# Patient Record
Sex: FEMALE | Race: WHITE | NOT HISPANIC OR LATINO | Employment: OTHER | URBAN - METROPOLITAN AREA
[De-identification: names, ages, dates, MRNs, and addresses within clinical notes are randomized per-mention and may not be internally consistent; named-entity substitution may affect disease eponyms.]

---

## 2017-01-16 ENCOUNTER — ALLSCRIPTS OFFICE VISIT (OUTPATIENT)
Dept: OTHER | Facility: OTHER | Age: 77
End: 2017-01-16

## 2017-01-23 ENCOUNTER — GENERIC CONVERSION - ENCOUNTER (OUTPATIENT)
Dept: OTHER | Facility: OTHER | Age: 77
End: 2017-01-23

## 2017-01-27 ENCOUNTER — ALLSCRIPTS OFFICE VISIT (OUTPATIENT)
Dept: OTHER | Facility: OTHER | Age: 77
End: 2017-01-27

## 2017-01-27 ENCOUNTER — GENERIC CONVERSION - ENCOUNTER (OUTPATIENT)
Dept: OTHER | Facility: OTHER | Age: 77
End: 2017-01-27

## 2017-01-27 DIAGNOSIS — R06.00 DYSPNEA: ICD-10-CM

## 2017-01-27 DIAGNOSIS — E11.40 TYPE 2 DIABETES MELLITUS WITH DIABETIC NEUROPATHY (HCC): ICD-10-CM

## 2017-01-27 DIAGNOSIS — I10 ESSENTIAL (PRIMARY) HYPERTENSION: ICD-10-CM

## 2017-01-27 DIAGNOSIS — J43.9 PULMONARY EMPHYSEMA (HCC): ICD-10-CM

## 2017-01-27 DIAGNOSIS — I50.23 ACUTE ON CHRONIC SYSTOLIC CONGESTIVE HEART FAILURE (HCC): ICD-10-CM

## 2017-01-27 DIAGNOSIS — E03.9 HYPOTHYROIDISM: ICD-10-CM

## 2017-01-28 LAB
A/G RATIO (HISTORICAL): 1.5 (ref 1.1–2.5)
ALBUMIN SERPL BCP-MCNC: 4.1 G/DL (ref 3.5–4.8)
ALP SERPL-CCNC: 67 IU/L (ref 39–117)
ALT SERPL W P-5'-P-CCNC: 28 IU/L (ref 0–32)
AST SERPL W P-5'-P-CCNC: 14 IU/L (ref 0–40)
B-NP NT PRO (HISTORICAL): 52 PG/ML (ref 0–738)
BASOPHILS # BLD AUTO: 0 %
BASOPHILS # BLD AUTO: 0 X10E3/UL (ref 0–0.2)
BILIRUB SERPL-MCNC: 0.2 MG/DL (ref 0–1.2)
BUN SERPL-MCNC: 31 MG/DL (ref 8–27)
BUN/CREA RATIO (HISTORICAL): 30 (ref 11–26)
CALCIUM SERPL-MCNC: 8.5 MG/DL (ref 8.7–10.3)
CHLORIDE SERPL-SCNC: 94 MMOL/L (ref 96–106)
CO2 SERPL-SCNC: 27 MMOL/L (ref 18–29)
CREAT SERPL-MCNC: 1.03 MG/DL (ref 0.57–1)
DEPRECATED RDW RBC AUTO: 15.4 % (ref 12.3–15.4)
EGFR AFRICAN AMERICAN (HISTORICAL): 60 ML/MIN/1.73
EGFR-AMERICAN CALC (HISTORICAL): 52 ML/MIN/1.73
EOSINOPHIL # BLD AUTO: 0 %
EOSINOPHIL # BLD AUTO: 0 X10E3/UL (ref 0–0.4)
ERYTHROCYTE SEDIMENTATION RATE (HISTORICAL): 43 MM/HR (ref 0–40)
GLUCOSE SERPL-MCNC: 323 MG/DL (ref 65–99)
HCT VFR BLD AUTO: 41.6 % (ref 34–46.6)
HGB BLD-MCNC: 13.6 G/DL (ref 11.1–15.9)
IMM.GRANULOCYTES (CD4/8) (HISTORICAL): 0.1 X10E3/UL (ref 0–0.1)
IMM.GRANULOCYTES (CD4/8) (HISTORICAL): 1 %
LYMPHOCYTES # BLD AUTO: 1.8 X10E3/UL (ref 0.7–3.1)
LYMPHOCYTES # BLD AUTO: 14 %
MCH RBC QN AUTO: 30 PG (ref 26.6–33)
MCHC RBC AUTO-ENTMCNC: 32.7 G/DL (ref 31.5–35.7)
MCV RBC AUTO: 92 FL (ref 79–97)
MONOCYTES # BLD AUTO: 0.8 X10E3/UL (ref 0.1–0.9)
MONOCYTES (HISTORICAL): 6 %
NEUTROPHILS # BLD AUTO: 10 X10E3/UL (ref 1.4–7)
NEUTROPHILS # BLD AUTO: 79 %
PLATELET # BLD AUTO: 190 X10E3/UL (ref 150–379)
POTASSIUM SERPL-SCNC: 4.4 MMOL/L (ref 3.5–5.2)
RBC (HISTORICAL): 4.53 X10E6/UL (ref 3.77–5.28)
SODIUM SERPL-SCNC: 140 MMOL/L (ref 134–144)
TOT. GLOBULIN, SERUM (HISTORICAL): 2.8 G/DL (ref 1.5–4.5)
TOTAL PROTEIN (HISTORICAL): 6.9 G/DL (ref 6–8.5)
TSH SERPL DL<=0.05 MIU/L-ACNC: 1.28 UIU/ML (ref 0.45–4.5)
WBC # BLD AUTO: 12.6 X10E3/UL (ref 3.4–10.8)

## 2017-01-30 ENCOUNTER — GENERIC CONVERSION - ENCOUNTER (OUTPATIENT)
Dept: OTHER | Facility: OTHER | Age: 77
End: 2017-01-30

## 2017-01-30 ENCOUNTER — ALLSCRIPTS OFFICE VISIT (OUTPATIENT)
Dept: OTHER | Facility: OTHER | Age: 77
End: 2017-01-30

## 2017-02-07 ENCOUNTER — GENERIC CONVERSION - ENCOUNTER (OUTPATIENT)
Dept: OTHER | Facility: OTHER | Age: 77
End: 2017-02-07

## 2017-02-08 ENCOUNTER — GENERIC CONVERSION - ENCOUNTER (OUTPATIENT)
Dept: OTHER | Facility: OTHER | Age: 77
End: 2017-02-08

## 2017-02-10 ENCOUNTER — GENERIC CONVERSION - ENCOUNTER (OUTPATIENT)
Dept: OTHER | Facility: OTHER | Age: 77
End: 2017-02-10

## 2017-02-20 ENCOUNTER — GENERIC CONVERSION - ENCOUNTER (OUTPATIENT)
Dept: OTHER | Facility: OTHER | Age: 77
End: 2017-02-20

## 2017-02-23 ENCOUNTER — ALLSCRIPTS OFFICE VISIT (OUTPATIENT)
Dept: OTHER | Facility: OTHER | Age: 77
End: 2017-02-23

## 2017-02-28 ENCOUNTER — GENERIC CONVERSION - ENCOUNTER (OUTPATIENT)
Dept: OTHER | Facility: OTHER | Age: 77
End: 2017-02-28

## 2017-02-28 LAB
LEFT EYE DIABETIC RETINOPATHY: NORMAL
RIGHT EYE DIABETIC RETINOPATHY: NORMAL

## 2017-03-03 ENCOUNTER — GENERIC CONVERSION - ENCOUNTER (OUTPATIENT)
Dept: OTHER | Facility: OTHER | Age: 77
End: 2017-03-03

## 2017-03-09 ENCOUNTER — ALLSCRIPTS OFFICE VISIT (OUTPATIENT)
Dept: OTHER | Facility: OTHER | Age: 77
End: 2017-03-09

## 2017-03-09 PROCEDURE — 85025 COMPLETE CBC W/AUTO DIFF WBC: CPT | Performed by: FAMILY MEDICINE

## 2017-03-09 PROCEDURE — 83036 HEMOGLOBIN GLYCOSYLATED A1C: CPT | Performed by: FAMILY MEDICINE

## 2017-03-09 PROCEDURE — 80053 COMPREHEN METABOLIC PANEL: CPT | Performed by: FAMILY MEDICINE

## 2017-03-09 PROCEDURE — 84443 ASSAY THYROID STIM HORMONE: CPT | Performed by: FAMILY MEDICINE

## 2017-03-09 PROCEDURE — 85652 RBC SED RATE AUTOMATED: CPT | Performed by: FAMILY MEDICINE

## 2017-03-09 PROCEDURE — 83880 ASSAY OF NATRIURETIC PEPTIDE: CPT | Performed by: FAMILY MEDICINE

## 2017-03-09 PROCEDURE — 86140 C-REACTIVE PROTEIN: CPT | Performed by: FAMILY MEDICINE

## 2017-03-10 ENCOUNTER — LAB REQUISITION (OUTPATIENT)
Dept: LAB | Facility: HOSPITAL | Age: 77
End: 2017-03-10
Payer: COMMERCIAL

## 2017-03-10 DIAGNOSIS — I50.23 ACUTE ON CHRONIC SYSTOLIC CONGESTIVE HEART FAILURE (HCC): ICD-10-CM

## 2017-03-10 DIAGNOSIS — E03.9 HYPOTHYROIDISM: ICD-10-CM

## 2017-03-10 DIAGNOSIS — E11.40 TYPE 2 DIABETES MELLITUS WITH DIABETIC NEUROPATHY (HCC): ICD-10-CM

## 2017-03-10 DIAGNOSIS — I10 ESSENTIAL (PRIMARY) HYPERTENSION: ICD-10-CM

## 2017-03-10 DIAGNOSIS — R60.9 EDEMA: ICD-10-CM

## 2017-03-10 DIAGNOSIS — K58.0 IRRITABLE BOWEL SYNDROME WITH DIARRHEA: ICD-10-CM

## 2017-03-10 DIAGNOSIS — R06.00 DYSPNEA: ICD-10-CM

## 2017-03-10 LAB
ALBUMIN SERPL BCP-MCNC: 3.8 G/DL (ref 3.5–5)
ALP SERPL-CCNC: 78 U/L (ref 46–116)
ALT SERPL W P-5'-P-CCNC: 42 U/L (ref 12–78)
ANION GAP SERPL CALCULATED.3IONS-SCNC: 8 MMOL/L (ref 4–13)
AST SERPL W P-5'-P-CCNC: 19 U/L (ref 5–45)
BASOPHILS # BLD AUTO: 0.1 THOUSANDS/ΜL (ref 0–0.1)
BASOPHILS NFR BLD AUTO: 1 % (ref 0–1)
BILIRUB SERPL-MCNC: 0.3 MG/DL (ref 0.2–1)
BUN SERPL-MCNC: 27 MG/DL (ref 5–25)
CALCIUM SERPL-MCNC: 8.8 MG/DL (ref 8.3–10.1)
CHLORIDE SERPL-SCNC: 100 MMOL/L (ref 100–108)
CO2 SERPL-SCNC: 35 MMOL/L (ref 21–32)
CREAT SERPL-MCNC: 1.01 MG/DL (ref 0.6–1.3)
CRP SERPL QL: 10.2 MG/L
EOSINOPHIL # BLD AUTO: 0.1 THOUSAND/ΜL (ref 0–0.61)
EOSINOPHIL NFR BLD AUTO: 1 % (ref 0–6)
ERYTHROCYTE [DISTWIDTH] IN BLOOD BY AUTOMATED COUNT: 16.9 % (ref 11.6–15.1)
ERYTHROCYTE [SEDIMENTATION RATE] IN BLOOD: 19 MM/HOUR (ref 2–25)
EST. AVERAGE GLUCOSE BLD GHB EST-MCNC: 226 MG/DL
GFR SERPL CREATININE-BSD FRML MDRD: 53 ML/MIN/1.73SQ M
GLUCOSE SERPL-MCNC: 123 MG/DL (ref 65–140)
HBA1C MFR BLD: 9.5 % (ref 4.2–6.3)
HCT VFR BLD AUTO: 43.6 % (ref 37–47)
HGB BLD-MCNC: 14.1 G/DL (ref 12–16)
LYMPHOCYTES # BLD AUTO: 1.8 THOUSANDS/ΜL (ref 0.6–4.47)
LYMPHOCYTES NFR BLD AUTO: 15 % (ref 14–44)
MCH RBC QN AUTO: 29.9 PG (ref 27–31)
MCHC RBC AUTO-ENTMCNC: 32.3 G/DL (ref 31.4–37.4)
MCV RBC AUTO: 92 FL (ref 82–98)
MONOCYTES # BLD AUTO: 1 THOUSAND/ΜL (ref 0.17–1.22)
MONOCYTES NFR BLD AUTO: 8 % (ref 4–12)
NEUTROPHILS # BLD AUTO: 9.1 THOUSANDS/ΜL (ref 1.85–7.62)
NEUTS SEG NFR BLD AUTO: 76 % (ref 43–75)
NRBC BLD AUTO-RTO: 0 /100 WBCS
NT-PROBNP SERPL-MCNC: 48 PG/ML
PLATELET # BLD AUTO: 214 THOUSANDS/UL (ref 130–400)
PLATELET BLD QL SMEAR: ADEQUATE
PMV BLD AUTO: 9.4 FL (ref 8.9–12.7)
POTASSIUM SERPL-SCNC: 4.1 MMOL/L (ref 3.5–5.3)
PROT SERPL-MCNC: 6.9 G/DL (ref 6.4–8.2)
RBC # BLD AUTO: 4.72 MILLION/UL (ref 4.2–5.4)
SODIUM SERPL-SCNC: 143 MMOL/L (ref 136–145)
TSH SERPL DL<=0.05 MIU/L-ACNC: 3.96 UIU/ML (ref 0.36–3.74)
WBC # BLD AUTO: 12 THOUSAND/UL (ref 4.8–10.8)

## 2017-03-23 ENCOUNTER — GENERIC CONVERSION - ENCOUNTER (OUTPATIENT)
Dept: OTHER | Facility: OTHER | Age: 77
End: 2017-03-23

## 2017-03-30 ENCOUNTER — ALLSCRIPTS OFFICE VISIT (OUTPATIENT)
Dept: OTHER | Facility: OTHER | Age: 77
End: 2017-03-30

## 2017-04-04 ENCOUNTER — ALLSCRIPTS OFFICE VISIT (OUTPATIENT)
Dept: OTHER | Facility: OTHER | Age: 77
End: 2017-04-04

## 2017-04-06 ENCOUNTER — GENERIC CONVERSION - ENCOUNTER (OUTPATIENT)
Dept: OTHER | Facility: OTHER | Age: 77
End: 2017-04-06

## 2017-04-15 ENCOUNTER — APPOINTMENT (EMERGENCY)
Dept: RADIOLOGY | Facility: HOSPITAL | Age: 77
DRG: 392 | End: 2017-04-15
Payer: COMMERCIAL

## 2017-04-15 ENCOUNTER — HOSPITAL ENCOUNTER (INPATIENT)
Facility: HOSPITAL | Age: 77
LOS: 5 days | Discharge: HOME | DRG: 392 | End: 2017-04-21
Attending: EMERGENCY MEDICINE | Admitting: STUDENT IN AN ORGANIZED HEALTH CARE EDUCATION/TRAINING PROGRAM
Payer: COMMERCIAL

## 2017-04-15 DIAGNOSIS — R10.31 RIGHT LOWER QUADRANT ABDOMINAL PAIN: Primary | ICD-10-CM

## 2017-04-15 DIAGNOSIS — K76.9 LIVER LESION: ICD-10-CM

## 2017-04-15 DIAGNOSIS — R10.84 GENERALIZED ABDOMINAL PAIN: ICD-10-CM

## 2017-04-15 DIAGNOSIS — K86.2 PANCREATIC CYST: ICD-10-CM

## 2017-04-15 DIAGNOSIS — R16.0 LIVER MASS, RIGHT LOBE: ICD-10-CM

## 2017-04-15 DIAGNOSIS — R16.0 HEPATOMEGALY: ICD-10-CM

## 2017-04-15 PROBLEM — E03.9 HYPOTHYROIDISM: Status: ACTIVE | Noted: 2017-04-15

## 2017-04-15 PROBLEM — E11.9 DM (DIABETES MELLITUS) (HCC): Status: ACTIVE | Noted: 2017-04-15

## 2017-04-15 PROBLEM — E87.6 HYPOKALEMIA: Status: ACTIVE | Noted: 2017-04-15

## 2017-04-15 PROBLEM — R11.0 NAUSEA: Status: ACTIVE | Noted: 2017-04-15

## 2017-04-15 PROBLEM — J44.9 COPD (CHRONIC OBSTRUCTIVE PULMONARY DISEASE) (HCC): Status: ACTIVE | Noted: 2017-04-15

## 2017-04-15 PROBLEM — R91.1 PULMONARY NODULE: Status: ACTIVE | Noted: 2017-04-15

## 2017-04-15 PROBLEM — R10.9 ABDOMINAL PAIN: Status: ACTIVE | Noted: 2017-04-15

## 2017-04-15 LAB
ALBUMIN SERPL BCP-MCNC: 3.4 G/DL (ref 3.5–5)
ALP SERPL-CCNC: 89 U/L (ref 46–116)
ALT SERPL W P-5'-P-CCNC: 62 U/L (ref 12–78)
ANION GAP SERPL CALCULATED.3IONS-SCNC: 8 MMOL/L (ref 4–13)
APTT PPP: 24 SECONDS (ref 24–33)
AST SERPL W P-5'-P-CCNC: 41 U/L (ref 5–45)
BACTERIA UR QL AUTO: ABNORMAL /HPF
BASOPHILS # BLD AUTO: 0.1 THOUSANDS/ΜL (ref 0–0.1)
BASOPHILS NFR BLD AUTO: 1 % (ref 0–1)
BILIRUB SERPL-MCNC: 0.5 MG/DL (ref 0.2–1)
BILIRUB UR QL STRIP: NEGATIVE
BUN SERPL-MCNC: 33 MG/DL (ref 5–25)
CALCIUM SERPL-MCNC: 9.7 MG/DL (ref 8.3–10.1)
CHLORIDE SERPL-SCNC: 92 MMOL/L (ref 100–108)
CLARITY UR: CLEAR
CO2 SERPL-SCNC: 36 MMOL/L (ref 21–32)
COLOR UR: YELLOW
CREAT SERPL-MCNC: 1.07 MG/DL (ref 0.6–1.3)
EOSINOPHIL # BLD AUTO: 0.1 THOUSAND/ΜL (ref 0–0.61)
EOSINOPHIL NFR BLD AUTO: 1 % (ref 0–6)
ERYTHROCYTE [DISTWIDTH] IN BLOOD BY AUTOMATED COUNT: 15.4 % (ref 11.6–15.1)
GFR SERPL CREATININE-BSD FRML MDRD: 49.6 ML/MIN/1.73SQ M
GLUCOSE SERPL-MCNC: 343 MG/DL (ref 65–140)
GLUCOSE UR STRIP-MCNC: ABNORMAL MG/DL
HCT VFR BLD AUTO: 43.4 % (ref 37–47)
HGB BLD-MCNC: 14.3 G/DL (ref 12–16)
HGB UR QL STRIP.AUTO: ABNORMAL
INR PPP: 0.96 (ref 0.86–1.16)
KETONES UR STRIP-MCNC: NEGATIVE MG/DL
LEUKOCYTE ESTERASE UR QL STRIP: ABNORMAL
LIPASE SERPL-CCNC: 104 U/L (ref 73–393)
LYMPHOCYTES # BLD AUTO: 2.4 THOUSANDS/ΜL (ref 0.6–4.47)
LYMPHOCYTES NFR BLD AUTO: 24 % (ref 14–44)
MAGNESIUM SERPL-MCNC: 1.9 MG/DL (ref 1.6–2.6)
MCH RBC QN AUTO: 30.2 PG (ref 27–31)
MCHC RBC AUTO-ENTMCNC: 33 G/DL (ref 31.4–37.4)
MCV RBC AUTO: 92 FL (ref 82–98)
MONOCYTES # BLD AUTO: 0.6 THOUSAND/ΜL (ref 0.17–1.22)
MONOCYTES NFR BLD AUTO: 6 % (ref 4–12)
NEUTROPHILS # BLD AUTO: 6.8 THOUSANDS/ΜL (ref 1.85–7.62)
NEUTS SEG NFR BLD AUTO: 68 % (ref 43–75)
NITRITE UR QL STRIP: NEGATIVE
NON-SQ EPI CELLS URNS QL MICRO: ABNORMAL /HPF
NRBC BLD AUTO-RTO: 0 /100 WBCS
PH UR STRIP.AUTO: 6 [PH] (ref 5–9)
PLATELET # BLD AUTO: 192 THOUSANDS/UL (ref 130–400)
PMV BLD AUTO: 9 FL (ref 8.9–12.7)
POTASSIUM SERPL-SCNC: 3.2 MMOL/L (ref 3.5–5.3)
PROT SERPL-MCNC: 7.2 G/DL (ref 6.4–8.2)
PROT UR STRIP-MCNC: NEGATIVE MG/DL
PROTHROMBIN TIME: 10.1 SECONDS (ref 9.4–11.7)
RBC # BLD AUTO: 4.73 MILLION/UL (ref 4.2–5.4)
RBC #/AREA URNS AUTO: ABNORMAL /HPF
SODIUM SERPL-SCNC: 136 MMOL/L (ref 136–145)
SP GR UR STRIP.AUTO: 1.01 (ref 1–1.03)
UROBILINOGEN UR QL STRIP.AUTO: 0.2 E.U./DL
WBC # BLD AUTO: 10.1 THOUSAND/UL (ref 4.8–10.8)
WBC #/AREA URNS AUTO: ABNORMAL /HPF

## 2017-04-15 PROCEDURE — 83735 ASSAY OF MAGNESIUM: CPT | Performed by: STUDENT IN AN ORGANIZED HEALTH CARE EDUCATION/TRAINING PROGRAM

## 2017-04-15 PROCEDURE — 85610 PROTHROMBIN TIME: CPT | Performed by: EMERGENCY MEDICINE

## 2017-04-15 PROCEDURE — 96361 HYDRATE IV INFUSION ADD-ON: CPT

## 2017-04-15 PROCEDURE — 96374 THER/PROPH/DIAG INJ IV PUSH: CPT

## 2017-04-15 PROCEDURE — 71020 HB CHEST X-RAY 2VW FRONTAL&LATL: CPT

## 2017-04-15 PROCEDURE — 36415 COLL VENOUS BLD VENIPUNCTURE: CPT | Performed by: EMERGENCY MEDICINE

## 2017-04-15 PROCEDURE — 87086 URINE CULTURE/COLONY COUNT: CPT | Performed by: EMERGENCY MEDICINE

## 2017-04-15 PROCEDURE — 80053 COMPREHEN METABOLIC PANEL: CPT | Performed by: EMERGENCY MEDICINE

## 2017-04-15 PROCEDURE — 96375 TX/PRO/DX INJ NEW DRUG ADDON: CPT

## 2017-04-15 PROCEDURE — 74177 CT ABD & PELVIS W/CONTRAST: CPT

## 2017-04-15 PROCEDURE — 85730 THROMBOPLASTIN TIME PARTIAL: CPT | Performed by: EMERGENCY MEDICINE

## 2017-04-15 PROCEDURE — 81001 URINALYSIS AUTO W/SCOPE: CPT | Performed by: EMERGENCY MEDICINE

## 2017-04-15 PROCEDURE — 85025 COMPLETE CBC W/AUTO DIFF WBC: CPT | Performed by: EMERGENCY MEDICINE

## 2017-04-15 PROCEDURE — 83690 ASSAY OF LIPASE: CPT | Performed by: EMERGENCY MEDICINE

## 2017-04-15 RX ORDER — FUROSEMIDE 40 MG/1
20 TABLET ORAL 2 TIMES DAILY
Status: ON HOLD | COMMUNITY
End: 2017-04-21

## 2017-04-15 RX ORDER — ONDANSETRON 2 MG/ML
4 INJECTION INTRAMUSCULAR; INTRAVENOUS ONCE
Status: COMPLETED | OUTPATIENT
Start: 2017-04-15 | End: 2017-04-15

## 2017-04-15 RX ORDER — POTASSIUM CHLORIDE 14.9 MG/ML
20 INJECTION INTRAVENOUS
Status: DISCONTINUED | OUTPATIENT
Start: 2017-04-15 | End: 2017-04-16

## 2017-04-15 RX ORDER — MORPHINE SULFATE 4 MG/ML
4 INJECTION, SOLUTION INTRAMUSCULAR; INTRAVENOUS ONCE
Status: COMPLETED | OUTPATIENT
Start: 2017-04-15 | End: 2017-04-15

## 2017-04-15 RX ORDER — PRAMIPEXOLE DIHYDROCHLORIDE 1 MG/1
1 TABLET ORAL 3 TIMES DAILY
COMMUNITY
Start: 2016-02-09 | End: 2018-02-08

## 2017-04-15 RX ORDER — OXYCODONE HYDROCHLORIDE 5 MG/1
1 CAPSULE ORAL EVERY EVENING
COMMUNITY
End: 2017-04-21 | Stop reason: HOSPADM

## 2017-04-15 RX ORDER — POTASSIUM CHLORIDE 29.8 MG/ML
40 INJECTION INTRAVENOUS ONCE
Status: DISCONTINUED | OUTPATIENT
Start: 2017-04-15 | End: 2017-04-15 | Stop reason: SDUPTHER

## 2017-04-15 RX ORDER — FOLIC ACID 1 MG/1
1 TABLET ORAL DAILY
COMMUNITY
Start: 2017-01-22 | End: 2017-09-18

## 2017-04-15 RX ORDER — SODIUM CHLORIDE 9 MG/ML
125 INJECTION, SOLUTION INTRAVENOUS CONTINUOUS
Status: DISCONTINUED | OUTPATIENT
Start: 2017-04-15 | End: 2017-04-16

## 2017-04-15 RX ORDER — IPRATROPIUM BROMIDE AND ALBUTEROL SULFATE 2.5; .5 MG/3ML; MG/3ML
1 SOLUTION RESPIRATORY (INHALATION) EVERY 4 HOURS PRN
COMMUNITY
End: 2018-01-30 | Stop reason: HOSPADM

## 2017-04-15 RX ORDER — LEVOTHYROXINE SODIUM 0.12 MG/1
1 TABLET ORAL DAILY
COMMUNITY
End: 2018-02-08

## 2017-04-15 RX ADMIN — SODIUM CHLORIDE 125 ML/HR: 0.9 INJECTION, SOLUTION INTRAVENOUS at 19:13

## 2017-04-15 RX ADMIN — ONDANSETRON 4 MG: 2 INJECTION INTRAMUSCULAR; INTRAVENOUS at 19:30

## 2017-04-15 RX ADMIN — IOHEXOL 100 ML: 350 INJECTION, SOLUTION INTRAVENOUS at 20:09

## 2017-04-15 RX ADMIN — MORPHINE SULFATE 4 MG: 4 INJECTION, SOLUTION INTRAMUSCULAR; INTRAVENOUS at 22:29

## 2017-04-16 LAB
ALBUMIN SERPL BCP-MCNC: 2.9 G/DL (ref 3.5–5)
ALP SERPL-CCNC: 73 U/L (ref 46–116)
ALT SERPL W P-5'-P-CCNC: 51 U/L (ref 12–78)
ANION GAP SERPL CALCULATED.3IONS-SCNC: 6 MMOL/L (ref 4–13)
ANION GAP SERPL CALCULATED.3IONS-SCNC: 9 MMOL/L (ref 4–13)
AST SERPL W P-5'-P-CCNC: 24 U/L (ref 5–45)
BILIRUB SERPL-MCNC: 0.5 MG/DL (ref 0.2–1)
BUN SERPL-MCNC: 23 MG/DL (ref 5–25)
BUN SERPL-MCNC: 24 MG/DL (ref 5–25)
CALCIUM SERPL-MCNC: 8.3 MG/DL (ref 8.3–10.1)
CALCIUM SERPL-MCNC: 8.8 MG/DL (ref 8.3–10.1)
CHLORIDE SERPL-SCNC: 94 MMOL/L (ref 100–108)
CHLORIDE SERPL-SCNC: 98 MMOL/L (ref 100–108)
CO2 SERPL-SCNC: 37 MMOL/L (ref 21–32)
CO2 SERPL-SCNC: 39 MMOL/L (ref 21–32)
CREAT SERPL-MCNC: 0.92 MG/DL (ref 0.6–1.3)
CREAT SERPL-MCNC: 0.98 MG/DL (ref 0.6–1.3)
ERYTHROCYTE [DISTWIDTH] IN BLOOD BY AUTOMATED COUNT: 15.4 % (ref 11.6–15.1)
GFR SERPL CREATININE-BSD FRML MDRD: 54.9 ML/MIN/1.73SQ M
GFR SERPL CREATININE-BSD FRML MDRD: 59 ML/MIN/1.73SQ M
GLUCOSE P FAST SERPL-MCNC: 271 MG/DL (ref 65–99)
GLUCOSE SERPL-MCNC: 264 MG/DL (ref 65–140)
GLUCOSE SERPL-MCNC: 264 MG/DL (ref 65–140)
GLUCOSE SERPL-MCNC: 271 MG/DL (ref 65–140)
GLUCOSE SERPL-MCNC: 275 MG/DL (ref 65–140)
GLUCOSE SERPL-MCNC: 287 MG/DL (ref 65–140)
GLUCOSE SERPL-MCNC: 298 MG/DL (ref 65–140)
GLUCOSE SERPL-MCNC: 319 MG/DL (ref 65–140)
HCT VFR BLD AUTO: 38 % (ref 37–47)
HGB BLD-MCNC: 12.6 G/DL (ref 12–16)
MCH RBC QN AUTO: 30.5 PG (ref 27–31)
MCHC RBC AUTO-ENTMCNC: 33.2 G/DL (ref 31.4–37.4)
MCV RBC AUTO: 92 FL (ref 82–98)
PLATELET # BLD AUTO: 161 THOUSANDS/UL (ref 130–400)
PMV BLD AUTO: 9.2 FL (ref 8.9–12.7)
POTASSIUM SERPL-SCNC: 2.6 MMOL/L (ref 3.5–5.3)
POTASSIUM SERPL-SCNC: 3.1 MMOL/L (ref 3.5–5.3)
PROT SERPL-MCNC: 6.1 G/DL (ref 6.4–8.2)
RBC # BLD AUTO: 4.14 MILLION/UL (ref 4.2–5.4)
SODIUM SERPL-SCNC: 141 MMOL/L (ref 136–145)
SODIUM SERPL-SCNC: 142 MMOL/L (ref 136–145)
WBC # BLD AUTO: 7.9 THOUSAND/UL (ref 4.8–10.8)

## 2017-04-16 PROCEDURE — A9270 NON-COVERED ITEM OR SERVICE: HCPCS | Performed by: INTERNAL MEDICINE

## 2017-04-16 PROCEDURE — A9270 NON-COVERED ITEM OR SERVICE: HCPCS | Performed by: STUDENT IN AN ORGANIZED HEALTH CARE EDUCATION/TRAINING PROGRAM

## 2017-04-16 PROCEDURE — 80053 COMPREHEN METABOLIC PANEL: CPT | Performed by: STUDENT IN AN ORGANIZED HEALTH CARE EDUCATION/TRAINING PROGRAM

## 2017-04-16 PROCEDURE — A9270 NON-COVERED ITEM OR SERVICE: HCPCS | Performed by: HOSPITALIST

## 2017-04-16 PROCEDURE — 82948 REAGENT STRIP/BLOOD GLUCOSE: CPT

## 2017-04-16 PROCEDURE — 80048 BASIC METABOLIC PNL TOTAL CA: CPT | Performed by: HOSPITALIST

## 2017-04-16 PROCEDURE — 94760 N-INVAS EAR/PLS OXIMETRY 1: CPT

## 2017-04-16 PROCEDURE — A9270 NON-COVERED ITEM OR SERVICE: HCPCS | Performed by: NURSE PRACTITIONER

## 2017-04-16 PROCEDURE — 99285 EMERGENCY DEPT VISIT HI MDM: CPT

## 2017-04-16 PROCEDURE — 94640 AIRWAY INHALATION TREATMENT: CPT

## 2017-04-16 PROCEDURE — 87081 CULTURE SCREEN ONLY: CPT | Performed by: STUDENT IN AN ORGANIZED HEALTH CARE EDUCATION/TRAINING PROGRAM

## 2017-04-16 PROCEDURE — 85027 COMPLETE CBC AUTOMATED: CPT | Performed by: STUDENT IN AN ORGANIZED HEALTH CARE EDUCATION/TRAINING PROGRAM

## 2017-04-16 RX ORDER — ONDANSETRON 2 MG/ML
4 INJECTION INTRAMUSCULAR; INTRAVENOUS EVERY 6 HOURS PRN
Status: DISCONTINUED | OUTPATIENT
Start: 2017-04-16 | End: 2017-04-21 | Stop reason: HOSPADM

## 2017-04-16 RX ORDER — ASPIRIN 81 MG/1
81 TABLET, CHEWABLE ORAL DAILY
Status: DISCONTINUED | OUTPATIENT
Start: 2017-04-16 | End: 2017-04-21 | Stop reason: HOSPADM

## 2017-04-16 RX ORDER — ALBUTEROL SULFATE 2.5 MG/3ML
2.5 SOLUTION RESPIRATORY (INHALATION) EVERY 6 HOURS PRN
Status: DISCONTINUED | OUTPATIENT
Start: 2017-04-16 | End: 2017-04-16

## 2017-04-16 RX ORDER — POLYETHYLENE GLYCOL 3350 17 G/17G
238 POWDER, FOR SOLUTION ORAL ONCE
Status: COMPLETED | OUTPATIENT
Start: 2017-04-16 | End: 2017-04-16

## 2017-04-16 RX ORDER — IPRATROPIUM BROMIDE AND ALBUTEROL SULFATE 2.5; .5 MG/3ML; MG/3ML
3 SOLUTION RESPIRATORY (INHALATION) EVERY 4 HOURS PRN
Status: DISCONTINUED | OUTPATIENT
Start: 2017-04-16 | End: 2017-04-21 | Stop reason: HOSPADM

## 2017-04-16 RX ORDER — SULFAMETHOXAZOLE AND TRIMETHOPRIM 800; 160 MG/1; MG/1
1 TABLET ORAL EVERY 12 HOURS SCHEDULED
Status: DISCONTINUED | OUTPATIENT
Start: 2017-04-16 | End: 2017-04-21 | Stop reason: HOSPADM

## 2017-04-16 RX ORDER — POTASSIUM CHLORIDE 20 MEQ/1
40 TABLET, EXTENDED RELEASE ORAL ONCE
Status: COMPLETED | OUTPATIENT
Start: 2017-04-16 | End: 2017-04-16

## 2017-04-16 RX ORDER — POLYETHYLENE GLYCOL 3350 17 G/17G
238 POWDER, FOR SOLUTION ORAL ONCE
Status: DISCONTINUED | OUTPATIENT
Start: 2017-04-16 | End: 2017-04-16

## 2017-04-16 RX ORDER — HEPARIN SODIUM 5000 [USP'U]/ML
5000 INJECTION, SOLUTION INTRAVENOUS; SUBCUTANEOUS EVERY 8 HOURS SCHEDULED
Status: DISCONTINUED | OUTPATIENT
Start: 2017-04-16 | End: 2017-04-18

## 2017-04-16 RX ORDER — PRAMIPEXOLE DIHYDROCHLORIDE 0.5 MG/1
1 TABLET ORAL 3 TIMES DAILY
Status: DISCONTINUED | OUTPATIENT
Start: 2017-04-16 | End: 2017-04-21 | Stop reason: HOSPADM

## 2017-04-16 RX ORDER — LEVOTHYROXINE SODIUM 0.12 MG/1
125 TABLET ORAL
Status: DISCONTINUED | OUTPATIENT
Start: 2017-04-16 | End: 2017-04-21 | Stop reason: HOSPADM

## 2017-04-16 RX ORDER — ACETAMINOPHEN 325 MG/1
650 TABLET ORAL EVERY 6 HOURS PRN
Status: DISCONTINUED | OUTPATIENT
Start: 2017-04-16 | End: 2017-04-21 | Stop reason: HOSPADM

## 2017-04-16 RX ORDER — POTASSIUM CHLORIDE 20MEQ/15ML
20 LIQUID (ML) ORAL ONCE
Status: COMPLETED | OUTPATIENT
Start: 2017-04-16 | End: 2017-04-16

## 2017-04-16 RX ORDER — SODIUM CHLORIDE 9 MG/ML
50 INJECTION, SOLUTION INTRAVENOUS CONTINUOUS
Status: DISCONTINUED | OUTPATIENT
Start: 2017-04-16 | End: 2017-04-17

## 2017-04-16 RX ADMIN — HEPARIN SODIUM 5000 UNITS: 5000 INJECTION, SOLUTION INTRAVENOUS; SUBCUTANEOUS at 13:08

## 2017-04-16 RX ADMIN — BISACODYL 10 MG: 5 TABLET, COATED ORAL at 11:59

## 2017-04-16 RX ADMIN — HEPARIN SODIUM 5000 UNITS: 5000 INJECTION, SOLUTION INTRAVENOUS; SUBCUTANEOUS at 05:42

## 2017-04-16 RX ADMIN — ASPIRIN 81 MG CHEWABLE TABLET 81 MG: 81 TABLET CHEWABLE at 08:38

## 2017-04-16 RX ADMIN — INSULIN LISPRO 2 UNITS: 100 INJECTION, SOLUTION INTRAVENOUS; SUBCUTANEOUS at 12:00

## 2017-04-16 RX ADMIN — POTASSIUM CHLORIDE 40 MEQ: 1500 TABLET, EXTENDED RELEASE ORAL at 05:42

## 2017-04-16 RX ADMIN — HYDROMORPHONE HYDROCHLORIDE 0.5 MG: 1 INJECTION, SOLUTION INTRAMUSCULAR; INTRAVENOUS; SUBCUTANEOUS at 18:23

## 2017-04-16 RX ADMIN — INSULIN LISPRO 2 UNITS: 100 INJECTION, SOLUTION INTRAVENOUS; SUBCUTANEOUS at 17:22

## 2017-04-16 RX ADMIN — PRAMIPEXOLE DIHYDROCHLORIDE 1 MG: 0.5 TABLET ORAL at 08:38

## 2017-04-16 RX ADMIN — POLYETHYLENE GLYCOL 3350 238 G: 17 POWDER, FOR SOLUTION ORAL at 13:08

## 2017-04-16 RX ADMIN — IPRATROPIUM BROMIDE AND ALBUTEROL SULFATE 3 ML: .5; 3 SOLUTION RESPIRATORY (INHALATION) at 15:12

## 2017-04-16 RX ADMIN — HEPARIN SODIUM 5000 UNITS: 5000 INJECTION, SOLUTION INTRAVENOUS; SUBCUTANEOUS at 21:57

## 2017-04-16 RX ADMIN — HYDROMORPHONE HYDROCHLORIDE 0.5 MG: 1 INJECTION, SOLUTION INTRAMUSCULAR; INTRAVENOUS; SUBCUTANEOUS at 05:59

## 2017-04-16 RX ADMIN — POTASSIUM CHLORIDE 20 MEQ: 200 INJECTION, SOLUTION INTRAVENOUS at 00:31

## 2017-04-16 RX ADMIN — SULFAMETHOXAZOLE AND TRIMETHOPRIM 1 TABLET: 800; 160 TABLET ORAL at 21:57

## 2017-04-16 RX ADMIN — INSULIN LISPRO 2 UNITS: 100 INJECTION, SOLUTION INTRAVENOUS; SUBCUTANEOUS at 05:55

## 2017-04-16 RX ADMIN — PRAMIPEXOLE DIHYDROCHLORIDE 1 MG: 0.5 TABLET ORAL at 17:23

## 2017-04-16 RX ADMIN — ACETAMINOPHEN 650 MG: 325 TABLET, FILM COATED ORAL at 23:47

## 2017-04-16 RX ADMIN — LEVOTHYROXINE SODIUM 125 MCG: 125 TABLET ORAL at 05:42

## 2017-04-16 RX ADMIN — PRAMIPEXOLE DIHYDROCHLORIDE 1 MG: 0.5 TABLET ORAL at 21:57

## 2017-04-16 RX ADMIN — SODIUM CHLORIDE 50 ML/HR: 0.9 INJECTION, SOLUTION INTRAVENOUS at 02:23

## 2017-04-16 RX ADMIN — INSULIN LISPRO 3 UNITS: 100 INJECTION, SOLUTION INTRAVENOUS; SUBCUTANEOUS at 02:27

## 2017-04-16 RX ADMIN — POTASSIUM CHLORIDE 20 MEQ: 20 SOLUTION ORAL at 12:01

## 2017-04-17 ENCOUNTER — APPOINTMENT (INPATIENT)
Dept: RADIOLOGY | Facility: HOSPITAL | Age: 77
DRG: 392 | End: 2017-04-17
Payer: COMMERCIAL

## 2017-04-17 PROBLEM — K59.09 CHRONIC CONSTIPATION: Status: ACTIVE | Noted: 2017-04-17

## 2017-04-17 LAB
ALBUMIN SERPL BCP-MCNC: 3 G/DL (ref 3.5–5)
ALP SERPL-CCNC: 73 U/L (ref 46–116)
ALT SERPL W P-5'-P-CCNC: 52 U/L (ref 12–78)
ANION GAP SERPL CALCULATED.3IONS-SCNC: 5 MMOL/L (ref 4–13)
AST SERPL W P-5'-P-CCNC: 24 U/L (ref 5–45)
BACTERIA UR CULT: NORMAL
BASOPHILS # BLD AUTO: 0 THOUSANDS/ΜL (ref 0–0.1)
BASOPHILS NFR BLD AUTO: 0 % (ref 0–1)
BILIRUB SERPL-MCNC: 0.4 MG/DL (ref 0.2–1)
BUN SERPL-MCNC: 14 MG/DL (ref 5–25)
CALCIUM SERPL-MCNC: 7.9 MG/DL (ref 8.3–10.1)
CHLORIDE SERPL-SCNC: 99 MMOL/L (ref 100–108)
CO2 SERPL-SCNC: 35 MMOL/L (ref 21–32)
CREAT SERPL-MCNC: 0.77 MG/DL (ref 0.6–1.3)
EOSINOPHIL # BLD AUTO: 0.1 THOUSAND/ΜL (ref 0–0.61)
EOSINOPHIL NFR BLD AUTO: 2 % (ref 0–6)
ERYTHROCYTE [DISTWIDTH] IN BLOOD BY AUTOMATED COUNT: 15.8 % (ref 11.6–15.1)
GFR SERPL CREATININE-BSD FRML MDRD: >60 ML/MIN/1.73SQ M
GLUCOSE SERPL-MCNC: 238 MG/DL (ref 65–140)
GLUCOSE SERPL-MCNC: 246 MG/DL (ref 65–140)
GLUCOSE SERPL-MCNC: 259 MG/DL (ref 65–140)
GLUCOSE SERPL-MCNC: 278 MG/DL (ref 65–140)
GLUCOSE SERPL-MCNC: 298 MG/DL (ref 65–140)
HCT VFR BLD AUTO: 38.3 % (ref 37–47)
HGB BLD-MCNC: 12.5 G/DL (ref 12–16)
LYMPHOCYTES # BLD AUTO: 1.5 THOUSANDS/ΜL (ref 0.6–4.47)
LYMPHOCYTES NFR BLD AUTO: 22 % (ref 14–44)
MAGNESIUM SERPL-MCNC: 2 MG/DL (ref 1.6–2.6)
MCH RBC QN AUTO: 30.5 PG (ref 27–31)
MCHC RBC AUTO-ENTMCNC: 32.7 G/DL (ref 31.4–37.4)
MCV RBC AUTO: 93 FL (ref 82–98)
MONOCYTES # BLD AUTO: 0.5 THOUSAND/ΜL (ref 0.17–1.22)
MONOCYTES NFR BLD AUTO: 8 % (ref 4–12)
MRSA NOSE QL CULT: NORMAL
NEUTROPHILS # BLD AUTO: 4.8 THOUSANDS/ΜL (ref 1.85–7.62)
NEUTS SEG NFR BLD AUTO: 69 % (ref 43–75)
NRBC BLD AUTO-RTO: 0 /100 WBCS
PHOSPHATE SERPL-MCNC: 3 MG/DL (ref 2.3–4.1)
PLATELET # BLD AUTO: 163 THOUSANDS/UL (ref 130–400)
PMV BLD AUTO: 9.1 FL (ref 8.9–12.7)
POTASSIUM SERPL-SCNC: 3.1 MMOL/L (ref 3.5–5.3)
PROT SERPL-MCNC: 6.2 G/DL (ref 6.4–8.2)
RBC # BLD AUTO: 4.11 MILLION/UL (ref 4.2–5.4)
SODIUM SERPL-SCNC: 139 MMOL/L (ref 136–145)
WBC # BLD AUTO: 7.1 THOUSAND/UL (ref 4.8–10.8)

## 2017-04-17 PROCEDURE — 83735 ASSAY OF MAGNESIUM: CPT | Performed by: HOSPITALIST

## 2017-04-17 PROCEDURE — 74183 MRI ABD W/O CNTR FLWD CNTR: CPT

## 2017-04-17 PROCEDURE — A9270 NON-COVERED ITEM OR SERVICE: HCPCS | Performed by: STUDENT IN AN ORGANIZED HEALTH CARE EDUCATION/TRAINING PROGRAM

## 2017-04-17 PROCEDURE — A9585 GADOBUTROL INJECTION: HCPCS | Performed by: INTERNAL MEDICINE

## 2017-04-17 PROCEDURE — 85025 COMPLETE CBC W/AUTO DIFF WBC: CPT | Performed by: HOSPITALIST

## 2017-04-17 PROCEDURE — 76376 3D RENDER W/INTRP POSTPROCES: CPT

## 2017-04-17 PROCEDURE — 94640 AIRWAY INHALATION TREATMENT: CPT

## 2017-04-17 PROCEDURE — 80053 COMPREHEN METABOLIC PANEL: CPT | Performed by: HOSPITALIST

## 2017-04-17 PROCEDURE — A9270 NON-COVERED ITEM OR SERVICE: HCPCS | Performed by: HOSPITALIST

## 2017-04-17 PROCEDURE — A9270 NON-COVERED ITEM OR SERVICE: HCPCS | Performed by: INTERNAL MEDICINE

## 2017-04-17 PROCEDURE — 94760 N-INVAS EAR/PLS OXIMETRY 1: CPT

## 2017-04-17 PROCEDURE — 84100 ASSAY OF PHOSPHORUS: CPT | Performed by: HOSPITALIST

## 2017-04-17 PROCEDURE — 82948 REAGENT STRIP/BLOOD GLUCOSE: CPT

## 2017-04-17 RX ORDER — INSULIN ASPART 100 [IU]/ML
20 INJECTION, SUSPENSION SUBCUTANEOUS
Status: DISCONTINUED | OUTPATIENT
Start: 2017-04-18 | End: 2017-04-18

## 2017-04-17 RX ORDER — POTASSIUM CHLORIDE 20 MEQ/1
40 TABLET, EXTENDED RELEASE ORAL EVERY 4 HOURS
Status: COMPLETED | OUTPATIENT
Start: 2017-04-17 | End: 2017-04-17

## 2017-04-17 RX ORDER — DIAZEPAM 5 MG/1
5 TABLET ORAL ONCE
Status: COMPLETED | OUTPATIENT
Start: 2017-04-17 | End: 2017-04-17

## 2017-04-17 RX ORDER — INSULIN ASPART 100 [IU]/ML
10 INJECTION, SUSPENSION SUBCUTANEOUS
Status: DISCONTINUED | OUTPATIENT
Start: 2017-04-17 | End: 2017-04-17

## 2017-04-17 RX ADMIN — IPRATROPIUM BROMIDE AND ALBUTEROL SULFATE 3 ML: .5; 3 SOLUTION RESPIRATORY (INHALATION) at 15:38

## 2017-04-17 RX ADMIN — SODIUM CHLORIDE 50 ML/HR: 0.9 INJECTION, SOLUTION INTRAVENOUS at 00:50

## 2017-04-17 RX ADMIN — INSULIN LISPRO 2 UNITS: 100 INJECTION, SOLUTION INTRAVENOUS; SUBCUTANEOUS at 06:02

## 2017-04-17 RX ADMIN — SULFAMETHOXAZOLE AND TRIMETHOPRIM 1 TABLET: 800; 160 TABLET ORAL at 22:48

## 2017-04-17 RX ADMIN — INSULIN LISPRO 6 UNITS: 100 INJECTION, SOLUTION INTRAVENOUS; SUBCUTANEOUS at 11:53

## 2017-04-17 RX ADMIN — PRAMIPEXOLE DIHYDROCHLORIDE 1 MG: 0.5 TABLET ORAL at 08:41

## 2017-04-17 RX ADMIN — HYDROMORPHONE HYDROCHLORIDE 0.5 MG: 1 INJECTION, SOLUTION INTRAMUSCULAR; INTRAVENOUS; SUBCUTANEOUS at 05:56

## 2017-04-17 RX ADMIN — POTASSIUM CHLORIDE 40 MEQ: 1500 TABLET, EXTENDED RELEASE ORAL at 18:39

## 2017-04-17 RX ADMIN — INSULIN ASPART 10 UNITS: 100 INJECTION, SUSPENSION SUBCUTANEOUS at 11:51

## 2017-04-17 RX ADMIN — DIAZEPAM 5 MG: 5 TABLET ORAL at 08:51

## 2017-04-17 RX ADMIN — HEPARIN SODIUM 5000 UNITS: 5000 INJECTION, SOLUTION INTRAVENOUS; SUBCUTANEOUS at 05:56

## 2017-04-17 RX ADMIN — HEPARIN SODIUM 5000 UNITS: 5000 INJECTION, SOLUTION INTRAVENOUS; SUBCUTANEOUS at 22:49

## 2017-04-17 RX ADMIN — ASPIRIN 81 MG CHEWABLE TABLET 81 MG: 81 TABLET CHEWABLE at 08:41

## 2017-04-17 RX ADMIN — SULFAMETHOXAZOLE AND TRIMETHOPRIM 1 TABLET: 800; 160 TABLET ORAL at 08:41

## 2017-04-17 RX ADMIN — INSULIN LISPRO 2 UNITS: 100 INJECTION, SOLUTION INTRAVENOUS; SUBCUTANEOUS at 00:52

## 2017-04-17 RX ADMIN — IPRATROPIUM BROMIDE AND ALBUTEROL SULFATE 3 ML: .5; 3 SOLUTION RESPIRATORY (INHALATION) at 08:19

## 2017-04-17 RX ADMIN — IPRATROPIUM BROMIDE AND ALBUTEROL SULFATE 3 ML: .5; 3 SOLUTION RESPIRATORY (INHALATION) at 19:36

## 2017-04-17 RX ADMIN — LEVOTHYROXINE SODIUM 125 MCG: 125 TABLET ORAL at 05:57

## 2017-04-17 RX ADMIN — INSULIN LISPRO 6 UNITS: 100 INJECTION, SOLUTION INTRAVENOUS; SUBCUTANEOUS at 22:49

## 2017-04-17 RX ADMIN — GADOBUTROL 11 ML: 604.72 INJECTION INTRAVENOUS at 10:12

## 2017-04-17 RX ADMIN — INSULIN ASPART 10 UNITS: 100 INJECTION, SUSPENSION SUBCUTANEOUS at 17:07

## 2017-04-17 RX ADMIN — INSULIN LISPRO 6 UNITS: 100 INJECTION, SOLUTION INTRAVENOUS; SUBCUTANEOUS at 17:06

## 2017-04-17 RX ADMIN — PRAMIPEXOLE DIHYDROCHLORIDE 1 MG: 0.5 TABLET ORAL at 22:48

## 2017-04-17 RX ADMIN — POTASSIUM CHLORIDE 40 MEQ: 1500 TABLET, EXTENDED RELEASE ORAL at 22:48

## 2017-04-17 RX ADMIN — PRAMIPEXOLE DIHYDROCHLORIDE 1 MG: 0.5 TABLET ORAL at 18:39

## 2017-04-17 RX ADMIN — HEPARIN SODIUM 5000 UNITS: 5000 INJECTION, SOLUTION INTRAVENOUS; SUBCUTANEOUS at 14:29

## 2017-04-18 ENCOUNTER — APPOINTMENT (INPATIENT)
Dept: RADIOLOGY | Facility: HOSPITAL | Age: 77
DRG: 392 | End: 2017-04-18
Payer: COMMERCIAL

## 2017-04-18 PROBLEM — E66.01 MORBID OBESITY WITH BMI OF 40.0-44.9, ADULT (HCC): Status: ACTIVE | Noted: 2017-04-18

## 2017-04-18 PROBLEM — Z79.891 CHRONIC PRESCRIPTION OPIATE USE: Status: ACTIVE | Noted: 2017-04-18

## 2017-04-18 LAB
ANION GAP SERPL CALCULATED.3IONS-SCNC: 5 MMOL/L (ref 4–13)
BUN SERPL-MCNC: 11 MG/DL (ref 5–25)
CALCIUM SERPL-MCNC: 7.7 MG/DL (ref 8.3–10.1)
CEA SERPL-MCNC: 1.2 NG/ML (ref 0–3)
CHLORIDE SERPL-SCNC: 100 MMOL/L (ref 100–108)
CO2 SERPL-SCNC: 34 MMOL/L (ref 21–32)
CREAT SERPL-MCNC: 0.84 MG/DL (ref 0.6–1.3)
GFR SERPL CREATININE-BSD FRML MDRD: >60 ML/MIN/1.73SQ M
GLUCOSE SERPL-MCNC: 206 MG/DL (ref 65–140)
GLUCOSE SERPL-MCNC: 265 MG/DL (ref 65–140)
GLUCOSE SERPL-MCNC: 267 MG/DL (ref 65–140)
GLUCOSE SERPL-MCNC: 280 MG/DL (ref 65–140)
GLUCOSE SERPL-MCNC: 310 MG/DL (ref 65–140)
MAGNESIUM SERPL-MCNC: 2.1 MG/DL (ref 1.6–2.6)
PHOSPHATE SERPL-MCNC: 2.3 MG/DL (ref 2.3–4.1)
POTASSIUM SERPL-SCNC: 3.3 MMOL/L (ref 3.5–5.3)
SODIUM SERPL-SCNC: 139 MMOL/L (ref 136–145)

## 2017-04-18 PROCEDURE — A9270 NON-COVERED ITEM OR SERVICE: HCPCS | Performed by: PHYSICIAN ASSISTANT

## 2017-04-18 PROCEDURE — 71260 CT THORAX DX C+: CPT

## 2017-04-18 PROCEDURE — 82378 CARCINOEMBRYONIC ANTIGEN: CPT | Performed by: INTERNAL MEDICINE

## 2017-04-18 PROCEDURE — A9270 NON-COVERED ITEM OR SERVICE: HCPCS | Performed by: STUDENT IN AN ORGANIZED HEALTH CARE EDUCATION/TRAINING PROGRAM

## 2017-04-18 PROCEDURE — 94760 N-INVAS EAR/PLS OXIMETRY 1: CPT

## 2017-04-18 PROCEDURE — A9270 NON-COVERED ITEM OR SERVICE: HCPCS | Performed by: INTERNAL MEDICINE

## 2017-04-18 PROCEDURE — A9270 NON-COVERED ITEM OR SERVICE: HCPCS | Performed by: NURSE PRACTITIONER

## 2017-04-18 PROCEDURE — 82948 REAGENT STRIP/BLOOD GLUCOSE: CPT

## 2017-04-18 PROCEDURE — 84100 ASSAY OF PHOSPHORUS: CPT | Performed by: INTERNAL MEDICINE

## 2017-04-18 PROCEDURE — 94640 AIRWAY INHALATION TREATMENT: CPT

## 2017-04-18 PROCEDURE — 80048 BASIC METABOLIC PNL TOTAL CA: CPT | Performed by: INTERNAL MEDICINE

## 2017-04-18 PROCEDURE — 86301 IMMUNOASSAY TUMOR CA 19-9: CPT | Performed by: INTERNAL MEDICINE

## 2017-04-18 PROCEDURE — 83735 ASSAY OF MAGNESIUM: CPT | Performed by: INTERNAL MEDICINE

## 2017-04-18 PROCEDURE — A9270 NON-COVERED ITEM OR SERVICE: HCPCS | Performed by: HOSPITALIST

## 2017-04-18 RX ORDER — DICYCLOMINE HYDROCHLORIDE 10 MG/1
10 CAPSULE ORAL
Status: DISCONTINUED | OUTPATIENT
Start: 2017-04-18 | End: 2017-04-21 | Stop reason: HOSPADM

## 2017-04-18 RX ORDER — SODIUM PHOSPHATE, DIBASIC AND SODIUM PHOSPHATE, MONOBASIC 7; 19 G/133ML; G/133ML
1 ENEMA RECTAL ONCE
Status: COMPLETED | OUTPATIENT
Start: 2017-04-18 | End: 2017-04-18

## 2017-04-18 RX ORDER — POLYETHYLENE GLYCOL 3350 17 G/17G
17 POWDER, FOR SOLUTION ORAL DAILY
Status: DISCONTINUED | OUTPATIENT
Start: 2017-04-18 | End: 2017-04-21 | Stop reason: HOSPADM

## 2017-04-18 RX ORDER — POTASSIUM CHLORIDE 20 MEQ/1
40 TABLET, EXTENDED RELEASE ORAL DAILY
Status: DISCONTINUED | OUTPATIENT
Start: 2017-04-18 | End: 2017-04-21 | Stop reason: HOSPADM

## 2017-04-18 RX ORDER — INSULIN ASPART 100 [IU]/ML
40 INJECTION, SUSPENSION SUBCUTANEOUS
Status: DISCONTINUED | OUTPATIENT
Start: 2017-04-18 | End: 2017-04-18

## 2017-04-18 RX ORDER — INSULIN ASPART 100 [IU]/ML
20 INJECTION, SUSPENSION SUBCUTANEOUS
Status: DISCONTINUED | OUTPATIENT
Start: 2017-04-18 | End: 2017-04-19

## 2017-04-18 RX ADMIN — IPRATROPIUM BROMIDE AND ALBUTEROL SULFATE 3 ML: .5; 3 SOLUTION RESPIRATORY (INHALATION) at 20:18

## 2017-04-18 RX ADMIN — IOHEXOL 85 ML: 350 INJECTION, SOLUTION INTRAVENOUS at 16:34

## 2017-04-18 RX ADMIN — PRAMIPEXOLE DIHYDROCHLORIDE 1 MG: 0.5 TABLET ORAL at 08:34

## 2017-04-18 RX ADMIN — HYDROMORPHONE HYDROCHLORIDE 0.5 MG: 1 INJECTION, SOLUTION INTRAMUSCULAR; INTRAVENOUS; SUBCUTANEOUS at 03:50

## 2017-04-18 RX ADMIN — PRAMIPEXOLE DIHYDROCHLORIDE 1 MG: 0.5 TABLET ORAL at 22:16

## 2017-04-18 RX ADMIN — IPRATROPIUM BROMIDE AND ALBUTEROL SULFATE 3 ML: .5; 3 SOLUTION RESPIRATORY (INHALATION) at 07:38

## 2017-04-18 RX ADMIN — POLYETHYLENE GLYCOL 3350 17 G: 17 POWDER, FOR SOLUTION ORAL at 10:03

## 2017-04-18 RX ADMIN — INSULIN ASPART 20 UNITS: 100 INJECTION, SUSPENSION SUBCUTANEOUS at 17:46

## 2017-04-18 RX ADMIN — INSULIN LISPRO 6 UNITS: 100 INJECTION, SOLUTION INTRAVENOUS; SUBCUTANEOUS at 08:34

## 2017-04-18 RX ADMIN — PRAMIPEXOLE DIHYDROCHLORIDE 1 MG: 0.5 TABLET ORAL at 16:11

## 2017-04-18 RX ADMIN — HEPARIN SODIUM 5000 UNITS: 5000 INJECTION, SOLUTION INTRAVENOUS; SUBCUTANEOUS at 05:07

## 2017-04-18 RX ADMIN — INSULIN LISPRO 4 UNITS: 100 INJECTION, SOLUTION INTRAVENOUS; SUBCUTANEOUS at 22:17

## 2017-04-18 RX ADMIN — ACETAMINOPHEN 650 MG: 325 TABLET, FILM COATED ORAL at 22:17

## 2017-04-18 RX ADMIN — ACETAMINOPHEN 650 MG: 325 TABLET, FILM COATED ORAL at 08:33

## 2017-04-18 RX ADMIN — SULFAMETHOXAZOLE AND TRIMETHOPRIM 1 TABLET: 800; 160 TABLET ORAL at 22:17

## 2017-04-18 RX ADMIN — HYDROMORPHONE HYDROCHLORIDE 0.5 MG: 1 INJECTION, SOLUTION INTRAMUSCULAR; INTRAVENOUS; SUBCUTANEOUS at 23:41

## 2017-04-18 RX ADMIN — ASPIRIN 81 MG CHEWABLE TABLET 81 MG: 81 TABLET CHEWABLE at 08:33

## 2017-04-18 RX ADMIN — INSULIN ASPART 20 UNITS: 100 INJECTION, SUSPENSION SUBCUTANEOUS at 08:41

## 2017-04-18 RX ADMIN — ACETAMINOPHEN 650 MG: 325 TABLET, FILM COATED ORAL at 01:34

## 2017-04-18 RX ADMIN — HEPARIN SODIUM 5000 UNITS: 5000 INJECTION, SOLUTION INTRAVENOUS; SUBCUTANEOUS at 13:52

## 2017-04-18 RX ADMIN — SODIUM PHOSPHATE 1 ENEMA: 7; 19 ENEMA RECTAL at 16:11

## 2017-04-18 RX ADMIN — IPRATROPIUM BROMIDE AND ALBUTEROL SULFATE 3 ML: .5; 3 SOLUTION RESPIRATORY (INHALATION) at 01:05

## 2017-04-18 RX ADMIN — INSULIN LISPRO 6 UNITS: 100 INJECTION, SOLUTION INTRAVENOUS; SUBCUTANEOUS at 12:43

## 2017-04-18 RX ADMIN — LEVOTHYROXINE SODIUM 125 MCG: 125 TABLET ORAL at 05:07

## 2017-04-18 RX ADMIN — SULFAMETHOXAZOLE AND TRIMETHOPRIM 1 TABLET: 800; 160 TABLET ORAL at 08:34

## 2017-04-18 RX ADMIN — INSULIN LISPRO 8 UNITS: 100 INJECTION, SOLUTION INTRAVENOUS; SUBCUTANEOUS at 17:47

## 2017-04-18 RX ADMIN — POTASSIUM CHLORIDE 40 MEQ: 1500 TABLET, EXTENDED RELEASE ORAL at 17:51

## 2017-04-19 ENCOUNTER — APPOINTMENT (INPATIENT)
Dept: RADIOLOGY | Facility: HOSPITAL | Age: 77
DRG: 392 | End: 2017-04-19
Payer: COMMERCIAL

## 2017-04-19 ENCOUNTER — APPOINTMENT (INPATIENT)
Dept: RADIOLOGY | Facility: HOSPITAL | Age: 77
DRG: 392 | End: 2017-04-19
Attending: INTERNAL MEDICINE
Payer: COMMERCIAL

## 2017-04-19 LAB
CANCER AG19-9 SERPL-ACNC: 24 U/ML (ref 0–35)
GLUCOSE SERPL-MCNC: 159 MG/DL (ref 65–140)
GLUCOSE SERPL-MCNC: 205 MG/DL (ref 65–140)
GLUCOSE SERPL-MCNC: 304 MG/DL (ref 65–140)
GLUCOSE SERPL-MCNC: 347 MG/DL (ref 65–140)

## 2017-04-19 PROCEDURE — A9270 NON-COVERED ITEM OR SERVICE: HCPCS | Performed by: PHYSICIAN ASSISTANT

## 2017-04-19 PROCEDURE — A9270 NON-COVERED ITEM OR SERVICE: HCPCS | Performed by: STUDENT IN AN ORGANIZED HEALTH CARE EDUCATION/TRAINING PROGRAM

## 2017-04-19 PROCEDURE — 76705 ECHO EXAM OF ABDOMEN: CPT

## 2017-04-19 PROCEDURE — 82948 REAGENT STRIP/BLOOD GLUCOSE: CPT

## 2017-04-19 PROCEDURE — 94760 N-INVAS EAR/PLS OXIMETRY 1: CPT

## 2017-04-19 PROCEDURE — A9270 NON-COVERED ITEM OR SERVICE: HCPCS | Performed by: HOSPITALIST

## 2017-04-19 PROCEDURE — 74022 RADEX COMPL AQT ABD SERIES: CPT

## 2017-04-19 PROCEDURE — A9270 NON-COVERED ITEM OR SERVICE: HCPCS | Performed by: INTERNAL MEDICINE

## 2017-04-19 PROCEDURE — 94640 AIRWAY INHALATION TREATMENT: CPT

## 2017-04-19 RX ORDER — FUROSEMIDE 20 MG/1
20 TABLET ORAL
Status: DISCONTINUED | OUTPATIENT
Start: 2017-04-19 | End: 2017-04-21 | Stop reason: HOSPADM

## 2017-04-19 RX ORDER — INSULIN ASPART 100 [IU]/ML
40 INJECTION, SUSPENSION SUBCUTANEOUS
Status: DISCONTINUED | OUTPATIENT
Start: 2017-04-19 | End: 2017-04-21 | Stop reason: HOSPADM

## 2017-04-19 RX ADMIN — LEVOTHYROXINE SODIUM 125 MCG: 125 TABLET ORAL at 06:12

## 2017-04-19 RX ADMIN — INSULIN ASPART 40 UNITS: 100 INJECTION, SUSPENSION SUBCUTANEOUS at 18:01

## 2017-04-19 RX ADMIN — HYDROMORPHONE HYDROCHLORIDE 0.5 MG: 1 INJECTION, SOLUTION INTRAMUSCULAR; INTRAVENOUS; SUBCUTANEOUS at 07:30

## 2017-04-19 RX ADMIN — SULFAMETHOXAZOLE AND TRIMETHOPRIM 1 TABLET: 800; 160 TABLET ORAL at 08:34

## 2017-04-19 RX ADMIN — IPRATROPIUM BROMIDE AND ALBUTEROL SULFATE 3 ML: .5; 3 SOLUTION RESPIRATORY (INHALATION) at 12:28

## 2017-04-19 RX ADMIN — IPRATROPIUM BROMIDE AND ALBUTEROL SULFATE 3 ML: .5; 3 SOLUTION RESPIRATORY (INHALATION) at 20:05

## 2017-04-19 RX ADMIN — INSULIN LISPRO 8 UNITS: 100 INJECTION, SOLUTION INTRAVENOUS; SUBCUTANEOUS at 18:04

## 2017-04-19 RX ADMIN — ASPIRIN 81 MG CHEWABLE TABLET 81 MG: 81 TABLET CHEWABLE at 08:34

## 2017-04-19 RX ADMIN — FUROSEMIDE 20 MG: 20 TABLET ORAL at 18:14

## 2017-04-19 RX ADMIN — PRAMIPEXOLE DIHYDROCHLORIDE 1 MG: 0.5 TABLET ORAL at 23:05

## 2017-04-19 RX ADMIN — POLYETHYLENE GLYCOL 3350 17 G: 17 POWDER, FOR SOLUTION ORAL at 10:44

## 2017-04-19 RX ADMIN — PRAMIPEXOLE DIHYDROCHLORIDE 1 MG: 0.5 TABLET ORAL at 10:53

## 2017-04-19 RX ADMIN — INSULIN ASPART 20 UNITS: 100 INJECTION, SUSPENSION SUBCUTANEOUS at 08:35

## 2017-04-19 RX ADMIN — INSULIN LISPRO 4 UNITS: 100 INJECTION, SOLUTION INTRAVENOUS; SUBCUTANEOUS at 12:35

## 2017-04-19 RX ADMIN — SULFAMETHOXAZOLE AND TRIMETHOPRIM 1 TABLET: 800; 160 TABLET ORAL at 23:05

## 2017-04-19 RX ADMIN — HYDROMORPHONE HYDROCHLORIDE 0.5 MG: 1 INJECTION, SOLUTION INTRAMUSCULAR; INTRAVENOUS; SUBCUTANEOUS at 23:07

## 2017-04-19 RX ADMIN — PRAMIPEXOLE DIHYDROCHLORIDE 1 MG: 0.5 TABLET ORAL at 18:01

## 2017-04-19 RX ADMIN — INSULIN LISPRO 8 UNITS: 100 INJECTION, SOLUTION INTRAVENOUS; SUBCUTANEOUS at 23:05

## 2017-04-20 LAB
GLUCOSE SERPL-MCNC: 141 MG/DL (ref 65–140)
GLUCOSE SERPL-MCNC: 163 MG/DL (ref 65–140)
GLUCOSE SERPL-MCNC: 230 MG/DL (ref 65–140)
TROPONIN I SERPL-MCNC: <0.02 NG/ML
TROPONIN I SERPL-MCNC: <0.02 NG/ML

## 2017-04-20 PROCEDURE — 82948 REAGENT STRIP/BLOOD GLUCOSE: CPT

## 2017-04-20 PROCEDURE — 94640 AIRWAY INHALATION TREATMENT: CPT

## 2017-04-20 PROCEDURE — A9270 NON-COVERED ITEM OR SERVICE: HCPCS | Performed by: INTERNAL MEDICINE

## 2017-04-20 PROCEDURE — 82105 ALPHA-FETOPROTEIN SERUM: CPT | Performed by: INTERNAL MEDICINE

## 2017-04-20 PROCEDURE — 93005 ELECTROCARDIOGRAM TRACING: CPT | Performed by: NURSE PRACTITIONER

## 2017-04-20 PROCEDURE — A9270 NON-COVERED ITEM OR SERVICE: HCPCS | Performed by: NURSE PRACTITIONER

## 2017-04-20 PROCEDURE — A9270 NON-COVERED ITEM OR SERVICE: HCPCS | Performed by: STUDENT IN AN ORGANIZED HEALTH CARE EDUCATION/TRAINING PROGRAM

## 2017-04-20 PROCEDURE — 84484 ASSAY OF TROPONIN QUANT: CPT | Performed by: NURSE PRACTITIONER

## 2017-04-20 PROCEDURE — 94760 N-INVAS EAR/PLS OXIMETRY 1: CPT

## 2017-04-20 PROCEDURE — A9270 NON-COVERED ITEM OR SERVICE: HCPCS | Performed by: PHYSICIAN ASSISTANT

## 2017-04-20 PROCEDURE — A9270 NON-COVERED ITEM OR SERVICE: HCPCS | Performed by: HOSPITALIST

## 2017-04-20 RX ADMIN — HYDROMORPHONE HYDROCHLORIDE 0.5 MG: 1 INJECTION, SOLUTION INTRAMUSCULAR; INTRAVENOUS; SUBCUTANEOUS at 10:49

## 2017-04-20 RX ADMIN — POLYETHYLENE GLYCOL 3350 17 G: 17 POWDER, FOR SOLUTION ORAL at 08:43

## 2017-04-20 RX ADMIN — PRAMIPEXOLE DIHYDROCHLORIDE 1 MG: 0.5 TABLET ORAL at 17:56

## 2017-04-20 RX ADMIN — INSULIN ASPART 40 UNITS: 100 INJECTION, SUSPENSION SUBCUTANEOUS at 17:15

## 2017-04-20 RX ADMIN — INSULIN LISPRO 4 UNITS: 100 INJECTION, SOLUTION INTRAVENOUS; SUBCUTANEOUS at 12:15

## 2017-04-20 RX ADMIN — PRAMIPEXOLE DIHYDROCHLORIDE 1 MG: 0.5 TABLET ORAL at 08:48

## 2017-04-20 RX ADMIN — ACETAMINOPHEN 650 MG: 325 TABLET, FILM COATED ORAL at 21:37

## 2017-04-20 RX ADMIN — ASPIRIN 81 MG CHEWABLE TABLET 81 MG: 81 TABLET CHEWABLE at 08:46

## 2017-04-20 RX ADMIN — INSULIN LISPRO 2 UNITS: 100 INJECTION, SOLUTION INTRAVENOUS; SUBCUTANEOUS at 21:37

## 2017-04-20 RX ADMIN — LEVOTHYROXINE SODIUM 125 MCG: 125 TABLET ORAL at 06:12

## 2017-04-20 RX ADMIN — POTASSIUM CHLORIDE 40 MEQ: 1500 TABLET, EXTENDED RELEASE ORAL at 09:22

## 2017-04-20 RX ADMIN — FUROSEMIDE 20 MG: 20 TABLET ORAL at 17:56

## 2017-04-20 RX ADMIN — IPRATROPIUM BROMIDE AND ALBUTEROL SULFATE 3 ML: .5; 3 SOLUTION RESPIRATORY (INHALATION) at 20:41

## 2017-04-20 RX ADMIN — INSULIN LISPRO 2 UNITS: 100 INJECTION, SOLUTION INTRAVENOUS; SUBCUTANEOUS at 08:59

## 2017-04-20 RX ADMIN — INSULIN ASPART 40 UNITS: 100 INJECTION, SUSPENSION SUBCUTANEOUS at 10:15

## 2017-04-20 RX ADMIN — FUROSEMIDE 20 MG: 20 TABLET ORAL at 08:49

## 2017-04-20 RX ADMIN — SULFAMETHOXAZOLE AND TRIMETHOPRIM 1 TABLET: 800; 160 TABLET ORAL at 21:37

## 2017-04-20 RX ADMIN — SULFAMETHOXAZOLE AND TRIMETHOPRIM 1 TABLET: 800; 160 TABLET ORAL at 08:53

## 2017-04-20 RX ADMIN — PRAMIPEXOLE DIHYDROCHLORIDE 1 MG: 0.5 TABLET ORAL at 21:37

## 2017-04-20 RX ADMIN — ONDANSETRON 4 MG: 2 INJECTION INTRAMUSCULAR; INTRAVENOUS at 11:00

## 2017-04-21 VITALS
OXYGEN SATURATION: 92 % | RESPIRATION RATE: 18 BRPM | SYSTOLIC BLOOD PRESSURE: 145 MMHG | HEIGHT: 62 IN | DIASTOLIC BLOOD PRESSURE: 63 MMHG | BODY MASS INDEX: 43.65 KG/M2 | WEIGHT: 237.2 LBS | HEART RATE: 92 BPM | TEMPERATURE: 98.4 F

## 2017-04-21 PROBLEM — L98.9 SKIN LESION OF LEFT LEG: Status: ACTIVE | Noted: 2017-04-21

## 2017-04-21 PROBLEM — F11.20 OPIOID DEPENDENCE (HCC): Status: ACTIVE | Noted: 2017-04-18

## 2017-04-21 LAB
AFP-TM SERPL-MCNC: 2.7 NG/ML (ref 0–8.3)
ALBUMIN SERPL BCP-MCNC: 3.1 G/DL (ref 3.5–5)
ALP SERPL-CCNC: 69 U/L (ref 46–116)
ALT SERPL W P-5'-P-CCNC: 57 U/L (ref 12–78)
AST SERPL W P-5'-P-CCNC: 26 U/L (ref 5–45)
BILIRUB DIRECT SERPL-MCNC: 0.1 MG/DL (ref 0–0.2)
BILIRUB SERPL-MCNC: 0.2 MG/DL (ref 0.2–1)
GLUCOSE SERPL-MCNC: 130 MG/DL (ref 65–140)
GLUCOSE SERPL-MCNC: 163 MG/DL (ref 65–140)
GLUCOSE SERPL-MCNC: 203 MG/DL (ref 65–140)
PROT SERPL-MCNC: 6.4 G/DL (ref 6.4–8.2)

## 2017-04-21 PROCEDURE — A9270 NON-COVERED ITEM OR SERVICE: HCPCS | Performed by: NURSE PRACTITIONER

## 2017-04-21 PROCEDURE — A9270 NON-COVERED ITEM OR SERVICE: HCPCS | Performed by: STUDENT IN AN ORGANIZED HEALTH CARE EDUCATION/TRAINING PROGRAM

## 2017-04-21 PROCEDURE — A9270 NON-COVERED ITEM OR SERVICE: HCPCS | Performed by: HOSPITALIST

## 2017-04-21 PROCEDURE — A9270 NON-COVERED ITEM OR SERVICE: HCPCS | Performed by: PHYSICIAN ASSISTANT

## 2017-04-21 PROCEDURE — A9270 NON-COVERED ITEM OR SERVICE: HCPCS | Performed by: INTERNAL MEDICINE

## 2017-04-21 PROCEDURE — 82948 REAGENT STRIP/BLOOD GLUCOSE: CPT

## 2017-04-21 PROCEDURE — 80076 HEPATIC FUNCTION PANEL: CPT | Performed by: PHYSICIAN ASSISTANT

## 2017-04-21 PROCEDURE — 94760 N-INVAS EAR/PLS OXIMETRY 1: CPT

## 2017-04-21 RX ORDER — ONDANSETRON 4 MG/1
4 TABLET, ORALLY DISINTEGRATING ORAL ONCE
Status: COMPLETED | OUTPATIENT
Start: 2017-04-21 | End: 2017-04-21

## 2017-04-21 RX ORDER — ACETAMINOPHEN 325 MG/1
650 TABLET ORAL EVERY 6 HOURS PRN
Qty: 30 TABLET | Refills: 0 | Status: SHIPPED | OUTPATIENT
Start: 2017-04-21 | End: 2017-06-17 | Stop reason: HOSPADM

## 2017-04-21 RX ORDER — POLYETHYLENE GLYCOL 3350 17 G/17G
17 POWDER, FOR SOLUTION ORAL 2 TIMES DAILY
Qty: 1020 G | Refills: 0 | Status: SHIPPED | OUTPATIENT
Start: 2017-04-21 | End: 2017-06-17 | Stop reason: HOSPADM

## 2017-04-21 RX ORDER — FUROSEMIDE 20 MG/1
20 TABLET ORAL 2 TIMES DAILY
Qty: 60 TABLET | Refills: 0 | Status: SHIPPED | OUTPATIENT
Start: 2017-04-21 | End: 2017-06-07 | Stop reason: CLARIF

## 2017-04-21 RX ORDER — DICYCLOMINE HYDROCHLORIDE 10 MG/1
10 CAPSULE ORAL
Qty: 90 CAPSULE | Refills: 0 | Status: SHIPPED | OUTPATIENT
Start: 2017-04-21 | End: 2017-06-17 | Stop reason: HOSPADM

## 2017-04-21 RX ORDER — SULFAMETHOXAZOLE AND TRIMETHOPRIM 800; 160 MG/1; MG/1
1 TABLET ORAL EVERY 12 HOURS SCHEDULED
Qty: 12 TABLET | Refills: 0 | Status: SHIPPED | OUTPATIENT
Start: 2017-04-21 | End: 2017-04-27

## 2017-04-21 RX ORDER — ONDANSETRON 4 MG/1
4 TABLET, ORALLY DISINTEGRATING ORAL EVERY 8 HOURS PRN
Qty: 20 TABLET | Refills: 0 | Status: SHIPPED | OUTPATIENT
Start: 2017-04-21 | End: 2017-06-07 | Stop reason: CLARIF

## 2017-04-21 RX ADMIN — INSULIN ASPART 40 UNITS: 100 INJECTION, SUSPENSION SUBCUTANEOUS at 08:23

## 2017-04-21 RX ADMIN — ONDANSETRON 4 MG: 4 TABLET, ORALLY DISINTEGRATING ORAL at 12:01

## 2017-04-21 RX ADMIN — FUROSEMIDE 20 MG: 20 TABLET ORAL at 08:07

## 2017-04-21 RX ADMIN — POTASSIUM CHLORIDE 40 MEQ: 1500 TABLET, EXTENDED RELEASE ORAL at 08:39

## 2017-04-21 RX ADMIN — INSULIN LISPRO 4 UNITS: 100 INJECTION, SOLUTION INTRAVENOUS; SUBCUTANEOUS at 12:02

## 2017-04-21 RX ADMIN — LEVOTHYROXINE SODIUM 125 MCG: 125 TABLET ORAL at 05:36

## 2017-04-21 RX ADMIN — HYDROMORPHONE HYDROCHLORIDE 0.5 MG: 1 INJECTION, SOLUTION INTRAMUSCULAR; INTRAVENOUS; SUBCUTANEOUS at 01:55

## 2017-04-21 RX ADMIN — POLYETHYLENE GLYCOL 3350 17 G: 17 POWDER, FOR SOLUTION ORAL at 08:45

## 2017-04-21 RX ADMIN — ASPIRIN 81 MG CHEWABLE TABLET 81 MG: 81 TABLET CHEWABLE at 08:05

## 2017-04-21 RX ADMIN — SULFAMETHOXAZOLE AND TRIMETHOPRIM 1 TABLET: 800; 160 TABLET ORAL at 08:10

## 2017-04-21 RX ADMIN — PRAMIPEXOLE DIHYDROCHLORIDE 1 MG: 0.5 TABLET ORAL at 08:09

## 2017-04-21 RX ADMIN — ACETAMINOPHEN 650 MG: 325 TABLET, FILM COATED ORAL at 04:38

## 2017-04-22 LAB
ATRIAL RATE: 73 BPM
P AXIS: 44 DEGREES
PR INTERVAL: 176 MS
QRS AXIS: -19 DEGREES
QRSD INTERVAL: 76 MS
QT INTERVAL: 416 MS
QTC INTERVAL: 458 MS
T WAVE AXIS: 17 DEGREES
VENTRICULAR RATE: 73 BPM

## 2017-04-26 ENCOUNTER — ALLSCRIPTS OFFICE VISIT (OUTPATIENT)
Dept: OTHER | Facility: OTHER | Age: 77
End: 2017-04-26

## 2017-04-26 ENCOUNTER — GENERIC CONVERSION - ENCOUNTER (OUTPATIENT)
Dept: OTHER | Facility: OTHER | Age: 77
End: 2017-04-26

## 2017-05-04 ENCOUNTER — ALLSCRIPTS OFFICE VISIT (OUTPATIENT)
Dept: OTHER | Facility: OTHER | Age: 77
End: 2017-05-04

## 2017-05-09 ENCOUNTER — ALLSCRIPTS OFFICE VISIT (OUTPATIENT)
Dept: OTHER | Facility: OTHER | Age: 77
End: 2017-05-09

## 2017-05-11 ENCOUNTER — GENERIC CONVERSION - ENCOUNTER (OUTPATIENT)
Dept: OTHER | Facility: OTHER | Age: 77
End: 2017-05-11

## 2017-05-22 ENCOUNTER — LAB REQUISITION (OUTPATIENT)
Dept: LAB | Facility: HOSPITAL | Age: 77
End: 2017-05-22
Payer: COMMERCIAL

## 2017-05-22 ENCOUNTER — ALLSCRIPTS OFFICE VISIT (OUTPATIENT)
Dept: OTHER | Facility: OTHER | Age: 77
End: 2017-05-22

## 2017-05-22 DIAGNOSIS — E11.40 TYPE 2 DIABETES MELLITUS WITH DIABETIC NEUROPATHY (HCC): ICD-10-CM

## 2017-05-22 PROCEDURE — 82570 ASSAY OF URINE CREATININE: CPT | Performed by: FAMILY MEDICINE

## 2017-05-22 PROCEDURE — 82043 UR ALBUMIN QUANTITATIVE: CPT | Performed by: FAMILY MEDICINE

## 2017-05-23 LAB
CREAT UR-MCNC: 178 MG/DL
MICROALBUMIN UR-MCNC: 51.5 MG/L (ref 0–20)
MICROALBUMIN/CREAT 24H UR: 29 MG/G CREATININE (ref 0–30)

## 2017-05-25 ENCOUNTER — ALLSCRIPTS OFFICE VISIT (OUTPATIENT)
Dept: OTHER | Facility: OTHER | Age: 77
End: 2017-05-25

## 2017-05-29 ENCOUNTER — ANESTHESIA EVENT (OUTPATIENT)
Dept: GASTROENTEROLOGY | Facility: HOSPITAL | Age: 77
End: 2017-05-29
Payer: COMMERCIAL

## 2017-05-30 ENCOUNTER — HOSPITAL ENCOUNTER (OUTPATIENT)
Facility: HOSPITAL | Age: 77
Setting detail: OUTPATIENT SURGERY
Discharge: HOME | End: 2017-05-30
Attending: INTERNAL MEDICINE | Admitting: INTERNAL MEDICINE
Payer: COMMERCIAL

## 2017-05-30 ENCOUNTER — HOSPITAL ENCOUNTER (OUTPATIENT)
Dept: RADIOLOGY | Facility: HOSPITAL | Age: 77
Discharge: HOME | End: 2017-05-30
Attending: INTERNAL MEDICINE
Payer: COMMERCIAL

## 2017-05-30 ENCOUNTER — ANESTHESIA (OUTPATIENT)
Dept: GASTROENTEROLOGY | Facility: HOSPITAL | Age: 77
End: 2017-05-30
Payer: COMMERCIAL

## 2017-05-30 ENCOUNTER — GENERIC CONVERSION - ENCOUNTER (OUTPATIENT)
Dept: OTHER | Facility: OTHER | Age: 77
End: 2017-05-30

## 2017-05-30 VITALS
HEIGHT: 62 IN | BODY MASS INDEX: 44.16 KG/M2 | SYSTOLIC BLOOD PRESSURE: 157 MMHG | HEART RATE: 92 BPM | DIASTOLIC BLOOD PRESSURE: 76 MMHG | TEMPERATURE: 97.2 F | WEIGHT: 240 LBS | OXYGEN SATURATION: 96 % | RESPIRATION RATE: 20 BRPM

## 2017-05-30 DIAGNOSIS — R93.2 NONVISUALIZATION OF GALLBLADDER: ICD-10-CM

## 2017-05-30 PROCEDURE — C2617 STENT, NON-COR, TEM W/O DEL: HCPCS | Performed by: INTERNAL MEDICINE

## 2017-05-30 PROCEDURE — C1769 GUIDE WIRE: HCPCS | Performed by: INTERNAL MEDICINE

## 2017-05-30 PROCEDURE — 74328 X-RAY BILE DUCT ENDOSCOPY: CPT

## 2017-05-30 DEVICE — PANCREATIC STENT
Type: IMPLANTABLE DEVICE | Site: DUODENUM | Status: FUNCTIONAL
Brand: ADVANIX™ PANCREATIC STENT

## 2017-05-30 RX ORDER — ASCORBIC ACID 500 MG
1000 TABLET ORAL DAILY
COMMUNITY
End: 2018-10-11 | Stop reason: ALTCHOICE

## 2017-05-30 RX ORDER — SUCCINYLCHOLINE CHLORIDE 20 MG/ML
INJECTION INTRAMUSCULAR; INTRAVENOUS AS NEEDED
Status: DISCONTINUED | OUTPATIENT
Start: 2017-05-30 | End: 2017-05-30 | Stop reason: SURG

## 2017-05-30 RX ORDER — OXYCODONE HYDROCHLORIDE 5 MG/1
5 CAPSULE ORAL
COMMUNITY
End: 2017-06-17 | Stop reason: HOSPADM

## 2017-05-30 RX ORDER — ONDANSETRON 2 MG/ML
INJECTION INTRAMUSCULAR; INTRAVENOUS AS NEEDED
Status: DISCONTINUED | OUTPATIENT
Start: 2017-05-30 | End: 2017-05-30 | Stop reason: SURG

## 2017-05-30 RX ORDER — SODIUM CHLORIDE 9 MG/ML
125 INJECTION, SOLUTION INTRAVENOUS CONTINUOUS
Status: DISCONTINUED | OUTPATIENT
Start: 2017-05-30 | End: 2017-05-30 | Stop reason: HOSPADM

## 2017-05-30 RX ORDER — CIPROFLOXACIN 2 MG/ML
400 INJECTION, SOLUTION INTRAVENOUS ONCE
Status: DISCONTINUED | OUTPATIENT
Start: 2017-05-30 | End: 2017-05-30 | Stop reason: SDUPTHER

## 2017-05-30 RX ORDER — PROPOFOL 10 MG/ML
INJECTION, EMULSION INTRAVENOUS AS NEEDED
Status: DISCONTINUED | OUTPATIENT
Start: 2017-05-30 | End: 2017-05-30 | Stop reason: SURG

## 2017-05-30 RX ORDER — METOLAZONE 2.5 MG/1
2.5 TABLET ORAL DAILY
COMMUNITY
End: 2017-06-07 | Stop reason: CLARIF

## 2017-05-30 RX ORDER — LIDOCAINE HYDROCHLORIDE 10 MG/ML
INJECTION, SOLUTION EPIDURAL; INFILTRATION; INTRACAUDAL; PERINEURAL AS NEEDED
Status: DISCONTINUED | OUTPATIENT
Start: 2017-05-30 | End: 2017-05-30 | Stop reason: SURG

## 2017-05-30 RX ORDER — GLYCOPYRROLATE 0.2 MG/ML
INJECTION INTRAMUSCULAR; INTRAVENOUS AS NEEDED
Status: DISCONTINUED | OUTPATIENT
Start: 2017-05-30 | End: 2017-05-30 | Stop reason: SURG

## 2017-05-30 RX ADMIN — GLYCOPYRROLATE 0.2 MG: 0.2 INJECTION INTRAMUSCULAR; INTRAVENOUS at 13:03

## 2017-05-30 RX ADMIN — ONDANSETRON 4 MG: 2 INJECTION INTRAMUSCULAR; INTRAVENOUS at 15:11

## 2017-05-30 RX ADMIN — GLUCAGON HYDROCHLORIDE 0.5 MG: KIT at 13:43

## 2017-05-30 RX ADMIN — SODIUM CHLORIDE: 0.9 INJECTION, SOLUTION INTRAVENOUS at 15:11

## 2017-05-30 RX ADMIN — SUCCINYLCHOLINE CHLORIDE 100 MG: 20 INJECTION, SOLUTION INTRAMUSCULAR; INTRAVENOUS at 13:04

## 2017-05-30 RX ADMIN — SODIUM CHLORIDE 125 ML/HR: 0.9 INJECTION, SOLUTION INTRAVENOUS at 12:54

## 2017-05-30 RX ADMIN — LIDOCAINE HYDROCHLORIDE 50 MG: 10 INJECTION, SOLUTION EPIDURAL; INFILTRATION; INTRACAUDAL; PERINEURAL at 13:03

## 2017-05-30 RX ADMIN — CIPROFLOXACIN 400 MG: 2 INJECTION INTRAVENOUS at 13:16

## 2017-05-30 RX ADMIN — PROPOFOL 150 MG: 10 INJECTION, EMULSION INTRAVENOUS at 13:03

## 2017-06-01 ENCOUNTER — TRANSCRIBE ORDERS (OUTPATIENT)
Dept: ADMINISTRATIVE | Facility: HOSPITAL | Age: 77
End: 2017-06-01

## 2017-06-01 ENCOUNTER — APPOINTMENT (OUTPATIENT)
Dept: LAB | Facility: HOSPITAL | Age: 77
End: 2017-06-01
Payer: COMMERCIAL

## 2017-06-01 DIAGNOSIS — K76.9 LIVER DISEASE: ICD-10-CM

## 2017-06-01 DIAGNOSIS — K76.9 UNSPECIFIED DISORDER OF LIVER: ICD-10-CM

## 2017-06-01 DIAGNOSIS — R94.5 ABNORMAL RESULTS OF LIVER FUNCTION STUDIES: ICD-10-CM

## 2017-06-01 DIAGNOSIS — K76.9 UNSPECIFIED DISORDER OF LIVER: Primary | ICD-10-CM

## 2017-06-01 LAB
ALBUMIN SERPL BCP-MCNC: 3 G/DL (ref 3.5–5)
ALP SERPL-CCNC: 277 U/L (ref 46–116)
ALT SERPL W P-5'-P-CCNC: 488 U/L (ref 12–78)
ANION GAP SERPL CALCULATED.3IONS-SCNC: 4 MMOL/L (ref 4–13)
AST SERPL W P-5'-P-CCNC: 210 U/L (ref 5–45)
BASOPHILS # BLD AUTO: 0 THOUSANDS/ΜL (ref 0–0.1)
BASOPHILS NFR BLD AUTO: 0 % (ref 0–1)
BILIRUB SERPL-MCNC: 1 MG/DL (ref 0.2–1)
BUN SERPL-MCNC: 15 MG/DL (ref 5–25)
CALCIUM SERPL-MCNC: 8.3 MG/DL (ref 8.3–10.1)
CHLORIDE SERPL-SCNC: 97 MMOL/L (ref 100–108)
CO2 SERPL-SCNC: 36 MMOL/L (ref 21–32)
CREAT SERPL-MCNC: 0.8 MG/DL (ref 0.6–1.3)
EOSINOPHIL # BLD AUTO: 0.1 THOUSAND/ΜL (ref 0–0.61)
EOSINOPHIL NFR BLD AUTO: 1 % (ref 0–6)
ERYTHROCYTE [DISTWIDTH] IN BLOOD BY AUTOMATED COUNT: 15.2 % (ref 11.6–15.1)
GFR SERPL CREATININE-BSD FRML MDRD: >60 ML/MIN/1.73SQ M
GLUCOSE SERPL-MCNC: 287 MG/DL (ref 65–140)
HCT VFR BLD AUTO: 40.1 % (ref 37–47)
HGB BLD-MCNC: 13.2 G/DL (ref 12–16)
LIPASE SERPL-CCNC: 345 U/L (ref 73–393)
LYMPHOCYTES # BLD AUTO: 1.4 THOUSANDS/ΜL (ref 0.6–4.47)
LYMPHOCYTES NFR BLD AUTO: 17 % (ref 14–44)
MCH RBC QN AUTO: 31.3 PG (ref 27–31)
MCHC RBC AUTO-ENTMCNC: 33 G/DL (ref 31.4–37.4)
MCV RBC AUTO: 95 FL (ref 82–98)
MONOCYTES # BLD AUTO: 0.5 THOUSAND/ΜL (ref 0.17–1.22)
MONOCYTES NFR BLD AUTO: 6 % (ref 4–12)
NEUTROPHILS # BLD AUTO: 6.4 THOUSANDS/ΜL (ref 1.85–7.62)
NEUTS SEG NFR BLD AUTO: 76 % (ref 43–75)
NRBC BLD AUTO-RTO: 0 /100 WBCS
PLATELET # BLD AUTO: 165 THOUSANDS/UL (ref 130–400)
PMV BLD AUTO: 8.5 FL (ref 8.9–12.7)
POTASSIUM SERPL-SCNC: 3.3 MMOL/L (ref 3.5–5.3)
PROT SERPL-MCNC: 6.6 G/DL (ref 6.4–8.2)
RBC # BLD AUTO: 4.22 MILLION/UL (ref 4.2–5.4)
SODIUM SERPL-SCNC: 137 MMOL/L (ref 136–145)
WBC # BLD AUTO: 8.4 THOUSAND/UL (ref 4.8–10.8)

## 2017-06-01 PROCEDURE — 85025 COMPLETE CBC W/AUTO DIFF WBC: CPT | Performed by: INTERNAL MEDICINE

## 2017-06-01 PROCEDURE — 36415 COLL VENOUS BLD VENIPUNCTURE: CPT | Performed by: INTERNAL MEDICINE

## 2017-06-01 PROCEDURE — 80053 COMPREHEN METABOLIC PANEL: CPT | Performed by: INTERNAL MEDICINE

## 2017-06-01 PROCEDURE — 83690 ASSAY OF LIPASE: CPT

## 2017-06-02 ENCOUNTER — GENERIC CONVERSION - ENCOUNTER (OUTPATIENT)
Dept: OTHER | Facility: OTHER | Age: 77
End: 2017-06-02

## 2017-06-06 ENCOUNTER — HOSPITAL ENCOUNTER (OUTPATIENT)
Dept: RADIOLOGY | Facility: HOSPITAL | Age: 77
Discharge: HOME | End: 2017-06-06
Attending: INTERNAL MEDICINE | Admitting: RADIOLOGY
Payer: COMMERCIAL

## 2017-06-06 VITALS
HEART RATE: 93 BPM | BODY MASS INDEX: 44.53 KG/M2 | DIASTOLIC BLOOD PRESSURE: 67 MMHG | TEMPERATURE: 98 F | RESPIRATION RATE: 18 BRPM | OXYGEN SATURATION: 93 % | HEIGHT: 62 IN | SYSTOLIC BLOOD PRESSURE: 152 MMHG | WEIGHT: 242 LBS

## 2017-06-06 DIAGNOSIS — K76.9 LIVER DISEASE: ICD-10-CM

## 2017-06-06 LAB
INR PPP: 0.91 (ref 0.86–1.16)
PROTHROMBIN TIME: 12.3 SECONDS (ref 12.1–14.4)

## 2017-06-06 PROCEDURE — 88333 PATH CONSLTJ SURG CYTO XM 1: CPT | Performed by: INTERNAL MEDICINE

## 2017-06-06 PROCEDURE — 88307 TISSUE EXAM BY PATHOLOGIST: CPT | Performed by: INTERNAL MEDICINE

## 2017-06-06 PROCEDURE — 88313 SPECIAL STAINS GROUP 2: CPT | Performed by: INTERNAL MEDICINE

## 2017-06-06 PROCEDURE — 76942 ECHO GUIDE FOR BIOPSY: CPT

## 2017-06-06 PROCEDURE — 99152 MOD SED SAME PHYS/QHP 5/>YRS: CPT

## 2017-06-06 PROCEDURE — 85610 PROTHROMBIN TIME: CPT | Performed by: RADIOLOGY

## 2017-06-06 PROCEDURE — A9270 NON-COVERED ITEM OR SERVICE: HCPCS | Performed by: RADIOLOGY

## 2017-06-06 PROCEDURE — 47000 NEEDLE BIOPSY OF LIVER PERQ: CPT

## 2017-06-06 PROCEDURE — 99153 MOD SED SAME PHYS/QHP EA: CPT

## 2017-06-06 RX ORDER — MIDAZOLAM HYDROCHLORIDE 1 MG/ML
INJECTION INTRAMUSCULAR; INTRAVENOUS CODE/TRAUMA/SEDATION MEDICATION
Status: COMPLETED | OUTPATIENT
Start: 2017-06-06 | End: 2017-06-06

## 2017-06-06 RX ORDER — FENTANYL CITRATE 50 UG/ML
INJECTION, SOLUTION INTRAMUSCULAR; INTRAVENOUS CODE/TRAUMA/SEDATION MEDICATION
Status: COMPLETED | OUTPATIENT
Start: 2017-06-06 | End: 2017-06-06

## 2017-06-06 RX ORDER — OXYCODONE HYDROCHLORIDE AND ACETAMINOPHEN 5; 325 MG/1; MG/1
2 TABLET ORAL EVERY 4 HOURS PRN
Status: DISCONTINUED | OUTPATIENT
Start: 2017-06-06 | End: 2017-06-06 | Stop reason: HOSPADM

## 2017-06-06 RX ADMIN — FENTANYL CITRATE 50 MCG: 50 INJECTION, SOLUTION INTRAMUSCULAR; INTRAVENOUS at 10:06

## 2017-06-06 RX ADMIN — MIDAZOLAM HYDROCHLORIDE 1 MG: 1 INJECTION, SOLUTION INTRAMUSCULAR; INTRAVENOUS at 10:21

## 2017-06-06 RX ADMIN — MIDAZOLAM HYDROCHLORIDE 2 MG: 1 INJECTION, SOLUTION INTRAMUSCULAR; INTRAVENOUS at 10:06

## 2017-06-06 RX ADMIN — FENTANYL CITRATE 50 MCG: 50 INJECTION, SOLUTION INTRAMUSCULAR; INTRAVENOUS at 10:21

## 2017-06-06 RX ADMIN — OXYCODONE HYDROCHLORIDE AND ACETAMINOPHEN 2 TABLET: 5; 325 TABLET ORAL at 14:29

## 2017-06-07 ENCOUNTER — APPOINTMENT (INPATIENT)
Dept: NON INVASIVE DIAGNOSTICS | Facility: HOSPITAL | Age: 77
DRG: 862 | End: 2017-06-07
Payer: COMMERCIAL

## 2017-06-07 ENCOUNTER — APPOINTMENT (INPATIENT)
Dept: RADIOLOGY | Facility: HOSPITAL | Age: 77
DRG: 862 | End: 2017-06-07
Payer: COMMERCIAL

## 2017-06-07 ENCOUNTER — APPOINTMENT (EMERGENCY)
Dept: RADIOLOGY | Facility: HOSPITAL | Age: 77
DRG: 862 | End: 2017-06-07
Payer: COMMERCIAL

## 2017-06-07 ENCOUNTER — HOSPITAL ENCOUNTER (INPATIENT)
Facility: HOSPITAL | Age: 77
LOS: 10 days | Discharge: NURSING, REHAB OR LONG-TERM CARE | DRG: 862 | End: 2017-06-17
Attending: EMERGENCY MEDICINE | Admitting: INTERNAL MEDICINE
Payer: COMMERCIAL

## 2017-06-07 DIAGNOSIS — R06.82 TACHYPNEA: ICD-10-CM

## 2017-06-07 DIAGNOSIS — E11.39 TYPE 2 DIABETES MELLITUS WITH OTHER OPHTHALMIC COMPLICATION, WITH LONG-TERM CURRENT USE OF INSULIN (HCC): Chronic | ICD-10-CM

## 2017-06-07 DIAGNOSIS — H53.8 BLURRY VISION, BILATERAL: ICD-10-CM

## 2017-06-07 DIAGNOSIS — J44.9 CHRONIC OBSTRUCTIVE PULMONARY DISEASE, UNSPECIFIED COPD TYPE (HCC): ICD-10-CM

## 2017-06-07 DIAGNOSIS — R65.20 SEVERE SEPSIS (HCC): ICD-10-CM

## 2017-06-07 DIAGNOSIS — J96.22 ACUTE ON CHRONIC RESPIRATORY FAILURE WITH HYPERCAPNIA (HCC): ICD-10-CM

## 2017-06-07 DIAGNOSIS — R06.02 SOB (SHORTNESS OF BREATH): ICD-10-CM

## 2017-06-07 DIAGNOSIS — Z79.4 TYPE 2 DIABETES MELLITUS WITH OTHER OPHTHALMIC COMPLICATION, WITH LONG-TERM CURRENT USE OF INSULIN (HCC): Chronic | ICD-10-CM

## 2017-06-07 DIAGNOSIS — E87.2 RESPIRATORY ACIDOSIS: ICD-10-CM

## 2017-06-07 DIAGNOSIS — R10.9 ABDOMINAL PAIN: ICD-10-CM

## 2017-06-07 DIAGNOSIS — A41.9 SEVERE SEPSIS (HCC): ICD-10-CM

## 2017-06-07 DIAGNOSIS — R50.82 POST-PROCEDURAL FEVER: Primary | ICD-10-CM

## 2017-06-07 PROBLEM — R65.10 SIRS (SYSTEMIC INFLAMMATORY RESPONSE SYNDROME) (HCC): Status: ACTIVE | Noted: 2017-06-07

## 2017-06-07 PROBLEM — I21.4 NON-ST ELEVATION MYOCARDIAL INFARCTION (NSTEMI) (HCC): Status: ACTIVE | Noted: 2017-06-07

## 2017-06-07 PROBLEM — G47.34 SLEEP RELATED HYPOXIA: Status: ACTIVE | Noted: 2017-06-07

## 2017-06-07 LAB
ABO GROUP BLD: NORMAL
ALBUMIN SERPL BCP-MCNC: 3.1 G/DL (ref 3.5–5)
ALP SERPL-CCNC: 181 U/L (ref 46–116)
ALT SERPL W P-5'-P-CCNC: 113 U/L (ref 12–78)
ANION GAP SERPL CALCULATED.3IONS-SCNC: 10 MMOL/L (ref 4–13)
APTT PPP: 26 SECONDS (ref 24–33)
ARTERIAL PATENCY WRIST A: YES
AST SERPL W P-5'-P-CCNC: 28 U/L (ref 5–45)
BACTERIA UR QL AUTO: ABNORMAL /HPF
BASE EXCESS BLDA CALC-SCNC: 2.8 MMOL/L
BASOPHILS # BLD AUTO: 0 THOUSANDS/ΜL (ref 0–0.1)
BASOPHILS NFR BLD AUTO: 0 % (ref 0–1)
BILIRUB SERPL-MCNC: 0.9 MG/DL (ref 0.2–1)
BILIRUB UR QL STRIP: NEGATIVE
BLD GP AB SCN SERPL QL: NEGATIVE
BODY TEMPERATURE: 104.2 DEGREES FEHRENHEIT
BUN SERPL-MCNC: 13 MG/DL (ref 5–25)
CALCIUM SERPL-MCNC: 8.5 MG/DL (ref 8.3–10.1)
CHLORIDE SERPL-SCNC: 98 MMOL/L (ref 100–108)
CLARITY UR: CLEAR
CO2 SERPL-SCNC: 30 MMOL/L (ref 21–32)
COLOR UR: YELLOW
CREAT SERPL-MCNC: 0.88 MG/DL (ref 0.6–1.3)
EOSINOPHIL # BLD AUTO: 0 THOUSAND/ΜL (ref 0–0.61)
EOSINOPHIL NFR BLD AUTO: 0 % (ref 0–6)
ERYTHROCYTE [DISTWIDTH] IN BLOOD BY AUTOMATED COUNT: 15.2 % (ref 11.6–15.1)
GFR SERPL CREATININE-BSD FRML MDRD: >60 ML/MIN/1.73SQ M
GLUCOSE SERPL-MCNC: 207 MG/DL (ref 65–140)
GLUCOSE SERPL-MCNC: 236 MG/DL (ref 65–140)
GLUCOSE SERPL-MCNC: 294 MG/DL (ref 65–140)
GLUCOSE SERPL-MCNC: 308 MG/DL (ref 65–140)
GLUCOSE UR STRIP-MCNC: ABNORMAL MG/DL
HCO3 BLDA-SCNC: 27.1 MMOL/L (ref 22–28)
HCT VFR BLD AUTO: 41.6 % (ref 37–47)
HGB BLD-MCNC: 13.7 G/DL (ref 12–16)
HGB UR QL STRIP.AUTO: ABNORMAL
INR PPP: 1.01 (ref 0.86–1.16)
KETONES UR STRIP-MCNC: NEGATIVE MG/DL
L PNEUMO1 AG UR QL IA.RAPID: NEGATIVE
LACTATE SERPL-SCNC: 1.7 MMOL/L (ref 0.5–2)
LEUKOCYTE ESTERASE UR QL STRIP: NEGATIVE
LIPASE SERPL-CCNC: 100 U/L (ref 73–393)
LYMPHOCYTES # BLD AUTO: 1 THOUSANDS/ΜL (ref 0.6–4.47)
LYMPHOCYTES NFR BLD AUTO: 6 % (ref 14–44)
MAGNESIUM SERPL-MCNC: 1.7 MG/DL (ref 1.6–2.6)
MCH RBC QN AUTO: 31.2 PG (ref 27–31)
MCHC RBC AUTO-ENTMCNC: 32.9 G/DL (ref 31.4–37.4)
MCV RBC AUTO: 95 FL (ref 82–98)
MONOCYTES # BLD AUTO: 0.5 THOUSAND/ΜL (ref 0.17–1.22)
MONOCYTES NFR BLD AUTO: 3 % (ref 4–12)
NEUTROPHILS # BLD AUTO: 14.9 THOUSANDS/ΜL (ref 1.85–7.62)
NEUTS SEG NFR BLD AUTO: 91 % (ref 43–75)
NITRITE UR QL STRIP: NEGATIVE
NON VENT TYPE-AEROSOL MASK: ABNORMAL
NON-SQ EPI CELLS URNS QL MICRO: ABNORMAL /HPF
NRBC BLD AUTO-RTO: 0 /100 WBCS
NT-PROBNP SERPL-MCNC: 212 PG/ML
O2 CT BLDA-SCNC: 18.9 ML/DL (ref 16–23)
OXYHGB MFR BLDA: 96.6 % (ref 94–97)
PCO2 BLDA: 40.7 MM HG (ref 36–44)
PCO2 TEMP ADJ BLDA: 46.6 MM HG (ref 36–44)
PH BLD: 7.39 [PH] (ref 7.35–7.45)
PH BLDA: 7.44 [PH] (ref 7.35–7.45)
PH UR STRIP.AUTO: 6.5 [PH] (ref 5–9)
PLATELET # BLD AUTO: 186 THOUSANDS/UL (ref 130–400)
PLATELET BLD QL SMEAR: ADEQUATE
PMV BLD AUTO: 8.2 FL (ref 8.9–12.7)
PO2 BLD: 131 MM HG (ref 75–129)
PO2 BLDA: 110.2 MM HG (ref 75–129)
POTASSIUM SERPL-SCNC: 3.8 MMOL/L (ref 3.5–5.3)
PROT SERPL-MCNC: 7.2 G/DL (ref 6.4–8.2)
PROT UR STRIP-MCNC: ABNORMAL MG/DL
PROTHROMBIN TIME: 10.6 SECONDS (ref 9.4–11.7)
RBC # BLD AUTO: 4.39 MILLION/UL (ref 4.2–5.4)
RBC #/AREA URNS AUTO: ABNORMAL /HPF
RH BLD: POSITIVE
S PNEUM AG UR QL: NEGATIVE
SODIUM SERPL-SCNC: 138 MMOL/L (ref 136–145)
SP GR UR STRIP.AUTO: <=1.005 (ref 1–1.03)
SPECIMEN EXPIRATION DATE: NORMAL
SPECIMEN SOURCE: ABNORMAL
TROPONIN I SERPL-MCNC: 0.33 NG/ML
TROPONIN I SERPL-MCNC: 0.78 NG/ML
TROPONIN I SERPL-MCNC: 0.89 NG/ML
TROPONIN I SERPL-MCNC: 1.12 NG/ML
UROBILINOGEN UR QL STRIP.AUTO: 0.2 E.U./DL
WBC # BLD AUTO: 16.4 THOUSAND/UL (ref 4.8–10.8)
WBC #/AREA URNS AUTO: ABNORMAL /HPF

## 2017-06-07 PROCEDURE — A9270 NON-COVERED ITEM OR SERVICE: HCPCS | Performed by: PHYSICIAN ASSISTANT

## 2017-06-07 PROCEDURE — 96360 HYDRATION IV INFUSION INIT: CPT

## 2017-06-07 PROCEDURE — 71010 HB CHEST X-RAY 1 VIEW FRONTAL (PORTABLE): CPT

## 2017-06-07 PROCEDURE — 83735 ASSAY OF MAGNESIUM: CPT | Performed by: EMERGENCY MEDICINE

## 2017-06-07 PROCEDURE — 94640 AIRWAY INHALATION TREATMENT: CPT

## 2017-06-07 PROCEDURE — 87186 SC STD MICRODIL/AGAR DIL: CPT | Performed by: EMERGENCY MEDICINE

## 2017-06-07 PROCEDURE — 80053 COMPREHEN METABOLIC PANEL: CPT | Performed by: EMERGENCY MEDICINE

## 2017-06-07 PROCEDURE — A9270 NON-COVERED ITEM OR SERVICE: HCPCS | Performed by: EMERGENCY MEDICINE

## 2017-06-07 PROCEDURE — 96367 TX/PROPH/DG ADDL SEQ IV INF: CPT

## 2017-06-07 PROCEDURE — 96365 THER/PROPH/DIAG IV INF INIT: CPT

## 2017-06-07 PROCEDURE — 86901 BLOOD TYPING SEROLOGIC RH(D): CPT | Performed by: EMERGENCY MEDICINE

## 2017-06-07 PROCEDURE — 87449 NOS EACH ORGANISM AG IA: CPT | Performed by: FAMILY MEDICINE

## 2017-06-07 PROCEDURE — 94760 N-INVAS EAR/PLS OXIMETRY 1: CPT

## 2017-06-07 PROCEDURE — 84484 ASSAY OF TROPONIN QUANT: CPT | Performed by: PHYSICIAN ASSISTANT

## 2017-06-07 PROCEDURE — 36600 WITHDRAWAL OF ARTERIAL BLOOD: CPT

## 2017-06-07 PROCEDURE — 85025 COMPLETE CBC W/AUTO DIFF WBC: CPT | Performed by: EMERGENCY MEDICINE

## 2017-06-07 PROCEDURE — 83605 ASSAY OF LACTIC ACID: CPT | Performed by: EMERGENCY MEDICINE

## 2017-06-07 PROCEDURE — 83690 ASSAY OF LIPASE: CPT | Performed by: EMERGENCY MEDICINE

## 2017-06-07 PROCEDURE — 96366 THER/PROPH/DIAG IV INF ADDON: CPT

## 2017-06-07 PROCEDURE — 85730 THROMBOPLASTIN TIME PARTIAL: CPT | Performed by: EMERGENCY MEDICINE

## 2017-06-07 PROCEDURE — 84484 ASSAY OF TROPONIN QUANT: CPT | Performed by: EMERGENCY MEDICINE

## 2017-06-07 PROCEDURE — 82805 BLOOD GASES W/O2 SATURATION: CPT | Performed by: EMERGENCY MEDICINE

## 2017-06-07 PROCEDURE — 99285 EMERGENCY DEPT VISIT HI MDM: CPT

## 2017-06-07 PROCEDURE — 74177 CT ABD & PELVIS W/CONTRAST: CPT

## 2017-06-07 PROCEDURE — 86850 RBC ANTIBODY SCREEN: CPT | Performed by: EMERGENCY MEDICINE

## 2017-06-07 PROCEDURE — 87076 CULTURE ANAEROBE IDENT EACH: CPT | Performed by: EMERGENCY MEDICINE

## 2017-06-07 PROCEDURE — 93005 ELECTROCARDIOGRAM TRACING: CPT | Performed by: EMERGENCY MEDICINE

## 2017-06-07 PROCEDURE — 36415 COLL VENOUS BLD VENIPUNCTURE: CPT | Performed by: EMERGENCY MEDICINE

## 2017-06-07 PROCEDURE — 81001 URINALYSIS AUTO W/SCOPE: CPT | Performed by: EMERGENCY MEDICINE

## 2017-06-07 PROCEDURE — 82948 REAGENT STRIP/BLOOD GLUCOSE: CPT

## 2017-06-07 PROCEDURE — 94664 DEMO&/EVAL PT USE INHALER: CPT

## 2017-06-07 PROCEDURE — 83880 ASSAY OF NATRIURETIC PEPTIDE: CPT | Performed by: EMERGENCY MEDICINE

## 2017-06-07 PROCEDURE — 85610 PROTHROMBIN TIME: CPT | Performed by: EMERGENCY MEDICINE

## 2017-06-07 PROCEDURE — 87040 BLOOD CULTURE FOR BACTERIA: CPT | Performed by: EMERGENCY MEDICINE

## 2017-06-07 PROCEDURE — 87081 CULTURE SCREEN ONLY: CPT | Performed by: INTERNAL MEDICINE

## 2017-06-07 PROCEDURE — 94660 CPAP INITIATION&MGMT: CPT

## 2017-06-07 PROCEDURE — 93306 TTE W/DOPPLER COMPLETE: CPT

## 2017-06-07 PROCEDURE — 86900 BLOOD TYPING SEROLOGIC ABO: CPT | Performed by: EMERGENCY MEDICINE

## 2017-06-07 RX ORDER — IPRATROPIUM BROMIDE AND ALBUTEROL SULFATE 2.5; .5 MG/3ML; MG/3ML
3 SOLUTION RESPIRATORY (INHALATION)
Status: DISCONTINUED | OUTPATIENT
Start: 2017-06-07 | End: 2017-06-07

## 2017-06-07 RX ORDER — POLYETHYLENE GLYCOL 3350 17 G/17G
17 POWDER, FOR SOLUTION ORAL DAILY
Status: DISCONTINUED | OUTPATIENT
Start: 2017-06-07 | End: 2017-06-17 | Stop reason: HOSPADM

## 2017-06-07 RX ORDER — POLYETHYLENE GLYCOL 3350 17 G/17G
17 POWDER, FOR SOLUTION ORAL DAILY
Status: CANCELLED | OUTPATIENT
Start: 2017-06-08

## 2017-06-07 RX ORDER — MAGNESIUM SULFATE HEPTAHYDRATE 40 MG/ML
2 INJECTION, SOLUTION INTRAVENOUS ONCE
Status: COMPLETED | OUTPATIENT
Start: 2017-06-07 | End: 2017-06-07

## 2017-06-07 RX ORDER — OXYCODONE HYDROCHLORIDE 5 MG/1
5 TABLET ORAL EVERY 4 HOURS PRN
Status: DISCONTINUED | OUTPATIENT
Start: 2017-06-07 | End: 2017-06-11

## 2017-06-07 RX ORDER — LEVOTHYROXINE SODIUM 0.12 MG/1
125 TABLET ORAL DAILY
Status: DISCONTINUED | OUTPATIENT
Start: 2017-06-08 | End: 2017-06-17 | Stop reason: HOSPADM

## 2017-06-07 RX ORDER — METOLAZONE 5 MG/1
5 TABLET ORAL 2 TIMES WEEKLY
COMMUNITY
End: 2017-06-17 | Stop reason: HOSPADM

## 2017-06-07 RX ORDER — IPRATROPIUM BROMIDE AND ALBUTEROL SULFATE 2.5; .5 MG/3ML; MG/3ML
SOLUTION RESPIRATORY (INHALATION)
Status: COMPLETED
Start: 2017-06-07 | End: 2017-06-07

## 2017-06-07 RX ORDER — ASPIRIN 81 MG/1
81 TABLET, CHEWABLE ORAL DAILY
Status: DISCONTINUED | OUTPATIENT
Start: 2017-06-08 | End: 2017-06-17 | Stop reason: HOSPADM

## 2017-06-07 RX ORDER — SODIUM CHLORIDE 9 MG/ML
75 INJECTION, SOLUTION INTRAVENOUS CONTINUOUS
Status: DISCONTINUED | OUTPATIENT
Start: 2017-06-07 | End: 2017-06-08

## 2017-06-07 RX ORDER — IPRATROPIUM BROMIDE AND ALBUTEROL SULFATE 2.5; .5 MG/3ML; MG/3ML
3 SOLUTION RESPIRATORY (INHALATION) ONCE
Status: COMPLETED | OUTPATIENT
Start: 2017-06-07 | End: 2017-06-07

## 2017-06-07 RX ORDER — IPRATROPIUM BROMIDE AND ALBUTEROL SULFATE 2.5; .5 MG/3ML; MG/3ML
3 SOLUTION RESPIRATORY (INHALATION)
Status: CANCELLED | OUTPATIENT
Start: 2017-06-07

## 2017-06-07 RX ORDER — FOLIC ACID 1 MG/1
1000 TABLET ORAL DAILY
Status: DISCONTINUED | OUTPATIENT
Start: 2017-06-07 | End: 2017-06-17 | Stop reason: HOSPADM

## 2017-06-07 RX ORDER — SODIUM CHLORIDE 9 MG/ML
75 INJECTION, SOLUTION INTRAVENOUS CONTINUOUS
Status: CANCELLED | OUTPATIENT
Start: 2017-06-07

## 2017-06-07 RX ORDER — IPRATROPIUM BROMIDE AND ALBUTEROL SULFATE 2.5; .5 MG/3ML; MG/3ML
3 SOLUTION RESPIRATORY (INHALATION) EVERY 6 HOURS PRN
Status: DISCONTINUED | OUTPATIENT
Start: 2017-06-07 | End: 2017-06-17 | Stop reason: HOSPADM

## 2017-06-07 RX ORDER — DICYCLOMINE HYDROCHLORIDE 10 MG/1
10 CAPSULE ORAL
Status: DISCONTINUED | OUTPATIENT
Start: 2017-06-07 | End: 2017-06-07

## 2017-06-07 RX ORDER — FUROSEMIDE 40 MG/1
40 TABLET ORAL 2 TIMES DAILY
COMMUNITY
End: 2017-09-21 | Stop reason: HOSPADM

## 2017-06-07 RX ORDER — ACETAMINOPHEN 325 MG/1
650 TABLET ORAL EVERY 6 HOURS PRN
Status: DISCONTINUED | OUTPATIENT
Start: 2017-06-07 | End: 2017-06-17 | Stop reason: HOSPADM

## 2017-06-07 RX ORDER — DICYCLOMINE HCL 20 MG
20 TABLET ORAL
Status: CANCELLED | OUTPATIENT
Start: 2017-06-07

## 2017-06-07 RX ORDER — ASCORBIC ACID 500 MG
500 TABLET ORAL DAILY
Status: DISCONTINUED | OUTPATIENT
Start: 2017-06-07 | End: 2017-06-17 | Stop reason: HOSPADM

## 2017-06-07 RX ORDER — ACETAMINOPHEN 325 MG/1
650 TABLET ORAL ONCE
Status: COMPLETED | OUTPATIENT
Start: 2017-06-07 | End: 2017-06-07

## 2017-06-07 RX ORDER — LEVOTHYROXINE SODIUM 0.12 MG/1
125 TABLET ORAL
Status: CANCELLED | OUTPATIENT
Start: 2017-06-07

## 2017-06-07 RX ADMIN — OXYCODONE HYDROCHLORIDE 5 MG: 5 TABLET ORAL at 19:51

## 2017-06-07 RX ADMIN — IPRATROPIUM BROMIDE AND ALBUTEROL SULFATE 3 ML: 2.5; .5 SOLUTION RESPIRATORY (INHALATION) at 06:57

## 2017-06-07 RX ADMIN — IOHEXOL 100 ML: 350 INJECTION, SOLUTION INTRAVENOUS at 09:29

## 2017-06-07 RX ADMIN — BISACODYL 10 MG: 5 TABLET, COATED ORAL at 21:29

## 2017-06-07 RX ADMIN — VANCOMYCIN HYDROCHLORIDE 1750 MG: 1 INJECTION, POWDER, LYOPHILIZED, FOR SOLUTION INTRAVENOUS at 08:25

## 2017-06-07 RX ADMIN — SODIUM CHLORIDE 1000 ML: 0.9 INJECTION, SOLUTION INTRAVENOUS at 07:39

## 2017-06-07 RX ADMIN — ACETAMINOPHEN 650 MG: 325 TABLET, FILM COATED ORAL at 07:06

## 2017-06-07 RX ADMIN — MAGNESIUM SULFATE HEPTAHYDRATE 2 G: 40 INJECTION, SOLUTION INTRAVENOUS at 19:52

## 2017-06-07 RX ADMIN — METRONIDAZOLE 500 MG: 500 SOLUTION INTRAVENOUS at 07:11

## 2017-06-07 RX ADMIN — INSULIN LISPRO 4 UNITS: 100 INJECTION, SOLUTION INTRAVENOUS; SUBCUTANEOUS at 13:56

## 2017-06-07 RX ADMIN — VANCOMYCIN HYDROCHLORIDE 1750 MG: 1 INJECTION, POWDER, LYOPHILIZED, FOR SOLUTION INTRAVENOUS at 20:10

## 2017-06-07 RX ADMIN — IPRATROPIUM BROMIDE AND ALBUTEROL SULFATE 3 ML: .5; 3 SOLUTION RESPIRATORY (INHALATION) at 13:33

## 2017-06-07 RX ADMIN — POLYETHYLENE GLYCOL 3350 17 G: 17 POWDER, FOR SOLUTION ORAL at 13:39

## 2017-06-07 RX ADMIN — PIPERACILLIN SODIUM,TAZOBACTAM SODIUM 3.38 G: 3; .375 INJECTION, POWDER, FOR SOLUTION INTRAVENOUS at 13:39

## 2017-06-07 RX ADMIN — SODIUM CHLORIDE 75 ML/HR: 0.9 INJECTION, SOLUTION INTRAVENOUS at 16:00

## 2017-06-07 RX ADMIN — OXYCODONE HYDROCHLORIDE 5 MG: 5 TABLET ORAL at 13:39

## 2017-06-07 RX ADMIN — SODIUM CHLORIDE 75 ML/HR: 0.9 INJECTION, SOLUTION INTRAVENOUS at 22:40

## 2017-06-07 RX ADMIN — INSULIN LISPRO 4 UNITS: 100 INJECTION, SOLUTION INTRAVENOUS; SUBCUTANEOUS at 21:55

## 2017-06-07 RX ADMIN — IPRATROPIUM BROMIDE AND ALBUTEROL SULFATE 3 ML: .5; 3 SOLUTION RESPIRATORY (INHALATION) at 06:57

## 2017-06-07 RX ADMIN — OXYCODONE HYDROCHLORIDE AND ACETAMINOPHEN 500 MG: 500 TABLET ORAL at 13:39

## 2017-06-07 RX ADMIN — ACETAMINOPHEN 650 MG: 325 TABLET, FILM COATED ORAL at 20:10

## 2017-06-07 RX ADMIN — FOLIC ACID 1000 MCG: 1 TABLET ORAL at 13:39

## 2017-06-07 RX ADMIN — PIPERACILLIN SODIUM,TAZOBACTAM SODIUM 3.38 G: 3; .375 INJECTION, POWDER, FOR SOLUTION INTRAVENOUS at 19:51

## 2017-06-07 RX ADMIN — PIPERACILLIN SODIUM,TAZOBACTAM SODIUM 3.38 G: 3; .375 INJECTION, POWDER, FOR SOLUTION INTRAVENOUS at 07:43

## 2017-06-07 RX ADMIN — INSULIN LISPRO 2 UNITS: 100 INJECTION, SOLUTION INTRAVENOUS; SUBCUTANEOUS at 17:25

## 2017-06-08 ENCOUNTER — APPOINTMENT (INPATIENT)
Dept: RADIOLOGY | Facility: HOSPITAL | Age: 77
DRG: 862 | End: 2017-06-08
Payer: COMMERCIAL

## 2017-06-08 PROBLEM — R78.81 GRAM-NEGATIVE BACTEREMIA: Status: ACTIVE | Noted: 2017-06-08

## 2017-06-08 LAB
ALBUMIN SERPL BCP-MCNC: 2.3 G/DL (ref 3.5–5)
ALP SERPL-CCNC: 125 U/L (ref 46–116)
ALT SERPL W P-5'-P-CCNC: 137 U/L (ref 12–78)
ANION GAP SERPL CALCULATED.3IONS-SCNC: 7 MMOL/L (ref 4–13)
AST SERPL W P-5'-P-CCNC: 105 U/L (ref 5–45)
BILIRUB SERPL-MCNC: 0.6 MG/DL (ref 0.2–1)
BUN SERPL-MCNC: 13 MG/DL (ref 5–25)
CA-I BLD-SCNC: 1.04 MMOL/L (ref 1.12–1.32)
CALCIUM SERPL-MCNC: 7.3 MG/DL (ref 8.3–10.1)
CHLORIDE SERPL-SCNC: 103 MMOL/L (ref 100–108)
CO2 SERPL-SCNC: 31 MMOL/L (ref 21–32)
CREAT SERPL-MCNC: 0.92 MG/DL (ref 0.6–1.3)
DOHLE BOD BLD QL SMEAR: PRESENT
DOHLE BOD BLD QL SMEAR: PRESENT
EOSINOPHIL # BLD AUTO: 0.13 THOUSAND/UL (ref 0–0.61)
EOSINOPHIL NFR BLD MANUAL: 1 % (ref 0–6)
ERYTHROCYTE [DISTWIDTH] IN BLOOD BY AUTOMATED COUNT: 15.4 % (ref 11.6–15.1)
EST. AVERAGE GLUCOSE BLD GHB EST-MCNC: 255 MG/DL
GFR SERPL CREATININE-BSD FRML MDRD: 59 ML/MIN/1.73SQ M
GLUCOSE SERPL-MCNC: 172 MG/DL (ref 65–140)
GLUCOSE SERPL-MCNC: 177 MG/DL (ref 65–140)
GLUCOSE SERPL-MCNC: 184 MG/DL (ref 65–140)
GLUCOSE SERPL-MCNC: 189 MG/DL (ref 65–140)
GLUCOSE SERPL-MCNC: 206 MG/DL (ref 65–140)
HBA1C MFR BLD: 10.5 % (ref 4.2–6.3)
HCT VFR BLD AUTO: 34.9 % (ref 37–47)
HGB BLD-MCNC: 11.4 G/DL (ref 12–16)
LYMPHOCYTES # BLD AUTO: 0.64 THOUSAND/UL (ref 0.6–4.47)
LYMPHOCYTES # BLD AUTO: 5 %
MAGNESIUM SERPL-MCNC: 2.3 MG/DL (ref 1.6–2.6)
MCH RBC QN AUTO: 31 PG (ref 27–31)
MCHC RBC AUTO-ENTMCNC: 32.7 G/DL (ref 31.4–37.4)
MCV RBC AUTO: 95 FL (ref 82–98)
MONOCYTES # BLD AUTO: 0.38 THOUSAND/UL (ref 0–1.22)
MONOCYTES NFR BLD AUTO: 3 % (ref 4–12)
MRSA NOSE QL CULT: NORMAL
NEUTS BAND NFR BLD MANUAL: 18 % (ref 0–8)
NEUTS SEG # BLD: 11.65 THOUSAND/UL (ref 1.81–6.82)
NEUTS SEG NFR BLD AUTO: 73 %
NRBC BLD AUTO-RTO: 0 /100 WBCS
PHOSPHATE SERPL-MCNC: 3.1 MG/DL (ref 2.3–4.1)
PLATELET # BLD AUTO: 149 THOUSANDS/UL (ref 130–400)
PLATELET BLD QL SMEAR: ADEQUATE
PLATELET BLD QL SMEAR: ADEQUATE
PMV BLD AUTO: 7.8 FL (ref 8.9–12.7)
POTASSIUM SERPL-SCNC: 3.5 MMOL/L (ref 3.5–5.3)
PROT SERPL-MCNC: 5.8 G/DL (ref 6.4–8.2)
RBC # BLD AUTO: 3.69 MILLION/UL (ref 4.2–5.4)
SODIUM SERPL-SCNC: 141 MMOL/L (ref 136–145)
TOTAL CELLS COUNTED SPEC: 100
WBC # BLD AUTO: 12.8 THOUSAND/UL (ref 4.8–10.8)

## 2017-06-08 PROCEDURE — A9270 NON-COVERED ITEM OR SERVICE: HCPCS | Performed by: PHYSICIAN ASSISTANT

## 2017-06-08 PROCEDURE — 85007 BL SMEAR W/DIFF WBC COUNT: CPT | Performed by: PHYSICIAN ASSISTANT

## 2017-06-08 PROCEDURE — 85027 COMPLETE CBC AUTOMATED: CPT | Performed by: PHYSICIAN ASSISTANT

## 2017-06-08 PROCEDURE — 83036 HEMOGLOBIN GLYCOSYLATED A1C: CPT | Performed by: SPECIALIST

## 2017-06-08 PROCEDURE — 80053 COMPREHEN METABOLIC PANEL: CPT | Performed by: PHYSICIAN ASSISTANT

## 2017-06-08 PROCEDURE — 83735 ASSAY OF MAGNESIUM: CPT | Performed by: PHYSICIAN ASSISTANT

## 2017-06-08 PROCEDURE — 94640 AIRWAY INHALATION TREATMENT: CPT

## 2017-06-08 PROCEDURE — 84100 ASSAY OF PHOSPHORUS: CPT | Performed by: PHYSICIAN ASSISTANT

## 2017-06-08 PROCEDURE — 93005 ELECTROCARDIOGRAM TRACING: CPT

## 2017-06-08 PROCEDURE — 82948 REAGENT STRIP/BLOOD GLUCOSE: CPT

## 2017-06-08 PROCEDURE — 94660 CPAP INITIATION&MGMT: CPT

## 2017-06-08 PROCEDURE — 94760 N-INVAS EAR/PLS OXIMETRY 1: CPT

## 2017-06-08 PROCEDURE — 93005 ELECTROCARDIOGRAM TRACING: CPT | Performed by: PHYSICIAN ASSISTANT

## 2017-06-08 PROCEDURE — 82330 ASSAY OF CALCIUM: CPT | Performed by: PHYSICIAN ASSISTANT

## 2017-06-08 RX ORDER — POTASSIUM CHLORIDE 20 MEQ/1
40 TABLET, EXTENDED RELEASE ORAL ONCE
Status: COMPLETED | OUTPATIENT
Start: 2017-06-08 | End: 2017-06-08

## 2017-06-08 RX ORDER — PRAMIPEXOLE DIHYDROCHLORIDE 0.5 MG/1
1 TABLET ORAL 3 TIMES DAILY
Status: DISCONTINUED | OUTPATIENT
Start: 2017-06-08 | End: 2017-06-17 | Stop reason: HOSPADM

## 2017-06-08 RX ADMIN — IPRATROPIUM BROMIDE AND ALBUTEROL SULFATE 3 ML: .5; 3 SOLUTION RESPIRATORY (INHALATION) at 03:01

## 2017-06-08 RX ADMIN — OXYCODONE HYDROCHLORIDE 5 MG: 5 TABLET ORAL at 23:13

## 2017-06-08 RX ADMIN — PIPERACILLIN SODIUM,TAZOBACTAM SODIUM 4.5 G: 4; .5 INJECTION, POWDER, FOR SOLUTION INTRAVENOUS at 20:24

## 2017-06-08 RX ADMIN — PRAMIPEXOLE DIHYDROCHLORIDE 1 MG: 0.5 TABLET ORAL at 09:30

## 2017-06-08 RX ADMIN — OXYCODONE HYDROCHLORIDE 5 MG: 5 TABLET ORAL at 07:55

## 2017-06-08 RX ADMIN — FOLIC ACID 1000 MCG: 1 TABLET ORAL at 09:30

## 2017-06-08 RX ADMIN — BISACODYL 10 MG: 5 TABLET, COATED ORAL at 21:42

## 2017-06-08 RX ADMIN — OXYCODONE HYDROCHLORIDE 5 MG: 5 TABLET ORAL at 19:00

## 2017-06-08 RX ADMIN — OXYCODONE HYDROCHLORIDE 5 MG: 5 TABLET ORAL at 02:52

## 2017-06-08 RX ADMIN — INSULIN LISPRO 2 UNITS: 100 INJECTION, SOLUTION INTRAVENOUS; SUBCUTANEOUS at 18:01

## 2017-06-08 RX ADMIN — PRAMIPEXOLE DIHYDROCHLORIDE 1 MG: 0.5 TABLET ORAL at 18:04

## 2017-06-08 RX ADMIN — IPRATROPIUM BROMIDE AND ALBUTEROL SULFATE 3 ML: .5; 3 SOLUTION RESPIRATORY (INHALATION) at 14:57

## 2017-06-08 RX ADMIN — LEVOTHYROXINE SODIUM 125 MCG: 125 TABLET ORAL at 05:01

## 2017-06-08 RX ADMIN — PRAMIPEXOLE DIHYDROCHLORIDE 1 MG: 0.5 TABLET ORAL at 21:41

## 2017-06-08 RX ADMIN — PIPERACILLIN SODIUM,TAZOBACTAM SODIUM 4.5 G: 4; .5 INJECTION, POWDER, FOR SOLUTION INTRAVENOUS at 14:50

## 2017-06-08 RX ADMIN — ACETAMINOPHEN 650 MG: 325 TABLET, FILM COATED ORAL at 03:24

## 2017-06-08 RX ADMIN — OXYCODONE HYDROCHLORIDE 5 MG: 5 TABLET ORAL at 14:48

## 2017-06-08 RX ADMIN — ACETAMINOPHEN 650 MG: 325 TABLET, FILM COATED ORAL at 14:49

## 2017-06-08 RX ADMIN — POLYETHYLENE GLYCOL 3350 17 G: 17 POWDER, FOR SOLUTION ORAL at 09:31

## 2017-06-08 RX ADMIN — INSULIN LISPRO 2 UNITS: 100 INJECTION, SOLUTION INTRAVENOUS; SUBCUTANEOUS at 10:59

## 2017-06-08 RX ADMIN — ENOXAPARIN SODIUM 40 MG: 40 INJECTION SUBCUTANEOUS at 13:23

## 2017-06-08 RX ADMIN — ASPIRIN 81 MG CHEWABLE TABLET 81 MG: 81 TABLET CHEWABLE at 09:30

## 2017-06-08 RX ADMIN — POTASSIUM CHLORIDE 40 MEQ: 1500 TABLET, EXTENDED RELEASE ORAL at 10:30

## 2017-06-08 RX ADMIN — OXYCODONE HYDROCHLORIDE AND ACETAMINOPHEN 500 MG: 500 TABLET ORAL at 09:30

## 2017-06-08 RX ADMIN — PIPERACILLIN SODIUM,TAZOBACTAM SODIUM 3.38 G: 3; .375 INJECTION, POWDER, FOR SOLUTION INTRAVENOUS at 07:56

## 2017-06-08 RX ADMIN — INSULIN LISPRO 4 UNITS: 100 INJECTION, SOLUTION INTRAVENOUS; SUBCUTANEOUS at 21:42

## 2017-06-08 RX ADMIN — PIPERACILLIN SODIUM,TAZOBACTAM SODIUM 3.38 G: 3; .375 INJECTION, POWDER, FOR SOLUTION INTRAVENOUS at 01:27

## 2017-06-08 RX ADMIN — INSULIN LISPRO 2 UNITS: 100 INJECTION, SOLUTION INTRAVENOUS; SUBCUTANEOUS at 07:56

## 2017-06-09 ENCOUNTER — APPOINTMENT (INPATIENT)
Dept: RADIOLOGY | Facility: HOSPITAL | Age: 77
DRG: 862 | End: 2017-06-09
Payer: COMMERCIAL

## 2017-06-09 PROBLEM — Z16.12 INFECTION DUE TO ESBL-PRODUCING KLEBSIELLA PNEUMONIAE: Status: ACTIVE | Noted: 2017-06-09

## 2017-06-09 PROBLEM — A49.8 INFECTION DUE TO ESBL-PRODUCING KLEBSIELLA PNEUMONIAE: Status: ACTIVE | Noted: 2017-06-09

## 2017-06-09 PROBLEM — K83.09 CHOLANGITIS: Status: ACTIVE | Noted: 2017-06-09

## 2017-06-09 LAB
ALBUMIN SERPL BCP-MCNC: 2.5 G/DL (ref 3.5–5)
ALP SERPL-CCNC: 160 U/L (ref 46–116)
ALT SERPL W P-5'-P-CCNC: 136 U/L (ref 12–78)
ANION GAP SERPL CALCULATED.3IONS-SCNC: 7 MMOL/L (ref 4–13)
AST SERPL W P-5'-P-CCNC: 70 U/L (ref 5–45)
BASOPHILS # BLD AUTO: 0 THOUSANDS/ΜL (ref 0–0.1)
BASOPHILS NFR BLD AUTO: 0 % (ref 0–1)
BILIRUB SERPL-MCNC: 0.5 MG/DL (ref 0.2–1)
BUN SERPL-MCNC: 16 MG/DL (ref 5–25)
CALCIUM SERPL-MCNC: 7.4 MG/DL (ref 8.3–10.1)
CHLORIDE SERPL-SCNC: 102 MMOL/L (ref 100–108)
CO2 SERPL-SCNC: 30 MMOL/L (ref 21–32)
CREAT SERPL-MCNC: 0.8 MG/DL (ref 0.6–1.3)
EOSINOPHIL # BLD AUTO: 0 THOUSAND/ΜL (ref 0–0.61)
EOSINOPHIL NFR BLD AUTO: 0 % (ref 0–6)
ERYTHROCYTE [DISTWIDTH] IN BLOOD BY AUTOMATED COUNT: 15.1 % (ref 11.6–15.1)
GFR SERPL CREATININE-BSD FRML MDRD: >60 ML/MIN/1.73SQ M
GLUCOSE SERPL-MCNC: 172 MG/DL (ref 65–140)
GLUCOSE SERPL-MCNC: 177 MG/DL (ref 65–140)
GLUCOSE SERPL-MCNC: 210 MG/DL (ref 65–140)
GLUCOSE SERPL-MCNC: 240 MG/DL (ref 65–140)
GLUCOSE SERPL-MCNC: 253 MG/DL (ref 65–140)
HCT VFR BLD AUTO: 35.7 % (ref 37–47)
HGB BLD-MCNC: 11.7 G/DL (ref 12–16)
LYMPHOCYTES # BLD AUTO: 1.2 THOUSANDS/ΜL (ref 0.6–4.47)
LYMPHOCYTES NFR BLD AUTO: 10 % (ref 14–44)
MAGNESIUM SERPL-MCNC: 2.3 MG/DL (ref 1.6–2.6)
MCH RBC QN AUTO: 31.2 PG (ref 27–31)
MCHC RBC AUTO-ENTMCNC: 32.9 G/DL (ref 31.4–37.4)
MCV RBC AUTO: 95 FL (ref 82–98)
MONOCYTES # BLD AUTO: 0.7 THOUSAND/ΜL (ref 0.17–1.22)
MONOCYTES NFR BLD AUTO: 6 % (ref 4–12)
NEUTROPHILS # BLD AUTO: 10.4 THOUSANDS/ΜL (ref 1.85–7.62)
NEUTS SEG NFR BLD AUTO: 84 % (ref 43–75)
NRBC BLD AUTO-RTO: 0 /100 WBCS
PLATELET # BLD AUTO: 162 THOUSANDS/UL (ref 130–400)
PMV BLD AUTO: 7.9 FL (ref 8.9–12.7)
POTASSIUM SERPL-SCNC: 4.4 MMOL/L (ref 3.5–5.3)
PROT SERPL-MCNC: 6.5 G/DL (ref 6.4–8.2)
RBC # BLD AUTO: 3.76 MILLION/UL (ref 4.2–5.4)
SODIUM SERPL-SCNC: 139 MMOL/L (ref 136–145)
WBC # BLD AUTO: 12.3 THOUSAND/UL (ref 4.8–10.8)

## 2017-06-09 PROCEDURE — A9270 NON-COVERED ITEM OR SERVICE: HCPCS | Performed by: PHYSICIAN ASSISTANT

## 2017-06-09 PROCEDURE — 80053 COMPREHEN METABOLIC PANEL: CPT | Performed by: PHYSICIAN ASSISTANT

## 2017-06-09 PROCEDURE — 85025 COMPLETE CBC W/AUTO DIFF WBC: CPT | Performed by: PHYSICIAN ASSISTANT

## 2017-06-09 PROCEDURE — 94760 N-INVAS EAR/PLS OXIMETRY 1: CPT

## 2017-06-09 PROCEDURE — 83735 ASSAY OF MAGNESIUM: CPT | Performed by: PHYSICIAN ASSISTANT

## 2017-06-09 PROCEDURE — 74000 HB X-RAY EXAM OF ABDOMEN (SINGLE ANTEROPOSTERIOR VIEW): CPT

## 2017-06-09 PROCEDURE — 71010 HB CHEST X-RAY 1 VIEW FRONTAL (PORTABLE): CPT

## 2017-06-09 PROCEDURE — 94660 CPAP INITIATION&MGMT: CPT

## 2017-06-09 PROCEDURE — 82948 REAGENT STRIP/BLOOD GLUCOSE: CPT

## 2017-06-09 RX ORDER — ONDANSETRON 2 MG/ML
4 INJECTION INTRAMUSCULAR; INTRAVENOUS EVERY 6 HOURS PRN
Status: DISCONTINUED | OUTPATIENT
Start: 2017-06-09 | End: 2017-06-17 | Stop reason: HOSPADM

## 2017-06-09 RX ADMIN — PIPERACILLIN SODIUM,TAZOBACTAM SODIUM 4.5 G: 4; .5 INJECTION, POWDER, FOR SOLUTION INTRAVENOUS at 07:44

## 2017-06-09 RX ADMIN — PRAMIPEXOLE DIHYDROCHLORIDE 1 MG: 0.5 TABLET ORAL at 21:26

## 2017-06-09 RX ADMIN — PRAMIPEXOLE DIHYDROCHLORIDE 1 MG: 0.5 TABLET ORAL at 15:28

## 2017-06-09 RX ADMIN — MEROPENEM 1000 MG: 1 INJECTION, POWDER, FOR SOLUTION INTRAVENOUS at 13:31

## 2017-06-09 RX ADMIN — MEROPENEM 1000 MG: 1 INJECTION, POWDER, FOR SOLUTION INTRAVENOUS at 21:19

## 2017-06-09 RX ADMIN — OXYCODONE HYDROCHLORIDE 5 MG: 5 TABLET ORAL at 05:23

## 2017-06-09 RX ADMIN — PIPERACILLIN SODIUM,TAZOBACTAM SODIUM 4.5 G: 4; .5 INJECTION, POWDER, FOR SOLUTION INTRAVENOUS at 01:49

## 2017-06-09 RX ADMIN — INSULIN LISPRO 2 UNITS: 100 INJECTION, SOLUTION INTRAVENOUS; SUBCUTANEOUS at 08:11

## 2017-06-09 RX ADMIN — LEVOTHYROXINE SODIUM 125 MCG: 125 TABLET ORAL at 05:23

## 2017-06-09 RX ADMIN — BISACODYL 10 MG: 5 TABLET, COATED ORAL at 21:25

## 2017-06-09 RX ADMIN — OXYCODONE HYDROCHLORIDE 5 MG: 5 TABLET ORAL at 15:26

## 2017-06-09 RX ADMIN — ONDANSETRON 4 MG: 2 INJECTION INTRAMUSCULAR; INTRAVENOUS at 21:41

## 2017-06-09 RX ADMIN — MAGNESIUM HYDROXIDE 15 ML: 400 SUSPENSION ORAL at 08:09

## 2017-06-09 RX ADMIN — INSULIN LISPRO 2 UNITS: 100 INJECTION, SOLUTION INTRAVENOUS; SUBCUTANEOUS at 21:25

## 2017-06-09 RX ADMIN — OXYCODONE HYDROCHLORIDE 5 MG: 5 TABLET ORAL at 19:14

## 2017-06-09 RX ADMIN — INSULIN LISPRO 6 UNITS: 100 INJECTION, SOLUTION INTRAVENOUS; SUBCUTANEOUS at 12:15

## 2017-06-09 RX ADMIN — OXYCODONE HYDROCHLORIDE AND ACETAMINOPHEN 500 MG: 500 TABLET ORAL at 08:09

## 2017-06-09 RX ADMIN — POLYETHYLENE GLYCOL 3350 17 G: 17 POWDER, FOR SOLUTION ORAL at 08:09

## 2017-06-09 RX ADMIN — FOLIC ACID 1000 MCG: 1 TABLET ORAL at 08:09

## 2017-06-09 RX ADMIN — INSULIN LISPRO 4 UNITS: 100 INJECTION, SOLUTION INTRAVENOUS; SUBCUTANEOUS at 17:01

## 2017-06-09 RX ADMIN — PRAMIPEXOLE DIHYDROCHLORIDE 1 MG: 0.5 TABLET ORAL at 08:09

## 2017-06-09 RX ADMIN — ONDANSETRON 4 MG: 2 INJECTION INTRAMUSCULAR; INTRAVENOUS at 10:47

## 2017-06-09 RX ADMIN — ASPIRIN 81 MG CHEWABLE TABLET 81 MG: 81 TABLET CHEWABLE at 08:08

## 2017-06-10 PROBLEM — J45.909 ASTHMA: Status: ACTIVE | Noted: 2017-06-10

## 2017-06-10 PROBLEM — I50.23 ACUTE ON CHRONIC SYSTOLIC HEART FAILURE (HCC): Chronic | Status: ACTIVE | Noted: 2017-06-10

## 2017-06-10 PROBLEM — I50.23 ACUTE ON CHRONIC SYSTOLIC HEART FAILURE (HCC): Status: ACTIVE | Noted: 2017-06-10

## 2017-06-10 PROBLEM — R06.02 SOB (SHORTNESS OF BREATH): Status: RESOLVED | Noted: 2017-06-07 | Resolved: 2017-06-10

## 2017-06-10 PROBLEM — J96.91 RESPIRATORY FAILURE WITH HYPOXIA (HCC): Status: ACTIVE | Noted: 2017-06-10

## 2017-06-10 PROBLEM — G89.29 CHRONIC PAIN: Status: ACTIVE | Noted: 2017-06-10

## 2017-06-10 PROBLEM — K59.03 CONSTIPATION DUE TO OPIOID THERAPY: Status: ACTIVE | Noted: 2017-06-10

## 2017-06-10 PROBLEM — E66.01 MORBID OBESITY WITH BMI OF 40.0-44.9, ADULT (HCC): Chronic | Status: ACTIVE | Noted: 2017-04-18

## 2017-06-10 PROBLEM — G25.81 RESTLESS LEG: Chronic | Status: ACTIVE | Noted: 2017-06-10

## 2017-06-10 PROBLEM — G89.29 CHRONIC PAIN: Chronic | Status: ACTIVE | Noted: 2017-06-10

## 2017-06-10 PROBLEM — G25.81 RESTLESS LEG: Status: ACTIVE | Noted: 2017-06-10

## 2017-06-10 PROBLEM — B96.1 BACTEREMIA DUE TO KLEBSIELLA PNEUMONIAE: Status: ACTIVE | Noted: 2017-06-08

## 2017-06-10 PROBLEM — G47.30 SLEEP APNEA: Status: ACTIVE | Noted: 2017-06-10

## 2017-06-10 PROBLEM — F11.20 OPIOID DEPENDENCE (HCC): Chronic | Status: ACTIVE | Noted: 2017-04-18

## 2017-06-10 PROBLEM — T40.2X5A CONSTIPATION DUE TO OPIOID THERAPY: Status: ACTIVE | Noted: 2017-06-10

## 2017-06-10 PROBLEM — G47.30 SLEEP APNEA: Chronic | Status: ACTIVE | Noted: 2017-06-10

## 2017-06-10 PROBLEM — K58.0 IRRITABLE BOWEL SYNDROME WITH DIARRHEA: Chronic | Status: ACTIVE | Noted: 2017-06-10

## 2017-06-10 PROBLEM — K58.0 IRRITABLE BOWEL SYNDROME WITH DIARRHEA: Status: ACTIVE | Noted: 2017-06-10

## 2017-06-10 PROBLEM — J45.909 ASTHMA: Chronic | Status: ACTIVE | Noted: 2017-06-10

## 2017-06-10 PROBLEM — K59.09 CHRONIC CONSTIPATION: Chronic | Status: ACTIVE | Noted: 2017-04-17

## 2017-06-10 PROBLEM — E11.9 T2DM (TYPE 2 DIABETES MELLITUS) (HCC): Chronic | Status: ACTIVE | Noted: 2017-04-15

## 2017-06-10 PROBLEM — E03.9 ACQUIRED HYPOTHYROIDISM: Chronic | Status: ACTIVE | Noted: 2017-04-15

## 2017-06-10 PROBLEM — R06.82 TACHYPNEA: Status: RESOLVED | Noted: 2017-06-07 | Resolved: 2017-06-10

## 2017-06-10 LAB
ALBUMIN SERPL BCP-MCNC: 2.4 G/DL (ref 3.5–5)
ALP SERPL-CCNC: 152 U/L (ref 46–116)
ALT SERPL W P-5'-P-CCNC: 109 U/L (ref 12–78)
ANION GAP SERPL CALCULATED.3IONS-SCNC: 3 MMOL/L (ref 4–13)
AST SERPL W P-5'-P-CCNC: 37 U/L (ref 5–45)
BACTERIA BLD CULT: ABNORMAL
BACTERIA BLD CULT: ABNORMAL
BASOPHILS # BLD AUTO: 0 THOUSANDS/ΜL (ref 0–0.1)
BASOPHILS NFR BLD AUTO: 0 % (ref 0–1)
BILIRUB SERPL-MCNC: 0.4 MG/DL (ref 0.2–1)
BUN SERPL-MCNC: 16 MG/DL (ref 5–25)
CALCIUM SERPL-MCNC: 7.6 MG/DL (ref 8.3–10.1)
CHLORIDE SERPL-SCNC: 104 MMOL/L (ref 100–108)
CO2 SERPL-SCNC: 33 MMOL/L (ref 21–32)
CREAT SERPL-MCNC: 0.73 MG/DL (ref 0.6–1.3)
EOSINOPHIL # BLD AUTO: 0.1 THOUSAND/ΜL (ref 0–0.61)
EOSINOPHIL NFR BLD AUTO: 1 % (ref 0–6)
ERYTHROCYTE [DISTWIDTH] IN BLOOD BY AUTOMATED COUNT: 15 % (ref 11.6–15.1)
GFR SERPL CREATININE-BSD FRML MDRD: >60 ML/MIN/1.73SQ M
GLUCOSE SERPL-MCNC: 164 MG/DL (ref 65–140)
GLUCOSE SERPL-MCNC: 179 MG/DL (ref 65–140)
GLUCOSE SERPL-MCNC: 231 MG/DL (ref 65–140)
GRAM STN SPEC: ABNORMAL
GRAM STN SPEC: ABNORMAL
HCT VFR BLD AUTO: 34.4 % (ref 37–47)
HGB BLD-MCNC: 11.1 G/DL (ref 12–16)
LYMPHOCYTES # BLD AUTO: 1.6 THOUSANDS/ΜL (ref 0.6–4.47)
LYMPHOCYTES NFR BLD AUTO: 20 % (ref 14–44)
MAGNESIUM SERPL-MCNC: 2.3 MG/DL (ref 1.6–2.6)
MCH RBC QN AUTO: 30.8 PG (ref 27–31)
MCHC RBC AUTO-ENTMCNC: 32.3 G/DL (ref 31.4–37.4)
MCV RBC AUTO: 95 FL (ref 82–98)
MONOCYTES # BLD AUTO: 0.6 THOUSAND/ΜL (ref 0.17–1.22)
MONOCYTES NFR BLD AUTO: 8 % (ref 4–12)
NEUTROPHILS # BLD AUTO: 5.9 THOUSANDS/ΜL (ref 1.85–7.62)
NEUTS SEG NFR BLD AUTO: 72 % (ref 43–75)
NRBC BLD AUTO-RTO: 0 /100 WBCS
PHOSPHATE SERPL-MCNC: 2.5 MG/DL (ref 2.3–4.1)
PLATELET # BLD AUTO: 177 THOUSANDS/UL (ref 130–400)
PMV BLD AUTO: 7.9 FL (ref 8.9–12.7)
POTASSIUM SERPL-SCNC: 4.7 MMOL/L (ref 3.5–5.3)
PROT SERPL-MCNC: 6.3 G/DL (ref 6.4–8.2)
RBC # BLD AUTO: 3.62 MILLION/UL (ref 4.2–5.4)
SODIUM SERPL-SCNC: 140 MMOL/L (ref 136–145)
WBC # BLD AUTO: 8.2 THOUSAND/UL (ref 4.8–10.8)

## 2017-06-10 PROCEDURE — 80053 COMPREHEN METABOLIC PANEL: CPT | Performed by: PHYSICIAN ASSISTANT

## 2017-06-10 PROCEDURE — A9270 NON-COVERED ITEM OR SERVICE: HCPCS | Performed by: PHYSICIAN ASSISTANT

## 2017-06-10 PROCEDURE — A9270 NON-COVERED ITEM OR SERVICE: HCPCS | Performed by: NURSE PRACTITIONER

## 2017-06-10 PROCEDURE — 94640 AIRWAY INHALATION TREATMENT: CPT

## 2017-06-10 PROCEDURE — 83735 ASSAY OF MAGNESIUM: CPT | Performed by: FAMILY MEDICINE

## 2017-06-10 PROCEDURE — 82948 REAGENT STRIP/BLOOD GLUCOSE: CPT

## 2017-06-10 PROCEDURE — 85025 COMPLETE CBC W/AUTO DIFF WBC: CPT | Performed by: PHYSICIAN ASSISTANT

## 2017-06-10 PROCEDURE — 94760 N-INVAS EAR/PLS OXIMETRY 1: CPT

## 2017-06-10 PROCEDURE — 84100 ASSAY OF PHOSPHORUS: CPT | Performed by: FAMILY MEDICINE

## 2017-06-10 RX ORDER — AMOXICILLIN 250 MG
1 CAPSULE ORAL 2 TIMES DAILY
Status: DISCONTINUED | OUTPATIENT
Start: 2017-06-10 | End: 2017-06-13

## 2017-06-10 RX ORDER — BISACODYL 10 MG
10 SUPPOSITORY, RECTAL RECTAL ONCE
Status: DISCONTINUED | OUTPATIENT
Start: 2017-06-10 | End: 2017-06-10

## 2017-06-10 RX ADMIN — POLYETHYLENE GLYCOL 3350 17 G: 17 POWDER, FOR SOLUTION ORAL at 08:19

## 2017-06-10 RX ADMIN — MEROPENEM 1000 MG: 1 INJECTION, POWDER, FOR SOLUTION INTRAVENOUS at 05:43

## 2017-06-10 RX ADMIN — PRAMIPEXOLE DIHYDROCHLORIDE 1 MG: 0.5 TABLET ORAL at 08:19

## 2017-06-10 RX ADMIN — ENOXAPARIN SODIUM 40 MG: 40 INJECTION SUBCUTANEOUS at 08:19

## 2017-06-10 RX ADMIN — MEROPENEM 1000 MG: 1 INJECTION, POWDER, FOR SOLUTION INTRAVENOUS at 14:51

## 2017-06-10 RX ADMIN — INSULIN LISPRO 2 UNITS: 100 INJECTION, SOLUTION INTRAVENOUS; SUBCUTANEOUS at 07:59

## 2017-06-10 RX ADMIN — IPRATROPIUM BROMIDE AND ALBUTEROL SULFATE 3 ML: .5; 3 SOLUTION RESPIRATORY (INHALATION) at 23:53

## 2017-06-10 RX ADMIN — PRAMIPEXOLE DIHYDROCHLORIDE 1 MG: 0.5 TABLET ORAL at 17:10

## 2017-06-10 RX ADMIN — OXYCODONE HYDROCHLORIDE 5 MG: 5 TABLET ORAL at 08:18

## 2017-06-10 RX ADMIN — OXYCODONE HYDROCHLORIDE 5 MG: 5 TABLET ORAL at 17:30

## 2017-06-10 RX ADMIN — FOLIC ACID 1000 MCG: 1 TABLET ORAL at 08:19

## 2017-06-10 RX ADMIN — IPRATROPIUM BROMIDE AND ALBUTEROL SULFATE 3 ML: .5; 3 SOLUTION RESPIRATORY (INHALATION) at 07:26

## 2017-06-10 RX ADMIN — OXYCODONE HYDROCHLORIDE AND ACETAMINOPHEN 500 MG: 500 TABLET ORAL at 08:19

## 2017-06-10 RX ADMIN — MEROPENEM 1000 MG: 1 INJECTION, POWDER, FOR SOLUTION INTRAVENOUS at 21:22

## 2017-06-10 RX ADMIN — INSULIN LISPRO 6 UNITS: 100 INJECTION, SOLUTION INTRAVENOUS; SUBCUTANEOUS at 17:09

## 2017-06-10 RX ADMIN — Medication 1 TABLET: at 17:10

## 2017-06-10 RX ADMIN — INSULIN LISPRO 6 UNITS: 100 INJECTION, SOLUTION INTRAVENOUS; SUBCUTANEOUS at 21:22

## 2017-06-10 RX ADMIN — ASPIRIN 81 MG CHEWABLE TABLET 81 MG: 81 TABLET CHEWABLE at 08:19

## 2017-06-10 RX ADMIN — ONDANSETRON 4 MG: 2 INJECTION INTRAMUSCULAR; INTRAVENOUS at 08:18

## 2017-06-10 RX ADMIN — INSULIN LISPRO 4 UNITS: 100 INJECTION, SOLUTION INTRAVENOUS; SUBCUTANEOUS at 11:58

## 2017-06-10 RX ADMIN — METHYLNALTREXONE BROMIDE 12 MG: 12 INJECTION, SOLUTION SUBCUTANEOUS at 10:13

## 2017-06-10 RX ADMIN — PRAMIPEXOLE DIHYDROCHLORIDE 1 MG: 0.5 TABLET ORAL at 21:22

## 2017-06-10 RX ADMIN — OXYCODONE HYDROCHLORIDE 5 MG: 5 TABLET ORAL at 12:01

## 2017-06-10 RX ADMIN — Medication 1 TABLET: at 10:23

## 2017-06-10 RX ADMIN — LEVOTHYROXINE SODIUM 125 MCG: 125 TABLET ORAL at 05:40

## 2017-06-11 ENCOUNTER — APPOINTMENT (INPATIENT)
Dept: RADIOLOGY | Facility: HOSPITAL | Age: 77
DRG: 862 | End: 2017-06-11
Payer: COMMERCIAL

## 2017-06-11 ENCOUNTER — GENERIC CONVERSION - ENCOUNTER (OUTPATIENT)
Dept: OTHER | Facility: OTHER | Age: 77
End: 2017-06-11

## 2017-06-11 LAB
ALBUMIN SERPL BCP-MCNC: 2.5 G/DL (ref 3.5–5)
ALP SERPL-CCNC: 145 U/L (ref 46–116)
ALT SERPL W P-5'-P-CCNC: 88 U/L (ref 12–78)
ANION GAP SERPL CALCULATED.3IONS-SCNC: 5 MMOL/L (ref 4–13)
ARTERIAL PATENCY WRIST A: YES
AST SERPL W P-5'-P-CCNC: 25 U/L (ref 5–45)
BASE EXCESS BLDA CALC-SCNC: 7.5 MMOL/L
BASOPHILS # BLD AUTO: 0.1 THOUSANDS/ΜL (ref 0–0.1)
BASOPHILS NFR BLD AUTO: 1 % (ref 0–1)
BILIRUB SERPL-MCNC: 0.3 MG/DL (ref 0.2–1)
BODY TEMPERATURE: 97.8 DEGREES FEHRENHEIT
BUN SERPL-MCNC: 10 MG/DL (ref 5–25)
CALCIUM SERPL-MCNC: 7.6 MG/DL (ref 8.3–10.1)
CHLORIDE SERPL-SCNC: 102 MMOL/L (ref 100–108)
CO2 SERPL-SCNC: 34 MMOL/L (ref 21–32)
CREAT SERPL-MCNC: 0.7 MG/DL (ref 0.6–1.3)
EOSINOPHIL # BLD AUTO: 0 THOUSAND/ΜL (ref 0–0.61)
EOSINOPHIL NFR BLD AUTO: 1 % (ref 0–6)
ERYTHROCYTE [DISTWIDTH] IN BLOOD BY AUTOMATED COUNT: 14.9 % (ref 11.6–15.1)
GFR SERPL CREATININE-BSD FRML MDRD: >60 ML/MIN/1.73SQ M
GLUCOSE SERPL-MCNC: 215 MG/DL (ref 65–140)
HCO3 BLDA-SCNC: 35 MMOL/L (ref 22–28)
HCT VFR BLD AUTO: 34.6 % (ref 37–47)
HGB BLD-MCNC: 11.4 G/DL (ref 12–16)
LYMPHOCYTES # BLD AUTO: 1.3 THOUSANDS/ΜL (ref 0.6–4.47)
LYMPHOCYTES NFR BLD AUTO: 18 % (ref 14–44)
MAGNESIUM SERPL-MCNC: 2.2 MG/DL (ref 1.6–2.6)
MCH RBC QN AUTO: 30.9 PG (ref 27–31)
MCHC RBC AUTO-ENTMCNC: 32.8 G/DL (ref 31.4–37.4)
MCV RBC AUTO: 94 FL (ref 82–98)
MONOCYTES # BLD AUTO: 0.6 THOUSAND/ΜL (ref 0.17–1.22)
MONOCYTES NFR BLD AUTO: 8 % (ref 4–12)
NASAL CANNULA: 2
NEUTROPHILS # BLD AUTO: 5.3 THOUSANDS/ΜL (ref 1.85–7.62)
NEUTS SEG NFR BLD AUTO: 73 % (ref 43–75)
NRBC BLD AUTO-RTO: 0 /100 WBCS
O2 CT BLDA-SCNC: 16.5 ML/DL (ref 16–23)
OXYHGB MFR BLDA: 93.4 % (ref 94–97)
PCO2 BLDA: 63.9 MM HG (ref 36–44)
PCO2 TEMP ADJ BLDA: 62.8 MM HG (ref 36–44)
PH BLD: 7.36 [PH] (ref 7.35–7.45)
PH BLDA: 7.36 [PH] (ref 7.35–7.45)
PHOSPHATE SERPL-MCNC: 2.1 MG/DL (ref 2.3–4.1)
PLATELET # BLD AUTO: 209 THOUSANDS/UL (ref 130–400)
PMV BLD AUTO: 8.6 FL (ref 8.9–12.7)
PO2 BLD: 71.3 MM HG (ref 75–129)
PO2 BLDA: 73.2 MM HG (ref 75–129)
POTASSIUM SERPL-SCNC: 4.8 MMOL/L (ref 3.5–5.3)
PROT SERPL-MCNC: 6.4 G/DL (ref 6.4–8.2)
RBC # BLD AUTO: 3.67 MILLION/UL (ref 4.2–5.4)
SODIUM SERPL-SCNC: 141 MMOL/L (ref 136–145)
SPECIMEN SOURCE: ABNORMAL
WBC # BLD AUTO: 7.2 THOUSAND/UL (ref 4.8–10.8)

## 2017-06-11 PROCEDURE — A9270 NON-COVERED ITEM OR SERVICE: HCPCS | Performed by: NURSE PRACTITIONER

## 2017-06-11 PROCEDURE — A9270 NON-COVERED ITEM OR SERVICE: HCPCS | Performed by: PHYSICIAN ASSISTANT

## 2017-06-11 PROCEDURE — 87040 BLOOD CULTURE FOR BACTERIA: CPT | Performed by: NURSE PRACTITIONER

## 2017-06-11 PROCEDURE — 80053 COMPREHEN METABOLIC PANEL: CPT | Performed by: NURSE PRACTITIONER

## 2017-06-11 PROCEDURE — 82948 REAGENT STRIP/BLOOD GLUCOSE: CPT

## 2017-06-11 PROCEDURE — 71010 HB CHEST X-RAY 1 VIEW FRONTAL: CPT

## 2017-06-11 PROCEDURE — 73130 X-RAY EXAM OF HAND: CPT

## 2017-06-11 PROCEDURE — 85025 COMPLETE CBC W/AUTO DIFF WBC: CPT | Performed by: NURSE PRACTITIONER

## 2017-06-11 PROCEDURE — 83735 ASSAY OF MAGNESIUM: CPT | Performed by: NURSE PRACTITIONER

## 2017-06-11 PROCEDURE — 36600 WITHDRAWAL OF ARTERIAL BLOOD: CPT

## 2017-06-11 PROCEDURE — 82805 BLOOD GASES W/O2 SATURATION: CPT | Performed by: NURSE PRACTITIONER

## 2017-06-11 PROCEDURE — 84100 ASSAY OF PHOSPHORUS: CPT | Performed by: NURSE PRACTITIONER

## 2017-06-11 PROCEDURE — 94760 N-INVAS EAR/PLS OXIMETRY 1: CPT

## 2017-06-11 PROCEDURE — 94640 AIRWAY INHALATION TREATMENT: CPT

## 2017-06-11 RX ORDER — MORPHINE SULFATE 2 MG/ML
2 INJECTION, SOLUTION INTRAMUSCULAR; INTRAVENOUS ONCE AS NEEDED
Status: COMPLETED | OUTPATIENT
Start: 2017-06-11 | End: 2017-06-11

## 2017-06-11 RX ORDER — MORPHINE SULFATE 2 MG/ML
2 INJECTION, SOLUTION INTRAMUSCULAR; INTRAVENOUS ONCE AS NEEDED
Status: DISCONTINUED | OUTPATIENT
Start: 2017-06-11 | End: 2017-06-11

## 2017-06-11 RX ORDER — POTASSIUM CHLORIDE 750 MG/1
10 TABLET, EXTENDED RELEASE ORAL DAILY
Status: DISCONTINUED | OUTPATIENT
Start: 2017-06-11 | End: 2017-06-17 | Stop reason: HOSPADM

## 2017-06-11 RX ORDER — FUROSEMIDE 40 MG/1
40 TABLET ORAL
Status: DISCONTINUED | OUTPATIENT
Start: 2017-06-11 | End: 2017-06-11

## 2017-06-11 RX ORDER — OXYCODONE HYDROCHLORIDE 5 MG/1
5 TABLET ORAL EVERY 6 HOURS PRN
Status: DISCONTINUED | OUTPATIENT
Start: 2017-06-11 | End: 2017-06-12

## 2017-06-11 RX ORDER — FUROSEMIDE 40 MG/1
40 TABLET ORAL
Status: DISCONTINUED | OUTPATIENT
Start: 2017-06-11 | End: 2017-06-13

## 2017-06-11 RX ORDER — FUROSEMIDE 40 MG/1
40 TABLET ORAL DAILY
Status: DISCONTINUED | OUTPATIENT
Start: 2017-06-11 | End: 2017-06-11

## 2017-06-11 RX ADMIN — INSULIN LISPRO 8 UNITS: 100 INJECTION, SOLUTION INTRAVENOUS; SUBCUTANEOUS at 21:45

## 2017-06-11 RX ADMIN — IPRATROPIUM BROMIDE AND ALBUTEROL SULFATE 3 ML: .5; 3 SOLUTION RESPIRATORY (INHALATION) at 12:20

## 2017-06-11 RX ADMIN — POTASSIUM CHLORIDE 10 MEQ: 750 TABLET, EXTENDED RELEASE ORAL at 12:43

## 2017-06-11 RX ADMIN — INSULIN LISPRO 2 UNITS: 100 INJECTION, SOLUTION INTRAVENOUS; SUBCUTANEOUS at 09:06

## 2017-06-11 RX ADMIN — MEROPENEM 1000 MG: 1 INJECTION, POWDER, FOR SOLUTION INTRAVENOUS at 15:55

## 2017-06-11 RX ADMIN — PRAMIPEXOLE DIHYDROCHLORIDE 1 MG: 0.5 TABLET ORAL at 15:55

## 2017-06-11 RX ADMIN — OXYCODONE HYDROCHLORIDE AND ACETAMINOPHEN 500 MG: 500 TABLET ORAL at 09:09

## 2017-06-11 RX ADMIN — PRAMIPEXOLE DIHYDROCHLORIDE 1 MG: 0.5 TABLET ORAL at 09:09

## 2017-06-11 RX ADMIN — POLYETHYLENE GLYCOL 3350 17 G: 17 POWDER, FOR SOLUTION ORAL at 09:09

## 2017-06-11 RX ADMIN — Medication 1 TABLET: at 09:08

## 2017-06-11 RX ADMIN — ENOXAPARIN SODIUM 40 MG: 40 INJECTION SUBCUTANEOUS at 09:09

## 2017-06-11 RX ADMIN — INSULIN LISPRO 8 UNITS: 100 INJECTION, SOLUTION INTRAVENOUS; SUBCUTANEOUS at 17:21

## 2017-06-11 RX ADMIN — LEVOTHYROXINE SODIUM 125 MCG: 125 TABLET ORAL at 05:37

## 2017-06-11 RX ADMIN — OXYCODONE HYDROCHLORIDE 5 MG: 5 TABLET ORAL at 18:59

## 2017-06-11 RX ADMIN — PRAMIPEXOLE DIHYDROCHLORIDE 1 MG: 0.5 TABLET ORAL at 21:45

## 2017-06-11 RX ADMIN — DIBASIC SODIUM PHOSPHATE, MONOBASIC POTASSIUM PHOSPHATE AND MONOBASIC SODIUM PHOSPHATE 2 TABLET: 852; 155; 130 TABLET ORAL at 12:44

## 2017-06-11 RX ADMIN — MEROPENEM 1000 MG: 1 INJECTION, POWDER, FOR SOLUTION INTRAVENOUS at 05:37

## 2017-06-11 RX ADMIN — OXYCODONE HYDROCHLORIDE 5 MG: 5 TABLET ORAL at 05:57

## 2017-06-11 RX ADMIN — FOLIC ACID 1000 MCG: 1 TABLET ORAL at 09:09

## 2017-06-11 RX ADMIN — ASPIRIN 81 MG CHEWABLE TABLET 81 MG: 81 TABLET CHEWABLE at 09:09

## 2017-06-11 RX ADMIN — FUROSEMIDE 40 MG: 40 TABLET ORAL at 12:54

## 2017-06-11 RX ADMIN — Medication 1 TABLET: at 18:46

## 2017-06-11 RX ADMIN — MORPHINE SULFATE 2 MG: 2 INJECTION, SOLUTION INTRAMUSCULAR; INTRAVENOUS at 16:34

## 2017-06-11 RX ADMIN — INSULIN LISPRO 8 UNITS: 100 INJECTION, SOLUTION INTRAVENOUS; SUBCUTANEOUS at 12:55

## 2017-06-11 RX ADMIN — MEROPENEM 1000 MG: 1 INJECTION, POWDER, FOR SOLUTION INTRAVENOUS at 21:45

## 2017-06-12 ENCOUNTER — APPOINTMENT (INPATIENT)
Dept: PULMONOLOGY | Facility: HOSPITAL | Age: 77
DRG: 862 | End: 2017-06-12
Attending: INTERNAL MEDICINE
Payer: COMMERCIAL

## 2017-06-12 LAB
ALBUMIN SERPL BCP-MCNC: 2.5 G/DL (ref 3.5–5)
ALP SERPL-CCNC: 129 U/L (ref 46–116)
ALT SERPL W P-5'-P-CCNC: 82 U/L (ref 12–78)
ANION GAP SERPL CALCULATED.3IONS-SCNC: 2 MMOL/L (ref 4–13)
AST SERPL W P-5'-P-CCNC: 20 U/L (ref 5–45)
BILIRUB SERPL-MCNC: 0.3 MG/DL (ref 0.2–1)
BUN SERPL-MCNC: 10 MG/DL (ref 5–25)
CALCIUM SERPL-MCNC: 7.9 MG/DL (ref 8.3–10.1)
CHLORIDE SERPL-SCNC: 102 MMOL/L (ref 100–108)
CO2 SERPL-SCNC: 41 MMOL/L (ref 21–32)
CREAT SERPL-MCNC: 0.65 MG/DL (ref 0.6–1.3)
ERYTHROCYTE [DISTWIDTH] IN BLOOD BY AUTOMATED COUNT: 14.8 % (ref 11.6–15.1)
GFR SERPL CREATININE-BSD FRML MDRD: >60 ML/MIN/1.73SQ M
GLUCOSE SERPL-MCNC: 193 MG/DL (ref 65–140)
GLUCOSE SERPL-MCNC: 196 MG/DL (ref 65–140)
GLUCOSE SERPL-MCNC: 215 MG/DL (ref 65–140)
GLUCOSE SERPL-MCNC: 269 MG/DL (ref 65–140)
GLUCOSE SERPL-MCNC: 297 MG/DL (ref 65–140)
GLUCOSE SERPL-MCNC: 315 MG/DL (ref 65–140)
GLUCOSE SERPL-MCNC: 316 MG/DL (ref 65–140)
GLUCOSE SERPL-MCNC: 319 MG/DL (ref 65–140)
GLUCOSE SERPL-MCNC: 340 MG/DL (ref 65–140)
HCT VFR BLD AUTO: 34.8 % (ref 37–47)
HGB BLD-MCNC: 11.3 G/DL (ref 12–16)
MAGNESIUM SERPL-MCNC: 2.1 MG/DL (ref 1.6–2.6)
MCH RBC QN AUTO: 31 PG (ref 27–31)
MCHC RBC AUTO-ENTMCNC: 32.6 G/DL (ref 31.4–37.4)
MCV RBC AUTO: 95 FL (ref 82–98)
PHOSPHATE SERPL-MCNC: 2.6 MG/DL (ref 2.3–4.1)
PLATELET # BLD AUTO: 196 THOUSANDS/UL (ref 130–400)
PMV BLD AUTO: 7.7 FL (ref 8.9–12.7)
POTASSIUM SERPL-SCNC: 4.1 MMOL/L (ref 3.5–5.3)
PROT SERPL-MCNC: 6.1 G/DL (ref 6.4–8.2)
RBC # BLD AUTO: 3.66 MILLION/UL (ref 4.2–5.4)
SODIUM SERPL-SCNC: 145 MMOL/L (ref 136–145)
T4 FREE SERPL-MCNC: 1.02 NG/DL (ref 0.76–1.46)
TSH 30M P TRH SERPL-ACNC: 7.57 UIU/ML
TSH SERPL DL<=0.05 MIU/L-ACNC: 7.34 UIU/ML
TSH SERPL DL<=0.05 MIU/L-ACNC: 7.75 UIU/ML (ref 0.36–3.74)
WBC # BLD AUTO: 5.8 THOUSAND/UL (ref 4.8–10.8)

## 2017-06-12 PROCEDURE — 84443 ASSAY THYROID STIM HORMONE: CPT | Performed by: NURSE PRACTITIONER

## 2017-06-12 PROCEDURE — 82948 REAGENT STRIP/BLOOD GLUCOSE: CPT

## 2017-06-12 PROCEDURE — A9270 NON-COVERED ITEM OR SERVICE: HCPCS | Performed by: NURSE PRACTITIONER

## 2017-06-12 PROCEDURE — A9270 NON-COVERED ITEM OR SERVICE: HCPCS | Performed by: PHYSICIAN ASSISTANT

## 2017-06-12 PROCEDURE — G8978 MOBILITY CURRENT STATUS: HCPCS

## 2017-06-12 PROCEDURE — 83735 ASSAY OF MAGNESIUM: CPT | Performed by: NURSE PRACTITIONER

## 2017-06-12 PROCEDURE — 82652 VIT D 1 25-DIHYDROXY: CPT | Performed by: NURSE PRACTITIONER

## 2017-06-12 PROCEDURE — 85027 COMPLETE CBC AUTOMATED: CPT | Performed by: NURSE PRACTITIONER

## 2017-06-12 PROCEDURE — 94760 N-INVAS EAR/PLS OXIMETRY 1: CPT

## 2017-06-12 PROCEDURE — G8987 SELF CARE CURRENT STATUS: HCPCS

## 2017-06-12 PROCEDURE — 94640 AIRWAY INHALATION TREATMENT: CPT

## 2017-06-12 PROCEDURE — G8988 SELF CARE GOAL STATUS: HCPCS

## 2017-06-12 PROCEDURE — 94660 CPAP INITIATION&MGMT: CPT

## 2017-06-12 PROCEDURE — 94060 EVALUATION OF WHEEZING: CPT

## 2017-06-12 PROCEDURE — 84100 ASSAY OF PHOSPHORUS: CPT | Performed by: NURSE PRACTITIONER

## 2017-06-12 PROCEDURE — G8979 MOBILITY GOAL STATUS: HCPCS

## 2017-06-12 PROCEDURE — 80053 COMPREHEN METABOLIC PANEL: CPT | Performed by: NURSE PRACTITIONER

## 2017-06-12 PROCEDURE — 84439 ASSAY OF FREE THYROXINE: CPT | Performed by: NURSE PRACTITIONER

## 2017-06-12 PROCEDURE — 97163 PT EVAL HIGH COMPLEX 45 MIN: CPT

## 2017-06-12 PROCEDURE — 97166 OT EVAL MOD COMPLEX 45 MIN: CPT

## 2017-06-12 PROCEDURE — 97535 SELF CARE MNGMENT TRAINING: CPT

## 2017-06-12 RX ORDER — OXYCODONE HYDROCHLORIDE 5 MG/1
5 TABLET ORAL EVERY 4 HOURS PRN
Status: DISCONTINUED | OUTPATIENT
Start: 2017-06-12 | End: 2017-06-17 | Stop reason: HOSPADM

## 2017-06-12 RX ORDER — DICYCLOMINE HYDROCHLORIDE 10 MG/1
10 CAPSULE ORAL
Status: DISCONTINUED | OUTPATIENT
Start: 2017-06-12 | End: 2017-06-17 | Stop reason: HOSPADM

## 2017-06-12 RX ORDER — INSULIN GLARGINE 100 [IU]/ML
40 INJECTION, SOLUTION SUBCUTANEOUS EVERY 12 HOURS SCHEDULED
Status: DISCONTINUED | OUTPATIENT
Start: 2017-06-12 | End: 2017-06-14

## 2017-06-12 RX ORDER — LISINOPRIL 10 MG/1
10 TABLET ORAL DAILY
Status: DISCONTINUED | OUTPATIENT
Start: 2017-06-12 | End: 2017-06-17 | Stop reason: HOSPADM

## 2017-06-12 RX ADMIN — INSULIN GLARGINE 40 UNITS: 100 INJECTION, SOLUTION SUBCUTANEOUS at 21:03

## 2017-06-12 RX ADMIN — PRAMIPEXOLE DIHYDROCHLORIDE 1 MG: 0.5 TABLET ORAL at 16:53

## 2017-06-12 RX ADMIN — INSULIN LISPRO 2 UNITS: 100 INJECTION, SOLUTION INTRAVENOUS; SUBCUTANEOUS at 10:08

## 2017-06-12 RX ADMIN — ASPIRIN 81 MG CHEWABLE TABLET 81 MG: 81 TABLET CHEWABLE at 10:05

## 2017-06-12 RX ADMIN — INSULIN LISPRO 2 UNITS: 100 INJECTION, SOLUTION INTRAVENOUS; SUBCUTANEOUS at 16:53

## 2017-06-12 RX ADMIN — MEROPENEM 1000 MG: 1 INJECTION, POWDER, FOR SOLUTION INTRAVENOUS at 14:48

## 2017-06-12 RX ADMIN — OXYCODONE HYDROCHLORIDE 5 MG: 5 TABLET ORAL at 16:57

## 2017-06-12 RX ADMIN — Medication 1 TABLET: at 10:05

## 2017-06-12 RX ADMIN — IPRATROPIUM BROMIDE AND ALBUTEROL SULFATE 3 ML: .5; 3 SOLUTION RESPIRATORY (INHALATION) at 00:30

## 2017-06-12 RX ADMIN — DICYCLOMINE HYDROCHLORIDE 10 MG: 10 CAPSULE ORAL at 14:48

## 2017-06-12 RX ADMIN — OXYCODONE HYDROCHLORIDE 5 MG: 5 TABLET ORAL at 07:22

## 2017-06-12 RX ADMIN — IPRATROPIUM BROMIDE AND ALBUTEROL SULFATE 3 ML: .5; 3 SOLUTION RESPIRATORY (INHALATION) at 16:19

## 2017-06-12 RX ADMIN — OXYCODONE HYDROCHLORIDE 5 MG: 5 TABLET ORAL at 11:38

## 2017-06-12 RX ADMIN — OXYCODONE HYDROCHLORIDE 5 MG: 5 TABLET ORAL at 21:04

## 2017-06-12 RX ADMIN — MEROPENEM 1000 MG: 1 INJECTION, POWDER, FOR SOLUTION INTRAVENOUS at 21:03

## 2017-06-12 RX ADMIN — OXYCODONE HYDROCHLORIDE 5 MG: 5 TABLET ORAL at 00:23

## 2017-06-12 RX ADMIN — POTASSIUM CHLORIDE 10 MEQ: 750 TABLET, EXTENDED RELEASE ORAL at 10:06

## 2017-06-12 RX ADMIN — INSULIN LISPRO 8 UNITS: 100 INJECTION, SOLUTION INTRAVENOUS; SUBCUTANEOUS at 11:37

## 2017-06-12 RX ADMIN — FUROSEMIDE 40 MG: 40 TABLET ORAL at 16:52

## 2017-06-12 RX ADMIN — ENOXAPARIN SODIUM 40 MG: 40 INJECTION SUBCUTANEOUS at 10:07

## 2017-06-12 RX ADMIN — OXYCODONE HYDROCHLORIDE AND ACETAMINOPHEN 500 MG: 500 TABLET ORAL at 10:06

## 2017-06-12 RX ADMIN — POLYETHYLENE GLYCOL 3350 17 G: 17 POWDER, FOR SOLUTION ORAL at 11:38

## 2017-06-12 RX ADMIN — LISINOPRIL 10 MG: 10 TABLET ORAL at 18:10

## 2017-06-12 RX ADMIN — Medication 1 TABLET: at 18:11

## 2017-06-12 RX ADMIN — LEVOTHYROXINE SODIUM 125 MCG: 125 TABLET ORAL at 05:29

## 2017-06-12 RX ADMIN — PRAMIPEXOLE DIHYDROCHLORIDE 1 MG: 0.5 TABLET ORAL at 21:03

## 2017-06-12 RX ADMIN — IPRATROPIUM BROMIDE AND ALBUTEROL SULFATE 3 ML: .5; 3 SOLUTION RESPIRATORY (INHALATION) at 10:22

## 2017-06-12 RX ADMIN — FUROSEMIDE 40 MG: 40 TABLET ORAL at 10:06

## 2017-06-12 RX ADMIN — FOLIC ACID 1000 MCG: 1 TABLET ORAL at 10:06

## 2017-06-12 RX ADMIN — DICYCLOMINE HYDROCHLORIDE 10 MG: 10 CAPSULE ORAL at 16:52

## 2017-06-12 RX ADMIN — MEROPENEM 1000 MG: 1 INJECTION, POWDER, FOR SOLUTION INTRAVENOUS at 05:29

## 2017-06-12 RX ADMIN — PRAMIPEXOLE DIHYDROCHLORIDE 1 MG: 0.5 TABLET ORAL at 11:47

## 2017-06-13 LAB
ALBUMIN SERPL BCP-MCNC: 2.8 G/DL (ref 3.5–5)
ALP SERPL-CCNC: 132 U/L (ref 46–116)
ALT SERPL W P-5'-P-CCNC: 79 U/L (ref 12–78)
ANION GAP SERPL CALCULATED.3IONS-SCNC: 1 MMOL/L (ref 4–13)
AST SERPL W P-5'-P-CCNC: 21 U/L (ref 5–45)
ATRIAL RATE: 79 BPM
BILIRUB SERPL-MCNC: 0.3 MG/DL (ref 0.2–1)
BUN SERPL-MCNC: 11 MG/DL (ref 5–25)
CALCIUM SERPL-MCNC: 8.2 MG/DL (ref 8.3–10.1)
CHLORIDE SERPL-SCNC: 96 MMOL/L (ref 100–108)
CO2 SERPL-SCNC: 45 MMOL/L (ref 21–32)
CREAT SERPL-MCNC: 0.58 MG/DL (ref 0.6–1.3)
ERYTHROCYTE [DISTWIDTH] IN BLOOD BY AUTOMATED COUNT: 14.8 % (ref 11.6–15.1)
GFR SERPL CREATININE-BSD FRML MDRD: >60 ML/MIN/1.73SQ M
GLUCOSE SERPL-MCNC: 175 MG/DL (ref 65–140)
GLUCOSE SERPL-MCNC: 209 MG/DL (ref 65–140)
GLUCOSE SERPL-MCNC: 256 MG/DL (ref 65–140)
GLUCOSE SERPL-MCNC: 266 MG/DL (ref 65–140)
GLUCOSE SERPL-MCNC: 288 MG/DL (ref 65–140)
GLUCOSE SERPL-MCNC: 329 MG/DL (ref 65–140)
GLUCOSE SERPL-MCNC: 330 MG/DL (ref 65–140)
HCT VFR BLD AUTO: 37.8 % (ref 37–47)
HGB BLD-MCNC: 12.4 G/DL (ref 12–16)
MCH RBC QN AUTO: 31.2 PG (ref 27–31)
MCHC RBC AUTO-ENTMCNC: 32.7 G/DL (ref 31.4–37.4)
MCV RBC AUTO: 95 FL (ref 82–98)
PLATELET # BLD AUTO: 243 THOUSANDS/UL (ref 130–400)
PMV BLD AUTO: 7.8 FL (ref 8.9–12.7)
POTASSIUM SERPL-SCNC: 3.8 MMOL/L (ref 3.5–5.3)
PR INTERVAL: 172 MS
PROT SERPL-MCNC: 6.6 G/DL (ref 6.4–8.2)
QRS AXIS: -26 DEGREES
QRSD INTERVAL: 90 MS
QT INTERVAL: 416 MS
QTC INTERVAL: 477 MS
RBC # BLD AUTO: 3.97 MILLION/UL (ref 4.2–5.4)
SODIUM SERPL-SCNC: 142 MMOL/L (ref 136–145)
T WAVE AXIS: -19 DEGREES
VENTRICULAR RATE: 79 BPM
WBC # BLD AUTO: 7.4 THOUSAND/UL (ref 4.8–10.8)

## 2017-06-13 PROCEDURE — A9270 NON-COVERED ITEM OR SERVICE: HCPCS | Performed by: NURSE PRACTITIONER

## 2017-06-13 PROCEDURE — 94760 N-INVAS EAR/PLS OXIMETRY 1: CPT

## 2017-06-13 PROCEDURE — 80053 COMPREHEN METABOLIC PANEL: CPT | Performed by: NURSE PRACTITIONER

## 2017-06-13 PROCEDURE — 82948 REAGENT STRIP/BLOOD GLUCOSE: CPT

## 2017-06-13 PROCEDURE — 94660 CPAP INITIATION&MGMT: CPT

## 2017-06-13 PROCEDURE — 94640 AIRWAY INHALATION TREATMENT: CPT

## 2017-06-13 PROCEDURE — 94762 N-INVAS EAR/PLS OXIMTRY CONT: CPT

## 2017-06-13 PROCEDURE — A9270 NON-COVERED ITEM OR SERVICE: HCPCS | Performed by: PHYSICIAN ASSISTANT

## 2017-06-13 PROCEDURE — 85027 COMPLETE CBC AUTOMATED: CPT | Performed by: NURSE PRACTITIONER

## 2017-06-13 RX ORDER — DOCUSATE SODIUM 100 MG/1
100 CAPSULE, LIQUID FILLED ORAL 2 TIMES DAILY
Status: DISCONTINUED | OUTPATIENT
Start: 2017-06-13 | End: 2017-06-17 | Stop reason: HOSPADM

## 2017-06-13 RX ORDER — FUROSEMIDE 10 MG/ML
40 INJECTION INTRAMUSCULAR; INTRAVENOUS
Status: DISCONTINUED | OUTPATIENT
Start: 2017-06-13 | End: 2017-06-14

## 2017-06-13 RX ORDER — MAGNESIUM CARB/ALUMINUM HYDROX 105-160MG
296 TABLET,CHEWABLE ORAL ONCE
Status: COMPLETED | OUTPATIENT
Start: 2017-06-13 | End: 2017-06-13

## 2017-06-13 RX ORDER — POLYETHYLENE GLYCOL 3350 17 G/17G
238 POWDER, FOR SOLUTION ORAL ONCE
Status: COMPLETED | OUTPATIENT
Start: 2017-06-13 | End: 2017-06-13

## 2017-06-13 RX ORDER — SENNOSIDES 8.6 MG
1 TABLET ORAL
Status: DISCONTINUED | OUTPATIENT
Start: 2017-06-14 | End: 2017-06-17 | Stop reason: HOSPADM

## 2017-06-13 RX ADMIN — LISINOPRIL 10 MG: 10 TABLET ORAL at 09:31

## 2017-06-13 RX ADMIN — INSULIN GLARGINE 40 UNITS: 100 INJECTION, SOLUTION SUBCUTANEOUS at 09:52

## 2017-06-13 RX ADMIN — MEROPENEM 1000 MG: 1 INJECTION, POWDER, FOR SOLUTION INTRAVENOUS at 15:08

## 2017-06-13 RX ADMIN — INSULIN LISPRO 2 UNITS: 100 INJECTION, SOLUTION INTRAVENOUS; SUBCUTANEOUS at 17:19

## 2017-06-13 RX ADMIN — DICYCLOMINE HYDROCHLORIDE 10 MG: 10 CAPSULE ORAL at 06:32

## 2017-06-13 RX ADMIN — OXYCODONE HYDROCHLORIDE 5 MG: 5 TABLET ORAL at 18:00

## 2017-06-13 RX ADMIN — ASPIRIN 81 MG CHEWABLE TABLET 81 MG: 81 TABLET CHEWABLE at 09:31

## 2017-06-13 RX ADMIN — OXYCODONE HYDROCHLORIDE 5 MG: 5 TABLET ORAL at 06:32

## 2017-06-13 RX ADMIN — METHYLNALTREXONE BROMIDE 12 MG: 12 INJECTION, SOLUTION SUBCUTANEOUS at 16:42

## 2017-06-13 RX ADMIN — OXYCODONE HYDROCHLORIDE 5 MG: 5 TABLET ORAL at 01:32

## 2017-06-13 RX ADMIN — POLYETHYLENE GLYCOL 3350 17 G: 17 POWDER, FOR SOLUTION ORAL at 09:30

## 2017-06-13 RX ADMIN — Medication 1 TABLET: at 09:32

## 2017-06-13 RX ADMIN — POLYETHYLENE GLYCOL 3350 238 G: 17 POWDER, FOR SOLUTION ORAL at 16:44

## 2017-06-13 RX ADMIN — FUROSEMIDE 40 MG: 40 TABLET ORAL at 09:31

## 2017-06-13 RX ADMIN — MAGESIUM CITRATE 296 ML: 1.75 LIQUID ORAL at 16:55

## 2017-06-13 RX ADMIN — MEROPENEM 1000 MG: 1 INJECTION, POWDER, FOR SOLUTION INTRAVENOUS at 06:32

## 2017-06-13 RX ADMIN — POTASSIUM CHLORIDE 10 MEQ: 750 TABLET, EXTENDED RELEASE ORAL at 09:31

## 2017-06-13 RX ADMIN — MEROPENEM 1000 MG: 1 INJECTION, POWDER, FOR SOLUTION INTRAVENOUS at 21:04

## 2017-06-13 RX ADMIN — METHYLNALTREXONE BROMIDE 8 MG: 12 INJECTION, SOLUTION SUBCUTANEOUS at 16:00

## 2017-06-13 RX ADMIN — INSULIN GLARGINE 40 UNITS: 100 INJECTION, SOLUTION SUBCUTANEOUS at 21:02

## 2017-06-13 RX ADMIN — FOLIC ACID 1000 MCG: 1 TABLET ORAL at 09:31

## 2017-06-13 RX ADMIN — INSULIN LISPRO 3 UNITS: 100 INJECTION, SOLUTION INTRAVENOUS; SUBCUTANEOUS at 12:09

## 2017-06-13 RX ADMIN — PRAMIPEXOLE DIHYDROCHLORIDE 1 MG: 0.5 TABLET ORAL at 15:14

## 2017-06-13 RX ADMIN — DICYCLOMINE HYDROCHLORIDE 10 MG: 10 CAPSULE ORAL at 12:09

## 2017-06-13 RX ADMIN — IPRATROPIUM BROMIDE AND ALBUTEROL SULFATE 3 ML: .5; 3 SOLUTION RESPIRATORY (INHALATION) at 10:40

## 2017-06-13 RX ADMIN — FUROSEMIDE 40 MG: 10 INJECTION, SOLUTION INTRAMUSCULAR; INTRAVENOUS at 15:08

## 2017-06-13 RX ADMIN — LEVOTHYROXINE SODIUM 125 MCG: 125 TABLET ORAL at 06:32

## 2017-06-13 RX ADMIN — PRAMIPEXOLE DIHYDROCHLORIDE 1 MG: 0.5 TABLET ORAL at 21:02

## 2017-06-13 RX ADMIN — INSULIN LISPRO 1 UNITS: 100 INJECTION, SOLUTION INTRAVENOUS; SUBCUTANEOUS at 09:51

## 2017-06-13 RX ADMIN — DOCUSATE SODIUM 100 MG: 100 CAPSULE, LIQUID FILLED ORAL at 17:21

## 2017-06-13 RX ADMIN — OXYCODONE HYDROCHLORIDE 5 MG: 5 TABLET ORAL at 23:27

## 2017-06-13 RX ADMIN — OXYCODONE HYDROCHLORIDE AND ACETAMINOPHEN 500 MG: 500 TABLET ORAL at 09:32

## 2017-06-13 RX ADMIN — PRAMIPEXOLE DIHYDROCHLORIDE 1 MG: 0.5 TABLET ORAL at 09:31

## 2017-06-13 RX ADMIN — ENOXAPARIN SODIUM 40 MG: 40 INJECTION SUBCUTANEOUS at 09:30

## 2017-06-13 RX ADMIN — OXYCODONE HYDROCHLORIDE 5 MG: 5 TABLET ORAL at 12:16

## 2017-06-13 RX ADMIN — DICYCLOMINE HYDROCHLORIDE 10 MG: 10 CAPSULE ORAL at 15:08

## 2017-06-14 ENCOUNTER — APPOINTMENT (INPATIENT)
Dept: PHYSICAL THERAPY | Facility: HOSPITAL | Age: 77
DRG: 862 | End: 2017-06-14
Payer: COMMERCIAL

## 2017-06-14 LAB
1,25(OH)2D3 SERPL-MCNC: 65.7 PG/ML (ref 19.9–79.3)
ALBUMIN SERPL BCP-MCNC: 2.7 G/DL (ref 3.5–5)
ALP SERPL-CCNC: 121 U/L (ref 46–116)
ALT SERPL W P-5'-P-CCNC: 68 U/L (ref 12–78)
AST SERPL W P-5'-P-CCNC: 23 U/L (ref 5–45)
BASE EXCESS BLDA CALC-SCNC: 12.9 MMOL/L
BILIRUB SERPL-MCNC: 0.3 MG/DL (ref 0.2–1)
BODY TEMPERATURE: 98 DEGREES FEHRENHEIT
BUN SERPL-MCNC: 13 MG/DL (ref 5–25)
CALCIUM SERPL-MCNC: 8.4 MG/DL (ref 8.3–10.1)
CHLORIDE SERPL-SCNC: 98 MMOL/L (ref 100–108)
CO2 SERPL-SCNC: >45 MMOL/L (ref 21–32)
CREAT SERPL-MCNC: 0.71 MG/DL (ref 0.6–1.3)
ERYTHROCYTE [DISTWIDTH] IN BLOOD BY AUTOMATED COUNT: 14.8 % (ref 11.6–15.1)
GFR SERPL CREATININE-BSD FRML MDRD: >60 ML/MIN/1.73SQ M
GLUCOSE SERPL-MCNC: 142 MG/DL (ref 65–140)
GLUCOSE SERPL-MCNC: 152 MG/DL (ref 65–140)
GLUCOSE SERPL-MCNC: 228 MG/DL (ref 65–140)
GLUCOSE SERPL-MCNC: 304 MG/DL (ref 65–140)
GLUCOSE SERPL-MCNC: 308 MG/DL (ref 65–140)
HCO3 BLDA-SCNC: 38.3 MMOL/L (ref 22–28)
HCT VFR BLD AUTO: 38.4 % (ref 37–47)
HGB BLD-MCNC: 12.7 G/DL (ref 12–16)
MCH RBC QN AUTO: 31.2 PG (ref 27–31)
MCHC RBC AUTO-ENTMCNC: 33 G/DL (ref 31.4–37.4)
MCV RBC AUTO: 95 FL (ref 82–98)
NASAL CANNULA: 2
O2 CT BLDA-SCNC: 17.7 ML/DL (ref 16–23)
OXYHGB MFR BLDA: 95.3 % (ref 94–97)
PCO2 BLDA: 51.4 MM HG (ref 36–44)
PCO2 TEMP ADJ BLDA: 50.7 MM HG (ref 36–44)
PH BLD: 7.5 [PH] (ref 7.35–7.45)
PH BLDA: 7.49 [PH] (ref 7.35–7.45)
PLATELET # BLD AUTO: 252 THOUSANDS/UL (ref 130–400)
PMV BLD AUTO: 7.6 FL (ref 8.9–12.7)
PO2 BLD: 82.5 MM HG (ref 75–129)
PO2 BLDA: 84.1 MM HG (ref 75–129)
POTASSIUM SERPL-SCNC: 3.6 MMOL/L (ref 3.5–5.3)
PROT SERPL-MCNC: 6.4 G/DL (ref 6.4–8.2)
RBC # BLD AUTO: 4.06 MILLION/UL (ref 4.2–5.4)
SODIUM SERPL-SCNC: 144 MMOL/L (ref 136–145)
SPECIMEN SOURCE: ABNORMAL
WBC # BLD AUTO: 7.8 THOUSAND/UL (ref 4.8–10.8)

## 2017-06-14 PROCEDURE — 85027 COMPLETE CBC AUTOMATED: CPT | Performed by: NURSE PRACTITIONER

## 2017-06-14 PROCEDURE — 94760 N-INVAS EAR/PLS OXIMETRY 1: CPT

## 2017-06-14 PROCEDURE — 82948 REAGENT STRIP/BLOOD GLUCOSE: CPT

## 2017-06-14 PROCEDURE — 97110 THERAPEUTIC EXERCISES: CPT

## 2017-06-14 PROCEDURE — 94660 CPAP INITIATION&MGMT: CPT

## 2017-06-14 PROCEDURE — 80053 COMPREHEN METABOLIC PANEL: CPT | Performed by: NURSE PRACTITIONER

## 2017-06-14 PROCEDURE — 94640 AIRWAY INHALATION TREATMENT: CPT

## 2017-06-14 PROCEDURE — 36600 WITHDRAWAL OF ARTERIAL BLOOD: CPT

## 2017-06-14 PROCEDURE — 97535 SELF CARE MNGMENT TRAINING: CPT

## 2017-06-14 PROCEDURE — 82805 BLOOD GASES W/O2 SATURATION: CPT | Performed by: NURSE PRACTITIONER

## 2017-06-14 RX ORDER — LACTULOSE 20 G/30ML
30 SOLUTION ORAL 3 TIMES DAILY
Status: DISCONTINUED | OUTPATIENT
Start: 2017-06-14 | End: 2017-06-16

## 2017-06-14 RX ORDER — INSULIN GLARGINE 100 [IU]/ML
45 INJECTION, SOLUTION SUBCUTANEOUS EVERY 12 HOURS SCHEDULED
Status: DISCONTINUED | OUTPATIENT
Start: 2017-06-14 | End: 2017-06-17 | Stop reason: HOSPADM

## 2017-06-14 RX ORDER — FUROSEMIDE 10 MG/ML
40 INJECTION INTRAMUSCULAR; INTRAVENOUS DAILY
Status: DISCONTINUED | OUTPATIENT
Start: 2017-06-15 | End: 2017-06-17 | Stop reason: HOSPADM

## 2017-06-14 RX ORDER — CLOTRIMAZOLE 10 MG/1
10 LOZENGE ORAL; TOPICAL 3 TIMES DAILY
Status: DISCONTINUED | OUTPATIENT
Start: 2017-06-14 | End: 2017-06-17 | Stop reason: HOSPADM

## 2017-06-14 RX ADMIN — DICYCLOMINE HYDROCHLORIDE 10 MG: 10 CAPSULE ORAL at 06:13

## 2017-06-14 RX ADMIN — PRAMIPEXOLE DIHYDROCHLORIDE 1 MG: 0.5 TABLET ORAL at 09:51

## 2017-06-14 RX ADMIN — INSULIN GLARGINE 40 UNITS: 100 INJECTION, SOLUTION SUBCUTANEOUS at 09:52

## 2017-06-14 RX ADMIN — INSULIN LISPRO 1 UNITS: 100 INJECTION, SOLUTION INTRAVENOUS; SUBCUTANEOUS at 09:51

## 2017-06-14 RX ADMIN — MEROPENEM 1000 MG: 1 INJECTION, POWDER, FOR SOLUTION INTRAVENOUS at 22:31

## 2017-06-14 RX ADMIN — DOCUSATE SODIUM 100 MG: 100 CAPSULE, LIQUID FILLED ORAL at 09:52

## 2017-06-14 RX ADMIN — PRAMIPEXOLE DIHYDROCHLORIDE 1 MG: 0.5 TABLET ORAL at 18:10

## 2017-06-14 RX ADMIN — ASPIRIN 81 MG CHEWABLE TABLET 81 MG: 81 TABLET CHEWABLE at 09:52

## 2017-06-14 RX ADMIN — LEVOTHYROXINE SODIUM 125 MCG: 125 TABLET ORAL at 06:13

## 2017-06-14 RX ADMIN — FUROSEMIDE 40 MG: 10 INJECTION, SOLUTION INTRAMUSCULAR; INTRAVENOUS at 09:52

## 2017-06-14 RX ADMIN — CLOTRIMAZOLE 10 MG: 10 LOZENGE ORAL at 22:31

## 2017-06-14 RX ADMIN — DICYCLOMINE HYDROCHLORIDE 10 MG: 10 CAPSULE ORAL at 14:00

## 2017-06-14 RX ADMIN — ENOXAPARIN SODIUM 40 MG: 40 INJECTION SUBCUTANEOUS at 09:51

## 2017-06-14 RX ADMIN — MEROPENEM 1000 MG: 1 INJECTION, POWDER, FOR SOLUTION INTRAVENOUS at 06:13

## 2017-06-14 RX ADMIN — INSULIN GLARGINE 45 UNITS: 100 INJECTION, SOLUTION SUBCUTANEOUS at 22:32

## 2017-06-14 RX ADMIN — OXYCODONE HYDROCHLORIDE 5 MG: 5 TABLET ORAL at 22:47

## 2017-06-14 RX ADMIN — POTASSIUM CHLORIDE 10 MEQ: 750 TABLET, EXTENDED RELEASE ORAL at 09:52

## 2017-06-14 RX ADMIN — LACTULOSE 30 G: 20 SOLUTION ORAL at 22:31

## 2017-06-14 RX ADMIN — PRAMIPEXOLE DIHYDROCHLORIDE 1 MG: 0.5 TABLET ORAL at 22:31

## 2017-06-14 RX ADMIN — OXYCODONE HYDROCHLORIDE 5 MG: 5 TABLET ORAL at 06:24

## 2017-06-14 RX ADMIN — DICYCLOMINE HYDROCHLORIDE 10 MG: 10 CAPSULE ORAL at 18:11

## 2017-06-14 RX ADMIN — INSULIN LISPRO 3 UNITS: 100 INJECTION, SOLUTION INTRAVENOUS; SUBCUTANEOUS at 18:11

## 2017-06-14 RX ADMIN — INSULIN LISPRO 2 UNITS: 100 INJECTION, SOLUTION INTRAVENOUS; SUBCUTANEOUS at 14:01

## 2017-06-14 RX ADMIN — LISINOPRIL 10 MG: 10 TABLET ORAL at 09:52

## 2017-06-14 RX ADMIN — MEROPENEM 1000 MG: 1 INJECTION, POWDER, FOR SOLUTION INTRAVENOUS at 14:07

## 2017-06-14 RX ADMIN — DOCUSATE SODIUM 100 MG: 100 CAPSULE, LIQUID FILLED ORAL at 18:11

## 2017-06-14 RX ADMIN — OXYCODONE HYDROCHLORIDE 5 MG: 5 TABLET ORAL at 14:00

## 2017-06-14 RX ADMIN — FOLIC ACID 1000 MCG: 1 TABLET ORAL at 09:52

## 2017-06-14 RX ADMIN — OXYCODONE HYDROCHLORIDE AND ACETAMINOPHEN 500 MG: 500 TABLET ORAL at 09:52

## 2017-06-14 RX ADMIN — IPRATROPIUM BROMIDE AND ALBUTEROL SULFATE 3 ML: .5; 3 SOLUTION RESPIRATORY (INHALATION) at 10:23

## 2017-06-15 LAB
ANION GAP SERPL CALCULATED.3IONS-SCNC: 0 MMOL/L (ref 4–13)
BUN SERPL-MCNC: 13 MG/DL (ref 5–25)
CALCIUM SERPL-MCNC: 8.7 MG/DL (ref 8.3–10.1)
CHLORIDE SERPL-SCNC: 96 MMOL/L (ref 100–108)
CO2 SERPL-SCNC: 44 MMOL/L (ref 21–32)
CREAT SERPL-MCNC: 0.62 MG/DL (ref 0.6–1.3)
GFR SERPL CREATININE-BSD FRML MDRD: >60 ML/MIN/1.73SQ M
GLUCOSE SERPL-MCNC: 105 MG/DL (ref 65–140)
GLUCOSE SERPL-MCNC: 109 MG/DL (ref 65–140)
GLUCOSE SERPL-MCNC: 208 MG/DL (ref 65–140)
GLUCOSE SERPL-MCNC: 278 MG/DL (ref 65–140)
GLUCOSE SERPL-MCNC: 281 MG/DL (ref 65–140)
POTASSIUM SERPL-SCNC: 3.9 MMOL/L (ref 3.5–5.3)
SODIUM SERPL-SCNC: 140 MMOL/L (ref 136–145)

## 2017-06-15 PROCEDURE — 94760 N-INVAS EAR/PLS OXIMETRY 1: CPT

## 2017-06-15 PROCEDURE — 80048 BASIC METABOLIC PNL TOTAL CA: CPT | Performed by: NURSE PRACTITIONER

## 2017-06-15 PROCEDURE — 97110 THERAPEUTIC EXERCISES: CPT

## 2017-06-15 PROCEDURE — 82948 REAGENT STRIP/BLOOD GLUCOSE: CPT

## 2017-06-15 PROCEDURE — 94660 CPAP INITIATION&MGMT: CPT

## 2017-06-15 RX ADMIN — OXYCODONE HYDROCHLORIDE AND ACETAMINOPHEN 500 MG: 500 TABLET ORAL at 09:27

## 2017-06-15 RX ADMIN — FUROSEMIDE 40 MG: 10 INJECTION, SOLUTION INTRAMUSCULAR; INTRAVENOUS at 09:27

## 2017-06-15 RX ADMIN — DICYCLOMINE HYDROCHLORIDE 10 MG: 10 CAPSULE ORAL at 06:20

## 2017-06-15 RX ADMIN — SENNOSIDES 8.6 MG: 8.6 TABLET, FILM COATED ORAL at 21:36

## 2017-06-15 RX ADMIN — INSULIN LISPRO 2 UNITS: 100 INJECTION, SOLUTION INTRAVENOUS; SUBCUTANEOUS at 19:07

## 2017-06-15 RX ADMIN — FOLIC ACID 1000 MCG: 1 TABLET ORAL at 09:27

## 2017-06-15 RX ADMIN — POTASSIUM CHLORIDE 10 MEQ: 750 TABLET, EXTENDED RELEASE ORAL at 09:27

## 2017-06-15 RX ADMIN — PRAMIPEXOLE DIHYDROCHLORIDE 1 MG: 0.5 TABLET ORAL at 21:36

## 2017-06-15 RX ADMIN — CLOTRIMAZOLE 10 MG: 10 LOZENGE ORAL at 15:31

## 2017-06-15 RX ADMIN — CLOTRIMAZOLE 10 MG: 10 LOZENGE ORAL at 09:27

## 2017-06-15 RX ADMIN — MEROPENEM 1000 MG: 1 INJECTION, POWDER, FOR SOLUTION INTRAVENOUS at 06:20

## 2017-06-15 RX ADMIN — LEVOTHYROXINE SODIUM 125 MCG: 125 TABLET ORAL at 06:20

## 2017-06-15 RX ADMIN — CLOTRIMAZOLE 10 MG: 10 LOZENGE ORAL at 21:36

## 2017-06-15 RX ADMIN — DICYCLOMINE HYDROCHLORIDE 10 MG: 10 CAPSULE ORAL at 15:31

## 2017-06-15 RX ADMIN — DOCUSATE SODIUM 100 MG: 100 CAPSULE, LIQUID FILLED ORAL at 19:07

## 2017-06-15 RX ADMIN — LACTULOSE 30 G: 20 SOLUTION ORAL at 21:36

## 2017-06-15 RX ADMIN — MEROPENEM 1000 MG: 1 INJECTION, POWDER, FOR SOLUTION INTRAVENOUS at 15:11

## 2017-06-15 RX ADMIN — PRAMIPEXOLE DIHYDROCHLORIDE 1 MG: 0.5 TABLET ORAL at 15:31

## 2017-06-15 RX ADMIN — LISINOPRIL 10 MG: 10 TABLET ORAL at 09:27

## 2017-06-15 RX ADMIN — INSULIN GLARGINE 45 UNITS: 100 INJECTION, SOLUTION SUBCUTANEOUS at 09:31

## 2017-06-15 RX ADMIN — DICYCLOMINE HYDROCHLORIDE 10 MG: 10 CAPSULE ORAL at 12:08

## 2017-06-15 RX ADMIN — MEROPENEM 1000 MG: 1 INJECTION, POWDER, FOR SOLUTION INTRAVENOUS at 21:36

## 2017-06-15 RX ADMIN — ENOXAPARIN SODIUM 40 MG: 40 INJECTION SUBCUTANEOUS at 09:26

## 2017-06-15 RX ADMIN — DOCUSATE SODIUM 100 MG: 100 CAPSULE, LIQUID FILLED ORAL at 09:27

## 2017-06-15 RX ADMIN — OXYCODONE HYDROCHLORIDE 5 MG: 5 TABLET ORAL at 06:20

## 2017-06-15 RX ADMIN — ASPIRIN 81 MG CHEWABLE TABLET 81 MG: 81 TABLET CHEWABLE at 09:27

## 2017-06-15 RX ADMIN — PRAMIPEXOLE DIHYDROCHLORIDE 1 MG: 0.5 TABLET ORAL at 09:27

## 2017-06-15 RX ADMIN — INSULIN LISPRO 2 UNITS: 100 INJECTION, SOLUTION INTRAVENOUS; SUBCUTANEOUS at 12:08

## 2017-06-15 RX ADMIN — OXYCODONE HYDROCHLORIDE 5 MG: 5 TABLET ORAL at 21:36

## 2017-06-15 RX ADMIN — INSULIN GLARGINE 45 UNITS: 100 INJECTION, SOLUTION SUBCUTANEOUS at 21:36

## 2017-06-16 ENCOUNTER — APPOINTMENT (INPATIENT)
Dept: RADIOLOGY | Facility: HOSPITAL | Age: 77
DRG: 862 | End: 2017-06-16
Payer: COMMERCIAL

## 2017-06-16 ENCOUNTER — APPOINTMENT (INPATIENT)
Dept: NON INVASIVE DIAGNOSTICS | Facility: HOSPITAL | Age: 77
DRG: 862 | End: 2017-06-16
Payer: COMMERCIAL

## 2017-06-16 ENCOUNTER — GENERIC CONVERSION - ENCOUNTER (OUTPATIENT)
Dept: OTHER | Facility: OTHER | Age: 77
End: 2017-06-16

## 2017-06-16 PROBLEM — D32.9 MENINGIOMA (HCC): Status: ACTIVE | Noted: 2017-06-16

## 2017-06-16 LAB
ANION GAP SERPL CALCULATED.3IONS-SCNC: 2 MMOL/L (ref 4–13)
ATRIAL RATE: 133 BPM
ATRIAL RATE: 79 BPM
BACTERIA BLD CULT: NORMAL
BACTERIA BLD CULT: NORMAL
BUN SERPL-MCNC: 13 MG/DL (ref 5–25)
CALCIUM SERPL-MCNC: 8.5 MG/DL (ref 8.3–10.1)
CHLORIDE SERPL-SCNC: 100 MMOL/L (ref 100–108)
CO2 SERPL-SCNC: 40 MMOL/L (ref 21–32)
CREAT SERPL-MCNC: 0.56 MG/DL (ref 0.6–1.3)
GFR SERPL CREATININE-BSD FRML MDRD: >60 ML/MIN/1.73SQ M
GLUCOSE SERPL-MCNC: 101 MG/DL (ref 65–140)
GLUCOSE SERPL-MCNC: 110 MG/DL (ref 65–140)
GLUCOSE SERPL-MCNC: 235 MG/DL (ref 65–140)
P AXIS: 28 DEGREES
P AXIS: 56 DEGREES
POTASSIUM SERPL-SCNC: 4 MMOL/L (ref 3.5–5.3)
PR INTERVAL: 154 MS
PR INTERVAL: 174 MS
QRS AXIS: -10 DEGREES
QRS AXIS: -24 DEGREES
QRSD INTERVAL: 80 MS
QRSD INTERVAL: 88 MS
QT INTERVAL: 290 MS
QT INTERVAL: 428 MS
QTC INTERVAL: 431 MS
QTC INTERVAL: 490 MS
SODIUM SERPL-SCNC: 142 MMOL/L (ref 136–145)
T WAVE AXIS: 10 DEGREES
T WAVE AXIS: 45 DEGREES
VENTRICULAR RATE: 133 BPM
VENTRICULAR RATE: 79 BPM

## 2017-06-16 PROCEDURE — C1751 CATH, INF, PER/CENT/MIDLINE: HCPCS

## 2017-06-16 PROCEDURE — 82948 REAGENT STRIP/BLOOD GLUCOSE: CPT

## 2017-06-16 PROCEDURE — 80048 BASIC METABOLIC PNL TOTAL CA: CPT | Performed by: NURSE PRACTITIONER

## 2017-06-16 PROCEDURE — 36569 INSJ PICC 5 YR+ W/O IMAGING: CPT

## 2017-06-16 PROCEDURE — 76937 US GUIDE VASCULAR ACCESS: CPT

## 2017-06-16 PROCEDURE — 02HV33Z INSERTION OF INFUSION DEVICE INTO SUPERIOR VENA CAVA, PERCUTANEOUS APPROACH: ICD-10-PCS | Performed by: RADIOLOGY

## 2017-06-16 PROCEDURE — 94760 N-INVAS EAR/PLS OXIMETRY 1: CPT

## 2017-06-16 PROCEDURE — 77001 FLUOROGUIDE FOR VEIN DEVICE: CPT

## 2017-06-16 PROCEDURE — 70450 CT HEAD/BRAIN W/O DYE: CPT

## 2017-06-16 PROCEDURE — 94640 AIRWAY INHALATION TREATMENT: CPT

## 2017-06-16 PROCEDURE — 97110 THERAPEUTIC EXERCISES: CPT

## 2017-06-16 RX ORDER — LACTULOSE 20 G/30ML
20 SOLUTION ORAL DAILY
Status: DISCONTINUED | OUTPATIENT
Start: 2017-06-17 | End: 2017-06-17 | Stop reason: HOSPADM

## 2017-06-16 RX ORDER — LIDOCAINE HYDROCHLORIDE 10 MG/ML
INJECTION, SOLUTION INFILTRATION; PERINEURAL CODE/TRAUMA/SEDATION MEDICATION
Status: COMPLETED | OUTPATIENT
Start: 2017-06-16 | End: 2017-06-16

## 2017-06-16 RX ADMIN — PRAMIPEXOLE DIHYDROCHLORIDE 1 MG: 0.5 TABLET ORAL at 08:33

## 2017-06-16 RX ADMIN — SENNOSIDES 8.6 MG: 8.6 TABLET, FILM COATED ORAL at 21:59

## 2017-06-16 RX ADMIN — INSULIN LISPRO 2 UNITS: 100 INJECTION, SOLUTION INTRAVENOUS; SUBCUTANEOUS at 12:24

## 2017-06-16 RX ADMIN — INSULIN GLARGINE 45 UNITS: 100 INJECTION, SOLUTION SUBCUTANEOUS at 08:54

## 2017-06-16 RX ADMIN — LIDOCAINE HYDROCHLORIDE 1 ML: 10 INJECTION, SOLUTION INFILTRATION; PERINEURAL at 09:49

## 2017-06-16 RX ADMIN — FOLIC ACID 1000 MCG: 1 TABLET ORAL at 08:33

## 2017-06-16 RX ADMIN — DOCUSATE SODIUM 100 MG: 100 CAPSULE, LIQUID FILLED ORAL at 18:13

## 2017-06-16 RX ADMIN — MEROPENEM 1000 MG: 1 INJECTION, POWDER, FOR SOLUTION INTRAVENOUS at 06:21

## 2017-06-16 RX ADMIN — ENOXAPARIN SODIUM 40 MG: 40 INJECTION SUBCUTANEOUS at 08:33

## 2017-06-16 RX ADMIN — OXYCODONE HYDROCHLORIDE AND ACETAMINOPHEN 500 MG: 500 TABLET ORAL at 08:33

## 2017-06-16 RX ADMIN — DICYCLOMINE HYDROCHLORIDE 10 MG: 10 CAPSULE ORAL at 06:21

## 2017-06-16 RX ADMIN — OXYCODONE HYDROCHLORIDE 5 MG: 5 TABLET ORAL at 20:27

## 2017-06-16 RX ADMIN — ASPIRIN 81 MG CHEWABLE TABLET 81 MG: 81 TABLET CHEWABLE at 08:33

## 2017-06-16 RX ADMIN — PRAMIPEXOLE DIHYDROCHLORIDE 1 MG: 0.5 TABLET ORAL at 20:14

## 2017-06-16 RX ADMIN — FUROSEMIDE 40 MG: 10 INJECTION, SOLUTION INTRAMUSCULAR; INTRAVENOUS at 08:33

## 2017-06-16 RX ADMIN — CLOTRIMAZOLE 10 MG: 10 LOZENGE ORAL at 20:14

## 2017-06-16 RX ADMIN — LEVOTHYROXINE SODIUM 125 MCG: 125 TABLET ORAL at 06:21

## 2017-06-16 RX ADMIN — PRAMIPEXOLE DIHYDROCHLORIDE 1 MG: 0.5 TABLET ORAL at 15:14

## 2017-06-16 RX ADMIN — LISINOPRIL 10 MG: 10 TABLET ORAL at 08:54

## 2017-06-16 RX ADMIN — MEROPENEM 1000 MG: 1 INJECTION, POWDER, FOR SOLUTION INTRAVENOUS at 21:59

## 2017-06-16 RX ADMIN — MEROPENEM 1000 MG: 1 INJECTION, POWDER, FOR SOLUTION INTRAVENOUS at 14:54

## 2017-06-16 RX ADMIN — DICYCLOMINE HYDROCHLORIDE 10 MG: 10 CAPSULE ORAL at 12:20

## 2017-06-16 RX ADMIN — INSULIN GLARGINE 45 UNITS: 100 INJECTION, SOLUTION SUBCUTANEOUS at 21:59

## 2017-06-16 RX ADMIN — IPRATROPIUM BROMIDE AND ALBUTEROL SULFATE 3 ML: .5; 3 SOLUTION RESPIRATORY (INHALATION) at 08:10

## 2017-06-16 RX ADMIN — IPRATROPIUM BROMIDE AND ALBUTEROL SULFATE 3 ML: .5; 3 SOLUTION RESPIRATORY (INHALATION) at 13:41

## 2017-06-16 RX ADMIN — OXYCODONE HYDROCHLORIDE 5 MG: 5 TABLET ORAL at 15:00

## 2017-06-16 RX ADMIN — POLYETHYLENE GLYCOL 3350 17 G: 17 POWDER, FOR SOLUTION ORAL at 08:34

## 2017-06-16 RX ADMIN — DOCUSATE SODIUM 100 MG: 100 CAPSULE, LIQUID FILLED ORAL at 08:33

## 2017-06-16 RX ADMIN — INSULIN LISPRO 2 UNITS: 100 INJECTION, SOLUTION INTRAVENOUS; SUBCUTANEOUS at 16:54

## 2017-06-16 RX ADMIN — CLOTRIMAZOLE 10 MG: 10 LOZENGE ORAL at 15:14

## 2017-06-16 RX ADMIN — DICYCLOMINE HYDROCHLORIDE 10 MG: 10 CAPSULE ORAL at 15:14

## 2017-06-16 RX ADMIN — CLOTRIMAZOLE 10 MG: 10 LOZENGE ORAL at 08:34

## 2017-06-16 RX ADMIN — POTASSIUM CHLORIDE 10 MEQ: 750 TABLET, EXTENDED RELEASE ORAL at 08:33

## 2017-06-17 VITALS
DIASTOLIC BLOOD PRESSURE: 63 MMHG | WEIGHT: 240.52 LBS | SYSTOLIC BLOOD PRESSURE: 133 MMHG | OXYGEN SATURATION: 98 % | RESPIRATION RATE: 16 BRPM | HEART RATE: 80 BPM | TEMPERATURE: 97.8 F | HEIGHT: 62 IN | BODY MASS INDEX: 44.26 KG/M2

## 2017-06-17 PROBLEM — A41.9 SEVERE SEPSIS (HCC): Status: RESOLVED | Noted: 2017-06-07 | Resolved: 2017-06-17

## 2017-06-17 PROBLEM — J96.92 RESPIRATORY FAILURE WITH HYPERCAPNIA (HCC): Status: RESOLVED | Noted: 2017-06-10 | Resolved: 2017-06-17

## 2017-06-17 PROBLEM — R91.8 LUNG NODULES: Status: ACTIVE | Noted: 2017-04-15

## 2017-06-17 PROBLEM — K83.09 CHOLANGITIS: Status: RESOLVED | Noted: 2017-06-09 | Resolved: 2017-06-17

## 2017-06-17 PROBLEM — B96.1 BACTEREMIA DUE TO KLEBSIELLA PNEUMONIAE: Status: RESOLVED | Noted: 2017-06-08 | Resolved: 2017-06-17

## 2017-06-17 PROBLEM — R65.20 SEVERE SEPSIS (HCC): Status: RESOLVED | Noted: 2017-06-07 | Resolved: 2017-06-17

## 2017-06-17 PROBLEM — J96.92 RESPIRATORY FAILURE WITH HYPERCAPNIA (HCC): Status: ACTIVE | Noted: 2017-06-10

## 2017-06-17 PROBLEM — I21.4 NON-ST ELEVATION MYOCARDIAL INFARCTION (NSTEMI) (HCC): Status: RESOLVED | Noted: 2017-06-07 | Resolved: 2017-06-17

## 2017-06-17 PROBLEM — R78.81 BACTEREMIA DUE TO KLEBSIELLA PNEUMONIAE: Status: RESOLVED | Noted: 2017-06-08 | Resolved: 2017-06-17

## 2017-06-17 LAB
ALBUMIN SERPL BCP-MCNC: 2.7 G/DL (ref 3.5–5)
ALP SERPL-CCNC: 89 U/L (ref 46–116)
ALT SERPL W P-5'-P-CCNC: 49 U/L (ref 12–78)
ANION GAP SERPL CALCULATED.3IONS-SCNC: 2 MMOL/L (ref 4–13)
AST SERPL W P-5'-P-CCNC: 19 U/L (ref 5–45)
BILIRUB SERPL-MCNC: 0.3 MG/DL (ref 0.2–1)
BUN SERPL-MCNC: 16 MG/DL (ref 5–25)
CALCIUM SERPL-MCNC: 8.4 MG/DL (ref 8.3–10.1)
CHLORIDE SERPL-SCNC: 101 MMOL/L (ref 100–108)
CO2 SERPL-SCNC: 40 MMOL/L (ref 21–32)
CREAT SERPL-MCNC: 0.73 MG/DL (ref 0.6–1.3)
ERYTHROCYTE [DISTWIDTH] IN BLOOD BY AUTOMATED COUNT: 15.3 % (ref 11.6–15.1)
GFR SERPL CREATININE-BSD FRML MDRD: >60 ML/MIN/1.73SQ M
GLUCOSE SERPL-MCNC: 208 MG/DL (ref 65–140)
GLUCOSE SERPL-MCNC: 268 MG/DL (ref 65–140)
GLUCOSE SERPL-MCNC: 79 MG/DL (ref 65–140)
GLUCOSE SERPL-MCNC: 85 MG/DL (ref 65–140)
HCT VFR BLD AUTO: 35 % (ref 37–47)
HGB BLD-MCNC: 11.4 G/DL (ref 12–16)
MAGNESIUM SERPL-MCNC: 2.3 MG/DL (ref 1.6–2.6)
MCH RBC QN AUTO: 31 PG (ref 27–31)
MCHC RBC AUTO-ENTMCNC: 32.7 G/DL (ref 31.4–37.4)
MCV RBC AUTO: 95 FL (ref 82–98)
PLATELET # BLD AUTO: 226 THOUSANDS/UL (ref 130–400)
PMV BLD AUTO: 8.1 FL (ref 8.9–12.7)
POTASSIUM SERPL-SCNC: 3.9 MMOL/L (ref 3.5–5.3)
PROT SERPL-MCNC: 6.1 G/DL (ref 6.4–8.2)
RBC # BLD AUTO: 3.69 MILLION/UL (ref 4.2–5.4)
SODIUM SERPL-SCNC: 143 MMOL/L (ref 136–145)
WBC # BLD AUTO: 5.3 THOUSAND/UL (ref 4.8–10.8)

## 2017-06-17 PROCEDURE — 82948 REAGENT STRIP/BLOOD GLUCOSE: CPT

## 2017-06-17 PROCEDURE — G0009 ADMIN PNEUMOCOCCAL VACCINE: HCPCS | Performed by: INTERNAL MEDICINE

## 2017-06-17 PROCEDURE — 90670 PCV13 VACCINE IM: CPT | Performed by: INTERNAL MEDICINE

## 2017-06-17 PROCEDURE — 94760 N-INVAS EAR/PLS OXIMETRY 1: CPT

## 2017-06-17 PROCEDURE — 83735 ASSAY OF MAGNESIUM: CPT | Performed by: NURSE PRACTITIONER

## 2017-06-17 PROCEDURE — 80053 COMPREHEN METABOLIC PANEL: CPT | Performed by: NURSE PRACTITIONER

## 2017-06-17 PROCEDURE — 85027 COMPLETE CBC AUTOMATED: CPT | Performed by: NURSE PRACTITIONER

## 2017-06-17 RX ORDER — LACTULOSE 20 G/30ML
10 SOLUTION ORAL DAILY
Qty: 236 ML | Refills: 0
Start: 2017-06-17 | End: 2017-09-18

## 2017-06-17 RX ORDER — INSULIN GLARGINE 100 [IU]/ML
45 INJECTION, SOLUTION SUBCUTANEOUS EVERY 12 HOURS SCHEDULED
Qty: 30 ML | Refills: 0 | Status: ON HOLD
Start: 2017-06-17 | End: 2017-09-21

## 2017-06-17 RX ORDER — POTASSIUM CHLORIDE 750 MG/1
20 TABLET, EXTENDED RELEASE ORAL DAILY
Qty: 30 TABLET | Refills: 0
Start: 2017-06-17 | End: 2018-04-12 | Stop reason: SDUPTHER

## 2017-06-17 RX ORDER — CLOTRIMAZOLE 10 MG/1
10 LOZENGE ORAL; TOPICAL 3 TIMES DAILY
Qty: 9 TABLET | Refills: 0
Start: 2017-06-17 | End: 2017-09-18

## 2017-06-17 RX ORDER — POLYETHYLENE GLYCOL 3350 17 G/17G
17 POWDER, FOR SOLUTION ORAL DAILY
Qty: 14 EACH | Refills: 0
Start: 2017-06-17 | End: 2018-10-11 | Stop reason: ALTCHOICE

## 2017-06-17 RX ORDER — OXYCODONE HYDROCHLORIDE 5 MG/1
5 TABLET ORAL EVERY 6 HOURS PRN
Qty: 30 TABLET | Refills: 0 | Status: SHIPPED | OUTPATIENT
Start: 2017-06-17 | End: 2017-07-01

## 2017-06-17 RX ORDER — ACETAMINOPHEN 325 MG/1
650 TABLET ORAL EVERY 6 HOURS PRN
Qty: 30 TABLET | Refills: 0 | Status: ON HOLD
Start: 2017-06-17 | End: 2018-02-22

## 2017-06-17 RX ORDER — DOCUSATE SODIUM 100 MG/1
100 CAPSULE, LIQUID FILLED ORAL 2 TIMES DAILY
Qty: 10 CAPSULE | Refills: 0
Start: 2017-06-17 | End: 2017-09-18

## 2017-06-17 RX ORDER — LISINOPRIL 10 MG/1
10 TABLET ORAL DAILY
Qty: 30 TABLET | Refills: 0
Start: 2017-06-17 | End: 2017-09-18

## 2017-06-17 RX ORDER — DICYCLOMINE HYDROCHLORIDE 10 MG/1
10 CAPSULE ORAL
Qty: 30 CAPSULE | Refills: 0
Start: 2017-06-17 | End: 2017-09-18

## 2017-06-17 RX ORDER — SENNOSIDES 8.6 MG
1 TABLET ORAL
Qty: 30 EACH | Refills: 0
Start: 2017-06-17 | End: 2017-09-21 | Stop reason: HOSPADM

## 2017-06-17 RX ADMIN — PRAMIPEXOLE DIHYDROCHLORIDE 1 MG: 0.5 TABLET ORAL at 09:29

## 2017-06-17 RX ADMIN — MEROPENEM 1000 MG: 1 INJECTION, POWDER, FOR SOLUTION INTRAVENOUS at 05:18

## 2017-06-17 RX ADMIN — OXYCODONE HYDROCHLORIDE 5 MG: 5 TABLET ORAL at 13:48

## 2017-06-17 RX ADMIN — PNEUMOCOCCAL 13-VALENT CONJUGATE VACCINE 0.5 ML: 2.2; 2.2; 2.2; 2.2; 2.2; 4.4; 2.2; 2.2; 2.2; 2.2; 2.2; 2.2; 2.2 INJECTION, SUSPENSION INTRAMUSCULAR at 13:48

## 2017-06-17 RX ADMIN — INSULIN LISPRO 2 UNITS: 100 INJECTION, SOLUTION INTRAVENOUS; SUBCUTANEOUS at 12:35

## 2017-06-17 RX ADMIN — ENOXAPARIN SODIUM 40 MG: 40 INJECTION SUBCUTANEOUS at 09:29

## 2017-06-17 RX ADMIN — LACTULOSE 20 G: 20 SOLUTION ORAL at 09:38

## 2017-06-17 RX ADMIN — DICYCLOMINE HYDROCHLORIDE 10 MG: 10 CAPSULE ORAL at 12:35

## 2017-06-17 RX ADMIN — DICYCLOMINE HYDROCHLORIDE 10 MG: 10 CAPSULE ORAL at 06:23

## 2017-06-17 RX ADMIN — OXYCODONE HYDROCHLORIDE 5 MG: 5 TABLET ORAL at 00:36

## 2017-06-17 RX ADMIN — POLYETHYLENE GLYCOL 3350 17 G: 17 POWDER, FOR SOLUTION ORAL at 09:29

## 2017-06-17 RX ADMIN — CLOTRIMAZOLE 10 MG: 10 LOZENGE ORAL at 09:29

## 2017-06-17 RX ADMIN — OXYCODONE HYDROCHLORIDE AND ACETAMINOPHEN 500 MG: 500 TABLET ORAL at 09:29

## 2017-06-17 RX ADMIN — LISINOPRIL 10 MG: 10 TABLET ORAL at 09:29

## 2017-06-17 RX ADMIN — OXYCODONE HYDROCHLORIDE 5 MG: 5 TABLET ORAL at 05:18

## 2017-06-17 RX ADMIN — LEVOTHYROXINE SODIUM 125 MCG: 125 TABLET ORAL at 05:18

## 2017-06-17 RX ADMIN — FUROSEMIDE 40 MG: 10 INJECTION, SOLUTION INTRAMUSCULAR; INTRAVENOUS at 09:29

## 2017-06-17 RX ADMIN — FOLIC ACID 1000 MCG: 1 TABLET ORAL at 09:29

## 2017-06-17 RX ADMIN — INSULIN GLARGINE 45 UNITS: 100 INJECTION, SOLUTION SUBCUTANEOUS at 09:28

## 2017-06-17 RX ADMIN — ASPIRIN 81 MG CHEWABLE TABLET 81 MG: 81 TABLET CHEWABLE at 09:29

## 2017-06-17 RX ADMIN — POTASSIUM CHLORIDE 10 MEQ: 750 TABLET, EXTENDED RELEASE ORAL at 09:29

## 2017-06-17 RX ADMIN — DOCUSATE SODIUM 100 MG: 100 CAPSULE, LIQUID FILLED ORAL at 09:29

## 2017-06-19 LAB — GLUCOSE SERPL-MCNC: 232 MG/DL (ref 65–140)

## 2017-07-05 ENCOUNTER — GENERIC CONVERSION - ENCOUNTER (OUTPATIENT)
Dept: OTHER | Facility: OTHER | Age: 77
End: 2017-07-05

## 2017-07-20 ENCOUNTER — GENERIC CONVERSION - ENCOUNTER (OUTPATIENT)
Dept: OTHER | Facility: OTHER | Age: 77
End: 2017-07-20

## 2017-07-21 ENCOUNTER — ALLSCRIPTS OFFICE VISIT (OUTPATIENT)
Dept: OTHER | Facility: OTHER | Age: 77
End: 2017-07-21

## 2017-07-21 ENCOUNTER — TRANSCRIBE ORDERS (OUTPATIENT)
Dept: ADMINISTRATIVE | Facility: HOSPITAL | Age: 77
End: 2017-07-21

## 2017-07-21 DIAGNOSIS — K76.9 LIVER DISEASE: ICD-10-CM

## 2017-07-21 DIAGNOSIS — K59.03 DRUG-INDUCED CONSTIPATION: ICD-10-CM

## 2017-07-21 DIAGNOSIS — Z46.89 ENCOUNTER FOR FITTING AND ADJUSTMENT OF OTHER SPECIFIED DEVICES: ICD-10-CM

## 2017-07-21 DIAGNOSIS — R52 PAIN: Primary | ICD-10-CM

## 2017-07-21 DIAGNOSIS — R93.2 ABNORMAL FINDINGS ON DIAGNOSTIC IMAGING OF LIVER AND BILIARY TRACT: ICD-10-CM

## 2017-08-01 ENCOUNTER — HOSPITAL ENCOUNTER (OUTPATIENT)
Dept: RADIOLOGY | Facility: HOSPITAL | Age: 77
Discharge: HOME | End: 2017-08-01
Payer: COMMERCIAL

## 2017-08-01 ENCOUNTER — ALLSCRIPTS OFFICE VISIT (OUTPATIENT)
Dept: OTHER | Facility: OTHER | Age: 77
End: 2017-08-01

## 2017-08-01 ENCOUNTER — TRANSCRIBE ORDERS (OUTPATIENT)
Dept: ADMINISTRATIVE | Facility: HOSPITAL | Age: 77
End: 2017-08-01

## 2017-08-01 DIAGNOSIS — R93.2 ABNORMAL FINDINGS ON DIAGNOSTIC IMAGING OF LIVER AND BILIARY TRACT: ICD-10-CM

## 2017-08-01 DIAGNOSIS — K59.03 DRUG-INDUCED CONSTIPATION: ICD-10-CM

## 2017-08-01 DIAGNOSIS — Z46.89 ENCOUNTER FOR FITTING AND ADJUSTMENT OF OTHER SPECIFIED DEVICES: ICD-10-CM

## 2017-08-01 LAB
ALBUMIN SERPL BCP-MCNC: 3.9 G/DL (ref 3.5–4.8)
ALP SERPL-CCNC: 70 IU/L (ref 39–117)
ALT SERPL W P-5'-P-CCNC: 26 IU/L (ref 0–32)
AST SERPL W P-5'-P-CCNC: 18 IU/L (ref 0–40)
BILIRUB SERPL-MCNC: 0.2 MG/DL (ref 0–1.2)
BILIRUBIN DIRECT (HISTORICAL): 0.08 MG/DL (ref 0–0.4)
BUN SERPL-MCNC: 23 MG/DL (ref 8–27)
BUN/CREA RATIO (HISTORICAL): 29 (ref 12–28)
CALCIUM SERPL-MCNC: 9.1 MG/DL (ref 8.7–10.3)
CANCER AG19-9 SERPL-ACNC: 21 U/ML (ref 0–35)
CARCINOEMBRYONIC ANTIGEN (HISTORICAL): 1.4 NG/ML (ref 0–4.7)
CHLORIDE SERPL-SCNC: 96 MMOL/L (ref 96–106)
CO2 SERPL-SCNC: 35 MMOL/L (ref 18–29)
CREAT SERPL-MCNC: 0.78 MG/DL (ref 0.57–1)
DEPRECATED RDW RBC AUTO: 14.7 % (ref 12.3–15.4)
EGFR AFRICAN AMERICAN (HISTORICAL): 84 ML/MIN/1.73
EGFR-AMERICAN CALC (HISTORICAL): 73 ML/MIN/1.73
GLUCOSE SERPL-MCNC: 144 MG/DL (ref 65–99)
HCT VFR BLD AUTO: 38.9 % (ref 34–46.6)
HGB BLD-MCNC: 12.8 G/DL (ref 11.1–15.9)
INR PPP: 1 (ref 0.8–1.2)
MCH RBC QN AUTO: 31.1 PG (ref 26.6–33)
MCHC RBC AUTO-ENTMCNC: 32.9 G/DL (ref 31.5–35.7)
MCV RBC AUTO: 95 FL (ref 79–97)
PLATELET # BLD AUTO: 179 X10E3/UL (ref 150–379)
POTASSIUM SERPL-SCNC: 3.9 MMOL/L (ref 3.5–5.2)
PROTHROMBIN TIME: 10 SEC (ref 9.1–12)
RBC (HISTORICAL): 4.11 X10E6/UL (ref 3.77–5.28)
SODIUM SERPL-SCNC: 146 MMOL/L (ref 134–144)
TOTAL PROTEIN (HISTORICAL): 6.9 G/DL (ref 6–8.5)
WBC # BLD AUTO: 9.1 X10E3/UL (ref 3.4–10.8)

## 2017-08-01 PROCEDURE — 74000 HB X-RAY EXAM OF ABDOMEN (SINGLE ANTEROPOSTERIOR VIEW): CPT

## 2017-08-02 ENCOUNTER — GENERIC CONVERSION - ENCOUNTER (OUTPATIENT)
Dept: OTHER | Facility: OTHER | Age: 77
End: 2017-08-02

## 2017-08-04 ENCOUNTER — HOSPITAL ENCOUNTER (OUTPATIENT)
Dept: RADIOLOGY | Facility: HOSPITAL | Age: 77
Discharge: HOME | End: 2017-08-04
Attending: INTERNAL MEDICINE
Payer: COMMERCIAL

## 2017-08-04 DIAGNOSIS — K59.03 DRUG-INDUCED CONSTIPATION: ICD-10-CM

## 2017-08-04 DIAGNOSIS — R93.2 ABNORMAL FINDINGS ON DIAGNOSTIC IMAGING OF LIVER AND BILIARY TRACT: ICD-10-CM

## 2017-08-04 DIAGNOSIS — Z46.89 ENCOUNTER FOR FITTING AND ADJUSTMENT OF OTHER SPECIFIED DEVICES: ICD-10-CM

## 2017-08-04 PROCEDURE — A9585 GADOBUTROL INJECTION: HCPCS | Performed by: INTERNAL MEDICINE

## 2017-08-04 PROCEDURE — 74183 MRI ABD W/O CNTR FLWD CNTR: CPT

## 2017-08-04 RX ADMIN — GADOBUTROL 10 ML: 604.72 INJECTION INTRAVENOUS at 10:51

## 2017-08-22 ENCOUNTER — TRANSCRIBE ORDERS (OUTPATIENT)
Dept: ADMINISTRATIVE | Facility: HOSPITAL | Age: 77
End: 2017-08-22

## 2017-08-22 DIAGNOSIS — K59.09 OTHER CONSTIPATION: Primary | ICD-10-CM

## 2017-08-22 DIAGNOSIS — Z46.89 ENCOUNTER FOR REMOVAL OF PANCREATIC STENT: ICD-10-CM

## 2017-09-01 ENCOUNTER — HOSPITAL ENCOUNTER (OUTPATIENT)
Dept: RADIOLOGY | Facility: HOSPITAL | Age: 77
Discharge: HOME | End: 2017-09-01
Attending: INTERNAL MEDICINE
Payer: COMMERCIAL

## 2017-09-18 ENCOUNTER — APPOINTMENT (EMERGENCY)
Dept: RADIOLOGY | Facility: HOSPITAL | Age: 77
DRG: 190 | End: 2017-09-18
Payer: COMMERCIAL

## 2017-09-18 ENCOUNTER — HOSPITAL ENCOUNTER (INPATIENT)
Facility: HOSPITAL | Age: 77
LOS: 3 days | Discharge: HOME | DRG: 190 | End: 2017-09-21
Attending: EMERGENCY MEDICINE | Admitting: INTERNAL MEDICINE
Payer: COMMERCIAL

## 2017-09-18 DIAGNOSIS — J96.21 ACUTE ON CHRONIC RESPIRATORY FAILURE WITH HYPOXIA AND HYPERCAPNIA (HCC): ICD-10-CM

## 2017-09-18 DIAGNOSIS — Z79.4 TYPE 2 DIABETES MELLITUS WITH OTHER OPHTHALMIC COMPLICATION, WITH LONG-TERM CURRENT USE OF INSULIN (HCC): Chronic | ICD-10-CM

## 2017-09-18 DIAGNOSIS — J96.22 ACUTE ON CHRONIC RESPIRATORY FAILURE WITH HYPOXIA AND HYPERCAPNIA (HCC): ICD-10-CM

## 2017-09-18 DIAGNOSIS — R10.84 GENERALIZED ABDOMINAL PAIN: Chronic | ICD-10-CM

## 2017-09-18 DIAGNOSIS — J44.1 COPD EXACERBATION (HCC): Primary | ICD-10-CM

## 2017-09-18 DIAGNOSIS — E11.39 TYPE 2 DIABETES MELLITUS WITH OTHER OPHTHALMIC COMPLICATION, WITH LONG-TERM CURRENT USE OF INSULIN (HCC): Chronic | ICD-10-CM

## 2017-09-18 PROBLEM — D32.9 MENINGIOMA (HCC): Status: RESOLVED | Noted: 2017-06-16 | Resolved: 2017-09-18

## 2017-09-18 PROBLEM — R11.0 NAUSEA: Status: RESOLVED | Noted: 2017-04-15 | Resolved: 2017-09-18

## 2017-09-18 PROBLEM — L98.9 SKIN LESION OF LEFT LEG: Status: RESOLVED | Noted: 2017-04-21 | Resolved: 2017-09-18

## 2017-09-18 PROBLEM — K58.0 IRRITABLE BOWEL SYNDROME WITH DIARRHEA: Chronic | Status: RESOLVED | Noted: 2017-06-10 | Resolved: 2017-09-18

## 2017-09-18 PROBLEM — E87.6 HYPOKALEMIA: Status: RESOLVED | Noted: 2017-04-15 | Resolved: 2017-09-18

## 2017-09-18 LAB
ALBUMIN SERPL BCP-MCNC: 3.2 G/DL (ref 3.5–5)
ALP SERPL-CCNC: 69 U/L (ref 46–116)
ALT SERPL W P-5'-P-CCNC: 38 U/L (ref 12–78)
ANION GAP SERPL CALCULATED.3IONS-SCNC: 5 MMOL/L (ref 4–13)
APTT PPP: 23 SECONDS (ref 23–35)
ARTERIAL PATENCY WRIST A: YES
AST SERPL W P-5'-P-CCNC: 35 U/L (ref 5–45)
BASE EXCESS BLDA CALC-SCNC: 9.8 MMOL/L
BASOPHILS # BLD AUTO: 0 THOUSANDS/ΜL (ref 0–0.1)
BASOPHILS NFR BLD AUTO: 0 % (ref 0–1)
BILIRUB SERPL-MCNC: 0.3 MG/DL (ref 0.2–1)
BODY TEMPERATURE: 98.5 DEGREES FEHRENHEIT
BUN SERPL-MCNC: 21 MG/DL (ref 5–25)
CALCIUM SERPL-MCNC: 8.6 MG/DL (ref 8.3–10.1)
CHLORIDE SERPL-SCNC: 101 MMOL/L (ref 100–108)
CO2 SERPL-SCNC: 37 MMOL/L (ref 21–32)
CREAT SERPL-MCNC: 0.85 MG/DL (ref 0.6–1.3)
EOSINOPHIL # BLD AUTO: 0.1 THOUSAND/ΜL (ref 0–0.61)
EOSINOPHIL NFR BLD AUTO: 2 % (ref 0–6)
ERYTHROCYTE [DISTWIDTH] IN BLOOD BY AUTOMATED COUNT: 16.1 % (ref 11.6–15.1)
GFR SERPL CREATININE-BSD FRML MDRD: 66 ML/MIN/1.73SQ M
GLUCOSE SERPL-MCNC: 173 MG/DL (ref 65–140)
HCO3 BLDA-SCNC: 36.7 MMOL/L (ref 22–28)
HCT VFR BLD AUTO: 38.4 % (ref 37–47)
HGB BLD-MCNC: 12.6 G/DL (ref 12–16)
INR PPP: 0.97 (ref 0.86–1.16)
LYMPHOCYTES # BLD AUTO: 1.6 THOUSANDS/ΜL (ref 0.6–4.47)
LYMPHOCYTES NFR BLD AUTO: 19 % (ref 14–44)
MAGNESIUM SERPL-MCNC: 2.3 MG/DL (ref 1.6–2.6)
MCH RBC QN AUTO: 30.6 PG (ref 27–31)
MCHC RBC AUTO-ENTMCNC: 32.8 G/DL (ref 31.4–37.4)
MCV RBC AUTO: 93 FL (ref 82–98)
MONOCYTES # BLD AUTO: 0.5 THOUSAND/ΜL (ref 0.17–1.22)
MONOCYTES NFR BLD AUTO: 5 % (ref 4–12)
NEUTROPHILS # BLD AUTO: 6.5 THOUSANDS/ΜL (ref 1.85–7.62)
NEUTS SEG NFR BLD AUTO: 75 % (ref 43–75)
NON VENT ROOM AIR: 21 %
NRBC BLD AUTO-RTO: 0 /100 WBCS
NT-PROBNP SERPL-MCNC: 98 PG/ML
O2 CT BLDA-SCNC: 15.9 ML/DL (ref 16–23)
OXYHGB MFR BLDA: 86.2 % (ref 94–97)
PCO2 BLDA: 59.9 MM HG (ref 36–44)
PCO2 TEMP ADJ BLDA: 59.6 MM HG (ref 36–44)
PH BLD: 7.41 [PH] (ref 7.35–7.45)
PH BLDA: 7.41 [PH] (ref 7.35–7.45)
PHOSPHATE SERPL-MCNC: 3.8 MG/DL (ref 2.3–4.1)
PLATELET # BLD AUTO: 191 THOUSANDS/UL (ref 130–400)
PMV BLD AUTO: 8.3 FL (ref 8.9–12.7)
PO2 BLD: 54.6 MM HG (ref 75–129)
PO2 BLDA: 55 MM HG (ref 75–129)
POTASSIUM SERPL-SCNC: 4.5 MMOL/L (ref 3.5–5.3)
PROT SERPL-MCNC: 7 G/DL (ref 6.4–8.2)
PROTHROMBIN TIME: 10.2 SECONDS (ref 9.4–11.7)
RBC # BLD AUTO: 4.12 MILLION/UL (ref 4.2–5.4)
SODIUM SERPL-SCNC: 143 MMOL/L (ref 136–145)
SPECIMEN SOURCE: ABNORMAL
TROPONIN I SERPL-MCNC: <0.02 NG/ML
WBC # BLD AUTO: 8.7 THOUSAND/UL (ref 4.8–10.8)

## 2017-09-18 PROCEDURE — 85730 THROMBOPLASTIN TIME PARTIAL: CPT | Performed by: EMERGENCY MEDICINE

## 2017-09-18 PROCEDURE — 84484 ASSAY OF TROPONIN QUANT: CPT | Performed by: EMERGENCY MEDICINE

## 2017-09-18 PROCEDURE — 94760 N-INVAS EAR/PLS OXIMETRY 1: CPT

## 2017-09-18 PROCEDURE — 94660 CPAP INITIATION&MGMT: CPT

## 2017-09-18 PROCEDURE — 85610 PROTHROMBIN TIME: CPT | Performed by: EMERGENCY MEDICINE

## 2017-09-18 PROCEDURE — 99285 EMERGENCY DEPT VISIT HI MDM: CPT

## 2017-09-18 PROCEDURE — 83735 ASSAY OF MAGNESIUM: CPT | Performed by: EMERGENCY MEDICINE

## 2017-09-18 PROCEDURE — 82948 REAGENT STRIP/BLOOD GLUCOSE: CPT

## 2017-09-18 PROCEDURE — 82805 BLOOD GASES W/O2 SATURATION: CPT | Performed by: EMERGENCY MEDICINE

## 2017-09-18 PROCEDURE — 93005 ELECTROCARDIOGRAM TRACING: CPT | Performed by: EMERGENCY MEDICINE

## 2017-09-18 PROCEDURE — 71020 HB CHEST X-RAY 2VW FRONTAL&LATL: CPT

## 2017-09-18 PROCEDURE — 84100 ASSAY OF PHOSPHORUS: CPT | Performed by: EMERGENCY MEDICINE

## 2017-09-18 PROCEDURE — 93005 ELECTROCARDIOGRAM TRACING: CPT | Performed by: PHYSICIAN ASSISTANT

## 2017-09-18 PROCEDURE — 94640 AIRWAY INHALATION TREATMENT: CPT

## 2017-09-18 PROCEDURE — 96375 TX/PRO/DX INJ NEW DRUG ADDON: CPT

## 2017-09-18 PROCEDURE — 85025 COMPLETE CBC W/AUTO DIFF WBC: CPT | Performed by: EMERGENCY MEDICINE

## 2017-09-18 PROCEDURE — 36415 COLL VENOUS BLD VENIPUNCTURE: CPT | Performed by: EMERGENCY MEDICINE

## 2017-09-18 PROCEDURE — 96374 THER/PROPH/DIAG INJ IV PUSH: CPT

## 2017-09-18 PROCEDURE — 80053 COMPREHEN METABOLIC PANEL: CPT | Performed by: EMERGENCY MEDICINE

## 2017-09-18 PROCEDURE — 74177 CT ABD & PELVIS W/CONTRAST: CPT

## 2017-09-18 PROCEDURE — 83880 ASSAY OF NATRIURETIC PEPTIDE: CPT | Performed by: EMERGENCY MEDICINE

## 2017-09-18 RX ORDER — CALCIUM CARBONATE 200(500)MG
1000 TABLET,CHEWABLE ORAL DAILY PRN
Status: DISCONTINUED | OUTPATIENT
Start: 2017-09-18 | End: 2017-09-21 | Stop reason: HOSPADM

## 2017-09-18 RX ORDER — AZITHROMYCIN 250 MG/1
500 TABLET, FILM COATED ORAL EVERY 24 HOURS
Status: DISCONTINUED | OUTPATIENT
Start: 2017-09-18 | End: 2017-09-21 | Stop reason: HOSPADM

## 2017-09-18 RX ORDER — METHYLPREDNISOLONE SODIUM SUCCINATE 125 MG/2ML
60 INJECTION, POWDER, LYOPHILIZED, FOR SOLUTION INTRAMUSCULAR; INTRAVENOUS ONCE
Status: COMPLETED | OUTPATIENT
Start: 2017-09-18 | End: 2017-09-18

## 2017-09-18 RX ORDER — ALBUTEROL SULFATE 2.5 MG/3ML
5 SOLUTION RESPIRATORY (INHALATION) ONCE
Status: COMPLETED | OUTPATIENT
Start: 2017-09-18 | End: 2017-09-18

## 2017-09-18 RX ORDER — SENNOSIDES 8.6 MG
1 TABLET ORAL
Status: DISCONTINUED | OUTPATIENT
Start: 2017-09-18 | End: 2017-09-19

## 2017-09-18 RX ORDER — IPRATROPIUM BROMIDE AND ALBUTEROL SULFATE 2.5; .5 MG/3ML; MG/3ML
3 SOLUTION RESPIRATORY (INHALATION) ONCE
Status: COMPLETED | OUTPATIENT
Start: 2017-09-18 | End: 2017-09-18

## 2017-09-18 RX ORDER — ASPIRIN 81 MG/1
81 TABLET, CHEWABLE ORAL DAILY
Status: DISCONTINUED | OUTPATIENT
Start: 2017-09-19 | End: 2017-09-21 | Stop reason: HOSPADM

## 2017-09-18 RX ORDER — ALBUTEROL SULFATE 2.5 MG/3ML
2.5 SOLUTION RESPIRATORY (INHALATION) EVERY 2 HOUR PRN
Status: DISCONTINUED | OUTPATIENT
Start: 2017-09-18 | End: 2017-09-21 | Stop reason: HOSPADM

## 2017-09-18 RX ORDER — IPRATROPIUM BROMIDE AND ALBUTEROL SULFATE 2.5; .5 MG/3ML; MG/3ML
3 SOLUTION RESPIRATORY (INHALATION)
Status: DISCONTINUED | OUTPATIENT
Start: 2017-09-19 | End: 2017-09-19

## 2017-09-18 RX ORDER — HEPARIN SODIUM 5000 [USP'U]/ML
5000 INJECTION, SOLUTION INTRAVENOUS; SUBCUTANEOUS EVERY 8 HOURS SCHEDULED
Status: DISCONTINUED | OUTPATIENT
Start: 2017-09-18 | End: 2017-09-21 | Stop reason: HOSPADM

## 2017-09-18 RX ORDER — LEVOTHYROXINE SODIUM 0.12 MG/1
125 TABLET ORAL
Status: DISCONTINUED | OUTPATIENT
Start: 2017-09-19 | End: 2017-09-21 | Stop reason: HOSPADM

## 2017-09-18 RX ORDER — ACETAMINOPHEN 325 MG/1
650 TABLET ORAL EVERY 6 HOURS PRN
Status: DISCONTINUED | OUTPATIENT
Start: 2017-09-18 | End: 2017-09-21 | Stop reason: HOSPADM

## 2017-09-18 RX ORDER — ASCORBIC ACID 500 MG
500 TABLET ORAL DAILY
Status: DISCONTINUED | OUTPATIENT
Start: 2017-09-19 | End: 2017-09-21 | Stop reason: HOSPADM

## 2017-09-18 RX ORDER — METHYLPREDNISOLONE SODIUM SUCCINATE 40 MG/ML
20 INJECTION, POWDER, LYOPHILIZED, FOR SOLUTION INTRAMUSCULAR; INTRAVENOUS EVERY 6 HOURS SCHEDULED
Status: DISCONTINUED | OUTPATIENT
Start: 2017-09-19 | End: 2017-09-19

## 2017-09-18 RX ORDER — POLYETHYLENE GLYCOL 3350 17 G/17G
17 POWDER, FOR SOLUTION ORAL DAILY
Status: DISCONTINUED | OUTPATIENT
Start: 2017-09-19 | End: 2017-09-20

## 2017-09-18 RX ORDER — PRAMIPEXOLE DIHYDROCHLORIDE 0.5 MG/1
1 TABLET ORAL 3 TIMES DAILY
Status: DISCONTINUED | OUTPATIENT
Start: 2017-09-18 | End: 2017-09-21 | Stop reason: HOSPADM

## 2017-09-18 RX ORDER — MORPHINE SULFATE 4 MG/ML
4 INJECTION, SOLUTION INTRAMUSCULAR; INTRAVENOUS ONCE
Status: COMPLETED | OUTPATIENT
Start: 2017-09-18 | End: 2017-09-18

## 2017-09-18 RX ADMIN — PRAMIPEXOLE DIHYDROCHLORIDE 1 MG: 0.5 TABLET ORAL at 23:54

## 2017-09-18 RX ADMIN — INSULIN LISPRO 2 UNITS: 100 INJECTION, SOLUTION INTRAVENOUS; SUBCUTANEOUS at 23:54

## 2017-09-18 RX ADMIN — MORPHINE SULFATE 4 MG: 4 INJECTION, SOLUTION INTRAMUSCULAR; INTRAVENOUS at 20:45

## 2017-09-18 RX ADMIN — HEPARIN SODIUM 5000 UNITS: 5000 INJECTION, SOLUTION INTRAVENOUS; SUBCUTANEOUS at 23:55

## 2017-09-18 RX ADMIN — SENNOSIDES 8.6 MG: 8.6 TABLET, FILM COATED ORAL at 23:54

## 2017-09-18 RX ADMIN — IPRATROPIUM BROMIDE 0.5 MG: 0.5 SOLUTION RESPIRATORY (INHALATION) at 19:12

## 2017-09-18 RX ADMIN — IOHEXOL 100 ML: 350 INJECTION, SOLUTION INTRAVENOUS at 20:37

## 2017-09-18 RX ADMIN — ALBUTEROL SULFATE 5 MG: 2.5 SOLUTION RESPIRATORY (INHALATION) at 19:13

## 2017-09-18 RX ADMIN — IPRATROPIUM BROMIDE AND ALBUTEROL SULFATE 3 ML: .5; 3 SOLUTION RESPIRATORY (INHALATION) at 20:45

## 2017-09-18 RX ADMIN — METHYLPREDNISOLONE SODIUM SUCCINATE 20 MG: 40 INJECTION, POWDER, FOR SOLUTION INTRAMUSCULAR; INTRAVENOUS at 23:54

## 2017-09-18 RX ADMIN — METHYLPREDNISOLONE SODIUM SUCCINATE 60 MG: 125 INJECTION, POWDER, FOR SOLUTION INTRAMUSCULAR; INTRAVENOUS at 19:14

## 2017-09-18 RX ADMIN — AZITHROMYCIN 500 MG: 250 TABLET, FILM COATED ORAL at 23:54

## 2017-09-19 ENCOUNTER — APPOINTMENT (INPATIENT)
Dept: PHYSICAL THERAPY | Facility: HOSPITAL | Age: 77
DRG: 190 | End: 2017-09-19
Payer: COMMERCIAL

## 2017-09-19 ENCOUNTER — APPOINTMENT (INPATIENT)
Dept: OCCUPATIONAL THERAPY | Facility: HOSPITAL | Age: 77
DRG: 190 | End: 2017-09-19
Payer: COMMERCIAL

## 2017-09-19 PROBLEM — E66.01 MORBID OBESITY DUE TO EXCESS CALORIES (HCC): Chronic | Status: ACTIVE | Noted: 2017-09-19

## 2017-09-19 PROBLEM — K86.2 PANCREATIC CYST: Chronic | Status: ACTIVE | Noted: 2017-09-19

## 2017-09-19 PROBLEM — K86.2 PANCREATIC CYST: Status: ACTIVE | Noted: 2017-09-19

## 2017-09-19 PROBLEM — K59.03 CONSTIPATION DUE TO OPIOID THERAPY: Chronic | Status: ACTIVE | Noted: 2017-06-10

## 2017-09-19 PROBLEM — J44.1 COPD EXACERBATION (HCC): Status: ACTIVE | Noted: 2017-09-19

## 2017-09-19 PROBLEM — T40.2X5A CONSTIPATION DUE TO OPIOID THERAPY: Chronic | Status: ACTIVE | Noted: 2017-06-10

## 2017-09-19 PROBLEM — R10.9 ABDOMINAL PAIN: Chronic | Status: ACTIVE | Noted: 2017-04-15

## 2017-09-19 PROBLEM — J96.21 ACUTE ON CHRONIC RESPIRATORY FAILURE WITH HYPOXIA AND HYPERCAPNIA (HCC): Status: ACTIVE | Noted: 2017-09-19

## 2017-09-19 PROBLEM — J96.22 ACUTE ON CHRONIC RESPIRATORY FAILURE WITH HYPOXIA AND HYPERCAPNIA (HCC): Status: ACTIVE | Noted: 2017-09-19

## 2017-09-19 PROBLEM — E66.01 MORBID OBESITY DUE TO EXCESS CALORIES (HCC): Status: ACTIVE | Noted: 2017-09-19

## 2017-09-19 PROBLEM — R91.8 LUNG NODULES: Chronic | Status: ACTIVE | Noted: 2017-04-15

## 2017-09-19 LAB
ALBUMIN SERPL BCP-MCNC: 3.2 G/DL (ref 3.5–5)
ALP SERPL-CCNC: 69 U/L (ref 46–116)
ALT SERPL W P-5'-P-CCNC: 29 U/L (ref 12–78)
ANION GAP SERPL CALCULATED.3IONS-SCNC: 8 MMOL/L (ref 4–13)
APTT PPP: 25 SECONDS (ref 24–33)
AST SERPL W P-5'-P-CCNC: 14 U/L (ref 5–45)
ATRIAL RATE: 82 BPM
BACTERIA UR QL AUTO: ABNORMAL /HPF
BASOPHILS # BLD AUTO: 0 THOUSANDS/ΜL (ref 0–0.1)
BASOPHILS NFR BLD AUTO: 0 % (ref 0–1)
BILIRUB SERPL-MCNC: 0.2 MG/DL (ref 0.2–1)
BILIRUB UR QL STRIP: NEGATIVE
BUN SERPL-MCNC: 24 MG/DL (ref 5–25)
CALCIUM SERPL-MCNC: 8.1 MG/DL (ref 8.3–10.1)
CHLORIDE SERPL-SCNC: 98 MMOL/L (ref 100–108)
CLARITY UR: ABNORMAL
CO2 SERPL-SCNC: 33 MMOL/L (ref 21–32)
COLOR UR: YELLOW
CREAT SERPL-MCNC: 1.16 MG/DL (ref 0.6–1.3)
EOSINOPHIL # BLD AUTO: 0 THOUSAND/ΜL (ref 0–0.61)
EOSINOPHIL NFR BLD AUTO: 0 % (ref 0–6)
ERYTHROCYTE [DISTWIDTH] IN BLOOD BY AUTOMATED COUNT: 15.8 % (ref 11.6–15.1)
EST. AVERAGE GLUCOSE BLD GHB EST-MCNC: 214 MG/DL
GFR SERPL CREATININE-BSD FRML MDRD: 45 ML/MIN/1.73SQ M
GLUCOSE SERPL-MCNC: 175 MG/DL (ref 65–140)
GLUCOSE SERPL-MCNC: 197 MG/DL (ref 65–140)
GLUCOSE SERPL-MCNC: 207 MG/DL (ref 65–140)
GLUCOSE SERPL-MCNC: 221 MG/DL (ref 65–140)
GLUCOSE SERPL-MCNC: 305 MG/DL (ref 65–140)
GLUCOSE SERPL-MCNC: 394 MG/DL (ref 65–140)
GLUCOSE SERPL-MCNC: 397 MG/DL (ref 65–140)
GLUCOSE SERPL-MCNC: 401 MG/DL (ref 65–140)
GLUCOSE SERPL-MCNC: 413 MG/DL (ref 65–140)
GLUCOSE SERPL-MCNC: 498 MG/DL (ref 65–140)
GLUCOSE UR STRIP-MCNC: NEGATIVE MG/DL
HBA1C MFR BLD: 9.1 % (ref 4.2–6.3)
HCT VFR BLD AUTO: 37.7 % (ref 37–47)
HGB BLD-MCNC: 12.4 G/DL (ref 12–16)
HGB UR QL STRIP.AUTO: NEGATIVE
INR PPP: 0.99 (ref 0.86–1.16)
KETONES UR STRIP-MCNC: NEGATIVE MG/DL
L PNEUMO1 AG UR QL IA.RAPID: NEGATIVE
LEUKOCYTE ESTERASE UR QL STRIP: NEGATIVE
LYMPHOCYTES # BLD AUTO: 0.5 THOUSANDS/ΜL (ref 0.6–4.47)
LYMPHOCYTES NFR BLD AUTO: 5 % (ref 14–44)
MAGNESIUM SERPL-MCNC: 2.1 MG/DL (ref 1.6–2.6)
MCH RBC QN AUTO: 30.7 PG (ref 27–31)
MCHC RBC AUTO-ENTMCNC: 32.8 G/DL (ref 31.4–37.4)
MCV RBC AUTO: 94 FL (ref 82–98)
MONOCYTES # BLD AUTO: 0.1 THOUSAND/ΜL (ref 0.17–1.22)
MONOCYTES NFR BLD AUTO: 1 % (ref 4–12)
NEUTROPHILS # BLD AUTO: 9.2 THOUSANDS/ΜL (ref 1.85–7.62)
NEUTS SEG NFR BLD AUTO: 94 % (ref 43–75)
NITRITE UR QL STRIP: NEGATIVE
NON-SQ EPI CELLS URNS QL MICRO: ABNORMAL /HPF
NRBC BLD AUTO-RTO: 0 /100 WBCS
P AXIS: 45 DEGREES
PH UR STRIP.AUTO: 5 [PH] (ref 5–9)
PHOSPHATE SERPL-MCNC: 4.9 MG/DL (ref 2.3–4.1)
PLATELET # BLD AUTO: 185 THOUSANDS/UL (ref 130–400)
PLATELET BLD QL SMEAR: ADEQUATE
PMV BLD AUTO: 8.3 FL (ref 8.9–12.7)
POTASSIUM SERPL-SCNC: 4.1 MMOL/L (ref 3.5–5.3)
PR INTERVAL: 186 MS
PROT SERPL-MCNC: 7 G/DL (ref 6.4–8.2)
PROT UR STRIP-MCNC: ABNORMAL MG/DL
PROTHROMBIN TIME: 10.4 SECONDS (ref 9.4–11.7)
QRS AXIS: -23 DEGREES
QRSD INTERVAL: 86 MS
QT INTERVAL: 394 MS
QTC INTERVAL: 460 MS
RBC # BLD AUTO: 4.03 MILLION/UL (ref 4.2–5.4)
RBC #/AREA URNS AUTO: ABNORMAL /HPF
S PNEUM AG UR QL: NEGATIVE
SODIUM SERPL-SCNC: 139 MMOL/L (ref 136–145)
SP GR UR STRIP.AUTO: <=1.005 (ref 1–1.03)
T WAVE AXIS: 16 DEGREES
UROBILINOGEN UR QL STRIP.AUTO: 0.2 E.U./DL
VENTRICULAR RATE: 82 BPM
WBC # BLD AUTO: 9.8 THOUSAND/UL (ref 4.8–10.8)
WBC #/AREA URNS AUTO: ABNORMAL /HPF

## 2017-09-19 PROCEDURE — G8979 MOBILITY GOAL STATUS: HCPCS

## 2017-09-19 PROCEDURE — 81001 URINALYSIS AUTO W/SCOPE: CPT | Performed by: EMERGENCY MEDICINE

## 2017-09-19 PROCEDURE — 83735 ASSAY OF MAGNESIUM: CPT | Performed by: PHYSICIAN ASSISTANT

## 2017-09-19 PROCEDURE — 80053 COMPREHEN METABOLIC PANEL: CPT | Performed by: PHYSICIAN ASSISTANT

## 2017-09-19 PROCEDURE — 85610 PROTHROMBIN TIME: CPT | Performed by: PHYSICIAN ASSISTANT

## 2017-09-19 PROCEDURE — 94760 N-INVAS EAR/PLS OXIMETRY 1: CPT

## 2017-09-19 PROCEDURE — 94640 AIRWAY INHALATION TREATMENT: CPT

## 2017-09-19 PROCEDURE — 84100 ASSAY OF PHOSPHORUS: CPT | Performed by: PHYSICIAN ASSISTANT

## 2017-09-19 PROCEDURE — 83036 HEMOGLOBIN GLYCOSYLATED A1C: CPT | Performed by: PHYSICIAN ASSISTANT

## 2017-09-19 PROCEDURE — 85730 THROMBOPLASTIN TIME PARTIAL: CPT | Performed by: PHYSICIAN ASSISTANT

## 2017-09-19 PROCEDURE — G8987 SELF CARE CURRENT STATUS: HCPCS

## 2017-09-19 PROCEDURE — 97167 OT EVAL HIGH COMPLEX 60 MIN: CPT

## 2017-09-19 PROCEDURE — 82948 REAGENT STRIP/BLOOD GLUCOSE: CPT

## 2017-09-19 PROCEDURE — 87081 CULTURE SCREEN ONLY: CPT | Performed by: PHYSICIAN ASSISTANT

## 2017-09-19 PROCEDURE — G8978 MOBILITY CURRENT STATUS: HCPCS

## 2017-09-19 PROCEDURE — 97110 THERAPEUTIC EXERCISES: CPT

## 2017-09-19 PROCEDURE — 87449 NOS EACH ORGANISM AG IA: CPT | Performed by: PHYSICIAN ASSISTANT

## 2017-09-19 PROCEDURE — 85025 COMPLETE CBC W/AUTO DIFF WBC: CPT | Performed by: PHYSICIAN ASSISTANT

## 2017-09-19 PROCEDURE — G8988 SELF CARE GOAL STATUS: HCPCS

## 2017-09-19 PROCEDURE — 97163 PT EVAL HIGH COMPLEX 45 MIN: CPT

## 2017-09-19 PROCEDURE — 97535 SELF CARE MNGMENT TRAINING: CPT

## 2017-09-19 RX ORDER — SENNOSIDES 8.6 MG
2 TABLET ORAL
Status: DISCONTINUED | OUTPATIENT
Start: 2017-09-19 | End: 2017-09-21 | Stop reason: HOSPADM

## 2017-09-19 RX ORDER — FUROSEMIDE 10 MG/ML
40 INJECTION INTRAMUSCULAR; INTRAVENOUS ONCE
Status: COMPLETED | OUTPATIENT
Start: 2017-09-19 | End: 2017-09-19

## 2017-09-19 RX ORDER — METHYLPREDNISOLONE SODIUM SUCCINATE 40 MG/ML
20 INJECTION, POWDER, LYOPHILIZED, FOR SOLUTION INTRAMUSCULAR; INTRAVENOUS EVERY 12 HOURS SCHEDULED
Status: DISCONTINUED | OUTPATIENT
Start: 2017-09-19 | End: 2017-09-20

## 2017-09-19 RX ORDER — MORPHINE SULFATE 2 MG/ML
2 INJECTION, SOLUTION INTRAMUSCULAR; INTRAVENOUS ONCE
Status: COMPLETED | OUTPATIENT
Start: 2017-09-19 | End: 2017-09-19

## 2017-09-19 RX ORDER — TORSEMIDE 20 MG/1
20 TABLET ORAL
Status: DISCONTINUED | OUTPATIENT
Start: 2017-09-19 | End: 2017-09-21 | Stop reason: HOSPADM

## 2017-09-19 RX ORDER — INSULIN GLARGINE 100 [IU]/ML
27 INJECTION, SOLUTION SUBCUTANEOUS EVERY 12 HOURS SCHEDULED
Status: DISCONTINUED | OUTPATIENT
Start: 2017-09-19 | End: 2017-09-19

## 2017-09-19 RX ORDER — IPRATROPIUM BROMIDE AND ALBUTEROL SULFATE 2.5; .5 MG/3ML; MG/3ML
3 SOLUTION RESPIRATORY (INHALATION)
Status: DISCONTINUED | OUTPATIENT
Start: 2017-09-19 | End: 2017-09-21 | Stop reason: HOSPADM

## 2017-09-19 RX ORDER — OXYCODONE HYDROCHLORIDE 5 MG/1
5 TABLET ORAL EVERY 6 HOURS PRN
Status: DISCONTINUED | OUTPATIENT
Start: 2017-09-19 | End: 2017-09-21 | Stop reason: HOSPADM

## 2017-09-19 RX ADMIN — OXYCODONE HYDROCHLORIDE 5 MG: 5 TABLET ORAL at 23:58

## 2017-09-19 RX ADMIN — PRAMIPEXOLE DIHYDROCHLORIDE 1 MG: 0.5 TABLET ORAL at 20:46

## 2017-09-19 RX ADMIN — TORSEMIDE 20 MG: 20 TABLET ORAL at 16:00

## 2017-09-19 RX ADMIN — PRAMIPEXOLE DIHYDROCHLORIDE 1 MG: 0.5 TABLET ORAL at 08:42

## 2017-09-19 RX ADMIN — OXYCODONE HYDROCHLORIDE 5 MG: 5 TABLET ORAL at 00:34

## 2017-09-19 RX ADMIN — HEPARIN SODIUM 5000 UNITS: 5000 INJECTION, SOLUTION INTRAVENOUS; SUBCUTANEOUS at 05:26

## 2017-09-19 RX ADMIN — AZITHROMYCIN 500 MG: 250 TABLET, FILM COATED ORAL at 22:32

## 2017-09-19 RX ADMIN — IPRATROPIUM BROMIDE AND ALBUTEROL SULFATE 3 ML: .5; 3 SOLUTION RESPIRATORY (INHALATION) at 07:18

## 2017-09-19 RX ADMIN — SODIUM CHLORIDE 15 UNITS/HR: 9 INJECTION, SOLUTION INTRAVENOUS at 12:10

## 2017-09-19 RX ADMIN — SENNOSIDES 17.2 MG: 8.6 TABLET, FILM COATED ORAL at 21:12

## 2017-09-19 RX ADMIN — INSULIN LISPRO 10 UNITS: 100 INJECTION, SOLUTION INTRAVENOUS; SUBCUTANEOUS at 07:05

## 2017-09-19 RX ADMIN — TORSEMIDE 20 MG: 20 TABLET ORAL at 08:42

## 2017-09-19 RX ADMIN — PRAMIPEXOLE DIHYDROCHLORIDE 1 MG: 0.5 TABLET ORAL at 16:00

## 2017-09-19 RX ADMIN — FUROSEMIDE 40 MG: 10 INJECTION, SOLUTION INTRAMUSCULAR; INTRAVENOUS at 00:34

## 2017-09-19 RX ADMIN — METHYLPREDNISOLONE SODIUM SUCCINATE 20 MG: 40 INJECTION, POWDER, FOR SOLUTION INTRAMUSCULAR; INTRAVENOUS at 20:46

## 2017-09-19 RX ADMIN — INSULIN GLARGINE 27 UNITS: 100 INJECTION, SOLUTION SUBCUTANEOUS at 07:01

## 2017-09-19 RX ADMIN — Medication 1 TABLET: at 08:42

## 2017-09-19 RX ADMIN — MORPHINE SULFATE 2 MG: 2 INJECTION, SOLUTION INTRAMUSCULAR; INTRAVENOUS at 03:19

## 2017-09-19 RX ADMIN — OXYCODONE HYDROCHLORIDE 5 MG: 5 TABLET ORAL at 16:55

## 2017-09-19 RX ADMIN — ASPIRIN 81 MG 81 MG: 81 TABLET ORAL at 08:42

## 2017-09-19 RX ADMIN — HEPARIN SODIUM 5000 UNITS: 5000 INJECTION, SOLUTION INTRAVENOUS; SUBCUTANEOUS at 13:56

## 2017-09-19 RX ADMIN — OXYCODONE HYDROCHLORIDE AND ACETAMINOPHEN 500 MG: 500 TABLET ORAL at 08:42

## 2017-09-19 RX ADMIN — IPRATROPIUM BROMIDE AND ALBUTEROL SULFATE 3 ML: .5; 3 SOLUTION RESPIRATORY (INHALATION) at 00:41

## 2017-09-19 RX ADMIN — HEPARIN SODIUM 5000 UNITS: 5000 INJECTION, SOLUTION INTRAVENOUS; SUBCUTANEOUS at 21:13

## 2017-09-19 RX ADMIN — OXYCODONE HYDROCHLORIDE 5 MG: 5 TABLET ORAL at 08:52

## 2017-09-19 RX ADMIN — LEVOTHYROXINE SODIUM 125 MCG: 125 TABLET ORAL at 05:26

## 2017-09-19 RX ADMIN — METHYLPREDNISOLONE SODIUM SUCCINATE 20 MG: 40 INJECTION, POWDER, FOR SOLUTION INTRAMUSCULAR; INTRAVENOUS at 05:30

## 2017-09-19 RX ADMIN — IPRATROPIUM BROMIDE AND ALBUTEROL SULFATE 3 ML: .5; 3 SOLUTION RESPIRATORY (INHALATION) at 15:06

## 2017-09-19 RX ADMIN — IPRATROPIUM BROMIDE AND ALBUTEROL SULFATE 3 ML: .5; 3 SOLUTION RESPIRATORY (INHALATION) at 03:14

## 2017-09-19 RX ADMIN — POLYETHYLENE GLYCOL 3350 17 G: 17 POWDER, FOR SOLUTION ORAL at 08:42

## 2017-09-19 RX ADMIN — SODIUM CHLORIDE 11 UNITS/HR: 9 INJECTION, SOLUTION INTRAVENOUS at 17:53

## 2017-09-20 ENCOUNTER — APPOINTMENT (INPATIENT)
Dept: PHYSICAL THERAPY | Facility: HOSPITAL | Age: 77
DRG: 190 | End: 2017-09-20
Payer: COMMERCIAL

## 2017-09-20 ENCOUNTER — APPOINTMENT (INPATIENT)
Dept: OCCUPATIONAL THERAPY | Facility: HOSPITAL | Age: 77
DRG: 190 | End: 2017-09-20
Payer: COMMERCIAL

## 2017-09-20 LAB
ALBUMIN SERPL BCP-MCNC: 3.2 G/DL (ref 3.5–5)
ALP SERPL-CCNC: 61 U/L (ref 46–116)
ALT SERPL W P-5'-P-CCNC: 28 U/L (ref 12–78)
ANION GAP SERPL CALCULATED.3IONS-SCNC: 3 MMOL/L (ref 4–13)
AST SERPL W P-5'-P-CCNC: 11 U/L (ref 5–45)
BASOPHILS # BLD AUTO: 0 THOUSANDS/ΜL (ref 0–0.1)
BASOPHILS NFR BLD AUTO: 0 % (ref 0–1)
BILIRUB SERPL-MCNC: 0.2 MG/DL (ref 0.2–1)
BUN SERPL-MCNC: 32 MG/DL (ref 5–25)
CALCIUM SERPL-MCNC: 8.2 MG/DL (ref 8.3–10.1)
CHLORIDE SERPL-SCNC: 102 MMOL/L (ref 100–108)
CO2 SERPL-SCNC: 36 MMOL/L (ref 21–32)
CREAT SERPL-MCNC: 1.02 MG/DL (ref 0.6–1.3)
EOSINOPHIL # BLD AUTO: 0 THOUSAND/ΜL (ref 0–0.61)
EOSINOPHIL NFR BLD AUTO: 0 % (ref 0–6)
ERYTHROCYTE [DISTWIDTH] IN BLOOD BY AUTOMATED COUNT: 16.2 % (ref 11.6–15.1)
GFR SERPL CREATININE-BSD FRML MDRD: 53 ML/MIN/1.73SQ M
GLUCOSE SERPL-MCNC: 128 MG/DL (ref 65–140)
GLUCOSE SERPL-MCNC: 145 MG/DL (ref 65–140)
GLUCOSE SERPL-MCNC: 155 MG/DL (ref 65–140)
GLUCOSE SERPL-MCNC: 156 MG/DL (ref 65–140)
GLUCOSE SERPL-MCNC: 165 MG/DL (ref 65–140)
GLUCOSE SERPL-MCNC: 187 MG/DL (ref 65–140)
GLUCOSE SERPL-MCNC: 232 MG/DL (ref 65–140)
GLUCOSE SERPL-MCNC: 262 MG/DL (ref 65–140)
GLUCOSE SERPL-MCNC: 324 MG/DL (ref 65–140)
GLUCOSE SERPL-MCNC: 351 MG/DL (ref 65–140)
HCT VFR BLD AUTO: 36.7 % (ref 37–47)
HGB BLD-MCNC: 12 G/DL (ref 12–16)
INR PPP: 0.98 (ref 0.86–1.16)
LYMPHOCYTES # BLD AUTO: 0.9 THOUSANDS/ΜL (ref 0.6–4.47)
LYMPHOCYTES NFR BLD AUTO: 6 % (ref 14–44)
MAGNESIUM SERPL-MCNC: 2.5 MG/DL (ref 1.6–2.6)
MCH RBC QN AUTO: 30.6 PG (ref 27–31)
MCHC RBC AUTO-ENTMCNC: 32.7 G/DL (ref 31.4–37.4)
MCV RBC AUTO: 93 FL (ref 82–98)
MONOCYTES # BLD AUTO: 0.6 THOUSAND/ΜL (ref 0.17–1.22)
MONOCYTES NFR BLD AUTO: 4 % (ref 4–12)
MRSA NOSE QL CULT: NORMAL
NEUTROPHILS # BLD AUTO: 13.3 THOUSANDS/ΜL (ref 1.85–7.62)
NEUTS SEG NFR BLD AUTO: 90 % (ref 43–75)
NRBC BLD AUTO-RTO: 0 /100 WBCS
PHOSPHATE SERPL-MCNC: 4.3 MG/DL (ref 2.3–4.1)
PLATELET # BLD AUTO: 187 THOUSANDS/UL (ref 130–400)
PLATELET BLD QL SMEAR: ADEQUATE
PMV BLD AUTO: 8.5 FL (ref 8.9–12.7)
POTASSIUM SERPL-SCNC: 4.1 MMOL/L (ref 3.5–5.3)
PROT SERPL-MCNC: 6.8 G/DL (ref 6.4–8.2)
PROTHROMBIN TIME: 10.3 SECONDS (ref 9.4–11.7)
RBC # BLD AUTO: 3.93 MILLION/UL (ref 4.2–5.4)
SODIUM SERPL-SCNC: 141 MMOL/L (ref 136–145)
WBC # BLD AUTO: 14.8 THOUSAND/UL (ref 4.8–10.8)

## 2017-09-20 PROCEDURE — 84100 ASSAY OF PHOSPHORUS: CPT | Performed by: PHYSICIAN ASSISTANT

## 2017-09-20 PROCEDURE — 85610 PROTHROMBIN TIME: CPT | Performed by: PHYSICIAN ASSISTANT

## 2017-09-20 PROCEDURE — 82948 REAGENT STRIP/BLOOD GLUCOSE: CPT

## 2017-09-20 PROCEDURE — 80053 COMPREHEN METABOLIC PANEL: CPT | Performed by: PHYSICIAN ASSISTANT

## 2017-09-20 PROCEDURE — 85025 COMPLETE CBC W/AUTO DIFF WBC: CPT | Performed by: PHYSICIAN ASSISTANT

## 2017-09-20 PROCEDURE — 83735 ASSAY OF MAGNESIUM: CPT | Performed by: PHYSICIAN ASSISTANT

## 2017-09-20 PROCEDURE — 94640 AIRWAY INHALATION TREATMENT: CPT

## 2017-09-20 PROCEDURE — 97110 THERAPEUTIC EXERCISES: CPT

## 2017-09-20 PROCEDURE — 94760 N-INVAS EAR/PLS OXIMETRY 1: CPT

## 2017-09-20 RX ORDER — INSULIN GLARGINE 100 [IU]/ML
27 INJECTION, SOLUTION SUBCUTANEOUS EVERY 12 HOURS SCHEDULED
Status: DISCONTINUED | OUTPATIENT
Start: 2017-09-20 | End: 2017-09-21 | Stop reason: HOSPADM

## 2017-09-20 RX ORDER — FUROSEMIDE 40 MG/1
40 TABLET ORAL 2 TIMES DAILY
Status: CANCELLED | OUTPATIENT
Start: 2017-09-20

## 2017-09-20 RX ORDER — METHYLPREDNISOLONE SODIUM SUCCINATE 40 MG/ML
20 INJECTION, POWDER, LYOPHILIZED, FOR SOLUTION INTRAMUSCULAR; INTRAVENOUS DAILY
Status: DISCONTINUED | OUTPATIENT
Start: 2017-09-21 | End: 2017-09-21 | Stop reason: HOSPADM

## 2017-09-20 RX ORDER — DICYCLOMINE HYDROCHLORIDE 10 MG/1
10 CAPSULE ORAL 3 TIMES DAILY PRN
Status: DISCONTINUED | OUTPATIENT
Start: 2017-09-20 | End: 2017-09-21 | Stop reason: HOSPADM

## 2017-09-20 RX ORDER — POLYETHYLENE GLYCOL 3350 17 G/17G
17 POWDER, FOR SOLUTION ORAL 2 TIMES DAILY
Status: DISCONTINUED | OUTPATIENT
Start: 2017-09-20 | End: 2017-09-21 | Stop reason: HOSPADM

## 2017-09-20 RX ORDER — POTASSIUM CHLORIDE 20 MEQ/1
20 TABLET, EXTENDED RELEASE ORAL ONCE
Status: COMPLETED | OUTPATIENT
Start: 2017-09-20 | End: 2017-09-20

## 2017-09-20 RX ORDER — POLYETHYLENE GLYCOL 3350 17 G/17G
17 POWDER, FOR SOLUTION ORAL DAILY
Status: DISCONTINUED | OUTPATIENT
Start: 2017-09-20 | End: 2017-09-20

## 2017-09-20 RX ORDER — POTASSIUM CHLORIDE 20 MEQ/1
20 TABLET, EXTENDED RELEASE ORAL DAILY
Status: DISCONTINUED | OUTPATIENT
Start: 2017-09-21 | End: 2017-09-21 | Stop reason: HOSPADM

## 2017-09-20 RX ADMIN — Medication 1 TABLET: at 08:11

## 2017-09-20 RX ADMIN — TORSEMIDE 20 MG: 20 TABLET ORAL at 08:11

## 2017-09-20 RX ADMIN — ASPIRIN 81 MG 81 MG: 81 TABLET ORAL at 08:13

## 2017-09-20 RX ADMIN — SENNOSIDES 17.2 MG: 8.6 TABLET, FILM COATED ORAL at 21:31

## 2017-09-20 RX ADMIN — PRAMIPEXOLE DIHYDROCHLORIDE 1 MG: 0.5 TABLET ORAL at 21:31

## 2017-09-20 RX ADMIN — OXYCODONE HYDROCHLORIDE 5 MG: 5 TABLET ORAL at 21:33

## 2017-09-20 RX ADMIN — INSULIN LISPRO 3 UNITS: 100 INJECTION, SOLUTION INTRAVENOUS; SUBCUTANEOUS at 11:36

## 2017-09-20 RX ADMIN — LEVOTHYROXINE SODIUM 125 MCG: 125 TABLET ORAL at 05:26

## 2017-09-20 RX ADMIN — AZITHROMYCIN 500 MG: 250 TABLET, FILM COATED ORAL at 23:34

## 2017-09-20 RX ADMIN — IPRATROPIUM BROMIDE AND ALBUTEROL SULFATE 3 ML: .5; 3 SOLUTION RESPIRATORY (INHALATION) at 16:09

## 2017-09-20 RX ADMIN — HEPARIN SODIUM 5000 UNITS: 5000 INJECTION, SOLUTION INTRAVENOUS; SUBCUTANEOUS at 13:35

## 2017-09-20 RX ADMIN — INSULIN LISPRO 5 UNITS: 100 INJECTION, SOLUTION INTRAVENOUS; SUBCUTANEOUS at 21:33

## 2017-09-20 RX ADMIN — PRAMIPEXOLE DIHYDROCHLORIDE 1 MG: 0.5 TABLET ORAL at 08:12

## 2017-09-20 RX ADMIN — POLYETHYLENE GLYCOL 3350 17 G: 17 POWDER, FOR SOLUTION ORAL at 21:31

## 2017-09-20 RX ADMIN — POLYETHYLENE GLYCOL 3350 17 G: 17 POWDER, FOR SOLUTION ORAL at 08:11

## 2017-09-20 RX ADMIN — IPRATROPIUM BROMIDE AND ALBUTEROL SULFATE 3 ML: .5; 3 SOLUTION RESPIRATORY (INHALATION) at 07:39

## 2017-09-20 RX ADMIN — INSULIN GLARGINE 27 UNITS: 100 INJECTION, SOLUTION SUBCUTANEOUS at 21:33

## 2017-09-20 RX ADMIN — PRAMIPEXOLE DIHYDROCHLORIDE 1 MG: 0.5 TABLET ORAL at 16:36

## 2017-09-20 RX ADMIN — OXYCODONE HYDROCHLORIDE 5 MG: 5 TABLET ORAL at 06:03

## 2017-09-20 RX ADMIN — HEPARIN SODIUM 5000 UNITS: 5000 INJECTION, SOLUTION INTRAVENOUS; SUBCUTANEOUS at 21:31

## 2017-09-20 RX ADMIN — INSULIN GLARGINE 27 UNITS: 100 INJECTION, SOLUTION SUBCUTANEOUS at 10:25

## 2017-09-20 RX ADMIN — INSULIN LISPRO 6 UNITS: 100 INJECTION, SOLUTION INTRAVENOUS; SUBCUTANEOUS at 17:05

## 2017-09-20 RX ADMIN — HEPARIN SODIUM 5000 UNITS: 5000 INJECTION, SOLUTION INTRAVENOUS; SUBCUTANEOUS at 05:26

## 2017-09-20 RX ADMIN — POTASSIUM CHLORIDE 20 MEQ: 1500 TABLET, EXTENDED RELEASE ORAL at 10:27

## 2017-09-20 RX ADMIN — OXYCODONE HYDROCHLORIDE AND ACETAMINOPHEN 500 MG: 500 TABLET ORAL at 08:11

## 2017-09-20 RX ADMIN — IPRATROPIUM BROMIDE AND ALBUTEROL SULFATE 3 ML: .5; 3 SOLUTION RESPIRATORY (INHALATION) at 00:58

## 2017-09-20 RX ADMIN — TORSEMIDE 20 MG: 20 TABLET ORAL at 16:37

## 2017-09-20 RX ADMIN — METHYLPREDNISOLONE SODIUM SUCCINATE 20 MG: 40 INJECTION, POWDER, FOR SOLUTION INTRAMUSCULAR; INTRAVENOUS at 08:13

## 2017-09-21 ENCOUNTER — APPOINTMENT (INPATIENT)
Dept: PHYSICAL THERAPY | Facility: HOSPITAL | Age: 77
DRG: 190 | End: 2017-09-21
Payer: COMMERCIAL

## 2017-09-21 ENCOUNTER — APPOINTMENT (INPATIENT)
Dept: OCCUPATIONAL THERAPY | Facility: HOSPITAL | Age: 77
DRG: 190 | End: 2017-09-21
Payer: COMMERCIAL

## 2017-09-21 VITALS
RESPIRATION RATE: 20 BRPM | BODY MASS INDEX: 42.4 KG/M2 | WEIGHT: 230.38 LBS | OXYGEN SATURATION: 94 % | HEIGHT: 62 IN | TEMPERATURE: 98.2 F | DIASTOLIC BLOOD PRESSURE: 91 MMHG | HEART RATE: 95 BPM | SYSTOLIC BLOOD PRESSURE: 181 MMHG

## 2017-09-21 LAB
ANION GAP SERPL CALCULATED.3IONS-SCNC: -1 MMOL/L (ref 4–13)
BASOPHILS # BLD AUTO: 0 THOUSANDS/ΜL (ref 0–0.1)
BASOPHILS NFR BLD AUTO: 0 % (ref 0–1)
BUN SERPL-MCNC: 31 MG/DL (ref 5–25)
CALCIUM SERPL-MCNC: 8 MG/DL (ref 8.3–10.1)
CHLORIDE SERPL-SCNC: 100 MMOL/L (ref 100–108)
CO2 SERPL-SCNC: 42 MMOL/L (ref 21–32)
CREAT SERPL-MCNC: 1.01 MG/DL (ref 0.6–1.3)
EOSINOPHIL # BLD AUTO: 0 THOUSAND/ΜL (ref 0–0.61)
EOSINOPHIL NFR BLD AUTO: 0 % (ref 0–6)
ERYTHROCYTE [DISTWIDTH] IN BLOOD BY AUTOMATED COUNT: 16 % (ref 11.6–15.1)
GFR SERPL CREATININE-BSD FRML MDRD: 53 ML/MIN/1.73SQ M
GLUCOSE SERPL-MCNC: 161 MG/DL (ref 65–140)
GLUCOSE SERPL-MCNC: 165 MG/DL (ref 65–140)
GLUCOSE SERPL-MCNC: 298 MG/DL (ref 65–140)
GLUCOSE SERPL-MCNC: 392 MG/DL (ref 65–140)
HCT VFR BLD AUTO: 37.9 % (ref 37–47)
HGB BLD-MCNC: 12.3 G/DL (ref 12–16)
LYMPHOCYTES # BLD AUTO: 2 THOUSANDS/ΜL (ref 0.6–4.47)
LYMPHOCYTES NFR BLD AUTO: 19 % (ref 14–44)
MCH RBC QN AUTO: 30.5 PG (ref 27–31)
MCHC RBC AUTO-ENTMCNC: 32.4 G/DL (ref 31.4–37.4)
MCV RBC AUTO: 94 FL (ref 82–98)
MONOCYTES # BLD AUTO: 0.8 THOUSAND/ΜL (ref 0.17–1.22)
MONOCYTES NFR BLD AUTO: 7 % (ref 4–12)
NEUTROPHILS # BLD AUTO: 7.7 THOUSANDS/ΜL (ref 1.85–7.62)
NEUTS SEG NFR BLD AUTO: 73 % (ref 43–75)
NRBC BLD AUTO-RTO: 0 /100 WBCS
PLATELET # BLD AUTO: 173 THOUSANDS/UL (ref 130–400)
PMV BLD AUTO: 8.3 FL (ref 8.9–12.7)
POTASSIUM SERPL-SCNC: 3.5 MMOL/L (ref 3.5–5.3)
RBC # BLD AUTO: 4.03 MILLION/UL (ref 4.2–5.4)
SODIUM SERPL-SCNC: 141 MMOL/L (ref 136–145)
WBC # BLD AUTO: 10.5 THOUSAND/UL (ref 4.8–10.8)

## 2017-09-21 PROCEDURE — 97110 THERAPEUTIC EXERCISES: CPT

## 2017-09-21 PROCEDURE — 80048 BASIC METABOLIC PNL TOTAL CA: CPT | Performed by: NURSE PRACTITIONER

## 2017-09-21 PROCEDURE — 85025 COMPLETE CBC W/AUTO DIFF WBC: CPT | Performed by: NURSE PRACTITIONER

## 2017-09-21 PROCEDURE — 82948 REAGENT STRIP/BLOOD GLUCOSE: CPT

## 2017-09-21 PROCEDURE — 94620 HB PULMONARY STRESS TEST/SIMPLE: CPT

## 2017-09-21 PROCEDURE — 97535 SELF CARE MNGMENT TRAINING: CPT

## 2017-09-21 PROCEDURE — 94640 AIRWAY INHALATION TREATMENT: CPT

## 2017-09-21 PROCEDURE — 94760 N-INVAS EAR/PLS OXIMETRY 1: CPT

## 2017-09-21 RX ORDER — INSULIN GLARGINE 100 [IU]/ML
27 INJECTION, SOLUTION SUBCUTANEOUS EVERY 12 HOURS SCHEDULED
Start: 2017-09-21 | End: 2017-09-21

## 2017-09-21 RX ORDER — INSULIN GLARGINE 100 [IU]/ML
27 INJECTION, SOLUTION SUBCUTANEOUS 2 TIMES DAILY
Qty: 300 UNITS | Refills: 0 | Status: SHIPPED | OUTPATIENT
Start: 2017-09-21 | End: 2017-09-21

## 2017-09-21 RX ORDER — INSULIN GLARGINE 100 [IU]/ML
30 INJECTION, SOLUTION SUBCUTANEOUS 2 TIMES DAILY
Qty: 300 UNITS | Refills: 0 | Status: SHIPPED | OUTPATIENT
Start: 2017-09-21 | End: 2017-10-08 | Stop reason: ALTCHOICE

## 2017-09-21 RX ORDER — DICYCLOMINE HYDROCHLORIDE 10 MG/1
10 CAPSULE ORAL 3 TIMES DAILY PRN
Qty: 20 CAPSULE | Refills: 0 | Status: ON HOLD | OUTPATIENT
Start: 2017-09-21 | End: 2017-12-21

## 2017-09-21 RX ORDER — AZITHROMYCIN 500 MG/1
500 TABLET, FILM COATED ORAL EVERY 24 HOURS
Qty: 10 TABLET | Refills: 0 | Status: SHIPPED | OUTPATIENT
Start: 2017-09-21 | End: 2017-09-23

## 2017-09-21 RX ORDER — INSULIN GLARGINE 100 [IU]/ML
27 INJECTION, SOLUTION SUBCUTANEOUS EVERY 12 HOURS SCHEDULED
Qty: 10 ML | Refills: 0 | Status: SHIPPED | OUTPATIENT
Start: 2017-09-21 | End: 2017-09-21 | Stop reason: HOSPADM

## 2017-09-21 RX ORDER — TORSEMIDE 20 MG/1
20 TABLET ORAL
Qty: 60 TABLET | Refills: 0 | Status: ON HOLD | OUTPATIENT
Start: 2017-09-21 | End: 2017-12-21

## 2017-09-21 RX ORDER — PREDNISONE 10 MG/1
10 TABLET ORAL DAILY
Qty: 30 TABLET | Refills: 0 | Status: SHIPPED | OUTPATIENT
Start: 2017-09-21 | End: 2017-10-08 | Stop reason: ALTCHOICE

## 2017-09-21 RX ADMIN — POLYETHYLENE GLYCOL 3350 17 G: 17 POWDER, FOR SOLUTION ORAL at 09:28

## 2017-09-21 RX ADMIN — INSULIN LISPRO 1 UNITS: 100 INJECTION, SOLUTION INTRAVENOUS; SUBCUTANEOUS at 09:12

## 2017-09-21 RX ADMIN — HEPARIN SODIUM 5000 UNITS: 5000 INJECTION, SOLUTION INTRAVENOUS; SUBCUTANEOUS at 05:12

## 2017-09-21 RX ADMIN — OXYCODONE HYDROCHLORIDE 5 MG: 5 TABLET ORAL at 04:46

## 2017-09-21 RX ADMIN — Medication 1 TABLET: at 09:28

## 2017-09-21 RX ADMIN — HEPARIN SODIUM 5000 UNITS: 5000 INJECTION, SOLUTION INTRAVENOUS; SUBCUTANEOUS at 14:41

## 2017-09-21 RX ADMIN — TORSEMIDE 20 MG: 20 TABLET ORAL at 16:52

## 2017-09-21 RX ADMIN — INSULIN LISPRO 6 UNITS: 100 INJECTION, SOLUTION INTRAVENOUS; SUBCUTANEOUS at 11:43

## 2017-09-21 RX ADMIN — PRAMIPEXOLE DIHYDROCHLORIDE 1 MG: 0.5 TABLET ORAL at 09:28

## 2017-09-21 RX ADMIN — ASPIRIN 81 MG 81 MG: 81 TABLET ORAL at 09:29

## 2017-09-21 RX ADMIN — OXYCODONE HYDROCHLORIDE 5 MG: 5 TABLET ORAL at 14:46

## 2017-09-21 RX ADMIN — METHYLPREDNISOLONE SODIUM SUCCINATE 20 MG: 40 INJECTION, POWDER, FOR SOLUTION INTRAMUSCULAR; INTRAVENOUS at 09:29

## 2017-09-21 RX ADMIN — IPRATROPIUM BROMIDE AND ALBUTEROL SULFATE 3 ML: .5; 3 SOLUTION RESPIRATORY (INHALATION) at 00:36

## 2017-09-21 RX ADMIN — INSULIN GLARGINE 27 UNITS: 100 INJECTION, SOLUTION SUBCUTANEOUS at 09:12

## 2017-09-21 RX ADMIN — POTASSIUM CHLORIDE 20 MEQ: 1500 TABLET, EXTENDED RELEASE ORAL at 09:28

## 2017-09-21 RX ADMIN — OXYCODONE HYDROCHLORIDE AND ACETAMINOPHEN 500 MG: 500 TABLET ORAL at 09:28

## 2017-09-21 RX ADMIN — PRAMIPEXOLE DIHYDROCHLORIDE 1 MG: 0.5 TABLET ORAL at 16:52

## 2017-09-21 RX ADMIN — INSULIN LISPRO 4 UNITS: 100 INJECTION, SOLUTION INTRAVENOUS; SUBCUTANEOUS at 16:52

## 2017-09-21 RX ADMIN — TORSEMIDE 20 MG: 20 TABLET ORAL at 09:28

## 2017-09-21 RX ADMIN — IPRATROPIUM BROMIDE AND ALBUTEROL SULFATE 3 ML: .5; 3 SOLUTION RESPIRATORY (INHALATION) at 08:17

## 2017-09-21 RX ADMIN — LEVOTHYROXINE SODIUM 125 MCG: 125 TABLET ORAL at 05:12

## 2017-09-21 RX ADMIN — IPRATROPIUM BROMIDE AND ALBUTEROL SULFATE 3 ML: .5; 3 SOLUTION RESPIRATORY (INHALATION) at 16:39

## 2017-09-22 ENCOUNTER — GENERIC CONVERSION - ENCOUNTER (OUTPATIENT)
Dept: OTHER | Facility: OTHER | Age: 77
End: 2017-09-22

## 2017-09-25 ENCOUNTER — GENERIC CONVERSION - ENCOUNTER (OUTPATIENT)
Dept: OTHER | Facility: OTHER | Age: 77
End: 2017-09-25

## 2017-09-25 ENCOUNTER — ALLSCRIPTS OFFICE VISIT (OUTPATIENT)
Dept: OTHER | Facility: OTHER | Age: 77
End: 2017-09-25

## 2017-09-25 LAB — GLUCOSE SERPL-MCNC: 340 MG/DL

## 2017-09-29 ENCOUNTER — LAB REQUISITION (OUTPATIENT)
Dept: LAB | Facility: HOSPITAL | Age: 77
End: 2017-09-29
Payer: COMMERCIAL

## 2017-09-29 ENCOUNTER — GENERIC CONVERSION - ENCOUNTER (OUTPATIENT)
Dept: OTHER | Facility: OTHER | Age: 77
End: 2017-09-29

## 2017-09-29 DIAGNOSIS — E11.40 TYPE 2 DIABETES MELLITUS WITH DIABETIC NEUROPATHY (HCC): ICD-10-CM

## 2017-09-29 DIAGNOSIS — R73.9 HYPERGLYCEMIA: ICD-10-CM

## 2017-09-29 DIAGNOSIS — I10 ESSENTIAL (PRIMARY) HYPERTENSION: ICD-10-CM

## 2017-09-29 DIAGNOSIS — E03.9 HYPOTHYROIDISM: ICD-10-CM

## 2017-09-29 DIAGNOSIS — J43.9 PULMONARY EMPHYSEMA (HCC): ICD-10-CM

## 2017-09-29 LAB
ALBUMIN SERPL BCP-MCNC: 3.4 G/DL (ref 3.5–5)
ALP SERPL-CCNC: 70 U/L (ref 46–116)
ALT SERPL W P-5'-P-CCNC: 36 U/L (ref 12–78)
ANION GAP SERPL CALCULATED.3IONS-SCNC: 7 MMOL/L (ref 4–13)
AST SERPL W P-5'-P-CCNC: 12 U/L (ref 5–45)
BASOPHILS # BLD AUTO: 0 THOUSANDS/ΜL (ref 0–0.1)
BASOPHILS NFR BLD AUTO: 0 % (ref 0–1)
BILIRUB SERPL-MCNC: 0.3 MG/DL (ref 0.2–1)
BUN SERPL-MCNC: 25 MG/DL (ref 5–25)
CALCIUM SERPL-MCNC: 9.1 MG/DL (ref 8.3–10.1)
CHLORIDE SERPL-SCNC: 101 MMOL/L (ref 100–108)
CO2 SERPL-SCNC: 34 MMOL/L (ref 21–32)
CREAT SERPL-MCNC: 1.14 MG/DL (ref 0.6–1.3)
EOSINOPHIL # BLD AUTO: 0.1 THOUSAND/ΜL (ref 0–0.61)
EOSINOPHIL NFR BLD AUTO: 1 % (ref 0–6)
ERYTHROCYTE [DISTWIDTH] IN BLOOD BY AUTOMATED COUNT: 15.7 % (ref 11.6–15.1)
EST. AVERAGE GLUCOSE BLD GHB EST-MCNC: 220 MG/DL
GFR SERPL CREATININE-BSD FRML MDRD: 46 ML/MIN/1.73SQ M
GLUCOSE SERPL-MCNC: 233 MG/DL (ref 65–140)
HBA1C MFR BLD: 9.3 % (ref 4.2–6.3)
HCT VFR BLD AUTO: 40.5 % (ref 37–47)
HGB BLD-MCNC: 13.4 G/DL (ref 12–16)
LYMPHOCYTES # BLD AUTO: 1.8 THOUSANDS/ΜL (ref 0.6–4.47)
LYMPHOCYTES NFR BLD AUTO: 18 % (ref 14–44)
MCH RBC QN AUTO: 31 PG (ref 27–31)
MCHC RBC AUTO-ENTMCNC: 33.1 G/DL (ref 31.4–37.4)
MCV RBC AUTO: 94 FL (ref 82–98)
MONOCYTES # BLD AUTO: 0.5 THOUSAND/ΜL (ref 0.17–1.22)
MONOCYTES NFR BLD AUTO: 6 % (ref 4–12)
NEUTROPHILS # BLD AUTO: 7.4 THOUSANDS/ΜL (ref 1.85–7.62)
NEUTS SEG NFR BLD AUTO: 75 % (ref 43–75)
NRBC BLD AUTO-RTO: 0 /100 WBCS
NT-PROBNP SERPL-MCNC: 66 PG/ML
PLATELET # BLD AUTO: 164 THOUSANDS/UL (ref 130–400)
PMV BLD AUTO: 9.5 FL (ref 8.9–12.7)
POTASSIUM SERPL-SCNC: 3.4 MMOL/L (ref 3.5–5.3)
PROT SERPL-MCNC: 6.6 G/DL (ref 6.4–8.2)
RBC # BLD AUTO: 4.32 MILLION/UL (ref 4.2–5.4)
SODIUM SERPL-SCNC: 142 MMOL/L (ref 136–145)
T3 SERPL-MCNC: 0.8 NG/ML (ref 0.6–1.8)
T4 FREE SERPL-MCNC: 1.09 NG/DL (ref 0.76–1.46)
TSH SERPL DL<=0.05 MIU/L-ACNC: 3.94 UIU/ML (ref 0.36–3.74)
WBC # BLD AUTO: 9.8 THOUSAND/UL (ref 4.8–10.8)

## 2017-09-29 PROCEDURE — 84439 ASSAY OF FREE THYROXINE: CPT | Performed by: FAMILY MEDICINE

## 2017-09-29 PROCEDURE — 84480 ASSAY TRIIODOTHYRONINE (T3): CPT | Performed by: FAMILY MEDICINE

## 2017-09-29 PROCEDURE — 84443 ASSAY THYROID STIM HORMONE: CPT | Performed by: FAMILY MEDICINE

## 2017-09-29 PROCEDURE — 86376 MICROSOMAL ANTIBODY EACH: CPT | Performed by: FAMILY MEDICINE

## 2017-09-29 PROCEDURE — 83880 ASSAY OF NATRIURETIC PEPTIDE: CPT | Performed by: FAMILY MEDICINE

## 2017-09-29 PROCEDURE — 84432 ASSAY OF THYROGLOBULIN: CPT | Performed by: FAMILY MEDICINE

## 2017-09-29 PROCEDURE — 83036 HEMOGLOBIN GLYCOSYLATED A1C: CPT | Performed by: FAMILY MEDICINE

## 2017-09-29 PROCEDURE — 86800 THYROGLOBULIN ANTIBODY: CPT | Performed by: FAMILY MEDICINE

## 2017-09-29 PROCEDURE — 85025 COMPLETE CBC W/AUTO DIFF WBC: CPT | Performed by: FAMILY MEDICINE

## 2017-09-29 PROCEDURE — 80053 COMPREHEN METABOLIC PANEL: CPT | Performed by: FAMILY MEDICINE

## 2017-09-29 PROCEDURE — 36415 COLL VENOUS BLD VENIPUNCTURE: CPT | Performed by: FAMILY MEDICINE

## 2017-09-30 ENCOUNTER — GENERIC CONVERSION - ENCOUNTER (OUTPATIENT)
Dept: OTHER | Facility: OTHER | Age: 77
End: 2017-09-30

## 2017-10-01 LAB — THYROPEROXIDASE AB SERPL-ACNC: 7 IU/ML (ref 0–34)

## 2017-10-03 LAB
THYROGLOB AB SERPL-ACNC: <1 IU/ML (ref 0–0.9)
THYROGLOB SERPL-MCNC: 11.8 NG/ML (ref 1.5–38.5)

## 2017-10-05 ENCOUNTER — ALLSCRIPTS OFFICE VISIT (OUTPATIENT)
Dept: OTHER | Facility: OTHER | Age: 77
End: 2017-10-05

## 2017-10-08 ENCOUNTER — APPOINTMENT (EMERGENCY)
Dept: RADIOLOGY | Facility: HOSPITAL | Age: 77
DRG: 205 | End: 2017-10-08
Payer: COMMERCIAL

## 2017-10-08 ENCOUNTER — HOSPITAL ENCOUNTER (INPATIENT)
Facility: HOSPITAL | Age: 77
LOS: 5 days | Discharge: HOME HEALTH | DRG: 205 | End: 2017-10-13
Attending: EMERGENCY MEDICINE | Admitting: INTERNAL MEDICINE
Payer: COMMERCIAL

## 2017-10-08 DIAGNOSIS — H53.2 DIPLOPIA: ICD-10-CM

## 2017-10-08 DIAGNOSIS — J44.1 COPD EXACERBATION (HCC): ICD-10-CM

## 2017-10-08 DIAGNOSIS — F41.9 ANXIETY: ICD-10-CM

## 2017-10-08 DIAGNOSIS — D32.9 MENINGIOMA (HCC): ICD-10-CM

## 2017-10-08 DIAGNOSIS — J44.1 ACUTE EXACERBATION OF CHRONIC OBSTRUCTIVE PULMONARY DISEASE (COPD) (HCC): Primary | ICD-10-CM

## 2017-10-08 DIAGNOSIS — E66.01 MORBID OBESITY WITH BMI OF 40.0-44.9, ADULT (HCC): Chronic | ICD-10-CM

## 2017-10-08 PROBLEM — Z79.4 TYPE 2 DIABETES MELLITUS WITH HYPERGLYCEMIA, WITH LONG-TERM CURRENT USE OF INSULIN (HCC): Status: ACTIVE | Noted: 2017-10-08

## 2017-10-08 PROBLEM — E11.65 TYPE 2 DIABETES MELLITUS WITH HYPERGLYCEMIA, WITH LONG-TERM CURRENT USE OF INSULIN (HCC): Status: ACTIVE | Noted: 2017-10-08

## 2017-10-08 LAB
ALBUMIN SERPL BCP-MCNC: 3.1 G/DL (ref 3.5–5)
ALP SERPL-CCNC: 66 U/L (ref 46–116)
ALT SERPL W P-5'-P-CCNC: 44 U/L (ref 12–78)
ANION GAP SERPL CALCULATED.3IONS-SCNC: 6 MMOL/L (ref 4–13)
AST SERPL W P-5'-P-CCNC: 13 U/L (ref 5–45)
BASOPHILS # BLD AUTO: 0.1 THOUSANDS/ΜL (ref 0–0.1)
BASOPHILS NFR BLD AUTO: 1 % (ref 0–1)
BILIRUB SERPL-MCNC: 0.2 MG/DL (ref 0.2–1)
BUN SERPL-MCNC: 22 MG/DL (ref 5–25)
CALCIUM SERPL-MCNC: 8.8 MG/DL (ref 8.3–10.1)
CHLORIDE SERPL-SCNC: 103 MMOL/L (ref 100–108)
CO2 SERPL-SCNC: 32 MMOL/L (ref 21–32)
CREAT SERPL-MCNC: 1.01 MG/DL (ref 0.6–1.3)
EOSINOPHIL # BLD AUTO: 0.1 THOUSAND/ΜL (ref 0–0.61)
EOSINOPHIL NFR BLD AUTO: 1 % (ref 0–6)
ERYTHROCYTE [DISTWIDTH] IN BLOOD BY AUTOMATED COUNT: 16 % (ref 11.6–15.1)
GFR SERPL CREATININE-BSD FRML MDRD: 53 ML/MIN/1.73SQ M
GLUCOSE SERPL-MCNC: 324 MG/DL (ref 65–140)
HCT VFR BLD AUTO: 38.3 % (ref 37–47)
HGB BLD-MCNC: 12.5 G/DL (ref 12–16)
LYMPHOCYTES # BLD AUTO: 1.7 THOUSANDS/ΜL (ref 0.6–4.47)
LYMPHOCYTES NFR BLD AUTO: 18 % (ref 14–44)
MCH RBC QN AUTO: 30.7 PG (ref 27–31)
MCHC RBC AUTO-ENTMCNC: 32.7 G/DL (ref 31.4–37.4)
MCV RBC AUTO: 94 FL (ref 82–98)
MONOCYTES # BLD AUTO: 0.6 THOUSAND/ΜL (ref 0.17–1.22)
MONOCYTES NFR BLD AUTO: 6 % (ref 4–12)
NEUTROPHILS # BLD AUTO: 7.1 THOUSANDS/ΜL (ref 1.85–7.62)
NEUTS SEG NFR BLD AUTO: 75 % (ref 43–75)
NRBC BLD AUTO-RTO: 0 /100 WBCS
NT-PROBNP SERPL-MCNC: 164 PG/ML
PLATELET # BLD AUTO: 158 THOUSANDS/UL (ref 130–400)
PMV BLD AUTO: 8.2 FL (ref 8.9–12.7)
POTASSIUM SERPL-SCNC: 4.3 MMOL/L (ref 3.5–5.3)
PROT SERPL-MCNC: 6.6 G/DL (ref 6.4–8.2)
RBC # BLD AUTO: 4.08 MILLION/UL (ref 4.2–5.4)
SODIUM SERPL-SCNC: 141 MMOL/L (ref 136–145)
TROPONIN I SERPL-MCNC: <0.02 NG/ML
WBC # BLD AUTO: 9.4 THOUSAND/UL (ref 4.8–10.8)

## 2017-10-08 PROCEDURE — 82948 REAGENT STRIP/BLOOD GLUCOSE: CPT

## 2017-10-08 PROCEDURE — 96374 THER/PROPH/DIAG INJ IV PUSH: CPT

## 2017-10-08 PROCEDURE — 85025 COMPLETE CBC W/AUTO DIFF WBC: CPT | Performed by: EMERGENCY MEDICINE

## 2017-10-08 PROCEDURE — 94640 AIRWAY INHALATION TREATMENT: CPT

## 2017-10-08 PROCEDURE — 99285 EMERGENCY DEPT VISIT HI MDM: CPT

## 2017-10-08 PROCEDURE — 96375 TX/PRO/DX INJ NEW DRUG ADDON: CPT

## 2017-10-08 PROCEDURE — 80053 COMPREHEN METABOLIC PANEL: CPT | Performed by: EMERGENCY MEDICINE

## 2017-10-08 PROCEDURE — 87081 CULTURE SCREEN ONLY: CPT | Performed by: INTERNAL MEDICINE

## 2017-10-08 PROCEDURE — 84484 ASSAY OF TROPONIN QUANT: CPT | Performed by: EMERGENCY MEDICINE

## 2017-10-08 PROCEDURE — 83880 ASSAY OF NATRIURETIC PEPTIDE: CPT | Performed by: EMERGENCY MEDICINE

## 2017-10-08 PROCEDURE — 71010 HB CHEST X-RAY 1 VIEW FRONTAL (PORTABLE): CPT

## 2017-10-08 PROCEDURE — 36415 COLL VENOUS BLD VENIPUNCTURE: CPT | Performed by: EMERGENCY MEDICINE

## 2017-10-08 PROCEDURE — 93005 ELECTROCARDIOGRAM TRACING: CPT

## 2017-10-08 RX ORDER — ASCORBIC ACID 500 MG
500 TABLET ORAL DAILY
Status: DISCONTINUED | OUTPATIENT
Start: 2017-10-09 | End: 2017-10-13 | Stop reason: HOSPADM

## 2017-10-08 RX ORDER — MORPHINE SULFATE 4 MG/ML
4 INJECTION, SOLUTION INTRAMUSCULAR; INTRAVENOUS ONCE
Status: COMPLETED | OUTPATIENT
Start: 2017-10-08 | End: 2017-10-08

## 2017-10-08 RX ORDER — OXYCODONE HYDROCHLORIDE AND ACETAMINOPHEN 5; 325 MG/1; MG/1
1 TABLET ORAL EVERY 4 HOURS PRN
COMMUNITY
End: 2018-02-26 | Stop reason: HOSPADM

## 2017-10-08 RX ORDER — HYDRALAZINE HYDROCHLORIDE 20 MG/ML
5 INJECTION INTRAMUSCULAR; INTRAVENOUS EVERY 6 HOURS PRN
Status: DISCONTINUED | OUTPATIENT
Start: 2017-10-08 | End: 2017-10-09

## 2017-10-08 RX ORDER — IPRATROPIUM BROMIDE AND ALBUTEROL SULFATE 2.5; .5 MG/3ML; MG/3ML
3 SOLUTION RESPIRATORY (INHALATION) EVERY 4 HOURS PRN
Status: DISCONTINUED | OUTPATIENT
Start: 2017-10-08 | End: 2017-10-09

## 2017-10-08 RX ORDER — ASPIRIN 81 MG/1
81 TABLET, CHEWABLE ORAL DAILY
Status: DISCONTINUED | OUTPATIENT
Start: 2017-10-09 | End: 2017-10-10

## 2017-10-08 RX ORDER — METHYLPREDNISOLONE SODIUM SUCCINATE 125 MG/2ML
80 INJECTION, POWDER, LYOPHILIZED, FOR SOLUTION INTRAMUSCULAR; INTRAVENOUS ONCE
Status: COMPLETED | OUTPATIENT
Start: 2017-10-08 | End: 2017-10-08

## 2017-10-08 RX ORDER — PRAMIPEXOLE DIHYDROCHLORIDE 0.5 MG/1
1 TABLET ORAL 3 TIMES DAILY
Status: DISCONTINUED | OUTPATIENT
Start: 2017-10-08 | End: 2017-10-13 | Stop reason: HOSPADM

## 2017-10-08 RX ORDER — INSULIN ASPART 100 [IU]/ML
40 INJECTION, SUSPENSION SUBCUTANEOUS
Status: DISCONTINUED | OUTPATIENT
Start: 2017-10-09 | End: 2017-10-13 | Stop reason: HOSPADM

## 2017-10-08 RX ORDER — LEVOTHYROXINE SODIUM 0.12 MG/1
125 TABLET ORAL
Status: DISCONTINUED | OUTPATIENT
Start: 2017-10-09 | End: 2017-10-13 | Stop reason: HOSPADM

## 2017-10-08 RX ORDER — POLYETHYLENE GLYCOL 3350 17 G/17G
17 POWDER, FOR SOLUTION ORAL 2 TIMES DAILY
Status: DISCONTINUED | OUTPATIENT
Start: 2017-10-09 | End: 2017-10-13 | Stop reason: HOSPADM

## 2017-10-08 RX ORDER — OXYCODONE HYDROCHLORIDE AND ACETAMINOPHEN 5; 325 MG/1; MG/1
1 TABLET ORAL EVERY 4 HOURS PRN
Status: DISCONTINUED | OUTPATIENT
Start: 2017-10-08 | End: 2017-10-11

## 2017-10-08 RX ORDER — TORSEMIDE 20 MG/1
20 TABLET ORAL
Status: DISCONTINUED | OUTPATIENT
Start: 2017-10-09 | End: 2017-10-11

## 2017-10-08 RX ORDER — METHYLPREDNISOLONE SODIUM SUCCINATE 40 MG/ML
40 INJECTION, POWDER, LYOPHILIZED, FOR SOLUTION INTRAMUSCULAR; INTRAVENOUS EVERY 12 HOURS SCHEDULED
Status: DISCONTINUED | OUTPATIENT
Start: 2017-10-09 | End: 2017-10-09

## 2017-10-08 RX ORDER — POTASSIUM CHLORIDE 20 MEQ/1
20 TABLET, EXTENDED RELEASE ORAL DAILY
Status: DISCONTINUED | OUTPATIENT
Start: 2017-10-09 | End: 2017-10-09

## 2017-10-08 RX ORDER — IPRATROPIUM BROMIDE AND ALBUTEROL SULFATE 2.5; .5 MG/3ML; MG/3ML
3 SOLUTION RESPIRATORY (INHALATION) ONCE
Status: COMPLETED | OUTPATIENT
Start: 2017-10-08 | End: 2017-10-08

## 2017-10-08 RX ORDER — ACETAMINOPHEN 325 MG/1
650 TABLET ORAL EVERY 6 HOURS PRN
Status: DISCONTINUED | OUTPATIENT
Start: 2017-10-08 | End: 2017-10-11

## 2017-10-08 RX ORDER — DICYCLOMINE HYDROCHLORIDE 10 MG/1
10 CAPSULE ORAL 3 TIMES DAILY PRN
Status: DISCONTINUED | OUTPATIENT
Start: 2017-10-08 | End: 2017-10-13 | Stop reason: HOSPADM

## 2017-10-08 RX ADMIN — IPRATROPIUM BROMIDE AND ALBUTEROL SULFATE 3 ML: .5; 3 SOLUTION RESPIRATORY (INHALATION) at 20:22

## 2017-10-08 RX ADMIN — PRAMIPEXOLE DIHYDROCHLORIDE 1 MG: 0.5 TABLET ORAL at 23:58

## 2017-10-08 RX ADMIN — METHYLPREDNISOLONE SODIUM SUCCINATE 80 MG: 125 INJECTION, POWDER, FOR SOLUTION INTRAMUSCULAR; INTRAVENOUS at 20:23

## 2017-10-08 RX ADMIN — IPRATROPIUM BROMIDE AND ALBUTEROL SULFATE 3 ML: .5; 3 SOLUTION RESPIRATORY (INHALATION) at 19:06

## 2017-10-08 RX ADMIN — MORPHINE SULFATE 4 MG: 4 INJECTION, SOLUTION INTRAMUSCULAR; INTRAVENOUS at 20:22

## 2017-10-08 NOTE — ED PROVIDER NOTES
History  Chief Complaint   Patient presents with    Shortness of Breath     Pt reports SOB x 4 days, and chest tightness  64 yo female with 4 d hx of increasing dyspnea  Some cough, denies chest pain, states pain all over  No fever  HX of COPD and has had prior admission for same, most recently about 3 weeks ago  States reason for today's visit is SOB, not chest pain  Has nebulizers at home which states not helping  Has home O2 as well  Not sure if she has seen a pulmonologist   During last admission pt had gastro evaluation with abdominal CT as well, no specific findings  Pt has umbilical hernia            Prior to Admission Medications   Prescriptions Last Dose Informant Patient Reported? Taking?    COD LIVER OIL PO   Yes No   Sig: Take by mouth daily   Multiple Vitamins-Minerals (MULTIVITAMIN ADULT PO)   Yes No   Sig: Take 1 tablet by mouth daily   acetaminophen (TYLENOL) 325 mg tablet   No No   Sig: Take 2 tablets by mouth every 6 (six) hours as needed for mild pain or headaches   ascorbic acid (VITAMIN C) 500 mg tablet   Yes No   Sig: Take 500 mg by mouth daily   aspirin 81 MG tablet   Yes No   Sig: Take by mouth daily   dicyclomine (BENTYL) 10 mg capsule   No No   Sig: Take 1 capsule by mouth 3 (three) times a day as needed (bowel spasm/abdominal pain)   insulin glargine (LANTUS) 100 units/mL subcutaneous injection   No No   Sig: Inject 30 Units under the skin 2 (two) times a day for 30 days   ipratropium-albuterol (DUONEB) 0 5-2 5 mg/mL   Yes No   Sig: Inhale 1 Container every 4 (four) hours as needed   levothyroxine (SYNTHROID) 125 mcg tablet   Yes No   Sig: Take 1 tablet by mouth daily   polyethylene glycol (MIRALAX) 17 g packet   No No   Sig: Take 17 g by mouth daily   potassium chloride (K-DUR,KLOR-CON) 10 mEq tablet   No No   Sig: Take 2 tablets by mouth daily   pramipexole (MIRAPEX) 1 mg tablet   Yes No   Sig: Take 1 tablet by mouth 3 (three) times a day   predniSONE 10 mg tablet No No   Sig: Take 1 tablet by mouth daily   torsemide (DEMADEX) 20 mg tablet   No No   Sig: Take 1 tablet by mouth 2 (two) times a day      Facility-Administered Medications: None       Past Medical History:   Diagnosis Date    Arthritis     Asthma     [facility] )     Diabetes mellitus ([facility] )     Disease of thyroid gland     Hypertension     Mitral valve regurgitation     Restless leg syndrome     Sleep related hypoxia        Past Surgical History:   Procedure Laterality Date    CHOLECYSTECTOMY      EYE SURGERY      HI ERCP DX COLLECTION SPECIMEN BRUSHING/WASHING N/A 5/30/2017    Procedure: ENDOSCOPIC RETROGRADE CHOLANGIOPANCREATOGRAPHY (ERCP); SPHINCTEROTOMY;  Surgeon: Raul Hunter MD;  Location: [facility]; Service: Gastroenterology    REPLACEMENT TOTAL KNEE BILATERAL      SKIN BIOPSY      melanoma of back    THORACOTOMY Right     at 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, for pericardial cyst    THYROIDECTOMY, PARTIAL      For thyroid cancer       Family History   Problem Relation Age of Onset    Cancer Father     Diabetes Sister      I have reviewed and agree with the history as documented  Social History   Substance Use Topics    Smoking status: Never Smoker    Smokeless tobacco: Never Used    Alcohol use No        Review of Systems   Respiratory: Positive for cough and shortness of breath  Gastrointestinal: Positive for abdominal pain  All other systems reviewed and are negative  Physical Exam  ED Triage Vitals [10/08/17 1731]   Temperature Pulse Respirations Blood Pressure SpO2   98 9 °F (37 2 °C) 92 20 147/73 95 %      Temp Source Heart Rate Source Patient Position - Orthostatic VS BP Location FiO2 (%)   Tympanic Monitor Sitting Left arm --      Pain Score       Worst Possible Pain           Physical Exam   Constitutional: She is oriented to person, place, and time  She appears well-developed and well-nourished  HENT:   Head: Normocephalic and atraumatic  Right Ear: External ear normal    Left Ear: External ear normal    Nose: Nose normal    Mouth/Throat: Oropharynx is clear and moist    Eyes: EOM are normal  Pupils are equal, round, and reactive to light  Neck: Normal range of motion  Neck supple  Cardiovascular: Normal rate and regular rhythm  Pulmonary/Chest: She has no wheezes  She has no rales  Moderate tachypnea noted  Pulse ox on room air is 95  Decreased BS bilaterally, no wheezing noted  Abdominal: Soft  She exhibits no distension  reduceable umbilical hernia noted  Diffuse nonlocalizing tenderness   Musculoskeletal: Normal range of motion  She exhibits edema (+1)  Neurological: She is alert and oriented to person, place, and time  No cranial nerve deficit  Coordination normal    Skin: Skin is warm and dry  Psychiatric: She has a normal mood and affect  Her behavior is normal    Nursing note and vitals reviewed  ED Medications  Medications   ipratropium-albuterol (DUO-NEB) 0 5-2 5 mg/mL inhalation solution 3 mL (3 mL Nebulization Given 10/8/17 1906)   ipratropium-albuterol (DUO-NEB) 0 5-2 5 mg/mL inhalation solution 3 mL (3 mL Nebulization Given 10/8/17 2022)   morphine (PF) 4 mg/mL injection 4 mg (4 mg Intravenous Given 10/8/17 2022)   methylPREDNISolone sodium succinate (Solu-MEDROL) injection 80 mg (80 mg Intravenous Given 10/8/17 2023)       Diagnostic Studies  Labs Reviewed   COMPREHENSIVE METABOLIC PANEL - Abnormal        Result Value Ref Range Status    Glucose 324 (*) 65 - 140 mg/dL Final    Comment:   If the patient is fasting, the ADA then defines impaired fasting glucose as > 100 mg/dL and diabetes as > or equal to 123 mg/dL  Specimen collection should occur prior to Sulfasalazine administration due to the potential for falsely depressed results  Specimen collection should occur prior to Sulfapyridine administration due to the potential for falsely elevated results      Albumin 3 1 (*) 3 5 - 5 0 g/dL Final    Sodium 141 136 - 145 mmol/L Final    Potassium 4 3  3 5 - 5 3 mmol/L Final    Chloride 103  100 - 108 mmol/L Final    CO2 32  21 - 32 mmol/L Final    Anion Gap 6  4 - 13 mmol/L Final    BUN 22  5 - 25 mg/dL Final    Creatinine 1 01  0 60 - 1 30 mg/dL Final    Comment: Standardized to IDMS reference method    Calcium 8 8  8 3 - 10 1 mg/dL Final    AST 13  5 - 45 U/L Final    Comment:   Specimen collection should occur prior to Sulfasalazine administration due to the potential for falsely depressed results  ALT 44  12 - 78 U/L Final    Comment:   Specimen collection should occur prior to Sulfasalazine administration due to the potential for falsely depressed results  Alkaline Phosphatase 66  46 - 116 U/L Final    Total Protein 6 6  6 4 - 8 2 g/dL Final    Total Bilirubin 0 20  0 20 - 1 00 mg/dL Final    eGFR 53  ml/min/1 73sq m Final    Narrative:     National Kidney Disease Education Program recommendations are as follows:  GFR calculation is accurate only with a steady state creatinine  Chronic Kidney disease less than 60 ml/min/1 73 sq  meters  Kidney failure less than 15 ml/min/1 73 sq  meters     CBC AND DIFFERENTIAL - Abnormal     RBC 4 08 (*) 4 20 - 5 40 Million/uL Final    RDW 16 0 (*) 11 6 - 15 1 % Final    MPV 8 2 (*) 8 9 - 12 7 fL Final    WBC 9 40  4 80 - 10 80 Thousand/uL Final    Hemoglobin 12 5  12 0 - 16 0 g/dL Final    Hematocrit 38 3  37 0 - 47 0 % Final    MCV 94  82 - 98 fL Final    MCH 30 7  27 0 - 31 0 pg Final    MCHC 32 7  31 4 - 37 4 g/dL Final    Platelets 646  690 - 400 Thousands/uL Final    nRBC 0  /100 WBCs Final    Neutrophils Relative 75  43 - 75 % Final    Lymphocytes Relative 18  14 - 44 % Final    Monocytes Relative 6  4 - 12 % Final    Eosinophils Relative 1  0 - 6 % Final    Basophils Relative 1  0 - 1 % Final    Neutrophils Absolute 7 10  1 85 - 7 62 Thousands/µL Final    Lymphocytes Absolute 1 70  0 60 - 4 47 Thousands/µL Final    Monocytes Absolute 0 60  0 17 - 1 22 Thousand/µL Final    Eosinophils Absolute 0 10  0 00 - 0 61 Thousand/µL Final    Basophils Absolute 0 10  0 00 - 0 10 Thousands/µL Final   TROPONIN I - Normal    Troponin I <0 02  <=0 04 ng/mL Final    Comment: 3Autovalidation override    Narrative:     [employer] Chemistry analyzer 99% cutoff is > 0 04 ng/mL in network labs    o cTnI 99% cutoff is useful only when applied to patients in the clinical setting of myocardial ischemia  o cTnI 99% cutoff should be interpreted in the context of clinical history, ECG findings and possibly cardiac imaging to establish correct diagnosis  o cTnI 99% cutoff may be suggestive but clearly not indicative of a coronary event without the clinical setting of myocardial ischemia  NT-BNP PRO (BRAIN NATRIURETIC PEPTIDE) - Normal    NT-proBNP 164  <450 pg/mL Final       XR chest 1 view portable   ED Interpretation   CM no CHF or infiltrate          Procedures  ECG 12 Lead Documentation  Date/Time: 10/8/2017 7:05 PM  Performed by: Corazon Mata  Authorized by: Corazon Mata     Indications / Diagnosis:  Dyspnea  Patient location:  ED  Previous ECG:     Previous ECG:  Compared to current    Similarity:  No change    Comparison to cardiac monitor: Yes    Interpretation:     Interpretation: abnormal    Rate:     ECG rate:  87  Rhythm:     Rhythm: sinus rhythm    Ectopy:     Ectopy: none    QRS:     QRS axis:  Normal  Conduction:     Conduction: abnormal      Abnormal conduction: incomplete LBBB    ST segments:     ST segments:  Normal  T waves:     T waves: normal            Phone Contacts  ED Phone Contact    ED Course  ED Course as of Oct 08 2055   Sun Oct 08, 2017   2014 Improvement with neb  Still some decreased air exchange, some wheezing noted  Will repeat neb, give solumedrol  CXR no CHF no infiltrate                                  MDM  Number of Diagnoses or Management Options  Acute exacerbation of chronic obstructive pulmonary disease (COPD) [facility]):   Diagnosis management comments: HX OF COPD, improved with nebs  BNP negative  Cardiac enzymes negative  Discussed with admitted advanced practice      The patient presented with a condition in which there was a high probability of imminent or life-threatening deterioration, and critical care services (excluding separately billable procedures) totalled 30-74 minutes  Disposition  Final diagnoses:   Acute exacerbation of chronic obstructive pulmonary disease (COPD) [facility])     ED Disposition     ED Disposition Condition Comment    Admit        Follow-up Information    None       Patient's Medications   Discharge Prescriptions    No medications on file     No discharge procedures on file      ED Provider  Electronically Signed by       Matthew Moreland MD  10/08/17 4660

## 2017-10-09 ENCOUNTER — APPOINTMENT (INPATIENT)
Dept: RADIOLOGY | Facility: HOSPITAL | Age: 77
DRG: 205 | End: 2017-10-09
Payer: COMMERCIAL

## 2017-10-09 ENCOUNTER — APPOINTMENT (INPATIENT)
Dept: PHYSICAL THERAPY | Facility: HOSPITAL | Age: 77
DRG: 205 | End: 2017-10-09
Payer: COMMERCIAL

## 2017-10-09 ENCOUNTER — APPOINTMENT (INPATIENT)
Dept: OCCUPATIONAL THERAPY | Facility: HOSPITAL | Age: 77
DRG: 205 | End: 2017-10-09
Payer: COMMERCIAL

## 2017-10-09 PROBLEM — R06.00 DYSPNEA ON EXERTION: Status: ACTIVE | Noted: 2017-10-09

## 2017-10-09 PROBLEM — F41.9 ANXIETY: Status: ACTIVE | Noted: 2017-10-09

## 2017-10-09 LAB
ANION GAP SERPL CALCULATED.3IONS-SCNC: 7 MMOL/L (ref 4–13)
ATRIAL RATE: 87 BPM
BUN SERPL-MCNC: 25 MG/DL (ref 5–25)
CALCIUM SERPL-MCNC: 8.2 MG/DL (ref 8.3–10.1)
CHLORIDE SERPL-SCNC: 102 MMOL/L (ref 100–108)
CO2 SERPL-SCNC: 29 MMOL/L (ref 21–32)
CREAT SERPL-MCNC: 1.19 MG/DL (ref 0.6–1.3)
GFR SERPL CREATININE-BSD FRML MDRD: 44 ML/MIN/1.73SQ M
GLUCOSE SERPL-MCNC: 286 MG/DL (ref 65–140)
GLUCOSE SERPL-MCNC: 319 MG/DL (ref 65–140)
GLUCOSE SERPL-MCNC: 414 MG/DL (ref 65–140)
GLUCOSE SERPL-MCNC: 429 MG/DL (ref 65–140)
GLUCOSE SERPL-MCNC: 443 MG/DL (ref 65–140)
GLUCOSE SERPL-MCNC: 481 MG/DL (ref 65–140)
MAGNESIUM SERPL-MCNC: 2.2 MG/DL (ref 1.6–2.6)
P AXIS: 56 DEGREES
POTASSIUM SERPL-SCNC: 5.5 MMOL/L (ref 3.5–5.3)
PR INTERVAL: 174 MS
QRS AXIS: -22 DEGREES
QRSD INTERVAL: 92 MS
QT INTERVAL: 384 MS
QTC INTERVAL: 462 MS
SODIUM SERPL-SCNC: 138 MMOL/L (ref 136–145)
T WAVE AXIS: 26 DEGREES
VENTRICULAR RATE: 87 BPM

## 2017-10-09 PROCEDURE — 94640 AIRWAY INHALATION TREATMENT: CPT

## 2017-10-09 PROCEDURE — G8988 SELF CARE GOAL STATUS: HCPCS

## 2017-10-09 PROCEDURE — G8987 SELF CARE CURRENT STATUS: HCPCS

## 2017-10-09 PROCEDURE — 94664 DEMO&/EVAL PT USE INHALER: CPT

## 2017-10-09 PROCEDURE — G8979 MOBILITY GOAL STATUS: HCPCS

## 2017-10-09 PROCEDURE — 97166 OT EVAL MOD COMPLEX 45 MIN: CPT

## 2017-10-09 PROCEDURE — 74022 RADEX COMPL AQT ABD SERIES: CPT

## 2017-10-09 PROCEDURE — G8978 MOBILITY CURRENT STATUS: HCPCS

## 2017-10-09 PROCEDURE — 83735 ASSAY OF MAGNESIUM: CPT | Performed by: NURSE PRACTITIONER

## 2017-10-09 PROCEDURE — 97535 SELF CARE MNGMENT TRAINING: CPT

## 2017-10-09 PROCEDURE — 97163 PT EVAL HIGH COMPLEX 45 MIN: CPT

## 2017-10-09 PROCEDURE — 97110 THERAPEUTIC EXERCISES: CPT

## 2017-10-09 PROCEDURE — 82948 REAGENT STRIP/BLOOD GLUCOSE: CPT

## 2017-10-09 PROCEDURE — 94760 N-INVAS EAR/PLS OXIMETRY 1: CPT

## 2017-10-09 PROCEDURE — 80048 BASIC METABOLIC PNL TOTAL CA: CPT | Performed by: NURSE PRACTITIONER

## 2017-10-09 PROCEDURE — 94150 VITAL CAPACITY TEST: CPT

## 2017-10-09 RX ORDER — ALPRAZOLAM 0.5 MG/1
0.5 TABLET ORAL 2 TIMES DAILY
Status: DISCONTINUED | OUTPATIENT
Start: 2017-10-09 | End: 2017-10-10

## 2017-10-09 RX ORDER — DOCUSATE SODIUM 100 MG/1
100 CAPSULE, LIQUID FILLED ORAL 2 TIMES DAILY
Status: DISCONTINUED | OUTPATIENT
Start: 2017-10-09 | End: 2017-10-13 | Stop reason: HOSPADM

## 2017-10-09 RX ORDER — IPRATROPIUM BROMIDE AND ALBUTEROL SULFATE 2.5; .5 MG/3ML; MG/3ML
3 SOLUTION RESPIRATORY (INHALATION)
Status: DISCONTINUED | OUTPATIENT
Start: 2017-10-09 | End: 2017-10-13 | Stop reason: HOSPADM

## 2017-10-09 RX ORDER — LISINOPRIL 5 MG/1
5 TABLET ORAL DAILY
Status: DISCONTINUED | OUTPATIENT
Start: 2017-10-09 | End: 2017-10-13 | Stop reason: HOSPADM

## 2017-10-09 RX ORDER — INSULIN GLARGINE 100 [IU]/ML
10 INJECTION, SOLUTION SUBCUTANEOUS ONCE
Status: COMPLETED | OUTPATIENT
Start: 2017-10-09 | End: 2017-10-09

## 2017-10-09 RX ORDER — ALPRAZOLAM 0.25 MG/1
0.25 TABLET ORAL 3 TIMES DAILY PRN
Status: DISCONTINUED | OUTPATIENT
Start: 2017-10-09 | End: 2017-10-11

## 2017-10-09 RX ORDER — LISINOPRIL 10 MG/1
10 TABLET ORAL DAILY
COMMUNITY
End: 2018-02-08

## 2017-10-09 RX ORDER — ALPRAZOLAM 0.5 MG/1
1 TABLET ORAL DAILY PRN
Status: DISCONTINUED | OUTPATIENT
Start: 2017-10-09 | End: 2017-10-09

## 2017-10-09 RX ORDER — INSULIN ASPART 100 [IU]/ML
40 INJECTION, SUSPENSION SUBCUTANEOUS
Status: ON HOLD | COMMUNITY
End: 2018-01-30

## 2017-10-09 RX ORDER — ESCITALOPRAM OXALATE 10 MG/1
10 TABLET ORAL DAILY
Status: DISCONTINUED | OUTPATIENT
Start: 2017-10-09 | End: 2017-10-10

## 2017-10-09 RX ADMIN — INSULIN GLARGINE 10 UNITS: 100 INJECTION, SOLUTION SUBCUTANEOUS at 14:03

## 2017-10-09 RX ADMIN — INSULIN ASPART 40 UNITS: 100 INJECTION, SUSPENSION SUBCUTANEOUS at 17:31

## 2017-10-09 RX ADMIN — OXYCODONE HYDROCHLORIDE AND ACETAMINOPHEN 1 TABLET: 5; 325 TABLET ORAL at 01:58

## 2017-10-09 RX ADMIN — Medication 1 TABLET: at 09:54

## 2017-10-09 RX ADMIN — IPRATROPIUM BROMIDE AND ALBUTEROL SULFATE 3 ML: .5; 3 SOLUTION RESPIRATORY (INHALATION) at 13:55

## 2017-10-09 RX ADMIN — IPRATROPIUM BROMIDE AND ALBUTEROL SULFATE 3 ML: .5; 3 SOLUTION RESPIRATORY (INHALATION) at 19:49

## 2017-10-09 RX ADMIN — PRAMIPEXOLE DIHYDROCHLORIDE 1 MG: 0.5 TABLET ORAL at 17:07

## 2017-10-09 RX ADMIN — ALPRAZOLAM 0.5 MG: 0.5 TABLET ORAL at 12:30

## 2017-10-09 RX ADMIN — INSULIN LISPRO 8 UNITS: 100 INJECTION, SOLUTION INTRAVENOUS; SUBCUTANEOUS at 07:46

## 2017-10-09 RX ADMIN — INSULIN LISPRO 3 UNITS: 100 INJECTION, SOLUTION INTRAVENOUS; SUBCUTANEOUS at 21:49

## 2017-10-09 RX ADMIN — PRAMIPEXOLE DIHYDROCHLORIDE 1 MG: 0.5 TABLET ORAL at 22:21

## 2017-10-09 RX ADMIN — LISINOPRIL 5 MG: 5 TABLET ORAL at 09:11

## 2017-10-09 RX ADMIN — INSULIN LISPRO 8 UNITS: 100 INJECTION, SOLUTION INTRAVENOUS; SUBCUTANEOUS at 17:31

## 2017-10-09 RX ADMIN — PRAMIPEXOLE DIHYDROCHLORIDE 1 MG: 0.5 TABLET ORAL at 09:11

## 2017-10-09 RX ADMIN — INSULIN LISPRO 8 UNITS: 100 INJECTION, SOLUTION INTRAVENOUS; SUBCUTANEOUS at 11:50

## 2017-10-09 RX ADMIN — OXYCODONE HYDROCHLORIDE AND ACETAMINOPHEN 1 TABLET: 5; 325 TABLET ORAL at 09:54

## 2017-10-09 RX ADMIN — POLYETHYLENE GLYCOL 3350 17 G: 17 POWDER, FOR SOLUTION ORAL at 09:11

## 2017-10-09 RX ADMIN — ASPIRIN 81 MG 81 MG: 81 TABLET ORAL at 09:11

## 2017-10-09 RX ADMIN — OXYCODONE HYDROCHLORIDE AND ACETAMINOPHEN 500 MG: 500 TABLET ORAL at 09:11

## 2017-10-09 RX ADMIN — LEVOTHYROXINE SODIUM 125 MCG: 125 TABLET ORAL at 05:03

## 2017-10-09 RX ADMIN — POLYETHYLENE GLYCOL 3350 17 G: 17 POWDER, FOR SOLUTION ORAL at 17:07

## 2017-10-09 RX ADMIN — ALPRAZOLAM 0.5 MG: 0.5 TABLET ORAL at 17:07

## 2017-10-09 RX ADMIN — SODIUM CHLORIDE 500 ML: 0.9 INJECTION, SOLUTION INTRAVENOUS at 07:41

## 2017-10-09 RX ADMIN — DOCUSATE SODIUM 100 MG: 100 CAPSULE, LIQUID FILLED ORAL at 09:11

## 2017-10-09 RX ADMIN — TORSEMIDE 20 MG: 20 TABLET ORAL at 17:07

## 2017-10-09 RX ADMIN — INSULIN LISPRO 6 UNITS: 100 INJECTION, SOLUTION INTRAVENOUS; SUBCUTANEOUS at 11:51

## 2017-10-09 RX ADMIN — ENOXAPARIN SODIUM 40 MG: 40 INJECTION SUBCUTANEOUS at 09:10

## 2017-10-09 RX ADMIN — METHYLPREDNISOLONE SODIUM SUCCINATE 40 MG: 40 INJECTION, POWDER, FOR SOLUTION INTRAMUSCULAR; INTRAVENOUS at 05:03

## 2017-10-09 RX ADMIN — INSULIN ASPART 40 UNITS: 100 INJECTION, SUSPENSION SUBCUTANEOUS at 07:46

## 2017-10-09 RX ADMIN — IPRATROPIUM BROMIDE AND ALBUTEROL SULFATE 3 ML: .5; 3 SOLUTION RESPIRATORY (INHALATION) at 00:48

## 2017-10-09 RX ADMIN — TORSEMIDE 20 MG: 20 TABLET ORAL at 07:48

## 2017-10-09 RX ADMIN — DOCUSATE SODIUM 100 MG: 100 CAPSULE, LIQUID FILLED ORAL at 17:07

## 2017-10-09 RX ADMIN — ESCITALOPRAM OXALATE 10 MG: 10 TABLET ORAL at 12:30

## 2017-10-09 RX ADMIN — ALPRAZOLAM 1 MG: 0.5 TABLET ORAL at 01:09

## 2017-10-09 RX ADMIN — INSULIN LISPRO 6 UNITS: 100 INJECTION, SOLUTION INTRAVENOUS; SUBCUTANEOUS at 07:47

## 2017-10-09 RX ADMIN — INSULIN LISPRO 5 UNITS: 100 INJECTION, SOLUTION INTRAVENOUS; SUBCUTANEOUS at 00:01

## 2017-10-09 NOTE — PROGRESS NOTES
Glucose 481, potassium 5 5  Increased  Humalog to 8 units subcu t i d  with meals  Normal saline 500 mL bolus x1  Discontinued potassium supplement  Continue NovoLog 70 /30 and SSI  Pending AM Accu-Chek

## 2017-10-09 NOTE — SOCIAL WORK
DASH discussion completed  Discussed goals of making sure pt's needs are met upon discharge, pt's preferences are taken into account, pt understands her health condition, medications and symptoms to watch for after returning home and pt is aware of any follow up appointments recommended by hospital physician  SPOKE WITH PT AT BEDSIDE AND AT LENGTH RE: DCP NEEDS AND HOME HEALTH STATUS  PT INDICATED MX ISSUES WITH RE: TO ANXIETY, DIET AND BREATHING DIFFICULTY  PT INDICATED THAT SHE FEELS THAT SHE IS DOING WHAT SHE IS SUPPOSED TO AT HOME BUT SHE DECLINES OVER TIME AND ENDS UP BACK IN [address]  WE HAD A LENGTHY CONVERSATION RE: CONTINUED NEED TO FOLLOW UP WITH PCP, GI AND POSS INITIATE THERAPY FOR ANXIETY ONCE SHE LEAVES THE HOSPITAL TO ASSIST WITH A BALANCE OF HER HEALTH  CM CLEARLY INDICATED MY ROLE AND ENCOURAGED THE PT TO CONTINUE TO HAVE THESE CONVERSATIONS RE: HER CONCERNS AND NEEDS OUTSIDE OF THE HOSPITAL TO HER PROVIDERS, CM OFFERED OP CARE COORDINATION TO ASSIST WITH THIS, HOWEVER, PT INDICATED THAT SHE HAS HAD THIS BEFORE WITH CARLOS PICKENS EMAILED CARLOS BURGESS  RE: CONTINUED OP NEEDS, WILL CONTINUE TO FOLLOW UP WITH THIS MATTER  PT Millie Young AND HAS O2 THROUGH [facility]  PT HAS DME WITH WALKER, CANE, WHEELCHAIR AND BSC    PT USES [facility] IN [facility]

## 2017-10-09 NOTE — NUTRITION
10/09/17 1448   Assessment   Timepoint Initial  (BMI>40)   Labs   List Completed Labs (, K 5 5, ; meds: vit C, dicyclomine, hydralazine, humalog, levothyroxine, MVI, torsemide)   Adequacy of Intake   Nutrition Modality PO  (CCD2, Cardiac Step 1)   Estimated calorie intake compared to estimated need Observed breakfast tray in ICU-100% consumed  Will monitor  Nutrition Prognosis   Potential Guarded   Nutrition Concerns Constipation; Other (Comment)  (morbid obesity, chronic constipation, b/l LE +1)   Comorbid Concerns (DM2, acute CHF, opioid dependence)   Nutrition Precautions None   Nutrition Considerations (Education inappropriate at present, will monitor and provide prn)   PES Statement   Problem Clinical   Weight (3) Overweight/obesity NC-3 3   Overweight/ obesity specific to Obese, Class III (5)   Related to Energy intake > Energy output over time   As evidenced by: BMI   Patient Nutrition Goals   Goal avoid weight gain;glucose in range;meet PO needs   Goal Status initiated   Timeframe to complete goal by next f/u   Recommendations/Interventions   Interventions Diet: continued as ordered   Nutrition Recommendations Continue diet as ordered   Nutrition Complexity Risk   Nutrition complexity level Moderate risk   Nutrition review: 10/14/17  (po intakes, education needs)   Follow up date 10/16/17

## 2017-10-09 NOTE — OCCUPATIONAL THERAPY NOTE
OT TREATMENT       10/09/17 0910   Restrictions/Precautions   Other Precautions Fall Risk;Pain   General   Family/Caregiver Present nurse present   Pain Assessment   Pain Assessment 0-10   Pain Score 8   Pain Location Generalized   ADL   Toileting Comments toilet transfer supervision, hygiene independent seated, supervision for depends management    Functional Standing Tolerance   Activity standing at sink to wash hands supervision    Transfers   Sit to Stand 5  Supervision   Stand to Sit 5  Supervision   Functional Mobility   Functional Mobility 5  Supervision   Additional Comments 10 feet   Additional items Rolling walker   Assessment   Assessment Patient is cooperative  Patient is frustrated with her urine incontinence she has been dealing with at home  Patient requires supervision for toileting and clothing management  Plan   Treatment Interventions ADL retraining;Functional transfer training;UE strengthening/ROM; Endurance training;Patient/family training;Equipment evaluation/education; Activityengagement   Recommendation   Discharge Recommendation Home with family support  (home PT/OT)

## 2017-10-09 NOTE — PLAN OF CARE

## 2017-10-09 NOTE — PHYSICAL THERAPY NOTE
PT EVALUATION       10/09/17 0900   Pain Assessment   Pain Assessment 0-10   Pain Score 8   Pain Type Chronic pain   Pain Location Generalized   Home Living   Type of Home House  (1 PETEY)   Home Layout One level   Bathroom Equipment Commode   Home Equipment (RW, rollator, home O2 at night)   Prior Function   Level of [place] Needs assistance with ADLs and functional mobility; Needs assistance with IADLs   Lives With Significant other   Receives Help From Family   ADL Assistance Needs assistance   IADLs Needs assistance   Restrictions/Precautions   Other Precautions Fall Risk;Bed Alarm; Chair Alarm;Pain;O2   General   Additional Pertinent History Pt adm with SOB x 4 days, and chest tightness  Family/Caregiver Present No   Cognition   Overall Cognitive Status WFL   Arousal/Participation Cooperative   Orientation Level Oriented X4   Following Commands Follows all commands and directions without difficulty   RLE Assessment   RLE Assessment WFL  (3 to 3+/5)   LLE Assessment   LLE Assessment WFL  (3 to 3+/5)   Light Touch   RLE Light Touch Grossly intact   LLE Light Touch Grossly intact   Transfers   Sit to Stand 5  Supervision   Stand to Sit 5  Supervision   Toilet transfer 5  Supervision  (Pt independent with hygiene s/p urination)   Ambulation/Elevation   Gait pattern Forward Flexion   Gait Assistance 5  Supervision   Assistive Device Rolling walker   Distance 10 feet to and from bathroom;SAO2 96% on RA   Balance   Static Sitting Good   Static Standing Fair +  (with RW)   Dynamic Standing Fair +  (with RW)   Ambulatory Fair +  (with RW)   Assessment   Prognosis Good   Problem List Decreased strength;Decreased range of motion;Decreased endurance; Impaired balance;Decreased mobility;Pain   Assessment Patient seen for Physical Therapy evaluation  Patient admitted with Dyspnea on exertion    Comorbidities affecting patient's physical performance include: Arthritis, asthma, COPD, DM, HTN, RLS, sleep related hypoxia and morbid obesity  Personal factors affecting patient at time of initial evaluation include: lives in one story house, ambulating with assistive device, stairs to enter home, inability to navigate community distances, inability to navigate level surfaces without external assistance, inability to perform dynamic tasks in community and anxiety  Prior to admission, patient was independent with functional mobility with RW/rollator, requiring assist for ADLS, requiring assist for IADLS, living with significant other in a one level home with 1 step to enter and ambulating household distance  Please find objective findings from Physical Therapy assessment regarding body systems outlined above with impairments and limitations including weakness, decreased ROM, impaired balance, decreased endurance, impaired coordination, gait deviations, pain, decreased activity tolerance, decreased functional mobility tolerance, fall risk and SOB upon exertion  The Barthel Index was used as a functional outcome tool presenting with a score of 50 today indicating marked limitations of functional mobility and ADLS  Patient's clinical presentation is currently unstable/unpredictable as seen in patient's presentation of vital sign response, changing level of pain, increased fall risk, new onset of impairment of functional mobility, decreased endurance and new onset of weakness  Pt would benefit from continued Physical Therapy treatment to address deficits as defined above and maximize level of functional mobility  As demonstrated by objective findings, the assigned level of complexity for this evaluation is high     Goals   Patient Goals Home   STG Expiration Date (1-7 days)   Short Term Goal #1 bed mob - I; trans - I   Short Term Goal #2 amb with RW x 50 feet - I; balance with RW - G; up/down steps - S/I   LTG Expiration Date (8-14 days)   Long Term Goal #1 Independent all functional mobility, home/community, levels/unlevels with RW as previous   Plan   Treatment/Interventions ADL retraining;Functional transfer training;LE strengthening/ROM; Elevations; Therapeutic exercise; Endurance training;Patient/family training;Bed mobility;Gait training   PT Frequency (3-5x/wk)   Recommendation   Recommendation Home with family support;Home PT   Equipment Recommended (Pt has a RW and rollator)   Barthel Index   Feeding 10   Bathing 0   Grooming Score 0   Dressing Score 5   Bladder Score 5   Bowels Score 10   Toilet Use Score 5   Transfers (Bed/Chair) Score 10   Mobility (Level Surface) Score 0   Stairs Score 5   Barthel Index Score 50

## 2017-10-09 NOTE — PROGRESS NOTES
Pt  c/o SOB and feeling jittery and uneasy  Lungs diminished breath sound on all lung field with expiratory wheeze  SpO2- 94% @2l/min  Noted abdominal breathing  Pt  stated feels calm after  she got the morphine in ER  No c/o pain or discomfort    Hospitalist was page

## 2017-10-09 NOTE — H&P
History and Physical - [facility] Internal Medicine    Patient Information: Molly Abbott 65 y o  female MRN: [id number]  Unit/Bed#: ICU 06 Encounter: [id number]  Admitting Physician: FATIMAH Davila  PCP: Lora Cooper MD  Date of Admission:  10/09/17    Assessment/Plan:    Hospital Problem List:     Principal Problem:    Dyspnea on exertion  Active Problems:    COPD exacerbation ([place] [id number] )    Anxiety    Type 2 diabetes mellitus with hyperglycemia, with long-term current use of insulin ([place] [id number] )    Morbid obesity with BMI of 40 0-44 9, adult ([place] [address] )      Plan for the Primary Problem(s):  · Dyspnea on exertion  Possible COPD exacerbation  Most likely driven by anxiety  Chest x-ray unremarkable, pending final read  Patient is afebrile  Denies productive cough  Patient received DuoNeb x2, Solu-Medrol 80 mg IV x1, morphine 4 mg IV x1 in ED  Will continue with Solu-Medrol 40 mg IV q 12 hours  Will restart patient on Xanax 1 mg p o  daily p r n  Lisa Mims There is no indication for IV antibiotic at present  Respiratory protocol  DuoNeb p r n     Continue oxygen 2 liters/minute via nasal cannula at HS  Will consult Pulmonary and psychiatry  Plan for Additional Problems:   · Possible COPD exacerbation  Management as above  Patient was admitted between 9/19/2017-9/21/2017 for COPD exacerbation  Was treated with IV steroids and IV Zithromax  · Anxiety  History of  Was on Xanax in the past   Will restart  Will consult Psychiatry  · Hyperglycemia secondary to uncontrolled type 2 diabetes  Glucose 324  A1c 9 3 10 days ago  Patient was changed from Lantus 30 units subcu b i d   to NovoLog 70 / 30 40 units subcu b i d  by her PCP recently  Glucose 324  Will add Humalog 5 units subcu t i d  with meals  Fingerstick blood sugar a c  and HS with SSI  Monitor     · Hypertension  BP elevated at times  Continue home medication lisinopril  Will order hydralazine p r n  for systolic above 517  Monitor BP  · Chronic diastolic CHF  2D echo in June 2017 showed normal EF, grade 1 diastolic dysfunction  Continue Demadex  Daily weight  · Hypothyroidism/partial thyroidectomy  Continue Synthroid  TSH, free T4 within normal limit 10 days ago  · IBS  Continue Bentyl p r n  MiraLax b i d  last BM 2 days ago  Questionable abdominal distension to me  Will add Colace  Will order OBS in a m Rosella Goldmann · Chronic pain in joints and abdomen  Continue Percocet p r n  and Bentyl p r n  · Restless leg syndrome  Continue Mirapex  Pulmonary nodules  Right lower lobe  No history of smoking  Patient saw Dr Azalia Castellano in April 2017, he recommended follow-up CT scan in 3 months  Not sure if it was done  Will defer to Pulmonary  History of obesity hypoventilation  Patient was supposedly to be on BiPAP at home  Currently using oxygen 2 liters/minute at night only  Will need to follow up with her Pulmonary as outpatient  Morbid obesity  Body mass index is 41 53 kg/m²  Diet and lifestyle modification  VTE Prophylaxis: Enoxaparin (Lovenox)  / sequential compression device   Code Status: Full code  POLST: POLST form is not discussed and not completed at this time  Anticipated Length of Stay:  Patient will be admitted on an Inpatient basis with an anticipated length of stay of  > 2 midnights  Justification for Hospital Stay: 1 hour  Total Time for Visit, including Counseling / Coordination of Care: 1 hour  Greater than 50% of this total time spent on direct patient counseling and coordination of care  Chief Complaint:   Difficulty breathing with exertion for 4 days  History of Present Illness:    Martin Lu is a 65 y o  female with PMH of  T2DM, HYPERTENSION, COPD, obesity hypoventilation, thyroid cancer who presents with dyspnea on exertion for 4 days  She was discharged from here about 2 weeks ago with Prednisone taper which was completed  on Tuesday last week  according to patient  She denies cough, runny nose or sore throat    She denies fever or chills  She denies chest pains, dizziness, nauseous, vomiting, urinary urgency or frequency  She states she has IBS, has been constipated for 2 days  She states she has chronic pain all over in joints and abdomen  Currently pain 5/10 total   She reports chronic headache  She states she has good appetite  She states she check her blood sugar 4 times a day at  Home  It was ranging between 160-300's for past couple days  Her PCP changed her lantus to possible humalog 70/30 40 units BID a couple recently due to uncontrolled blood sugar  She denies recent weight gain or weight loss  Of note,Patient was admitted between 9/19/2017-9/21/2017 for COPD exacerbation  Was treated with IV steroids and IV Zithromax  In ED, chest x-ray no change from before  Glucose 324  Patient received DuoNeb x2 , Solu-Medrol 80 mg IV x1, morphine 4 mg IV x1 in ED  Review of Systems:    Review of Systems   Constitutional: Positive for chills  Negative for activity change, appetite change, fever and unexpected weight change  HENT: Negative for congestion and sore throat  Respiratory: Positive for shortness of breath  Negative for cough and wheezing  Cardiovascular: Negative for chest pain and leg swelling  Gastrointestinal: Positive for abdominal pain and constipation  Negative for abdominal distention, diarrhea, nausea and vomiting  Musculoskeletal: Positive for arthralgias  Neurological: Positive for headaches  Negative for dizziness and light-headedness  Psychiatric/Behavioral: The patient is nervous/anxious  All other systems reviewed and are negative        Past Medical and Surgical History:     Past Medical History:   Diagnosis Date    Arthritis     Asthma     [facility]     Diabetes mellitus ([facility] )     Disease of thyroid gland     Hypertension     Mitral valve regurgitation     Restless leg syndrome     Sleep related hypoxia     [facility] Past Surgical History:   Procedure Laterality Date    CHOLECYSTECTOMY      EYE SURGERY      [place] ERCP DX COLLECTION SPECIMEN BRUSHING/WASHING N/A 5/30/2017    Procedure: ENDOSCOPIC RETROGRADE CHOLANGIOPANCREATOGRAPHY (ERCP); SPHINCTEROTOMY;  Surgeon: Yasemin Siegel MD;  Location: [facility]; Service: Gastroenterology    REPLACEMENT TOTAL KNEE BILATERAL      SKIN BIOPSY      melanoma of back    THORACOTOMY Right     at 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, for pericardial cyst    THYROIDECTOMY, PARTIAL      For thyroid cancer       Meds/Allergies:    Prior to Admission medications    Medication Sig Start Date End Date Taking?  Authorizing Provider   ascorbic acid (VITAMIN C) 500 mg tablet Take 500 mg by mouth daily   Yes Historical Provider, MD   aspirin 81 MG tablet Take by mouth daily   Yes Historical Provider, MD   Insulin Lispro Prot & Lispro (HUMALOG MIX 75/25 SC) Inject 40 Units under the skin 2 (two) times a day with meals   Yes Historical Provider, MD   ipratropium-albuterol (DUONEB) 0 5-2 5 mg/mL Inhale 1 Container every 4 (four) hours as needed   Yes Historical Provider, MD   levothyroxine (SYNTHROID) 125 mcg tablet Take 1 tablet by mouth daily   Yes Historical Provider, MD   Multiple Vitamins-Minerals (MULTIVITAMIN ADULT PO) Take 1 tablet by mouth daily 2/9/16  Yes Historical Provider, MD   oxyCODONE-acetaminophen (PERCOCET) 5-325 mg per tablet Take 1 tablet by mouth every 4 (four) hours as needed for moderate pain   Yes Historical Provider, MD   polyethylene glycol (MIRALAX) 17 g packet Take 17 g by mouth daily  Patient taking differently: Take 17 g by mouth 2 (two) times a day   6/17/17  Yes FATIMAH Wilburn   potassium chloride (K-DUR,KLOR-CON) 10 mEq tablet Take 2 tablets by mouth daily 6/17/17  Yes FATIMAH Wilburn   pramipexole (MIRAPEX) 1 mg tablet Take 1 tablet by mouth 3 (three) times a day 2/9/16  Yes Historical Provider, MD   torsemide (DEMADEX) 20 mg tablet Take 1 tablet by mouth 2 (two) times a day 9/21/17  Yes Blaine Barrios MD   insulin glargine (LANTUS) 100 units/mL subcutaneous injection Inject 30 Units under the skin 2 (two) times a day for 30 days 9/21/17 10/8/17 Yes Blaine Barrios MD   acetaminophen (TYLENOL) 325 mg tablet Take 2 tablets by mouth every 6 (six) hours as needed for mild pain or headaches 6/17/17   Sherren Six, CRNP   COD LIVER OIL PO Take by mouth daily    Historical Provider, MD   dicyclomine (BENTYL) 10 mg capsule Take 1 capsule by mouth 3 (three) times a day as needed (bowel spasm/abdominal pain) 9/21/17   Blaine Barrios MD   predniSONE 10 mg tablet Take 1 tablet by mouth daily 9/21/17 10/8/17  Blaine Barrios MD     I have reviewed home medications with patient personally  Allergies: Allergies   Allergen Reactions    Ketorolac Swelling    Latex Rash       Social History:     Marital Status:    Occupation:  Retired  Patient Pre-hospital Living Situation:  Lives with significant other's  Patient Pre-hospital Level of Mobility:  Ambulates with walker outside  Patient Pre-hospital Diet Restrictions:  Diabetic and cardiac  Substance Use History:   History   Alcohol Use No     History   Smoking Status    Never Smoker   Smokeless Tobacco    Never Used     History   Drug Use No       Family History:    Family History   Problem Relation Age of Onset    Cancer Father     Diabetes Sister        Physical Exam:     Vitals:   Blood Pressure: 163/74 (10/09/17 0000)  Pulse: (!) 107 (10/09/17 0048)  Temperature: 98 5 °F (36 9 °C) (10/09/17 0000)  Temp Source: Temporal (10/09/17 0000)  Respirations: (!) 24 (10/09/17 0048)  Height: 5' 2" (157 5 cm) (10/08/17 2225)  Weight - Scale: 103 kg (227 lb 1 2 oz) (10/08/17 2225)  SpO2: 97 % (10/09/17 0048)    Physical Exam   Constitutional: She is oriented to person, place, and time  She appears well-developed  Body mass index is 41 53 kg/m²  HENT:   Head: Normocephalic and atraumatic     Neck: Normal range of motion  Neck supple  Cardiovascular: Normal rate and regular rhythm  Exam reveals no gallop and no friction rub  No murmur heard  Pulmonary/Chest: Effort normal  No respiratory distress  She has no wheezes  She has no rales  Diminished breath sounds lower lobes  No conversational dyspnea  Abdominal: Soft  Bowel sounds are normal  She exhibits distension  There is tenderness  There is no rebound  Diffuse abdominal tenderness  Umbilical hernia  Musculoskeletal: Normal range of motion  She exhibits no edema, tenderness or deformity  Discoloration bilateral lower extremities  Neurological: She is alert and oriented to person, place, and time  Skin: Skin is warm and dry  Psychiatric: She has a normal mood and affect  Nursing note and vitals reviewed  Additional Data:     Lab Results: I have personally reviewed pertinent reports  Results from last 7 days  Lab Units 10/08/17  1908   WBC Thousand/uL 9 40   HEMOGLOBIN g/dL 12 5   HEMATOCRIT % 38 3   PLATELETS Thousands/uL 158   NEUTROS PCT % 75   LYMPHS PCT % 18   MONOS PCT % 6   EOS PCT % 1       Results from last 7 days  Lab Units 10/08/17  1908   SODIUM mmol/L 141   POTASSIUM mmol/L 4 3   CHLORIDE mmol/L 103   CO2 mmol/L 32   BUN mg/dL 22   CREATININE mg/dL 1 01   CALCIUM mg/dL 8 8   TOTAL PROTEIN g/dL 6 6   BILIRUBIN TOTAL mg/dL 0 20   ALK PHOS U/L 66   ALT U/L 44   AST U/L 13   GLUCOSE RANDOM mg/dL 324*           Imaging: I have personally reviewed pertinent reports  Xr Chest 2 Views    Result Date: 9/19/2017  Narrative: CHEST INDICATION:  Shortness of breath  COMPARISON:  6/11/2017 VIEWS:  Frontal and lateral projections IMAGES:  2 FINDINGS:  Study is somewhat limited by suboptimal inspiration  The cardiomediastinal silhouette is stable    The lungs are grossly clear  No pneumothorax or pleural effusion  Visualized osseous structures appear within normal limits for the patient's age       Impression: Slightly limited study  No acute pulmonary disease  Workstation performed: [id number]     Ct Abdomen Pelvis With Contrast    Result Date: 9/18/2017  Narrative: CT ABDOMEN AND PELVIS WITH IV CONTRAST INDICATION:  Abdominal pain and constipation COMPARISON: CT performed on 6/7/2017 and 4/15/2017 TECHNIQUE:  CT examination of the abdomen and pelvis was performed  Reformatted images were created in axial, sagittal, and coronal planes  Radiation dose length product (DLP) for this visit:  1021 01 mGy-cm   This examination, like all CT scans performed in the [facility], was performed utilizing techniques to minimize radiation dose exposure, including the use of iterative reconstruction and automated exposure control  IV Contrast:  100 mL of iohexol (OMNIPAQUE)     Enteric Contrast:  Enteric contrast was not administered  FINDINGS: ABDOMEN LOWER CHEST:  No significant abnormalities identified in the lower chest  LIVER/BILIARY TREE:  Persistently dilated right intrahepatic duct is noted  No perfusion imbalance or parenchymal change  Remainder of the liver appears unremarkable  GALLBLADDER:  Gallbladder is surgically absent  Secondary dilatation of the CBD is noted  Mild prominence of pancreatic duct is unchanged  SPLEEN:  Unremarkable  PANCREAS:  Unremarkable  ADRENAL GLANDS:  Unremarkable  KIDNEYS/URETERS:  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  No hydronephrosis or perinephric stranding  STOMACH AND BOWEL:  Hiatal hernia  No evidence for obstruction  Midline fat-containing umbilical hernia without bowel  Mild fecal burden within the colon  A few scattered diverticula  No evidence for diverticulitis  APPENDIX:  No findings to suggest appendicitis  ABDOMINOPELVIC CAVITY:  Stable retroperitoneal and left pelvic sidewall lymph nodes  VESSELS:  Unremarkable for patient's age  PELVIS REPRODUCTIVE ORGANS:  Patient is status post hysterectomy  URINARY BLADDER:  Unremarkable   ABDOMINAL WALL/INGUINAL REGIONS:  Unremarkable  OSSEOUS STRUCTURES:  No acute fracture or destructive osseous lesion  Impression: No evidence for an acute inflammatory process within the abdomen  Stable dilatation of the right intrahepatic ducts  No associated parenchymal change  Stable distal body pancreatic cyst  Stable retroperitoneal lymph nodes  Workstation performed: [id number]       EKG, Pathology, and Other Studies Reviewed on Admission:   · EKG:  Normal sinus rhythm, incomplete left bundle branch block, rate 87  Allscripts Records Reviewed: Yes     ** Please Note: Dragon 360 Dictation voice to text software may have been used in the creation of this document   **

## 2017-10-09 NOTE — PHYSICAL THERAPY NOTE
PT TREATMENT     10/09/17 0920   Pain Assessment   Pain Assessment 0-10   Pain Score 8   Pain Type Chronic pain   Pain Location Generalized   Restrictions/Precautions   Other Precautions Fall Risk;Bed Alarm; Chair Alarm;O2;Pain   General   Chart Reviewed Yes   Family/Caregiver Present No   Subjective   Subjective "I just wish I would get better instead of this back and forth"   Transfers   Sit to Stand 5  Supervision   Stand to Sit 5  Supervision   Ambulation/Elevation   Gait pattern Forward Flexion   Gait Assistance 5  Supervision   Assistive Device Rolling walker   Distance 80 feet   Assessment   Assessment Pt will cont to benefit from skilled PT services to increase pt's strength, endurance and mobility  Plan   Treatment/Interventions ADL retraining;Functional transfer training;LE strengthening/ROM; Elevations; Therapeutic exercise; Endurance training;Patient/family training;Bed mobility;Gait training   Recommendation   Recommendation Home with family support;Home PT   Equipment Recommended (Pt has a RW and rollator)

## 2017-10-09 NOTE — CONSULTS
Consultation - Pulmonary Medicine   Albert Palomino 65 y o  female MRN: [id number]  Unit/Bed#: ICU 06 Encounter: [id number]      Assessment:  Dyspnea which is secondary to her morbid obesity and restrictive impairment from this  At this point I do not feel she has an asthma exacerbation  She has a questionable history of asthma  Possible obstructive sleep apnea  Chronic compensated hypercapnic respiratory failure secondary to obesity alveolar hypoventilation  Patient complains of diffuse chronic pain all over body  Will check markers for any autoimmune or inflammatory disorder    Plan:   Discontinue IV methylprednisolone and will order sleep screen tonight with patient on 2 L of oxygen  I will order serum VIMAL, C-reactive protein and sed rate  Change neb treatments opf Duoneb to q i d  and p r n  History of Present Illness   Physician Requesting Consult: Waldron Ganser, MD  Reason for Consult / Principal Problem: dyspnea    HPI: Albert Palomino is a 65y o  year old female who presents with complaints of pain all over body and shortness of Breath particular with activity  She states she has chronic pain of her chest wall, abdomen, and extremities  When this pain is worse she feels more out of breath  She states she does have shortness of breath with activity  She is not having much in way of any cough and denies any wheezing  She uses 2 L of oxygen at night for sleep-related hypoxemia  She also gets some abdominal discomfort as well  No vomiting or diarrhea  She was given a dose of morphine 4 mg IV in ER and states she had dramatic relief of her pain  and felt great after that  Also her breathing improved right after that  She has a questionable history of asthma  She never smoked  She had recent admission from September 18 to 21 for complains of shortness of breath   She does have obesity alveolar hypoventilation syndrome and prior spirometry done admission here did show severe restrictive impairment and some mild airflow obstruction as obstructive index was mildly decreased at 62%  She states she has irritable bowel syndrome and does get abdominal discomfort from this  She was discharged home with a tapering dose of prednisone  Portable chest x-ray is unremarkable  Blood gas done during her previous [place] should have shown compensated hypercapnia  Her room air pH was 7 41 pCO2 60 and PO2 55 mm Hg  She has been started on IV methylprednisolone 40 mg every 12 hours and nebulized treatments with nebulizer treatments with DuoNeb q 4 hours p r n     At home she does use a nebulizer with DuoNeb and is not on any other regular inhaler  She did have an admission to the hospital earlier this year for cholangitis        Review of Systems   Constitutional:        Presented complaining of shortness of Breath and states she has discomfort all over her body  States she normally has chronic discomfort of her arms legs chest and abdomen  No fever or chills  No new cough   HENT: Negative for congestion and sore throat  Eyes: Negative for pain and redness  Respiratory: Positive for shortness of breath  Negative for cough  Has shortness of breath particularly with activity   Cardiovascular: Negative for chest pain and leg swelling  Endocrine: Negative for cold intolerance and polydipsia  Genitourinary: Negative for dysuria and hematuria  Musculoskeletal: Positive for arthralgias and myalgias  Neurological: Negative for dizziness, syncope and light-headedness  Psychiatric/Behavioral: Negative for agitation and confusion         Historical Information   Past Medical History:   Diagnosis Date    Arthritis     Asthma     [facility]     [facility]     Disease of thyroid gland     Hypertension     Mitral valve regurgitation     Restless leg syndrome     Sleep related hypoxia     Thyroid cancer [facility])      Past Surgical History:   Procedure Laterality Date    CHOLECYSTECTOMY      EYE SURGERY      [place] ERCP DX COLLECTION SPECIMEN BRUSHING/WASHING N/A 5/30/2017    Procedure: ENDOSCOPIC RETROGRADE CHOLANGIOPANCREATOGRAPHY (ERCP); SPHINCTEROTOMY;  Surgeon: Yi Barrios MD;  Location: [facility];   Service: Gastroenterology    REPLACEMENT TOTAL KNEE BILATERAL      SKIN BIOPSY      melanoma of back    THORACOTOMY Right     at 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, for pericardial cyst    THYROIDECTOMY, PARTIAL      For thyroid cancer     Social History   History   Alcohol Use No     History   Drug Use No     History   Smoking Status    Never Smoker   Smokeless Tobacco    Never Used         Family History:   Family History   Problem Relation Age of Onset    Cancer Father     Diabetes Sister        Meds/Allergies     Current Facility-Administered Medications:     acetaminophen (TYLENOL) tablet 650 mg, 650 mg, Oral, Q6H PRN, FEDE HealyNP    ALPRAZolam (XANAX) tablet 1 mg, 1 mg, Oral, Daily PRN, Aggie Grullon, CRNP, 1 mg at 10/09/17 0109    ascorbic acid (VITAMIN C) tablet 500 mg, 500 mg, Oral, Daily, Aggie Grullon, FEDENP, 500 mg at 10/09/17 0911    aspirin chewable tablet 81 mg, 81 mg, Oral, Daily, Aggie Annerimony, CRNP, 81 mg at 10/09/17 0911    dicyclomine (BENTYL) capsule 10 mg, 10 mg, Oral, TID PRN, Aggie Grullon, CRNP    docusate sodium (COLACE) capsule 100 mg, 100 mg, Oral, BID, Aggie Annerik, CRNP, 100 mg at 10/09/17 0911    enoxaparin (LOVENOX) subcutaneous injection 40 mg, 40 mg, Subcutaneous, Daily, Aggie Grullon CRNP, 40 mg at 10/09/17 0910    insulin aspart protamine-insulin aspart (NovoLOG 70/30) 100 units/mL subcutaneous injection 40 Units, 40 Units, Subcutaneous, BID AC, FEDE HealyNP, 40 Units at 10/09/17 0746    insulin lispro (HumaLOG) 100 units/mL subcutaneous injection 1-5 Units, 1-5 Units, Subcutaneous, HS, FEDE HealyNP, 5 Units at 10/09/17 0001    insulin lispro (HumaLOG) 100 units/mL subcutaneous injection 1-6 Units, 1-6 Units, Subcutaneous, TID AC, 6 Units at 10/09/17 0747 **AND** Fingerstick Glucose (POCT), , , TID AC, FATIMAH Healy    insulin lispro (HumaLOG) 100 units/mL subcutaneous injection 8 Units, 8 Units, Subcutaneous, TID With Meals, FEDE HealyNP, 8 Units at 10/09/17 0746    ipratropium-albuterol (DUO-NEB) 0 5-2 5 mg/mL inhalation solution 3 mL, 3 mL, Nebulization, Q4H PRN, Aggie Mcnamarak, CRNP, 3 mL at 10/09/17 0048    levothyroxine tablet 125 mcg, 125 mcg, Oral, Early Morning, Aggie Grullon, CRKWESI, 125 mcg at 10/09/17 0503    lisinopril (ZESTRIL) tablet 5 mg, 5 mg, Oral, Daily, Aggie Grullon, FATIMAH, 5 mg at 10/09/17 0911    methylPREDNISolone sodium succinate (Solu-MEDROL) injection 40 mg, 40 mg, Intravenous, Q12H [address], Aggie Annerik, CRNP, 40 mg at 10/09/17 0503    multivitamin-minerals (CENTRUM) tablet 1 tablet, 1 tablet, Oral, Daily, Aggie Grullon, CRKWESI    oxyCODONE-acetaminophen (PERCOCET) 5-325 mg per tablet 1 tablet, 1 tablet, Oral, Q4H PRN, Aggie Grullon, FEDENP, 1 tablet at 10/09/17 0158    polyethylene glycol (MIRALAX) packet 17 g, 17 g, Oral, BID, Aggie Grullon, CRNP, 17 g at 10/09/17 0911    pramipexole (MIRAPEX) tablet 1 mg, 1 mg, Oral, TID, Aggie Grullon, FEDENP, 1 mg at 10/09/17 0911    sodium chloride 0 9 % bolus 500 mL, 500 mL, Intravenous, Once, FATIMAH Healy, Last Rate: 166 7 mL/hr at 10/09/17 0741, 500 mL at 10/09/17 0741    torsemide (DEMADEX) tablet 20 mg, 20 mg, Oral, BID (diuretic), FATIMAH Healy, 20 mg at 10/09/17 0748    Prior to Admission medications    Medication Sig Start Date End Date Taking?  Authorizing Provider   ascorbic acid (VITAMIN C) 500 mg tablet Take 500 mg by mouth daily   Yes Historical Provider, MD   aspirin 81 MG tablet Take by mouth daily   Yes Historical Provider, MD   insulin aspart protamine-insulin aspart (NovoLOG 70/30) 100 units/mL injection Inject 40 Units under the skin 2 (two) times a day before meals   Yes Historical Provider, MD   ipratropium-albuterol (DUONEB) 0 5-2 5 mg/mL Inhale 1 Container every 4 (four) hours as needed   Yes Historical Provider, MD   levothyroxine (SYNTHROID) 125 mcg tablet Take 1 tablet by mouth daily   Yes Historical Provider, MD   lisinopril (ZESTRIL) 10 mg tablet Take 10 mg by mouth daily   Yes Historical Provider, MD   Multiple Vitamins-Minerals (MULTIVITAMIN ADULT PO) Take 1 tablet by mouth daily 2/9/16  Yes Historical Provider, MD   oxyCODONE-acetaminophen (PERCOCET) 5-325 mg per tablet Take 1 tablet by mouth every 4 (four) hours as needed for moderate pain   Yes Historical Provider, MD   polyethylene glycol (MIRALAX) 17 g packet Take 17 g by mouth daily  Patient taking differently: Take 17 g by mouth 2 (two) times a day   6/17/17  Yes FATIMAH Ramirez   potassium chloride (K-DUR,KLOR-CON) 10 mEq tablet Take 2 tablets by mouth daily 6/17/17  Yes FATIMAH Ramirez   pramipexole (MIRAPEX) 1 mg tablet Take 1 tablet by mouth 3 (three) times a day 2/9/16  Yes Historical Provider, MD   torsemide (DEMADEX) 20 mg tablet Take 1 tablet by mouth 2 (two) times a day 9/21/17  Yes Francisca Velasquez MD   Insulin Lispro Prot & Lispro (HUMALOG MIX 75/25 SC) Inject 40 Units under the skin 2 (two) times a day with meals  10/9/17 Yes Historical Provider, MD   acetaminophen (TYLENOL) 325 mg tablet Take 2 tablets by mouth every 6 (six) hours as needed for mild pain or headaches 6/17/17   FATIMAH Ramirez   COD LIVER OIL PO Take by mouth daily    Historical Provider, MD   dicyclomine (BENTYL) 10 mg capsule Take 1 capsule by mouth 3 (three) times a day as needed (bowel spasm/abdominal pain) 9/21/17   Francisca Velasquez MD       Allergies   Allergen Reactions    Ketorolac Swelling    Latex Rash       Objective   Vitals: Blood pressure 153/73, pulse 97, temperature 98 2 °F (36 8 °C), temperature source Temporal, resp  rate 20, height 5' 2" (1 575 m), weight 103 kg (227 lb 1 2 oz), SpO2 95 %, not currently breastfeeding  ,Body mass index is 41 53 kg/m²  Intake/Output Summary (Last 24 hours) at 10/09/17 0946  Last data filed at 10/09/17 0501   Gross per 24 hour   Intake                0 ml   Output              200 ml   Net             -200 ml     Invasive Devices     Peripheral Intravenous Line            Peripheral IV 10/08/17 Left Antecubital less than 1 day                Physical Exam   Constitutional: She is oriented to person, place, and time  Patient is awake, alert, no distress, on 2 liters/minute with O2 saturation is 94-95%   HENT:   Nose: Nose normal    Mouth/Throat: Oropharynx is clear and moist  No oropharyngeal exudate  Mallampati score is 3   Eyes: Conjunctivae are normal  Pupils are equal, round, and reactive to light  Neck: Neck supple  No JVD present  Cardiovascular: Normal rate, regular rhythm and normal heart sounds  Pulmonary/Chest: Effort normal  She has no wheezes  She has no rales  Abdominal: Soft  She exhibits no distension  There is no guarding  Obese   Musculoskeletal:   Minimal edema lower extremities   Neurological: She is alert and oriented to person, place, and time  Skin: Skin is warm and dry  No erythema  Psychiatric: She has a normal mood and affect   Her behavior is normal  Thought content normal        Lab Results: ABG: Lab Results   Component Value Date    PHART 7 408 09/18/2017    QGN9WAH 59 9 (HH) 09/18/2017    PO2ART 55 0 (LL) 09/18/2017    SHB4NNU 36 7 (H) 09/18/2017    BEART 9 8 09/18/2017    SOURCE Radial, Right 09/18/2017   , BNP: No results found for: BNP, CBC: Lab Results   Component Value Date    WBC 9 40 10/08/2017    HGB 12 5 10/08/2017    HCT 38 3 10/08/2017    MCV 94 10/08/2017     10/08/2017    ADJUSTEDWBC 10 30 04/04/2016    MCH 30 7 10/08/2017    MCHC 32 7 10/08/2017    RDW 16 0 (H) 10/08/2017    MPV 8 2 (L) 10/08/2017    NRBC 0 10/08/2017   , CMP: Lab Results   Component Value Date     10/09/2017     01/08/2016    K 5 5 (H) 10/09/2017    K 3 9 01/08/2016     10/09/2017     01/08/2016    CO2 29 10/09/2017    CO2 34 (H) 01/08/2016    ANIONGAP 7 10/09/2017    ANIONGAP 10 8 01/08/2016    BUN 25 10/09/2017    BUN 15 01/08/2016    CREATININE 1 19 10/09/2017    CREATININE 0 9 01/08/2016    GLUCOSE 481 (H) 10/09/2017    GLUCOSE 164 (H) 01/08/2016    CALCIUM 8 2 (L) 10/09/2017    CALCIUM 8 5 01/08/2016    AST 13 10/08/2017    AST 16 01/08/2016    ALT 44 10/08/2017    ALT 29 01/08/2016    ALKPHOS 66 10/08/2017    ALKPHOS 72 01/08/2016    PROT 6 6 10/08/2017    PROT 7 0 01/08/2016    ALBUMIN 3 1 (L) 10/08/2017    ALBUMIN 3 7 01/08/2016    BILITOT 0 20 10/08/2017    BILITOT 0 3 01/08/2016    EGFR 44 10/09/2017   , PT/INR:   Lab Results   Component Value Date    INR 0 98 09/20/2017   , Troponin: No components found for: TROPONIN    Imaging Studies: I have personally reviewed pertinent reports  and I have personally reviewed pertinent films in PACS    EKG, Pathology, and Other Studies: I have personally reviewed pertinent reports  VTE Prophylaxis: Sequential compression device (Venodyne)     Code Status: Level 1 - Full Code    Counseling/Coordination of Care: Total floor / unit time spent today 30 minutes  Greater than 50% of total time was spent with the patient and / or family counseling and / or coordination of care   A description of the counseling / coordination of care: I reviewed patient's chart, chest radiographs, and discussed diagnosis and treatment with her

## 2017-10-09 NOTE — CONSULTS
Consultation - Roscoe Christopher 65 y o  female MRN: [id number]    Unit/Bed#: ICU 06 Encounter: [id number]      Identifying Data:  65years old white female is admitted at [facility] on 2017 with complaining of difficulty in breathing psychiatric consultation is asked for the anxiety and depression  Patient examined spoke with the nurse history physical medications labs reviewed and noted patient is a poor historian but she was able to give out the background history she admits being anxious and nervous she has trouble in sleeping she also reports that she has seen psychiatrists in the past for short period of time she also reports being depressed especially after her son  in the Sleep 2 years ago she did not go to talk to anybody and currently she is not under psychiatric care  Patient lives with her boyfriend since 24/10 years she had 1 son who  2 years ago and 3 daughters  Patient denies smoking denies abusing alcohol or drugs she has some college education and she was working at [employer] for 28 years she is retired  He patient is suffering from anxiety, depression, arthritis, asthma, COPD, diabetes, thyroid disease, hypertension, mitral wall regurgitation, restless legs syndrome, history of thyroid cancer, cholecystectomy, eye surgery, history of thoracotomy, thyroidectomy partial, patient admits being anxious and agreed to take the medications for the same  Chief Complaints:  Trouble in breathing  Family History:  Patient denies  Family History   Problem Relation Age of Onset   [facility] Cancer Father     Diabetes Sister        Legal History:  Patient denies  Mental Status Exam:  65years old white female is alert awake oriented x3 cooperative her affect anxious and she reports feeling anxious nervous trouble in sleeping and depressed  Memory intact  Patient denies auditory or visual hallucinations    Patient denies suicidal or homicidal ideations  No paranoia and no delusion elucidated poor concentration  Insight and judgments are adequate  History of Present Illness     HPI: Yasmin Sutton is a 65y o  year old female who presents with trouble in breathing    Consults      Historical Information   Past Psychiatric History:  Patient reports being anxious since long time and she is also depressed but she never went for psychiatric help except she has history of seeing a psychiatrist for short period of time she also feels more depressed after her son  2 years ago and his sleep  Patient has no psychiatric admissions no suicide attempts in the past patient has no history of drug and alcohol abuse no DUIs no rehabs and no legal problems  Currently patient is not under psychiatric care and she is not on psychotropic medications at home  Past Medical History:   Diagnosis Date    Arthritis     Asthma     [facility]     [facility]     Disease of thyroid gland     Hypertension     Mitral valve regurgitation     Restless leg syndrome     Sleep related hypoxia     Thyroid cancer ([facility] )      Past Surgical History:   Procedure Laterality Date    CHOLECYSTECTOMY      EYE SURGERY      [place] ERCP DX COLLECTION SPECIMEN BRUSHING/WASHING N/A 2017    Procedure: ENDOSCOPIC RETROGRADE CHOLANGIOPANCREATOGRAPHY (ERCP); SPHINCTEROTOMY;  Surgeon: Lg Arnold MD;  Location: [facility];   Service: Gastroenterology    REPLACEMENT TOTAL KNEE BILATERAL      SKIN BIOPSY      melanoma of back    THORACOTOMY Right     at 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, for pericardial cyst    THYROIDECTOMY, PARTIAL      For thyroid cancer     Social History   History   Alcohol Use No     History   Drug Use No     History   Smoking Status    Never Smoker   Smokeless Tobacco    Never Used       Meds/Allergies   current meds:   Current Facility-Administered Medications   Medication Dose Route Frequency    acetaminophen (TYLENOL) tablet 650 mg  650 mg Oral Q6H PRN    ALPRAZolam (XANAX) tablet 1 mg  1 mg Oral Daily PRN    ascorbic acid (VITAMIN C) tablet 500 mg  500 mg Oral Daily    aspirin chewable tablet 81 mg  81 mg Oral Daily    dicyclomine (BENTYL) capsule 10 mg  10 mg Oral TID PRN    docusate sodium (COLACE) capsule 100 mg  100 mg Oral BID    enoxaparin (LOVENOX) subcutaneous injection 40 mg  40 mg Subcutaneous Daily    insulin aspart protamine-insulin aspart (NovoLOG 70/30) 100 units/mL subcutaneous injection 40 Units  40 Units Subcutaneous BID AC    insulin lispro (HumaLOG) 100 units/mL subcutaneous injection 1-5 Units  1-5 Units Subcutaneous HS    insulin lispro (HumaLOG) 100 units/mL subcutaneous injection 1-6 Units  1-6 Units Subcutaneous TID AC    insulin lispro (HumaLOG) 100 units/mL subcutaneous injection 8 Units  8 Units Subcutaneous TID With Meals    ipratropium-albuterol (DUO-NEB) 0 5-2 5 mg/mL inhalation solution 3 mL  3 mL Nebulization Q6H    levothyroxine tablet 125 mcg  125 mcg Oral Early Morning    lisinopril (ZESTRIL) tablet 5 mg  5 mg Oral Daily    multivitamin-minerals (CENTRUM) tablet 1 tablet  1 tablet Oral Daily    oxyCODONE-acetaminophen (PERCOCET) 5-325 mg per tablet 1 tablet  1 tablet Oral Q4H PRN    polyethylene glycol (MIRALAX) packet 17 g  17 g Oral BID    pramipexole (MIRAPEX) tablet 1 mg  1 mg Oral TID    torsemide (DEMADEX) tablet 20 mg  20 mg Oral BID (diuretic)    and PTA meds:    Prescriptions Prior to Admission   Medication    ascorbic acid (VITAMIN C) 500 mg tablet    aspirin 81 MG tablet    insulin aspart protamine-insulin aspart (NovoLOG 70/30) 100 units/mL injection    ipratropium-albuterol (DUONEB) 0 5-2 5 mg/mL    levothyroxine (SYNTHROID) 125 mcg tablet    lisinopril (ZESTRIL) 10 mg tablet    Multiple Vitamins-Minerals (MULTIVITAMIN ADULT PO)    oxyCODONE-acetaminophen (PERCOCET) 5-325 mg per tablet    polyethylene glycol (MIRALAX) 17 g packet    potassium chloride (K-DUR,KLOR-CON) 10 mEq tablet    pramipexole (MIRAPEX) 1 mg tablet    torsemide (DEMADEX) 20 mg tablet    acetaminophen (TYLENOL) 325 mg tablet    COD LIVER OIL PO    dicyclomine (BENTYL) 10 mg capsule     Allergies   Allergen Reactions    Ketorolac Swelling    Latex Rash       Objective   Vitals: Blood pressure 153/73, pulse 94, temperature 98 4 °F (36 9 °C), temperature source Tympanic, resp  rate 17, height 5' 2" (1 575 m), weight 103 kg (227 lb 1 2 oz), SpO2 94 %, not currently breastfeeding        Routine Lab Results:   Admission on 10/08/2017   Component Date Value Ref Range Status    Sodium 10/08/2017 141  136 - 145 mmol/L Final    Potassium 10/08/2017 4 3  3 5 - 5 3 mmol/L Final    Chloride 10/08/2017 103  100 - 108 mmol/L Final    CO2 10/08/2017 32  21 - 32 mmol/L Final    Anion Gap 10/08/2017 6  4 - 13 mmol/L Final    BUN 10/08/2017 22  5 - 25 mg/dL Final    Creatinine 10/08/2017 1 01  0 60 - 1 30 mg/dL Final    Glucose 10/08/2017 324* 65 - 140 mg/dL Final    Calcium 10/08/2017 8 8  8 3 - 10 1 mg/dL Final    AST 10/08/2017 13  5 - 45 U/L Final    ALT 10/08/2017 44  12 - 78 U/L Final    Alkaline Phosphatase 10/08/2017 66  46 - 116 U/L Final    Total Protein 10/08/2017 6 6  6 4 - 8 2 g/dL Final    Albumin 10/08/2017 3 1* 3 5 - 5 0 g/dL Final    Total Bilirubin 10/08/2017 0 20  0 20 - 1 00 mg/dL Final    eGFR 10/08/2017 53  ml/min/1 73sq m Final    Troponin I 10/08/2017 <0 02  <=0 04 ng/mL Final    NT-proBNP 10/08/2017 164  <450 pg/mL Final    WBC 10/08/2017 9 40  4 80 - 10 80 Thousand/uL Final    RBC 10/08/2017 4 08* 4 20 - 5 40 Million/uL Final    Hemoglobin 10/08/2017 12 5  12 0 - 16 0 g/dL Final    Hematocrit 10/08/2017 38 3  37 0 - 47 0 % Final    MCV 10/08/2017 94  82 - 98 fL Final    MCH 10/08/2017 30 7  27 0 - 31 0 pg Final    MCHC 10/08/2017 32 7  31 4 - 37 4 g/dL Final    RDW 10/08/2017 16 0* 11 6 - 15 1 % Final    MPV 10/08/2017 8 2* 8 9 - 12 7 fL Final    Platelets 08/07/5364 158  130 - 400 Thousands/uL Final    nRBC 10/08/2017 0  /100 WBCs Final    Neutrophils Relative 10/08/2017 75  43 - 75 % Final    Lymphocytes Relative 10/08/2017 18  14 - 44 % Final    Monocytes Relative 10/08/2017 6  4 - 12 % Final    Eosinophils Relative 10/08/2017 1  0 - 6 % Final    Basophils Relative 10/08/2017 1  0 - 1 % Final    Neutrophils Absolute 10/08/2017 7 10  1 85 - 7 62 Thousands/µL Final    Lymphocytes Absolute 10/08/2017 1 70  0 60 - 4 47 Thousands/µL Final    Monocytes Absolute 10/08/2017 0 60  0 17 - 1 22 Thousand/µL Final    Eosinophils Absolute 10/08/2017 0 10  0 00 - 0 61 Thousand/µL Final    Basophils Absolute 10/08/2017 0 10  0 00 - 0 10 Thousands/µL Final    POC Glucose 10/09/2017 443* 65 - 140 mg/dl Final    Sodium 10/09/2017 138  136 - 145 mmol/L Final    Potassium 10/09/2017 5 5* 3 5 - 5 3 mmol/L Final    Chloride 10/09/2017 102  100 - 108 mmol/L Final    CO2 10/09/2017 29  21 - 32 mmol/L Final    Anion Gap 10/09/2017 7  4 - 13 mmol/L Final    BUN 10/09/2017 25  5 - 25 mg/dL Final    Creatinine 10/09/2017 1 19  0 60 - 1 30 mg/dL Final    Glucose 10/09/2017 481* 65 - 140 mg/dL Final    Calcium 10/09/2017 8 2* 8 3 - 10 1 mg/dL Final    eGFR 10/09/2017 44  ml/min/1 73sq m Final    Magnesium 10/09/2017 2 2  1 6 - 2 6 mg/dL Final    POC Glucose 10/09/2017 414* 65 - 140 mg/dl Final         Diagnosis:  Major depression recurrent  Anxiety  Insomnia  Rule out pathological bereavement  Plan:  Lexapro 10 mg daily  Xanax 0 5 mg p o  b i d  Xanax 0 25 mg p o  q 8 hours p r n  for anxiety  Psychotherapy  Psychiatric follow-up recommended after the discharge  I will follow up during the hospital stay  Thank you very much      Reena Araujo MD

## 2017-10-09 NOTE — OCCUPATIONAL THERAPY NOTE
OT EVALUATION       10/09/17 0855   Restrictions/Precautions   Other Precautions Fall Risk;Pain   Pain Assessment   Pain Assessment 0-10   Pain Score 8   Pain Location Generalized   Home Living   Type of Home House  (1 PETEY)   Home Layout One level   Bathroom Shower/Tub (pt reports standing to shower with assist from daughter)   [address]  (rollator, home O2 at night )   Prior Function   Level of [place] Needs assistance with ADLs and functional mobility   Lives With Significant other   Receives Help From Family   ADL Assistance Needs assistance   IADLs Needs assistance   Comments independent with dressing assist with showering per patient   ADL   Eating Assistance 7  Independent   Grooming Assistance 7  Independent   UB Bathing Assistance 7  Independent   LB Bathing Assistance 4  Minimal Assistance   UB Dressing Assistance 7  Independent   LB Dressing Assistance 5  Supervision/Setup   Toileting Assistance  5  Supervision/Setup   Transfers   Sit to Stand 5  Supervision   Stand to Sit 5  Supervision   Functional Mobility   Functional Mobility 5  Supervision   Additional Comments 10 feet   Additional items Rolling walker   Balance   Static Sitting Good   Dynamic Sitting Fair +   Static Standing Fair +   Dynamic Standing Fair +   Activity Tolerance   Activity Tolerance Patient limited by fatigue   RUE Assessment   RUE Assessment WFL  (4-/5)   LUE Assessment   LUE Assessment WFL  (4-/5)   Cognition   Overall Cognitive Status WFL   Arousal/Participation Cooperative   Attention Within functional limits   Orientation Level Oriented X4   Following Commands Follows all commands and directions without difficulty   Assessment   Limitation Decreased ADL status; Decreased UE strength;Decreased Safe judgement during ADL;Decreased endurance;Decreased self-care trans;Decreased high-level ADLs  (decreased balance and mobility)   Prognosis Good   Assessment Patient evaluated by Occupational Therapy  Patient admitted with Dyspnea on exertion  The patients occupational profile, medical and therapy history includes a extensive additional review of physical, cognitive, or psychosocial history related to current functional performance  Comorbidities affecting functional mobility and ADLS include: anxiety, arthritis, COPD, diabetes and hypertension  Prior to admission, patient was independent with functional mobility with rollator, requiring assist for ADLS and requiring assist for IADLS  The evaluation identifies the following performance deficits: weakness, impaired balance, decreased endurance, increased fall risk, new onset of impairment of functional mobility, decreased ADLS, decreased IADLS, pain, decreased activity tolerance, decreased safety awareness, impaired judgement, SOB upon exertion and decreased strength, that result in activity limitations and/or participation restrictions  This evaluation requires clinical decision making of moderate complexity, because the patient may present with comorbidities that affect occupational performance and required minimal or moderate modification of tasks or assistance with the consideration of several treatment options  The Barthel Index was used as a functional outcome tool presenting with a score of 55, indicating marked limitations of functional mobility and ADLS  Patient will benefit from skilled Occupational Therapy services to address above deficits and facilitate a safe return to prior level of function  Goals   Patient Goals go home   STG Time Frame (1-7 days)   Short Term Goal  Patient will increase standing tolerance to 3 minutes during functional activity;   Patient will increase functional mobility to and from bathroom with rolling walker independently to increase performance with ADLS; Patient will tolerate 8 minutes of UE ROM/strengthening to increase general activity tolerance and performance in ADLS/IADLS; Patient will improve functional activity tolerance to 10 minutes of sustained functional tasks to increase participation in basic self-care and decrease assistance level  LTG Time Frame (8-14 days)   Long Term Goal Patient will increase standing tolerance to 6 minutes during functional activity; Patient will tolerate 12 minutes of UE ROM/strengthening to increase general activity tolerance and performance in ADLS/IADLS; Patient will improve functional activity tolerance to 20 minutes of sustained functional tasks to increase participation in basic self-care and decrease assistance level  Functional Transfer Goals   Pt Will Perform All Functional Transfers (STG independent )   ADL Goals   Pt Will Perform Bathing (STG supervision LTG independent )   Pt Will Perform LE Dressing (STG independent )   Pt Will Perform Toileting (STG independent )   Plan   Treatment Interventions ADL retraining;Functional transfer training;UE strengthening/ROM; Endurance training;Patient/family training;Equipment evaluation/education; Activityengagement   OT Frequency 2-3x/wk   Recommendation   Discharge Recommendation Home with family support  (home services PT/OT)   Barthel Index   Feeding 10   Bathing 0   Grooming Score 5   Dressing Score 5   Bladder Score 5   Bowels Score 10   Toilet Use Score 5   Transfers (Bed/Chair) Score 10   Mobility (Level Surface) Score 0   Stairs Score 5   Barthel Index Score 55

## 2017-10-09 NOTE — PLAN OF CARE
Problem: DISCHARGE PLANNING - CARE MANAGEMENT  Goal: Discharge to post-acute care or home with appropriate resources  INTERVENTIONS:  - Conduct assessment to determine patient/family and health care team treatment goals, and need for post-acute services based on payer coverage, community resources, and patient preferences, and barriers to discharge  - Address psychosocial, clinical, and financial barriers to discharge as identified in assessment in conjunction with the patient/family and health care team  - Arrange appropriate level of post-acute services according to patient's   needs and preference and payer coverage in collaboration with the physician and health care team  - Communicate with and update the patient/family, physician, and health care team regarding progress on the discharge plan  - Arrange appropriate transportation to post-acute venues  Fariba Lopez     Outcome: Progressing

## 2017-10-09 NOTE — PLAN OF CARE
Potential for Falls     Patient will remain free of falls Progressing        RESPIRATORY - ADULT     Achieves optimal ventilation and oxygenation Progressing

## 2017-10-10 ENCOUNTER — APPOINTMENT (INPATIENT)
Dept: PHYSICAL THERAPY | Facility: HOSPITAL | Age: 77
DRG: 205 | End: 2017-10-10
Payer: COMMERCIAL

## 2017-10-10 ENCOUNTER — APPOINTMENT (INPATIENT)
Dept: RADIOLOGY | Facility: HOSPITAL | Age: 77
DRG: 205 | End: 2017-10-10
Payer: COMMERCIAL

## 2017-10-10 ENCOUNTER — GENERIC CONVERSION - ENCOUNTER (OUTPATIENT)
Dept: OTHER | Facility: OTHER | Age: 77
End: 2017-10-10

## 2017-10-10 LAB
ALBUMIN SERPL BCP-MCNC: 3 G/DL (ref 3.5–5)
ALP SERPL-CCNC: 62 U/L (ref 46–116)
ALT SERPL W P-5'-P-CCNC: 48 U/L (ref 12–78)
ANION GAP SERPL CALCULATED.3IONS-SCNC: 4 MMOL/L (ref 4–13)
ANION GAP SERPL CALCULATED.3IONS-SCNC: 6 MMOL/L (ref 4–13)
APTT PPP: 23 SECONDS (ref 23–35)
ARTERIAL PATENCY WRIST A: YES
AST SERPL W P-5'-P-CCNC: 16 U/L (ref 5–45)
BASE EXCESS BLDA CALC-SCNC: 7.4 MMOL/L
BASOPHILS # BLD AUTO: 0.1 THOUSANDS/ΜL (ref 0–0.1)
BASOPHILS NFR BLD AUTO: 1 % (ref 0–1)
BILIRUB SERPL-MCNC: 0.2 MG/DL (ref 0.2–1)
BODY TEMPERATURE: 97.8 DEGREES FEHRENHEIT
BUN SERPL-MCNC: 38 MG/DL (ref 5–25)
BUN SERPL-MCNC: 39 MG/DL (ref 5–25)
CALCIUM SERPL-MCNC: 8.3 MG/DL (ref 8.3–10.1)
CALCIUM SERPL-MCNC: 8.4 MG/DL (ref 8.3–10.1)
CHLORIDE SERPL-SCNC: 102 MMOL/L (ref 100–108)
CHLORIDE SERPL-SCNC: 99 MMOL/L (ref 100–108)
CO2 SERPL-SCNC: 34 MMOL/L (ref 21–32)
CO2 SERPL-SCNC: 37 MMOL/L (ref 21–32)
CREAT SERPL-MCNC: 1.01 MG/DL (ref 0.6–1.3)
CREAT SERPL-MCNC: 1.01 MG/DL (ref 0.6–1.3)
CRP SERPL QL: 5.4 MG/L
EOSINOPHIL # BLD AUTO: 0 THOUSAND/ΜL (ref 0–0.61)
EOSINOPHIL NFR BLD AUTO: 1 % (ref 0–6)
ERYTHROCYTE [DISTWIDTH] IN BLOOD BY AUTOMATED COUNT: 16.2 % (ref 11.6–15.1)
ERYTHROCYTE [SEDIMENTATION RATE] IN BLOOD: 13 MM/HOUR (ref 2–25)
GFR SERPL CREATININE-BSD FRML MDRD: 53 ML/MIN/1.73SQ M
GFR SERPL CREATININE-BSD FRML MDRD: 53 ML/MIN/1.73SQ M
GLUCOSE SERPL-MCNC: 162 MG/DL (ref 65–140)
GLUCOSE SERPL-MCNC: 166 MG/DL (ref 65–140)
GLUCOSE SERPL-MCNC: 177 MG/DL (ref 65–140)
GLUCOSE SERPL-MCNC: 208 MG/DL (ref 65–140)
GLUCOSE SERPL-MCNC: 239 MG/DL (ref 65–140)
GLUCOSE SERPL-MCNC: 246 MG/DL (ref 65–140)
GLUCOSE SERPL-MCNC: 247 MG/DL (ref 65–140)
GLUCOSE SERPL-MCNC: 265 MG/DL (ref 65–140)
GLUCOSE SERPL-MCNC: 269 MG/DL (ref 65–140)
GLUCOSE SERPL-MCNC: 290 MG/DL (ref 65–140)
HCO3 BLDA-SCNC: 34.5 MMOL/L (ref 22–28)
HCT VFR BLD AUTO: 38.9 % (ref 37–47)
HGB BLD-MCNC: 12.7 G/DL (ref 12–16)
INR PPP: 0.95 (ref 0.86–1.16)
LYMPHOCYTES # BLD AUTO: 1.9 THOUSANDS/ΜL (ref 0.6–4.47)
LYMPHOCYTES NFR BLD AUTO: 21 % (ref 14–44)
MCH RBC QN AUTO: 30.7 PG (ref 27–31)
MCHC RBC AUTO-ENTMCNC: 32.6 G/DL (ref 31.4–37.4)
MCV RBC AUTO: 94 FL (ref 82–98)
MONOCYTES # BLD AUTO: 0.5 THOUSAND/ΜL (ref 0.17–1.22)
MONOCYTES NFR BLD AUTO: 6 % (ref 4–12)
MRSA NOSE QL CULT: NORMAL
NASAL CANNULA: 2
NEUTROPHILS # BLD AUTO: 6.5 THOUSANDS/ΜL (ref 1.85–7.62)
NEUTS SEG NFR BLD AUTO: 72 % (ref 43–75)
NRBC BLD AUTO-RTO: 0 /100 WBCS
O2 CT BLDA-SCNC: 17.9 ML/DL (ref 16–23)
OXYHGB MFR BLDA: 95.8 % (ref 94–97)
PCO2 BLDA: 59.9 MM HG (ref 36–44)
PCO2 TEMP ADJ BLDA: 58.9 MM HG (ref 36–44)
PH BLD: 7.38 [PH] (ref 7.35–7.45)
PH BLDA: 7.38 [PH] (ref 7.35–7.45)
PLATELET # BLD AUTO: 159 THOUSANDS/UL (ref 130–400)
PMV BLD AUTO: 8 FL (ref 8.9–12.7)
PO2 BLD: 94.4 MM HG (ref 75–129)
PO2 BLDA: 96.7 MM HG (ref 75–129)
POTASSIUM SERPL-SCNC: 4 MMOL/L (ref 3.5–5.3)
POTASSIUM SERPL-SCNC: 4.2 MMOL/L (ref 3.5–5.3)
PROT SERPL-MCNC: 6.5 G/DL (ref 6.4–8.2)
PROTHROMBIN TIME: 10 SECONDS (ref 9.4–11.7)
RBC # BLD AUTO: 4.14 MILLION/UL (ref 4.2–5.4)
SODIUM SERPL-SCNC: 140 MMOL/L (ref 136–145)
SODIUM SERPL-SCNC: 142 MMOL/L (ref 136–145)
SPECIMEN SOURCE: ABNORMAL
TROPONIN I SERPL-MCNC: <0.02 NG/ML
WBC # BLD AUTO: 9 THOUSAND/UL (ref 4.8–10.8)

## 2017-10-10 PROCEDURE — 36600 WITHDRAWAL OF ARTERIAL BLOOD: CPT

## 2017-10-10 PROCEDURE — 97110 THERAPEUTIC EXERCISES: CPT

## 2017-10-10 PROCEDURE — 85025 COMPLETE CBC W/AUTO DIFF WBC: CPT | Performed by: NURSE PRACTITIONER

## 2017-10-10 PROCEDURE — 94640 AIRWAY INHALATION TREATMENT: CPT

## 2017-10-10 PROCEDURE — 70498 CT ANGIOGRAPHY NECK: CPT

## 2017-10-10 PROCEDURE — 85610 PROTHROMBIN TIME: CPT | Performed by: NURSE PRACTITIONER

## 2017-10-10 PROCEDURE — 70496 CT ANGIOGRAPHY HEAD: CPT

## 2017-10-10 PROCEDURE — 86140 C-REACTIVE PROTEIN: CPT | Performed by: INTERNAL MEDICINE

## 2017-10-10 PROCEDURE — 94760 N-INVAS EAR/PLS OXIMETRY 1: CPT

## 2017-10-10 PROCEDURE — 86038 ANTINUCLEAR ANTIBODIES: CPT | Performed by: INTERNAL MEDICINE

## 2017-10-10 PROCEDURE — 85730 THROMBOPLASTIN TIME PARTIAL: CPT | Performed by: NURSE PRACTITIONER

## 2017-10-10 PROCEDURE — 82948 REAGENT STRIP/BLOOD GLUCOSE: CPT

## 2017-10-10 PROCEDURE — 82805 BLOOD GASES W/O2 SATURATION: CPT | Performed by: NURSE PRACTITIONER

## 2017-10-10 PROCEDURE — 94762 N-INVAS EAR/PLS OXIMTRY CONT: CPT

## 2017-10-10 PROCEDURE — 80048 BASIC METABOLIC PNL TOTAL CA: CPT | Performed by: INTERNAL MEDICINE

## 2017-10-10 PROCEDURE — 94150 VITAL CAPACITY TEST: CPT

## 2017-10-10 PROCEDURE — 80053 COMPREHEN METABOLIC PANEL: CPT | Performed by: NURSE PRACTITIONER

## 2017-10-10 PROCEDURE — 84484 ASSAY OF TROPONIN QUANT: CPT | Performed by: NURSE PRACTITIONER

## 2017-10-10 PROCEDURE — 70450 CT HEAD/BRAIN W/O DYE: CPT

## 2017-10-10 PROCEDURE — 85652 RBC SED RATE AUTOMATED: CPT | Performed by: INTERNAL MEDICINE

## 2017-10-10 RX ORDER — ALPRAZOLAM 0.25 MG/1
0.25 TABLET ORAL 2 TIMES DAILY
Status: DISCONTINUED | OUTPATIENT
Start: 2017-10-10 | End: 2017-10-10

## 2017-10-10 RX ORDER — INSULIN ASPART 100 [IU]/ML
40 INJECTION, SUSPENSION SUBCUTANEOUS
Qty: 3 ML | Refills: 0 | Status: SHIPPED | OUTPATIENT
Start: 2017-10-10 | End: 2018-01-09

## 2017-10-10 RX ORDER — ALPRAZOLAM 0.25 MG/1
0.25 TABLET ORAL 3 TIMES DAILY PRN
Qty: 30 TABLET | Refills: 0 | Status: ON HOLD | OUTPATIENT
Start: 2017-10-10 | End: 2018-02-21 | Stop reason: ALTCHOICE

## 2017-10-10 RX ORDER — ESCITALOPRAM OXALATE 10 MG/1
10 TABLET ORAL DAILY
Qty: 30 TABLET | Refills: 0 | Status: ON HOLD | OUTPATIENT
Start: 2017-10-11 | End: 2017-12-21

## 2017-10-10 RX ORDER — SODIUM CHLORIDE 9 MG/ML
75 INJECTION, SOLUTION INTRAVENOUS CONTINUOUS
Status: DISCONTINUED | OUTPATIENT
Start: 2017-10-10 | End: 2017-10-11

## 2017-10-10 RX ORDER — ATORVASTATIN CALCIUM 80 MG/1
80 TABLET, FILM COATED ORAL
Status: DISCONTINUED | OUTPATIENT
Start: 2017-10-10 | End: 2017-10-11

## 2017-10-10 RX ORDER — ASPIRIN 325 MG
325 TABLET ORAL DAILY
Status: DISCONTINUED | OUTPATIENT
Start: 2017-10-10 | End: 2017-10-13 | Stop reason: HOSPADM

## 2017-10-10 RX ADMIN — POLYETHYLENE GLYCOL 3350 17 G: 17 POWDER, FOR SOLUTION ORAL at 18:49

## 2017-10-10 RX ADMIN — IOHEXOL 85 ML: 350 INJECTION, SOLUTION INTRAVENOUS at 23:04

## 2017-10-10 RX ADMIN — ALPRAZOLAM 0.5 MG: 0.5 TABLET ORAL at 09:56

## 2017-10-10 RX ADMIN — ENOXAPARIN SODIUM 40 MG: 40 INJECTION SUBCUTANEOUS at 09:47

## 2017-10-10 RX ADMIN — DOCUSATE SODIUM 100 MG: 100 CAPSULE, LIQUID FILLED ORAL at 09:56

## 2017-10-10 RX ADMIN — ESCITALOPRAM OXALATE 10 MG: 10 TABLET ORAL at 09:30

## 2017-10-10 RX ADMIN — INSULIN LISPRO 2 UNITS: 100 INJECTION, SOLUTION INTRAVENOUS; SUBCUTANEOUS at 23:21

## 2017-10-10 RX ADMIN — INSULIN ASPART 40 UNITS: 100 INJECTION, SUSPENSION SUBCUTANEOUS at 10:38

## 2017-10-10 RX ADMIN — OXYCODONE HYDROCHLORIDE AND ACETAMINOPHEN 500 MG: 500 TABLET ORAL at 09:47

## 2017-10-10 RX ADMIN — INSULIN LISPRO 4 UNITS: 100 INJECTION, SOLUTION INTRAVENOUS; SUBCUTANEOUS at 13:13

## 2017-10-10 RX ADMIN — SODIUM CHLORIDE 75 ML/HR: 0.9 INJECTION, SOLUTION INTRAVENOUS at 23:24

## 2017-10-10 RX ADMIN — ALPRAZOLAM 0.25 MG: 0.25 TABLET ORAL at 18:49

## 2017-10-10 RX ADMIN — PRAMIPEXOLE DIHYDROCHLORIDE 1 MG: 0.5 TABLET ORAL at 10:38

## 2017-10-10 RX ADMIN — ASPIRIN 325 MG: 325 TABLET ORAL at 23:20

## 2017-10-10 RX ADMIN — TORSEMIDE 20 MG: 20 TABLET ORAL at 09:47

## 2017-10-10 RX ADMIN — PRAMIPEXOLE DIHYDROCHLORIDE 1 MG: 0.5 TABLET ORAL at 18:50

## 2017-10-10 RX ADMIN — INSULIN ASPART 40 UNITS: 100 INJECTION, SUSPENSION SUBCUTANEOUS at 18:49

## 2017-10-10 RX ADMIN — ASPIRIN 81 MG 81 MG: 81 TABLET ORAL at 09:47

## 2017-10-10 RX ADMIN — LEVOTHYROXINE SODIUM 125 MCG: 125 TABLET ORAL at 06:33

## 2017-10-10 RX ADMIN — POLYETHYLENE GLYCOL 3350 17 G: 17 POWDER, FOR SOLUTION ORAL at 09:48

## 2017-10-10 RX ADMIN — Medication 1 TABLET: at 09:47

## 2017-10-10 RX ADMIN — DOCUSATE SODIUM 100 MG: 100 CAPSULE, LIQUID FILLED ORAL at 18:49

## 2017-10-10 RX ADMIN — IPRATROPIUM BROMIDE AND ALBUTEROL SULFATE 3 ML: .5; 3 SOLUTION RESPIRATORY (INHALATION) at 08:22

## 2017-10-10 RX ADMIN — ATORVASTATIN CALCIUM 80 MG: 80 TABLET, FILM COATED ORAL at 23:21

## 2017-10-10 RX ADMIN — OXYCODONE HYDROCHLORIDE AND ACETAMINOPHEN 1 TABLET: 5; 325 TABLET ORAL at 00:21

## 2017-10-10 RX ADMIN — IPRATROPIUM BROMIDE AND ALBUTEROL SULFATE 3 ML: .5; 3 SOLUTION RESPIRATORY (INHALATION) at 13:34

## 2017-10-10 RX ADMIN — TORSEMIDE 20 MG: 20 TABLET ORAL at 18:51

## 2017-10-10 RX ADMIN — INSULIN LISPRO 6 UNITS: 100 INJECTION, SOLUTION INTRAVENOUS; SUBCUTANEOUS at 18:50

## 2017-10-10 RX ADMIN — LISINOPRIL 5 MG: 5 TABLET ORAL at 09:47

## 2017-10-10 RX ADMIN — INSULIN LISPRO 2 UNITS: 100 INJECTION, SOLUTION INTRAVENOUS; SUBCUTANEOUS at 08:30

## 2017-10-10 NOTE — RESPIRATORY THERAPY NOTE
Overnight pulse oximetry study completed on 2LPM nasal cannula per Dr Laurence Valderrama order  Results printed and placed in bin to be scanned at nurses station  Carlos Casillas RRT

## 2017-10-10 NOTE — CASE MANAGEMENT
Continued Stay Review    Date: 10/10/17    Vital Signs: /64   Pulse 82   Temp (!) 97 4 °F (36 3 °C) (Temporal)   Resp (!) 24   Ht 5' 2" (1 575 m)   Wt 103 kg (227 lb 1 2 oz)   LMP  (LMP Unknown)   SpO2 98%   BMI 41 53 kg/m²       TRANSFER TO ICU  NEURO CHECKS  SPEECH SWALLOW EVAL  CONSULT NEUROLOGY  RESPIRATORY PROTOCOL  OXYGEN KEEP SPO2>92%  Medications:   Scheduled Meds:   acetaminophen 650 mg Oral Q6H [address]   ascorbic acid 500 mg Oral Daily   aspirin 325 mg Oral Daily   atorvastatin 80 mg Oral Daily With Dinner   docusate sodium 100 mg Oral BID   enoxaparin 40 mg Subcutaneous Daily   insulin aspart protamine-insulin aspart 40 Units Subcutaneous BID AC   insulin lispro 1-5 Units Subcutaneous HS   insulin lispro 2-12 Units Subcutaneous TID AC   ipratropium-albuterol 3 mL Nebulization Q6H   levothyroxine 125 mcg Oral Early Morning   lisinopril 5 mg Oral Daily   multivitamin-minerals 1 tablet Oral Daily   polyethylene glycol 17 g Oral BID   pramipexole 1 mg Oral TID     Continuous Infusions:   multi-electrolyte 75 mL/hr Last Rate: 75 mL/hr (10/11/17 0555)     PRN Meds: dicyclomine    LABS:   Abg pco2 59 9 hco3 34 5 cl 99 co2 37 bun 39 glucose 269 , TROPONIN <0 02   CT HEAD No acute intracranial abnormality    Stable parietal convexity meningioma  No demonstrable adjacent parenchymal edema  CTA HEAD NECK  No signs of intracranial vascular occlusive disease or aneurysm formation  No hemodynamically significant stenosis within the cervical carotids by NASCET criteria  Patent bilateral cervical vertebral arteries          Age/Sex: 65 y o  female     Assessment/Plan:   PER NURSING  Patient started to present with symptoms of fatigued and jittery while trying to eat lunch  V/S taken and BS taken again  Patient states that she is very very tired at this point  Paged Dr Joe Wills to update him of patient's condition    Notified by RN around 8:20 PM the patient complained of diplopia and posterior headache  Pt generalized weakness, lethargy and flat affect noted around 12 noon      NIHSS 2  ( mild right facial droop and mild slurred speech per RN)      Patient seen and examined around 830pm, lethargy but easily arousable, oriented to place and person, moves all extremities but weak, all extremity strength 4/5, follows commands  Mild right facial droop and questionable slurred speech  Double vision and blurry vision in both eyes, pupils equal reactive to light bilateral   Patient denies difficulty speaking or difficulty swallowing  Reports "fullness or swelling" on right side of face  Patient was not sure when diplopia,right facial paresthesia started  Spoke to Dr Celine Petit, neurologist on-call, do CTA head and neck stat, if CT head negative,can give aspirin 325 mg p o  and Lipitor 80 mg p o  Tonight  May need to transfer patient to ICU step-down for close monitoring  Patient is not a candidate for tPA due to last known well > 4 5 hours ago  Neuro checks  Place patient on telemetry  MRI of the brain, 2D echo with bubble study in a m  [facility] Check A1c, lipid panel, homocysteine level in a m     CT head no acute findings  Patient was ordered full dose  aspirin and Lipitor  Discontinued xanax bid and lexapro which were started yesterday for depression/anxiety  Stat blood work showed glucose 269, creatinine 1 01, sodium 140, troponin negative, CBC with diff essentially normal   ABG showed normal pH, pCO2 58 9       Spoke to ICU attending Dr Tejal Curtis  Requested patient to be transferred to step-down for close monitoring  Patient accepted to ICU step-down    Patient will be transferred to ICU step-down after CTA head and neck completion

## 2017-10-10 NOTE — DISCHARGE SUMMARY
Discharge Summary - [address] [facility]    Patient Information: Nava Torres 65 y o  female MRN: [id number]  Unit/Bed#: [address] [place] [address] Encounter: [id number]    Discharging Physician / Practitioner: Robin Sims MD  PCP: Noy Morillo MD  Admission Date: 10/8/2017  Discharge Date: 10/10/17    Reason for Admission:  Shortness of breath    Discharge Diagnoses:     Principal Problem:    Dyspnea on exertion  Active Problems: Morbid obesity with BMI of 40 0-44 9, adult (HCC)    COPD exacerbation ([facility])    Type 2 diabetes mellitus with hyperglycemia, with long-term current use of insulin (HCC)    Anxiety  Resolved Problems:    * No resolved hospital problems  *      Consultations During Hospital Stay:  · Pulmonology    Procedures Performed:     · None    Significant Findings / Test Results:     · None    Incidental Findings:   · None     Test Results Pending at Discharge (will require follow up): · None     Outpatient Tests Requested:  · ESR, CRP, anti nuclear antibodies    Complications:  None    Hospital Course:   Nava Torres is a 65 y o  female with PMH of  T2DM, HYPERTENSION, COPD, obesity hypoventilation, thyroid cancer who presents with dyspnea on exertion for 4 days  She was discharged from here about 2 weeks ago with Prednisone taper which was completed  on Tuesday last week  Patient otherwise did not have any cardiopulmonary symptoms  Chest x-ray done in the ER was unremarkable  She was initially started on IV Solu-Medrol for possible COPD exacerbation, this was discontinued by pulmonology as patient was not wheezing  Impression by Pulmonary was that she might have restrictive lung disease from obesity  Patient had a sleep screen, her pulse ox went to 80% only for 3 minutes, otherwise she was always above 90%  When I saw her this morning, she stated that her shortness of breath was better but she was enquiring about her Xanax    Likely her and anxiety is contributing to the feet sensation of shortness of breath  Patient was seen by behavioral health service while inpatient and her medications were adjusted  She will be screened by Physical therapy and then patient will be discharged according to Physical therapy recommendations  Condition at Discharge: stable     Discharge Day Visit / Exam:     Subjective:  Shortness of breath had improved, no coughing, chest pain, or palpitations  Vitals: Blood Pressure: 115/57 (10/10/17 1415)  Pulse: 92 (10/10/17 1415)  Temperature: 98 4 °F (36 9 °C) (10/10/17 1415)  Temp Source: Tympanic (10/10/17 1415)  Respirations: 20 (10/10/17 1415)  Height: 5' 2" (157 5 cm) (10/08/17 2225)  Weight - Scale: 103 kg (227 lb 1 2 oz) (10/08/17 2225)  SpO2: 95 % (10/10/17 1415)  Exam:   Physical Exam  General: Alert , Ox3  H&N: Anicteric sclera, supple neck  Chest right crackles  Heart: S1, S2 RRR  Abd: soft, Non tender, non distended  Extremities: No oedema, clubbing or cyanosis  Skin: Intact, no rash  Discharge instructions/Information to patient and family:   See after visit summary for information provided to patient and family  Provisions for Follow-Up Care:  See after visit summary for information related to follow-up care and any pertinent home health orders  Disposition:     Home    For Discharges to [facility]:   · Not Applicable to this Patient - Not Applicable to this Patient    Planned Readmission: No     Discharge Statement:  I spent 30 minutes discharging the patient  This time was spent on the day of discharge  I had direct contact with the patient on the day of discharge  Greater than 50% of the total time was spent examining patient, answering all patient questions, arranging and discussing plan of care with patient as well as directly providing post-discharge instructions  Additional time then spent on discharge activities      Discharge Medications:  See after visit summary for reconciled discharge medications provided to patient and family        ** Please Note: This note has been constructed using a voice recognition system **

## 2017-10-10 NOTE — PROGRESS NOTES
Progress Note - Pulmonary   Terri Ramirez 65 y o  female MRN: [id number]  Unit/Bed#: [address] [place] [address] Encounter: [id number]    Assessment:  Dyspnea improved  I suspect her dyspnea is related to her morbid obesity and restrictive impairment from this  She has a questionable history of asthma and does use a nebulizer albuterol periodically at home  Sleep screen done last night did not show any evidence of any significant obstructive sleep apnea  Overall AHI was 2  Patient had only 6 obstructive apneas and 0 hypopneas  With 2 L of oxygen O2 saturation average was 93% and raymond 81%  She spent only 3 minutes below 88%  Patient states her overall diffuse pain is better  Chronic hypercapnic respiratory failure secondary to obesity alveolar hypoventilation  Serum VIMAL pending  There is mild elevation of C-reactive protein which is nonspecific  Plan:  Oxygen 3 l/m nc at bedtime  Follow up in our office in 2 to 3 weeks  She does have a nebulizer at home she can use albuterol 2 5 mg QID PRN wheezing or shortness of breath  Subjective:   Patient feels better today  No shortness of breath at rest and overall states her body pain is significantly better  Objective:     Vitals: Blood pressure 141/67, pulse 83, temperature 98 1 °F (36 7 °C), temperature source Tympanic, resp  rate 18, height 5' 2" (1 575 m), weight 103 kg (227 lb 1 2 oz), SpO2 95 %, not currently breastfeeding  ,Body mass index is 41 53 kg/m²  Intake/Output Summary (Last 24 hours) at 10/10/17 1137  Last data filed at 10/10/17 0401   Gross per 24 hour   Intake                0 ml   Output              350 ml   Net             -350 ml       Physical Exam: Physical Exam   Constitutional: She is oriented to person, place, and time  Obese female who is awake alert no distress, room air O2 saturation is 93%   HENT:   Head: Normocephalic     Nose: Nose normal    Mouth/Throat: Oropharynx is clear and moist    Eyes: Conjunctivae are normal    Neck: Neck supple  No JVD present  Cardiovascular: Normal rate, regular rhythm and normal heart sounds  Pulmonary/Chest: Effort normal    Lung sounds are clear but decreased   Abdominal: Soft  Obese, nontender   Musculoskeletal: She exhibits no edema  Neurological: She is alert and oriented to person, place, and time  Skin: Skin is warm and dry  Psychiatric: She has a normal mood and affect  Her behavior is normal         Labs: I have personally reviewed pertinent lab results  , ABG: Lab Results   Component Value Date    PHART 7 408 09/18/2017    GXO6ZBA 59 9 (HH) 09/18/2017    PO2ART 55 0 (LL) 09/18/2017    RXJ6TOZ 36 7 (H) 09/18/2017    BEART 9 8 09/18/2017    SOURCE Radial, Right 09/18/2017   , BNP: No results found for: BNP, CBC: Lab Results   Component Value Date    WBC 9 40 10/08/2017    HGB 12 5 10/08/2017    HCT 38 3 10/08/2017    MCV 94 10/08/2017     10/08/2017    ADJUSTEDWBC 10 30 04/04/2016    MCH 30 7 10/08/2017    MCHC 32 7 10/08/2017    RDW 16 0 (H) 10/08/2017    MPV 8 2 (L) 10/08/2017    NRBC 0 10/08/2017   , CMP: Lab Results   Component Value Date     10/10/2017     01/08/2016    K 4 2 10/10/2017    K 3 9 01/08/2016     10/10/2017     01/08/2016    CO2 34 (H) 10/10/2017    CO2 34 (H) 01/08/2016    ANIONGAP 6 10/10/2017    ANIONGAP 10 8 01/08/2016    BUN 38 (H) 10/10/2017    BUN 15 01/08/2016    CREATININE 1 01 10/10/2017    CREATININE 0 9 01/08/2016    GLUCOSE 177 (H) 10/10/2017    GLUCOSE 164 (H) 01/08/2016    CALCIUM 8 3 10/10/2017    CALCIUM 8 5 01/08/2016    AST 13 10/08/2017    AST 16 01/08/2016    ALT 44 10/08/2017    ALT 29 01/08/2016    ALKPHOS 66 10/08/2017    ALKPHOS 72 01/08/2016    PROT 6 6 10/08/2017    PROT 7 0 01/08/2016    ALBUMIN 3 1 (L) 10/08/2017    ALBUMIN 3 7 01/08/2016    BILITOT 0 20 10/08/2017    BILITOT 0 3 01/08/2016    EGFR 53 10/10/2017   , PT/INR:   Lab Results   Component Value Date    INR 0 98 09/20/2017   , Troponin: No components found for: TROPONIN    Imaging and other studies: I have personally reviewed pertinent reports     and I have personally reviewed pertinent films in PACS

## 2017-10-10 NOTE — PROGRESS NOTES
Patient examined spoke with the nurse  Patient is sluggish stressed in the bed and complaining of feeling tired nurse reports that she is doing pretty good with breathing and she will be discharged home  Patient reports that she is on oxygen at night when she is at home  Patient is suffering from depression and anxiety patient looks sleepy of sluggish I will reduce her Xanax from 0 5 mg p  o  b i d  to 0 25 mg p o  b i d  and psychiatric follow-up recommended  Patient offers no new complaints  No psychosis denies feeling suicidal not agitated I will continue Lexapro Xanax and Xanax p r n  as ordered  No hallucinations   Therapy done with good response  Discussed with the nurse  Patient may discharge later today  Thank you very much

## 2017-10-10 NOTE — CASE MANAGEMENT
Continued Stay Review     Date: 10/9/17     Vital Signs: /73   Pulse 97   Temp 98 2 °F (36 8 °C) (Temporal)   Resp 20   Ht 5' 2" (1 575 m)   Wt 103 kg (227 lb 1 2 oz)   LMP  (LMP Unknown)   SpO2 95%   BMI 41 53 kg/m²        CONSULT PSYCHIATRY  IV NS BOLUS 500ML  PULSE OXIMETRY OVERNIGHT   C REACTIVE PROTEIN SED RATE  VIMAL SCREEN REFLEX   LEXAPRO  XANAX  Medications:   Scheduled Meds:   ascorbic acid 500 mg Oral Daily   aspirin 81 mg Oral Daily   docusate sodium 100 mg Oral BID   enoxaparin 40 mg Subcutaneous Daily   insulin aspart protamine-insulin aspart 40 Units Subcutaneous BID AC   insulin lispro 1-5 Units Subcutaneous HS   insulin lispro 1-6 Units Subcutaneous TID AC   insulin lispro 8 Units Subcutaneous TID With Meals   ipratropium-albuterol 3 mL Nebulization Q6H   levothyroxine 125 mcg Oral Early Morning   lisinopril 5 mg Oral Daily   multivitamin-minerals 1 tablet Oral Daily   polyethylene glycol 17 g Oral BID   pramipexole 1 mg Oral TID   sodium chloride 500 mL Intravenous Once   torsemide 20 mg Oral BID (diuretic)      Continuous Infusions:    PRN Meds:   acetaminophen    ALPRAZolam    dicyclomine    oxyCODONE-acetaminophen     Abnormal Labs/Diagnostic Results:   K 5 5 GLUC 481 GFR 44      Age/Sex: 65 y o  female      Assessment/Plan:   Glucose 481, potassium 5 5   Increased  Humalog to 8 units subcu t i d  with meals   Normal saline 500 mL bolus x1   Discontinued potassium supplement   Continue NovoLog 70 /30 and SSI   Pending AM Accu-Chek      PER PULM  Assessment:  Dyspnea which is secondary to her morbid obesity and restrictive impairment from this  She has a questionable history of asthma    Possible obstructive sleep apnea  Chronic compensated hypercapnic respiratory failure secondary to obesity alveolar hypoventilation  Patient complains of diffuse chronic pain all over body   Will check markers for any autoimmune or inflammatory disorder  Plan:   Will order sleep screen tonight with patient on 2 L of oxygen  I will order serum VIMAL, C-reactive protein and sed rate  Change neb treatments opf Duoneb to q i d  and p r n  Discharge Plan:   HOME    PER PSYCHIATRY  Diagnosis:  Major depression recurrent  Anxiety  Insomnia  Rule out pathological bereavement      Plan:  Lexapro 10 mg daily  Xanax 0 5 mg p o  b i d  Xanax 0 25 mg p o  q 8 hours p r n  for anxiety  Psychotherapy  Psychiatric follow-up recommended after the discharge  I will follow up during the hospital stay  [address] [place] [address] in the [facility] by [address] for 2017  Network Utilization Review Department  Phone: [phone]; Fax [phone]  ATTENTION: The Network Utilization Review Department is now centralized for our 7 Facilities  Make a note that we have a new phone and fax numbers for our Department  Please call with any questions or concerns to [phone] and carefully follow the prompts so that you are directed to the right person  All voicemails are confidential  Fax any determinations, approvals, denials, and requests for initial or continue stay review clinical to [phone]   Due to HIGH CALL volume, it would be easier if you could please send faxed requests to expedite your requests and in part, help us provide discharge notifications faster

## 2017-10-10 NOTE — PHYSICAL THERAPY NOTE
PT TREATMENT     10/10/17 1430   Pain Assessment   Pain Assessment No/denies pain   Restrictions/Precautions   Other Precautions Chair Alarm; Bed Alarm; Fall Risk  (lethargic)   General   Chart Reviewed Yes   Family/Caregiver Present No   Cognition   Arousal/Participation Lethargic   Subjective   Subjective patient reporting double vision upon standing from chair and with gait attempts   Transfers   Sit to Stand 4  Minimal assistance   Additional items Assist x 1;Verbal cues   Stand to Sit 4  Minimal assistance   Additional items Assist x 1;Verbal cues   Ambulation/Elevation   Gait Assistance 3  Moderate assist   Additional items Assist x 1   Assistive Device Rolling walker   Distance 5 feet with constant verbal cuing and patient unable to keep eyes open and then reporting double vision with standing and gait and not changing with rest  Nurse made aware of complaints   Exercises   Hip Flexion Sitting;10 reps;Bilateral   Knee AROM Long Arc Quad Sitting;10 reps;Bilateral   Ankle Pumps Sitting;10 reps;Bilateral   Assessment   Assessment patient lethargic and unable to effectively participate with therapy this session  Patient also reporting double vision with standing and gait attempts  SpO2 at 93% on 2 liters O2  Patient will progress as tolerated toward goals   Plan   Treatment/Interventions ADL retraining;Functional transfer training;LE strengthening/ROM; Therapeutic exercise; Endurance training;Patient/family training;Equipment eval/education; Bed mobility;Gait training   PT Frequency (3-5x/week)   Recommendation   Recommendation (home with family pending progress/medical status)

## 2017-10-10 NOTE — PROGRESS NOTES
Patient started to present with symptoms of fatigued and jittery while trying to eat lunch  V/S taken and BS taken again  Patient states that she is very very tired at this point  Paged Dr Pk Gibson to update him of patient's condition    Awaiting for him to call us back

## 2017-10-11 ENCOUNTER — APPOINTMENT (INPATIENT)
Dept: RADIOLOGY | Facility: HOSPITAL | Age: 77
DRG: 205 | End: 2017-10-11
Payer: COMMERCIAL

## 2017-10-11 ENCOUNTER — APPOINTMENT (INPATIENT)
Dept: PHYSICAL THERAPY | Facility: HOSPITAL | Age: 77
DRG: 205 | End: 2017-10-11
Payer: COMMERCIAL

## 2017-10-11 ENCOUNTER — APPOINTMENT (INPATIENT)
Dept: OCCUPATIONAL THERAPY | Facility: HOSPITAL | Age: 77
DRG: 205 | End: 2017-10-11
Payer: COMMERCIAL

## 2017-10-11 ENCOUNTER — APPOINTMENT (INPATIENT)
Dept: NON INVASIVE DIAGNOSTICS | Facility: HOSPITAL | Age: 77
DRG: 205 | End: 2017-10-11
Payer: COMMERCIAL

## 2017-10-11 PROBLEM — E66.2 OBESITY HYPOVENTILATION SYNDROME (HCC): Chronic | Status: ACTIVE | Noted: 2017-10-11

## 2017-10-11 PROBLEM — R10.9 ABDOMINAL PAIN: Chronic | Status: RESOLVED | Noted: 2017-04-15 | Resolved: 2017-10-11

## 2017-10-11 PROBLEM — R06.00 DYSPNEA ON EXERTION: Status: RESOLVED | Noted: 2017-10-09 | Resolved: 2017-10-11

## 2017-10-11 PROBLEM — G45.9 TIA (TRANSIENT ISCHEMIC ATTACK): Status: ACTIVE | Noted: 2017-10-11

## 2017-10-11 LAB
ANION GAP SERPL CALCULATED.3IONS-SCNC: 3 MMOL/L (ref 4–13)
BASOPHILS # BLD AUTO: 0 THOUSANDS/ΜL (ref 0–0.1)
BASOPHILS NFR BLD AUTO: 1 % (ref 0–1)
BUN SERPL-MCNC: 36 MG/DL (ref 5–25)
CALCIUM SERPL-MCNC: 7.9 MG/DL (ref 8.3–10.1)
CHLORIDE SERPL-SCNC: 99 MMOL/L (ref 100–108)
CHOLEST SERPL-MCNC: 195 MG/DL (ref 50–200)
CO2 SERPL-SCNC: 36 MMOL/L (ref 21–32)
CREAT SERPL-MCNC: 0.93 MG/DL (ref 0.6–1.3)
EOSINOPHIL # BLD AUTO: 0.1 THOUSAND/ΜL (ref 0–0.61)
EOSINOPHIL NFR BLD AUTO: 1 % (ref 0–6)
ERYTHROCYTE [DISTWIDTH] IN BLOOD BY AUTOMATED COUNT: 16.3 % (ref 11.6–15.1)
EST. AVERAGE GLUCOSE BLD GHB EST-MCNC: 237 MG/DL
GFR SERPL CREATININE-BSD FRML MDRD: 59 ML/MIN/1.73SQ M
GLUCOSE SERPL-MCNC: 203 MG/DL (ref 65–140)
GLUCOSE SERPL-MCNC: 205 MG/DL (ref 65–140)
GLUCOSE SERPL-MCNC: 235 MG/DL (ref 65–140)
HBA1C MFR BLD: 9.9 % (ref 4.2–6.3)
HCT VFR BLD AUTO: 39.5 % (ref 37–47)
HCYS SERPL-SCNC: 10.3 UMOL/L (ref 3.7–11.2)
HDLC SERPL-MCNC: 47 MG/DL (ref 40–60)
HGB BLD-MCNC: 13 G/DL (ref 12–16)
LDLC SERPL CALC-MCNC: 91 MG/DL (ref 0–100)
LYMPHOCYTES # BLD AUTO: 1.8 THOUSANDS/ΜL (ref 0.6–4.47)
LYMPHOCYTES NFR BLD AUTO: 23 % (ref 14–44)
MAGNESIUM SERPL-MCNC: 2.2 MG/DL (ref 1.6–2.6)
MCH RBC QN AUTO: 31 PG (ref 27–31)
MCHC RBC AUTO-ENTMCNC: 32.8 G/DL (ref 31.4–37.4)
MCV RBC AUTO: 95 FL (ref 82–98)
MONOCYTES # BLD AUTO: 0.6 THOUSAND/ΜL (ref 0.17–1.22)
MONOCYTES NFR BLD AUTO: 8 % (ref 4–12)
NEUTROPHILS # BLD AUTO: 5.5 THOUSANDS/ΜL (ref 1.85–7.62)
NEUTS SEG NFR BLD AUTO: 68 % (ref 43–75)
NRBC BLD AUTO-RTO: 0 /100 WBCS
PHOSPHATE SERPL-MCNC: 4.4 MG/DL (ref 2.3–4.1)
PLATELET # BLD AUTO: 156 THOUSANDS/UL (ref 130–400)
PMV BLD AUTO: 8.2 FL (ref 8.9–12.7)
POTASSIUM SERPL-SCNC: 3.9 MMOL/L (ref 3.5–5.3)
POTASSIUM SERPL-SCNC: 5.1 MMOL/L (ref 3.5–5.3)
RBC # BLD AUTO: 4.18 MILLION/UL (ref 4.2–5.4)
RYE IGE QN: NEGATIVE
SODIUM SERPL-SCNC: 138 MMOL/L (ref 136–145)
TRIGL SERPL-MCNC: 286 MG/DL
WBC # BLD AUTO: 8 THOUSAND/UL (ref 4.8–10.8)

## 2017-10-11 PROCEDURE — 92610 EVALUATE SWALLOWING FUNCTION: CPT

## 2017-10-11 PROCEDURE — 93306 TTE W/DOPPLER COMPLETE: CPT

## 2017-10-11 PROCEDURE — 82948 REAGENT STRIP/BLOOD GLUCOSE: CPT

## 2017-10-11 PROCEDURE — 83735 ASSAY OF MAGNESIUM: CPT | Performed by: NURSE PRACTITIONER

## 2017-10-11 PROCEDURE — 84100 ASSAY OF PHOSPHORUS: CPT | Performed by: ANESTHESIOLOGY

## 2017-10-11 PROCEDURE — 94760 N-INVAS EAR/PLS OXIMETRY 1: CPT

## 2017-10-11 PROCEDURE — 70553 MRI BRAIN STEM W/O & W/DYE: CPT

## 2017-10-11 PROCEDURE — 85025 COMPLETE CBC W/AUTO DIFF WBC: CPT | Performed by: ANESTHESIOLOGY

## 2017-10-11 PROCEDURE — 83036 HEMOGLOBIN GLYCOSYLATED A1C: CPT | Performed by: NURSE PRACTITIONER

## 2017-10-11 PROCEDURE — 97535 SELF CARE MNGMENT TRAINING: CPT

## 2017-10-11 PROCEDURE — 97110 THERAPEUTIC EXERCISES: CPT

## 2017-10-11 PROCEDURE — G8998 SWALLOW D/C STATUS: HCPCS

## 2017-10-11 PROCEDURE — A9585 GADOBUTROL INJECTION: HCPCS | Performed by: ANESTHESIOLOGY

## 2017-10-11 PROCEDURE — G8996 SWALLOW CURRENT STATUS: HCPCS

## 2017-10-11 PROCEDURE — 80048 BASIC METABOLIC PNL TOTAL CA: CPT | Performed by: NURSE PRACTITIONER

## 2017-10-11 PROCEDURE — G8997 SWALLOW GOAL STATUS: HCPCS

## 2017-10-11 PROCEDURE — 80061 LIPID PANEL: CPT | Performed by: NURSE PRACTITIONER

## 2017-10-11 PROCEDURE — 84132 ASSAY OF SERUM POTASSIUM: CPT | Performed by: ANESTHESIOLOGY

## 2017-10-11 PROCEDURE — 83090 ASSAY OF HOMOCYSTEINE: CPT | Performed by: NURSE PRACTITIONER

## 2017-10-11 PROCEDURE — 94640 AIRWAY INHALATION TREATMENT: CPT

## 2017-10-11 RX ORDER — ALPRAZOLAM 0.25 MG/1
0.25 TABLET ORAL ONCE
Status: COMPLETED | OUTPATIENT
Start: 2017-10-11 | End: 2017-10-11

## 2017-10-11 RX ORDER — SODIUM CHLORIDE, SODIUM GLUCONATE, SODIUM ACETATE, POTASSIUM CHLORIDE, MAGNESIUM CHLORIDE, SODIUM PHOSPHATE, DIBASIC, AND POTASSIUM PHOSPHATE .53; .5; .37; .037; .03; .012; .00082 G/100ML; G/100ML; G/100ML; G/100ML; G/100ML; G/100ML; G/100ML
75 INJECTION, SOLUTION INTRAVENOUS CONTINUOUS
Status: ACTIVE | OUTPATIENT
Start: 2017-10-11 | End: 2017-10-11

## 2017-10-11 RX ORDER — ACETAMINOPHEN 325 MG/1
650 TABLET ORAL EVERY 6 HOURS SCHEDULED
Status: DISCONTINUED | OUTPATIENT
Start: 2017-10-11 | End: 2017-10-13 | Stop reason: HOSPADM

## 2017-10-11 RX ORDER — OXYCODONE HYDROCHLORIDE AND ACETAMINOPHEN 5; 325 MG/1; MG/1
1 TABLET ORAL ONCE
Status: DISCONTINUED | OUTPATIENT
Start: 2017-10-11 | End: 2017-10-11

## 2017-10-11 RX ORDER — OXYCODONE HYDROCHLORIDE AND ACETAMINOPHEN 5; 325 MG/1; MG/1
1 TABLET ORAL EVERY 6 HOURS PRN
Status: DISCONTINUED | OUTPATIENT
Start: 2017-10-11 | End: 2017-10-13 | Stop reason: HOSPADM

## 2017-10-11 RX ORDER — ALPRAZOLAM 0.25 MG/1
0.25 TABLET ORAL 2 TIMES DAILY PRN
Status: DISCONTINUED | OUTPATIENT
Start: 2017-10-11 | End: 2017-10-13 | Stop reason: HOSPADM

## 2017-10-11 RX ORDER — ATORVASTATIN CALCIUM 20 MG/1
20 TABLET, FILM COATED ORAL
Status: DISCONTINUED | OUTPATIENT
Start: 2017-10-12 | End: 2017-10-13 | Stop reason: HOSPADM

## 2017-10-11 RX ORDER — SODIUM CHLORIDE, SODIUM GLUCONATE, SODIUM ACETATE, POTASSIUM CHLORIDE, MAGNESIUM CHLORIDE, SODIUM PHOSPHATE, DIBASIC, AND POTASSIUM PHOSPHATE .53; .5; .37; .037; .03; .012; .00082 G/100ML; G/100ML; G/100ML; G/100ML; G/100ML; G/100ML; G/100ML
75 INJECTION, SOLUTION INTRAVENOUS CONTINUOUS
Status: DISCONTINUED | OUTPATIENT
Start: 2017-10-11 | End: 2017-10-11

## 2017-10-11 RX ADMIN — IPRATROPIUM BROMIDE AND ALBUTEROL SULFATE 3 ML: .5; 3 SOLUTION RESPIRATORY (INHALATION) at 14:18

## 2017-10-11 RX ADMIN — INSULIN LISPRO 8 UNITS: 100 INJECTION, SOLUTION INTRAVENOUS; SUBCUTANEOUS at 12:40

## 2017-10-11 RX ADMIN — DOCUSATE SODIUM 100 MG: 100 CAPSULE, LIQUID FILLED ORAL at 08:50

## 2017-10-11 RX ADMIN — IPRATROPIUM BROMIDE AND ALBUTEROL SULFATE 3 ML: .5; 3 SOLUTION RESPIRATORY (INHALATION) at 20:23

## 2017-10-11 RX ADMIN — POLYETHYLENE GLYCOL 3350 17 G: 17 POWDER, FOR SOLUTION ORAL at 17:10

## 2017-10-11 RX ADMIN — LEVOTHYROXINE SODIUM 125 MCG: 125 TABLET ORAL at 05:55

## 2017-10-11 RX ADMIN — ALPRAZOLAM 0.25 MG: 0.25 TABLET ORAL at 11:50

## 2017-10-11 RX ADMIN — ACETAMINOPHEN 650 MG: 325 TABLET, FILM COATED ORAL at 04:26

## 2017-10-11 RX ADMIN — INSULIN LISPRO 4 UNITS: 100 INJECTION, SOLUTION INTRAVENOUS; SUBCUTANEOUS at 08:45

## 2017-10-11 RX ADMIN — INSULIN LISPRO 4 UNITS: 100 INJECTION, SOLUTION INTRAVENOUS; SUBCUTANEOUS at 21:56

## 2017-10-11 RX ADMIN — ALPRAZOLAM 0.25 MG: 0.25 TABLET ORAL at 20:59

## 2017-10-11 RX ADMIN — OXYCODONE HYDROCHLORIDE AND ACETAMINOPHEN 1 TABLET: 5; 325 TABLET ORAL at 00:02

## 2017-10-11 RX ADMIN — GADOBUTROL 10 ML: 604.72 INJECTION INTRAVENOUS at 12:18

## 2017-10-11 RX ADMIN — IPRATROPIUM BROMIDE AND ALBUTEROL SULFATE 3 ML: .5; 3 SOLUTION RESPIRATORY (INHALATION) at 07:35

## 2017-10-11 RX ADMIN — INSULIN LISPRO 4 UNITS: 100 INJECTION, SOLUTION INTRAVENOUS; SUBCUTANEOUS at 17:17

## 2017-10-11 RX ADMIN — Medication 1 TABLET: at 08:50

## 2017-10-11 RX ADMIN — IPRATROPIUM BROMIDE AND ALBUTEROL SULFATE 3 ML: .5; 3 SOLUTION RESPIRATORY (INHALATION) at 01:59

## 2017-10-11 RX ADMIN — POLYETHYLENE GLYCOL 3350 17 G: 17 POWDER, FOR SOLUTION ORAL at 08:50

## 2017-10-11 RX ADMIN — OXYCODONE HYDROCHLORIDE AND ACETAMINOPHEN 500 MG: 500 TABLET ORAL at 08:50

## 2017-10-11 RX ADMIN — PRAMIPEXOLE DIHYDROCHLORIDE 1 MG: 0.5 TABLET ORAL at 17:10

## 2017-10-11 RX ADMIN — PRAMIPEXOLE DIHYDROCHLORIDE 1 MG: 0.5 TABLET ORAL at 08:50

## 2017-10-11 RX ADMIN — DOCUSATE SODIUM 100 MG: 100 CAPSULE, LIQUID FILLED ORAL at 17:10

## 2017-10-11 RX ADMIN — OXYCODONE HYDROCHLORIDE AND ACETAMINOPHEN 1 TABLET: 5; 325 TABLET ORAL at 21:00

## 2017-10-11 RX ADMIN — INSULIN ASPART 40 UNITS: 100 INJECTION, SUSPENSION SUBCUTANEOUS at 08:49

## 2017-10-11 RX ADMIN — LISINOPRIL 5 MG: 5 TABLET ORAL at 08:49

## 2017-10-11 RX ADMIN — PRAMIPEXOLE DIHYDROCHLORIDE 1 MG: 0.5 TABLET ORAL at 21:00

## 2017-10-11 RX ADMIN — ENOXAPARIN SODIUM 40 MG: 40 INJECTION SUBCUTANEOUS at 09:54

## 2017-10-11 RX ADMIN — SODIUM CHLORIDE, SODIUM GLUCONATE, SODIUM ACETATE, POTASSIUM CHLORIDE, MAGNESIUM CHLORIDE, SODIUM PHOSPHATE, DIBASIC, AND POTASSIUM PHOSPHATE 75 ML/HR: .53; .5; .37; .037; .03; .012; .00082 INJECTION, SOLUTION INTRAVENOUS at 05:55

## 2017-10-11 RX ADMIN — INSULIN ASPART 40 UNITS: 100 INJECTION, SUSPENSION SUBCUTANEOUS at 18:31

## 2017-10-11 RX ADMIN — ASPIRIN 325 MG: 325 TABLET ORAL at 08:50

## 2017-10-11 NOTE — PLAN OF CARE
Problem: Potential for Falls  Goal: Patient will remain free of falls  INTERVENTIONS:  - Assess patient frequently for physical needs  -  Identify cognitive and physical deficits and behaviors that affect risk of falls  -  [place] fall precautions as indicated by assessment   - Educate patient/family on patient safety including physical limitations  - Instruct patient to call for assistance with activity based on assessment  - Modify environment to reduce risk of injury  - Consider OT/PT consult to assist with strengthening/mobility   Outcome: Progressing      Problem: RESPIRATORY - ADULT  Goal: Achieves optimal ventilation and oxygenation  INTERVENTIONS:  - Assess for changes in respiratory status  - Assess for changes in mentation and behavior  - Position to facilitate oxygenation and minimize respiratory effort  - Oxygen administration by appropriate delivery method based on oxygen saturation (per order) or ABGs  - Initiate smoking cessation education as indicated  - Encourage broncho-pulmonary hygiene including cough, deep breathe, Incentive Spirometry  - Assess the need for suctioning and aspirate as needed  - Assess and instruct to report SOB or any respiratory difficulty  - Respiratory Therapy support as indicated   Outcome: Progressing      Problem: Nutrition/Hydration-ADULT  Goal: Nutrient/Hydration intake appropriate for improving, restoring or maintaining nutritional needs  Monitor and assess patient's nutrition/hydration status for malnutrition (ex- brittle hair, bruises, dry skin, pale skin and conjunctiva, muscle wasting, smooth red tongue, and disorientation)  Collaborate with interdisciplinary team and initiate plan and interventions as ordered  Monitor patient's weight and dietary intake as ordered or per policy  Utilize nutrition screening tool and intervene per policy  Determine patient's food preferences and provide high-protein, high-caloric foods as appropriate       INTERVENTIONS:  - Monitor oral intake, urinary output, labs, and treatment plans  - Assess nutrition and hydration status and recommend course of action  - Evaluate amount of meals eaten  - Assist patient with eating if necessary   - Allow adequate time for meals  - Recommend/ encourage appropriate diets, oral nutritional supplements, and vitamin/mineral supplements  - Assess need for intravenous fluids  - Provide specific nutrition/hydration education as appropriate  - Include patient/family/caregiver in decisions related to nutrition   Outcome: Progressing      Problem: DISCHARGE PLANNING - CARE MANAGEMENT  Goal: Discharge to post-acute care or home with appropriate resources  INTERVENTIONS:  - Conduct assessment to determine patient/family and health care team treatment goals, and need for post-acute services based on payer coverage, community resources, and patient preferences, and barriers to discharge  - Address psychosocial, clinical, and financial barriers to discharge as identified in assessment in conjunction with the patient/family and health care team  - Arrange appropriate level of post-acute services according to patient's   needs and preference and payer coverage in collaboration with the physician and health care team  - Communicate with and update the patient/family, physician, and health care team regarding progress on the discharge plan  - Arrange appropriate transportation to post-acute venues  [place] [id number]      Outcome: Progressing      Problem: Prexisting or High Potential for Compromised Skin Integrity  Goal: Skin integrity is maintained or improved  INTERVENTIONS:  - Identify patients at risk for skin breakdown  - Assess and monitor skin integrity  - Assess and monitor nutrition and hydration status  - Monitor labs (i e  albumin)  - Assess for incontinence   - Turn and reposition patient  - Assist with mobility/ambulation  - Relieve pressure over bony prominences  - Avoid friction and shearing  - Provide appropriate hygiene as needed including keeping skin clean and dry  - Evaluate need for skin moisturizer/barrier cream  - Collaborate with interdisciplinary team (i e  Nutrition, Rehabilitation, etc )   - Patient/family teaching   Outcome: Progressing

## 2017-10-11 NOTE — PROGRESS NOTES
Progress Note - Critical Care   Lowell Bustamante 65 y o  female MRN: [id number]  Unit/Bed#: ICU 05 Encounter: [id number]          ______________________________________________________________________  Assessment and Plan:   Principal Problem (Resolved):    Dyspnea on exertion  Active Problems:    TIA (transient ischemic attack)    Morbid obesity with BMI of 40 0-44 9, adult (HCC)    COPD exacerbation (HCC)    Obesity hypoventilation syndrome (HCC)    Anxiety        Neuro:   · TIA:  Symptoms have resolved  Continue aspirin and statin  Carotids clear  Permissive hypertension  CT head negative  CT neck without significant carotid disease  MRI pending  · Delirium: CAM ICU positive no   · Pain controlled with: PRN percocet  · Pain score: 5-7 /10 - generalized pain  · Regulate sleep/wake cycle  · Encephalopathy:  Secondary to TIA versus benzodiazepine use  Resolved  · Dementia:  Mild  Verlinda Jump following  · Opioid dependence:  Hold opiates at this time  CV:     · Rhythm: NSR  · Follow rhythm on telemetry  · Hemodynamics stable  · Diastolic heart failure  Chronic  EF 55-60%  · Hypertension:  Continue lisinopril  Lung:   · Obesity hypoventilation syndrome:  PFT with reduced FVC of 38% of predicted FEV1 severely decreased at 32% of predicted  Severely restrictive impairment mild airflow obstruction  · Chronic hypercapnic respiratory failure as evidenced by elevated CO2 on BMP likely related to obesity hypoventilation syndrome  GI:   · Stress ulcer prophylaxis: No prophylaxis needed  · Bowel regimen: colace , miralax and Bentyl  · Last BM: 10/8  · Chronic constipation:  Continue home Bentyl    FEN:   · IV fluids:  Continue isolate for 24 hours post contrast to facilitate clearance    · Nutrition/diet plan:  Constant carb to cardiac step 1 diet  · Electrolytes repleted: yes  · Goal: K >4 0, Mag >2 0, and Phos >3 0    :   · Indwelling Cr present: no   · Creatinine 0 93 with adequate urine output  ID:   · No antibiotic therapy indicated  Heme:   · Trend hgb and plts    Endo:   · Glycemic control plan: Blood glucose not controlled  Start SQ insulin  Algorithm 4   · Hemoglobin A1c greater than 9  He has coli insulin regimen  · Hypothyroidism:  Continue home levothyroxine  MSK/Skin:  · Mobility goal:  Out of bed as tolerated  Ambulate patient Q shift  Out of bed for all meals  · PT consult: yes  · OT consult: yes  · Frequent turning and off-loading    Family:  · Family updated within 24 hours: yes   · Family meeting planned today: no   Lines:  · Peripheral IVs  VTE Prophylaxis:  · Pharmacologic Prophylaxis: Enoxaparin (Lovenox)  · Mechanical Prophylaxis: sequential compression device    Disposition: Transfer to [facility]      Code Status: Level 1 - Full Code    Counseling / Coordination of Care  Total time spent today 48 minutes  Greater than 50% of total time was spent with the patient and / or family counseling and / or coordination of care  A description of the counseling / coordination of care: Discussed with patient  Discussed with attending  Discussed with Neurology  Discussed with family  ______________________________________________________________________    Chief Complaint: " I hurt everywhere"    24 Hour Events:   Stroke alert overnight  NIH stroke scale of 2 with dysarthria and right-sided facial droop  Symptoms now resolved  Negative CT  MRI pending     ______________________________________________________________________    Physical Exam   Constitutional: She is oriented to person, place, and time  She appears well-developed and well-nourished  HENT:   Head: Normocephalic and atraumatic  Eyes: Pupils are equal, round, and reactive to light  No scleral icterus  Neck: Neck supple  No JVD present  Cardiovascular: Normal rate and regular rhythm  Exam reveals no gallop and no friction rub  Murmur heard  Pulmonary/Chest: Effort normal and breath sounds normal  No respiratory distress  Abdominal: Soft  Bowel sounds are normal  She exhibits distension  There is no tenderness  There is no rebound  Musculoskeletal: She exhibits no edema  Neurological: She is alert and oriented to person, place, and time  No cranial nerve deficit  Skin: Skin is warm and dry  No erythema        ______________________________________________________________________  Vitals:    10/11/17 0736 10/11/17 0800 10/11/17 0900 10/11/17 1000   BP:  121/56 103/55 144/65   Pulse: 82 82 97 100   Resp: (!) 24 17 (!) 11 22   Temp:       TempSrc:       SpO2: 98% 100% 99% 96%   Weight:   103 kg (227 lb 1 2 oz)    Height:                  Temperature:   Temp (24hrs), Av 9 °F (36 6 °C), Min:97 4 °F (36 3 °C), Max:98 7 °F (37 1 °C)    Current Temperature: (!) 97 4 °F (36 3 °C)  Weights:   IBW: 50 1 kg    Body mass index is 41 53 kg/m²  Weight (last 2 days)     Date/Time   Weight    10/11/17 0900  103 (227 07)            Hemodynamic Monitoring:  N/A     Non-Invasive/Invasive Ventilation Settings:  Respiratory    Lab Data (Last 4 hours)    None         O2/Vent Data (Last 4 hours)    None              SpO2: SpO2: 93 %, SpO2 Activity: SpO2 Activity: At Rest, SpO2 Device: O2 Device: None (Room air)  Intake and Outputs:  I/O       10/09 0701 - 10/10 0700 10/10 0701 - 10/11 0700 10/11 0701 - 10/12 0700    IV Piggyback 500      Total Intake(mL/kg) 500 (4 9)      Urine (mL/kg/hr) 725 (0 3) 1200 (0 5)     Stool       Total Output 725 1200      Net -225 -1200             Unmeasured Urine Occurrence 1 x            Nutrition:        Diet Orders            Start     Ordered    10/09/17 1503  Room Service  Once     Question:  Type of Service  Answer:  Room Service: Food and nutrition svc to call patient    10/09/17 1503    10/08/17 2153  Diet George/CHO Controlled;  Cons Carb 2: 19-2100Kcals; Cardiac Step 1  Diet effective now     Question Answer Comment   Diet Type George/CHO Controlled    George/CHO Controlled Cons Carb 2: 19-2100Kcals    Other Restriction(s): Cardiac Step 1    RD to adjust diet per protocol? Yes        10/08/17 2152        Labs:     Results from last 7 days  Lab Units 10/11/17  0608 10/10/17  2153 10/08/17  1908   WBC Thousand/uL 8 00 9 00 9 40   HEMOGLOBIN g/dL 13 0 12 7 12 5   HEMATOCRIT % 39 5 38 9 38 3   PLATELETS Thousands/uL 156 159 158   NEUTROS PCT % 68 72 75   MONOS PCT % 8 6 6       Results from last 7 days  Lab Units 10/11/17  0913 10/11/17  0608 10/10/17  2153 10/10/17  0645  10/08/17  1908   SODIUM mmol/L  --  138 140 142  < > 141   POTASSIUM mmol/L 3 9 5 1 4 0 4 2  < > 4 3   CHLORIDE mmol/L  --  99* 99* 102  < > 103   CO2 mmol/L  --  36* 37* 34*  < > 32   BUN mg/dL  --  36* 39* 38*  < > 22   CREATININE mg/dL  --  0 93 1 01 1 01  < > 1 01   CALCIUM mg/dL  --  7 9* 8 4 8 3  < > 8 8   TOTAL PROTEIN g/dL  --   --  6 5  --   --  6 6   BILIRUBIN TOTAL mg/dL  --   --  0 20  --   --  0 20   ALK PHOS U/L  --   --  62  --   --  66   ALT U/L  --   --  48  --   --  44   AST U/L  --   --  16  --   --  13   GLUCOSE RANDOM mg/dL  --  205* 269* 177*  < > 324*   < > = values in this interval not displayed      Results from last 7 days  Lab Units 10/11/17  0608 10/09/17  0500   MAGNESIUM mg/dL 2 2 2 2     Lab Results   Component Value Date    PHOS 4 4 (H) 10/11/2017    PHOS 4 3 (H) 09/20/2017    PHOS 4 9 (H) 09/19/2017        Results from last 7 days  Lab Units 10/10/17  2153   INR  0 95   PTT seconds 23       0  Lab Value Date/Time   TROPONINI <0 02 10/10/2017 2153   TROPONINI <0 02 10/08/2017 1908   TROPONINI <0 02 09/18/2017 1908   TROPONINI 0 78 (H) 06/07/2017 1743   TROPONINI 0 89 (H) 06/07/2017 1454   TROPONINI 1 12 (H) 06/07/2017 1109   TROPONINI 0 33 (H) 06/07/2017 0700   TROPONINI <0 02 04/20/2017 0941   TROPONINI <0 02 04/20/2017 0631         ABG:  Lab Results   Component Value Date    PHART 7 378 10/10/2017    JDU1SYI 59 9 (HH) 10/10/2017    PO2ART 96 7 10/10/2017    EQE6IQQ 34 5 (H) 10/10/2017    BEART 7 4 10/10/2017    SOURCE Radial, Left 10/10/2017     Imaging:   MRI brain w wo contrast   Final Result      Moderate diffuse cerebral volume loss  Mild scattered white matter change consistent with chronic microangiopathy  No acute ischemia  2 separate homogeneously enhancing dural based masses are identified within the left hemisphere consistent with meningiomas  The more superior mass within the parietal region is partially calcified while the more inferior mass within the lateral aspect    of the middle cranial fossa does not demonstrate calcification  Workstation performed: [id number]         CTA head and neck w wo contrast   Final Result      No signs of intracranial vascular occlusive disease or aneurysm formation  No hemodynamically significant stenosis within the cervical carotids by NASCET criteria  Patent bilateral cervical vertebral arteries  Workstation performed: [id number]         CT head wo contrast   Final Result      No acute intracranial abnormality  Stable parietal convexity meningioma  No demonstrable adjacent parenchymal edema  Workstation performed: [id number]         XR abdomen obstruction series   Final Result         1  Moderately increased colonic stool without significant distention  2   No evidence of small bowel obstruction or free air         Workstation performed: [id number]         XR chest 1 view portable   ED Interpretation   CM no CHF or infiltrate      Final Result      No active pulmonary disease  Workstation performed: [id number]            I have personally reviewed pertinent reports  and I have personally reviewed pertinent films in PACS  EKG: NSR on tele  Micro:  Lab Results   Component Value Date    BLOODCX No Growth After 5 Days  06/11/2017    BLOODCX No Growth After 5 Days   06/11/2017    BLOODCX Klebsiella pneumoniae - ESBL (A) 06/07/2017    BLOODCX Klebsiella pneumoniae ESBL (A) 06/07/2017    URINECX 20,000-29,000 cfu/ml Mixed Contaminants X3 04/15/2017     Allergies: Allergies   Allergen Reactions    Ketorolac Swelling    Latex Rash     Medications:   Scheduled Meds:    acetaminophen 650 mg Oral Q6H [address]   ascorbic acid 500 mg Oral Daily   aspirin 325 mg Oral Daily   atorvastatin 80 mg Oral Daily With Dinner   docusate sodium 100 mg Oral BID   enoxaparin 40 mg Subcutaneous Daily   insulin aspart protamine-insulin aspart 40 Units Subcutaneous BID AC   insulin lispro 1-5 Units Subcutaneous HS   insulin lispro 2-12 Units Subcutaneous TID AC   ipratropium-albuterol 3 mL Nebulization Q6H   levothyroxine 125 mcg Oral Early Morning   lisinopril 5 mg Oral Daily   multivitamin-minerals 1 tablet Oral Daily   oxyCODONE-acetaminophen 1 tablet Oral Once   polyethylene glycol 17 g Oral BID   pramipexole 1 mg Oral TID     Continuous Infusions:    multi-electrolyte 75 mL/hr Last Rate: 75 mL/hr (10/11/17 0555)     PRN Meds:    dicyclomine 10 mg TID PRN     VTE Pharmacologic Prophylaxis: Enoxaparin (Lovenox)  VTE Mechanical Prophylaxis: sequential compression device  Invasive lines and devices: Invasive Devices     Peripheral Intravenous Line            Peripheral IV 10/11/17 Left Wrist less than 1 day                     Portions of the record may have been created with voice recognition software  Occasional wrong word or "sound a like" substitutions may have occurred due to the inherent limitations of voice recognition software  Read the chart carefully and recognize, using context, where substitutions have occurred      Lourdes Panda PA-C

## 2017-10-11 NOTE — PROGRESS NOTES
Patient received from 3N via bed  Ambulated to the BR to void  Gait slow and steady  Minimal assist  No weakness noted  Returned to ICU bed and cardiac monitor applied  Baseline VS obtained

## 2017-10-11 NOTE — PLAN OF CARE
Problem: SLP ADULT - SWALLOWING, IMPAIRED  Goal: Initial SLP swallow eval performed  Outcome: Completed Date Met: 10/11/17

## 2017-10-11 NOTE — PROGRESS NOTES
Patient is alert awake cooperative she is seen in ICU patient reports that she was confused and she does not remember why she came to ICU but now she feels better her breathing is better and she was able to tell me that she is at the hospital when I asked her about her age she said she is 75 but actually she is 65 patient is still not completely clear with the confusion she is forgetful suffering from dementia depression and anxiety  Currently patient is not lethargic not agitated confabulates with the questions she offers no new complaints  No evidence of hallucinations  Not agitated not suicidal   Nurse reports that she is doing all right  No behavior problems reported or noted  Medical treatment is in progress  I will continue same treatment at this time  Therapy done with good response  I will follow up  Thank you very much

## 2017-10-11 NOTE — PHYSICAL THERAPY NOTE
PT TREATMENT     10/11/17 1015   Pain Assessment   Pain Assessment 0-10   Pain Score 7   Pain Location (R abdomen, R head)   Restrictions/Precautions   Other Precautions Pain; Fall Risk;O2;Chair Alarm; Bed Alarm   General   Chart Reviewed Yes   Cognition   Overall Cognitive Status WFL   Arousal/Participation Responsive  (slow to respond)   Orientation Level Oriented X4   Following Commands Follows multistep commands with increased time or repetition   Subjective   Subjective "feeling weak, tired"  also c/o dizziness when sitting at the edge of the bed   Bed Mobility   Supine to Sit 5  Supervision   Balance   Static Sitting Good   Dynamic Sitting Fair   Activity Tolerance   Activity Tolerance Patient limited by fatigue   Exercises   Heelslides 10 reps; Supine;Bilateral;AROM   Hip Abduction 10 reps; Supine;Bilateral;AROM   Ankle Pumps 20 reps; Supine  (ankle pumps and circles CW/CCW)   Balance training  pt sat at edge of bed x 10 minutes   Assessment   Prognosis Good   Problem List Decreased strength;Decreased endurance; Impaired balance;Decreased mobility   Assessment Pt c/o feeling lightheaded when sitting at edge of bed so pt deferred getting OOB  RN present and took BP which dropped from supine  No focal weakness noted, pt is just generally weak  Await MRI of brain results  Pt left sitting at edge of bed with RN and nursing assistant  Plan   Treatment/Interventions ADL retraining;Functional transfer training;LE strengthening/ROM; Therapeutic exercise; Endurance training;Patient/family training;Gait training;Bed mobility; Equipment eval/education   Progress Progressing toward goals   Recommendation   Recommendation Home PT; Home with family support   Equipment Recommended (pt has a rolling walker)

## 2017-10-11 NOTE — SPEECH THERAPY NOTE
SLP  Bedside Swallow Evaluation       10/11/17 0900   Patient Information   Current Medical 65year-old admitted secondary to SOB x 4 days  On 10/10/17 patient developed diplopia, posterior HA, weakness, flat affect  Swallow Information   Current Risks for Dysphagia & Aspiration New Neuro event   Current Diet Regular; Thin liquids   Baseline Diet Regular; Thin liquids   Baseline Assessment   Behavior/Cognition Alert; Cooperative; Interactive   Speech/Language Status WFL; speech is clear  Patient Positioning Upright in bed   Swallow Mechanism Exam   Labial Symmetry WFL   Labial Strength WFL   Labial ROM WFL   Labial Sensation WFL   Facial Strength WFL   Facial ROM WFL   Facial Sensation   Patient reported Right side of face "feels swollen"  Lingual Symmetry Right Deviation (mild)   Lingual Strength WFL   Lingual ROM WFL   Lingual Sensation WFL   Velum WFL   Mandible WFL   Dentition Adequate   Volitional Cough Strong   Respirations (!) 11   SpO2 99 %   Consistencies Assessed and Performance   Materials Administered Puree/Level 1;Mechanical Soft/Level 2;Soft/Level 3;Regular/Solid; Thin liquid   Oral Stage WFL   Pharyngeal Stage WFL   Pharyngeal Stage Comment No s/s of aspiration seen following all swallows of puree, soft/ solid foods, and thin liquid  Swallow Mechanics WFL   Esophageal Concerns No s/s reported   Strategies and Efficacy Small bites/ sips, slow pace   Summary   Swallow Summary Oral pharyngeal swallow skills are safe/ [facility] for regular solids and thin/ all liquids,   Recommendations   Risk for Aspiration None   Recommendations Oral diet   Diet Solid Recommendation Regular consistency   Diet Liquid Recommendation Thin liquid   Recommended Form of Medications Whole with puree bolus   General Precautions Aspiration precautions; Upright as possible for all oral intakes;  Remain upright for 45 minutes after meals; Assist with feeding   Compensatory Swallowing Strategies External pacing;  Small bites/ sips, slow pace   Further Evaluations Dietitian   Results Reviewed with Patient; RN   Treatment Recommendations   Duration of treatment N/A   Follow up treatments   N/A   Dysphagia Goals   N/A   Speech Therapy Prognosis   Prognosis Good   Prognosis Considerations Co-Morbidities

## 2017-10-11 NOTE — CONSULTS
Consult received and appreciated  Full assessment completed 10/9  Noted pt with episode of lethargy, c/o diplopia, and right side facial droop  Possible acute ischemic stroke, to have MRI, passed beside swallow eval   ST following  Pt reports 100% intakes of breakfast this morning, tolerated well  Will continue to monitor intakes and tolerance  Currently following at moderate risk      Thank you,   Deja Cruz MA, RD, LDN  Pager: [phone]

## 2017-10-11 NOTE — OCCUPATIONAL THERAPY NOTE
OT TREATMENT       10/11/17 0920   Restrictions/Precautions   Other Precautions Bed Alarm; Chair Alarm; Fall Risk   Pain Assessment   Pain Assessment 0-10   Pain Score Worst Possible Pain   Pain Location Generalized  (right abdomen and flank)   ADL   Toileting Comments pt received on commode, commode transfer min assist, hygiene supervision seated    Bed Mobility   Sit to Supine 5  Supervision   Transfers   Sit to Stand 4  Minimal assistance   Stand to Sit 4  Minimal assistance   Functional Mobility   Functional Mobility 4  Minimal assistance   Additional Comments few steps with RW   ROM- Right Upper Extremities   R Shoulder AROM; Flexion; Horizontal ABduction  (chest press)   R Elbow AROM;Elbow flexion;Elbow extension   R Weight/Reps/Sets 10 times each supine   ROM - Left Upper Extremities    L Shoulder AROM; Flexion; Horizontal ABduction  (chest press)   L Elbow AROM;Elbow flexion;Elbow extension   L Weight/Reps/Sets 10 times each supine   Assessment   Assessment Tolerated well  Patient is cooperative and pleasant  Patient is limited by pain, reports not feeling well today  Patient transferred to ICU due to facial droop  Patient with BUE strength 4-/5 today and fine motor coordination functional on BUE  Plan   Treatment Interventions ADL retraining;Functional transfer training;UE strengthening/ROM; Endurance training;Patient/family training;Equipment evaluation/education; Activityengagement   Recommendation   OT Discharge Recommendation Home with family support  (home with home PT/OT)

## 2017-10-11 NOTE — PROGRESS NOTES
Pt  Victorina Najera to her daughter Ajit Chapman [id number] re transfer  Pt  Left unit via wheelchair awake alert no distress

## 2017-10-11 NOTE — SIGNIFICANT EVENT
Notified by RN around 8:20 PM the patient complained of diplopia and posterior headache  Per RN, patient was supposed to be discharged today, it was canceled due to generalized weakness, lethargy and flat affect noted around 12 noon  NIHSS 2  ( mild right facial droop and mild slurred speech per RN)     Patient seen and examined around 830pm, lethargy but easily arousable, oriented to place and person, moves all extremities but weak, all extremity strength 4/5, follows commands  Mild right facial droop and questionable slurred speech  Double vision and blurry vision in both eyes, pupils equal reactive to light bilateral   Patient denies difficulty speaking or difficulty swallowing  Reports "fullness or swelling" on right side of face  Patient was not sure when diplopia,right facial paresthesia started  Spoke to Dr Hermelinda Adamson, neurologist on-call, do CTA head and neck stat, if CT head negative,can give aspirin 325 mg p o  and Lipitor 80 mg p o  Tonight  May need to transfer patient to ICU step-down for close monitoring  Patient is not a candidate for tPA due to last known well > 4 5 hours ago  Neuro checks  Place patient on telemetry  MRI of the brain, 2D echo with bubble study in a [facility] Check A1c, lipid panel, homocysteine level in a m       CT head no acute findings  Patient was ordered full dose  aspirin and Lipitor  Discontinued xanax bid and lexapro which were started yesterday for depression/anxiety  Stat blood work showed glucose 269, creatinine 1 01, sodium 140, troponin negative, CBC with diff essentially normal   ABG showed normal pH, pCO2 58 9  Spoke to ICU attending Dr Brigitte Ruff  Requested patient to be transferred to step-down for close monitoring  Patient accepted to ICU step-down  Patient will be transferred to ICU step-down after CTA head and neck completion

## 2017-10-11 NOTE — CASE MANAGEMENT
Continued Stay Review    Date: 10/11/17    Vital Signs: /64   Pulse 82   Temp (!) 97 4 °F (36 3 °C) (Temporal)   Resp (!) 24   Ht 5' 2" (1 575 m)   Wt 103 kg (227 lb 1 2 oz)   LMP  (LMP Unknown)   SpO2 98%   BMI 41 53 kg/m²     Medications:   Scheduled Meds:   acetaminophen 650 mg Oral Q6H [address]   ascorbic acid 500 mg Oral Daily   aspirin 325 mg Oral Daily   atorvastatin 80 mg Oral Daily With Dinner   docusate sodium 100 mg Oral BID   enoxaparin 40 mg Subcutaneous Daily   insulin aspart protamine-insulin aspart 40 Units Subcutaneous BID AC   insulin lispro 1-5 Units Subcutaneous HS   insulin lispro 2-12 Units Subcutaneous TID AC   ipratropium-albuterol 3 mL Nebulization Q6H   levothyroxine 125 mcg Oral Early Morning   lisinopril 5 mg Oral Daily   multivitamin-minerals 1 tablet Oral Daily   polyethylene glycol 17 g Oral BID   pramipexole 1 mg Oral TID     Continuous Infusions:   multi-electrolyte 75 mL/hr Last Rate: 75 mL/hr (10/11/17 0555)     PRN Meds: dicyclomine  Percocet: used x2/24hrs  Abnormal Labs/Diagnostic Results:   CL 99 Co2 36 ANION GAP 3 BUN 36 GLUC 205 CA 7 9 PHOS 4 4    Age/Sex: 65 y o  female     Assessment/Plan:   Neuro:   · Possible Acute Ischemic Stroke:  ? C/w ASA 325mg PO qd and Lipitor 80mg PO qd  ? F/u Lipid Panel, HgbA1c  ? F/u neuro consult  ? CTA and CT head negative  ? Order placed for echo   ? MRI Brain   ? Bedside swallow evaluation done and passed  · Delirium: CAM ICU positive no   · Restless Leg Syndrome: Continue Mirapex   · Opioid Dependence: No opiate medication use  · Chronic Pain controlled with: Tylenol PRN  ? Pain score: moderate  · Regulate sleep/wake cycle  CV:   · Rhythm: NSR  ? Follow rhythm on telemetry  · Hemodynamically stable   · Acute on Chronic Systolic HF: Last echo was 6/7/17 with EF of 55-60  · HTN: Continue Lisinopril  Lung:    · Chronic Respiratory Failure likely 2/2 Restrictive Lung Disease from Obesity: Pulmonology following   Pt was previously on steroids which were discontinued  Per Dr Teri Hill, continue 3L NC O2 at bedtime and albuterol neb QID PRN for wheezing/SOB  Will continue to monitor O2 sat  Currently on 2L NC O2 saturating at 98%   · ABG showed PCO2 at 59 9, likely chronic  Will monitor  · Hx of COPD vs Asthma: Continue Duo-Neb PRN  · I/S  GI:   · Hx of Constipation: Continue Colace and Miralax  · Cardiac/Diabetic Diet  · No stress ulcer prophylaxis needed at this time  FEN:   · Isolyte at 75 mL/hr IV  · Cardiac/Diabetic Diet  · Monitor and replete electrolytes as needed  ? Goal: K >4 0, Mag >2 0, and Phos >3 0  :   · Indwelling Cr present: no   ID:   · Afebrile, WBC WNL, monitor  Heme:   · Hgb and platelets WNL, monitor  Endo:   · Type II DM: ISS, AC 4 times daily  · Hypothyroidism: C/w Levothyroxine  MSK/Skin:  · Mobility goal: OOBT  ? PT consult: yes  ? OT consult: yes  · Frequent turning and off-loading  Discharge Plan: TBD    [address] [facility] in the [facility] by Miguel Ángel Estevez for 2017  Network Utilization Review Department  Phone: [phone]; Fax [phone]  ATTENTION: The Network Utilization Review Department is now centralized for our 7 Facilities  Make a note that we have a new phone and fax numbers for our Department  Please call with any questions or concerns to [phone] and carefully follow the prompts so that you are directed to the right person  All voicemails are confidential  Fax any determinations, approvals, denials, and requests for initial or continue stay review clinical to [phone]  Due to HIGH CALL volume, it would be easier if you could please send faxed requests to expedite your requests and in part, help us provide discharge notifications faster

## 2017-10-11 NOTE — PROGRESS NOTES
Med Surg to Stepdown Acceptance Note - [facility] 65 y o  female MRN: [id number]  Unit/Bed#: ICU 05 Encounter: [id number]          ______________________________________________________________________  Assessment and Plan:   Patient Active Problem List   Diagnosis    Abdominal pain    Acquired hypothyroidism    T2DM (type 2 diabetes mellitus) ([facility] )    Lung nodules    Chronic constipation    Morbid obesity with BMI of 40 0-44 9, adult ([place] [address] )    Opioid dependence ([place] [address] )    Acute on chronic systolic heart failure (HCC)    Asthma    Chronic pain    Constipation due to opioid therapy    Restless leg    Sleep apnea    Acute on chronic respiratory failure with hypoxia and hypercapnia (HCC)    Morbid obesity due to excess calories (HCC)    Pancreatic cyst    COPD exacerbation (HCC)    Type 2 diabetes mellitus with hyperglycemia, with long-term current use of insulin (HCC)    Dyspnea on exertion    Anxiety         Neuro:     · Possible Acute Ischemic Stroke:  · C/w ASA 325mg PO qd and Lipitor 80mg PO qd  · F/u Lipid Panel, HgbA1c  · F/u neuro consult  · CTA and CT head negative  · Order placed for echo tomorrow  · MRI Brain tomorrow  · Bedside swallow evaluation done and passed  · Delirium: CAM ICU positive no   · Restless Leg Syndrome: Continue Mirapex   · Opioid Dependence: No opiate medication use  · Chronic Pain controlled with: Tylenol PRN  · Pain score: moderate  · Regulate sleep/wake cycle    CV:     · Rhythm: NSR  · Follow rhythm on telemetry  · Hemodynamically stable   · Acute on Chronic Systolic HF: Last echo was 6/7/17 with EF of 55-60  · HTN: Continue Lisinopril    Lung:      · Chronic Respiratory Failure likely 2/2 Restrictive Lung Disease from Obesity: Pulmonology following  Pt was previously on steroids which were discontinued  Per Dr Minoo Dia, continue 3L NC O2 at bedtime and albuterol neb QID PRN for wheezing/SOB  Will continue to monitor O2 sat   Currently on 2L NC O2 saturating at 98%   · ABG showed PCO2 at 59 9, likely chronic  Will monitor  · Hx of COPD vs Asthma: Continue Duo-Neb PRN  · I/S    GI:   · Hx of Constipation: Continue Colace and Miralax  · Cardiac/Diabetic Diet  · No stress ulcer prophylaxis needed at this time  FEN:   · Isolyte at 75 mL/hr IV  · Cardiac/Diabetic Diet  · Monitor and replete electrolytes as needed  · Goal: K >4 0, Mag >2 0, and Phos >3 0    :   · Indwelling Cr present: no     ID:   · Afebrile, WBC WNL, monitor  Heme:   · Hgb and platelets WNL, monitor  Endo:   · Type II DM: ISS, AC 4 times daily  · Hypothyroidism: C/w Levothyroxine    MSK/Skin:  · Mobility goal: OOBT  · PT consult: yes  · OT consult: yes  · Frequent turning and off-loading    Lines:  · PIV    VTE Prophylaxis:  · Pharmacologic Prophylaxis: Enoxaparin (Lovenox)  · Mechanical Prophylaxis: sequential compression device    Disposition: Admit to Step Down      Code Status: Level 1 - Full Code    ______________________________________________________________________  History of Hospital Course:   Dina Clay a 65 y  o  female with PMH of  T2DM, HTN, COPD, obesity hypoventilation, thyroid cancer who presented to the ED on 10/8/17 with dyspnea on exertion for 4 days  Chest x-ray done in the ER was unremarkable  She was initially started on IV Solu-Medrol for possible COPD exacerbation, which was discontinued by pulmonology as patient was not wheezing  Impression by Pulmonary was that she may have restrictive lung disease from obesity  Patient had a sleep screen, her pulse ox went to 80% only for 3 minutes, otherwise she was always above 90%  Yesterday in the AM, pt's SOB had removed and stated that her SOB may have been due to anxiety  Pt was seen by Dr Avila Guzmán and was continued on Lexapro and Xanax       Pt was supposed to be discharged earlier today but around 12:00 PM it was noted that she was increasingly lethargic, had a flat affect, and lethargy so the discharge was cancelled  At 8:20 PM, she complained of diplopia and posterior headache  She was examined at that time by hospitalist and was noted to be lethargic but easily arousable, oriented, and able to move all extremities  Pt did have a mild right sided facial droop and questionable slurred speech  NIH stroke scale at that time had score of 2  Neurology was consulted  CT Head and CTA Head/Neck were done and negative  Pt started on  and Lipitor 80mg  HgA1c and Lipid panel were ordered  ABG done showed PCO2 at 58 9 which is chronic  At this time pt states that she continues to have blurry vision but this is chronic and she has been having similar episodes of blurry vision for the past 6 months which has caused her to stop driving  She states she does not note a facial droop, but feels like the right side of her face is more swollen than the left  Continues to complain of chronic generalized pain        ______________________________________________________________________    Physical Exam   Constitutional: She is oriented to person, place, and time  She appears well-developed and well-nourished  She appears lethargic  No distress  Lethargic   HENT:   Head: Normocephalic and atraumatic  Mouth/Throat: No oropharyngeal exudate  Eyes: Conjunctivae and EOM are normal  Pupils are equal, round, and reactive to light  Right eye exhibits no discharge  Left eye exhibits no discharge  No scleral icterus  Neck: Normal range of motion  Neck supple  Cardiovascular: Normal rate, regular rhythm, normal heart sounds and intact distal pulses  Exam reveals no gallop and no friction rub  No murmur heard  Pulmonary/Chest: Effort normal and breath sounds normal  No respiratory distress  She has no wheezes  She has no rales  She exhibits no tenderness  Abdominal: Soft  She exhibits no distension and no mass  There is tenderness  There is no rebound and no guarding     Musculoskeletal: She exhibits no edema, tenderness or deformity  Neurological: She is oriented to person, place, and time  She has normal reflexes  She appears lethargic  She displays no atrophy, no tremor and normal reflexes  No cranial nerve deficit or sensory deficit  She exhibits normal muscle tone  She displays no seizure activity  Coordination normal    Slight right sided facial droop   Skin: Skin is warm and dry  No rash noted  She is not diaphoretic  No erythema  No pallor  Psychiatric: She has a normal mood and affect  Her behavior is normal  Judgment and thought content normal            ______________________________________________________________________  Vitals:    10/10/17 2010 10/10/17 2102 10/10/17 2310 10/10/17 2357   BP: 107/60 112/59 122/68    Pulse: 87 87 93 91   Resp:  18 (!) 26 (!) 28   Temp: 98 7 °F (37 1 °C) 97 8 °F (36 6 °C) 97 6 °F (36 4 °C)    TempSrc: Tympanic Oral Temporal    SpO2: 97% 95% 98% 97%   Weight:       Height:                  Temperature:   Temp (24hrs), Av °F (36 7 °C), Min:97 6 °F (36 4 °C), Max:98 7 °F (37 1 °C)    Current Temperature: 97 6 °F (36 4 °C)  Weights:   IBW: 50 1 kg    Body mass index is 41 53 kg/m²  Weight (last 2 days)     None        Hemodynamic Monitoring:  N/A     Non-Invasive/Invasive Ventilation Settings:  Respiratory    Lab Data (Last 4 hours)      10/10 2147            pH, Arterial       7 378           pCO2, Arterial       (!)59 9  Comment:  Verified by repeat analysis              pO2, Arterial       96 7           HCO3, Arterial       (!)34 5           Base Excess, Arterial       7 4                O2/Vent Data     None              SpO2: SpO2: 99 %, SpO2 Activity: SpO2 Activity: At Rest, SpO2 Device: O2 Device: Nasal cannula, Capnography:    Intake and Outputs:  I/O       10/09 0701 - 10/10 0700 10/10 0701 - 10/11 0700    IV Piggyback 500     Total Intake(mL/kg) 500 (4 9)     Urine (mL/kg/hr) 725 (0 3) 400 (0 2)    Total Output 725 400    Net -225 -400          Unmeasured Urine Occurrence 1 x         Nutrition:        Diet Orders            Start     Ordered    10/09/17 1503  Room Service  Once     Question:  Type of Service  Answer:  Room Service: Food and nutrition svc to call patient    10/09/17 1503    10/08/17 2153  Diet George/CHO Controlled; Cons Carb 2: 19-2100Kcals; Cardiac Step 1  Diet effective now     Question Answer Comment   Diet Type George/CHO Controlled    George/CHO Controlled Cons Carb 2: 19-2100Kcals    Other Restriction(s): Cardiac Step 1    RD to adjust diet per protocol?  Yes        10/08/17 2152        Labs:     Results from last 7 days  Lab Units 10/10/17  2153 10/08/17  1908   WBC Thousand/uL 9 00 9 40   HEMOGLOBIN g/dL 12 7 12 5   HEMATOCRIT % 38 9 38 3   PLATELETS Thousands/uL 159 158   NEUTROS PCT % 72 75   MONOS PCT % 6 6       Results from last 7 days  Lab Units 10/10/17  2153 10/10/17  0645 10/09/17  0500 10/08/17  1908   SODIUM mmol/L 140 142 138 141   POTASSIUM mmol/L 4 0 4 2 5 5* 4 3   CHLORIDE mmol/L 99* 102 102 103   CO2 mmol/L 37* 34* 29 32   BUN mg/dL 39* 38* 25 22   CREATININE mg/dL 1 01 1 01 1 19 1 01   CALCIUM mg/dL 8 4 8 3 8 2* 8 8   TOTAL PROTEIN g/dL 6 5  --   --  6 6   BILIRUBIN TOTAL mg/dL 0 20  --   --  0 20   ALK PHOS U/L 62  --   --  66   ALT U/L 48  --   --  44   AST U/L 16  --   --  13   GLUCOSE RANDOM mg/dL 269* 177* 481* 324*       Results from last 7 days  Lab Units 10/09/17  0500   MAGNESIUM mg/dL 2 2     Lab Results   Component Value Date    PHOS 4 3 (H) 09/20/2017    PHOS 4 9 (H) 09/19/2017    PHOS 3 8 09/18/2017        Results from last 7 days  Lab Units 10/10/17  2153   INR  0 95   PTT seconds 23       0  Lab Value Date/Time   TROPONINI <0 02 10/10/2017 2153   TROPONINI <0 02 10/08/2017 1908   TROPONINI <0 02 09/18/2017 1908   TROPONINI 0 78 (H) 06/07/2017 1743   TROPONINI 0 89 (H) 06/07/2017 1454   TROPONINI 1 12 (H) 06/07/2017 1109   TROPONINI 0 33 (H) 06/07/2017 0700   TROPONINI <0 02 04/20/2017 0941   TROPONINI <0 02 04/20/2017 0631 ABG:  Lab Results   Component Value Date    PHART 7 378 10/10/2017    [id number] 59 9 (HH) 10/10/2017    PO2ART 96 7 10/10/2017    ZJZ4GLI 34 5 (H) 10/10/2017    BEART 7 4 10/10/2017    SOURCE Radial, Left 10/10/2017     Micro:  Lab Results   Component Value Date    BLOODCX No Growth After 5 Days  06/11/2017    BLOODCX No Growth After 5 Days  06/11/2017    BLOODCX Klebsiella pneumoniae - ESBL (A) 06/07/2017    BLOODCX Klebsiella pneumoniae ESBL (A) 06/07/2017    URINECX 20,000-29,000 cfu/ml Mixed Contaminants X3 04/15/2017     Allergies: Allergies   Allergen Reactions    Ketorolac Swelling    Latex Rash     Medications:   Scheduled Meds:  ascorbic acid 500 mg Oral Daily   aspirin 325 mg Oral Daily   atorvastatin 80 mg Oral Daily With Dinner   docusate sodium 100 mg Oral BID   enoxaparin 40 mg Subcutaneous Daily   insulin aspart protamine-insulin aspart 40 Units Subcutaneous BID AC   insulin lispro 1-5 Units Subcutaneous HS   insulin lispro 2-12 Units Subcutaneous TID AC   ipratropium-albuterol 3 mL Nebulization Q6H   levothyroxine 125 mcg Oral Early Morning   lisinopril 5 mg Oral Daily   multivitamin-minerals 1 tablet Oral Daily   polyethylene glycol 17 g Oral BID   pramipexole 1 mg Oral TID   torsemide 20 mg Oral BID (diuretic)     Continuous Infusions:  sodium chloride 75 mL/hr Last Rate: 75 mL/hr (10/10/17 2324)     PRN Meds:    acetaminophen 650 mg Q6H PRN   ALPRAZolam 0 25 mg TID PRN   dicyclomine 10 mg TID PRN   oxyCODONE-acetaminophen 1 tablet Q4H PRN     VTE Pharmacologic Prophylaxis: Enoxaparin (Lovenox)  VTE Mechanical Prophylaxis: sequential compression device  Invasive lines and devices: Invasive Devices     Peripheral Intravenous Line            Peripheral IV 10/08/17 Left Antecubital 2 days                     Portions of the record may have been created with voice recognition software    Occasional wrong word or "sound a like" substitutions may have occurred due to the inherent limitations of voice recognition software  Read the chart carefully and recognize, using context, where substitutions have occurred      Lola Eric MD

## 2017-10-11 NOTE — PROGRESS NOTES
Transfer Note - ICU Transfer to SD/MS tele   Jaime Akhtar 65 y o  female MRN: [id number]  [facility]   Unit/Bed#: 2 [place] [address] Encounter: [id number]    Code Status: Level 1 - Full Code  POA:    POLST:      Reason for ICU adm:  Change in mental status    Active problems:   Principal Problem (Resolved):    Dyspnea on exertion  Active Problems:    TIA (transient ischemic attack)    Morbid obesity with BMI of 40 0-44 9, adult (HCC)    COPD exacerbation ([facility] [address] )    Obesity hypoventilation syndrome ([facility] [address] )    Anxiety      Consultants:  Neurology, behavioral health, pulmonology      Summary of clinical course:     66 yo F, PMH of  T2DM, HTN, COPD, obesity hypoventilation, thyroid CA presents w/ SOB on exertion x 4 days  Per pulmonology, SOB likely 2/2 restrictive lung disease from obesity  Was supposed to be discharged on 10/10/17 but prior to discharge was noted to have lethargy, decreased affect, blurry vision, right sided facial droop and questionable slurred speech  CTA of head and neck negative for acute findings  MRI negative for acute pathology  Symptoms resolved  Recent or scheduled procedures:    10/10 CT Head: No acute intracranial abnormality  CTA Head and Neck: No signs of intracranial vascular occlusive disease or aneurysm formation  10/11: MRI head: Moderate diffuse cerebral volume loss   Mild scattered white matter change consistent with chronic microangiopathy   No acute ischemia  2 separate homogeneously enhancing dural based masses are identified within the left hemisphere consistent with meningiomas   The more superior mass within the parietal region is partially calcified while the more inferior mass within the lateral aspect of the middle cranial fossa does not demonstrate calcification  10/11: Echocardiogram:  EF 60-65%  Grade 1 diastolic dysfunction  No obvious defect or patent foramen ovale    No significant valvular dysfunction    Outstanding/pending diagnostics: None       Mobilization Plan:  PT/OT    Nutrition Plan:   Constant carb 2 cardiac step 1 diet      Initial Physical Therapy Recommendations:  Home with PT OT  Initial Occupational Therapy Recommendations:  Home with PT OT  Initial /Plan:  Home with PT OT       Spoke with Anna Marie  regarding transfer  Portions of the record may have been created with voice recognition software  Occasional wrong word or "sound a like" substitutions may have occurred due to the inherent limitations of voice recognition software  Read the chart carefully and recognize, using context, where substitutions have occurred      Matthew Dupont PA-C

## 2017-10-11 NOTE — PROGRESS NOTES
NIH stroke scale completed by KATIE Malik from [id number] [facility]  Patient remains lethargic and reports "fullness or swelling" in right cheek  FATIMAH Diaz at bedside

## 2017-10-11 NOTE — PROGRESS NOTES
Patient off the floor to CT scan of head and neck  Per JOHNNY Freeman patient will go to ICU after CT scan  Patient sleepy and easy to arouse; continues to report "swelling like" feeling in right cheek

## 2017-10-11 NOTE — PLAN OF CARE
Problem: OCCUPATIONAL THERAPY ADULT  Goal: Performs self-care activities at highest level of function for planned discharge setting  See evaluation for individualized goals  Outcome: Progressing  Limitation: Decreased ADL status, Decreased UE strength, Decreased Safe judgement during ADL, Decreased endurance, Decreased self-care trans, Decreased high-level ADLs (decreased balance and mobility)  Prognosis: Good  Assessment: Tolerated well  Patient is cooperative and pleasant  Patient is limited by pain, reports not feeling well today  Patient transferred to ICU due to facial droop  Patient with BUE strength 4-/5 today and fine motor coordination functional on BUE        OT Discharge Recommendation: Home with family support (home with home PT/OT)

## 2017-10-11 NOTE — PROGRESS NOTES
Patient reports double vision and posterior headache that is not new for the patient  Patient had report of double vision prior supper per report  Patient is lethargic and falls asleep easily during conversation  VS WNL  Philip 4014 aware and orders received for NIH stroke scale and CT scan

## 2017-10-12 ENCOUNTER — APPOINTMENT (INPATIENT)
Dept: PHYSICAL THERAPY | Facility: HOSPITAL | Age: 77
DRG: 205 | End: 2017-10-12
Payer: COMMERCIAL

## 2017-10-12 LAB
GLUCOSE SERPL-MCNC: 178 MG/DL (ref 65–140)
GLUCOSE SERPL-MCNC: 207 MG/DL (ref 65–140)
GLUCOSE SERPL-MCNC: 225 MG/DL (ref 65–140)
GLUCOSE SERPL-MCNC: 234 MG/DL (ref 65–140)
GLUCOSE SERPL-MCNC: 249 MG/DL (ref 65–140)
GLUCOSE SERPL-MCNC: 320 MG/DL (ref 65–140)

## 2017-10-12 PROCEDURE — G9163 LANG EXPRESS GOAL STATUS: HCPCS

## 2017-10-12 PROCEDURE — G9164 LANG EXPRESS D/C STATUS: HCPCS

## 2017-10-12 PROCEDURE — G9162 LANG EXPRESS CURRENT STATUS: HCPCS

## 2017-10-12 PROCEDURE — 94760 N-INVAS EAR/PLS OXIMETRY 1: CPT

## 2017-10-12 PROCEDURE — 97110 THERAPEUTIC EXERCISES: CPT

## 2017-10-12 PROCEDURE — 82948 REAGENT STRIP/BLOOD GLUCOSE: CPT

## 2017-10-12 PROCEDURE — 92523 SPEECH SOUND LANG COMPREHEN: CPT

## 2017-10-12 PROCEDURE — 97535 SELF CARE MNGMENT TRAINING: CPT

## 2017-10-12 PROCEDURE — 94640 AIRWAY INHALATION TREATMENT: CPT

## 2017-10-12 RX ADMIN — ATORVASTATIN CALCIUM 20 MG: 20 TABLET, FILM COATED ORAL at 16:26

## 2017-10-12 RX ADMIN — PRAMIPEXOLE DIHYDROCHLORIDE 1 MG: 0.5 TABLET ORAL at 21:41

## 2017-10-12 RX ADMIN — ASPIRIN 325 MG: 325 TABLET ORAL at 09:23

## 2017-10-12 RX ADMIN — INSULIN LISPRO 4 UNITS: 100 INJECTION, SOLUTION INTRAVENOUS; SUBCUTANEOUS at 21:41

## 2017-10-12 RX ADMIN — INSULIN LISPRO 4 UNITS: 100 INJECTION, SOLUTION INTRAVENOUS; SUBCUTANEOUS at 16:26

## 2017-10-12 RX ADMIN — IPRATROPIUM BROMIDE AND ALBUTEROL SULFATE 3 ML: .5; 3 SOLUTION RESPIRATORY (INHALATION) at 07:22

## 2017-10-12 RX ADMIN — INSULIN LISPRO 4 UNITS: 100 INJECTION, SOLUTION INTRAVENOUS; SUBCUTANEOUS at 11:16

## 2017-10-12 RX ADMIN — OXYCODONE HYDROCHLORIDE AND ACETAMINOPHEN 1 TABLET: 5; 325 TABLET ORAL at 03:04

## 2017-10-12 RX ADMIN — OXYCODONE HYDROCHLORIDE AND ACETAMINOPHEN 1 TABLET: 5; 325 TABLET ORAL at 19:23

## 2017-10-12 RX ADMIN — DOCUSATE SODIUM 100 MG: 100 CAPSULE, LIQUID FILLED ORAL at 17:41

## 2017-10-12 RX ADMIN — LEVOTHYROXINE SODIUM 125 MCG: 125 TABLET ORAL at 06:09

## 2017-10-12 RX ADMIN — IPRATROPIUM BROMIDE AND ALBUTEROL SULFATE 3 ML: .5; 3 SOLUTION RESPIRATORY (INHALATION) at 13:46

## 2017-10-12 RX ADMIN — IPRATROPIUM BROMIDE AND ALBUTEROL SULFATE 3 ML: .5; 3 SOLUTION RESPIRATORY (INHALATION) at 02:28

## 2017-10-12 RX ADMIN — IPRATROPIUM BROMIDE AND ALBUTEROL SULFATE 3 ML: .5; 3 SOLUTION RESPIRATORY (INHALATION) at 20:41

## 2017-10-12 RX ADMIN — PRAMIPEXOLE DIHYDROCHLORIDE 1 MG: 0.5 TABLET ORAL at 16:27

## 2017-10-12 RX ADMIN — DOCUSATE SODIUM 100 MG: 100 CAPSULE, LIQUID FILLED ORAL at 09:23

## 2017-10-12 RX ADMIN — Medication 1 TABLET: at 09:23

## 2017-10-12 RX ADMIN — INSULIN ASPART 40 UNITS: 100 INJECTION, SUSPENSION SUBCUTANEOUS at 16:26

## 2017-10-12 RX ADMIN — POLYETHYLENE GLYCOL 3350 17 G: 17 POWDER, FOR SOLUTION ORAL at 17:41

## 2017-10-12 RX ADMIN — INSULIN ASPART 40 UNITS: 100 INJECTION, SUSPENSION SUBCUTANEOUS at 06:09

## 2017-10-12 RX ADMIN — POLYETHYLENE GLYCOL 3350 17 G: 17 POWDER, FOR SOLUTION ORAL at 09:22

## 2017-10-12 RX ADMIN — PRAMIPEXOLE DIHYDROCHLORIDE 1 MG: 0.5 TABLET ORAL at 09:32

## 2017-10-12 RX ADMIN — ENOXAPARIN SODIUM 40 MG: 40 INJECTION SUBCUTANEOUS at 09:23

## 2017-10-12 RX ADMIN — INSULIN LISPRO 2 UNITS: 100 INJECTION, SOLUTION INTRAVENOUS; SUBCUTANEOUS at 09:22

## 2017-10-12 RX ADMIN — ACETAMINOPHEN 650 MG: 325 TABLET, FILM COATED ORAL at 21:41

## 2017-10-12 RX ADMIN — OXYCODONE HYDROCHLORIDE AND ACETAMINOPHEN 500 MG: 500 TABLET ORAL at 09:23

## 2017-10-12 RX ADMIN — LISINOPRIL 5 MG: 5 TABLET ORAL at 09:23

## 2017-10-12 NOTE — OCCUPATIONAL THERAPY NOTE
OT TREATMENT       10/12/17 0855   Restrictions/Precautions   Other Precautions Fall Risk;Bed Alarm; Chair Alarm;Contact/isolation   Pain Assessment   Pain Assessment No/denies pain   ADL   Where Assessed Standing at sink   Grooming Assistance 4  Minimal Assistance   UB Bathing Assistance 4  Minimal Assistance   Toileting Assistance  4  Minimal Assistance   Functional Standing Tolerance   Time 5 minutes   Activity grooming ADL   Comments Lee Ann/Supervision   Bed Mobility   Supine to Sit 5  Supervision   Transfers   Sit to Stand 4  Minimal assistance   Stand to Sit 5  Supervision   Functional Mobility   Functional Mobility 4  Minimal assistance   Additional Comments 12 feet x 2    Additional items Rolling walker   Toilet Transfers   Toilet Transfer From Bed   Toilet Transfer Type To and from   Toilet Transfer to Raised toilet seat with rails   Toilet Transfer Technique Ambulating   Toilet Transfers Minimal assistance   Toilet Transfers Comments RW   Cognition   Overall Cognitive Status [facility]   Arousal/Participation Alert; Cooperative   Attention Attends with cues to redirect   Orientation Level Oriented X4   Memory Decreased recall of recent events   Following Commands Follows multistep commands with increased time or repetition   Activity Tolerance   Activity Tolerance Patient limited by fatigue   Assessment   Assessment No lsurred speech or facial droop observed this morning  Pt demonstrating Fair+/Good- stanidng balance during ADLs and mobility tasks  No confusion noted  Plan   Treatment Interventions ADL retraining;Functional transfer training;UE strengthening/ROM; Endurance training;Patient/family training;Equipment evaluation/education; Activityengagement   OT Frequency 2-3x/wk   Recommendation   OT Discharge Recommendation (home with services and family)   Barthel Index   Feeding 10   Bathing 0   Grooming Score 5   Dressing Score 5   Bladder Score 10   Bowels Score 10   Toilet Use Score 5   Transfers (Bed/Chair) Score 10   Mobility (Level Surface) Score 5   Stairs Score 5   Barthel Index Score 65     Patient left OOB in chair with all needs within reach, tab alarm in place

## 2017-10-12 NOTE — PLAN OF CARE
DISCHARGE PLANNING - CARE MANAGEMENT     Discharge to post-acute care or home with appropriate resources Progressing        Nutrition/Hydration-ADULT     Nutrient/Hydration intake appropriate for improving, restoring or maintaining nutritional needs Progressing        Potential for Falls     Patient will remain free of falls Progressing        Prexisting or High Potential for Compromised Skin Integrity     Skin integrity is maintained or improved Progressing        RESPIRATORY - ADULT     Achieves optimal ventilation and oxygenation Progressing

## 2017-10-12 NOTE — SPEECH THERAPY NOTE
SLP  Speech Language Evaluation       10/12/17 1100   Cognition   Overall Cognitive Status WFL   Arousal/ Participation Alert; Cooperative; Interactive   Attention Within functional limits   Orientation Level Oriented x 4   Memory Within functional limits   Following Commands Follows all commands and directions without difficulty   Pain Assessment   Pain Assessment No/ denies pain   Pain Score No Pain   Speech/Swallow Mechanism Exam   Labial Symmetry WFL   Labial Strength WFL   Labial ROM WFL   Facial Symmetry WFL   Facial Strength WFL   Facial ROM WFL   Lingual Symmetry WFL   Lingual Strength WFL   Lingual ROM WFL   Velum WFL   Mandible WFL   Dentition Adequate   Volitional Cough Strong   Vocal Quality Clear   Volitional Swallow WFL   Respratory Status Room air   Motor Speech Evaluation   Respiration/ Phonation WFL   Vocal Quality WFL   Dysarthria No   Intelligibility Intelligible   Apraxia None present   Auditory Comprehension   Word Level Comprehension WFL   Yes/No Questions WFL   Commands WFL   Comprehends Conversation Complex   Reading Comprehension   Reading Status WFL   Expression   Primary Mode of Expression Verbal   Verbal Expression   Repetition No impairment   Automatic Speech WFL   Naming WFL   Narrative Speech WFL   Sentence Level No Impairment   Pragmatics WFL   Summary   Speech/Language Summary Speech, language, and cognition is within functional limits  Communication   Expression (FIM) 7 - Expresses complex/abstract ideas in a reasonable time without devices or helper  Recommendations   Recommendations   No speech therapy warranted at this time     SLP - OK to Discharge Yes   Goals   Goal 1 N/A   Goal 2 N/A

## 2017-10-12 NOTE — PHYSICAL THERAPY NOTE
PT TREATMENT     10/12/17 1056   Pain Assessment   Pain Assessment No/denies pain   Restrictions/Precautions   Other Precautions Fall Risk;Bed Alarm; Chair Alarm;Contact/isolation   General   Chart Reviewed Yes   Family/Caregiver Present No   Subjective   Subjective "exhausted"   Transfers   Sit to Stand 5  Supervision   Stand to Sit 5  Supervision   Ambulation/Elevation   Gait Assistance 5  Supervision   Assistive Device Rolling walker   Distance 200 feet;pt fatigued after amb, SAO2 on 3L 96%   Exercises   Hip Flexion 10 reps;Bilateral   Knee AROM Long Arc Quad 10 reps;Bilateral   Ankle Pumps 10 reps;Bilateral   Assessment   Assessment Pt noted with increased amb endurance today  Pt mildly fatigued with long distance amb  Pt will cont to benefit from skilled PT services  Plan   Treatment/Interventions ADL retraining;Functional transfer training;LE strengthening/ROM; Elevations; Therapeutic exercise; Endurance training;Patient/family training;Bed mobility;Gait training   Recommendation   Recommendation Home with family support;Home PT   Equipment Recommended (cont use of RW)

## 2017-10-12 NOTE — PLAN OF CARE
Problem: PHYSICAL THERAPY ADULT  Goal: Performs mobility at highest level of function for planned discharge setting  See evaluation for individualized goals  Outcome: Progressing  Prognosis: Good  Problem List: Decreased strength, Decreased endurance, Impaired balance, Decreased mobility  Assessment: Pt noted with increased amb endurance today  Pt mildly fatigued with long distance amb  Pt will cont to benefit from skilled PT services  Recommendation: Home with family support, Home PT          See flowsheet documentation for full assessment

## 2017-10-12 NOTE — PROGRESS NOTES
[facility] Progress Note  Patient: Roscoe Christopher 65 y o  female   MRN: [id number]  PCP: Yordy Montelongo MD  Unit/Bed#: [address] [place] [address] Encounter: [id number]  Date Of Visit: 10/12/17      Subjective:   Patient was transferred out of ICU yesterday after she had been worked up for stroke  Her MRI did not confirm any stroke, but incidentally showed to space-occupying lesions consistent with meningioma  This was discussed with Neurosurgery at [facility], there is no surgical intervention for these tumors currently  Recommendation is to follow them up clinically as an outpatient  Will provide the patient with the physician name and numbers to cold after she is discharged  Neurosurgery recommendation was to evaluate the patient for seizure activity prior to discharge and for further evaluation of her stroke-like symptoms  Patient herself is awake, working with physical therapy  She is afebrile  Denies any chest pain, SOB, coughing or palpitations  No abd pain, nausea, vomiting, or diarrhea  Physical Exam:     Vitals:   Temp (24hrs), Av 8 °F (36 6 °C), Min:96 °F (35 6 °C), Max:99 °F (37 2 °C)    HR:  [] 101  Resp:  [18-20] 18  BP: (117-144)/(58-74) 141/69  SpO2:  [88 %-98 %] 98 %  Body mass index is 43 47 kg/m²  H&N: Anicteric sclera, supple neck  Chest CTA BL  Heart: S1, S2 RRR  Abd: soft, Non tender, non distended  Extremities: No oedema, clubbing or cyanosis  Skin: Intact, no rash  Input and Output Summary (last 24 hours): Intake/Output Summary (Last 24 hours) at 10/12/17 1450  Last data filed at 10/12/17 0840   Gross per 24 hour   Intake              240 ml   Output              600 ml   Net             -360 ml       Assessment:    Active Problems:     Morbid obesity with BMI of 40 0-44 9, adult (HCC)    COPD exacerbation (HCC)    Anxiety    Obesity hypoventilation syndrome (HCC)    TIA (transient ischemic attack)      Plan:  Acute encephalopathy likely secondary to benzodiazepine  Six CT scan and MRI of showed no evidence of stroke  Meningioma to be managed as the explained and a history  Section  Will consult Neurology for EEG prior to discharge  Will discontinue benzos    Shortness of breath   Chest x-ray done in the ER was unremarkable  She was initially started on IV Solu-Medrol for possible COPD exacerbation, this was discontinued by pulmonology as patient was not wheezing  Impression by Pulmonary was that she might have restrictive lung disease from obesity  Patient had a sleep screen, her pulse ox went to 80% only for 3 minutes, otherwise she was always above 90%  Continue DuoNeb    Hypothyroidism  Continue levothyroxine    Hypertension  Continue lisinopril    Hyperlipidemia  Continue Lipitor    Diabetes  Continue short/long-acting insulin regimen  Carbohydrate restricted diet  Insulin sliding scale    VTE Pharmacologic Prophylaxis:   Pharmacologic: Enoxaparin (Lovenox)  Mechanical VTE Prophylaxis in Place: Yes    Patient Centered Rounds: I have performed bedside rounds with nursing staff today  Discussions with Specialists or Other Care Team Provider: Nursing + Neurosurgery    Education and Discussions with Family / Patient: Patient and daughter    Time Spent for Care: 30 minutes  More than 50% of total time spent on counseling and coordination of care as described above      Current Length of Stay: 4 day(s)    Current Patient Status: Inpatient   Certification Statement: The patient will continue to require additional inpatient hospital stay due to Need for EEG prior to discharge    Discharge Plan: home    Code Status: Level 1 - Full Code    Additional Data:     Labs:      Results from last 7 days  Lab Units 10/11/17  0608   WBC Thousand/uL 8 00   HEMOGLOBIN g/dL 13 0   HEMATOCRIT % 39 5   PLATELETS Thousands/uL 156   NEUTROS PCT % 68   LYMPHS PCT % 23   MONOS PCT % 8   EOS PCT % 1       Results from last 7 days  Lab Units 10/11/17  0913 10/11/17  3036 10/10/17  2153   SODIUM mmol/L  --  138 140   POTASSIUM mmol/L 3 9 5 1 4 0   CHLORIDE mmol/L  --  99* 99*   CO2 mmol/L  --  36* 37*   BUN mg/dL  --  36* 39*   CREATININE mg/dL  --  0 93 1 01   CALCIUM mg/dL  --  7 9* 8 4   TOTAL PROTEIN g/dL  --   --  6 5   BILIRUBIN TOTAL mg/dL  --   --  0 20   ALK PHOS U/L  --   --  62   ALT U/L  --   --  48   AST U/L  --   --  16   GLUCOSE RANDOM mg/dL  --  205* 269*       Results from last 7 days  Lab Units 10/10/17  2153   INR  0 95       * I Have Reviewed All Lab Data Listed Above  * Additional Pertinent Lab Tests Reviewed: All Labs Within Last 24 Hours Reviewed    Recent Cultures (last 7 days):           Last 24 Hours Medication List:     acetaminophen 650 mg Oral Q6H [address]   ascorbic acid 500 mg Oral Daily   aspirin 325 mg Oral Daily   atorvastatin 20 mg Oral Daily With Dinner   docusate sodium 100 mg Oral BID   enoxaparin 40 mg Subcutaneous Daily   insulin aspart protamine-insulin aspart 40 Units Subcutaneous BID AC   insulin lispro 2-12 Units Subcutaneous TID AC   insulin lispro 2-12 Units Subcutaneous HS   ipratropium-albuterol 3 mL Nebulization Q6H   levothyroxine 125 mcg Oral Early Morning   lisinopril 5 mg Oral Daily   multivitamin-minerals 1 tablet Oral Daily   polyethylene glycol 17 g Oral BID   pramipexole 1 mg Oral TID        Today, Patient Was Seen By: Mando Slaughter MD    ** Please Note: Dragon 360 Dictation voice to text software may have been used in the creation of this document   **

## 2017-10-12 NOTE — PLAN OF CARE
Problem: RESPIRATORY - ADULT  Goal: Achieves optimal ventilation and oxygenation  INTERVENTIONS:  - Assess for changes in respiratory status  - Assess for changes in mentation and behavior  - Position to facilitate oxygenation and minimize respiratory effort  - Oxygen administration by appropriate delivery method based on oxygen saturation (per order) or ABGs  - Initiate smoking cessation education as indicated  - Encourage broncho-pulmonary hygiene including cough, deep breathe, Incentive Spirometry  - Assess and instruct to report SOB or any respiratory difficulty  - Respiratory Therapy support as indicated   Outcome: Progressing  POC due 10/15/17 3633 [address] [place] RRT

## 2017-10-12 NOTE — CASE MANAGEMENT
Continued Stay Review    Date: 10/12/17    Vital Signs: /63   Pulse 95   Temp 99 °F (37 2 °C) (Oral)   Resp 18   Ht 5' 2" (1 575 m)   Wt 108 kg (237 lb 10 5 oz)   LMP  (LMP Unknown)   SpO2 97%   BMI 43 47 kg/m²     Medications:   Scheduled Meds:   acetaminophen 650 mg Oral Q6H [address]   ascorbic acid 500 mg Oral Daily   aspirin 325 mg Oral Daily   atorvastatin 20 mg Oral Daily With Dinner   docusate sodium 100 mg Oral BID   enoxaparin 40 mg Subcutaneous Daily   insulin aspart protamine-insulin aspart 40 Units Subcutaneous BID AC   insulin lispro 2-12 Units Subcutaneous TID AC   insulin lispro 2-12 Units Subcutaneous HS   ipratropium-albuterol 3 mL Nebulization Q6H   levothyroxine 125 mcg Oral Early Morning   lisinopril 5 mg Oral Daily   multivitamin-minerals 1 tablet Oral Daily   polyethylene glycol 17 g Oral BID   pramipexole 1 mg Oral TID     Continuous Infusions:    PRN Meds: ALPRAZolam    dicyclomine    oxyCODONE-acetaminophen    Abnormal Labs/Diagnostic Results:   MRI BRAIN=Moderate diffuse cerebral volume loss  Mild scattered white matter change consistent with chronic microangiopathy  No acute ischemia  2 separate homogeneously enhancing dural based masses are identified within the left hemisphere consistent with meningiomas  The more superior mass within the parietal region is partially calcified while the more inferior mass within the lateral aspect of the middle cranial fossa does not demonstrate calcification  Age/Sex: 65 y o  female   Assessment/Plan: TELEMETRY  Subjective:   Patient was transferred out of ICU yesterday after she had been worked up for stroke  Her MRI did not confirm any stroke, but incidentally showed to space-occupying lesions consistent with meningioma  This was discussed with Neurosurgery at [facility], there is no surgical intervention for these tumors currently   Neurosurgery recommendation was to evaluate the patient for seizure activity prior to discharge and for further evaluation of her stroke-like symptoms  Plan:  Acute encephalopathy likely secondary to benzodiazepine  Six CT scan and MRI of showed no evidence of stroke  Meningioma to be managed as the explained and a history  Section  Will consult Neurology for EEG prior to discharge  Will discontinue benzos  Shortness of breath   Chest x-ray done in the ER was unremarkable   She was initially started on IV Solu-Medrol for possible COPD exacerbation, this was discontinued by pulmonology as patient was not wheezing   Impression by Pulmonary was that she might have restrictive lung disease from obesity   Patient had a sleep screen, her pulse ox went to 80% only for 3 minutes, otherwise she was always above 90%  Continue DuoNeb  Hypothyroidism  Continue levothyroxine  Hypertension  Continue lisinopri   Hyperlipidemia  Continue Lipitor  Diabetes  Continue short/long-acting insulin regimen  Carbohydrate restricted diet  Insulin sliding scale  Discharge Plan: TBD  [address] [facility] in the [facility] by Miguel Ángel Estevez for 2017  Network Utilization Review Department  Phone: [phone]; Fax [phone]  ATTENTION: The Network Utilization Review Department is now centralized for our 7 Facilities  Make a note that we have a new phone and fax numbers for our Department  Please call with any questions or concerns to [phone] and carefully follow the prompts so that you are directed to the right person  All voicemails are confidential  Fax any determinations, approvals, denials, and requests for initial or continue stay review clinical to [phone]  Due to HIGH CALL volume, it would be easier if you could please send faxed requests to expedite your requests and in part, help us provide discharge notifications faster

## 2017-10-12 NOTE — PROGRESS NOTES
Patient is alert awake sitting in the chair cooperative she has periods of confusion and anxiety nurse reports that she has received Xanax yesterday and she was sleepy also reportedly patient was dizzy  Currently patient offers no new complaints she was able to tell me that she is at the hospital and she is 65years old  No hallucinations no psychosis patient is forgetful demented and depressed and gets anxious at times but no other behavior problems  Medical evaluation and treatment is in progress  Neurology consultation reviewed and noted  Currently patient is only on Xanax p r n  Alexia Cutting Patient is not sleepy  No evidence of hallucinations  No agitation not suicidal   Therapy done with good response  Patient is sluggish and she needs help with ADL care  I will continue same treatment at this time and follow up  Thank you very much

## 2017-10-13 VITALS
SYSTOLIC BLOOD PRESSURE: 124 MMHG | OXYGEN SATURATION: 98 % | WEIGHT: 235.4 LBS | RESPIRATION RATE: 18 BRPM | BODY MASS INDEX: 43.32 KG/M2 | HEIGHT: 62 IN | TEMPERATURE: 98 F | HEART RATE: 124 BPM | DIASTOLIC BLOOD PRESSURE: 60 MMHG

## 2017-10-13 LAB
GLUCOSE SERPL-MCNC: 178 MG/DL (ref 65–140)
GLUCOSE SERPL-MCNC: 245 MG/DL (ref 65–140)

## 2017-10-13 PROCEDURE — 94760 N-INVAS EAR/PLS OXIMETRY 1: CPT

## 2017-10-13 PROCEDURE — 94640 AIRWAY INHALATION TREATMENT: CPT

## 2017-10-13 PROCEDURE — 82948 REAGENT STRIP/BLOOD GLUCOSE: CPT

## 2017-10-13 RX ORDER — ATORVASTATIN CALCIUM 20 MG/1
20 TABLET, FILM COATED ORAL
Qty: 30 TABLET | Refills: 0 | Status: ON HOLD | OUTPATIENT
Start: 2017-10-13 | End: 2018-02-22

## 2017-10-13 RX ORDER — ASPIRIN 325 MG
325 TABLET ORAL DAILY
Qty: 100 TABLET | Refills: 0 | Status: SHIPPED | OUTPATIENT
Start: 2017-10-13 | End: 2018-02-08

## 2017-10-13 RX ADMIN — PRAMIPEXOLE DIHYDROCHLORIDE 1 MG: 0.5 TABLET ORAL at 08:43

## 2017-10-13 RX ADMIN — IPRATROPIUM BROMIDE AND ALBUTEROL SULFATE 3 ML: .5; 3 SOLUTION RESPIRATORY (INHALATION) at 02:32

## 2017-10-13 RX ADMIN — ASPIRIN 325 MG: 325 TABLET ORAL at 08:41

## 2017-10-13 RX ADMIN — Medication 1 TABLET: at 08:40

## 2017-10-13 RX ADMIN — ENOXAPARIN SODIUM 40 MG: 40 INJECTION SUBCUTANEOUS at 08:41

## 2017-10-13 RX ADMIN — OXYCODONE HYDROCHLORIDE AND ACETAMINOPHEN 500 MG: 500 TABLET ORAL at 08:40

## 2017-10-13 RX ADMIN — INSULIN LISPRO 2 UNITS: 100 INJECTION, SOLUTION INTRAVENOUS; SUBCUTANEOUS at 08:41

## 2017-10-13 RX ADMIN — POLYETHYLENE GLYCOL 3350 17 G: 17 POWDER, FOR SOLUTION ORAL at 08:41

## 2017-10-13 RX ADMIN — INSULIN ASPART 40 UNITS: 100 INJECTION, SUSPENSION SUBCUTANEOUS at 08:42

## 2017-10-13 RX ADMIN — IPRATROPIUM BROMIDE AND ALBUTEROL SULFATE 3 ML: .5; 3 SOLUTION RESPIRATORY (INHALATION) at 07:24

## 2017-10-13 RX ADMIN — LISINOPRIL 5 MG: 5 TABLET ORAL at 08:40

## 2017-10-13 RX ADMIN — DOCUSATE SODIUM 100 MG: 100 CAPSULE, LIQUID FILLED ORAL at 08:40

## 2017-10-13 RX ADMIN — LEVOTHYROXINE SODIUM 125 MCG: 125 TABLET ORAL at 05:18

## 2017-10-13 RX ADMIN — IPRATROPIUM BROMIDE AND ALBUTEROL SULFATE 3 ML: .5; 3 SOLUTION RESPIRATORY (INHALATION) at 13:21

## 2017-10-13 RX ADMIN — ACETAMINOPHEN 650 MG: 325 TABLET, FILM COATED ORAL at 11:59

## 2017-10-13 RX ADMIN — OXYCODONE HYDROCHLORIDE AND ACETAMINOPHEN 1 TABLET: 5; 325 TABLET ORAL at 05:18

## 2017-10-13 NOTE — PROGRESS NOTES
Patient examined met with her 2 daughters at bedside and discussed with them  Reportedly patient lives with her boyfriend and she has been getting forgetful since last 6 months patient also reports that she stopped driving because she has been seeing double while she is driving sometimes she also seeing double while watching the TV and she is going through this since last 6 months I asked the family whether she went to see the eye doctor they said she did and patient also had seen the neurologist during this hospitalization  Patient has some memory loss she stop driving she has ongoing chronic medical problems breathing problem hypertension  restless legs syndrome today patient seems much better and she is able to tell me her age date of birth current year current month name of the president she was able to give out most of the medications name what she takes at home and what for  She seems to be doing much better today  Patient will follow up with her recommended doctors after the discharge  Currently patient is stable with the depression she has a tendency to get anxious and nervous trouble in sleeping  Patient is on Xanax p r n  which is helping her  Patient is not psychotic not suicidal no paranoia and no delusion elucidated  Patient is not lethargic not agitated  Therapy done with good response  I will continue same treatment at this time   Thank you very much

## 2017-10-13 NOTE — NURSING NOTE
Patient discharged to home  Iv access removed prior to leaving  Vaccines not indicated belongings sent home  Prescriptions sent to pharmacy  Left by wheel chair, back to home with daughters  Discharged instructions and education given to daughters

## 2017-10-13 NOTE — SOCIAL WORK
PT D/C TO HOME WITH RESUMPTION OF [facility]  IMM DONE, PT AND FAMILY AGREEABLE WITH DCP  F/U APPT FOR PCP SCHEDULED

## 2017-10-13 NOTE — DISCHARGE SUMMARY
Discharge Summary - [address] [facility]    Patient Information: Molly Abbott 65 y o  female MRN: [id number]  Unit/Bed#: [address] [facility]  Encounter: [id number]    Discharging Physician / Practitioner: Richard Bo MD  PCP: Lora Cooper MD  Admission Date: 10/8/2017  Discharge Date: 10/13/17    Reason for Admission:  Shortness of breath    Discharge Diagnoses:     Principal Problem (Resolved):    TIA   Dyspnea on exertion  Active Problems: Morbid obesity with BMI of 40 0-44 9, adult (HCC)    COPD exacerbation (HCC)    Anxiety    Obesity hypoventilation syndrome ([facility] [address] )    TIA (transient ischemic attack)      Consultations During Hospital Stay:  · Neurology and Pulmonary Critical Care    Procedures Performed:     · None    Significant Findings / Test Results:     · See below    Incidental Findings:   · MRI incidentally showing to meningiomas     Test Results Pending at Discharge (will require follow up): · None     Outpatient Tests Requested:  · None    Complications:  None    Hospital Course:   Gertrude Zhou a 65 y  o  female with PMH of  T2DM, HYPERTENSION, COPD, obesity hypoventilation, thyroid cancer who presents with dyspnea on exertion for 4 days  Areli [place] was discharged from here about 2 weeks ago with Prednisone taper which was completed  on Tuesday last week  Patient otherwise did not have any cardiopulmonary symptoms  Chest x-ray done in the ER was unremarkable  She was initially started on IV Solu-Medrol for possible COPD exacerbation, this was discontinued by pulmonology as patient was not wheezing  Impression by Pulmonary was that she might have restrictive lung disease from obesity  Patient had a sleep screen, her pulse ox went to 80% only for 3 minutes, otherwise she was always above 90%  patient was seen by Psychiatry and she was started on Xanax     Likely her and anxiety is contributing to the feet sensation of shortness of breath    patient suddenly developed increased fatigability, headache, and diplopia  With this she had a stat CT scan of the brain that came back unremarkable, patient was transferred to ICU for stroke colored, she had an MRI of the brain that came back showing no evidence of stroke but incidentally showed 2 masses of meningiomas  Patient was started on statins and her aspirin dose was increased to 325 mg a day  Case was discussed with Neurosurgery at [facility] who did not recommend any surgical procedure but recommended outpatient follow-up  Patient was screened by PT/OT and she will be discharged according to the recommendations    Condition at Discharge: stable     Discharge Day Visit / Exam:     Subjective:  Headache had resolved, no double vision, no chest pain or shortness of breath  Vitals: Blood Pressure: 124/60 (10/13/17 0657)  Pulse: (!) 124 (10/13/17 0657)  Temperature: 98 °F (36 7 °C) (10/13/17 0657)  Temp Source: Tympanic (10/13/17 0657)  Respirations: 18 (10/13/17 0657)  Height: 5' 2" (157 5 cm) (10/08/17 2225)  Weight - Scale: 107 kg (235 lb 6 4 oz) (10/13/17 0549)  SpO2: 98 % (10/13/17 0900)  Exam:   Physical Exam  H&N: Anicteric sclera, supple neck  Chest CTA BL  Heart: S1, S2 RRR  Abd: soft, Non tender, non distended  Extremities: No oedema, clubbing or cyanosis  Skin: Intact, no rash  Discharge instructions/Information to patient and family:   See after visit summary for information provided to patient and family  Provisions for Follow-Up Care:  See after visit summary for information related to follow-up care and any pertinent home health orders  Disposition:     Home    For Discharges to [facility] SNF:   · Not Applicable to this Patient - Not Applicable to this Patient    Planned Readmission: no     Discharge Statement:  I spent 35 minutes discharging the patient  This time was spent on the day of discharge  I had direct contact with the patient on the day of discharge   Greater than 50% of the total time was spent examining patient, answering all patient questions, arranging and discussing plan of care with patient as well as directly providing post-discharge instructions  Additional time then spent on discharge activities  Discharge Medications:  See after visit summary for reconciled discharge medications provided to patient and family        ** Please Note: This note has been constructed using a voice recognition system **

## 2017-10-16 ENCOUNTER — GENERIC CONVERSION - ENCOUNTER (OUTPATIENT)
Dept: OTHER | Facility: OTHER | Age: 77
End: 2017-10-16

## 2017-10-16 ENCOUNTER — ALLSCRIPTS OFFICE VISIT (OUTPATIENT)
Dept: OTHER | Facility: OTHER | Age: 77
End: 2017-10-16

## 2017-10-16 ENCOUNTER — TRANSCRIBE ORDERS (OUTPATIENT)
Dept: ADMINISTRATIVE | Facility: HOSPITAL | Age: 77
End: 2017-10-16

## 2017-10-16 DIAGNOSIS — G47.19 TRANSIENT DISORDER OF INITIATING OR MAINTAINING WAKEFULNESS: Primary | ICD-10-CM

## 2017-10-16 NOTE — CASE MANAGEMENT
Jose L Jha MD Physician Signed Internal Medicine  Discharge Summaries Date of Service: 10/13/2017  1:59 PM         []Hide copied text  Discharge Summary - [address] Internal Medicine     Patient Information: Gabbie Maria 65 y o  female MRN: [id number]  Unit/Bed#: [facility] Encounter: [id number]     Discharging Physician / Practitioner: Jose L Jha MD  PCP: Sarah Ny MD  Admission Date: 10/8/2017  Discharge Date: 10/13/17     Reason for Admission:  Shortness of breath     Discharge Diagnoses:      Principal Problem (Resolved):    TIA   Dyspnea on exertion  Active Problems: Morbid obesity with BMI of 40 0-44 9, adult (HCC)    COPD exacerbation (HCC)    Anxiety    Obesity hypoventilation syndrome ([facility] [address] )    TIA (transient ischemic attack)        Consultations During Hospital Stay:  · Neurology and Pulmonary Critical Care     Procedures Performed:      · None     Significant Findings / Test Results:      · See below     Incidental Findings:   · MRI incidentally showing to meningiomas      Test Results Pending at Discharge (will require follow up): · None     Outpatient Tests Requested:  · None     Complications:  None     Hospital Course:   Kia Goodwin a 65 y  o  female with PMH of  T2DM, HYPERTENSION, COPD, obesity hypoventilation, thyroid cancer who presents with dyspnea on exertion for 4 days  Dave Lemon was discharged from here about 2 weeks ago with Prednisone taper which was completed  on Tuesday last week   Patient otherwise did not have any cardiopulmonary symptoms   Chest x-ray done in the ER was unremarkable   She was initially started on IV Solu-Medrol for possible COPD exacerbation, this was discontinued by pulmonology as patient was not wheezing   Impression by Pulmonary was that she might have restrictive lung disease from obesity   Patient had a sleep screen, her pulse ox went to 80% only for 3 minutes, otherwise she was always above 90%     patient was seen by Psychiatry and she was started on Xanax    Likely her and anxiety is contributing to the feet sensation of shortness of breath    patient suddenly developed increased fatigability, headache, and diplopia  With this she had a stat CT scan of the brain that came back unremarkable, patient was transferred to ICU for stroke colored, she had an MRI of the brain that came back showing no evidence of stroke but incidentally showed 2 masses of meningiomas  Patient was started on statins and her aspirin dose was increased to 325 mg a day  Case was discussed with Neurosurgery at [facility] who did not recommend any surgical procedure but recommended outpatient follow-up  Patient was screened by PT/OT and she will be discharged according to the recommendations     Condition at Discharge: stable      Discharge Day Visit / Exam:      Subjective:  Headache had resolved, no double vision, no chest pain or shortness of breath  Vitals: Blood Pressure: 124/60 (10/13/17 0657)  Pulse: (!) 124 (10/13/17 0657)  Temperature: 98 °F (36 7 °C) (10/13/17 0657)  Temp Source: Tympanic (10/13/17 0657)  Respirations: 18 (10/13/17 0657)  Height: 5' 2" (157 5 cm) (10/08/17 2225)  Weight - Scale: 107 kg (235 lb 6 4 oz) (10/13/17 0549)  SpO2: 98 % (10/13/17 0900)  Exam:   Physical Exam  H&N: Anicteric sclera, supple neck  Chest CTA BL  Heart: S1, S2 RRR  Abd: soft, Non tender, non distended  Extremities: No oedema, clubbing or cyanosis  Skin: Intact, no rash      Discharge instructions/Information to patient and family:   See after visit summary for information provided to patient and family        Provisions for Follow-Up Care:  See after visit summary for information related to follow-up care and any pertinent home health orders        Disposition:      Home     For Discharges to [facility]:   · Not Applicable to this Patient - Not Applicable to this Patient     Planned Readmission: no     Discharge Statement:  I spent 35 minutes discharging the patient  This time was spent on the day of discharge  I had direct contact with the patient on the day of discharge  Greater than 50% of the total time was spent examining patient, answering all patient questions, arranging and discussing plan of care with patient as well as directly providing post-discharge instructions    Additional time then spent on discharge activities      Discharge Medications:  See after visit summary for reconciled discharge medications provided to patient and family        ** Please Note: This note has been constructed using a voice recognition system **

## 2017-10-17 ENCOUNTER — GENERIC CONVERSION - ENCOUNTER (OUTPATIENT)
Dept: OTHER | Facility: OTHER | Age: 77
End: 2017-10-17

## 2017-10-17 NOTE — CASE MANAGEMENT
Notification of Discharge  This is a Notification of Discharge from our facility [address]  Please be advised that this patient has been discharge from our facility  Below you will find the admission and discharge date and time including the patients disposition  PRESENTATION DATE: 10/8/2017  6:05 PM  IP ADMISSION DATE: 10/8/17 2050  DISCHARGE DATE: 10/13/2017  1:59 PM  DISPOSITION: Home with [address] [place] [address] in the [facility] by [address] for 2017  Network Utilization Review Department  Phone: [phone]; Fax [phone]  ATTENTION: The Network Utilization Review Department is now centralized for our 7 Facilities  Make a note that we have a new phone and fax numbers for our Department  Please call with any questions or concerns to [phone] and carefully follow the prompts so that you are directed to the right person  All voicemails are confidential  Fax any determinations, approvals, denials, and requests for initial or continue stay review clinical to [phone]  Due to HIGH CALL volume, it would be easier if you could please send faxed requests to expedite your requests and in part, help us provide discharge notifications faster

## 2017-10-19 ENCOUNTER — GENERIC CONVERSION - ENCOUNTER (OUTPATIENT)
Dept: OTHER | Facility: OTHER | Age: 77
End: 2017-10-19

## 2017-10-20 ENCOUNTER — GENERIC CONVERSION - ENCOUNTER (OUTPATIENT)
Dept: OTHER | Facility: OTHER | Age: 77
End: 2017-10-20

## 2017-10-20 ENCOUNTER — APPOINTMENT (OUTPATIENT)
Dept: RADIOLOGY | Facility: CLINIC | Age: 77
End: 2017-10-20
Payer: COMMERCIAL

## 2017-10-20 ENCOUNTER — ALLSCRIPTS OFFICE VISIT (OUTPATIENT)
Dept: OTHER | Facility: OTHER | Age: 77
End: 2017-10-20

## 2017-10-20 DIAGNOSIS — S06.0X9A CONCUSSION WITH LOSS OF CONSCIOUSNESS: ICD-10-CM

## 2017-10-20 DIAGNOSIS — M25.551 PAIN IN RIGHT HIP: ICD-10-CM

## 2017-10-20 DIAGNOSIS — M25.552 PAIN IN LEFT HIP: ICD-10-CM

## 2017-10-20 DIAGNOSIS — M70.61 TROCHANTERIC BURSITIS OF RIGHT HIP: ICD-10-CM

## 2017-10-20 DIAGNOSIS — M76.31 ILIOTIBIAL BAND SYNDROME OF RIGHT SIDE: ICD-10-CM

## 2017-10-20 PROCEDURE — 73502 X-RAY EXAM HIP UNI 2-3 VIEWS: CPT

## 2017-10-21 ENCOUNTER — HOSPITAL ENCOUNTER (OUTPATIENT)
Dept: SLEEP CENTER | Facility: CLINIC | Age: 77
Discharge: HOME | End: 2017-10-21
Payer: COMMERCIAL

## 2017-10-21 DIAGNOSIS — T40.2X5A CONSTIPATION DUE TO OPIOID THERAPY: Chronic | ICD-10-CM

## 2017-10-21 DIAGNOSIS — J96.21 ACUTE ON CHRONIC RESPIRATORY FAILURE WITH HYPOXIA AND HYPERCAPNIA (HCC): ICD-10-CM

## 2017-10-21 DIAGNOSIS — K59.03 CONSTIPATION DUE TO OPIOID THERAPY: Chronic | ICD-10-CM

## 2017-10-21 DIAGNOSIS — J44.1 COPD EXACERBATION (HCC): ICD-10-CM

## 2017-10-21 DIAGNOSIS — R91.8 LUNG NODULES: Chronic | ICD-10-CM

## 2017-10-21 DIAGNOSIS — E66.01 MORBID OBESITY WITH BMI OF 40.0-44.9, ADULT (HCC): Chronic | ICD-10-CM

## 2017-10-21 DIAGNOSIS — E66.2 OBESITY HYPOVENTILATION SYNDROME (HCC): Chronic | ICD-10-CM

## 2017-10-21 DIAGNOSIS — G25.81 RESTLESS LEG: Chronic | ICD-10-CM

## 2017-10-21 DIAGNOSIS — I50.23 ACUTE ON CHRONIC SYSTOLIC HEART FAILURE (HCC): Chronic | ICD-10-CM

## 2017-10-21 DIAGNOSIS — F41.9 ANXIETY: ICD-10-CM

## 2017-10-21 DIAGNOSIS — J96.22 ACUTE ON CHRONIC RESPIRATORY FAILURE WITH HYPOXIA AND HYPERCAPNIA (HCC): ICD-10-CM

## 2017-10-21 DIAGNOSIS — E03.9 ACQUIRED HYPOTHYROIDISM: Chronic | ICD-10-CM

## 2017-10-21 DIAGNOSIS — G47.19 TRANSIENT DISORDER OF INITIATING OR MAINTAINING WAKEFULNESS: ICD-10-CM

## 2017-10-21 PROCEDURE — 95810 POLYSOM 6/> YRS 4/> PARAM: CPT

## 2017-10-24 ENCOUNTER — GENERIC CONVERSION - ENCOUNTER (OUTPATIENT)
Dept: OTHER | Facility: OTHER | Age: 77
End: 2017-10-24

## 2017-10-24 ENCOUNTER — TRANSCRIBE ORDERS (OUTPATIENT)
Dept: ADMINISTRATIVE | Facility: HOSPITAL | Age: 77
End: 2017-10-24

## 2017-10-24 DIAGNOSIS — G47.30 SLEEP APNEA, UNSPECIFIED TYPE: Primary | ICD-10-CM

## 2017-10-31 ENCOUNTER — ALLSCRIPTS OFFICE VISIT (OUTPATIENT)
Dept: OTHER | Facility: OTHER | Age: 77
End: 2017-10-31

## 2017-11-07 ENCOUNTER — GENERIC CONVERSION - ENCOUNTER (OUTPATIENT)
Dept: OTHER | Facility: OTHER | Age: 77
End: 2017-11-07

## 2017-11-08 ENCOUNTER — HOSPITAL ENCOUNTER (OUTPATIENT)
Dept: SLEEP CENTER | Facility: CLINIC | Age: 77
Discharge: HOME | End: 2017-11-08
Payer: COMMERCIAL

## 2017-11-08 DIAGNOSIS — G47.30 SLEEP APNEA, UNSPECIFIED TYPE: ICD-10-CM

## 2017-11-08 PROCEDURE — 95811 POLYSOM 6/>YRS CPAP 4/> PARM: CPT

## 2017-11-09 ENCOUNTER — GENERIC CONVERSION - ENCOUNTER (OUTPATIENT)
Dept: OTHER | Facility: OTHER | Age: 77
End: 2017-11-09

## 2017-11-17 ENCOUNTER — ALLSCRIPTS OFFICE VISIT (OUTPATIENT)
Dept: OTHER | Facility: OTHER | Age: 77
End: 2017-11-17

## 2017-11-17 LAB — HBA1C MFR BLD HPLC: 9.2 %

## 2017-11-22 NOTE — PROGRESS NOTES
Assessment    1  Never a smoker   2  Morbid obesity due to excess calories (278 01) (E66 01)   3  Lung nodules (793 19) (R91 8)   4  Daytime hypersomnolence (780 54) (G47 19)   5  Asthma (493 90) (J45 909)   6  Hypoventilation associated with obesity syndrome (278 03) (E66 2)    Plan  Asthma    · *1 - [facility]  *  Status: Hold For -Scheduling  Requested for: 19TLD7871   Ordered; Asthma; Ordered By: Hector Ramsay Performed:  Due: 80LKI7790  Care Summary provided  : Yes  Daytime hypersomnolence    · *Polysomnography, Sleep Study, Diagnostic; Status:Need Information - FinancialAuthorization; Requested URR:[id number];    Perform:[facility]; Due:51Msv0366; Ordered;hypersomnolence; Ordered By:Yuki Norris;  there any other medical conditions or medications that would explain these    symptoms? : No  is the sleep disturbance affecting the patient's ability to function? : yes  History/Symptoms: : snores  Study Only or Consult : Sleep Study and Consult and F/U with Sleep Specialist    Results/Data  PFT Results v2:     Spirometry: Forced vital capacity: 1  02L-- and-- 46% Predicted Values  Forced expiratory volume in one second: 0 69L-- and-- 42% Predicted Value  FEV1/FVC ratio is 67  Post Bronchodilator Spirometry:   Lung Volumes:   DLCO:   PFT Interpretation:  restrictive impairment  Discussion/Summary  Discussion Summary:   Angelito Hodgson is here today for hospital follow-up  She has compensated chronic hypercapnic respiratory failure  She did have a negative apnea link while hospitalized  Angelito Hodgson is agreeable to have a diagnostic sleep study  Her daughters were with her today and they are concerned on her somnolence during the day as well as heavy snoring  did have PFT done here in the office today however she started to feel dizzy FVC was 1 02 L or 46% of predicted FEV1 was 0 69 or 42% of predicted and obstruction ratio 67 percent    Jaye was seen by Dr kyra dacosta during her last hospitalization  She does have hypoventilation obesity syndrome  And restrictive impairment secondary to obesityis room air oxygen is 94%, after ambulation it went to 91% but quickly recovered to 94%  She is agreeable to pulmonary rehabilitation as she is certainly deconditioneddoes have nebulizer at home which she uses on occasion she is not certain that this is helpfulis dyspnea likely secondary to morbid obesity restrictive impairment  Questionable history of asthma chronic compensated hypercapnic respiratory failure secondary to obesity alveolar hypoventilationFollow-up will be after diagnostic sleep study  Counseling Documentation With Imm: The patient, patient's family was counseled regarding  Goals and Barriers: The patient has the current Goals: Deanna Pedro is agreeable to pulmonary rehabilitation  Patient's Capacity to Self-Care: Patient is able to Self-Care  Patient Education: Educational resources provided: Information discussed regarding LISBET diagnosis , treatment  Medication SE Review and Pt Understands Tx: Possible side effects of new medications were reviewed with the patient/guardian today  Active Problems    1  Abnormal CT scan, liver (793 3) (R93 2)   2  Abnormal tumor markers (795 89) (R97 8)   3  Acquired hypothyroidism (244 9) (E03 9)   4  Arthritis (716 90) (M19 90)   5  Chronic pain (338 29) (G89 29)   6  Constipation due to opioid therapy (564 09,E935 2) (K59 03,T40 2X5A)   7  Diabetes mellitus with neuropathy (250 60,357 2) (E11 40)   8  Edema (782 3) (R60 9)   9  Elevated LFTs (790 6) (R79 89)   10  Generalized colicky abdominal pain (789 7) (R10 83)   11  Hyperglycemia (790 29) (R73 9)   12  Hypertension (401 9) (I10)   13  Irritable bowel syndrome with diarrhea (564 1) (K58 0)   14  Liver lesion (573 8) (K76 9)   15  Mild persistent asthma with acute exacerbation (493 92) (J45 31)   16  Morbid obesity due to excess calories (278 01) (E66 01)   17   Pancreatic cyst (577 2) (K86 2)   18  Pulmonary emphysema (492 8) (J43 9)   19  Pulmonary nodules (793 19) (R91 8)   20  Restless leg syndrome (333 94) (G25 81)   21  Screening for osteoporosis (V82 81) (Z13 820)   22  Sleep apnea (780 57) (G47 30)   23  Thyroid disease (246 9) (E07 9)    Chief Complaint  Chief Complaint Free Text Note Form: Mariza Ofelialew was in the hospital 10/01/2017-10/06/2017 for SOB She is here for a f/u      History of Present Illness  HPI: Marifer Burleson is a 79-year-old female who was recently hospitalized at [address] [place] [address] in October of 2017  She is here today for hospital follow-up and is accompanied with her daughters  She did have apnea link done while hospitalized AHI was 2 and it was noted that oxygen below 90% was for 5 minutes she uses 2 L of supplemental oxygen at nighttime and has been using this fordid have bedside pulmonary function test done while hospitalized in July 2017  This was obviously at a different on admission  FVC was decreased to 0 93 L or 38% of predicted, FEV1 was 0 58 L or 32% of predicted obstruction ratio is 62% the impression was severe restriction impairment and mild air flow, and restrictive impairment could be secondary to obesity  was she was discharged October 13, 2017 for shortness of breath  Principal diagnosis with TIA she also had noted obesity hypoventilation syndrome and TIA  There was consultation by critical care Mariza Tucker has history of hypertension obesity hypoventilation type 2 diabetes  She had been admitted and discharged to [address] [place] [address] 2 weeks previous to admission and was on prednisone taper which was completed she did have a chest x-ray that was unremarkable and was started on IV Solu-Medrol for possible COPD  However this was discontinued by pulmonology as patient was not wheezing  Pulmonology felt that she likely had restrictive lung disease from obesity   She had a sleep screen done her pulse ox went to 80% for only 3 minutes otherwise there was no indication or of obstructive sleep apnea  of the brain was done this came back unremarkable however she was transferred to ICU and had MRI of the brain that showed no evidence of stroke but 2 incidental meningiomas  She was discharged on aspirin 325 p  o  daily and also statinswas seen by Dr kyra huggins during this hospitalization  He felt that her shortness of breath was likely secondary to obesity and restrictive impairment he did not feel she had asthma exacerbation although this is questionable  He felt that there could be possible obstructive sleep apnea  And she did have chronic compensated hypercapnic respiratory failure secondary to obesity alveolar hypoventilation according to her daughters years she has had sleep studies done [address] in the past  She was on CPAP but has not used in sometime S2 lower states that this was picked up by her [employer] company  Blood gas was done she does have compensated hypercapnia room air pH was 7 41 pCO2 was 60 and PO2 55 does have nebulizer at home with DuoNeb she does use as needed she does not feel that this improved her shortness of breath  did have CT of the chest done in April 2017  There were pulmonary nodules 3 measuring below 5 mm in no follow-up CT is needed  did do an [place] Sleepiness Scale results are 19  According to her daughters she seems to sleep irregularly with pauses and that in between breast did have a 2D echo done while hospitalized  LVEF was 61-40%, grade 1 diastolic dysfunction, mild mitral regurgitation, trace regurgitation the aorta and PA pressure was 35 mm of mercury      Review of Systems  Complete-Female - Pulm:  Constitutional: feeling poorly-- and-- feeling tired  Eyes: no complaints of vision problems  ENT: no rhinitis, no PND, no epistaxis  Cardiovascular: no palpitations, no chest pain  Respiratory: shortness of breath  Past Medical History  1  History of Acute on chronic systolic congestive heart failure (428 23,428 0) (I50 23)   2  History of Cellulitis of right lower extremity (682 6) (L03 115)   3  History of Depression, acute (296 20) (F32 9)   4  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)   5  History of Emphysema, 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518 2) (J98 3)   6  Flu vaccine need (V04 81) (Z23)   7  History of aortic valve disorder (V12 59) (Z86 79)   8  History of congestive heart failure (V12 59) (Z86 79)   9  History of Restless leg syndrome (333 94) (G25 81)    Surgical History  1  History of Cholecystectomy   2  History of Eye Surgery   3  History of Resection Of Pericardial Cyst Or Tumor   4  History of Thoracoscopy (Therapeutic) W/ Excision Of Pericardial Cyst   5  History of Thyroid Surgery  Surgical History Reviewed: The surgical history was reviewed and updated today  Family History  Mother    1  Family history of Asthma   2  Denied: Family history of Colon cancer   3  Family history of    4  Denied: Family history of alcoholism   5  Denied: Family history of mental disorder  Father    10  Denied: Family history of Colon cancer   7  Family history of    6  Denied: Family history of alcoholism   9  Denied: Family history of mental disorder   10  Family history of Liver cancer  Family History Reviewed: The family history was reviewed and updated today  Social History     · Active advance directive (V49 89) (Z78 9)   · copy not obtained   · Caffeine use (V49 89) (F15 90)   · Drinks coffee   · Feels safe at home   · Never a smoker   · No alcohol use   · No drug use   · Primary language is [place]   · Retired   · Seeing a dentist  Social History Reviewed: The social history was reviewed and updated today  Current Meds   1  ALPRAZolam ER 1 MG Oral Tablet Extended Release 24 Hour; TAKE 1 TABLET DAILY AS DIRECTED; Therapy:  to (Evaluate:72Avd1014); Last Rx:2017 Ordered   2  Aspirin 325 MG Oral Tablet; TAKE 1 TABLET DAILY; Therapy: (Recorded:2017) to Recorded   3   Cod Liver Oil/Vitamins A & D Oral Capsule; Take 1 teaspoon daily; Therapy: (Recorded:16Oct2017) to Recorded   4  Dicyclomine HCl - 10 MG Oral Capsule; TAKE 1 CAPSULE 3 TIMES DAILY  Requested for: 62FFH6944; Last Rx:03Oct2017 Ordered   5  Dulcolax TBEC; take 2 tablet twice daily; Therapy: (08 900 7707) to Recorded   6  DuoNeb 0 5-2 5 (3) MG/3ML Inhalation Solution; USE 1 UNIT DOSE IN NEBULIZER EVERY 4 HOURS AS NEEDED; Therapy: (Recorded:23Vsw6510) to Recorded   7  Folic Acid 1 MG Oral Tablet; take 1 tablet by mouth once daily; Therapy: 11FXI0397 to (Evaluate:36Bks1035)  Requested for: 85JHJ6847; Last Rx:25Jan2017 Ordered   8  Furosemide 40 MG Oral Tablet; two tabs in the AM    one tablet in the afternoon  Requested for: 68Ctk1978; Last Rx:46Dpd4784 Ordered   9  Lactulose 10 GM/15ML Oral Solution; TAKE 15 ML DAILY; Therapy: (08 9001 7781) to Recorded   10  Lisinopril 10 MG Oral Tablet; TAKE 1 TABLET DAILY; Therapy: (08 9002 7781) to Recorded   11  LORazepam 1 MG Oral Tablet; TAKE 1 TABLET 1 HOUR BEFORE MRI  MAY REPEAT  ONCE 15 MINUTES BEFORE MRI; Therapy: 31Aug2017 to (Evaluate:01Sep2017); Last Rx:54Xvd3993 Ordered   12  MetOLazone 2 5 MG Oral Tablet; TAKE 1 TABLET DAILY ON MONDAY, WEDNESDAY, AND  FRIDAY; Therapy: 40PCL3484 to (Last Rx:07Apr2017)  Requested for: 07Apr2017 Ordered   13  Movantik 25 MG Oral Tablet; Take 1 tablet prior to first meal of the day or two hours after  first meal;  Therapy: (Recorded:16Oct2017) to Recorded   14  Multi-Vitamins Oral Tablet; TAKE 1 TABLET DAILY; Therapy: 81EUP4422 to Recorded   15  NovoLOG Mix 70/30 (70-30) 100 UNIT/ML Subcutaneous Suspension; inject 35 units  twice daily per H [place]; Therapy: (1001-7324283) to Recorded   16  Oxycodone-Acetaminophen 5-325 MG Oral Tablet; TAKE 1 TABLET EVERY 4 HOURS AS  NEEDED FOR PAIN;  Therapy: 02Apr2015 to (Evaluate:67Ukj9972); Last Rx:68Djq1569 Ordered   17   Potassium Chloride Bina ER 20 MEQ Oral Tablet Extended Release; TAKE 1 TABLET DAILY  Requested for: 03Sep2017; Last VE:45LZT7998 Ordered   18  Pramipexole Dihydrochloride 1 MG Oral Tablet; TAKE 1 TABLET THREE TIMES A DAY; Therapy: 00SYC9570 to (Last Rx:07Aug2017)  Requested for: 07Aug2017 Ordered   19  Synthroid 125 MCG Oral Tablet; TAKE 1 TABLET EVERY MORNING; Therapy: (Recorded:12Oct2016) to Recorded   20  Torsemide 20 MG Oral Tablet; Take 1 tablet twice daily; Therapy: (Recorded:29Sep2017) to Recorded    Allergies  1  Latex Gloves MISC   2  Toradol Oral TABS  3  No Known Environmental Allergies   4  No Known Food Allergies    Vitals  Vital Signs    Recorded: 46VBB6723 08:48AM   Temperature 98 F, Oral   Heart Rate 88   Pulse Quality Normal   Respiration Quality Normal   Respiration 22   Systolic 637, LUE, Sitting   Diastolic 78, LUE, Sitting   Height 5 ft    Weight 228 lb    BMI Calculated 44 53   BSA Calculated 1 97   O2 Saturation 93, RA       Physical Exam   Constitutional  General appearance: No acute distress, well appearing and well nourished  Eyes  Examination of pupil and irises: Anicteric, pupils reactive  Ears, Nose, Mouth, and Throat  External inspection of ears and nose: Normal    Nasal mucosa, septum, and turbinates: Normal without edema or erythema  Lips, teeth, and gums: Normal, good dentition  Oropharynx: Normal with no erythema, edema, exudate or lesions  Mallampati Classification: 4  Neck  Neck: Supple, symmetric, trachea midline, no masses  Jugular veins: Normal    Pulmonary  Chest: Normal    Respiratory effort: No increased work of breathing or signs of respiratory distress  Auscultation of lungs: Clear to auscultation, no rales, no crackles, no wheezing  Cardiovascular  Auscultation of heart: Normal rate and rhythm, normal S1 and S2, no murmurs  Examination of extremities for edema and/or varicosities: Abnormal   nonpitting edema present  Abdomen  Abdomen: Soft, non-tender  Lymphatic  Palpation of lymph nodes in neck: No lymphadenopathy  Musculoskeletal  Gait and station: Normal    Digits and nails: Normal without clubbing or cyanosis  Neurologic  Mental Status: Normal  Not confused, no evidence of dementia, good comprehension, good concentration  Motor Exam: Gross motor function normal    Skin  Skin and subcutaneous tissue: Limited exam shows no rash     Psychiatric  Orientation to person, place and time: Normal    Mood and affect: Normal        Future Appointments    Date/Time Provider Specialty Site   10/19/2017 02:15 PM Ligia Ybarra MD [facility] [place] [address] PHYSICIANS       Signatures   Electronically signed by : JASWANT Burgess; Oct 16 2017  9:56AM EST                       (Author)    Electronically signed by : JENIFER Hay ; Nov 21 2017  4:22PM EST                       (Author)

## 2017-11-29 ENCOUNTER — GENERIC CONVERSION - ENCOUNTER (OUTPATIENT)
Dept: OTHER | Facility: OTHER | Age: 77
End: 2017-11-29

## 2017-11-29 DIAGNOSIS — R93.2 ABNORMAL FINDINGS ON DIAGNOSTIC IMAGING OF LIVER AND BILIARY TRACT: ICD-10-CM

## 2017-11-30 ENCOUNTER — GENERIC CONVERSION - ENCOUNTER (OUTPATIENT)
Dept: OTHER | Facility: OTHER | Age: 77
End: 2017-11-30

## 2017-11-30 ENCOUNTER — LAB REQUISITION (OUTPATIENT)
Dept: LAB | Facility: HOSPITAL | Age: 77
End: 2017-11-30
Payer: COMMERCIAL

## 2017-11-30 DIAGNOSIS — G89.29 OTHER CHRONIC PAIN: ICD-10-CM

## 2017-11-30 DIAGNOSIS — M25.551 PAIN IN RIGHT HIP: ICD-10-CM

## 2017-11-30 DIAGNOSIS — R97.8 OTHER ABNORMAL TUMOR MARKERS: ICD-10-CM

## 2017-11-30 DIAGNOSIS — R60.9 EDEMA: ICD-10-CM

## 2017-11-30 DIAGNOSIS — E11.40 TYPE 2 DIABETES MELLITUS WITH DIABETIC NEUROPATHY (HCC): ICD-10-CM

## 2017-11-30 DIAGNOSIS — E03.9 HYPOTHYROIDISM: ICD-10-CM

## 2017-11-30 DIAGNOSIS — I10 ESSENTIAL (PRIMARY) HYPERTENSION: ICD-10-CM

## 2017-11-30 PROCEDURE — 86301 IMMUNOASSAY TUMOR CA 19-9: CPT | Performed by: FAMILY MEDICINE

## 2017-11-30 PROCEDURE — 80053 COMPREHEN METABOLIC PANEL: CPT | Performed by: FAMILY MEDICINE

## 2017-11-30 PROCEDURE — 86376 MICROSOMAL ANTIBODY EACH: CPT | Performed by: FAMILY MEDICINE

## 2017-11-30 PROCEDURE — 84439 ASSAY OF FREE THYROXINE: CPT | Performed by: FAMILY MEDICINE

## 2017-11-30 PROCEDURE — 86140 C-REACTIVE PROTEIN: CPT | Performed by: FAMILY MEDICINE

## 2017-11-30 PROCEDURE — 86800 THYROGLOBULIN ANTIBODY: CPT | Performed by: FAMILY MEDICINE

## 2017-11-30 PROCEDURE — 85025 COMPLETE CBC W/AUTO DIFF WBC: CPT | Performed by: FAMILY MEDICINE

## 2017-11-30 PROCEDURE — 84432 ASSAY OF THYROGLOBULIN: CPT | Performed by: FAMILY MEDICINE

## 2017-12-01 ENCOUNTER — TRANSCRIBE ORDERS (OUTPATIENT)
Dept: ADMINISTRATIVE | Facility: HOSPITAL | Age: 77
End: 2017-12-01

## 2017-12-01 ENCOUNTER — ALLSCRIPTS OFFICE VISIT (OUTPATIENT)
Dept: OTHER | Facility: OTHER | Age: 77
End: 2017-12-01

## 2017-12-01 ENCOUNTER — GENERIC CONVERSION - ENCOUNTER (OUTPATIENT)
Dept: OTHER | Facility: OTHER | Age: 77
End: 2017-12-01

## 2017-12-01 DIAGNOSIS — D42.9 NEOPLASM OF UNCERTAIN BEHAVIOR OF MENINGES (HCC): Primary | ICD-10-CM

## 2017-12-01 LAB
ALBUMIN SERPL BCP-MCNC: 3.5 G/DL (ref 3.5–5)
ALP SERPL-CCNC: 67 U/L (ref 46–116)
ALT SERPL W P-5'-P-CCNC: 29 U/L (ref 12–78)
ANION GAP SERPL CALCULATED.3IONS-SCNC: 3 MMOL/L (ref 4–13)
AST SERPL W P-5'-P-CCNC: 11 U/L (ref 5–45)
BASOPHILS # BLD AUTO: 0 THOUSANDS/ΜL (ref 0–0.1)
BASOPHILS NFR BLD AUTO: 0 % (ref 0–1)
BILIRUB SERPL-MCNC: 0.2 MG/DL (ref 0.2–1)
BUN SERPL-MCNC: 27 MG/DL (ref 5–25)
CALCIUM SERPL-MCNC: 8.6 MG/DL (ref 8.3–10.1)
CHLORIDE SERPL-SCNC: 100 MMOL/L (ref 100–108)
CO2 SERPL-SCNC: 38 MMOL/L (ref 21–32)
CREAT SERPL-MCNC: 1.07 MG/DL (ref 0.6–1.3)
CRP SERPL QL: 3.2 MG/L
EOSINOPHIL # BLD AUTO: 0.1 THOUSAND/ΜL (ref 0–0.61)
EOSINOPHIL NFR BLD AUTO: 2 % (ref 0–6)
ERYTHROCYTE [DISTWIDTH] IN BLOOD BY AUTOMATED COUNT: 15.3 % (ref 11.6–15.1)
GFR SERPL CREATININE-BSD FRML MDRD: 50 ML/MIN/1.73SQ M
GLUCOSE SERPL-MCNC: 315 MG/DL (ref 65–140)
HCT VFR BLD AUTO: 38.1 % (ref 37–47)
HGB BLD-MCNC: 12.5 G/DL (ref 12–16)
LYMPHOCYTES # BLD AUTO: 1.7 THOUSANDS/ΜL (ref 0.6–4.47)
LYMPHOCYTES NFR BLD AUTO: 20 % (ref 14–44)
MCH RBC QN AUTO: 31 PG (ref 27–31)
MCHC RBC AUTO-ENTMCNC: 32.8 G/DL (ref 31.4–37.4)
MCV RBC AUTO: 94 FL (ref 82–98)
MONOCYTES # BLD AUTO: 0.3 THOUSAND/ΜL (ref 0.17–1.22)
MONOCYTES NFR BLD AUTO: 4 % (ref 4–12)
NEUTROPHILS # BLD AUTO: 6.5 THOUSANDS/ΜL (ref 1.85–7.62)
NEUTS SEG NFR BLD AUTO: 75 % (ref 43–75)
NRBC BLD AUTO-RTO: 0 /100 WBCS
PLATELET # BLD AUTO: 180 THOUSANDS/UL (ref 130–400)
PMV BLD AUTO: 9.5 FL (ref 8.9–12.7)
POTASSIUM SERPL-SCNC: 4.3 MMOL/L (ref 3.5–5.3)
PROT SERPL-MCNC: 6.6 G/DL (ref 6.4–8.2)
RBC # BLD AUTO: 4.03 MILLION/UL (ref 4.2–5.4)
SODIUM SERPL-SCNC: 141 MMOL/L (ref 136–145)
T4 FREE SERPL-MCNC: 1.06 NG/DL (ref 0.76–1.46)
WBC # BLD AUTO: 8.6 THOUSAND/UL (ref 4.8–10.8)

## 2017-12-02 LAB
THYROGLOB AB SERPL-ACNC: <1 IU/ML (ref 0–0.9)
THYROGLOB SERPL-MCNC: 4.5 NG/ML (ref 1.5–38.5)
THYROPEROXIDASE AB SERPL-ACNC: 7 IU/ML (ref 0–34)

## 2017-12-03 ENCOUNTER — GENERIC CONVERSION - ENCOUNTER (OUTPATIENT)
Dept: OTHER | Facility: OTHER | Age: 77
End: 2017-12-03

## 2017-12-03 LAB — CANCER AG19-9 SERPL-ACNC: 23 U/ML (ref 0–35)

## 2017-12-04 ENCOUNTER — TRANSCRIBE ORDERS (OUTPATIENT)
Dept: ADMINISTRATIVE | Facility: HOSPITAL | Age: 77
End: 2017-12-04

## 2017-12-04 DIAGNOSIS — D13.5: Primary | ICD-10-CM

## 2017-12-05 ENCOUNTER — ALLSCRIPTS OFFICE VISIT (OUTPATIENT)
Dept: OTHER | Facility: OTHER | Age: 77
End: 2017-12-05

## 2017-12-08 ENCOUNTER — GENERIC CONVERSION - ENCOUNTER (OUTPATIENT)
Dept: OTHER | Facility: OTHER | Age: 77
End: 2017-12-08

## 2017-12-12 ENCOUNTER — APPOINTMENT (OUTPATIENT)
Dept: CARDIAC REHAB | Facility: CLINIC | Age: 77
End: 2017-12-12
Payer: COMMERCIAL

## 2017-12-13 ENCOUNTER — HOSPITAL ENCOUNTER (OUTPATIENT)
Dept: RADIOLOGY | Facility: HOSPITAL | Age: 77
Discharge: HOME | End: 2017-12-13
Attending: INTERNAL MEDICINE
Payer: COMMERCIAL

## 2017-12-13 ENCOUNTER — ALLSCRIPTS OFFICE VISIT (OUTPATIENT)
Dept: OTHER | Facility: OTHER | Age: 77
End: 2017-12-13

## 2017-12-13 DIAGNOSIS — R93.2 ABNORMAL FINDINGS ON DIAGNOSTIC IMAGING OF LIVER AND BILIARY TRACT: ICD-10-CM

## 2017-12-13 PROCEDURE — 74177 CT ABD & PELVIS W/CONTRAST: CPT

## 2017-12-13 RX ADMIN — IOHEXOL 100 ML: 350 INJECTION, SOLUTION INTRAVENOUS at 11:43

## 2017-12-13 RX ADMIN — IOHEXOL 50 ML: 240 INJECTION, SOLUTION INTRATHECAL; INTRAVASCULAR; INTRAVENOUS; ORAL at 11:42

## 2017-12-14 NOTE — CONSULTS
Assessment  Assessed    1  Chronic pain syndrome (338 4) (G89 4)   2  Acute hip pain, right (719 45) (M25 551)   3  Greater trochanteric bursitis of right hip (726 5) (M70 61)   4  Generalized pain (780 96) (R52)    Plan  Acute hip pain, right, Chronic pain syndrome, Greater trochanteric bursitis of right hip    · Follow-up Visit in 4 Weeks Evaluation and Treatment  Follow-up  Status: Hold For -Scheduling  Requested for: 20Bbl6243   Ordered; For: Acute hip pain, right, Chronic pain syndrome, Greater trochanteric bursitis of right hip; Ordered By: [employer] 17 Performed:  Due: 30JHY2390   · Joint Bursa Aspiration &/or Injection Major; Status:Complete;   Done: 44PTS2807   Perform:[facility]; Due:75Jhc1244; Ordered;hip pain, right, Chronic pain syndrome, Greater trochanteric bursitis of right hip; Ordered By:Ulisses Alfonso;  Generalized pain    · 3 - Jimbo Araujo MD, Norberto Sewell (Rheumatology) Co-Management  *  Status: Hold For - Scheduling Requested for: 57Nrq4231   Ordered;Generalized pain; Ordered By: [employer] Performed:  Due: 54FOH8860  are Referring to a non- Preferred Provider : Services not provided in network  Care Summary provided  : Yes    Discussion/Summary    My impressions and treatment recommendations were discussed in detail with the patient, who verbalized understanding and had no further questions  patient is complaining of an exquisitely tender right trochanteric bursa at today's visit  As such, since she has signs and symptoms of right trochanteric bursitis, discussed the rationale of undergoing a right trochanteric bursa injection under fluoroscopic guidance since this could be potentially therapeutic   procedures, its risks, and benefits were explained in detail to the patient  Risks include but are not limited to bleeding, infection, hematoma formation, abscess formation, weakness, headache, failure the pain to improve, nerve irritation or damage, and potential worsening of the pain   The patient verbalized understanding and wished to proceed with the procedure  patient also has generalized whole body pain and does not currently carry a diagnosis for the cause of her generalized whole body pain  As such, I felt it reasonable to have the patient see a rheumatologist, Dr Wilfredo Nathan, in regards to evaluation for rheumatological process that may be contributing to her pain complaints  is planned with the patient in 4 weeks time or sooner as warranted  Discharge instructions were provided  I personally saw and examined the patient and I agree with the above discussed plan of care  The patient has the current Goals: Decreased pain and improved level of functioning  Patient is able to Self-Care  Chief Complaint  Chief Complaints    1  Leg Pain    History of Present Illness  Ms Joseluis Carrion is a pleasant 53-year-old  female who presents to [facility] for initial evaluation of the above-stated pain complaints  The patient has a past medical and chronic pain history as outlined in the impression section below  She was referred to our office by Dr Hesham Iniguez  The patient reports a 1-1/2 year history of generalized body pain  She does report a recent fall and states that her right hip is bothering her significantly at today's visit  She states that her pain is severe in 10/10 on the verbal numerical pain rating scale  Her pain is constant in nature  She describes her pain as âburning, cramping, and pressure like  â The patient does state that her primary pain complaint involves her right hip at today's visit  She does state that physical therapy did not provide any pain relief  She is currently using oxycodone/acetaminophen as needed for pain relief  The patient is currently retired    Referring physician is  Dale  Primary Care physician is  Hansa0 Lakisha Jones presents with complaints of constant episodes of right anterior upper leg pain, described as burning, radiating to the right thigh  On a scale of 1 to 10, the patient rates the pain as 10  Review of Systems   Constitutional: recent weight gain, but-- no fever-- and-- no recent weight loss  Eyes: double vision, but-- no blurry vision  Cardiovascular: no chest pain,-- no palpitations-- and-- no lower extremity edema  Respiratory: shortness of breath-- and-- wheezing  Musculoskeletal: difficulty walking,-- muscle weakness-- and-- joint stiffness, but-- no joint swelling,-- no limb swelling,-- no pain in extremity-- and-- no decreased range of motion  Neurological: dizziness, but-- no difficulty swallowing,-- no memory loss,-- no loss of consciousness-- and-- no seizures  Gastrointestinal: constipation, but-- no nausea,-- no vomiting-- and-- no diarrhea  Genitourinary: no difficulty initiating urine stream,-- no genital pain-- and-- no frequent urination  Integumentary: no complaints of skin rash  Psychiatric: no depression  Endocrine: excessive thirst, but-- no adrenal disease,-- no hypothyroidism-- and-- no hyperthyroidism  Hematologic/Lymphatic: no tendency for easy bruising-- and-- no tendency for easy bleeding  ROS reviewed  Active Problems  Problems    1  Abnormal CT scan, liver (793 3) (R93 2)   2  Abnormal tumor markers (795 89) (R97 8)   3  Acquired hypothyroidism (244 9) (E03 9)   4  Acute hip pain, right (719 45) (M25 551)   5  Arthritis (716 90) (M19 90)   6  Asthma (493 90) (J45 909)   7  Balance problem (781 99) (R26 89)   8  Chronic pain (338 29) (G89 29)   9  Concussion (850 9) (S06 0X9A)   10  Constipation due to opioid therapy (564 09,E935 2) (K59 03,T40 2X5A)   11  Daytime hypersomnolence (780 54) (G47 19)   12  Diabetes mellitus with neuropathy (250 60,357 2) (E11 40)   13  Diastolic dysfunction (450 2) (I51 9)   14  Dyspnea on exertion (786 09) (R06 09)   15  Edema (782 3) (R60 9)   16  Elevated LFTs (790 6) (R79 89)   17   Generalized colicky abdominal pain (708 7) (R10 83)   18  Greater trochanteric bursitis of right hip (726 5) (M70 61)   19  Hyperglycemia (790 29) (R73 9)   20  Hypertension (401 9) (I10)   21  Hypoventilation associated with obesity syndrome (278 03) (E66 2)   22  Iliotibial band syndrome, right leg (728 89) (M76 31)   23  Irritable bowel syndrome with diarrhea (564 1) (K58 0)   24  Left hip pain (719 45) (M25 552)   25  Liver lesion (573 8) (K76 9)   26  Lung nodules (793 19) (R91 8)   27  Meningiomas, multiple (237 6) (D42 9)   28  Mild persistent asthma with acute exacerbation (493 92) (J45 31)   29  Moderate episode of recurrent major depressive disorder (296 32) (F33 1)   30  Moderate obstructive sleep apnea (327 23) (G47 33)   31  Morbid obesity due to excess calories (278 01) (E66 01)   32  Pancreatic cyst (577 2) (K86 2)   33  Pulmonary emphysema (492 8) (J43 9)   34  Pulmonary nodules (793 19) (R91 8)   35  Restless leg syndrome (333 94) (G25 81)   36  Sleep apnea (780 57) (G47 30)   37  Thyroid disease (246 9) (E07 9)    Past Medical History  Problems    1  History of Acute on chronic systolic congestive heart failure (428 23,428 0) (I50 23)   2  History of Anxiety (300 00) (F41 9)   3  History of Cellulitis of right lower extremity (682 6) (L03 115)   4  History of Confusion and disorientation (300 9) (F99)   5  History of Depression, acute (296 20) (F32 9)   6  History of Dry skin (701 1) (L85 3)   7  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)   8  History of Emphysema, 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518 2) (J98 3)   9  Excessive thirst (783 5) (R63 1)   10  Flu vaccine need (V04 81) (Z23)   11  H/O blurred vision (V12 49) (Z86 69)   12  History of Hair loss (704 00) (L65 9)   13  History of aortic valve disorder (V12 59) (Z86 79)   14  History of asthma (V12 69) (Z87 09)   15  History of cataract (V12 49) (Z86 69)   16  History of congestive heart failure (V12 59) (Z86 79)   17  History of depression (V11 8) (Z86 59)   18   History of eye injury (V15 59) (Z87 828)   19  History of fatigue (V13 89) (Z87 898)   20  History of hearing loss (V12 49) (Z86 69)   21  History of hypertension (V12 59) (Z86 79)   22  History of malignant neoplasm of skin (V10 83) (Z85 828)   23  History of malignant neoplasm of thyroid (V10 87) (Z85 850)   24  History of memory loss (V11 9) (Z86 59)   25  History of nasal congestion (V12 69) (Z87 09)   26  History of nasal discharge (V12 69) (Z87 09)   27  History of night sweats (V15 89) (Z87 898)   28  History of seasonal allergies (V15 09) (Z88 9)   29  History of thyroid disease (V12 29) (Z86 39)   30  History of tinnitus (V12 49) (Z86 69)   31  History of Leg pain (729 5) (M79 606)   32  History of Restless leg syndrome (333 94) (G25 81)   33  History of Skin cancer (melanoma) (172 9) (C43 9)   34  History of Sleep disorder (780 50) (G47 9)   35  History of Swelling of extremity (729 81) (M79 89)    The active problems and past medical history were reviewed and updated today  Surgical History  Problems    1  History of Cholecystectomy   2  History of Eye Surgery   3  History of Hysterectomy   4  History of Removal Of Lesion   5  History of Resection Of Pericardial Cyst Or Tumor   6  History of Thoracoscopy (Therapeutic) W/ Excision Of Pericardial Cyst   7  History of Thyroid Surgery   8  History of Total Knee Replacement Left   9  History of Total Knee Replacement Right    The surgical history was reviewed and updated today  Family History  Mother    1  Family history of Asthma   2  Denied: Family history of Colon cancer   3  Family history of    4  Denied: Family history of alcoholism   5  Denied: Family history of mental disorder  Father    10  Denied: Family history of Colon cancer   7  Family history of    6  Denied: Family history of alcoholism   9  Family history of arthritis (V17 7) (Z82 61)   10  Denied: Family history of mental disorder   11  Family history of Liver cancer  Sister    15   Family history of Alive and well   13  Family history of   Brother    15  Family history of Alive and well  Maternal Grandmother    15  Family history of   Maternal Grandfather    12  Family history of     The family history was reviewed and updated today  Social History  Problems    · Active advance directive () (Z78 9)   · Caffeine use () (F15 90)   · Drinks coffee   · Feels safe at home   · Four children   · High school graduate   · Never a smoker   · No alcohol use   · No drug use   · Primary language is [place]   · Retired   · Seeing a dentist  The social history was reviewed and updated today  The social history was reviewed and is unchanged  Current Meds   1  Aspirin 325 MG Oral Tablet; TAKE 1 TABLET DAILY; Therapy: (Recorded:2017) to Recorded   2  Atorvastatin Calcium 20 MG Oral Tablet; Take 1 tablet daily; Therapy: 95RZI7682 to (Evaluate:2018)  Requested for: 11KGO4707; Last Rx:2017 Ordered   3  Cod Liver Oil/Vitamins A & D Oral Capsule; Take 1 teaspoon daily; Therapy: (Recorded:2017) to Recorded   4  Demadex 20 MG Oral Tablet; usually 2x a day 3x for 3 days starting today then back to 2x daily; Therapy: 72CMM9667 to Recorded   5  Dicyclomine HCl - 10 MG Oral Capsule; TAKE 1 CAPSULE 3 TIMES DAILY  Requested for: 15UPD1805; Last Rx:2017 Ordered   6  Dulcolax TBEC; take 2 tablet twice daily; Therapy: () 6702 7703) to Recorded   7  DuoNeb 0 5-2 5 (3) MG/3ML Inhalation Solution; USE 1 UNIT DOSE IN NEBULIZER EVERY 4 HOURS AS NEEDED; Therapy: (Recorded:79Zdv7392) to Recorded   8  Escitalopram Oxalate 10 MG Oral Tablet; take 1 tablet every day; Therapy: 41JTY5340 to (Last Rx:2017)  Requested for: 47EXC8850 Ordered   9  Folic Acid 1 MG Oral Tablet; take 1 tablet by mouth once daily; Therapy: 61NGJ6733 to (Evaluate:65Gan8666)  Requested for: 11PMD2568; Last Rx:2017 Ordered   10   Levothyroxine Sodium 125 MCG Oral Tablet; TAKE ONE TABLET BY MOUTH ONCE DAILY  AS DIRECTED  Requested for: 09TKW6021; Last EV:18JFU2437 Ordered   6  Lisinopril 10 MG Oral Tablet; TAKE 1 TABLET DAILY  Requested for: 44SHV9356; Last  Rx:78Twi9823 Ordered   12  MetOLazone 2 5 MG Oral Tablet; TAKE 1 TABLET DAILY ON MONDAY, WEDNESDAY, AND  FRIDAY; Therapy: 09HQU8905 to (Last Rx:71Rhi8667)  Requested for: 82YFE3439 Ordered   13  Mirapex 1 MG Oral Tablet; TAKE 1 TABLET 3 TIMES DAILY; Therapy: 84CTR7655 to Recorded   14  Movantik 25 MG Oral Tablet; Take 1 tablet prior to first meal of the day or two hours after  first meal  Requested for: 59PPC5570; Last Rx:44Pak7336 Ordered   15  Multi-Vitamins Oral Tablet; TAKE 1 TABLET DAILY; Therapy: 87GKQ4551 to Recorded   16  NovoLOG Mix 70/30 FlexPen (70-30) 100 UNIT/ML Subcutaneous Suspension  Pen-injector; INJECT SUBCUTANEOUSLY AS DIRECTED; Therapy: 07XMX2930 to (Last XZ:72FWT9047)  Requested for: 64KUQ7582 Ordered   17  Oxycodone-Acetaminophen 5-325 MG Oral Tablet; TAKE 1 TABLET Every 6 hours FOR  PAIN RELIEF; Therapy: 02Apr2015 to (Evaluate:42Ajz2638); Last Rx:27Okv0702 Ordered    The medication list was reviewed and updated today  Allergies  Medication    1  Latex Gloves MISC   2  Toradol Oral TABS  Non-Medication    3  No Known Environmental Allergies   4  No Known Food Allergies    Vitals  Vital Signs    Recorded: 13Dec2017 09:47AM   Temperature 98 1 F   Heart Rate 84   Systolic 708   Diastolic 64   Height 5 ft 2 in   Weight 234 lb    BMI Calculated 42 8   BSA Calculated 2 04   Pain Scale 10       Physical Exam   Constitutional  General appearance: Well developed, well nourished, alert, in no distress, non-toxic and no overt pain behavior  Eyes  Sclera: anicteric  HEENT  Hearing grossly intact  Pulmonary  Respiratory effort: Even and unlabored  Cardiovascular  Examination of extremities: No edema or pitting edema present  Skin  Skin and subcutaneous tissue: Normal without rashes or lesions, well hydrated  Psychiatric  Mood and affect: Mood and affect appropriate  Neurologic  Cranial nerves: Cranial nerves II-XII grossly intact  Musculoskeletal  Gait and station: Normal    Lumbar/Sacral Spine examination demonstrates Lumbosacral Spine: Generalized body tenderness noted throughout the thoracolumbar spine  There are cysts tender points noted both above and below the diaphragm bilaterally  Tenderness: None except at lumbar spine  Tenderness noted over the right trochanteric bursa  There was no sacroiliac joint tenderness noted  Lumbosacral Spine Sensory: intact to light touch and pinprick in the lower extremities  ROM Lumbosacral Spine: Lumbar facet loading is negative bilaterally  Flexion was not restricted-- and-- was painless  Extension was not restricted-- and-- was painless  Left lateral flexion was not restricted-- and-- was painless  Right lateral flexion was not restricted-- and-- was painless  Rotation to the left was not restricted-- and-- was painless  Rotation to the right was not restricted-- and-- was painless  Left hip internal rotation was not restricted-- and-- was not painful  Right hip internal rotation was not restricted-- and-- was not painful  Left external hip rotation was not restricted-- and-- was not painful  Right hip external rotation was not restricted-- and-- was not painful  Right Foot Plantar Flexion: 5/5  Left Foot Plantar Flexion: 5/5  Right Foot Dorsiflexion: 5/5  Left Foot Dorsiflexion: 5/5  Right Knee Flexion: 5/5  Left Knee Flexion: 5/5  Right Knee Extension: 5/5  Left Knee Extension: 5/5  Right Hip Flexion: 5/5  Left Hip Flexion: 5/5  Right Hip Extension: 5/5  Left Hip Extension: 5/5  Right Hip Abduction: 5/5  Left Hip Abduction: 5/5  Right Hip Adduction: 5/5-- and-- 5/5  Evaluation of Muscle Stretch Reflexes on the right side demonstrates 2/4 Knee Jerk Reflex-- and-- 2/4 Ankle Jerk Reflex    Evaluation of Muscle Stretch Reflexes on the left side demonstrates 2/4 Knee Jerk Reflex-- and-- 2/4 Ankle Jerk Reflex  Future Appointments    Date/Time Provider Specialty Site   12/21/2017 02:45 PM Kuldeep Maki MD [facility]   12/27/2017 10:40 AM LASHAE Fleming  [facility]   02/08/2018 11:00 AM Jimmy Barksdale, DO Orthopedic Surgery [facility]   03/09/2018 12:00 PM JENIFER Healy   Pulmonary Medicine [facility] [place]   04/06/2018 01:00 PM Casandra Neri, [facility] Neurosurgery [facility]   04/06/2018 01:15 PM Pantera Oneill MD PhD Neurosurgery 17 [facility]   Electronically signed by : LASHAE Rice ; Dec 13 2017 10:29AM EST                       (Author)

## 2017-12-15 ENCOUNTER — APPOINTMENT (OUTPATIENT)
Dept: PHYSICAL THERAPY | Facility: CLINIC | Age: 77
End: 2017-12-15
Payer: COMMERCIAL

## 2017-12-18 ENCOUNTER — APPOINTMENT (OUTPATIENT)
Dept: PHYSICAL THERAPY | Facility: CLINIC | Age: 77
End: 2017-12-18
Payer: COMMERCIAL

## 2017-12-18 ENCOUNTER — APPOINTMENT (OUTPATIENT)
Dept: PULMONOLOGY | Facility: CLINIC | Age: 77
End: 2017-12-18
Payer: COMMERCIAL

## 2017-12-18 PROCEDURE — G8979 MOBILITY GOAL STATUS: HCPCS

## 2017-12-18 PROCEDURE — G0424 PULMONARY REHAB W EXER: HCPCS

## 2017-12-18 PROCEDURE — 97162 PT EVAL MOD COMPLEX 30 MIN: CPT

## 2017-12-18 PROCEDURE — G8978 MOBILITY CURRENT STATUS: HCPCS

## 2017-12-19 ENCOUNTER — APPOINTMENT (OUTPATIENT)
Dept: PHYSICAL THERAPY | Facility: CLINIC | Age: 77
End: 2017-12-19
Payer: COMMERCIAL

## 2017-12-19 PROCEDURE — 97140 MANUAL THERAPY 1/> REGIONS: CPT

## 2017-12-19 PROCEDURE — 97110 THERAPEUTIC EXERCISES: CPT

## 2017-12-21 ENCOUNTER — GENERIC CONVERSION - ENCOUNTER (OUTPATIENT)
Dept: OTHER | Facility: OTHER | Age: 77
End: 2017-12-21

## 2017-12-21 ENCOUNTER — HOSPITAL ENCOUNTER (OUTPATIENT)
Facility: AMBULARY SURGERY CENTER | Age: 77
Setting detail: OUTPATIENT SURGERY
Discharge: HOME | End: 2017-12-21
Attending: ANESTHESIOLOGY | Admitting: ANESTHESIOLOGY
Payer: COMMERCIAL

## 2017-12-21 ENCOUNTER — HOSPITAL ENCOUNTER (OUTPATIENT)
Dept: RADIOLOGY | Facility: HOSPITAL | Age: 77
Setting detail: OUTPATIENT SURGERY
Discharge: HOME | End: 2017-12-21
Payer: COMMERCIAL

## 2017-12-21 VITALS
OXYGEN SATURATION: 96 % | HEART RATE: 80 BPM | DIASTOLIC BLOOD PRESSURE: 100 MMHG | RESPIRATION RATE: 18 BRPM | TEMPERATURE: 98.3 F | SYSTOLIC BLOOD PRESSURE: 166 MMHG

## 2017-12-21 PROBLEM — M70.61 TROCHANTERIC BURSITIS OF RIGHT HIP: Status: ACTIVE | Noted: 2017-12-21

## 2017-12-21 PROBLEM — M25.551 PAIN IN RIGHT HIP: Status: ACTIVE | Noted: 2017-12-21

## 2017-12-21 PROCEDURE — 73501 X-RAY EXAM HIP UNI 1 VIEW: CPT

## 2017-12-21 RX ORDER — LIDOCAINE WITH 8.4% SOD BICARB 0.9%(10ML)
SYRINGE (ML) INJECTION AS NEEDED
Status: DISCONTINUED | OUTPATIENT
Start: 2017-12-21 | End: 2017-12-21 | Stop reason: HOSPADM

## 2017-12-21 RX ORDER — LIDOCAINE HYDROCHLORIDE 10 MG/ML
INJECTION, SOLUTION EPIDURAL; INFILTRATION; INTRACAUDAL; PERINEURAL AS NEEDED
Status: DISCONTINUED | OUTPATIENT
Start: 2017-12-21 | End: 2017-12-21 | Stop reason: HOSPADM

## 2017-12-21 RX ORDER — METHYLPREDNISOLONE ACETATE 80 MG/ML
INJECTION, SUSPENSION INTRA-ARTICULAR; INTRALESIONAL; INTRAMUSCULAR; SOFT TISSUE AS NEEDED
Status: DISCONTINUED | OUTPATIENT
Start: 2017-12-21 | End: 2017-12-21 | Stop reason: HOSPADM

## 2017-12-21 NOTE — DISCHARGE INSTRUCTIONS
1  Do not apply heat to any area that is numb  If you have discomfort or soreness at the injection site, you may apply ice today, 20 minutes on and 20 minutes off  Tomorrow you may use ice or warm, moist heat  Do not apply ice or heat directly to the skin  2  You may have an increase or change in the discomfort for 36-48 hours after your treatment  Apply ice and continue with any pain medicine you have been prescribed  3  Do not do anything strenuous today  You may shower, but no tub baths or hot tubs today  You may resume your normal activities tomorrow, but do not overdo it  Resume normal activities slowly when you are feeling better  4  If you experience redness, drainage or swelling at the injection site, or if you develop a fever above 100 degrees, please call [facility] at [phone] or go to the Emergency Room  5  Continue to take all routine medicines prescribed by your primary care physician unless otherwise instructed by our staff  Most blood thinners should be started again according to your regularly scheduled dosing  If you have any questions, please give our office a call  If you have a problem specifically related to your procedure, please call our office at [phone]  Problems not related to your procedure should be directed to your primary care physician

## 2017-12-21 NOTE — OP NOTE
ATTENDING PHYSICIAN:  Jarvis Gonsalves MD     PROCEDURE: Right trochanteric bursa injection under fluoroscopic guidance  PREPROCEDURE DIAGNOSIS: Trochanteric bursitis  POSTPROCEDURE DIAGNOSIS: Trochanteric bursitis  ANESTHESIA:  Local     ESTIMATED BLOOD LOSS:  Minimal     COMPLICATIONS:  None  LOCATION:  [address]  CONSENT:  Today's procedure, its potential benefits as well as its risks and potential side effects were reviewed  Discussed risks of the procedure including bleeding, infection, nerve irritation or damage, reactions to the medications, failure of the pain to improve, and potential worsening of the pain were explained to the patient who verbalized understanding and who wished to proceed  Written informed consent was thereby obtained  DESCRIPTION OF THE PROCEDURE:  After written informed consent was obtained, the patient was taken to the fluoroscopy suite and placed in the lateral decubitus position  The greater trochanter was then identified with palpation and confirmed with fluoroscopy  The needle entry site was marked  The skin was prepped with antiseptic swabs in the usual sterile fashion  A 25-gauge 3-1/2-inch spinal needle was advanced until os was contacted at the greater trochanter  The needle was withdrawn 1 mm and an injectate consisting of 4 mL of 0 25% bupivacaine mixed with 1 mL of 40 mg/mL Depo-Medrol was slowly injected after negative aspiration  If this were a bilateral trochanteric bursa injection, the procedure was then repeated after asking the patient to move into the opposite lateral decubitus position  The patient tolerated the procedure well and all needles were removed with the tips intact  Hemostasis was maintained  There were no apparent paresthesias or complications  The skin was wiped clean and a Band-Aid was placed as appropriate   The patient was monitored for an appropriate period of time following the procedure and remained hemodynamically stable and neurovascularly intact following the procedure  The patient was ultimately discharged to home with supervision in good condition and instructed to call the office in a few days for an update or sooner as warranted  I was present and participated in all key and critical portions of this procedure      Shadia Rose MD  12/21/2017  2:33 PM

## 2017-12-22 ENCOUNTER — APPOINTMENT (OUTPATIENT)
Dept: PULMONOLOGY | Facility: CLINIC | Age: 77
End: 2017-12-22
Payer: COMMERCIAL

## 2017-12-22 ENCOUNTER — APPOINTMENT (OUTPATIENT)
Dept: PHYSICAL THERAPY | Facility: CLINIC | Age: 77
End: 2017-12-22
Payer: COMMERCIAL

## 2017-12-22 PROCEDURE — G0424 PULMONARY REHAB W EXER: HCPCS

## 2017-12-26 ENCOUNTER — GENERIC CONVERSION - ENCOUNTER (OUTPATIENT)
Dept: OBGYN CLINIC | Facility: CLINIC | Age: 77
End: 2017-12-26

## 2017-12-27 ENCOUNTER — APPOINTMENT (OUTPATIENT)
Dept: PHYSICAL THERAPY | Facility: CLINIC | Age: 77
End: 2017-12-27
Payer: COMMERCIAL

## 2017-12-27 ENCOUNTER — APPOINTMENT (OUTPATIENT)
Dept: PULMONOLOGY | Facility: CLINIC | Age: 77
End: 2017-12-27
Payer: COMMERCIAL

## 2017-12-27 ENCOUNTER — ALLSCRIPTS OFFICE VISIT (OUTPATIENT)
Dept: OTHER | Facility: OTHER | Age: 77
End: 2017-12-27

## 2017-12-27 PROCEDURE — 97110 THERAPEUTIC EXERCISES: CPT

## 2017-12-27 PROCEDURE — 97140 MANUAL THERAPY 1/> REGIONS: CPT

## 2017-12-27 PROCEDURE — G0424 PULMONARY REHAB W EXER: HCPCS

## 2017-12-28 NOTE — PROGRESS NOTES
Assessment   1  Diabetes mellitus with neuropathy (250 60,357 2) (E11 40)   2  Diastolic dysfunction (032 8) (I51 9)   3  Balance problem (781 99) (R26 89)   4  Chronic pain (338 29) (G89 29)   5  Edema (782 3) (R60 9)   6  Hypertension (401 9) (I10)   7  Hypoventilation associated with obesity syndrome (278 03) (E66 2)   8  Dyspnea on exertion (786 09) (R06 09)   9  Meningiomas, multiple (237 6) (D42 9)   10  Pulmonary emphysema (492 8) (J43 9)   11  Mild persistent asthma with acute exacerbation (493 92) (J45 31)   12  Restless leg syndrome (333 94) (G25 81)    Plan    · EKG/ECG- POC; Status:Complete;   Done: 12RHH6400   Perform: In Office; [id number]; Last Updated By:Angella Polo; 2017 11:15:12 AM;Ordered; For:Chest pain, unspecified type; Ordered By:Everardo Arellano;   · (1) CBC/PLT/DIFF; Status:Active; Requested for:2018; Perform:[facility]; BOC:[id number];EABDYLR; For:Diastolic dysfunction; Ordered By:Everardo Arellano;   · (1) COMPREHENSIVE METABOLIC PANEL; Status:Active; Requested for:2018; Perform:[facility]; [id number];QFZEYSC; For:Diastolic dysfunction; Ordered By:Everardo Arellano;   · (1) LIPID PANEL FASTING W DIRECT LDL REFLEX; Status:Active; Requested    for:2018; Perform:[facility]; KIRIT:[id number];FETKDDX; For:Diastolic dysfunction; Ordered By:Everardo Arellano;   · (1) MAGNESIUM; Status:Active; Requested for:2018; Perform:[facility]; [id number]; For:Diastolic dysfunction; Ordered By:Everardo Arellano;   · (1) MICROALBUMIN CREATININE RATIO, RANDOM URINE; Status:Active; Requested    for:2018; Perform:[facility]; ARU:[id number];OQJMABM; For:Diastolic dysfunction; Ordered By:Everardo Arellano;   · (1) NT- BNP (PRO BRAIN NATRIURETIC PEPTIDE); Status:Active; Requested    for:20Xwg7506; Perform:[facility]; CZZ:[id number];UHUXGZD; For:Diastolic dysfunction; Ordered By:Everardo Arellano;   · Metoprolol Tartrate 25 MG Oral Tablet; TAKE 1 TABLET TWICE DAILY   Rx By: Rocio Nguyenzhou; Dispense: 30 Days ; #:60 Tablet; Refill: 2;For: Diastolic dysfunction, Dyspnea on exertion, Edema; DEANA = N; Verified Transmission to [facility]; Last Updated By: SystemHoracio; 12/27/2017 11:36:32 AM    Discussion/Summary      Diastolic hf- moderate edema on exam  discussed etiology  multifactorial wt loss add metoprolol 25mg bid sodium control  tartget 2gm daily continue torsemide 20mg twice a day + metolazone three times a weeks elevation of legs   apnea awaiting cpap  needs compliance  discussed relation with edema   followed by neurosurgery   wt loss        The patient was counseled regarding diagnostic results,-- instructions for management,-- prognosis,-- patient and family education,-- risks and benefits of treatment options,-- importance of compliance with treatment  total time of encounter was 60 mins minutes-- and-- 40 min on edema control minutes was spent counseling  Chief Complaint   Patient is here as referred by Pulmonologist  chest pain, sob/falk, lightheadedness and Le Edema right worse than left /cg      History of Present Illness   HPI: Appreciate consultation for edema and shortness of breath woman with a history of obesity, sleep apnea awaiting CPAP, chronic diastolic heart failure on diuretic therapy presents with continued severe edema and shortness of breath on exertion  She continues to feel significant fatigue and dyspnea  She has not been taking her diuretic over the holiday and has had some dietary indiscretion  She does not watch or count her sodium intake though she does not add any sodium to her diet  She has had some dyspnea walking 1-2 blocks  She also reports increased fatigue  She denies having any bleeding or bruising she denies having any falls  She denies having any irregular heart rhythm  twice a day- hard to take medication mod- wed- fri          Review of Systems           Cardiac: as noted in HPI-- and-- no palpitations present  Skin: No complaints of nonhealing sores or skin rash  Genitourinary: No complaints of recurrent urinary tract infections, frequent urination at night, difficult urination, blood in urine, kidney stones, loss of bladder control, kidney problems, denies any birth control or hormone replacement, is not post menopausal, not currently pregnant  Psychological: No complaints of feeling depressed, anxiety, panic attacks, or difficulty concentrating  General: trouble sleeping, but-- No complaints of trouble sleeping, lack of energy, fatigue, appetite changes, weight changes, fever, frequent infections, or night sweats  -- and-- as noted in HPI  Respiratory: shortness of breath, but-- as noted in HPI  HEENT: No complaints of serious problems, hearing problems, nose problems, throat problems, or snoring  Gastrointestinal: No complaints of liver problems, nausea, vomiting, heartburn, constipation, bloody stools, diarrhea, problems swallowing, adbominal pain, or rectal bleeding  Hematologic: No complaints of bleeding disorders, anemia, blood clots, or excessive brusing  Neurological: No complaints of numbness, tingling, dizziness, weakness, seizures, headaches, syncope or fainting, AM fatigue, daytime sleepiness, no witnessed apnea episodes  Musculoskeletal: No complaints of arthritis, back pain, or painfull swelling  ROS reviewed  Active Problems   1  Abnormal CT scan, liver (793 3) (R93 2)   2  Abnormal tumor markers (795 89) (R97 8)   3  Acquired hypothyroidism (244 9) (E03 9)   4  Acute hip pain, right (719 45) (M25 551)   5  Arthritis (716 90) (M19 90)   6  Asthma (493 90) (J45 909)   7  Balance problem (781 99) (R26 89)   8  Chronic pain (338 29) (G89 29)   9  Chronic pain syndrome (338 4) (G89 4)   10  Concussion (850 9) (S06 0X9A)   11  Constipation due to opioid therapy (564 09,E935 2) (K59 03,T40 2X5A)   12  Daytime hypersomnolence (780 54) (G47 19)   13  Diabetes mellitus with neuropathy (250 60,357 2) (E11 40)   14  Diastolic dysfunction (027 2) (I51 9)   15  Dyspnea on exertion (786 09) (R06 09)   16  Edema (782 3) (R60 9)   17  Elevated LFTs (790 6) (R79 89)   18  Generalized colicky abdominal pain (789 7) (R10 83)   19  Generalized pain (780 96) (R52)   20  Greater trochanteric bursitis of right hip (726 5) (M70 61)   21  Hyperglycemia (790 29) (R73 9)   22  Hypertension (401 9) (I10)   23  Hypoventilation associated with obesity syndrome (278 03) (E66 2)   24  Iliotibial band syndrome, right leg (728 89) (M76 31)   25  Irritable bowel syndrome with diarrhea (564 1) (K58 0)   26  Left hip pain (719 45) (M25 552)   27  Liver lesion (573 8) (K76 9)   28  Lung nodules (793 19) (R91 8)   29  Meningiomas, multiple (237 6) (D42 9)   30  Mild persistent asthma with acute exacerbation (493 92) (J45 31)   31  Moderate episode of recurrent major depressive disorder (296 32) (F33 1)   32  Moderate obstructive sleep apnea (327 23) (G47 33)   33  Morbid obesity due to excess calories (278 01) (E66 01)   34  Pancreatic cyst (577 2) (K86 2)   35  Pulmonary emphysema (492 8) (J43 9)   36  Pulmonary nodules (793 19) (R91 8)   37  Restless leg syndrome (333 94) (G25 81)   38  Sleep apnea (780 57) (G47 30)   39   Thyroid disease (246 9) (E07 9)    Past Medical History    · History of Acute on chronic systolic congestive heart failure (428 23,428 0) (I50 23)   · History of Anxiety (300 00) (F41 9)   · History of Cellulitis of right lower extremity (682 6) (L03 115)   · History of Confusion and disorientation (300 9) (F99)   · History of Depression, acute (296 20) (F32 9)   · History of Dry skin (701 1) (L85 3)   · 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)   · History of Emphysema, 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518 2) (J98 3)   · Excessive thirst (783 5) (R63 1)   · Flu vaccine need (V04 81) (Z23)   · H/O blurred vision (V12 49) (Z86 69)   · History of Hair loss (704 00) (L65 9)   · History of aortic valve disorder (V12 59) (Z86 79)   · History of asthma (V12 69) (Z87 09)   · History of cataract (V12 49) (Z86 69)   · History of congestive heart failure (V12 59) (Z86 79)   · History of depression (V11 8) (Z86 59)   · History of eye injury (V15 59) (K20 814)   · History of fatigue (V13 89) (R40 580)   · History of hearing loss (V12 49) (Z86 69)   · History of hypertension (V12 59) (Z86 79)   · History of malignant neoplasm of skin (V10 83) (L13 367)   · History of malignant neoplasm of thyroid (V10 87) (Z85 850)   · History of memory loss (V11 9) (Z86 59)   · History of nasal congestion (V12 69) (Z87 09)   · History of nasal discharge (V12 69) (Z87 09)   · History of night sweats (V15 89) (Z87 898)   · History of seasonal allergies (V15 09) (Z88 9)   · History of thyroid disease (V12 29) (Z86 39)   · History of tinnitus (V12 49) (Z86 69)   · History of Leg pain (729 5) (M79 606)   · History of Restless leg syndrome (333 94) (G25 81)   · History of Skin cancer (melanoma) (172 9) (C43 9)   · History of Sleep disorder (780 50) (G47 9)   · History of Swelling of extremity (729 81) (M79 89)     The active problems and past medical history were reviewed and updated today  Surgical History    · History of Cholecystectomy   · History of Eye Surgery   · History of Hysterectomy   · History of Removal Of Lesion   · History of Resection Of Pericardial Cyst Or Tumor   · History of Thoracoscopy (Therapeutic) W/ Excision Of Pericardial Cyst   · History of Thyroid Surgery   · History of Total Knee Replacement Left   · History of Total Knee Replacement Right     The surgical history was reviewed and updated today         Family History   Mother    · Family history of Asthma   · Denied: Family history of Colon cancer   · Family history of    · Denied: Family history of alcoholism   · Denied: Family history of mental disorder  Father    · Denied: Family history of Colon cancer   · Family history of    · Denied: Family history of alcoholism   · Family history of arthritis (V17 7) (Z82 61)   · Denied: Family history of mental disorder   · Family history of Liver cancer  Sister    · Family history of Alive and well   · Family history of   Brother    · Family history of Alive and well  Maternal Grandmother    · Family history of   Maternal Grandfather    · Family history of      The family history was reviewed and updated today  Social History    · Active advance directive (V49 89) (Z78 9)   · copy not obtained   · Caffeine use (V49 89) (F15 90)   · Drinks coffee   · Feels safe at home   · Four children   · High school graduate   · Never a smoker   · No alcohol use   · No drug use   · Primary language is [place]   · Retired   · Seeing a dentist  The social history was reviewed and updated today  Current Meds    1  ALPRAZolam ER 1 MG Oral Tablet Extended Release 24 Hour; TAKE 1 TABLET DAILY; Therapy: (Recorded:39Ghg8864) to Recorded   2  Aspirin 325 MG Oral Tablet; TAKE 1 TABLET DAILY; Therapy: (Recorded:83Kxv0770) to Recorded   3  Cod Liver Oil/Vitamins A & D Oral Capsule; Take 1 teaspoon daily; Therapy: (Recorded:33Lfu8226) to Recorded   4  Dicyclomine HCl - 10 MG Oral Capsule; TAKE 1 CAPSULE 3 TIMES DAILY; Therapy: (Recorded:24Caf7291) to Recorded   5  Dulcolax TBEC; take 2 tablet twice daily; Therapy: (0345 74 47 21) to Recorded   6  DuoNeb 0 5-2 5 (3) MG/3ML Inhalation Solution; USE 1 UNIT DOSE IN NEBULIZER     EVERY 4 HOURS AS NEEDED; Therapy: (Recorded:60Edc0555) to Recorded   7  Escitalopram Oxalate 10 MG Oral Tablet; 1 every day; Therapy: (Recorded:20Cpl8523) to Recorded   8  Folic Acid 1 MG Oral Tablet; Take 1 tablet daily; Therapy: 68ORO1563 to (Last Rx:93Nws6677)  Requested for: 02Iuy3884 Ordered   9  Lasix 40 MG Oral Tablet; Take: 2 tablets in the a m  and one tablet in the afternoon;      Therapy: (Recorded:93Tdr6821) to Recorded   10  Levothyroxine Sodium 125 MCG Oral Tablet; TAKE ONE TABLET BY MOUTH ONCE DAILY      AS DIRECTED  Requested for: 25Oct2017; Last QI:30FFN4518 Ordered   11  Lisinopril 10 MG Oral Tablet; TAKE 1 TABLET DAILY; Therapy: (Recorded:33Ssi0536) to Recorded   12  LORazepam 1 MG Oral Tablet; Take ONE tablet 1 hour before MRI  May repeat once 15      minutes before MRI; Therapy: (Recorded:49Nvz0400) to Recorded   13  MetOLazone 2 5 MG Oral Tablet; TAKE 1 TABLET DAILY ON MONDAY, WEDNESDAY, AND      FRIDAY; Therapy: 07MAY7403 to (Last Rx:62Lff6967)  Requested for: 46HLH8365 Ordered   14  Multi-Vitamins Oral Tablet; TAKE 1 TABLET DAILY; Therapy: 99ACB5250 to Recorded   15  NovoLOG Mix 70/30 FlexPen (70-30) 100 UNIT/ML Subcutaneous Suspension      Pen-injector; INJECT SUBCUTANEOUSLY AS DIRECTED; Therapy: 16MUW8456 to (Last JT:13OTS0503)  Requested for: 23XXD4572 Ordered   16  Oxycodone-Acetaminophen 5-325 MG Oral Tablet; TAKE 1 TABLET Every 6 hours FOR      PAIN RELIEF; Therapy: 02Apr2015 to (Evaluate:07Jan2018); Last Rx:76Ejk8969 Ordered   17  Sulindac 200 MG Oral Tablet; take 1 tablet every other day; Therapy: (Recorded:20Tag3999) to Recorded   18  Voltaren 1 % Transdermal Gel; Apply sparingly to affected are three to four times a day; Therapy: (Recorded:01Sbr0387) to Recorded     The medication list was reviewed and updated today  Allergies   1  Latex Gloves MISC   2  Toradol Oral TABS  3  No Known Environmental Allergies   4  No Known Food Allergies    Vitals   Signs   Heart Rate: 82, Apical  Pulse Quality: Normal, Apical  Systolic: 676, RUE, Sitting  Diastolic: 68, RUE, Sitting  BP Cuff Size: Large  Height: 5 ft 2 in  Weight: 227 lb   BMI Calculated: 41 52  BSA Calculated: 2 02  O2 Saturation: 94, RA    Physical Exam        Constitutional drowsy, obesity, pleasant        Eyes      Ophthalmoscopic examination: Abnormal        Ears, Nose, Mouth, and Throat - Oropharynx: Clear, nares are clear, mucous membranes are moist       Neck      Neck and thyroid: Normal, supple, trachea midline, no thyromegaly  Pulmonary      Respiratory effort: No increased work of breathing or signs of respiratory distress  Auscultation of lungs: Clear to auscultation, no rales, no rhonchi, no wheezing, good air movement  Cardiovascular      Auscultation of heart: Abnormal  -- rrr +s1 +S2 +s4  Carotid pulses: Normal, 2+ bilaterally  Peripheral vascular exam: Normal pulses throughout, no tenderness, erythema or swelling  Pedal pulses: Abnormal        Examination of extremities for edema and/or varicosities: Abnormal  -- 2+ edema upper shin  Abdomen      Abdomen: Abnormal        Liver and spleen: No hepatomegaly or splenomegaly  Musculoskeletal Gait and station: Abnormal -- Digits and nails: Normal without clubbing or cyanosis  -- Inspection/palpation of joints, bones, and muscles: Abnormal       Skin - Skin and subcutaneous tissue: Normal without rashes or lesions  Skin is warm and well perfused, normal turgor  Neurologic - Cranial nerves: II - XII intact  -- Speech: Normal        Psychiatric - Orientation to person, place, and time: Normal -- Mood and affect: Normal       Results/Data   I personally reviewed the recording/images in the office today  My interpretation follows  Normal sinus rhythm left axis deviation incomplete right bundle branch block prolonged QTC      Future Appointments      Date/Time Provider Specialty Site   04/06/2018 01:00 PM Kia Reinoso [facility] Neurosurgery [facility]   04/06/2018 01:15 PM Luis Alberto Hill MD PhD Neurosurgery [address]    03/09/2018 12:00 PM JENIFER Martinez   Pulmonary Medicine [facility]   02/08/2018 11:00 AM Haylee Enriquez DO Orthopedic Surgery  [facility]     Signatures    Electronically signed by : LASHAE Blackmon ; Dec 27 2017  2:45PM EST                       (Author)

## 2017-12-29 ENCOUNTER — APPOINTMENT (OUTPATIENT)
Dept: PHYSICAL THERAPY | Facility: CLINIC | Age: 77
End: 2017-12-29
Payer: COMMERCIAL

## 2017-12-29 ENCOUNTER — APPOINTMENT (OUTPATIENT)
Dept: PULMONOLOGY | Facility: CLINIC | Age: 77
End: 2017-12-29
Payer: COMMERCIAL

## 2017-12-29 PROCEDURE — G0424 PULMONARY REHAB W EXER: HCPCS

## 2017-12-29 PROCEDURE — 97110 THERAPEUTIC EXERCISES: CPT

## 2017-12-29 PROCEDURE — 97140 MANUAL THERAPY 1/> REGIONS: CPT

## 2018-01-01 ENCOUNTER — GENERIC CONVERSION - ENCOUNTER (OUTPATIENT)
Dept: OTHER | Facility: OTHER | Age: 78
End: 2018-01-01

## 2018-01-03 ENCOUNTER — APPOINTMENT (OUTPATIENT)
Dept: PHYSICAL THERAPY | Facility: CLINIC | Age: 78
End: 2018-01-03
Payer: COMMERCIAL

## 2018-01-03 ENCOUNTER — APPOINTMENT (OUTPATIENT)
Dept: PULMONOLOGY | Facility: CLINIC | Age: 78
End: 2018-01-03
Payer: COMMERCIAL

## 2018-01-03 PROCEDURE — 97140 MANUAL THERAPY 1/> REGIONS: CPT

## 2018-01-03 PROCEDURE — 97110 THERAPEUTIC EXERCISES: CPT

## 2018-01-03 PROCEDURE — G0424 PULMONARY REHAB W EXER: HCPCS

## 2018-01-05 ENCOUNTER — APPOINTMENT (OUTPATIENT)
Dept: PHYSICAL THERAPY | Facility: CLINIC | Age: 78
End: 2018-01-05
Payer: COMMERCIAL

## 2018-01-05 ENCOUNTER — APPOINTMENT (OUTPATIENT)
Dept: PULMONOLOGY | Facility: CLINIC | Age: 78
End: 2018-01-05
Payer: COMMERCIAL

## 2018-01-05 PROCEDURE — G0424 PULMONARY REHAB W EXER: HCPCS

## 2018-01-05 PROCEDURE — 97110 THERAPEUTIC EXERCISES: CPT

## 2018-01-08 ENCOUNTER — APPOINTMENT (OUTPATIENT)
Dept: PULMONOLOGY | Facility: CLINIC | Age: 78
End: 2018-01-08
Payer: COMMERCIAL

## 2018-01-08 ENCOUNTER — APPOINTMENT (OUTPATIENT)
Dept: PHYSICAL THERAPY | Facility: CLINIC | Age: 78
End: 2018-01-08
Payer: COMMERCIAL

## 2018-01-08 PROCEDURE — 97110 THERAPEUTIC EXERCISES: CPT

## 2018-01-08 PROCEDURE — G0424 PULMONARY REHAB W EXER: HCPCS

## 2018-01-08 PROCEDURE — 97140 MANUAL THERAPY 1/> REGIONS: CPT

## 2018-01-09 ENCOUNTER — APPOINTMENT (EMERGENCY)
Dept: RADIOLOGY | Facility: HOSPITAL | Age: 78
End: 2018-01-09
Payer: COMMERCIAL

## 2018-01-09 ENCOUNTER — HOSPITAL ENCOUNTER (EMERGENCY)
Facility: HOSPITAL | Age: 78
Discharge: HOME | End: 2018-01-10
Attending: EMERGENCY MEDICINE | Admitting: EMERGENCY MEDICINE
Payer: COMMERCIAL

## 2018-01-09 DIAGNOSIS — R10.84 GENERALIZED ABDOMINAL PAIN: Primary | ICD-10-CM

## 2018-01-09 LAB
ANION GAP SERPL CALCULATED.3IONS-SCNC: 1 MMOL/L (ref 4–13)
ATRIAL RATE: 80 BPM
BASOPHILS # BLD AUTO: 0 THOUSANDS/ΜL (ref 0–0.1)
BASOPHILS NFR BLD AUTO: 0 % (ref 0–1)
BUN SERPL-MCNC: 23 MG/DL (ref 5–25)
CALCIUM SERPL-MCNC: 8.8 MG/DL (ref 8.3–10.1)
CHLORIDE SERPL-SCNC: 100 MMOL/L (ref 100–108)
CO2 SERPL-SCNC: 41 MMOL/L (ref 21–32)
CREAT SERPL-MCNC: 0.88 MG/DL (ref 0.6–1.3)
EOSINOPHIL # BLD AUTO: 0.1 THOUSAND/ΜL (ref 0–0.61)
EOSINOPHIL NFR BLD AUTO: 1 % (ref 0–6)
ERYTHROCYTE [DISTWIDTH] IN BLOOD BY AUTOMATED COUNT: 15.4 % (ref 11.6–15.1)
GFR SERPL CREATININE-BSD FRML MDRD: 63 ML/MIN/1.73SQ M
GLUCOSE SERPL-MCNC: 280 MG/DL (ref 65–140)
HCT VFR BLD AUTO: 38 % (ref 37–47)
HGB BLD-MCNC: 12.4 G/DL (ref 12–16)
LIPASE SERPL-CCNC: 121 U/L (ref 73–393)
LYMPHOCYTES # BLD AUTO: 1.7 THOUSANDS/ΜL (ref 0.6–4.47)
LYMPHOCYTES NFR BLD AUTO: 17 % (ref 14–44)
MCH RBC QN AUTO: 30.9 PG (ref 27–31)
MCHC RBC AUTO-ENTMCNC: 32.6 G/DL (ref 31.4–37.4)
MCV RBC AUTO: 95 FL (ref 82–98)
MONOCYTES # BLD AUTO: 0.4 THOUSAND/ΜL (ref 0.17–1.22)
MONOCYTES NFR BLD AUTO: 4 % (ref 4–12)
NEUTROPHILS # BLD AUTO: 8.3 THOUSANDS/ΜL (ref 1.85–7.62)
NEUTS SEG NFR BLD AUTO: 78 % (ref 43–75)
NRBC BLD AUTO-RTO: 0 /100 WBCS
P AXIS: 37 DEGREES
PLATELET # BLD AUTO: 173 THOUSANDS/UL (ref 130–400)
PMV BLD AUTO: 8.5 FL (ref 8.9–12.7)
POTASSIUM SERPL-SCNC: 3.1 MMOL/L (ref 3.5–5.3)
PR INTERVAL: 186 MS
QRS AXIS: -29 DEGREES
QRSD INTERVAL: 100 MS
QT INTERVAL: 420 MS
QTC INTERVAL: 484 MS
RBC # BLD AUTO: 4 MILLION/UL (ref 4.2–5.4)
SODIUM SERPL-SCNC: 142 MMOL/L (ref 136–145)
T WAVE AXIS: 18 DEGREES
TROPONIN I SERPL-MCNC: <0.02 NG/ML
VENTRICULAR RATE: 80 BPM
WBC # BLD AUTO: 10.6 THOUSAND/UL (ref 4.8–10.8)

## 2018-01-09 PROCEDURE — 96374 THER/PROPH/DIAG INJ IV PUSH: CPT

## 2018-01-09 PROCEDURE — 70450 CT HEAD/BRAIN W/O DYE: CPT

## 2018-01-09 PROCEDURE — 36415 COLL VENOUS BLD VENIPUNCTURE: CPT | Performed by: EMERGENCY MEDICINE

## 2018-01-09 PROCEDURE — 84484 ASSAY OF TROPONIN QUANT: CPT | Performed by: EMERGENCY MEDICINE

## 2018-01-09 PROCEDURE — 83690 ASSAY OF LIPASE: CPT | Performed by: EMERGENCY MEDICINE

## 2018-01-09 PROCEDURE — 93005 ELECTROCARDIOGRAM TRACING: CPT

## 2018-01-09 PROCEDURE — 93005 ELECTROCARDIOGRAM TRACING: CPT | Performed by: EMERGENCY MEDICINE

## 2018-01-09 PROCEDURE — 80048 BASIC METABOLIC PNL TOTAL CA: CPT | Performed by: EMERGENCY MEDICINE

## 2018-01-09 PROCEDURE — 85025 COMPLETE CBC W/AUTO DIFF WBC: CPT | Performed by: EMERGENCY MEDICINE

## 2018-01-09 PROCEDURE — 74177 CT ABD & PELVIS W/CONTRAST: CPT

## 2018-01-09 RX ORDER — POTASSIUM CHLORIDE 20 MEQ/1
20 TABLET, EXTENDED RELEASE ORAL ONCE
Status: COMPLETED | OUTPATIENT
Start: 2018-01-09 | End: 2018-01-09

## 2018-01-09 RX ORDER — ACETAMINOPHEN 325 MG/1
650 TABLET ORAL ONCE
Status: COMPLETED | OUTPATIENT
Start: 2018-01-09 | End: 2018-01-09

## 2018-01-09 RX ORDER — TRAMADOL HYDROCHLORIDE 50 MG/1
50 TABLET ORAL ONCE
Status: COMPLETED | OUTPATIENT
Start: 2018-01-09 | End: 2018-01-09

## 2018-01-09 RX ORDER — MORPHINE SULFATE 2 MG/ML
2 INJECTION, SOLUTION INTRAMUSCULAR; INTRAVENOUS ONCE
Status: COMPLETED | OUTPATIENT
Start: 2018-01-09 | End: 2018-01-09

## 2018-01-09 RX ADMIN — IOHEXOL 100 ML: 350 INJECTION, SOLUTION INTRAVENOUS at 18:51

## 2018-01-09 RX ADMIN — ACETAMINOPHEN 650 MG: 325 TABLET, FILM COATED ORAL at 19:08

## 2018-01-09 RX ADMIN — MORPHINE SULFATE 2 MG: 2 INJECTION, SOLUTION INTRAMUSCULAR; INTRAVENOUS at 22:39

## 2018-01-09 RX ADMIN — POTASSIUM CHLORIDE 20 MEQ: 1500 TABLET, EXTENDED RELEASE ORAL at 19:09

## 2018-01-09 RX ADMIN — TRAMADOL HYDROCHLORIDE 50 MG: 50 TABLET, FILM COATED ORAL at 20:45

## 2018-01-09 NOTE — PROGRESS NOTES
Assessment    1  Diabetes mellitus with neuropathy (250 60,357 2) (E11 40)   2  Constipation due to opioid therapy (564 09,E935 2) (K59 03,T40 2X5A)   3  Hypertension (401 9) (I10)   4  Hypoventilation associated with obesity syndrome (278 03) (E66 2)   5  Greater trochanteric bursitis of right hip (726 5) (M70 61)   6  Arthritis (716 90) (M19 90)    Plan  Arthritis    · Sulindac 200 MG Oral Tablet; take 1 tablet every other day  Arthritis, Constipation due to opioid therapy, Diabetes mellitus with neuropathy, Greater  trochanteric bursitis of right hip, Hypertension, Hypoventilation associated with obesity  syndrome    · Follow-up visit in 1 week Evaluation and Treatment  Follow-up  Status: Hold For -  Scheduling  Requested for: 31Oct2017    Discussion/Summary  Impression: Subsequent Annual Wellness Visit, with preventive exam as well as age and risk appropriate counseling completed  Cardiovascular screening and counseling: the risks and benefits of screening were discussed  Diabetes screening and counseling: screening is current  Breast cancer screening and counseling: screening not indicated  Cervical cancer screening and counseling: screening not indicated  Osteoporosis screening and counseling: the risks and benefits of screening were discussed  HIV screening and counseling: screening not indicated  Immunizations: influenza vaccine is up to date this year, pneumococcal vaccine due today, hepatitis B vaccination series is not indicated at this time due to the patient's low risk of valeri the disease, Zostavax vaccination status is unknown and Tdap vaccination up to date  Advance Directive Planning: not complete  Advice and education were given regarding seat belt use and sunscreen use  Patient Discussion: follow-up visit needed in 3 months  Chief Complaint  Patient is here today for her annual wellness visit,   White/LPN      History of Present Illness  The patient is being seen for the subsequent annual wellness visit  Medicare Screening and Risk Factors   Hospitalizations: she has been previously hospitalizied  Once per lifetime medicare screening tests: ECG ~U()   Medicare Screening Tests Risk Questions   Abdominal aortic aneurysm risk assessment: none indicated  Osteoporosis risk assessment:  and over 48years of age  HIV risk assessment: none indicated  Drug and Alcohol Use: The patient has never smoked cigarettes  The patient reports never drinking alcohol  Alcohol concern:   The patient has no concerns about alcohol abuse  She has never used illicit drugs  Diet and Physical Activity: Current diet includes low salt food choices  She is sedentary  Mood Disorder and Cognitive Impairment Screening: PHQ-9 Depression Scale   Depression screening  negative for symptoms  She denies feeling down, depressed, or hopeless over the past two weeks  She denies feeling little interest or pleasure in doing things over the past two weeks  Cognitive impairment screening: denies difficulty learning/retaining new information, denies difficulty handling complex tasks, denies difficulty with reasoning, denies difficulty with spatial ability and orientation, denies difficulty with language and denies difficulty with behavior  Functional Ability/Level of Safety: Hearing is normal bilaterally  The patient is currently able to do activities of daily living without limitations  Fall risk factors: The patient fell 0 times in the past 12 months  Advance Directives: Advance directives: no living will, no durable power of  for health care directives and no advance directives  end of life decisions were not reviewed with the patient     Co-Managers and Medical Equipment/Suppliers: See Patient Care Team      Patient Care Team    Care Team Member Role Specialty Office Number   Germán Kauffman   [phone]   Dewey Jo MD  Endocrinology [phone]   Danette Claudio   [phone] Osvaldo BURGESS  Specialist Gastroenterology Adult [phone]   Charmayne Angel MD Specialist Hematology Oncology [phone]   Madonna Zeinab [facility] Attending Gastroenterology Adult [phone]     Active Problems    1  Abnormal CT scan, liver (793 3) (R93 2)   2  Abnormal tumor markers (795 89) (R97 8)   3  Acquired hypothyroidism (244 9) (E03 9)   4  Acute hip pain, right (719 45) (M25 551)   5  Arthritis (716 90) (M19 90)   6  Asthma (493 90) (J45 909)   7  Chronic pain (338 29) (G89 29)   8  Constipation due to opioid therapy (564 09,E935 2) (K59 03,T40 2X5A)   9  Daytime hypersomnolence (780 54) (G47 19)   10  Diabetes mellitus with neuropathy (250 60,357 2) (E11 40)   11  Edema (782 3) (R60 9)   12  Elevated LFTs (790 6) (R79 89)   13  Generalized colicky abdominal pain (789 7) (R10 83)   14  Greater trochanteric bursitis of right hip (726 5) (M70 61)   15  Hyperglycemia (790 29) (R73 9)   16  Hypertension (401 9) (I10)   17  Hypoventilation associated with obesity syndrome (278 03) (E66 2)   18  Iliotibial band syndrome, right leg (728 89) (M76 31)   19  Irritable bowel syndrome with diarrhea (564 1) (K58 0)   20  Left hip pain (719 45) (M25 552)   21  Liver lesion (573 8) (K76 9)   22  Lung nodules (793 19) (R91 8)   23  Mild persistent asthma with acute exacerbation (493 92) (J45 31)   24  Moderate episode of recurrent major depressive disorder (296 32) (F33 1)   25  Morbid obesity due to excess calories (278 01) (E66 01)   26  Pancreatic cyst (577 2) (K86 2)   27  Pulmonary emphysema (492 8) (J43 9)   28  Pulmonary nodules (793 19) (R91 8)   29  Restless leg syndrome (333 94) (G25 81)   30  Screening for osteoporosis (V82 81) (Z13 820)   31  Sleep apnea (780 57) (G47 30)   32  Thyroid disease (246 9) (E07 9)    Past Medical History    1  History of Acute on chronic systolic congestive heart failure (428 23,428 0) (I50 23)   2   History of Cellulitis of right lower extremity (682 6) (L03 115)   3  History of Depression, acute (296 20) (F32 9)   4  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)   5  History of Emphysema, 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518 2) (J98 3)   6  Flu vaccine need (V04 81) (Z23)   7  History of aortic valve disorder (V12 59) (Z86 79)   8  History of congestive heart failure (V12 59) (Z86 79)   9  History of malignant neoplasm of thyroid (V10 87) (Z85 850)   10  History of Restless leg syndrome (333 94) (G25 81)   11  History of Skin cancer (melanoma) (172 9) (C43 9)    Surgical History    1  History of Cholecystectomy   2  History of Eye Surgery   3  History of Resection Of Pericardial Cyst Or Tumor   4  History of Thoracoscopy (Therapeutic) W/ Excision Of Pericardial Cyst   5  History of Thyroid Surgery    Family History  Mother    1  Family history of Asthma   2  Denied: Family history of Colon cancer   3  Family history of    4  Denied: Family history of alcoholism   5  Denied: Family history of mental disorder  Father    10  Denied: Family history of Colon cancer   7  Family history of    6  Denied: Family history of alcoholism   9  Family history of arthritis (V17 7) (Z82 61)   10  Denied: Family history of mental disorder   11  Family history of Liver cancer    Social History    · Active advance directive (V49 89) (Z78 9)   · Caffeine use (V49 89) (F15 90)   · Drinks coffee   · Feels safe at home   · Never a smoker   · No alcohol use   · No drug use   · Primary language is [place]   · Retired   · Seeing a dentist    Current Meds   1  ALPRAZolam ER 1 MG Oral Tablet Extended Release 24 Hour; TAKE 1 TABLET DAILY AS   DIRECTED; Therapy: 84ZQU1119 to (Evaluate:95Hzw3646); Last Rx:2017 Ordered   2  Aspirin 325 MG Oral Tablet; TAKE 1 TABLET DAILY; Therapy: (Recorded:2017) to Recorded   3  Cod Liver Oil/Vitamins A & D Oral Capsule; Take 1 teaspoon daily; Therapy: (Recorded:2017) to Recorded   4   Diclofenac Sodium 1 % Transdermal Gel; APPLY SPARINGLY TO AFFECTED AREA(S)   THREE TO FOUR TIMES A DAY; Therapy: 27FAF6099 to (Last [facility] [place])  Requested for: 20Oct2017 Ordered   5  Dicyclomine HCl - 10 MG Oral Capsule; TAKE 1 CAPSULE 3 TIMES DAILY  Requested   for: 55YKW3744; Last Rx:03Oct2017 Ordered   6  Dulcolax TBEC; take 2 tablet twice daily; Therapy: (Jordyn Huff) to Recorded   7  DuoNeb 0 5-2 5 (3) MG/3ML Inhalation Solution; USE 1 UNIT DOSE IN NEBULIZER   EVERY 4 HOURS AS NEEDED; Therapy: (Recorded:61Qdr6590) to Recorded   8  Escitalopram Oxalate 10 MG Oral Tablet; take 1 tablet every day; Therapy: 38EGR1424 to (Last Rx:17Oct2017)  Requested for: 31PEE5199 Ordered   9  Folic Acid 1 MG Oral Tablet; take 1 tablet by mouth once daily; Therapy: 26MJD9241 to (Evaluate:73Hfg3779)  Requested for: 73ARG6640; Last   Rx:25Jan2017 Ordered   10  Furosemide 40 MG Oral Tablet; two tabs in the AM    one tablet in the afternoon     Requested for: 60Wop4821; Last Rx:61Uix4676 Ordered   11  Lactulose 10 GM/15ML Oral Solution; TAKE 15 ML DAILY; Therapy: (Jordyn Huff) to Recorded   12  Levothyroxine Sodium 125 MCG Oral Tablet; TAKE ONE TABLET BY MOUTH ONCE DAILY    AS DIRECTED  Requested for: 25Oct2017; Last Rx:25Oct2017 Ordered   13  Lisinopril 10 MG Oral Tablet; TAKE 1 TABLET DAILY  Requested for: 83MTN3250; Last    Rx:19Oct2017 Ordered   14  LORazepam 1 MG Oral Tablet; TAKE 1 TABLET 1 HOUR BEFORE MRI  MAY REPEAT    ONCE 15 MINUTES BEFORE MRI; Therapy: 20Fzn1923 to (Evaluate:38Wuk7522); Last Rx:92Slk9734 Ordered   15  MetOLazone 2 5 MG Oral Tablet; TAKE 1 TABLET DAILY ON MONDAY, WEDNESDAY, AND    FRIDAY; Therapy: 21MFQ7487 to (Last Rx:07Apr2017)  Requested for: 07Apr2017 Ordered   16  Movantik 25 MG Oral Tablet; Take 1 tablet prior to first meal of the day or two hours after    first meal;    Therapy: (Recorded:16Oct2017) to Recorded   17  Multi-Vitamins Oral Tablet; TAKE 1 TABLET DAILY; Therapy: 19HJE7112 to Recorded   18   NovoLOG Mix 70/30 (70-30) 100 UNIT/ML Subcutaneous Suspension; inject 35 units    twice daily per H Madhav; Therapy: [phone]) to Recorded   19  Oxycodone-Acetaminophen 5-325 MG Oral Tablet; TAKE 1 TABLET EVERY 4 HOURS AS    NEEDED FOR PAIN;    Therapy: 02Apr2015 to (Evaluate:23Nov2017); Last Rx:24Oct2017 Ordered    Allergies    1  Latex Gloves MISC   2  Toradol Oral TABS    3  No Known Environmental Allergies   4  No Known Food Allergies    Immunizations  Influenza --- Erika Cotton: Temporarily Deferred: Pt refuses, As of: [id number], Defer for 1 Years;   Prince Comes: [id number] Tanesha: 05-Oct-2017   PPSV --- Raford Lula: 04-Apr-2016     Vitals  Signs   Recorded: 63WNK7009 11:09AM   Temperature: 98 9 F, Temporal  Heart Rate: 84, R Radial  Pulse Quality: Normal, R Radial  Respiration Quality: Difficult  Respiration: 20  Systolic: 231, LUE, Sitting  Diastolic: 70, LUE, Sitting  BP Cuff Size: Large  Height: 5 ft   Weight: 230 lb   BMI Calculated: 44 92  BSA Calculated: 1 98  O2 Saturation: 92    Future Appointments    Date/Time Provider Specialty Site   12/01/2017 10:30 AM Tori Neri, [facility] Neurosurgery [facility]   12/01/2017 11:15 AM Ekaterina Brody MD PhD Neurosurgery [address] [facility]   11/07/2017 01:45 PM Andrzej White MD Family Medicine [place] [address] [facility]   12/20/2017 01:00 PM Lita Frost DO Orthopedic Surgery [address]     Signatures   Electronically signed by : Jd Chavez MD; Oct 31 2017 12:57PM EST                       (Author)

## 2018-01-09 NOTE — ED PROVIDER NOTES
History  Chief Complaint   Patient presents with    Abdominal Pain     c/o pain in her back, neck, head, finger tips and abdomen  denies other s/s     78-year-old female presents to the ED with 2-3 months worth of abdominal pain states is constant sharp pain radiating crossed her whole abdomen  No fevers no chills no nausea no vomiting no diarrhea no other complaints per the patient  Patient states that the pain just continues and her PCP has done ultrasound and lab work and could not find anything  History provided by:  Patient   used: No        Prior to Admission Medications   Prescriptions Last Dose Informant Patient Reported? Taking?    ALPRAZolam (XANAX) 0 25 mg tablet   No No   Sig: Take 1 tablet by mouth 3 (three) times a day as needed for anxiety for up to 10 days   COD LIVER OIL PO 1/9/2018 at Unknown time  Yes Yes   Sig: Take by mouth daily   Multiple Vitamins-Minerals (MULTIVITAMIN ADULT PO) 1/9/2018 at Unknown time  Yes Yes   Sig: Take 1 tablet by mouth daily   acetaminophen (TYLENOL) 325 mg tablet 1/9/2018 at Unknown time  No Yes   Sig: Take 2 tablets by mouth every 6 (six) hours as needed for mild pain or headaches   ascorbic acid (VITAMIN C) 500 mg tablet 1/9/2018 at Unknown time  Yes Yes   Sig: Take 500 mg by mouth daily   aspirin 325 mg tablet 1/9/2018 at Unknown time  No Yes   Sig: Take 1 tablet by mouth daily   atorvastatin (LIPITOR) 20 mg tablet 1/9/2018 at Unknown time  No Yes   Sig: Take 1 tablet by mouth daily with dinner   insulin aspart protamine-insulin aspart (NovoLOG 70/30) 100 units/mL injection 1/9/2018 at Unknown time  Yes Yes   Sig: Inject 40 Units under the skin 2 (two) times a day before meals   ipratropium-albuterol (DUONEB) 0 5-2 5 mg/mL 1/9/2018 at Unknown time  Yes Yes   Sig: Inhale 1 Container every 4 (four) hours as needed   levothyroxine (SYNTHROID) 125 mcg tablet 1/9/2018 at Unknown time  Yes Yes   Sig: Take 1 tablet by mouth daily   lisinopril (ZESTRIL) 10 mg tablet 1/9/2018 at Unknown time  Yes Yes   Sig: Take 10 mg by mouth daily   oxyCODONE-acetaminophen (PERCOCET) 5-325 mg per tablet 1/9/2018 at Unknown time Self Yes Yes   Sig: Take 1 tablet by mouth every 4 (four) hours as needed for moderate pain   polyethylene glycol (MIRALAX) 17 g packet 1/9/2018 at Unknown time  No Yes   Sig: Take 17 g by mouth daily   Patient taking differently: Take 17 g by mouth 2 (two) times a day     potassium chloride (K-DUR,KLOR-CON) 10 mEq tablet 1/9/2018 at Unknown time  No Yes   Sig: Take 2 tablets by mouth daily   pramipexole (MIRAPEX) 1 mg tablet 1/9/2018 at Unknown time  Yes Yes   Sig: Take 1 tablet by mouth 3 (three) times a day      Facility-Administered Medications: None       Past Medical History:   Diagnosis Date    Arthritis     Asthma     [facility] )     Disease of thyroid gland     Hypertension     Mitral valve regurgitation     Obesity hypoventilation syndrome (HCC)     Pain     right hip    Restless leg syndrome     Sleep related hypoxia     Thyroid cancer ([facility] )        Past Surgical History:   Procedure Laterality Date    CHOLECYSTECTOMY      EYE SURGERY      [place] ERCP DX COLLECTION SPECIMEN BRUSHING/WASHING N/A 5/30/2017    Procedure: ENDOSCOPIC RETROGRADE CHOLANGIOPANCREATOGRAPHY (ERCP); SPHINCTEROTOMY;  Surgeon: Edda Lane MD;  Location: [facility]; Service: Gastroenterology    REPLACEMENT TOTAL KNEE BILATERAL      SKIN BIOPSY      melanoma of back    STEROID INJECTION HIP Right 12/21/2017    Procedure: TROCHANTERIC BURSA INJECTION RIGHT;  Surgeon: Otis Banegas MD;  [facility] MA[facility];  Service: Pain Management     THORACOTOMY Right     at least 40 years, for pericardial cyst    THYROIDECTOMY, PARTIAL      For thyroid cancer       Family History   Problem Relation Age of Onset    Cancer Father     Diabetes Sister      I have reviewed and agree with the history as documented      Social History   Substance Use Topics  Smoking status: Never Smoker    Smokeless tobacco: Never Used    Alcohol use No        Review of Systems   Constitutional: Negative for activity change, chills, diaphoresis and fever  HENT: Negative for congestion, ear pain, nosebleeds, sore throat, trouble swallowing and voice change  Eyes: Negative for pain, discharge and redness  Respiratory: Negative for apnea, cough, choking, shortness of breath, wheezing and stridor  Cardiovascular: Negative for chest pain and palpitations  Gastrointestinal: Positive for abdominal pain  Negative for abdominal distention, constipation, diarrhea, nausea and vomiting  Endocrine: Negative for polydipsia  Genitourinary: Negative for difficulty urinating, dysuria, flank pain, frequency, hematuria and urgency  Musculoskeletal: Negative for back pain, gait problem, joint swelling, myalgias, neck pain and neck stiffness  Skin: Negative for pallor and rash  Neurological: Negative for dizziness, tremors, syncope, speech difficulty, weakness, numbness and headaches  Hematological: Negative for adenopathy  Psychiatric/Behavioral: Negative for confusion, hallucinations, self-injury and suicidal ideas  The patient is not nervous/anxious  Physical Exam  ED Triage Vitals   Temperature Pulse Respirations Blood Pressure SpO2   01/09/18 1504 01/09/18 1504 01/09/18 1504 01/09/18 1504 01/09/18 1504   98 9 °F (37 2 °C) 96 20 145/71 93 %      Temp src Heart Rate Source Patient Position - Orthostatic VS BP Location FiO2 (%)   -- 01/09/18 2244 -- -- --    Monitor         Pain Score       01/09/18 1504       Worst Possible Pain           Orthostatic Vital Signs  Vitals:    01/09/18 1504 01/09/18 2244 01/09/18 2245 01/09/18 2300   BP: 145/71 151/78     Pulse: 96 77 78 75       Physical Exam   Constitutional: She is oriented to person, place, and time  She appears well-developed and well-nourished  HENT:   Head: Normocephalic and atraumatic     Eyes: EOM are normal  Pupils are equal, round, and reactive to light  Neck: Normal range of motion  Neck supple  Cardiovascular: Normal rate, regular rhythm and normal heart sounds  Pulmonary/Chest: Effort normal and breath sounds normal    Abdominal: Soft  Bowel sounds are normal    Musculoskeletal: Normal range of motion  Neurological: She is alert and oriented to person, place, and time  Skin: Skin is warm  Psychiatric: She has a normal mood and affect  Nursing note and vitals reviewed  ED Medications  Medications   acetaminophen (TYLENOL) tablet 650 mg (650 mg Oral Given 1/9/18 1908)   potassium chloride (K-DUR,KLOR-CON) CR tablet 20 mEq (20 mEq Oral Given 1/9/18 1909)   iohexol (OMNIPAQUE) 350 MG/ML injection (MULTI-DOSE) 100 mL (100 mL Intravenous Given 1/9/18 1851)   traMADol (ULTRAM) tablet 50 mg (50 mg Oral Given 1/9/18 2045)   morphine injection 2 mg (2 mg Intravenous Given 1/9/18 2239)       Diagnostic Studies  Results Reviewed     Procedure Component Value Units Date/Time    Troponin I [[id number]]  (Normal) Collected:  01/09/18 1750    Lab Status:  Final result Specimen:  Blood Updated:  01/09/18 1819     Troponin I <0 02 ng/mL     Narrative:         [employer] Chemistry analyzer 99% cutoff is > 0 04 ng/mL in network labs    o cTnI 99% cutoff is useful only when applied to patients in the clinical setting of myocardial ischemia  o cTnI 99% cutoff should be interpreted in the context of clinical history, ECG findings and possibly cardiac imaging to establish correct diagnosis  o cTnI 99% cutoff may be suggestive but clearly not indicative of a coronary event without the clinical setting of myocardial ischemia      Basic metabolic panel [[id number]]  (Abnormal) Collected:  01/09/18 1750    Lab Status:  Final result Specimen:  Blood Updated:  01/09/18 1812     Sodium 142 mmol/L      Potassium 3 1 (L) mmol/L      Chloride 100 mmol/L      CO2 41 (H) mmol/L      Anion Gap 1 (L) mmol/L      BUN 23 mg/dL      Creatinine 0 88 mg/dL      Glucose 280 (H) mg/dL      Calcium 8 8 mg/dL      eGFR 63 ml/min/1 73sq m     Narrative:         National Kidney Disease Education Program recommendations are as follows:  GFR calculation is accurate only with a steady state creatinine  Chronic Kidney disease less than 60 ml/min/1 73 sq  meters  Kidney failure less than 15 ml/min/1 73 sq  meters  Lipase [[id number]]  (Normal) Collected:  01/09/18 1750    Lab Status:  Final result Specimen:  Blood Updated:  01/09/18 1812     Lipase 121 u/L     CBC and differential [[id number]]  (Abnormal) Collected:  01/09/18 1750    Lab Status:  Final result Specimen:  Blood Updated:  01/09/18 1759     WBC 10 60 Thousand/uL      RBC 4 00 (L) Million/uL      Hemoglobin 12 4 g/dL      Hematocrit 38 0 %      MCV 95 fL      MCH 30 9 pg      MCHC 32 6 g/dL      RDW 15 4 (H) %      MPV 8 5 (L) fL      Platelets 044 Thousands/uL      nRBC 0 /100 WBCs      Neutrophils Relative 78 (H) %      Lymphocytes Relative 17 %      Monocytes Relative 4 %      Eosinophils Relative 1 %      Basophils Relative 0 %      Neutrophils Absolute 8 30 (H) Thousands/µL      Lymphocytes Absolute 1 70 Thousands/µL      Monocytes Absolute 0 40 Thousand/µL      Eosinophils Absolute 0 10 Thousand/µL      Basophils Absolute 0 00 Thousands/µL                  CT head without contrast   Final Result by Tahir Oh MD (01/09 2336)      No acute intracranial abnormality  Stable left parietal convexity meningioma  No demonstrable adjacent parenchymal edema  Workstation performed: [id number]         CT abdomen pelvis with contrast   Final Result by Janna Camacho MD (01/09 1931)         1  Moderate amount of stool throughout the colon may indicate constipation  No evidence of bowel obstruction, colitis, diverticulitis or appendicitis  Moderate hiatal hernia and small fat-containing periumbilical hernia  2  Stable left millimeters cystic lesion in the body of the pancreas   No ductal dilatation  Possible  serous cystadenoma  3  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  No pyelonephritis or obstructive uropathy  Workstation performed: [id number]                    Procedures  Procedures       Phone Contacts  ED Phone Contact    ED Course  ED Course as of Jan 12 1026 Tue Jan 09, 2018   2355 Sodium: 142                               MDM  CritCare Time    Disposition  Final diagnoses:   Generalized abdominal pain     Time reflects when diagnosis was documented in both MDM as applicable and the Disposition within this note     Time User Action Codes Description Comment    1/9/2018  7:37 PM [place] Factor Add [R10 84] Generalized abdominal pain       ED Disposition     ED Disposition Condition Comment    Discharge  [facility] discharge to home/self care      Condition at discharge: Stable        Follow-up Information     Follow up With Specialties Details Why Contact Info    Tristan Dalal MD Family Medicine Schedule an appointment as soon as possible for a visit  [address] [place] [address]  [phone]          Discharge Medication List as of 1/10/2018 12:36 AM      CONTINUE these medications which have NOT CHANGED    Details   acetaminophen (TYLENOL) 325 mg tablet Take 2 tablets by mouth every 6 (six) hours as needed for mild pain or headaches, Starting Sat 6/17/2017, No Print      ALPRAZolam (XANAX) 0 25 mg tablet Take 1 tablet by mouth 3 (three) times a day as needed for anxiety for up to 10 days, Starting Tue 10/10/2017, Until Fri 10/20/2017, Print      ascorbic acid (VITAMIN C) 500 mg tablet Take 500 mg by mouth daily, Until Discontinued, Historical Med      aspirin 325 mg tablet Take 1 tablet by mouth daily, Starting Fri 10/13/2017, Normal      atorvastatin (LIPITOR) 20 mg tablet Take 1 tablet by mouth daily with dinner, Starting Fri 10/13/2017, Normal      COD LIVER OIL PO Take by mouth daily, Historical Med      insulin aspart protamine-insulin aspart (NovoLOG 70/30) 100 units/mL injection Inject 40 Units under the skin 2 (two) times a day before meals, Historical Med      ipratropium-albuterol (DUONEB) 0 5-2 5 mg/mL Inhale 1 Container every 4 (four) hours as needed, Until Discontinued, Historical Med      levothyroxine (SYNTHROID) 125 mcg tablet Take 1 tablet by mouth daily, Until Discontinued, Historical Med      lisinopril (ZESTRIL) 10 mg tablet Take 10 mg by mouth daily, Historical Med      Multiple Vitamins-Minerals (MULTIVITAMIN ADULT PO) Take 1 tablet by mouth daily, Starting 2/9/2016, Until Discontinued, Historical Med      oxyCODONE-acetaminophen (PERCOCET) 5-325 mg per tablet Take 1 tablet by mouth every 4 (four) hours as needed for moderate pain, Historical Med      polyethylene glycol (MIRALAX) 17 g packet Take 17 g by mouth daily, Starting Sat 6/17/2017, No Print      potassium chloride (K-DUR,KLOR-CON) 10 mEq tablet Take 2 tablets by mouth daily, Starting Sat 6/17/2017, No Print      pramipexole (MIRAPEX) 1 mg tablet Take 1 tablet by mouth 3 (three) times a day, Starting 2/9/2016, Until Discontinued, Historical Med           No discharge procedures on file      ED Provider  Electronically Signed by           Rabia Muniz DO  01/09/18 Lina Oshea DO  01/12/18 1027

## 2018-01-10 ENCOUNTER — APPOINTMENT (OUTPATIENT)
Dept: PHYSICAL THERAPY | Facility: CLINIC | Age: 78
End: 2018-01-10
Payer: COMMERCIAL

## 2018-01-10 ENCOUNTER — APPOINTMENT (OUTPATIENT)
Dept: PULMONOLOGY | Facility: CLINIC | Age: 78
End: 2018-01-10
Payer: COMMERCIAL

## 2018-01-10 VITALS
HEART RATE: 75 BPM | TEMPERATURE: 98.9 F | DIASTOLIC BLOOD PRESSURE: 78 MMHG | OXYGEN SATURATION: 96 % | BODY MASS INDEX: 40.6 KG/M2 | RESPIRATION RATE: 20 BRPM | SYSTOLIC BLOOD PRESSURE: 151 MMHG | WEIGHT: 222 LBS

## 2018-01-10 PROCEDURE — 99284 EMERGENCY DEPT VISIT MOD MDM: CPT

## 2018-01-10 PROCEDURE — G0424 PULMONARY REHAB W EXER: HCPCS

## 2018-01-10 PROCEDURE — 97140 MANUAL THERAPY 1/> REGIONS: CPT

## 2018-01-10 PROCEDURE — 97110 THERAPEUTIC EXERCISES: CPT

## 2018-01-10 NOTE — RESULT NOTES
Message   PLEASE CALL   REVIEWED BW   SUGAR WAS A BIT ELEVATED   WE WILL DISCUSS AT OUR NEXT VIST   THANKS     Verified Results  (1) CBC/PLT/DIFF 04Apr2016 04:00PM Mauricio Carcamo     Test Name Result Flag Reference   WBC COUNT 10 30 Thousand/uL  4 80-10 80   WBC ADJUSTED 10 30 Thousand/uL  4 80-10 80   RBC COUNT 4 51 Million/uL  4 20-5 40   HEMOGLOBIN 13 5 g/dL  12 0-16 0   HEMATOCRIT 42 0 %  37 0-47 0   MCV 93 fL  82-98   MCH 30 0 pg  27 0-31 0   MCHC 32 2 g/dL  31 4-37 4   RDW 15 4 % H 11 6-15 1   MPV 9 0 fL  8 9-12 7   PLATELET COUNT 024 Thousands/uL  130-400   nRBC AUTOMATED 0 /100 WBCs     NEUTROPHILS RELATIVE PERCENT 71 %  43-75   LYMPHOCYTES RELATIVE PERCENT 21 %  14-44   MONOCYTES RELATIVE PERCENT 6 %  4-12   EOSINOPHILS RELATIVE PERCENT 2 %  0-6   BASOPHILS RELATIVE PERCENT 0 %  0-1   NEUTROPHILS ABSOLUTE COUNT 7 30 Thousands/µL  1 85-7 62   LYMPHOCYTES ABSOLUTE COUNT 2 10 Thousands/µL  0 60-4 47   MONOCYTES ABSOLUTE COUNT 0 60 Thousand/µL  0 17-1 22   EOSINOPHILS ABSOLUTE COUNT 0 20 Thousand/µL  0 00-0 61   BASOPHILS ABSOLUTE COUNT 0 00 Thousands/µL  0 00-0 10     (1) COMPREHENSIVE METABOLIC PANEL [id number] 42:26KZ Willie Corey 351 Kidney Disease Education Program recommendations are as follows:  GFR calculation is accurate only with a steady state creatinine  Chronic Kidney disease less than 60 ml/min/1 73 sq  meters  Kidney failure less than 15 ml/min/1 73 sq  meters  Test Name Result Flag Reference   GLUCOSE,RANDM 146 mg/dL H    If the patient is fasting, the ADA then defines impaired fasting glucose as > 100 mg/dL and diabetes as > or equal to 123 mg/dL     SODIUM 144 mmol/L  136-145   POTASSIUM 4 1 mmol/L  3 5-5 3   CHLORIDE 102 mmol/L  100-108   CARBON DIOXIDE 35 mmol/L H 21-32   ANION GAP (CALC) 7 mmol/L  4-13   BLOOD UREA NITROGEN 24 mg/dL  5-25   CREATININE 0 80 mg/dL  0 60-1 30   Standardized to IDMS reference method   CALCIUM 8 5 mg/dL  8 3-10 1   BILI, TOTAL 0 40 mg/dL 0  20-1 00   ALK PHOSPHATAS 77 U/L     ALT (SGPT) 33 U/L  12-78   AST(SGOT) 18 U/L  5-45   ALBUMIN 3 7 g/dL  3 5-5 0   TOTAL PROTEIN 6 9 g/dL  6 4-8 2   eGFR Non-African American      >60 0 ml/min/1 73sq m     (1) LIPID PANEL, FASTING 04Apr2016 04:00PM Gerhardt Rolling   Triglyceride:         Normal              <150 mg/dl       Borderline High    150-199 mg/dl       High               200-499 mg/dl       Very High          >499 mg/dl  Cholesterol:         Desirable        <200 mg/dl      Borderline High  200-239 mg/dl      High             >239 mg/dl  HDL Cholesterol:        High    >59 mg/dL      Low     <41 mg/dL     Test Name Result Flag Reference   CHOLESTEROL 173 mg/dL     HDL,DIRECT 45 mg/dL  40-60   Specimen collection should occur prior to Metamizole administration due to the potential for falsely depressed results  LDL CHOLESTEROL CALCULATED 96 mg/dL  0-100   TRIGLYCERIDES 158 mg/dL H <=150   Specimen collection should occur prior to N-Acetylcysteine or Metamizole administration due to the potential for falsely depressed results  (1) TSH 04Apr2016 04:00PM Gerhardt Rolling   Patients undergoing fluorescein dye angiography may retain small amounts of fluorescein in the body for 48-72 hours post procedure  Samples containing fluorescein can produce falsely depressed TSH values  If the patient had this procedure,a specimen should be resubmitted post fluorescein clearance          The recommended reference ranges for TSH during pregnancy are as follows:  First trimester 0 1 to 2 5 uIU/mL  Second trimester  0 2 to 3 0 uIU/mL  Third trimester 0 3 to 3 0 uIU/m     Test Name Result Flag Reference   TSH 1 693 uIU/mL  0 358-3 740     (1) MICROALBUMIN CREATININE RATIO, RANDOM URINE 04Apr2016 04:00PM Gerhardt Rolling     Test Name Result Flag Reference   MICROALBUMIN/ CREAT R <39 mg/g creatinine H 0-30   MICROALBUMIN,URINE <5 0 mg/L  0 0-20 0   CREATININE URINE 12 7 mg/dL       (1) HEMOGLOBIN A1C 04Apr2016 04: 00PM Pelon Pelletier   5 7-6 4% impaired fasting glucose  >=6 5% diagnosis of diabetes    Falsely low levels are seen in conditions linked to short RBC life span-  hemolytic anemia, and splenomegaly  Falsely elevated levels are seen in situations where there is an increased production of RBC- receipt of erythropoietin or blood transfusions  Adopted from ADA-Clinical Practice Recommendations     Test Name Result Flag Reference   HEMOGLOBIN A1C 8 1 % H 4 0-5 6   EST  AVG   GLUCOSE 186 mg/dl

## 2018-01-10 NOTE — MISCELLANEOUS
Chief Complaint  Chief Complaint Free Text Note Form: telephone communication for hospitalization for right lower leg cellulitis, BOO Arthur      History of Present Illness  TCM Communication [facility]: The patient is being contacted for follow-up after hospitalization  She was hospitalized at [facility]  The dates of hospitalization:, date of admission: 05/02/2016, date of discharge: 05/06/2016  Diagnosis: right lower extremity cellulitis  She was discharged to home  Medications reviewed and updated today  She scheduled a follow up appointment  Symptoms: rash:, swelling and swelling location right lower extremity, but no fever, no weakness, no dizziness, no headache, no fatigue, no cough, no shortness of breath, no chest pain, no back pain on left side, no back pain on right side, no arm pain left side, no arm pain on right side, no leg pain on left side, no leg pain on right side, no upper abdominal pain, no middle abdominal pain, no lower abdominal pain, no anorexia, no nausea, no vomiting, no loose stools, no constipation, no pain with urinating, no incisional pain and no wound drainage  The patient is currently experiencing symptoms  patient states that the cellulitis is much better Counseling was provided to the patient  Topics counseled included activities of daily living and importance of compliance with treatment  Communication performed and completed by BOO Arthur      Active Problems    1  Acquired hypothyroidism (244 9) (E03 9)   2  Cellulitis of right lower extremity (682 6) (L03 115)   3  Chronic pain (338 29) (G89 29)   4  Diabetes mellitus with neuropathy (250 60,357 2) (E11 40)   5  Edema (782 3) (R60 9)   6  Emphysema, unspecified (492 8) (J43 9)   7  History of congestive heart failure (V12 59) (Z86 79)   8  Hypertension (401 9) (I10)   9  Obesity (278 00) (E66 9)   10  Sleep apnea (780 57) (G47 30)    Past Medical History    1  History of Depression, acute (296 20) (F32 9)   2   History of Emphysema, 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518 2) (J98 3)   3  History of aortic valve disorder (V12 59) (Z86 79)   4  History of congestive heart failure (V12 59) (Z86 79)   5  History of Restless leg syndrome (333 94) (G25 81)    Surgical History    1  History of Cholecystectomy   2  History of Resection Of Pericardial Cyst Or Tumor   3  History of Thoracoscopy (Therapeutic) W/ Excision Of Pericardial Cyst   4  History of Thyroid Surgery    Family History  Mother    1  Family history of Asthma   2  Family history of    3  Denied: Family history of alcoholism   4  Denied: Family history of mental disorder  Father    5  Family history of    6  Denied: Family history of alcoholism   7  Denied: Family history of mental disorder   8  Family history of Liver cancer    Social History    · Caffeine use (V49 89) (F15 90)   · Drinks coffee   · Never a smoker   · No alcohol use   · No drug use   · Primary language is [place]   · Retired   · Seeing a dentist    Current Meds   1  Aspirin 81 MG TABS; TAKE 1 TABLET DAILY; Therapy: (Recorded:24Khb3003) to Recorded   2  Cephalexin 500 MG Oral Capsule; take 1 capsule bid; Therapy: 2016 to (Evaluate:85Qyf4725)  Requested for: 2016; Last   Rx:[id number] Ordered   3  Cod Liver Oil Oral Oil; 1 teaspoon daily; Therapy: [id number] to Recorded   4  DuoNeb 0 5-2 5 (3) MG/3ML Inhalation Solution; USE 1 UNIT DOSE IN NEBULIZER   EVERY 4 HOURS AS NEEDED; Therapy: (Recorded:70Qzx9884) to Recorded   5  Furosemide 20 MG Oral Tablet; tuesday,  and   Requested for:   2016; Last Rx:2016 Ordered   6  Furosemide 40 MG Oral Tablet; monday, , and friday; Therapy: (Recorded:75Uuv5150) to Recorded   7  Lantus SoloStar 100 UNIT/ML Subcutaneous Solution Pen-injector; 27 units at 8 am and   8 pm;   Therapy: (Recorded:59Gcc9706) to Recorded   8  MiraLax Oral Powder; Therapy: (Recorded:88Guw7537) to Recorded   9   Mirapex 1 MG Oral Tablet; TAKE 1 TO 2 TABLETS AT BEDTIME; Therapy: 42EQT5954 to Recorded   10  Multi-Vitamins Oral Tablet; TAKE 1 TABLET DAILY; Therapy: 63GNL9112 to Recorded   11  Neomycin-Polymyxin-Dexameth 0 1 % Ophthalmic Suspension; Apply to eyelid once    daily; Therapy: 55GQA0444 to Recorded   12  Neomycin-Polymyxin-Dexameth 3 5-19356-7 1 Ophthalmic Ointment; Apply at bedtime to    upper and lower lids as directed; Therapy: (Recorded:62Ezi1990) to Recorded   13  Potassium Chloride Bina ER 20 MEQ Oral Tablet Extended Release; TAKE 1 TABLET    DAILY  Requested for: 70DTG4744; Last Rx:[id number] Ordered   14  Senokot 8 6 MG Oral Tablet; 3 tablets at bedtime; Therapy: 00SUE2100 to Recorded   15  Synthroid 125 MCG Oral Tablet; TAKE 1 TABLET EVERY MORNING; Therapy: (Recorded:85Aru5611) to Recorded   16  TraMADol HCl - 50 MG Oral Tablet; take 1 tablet by mouth every 4 hours if needed; Last    Rx:[id number] Ordered   17  Venlafaxine HCl - 75 MG Oral Tablet; TAKE 1 TABLET DAILY; Therapy: 77GCH4418 to (Evaluate:62Qvr6626)  Requested for: 90Rwc5021; Last    Rx:28Mdv9090 Ordered   18  Vitamin C 500 MG Oral Tablet; TAKE 1 TABLET DAILY; Therapy: (Recorded:76Ziw4038) to Recorded    Allergies    1  Latex Gloves MISC   2  Toradol Oral TABS    3  No Known Environmental Allergies   4   No Known Food Allergies    Future Appointments    Date/Time Provider Specialty Site   07/05/2016 11:00 AM Ivis Granado MD [facility]     Signatures   Electronically signed by : Julio Krishnan LPN; May  9 7146 18:82OC EST                       (Author)    Electronically signed by : Andres Weaver MD; May  9 2016  1:30PM EST                       (Author)

## 2018-01-10 NOTE — DISCHARGE INSTRUCTIONS

## 2018-01-10 NOTE — CONSULTS
Chief Complaint  Patient is here today for a pre op clearance  She will be having Canthoplasty, Conj  plasty with graft, mid face Plycation  The surgery will be done on 06/08/16 [address] Joe Guy [address]  [address]      History of Present Illness  Pre-Op Visit (Brief): The patient is being seen for a preoperative visit  The procedure is a(n) CARTHOPLASTY scheduled for 06/08/2016 with DR London Dela Cruz  The indication for surgery is ECTROPION  Surgical Risk Assessment:   Prior Anesthesia: She had prior anesthesia, no prior adverse reaction to edidural anesthesia and no prior adverse reaction to general anesthesia  Pertinent Past Medical History: HTN, CHF, DM, EDEMA, OA  Exercise Capacity: unable to walk four blocks without symptoms and unable to walk two flights of stairs without symptoms  Lifestyle Factors: denies alcohol use, denies tobacco use and denies illegal drug use  Symptoms: no easy bleeding, no easy bruising, no frequent nosebleeds, no chest pain, no cough, dyspnea, edema, no palpitations and no wheezing  Living Situation: home is secure and supportive  HPI: PATIENT COMES IN FOR PRE OP EVALUATION FOR ELECTIVE EYE SURGERY      Review of Systems    ROS reviewed  Constitutional: no fever, no chills and not feeling tired  ENT: no sore throat and no nasal discharge  Cardiovascular: lower extremity edema, but no chest pain, the heart rate was not fast and no palpitations  Respiratory: shortness of breath during exertion, but no shortness of breath, no cough and no wheezing  Gastrointestinal: no abdominal pain, no nausea, no constipation and no diarrhea  Genitourinary: no incontinence  Musculoskeletal: arthralgias and joint stiffness, but no joint swelling  Integumentary: no rashes, no itching, no dry skin and no skin lesions  Neurological: no headache and no dizziness  Active Problems    1   Acquired hypothyroidism (244 9) (E03 9)   2  Cellulitis of right lower extremity (682 6) (L03 115)   3  Chronic pain (338 29) (G89 29)   4  Diabetes mellitus with neuropathy (250 60,357 2) (E11 40)   5  Edema (782 3) (R60 9)   6  Emphysema, unspecified (492 8) (J43 9)   7  History of congestive heart failure (V12 59) (Z86 79)   8  Hypertension (401 9) (I10)   9  Obesity (278 00) (E66 9)   10  Sleep apnea (780 57) (G47 30)    Past Medical History    · History of Depression, acute (296 20) (F32 9)   · History of Emphysema, 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518 2) (J98 3)   · History of aortic valve disorder (V12 59) (Z86 79)   · History of congestive heart failure (V12 59) (Z86 79)   · History of Restless leg syndrome (333 94) (G25 81)    The active problems and past medical history were reviewed and updated today  Surgical History    · History of Cholecystectomy   · History of Resection Of Pericardial Cyst Or Tumor   · History of Thoracoscopy (Therapeutic) W/ Excision Of Pericardial Cyst   · History of Thyroid Surgery    The surgical history was reviewed and updated today  Family History    · Family history of Asthma   · Family history of    · Denied: Family history of alcoholism   · Denied: Family history of mental disorder    · Family history of    · Denied: Family history of alcoholism   · Denied: Family history of mental disorder   · Family history of Liver cancer    The family history was reviewed and updated today  Social History    · Caffeine use (V49 89) (F15 90)   · Drinks coffee   · Never a smoker   · No alcohol use   · No drug use   · Primary language is [place]   · Retired   · Seeing a dentist  The social history was reviewed and updated today  Current Meds   1  Aspirin 81 MG TABS; TAKE 1 TABLET DAILY; Therapy: (Recorded:33Gab9486) to Recorded   2  Cod Liver Oil Oral Oil; 1 teaspoon daily; Therapy: 51WIM0841 to Recorded   3   DuoNeb 0 5-2 5 (3) MG/3ML Inhalation Solution; USE 1 UNIT DOSE IN NEBULIZER   EVERY 4 HOURS AS NEEDED; Therapy: (Recorded:51Yqm8524) to Recorded   4  Furosemide 20 MG Oral Tablet; tuesday, thursday saturday and sunday  Requested for:   14Apr2016; Last Rx:14Apr2016 Ordered   5  Furosemide 40 MG Oral Tablet; monday, wednsday, and friday; Therapy: (Recorded:99Lgt6759) to Recorded   6  Lantus SoloStar 100 UNIT/ML Subcutaneous Solution Pen-injector; 27 units at 8 am and   8 pm;   Therapy: (Recorded:27Dqv5360) to Recorded   7  MiraLax Oral Powder; Therapy: (Recorded:72Ryo6112) to Recorded   8  Mirapex 1 MG Oral Tablet; TAKE 1 TO 2 TABLETS AT BEDTIME; Therapy: 94HAJ9036 to Recorded   9  Multi-Vitamins Oral Tablet; TAKE 1 TABLET DAILY; Therapy: 58OYV0177 to Recorded   10  Potassium Chloride Bina ER 20 MEQ Oral Tablet Extended Release; TAKE 1 TABLET    DAILY  Requested for: 53EVV9180; Last Rx:03Zoh6028 Ordered   11  Senokot 8 6 MG Oral Tablet; 3 tablets at bedtime; Therapy: 72HHD8500 to Recorded   12  Synthroid 125 MCG Oral Tablet; TAKE 1 TABLET EVERY MORNING; Therapy: (Recorded:93Oza5714) to Recorded   13  TraMADol HCl - 50 MG Oral Tablet; take 1 tablet by mouth every 4 hours if needed; Last    Rx:18Apr2016 Ordered   14  Venlafaxine HCl - 75 MG Oral Tablet; TAKE 1 TABLET DAILY; Therapy: 64DQK8304 to (Evaluate:09Apr2017)  Requested for: 14Apr2016; Last    Rx:14Apr2016 Ordered   15  Vitamin C 500 MG Oral Tablet; TAKE 1 TABLET DAILY; Therapy: (Recorded:23Bnt8207) to Recorded    The medication list was reviewed and updated today  Allergies    1  Latex Gloves MISC   2  Toradol Oral TABS    3  No Known Environmental Allergies   4   No Known Food Allergies    Vitals  Signs [Data Includes: Current Encounter]    Temperature: 99 1 F, Tympanic  Heart Rate: 80, R Radial  Pulse Quality: Normal, R Radial  Respiration: 16  Respiration Quality: Normal  Systolic: 631, LUE, Sitting  Diastolic: 74, LUE, Sitting  BP Cuff Size: Standard  Height: 4 ft 11 5 in  Weight: 215 lb   BMI Calculated: 42 7  BSA Calculated: 1 91  Patient Refused Height: No  Patient Refused Weight: No  Patient Refused Height: No  Patient Refused Weight: No    Physical Exam    Constitutional   General appearance: No acute distress, well appearing and well nourished  Eyes   Conjunctiva and lids: No swelling, erythema or discharge  SL PALE  Pupils and irises: Equal, round and reactive to light  Ears, Nose, Mouth, and Throat   External inspection of ears and nose: Normal     Otoscopic examination: Abnormal   DULL  Nasal mucosa, septum, and turbinates: Normal without edema or erythema  Oropharynx: Normal with no erythema, edema, exudate or lesions  Pulmonary   Respiratory effort: No increased work of breathing or signs of respiratory distress  Auscultation of lungs: Abnormal   MINIMAL BIBASILAR CRACKLES  Cardiovascular   Auscultation of heart: Normal rate and rhythm, normal S1 and S2, without murmurs  SOFT TIBURCIO NOTED  Examination of extremities for edema and/or varicosities: Abnormal   LEGS WRAPPED 1-2+ EDEMA  Carotid pulses: Normal   NO BRUITS  Abdomen   Abdomen: Abnormal   OBESE  Liver and spleen: No hepatomegaly or splenomegaly  Lymphatic   Palpation of lymph nodes in neck: No lymphadenopathy  Musculoskeletal   Gait and station: Normal     Digits and nails: Abnormal   MILD DJD CHANGES  Inspection/palpation of joints, bones, and muscles: Abnormal   MILD DJD CHANGES  Skin   Skin and subcutaneous tissue: Normal without rashes or lesions  Neurologic   Cranial nerves: Cranial nerves 2-12 intact  Reflexes: 2+ and symmetric  Sensation: No sensory loss  Psychiatric   Orientation to person, place, and time: Normal     Mood and affect: Normal          Results/Data  A 12 lead ECG was performed and was normal    Rhythm and rate: the atrial rate is 81 beats per minute and ventricular rate is 81 beats per minute, but normal sinus rhythm     P-waves: CA interval is normal, but the P wave is normal  TN Interval: 0 180 seconds  Axis: the QRS axis is normal    QRS: QRS interval: 0 080 seconds, but the QRS is normal    ST segment: QTc interval: 0 442 and the ST segments are normal    T waves: normal       Assessment    1  Preoperative clearance (V72 84) (Z01 818)   2  Diabetes mellitus with neuropathy (250 60,357 2) (E11 40)   3  Emphysema, unspecified (492 8) (J43 9)   4  Hypertension (401 9) (I10)   5  Obesity (278 00) (E66 9)   6  Edema (782 3) (R60 9)   7  Chronic pain (338 29) (G89 29)   8  History of congestive heart failure (V12 59) (Z86 79)    Plan  Hypertension    · (1) CBC/ PLT (NO DIFF); Status:Active; Requested for:08Lls0257;    · (1) COMPREHENSIVE METABOLIC PANEL; Status:Active; Requested for:13Zev6122;    · EKG/ECG- POC; Status:Active; Requested for:54Hrk4821;    · Follow-up visit in 6 weeks Evaluation and Treatment  Follow-up  Status: Hold For -  Scheduling  Requested for: 46FIT2940    Discussion/Summary  Surgical Clearance: She is at a LOW TO MODERATE risk from a cardiovascular standpoint at this time without any additional cardiac testing  Reevaluation needed, if she should present with symptoms prior to surgery/procedure  THERE ARE NO OBVIOUS MEDICAL CONTRAINDICATIONS FOR SURGERY UNDER LOCAL ANESTHESIA WITH SEDATION  End of Encounter Meds    1  TraMADol HCl - 50 MG Oral Tablet; take 1 tablet by mouth every 4 hours if needed; Last   Rx:53Oqw8989 Ordered    2  Furosemide 20 MG Oral Tablet; tuesday, thursday saturday and sunday  Requested for:   14Apr2016; Last Rx:14Apr2016 Ordered   3  Potassium Chloride Bina ER 20 MEQ Oral Tablet Extended Release; TAKE 1 TABLET   DAILY  Requested for: 86JBE1499; Last Rx:52Uzn3599 Ordered    4  Venlafaxine HCl - 75 MG Oral Tablet; TAKE 1 TABLET DAILY; Therapy: 11RZR1713 to (Evaluate:09Apr2017)  Requested for: 14Apr2016; Last   Rx:80Adu0596 Ordered    5  Aspirin 81 MG TABS; TAKE 1 TABLET DAILY;    Therapy: (Recorded:57Klh9199) to Recorded   6  Cod Liver Oil Oral Oil; 1 teaspoon daily; Therapy: 92BKY9693 to Recorded   7  DuoNeb 0 5-2 5 (3) MG/3ML Inhalation Solution (Ipratropium-Albuterol); USE 1 UNIT   DOSE IN NEBULIZER EVERY 4 HOURS AS NEEDED; Therapy: (Recorded:79Ydg3162) to Recorded   8  Furosemide 40 MG Oral Tablet; monday, wednsday, and friday; Therapy: (Recorded:59Aak4554) to Recorded   9  Lantus SoloStar 100 UNIT/ML Subcutaneous Solution Pen-injector; 27 units at 8 am and   8 pm;   Therapy: (Recorded:11Dkb2176) to Recorded   10  MiraLax Oral Powder (Polyethylene Glycol 3350); Therapy: (Recorded:42Ocf8571) to Recorded   11  Mirapex 1 MG Oral Tablet (Pramipexole Dihydrochloride); TAKE 1 TO 2 TABLETS AT    BEDTIME; Therapy: 09ZST3274 to Recorded   12  Multi-Vitamins Oral Tablet; TAKE 1 TABLET DAILY; Therapy: 76YTK9204 to Recorded   13  Senokot 8 6 MG Oral Tablet; 3 tablets at bedtime; Therapy: 21CHI3437 to Recorded   14  Synthroid 125 MCG Oral Tablet (Levothyroxine Sodium); TAKE 1 TABLET EVERY    MORNING; Therapy: (Recorded:21Rtm4728) to Recorded   15  Vitamin C 500 MG Oral Tablet; TAKE 1 TABLET DAILY;     Therapy: (Recorded:38Zkl5082) to Recorded    Signatures   Electronically signed by : Cira Vivar MD; May 25 2016  2:23PM EST                       (Author)

## 2018-01-10 NOTE — PROGRESS NOTES
History of Present Illness  Care Coordination Encounter Information:   Type of Encounter: Telephonic   Last Office Visit: 8/15/16   Spoke to Patient  Care Coordination [facility] Nurse Enedina Gusman:   The reason for call is to discuss outreach for follow up/needed services and coordination of meeting care plan treatment goals  Claudia Katz has had a recent hospital admission for cellulitis  I did schedule a BRITTANY appointment for her to see Dr Adan Elmore  She is living at home once again  Her blood sugar is elevated, her mood is depressed, and she has returned to her food binging in the middle of the night  This is a common scenario when she lives at home without the "controlled environment" enjoyed while living with her daughter  She does not put forth effort to care for herself  She has home health nurses for dressing changes and also physical therapy visits as well  Her medication list was adjusted to include addition of Percocet  Active Problems    1  Acquired hypothyroidism (244 9) (E03 9)   2  Cellulitis of lower leg (682 6) (L03 119)   3  Cellulitis of right lower extremity (682 6) (L03 115)   4  Chronic pain (338 29) (G89 29)   5  Diabetes mellitus with neuropathy (250 60,357 2) (E11 40)   6  Dizziness (780 4) (R42)   7  Edema (782 3) (R60 9)   8  Emphysema, unspecified (492 8) (J43 9)   9  History of congestive heart failure (V12 59) (Z86 79)   10  Hypertension (401 9) (I10)   11  Obesity (278 00) (E66 9)   12  Restless leg syndrome (333 94) (G25 81)   13  Screening for osteoporosis (V82 81) (Z13 820)   14  Sleep apnea (780 57) (G47 30)    Past Medical History    1  History of Depression, acute (296 20) (F32 9)   2  History of Emphysema, 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518 2) (J98 3)   3  History of aortic valve disorder (V12 59) (Z86 79)   4  History of congestive heart failure (V12 59) (Z86 79)   5  History of Restless leg syndrome (333 94) (G25 81)    Surgical History    1  History of Cholecystectomy   2   History of Eye Surgery 3  History of Resection Of Pericardial Cyst Or Tumor   4  History of Thoracoscopy (Therapeutic) W/ Excision Of Pericardial Cyst   5  History of Thyroid Surgery    Family History  Mother    1  Family history of Asthma   2  Family history of    3  Denied: Family history of alcoholism   4  Denied: Family history of mental disorder  Father    5  Family history of    6  Denied: Family history of alcoholism   7  Denied: Family history of mental disorder   8  Family history of Liver cancer    Social History    · Caffeine use (V49 89) (F15 90)   · Drinks coffee   · Never a smoker   · No alcohol use   · No drug use   · Primary language is [place]   · Retired   · Seeing a dentist    Current Meds    1  TraMADol HCl - 50 MG Oral Tablet; take 1 tablet by mouth every 4 hours if needed; Last   Rx:57Tkz3064 Ordered    2  Potassium Chloride Bina ER 20 MEQ Oral Tablet Extended Release; TAKE 1 TABLET   DAILY  Requested for: 39ASV5489; Last Rx:48Kxp8663 Ordered    3  Venlafaxine HCl - 75 MG Oral Tablet; TAKE 1 TABLET DAILY; Therapy: 55GSL7115 to (Evaluate:2017)  Requested for: 2016; Last   Rx:2016 Ordered    4  Pramipexole Dihydrochloride 1 MG Oral Tablet (Mirapex); TAKE 1 TABLET 3 TIMES DAILY; Therapy: 55EAC7137 to (Evaluate:75Evb7500)  Requested for: 57Iei4530; Last   Rx:17Saj8086 Ordered    5  Aspirin 81 MG TABS; TAKE 1 TABLET DAILY; Therapy: (Recorded:71Ngg1152) to Recorded   6  Cod Liver Oil Oral Oil; 1 teaspoon daily; Therapy: 33RIA6136 to Recorded   7  DuoNeb 0 5-2 5 (3) MG/3ML Inhalation Solution (Ipratropium-Albuterol); USE 1 UNIT   DOSE IN NEBULIZER EVERY 4 HOURS AS NEEDED; Therapy: (Recorded:23Gsz1168) to Recorded   8  Furosemide 40 MG Oral Tablet; monday, , and friday; Therapy: (Recorded:22Wik9041) to Recorded   9  Lantus SoloStar 100 UNIT/ML Subcutaneous Solution Pen-injector; 27 units at 8 am and   8 pm;   Therapy: (Recorded:09Tzy8291) to Recorded   10   MiraLax Oral Powder (Polyethylene Glycol 3350); Therapy: (Recorded:42Avo7291) to Recorded   11  Multi-Vitamins Oral Tablet; TAKE 1 TABLET DAILY; Therapy: 93KHH7780 to Recorded   12  Percocet 5-325 MG Oral Tablet (Oxycodone-Acetaminophen); TAKE 1 TABLET EVERY 6    HOURS AS NEEDED FOR PAIN;    Therapy: 68HUE9454 to Recorded   13  Senokot 8 6 MG Oral Tablet; 3 tablets at bedtime; Therapy: 30PZW7457 to Recorded   14  Vitamin C 500 MG Oral Tablet; TAKE 1 TABLET DAILY; Therapy: (Recorded:06Nbu8851) to Recorded    Allergies    1  Latex Gloves MISC   2  Toradol Oral TABS    3  No Known Environmental Allergies   4  No Known Food Allergies    End of Encounter Meds    1  TraMADol HCl - 50 MG Oral Tablet; take 1 tablet by mouth every 4 hours if needed; Last   Rx:86Xjc1487 Ordered    2  Potassium Chloride Bina ER 20 MEQ Oral Tablet Extended Release; TAKE 1 TABLET   DAILY  Requested for: 62LDK6375; Last Rx:93Kiv7850 Ordered    3  Venlafaxine HCl - 75 MG Oral Tablet; TAKE 1 TABLET DAILY; Therapy: 84LQF0346 to (Evaluate:09Apr2017)  Requested for: 31Ogr0186; Last   Rx:56Eup9334 Ordered    4  Pramipexole Dihydrochloride 1 MG Oral Tablet (Mirapex); TAKE 1 TABLET 3 TIMES DAILY; Therapy: 06UPQ5958 to (Evaluate:63Gqa8134)  Requested for: 07Qxc3720; Last   Rx:76Omd5898 Ordered    5  Aspirin 81 MG TABS; TAKE 1 TABLET DAILY; Therapy: (Recorded:35Xmn5399) to Recorded   6  Cod Liver Oil Oral Oil; 1 teaspoon daily; Therapy: 34QBD3429 to Recorded   7  DuoNeb 0 5-2 5 (3) MG/3ML Inhalation Solution (Ipratropium-Albuterol); USE 1 UNIT   DOSE IN NEBULIZER EVERY 4 HOURS AS NEEDED; Therapy: (Recorded:21Gmx2449) to Recorded   8  Furosemide 40 MG Oral Tablet; monday, wednsday, and friday; Therapy: (Recorded:93Hfh3759) to Recorded   9  Lantus SoloStar 100 UNIT/ML Subcutaneous Solution Pen-injector; 27 units at 8 am and   8 pm;   Therapy: (Recorded:84Iuj9577) to Recorded   10  MiraLax Oral Powder (Polyethylene Glycol 3350);     Therapy: (Recorded:32Qjx4941) to Recorded   11  Multi-Vitamins Oral Tablet; TAKE 1 TABLET DAILY; Therapy: 42SOP8927 to Recorded   12  Percocet 5-325 MG Oral Tablet (Oxycodone-Acetaminophen); TAKE 1 TABLET EVERY 6    HOURS AS NEEDED FOR PAIN;    Therapy: 06OVP4717 to Recorded   13  Senokot 8 6 MG Oral Tablet; 3 tablets at bedtime; Therapy: 10NRM7337 to Recorded   14  Vitamin C 500 MG Oral Tablet; TAKE 1 TABLET DAILY; Therapy: (Recorded:09Rps8588) to Recorded    Future Appointments    Date/Time Provider Specialty Site   09/02/2016 10:30 AM Malvin Baig MD [address] [facility]     Patient Care Team    Care Team Member Role Specialty Office Number   Shea Courser   [phone]   Jose Gilmore MD  Endocrinology [phone]     Signatures   Electronically signed by :  Ajay Rowland RN; Sep  1 2016 10:53AM EST                       (Author)

## 2018-01-11 ENCOUNTER — GENERIC CONVERSION - ENCOUNTER (OUTPATIENT)
Dept: OTHER | Facility: OTHER | Age: 78
End: 2018-01-11

## 2018-01-11 NOTE — RESULT NOTES
Verified Results  (1) CBC/PLT/DIFF 87PNP2039 12:29PM Yanique Crockett     Test Name Result Flag Reference   WBC COUNT 8 40 Thousand/uL  4 80-10 80   RBC COUNT 4 22 Million/uL  4 20-5 40   HEMOGLOBIN 13 2 g/dL  12 0-16 0   HEMATOCRIT 40 1 %  37 0-47 0   MCV 95 fL  82-98   MCH 31 3 pg H 27 0-31 0   MCHC 33 0 g/dL  31 4-37 4   RDW 15 2 % H 11 6-15 1   MPV 8 5 fL L 8 9-12 7   PLATELET COUNT 200 Thousands/uL  130-400   nRBC AUTOMATED 0 /100 WBCs     NEUTROPHILS RELATIVE PERCENT 76 % H 43-75   LYMPHOCYTES RELATIVE PERCENT 17 %  14-44   MONOCYTES RELATIVE PERCENT 6 %  4-12   EOSINOPHILS RELATIVE PERCENT 1 %  0-6   BASOPHILS RELATIVE PERCENT 0 %  0-1   NEUTROPHILS ABSOLUTE COUNT 6 40 Thousands/? ??L  1 85-7 62   LYMPHOCYTES ABSOLUTE COUNT 1 40 Thousands/? ??L  0 60-4 47   MONOCYTES ABSOLUTE COUNT 0 50 Thousand/? ??L  0 17-1 22   EOSINOPHILS ABSOLUTE COUNT 0 10 Thousand/? ??L  0 00-0 61   BASOPHILS ABSOLUTE COUNT 0 00 Thousands/? ??L  0 00-0 10   This bloodwork is non-fasting  Please drink two glasses of water morning of  bloodwork  (1) COMPREHENSIVE METABOLIC PANEL 46ZNB7509 09:71XJ Yanique Crockett     Test Name Result Flag Reference   GLUCOSE,RANDM 287 mg/dL H    If the patient is fasting, the ADA then defines impaired fasting glucose as > 100 mg/dL and diabetes as > or equal to 123 mg/dL     SODIUM 137 mmol/L  136-145   POTASSIUM 3 3 mmol/L L 3 5-5 3   CHLORIDE 97 mmol/L L 100-108   CARBON DIOXIDE 36 mmol/L H 21-32   ANION GAP (CALC) 4 mmol/L  4-13   BLOOD UREA NITROGEN 15 mg/dL  5-25   CREATININE 0 80 mg/dL  0 60-1 30   Standardized to IDMS reference method   CALCIUM 8 3 mg/dL  8 3-10 1   BILI, TOTAL 1 00 mg/dL  0 20-1 00   ALK PHOSPHATAS 277 U/L H    ALT (SGPT) 488 U/L H 12-78   AST(SGOT) 210 U/L H 5-45   ALBUMIN 3 0 g/dL L 3 5-5 0   TOTAL PROTEIN 6 6 g/dL  6 4-8 2   eGFR Non-African American      >60 0 ml/min/1 [address] [facility] [employer] Disease Education Program recommendations are as follows:  GFR calculation is accurate only with a steady state creatinine  Chronic Kidney disease less than 60 ml/min/1 73 sq  meters  Kidney failure less than 15 ml/min/1 73 sq  meters  (1) LIPASE 91FUG6653 12:29PM [facility]     Test Name Result Flag Reference   LIPASE 345 u/L         Plan  Elevated LFTs    · (1) HEPATIC FUNCTION PANEL; Status:Active;  Requested LBK:06BTH5877;
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
## 2018-01-12 ENCOUNTER — GENERIC CONVERSION - ENCOUNTER (OUTPATIENT)
Dept: OTHER | Facility: OTHER | Age: 78
End: 2018-01-12

## 2018-01-12 ENCOUNTER — APPOINTMENT (OUTPATIENT)
Dept: PHYSICAL THERAPY | Facility: CLINIC | Age: 78
End: 2018-01-12
Payer: COMMERCIAL

## 2018-01-12 ENCOUNTER — APPOINTMENT (OUTPATIENT)
Dept: PULMONOLOGY | Facility: CLINIC | Age: 78
End: 2018-01-12
Payer: COMMERCIAL

## 2018-01-12 VITALS
TEMPERATURE: 98.1 F | BODY MASS INDEX: 45.99 KG/M2 | HEIGHT: 59 IN | WEIGHT: 228.13 LBS | DIASTOLIC BLOOD PRESSURE: 70 MMHG | SYSTOLIC BLOOD PRESSURE: 134 MMHG | OXYGEN SATURATION: 96 % | RESPIRATION RATE: 18 BRPM | HEART RATE: 87 BPM

## 2018-01-12 PROCEDURE — G0424 PULMONARY REHAB W EXER: HCPCS
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
## 2018-01-12 NOTE — MISCELLANEOUS
Provider Comments  Provider Comments:   LMOM FOR PT TO CALL BACK AND RESCHEDULE APPT   SF      Signatures   Electronically signed by : Noy Morillo MD; Feb 9 2017 12:23PM EST                       (Author)

## 2018-01-12 NOTE — MISCELLANEOUS
History of Present Illness  COPD Hospital Discharge Initial Follow-Up Call: The patient is being contacted for follow-up after hospitalization  The date of discharge is 10/13/17  Left message on voice mail requesting return call  Current Meds    1  LORazepam 1 MG Oral Tablet; TAKE 1 TABLET 1 HOUR BEFORE MRI  MAY REPEAT   ONCE 15 MINUTES BEFORE MRI; Therapy: 24Lll1279 to (Evaluate:01Sep2017); Last Rx:55Mdu8734 Ordered    2  NovoLOG Mix 70/30 (70-30) 100 UNIT/ML Subcutaneous Suspension; inject 35 units   twice daily per H Madhav; Therapy: (Recorded:03Oct2017) to Recorded    3  ALPRAZolam ER 1 MG Oral Tablet Extended Release 24 Hour (Xanax XR); TAKE 1   TABLET DAILY AS DIRECTED; Therapy: 42QSM5417 to (Evaluate:29Wpl3171); Last Rx:17Aug2017 Ordered    4  Folic Acid 1 MG Oral Tablet; take 1 tablet by mouth once daily; Therapy: 50ONE1186 to (Evaluate:42Iwj0759)  Requested for: 77XAK9595; Last   Rx:25Jan2017 Ordered    5  Furosemide 40 MG Oral Tablet; two tabs in the AM    one tablet in the afternoon    Requested for: 19Wkx7789; Last Rx:03Aug2017 Ordered   6  MetOLazone 2 5 MG Oral Tablet; TAKE 1 TABLET DAILY ON MONDAY, WEDNESDAY, AND   FRIDAY; Therapy: 21GJA3685 to (Last Rx:07Apr2017)  Requested for: 07Apr2017 Ordered   7  Potassium Chloride Bina ER 20 MEQ Oral Tablet Extended Release; TAKE 1 TABLET   DAILY  Requested for: 03Sep2017; Last Rx:03Sep2017 Ordered    8  Dicyclomine HCl - 10 MG Oral Capsule; TAKE 1 CAPSULE 3 TIMES DAILY  Requested   for: 82IEZ9970; Last Rx:03Oct2017 Ordered    9  Oxycodone-Acetaminophen 5-325 MG Oral Tablet; TAKE 1 TABLET EVERY 4 HOURS AS   NEEDED FOR PAIN;   Therapy: 48Ixz8727 to (Evaluate:03Mzh7416); Last Rx:35Zbx7998 Ordered    10  Pramipexole Dihydrochloride 1 MG Oral Tablet (Mirapex); TAKE 1 TABLET THREE TIMES    A DAY; Therapy: 38TIH1184 to (Last Rx:07Aug2017)  Requested for: 07Aug2017 Ordered    11  Aspirin 325 MG Oral Tablet; TAKE 1 TABLET DAILY;     Therapy: (Recorded:55Yoa8894) to Recorded   12  Cod Liver Oil/Vitamins A & D Oral Capsule; Take 1 teaspoon daily; Therapy: (Recorded:87Oat9636) to Recorded   13  Dulcolax TBEC; take 2 tablet twice daily; Therapy: (Gagan Domingo) to Recorded   14  DuoNeb 0 5-2 5 (3) MG/3ML Inhalation Solution (Ipratropium-Albuterol); USE 1 UNIT    DOSE IN NEBULIZER EVERY 4 HOURS AS NEEDED; Therapy: (Recorded:92Yjt4217) to Recorded   15  Lactulose 10 GM/15ML Oral Solution; TAKE 15 ML DAILY; Therapy: (Gagan Domingo) to Recorded   16  Lisinopril 10 MG Oral Tablet; TAKE 1 TABLET DAILY; Therapy: (Gagan Domingo) to Recorded   17  Movantik 25 MG Oral Tablet; Take 1 tablet prior to first meal of the day or two hours after    first meal;    Therapy: (Recorded:69Xnw5273) to Recorded   18  Multi-Vitamins Oral Tablet; TAKE 1 TABLET DAILY; Therapy: 80MKC6872 to Recorded   19  Synthroid 125 MCG Oral Tablet (Levothyroxine Sodium); TAKE 1 TABLET EVERY    MORNING; Therapy: (Recorded:57Rfz4232) to Recorded   20  Torsemide 20 MG Oral Tablet; Take 1 tablet twice daily; Therapy: (Recorded:01Tpl2669) to Recorded    Allergies    1  Latex Gloves MISC   2  Toradol Oral TABS    3  No Known Environmental Allergies   4  No Known Food Allergies    Future Appointments    Date/Time Provider Specialty Site   10/19/2017 02:15 PM Aj De Leon MD Family Medicine [address]     Signatures   Electronically signed by :  Robin Solomon RT; Oct 16 2017 12:17PM EST                       (Author)

## 2018-01-13 VITALS
OXYGEN SATURATION: 88 % | SYSTOLIC BLOOD PRESSURE: 126 MMHG | WEIGHT: 240 LBS | HEART RATE: 102 BPM | DIASTOLIC BLOOD PRESSURE: 80 MMHG | HEIGHT: 55 IN | TEMPERATURE: 99.8 F | RESPIRATION RATE: 22 BRPM | BODY MASS INDEX: 55.54 KG/M2

## 2018-01-13 VITALS
TEMPERATURE: 98.7 F | HEIGHT: 59 IN | BODY MASS INDEX: 47.17 KG/M2 | SYSTOLIC BLOOD PRESSURE: 134 MMHG | OXYGEN SATURATION: 90 % | WEIGHT: 234 LBS | DIASTOLIC BLOOD PRESSURE: 80 MMHG | HEART RATE: 100 BPM | RESPIRATION RATE: 20 BRPM

## 2018-01-13 VITALS
WEIGHT: 227 LBS | HEART RATE: 94 BPM | OXYGEN SATURATION: 91 % | HEIGHT: 59 IN | RESPIRATION RATE: 20 BRPM | BODY MASS INDEX: 45.76 KG/M2 | TEMPERATURE: 98.7 F | SYSTOLIC BLOOD PRESSURE: 124 MMHG | DIASTOLIC BLOOD PRESSURE: 74 MMHG

## 2018-01-13 VITALS
HEART RATE: 95 BPM | WEIGHT: 240 LBS | BODY MASS INDEX: 48.38 KG/M2 | HEIGHT: 59 IN | SYSTOLIC BLOOD PRESSURE: 158 MMHG | RESPIRATION RATE: 20 BRPM | OXYGEN SATURATION: 95 % | DIASTOLIC BLOOD PRESSURE: 78 MMHG | TEMPERATURE: 97.9 F

## 2018-01-13 VITALS
HEIGHT: 59 IN | HEART RATE: 88 BPM | DIASTOLIC BLOOD PRESSURE: 84 MMHG | BODY MASS INDEX: 42.33 KG/M2 | SYSTOLIC BLOOD PRESSURE: 150 MMHG | WEIGHT: 210 LBS

## 2018-01-13 VITALS
TEMPERATURE: 99.3 F | RESPIRATION RATE: 28 BRPM | HEART RATE: 100 BPM | OXYGEN SATURATION: 93 % | WEIGHT: 229 LBS | BODY MASS INDEX: 46.65 KG/M2 | DIASTOLIC BLOOD PRESSURE: 68 MMHG | SYSTOLIC BLOOD PRESSURE: 130 MMHG

## 2018-01-13 VITALS
HEART RATE: 86 BPM | DIASTOLIC BLOOD PRESSURE: 78 MMHG | SYSTOLIC BLOOD PRESSURE: 141 MMHG | WEIGHT: 235 LBS | TEMPERATURE: 98.1 F | HEIGHT: 59 IN | BODY MASS INDEX: 47.37 KG/M2 | OXYGEN SATURATION: 92 %

## 2018-01-13 VITALS
SYSTOLIC BLOOD PRESSURE: 130 MMHG | HEIGHT: 59 IN | DIASTOLIC BLOOD PRESSURE: 63 MMHG | HEART RATE: 94 BPM | RESPIRATION RATE: 18 BRPM | WEIGHT: 228 LBS | BODY MASS INDEX: 45.96 KG/M2 | TEMPERATURE: 98.5 F | OXYGEN SATURATION: 95 %

## 2018-01-13 VITALS
DIASTOLIC BLOOD PRESSURE: 84 MMHG | SYSTOLIC BLOOD PRESSURE: 160 MMHG | BODY MASS INDEX: 48.18 KG/M2 | OXYGEN SATURATION: 93 % | WEIGHT: 239 LBS | RESPIRATION RATE: 40 BRPM | TEMPERATURE: 98.1 F | HEART RATE: 92 BPM | HEIGHT: 59 IN

## 2018-01-13 VITALS
HEART RATE: 100 BPM | DIASTOLIC BLOOD PRESSURE: 60 MMHG | HEIGHT: 59 IN | WEIGHT: 221 LBS | TEMPERATURE: 97.7 F | OXYGEN SATURATION: 92 % | SYSTOLIC BLOOD PRESSURE: 128 MMHG | BODY MASS INDEX: 44.55 KG/M2 | RESPIRATION RATE: 20 BRPM

## 2018-01-13 NOTE — RESULT NOTES
Verified Results  (1) TISSUE EXAM [id number] 10:21AM [facility]     Test Name Result Flag Reference   LAB AP CASE REPORT (Report)     Surgical Pathology Report             Case: [id number]                   Authorizing Provider: Ihsan Dupont MD       Collected:      06/06/2017 1021        Ordering Location:   [address] [place] [address]   Received:      06/06/2017 [address]                           Pathologist:      Dale Manzano MD                             Specimen:  Liver   LAB AP FINAL DIAGNOSIS      A  Liver, biopsy:  - Cholangitis with biliary interface activity (see note)  - Negative for features of malignancy  Electronically signed by Dale Manzano MD on 6/9/2017 at 9:02 AM   LAB AP INTRAOPERATIVE CONSULTATION      Hepatocytes present, defer to aurelio Padilla 6/6/17   LAB AP NOTE (Report)     The sample shows multiple core biopsy portions of hepatic parenchyma with   intact lobular architecture  The portal tracts show mixed acute and   chronic inflammation with neutrophils, eosinophils, lymphocytes and   histiocytes  Neutrophils are identified expanding beyond the portal tract   into lobular tissue (interface hepatitis/activity)  Also noted are portal   edema and zone 2-3 sinusoidal dilatation with neutrophils and lymphocytes   in sinusoids  Neutrophils are present within small bile duct walls   consistent with cholangitis  No evidence of ballooning degeneration,   Karina hyalin, cholestasis, or tissue necrosis are identified  No   significant steatosis is identified  Trichrome stain highlights normal   portal collagen without evidence of fibrosis  PAS stain with and without   diastase digestion shows no evidence of alpha-1 antitrypsin globules  Reticulin stain highlights normal hepatic plate thickness and   architecture  Iron stain is negative for significant iron deposition  Overall, no distinct features of a mass lesion are identified  The patient   history of bacteremia is noted and may be a contributing factor to these   findings  Correlation with clinical impression is recommended to assess   for adequacy of sampling and to evaluate for other possible causes of   liver/biliary injury  LAB AP SURGICAL ADDITIONAL INFORMATION (Report)     These tests were developed and their performance characteristics   determined by [facility] or [address] [place] [address]  They may not be cleared or approved by the [place]  Food and   Drug Administration  The FDA has determined that such clearance or   approval is not necessary  These tests are used for clinical purposes  They should not be regarded as investigational or for research  This   laboratory has been approved by [place] [address], designated as a high-complexity   laboratory and is qualified to perform these tests  Interpretation performed at [facility]   LAB AP GROSS DESCRIPTION (Report)     A  The specimen is received in formalin, labeled with the patient's name   and hospital number, and is designated liver, are three cores of   tan-brown and hemorrhagic soft tissue ranging in length from 1 7-2 3 cm x   0 1 cm in diameter each  Entirely submitted with one core per cassette  Three cassettes  Note: The estimated total formalin fixation time based upon information   provided by the submitting clinician and the standard processing schedule   is 16 0 hours        RLR   LAB AP CLINICAL INFORMATION      pancreatic mass and liver mass
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
## 2018-01-13 NOTE — PROCEDURES
Procedures by Fco Wilkins MD at 6/16/2017  9:56 AM      Author:  Fco Wilkins MD Service:  Interventional Radiology  Author Type:  Physician    Filed:  6/16/2017  9:57 AM Date of Service:  6/16/2017  9:56 AM Status:  Signed    :  Fco Wilkins MD (Physician)        Procedure Orders:       1  Insert PICC line [[id number]] ordered by Fco Wilkins MD at 06/16/17 0956                  PICC Line Insertion  Date/Time: 6/16/2017 9:56 AM  Performed by: Chago Baez by: Ivana Castellanos     Patient location:  IR  Other Assisting Provider: No     Consent:     Consent obtained:  Written    Consent given by:  Patient  Universal protocol:     Procedure explained and questions answered to patient or proxy's satisfaction: yes      Relevant documents present and verified: yes      Test results available and properly labeled: yes       Imaging studies available: yes      Required blood products, implants, devices, and special equipment available: yes      Site/side marked: yes      Immediately prior to procedure, a time out was called: yes      Patient identity confirmed:  Verbally with patient  Pre-procedure details:     Hand hygiene: Hand hygiene performed prior to insertion      Sterile barrier technique: All elements of maximal sterile technique followed      Skin preparation:  ChloraPrep  Indications:     PICC line indications: vascular access    Anesthesia (see MAR for exact dosages):      Anesthesia method:  Local infiltration    Local anesthetic:  Lidocaine 1% w/o epi  Procedure details:     Location:  Basilic    Vessel type: vein      Laterality:  Right    Approach: percutaneous technique used      Patient position:  Flat    Procedural supplies:  Double lumen    Catheter size:  5 5 Fr    Landmarks identified: yes      Ultrasound guidance: yes      Sterile ultrasound techniques: Sterile gel and sterile probe covers were used      Number of attempts:  1 Successful placement: yes      Cath access vessel: SVC  Post-procedure details:     Post-procedure:  Dressing applied    Assessment:  Blood return through all ports and free fluid flow    Post-procedure complications: none      Patient tolerance of procedure:   Tolerated well, no immediate complications                 Received for:Provider  EPIC   Jun 16 2017  9:57AM [facility]

## 2018-01-13 NOTE — PROGRESS NOTES
History of Present Illness  Care Coordination Encounter Information:   Type of Encounter: Telephonic   Last Office Visit: 1/30/17   Spoke to Patient  Care Coordination SL Nurse Josefina [employer]:   The reason for call is to discuss outreach for follow up/needed services and coordination of meeting care plan treatment goals  Today Sherryle Cage tells me, "I just woke up  My sugars are High  I come to see him (Dr Thien Terrazas) tomorrow  Her glucose this morning was 229, last night it was 287  We went over her food diary and I could not  a cause  I looked at her medication list and she is taking prednisone  We discussed the menu of foods she will eat until she sees Dr Thien Terrazas  She is taking her medications as directed  She tells me her glucose was over 400 over the weekend "so I just went to bed " I have encouraged her to call the office if this happens again  The conversation was 25 minutes  Active Problems    1  Acquired hypothyroidism (244 9) (E03 9)   2  Acute dyspnea (786 09) (R06 00)   3  Acute on chronic systolic congestive heart failure (428 23,428 0) (I50 23)   4  Chronic pain (338 29) (G89 29)   5  Diabetes mellitus with neuropathy (250 60,357 2) (E11 40)   6  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)   7  Edema (782 3) (R60 9)   8  Emphysema, unspecified (492 8) (J43 9)   9  Epiphora, unspecified laterality (375 20) (H04 209)   10  Hypertension (401 9) (I10)   11  Obesity (278 00) (E66 9)   12  Restless leg syndrome (333 94) (G25 81)   13  Sleep apnea (780 57) (G47 30)    Past Medical History    1  History of Cellulitis of right lower extremity (682 6) (L03 115)   2  History of Depression, acute (296 20) (F32 9)   3  History of Emphysema, 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518 2) (J98 3)   4  History of aortic valve disorder (V12 59) (Z86 79)   5  History of congestive heart failure (V12 59) (Z86 79)   6  History of Restless leg syndrome (333 94) (G25 81)    Surgical History    1  History of Cholecystectomy   2   History of Eye Surgery   3  History of Resection Of Pericardial Cyst Or Tumor   4  History of Thoracoscopy (Therapeutic) W/ Excision Of Pericardial Cyst   5  History of Thyroid Surgery    Family History  Mother    1  Family history of Asthma   2  Family history of    3  Denied: Family history of alcoholism   4  Denied: Family history of mental disorder  Father    5  Family history of    6  Denied: Family history of alcoholism   7  Denied: Family history of mental disorder   8  Family history of Liver cancer    Social History    · Caffeine use (V49 89) (F15 90)   · Drinks coffee   · Never a smoker   · No alcohol use   · No drug use   · Primary language is [place]   · Retired   · Seeing a dentist    Current Meds    1  OxyCODONE HCl - 5 MG Oral Capsule; TAKE 1 CAPSULE EVERY 8 HOURS; Last   Rx:86Fwq1027 Ordered    2  Folic Acid 1 MG Oral Tablet; take 1 tablet by mouth once daily; Therapy: 34BUO0574 to (Evaluate:15Enn3258)  Requested for: 2017; Last   Rx:2017 Ordered    3  Potassium Chloride Bina ER 20 MEQ Oral Tablet Extended Release; TAKE 1 TABLET   DAILY  Requested for: 84IFT5005; Last Rx:2017; Status: ACTIVE - Transmit to   Pharmacy - Awaiting Verification Ordered    4  Furosemide 40 MG Oral Tablet; TAKE 1 TABLET TWICE DAILY  Requested for:   35DHZ5892; Last TT:48OSB9515 Ordered    5  Pramipexole Dihydrochloride 1 MG Oral Tablet (Mirapex); TAKE 1 TABLET 3 TIMES DAILY; Therapy: 83UNQ4315 to (Evaluate:68Kia0662)  Requested for: 83Iwu1034; Last   Rx:70Bll3993 Ordered    6  Aspirin 81 MG TABS; TAKE 1 TABLET DAILY; Therapy: (Recorded:69Xij6686) to Recorded   7  Cod Liver Oil Oral Oil; 1 teaspoon daily; Therapy: 99FMT5773 to Recorded   8  DuoNeb 0 5-2 5 (3) MG/3ML Inhalation Solution (Ipratropium-Albuterol); USE 1 UNIT   DOSE IN NEBULIZER EVERY 4 HOURS AS NEEDED; Therapy: (Recorded:24Hzn3574) to Recorded   9  HumuLIN 70/30 Pen (70-30) 100 UNIT/ML SUSP; INJECT 40 UNITS TWICE DAILY;    Therapy: Ning Enrique) to Recorded   10  Keflex 500 MG Oral Capsule (Cephalexin); TAKE 1 CAPSULE 3 TIMES DAILY UNTIL    GONE;    Therapy: (Recorded:23Jan2017) to Recorded   11  MiraLax Oral Powder (Polyethylene Glycol 3350); Therapy: (Recorded:03Feb2016) to Recorded   12  Multi-Vitamins Oral Tablet; TAKE 1 TABLET DAILY; Therapy: 14QAD9656 to Recorded   13  Multi-Vitamins TABS; TAKE 1 TABLET DAILY; Therapy: (ZKJVQTZN:22UZA8804) to Recorded   14  PredniSONE 10 MG Oral Tablet; TAKE 1 TABLET DAILY; Therapy: (YJFPJGTN:07EWW8379) to Recorded   15  Senokot 8 6 MG Oral Tablet; 3 tablets at bedtime; Therapy: 27WGW8892 to Recorded   16  Synthroid 125 MCG Oral Tablet (Levothyroxine Sodium); TAKE 1 TABLET EVERY    MORNING; Therapy: (Recorded:12Oct2016) to Recorded   17  Vitamin C 500 MG Oral Tablet; TAKE 1 TABLET DAILY; Therapy: (Recorded:41Mye4277) to Recorded    Allergies    1  Latex Gloves MISC   2  Toradol Oral TABS    3  No Known Environmental Allergies   4  No Known Food Allergies    End of Encounter Meds    1  OxyCODONE HCl - 5 MG Oral Capsule; TAKE 1 CAPSULE EVERY 8 HOURS; Last   Rx:03Feb2017 Ordered    2  Folic Acid 1 MG Oral Tablet; take 1 tablet by mouth once daily; Therapy: 35SRI1681 to (Evaluate:72Qrz2973)  Requested for: 25Jan2017; Last   Rx:25Jan2017 Ordered    3  Potassium Chloride Bina ER 20 MEQ Oral Tablet Extended Release; TAKE 1 TABLET   DAILY  Requested for: 59AYY9089; Last Rx:22Jan2017; Status: ACTIVE - Transmit to   Pharmacy - Awaiting Verification Ordered    4  Furosemide 40 MG Oral Tablet; TAKE 1 TABLET TWICE DAILY  Requested for:   05BDX2082; Last QW:57RAY3418 Ordered    5  Pramipexole Dihydrochloride 1 MG Oral Tablet (Mirapex); TAKE 1 TABLET 3 TIMES DAILY; Therapy: 97RSZ7529 to (Evaluate:99Aja8980)  Requested for: 88Iiz4866; Last   Rx:75Tto0440 Ordered    6  Aspirin 81 MG TABS; TAKE 1 TABLET DAILY; Therapy: (Recorded:84Mmc0343) to Recorded   7   Cod Liver Oil Oral Oil; 1 teaspoon daily; Therapy: 75QQM3267 to Recorded   8  DuoNeb 0 5-2 5 (3) MG/3ML Inhalation Solution (Ipratropium-Albuterol); USE 1 UNIT   DOSE IN NEBULIZER EVERY 4 HOURS AS NEEDED; Therapy: (Recorded:83Kfl6350) to Recorded   9  HumuLIN 70/30 Pen (70-30) 100 UNIT/ML SUSP; INJECT 40 UNITS TWICE DAILY; Therapy: (Recorded:31Jan2017) to Recorded   10  Keflex 500 MG Oral Capsule (Cephalexin); TAKE 1 CAPSULE 3 TIMES DAILY UNTIL    GONE;    Therapy: (Recorded:23Jan2017) to Recorded   11  MiraLax Oral Powder (Polyethylene Glycol 3350); Therapy: (Recorded:94Fpb5762) to Recorded   12  Multi-Vitamins Oral Tablet; TAKE 1 TABLET DAILY; Therapy: 42BOX1251 to Recorded   13  Multi-Vitamins TABS; TAKE 1 TABLET DAILY; Therapy: (SNSBEMUH:05SFY4614) to Recorded   14  PredniSONE 10 MG Oral Tablet; TAKE 1 TABLET DAILY; Therapy: (BTRUZCEK:58EDO4196) to Recorded   15  Senokot 8 6 MG Oral Tablet; 3 tablets at bedtime; Therapy: 91WVT9442 to Recorded   16  Synthroid 125 MCG Oral Tablet (Levothyroxine Sodium); TAKE 1 TABLET EVERY    MORNING; Therapy: (Recorded:12Oct2016) to Recorded   17  Vitamin C 500 MG Oral Tablet; TAKE 1 TABLET DAILY; Therapy: (Recorded:33Quv2318) to Recorded    Future Appointments    Date/Time Provider Specialty Site   02/08/2017 02:30 PM Kuldeep Maki MD [facility]  [address] [facility]     Patient Care Team    Care Team Member Role Specialty Office Number   Roel Smith   [phone]   Louretta Osgood MD  Endocrinology [phone]   Amina Orona   [phone]     Signatures   Electronically signed by :  Dannielle Naranjo RN; Feb 7 2017  1:40PM EST                       (Author)

## 2018-01-13 NOTE — RESULT NOTES
Message   PLEASE CALL   BW WAS OK   HOW IS SHE FEELING   THANKS        Verified Results  (1) CBC/ PLT (NO DIFF) 04BCX7273 10:00AM Rachel Aggarwal     Test Name Result Flag Reference   HEMATOCRIT 39 9 %  37 0-47 0   HEMOGLOBIN 13 2 g/dL  12 0-16 0   MCHC 33 0 g/dL  31 4-37 4   MCH 30 0 pg  27 0-31 0   MCV 91 fL  82-98   PLATELET COUNT 286 Thousands/uL  130-400   RBC COUNT 4 38 Million/uL  4 20-5  40   RDW 15 5 % H 11 6-15 1   WBC COUNT 10 20 Thousand/uL  4 80-10 80   MPV 8 9 fL  8 9-12 7     (1) COMPREHENSIVE METABOLIC PANEL 01ILO7489 31:55OK Rachel Aggarwal     Test Name Result Flag Reference   GLUCOSE,RANDM 131 mg/dL     If the patient is fasting, the ADA then defines impaired fasting glucose as > 100 mg/dL and diabetes as > or equal to 123 mg/dL  SODIUM 141 mmol/L  136-145   POTASSIUM 4 1 mmol/L  3 5-5 3   CHLORIDE 101 mmol/L  100-108   CARBON DIOXIDE 33 mmol/L H 21-32   ANION GAP (CALC) 7 mmol/L  4-13   BLOOD UREA NITROGEN 19 mg/dL  5-25   CREATININE 0 70 mg/dL  0 60-1 30   Standardized to IDMS reference method   CALCIUM 8 5 mg/dL  8 3-10 1   BILI, TOTAL 0 20 mg/dL  0 20-1 00   ALK PHOSPHATAS 73 U/L     ALT (SGPT) 30 U/L  12-78   AST(SGOT) 17 U/L  5-45   ALBUMIN 3 7 g/dL  3 5-5 0   TOTAL PROTEIN 7 1 g/dL  6 4-8 2   eGFR Non-African American      >60 0 ml/min/1 [facility] Disease Education Program recommendations are as follows:  GFR calculation is accurate only with a steady state creatinine  Chronic Kidney disease less than 60 ml/min/1 73 sq  meters  Kidney failure less than 15 ml/min/1 73 sq  meters

## 2018-01-13 NOTE — PROGRESS NOTES
History of Present Illness  Care Coordination Encounter Information:   Type of Encounter: Telephonic   Last Office Visit: 6/2/16   Spoke to Patient  Care Coordination SL Nurse [facility] includes the [facility]:   The reason for call is to discuss outreach for follow up/needed services and coordination of meeting care plan treatment goals  Today Mariza Tucker tell me she is having eye surgery tomorrow  She describes what sounds like a blepharoplasty  She remains living with her daughter Lorena Melendez because " she pushes me to do the right things and I don't even know she is pushing me " She has joined water aerobics and participates twice weekly  She was seen in the office for "dizziness" on 6/2/16  She states these symptoms have resolved but she still feels "unsteady" when she rises from a sitting to a standing position  She has purchased a one month supply of [address] [employer] [place] in an attempt to loose weight  Active Problems    1  Acquired hypothyroidism (244 9) (E03 9)   2  Cellulitis of right lower extremity (682 6) (L03 115)   3  Chronic pain (338 29) (G89 29)   4  Diabetes mellitus with neuropathy (250 60,357 2) (E11 40)   5  Dizziness (780 4) (R42)   6  Edema (782 3) (R60 9)   7  Emphysema, unspecified (492 8) (J43 9)   8  History of congestive heart failure (V12 59) (Z86 79)   9  Hypertension (401 9) (I10)   10  Obesity (278 00) (E66 9)   11  Preoperative clearance (V72 84) (Z01 818)   12  Restless leg syndrome (333 94) (G25 81)   13  Sleep apnea (780 57) (G47 30)    Past Medical History    1  History of Depression, acute (296 20) (F32 9)   2  History of Emphysema, 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518 2) (J98 3)   3  History of aortic valve disorder (V12 59) (Z86 79)   4  History of congestive heart failure (V12 59) (Z86 79)   5  History of Restless leg syndrome (333 94) (G25 81)    Surgical History    1  History of Cholecystectomy   2  History of Resection Of Pericardial Cyst Or Tumor   3   History of Thoracoscopy (Therapeutic) W/ Excision Of Pericardial Cyst   4  History of Thyroid Surgery    Family History  Mother    1  Family history of Asthma   2  Family history of    3  Denied: Family history of alcoholism   4  Denied: Family history of mental disorder  Father    5  Family history of    6  Denied: Family history of alcoholism   7  Denied: Family history of mental disorder   8  Family history of Liver cancer    Social History    · Caffeine use (V49 89) (F15 90)   · Drinks coffee   · Never a smoker   · No alcohol use   · No drug use   · Primary language is [place]   · Retired   · Seeing a dentist    Current Meds    1  TraMADol HCl - 50 MG Oral Tablet; take 1 tablet by mouth every 4 hours if needed; Last   Rx:2016 Ordered    2  Furosemide 20 MG Oral Tablet; tuesday,  and   Requested for:   2016; Last Rx:2016 Ordered   3  Potassium Chloride Bina ER 20 MEQ Oral Tablet Extended Release; TAKE 1 TABLET   DAILY  Requested for: ; Last Rx:43Ncw4533 Ordered    4  Venlafaxine HCl - 75 MG Oral Tablet; TAKE 1 TABLET DAILY; Therapy: 61FZO3003 to (Evaluate:2017)  Requested for: 2016; Last   Rx:2016 Ordered    5  Pramipexole Dihydrochloride 1 MG Oral Tablet (Mirapex); TAKE 1 TABLET 3 TIMES DAILY; Therapy: 68WRO1368 to (Alyson Huang)  Requested for: 05JWH8602; Last   Rx:2016 Ordered    6  Aspirin 81 MG TABS; TAKE 1 TABLET DAILY; Therapy: (Recorded:97Cbp8929) to Recorded   7  Cod Liver Oil Oral Oil; 1 teaspoon daily; Therapy: 51HFJ2780 to Recorded   8  DuoNeb 0 5-2 5 (3) MG/3ML Inhalation Solution (Ipratropium-Albuterol); USE 1 UNIT   DOSE IN NEBULIZER EVERY 4 HOURS AS NEEDED; Therapy: (Recorded:87Yno3357) to Recorded   9  Furosemide 40 MG Oral Tablet; monday, , and friday; Therapy: (Recorded:75Odq1240) to Recorded   10  Lantus SoloStar 100 UNIT/ML Subcutaneous Solution Pen-injector; 27 units at 8 am and    8 pm;    Therapy: (Recorded:61Epo3074) to Recorded   11   MiraLax Oral Powder (Polyethylene Glycol 3350); Therapy: (Recorded:02Vzd6669) to Recorded   12  Multi-Vitamins Oral Tablet; TAKE 1 TABLET DAILY; Therapy: 61UMN4446 to Recorded   13  Senokot 8 6 MG Oral Tablet; 3 tablets at bedtime; Therapy: 15FKW8458 to Recorded   14  Synthroid 125 MCG Oral Tablet (Levothyroxine Sodium); TAKE 1 TABLET EVERY    MORNING; Therapy: (Recorded:16Dou1240) to Recorded   15  Vitamin C 500 MG Oral Tablet; TAKE 1 TABLET DAILY; Therapy: (Recorded:39Wvn0474) to Recorded    Allergies    1  Latex Gloves MISC   2  Toradol Oral TABS    3  No Known Environmental Allergies   4  No Known Food Allergies    End of Encounter Meds    1  TraMADol HCl - 50 MG Oral Tablet; take 1 tablet by mouth every 4 hours if needed; Last   Rx:34Gup8746 Ordered    2  Furosemide 20 MG Oral Tablet; tuesday, thursday saturday and sunday  Requested for:   14Apr2016; Last Rx:11Fno3918 Ordered   3  Potassium Chloride Bina ER 20 MEQ Oral Tablet Extended Release; TAKE 1 TABLET   DAILY  Requested for: 47GPF5370; Last Rx:57Bfq8411 Ordered    4  Venlafaxine HCl - 75 MG Oral Tablet; TAKE 1 TABLET DAILY; Therapy: 54SNE7735 to (Evaluate:09Apr2017)  Requested for: 14Apr2016; Last   Rx:14Apr2016 Ordered    5  Pramipexole Dihydrochloride 1 MG Oral Tablet (Mirapex); TAKE 1 TABLET 3 TIMES DAILY; Therapy: 68MKZ9410 to (Bartolome De Luna)  Requested for: 40EPB0847; Last   Rx:35Keh8936 Ordered    6  Aspirin 81 MG TABS; TAKE 1 TABLET DAILY; Therapy: (Recorded:44Hyv0346) to Recorded   7  Cod Liver Oil Oral Oil; 1 teaspoon daily; Therapy: 29UOS1572 to Recorded   8  DuoNeb 0 5-2 5 (3) MG/3ML Inhalation Solution (Ipratropium-Albuterol); USE 1 UNIT   DOSE IN NEBULIZER EVERY 4 HOURS AS NEEDED; Therapy: (Recorded:09Ngg6317) to Recorded   9  Furosemide 40 MG Oral Tablet; monday, wednsday, and friday; Therapy: (Recorded:61Fij1499) to Recorded   10   Lantus SoloStar 100 UNIT/ML Subcutaneous Solution Pen-injector; 27 units at 8 am and 8 pm;    Therapy: (Recorded:42Zec0886) to Recorded   11  MiraLax Oral Powder (Polyethylene Glycol 3350); Therapy: (Recorded:65Hmu7084) to Recorded   12  Multi-Vitamins Oral Tablet; TAKE 1 TABLET DAILY; Therapy: 36NDN5049 to Recorded   13  Senokot 8 6 MG Oral Tablet; 3 tablets at bedtime; Therapy: 78WHH3042 to Recorded   14  Synthroid 125 MCG Oral Tablet (Levothyroxine Sodium); TAKE 1 TABLET EVERY    MORNING; Therapy: (Recorded:60Yfx5438) to Recorded   15  Vitamin C 500 MG Oral Tablet; TAKE 1 TABLET DAILY; Therapy: (Recorded:81Ugh1697) to Recorded    Future Appointments    Date/Time Provider Specialty Site   07/05/2016 11:00 AM Estefani Carr MD [address] [facility]     Patient Care Team    Care Team Member Role Specialty Office Number   Yoon Tatianna   [phone]   Lew Duran MD  Endocrinology [phone]     Signatures   Electronically signed by :  Glo Jenkins RN; Jun 7 2016  2:34PM EST                       (Author)

## 2018-01-14 VITALS
WEIGHT: 228 LBS | RESPIRATION RATE: 22 BRPM | HEART RATE: 88 BPM | OXYGEN SATURATION: 93 % | SYSTOLIC BLOOD PRESSURE: 162 MMHG | DIASTOLIC BLOOD PRESSURE: 78 MMHG | HEIGHT: 60 IN | BODY MASS INDEX: 44.76 KG/M2 | TEMPERATURE: 98 F

## 2018-01-14 VITALS
OXYGEN SATURATION: 92 % | TEMPERATURE: 98.9 F | HEIGHT: 60 IN | RESPIRATION RATE: 20 BRPM | HEART RATE: 84 BPM | WEIGHT: 230 LBS | BODY MASS INDEX: 45.16 KG/M2 | SYSTOLIC BLOOD PRESSURE: 138 MMHG | DIASTOLIC BLOOD PRESSURE: 70 MMHG

## 2018-01-14 VITALS
BODY MASS INDEX: 44.57 KG/M2 | HEART RATE: 80 BPM | HEIGHT: 60 IN | WEIGHT: 227 LBS | TEMPERATURE: 97.8 F | OXYGEN SATURATION: 92 %

## 2018-01-14 VITALS
RESPIRATION RATE: 32 BRPM | TEMPERATURE: 98.1 F | DIASTOLIC BLOOD PRESSURE: 84 MMHG | WEIGHT: 229 LBS | SYSTOLIC BLOOD PRESSURE: 136 MMHG | HEART RATE: 88 BPM | BODY MASS INDEX: 46.16 KG/M2 | OXYGEN SATURATION: 94 % | HEIGHT: 59 IN

## 2018-01-14 NOTE — PROGRESS NOTES
History of Present Illness  Care Coordination Encounter Information:   Type of Encounter: In Office   Last Office Visit: 10/17/16   Spoke to Patient  Care Coordination SL Nurse Ravin Queen:   The reason for call is to discuss outreach for follow up/needed services and coordination of meeting care plan treatment goals  I had a personal meeting with Malgorzata Carol today after her PCP appointment  She continues to live with her significant other, Rosy Espino, whom I also met  She complains that this arrangement is not as easy as living with her daughter as her daughter did all of the cooking  She states she is "tired" as she has gone back to some bad eating habits  I provided a refresher of the nutrition plan that I have laid out many times before in previous notes  I confirmed that she does have my contact information if I may be of service to her in the future  Active Problems    1  Acquired hypothyroidism (244 9) (E03 9)   2  Chronic pain (338 29) (G89 29)   3  Diabetes mellitus with neuropathy (250 60,357 2) (E11 40)   4  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)   5  Edema (782 3) (R60 9)   6  Emphysema, unspecified (492 8) (J43 9)   7  Epiphora, unspecified laterality (375 20) (H04 209)   8  History of congestive heart failure (V12 59) (Z86 79)   9  Hypertension (401 9) (I10)   10  Obesity (278 00) (E66 9)   11  Preoperative clearance (V72 84) (Z01 818)   12  Restless leg syndrome (333 94) (G25 81)   13  Sleep apnea (780 57) (G47 30)    Past Medical History    1  History of Cellulitis of right lower extremity (682 6) (L03 115)   2  History of Depression, acute (296 20) (F32 9)   3  History of Emphysema, 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518 2) (J98 3)   4  History of aortic valve disorder (V12 59) (Z86 79)   5  History of congestive heart failure (V12 59) (Z86 79)   6  History of Restless leg syndrome (333 94) (G25 81)    Surgical History    1  History of Cholecystectomy   2  History of Eye Surgery   3   History of Resection Of Pericardial Cyst Or Tumor   4  History of Thoracoscopy (Therapeutic) W/ Excision Of Pericardial Cyst   5  History of Thyroid Surgery    Family History  Mother    1  Family history of Asthma   2  Family history of    3  Denied: Family history of alcoholism   4  Denied: Family history of mental disorder  Father    5  Family history of    6  Denied: Family history of alcoholism   7  Denied: Family history of mental disorder   8  Family history of Liver cancer    Social History    · Caffeine use (V49 89) (F15 90)   · Drinks coffee   · Never a smoker   · No alcohol use   · No drug use   · Primary language is [place]   · Retired   · Seeing a dentist    Current Meds    1  FentaNYL 12 MCG/HR Transdermal Patch 72 Hour; APPLY 1 PATCH EVERY 3 DAYS; Therapy: 79PBP4970 to (Evaluate:08Fug8083); Last Rx:05Vlp4314 Ordered    2  Potassium Chloride Bina ER 20 MEQ Oral Tablet Extended Release; TAKE 1 TABLET   DAILY  Requested for: 70GIA0457; Last Rx:51Arm1818 Ordered    3  Furosemide 40 MG Oral Tablet; TAKE 1 TABLET DAILY; Last Rx:38Kiu3162 Ordered    4  Pramipexole Dihydrochloride 1 MG Oral Tablet (Mirapex); TAKE 1 TABLET 3 TIMES DAILY; Therapy: 85WXG2560 to (Evaluate:67Sfc7457)  Requested for: 86Zil9665; Last   Rx:58Tcl7554 Ordered    5  Aspirin 81 MG TABS; TAKE 1 TABLET DAILY; Therapy: (Recorded:77Dri2535) to Recorded   6  Cod Liver Oil Oral Oil; 1 teaspoon daily; Therapy: 50EHH8681 to Recorded   7  DuoNeb 0 5-2 5 (3) MG/3ML Inhalation Solution (Ipratropium-Albuterol); USE 1 UNIT   DOSE IN NEBULIZER EVERY 4 HOURS AS NEEDED; Therapy: (Recorded:09Yzx0360) to Recorded   8  Lantus SoloStar 100 UNIT/ML Subcutaneous Solution Pen-injector; 27 units at 8 am and   8 pm;   Therapy: (Recorded:25Xxy1655) to Recorded   9  MiraLax Oral Powder (Polyethylene Glycol 3350); Therapy: (Recorded:68Hsn5969) to Recorded   10  Multi-Vitamins Oral Tablet; TAKE 1 TABLET DAILY; Therapy: 93IEO5348 to Recorded   11   Senokot 8 6 MG Oral Tablet; 3 tablets at bedtime; Therapy: 41IOY6946 to Recorded   12  Synthroid 125 MCG Oral Tablet (Levothyroxine Sodium); TAKE 1 TABLET EVERY    MORNING; Therapy: (Recorded:12Oct2016) to Recorded   13  Vitamin C 500 MG Oral Tablet; TAKE 1 TABLET DAILY; Therapy: (Recorded:17Bsz5871) to Recorded    Allergies    1  Latex Gloves MISC   2  Toradol Oral TABS    3  No Known Environmental Allergies   4  No Known Food Allergies    End of Encounter Meds    1  FentaNYL 12 MCG/HR Transdermal Patch 72 Hour; APPLY 1 PATCH EVERY 3 DAYS; Therapy: 45TSY4048 to (Evaluate:17Nov2016); Last Rx:17Oct2016 Ordered    2  Potassium Chloride Bina ER 20 MEQ Oral Tablet Extended Release; TAKE 1 TABLET   DAILY  Requested for: 54BVW6356; Last Rx:68Ylr7852 Ordered    3  Furosemide 40 MG Oral Tablet; TAKE 1 TABLET DAILY; Last Rx:75Swr0467 Ordered    4  Pramipexole Dihydrochloride 1 MG Oral Tablet (Mirapex); TAKE 1 TABLET 3 TIMES DAILY; Therapy: 56TGA7323 to (Evaluate:53Mhd1241)  Requested for: 59Vnb8326; Last   Rx:94Mei7157 Ordered    5  Aspirin 81 MG TABS; TAKE 1 TABLET DAILY; Therapy: (Recorded:25Jsa8450) to Recorded   6  Cod Liver Oil Oral Oil; 1 teaspoon daily; Therapy: 27CUB7893 to Recorded   7  DuoNeb 0 5-2 5 (3) MG/3ML Inhalation Solution (Ipratropium-Albuterol); USE 1 UNIT   DOSE IN NEBULIZER EVERY 4 HOURS AS NEEDED; Therapy: (Recorded:95Zqv5677) to Recorded   8  Lantus SoloStar 100 UNIT/ML Subcutaneous Solution Pen-injector; 27 units at 8 am and   8 pm;   Therapy: (Recorded:02Tpj1242) to Recorded   9  MiraLax Oral Powder (Polyethylene Glycol 3350); Therapy: (Recorded:03Uyp4220) to Recorded   10  Multi-Vitamins Oral Tablet; TAKE 1 TABLET DAILY; Therapy: 08POX9255 to Recorded   11  Senokot 8 6 MG Oral Tablet; 3 tablets at bedtime; Therapy: 72QSP9620 to Recorded   12  Synthroid 125 MCG Oral Tablet (Levothyroxine Sodium); TAKE 1 TABLET EVERY    MORNING; Therapy: (Recorded:12Oct2016) to Recorded   13   Vitamin C 500 MG Oral Tablet; TAKE 1 TABLET DAILY; Therapy: (Recorded:76Ral7816) to Recorded    Future Appointments    Date/Time Provider Specialty Site   11/01/2016 11:15 AM Reginaldo Padilla MD [address] [place] [address]     Patient Care Team    Care Team Member Role Specialty Office Number   Jaenette Stephen   [phone]   Lizzy Bennett MD  Endocrinology [phone]     Signatures   Electronically signed by :  Spenser Valentin RN; Oct 17 2016  3:21PM EST                       (Author)

## 2018-01-14 NOTE — RESULT NOTES
Discussion/Summary   Please inform the patient that her laboratory studies are stable, her liver tests are normal, all of her tumor markers are negative  This is good news, I am awaiting the results of x-ray and MRI  Verified Results  (1) HEPATIC FUNCTION PANEL 01Aug2017 11:33AM Dayton Maldonado     Test Name Result Flag Reference   Protein, Total, Serum 6 9 g/dL  6 0-8 5   Albumin, Serum 3 9 g/dL  3 5-4 8   Bilirubin, Total 0 2 mg/dL  0 0-1 2   Bilirubin, Direct 0 08 mg/dL  0 00-0 40   Alkaline Phosphatase, S 70 IU/L     AST (SGOT) 18 IU/L  0-40   ALT (SGPT) 26 IU/L  0-32     (1) CBC/ PLT (NO DIFF) 01Aug2017 11:33AM Sherrellcharo Maldonado     Test Name Result Flag Reference   WBC 9 1 x10E3/uL  3 4-10 8   RBC 4 11 x10E6/uL  3 77-5 28   Hemoglobin 12 8 g/dL  11 1-15 9   Hematocrit 38 9 %  34 0-46  6   MCV 95 fL  79-97   MCH 31 1 pg  26 6-33 0   MCHC 32 9 g/dL  31 5-35 7   RDW 14 7 %  12 3-15 4   Platelets 324 I94F2/BZ  150-379     (1) BASIC METABOLIC PROFILE [id number] 11:33AM [place] Maldonado     Test Name Result Flag Reference   Glucose, Serum 144 mg/dL H 65-99   BUN 23 mg/dL  8-27   Creatinine, Serum 0 78 mg/dL  0 57-1 00   BUN/Creatinine Ratio 29 H 12-28   Sodium, Serum 146 mmol/L H 134-144   Potassium, Serum 3 9 mmol/L  3 5-5 2   Chloride, Serum 96 mmol/L     Carbon Dioxide, Total 35 mmol/L H 18-29   Calcium, Serum 9 1 mg/dL  8 7-10 3   eGFR If NonAfricn Am 73 mL/min/1 73  >59   eGFR If Africn Am 84 mL/min/1 73  >59     (1) CA 19-9 01Aug2017 11:33AM George Carmona     Test Name Result Flag Reference   CA 19-9 21 U/mL  0-35   Roche ECLIA methodology  Values obtained with different assay methods or kits cannot be  used interchangeably  Results cannot be interpreted as absolute  evidence of the presence or absence of malignant disease       (1) CEA [id number] 11:33AM [facility]     Test Name Result Flag Reference   CEA 1 4 ng/mL  0 0-4 7   Roche ECLIA methodology       Nonsmokers  <3 9 Smokers     <5 6                 Values obtained with different assay methods or kits                 cannot be used interchangeably  Results cannot be                 interpreted as absolute evidence of the presence or                 absence of malignant disease  (1) PT WITH INR 42Mkk2179 11:33AM Nancy Rosa     Test Name Result Flag Reference   INR 1 0  0 8-1 2   Reference interval is for non-anticoagulated patients                   Suggested INR therapeutic range for Vitamin K                 antagonist therapy:                    Standard Dose (moderate intensity                                   therapeutic range):       2 0 - 3 0                    Higher intensity therapeutic range       2 5 - 3 5   Prothrombin Time 10 0 sec  9 1-12 0

## 2018-01-15 ENCOUNTER — APPOINTMENT (OUTPATIENT)
Dept: PHYSICAL THERAPY | Facility: CLINIC | Age: 78
End: 2018-01-15
Payer: COMMERCIAL

## 2018-01-15 ENCOUNTER — APPOINTMENT (OUTPATIENT)
Dept: PULMONOLOGY | Facility: CLINIC | Age: 78
End: 2018-01-15
Payer: COMMERCIAL

## 2018-01-15 VITALS
SYSTOLIC BLOOD PRESSURE: 120 MMHG | BODY MASS INDEX: 47.05 KG/M2 | DIASTOLIC BLOOD PRESSURE: 84 MMHG | OXYGEN SATURATION: 95 % | TEMPERATURE: 98.8 F | RESPIRATION RATE: 18 BRPM | WEIGHT: 233.38 LBS | HEIGHT: 59 IN | HEART RATE: 94 BPM

## 2018-01-15 PROCEDURE — 97110 THERAPEUTIC EXERCISES: CPT

## 2018-01-15 PROCEDURE — G8979 MOBILITY GOAL STATUS: HCPCS | Performed by: PHYSICAL THERAPIST

## 2018-01-15 PROCEDURE — G8980 MOBILITY D/C STATUS: HCPCS | Performed by: PHYSICAL THERAPIST

## 2018-01-15 PROCEDURE — G0424 PULMONARY REHAB W EXER: HCPCS
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
## 2018-01-15 NOTE — MISCELLANEOUS
History of Present Illness  COPD Hospital Discharge Initial Follow-Up Call: The patient is being contacted for follow-up after hospitalization  The date of discharge is 9/21/17  I spoke with patient  The patient was discharged to home  The patient is experiencing the following symptoms: no wheezing, no cough, no dyspnea, no fever and not coughing up sputum  Oxygen is used at bedtime only  Zone tool status is yellow  The following topics were reviewed:  rescue vs  maintenance inhalers, energy conservation, physician follow-ups and medication compliance  Narrative Summary:  Patient called the COPD office on Sat and early Monday  Returned call and spoke at length with patient, who was concerned with high blood sugar (300+)  She called her PCP who will see her today  She was experiencing high pulse and sweating  Encouraged fluid intake, rest and oxygen usage  Her breathing is fine at rest, although she has pain in the abdomen and struggles with IBS  She gets SOB when doing household activities  Encouraged activity limitation for the next 3 weeks  She will discuss her combination of medical issues when she sees her MD today  Current Meds    1  LORazepam 1 MG Oral Tablet; TAKE 1 TABLET 1 HOUR BEFORE MRI  MAY REPEAT   ONCE 15 MINUTES BEFORE MRI; Therapy: 07Zjl1700 to (Evaluate:01Pan1068); Last Rx:72Gzo6878 Ordered    2  ALPRAZolam ER 1 MG Oral Tablet Extended Release 24 Hour (Xanax XR); TAKE 1   TABLET DAILY AS DIRECTED; Therapy: 43GOJ6486 to (Evaluate:29Npt5889); Last Rx:17Aug2017 Ordered    3  Folic Acid 1 MG Oral Tablet; take 1 tablet by mouth once daily; Therapy: 97XDD7340 to (Evaluate:08Ieb1688)  Requested for: 95EDZ9509; Last   Rx:25Jan2017 Ordered    4  Furosemide 40 MG Oral Tablet; two tabs in the AM    one tablet in the afternoon    Requested for: 49Pqe4506; Last Rx:03Aug2017 Ordered   5   MetOLazone 2 5 MG Oral Tablet; TAKE 1 TABLET DAILY ON MONDAY, WEDNESDAY, AND   FRIDAY; Therapy: 19OKH4799 to (Last Rx:07Apr2017)  Requested for: 65Ble8069 Ordered   6  Potassium Chloride Bina ER 20 MEQ Oral Tablet Extended Release; TAKE 1 TABLET   DAILY  Requested for: 95Zkq9802; Last Rx:03Sep2017 Ordered    7  LORazepam 1 MG Oral Tablet (Ativan); TAKE 1 TABLET 1 HOUR BEFORE MRI  MAY   REPEAT ONCE 15 MINUTES BEFORE MRI; Therapy: 22HQK3332 to (Evaluate:67Twr3264); Last Rx:91Qwn2288 Ordered    8  Oxycodone-Acetaminophen 5-325 MG Oral Tablet; TAKE 1 TABLET EVERY 4 HOURS AS   NEEDED FOR PAIN;   Therapy: 46Wtz7944 to (Evaluate:53Atb9518); Last Rx:73Laq3043 Ordered    9  Pramipexole Dihydrochloride 1 MG Oral Tablet (Mirapex); TAKE 1 TABLET THREE TIMES   A DAY; Therapy: 83VPF5980 to (Last Rx:50Jgb7736)  Requested for: 53Dkw3156 Ordered    10  Aspirin 81 MG TABS; TAKE 1 TABLET DAILY; Therapy: (Recorded:40Kip8201) to Recorded   11  Dulcolax TBEC; take 2 tablet twice daily; Therapy: [phone] to Recorded   12  DuoNeb 0 5-2 5 (3) MG/3ML Inhalation Solution (Ipratropium-Albuterol); USE 1 UNIT    DOSE IN NEBULIZER EVERY 4 HOURS AS NEEDED; Therapy: (Recorded:61Ayh1420) to Recorded   13  Lactulose 10 GM/15ML Oral Solution; TAKE 15 ML DAILY; Therapy: [phone]) to Recorded   14  Lantus 100 UNIT/ML Subcutaneous Solution; inject 40 unit daily; Therapy: [phone]) to Recorded   15  Lisinopril 10 MG Oral Tablet; TAKE 1 TABLET DAILY; Therapy: [phone]) to Recorded   16  Multi-Vitamins Oral Tablet; TAKE 1 TABLET DAILY; Therapy: 96MWO8180 to Recorded   17  Synthroid 125 MCG Oral Tablet (Levothyroxine Sodium); TAKE 1 TABLET EVERY    MORNING; Therapy: (Recorded:52Ruh4176) to Recorded    Allergies    1  Latex Gloves MISC   2  Toradol Oral TABS    3  No Known Environmental Allergies   4   No Known Food Allergies    Future Appointments    Date/Time Provider Specialty Site   09/25/2017 05:15 PM Oneal Taylor MD [address] [place] [address] [facility]   09/29/2017 01:45 PM Ayaz Machuca MD [address] [place] [address] [place]     Signatures   Electronically signed by :  Marva Wheat RT; Sep 25 2017  2:52PM EST                       (Author)
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
## 2018-01-15 NOTE — PROGRESS NOTES
History of Present Illness  Care Coordination Encounter Information:   Type of Encounter: Telephonic    Spoke to Patient  Care Coordination SL Nurse [facility]:   The reason for call is to discuss outreach for follow up/needed services and coordination of meeting care plan treatment goals  Spoke with Juan Cobian and daughter Enrique Bear  She admits to dyspnea with movement  Her glucose range is 161-400  She has not seen endocrinology recently  Leandro BARRY has been to the home for eval of services  Sleep study scheduled 10/21  Juan Cobian states they are still determining presence of TIA or other issue  Pulmonary note shows hypoventilation due to obesity  BMI is 45  Juan Cobian states neuro apt  is scheduled  Pulmonary rehab to start 10/17 for deconditioning  She is not able to shower herself every day due to deconditioning  She uses a walker outside of home  Food shopping is done by [employer]  Active Problems    1  Abnormal CT scan, liver (793 3) (R93 2)   2  Abnormal tumor markers (795 89) (R97 8)   3  Acquired hypothyroidism (244 9) (E03 9)   4  Arthritis (716 90) (M19 90)   5  Asthma (493 90) (J45 909)   6  Chronic pain (338 29) (G89 29)   7  Constipation due to opioid therapy (564 09,E935 2) (K59 03,T40 2X5A)   8  Daytime hypersomnolence (780 54) (G47 19)   9  Diabetes mellitus with neuropathy (250 60,357 2) (E11 40)   10  Edema (782 3) (R60 9)   11  Elevated LFTs (790 6) (R79 89)   12  Generalized colicky abdominal pain (789 7) (R10 83)   13  Hyperglycemia (790 29) (R73 9)   14  Hypertension (401 9) (I10)   15  Hypoventilation associated with obesity syndrome (278 03) (E66 2)   16  Irritable bowel syndrome with diarrhea (564 1) (K58 0)   17  Liver lesion (573 8) (K76 9)   18  Lung nodules (793 19) (R91 8)   19  Mild persistent asthma with acute exacerbation (493 92) (J45 31)   20  Morbid obesity due to excess calories (278 01) (E66 01)   21  Pancreatic cyst (577 2) (K86 2)   22  Pulmonary emphysema (492 8) (J43 9)   23  Pulmonary nodules (793 19) (R91 8)   24  Restless leg syndrome (333 94) (G25 81)   25  Screening for osteoporosis (V82 81) (Z13 820)   26  Sleep apnea (780 57) (G47 30)   27  Thyroid disease (246 9) (E07 9)    Past Medical History    1  History of Acute on chronic systolic congestive heart failure (428 23,428 0) (I50 23)   2  History of Cellulitis of right lower extremity (682 6) (L03 115)   3  History of Depression, acute (296 20) (F32 9)   4  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)   5  History of Emphysema, 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518 2) (J98 3)   6  Flu vaccine need (V04 81) (Z23)   7  History of aortic valve disorder (V12 59) (Z86 79)   8  History of congestive heart failure (V12 59) (Z86 79)   9  History of Restless leg syndrome (333 94) (G25 81)    Surgical History    1  History of Cholecystectomy   2  History of Eye Surgery   3  History of Resection Of Pericardial Cyst Or Tumor   4  History of Thoracoscopy (Therapeutic) W/ Excision Of Pericardial Cyst   5  History of Thyroid Surgery    Family History  Mother    1  Family history of Asthma   2  Denied: Family history of Colon cancer   3  Family history of    4  Denied: Family history of alcoholism   5  Denied: Family history of mental disorder  Father    10  Denied: Family history of Colon cancer   7  Family history of    6  Denied: Family history of alcoholism   9  Denied: Family history of mental disorder   10  Family history of Liver cancer    Social History    · Active advance directive (V49 89) (Z78 9)   · Caffeine use (V49 89) (F15 90)   · Drinks coffee   · Feels safe at home   · Never a smoker   · No alcohol use   · No drug use   · Primary language is [place]   · Retired   · Seeing a dentist    Current Meds    1  LORazepam 1 MG Oral Tablet; TAKE 1 TABLET 1 HOUR BEFORE MRI  MAY REPEAT   ONCE 15 MINUTES BEFORE MRI; Therapy: 34Hmg3946 to (Evaluate:51Kju0099); Last Rx:42Bko8779 Ordered    2   NovoLOG Mix 70/30 (70-30) 100 UNIT/ML Subcutaneous Suspension; inject 35 units   twice daily per H Madhav; Therapy: (Recorded:03Oct2017) to Recorded    3  ALPRAZolam ER 1 MG Oral Tablet Extended Release 24 Hour (Xanax XR); TAKE 1   TABLET DAILY AS DIRECTED; Therapy: 22FOA5705 to (Evaluate:51Ein6210); Last Rx:64Nca7661 Ordered    4  Folic Acid 1 MG Oral Tablet; take 1 tablet by mouth once daily; Therapy: 02DCE8537 to (Evaluate:19Ffz3724)  Requested for: 04DLQ4385; Last   Rx:25Jan2017 Ordered    5  Furosemide 40 MG Oral Tablet; two tabs in the AM    one tablet in the afternoon    Requested for: 78Rlh4967; Last Rx:03Aug2017 Ordered   6  MetOLazone 2 5 MG Oral Tablet; TAKE 1 TABLET DAILY ON MONDAY, WEDNESDAY, AND   FRIDAY; Therapy: 07JDG8305 to (Last Rx:07Apr2017)  Requested for: 07Apr2017 Ordered   7  Potassium Chloride Bina ER 20 MEQ Oral Tablet Extended Release; TAKE 1 TABLET   DAILY  Requested for: 16Fyq7298; Last Rx:03Sep2017 Ordered    8  Dicyclomine HCl - 10 MG Oral Capsule; TAKE 1 CAPSULE 3 TIMES DAILY  Requested   for: 62VKZ5124; Last Rx:03Oct2017 Ordered    9  Oxycodone-Acetaminophen 5-325 MG Oral Tablet; TAKE 1 TABLET EVERY 4 HOURS AS   NEEDED FOR PAIN;   Therapy: 02Apr2015 to (Evaluate:27Wfp4806); Last Rx:89Wdc9308 Ordered    10  Pramipexole Dihydrochloride 1 MG Oral Tablet (Mirapex); TAKE 1 TABLET THREE TIMES    A DAY; Therapy: 61QEQ9297 to (Last Rx:07Aug2017)  Requested for: 07Aug2017 Ordered    11  Aspirin 325 MG Oral Tablet; TAKE 1 TABLET DAILY; Therapy: (Recorded:16Oct2017) to Recorded   12  Cod Liver Oil/Vitamins A & D Oral Capsule; Take 1 teaspoon daily; Therapy: (Recorded:16Oct2017) to Recorded   13  Dulcolax TBEC; take 2 tablet twice daily; Therapy: [phone]) to Recorded   14  DuoNeb 0 5-2 5 (3) MG/3ML Inhalation Solution (Ipratropium-Albuterol); USE 1 UNIT    DOSE IN NEBULIZER EVERY 4 HOURS AS NEEDED; Therapy: (Recorded:40Fdn1693) to Recorded   15   Lactulose 10 GM/15ML Oral Solution; TAKE 15 ML DAILY; Therapy: ([phone]) to Recorded   16  Lisinopril 10 MG Oral Tablet; TAKE 1 TABLET DAILY; Therapy: ([phone] 21) to Recorded   17  Movantik 25 MG Oral Tablet; Take 1 tablet prior to first meal of the day or two hours after    first meal;    Therapy: (Recorded:16Oct2017) to Recorded   18  Multi-Vitamins Oral Tablet; TAKE 1 TABLET DAILY; Therapy: 35HGI9183 to Recorded   19  Synthroid 125 MCG Oral Tablet (Levothyroxine Sodium); TAKE 1 TABLET EVERY    MORNING; Therapy: (Recorded:22Hot4403) to Recorded   20  Torsemide 20 MG Oral Tablet; Take 1 tablet twice daily; Therapy: (Recorded:60Pjx9505) to Recorded    Allergies    1  Latex Gloves MISC   2  Toradol Oral TABS    3  No Known Environmental Allergies   4  No Known Food Allergies    End of Encounter Meds    1  LORazepam 1 MG Oral Tablet; TAKE 1 TABLET 1 HOUR BEFORE MRI  MAY REPEAT   ONCE 15 MINUTES BEFORE MRI; Therapy: 52Mbg9598 to (Evaluate:93Jso1285); Last Rx:58Fll7401 Ordered    2  NovoLOG Mix 70/30 (70-30) 100 UNIT/ML Subcutaneous Suspension; inject 35 units   twice daily per H Madhav; Therapy: (Recorded:03Oct2017) to Recorded    3  ALPRAZolam ER 1 MG Oral Tablet Extended Release 24 Hour (Xanax XR); TAKE 1   TABLET DAILY AS DIRECTED; Therapy: 11BQL1524 to (Evaluate:13Mvc4046); Last Rx:29Eyx3134 Ordered    4  Folic Acid 1 MG Oral Tablet; take 1 tablet by mouth once daily; Therapy: 78KQK1132 to (Evaluate:02Rmu1205)  Requested for: 69JEW3613; Last   Rx:25Jan2017 Ordered    5  Furosemide 40 MG Oral Tablet; two tabs in the AM    one tablet in the afternoon    Requested for: 48Ljr9534; Last Rx:95Nyy5482 Ordered   6  MetOLazone 2 5 MG Oral Tablet; TAKE 1 TABLET DAILY ON MONDAY, WEDNESDAY, AND   FRIDAY; Therapy: 65AFR7250 to (Last Rx:07Apr2017)  Requested for: 68Vfj5059 Ordered   7  Potassium Chloride Bina ER 20 MEQ Oral Tablet Extended Release; TAKE 1 TABLET   DAILY  Requested for: 01Yrk0954; Last Rx:03Sep2017 Ordered    8  Dicyclomine HCl - 10 MG Oral Capsule; TAKE 1 CAPSULE 3 TIMES DAILY  Requested   for: 84KLE2978; Last Rx:91Tgx0787 Ordered    9  Oxycodone-Acetaminophen 5-325 MG Oral Tablet; TAKE 1 TABLET EVERY 4 HOURS AS   NEEDED FOR PAIN;   Therapy: 77Zgn4438 to (Evaluate:01Sle6136); Last Rx:29Gts4099 Ordered    10  Pramipexole Dihydrochloride 1 MG Oral Tablet (Mirapex); TAKE 1 TABLET THREE TIMES    A DAY; Therapy: 99LYR4288 to (Last Rx:48Qin6871)  Requested for: 66Zbq3920 Ordered    11  Aspirin 325 MG Oral Tablet; TAKE 1 TABLET DAILY; Therapy: (Recorded:16Oct2017) to Recorded   12  Cod Liver Oil/Vitamins A & D Oral Capsule; Take 1 teaspoon daily; Therapy: (Recorded:16Oct2017) to Recorded   13  Dulcolax TBEC; take 2 tablet twice daily; Therapy: [phone]) to Recorded   14  DuoNeb 0 5-2 5 (3) MG/3ML Inhalation Solution (Ipratropium-Albuterol); USE 1 UNIT    DOSE IN NEBULIZER EVERY 4 HOURS AS NEEDED; Therapy: (Recorded:05Wyl5599) to Recorded   15  Lactulose 10 GM/15ML Oral Solution; TAKE 15 ML DAILY; Therapy: [phone]) to Recorded   16  Lisinopril 10 MG Oral Tablet; TAKE 1 TABLET DAILY; Therapy: [phone]) to Recorded   17  Movantik 25 MG Oral Tablet; Take 1 tablet prior to first meal of the day or two hours after    first meal;    Therapy: (Recorded:16Oct2017) to Recorded   18  Multi-Vitamins Oral Tablet; TAKE 1 TABLET DAILY; Therapy: 61NSO3473 to Recorded   19  Synthroid 125 MCG Oral Tablet (Levothyroxine Sodium); TAKE 1 TABLET EVERY    MORNING; Therapy: (Recorded:12Oct2016) to Recorded   20  Torsemide 20 MG Oral Tablet; Take 1 tablet twice daily;     Therapy: (Recorded:10Pgo1268) to Recorded    Future Appointments    Date/Time Provider Specialty Site   10/19/2017 02:15 PM MD Dre Barron   [address] [facility]     Patient Care Team    Care Team Member Role Specialty Office Number   Shea Courser   [phone]   Jose Gilmore MD Endocrinology [phone]   Sravani Rose   [phone]   Malka BURGESS  Specialist Gastroenterology Adult [phone]   Khadijah Dupont MD Specialist Hematology Oncology [phone]   [facility] Attending Gastroenterology Adult [phone]     Signatures   Electronically signed by :  Leny Pedersen RN; Oct 16 2017  3:08PM EST                       (Author)

## 2018-01-15 NOTE — PROGRESS NOTES
History of Present Illness  Care Coordination Encounter Information:   Type of Encounter: Telephonic   Last Office Visit: 7/12/16   Spoke to Patient  Care Coordination SL Nurse [facility]:   The reason for call is to discuss outreach for follow up/needed services and coordination of meeting care plan treatment goals  Today Shayna complains of "dizziness" worse with laying down, constant, and also a headache at the back of her head for 3 days that is also constant  Her blood pressure is 138/71, which I had her take when I was on the phone  Her blood sugar is 186  She claims to have eaten a 1/2 English muffin with peanut butter for breakfast and is planning to eat a sandwich and salad for lunch  We then had a discussion regarding the health effects of eating carbohydrates regarding blood sugar and pain  She initially refused a same day appointment with Dr Frankie Sampson but has now agreed and I have scheduled her  This is in cardozo contrast to her condition when last I spoke to her one month ago  She has been living at home for the last 2 weeks without the constant support of her daughter  Active Problems    1  Acquired hypothyroidism (244 9) (E03 9)   2  Cellulitis of right lower extremity (682 6) (L03 115)   3  Chronic pain (338 29) (G89 29)   4  Diabetes mellitus with neuropathy (250 60,357 2) (E11 40)   5  Dizziness (780 4) (R42)   6  Edema (782 3) (R60 9)   7  Emphysema, unspecified (492 8) (J43 9)   8  History of congestive heart failure (V12 59) (Z86 79)   9  Hypertension (401 9) (I10)   10  Obesity (278 00) (E66 9)   11  Preoperative clearance (V72 84) (Z01 818)   12  Restless leg syndrome (333 94) (G25 81)   13  Sleep apnea (780 57) (G47 30)    Past Medical History    1  History of Depression, acute (296 20) (F32 9)   2  History of Emphysema, 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518 2) (J98 3)   3  History of aortic valve disorder (V12 59) (Z86 79)   4  History of congestive heart failure (V12 59) (Z86 79)   5   History of Restless leg syndrome (333 94) (G25 81)    Surgical History    1  History of Cholecystectomy   2  History of Eye Surgery   3  History of Resection Of Pericardial Cyst Or Tumor   4  History of Thoracoscopy (Therapeutic) W/ Excision Of Pericardial Cyst   5  History of Thyroid Surgery    Family History  Mother    1  Family history of Asthma   2  Family history of    3  Denied: Family history of alcoholism   4  Denied: Family history of mental disorder  Father    5  Family history of    6  Denied: Family history of alcoholism   7  Denied: Family history of mental disorder   8  Family history of Liver cancer    Social History    · Caffeine use (V49 89) (F15 90)   · Drinks coffee   · Never a smoker   · No alcohol use   · No drug use   · Primary language is [place]   · Retired   · Seeing a dentist    Current Meds    1  TraMADol HCl - 50 MG Oral Tablet; take 1 tablet by mouth every 4 hours if needed; Last   Rx:2016 Ordered    2  Potassium Chloride Bina ER 20 MEQ Oral Tablet Extended Release; TAKE 1 TABLET   DAILY  Requested for: 78UYB6250; Last Rx:74Zeo8093 Ordered    3  Venlafaxine HCl - 75 MG Oral Tablet; TAKE 1 TABLET DAILY; Therapy: 16HAD4600 to (Evaluate:2017)  Requested for: 2016; Last   Rx:2016 Ordered    4  Pramipexole Dihydrochloride 1 MG Oral Tablet (Mirapex); TAKE 1 TABLET 3 TIMES DAILY; Therapy: 70QQQ0232 to (Evaluate:2017)  Requested for: 2016; Last   Rx:2016 Ordered    5  Aspirin 81 MG TABS; TAKE 1 TABLET DAILY; Therapy: (Recorded:40Otn4697) to Recorded   6  Cod Liver Oil Oral Oil; 1 teaspoon daily; Therapy: 09EMG5769 to Recorded   7  DuoNeb 0 5-2 5 (3) MG/3ML Inhalation Solution (Ipratropium-Albuterol); USE 1 UNIT   DOSE IN NEBULIZER EVERY 4 HOURS AS NEEDED; Therapy: (Recorded:50Tvm4094) to Recorded   8  Furosemide 40 MG Oral Tablet; monday, , and friday; Therapy: (Recorded:34Nfw7195) to Recorded   9   Lantus SoloStar 100 UNIT/ML Subcutaneous Solution Pen-injector; 27 units at 8 am and   8 pm;   Therapy: (Recorded:41Dyw6840) to Recorded   10  MiraLax Oral Powder (Polyethylene Glycol 3350); Therapy: (Recorded:19Ivv4239) to Recorded   11  Multi-Vitamins Oral Tablet; TAKE 1 TABLET DAILY; Therapy: 40UMI1496 to Recorded   12  Senokot 8 6 MG Oral Tablet; 3 tablets at bedtime; Therapy: 71IYH6494 to Recorded   13  Synthroid 125 MCG Oral Tablet (Levothyroxine Sodium); TAKE 1 TABLET EVERY    MORNING; Therapy: (Recorded:96Lyw7247) to Recorded   14  Vitamin C 500 MG Oral Tablet; TAKE 1 TABLET DAILY; Therapy: (Recorded:98Zes3700) to Recorded    Allergies    1  Latex Gloves MISC   2  Toradol Oral TABS    3  No Known Environmental Allergies   4  No Known Food Allergies    End of Encounter Meds    1  TraMADol HCl - 50 MG Oral Tablet; take 1 tablet by mouth every 4 hours if needed; Last   Rx:28Jun2016 Ordered    2  Potassium Chloride Bina ER 20 MEQ Oral Tablet Extended Release; TAKE 1 TABLET   DAILY  Requested for: 57RAE1539; Last Rx:64Fjb7775 Ordered    3  Venlafaxine HCl - 75 MG Oral Tablet; TAKE 1 TABLET DAILY; Therapy: 74LIB2986 to (Evaluate:09Apr2017)  Requested for: 14Apr2016; Last   Rx:28Esp1129 Ordered    4  Pramipexole Dihydrochloride 1 MG Oral Tablet (Mirapex); TAKE 1 TABLET 3 TIMES DAILY; Therapy: 37CPN2347 to (Evaluate:15Jun2017)  Requested for: 20Jun2016; Last   Rx:20Jun2016 Ordered    5  Aspirin 81 MG TABS; TAKE 1 TABLET DAILY; Therapy: (Recorded:80Gex2209) to Recorded   6  Cod Liver Oil Oral Oil; 1 teaspoon daily; Therapy: 23UGF0206 to Recorded   7  DuoNeb 0 5-2 5 (3) MG/3ML Inhalation Solution (Ipratropium-Albuterol); USE 1 UNIT   DOSE IN NEBULIZER EVERY 4 HOURS AS NEEDED; Therapy: (Recorded:00Shg6003) to Recorded   8  Furosemide 40 MG Oral Tablet; monday, wednsday, and friday; Therapy: (Recorded:83Dwn4913) to Recorded   9   Lantus SoloStar 100 UNIT/ML Subcutaneous Solution Pen-injector; 27 units at 8 am and   8 pm;   Therapy: (Recorded:45Cxc1312) to Recorded   10  MiraLax Oral Powder (Polyethylene Glycol 3350); Therapy: (Recorded:28Tsc4603) to Recorded   11  Multi-Vitamins Oral Tablet; TAKE 1 TABLET DAILY; Therapy: 51NZA9568 to Recorded   12  Senokot 8 6 MG Oral Tablet; 3 tablets at bedtime; Therapy: 23UCJ5183 to Recorded   13  Synthroid 125 MCG Oral Tablet (Levothyroxine Sodium); TAKE 1 TABLET EVERY    MORNING; Therapy: (Recorded:98Ats8623) to Recorded   14  Vitamin C 500 MG Oral Tablet; TAKE 1 TABLET DAILY; Therapy: (Recorded:15Cvf1497) to Recorded    Future Appointments    Date/Time Provider Specialty Site   07/21/2016 02:30 PM Charley Salvador  [facility]   08/16/2016 10:30 AM Charley Salvador MD [facility]  [address] [facility]     Patient Care Team    Care Team Member Role Specialty Office Number   Ranulfo Baba   [phone]   Jorge Luis Whyte MD  Endocrinology [phone]     Signatures   Electronically signed by :  Calos Lal RN; Jul 21 2016 12:27PM EST                       (Author)

## 2018-01-15 NOTE — MISCELLANEOUS
Message  Spoke with patient regarding her continued symptoms  She continues to have abdominal pain which is no worse than it was in the hospital  She states she was taking Miralax 3 times daily and she was having some liquid stool with coughing and movement so she cut it back and is only taking it once daily in the AM  She said she is having hard balls of stool  She takes Percocet before bed  Movantik was recommended in the hospital notes if the Miralax was not successful  I prescribed a month's worth of Movantik 25 mg daily  I left a message instructing her to pick this script up from the [address] [place] [address] office as I am unable to send it electronically  She was instructed to try this medication for at least 1 week to assess for side effects and effects  She was instructed of the most common side effects  Signatures   Electronically signed by : ROBB Pool;  Apr 26 2017  9:48AM EST                       (Author)

## 2018-01-15 NOTE — PROGRESS NOTES
Assessment    1  Preoperative clearance (V72 84) (Z01 818)   2  Diabetes mellitus with neuropathy (250 60,357 2) (E11 40)   3  Emphysema, unspecified (492 8) (J43 9)   4  Hypertension (401 9) (I10)   5  Obesity (278 00) (E66 9)   6  Edema (782 3) (R60 9)   7  Chronic pain (338 29) (G89 29)   8  History of congestive heart failure (V12 59) (Z86 79)    Plan  Hypertension    · (1) CBC/ PLT (NO DIFF); Status:Active; Requested for:97Fjc4148;    · (1) COMPREHENSIVE METABOLIC PANEL; Status:Active; Requested for:48Btf3814;    · EKG/ECG- POC; Status:Active; Requested for:04Eow3272;    · Follow-up visit in 6 weeks Evaluation and Treatment  Follow-up  Status: Hold For -  Scheduling  Requested for: 44FQS3292    Discussion/Summary  Surgical Clearance: She is at a LOW TO MODERATE risk from a cardiovascular standpoint at this time without any additional cardiac testing  Reevaluation needed, if she should present with symptoms prior to surgery/procedure  THERE ARE NO OBVIOUS MEDICAL CONTRAINDICATIONS FOR SURGERY UNDER LOCAL ANESTHESIA WITH SEDATION  Chief Complaint  Patient is here today for a pre op clearance  She will be having Canthoplasty, Conj  plasty with graft, mid face Plycation  The surgery will be done on 06/08/16 by Dr Jessica Delcid @ [address] [address]  lb/lpn      History of Present Illness  Pre-Op Visit (Brief): The patient is being seen for a preoperative visit  The procedure is a(n) CARTHOPLASTY scheduled for 06/08/2016 with DR Sophie Villa  The indication for surgery is ECTROPION  Surgical Risk Assessment:   Prior Anesthesia: She had prior anesthesia, no prior adverse reaction to edidural anesthesia and no prior adverse reaction to general anesthesia  Pertinent Past Medical History: HTN, CHF, DM, EDEMA, OA  Exercise Capacity: unable to walk four blocks without symptoms and unable to walk two flights of stairs without symptoms   Lifestyle Factors: denies alcohol use, denies tobacco use and denies illegal drug use  Symptoms: no easy bleeding, no easy bruising, no frequent nosebleeds, no chest pain, no cough, dyspnea, edema, no palpitations and no wheezing  Living Situation: home is secure and supportive  HPI: PATIENT COMES IN FOR PRE OP EVALUATION FOR ELECTIVE EYE SURGERY      Review of Systems    ROS reviewed  Constitutional: no fever, no chills and not feeling tired  ENT: no sore throat and no nasal discharge  Cardiovascular: lower extremity edema, but no chest pain, the heart rate was not fast and no palpitations  Respiratory: shortness of breath during exertion, but no shortness of breath, no cough and no wheezing  Gastrointestinal: no abdominal pain, no nausea, no constipation and no diarrhea  Genitourinary: no incontinence  Musculoskeletal: arthralgias and joint stiffness, but no joint swelling  Integumentary: no rashes, no itching, no dry skin and no skin lesions  Neurological: no headache and no dizziness  Active Problems    1  Acquired hypothyroidism (244 9) (E03 9)   2  Cellulitis of right lower extremity (682 6) (L03 115)   3  Chronic pain (338 29) (G89 29)   4  Diabetes mellitus with neuropathy (250 60,357 2) (E11 40)   5  Edema (782 3) (R60 9)   6  Emphysema, unspecified (492 8) (J43 9)   7  History of congestive heart failure (V12 59) (Z86 79)   8  Hypertension (401 9) (I10)   9  Obesity (278 00) (E66 9)   10  Sleep apnea (780 57) (G47 30)    Past Medical History    · History of Depression, acute (296 20) (F32 9)   · History of Emphysema, 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518 2) (J98 3)   · History of aortic valve disorder (V12 59) (Z86 79)   · History of congestive heart failure (V12 59) (Z86 79)   · History of Restless leg syndrome (333 94) (G25 81)    The active problems and past medical history were reviewed and updated today        Surgical History    · History of Cholecystectomy   · History of Resection Of Pericardial Cyst Or Tumor   · History of Thoracoscopy (Therapeutic) W/ Excision Of Pericardial Cyst   · History of Thyroid Surgery    The surgical history was reviewed and updated today  Family History  Mother    · Family history of Asthma   · Family history of    · Denied: Family history of alcoholism   · Denied: Family history of mental disorder  Father    · Family history of    · Denied: Family history of alcoholism   · Denied: Family history of mental disorder   · Family history of Liver cancer    The family history was reviewed and updated today  Social History    · Caffeine use (V49 89) (F15 90)   · Drinks coffee   · Never a smoker   · No alcohol use   · No drug use   · Primary language is [place]   · Retired   · Seeing a dentist  The social history was reviewed and updated today  Current Meds   1  Aspirin 81 MG TABS; TAKE 1 TABLET DAILY; Therapy: (Recorded:73Dod9197) to Recorded   2  Cod Liver Oil Oral Oil; 1 teaspoon daily; Therapy: 37UKF8868 to Recorded   3  DuoNeb 0 5-2 5 (3) MG/3ML Inhalation Solution; USE 1 UNIT DOSE IN NEBULIZER   EVERY 4 HOURS AS NEEDED; Therapy: (Recorded:65Umq3939) to Recorded   4  Furosemide 20 MG Oral Tablet; tuesday,  and   Requested for:   92Cag6939; Last Rx:2016 Ordered   5  Furosemide 40 MG Oral Tablet; monday, , and friday; Therapy: (Recorded:17Gdi4761) to Recorded   6  Lantus SoloStar 100 UNIT/ML Subcutaneous Solution Pen-injector; 27 units at 8 am and   8 pm;   Therapy: (Recorded:83Uqn6537) to Recorded   7  MiraLax Oral Powder; Therapy: (Recorded:09Omu4495) to Recorded   8  Mirapex 1 MG Oral Tablet; TAKE 1 TO 2 TABLETS AT BEDTIME; Therapy: 68MSJ0727 to Recorded   9  Multi-Vitamins Oral Tablet; TAKE 1 TABLET DAILY; Therapy: 61WOW3904 to Recorded   10  Potassium Chloride Bina ER 20 MEQ Oral Tablet Extended Release; TAKE 1 TABLET    DAILY  Requested for: 40VSC4797; Last Rx:60Cnx2045 Ordered   11   Senokot 8 6 MG Oral Tablet; 3 tablets at bedtime; Therapy: 55VRF8151 to Recorded   12  Synthroid 125 MCG Oral Tablet; TAKE 1 TABLET EVERY MORNING; Therapy: (Recorded:99Zis6302) to Recorded   13  TraMADol HCl - 50 MG Oral Tablet; take 1 tablet by mouth every 4 hours if needed; Last    Rx:96Urk9370 Ordered   14  Venlafaxine HCl - 75 MG Oral Tablet; TAKE 1 TABLET DAILY; Therapy: 40YDX2458 to (Evaluate:09Apr2017)  Requested for: 60Sdo6057; Last    Rx:48Lhe1389 Ordered   15  Vitamin C 500 MG Oral Tablet; TAKE 1 TABLET DAILY; Therapy: (Recorded:08Lwt1606) to Recorded    The medication list was reviewed and updated today  Allergies    1  Latex Gloves MISC   2  Toradol Oral TABS    3  No Known Environmental Allergies   4  No Known Food Allergies    Vitals   Recorded: 32HOT7867 01:48PM   Temperature 99 1 F, Tympanic    Heart Rate 80, R Radial    Pulse Quality Normal, R Radial    Respiration 16    Respiration Quality Normal    Systolic 856, LUE, Sitting    Diastolic 74, LUE, Sitting    BP Cuff Size Standard    Height 4 ft 11 5 in    Weight 215 lb     BMI Calculated 42 7    BSA Calculated 1 91    Patient Refused Height No No   Patient Refused Weight No No     Physical Exam    Constitutional   General appearance: No acute distress, well appearing and well nourished  Eyes   Conjunctiva and lids: No swelling, erythema or discharge  SL PALE  Pupils and irises: Equal, round and reactive to light  Ears, Nose, Mouth, and Throat   External inspection of ears and nose: Normal     Otoscopic examination: Abnormal   DULL  Nasal mucosa, septum, and turbinates: Normal without edema or erythema  Oropharynx: Normal with no erythema, edema, exudate or lesions  Pulmonary   Respiratory effort: No increased work of breathing or signs of respiratory distress  Auscultation of lungs: Abnormal   MINIMAL BIBASILAR CRACKLES  Cardiovascular   Auscultation of heart: Normal rate and rhythm, normal S1 and S2, without murmurs    SOFT TIBURCIO NOTED  Examination of extremities for edema and/or varicosities: Abnormal   LEGS WRAPPED 1-2+ EDEMA  Carotid pulses: Normal   NO BRUITS  Abdomen   Abdomen: Abnormal   OBESE  Liver and spleen: No hepatomegaly or splenomegaly  Lymphatic   Palpation of lymph nodes in neck: No lymphadenopathy  Musculoskeletal   Gait and station: Normal     Digits and nails: Abnormal   MILD DJD CHANGES  Inspection/palpation of joints, bones, and muscles: Abnormal   MILD DJD CHANGES  Skin   Skin and subcutaneous tissue: Normal without rashes or lesions  Neurologic   Cranial nerves: Cranial nerves 2-12 intact  Reflexes: 2+ and symmetric  Sensation: No sensory loss  Psychiatric   Orientation to person, place, and time: Normal     Mood and affect: Normal          Results/Data  A 12 lead ECG was performed and was normal    Rhythm and rate: the atrial rate is 81 beats per minute and ventricular rate is 81 beats per minute, but normal sinus rhythm  P-waves: NE interval is normal, but the P wave is normal  NE Interval: 0 180 seconds  Axis: the QRS axis is normal    QRS: QRS interval: 0 080 seconds, but the QRS is normal    ST segment: QTc interval: 0 442 and the ST segments are normal    T waves: normal       End of Encounter Meds    1  TraMADol HCl - 50 MG Oral Tablet; take 1 tablet by mouth every 4 hours if needed; Last   Rx:18Apr2016 Ordered    2  Furosemide 20 MG Oral Tablet; tuesday, thursday saturday and sunday  Requested for:   14Apr2016; Last Rx:14Apr2016 Ordered   3  Potassium Chloride Bina ER 20 MEQ Oral Tablet Extended Release; TAKE 1 TABLET   DAILY  Requested for: 52FQK1180; Last Rx:77Wdk3312 Ordered    4  Venlafaxine HCl - 75 MG Oral Tablet; TAKE 1 TABLET DAILY; Therapy: 41OAE2483 to (Evaluate:09Apr2017)  Requested for: 14Apr2016; Last   Rx:14Apr2016 Ordered    5  Aspirin 81 MG TABS; TAKE 1 TABLET DAILY; Therapy: (Recorded:66Laz6795) to Recorded   6   Cod Liver Oil Oral Oil; 1 teaspoon daily; Therapy: 66WKA0773 to Recorded   7  DuoNeb 0 5-2 5 (3) MG/3ML Inhalation Solution (Ipratropium-Albuterol); USE 1 UNIT   DOSE IN NEBULIZER EVERY 4 HOURS AS NEEDED; Therapy: (Recorded:46Obb8100) to Recorded   8  Furosemide 40 MG Oral Tablet; monday, wednsday, and friday; Therapy: (Recorded:78Yhf8160) to Recorded   9  Lantus SoloStar 100 UNIT/ML Subcutaneous Solution Pen-injector; 27 units at 8 am and   8 pm;   Therapy: (Recorded:34Jtw7859) to Recorded   10  MiraLax Oral Powder (Polyethylene Glycol 3350); Therapy: (Recorded:51Nvl3999) to Recorded   11  Mirapex 1 MG Oral Tablet (Pramipexole Dihydrochloride); TAKE 1 TO 2 TABLETS AT    BEDTIME; Therapy: 31ZDO6539 to Recorded   12  Multi-Vitamins Oral Tablet; TAKE 1 TABLET DAILY; Therapy: 78MDY8051 to Recorded   13  Senokot 8 6 MG Oral Tablet; 3 tablets at bedtime; Therapy: 34FLQ8458 to Recorded   14  Synthroid 125 MCG Oral Tablet (Levothyroxine Sodium); TAKE 1 TABLET EVERY    MORNING; Therapy: (Recorded:65Lun3520) to Recorded   15  Vitamin C 500 MG Oral Tablet; TAKE 1 TABLET DAILY;     Therapy: (Recorded:24Ryl0257) to Recorded    Future Appointments    Date/Time Provider Specialty Site   07/05/2016 11:00 AM Nina Snell MD [facility] [place] [address] [facility]     Signatures   Electronically signed by : Olya Boone MD; May 25 2016  2:23PM EST                       (Author)

## 2018-01-16 ENCOUNTER — ALLSCRIPTS OFFICE VISIT (OUTPATIENT)
Dept: OTHER | Facility: OTHER | Age: 78
End: 2018-01-16
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
## 2018-01-16 NOTE — PROGRESS NOTES
History of Present Illness  Care Coordination Encounter Information:   Type of Encounter: Telephonic   Last Office Visit: 1/23/17   Spoke to Patient  Care Coordination SL Nurse [facility]:   The reason for call is to discuss outreach for follow up/needed services and coordination of meeting care plan treatment goals  I contacted Christi Padilla at the request of the office staff  She continues to complain of blurred vision, headache from top of head that extends down her spine ti her sacral area, and hyperglycemia  I made her an appointment to see Dr Frausto Esters today  I provided my contact number for her to use  We will speak next week  Active Problems    1  Acquired hypothyroidism (244 9) (E03 9)   2  Acute dyspnea (786 09) (R06 00)   3  Acute on chronic systolic congestive heart failure (428 23,428 0) (I50 23)   4  Chronic pain (338 29) (G89 29)   5  Diabetes mellitus with neuropathy (250 60,357 2) (E11 40)   6  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)   7  Edema (782 3) (R60 9)   8  Emphysema, unspecified (492 8) (J43 9)   9  Epiphora, unspecified laterality (375 20) (H04 209)   10  Hypertension (401 9) (I10)   11  Obesity (278 00) (E66 9)   12  Restless leg syndrome (333 94) (G25 81)   13  Sleep apnea (780 57) (G47 30)    Past Medical History    1  History of Cellulitis of right lower extremity (682 6) (L03 115)   2  History of Depression, acute (296 20) (F32 9)   3  History of Emphysema, 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518 2) (J98 3)   4  History of aortic valve disorder (V12 59) (Z86 79)   5  History of congestive heart failure (V12 59) (Z86 79)   6  History of Restless leg syndrome (333 94) (G25 81)    Surgical History    1  History of Cholecystectomy   2  History of Eye Surgery   3  History of Resection Of Pericardial Cyst Or Tumor   4  History of Thoracoscopy (Therapeutic) W/ Excision Of Pericardial Cyst   5  History of Thyroid Surgery    Family History  Mother    1  Family history of Asthma   2   Family history of    3  Denied: Family history of alcoholism   4  Denied: Family history of mental disorder  Father    5  Family history of    6  Denied: Family history of alcoholism   7  Denied: Family history of mental disorder   8  Family history of Liver cancer    Social History    · Caffeine use (V49 89) (F15 90)   · Drinks coffee   · Never a smoker   · No alcohol use   · No drug use   · Primary language is [place]   · Retired   · Seeing a dentist    Current Meds    1  Folic Acid 1 MG Oral Tablet; take 1 tablet by mouth once daily; Therapy: 89BON6939 to (Evaluate:70Puk7300)  Requested for: 49XJM3543; Last   Rx:2017 Ordered    2  Potassium Chloride Bina ER 20 MEQ Oral Tablet Extended Release; TAKE 1 TABLET   DAILY  Requested for: 21MDE0567; Last Rx:2017; Status: ACTIVE - Transmit to   Pharmacy - Awaiting Verification Ordered    3  Furosemide 40 MG Oral Tablet; TAKE 1 TABLET TWICE DAILY  Requested for:   48GLL3586; Last QJ:45UBO4012 Ordered    4  Pramipexole Dihydrochloride 1 MG Oral Tablet (Mirapex); TAKE 1 TABLET 3 TIMES DAILY; Therapy: 18PKB6760 to (Evaluate:87Gtg5405)  Requested for: 55Ics4635; Last   Rx:45Dvb1634 Ordered    5  Aspirin 81 MG TABS; TAKE 1 TABLET DAILY; Therapy: (Recorded:32Svg8394) to Recorded   6  Cod Liver Oil Oral Oil; 1 teaspoon daily; Therapy: 45DJN2188 to Recorded   7  DuoNeb 0 5-2 5 (3) MG/3ML Inhalation Solution (Ipratropium-Albuterol); USE 1 UNIT   DOSE IN NEBULIZER EVERY 4 HOURS AS NEEDED; Therapy: (Recorded:19Iru5826) to Recorded   8  Keflex 500 MG Oral Capsule (Cephalexin); TAKE 1 CAPSULE 3 TIMES DAILY UNTIL   GONE;   Therapy: (Recorded:2017) to Recorded   9  Lantus SoloStar 100 UNIT/ML Subcutaneous Solution Pen-injector; 27 units at 8 am and   8 pm;   Therapy: (Recorded:50Lau6756) to Recorded   10  MiraLax Oral Powder (Polyethylene Glycol 3350); Therapy: (Recorded:97Uyf9996) to Recorded   11  Multi-Vitamins Oral Tablet; TAKE 1 TABLET DAILY;     Therapy: 14FJP1667 to Recorded   12  Multi-Vitamins TABS; TAKE 1 TABLET DAILY; Therapy: (TPYSKEIR:43ZWT7366) to Recorded   13  OxyCODONE HCl - 5 MG Oral Capsule; Therapy: (LYOJJRGX:25ZTD6800) to Recorded   14  PredniSONE 10 MG Oral Tablet; TAKE 1 TABLET DAILY; Therapy: (UFXZYFAZ:58AQS4812) to Recorded   15  Senokot 8 6 MG Oral Tablet; 3 tablets at bedtime; Therapy: 64JDV2803 to Recorded   16  Synthroid 125 MCG Oral Tablet (Levothyroxine Sodium); TAKE 1 TABLET EVERY    MORNING; Therapy: (Recorded:39Cny8307) to Recorded   17  Vitamin C 500 MG Oral Tablet; TAKE 1 TABLET DAILY; Therapy: (Recorded:67Cvn8827) to Recorded    Allergies    1  Latex Gloves MISC   2  Toradol Oral TABS    3  No Known Environmental Allergies   4  No Known Food Allergies    End of Encounter Meds    1  Folic Acid 1 MG Oral Tablet; take 1 tablet by mouth once daily; Therapy: 60YFD9186 to (Evaluate:02Anh3795)  Requested for: 67CAG8480; Last   Rx:25Jan2017 Ordered    2  Potassium Chloride Bina ER 20 MEQ Oral Tablet Extended Release; TAKE 1 TABLET   DAILY  Requested for: 12YNC2635; Last Rx:22Jan2017; Status: ACTIVE - Transmit to   Pharmacy - Awaiting Verification Ordered    3  Furosemide 40 MG Oral Tablet; TAKE 1 TABLET TWICE DAILY  Requested for:   81QFR6973; Last DC:98OVB9838 Ordered    4  Pramipexole Dihydrochloride 1 MG Oral Tablet (Mirapex); TAKE 1 TABLET 3 TIMES DAILY; Therapy: 49WIK2978 to (Evaluate:09Rcu3570)  Requested for: 31Aqd9022; Last   Rx:69Pas3710 Ordered    5  Aspirin 81 MG TABS; TAKE 1 TABLET DAILY; Therapy: (Recorded:18Efp1044) to Recorded   6  Cod Liver Oil Oral Oil; 1 teaspoon daily; Therapy: 86URY0176 to Recorded   7  DuoNeb 0 5-2 5 (3) MG/3ML Inhalation Solution (Ipratropium-Albuterol); USE 1 UNIT   DOSE IN NEBULIZER EVERY 4 HOURS AS NEEDED; Therapy: (Recorded:82Zvu7624) to Recorded   8   Keflex 500 MG Oral Capsule (Cephalexin); TAKE 1 CAPSULE 3 TIMES DAILY UNTIL   GONE;   Therapy: (Recorded:23Jan2017) to Recorded   9  Lantus SoloStar 100 UNIT/ML Subcutaneous Solution Pen-injector; 27 units at 8 am and   8 pm;   Therapy: (Recorded:85Jea5196) to Recorded   10  MiraLax Oral Powder (Polyethylene Glycol 3350); Therapy: (Recorded:79Cdw2169) to Recorded   11  Multi-Vitamins Oral Tablet; TAKE 1 TABLET DAILY; Therapy: 57WCP7801 to Recorded   12  Multi-Vitamins TABS; TAKE 1 TABLET DAILY; Therapy: (FZZDGHXC:33OLX8972) to Recorded   13  OxyCODONE HCl - 5 MG Oral Capsule; Therapy: (EZRNUGAC:38MIA7156) to Recorded   14  PredniSONE 10 MG Oral Tablet; TAKE 1 TABLET DAILY; Therapy: (WQVXKPWO:61AKX8234) to Recorded   15  Senokot 8 6 MG Oral Tablet; 3 tablets at bedtime; Therapy: 42GZW2317 to Recorded   16  Synthroid 125 MCG Oral Tablet (Levothyroxine Sodium); TAKE 1 TABLET EVERY    MORNING; Therapy: (Recorded:70Taf0757) to Recorded   17  Vitamin C 500 MG Oral Tablet; TAKE 1 TABLET DAILY; Therapy: (Recorded:73Mkg9631) to Recorded    Future Appointments    Date/Time Provider Specialty Site   01/27/2017 04:30 PM Wanda Andrews MD [address] [place] [address]   01/30/2017 10:30 AM Wanda Andrews MD [facility]  [id number] [facility]     Patient Care Team    Care Team Member Role Specialty Office Number   Radha Verde   [phone]   Claudio Leon MD  Endocrinology [phone]   [facility]   [phone]     Signatures   Electronically signed by :  John Jovel RN; Jan 27 2017  2:45PM EST                       (Author)

## 2018-01-16 NOTE — PROGRESS NOTES
History of Present Illness  Care Coordination Encounter Information:   Type of Encounter: Telephonic    Spoke to Patient  Care Coordination SL Nurse [facility]:   The reason for call is to discuss outreach for follow up/needed services and coordination of meeting care plan treatment goals  Kelvin Montes states 'I have a horrible time breathing  I feel like I am strangulated every day " She has gained weight, has a protruding abdomen and admits her glucose is high  She remains living with Celestine Ratel and eating junk  She will accept CM calls  She claims she needs to be told what to eat but wants to be able to eat strawberry sundaes every two weeks and man cracker snacks  Active Problems    1  Abnormal CT scan, liver (793 3) (R93 2)   2  Abnormal tumor markers (795 89) (R97 8)   3  Acquired hypothyroidism (244 9) (E03 9)   4  Arthritis (716 90) (M19 90)   5  Chronic pain (338 29) (G89 29)   6  Constipation due to opioid therapy (564 09,E935 2) (K59 03,T40 2X5A)   7  Diabetes mellitus with neuropathy (250 60,357 2) (E11 40)   8  Edema (782 3) (R60 9)   9  Elevated LFTs (790 6) (R79 89)   10  Generalized colicky abdominal pain (789 7) (R10 83)   11  Hyperglycemia (790 29) (R73 9)   12  Hypertension (401 9) (I10)   13  Irritable bowel syndrome with diarrhea (564 1) (K58 0)   14  Liver lesion (573 8) (K76 9)   15  Mild persistent asthma with acute exacerbation (493 92) (J45 31)   16  Morbid obesity due to excess calories (278 01) (E66 01)   17  Pancreatic cyst (577 2) (K86 2)   18  Pulmonary emphysema (492 8) (J43 9)   19  Pulmonary nodules (793 19) (R91 8)   20  Restless leg syndrome (333 94) (G25 81)   21  Screening for osteoporosis (V82 81) (Z13 820)   22  Sleep apnea (780 57) (G47 30)   23  Thyroid disease (246 9) (E07 9)    Past Medical History    1  History of Acute on chronic systolic congestive heart failure (428 23,428 0) (I50 23)   2  History of Cellulitis of right lower extremity (682 6) (L03 115)   3   History of Depression, acute (296 20) (F32 9)   4  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)   5  History of Emphysema, 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518 2) (J98 3)   6  Flu vaccine need (V04 81) (Z23)   7  History of aortic valve disorder (V12 59) (Z86 79)   8  History of congestive heart failure (V12 59) (Z86 79)   9  History of Restless leg syndrome (333 94) (G25 81)    Surgical History    1  History of Cholecystectomy   2  History of Eye Surgery   3  History of Resection Of Pericardial Cyst Or Tumor   4  History of Thoracoscopy (Therapeutic) W/ Excision Of Pericardial Cyst   5  History of Thyroid Surgery    Family History  Mother    1  Family history of Asthma   2  Denied: Family history of Colon cancer   3  Family history of    4  Denied: Family history of alcoholism   5  Denied: Family history of mental disorder  Father    10  Denied: Family history of Colon cancer   7  Family history of    6  Denied: Family history of alcoholism   9  Denied: Family history of mental disorder   10  Family history of Liver cancer    Social History    · Active advance directive (V49 89) (Z78 9)   · Caffeine use (V49 89) (F15 90)   · Drinks coffee   · Feels safe at home   · Never a smoker   · No alcohol use   · No drug use   · Primary language is [place]   · Retired   · Seeing a dentist    Current Meds    1  LORazepam 1 MG Oral Tablet; TAKE 1 TABLET 1 HOUR BEFORE MRI  MAY REPEAT   ONCE 15 MINUTES BEFORE MRI; Therapy: 48Nua9552 to (Evaluate:57Ahx9390); Last Rx:06Jcp2162 Ordered    2  NovoLOG Mix 70/30 (70-30) 100 UNIT/ML Subcutaneous Suspension; inject 35 units   twice daily per H Madhav; Therapy: (Recorded:2017) to Recorded    3  ALPRAZolam ER 1 MG Oral Tablet Extended Release 24 Hour (Xanax XR); TAKE 1   TABLET DAILY AS DIRECTED; Therapy: 52ARJ3635 to (Evaluate:13Foc3886); Last Rx:2017 Ordered    4  Folic Acid 1 MG Oral Tablet; take 1 tablet by mouth once daily;    Therapy: 69NBS4920 to (Evaluate:31Dyc8548) Requested for: 64VVX3110; Last   Rx:25Jan2017 Ordered    5  Furosemide 40 MG Oral Tablet; two tabs in the AM    one tablet in the afternoon    Requested for: 14Cgk8534; Last Rx:03Aug2017 Ordered   6  MetOLazone 2 5 MG Oral Tablet; TAKE 1 TABLET DAILY ON MONDAY, WEDNESDAY, AND   FRIDAY; Therapy: 16MXC9985 to (Last Rx:07Apr2017)  Requested for: 07Apr2017 Ordered   7  Potassium Chloride Bina ER 20 MEQ Oral Tablet Extended Release; TAKE 1 TABLET   DAILY  Requested for: 25Jbs2797; Last Rx:03Sep2017 Ordered    8  Dicyclomine HCl - 10 MG Oral Capsule; TAKE 1 CAPSULE 3 TIMES DAILY  Requested   for: 51RNF9668; Last Rx:03Oct2017 Ordered    9  Oxycodone-Acetaminophen 5-325 MG Oral Tablet; TAKE 1 TABLET EVERY 4 HOURS AS   NEEDED FOR PAIN;   Therapy: 02Apr2015 to (Evaluate:76Ngp7423); Last Rx:27Lpf2339 Ordered    10  Pramipexole Dihydrochloride 1 MG Oral Tablet (Mirapex); TAKE 1 TABLET THREE TIMES    A DAY; Therapy: 13QMV1023 to (Last Rx:69Fik2994)  Requested for: 07Aug2017 Ordered    11  Aspirin 81 MG TABS; TAKE 1 TABLET DAILY; Therapy: (Recorded:09Mby0649) to Recorded   12  Dulcolax TBEC; take 2 tablet twice daily; Therapy: [phone]) to Recorded   13  DuoNeb 0 5-2 5 (3) MG/3ML Inhalation Solution (Ipratropium-Albuterol); USE 1 UNIT    DOSE IN NEBULIZER EVERY 4 HOURS AS NEEDED; Therapy: (Recorded:15Rkp2130) to Recorded   14  Lactulose 10 GM/15ML Oral Solution; TAKE 15 ML DAILY; Therapy: [phone]) to Recorded   15  Lisinopril 10 MG Oral Tablet; TAKE 1 TABLET DAILY; Therapy: [phone]) to Recorded   16  Multi-Vitamins Oral Tablet; TAKE 1 TABLET DAILY; Therapy: 28HJC6099 to Recorded   17  Synthroid 125 MCG Oral Tablet (Levothyroxine Sodium); TAKE 1 TABLET EVERY    MORNING; Therapy: (Recorded:12Oct2016) to Recorded   18  Torsemide 20 MG Oral Tablet; Take 1 tablet twice daily; Therapy: (Recorded:66Mry1225) to Recorded    Allergies    1  Latex Gloves MISC   2   Toradol Oral TABS    3  No Known Environmental Allergies   4  No Known Food Allergies    End of Encounter Meds    1  LORazepam 1 MG Oral Tablet; TAKE 1 TABLET 1 HOUR BEFORE MRI  MAY REPEAT   ONCE 15 MINUTES BEFORE MRI; Therapy: 15Maq0135 to (Evaluate:01Sep2017); Last Rx:10Rzn2399 Ordered    2  NovoLOG Mix 70/30 (70-30) 100 UNIT/ML Subcutaneous Suspension; inject 35 units   twice daily per H [place]; Therapy: (Recorded:03Oct2017) to Recorded    3  ALPRAZolam ER 1 MG Oral Tablet Extended Release 24 Hour (Xanax XR); TAKE 1   TABLET DAILY AS DIRECTED; Therapy: 06JND0448 to (Evaluate:93Wzu7709); Last Rx:17Aug2017 Ordered    4  Folic Acid 1 MG Oral Tablet; take 1 tablet by mouth once daily; Therapy: 33UOW5830 to (Evaluate:14Hjh9649)  Requested for: 94TTM9805; Last   Rx:25Jan2017 Ordered    5  Furosemide 40 MG Oral Tablet; two tabs in the AM    one tablet in the afternoon    Requested for: 03Aug2017; Last Rx:03Aug2017 Ordered   6  MetOLazone 2 5 MG Oral Tablet; TAKE 1 TABLET DAILY ON MONDAY, WEDNESDAY, AND   FRIDAY; Therapy: 98AQB2974 to (Last Rx:07Apr2017)  Requested for: 07Apr2017 Ordered   7  Potassium Chloride Bina ER 20 MEQ Oral Tablet Extended Release; TAKE 1 TABLET   DAILY  Requested for: 71Gph7973; Last Rx:03Sep2017 Ordered    8  Dicyclomine HCl - 10 MG Oral Capsule; TAKE 1 CAPSULE 3 TIMES DAILY  Requested   for: 25VSX5190; Last Rx:03Oct2017 Ordered    9  Oxycodone-Acetaminophen 5-325 MG Oral Tablet; TAKE 1 TABLET EVERY 4 HOURS AS   NEEDED FOR PAIN;   Therapy: 42Fls6936 to (Evaluate:69Slm1763); Last Rx:85Ltv6312 Ordered    10  Pramipexole Dihydrochloride 1 MG Oral Tablet (Mirapex); TAKE 1 TABLET THREE TIMES    A DAY; Therapy: 66ZTY5124 to (Last Rx:64Hxi5375)  Requested for: 07Aug2017 Ordered    11  Aspirin 81 MG TABS; TAKE 1 TABLET DAILY; Therapy: (Recorded:26Crb6819) to Recorded   12  Dulcolax TBEC; take 2 tablet twice daily; Therapy: [phone]) to Recorded   13   DuoNeb 0 5-2 5 (3) MG/3ML Inhalation Solution (Ipratropium-Albuterol); USE 1 UNIT    DOSE IN NEBULIZER EVERY 4 HOURS AS NEEDED; Therapy: (Recorded:48Pht3392) to Recorded   14  Lactulose 10 GM/15ML Oral Solution; TAKE 15 ML DAILY; Therapy: ([phone]) to Recorded   15  Lisinopril 10 MG Oral Tablet; TAKE 1 TABLET DAILY; Therapy: ([phone] 21) to Recorded   16  Multi-Vitamins Oral Tablet; TAKE 1 TABLET DAILY; Therapy: 53SDU1379 to Recorded   17  Synthroid 125 MCG Oral Tablet (Levothyroxine Sodium); TAKE 1 TABLET EVERY    MORNING; Therapy: (Recorded:42Syb8572) to Recorded   18  Torsemide 20 MG Oral Tablet; Take 1 tablet twice daily; Therapy: (Recorded:27Qig1832) to Recorded    Future Appointments    Date/Time Provider Specialty Site   10/19/2017 02:15 PM Jomarie Saint, MD [address] [place] [address]     Patient Care Team    Care Team Member Role Specialty Office Number   Katherne Lesches   [phone]   Janene Farah MD  Endocrinology [phone]   Sravani Rose   [phone]   Malka BURGESS  Specialist Gastroenterology Adult [phone]   Khadijah Dupont MD Specialist Hematology Oncology [phone]   [facility] Attending Gastroenterology Adult [phone]     Signatures   Electronically signed by :  Leny Pedersen RN; Oct 10 2017 10:08AM EST                       (Author)

## 2018-01-16 NOTE — PROGRESS NOTES
Assessment    1  Irritable bowel syndrome with diarrhea (564 1) (K58 0)   2  Diabetes mellitus with neuropathy (250 60,357 2) (E11 40)   3  Acute on chronic systolic congestive heart failure (428 23,428 0) (I50 23)   4  Acute dyspnea (786 09) (R06 00)   5  Acquired hypothyroidism (244 9) (E03 9)   6  Hypertension (401 9) (I10)   7  Edema (782 3) (R60 9)    Plan  Acquired hypothyroidism, Acute dyspnea, Acute on chronic systolic congestive heart  failure, Diabetes mellitus with neuropathy, Edema, Hypertension, Irritable bowel  syndrome with diarrhea    · (1) CBC/PLT/DIFF; Status: In Progress - Specimen/Data Collected;   Done: 21DUH2323   · (1) COMPREHENSIVE METABOLIC PANEL; Status: In Progress - Specimen/Data  Collected;   Done: 45JWS6092   · (1) C-REACTIVE PROTEIN; Status: In Progress - Specimen/Data Collected;   Done:  12PES0245   · (1) HEMOGLOBIN A1C; Status: In Progress - Specimen/Data Collected;   Done:  26FEJ5190   · (1) NT- BNP (PRO BRAIN NATRIURETIC PEPTIDE); Status: In Progress - Specimen/Data  Collected;   Done: 64XPY5673   · (1) SED RATE; Status: In Progress - Specimen/Data Collected;   Done: 79NVZ6256   · (1) TSH; Status: In Progress - Specimen/Data Collected;   Done: 74UOT8057  Chronic pain    · OxyCODONE HCl - 5 MG Oral Capsule; TAKE 1 CAPSULE EVERY 8 HOURS  Irritable bowel syndrome with diarrhea    · 1 - George Carmona MD (Gastroenterology) Physician Referral  Consult Only: the expectation  is that the referring provider will communicate back to the patient on treatment options  Evaluation and Treatment: the expectation is that the referred to provider will  communicate back to the patient on treatment options    Status: Active  Requested for:  68YEC2666  Care Summary provided  : Yes  Refused influenza vaccine    · Stop: Fluzone Quadrivalent 0 5 ML Intramuscular Suspension    Discussion/Summary    CONTINUE PRESENT TREATMENT PLAN  BW WILL BE OBTAINED  STRESSED MONITORING SODIUM INTAKE  WILL CALL WITH RESULT OVER THE WEEKEND  GI CONSULT  The treatment plan was reviewed with the patient/guardian  The patient/guardian understands and agrees with the treatment plan      Chief Complaint  Patient is here today for a follow up on edema, and IBS, L White/LPN      History of Present Illness  PATIENT RETURNS  NO REAL IMPROVEMENT  WATCHING HER SODIUM  SWELLING NO BETTER      Review of Systems    Constitutional: feeling tired, but no fever, not feeling poorly and no chills  ENT: no earache, no sore throat, no nasal discharge and no hoarseness  Cardiovascular: lower extremity edema, but no chest pain, no intermittent leg claudication, the heart rate was not fast and no palpitations  Respiratory: shortness of breath, orthopnea and shortness of breath during exertion, but no cough, no wheezing and no PND  Gastrointestinal: abdominal pain and diarrhea, but no nausea, no vomiting, no constipation and no blood in stools  Genitourinary: no dysuria  Musculoskeletal: arthralgias, limb pain, myalgias, joint stiffness and limb swelling, but no joint swelling  Integumentary: no rashes  Neurological: no headache, no numbness, no tingling and no dizziness  Active Problems    1  Acquired hypothyroidism (244 9) (E03 9)   2  Acute dyspnea (786 09) (R06 00)   3  Acute on chronic systolic congestive heart failure (428 23,428 0) (I50 23)   4  Chronic pain (338 29) (G89 29)   5  Diabetes mellitus with neuropathy (250 60,357 2) (E11 40)   6  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)   7  Edema (782 3) (R60 9)   8  Emphysema, unspecified (492 8) (J43 9)   9  Epiphora, unspecified laterality (375 20) (H04 209)   10  Hypertension (401 9) (I10)   11  Irritable bowel syndrome with diarrhea (564 1) (K58 0)   12  Obesity (278 00) (E66 9)   13  Restless leg syndrome (333 94) (G25 81)   14  Sleep apnea (780 57) (G47 30)    Past Medical History    1  History of Cellulitis of right lower extremity (682 6) (L03 115)   2   History of Depression, acute (296 20) (F32 9)   3  History of Emphysema, 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518 2) (J98 3)   4  History of aortic valve disorder (V12 59) (Z86 79)   5  History of congestive heart failure (V12 59) (Z86 79)   6  History of Restless leg syndrome (333 94) (G25 81)    The active problems and past medical history were reviewed and updated today  Surgical History    1  History of Cholecystectomy   2  History of Eye Surgery   3  History of Resection Of Pericardial Cyst Or Tumor   4  History of Thoracoscopy (Therapeutic) W/ Excision Of Pericardial Cyst   5  History of Thyroid Surgery    The surgical history was reviewed and updated today  Family History  Mother    1  Family history of Asthma   2  Family history of    3  Denied: Family history of alcoholism   4  Denied: Family history of mental disorder  Father    5  Family history of    6  Denied: Family history of alcoholism   7  Denied: Family history of mental disorder   8  Family history of Liver cancer    The family history was reviewed and updated today  Social History    · Active advance directive (V49 89) (Z78 9)   · Caffeine use (V49 89) (F15 90)   · Drinks coffee   · Never a smoker   · No alcohol use   · No drug use   · Primary language is [place]   · Retired   · Seeing a dentist  The social history was reviewed and updated today  Current Meds   1  Aspirin 81 MG TABS; TAKE 1 TABLET DAILY; Therapy: (Recorded:40Ozv3863) to Recorded   2  DuoNeb 0 5-2 5 (3) MG/3ML Inhalation Solution; USE 1 UNIT DOSE IN NEBULIZER   EVERY 4 HOURS AS NEEDED; Therapy: (Recorded:53Rnz9981) to Recorded   3  Folic Acid 1 MG Oral Tablet; take 1 tablet by mouth once daily; Therapy: 98PYQ2233 to (Evaluate:70Zze6677)  Requested for: 04DAF5809; Last   Rx:2017 Ordered   4  Furosemide 40 MG Oral Tablet; two tabs in the AM    one tablet in the afternoon; Therapy: (Recorded:2017) to Recorded   5   HumuLIN 70/30 Pen (70-30) 100 UNIT/ML SUSP; INJECT 40 UNITS TWICE DAILY; Therapy: (Recorded:31Jan2017) to Recorded   6  Multi-Vitamins Oral Tablet; TAKE 1 TABLET DAILY; Therapy: 96TVY5938 to Recorded   7  Potassium Chloride Bina ER 20 MEQ Oral Tablet Extended Release; TAKE 1 TABLET   DAILY  Requested for: 05CBK4834; Last Rx:22Jan2017; Status: ACTIVE - Transmit to   Pharmacy - Awaiting Verification Ordered   8  Pramipexole Dihydrochloride 1 MG Oral Tablet; TAKE 1 TABLET 3 TIMES DAILY; Therapy: 89WPN0878 to (Evaluate:72Vuf8596)  Requested for: 72Ocy7910; Last   Rx:05Imk2986 Ordered   9  PredniSONE 10 MG Oral Tablet; TAKE 1 TABLET DAILY; Therapy: (YSESTEFW:77XKH8625) to Recorded   10  Synthroid 125 MCG Oral Tablet; TAKE 1 TABLET EVERY MORNING; Therapy: (Recorded:12Oct2016) to Recorded   11  Vitamin C 500 MG Oral Tablet; TAKE 1 TABLET DAILY; Therapy: (Recorded:16Nxw8037) to Recorded    The medication list was reviewed and updated today  Allergies    1  Latex Gloves MISC   2  Toradol Oral TABS    3  No Known Environmental Allergies   4  No Known Food Allergies    Vitals  Vital Signs    Recorded: 73MVJ2239 01:17PM   Temperature 99 2 F, Tympanic   Heart Rate 106, R Radial   Pulse Quality Normal, R Radial   Respiration Quality Normal   Respiration 24   Systolic 355, LUE, Sitting   Diastolic 70, LUE, Sitting   Height 4 ft 10 5 in   Weight 238 lb 2 oz   BMI Calculated 48 92   BSA Calculated 1 97   O2 Saturation 91     Physical Exam    Constitutional   General appearance: No acute distress, well appearing and well nourished  Eyes   Conjunctiva and lids: Abnormal   PALE  Pupils and irises: Equal, round and reactive to light  Ears, Nose, Mouth, and Throat   External inspection of ears and nose: Normal     Otoscopic examination: Abnormal   DULL  Oropharynx: Normal with no erythema, edema, exudate or lesions  Pulmonary   Respiratory effort: Abnormal   RR 24  Auscultation of lungs: Abnormal   DECREASED BS IN BOTH BASES, BASILAR RALES, EXP WHEEZING  Cardiovascular   Auscultation of heart: Abnormal   TIBURCIO  Examination of extremities for edema and/or varicosities: Abnormal   1+ EDEMA WITH STASIS CHANGES, NO ULCERATIONS SEEN  Abdomen   Abdomen: Abnormal   OBESE, BS PRESENT, NO MASSES, MARKED R AND L UQ TENDERNESS  Liver and spleen: Abnormal   ? ENLARGED, MILD HJR  Lymphatic   Palpation of lymph nodes in neck: No lymphadenopathy  Musculoskeletal   Gait and station: Abnormal   WIDE BASED  Digits and nails: Abnormal   MILD DJD  MILD DJD  Neurologic   Cranial nerves: Cranial nerves 2-12 intact  Reflexes: 2+ and symmetric  Sensation: No sensory loss  Psychiatric   Orientation to person, place, and time: Normal     Mood and affect: Abnormal   APPEARS FLAT, DEPRESSED  Diabetic Foot Screen: Abnormal     Socks and shoes removed, the Right Foot: the foot was swollen, but not tender, not erythematous, not warm, not dry and without maceration  The toes on the right were swollen  Diminished tactile sensation with monofilament testing throughout the right foot  Socks and shoes removed, Left Foot: the foot was swollen, but not tender, not erythematous, not warm, not dry and wtihout maceration  The toes on the left were swollen  Diminished tactile sensation with monofilament testing throughout the left foot  Pulses:   1+ in the posterior tibialis on the right   1+ in the dorsalis pedis on the right  Pulses:   1+ in the posterior tibialis on the left   1+ in the dorsalis pedis on the left  Assign Risk Category: 2: Loss of protective sensation with or without weakness, deformity, callus, pre-ulcer, or history of ulceration  High risk  Future Appointments    Date/Time Provider Specialty Site   03/30/2017 11:10 AM LASHAE Rider   Gastroenterology Adult [address] [place] [address]     Signatures   Electronically signed by : Nhi Vasquez MD; Mar 11 2017  2:51PM EST                       (Author)
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
## 2018-01-17 ENCOUNTER — CLINICAL SUPPORT (OUTPATIENT)
Dept: PULMONOLOGY | Facility: CLINIC | Age: 78
End: 2018-01-17
Payer: COMMERCIAL

## 2018-01-17 DIAGNOSIS — J45.31 MILD PERSISTENT ASTHMA WITH ACUTE EXACERBATION: Primary | ICD-10-CM

## 2018-01-17 PROCEDURE — G0424 PULMONARY REHAB W EXER: HCPCS

## 2018-01-17 NOTE — RESULT NOTES
Message   PLEASE CALL EDNIS   REVIEWED BW   LOOKS OK   SUGAR SL ELEVATED   CONTINUE TO WATCH SUGARS AND STARCHES   THANKS     Verified Results  (1) CBC/ PLT (NO DIFF) 03KAT3472 10:58AM Nancy Cross Order Number: [id number]   Order Number: [id number]     Test Name Result Flag Reference   HEMATOCRIT 42 2 %  34 8-46 1   HEMOGLOBIN 13 4 g/dL  11 5-15 4   MCHC 31 8 g/dL  31 4-37 4   MCH 30 4 pg  26 8-34 3   MCV 96 fL  82-98   PLATELET COUNT 692 Thousands/uL  149-390   RBC COUNT 4 41 Million/uL  3 81-5 12   RDW 15 4 % H 11 6-15 1   WBC COUNT 9 03 Thousand/uL  4 31-10 16   MPV 11 4 fL  8 9-12 7     (1) COMPREHENSIVE METABOLIC PANEL 35EFD4570 26:41JY Nancy Cross Order Number: [id number]   Order Number: [id number]     Test Name Result Flag Reference   GLUCOSE,RANDM 119 mg/dL     If the patient is fasting, the ADA then defines impaired fasting glucose as > 100 mg/dL and diabetes as > or equal to 123 mg/dL  SODIUM 140 mmol/L  136-145   POTASSIUM 4 0 mmol/L  3 5-5 3   CHLORIDE 102 mmol/L  100-108   CARBON DIOXIDE 31 mmol/L  21-32   ANION GAP (CALC) 7 mmol/L  4-13   BLOOD UREA NITROGEN 23 mg/dL  5-25   CREATININE 0 69 mg/dL  0 60-1 30   Standardized to IDMS reference method   CALCIUM 8 8 mg/dL  8 3-10 1   BILI, TOTAL 0 28 mg/dL  0 20-1 00   ALK PHOSPHATAS 72 U/L     ALT (SGPT) 28 U/L  12-78   AST(SGOT) 17 U/L  5-45   ALBUMIN 3 6 g/dL  3 5-5 0   TOTAL PROTEIN 7 1 g/dL  6 4-8 2   eGFR Non-African American      >60 0 ml/min/1 73sq [facility] [employer] Disease Education Program recommendations are as follows:  GFR calculation is accurate only with a steady state creatinine  Chronic Kidney disease less than 60 ml/min/1 73 sq  meters  Kidney failure less than 15 ml/min/1 73 sq  meters       (1) HEMOGLOBIN A1C 59Upf1142 10:58AM Akilah Nicholson   TW Order Number: [id number]  TW Order Number: [id number]     Test Name Result Flag Reference HEMOGLOBIN A1C 8 3 % H 4 2-6 3   EST  AVG  GLUCOSE 192 mg/dl       (1) LIPID PANEL, FASTING 60Afb7820 10:58AM Racquel Houser    Order Number: [id number]  TW Order Number: [id number]     Test Name Result Flag Reference   CHOLESTEROL 154 mg/dL     HDL,DIRECT 48 mg/dL  40-60   Specimen collection should occur prior to Metamizole administration due to the potential for falsely depressed results  LDL CHOLESTEROL CALCULATED 87 mg/dL  0-100   Triglyceride:         Normal              <150 mg/dl       Borderline High    150-199 mg/dl       High               200-499 mg/dl       Very High          >499 mg/dl  Cholesterol:         Desirable        <200 mg/dl      Borderline High  200-239 mg/dl      High             >239 mg/dl  HDL Cholesterol:        High    >59 mg/dL      Low     <41 mg/dL  LDL CALCULATED:    This screening LDL is a calculated result  It does not have the accuracy of the Direct Measured LDL in the monitoring of patients with hyperlipidemia and/or statin therapy  Direct Measure LDL (DGF577) must be ordered separately in these patients  TRIGLYCERIDES 95 mg/dL  <=150   Specimen collection should occur prior to N-Acetylcysteine or Metamizole administration due to the potential for falsely depressed results  (1) TSH 12Jul2016 10:58AM Richard Pelletier Order Number: [id number]   Order Number: [id number]     Test Name Result Flag Reference   TSH 1 800 uIU/mL  0 358-3 740   Patients undergoing fluorescein dye angiography may retain small amounts of fluorescein in the body for 48-72 hours post procedure  Samples containing fluorescein can produce falsely depressed TSH values  If the patient had this procedure,a specimen should be resubmitted post fluorescein clearance            The recommended reference ranges for TSH during pregnancy are as follows:  First trimester 0 1 to 2 5 uIU/mL  Second trimester  0 2 to 3 0 uIU/mL  Third trimester 0 3 to 3 0 uIU/m     (1) NT- BNP (PRO BRAIN NATRIURETIC PEPTIDE) 69XII0440 10:58AM Jordyn Lugo Order Number: [id number]   Order Number: [id number]     Test Name Result Flag Reference   NT-PRO BNP 57 pg/mL  <450

## 2018-01-17 NOTE — PROGRESS NOTES
History of Present Illness  Care Coordination Encounter Information:   Type of Encounter: Telephonic   Last Office Visit: 17       Care Coordination  Nurse [facility]:   The reason for call is to discuss outreach for follow up/needed services  i called Bhavna Parker as she missed her 17 appointment  I left a voice message with request for a call back  Active Problems    1  Acquired hypothyroidism (244 9) (E03 9)   2  Acute dyspnea (786 09) (R06 00)   3  Acute on chronic systolic congestive heart failure (428 23,428 0) (I50 23)   4  Chronic pain (338 29) (G89 29)   5  Diabetes mellitus with neuropathy (250 60,357 2) (E11 40)   6  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)   7  Edema (782 3) (R60 9)   8  Emphysema, unspecified (492 8) (J43 9)   9  Epiphora, unspecified laterality (375 20) (H04 209)   10  Hypertension (401 9) (I10)   11  Obesity (278 00) (E66 9)   12  Restless leg syndrome (333 94) (G25 81)   13  Sleep apnea (780 57) (G47 30)    Past Medical History    1  History of Cellulitis of right lower extremity (682 6) (L03 115)   2  History of Depression, acute (296 20) (F32 9)   3  History of Emphysema, 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518 2) (J98 3)   4  History of aortic valve disorder (V12 59) (Z86 79)   5  History of congestive heart failure (V12 59) (Z86 79)   6  History of Restless leg syndrome (333 94) (G25 81)    Surgical History    1  History of Cholecystectomy   2  History of Eye Surgery   3  History of Resection Of Pericardial Cyst Or Tumor   4  History of Thoracoscopy (Therapeutic) W/ Excision Of Pericardial Cyst   5  History of Thyroid Surgery    Family History  Mother    1  Family history of Asthma   2  Family history of    3  Denied: Family history of alcoholism   4  Denied: Family history of mental disorder  Father    5  Family history of    6  Denied: Family history of alcoholism   7  Denied: Family history of mental disorder   8   Family history of Liver cancer    Social History    · Caffeine use (V49 89) (F15 90)   · Drinks coffee   · Never a smoker   · No alcohol use   · No drug use   · Primary language is [place]   · Retired   · Seeing a dentist    Current Meds    1  OxyCODONE HCl - 5 MG Oral Capsule; TAKE 1 CAPSULE EVERY 8 HOURS; Last   Rx:03Feb2017 Ordered    2  Folic Acid 1 MG Oral Tablet; take 1 tablet by mouth once daily; Therapy: 49DGV4792 to (Evaluate:02Xam1791)  Requested for: 25Jan2017; Last   Rx:25Jan2017 Ordered    3  Potassium Chloride Bina ER 20 MEQ Oral Tablet Extended Release; TAKE 1 TABLET   DAILY  Requested for: 01LFI3189; Last Rx:22Jan2017; Status: ACTIVE - Transmit to   Pharmacy - Awaiting Verification Ordered    4  Furosemide 40 MG Oral Tablet; TAKE 1 TABLET TWICE DAILY  Requested for:   65FQV7601; Last KS:81HOK1702 Ordered    5  Pramipexole Dihydrochloride 1 MG Oral Tablet (Mirapex); TAKE 1 TABLET 3 TIMES DAILY; Therapy: 83VWO1107 to (Evaluate:39Rpm2604)  Requested for: 27Yoq8535; Last   Rx:68Zie6826 Ordered    6  Aspirin 81 MG TABS; TAKE 1 TABLET DAILY; Therapy: (Recorded:55Lnp8217) to Recorded   7  Cod Liver Oil Oral Oil; 1 teaspoon daily; Therapy: 66YND0633 to Recorded   8  DuoNeb 0 5-2 5 (3) MG/3ML Inhalation Solution (Ipratropium-Albuterol); USE 1 UNIT   DOSE IN NEBULIZER EVERY 4 HOURS AS NEEDED; Therapy: (Recorded:15Seg1706) to Recorded   9  HumuLIN 70/30 Pen (70-30) 100 UNIT/ML SUSP; INJECT 40 UNITS TWICE DAILY; Therapy: (Recorded:31Jan2017) to Recorded   10  Keflex 500 MG Oral Capsule (Cephalexin); TAKE 1 CAPSULE 3 TIMES DAILY UNTIL    GONE;    Therapy: (Recorded:23Jan2017) to Recorded   11  MiraLax Oral Powder (Polyethylene Glycol 3350); Therapy: (Recorded:31Ftc9887) to Recorded   12  Multi-Vitamins Oral Tablet; TAKE 1 TABLET DAILY; Therapy: 56JAL3500 to Recorded   13  Multi-Vitamins TABS; TAKE 1 TABLET DAILY; Therapy: (HCOLASEF:11RDJ8467) to Recorded   14  PredniSONE 10 MG Oral Tablet; TAKE 1 TABLET DAILY;     Therapy: (Recorded:23Jan2017) to Recorded   15  Senokot 8 6 MG Oral Tablet; 3 tablets at bedtime; Therapy: 27WHA6846 to Recorded   16  Synthroid 125 MCG Oral Tablet (Levothyroxine Sodium); TAKE 1 TABLET EVERY    MORNING; Therapy: (Recorded:12Oct2016) to Recorded   17  Vitamin C 500 MG Oral Tablet; TAKE 1 TABLET DAILY; Therapy: (Recorded:48Wuv1138) to Recorded    Allergies    1  Latex Gloves MISC   2  Toradol Oral TABS    3  No Known Environmental Allergies   4  No Known Food Allergies    End of Encounter Meds    1  OxyCODONE HCl - 5 MG Oral Capsule; TAKE 1 CAPSULE EVERY 8 HOURS; Last   Rx:20Yzt0479 Ordered    2  Folic Acid 1 MG Oral Tablet; take 1 tablet by mouth once daily; Therapy: 91ICR3370 to (Evaluate:88Gap0108)  Requested for: 25Jan2017; Last   Rx:25Jan2017 Ordered    3  Potassium Chloride Bina ER 20 MEQ Oral Tablet Extended Release; TAKE 1 TABLET   DAILY  Requested for: 17EHZ5577; Last Rx:22Jan2017; Status: ACTIVE - Transmit to   Pharmacy - Awaiting Verification Ordered    4  Furosemide 40 MG Oral Tablet; TAKE 1 TABLET TWICE DAILY  Requested for:   52JCY3557; Last YY:06WCD7219 Ordered    5  Pramipexole Dihydrochloride 1 MG Oral Tablet (Mirapex); TAKE 1 TABLET 3 TIMES DAILY; Therapy: 00JVG6711 to (Evaluate:05Ayz2429)  Requested for: 24Hhm2521; Last   Rx:43Qmq7991 Ordered    6  Aspirin 81 MG TABS; TAKE 1 TABLET DAILY; Therapy: (Recorded:30Ipa8304) to Recorded   7  Cod Liver Oil Oral Oil; 1 teaspoon daily; Therapy: 19SZB2336 to Recorded   8  DuoNeb 0 5-2 5 (3) MG/3ML Inhalation Solution (Ipratropium-Albuterol); USE 1 UNIT   DOSE IN NEBULIZER EVERY 4 HOURS AS NEEDED; Therapy: (Recorded:99Ugk0110) to Recorded   9  HumuLIN 70/30 Pen (70-30) 100 UNIT/ML SUSP; INJECT 40 UNITS TWICE DAILY; Therapy: (Recorded:31Jan2017) to Recorded   10  Keflex 500 MG Oral Capsule (Cephalexin); TAKE 1 CAPSULE 3 TIMES DAILY UNTIL    GONE;    Therapy: (Recorded:23Jan2017) to Recorded   11  MiraLax Oral Powder (Polyethylene Glycol 3350);     Therapy: (Recorded:80Hil9179) to Recorded   12  Multi-Vitamins Oral Tablet; TAKE 1 TABLET DAILY; Therapy: 39IPA9155 to Recorded   13  Multi-Vitamins TABS; TAKE 1 TABLET DAILY; Therapy: (BSYBALG10RZC9294) to Recorded   14  PredniSONE 10 MG Oral Tablet; TAKE 1 TABLET DAILY; Therapy: (XRFCQRZB:31DBO0270) to Recorded   15  Senokot 8 6 MG Oral Tablet; 3 tablets at bedtime; Therapy: 17OGZ0954 to Recorded   16  Synthroid 125 MCG Oral Tablet (Levothyroxine Sodium); TAKE 1 TABLET EVERY    MORNING; Therapy: (Recorded:91Sek8054) to Recorded   17  Vitamin C 500 MG Oral Tablet; TAKE 1 TABLET DAILY; Therapy: (Recorded:63Ezr0950) to Recorded    Patient Care Team    Care Team Member Role Specialty Office Number   Jamari a   [phone]   Kitty Norman MD  Endocrinology [phone]   Samantha Philippe   [phone]     Signatures   Electronically signed by :  Lizeth Wong RN; Feb 10 2017  1:07PM EST                       (Author)

## 2018-01-17 NOTE — PROGRESS NOTES
History of Present Illness  Care Coordination Encounter Information:   Type of Encounter: Telephonic   Last Office Visit: 17   Spoke to Patient  Care Coordination [facility] Nurse Chava Dates:   The reason for call is to discuss outreach for follow up/needed services  I left a message for [facility], requesting a call back, regarding the outcome of the appointment I scheduled for her with Dr Lv Wayne on 17  I provided my call back numbers  Active Problems    1  Acquired hypothyroidism (244 9) (E03 9)   2  Acute dyspnea (786 09) (R06 00)   3  Acute on chronic systolic congestive heart failure (428 23,428 0) (I50 23)   4  Chronic pain (338 29) (G89 29)   5  Diabetes mellitus with neuropathy (250 60,357 2) (E11 40)   6  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)   7  Edema (782 3) (R60 9)   8  Emphysema, unspecified (492 8) (J43 9)   9  Epiphora, unspecified laterality (375 20) (H04 209)   10  Hypertension (401 9) (I10)   11  Obesity (278 00) (E66 9)   12  Restless leg syndrome (333 94) (G25 81)   13  Sleep apnea (780 57) (G47 30)    Past Medical History    1  History of Cellulitis of right lower extremity (682 6) (L03 115)   2  History of Depression, acute (296 20) (F32 9)   3  History of Emphysema, 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518 2) (J98 3)   4  History of aortic valve disorder (V12 59) (Z86 79)   5  History of congestive heart failure (V12 59) (Z86 79)   6  History of Restless leg syndrome (333 94) (G25 81)    Surgical History    1  History of Cholecystectomy   2  History of Eye Surgery   3  History of Resection Of Pericardial Cyst Or Tumor   4  History of Thoracoscopy (Therapeutic) W/ Excision Of Pericardial Cyst   5  History of Thyroid Surgery    Family History  Mother    1  Family history of Asthma   2  Family history of    3  Denied: Family history of alcoholism   4  Denied: Family history of mental disorder  Father    5  Family history of    6  Denied: Family history of alcoholism   7   Denied: Family history of mental disorder   8  Family history of Liver cancer    Social History    · Caffeine use (V49 89) (F15 90)   · Drinks coffee   · Never a smoker   · No alcohol use   · No drug use   · Primary language is [place]   · Retired   · Seeing a dentist    Current Meds    1  Folic Acid 1 MG Oral Tablet; take 1 tablet by mouth once daily; Therapy: 60NMH6495 to (Evaluate:20Zvw7966)  Requested for: 81HZK5564; Last   Rx:25Jan2017 Ordered    2  Potassium Chloride Bina ER 20 MEQ Oral Tablet Extended Release; TAKE 1 TABLET   DAILY  Requested for: 36MQF8671; Last Rx:22Jan2017; Status: ACTIVE - Transmit to   Pharmacy - Awaiting Verification Ordered    3  Furosemide 40 MG Oral Tablet; TAKE 1 TABLET TWICE DAILY  Requested for:   23CAQ3367; Last VK:36ZTG1535 Ordered    4  Pramipexole Dihydrochloride 1 MG Oral Tablet (Mirapex); TAKE 1 TABLET 3 TIMES DAILY; Therapy: 71VIN7532 to (Evaluate:22Jul2017)  Requested for: 77Ymt3649; Last   Rx:50Bxc9065 Ordered    5  Aspirin 81 MG TABS; TAKE 1 TABLET DAILY; Therapy: (Recorded:91Mqo0147) to Recorded   6  Cod Liver Oil Oral Oil; 1 teaspoon daily; Therapy: 73NOW5590 to Recorded   7  DuoNeb 0 5-2 5 (3) MG/3ML Inhalation Solution (Ipratropium-Albuterol); USE 1 UNIT   DOSE IN NEBULIZER EVERY 4 HOURS AS NEEDED; Therapy: (Recorded:03Vou4557) to Recorded   8  Keflex 500 MG Oral Capsule (Cephalexin); TAKE 1 CAPSULE 3 TIMES DAILY UNTIL   GONE;   Therapy: (Recorded:23Jan2017) to Recorded   9  Lantus SoloStar 100 UNIT/ML Subcutaneous Solution Pen-injector; 100 units at 8 am   with breakfast and 100 units with evening meal;   Therapy: (Recorded:27Jan2017) to Recorded   10  MiraLax Oral Powder (Polyethylene Glycol 3350); Therapy: (Recorded:68Yhr1284) to Recorded   11  Multi-Vitamins Oral Tablet; TAKE 1 TABLET DAILY; Therapy: 11AAS3091 to Recorded   12  Multi-Vitamins TABS; TAKE 1 TABLET DAILY; Therapy: (XASZLQWT:78JXN5263) to Recorded   13   OxyCODONE HCl - 5 MG Oral Capsule; Therapy: (SPRTEMVN:85ZUL9138) to Recorded   14  PredniSONE 10 MG Oral Tablet; TAKE 1 TABLET DAILY; Therapy: (ITNYCLWW:10FAQ0125) to Recorded   15  Senokot 8 6 MG Oral Tablet; 3 tablets at bedtime; Therapy: 09DHH2214 to Recorded   16  Synthroid 125 MCG Oral Tablet (Levothyroxine Sodium); TAKE 1 TABLET EVERY    MORNING; Therapy: (Recorded:94Cdp5650) to Recorded   17  Vitamin C 500 MG Oral Tablet; TAKE 1 TABLET DAILY; Therapy: (Recorded:93Xmq1434) to Recorded    Allergies    1  Latex Gloves MISC   2  Toradol Oral TABS    3  No Known Environmental Allergies   4  No Known Food Allergies    End of Encounter Meds    1  Folic Acid 1 MG Oral Tablet; take 1 tablet by mouth once daily; Therapy: 40AKM0454 to (Evaluate:06Xfs9111)  Requested for: 32ZOT4663; Last   Rx:25Jan2017 Ordered    2  Potassium Chloride Bina ER 20 MEQ Oral Tablet Extended Release; TAKE 1 TABLET   DAILY  Requested for: 45AGL0818; Last Rx:22Jan2017; Status: ACTIVE - Transmit to   Pharmacy - Awaiting Verification Ordered    3  Furosemide 40 MG Oral Tablet; TAKE 1 TABLET TWICE DAILY  Requested for:   64BTG5760; Last NB:25HQI8574 Ordered    4  Pramipexole Dihydrochloride 1 MG Oral Tablet (Mirapex); TAKE 1 TABLET 3 TIMES DAILY; Therapy: 66WRG0228 to (Evaluate:45Mrs1398)  Requested for: 21Gqo9055; Last   Rx:38Mlo3466 Ordered    5  Aspirin 81 MG TABS; TAKE 1 TABLET DAILY; Therapy: (Recorded:71Wpc8263) to Recorded   6  Cod Liver Oil Oral Oil; 1 teaspoon daily; Therapy: 17DZN2736 to Recorded   7  DuoNeb 0 5-2 5 (3) MG/3ML Inhalation Solution (Ipratropium-Albuterol); USE 1 UNIT   DOSE IN NEBULIZER EVERY 4 HOURS AS NEEDED; Therapy: (Recorded:21Ojq9960) to Recorded   8  Keflex 500 MG Oral Capsule (Cephalexin); TAKE 1 CAPSULE 3 TIMES DAILY UNTIL   GONE;   Therapy: (Recorded:23Jan2017) to Recorded   9   Lantus SoloStar 100 UNIT/ML Subcutaneous Solution Pen-injector; 100 units at 8 am   with breakfast and 100 units with evening meal;   Therapy: (WJRAKLLE:60TRA3833) to Recorded   10  MiraLax Oral Powder (Polyethylene Glycol 3350); Therapy: (Recorded:70Vwr7334) to Recorded   11  Multi-Vitamins Oral Tablet; TAKE 1 TABLET DAILY; Therapy: 87KQN4380 to Recorded   12  Multi-Vitamins TABS; TAKE 1 TABLET DAILY; Therapy: (BKJPJUGS:78OKC9435) to Recorded   13  OxyCODONE HCl - 5 MG Oral Capsule; Therapy: (DJYMXIXQ:50JIZ7025) to Recorded   14  PredniSONE 10 MG Oral Tablet; TAKE 1 TABLET DAILY; Therapy: (KQESLSBR:03QZB3544) to Recorded   15  Senokot 8 6 MG Oral Tablet; 3 tablets at bedtime; Therapy: 60IPW9465 to Recorded   16  Synthroid 125 MCG Oral Tablet (Levothyroxine Sodium); TAKE 1 TABLET EVERY    MORNING; Therapy: (Recorded:33Jlw6251) to Recorded   17  Vitamin C 500 MG Oral Tablet; TAKE 1 TABLET DAILY; Therapy: (Recorded:83Hue3111) to Recorded    Future Appointments    Date/Time Provider Specialty Site   01/30/2017 02:45 PM Malvin Baig MD [address] [place] [address]     Patient Care Team    Care Team Member Role Specialty Office Number   Shea Courser   [phone]   Jose Gilmore MD  Endocrinology [phone]   Jenna Verduzco   [phone]     Signatures   Electronically signed by :  Ajay Rowland RN; Jan 30 2017 10:10AM EST                       (Author)

## 2018-01-17 NOTE — PROGRESS NOTES
Assessment   1  Chronic pain syndrome (338 4) (G89 4)   2  Acute hip pain, right (719 45) (M25 551)   3  Greater trochanteric bursitis of right hip (726 5) (M70 61)   4  Generalized pain (780 96) (R52)    Discussion/Summary      My impressions and treatment recommendations were discussed in detail with the patient, who verbalized understanding and had no further questions  patient is complaining of generalized body pain at today's office visit  She did undergo a right trochanteric bursa injection on December 21, 2017, but notes that she only had 2 days worth of 100% pain relief for her right trochanteric bursa associated pain  I do feel as though there is a rheumatological etiology for the vast majority of her pain complaints since they do include multiple joints throughout her body  She is complaining of head pain, neck pain, rib pain, bilateral buttocks pain, elbow pain, and wrist pain  She will be meeting with a rheumatologist later on today and I suggested that once her rheumatological evaluation and medical management ensues, I think she will feel a lot better  I did discuss with the patient that after her rheumatological complaints are well controlled, she can return back to me for further interventional pain procedures  The patient verbalized understanding  is planned with the patient on an as-needed basis  Discharge instructions were provided  I personally saw and examined the patient and I agree with the above discussed plan of care  The patient has the current Goals: Decreased pain and improved level of functioning  The patent has the current Barriers: Morbid obesity  Patient is able to Self-Care  Chief Complaint   1  Pain    History of Present Illness   Ms Henna Saez is a pleasant 69-year-old  female who presents to [facility] for interval re-evaluation of the above-stated pain complaints   The patient has a past medical and chronic pain history as outlined in impression section below  She was last seen on December 21, 2017 at which time she underwent a right trochanteric bursa injection  today's office visit, the patient's pain score is 10/10 on the verbal numerical pain rating scale  The patient states that her pain is in her head, neck, bilateral elbows, bilateral wrists, right-sided rib, and bilateral buttocks  She states that her pain is constant in nature and describes the quality of her pain as âburning and pressure like  â The patient does report that she did get 2 days worth of 100% pain relief following the right trochanteric bursa injection on December 21, 2017  She has not yet seen the rheumatologist that I referred her to the at her last office visit, but does state that she has an appointment later on today  than as stated above, the patient denies any interval changes in medications, medical condition, mental condition, symptoms, or allergies since the last office visit  Scott Meigs presents with complaints of gradual onset of constant episodes of severe head, bilateral neck, right upper back, bilateral lower back, left shoulder and right hip pain, described as burning  On a scale of 1 to 10, the patient rates the pain as 10  Symptoms are worsening  Review of Systems        Constitutional: no fever,-- no recent weight gain-- and-- no recent weight loss  Eyes: no double vision-- and-- no blurry vision  Cardiovascular: no chest pain,-- no palpitations-- and-- no lower extremity edema  Respiratory: shortness of breath, but-- no wheezing  Musculoskeletal: difficulty walking,-- muscle weakness-- and-- joint stiffness, but-- no joint swelling,-- no limb swelling,-- no pain in extremity-- and-- no decreased range of motion  Neurological: dizziness, but-- no difficulty swallowing,-- no memory loss,-- no loss of consciousness-- and-- no seizures        Gastrointestinal: constipation, but-- no nausea,-- no vomiting-- and-- no diarrhea  Genitourinary: no difficulty initiating urine stream,-- no genital pain-- and-- no frequent urination  Integumentary: no complaints of skin rash  Psychiatric: no depression  Endocrine: no excessive thirst,-- no adrenal disease,-- no hypothyroidism-- and-- no hyperthyroidism  Hematologic/Lymphatic: no tendency for easy bruising-- and-- no tendency for easy bleeding  ROS reviewed  Active Problems   1  Abnormal CT scan, liver (793 3) (R93 2)   2  Abnormal tumor markers (795 89) (R97 8)   3  Acquired hypothyroidism (244 9) (E03 9)   4  Acute hip pain, right (719 45) (M25 551)   5  Arthritis (716 90) (M19 90)   6  Asthma (493 90) (J45 909)   7  Balance problem (781 99) (R26 89)   8  Chest pain, unspecified type (786 50) (R07 9)   9  Chronic pain (338 29) (G89 29)   10  Chronic pain syndrome (338 4) (G89 4)   11  Concussion (850 9) (S06 0X9A)   12  Constipation due to opioid therapy (564 09,E935 2) (K59 03,T40 2X5A)   13  Daytime hypersomnolence (780 54) (G47 19)   14  Diabetes mellitus with neuropathy (250 60,357 2) (E11 40)   15  Diastolic dysfunction (911 2) (I51 9)   16  Dyspnea on exertion (786 09) (R06 09)   17  Edema (782 3) (R60 9)   18  Elevated LFTs (790 6) (R79 89)   19  Generalized colicky abdominal pain (789 7) (R10 83)   20  Generalized pain (780 96) (R52)   21  Greater trochanteric bursitis of right hip (726 5) (M70 61)   22  Hyperglycemia (790 29) (R73 9)   23  Hypertension (401 9) (I10)   24  Hypoventilation associated with obesity syndrome (278 03) (E66 2)   25  Iliotibial band syndrome, right leg (728 89) (M76 31)   26  Irritable bowel syndrome with diarrhea (564 1) (K58 0)   27  Left hip pain (719 45) (M25 552)   28  Liver lesion (573 8) (K76 9)   29  Lung nodules (793 19) (R91 8)   30  Meningiomas, multiple (237 6) (D42 9)   31  Mild persistent asthma with acute exacerbation (493 92) (J45 31)   32   Moderate episode of recurrent major depressive disorder (296 32) (F33 1)   33  Moderate obstructive sleep apnea (327 23) (G47 33)   34  Morbid obesity due to excess calories (278 01) (E66 01)   35  Pancreatic cyst (577 2) (K86 2)   36  Pulmonary emphysema (492 8) (J43 9)   37  Pulmonary nodules (793 19) (R91 8)   38  Restless leg syndrome (333 94) (G25 81)   39  Sleep apnea (780 57) (G47 30)   40  Thyroid disease (246 9) (E07 9)    Past Medical History   1  History of Acute on chronic systolic congestive heart failure (428 23,428 0) (I50 23)   2  History of Anxiety (300 00) (F41 9)   3  History of Cellulitis of right lower extremity (682 6) (L03 115)   4  History of Confusion and disorientation (300 9) (F99)   5  History of Depression, acute (296 20) (F32 9)   6  History of Dry skin (701 1) (L85 3)   7  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)   8  History of Emphysema, 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518 2) (J98 3)   9  Excessive thirst (783 5) (R63 1)   10  Flu vaccine need (V04 81) (Z23)   11  H/O blurred vision (V12 49) (Z86 69)   12  History of Hair loss (704 00) (L65 9)   13  History of aortic valve disorder (V12 59) (Z86 79)   14  History of asthma (V12 69) (Z87 09)   15  History of cataract (V12 49) (Z86 69)   16  History of congestive heart failure (V12 59) (Z86 79)   17  History of depression (V11 8) (Z86 59)   18  History of eye injury (V15 59) (Z87 828)   19  History of fatigue (V13 89) (Z87 898)   20  History of hearing loss (V12 49) (Z86 69)   21  History of hypertension (V12 59) (Z86 79)   22  History of malignant neoplasm of skin (V10 83) (Z85 828)   23  History of malignant neoplasm of thyroid (V10 87) (Z85 850)   24  History of memory loss (V11 9) (Z86 59)   25  History of nasal congestion (V12 69) (Z87 09)   26  History of nasal discharge (V12 69) (Z87 09)   27  History of night sweats (V15 89) (Z87 898)   28  History of seasonal allergies (V15 09) (Z88 9)   29  History of thyroid disease (V12 29) (Z86 39)   30  History of tinnitus (V12 49) (Z86 69)   31  History of Leg pain (729 5) (M79 606)   32  History of Restless leg syndrome (333 94) (G25 81)   33  History of Skin cancer (melanoma) (172 9) (C43 9)   34  History of Sleep disorder (780 50) (G47 9)   35  History of Swelling of extremity (729 81) (M79 89)    Surgical History   1  History of Cholecystectomy   2  History of Eye Surgery   3  History of Hysterectomy   4  History of Removal Of Lesion   5  History of Resection Of Pericardial Cyst Or Tumor   6  History of Thoracoscopy (Therapeutic) W/ Excision Of Pericardial Cyst   7  History of Thyroid Surgery   8  History of Total Knee Replacement Left   9  History of Total Knee Replacement Right    Family History   Mother    1  Family history of Asthma   2  Denied: Family history of Colon cancer   3  Family history of    4  Denied: Family history of alcoholism   5  Denied: Family history of mental disorder  Father    10  Denied: Family history of Colon cancer   7  Family history of    6  Denied: Family history of alcoholism   9  Family history of arthritis (V17 7) (Z82 61)   10  Denied: Family history of mental disorder   11  Family history of Liver cancer  Sister    15  Family history of Alive and well   13  Family history of   Brother    15  Family history of Alive and well  Maternal Grandmother    15  Family history of   Maternal Grandfather    12  Family history of     Social History    · Active advance directive (V49 89) (Z78 9)   · Caffeine use (V49 89) (F15 90)   · Drinks coffee   · Feels safe at home   · Four children   · High school graduate   · Never a smoker   · No alcohol use   · No drug use   · Primary language is [place]   · Retired   · Seeing a dentist    Current Meds    1  ALPRAZolam ER 1 MG Oral Tablet Extended Release 24 Hour; TAKE 1 TABLET DAILY; Therapy: (Recorded:00Ieh8583) to Recorded   2  Aspirin 325 MG Oral Tablet; TAKE 1 TABLET DAILY; Therapy: (Recorded:96Gbm0441) to Recorded   3   24 [facility] ER Oil/Vitamins A & D Oral Capsule; Take 1 teaspoon daily; Therapy: (Recorded:58Tup0482) to Recorded   4  Dicyclomine HCl - 10 MG Oral Capsule; TAKE 1 CAPSULE 3 TIMES DAILY; Therapy: (Recorded:38Xmq7852) to Recorded   5  Dulcolax TBEC; take 2 tablet twice daily; Therapy: (0345 74 47 21) to Recorded   6  DuoNeb 0 5-2 5 (3) MG/3ML Inhalation Solution; USE 1 UNIT DOSE IN NEBULIZER     EVERY 4 HOURS AS NEEDED; Therapy: (Recorded:69Dud1294) to Recorded   7  Escitalopram Oxalate 10 MG Oral Tablet; 1 every day; Therapy: (Recorded:29Ssa3486) to Recorded   8  Folic Acid 1 MG Oral Tablet; Take 1 tablet daily; Therapy: 49ISU7435 to (Last Rx:61Tre2870)  Requested for: 39Vpq9855 Ordered   9  Lasix 40 MG Oral Tablet; Take: 2 tablets in the a m  and one tablet in the afternoon; Therapy: (Recorded:92Yvg7272) to Recorded   10  Levothyroxine Sodium 125 MCG Oral Tablet; TAKE ONE TABLET BY MOUTH ONCE DAILY      AS DIRECTED  Requested for: 25Oct2017; Last IY:25TKI5978 Ordered   11  Lisinopril 10 MG Oral Tablet; TAKE 1 TABLET DAILY; Therapy: (Recorded:85Sxd9211) to Recorded   12  LORazepam 1 MG Oral Tablet; Take ONE tablet 1 hour before MRI  May repeat once 15      minutes before MRI; Therapy: (Recorded:65Ctc1099) to Recorded   13  MetOLazone 2 5 MG Oral Tablet; TAKE 1 TABLET DAILY ON MONDAY, WEDNESDAY, AND      FRIDAY; Therapy: 56AXL6493 to (Last Rx:04Dki3229)  Requested for: 44DJH7126 Ordered   14  Metoprolol Tartrate 25 MG Oral Tablet; TAKE 1 TABLET TWICE DAILY; Therapy: 31ZFE8293 to (Evaluate:27Mar2018)  Requested for: 97Woa8338; Last      Rx:60Cvb0915 Ordered   15  Multi-Vitamins Oral Tablet; TAKE 1 TABLET DAILY; Therapy: 54GIS4801 to Recorded   16  NovoLOG Mix 70/30 FlexPen (70-30) 100 UNIT/ML Subcutaneous Suspension      Pen-injector; INJECT SUBCUTANEOUSLY AS DIRECTED; Therapy: 93AEZ5679 to (Last Rx:10Jan2018)  Requested for: 83EFA8230 Ordered   17  Oxycodone-Acetaminophen 5-325 MG Oral Tablet; TAKE 1 TABLET Every 6 hours FOR      PAIN RELIEF; Therapy: 02Apr2015 to (Evaluate:07Jan2018); Last Rx:08Dec2017 Ordered   18  Sulindac 200 MG Oral Tablet; take 1 tablet every other day; Therapy: (Recorded:14Sno5366) to Recorded   19  Voltaren 1 % Transdermal Gel; Apply sparingly to affected are three to four times a day; Therapy: (Recorded:82Yhs9437) to Recorded    Allergies   1  Latex Gloves MISC   2  Toradol Oral TABS  3  No Known Environmental Allergies   4  No Known Food Allergies    Vitals   Vital Signs    Recorded: 68SAU6666 10:14AM   Temperature 97 1 F   Heart Rate 86   Respiration 24   Systolic 446   Diastolic 54   Height 5 ft 2 in   Weight 228 lb 6 oz   BMI Calculated 41 77   BSA Calculated 2 02   Pain Scale 10     Physical Exam        Constitutional      General appearance: Well developed, well nourished, alert, in no distress, non-toxic and no overt pain behavior  Eyes      Sclera: anicteric      HEENT      Hearing grossly intact  Pulmonary      Respiratory effort: Even and unlabored  Cardiovascular      Examination of extremities: No edema or pitting edema present  Skin      Skin and subcutaneous tissue: Normal without rashes or lesions, well hydrated  Psychiatric      Mood and affect: Mood and affect appropriate  Neurologic      Cranial nerves: Cranial nerves II-XII grossly intact  Musculoskeletal      Gait and station: Normal        Lumbar/Sacral Spine examination demonstrates Lumbosacral Spine: Generalized body tenderness noted throughout the thoracolumbar spine  There are tender points noted both above and below the diaphragm bilaterally  Tenderness: None except at lumbar spine  Tenderness noted over the right trochanteric bursa  There was no sacroiliac joint tenderness noted  Lumbosacral Spine Sensory: intact to light touch and pinprick in the lower extremities       ROM Lumbosacral Spine: Full  ROM Hips: Full      Foot and ankle strength was normal bilaterally  Knee strength was normal bilaterally  Hip strength was normal bilaterally  Future Appointments      Date/Time Provider Specialty Site   04/06/2018 01:00 PM Kenney Presley [facility] Neurosurgery [facility]   04/06/2018 01:15 PM Shiva Rodriguez MD PhD Neurosurgery [address]   03/09/2018 12:00 PM JENIFER Miranda   P[facility]   02/08/2018 11:00 AM Zainab Nolasco DO Orthopedic Surgery  [facility] [address]     Signatures    Electronically signed by : LASHAE Drew ; Jan 16 2018 12:08PM EST                       (Author)

## 2018-01-17 NOTE — MISCELLANEOUS
History of Present Illness  COPD Hospital Discharge Initial Follow-Up Call: The patient is being contacted for follow-up after hospitalization  The date of discharge is 9/21/17  Left message on voice mail requesting return call  Narrative Summary:  Message left with reminders to schedule PCP and Pulmonary appts  Current Meds    1  LORazepam 1 MG Oral Tablet; TAKE 1 TABLET 1 HOUR BEFORE MRI  MAY REPEAT   ONCE 15 MINUTES BEFORE MRI; Therapy: 26Qgz1021 to (Evaluate:64Xtw9476); Last Rx:18Vfs1560 Ordered    2  ALPRAZolam ER 1 MG Oral Tablet Extended Release 24 Hour (Xanax XR); TAKE 1   TABLET DAILY AS DIRECTED; Therapy: 32LYO0622 to (Evaluate:64Jyy1489); Last Rx:99Jtj1572 Ordered    3  Folic Acid 1 MG Oral Tablet; take 1 tablet by mouth once daily; Therapy: 03WXL0272 to (Evaluate:77Xzj9663)  Requested for: 08LXR6108; Last   Rx:84Sef7561 Ordered    4  Furosemide 40 MG Oral Tablet; two tabs in the AM    one tablet in the afternoon    Requested for: 48Kst7379; Last Rx:15Iiq7137 Ordered   5  MetOLazone 2 5 MG Oral Tablet; TAKE 1 TABLET DAILY ON MONDAY, WEDNESDAY, AND   FRIDAY; Therapy: 42ENU8296 to (Last Rx:07Apr2017)  Requested for: 07Apr2017 Ordered   6  Potassium Chloride Bina ER 20 MEQ Oral Tablet Extended Release; TAKE 1 TABLET   DAILY  Requested for: 53Oga2307; Last Rx:03Sep2017 Ordered    7  LORazepam 1 MG Oral Tablet (Ativan); TAKE 1 TABLET 1 HOUR BEFORE MRI  MAY   REPEAT ONCE 15 MINUTES BEFORE MRI; Therapy: 95DSN8705 to (Evaluate:32Ojr6494); Last Rx:73Tms6312 Ordered    8  Oxycodone-Acetaminophen 5-325 MG Oral Tablet; TAKE 1 TABLET EVERY 4 HOURS AS   NEEDED FOR PAIN;   Therapy: 48Zun9699 to (Evaluate:42Ydn3121); Last Rx:19Ips0494 Ordered    9  Pramipexole Dihydrochloride 1 MG Oral Tablet (Mirapex); TAKE 1 TABLET THREE TIMES   A DAY; Therapy: 30WDL9181 to (Last Rx:58Vtx1271)  Requested for: 26Hza3524 Ordered    10  Aspirin 81 MG TABS; TAKE 1 TABLET DAILY;     Therapy: (Recorded:11Sep2014) to Recorded   11  Dulcolax TBEC; take 2 tablet twice daily; Therapy: (Hurman Libman) to Recorded   12  DuoNeb 0 5-2 5 (3) MG/3ML Inhalation Solution (Ipratropium-Albuterol); USE 1 UNIT    DOSE IN NEBULIZER EVERY 4 HOURS AS NEEDED; Therapy: (Recorded:60Twu9102) to Recorded   13  Lactulose 10 GM/15ML Oral Solution; TAKE 15 ML DAILY; Therapy: (Hurman Libman) to Recorded   14  Lantus 100 UNIT/ML Subcutaneous Solution; inject 40 unit daily; Therapy: (Hurman Libman) to Recorded   15  Lisinopril 10 MG Oral Tablet; TAKE 1 TABLET DAILY; Therapy: (Hurman Libman) to Recorded   16  Multi-Vitamins Oral Tablet; TAKE 1 TABLET DAILY; Therapy: 45LKZ2912 to Recorded   17  Synthroid 125 MCG Oral Tablet (Levothyroxine Sodium); TAKE 1 TABLET EVERY    MORNING; Therapy: (Recorded:91Pcg3493) to Recorded    Allergies    1  Latex Gloves MISC   2  Toradol Oral TABS    3  No Known Environmental Allergies   4  No Known Food Allergies    Signatures   Electronically signed by :  RT Aries; Sep 22 2017  1:08PM EST                       (Author)

## 2018-01-17 NOTE — RESULT NOTES
Verified Results  (1) CBC/PLT/DIFF 46Wmw4040 02:15PM Carlos Briceno Order Number: [id number]     Test Name Result Flag Reference   WBC COUNT 9 80 Thousand/uL  4 80-10 80   RBC COUNT 4 32 Million/uL  4 20-5 40   HEMOGLOBIN 13 4 g/dL  12 0-16 0   HEMATOCRIT 40 5 %  37 0-47 0   MCV 94 fL  82-98   MCH 31 0 pg  27 0-31 0   MCHC 33 1 g/dL  31 4-37 4   RDW 15 7 % H 11 6-15 1   MPV 9 5 fL  8 9-12 7   PLATELET COUNT 268 Thousands/uL  130-400   nRBC AUTOMATED 0 /100 WBCs     NEUTROPHILS RELATIVE PERCENT 75 %  43-75   LYMPHOCYTES RELATIVE PERCENT 18 %  14-44   MONOCYTES RELATIVE PERCENT 6 %  4-12   EOSINOPHILS RELATIVE PERCENT 1 %  0-6   BASOPHILS RELATIVE PERCENT 0 %  0-1   NEUTROPHILS ABSOLUTE COUNT 7 40 Thousands/? ??L  1 85-7 62   LYMPHOCYTES ABSOLUTE COUNT 1 80 Thousands/? ??L  0 60-4 47   MONOCYTES ABSOLUTE COUNT 0 50 Thousand/? ??L  0 17-1 22   EOSINOPHILS ABSOLUTE COUNT 0 10 Thousand/? ??L  0 00-0 61   BASOPHILS ABSOLUTE COUNT 0 00 Thousands/? ??L  0 00-0 10     (1) COMPREHENSIVE METABOLIC PANEL 13ZPD5152 85:21JG Carlos Briceno Order Number: [id number]     Test Name Result Flag Reference   GLUCOSE,RANDM 233 mg/dL H    If the patient is fasting, the ADA then defines impaired fasting glucose as > 100 mg/dL and diabetes as > or equal to 123 mg/dL  Specimen collection should occur prior to Sulfasalazine administration due to the potential for falsely depressed results  Specimen collection should occur prior to Sulfapyridine administration due to the potential for falsely elevated results     SODIUM 142 mmol/L  136-145   POTASSIUM 3 4 mmol/L L 3 5-5 3   CHLORIDE 101 mmol/L  100-108   CARBON DIOXIDE 34 mmol/L H 21-32   ANION GAP (CALC) 7 mmol/L  4-13   BLOOD UREA NITROGEN 25 mg/dL  5-25   CREATININE 1 14 mg/dL  0 60-1 30   Standardized to IDMS reference method   CALCIUM 9 1 mg/dL  8 3-10 1   BILI, TOTAL 0 30 mg/dL  0 20-1 00   ALK PHOSPHATAS 70 U/L     ALT (SGPT) 36 U/L  12-78 Specimen collection should occur prior to Sulfasalazine administration due to the potential for falsely depressed results  AST(SGOT) 12 U/L  5-45   Specimen collection should occur prior to Sulfasalazine administration due to the potential for falsely depressed results  ALBUMIN 3 4 g/dL L 3 5-5 0   TOTAL PROTEIN 6 6 g/dL  6 4-8 2   eGFR 46 ml/min/1 73sq m     [facility] Disease Education Program recommendations are as follows:  GFR calculation is accurate only with a steady state creatinine  Chronic Kidney disease less than 60 ml/min/1 73 sq  meters  Kidney failure less than 15 ml/min/1 73 sq  meters  (1) NT- BNP (PRO BRAIN NATRIURETIC PEPTIDE) 87ITK2428 02:15PM [email or web address] Draft Order Number: [id number]     Test Name Result Flag Reference   NT-PRO BNP 66 pg/mL  <450     (1) HEMOGLOBIN A1C 29Sep2017 02:15PM [email or web address] Draft Order Number: [id number]     Test Name Result Flag Reference   HEMOGLOBIN A1C 9 3 % H 4 2-6 3   EST  AVG  GLUCOSE 220 mg/dl       (1) TSH 29Sep2017 02:15PM [email or web address] Draft Order Number: [id number]     Test Name Result Flag Reference   TSH 3 938 uIU/mL H 0 358-3 740   Patients undergoing fluorescein dye angiography may retain small amounts of fluorescein in the body for 48-72 hours post procedure  Samples containing fluorescein can produce falsely depressed TSH values  If the patient had this procedure,a specimen should be resubmitted post fluorescein clearance  The recommended reference ranges for TSH during pregnancy are as follows:  First trimester 0 1 to 2 5 uIU/mL  Second trimester  0 2 to 3 0 uIU/mL  Third trimester 0 3 to 3 0 uIU/m     (1) T4, FREE 29Sep2017 02:15PM [email or web address] Draft Order Number: [id number]     Test Name Result Flag Reference   T4,FREE 1 09 ng/dL  0 76-1 46   Specimen collection should occur prior to Sulfasalazine administration due to the potential for falsely elevated results       (1) T3 TOTAL 02JVH5652 02:15PM Morgandarin Valadezzackary Order Number: [id number]     Test Name Result Flag Reference   T3 0 80 ng/mL  0 60-1 80

## 2018-01-18 NOTE — MISCELLANEOUS
Chief Complaint  Chief Complaint Free Text Note Form: Patient d/c [facility] - sepsis      History of Present Illness  TCM Communication  Luke: The patient is being contacted for follow-up after hospitalization and [facility]  Hospital records were reviewed  She was hospitalized at [facility]  The dates of hospitalization: 6/17/2017, date of admission: 6/17/2017, date of discharge: 6/30/2017  Diagnosis: Sepsis  She was discharged to home  Medications reviewed and updated today  She scheduled a follow up appointment  The patient is currently asymptomatic  Counseling was provided to the patient  Topics counseled included importance of compliance with treatment  Communication performed and completed by FRANCISCO Davis      Active Problems   1  Abnormal CT scan, liver (793 3) (R93 2)  2  Acquired hypothyroidism (244 9) (E03 9)  3  Acute sinusitis, recurrence not specified, unspecified location (461 9) (J01 90)  4  Arthritis (716 90) (M19 90)  5  Asthma (493 90) (J45 909)  6  Chronic pain (338 29) (G89 29)  7  Constipation due to opioid therapy (564 09,E935 2) (K59 03,T40 2X5A)  8  Diabetes mellitus with neuropathy (250 60,357 2) (E11 40)  9  Edema (782 3) (R60 9)  10  Elevated LFTs (790 6) (R79 89)  11  Generalized colicky abdominal pain (789 7) (R10 83)  12  Hypertension (401 9) (I10)  13  Irritable bowel syndrome with diarrhea (564 1) (K58 0)  14  Liver lesion (573 8) (K76 9)  15  Morbid obesity due to excess calories (278 01) (E66 01)  16  Pancreatic cyst (577 2) (K86 2)  17  Pulmonary emphysema (492 8) (J43 9)  18  Pulmonary nodules (793 19) (R91 8)  19  Refused influenza vaccine (V64 06) (Z28 21)  20  Restless leg syndrome (333 94) (G25 81)  21  Sleep apnea (780 57) (G47 30)  22  Thyroid disease (246 9) (E07 9)  23  URTI (acute upper respiratory infection) (465 9) (J06 9)    Past Medical History   1   History of Acute on chronic systolic congestive heart failure (428 23,428 0) (I50 23)  2  History of Cellulitis of right lower extremity (682 6) (L03 115)  3  History of Depression, acute (296 20) (F32 9)  4  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)  5  History of Emphysema, 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518 2) (J98 3)  6  History of aortic valve disorder (V12 59) (Z86 79)  7  History of congestive heart failure (V12 59) (Z86 79)  8  History of Restless leg syndrome (333 94) (G25 81)    Surgical History   1  History of Cholecystectomy  2  History of Eye Surgery  3  History of Resection Of Pericardial Cyst Or Tumor  4  History of Thoracoscopy (Therapeutic) W/ Excision Of Pericardial Cyst  5  History of Thyroid Surgery    Family History  Mother   1  Family history of Asthma  2  Denied: Family history of Colon cancer  3  Family history of   4  Denied: Family history of alcoholism  5  Denied: Family history of mental disorder  Father   10  Denied: Family history of Colon cancer  7  Family history of   6  Denied: Family history of alcoholism  9  Denied: Family history of mental disorder  10  Family history of Liver cancer    Social History    · Active advance directive (V49 89) (Z78 9)   · Caffeine use (V49 89) (F15 90)   · Drinks coffee   · Feels safe at home   · Never a smoker   · No alcohol use   · No drug use   · Primary language is [place]   · Retired   · Seeing a dentist    Current Meds  1  Aspirin 81 MG TABS; TAKE 1 TABLET DAILY; Therapy: (Recorded:68Ika9299) to Recorded  2  Cefuroxime Axetil 250 MG Oral Tablet; Take one twice daily for 10 days; Therapy: 90DQG9493 to (Evaluate:2017)  Requested for: 76AHN0758; Last   Rx:85Fpo6155 Ordered  3  DuoNeb 0 5-2 5 (3) MG/3ML Inhalation Solution; USE 1 UNIT DOSE IN NEBULIZER   EVERY 4 HOURS AS NEEDED; Therapy: (Recorded:23Hxx1084) to Recorded  4  Folic Acid 1 MG Oral Tablet; take 1 tablet by mouth once daily; Therapy: 02QII5439 to (Evaluate:68Ujy2067)  Requested for: 48SBC1398; Last   Rx:2017 Ordered  5  Furosemide 40 MG Oral Tablet; two tabs in the AM    one tablet in the afternoon; Therapy: (Recorded:09Mar2017) to Recorded  6  MetOLazone 2 5 MG Oral Tablet; TAKE 1 TABLET DAILY ON MONDAY, WEDNESDAY, AND   FRIDAY; Therapy: 89KEW6936 to (Last Rx:07Apr2017)  Requested for: 07Apr2017 Ordered  7  Multi-Vitamins Oral Tablet; TAKE 1 TABLET DAILY; Therapy: 00JAK8181 to Recorded  8  Oxycodone-Acetaminophen 5-325 MG Oral Tablet; TAKE 1 TABLET EVERY 4 HOURS AS   NEEDED FOR PAIN;   Therapy: 08Jqw2033 to (Evaluate:42Fke7016); Last Rx:26Apr2017 Ordered  9  Potassium Chloride Bina ER 20 MEQ Oral Tablet Extended Release; TAKE 1 TABLET   DAILY  Requested for: 98XIQ3250; Last Rx:11Mar2017 Ordered  10  Pramipexole Dihydrochloride 1 MG Oral Tablet; TAKE 1 TABLET 3 TIMES DAILY; Therapy: 83GPU3112 to (Evaluate:09Sdk6248)  Requested for: 40Gla1051; Last    Rx:90Ipz9245 Ordered  11  Synthroid 125 MCG Oral Tablet; TAKE 1 TABLET EVERY MORNING; Therapy: (Recorded:67Vra0554) to Recorded    Allergies   1  Latex Gloves MISC  2  Toradol Oral TABS   3  No Known Environmental Allergies  4   No Known Food Allergies    Future Appointments    Date/Time Provider Specialty Site   [facility] [address] [place] [address]     Signatures   Electronically signed by : Alicja Forbes, ; Jul 5 2017 10:22AM EST                       (Co-author)    Electronically signed by : Suyapa Arciniega MD; Jul 8 2017  8:25PM EST                       (Author)    Electronically signed by : Suyapa Arciniega MD; Jul 8 2017  8:26PM EST                       (Author)

## 2018-01-19 ENCOUNTER — GENERIC CONVERSION - ENCOUNTER (OUTPATIENT)
Dept: OTHER | Facility: OTHER | Age: 78
End: 2018-01-19

## 2018-01-19 ENCOUNTER — APPOINTMENT (EMERGENCY)
Dept: RADIOLOGY | Facility: HOSPITAL | Age: 78
DRG: 871 | End: 2018-01-19
Payer: COMMERCIAL

## 2018-01-19 ENCOUNTER — HOSPITAL ENCOUNTER (INPATIENT)
Facility: HOSPITAL | Age: 78
LOS: 11 days | Discharge: HOME HEALTH | DRG: 871 | End: 2018-01-30
Attending: EMERGENCY MEDICINE | Admitting: STUDENT IN AN ORGANIZED HEALTH CARE EDUCATION/TRAINING PROGRAM
Payer: COMMERCIAL

## 2018-01-19 ENCOUNTER — APPOINTMENT (OUTPATIENT)
Dept: PULMONOLOGY | Facility: CLINIC | Age: 78
End: 2018-01-19
Payer: COMMERCIAL

## 2018-01-19 DIAGNOSIS — K21.9 GERD (GASTROESOPHAGEAL REFLUX DISEASE): ICD-10-CM

## 2018-01-19 DIAGNOSIS — E11.39 TYPE 2 DIABETES MELLITUS WITH OTHER OPHTHALMIC COMPLICATION, WITH LONG-TERM CURRENT USE OF INSULIN (HCC): Chronic | ICD-10-CM

## 2018-01-19 DIAGNOSIS — J44.1 COPD EXACERBATION (HCC): ICD-10-CM

## 2018-01-19 DIAGNOSIS — J96.21 ACUTE ON CHRONIC RESPIRATORY FAILURE WITH HYPOXIA AND HYPERCAPNIA (HCC): ICD-10-CM

## 2018-01-19 DIAGNOSIS — J11.1 INFLUENZA: Primary | ICD-10-CM

## 2018-01-19 DIAGNOSIS — Z79.4 TYPE 2 DIABETES MELLITUS WITH OTHER OPHTHALMIC COMPLICATION, WITH LONG-TERM CURRENT USE OF INSULIN (HCC): Chronic | ICD-10-CM

## 2018-01-19 DIAGNOSIS — I50.9 CHF (CONGESTIVE HEART FAILURE) (HCC): ICD-10-CM

## 2018-01-19 DIAGNOSIS — J96.22 ACUTE ON CHRONIC RESPIRATORY FAILURE WITH HYPOXIA AND HYPERCAPNIA (HCC): ICD-10-CM

## 2018-01-19 DIAGNOSIS — R91.8 LUNG NODULES: Chronic | ICD-10-CM

## 2018-01-19 PROBLEM — E78.5 HLD (HYPERLIPIDEMIA): Status: ACTIVE | Noted: 2018-01-19

## 2018-01-19 PROBLEM — J20.9 ACUTE BRONCHITIS: Status: ACTIVE | Noted: 2018-01-19

## 2018-01-19 PROBLEM — A41.9 SEPSIS (HCC): Status: ACTIVE | Noted: 2018-01-19

## 2018-01-19 PROBLEM — E03.9 HYPOTHYROIDISM: Status: ACTIVE | Noted: 2018-01-19

## 2018-01-19 PROBLEM — I10 HTN (HYPERTENSION): Status: ACTIVE | Noted: 2018-01-19

## 2018-01-19 LAB
ALBUMIN SERPL BCP-MCNC: 3.2 G/DL (ref 3.5–5)
ALP SERPL-CCNC: 72 U/L (ref 46–116)
ALT SERPL W P-5'-P-CCNC: 29 U/L (ref 12–78)
ANION GAP SERPL CALCULATED.3IONS-SCNC: 5 MMOL/L (ref 4–13)
APTT PPP: 24 SECONDS (ref 24–33)
AST SERPL W P-5'-P-CCNC: 28 U/L (ref 5–45)
BASOPHILS # BLD AUTO: 0 THOUSANDS/ΜL (ref 0–0.1)
BASOPHILS NFR BLD AUTO: 0 % (ref 0–1)
BILIRUB SERPL-MCNC: 0.5 MG/DL (ref 0.2–1)
BUN SERPL-MCNC: 15 MG/DL (ref 5–25)
CALCIUM SERPL-MCNC: 8.8 MG/DL (ref 8.3–10.1)
CHLORIDE SERPL-SCNC: 97 MMOL/L (ref 100–108)
CO2 SERPL-SCNC: 37 MMOL/L (ref 21–32)
CREAT SERPL-MCNC: 0.79 MG/DL (ref 0.6–1.3)
EOSINOPHIL # BLD AUTO: 0.1 THOUSAND/ΜL (ref 0–0.61)
EOSINOPHIL NFR BLD AUTO: 1 % (ref 0–6)
ERYTHROCYTE [DISTWIDTH] IN BLOOD BY AUTOMATED COUNT: 15.2 % (ref 11.6–15.1)
GFR SERPL CREATININE-BSD FRML MDRD: 71 ML/MIN/1.73SQ M
GLUCOSE SERPL-MCNC: 251 MG/DL (ref 65–140)
HCT VFR BLD AUTO: 38.8 % (ref 37–47)
HGB BLD-MCNC: 12.9 G/DL (ref 12–16)
INR PPP: 0.99 (ref 0.86–1.16)
LACTATE SERPL-SCNC: 1.3 MMOL/L (ref 0.5–2)
LYMPHOCYTES # BLD AUTO: 0.9 THOUSANDS/ΜL (ref 0.6–4.47)
LYMPHOCYTES NFR BLD AUTO: 14 % (ref 14–44)
MCH RBC QN AUTO: 31.2 PG (ref 27–31)
MCHC RBC AUTO-ENTMCNC: 33.2 G/DL (ref 31.4–37.4)
MCV RBC AUTO: 94 FL (ref 82–98)
MONOCYTES # BLD AUTO: 0.6 THOUSAND/ΜL (ref 0.17–1.22)
MONOCYTES NFR BLD AUTO: 9 % (ref 4–12)
NEUTROPHILS # BLD AUTO: 5.1 THOUSANDS/ΜL (ref 1.85–7.62)
NEUTS SEG NFR BLD AUTO: 76 % (ref 43–75)
NRBC BLD AUTO-RTO: 0 /100 WBCS
PLATELET # BLD AUTO: 156 THOUSANDS/UL (ref 130–400)
PMV BLD AUTO: 8.7 FL (ref 8.9–12.7)
POTASSIUM SERPL-SCNC: 3.6 MMOL/L (ref 3.5–5.3)
PROT SERPL-MCNC: 7 G/DL (ref 6.4–8.2)
PROTHROMBIN TIME: 10.4 SECONDS (ref 9.4–11.7)
RBC # BLD AUTO: 4.12 MILLION/UL (ref 4.2–5.4)
SODIUM SERPL-SCNC: 139 MMOL/L (ref 136–145)
WBC # BLD AUTO: 6.7 THOUSAND/UL (ref 4.8–10.8)

## 2018-01-19 PROCEDURE — 93005 ELECTROCARDIOGRAM TRACING: CPT

## 2018-01-19 PROCEDURE — 99284 EMERGENCY DEPT VISIT MOD MDM: CPT

## 2018-01-19 PROCEDURE — 96361 HYDRATE IV INFUSION ADD-ON: CPT

## 2018-01-19 PROCEDURE — 93005 ELECTROCARDIOGRAM TRACING: CPT | Performed by: EMERGENCY MEDICINE

## 2018-01-19 PROCEDURE — 87798 DETECT AGENT NOS DNA AMP: CPT | Performed by: EMERGENCY MEDICINE

## 2018-01-19 PROCEDURE — 87040 BLOOD CULTURE FOR BACTERIA: CPT | Performed by: EMERGENCY MEDICINE

## 2018-01-19 PROCEDURE — 96374 THER/PROPH/DIAG INJ IV PUSH: CPT

## 2018-01-19 PROCEDURE — 71045 X-RAY EXAM CHEST 1 VIEW: CPT

## 2018-01-19 PROCEDURE — 83605 ASSAY OF LACTIC ACID: CPT | Performed by: EMERGENCY MEDICINE

## 2018-01-19 PROCEDURE — 80053 COMPREHEN METABOLIC PANEL: CPT | Performed by: EMERGENCY MEDICINE

## 2018-01-19 PROCEDURE — 96375 TX/PRO/DX INJ NEW DRUG ADDON: CPT

## 2018-01-19 PROCEDURE — 85730 THROMBOPLASTIN TIME PARTIAL: CPT | Performed by: EMERGENCY MEDICINE

## 2018-01-19 PROCEDURE — 94640 AIRWAY INHALATION TREATMENT: CPT

## 2018-01-19 PROCEDURE — 85610 PROTHROMBIN TIME: CPT | Performed by: EMERGENCY MEDICINE

## 2018-01-19 PROCEDURE — 85025 COMPLETE CBC W/AUTO DIFF WBC: CPT | Performed by: EMERGENCY MEDICINE

## 2018-01-19 PROCEDURE — 36415 COLL VENOUS BLD VENIPUNCTURE: CPT | Performed by: EMERGENCY MEDICINE

## 2018-01-19 RX ORDER — LEVOTHYROXINE SODIUM 0.12 MG/1
125 TABLET ORAL
Status: DISCONTINUED | OUTPATIENT
Start: 2018-01-20 | End: 2018-01-30 | Stop reason: HOSPADM

## 2018-01-19 RX ORDER — PRAMIPEXOLE DIHYDROCHLORIDE 0.5 MG/1
1 TABLET ORAL 3 TIMES DAILY
Status: DISCONTINUED | OUTPATIENT
Start: 2018-01-19 | End: 2018-01-30 | Stop reason: HOSPADM

## 2018-01-19 RX ORDER — IPRATROPIUM BROMIDE AND ALBUTEROL SULFATE 2.5; .5 MG/3ML; MG/3ML
3 SOLUTION RESPIRATORY (INHALATION)
Status: DISCONTINUED | OUTPATIENT
Start: 2018-01-19 | End: 2018-01-20

## 2018-01-19 RX ORDER — MORPHINE SULFATE 4 MG/ML
4 INJECTION, SOLUTION INTRAMUSCULAR; INTRAVENOUS ONCE
Status: COMPLETED | OUTPATIENT
Start: 2018-01-19 | End: 2018-01-19

## 2018-01-19 RX ORDER — HEPARIN SODIUM 5000 [USP'U]/ML
5000 INJECTION, SOLUTION INTRAVENOUS; SUBCUTANEOUS EVERY 8 HOURS SCHEDULED
Status: DISCONTINUED | OUTPATIENT
Start: 2018-01-20 | End: 2018-01-30 | Stop reason: HOSPADM

## 2018-01-19 RX ORDER — ALBUTEROL SULFATE 2.5 MG/3ML
2.5 SOLUTION RESPIRATORY (INHALATION) EVERY 6 HOURS PRN
Status: DISCONTINUED | OUTPATIENT
Start: 2018-01-19 | End: 2018-01-20

## 2018-01-19 RX ORDER — AZITHROMYCIN 250 MG/1
500 TABLET, FILM COATED ORAL DAILY
Status: DISCONTINUED | OUTPATIENT
Start: 2018-01-19 | End: 2018-01-24

## 2018-01-19 RX ORDER — INSULIN ASPART 100 [IU]/ML
40 INJECTION, SUSPENSION SUBCUTANEOUS
Status: DISCONTINUED | OUTPATIENT
Start: 2018-01-20 | End: 2018-01-20

## 2018-01-19 RX ORDER — LISINOPRIL 10 MG/1
10 TABLET ORAL DAILY
Status: DISCONTINUED | OUTPATIENT
Start: 2018-01-20 | End: 2018-01-20

## 2018-01-19 RX ORDER — ONDANSETRON 2 MG/ML
4 INJECTION INTRAMUSCULAR; INTRAVENOUS ONCE
Status: COMPLETED | OUTPATIENT
Start: 2018-01-19 | End: 2018-01-19

## 2018-01-19 RX ORDER — IPRATROPIUM BROMIDE AND ALBUTEROL SULFATE 2.5; .5 MG/3ML; MG/3ML
SOLUTION RESPIRATORY (INHALATION)
Status: COMPLETED
Start: 2018-01-19 | End: 2018-01-19

## 2018-01-19 RX ORDER — OSELTAMIVIR PHOSPHATE 75 MG/1
75 CAPSULE ORAL ONCE
Status: COMPLETED | OUTPATIENT
Start: 2018-01-19 | End: 2018-01-19

## 2018-01-19 RX ORDER — ATORVASTATIN CALCIUM 20 MG/1
20 TABLET, FILM COATED ORAL
Status: DISCONTINUED | OUTPATIENT
Start: 2018-01-20 | End: 2018-01-30 | Stop reason: HOSPADM

## 2018-01-19 RX ORDER — ASPIRIN 325 MG
325 TABLET ORAL DAILY
Status: DISCONTINUED | OUTPATIENT
Start: 2018-01-20 | End: 2018-01-30 | Stop reason: HOSPADM

## 2018-01-19 RX ORDER — ACETAMINOPHEN 325 MG/1
650 TABLET ORAL ONCE
Status: COMPLETED | OUTPATIENT
Start: 2018-01-19 | End: 2018-01-19

## 2018-01-19 RX ORDER — FUROSEMIDE 40 MG/1
40 TABLET ORAL
COMMUNITY
End: 2018-02-26 | Stop reason: HOSPADM

## 2018-01-19 RX ORDER — ONDANSETRON 2 MG/ML
INJECTION INTRAMUSCULAR; INTRAVENOUS
Status: COMPLETED
Start: 2018-01-19 | End: 2018-01-19

## 2018-01-19 RX ORDER — FUROSEMIDE 40 MG/1
80 TABLET ORAL DAILY
Status: ON HOLD | COMMUNITY
End: 2018-01-30

## 2018-01-19 RX ORDER — METOLAZONE 2.5 MG/1
2.5 TABLET ORAL 3 TIMES WEEKLY
Status: ON HOLD | COMMUNITY
End: 2018-01-30

## 2018-01-19 RX ORDER — FOLIC ACID 1 MG/1
TABLET ORAL DAILY
COMMUNITY
End: 2019-09-06 | Stop reason: ALTCHOICE

## 2018-01-19 RX ADMIN — OSELTAMIVIR PHOSPHATE 75 MG: 75 CAPSULE ORAL at 22:31

## 2018-01-19 RX ADMIN — IPRATROPIUM BROMIDE AND ALBUTEROL SULFATE 3 ML: .5; 3 SOLUTION RESPIRATORY (INHALATION) at 19:30

## 2018-01-19 RX ADMIN — ONDANSETRON 4 MG: 2 INJECTION INTRAMUSCULAR; INTRAVENOUS at 19:09

## 2018-01-19 RX ADMIN — MORPHINE SULFATE 4 MG: 4 INJECTION, SOLUTION INTRAMUSCULAR; INTRAVENOUS at 20:26

## 2018-01-19 RX ADMIN — SODIUM CHLORIDE 1000 ML: 0.9 INJECTION, SOLUTION INTRAVENOUS at 19:19

## 2018-01-19 RX ADMIN — ACETAMINOPHEN 650 MG: 325 TABLET, FILM COATED ORAL at 18:51

## 2018-01-19 NOTE — ED PROVIDER NOTES
History  Chief Complaint   Patient presents with    Flu Symptoms     woke up with chills,sore throat,general body aches, sent here by family doctor,has serous drainage from rt shin area also     Patient was well until this morning when she woke up with sudden onset of multiple flu-like symptoms  Patient noted fever chills body aches congestion sore throat and a cough  She is unsure of any sick contacts in the last day or 2  Patient states she has also started to feel nauseous on arrival and feels quite weak  She states she did get a flu shot this year and has had a pneumonia shot as well  Has never smoked  Patient called her doctor and was referred here            Prior to Admission Medications   Prescriptions Last Dose Informant Patient Reported? Taking?    ALPRAZolam (XANAX) 0 25 mg tablet   No No   Sig: Take 1 tablet by mouth 3 (three) times a day as needed for anxiety for up to 10 days   COD LIVER OIL PO   Yes No   Sig: Take by mouth daily   Multiple Vitamins-Minerals (MULTIVITAMIN ADULT PO)   Yes No   Sig: Take 1 tablet by mouth daily   acetaminophen (TYLENOL) 325 mg tablet   No No   Sig: Take 2 tablets by mouth every 6 (six) hours as needed for mild pain or headaches   ascorbic acid (VITAMIN C) 500 mg tablet   Yes No   Sig: Take 500 mg by mouth daily   aspirin 325 mg tablet   No No   Sig: Take 1 tablet by mouth daily   atorvastatin (LIPITOR) 20 mg tablet   No No   Sig: Take 1 tablet by mouth daily with dinner   insulin aspart protamine-insulin aspart (NovoLOG 70/30) 100 units/mL injection   Yes No   Sig: Inject 40 Units under the skin 2 (two) times a day before meals   ipratropium-albuterol (DUONEB) 0 5-2 5 mg/mL   Yes No   Sig: Inhale 1 Container every 4 (four) hours as needed   levothyroxine (SYNTHROID) 125 mcg tablet   Yes No   Sig: Take 1 tablet by mouth daily   lisinopril (ZESTRIL) 10 mg tablet   Yes No   Sig: Take 10 mg by mouth daily   oxyCODONE-acetaminophen (PERCOCET) 5-325 mg per tablet  Self Yes No   Sig: Take 1 tablet by mouth every 4 (four) hours as needed for moderate pain   polyethylene glycol (MIRALAX) 17 g packet   No No   Sig: Take 17 g by mouth daily   Patient taking differently: Take 17 g by mouth 2 (two) times a day     potassium chloride (K-DUR,KLOR-CON) 10 mEq tablet   No No   Sig: Take 2 tablets by mouth daily   pramipexole (MIRAPEX) 1 mg tablet   Yes No   Sig: Take 1 tablet by mouth 3 (three) times a day      Facility-Administered Medications: None       Past Medical History:   Diagnosis Date    Arthritis     Asthma     Diabetes mellitus ([facility] )     Disease of thyroid gland     Hypertension     Mitral valve regurgitation     Obesity hypoventilation syndrome (HCC)     Pain     right hip    Restless leg syndrome     Sleep related hypoxia     [facility]        Past Surgical History:   Procedure Laterality Date    CHOLECYSTECTOMY      EYE SURGERY      RI ERCP DX COLLECTION SPECIMEN BRUSHING/WASHING N/A 5/30/2017    Procedure: ENDOSCOPIC RETROGRADE CHOLANGIOPANCREATOGRAPHY (ERCP); SPHINCTEROTOMY;  Surgeon: Yuri Deluna MD;  Location: [facility]; Service: Gastroenterology    REPLACEMENT TOTAL KNEE BILATERAL      SKIN BIOPSY      melanoma of back    STEROID INJECTION HIP Right 12/21/2017    Procedure: TROCHANTERIC BURSA INJECTION RIGHT;  Surgeon: Tom Escobar MD;  Location: [place] [address] MAIN OR;  Service: Pain Management     THORACOTOMY Right     at least 40 years, for pericardial cyst    THYROIDECTOMY, PARTIAL      For thyroid cancer       Family History   Problem Relation Age of Onset    Cancer Father     Diabetes Sister      I have reviewed and agree with the history as documented  Social History   Substance Use Topics    Smoking status: Never Smoker    Smokeless tobacco: Never Used    Alcohol use No        Review of Systems   Constitutional: Positive for chills, fatigue and fever  HENT: Positive for congestion and sore throat      Respiratory: Positive for cough and shortness of breath  Cardiovascular: Positive for leg swelling  Negative for chest pain and palpitations  Gastrointestinal: Negative for abdominal pain  Genitourinary: Negative for dysuria  Musculoskeletal: Positive for arthralgias and myalgias  Skin: Positive for rash  Neurological: Positive for weakness and numbness  Negative for syncope  All other systems reviewed and are negative  Physical Exam  ED Triage Vitals [01/19/18 1818]   Temperature Pulse Respirations Blood Pressure SpO2   (!) 102 °F (38 9 °C) 105 18 162/78 91 %      Temp Source Heart Rate Source Patient Position - Orthostatic VS BP Location FiO2 (%)   Tympanic Monitor Sitting Left arm --      Pain Score       Worst Possible Pain           Orthostatic Vital Signs  Vitals:    01/19/18 1818   BP: 162/78   Pulse: 105   Patient Position - Orthostatic VS: Sitting       Physical Exam   Constitutional: She is oriented to person, place, and time  She appears well-developed and well-nourished  HENT:   Head: Normocephalic and atraumatic  Mouth/Throat: Oropharynx is clear and moist    Eyes: Conjunctivae and EOM are normal    Neck: Normal range of motion  Neck supple  Cardiovascular: Normal rate, regular rhythm and normal heart sounds  Pulmonary/Chest: Effort normal and breath sounds normal    Abdominal: Soft  Bowel sounds are normal  There is no tenderness  Musculoskeletal: Normal range of motion  She exhibits tenderness  Neurological: She is alert and oriented to person, place, and time  Skin: Skin is warm and dry  Patient has stasis dermatitis of both lower extremities and a small area of excoriation on the pretibial aspect of the right leg  No cellulitis   Psychiatric: She has a normal mood and affect  Her behavior is normal    Nursing note and vitals reviewed        ED Medications  Medications   ipratropium-albuterol (DUO-NEB) 0 5-2 5 mg/mL inhalation solution 3 mL (3 mL Nebulization Given 1/19/18 1930)   oseltamivir (TAMIFLU) capsule 75 mg (not administered)   acetaminophen (TYLENOL) tablet 650 mg (650 mg Oral Given 1/19/18 1851)   sodium chloride 0 9 % bolus 1,000 mL (1,000 mL Intravenous New Bag 1/19/18 1919)   ondansetron (ZOFRAN) injection 4 mg (4 mg Intravenous Given 1/19/18 1909)   morphine (PF) 4 mg/mL injection 4 mg (4 mg Intravenous Given 1/19/18 2026)       Diagnostic Studies  Results Reviewed     Procedure Component Value Units Date/Time    Influenza A/B and RSV by PCR (indicated for patients >2 mo of age) [[id number]] Collected:  01/19/18 2025    Lab Status: In process Specimen:  Nasopharyngeal from Nasopharyngeal Swab Updated:  01/19/18 2038    Blood culture #2 [[id number]] Collected:  01/19/18 1945    Lab Status: In process Specimen:  Blood from Arm, Right Updated:  01/19/18 2032    Comprehensive metabolic panel [[id number]]  (Abnormal) Collected:  01/19/18 1919    Lab Status:  Final result Specimen:  Blood from Arm, Right Updated:  01/19/18 2006     Sodium 139 mmol/L      Potassium 3 6 mmol/L      Chloride 97 (L) mmol/L      CO2 37 (H) mmol/L      Anion Gap 5 mmol/L      BUN 15 mg/dL      Creatinine 0 79 mg/dL      Glucose 251 (H) mg/dL      Calcium 8 8 mg/dL      AST 28 U/L      ALT 29 U/L      Alkaline Phosphatase 72 U/L      Total Protein 7 0 g/dL      Albumin 3 2 (L) g/dL      Total Bilirubin 0 50 mg/dL      eGFR 71 ml/min/1 73sq m     Narrative:         National Kidney Disease Education Program recommendations are as follows:  GFR calculation is accurate only with a steady state creatinine  Chronic Kidney disease less than 60 ml/min/1 73 sq  meters  Kidney failure less than 15 ml/min/1 73 sq  meters  Lactic acid, plasma [[id number]]  (Normal) Collected:  01/19/18 1919    Lab Status:  Final result Specimen:  Blood from Arm, Right Updated:  01/19/18 1957     LACTIC ACID 1 3 mmol/L     Narrative:         Result may be elevated if tourniquet was used during collection      Protime-INR [[id number]]  (Normal) Collected: 01/19/18 1919    Lab Status:  Final result Specimen:  Blood from Arm, Right Updated:  01/19/18 1950     Protime 10 4 seconds      INR 0 99    APTT [[id number]]  (Normal) Collected:  01/19/18 1919    Lab Status:  Final result Specimen:  Blood from Arm, Right Updated:  01/19/18 1950     PTT 24 seconds     Narrative: Therapeutic Heparin Range = 60-90 seconds    CBC and differential [[id number]]  (Abnormal) Collected:  01/19/18 1919    Lab Status:  Final result Specimen:  Blood from Arm, Right Updated:  01/19/18 1936     WBC 6 70 Thousand/uL      RBC 4 12 (L) Million/uL      Hemoglobin 12 9 g/dL      Hematocrit 38 8 %      MCV 94 fL      MCH 31 2 (H) pg      MCHC 33 2 g/dL      RDW 15 2 (H) %      MPV 8 7 (L) fL      Platelets 730 Thousands/uL      nRBC 0 /100 WBCs      Neutrophils Relative 76 (H) %      Lymphocytes Relative 14 %      Monocytes Relative 9 %      Eosinophils Relative 1 %      Basophils Relative 0 %      Neutrophils Absolute 5 10 Thousands/µL      Lymphocytes Absolute 0 90 Thousands/µL      Monocytes Absolute 0 60 Thousand/µL      Eosinophils Absolute 0 10 Thousand/µL      Basophils Absolute 0 00 Thousands/µL     Blood culture #1 [[id number]] Collected:  01/19/18 1919    Lab Status: In process Specimen:  Blood from Arm, Right Updated:  01/19/18 1931                 XR chest 1 view portable   Final Result by Orlando Milan DO (01/19 2113)      No active pulmonary disease           Workstation performed: [id number]                    Procedures  ECG 12 Lead Documentation  Date/Time: 1/19/2018 7:05 PM  Performed by: Amie Israel  Authorized by: Amie Israel     Indications / Diagnosis:  SOB  ECG reviewed by me, the ED Provider: yes    Patient location:  ED  Interpretation:     Interpretation: abnormal    Rate:     ECG rate:  109    ECG rate assessment: tachycardic    Rhythm:     Rhythm: sinus tachycardia    Ectopy:     Ectopy: none    QRS:     QRS axis:  Left    QRS intervals: Normal  Conduction:     Conduction: abnormal      Abnormal conduction: incomplete RBBB    ST segments:     ST segments:  Non-specific  T waves:     T waves: non-specific             Phone Contacts  ED Phone Contact    ED Course  ED Course                                MDM  Number of Diagnoses or Management Options  Diagnosis management comments: Patient has rapid onset of multiple flu-like symptoms consistent with a flu illness  Will check for sepsis    CritCare Time    Disposition  Final diagnoses:   Influenza     Time reflects when diagnosis was documented in both MDM as applicable and the Disposition within this note     Time User Action Codes Description Comment    1/19/2018  9:49 PM Abena FLORES Add [J11 1] Influenza       ED Disposition     ED Disposition Condition Comment    Admit  Case was discussed with Dr Dell Bearden and the patient's admission status was agreed to be Admission Status: inpatient status to the service of Dr Dell Bearden   Follow-up Information    None       Patient's Medications   Discharge Prescriptions    No medications on file     No discharge procedures on file      ED Provider  Electronically Signed by           Naina Holt MD  01/19/18 6948

## 2018-01-20 LAB
ANION GAP SERPL CALCULATED.3IONS-SCNC: 6 MMOL/L (ref 4–13)
ANION GAP SERPL CALCULATED.3IONS-SCNC: 8 MMOL/L (ref 4–13)
ARTERIAL PATENCY WRIST A: YES
ATRIAL RATE: 109 BPM
BASE EXCESS BLDA CALC-SCNC: 8.4 MMOL/L
BODY TEMPERATURE: 101.7 DEGREES FEHRENHEIT
BUN SERPL-MCNC: 14 MG/DL (ref 5–25)
BUN SERPL-MCNC: 28 MG/DL (ref 5–25)
CALCIUM SERPL-MCNC: 8 MG/DL (ref 8.3–10.1)
CALCIUM SERPL-MCNC: 8 MG/DL (ref 8.3–10.1)
CHLORIDE SERPL-SCNC: 96 MMOL/L (ref 100–108)
CHLORIDE SERPL-SCNC: 97 MMOL/L (ref 100–108)
CO2 SERPL-SCNC: 35 MMOL/L (ref 21–32)
CO2 SERPL-SCNC: 36 MMOL/L (ref 21–32)
CREAT SERPL-MCNC: 0.98 MG/DL (ref 0.6–1.3)
CREAT SERPL-MCNC: 1.22 MG/DL (ref 0.6–1.3)
ERYTHROCYTE [DISTWIDTH] IN BLOOD BY AUTOMATED COUNT: 15.3 % (ref 11.6–15.1)
FLUAV AG SPEC QL: DETECTED
FLUBV AG SPEC QL: ABNORMAL
GFR SERPL CREATININE-BSD FRML MDRD: 42 ML/MIN/1.73SQ M
GFR SERPL CREATININE-BSD FRML MDRD: 55 ML/MIN/1.73SQ M
GLUCOSE SERPL-MCNC: 301 MG/DL (ref 65–140)
GLUCOSE SERPL-MCNC: 304 MG/DL (ref 65–140)
GLUCOSE SERPL-MCNC: 306 MG/DL (ref 65–140)
GLUCOSE SERPL-MCNC: 401 MG/DL (ref 65–140)
GLUCOSE SERPL-MCNC: 430 MG/DL (ref 65–140)
GLUCOSE SERPL-MCNC: 443 MG/DL (ref 65–140)
GLUCOSE SERPL-MCNC: 479 MG/DL (ref 65–140)
GLUCOSE SERPL-MCNC: 487 MG/DL (ref 65–140)
HCO3 BLDA-SCNC: 35.3 MMOL/L (ref 22–28)
HCT VFR BLD AUTO: 35.3 % (ref 37–47)
HGB BLD-MCNC: 11.6 G/DL (ref 12–16)
L PNEUMO1 AG UR QL IA.RAPID: NEGATIVE
MCH RBC QN AUTO: 31.2 PG (ref 27–31)
MCHC RBC AUTO-ENTMCNC: 33 G/DL (ref 31.4–37.4)
MCV RBC AUTO: 95 FL (ref 82–98)
NASAL CANNULA: ABNORMAL
O2 CT BLDA-SCNC: 17.1 ML/DL (ref 16–23)
OXYHGB MFR BLDA: 94.1 % (ref 94–97)
P AXIS: 57 DEGREES
PCO2 BLDA: 59.7 MM HG (ref 36–44)
PCO2 TEMP ADJ BLDA: 64.3 MM HG (ref 36–44)
PH BLD: 7.37 [PH] (ref 7.35–7.45)
PH BLDA: 7.39 [PH] (ref 7.35–7.45)
PLATELET # BLD AUTO: 148 THOUSANDS/UL (ref 130–400)
PMV BLD AUTO: 8.7 FL (ref 8.9–12.7)
PO2 BLD: 89.2 MM HG (ref 75–129)
PO2 BLDA: 79.8 MM HG (ref 75–129)
POTASSIUM SERPL-SCNC: 2.9 MMOL/L (ref 3.5–5.3)
POTASSIUM SERPL-SCNC: 4.6 MMOL/L (ref 3.5–5.3)
PR INTERVAL: 186 MS
QRS AXIS: -37 DEGREES
QRSD INTERVAL: 104 MS
QT INTERVAL: 344 MS
QTC INTERVAL: 463 MS
RBC # BLD AUTO: 3.72 MILLION/UL (ref 4.2–5.4)
RSV B RNA SPEC QL NAA+PROBE: ABNORMAL
S PNEUM AG UR QL: NEGATIVE
SODIUM SERPL-SCNC: 138 MMOL/L (ref 136–145)
SODIUM SERPL-SCNC: 140 MMOL/L (ref 136–145)
SPECIMEN SOURCE: ABNORMAL
T WAVE AXIS: 36 DEGREES
VENTRICULAR RATE: 109 BPM
WBC # BLD AUTO: 6.7 THOUSAND/UL (ref 4.8–10.8)

## 2018-01-20 PROCEDURE — 85027 COMPLETE CBC AUTOMATED: CPT | Performed by: STUDENT IN AN ORGANIZED HEALTH CARE EDUCATION/TRAINING PROGRAM

## 2018-01-20 PROCEDURE — 36600 WITHDRAWAL OF ARTERIAL BLOOD: CPT

## 2018-01-20 PROCEDURE — 87449 NOS EACH ORGANISM AG IA: CPT | Performed by: STUDENT IN AN ORGANIZED HEALTH CARE EDUCATION/TRAINING PROGRAM

## 2018-01-20 PROCEDURE — 82948 REAGENT STRIP/BLOOD GLUCOSE: CPT

## 2018-01-20 PROCEDURE — 87081 CULTURE SCREEN ONLY: CPT | Performed by: STUDENT IN AN ORGANIZED HEALTH CARE EDUCATION/TRAINING PROGRAM

## 2018-01-20 PROCEDURE — 94760 N-INVAS EAR/PLS OXIMETRY 1: CPT

## 2018-01-20 PROCEDURE — G8979 MOBILITY GOAL STATUS: HCPCS

## 2018-01-20 PROCEDURE — 94644 CONT INHLJ TX 1ST HOUR: CPT

## 2018-01-20 PROCEDURE — G8978 MOBILITY CURRENT STATUS: HCPCS

## 2018-01-20 PROCEDURE — 80048 BASIC METABOLIC PNL TOTAL CA: CPT | Performed by: STUDENT IN AN ORGANIZED HEALTH CARE EDUCATION/TRAINING PROGRAM

## 2018-01-20 PROCEDURE — 97163 PT EVAL HIGH COMPLEX 45 MIN: CPT

## 2018-01-20 PROCEDURE — 80048 BASIC METABOLIC PNL TOTAL CA: CPT | Performed by: INTERNAL MEDICINE

## 2018-01-20 PROCEDURE — 82805 BLOOD GASES W/O2 SATURATION: CPT | Performed by: STUDENT IN AN ORGANIZED HEALTH CARE EDUCATION/TRAINING PROGRAM

## 2018-01-20 PROCEDURE — 97110 THERAPEUTIC EXERCISES: CPT

## 2018-01-20 PROCEDURE — 94640 AIRWAY INHALATION TREATMENT: CPT

## 2018-01-20 RX ORDER — ACETAMINOPHEN 325 MG/1
650 TABLET ORAL EVERY 6 HOURS PRN
Status: DISCONTINUED | OUTPATIENT
Start: 2018-01-20 | End: 2018-01-20

## 2018-01-20 RX ORDER — ACETAMINOPHEN 325 MG/1
650 TABLET ORAL EVERY 6 HOURS SCHEDULED
Status: DISCONTINUED | OUTPATIENT
Start: 2018-01-20 | End: 2018-01-30 | Stop reason: HOSPADM

## 2018-01-20 RX ORDER — INSULIN GLARGINE 100 [IU]/ML
50 INJECTION, SOLUTION SUBCUTANEOUS
Status: DISCONTINUED | OUTPATIENT
Start: 2018-01-20 | End: 2018-01-22

## 2018-01-20 RX ORDER — OXYCODONE HYDROCHLORIDE 5 MG/1
5 TABLET ORAL EVERY 6 HOURS PRN
Status: DISCONTINUED | OUTPATIENT
Start: 2018-01-20 | End: 2018-01-30 | Stop reason: HOSPADM

## 2018-01-20 RX ORDER — METHYLPREDNISOLONE SODIUM SUCCINATE 40 MG/ML
20 INJECTION, POWDER, LYOPHILIZED, FOR SOLUTION INTRAMUSCULAR; INTRAVENOUS EVERY 6 HOURS SCHEDULED
Status: DISCONTINUED | OUTPATIENT
Start: 2018-01-21 | End: 2018-01-21

## 2018-01-20 RX ORDER — METHYLPREDNISOLONE SODIUM SUCCINATE 125 MG/2ML
60 INJECTION, POWDER, LYOPHILIZED, FOR SOLUTION INTRAMUSCULAR; INTRAVENOUS EVERY 8 HOURS SCHEDULED
Status: DISCONTINUED | OUTPATIENT
Start: 2018-01-20 | End: 2018-01-20

## 2018-01-20 RX ORDER — METHYLPREDNISOLONE SODIUM SUCCINATE 40 MG/ML
40 INJECTION, POWDER, LYOPHILIZED, FOR SOLUTION INTRAMUSCULAR; INTRAVENOUS EVERY 8 HOURS SCHEDULED
Status: DISCONTINUED | OUTPATIENT
Start: 2018-01-20 | End: 2018-01-20

## 2018-01-20 RX ORDER — SODIUM CHLORIDE FOR INHALATION 0.9 %
3 VIAL, NEBULIZER (ML) INHALATION EVERY 6 HOURS PRN
Status: DISCONTINUED | OUTPATIENT
Start: 2018-01-20 | End: 2018-01-20

## 2018-01-20 RX ORDER — OXYMETAZOLINE HYDROCHLORIDE 0.05 G/100ML
2 SPRAY NASAL EVERY 12 HOURS SCHEDULED
Status: COMPLETED | OUTPATIENT
Start: 2018-01-20 | End: 2018-01-22

## 2018-01-20 RX ORDER — ACETAMINOPHEN 325 MG/1
325 TABLET ORAL EVERY 6 HOURS PRN
Status: DISCONTINUED | OUTPATIENT
Start: 2018-01-20 | End: 2018-01-30 | Stop reason: HOSPADM

## 2018-01-20 RX ORDER — LEVALBUTEROL 1.25 MG/.5ML
1.25 SOLUTION, CONCENTRATE RESPIRATORY (INHALATION) CONTINUOUS
Status: DISCONTINUED | OUTPATIENT
Start: 2018-01-20 | End: 2018-01-20

## 2018-01-20 RX ORDER — SODIUM CHLORIDE FOR INHALATION 0.9 %
3 VIAL, NEBULIZER (ML) INHALATION CONTINUOUS
Status: DISCONTINUED | OUTPATIENT
Start: 2018-01-20 | End: 2018-01-20

## 2018-01-20 RX ORDER — LISINOPRIL 10 MG/1
10 TABLET ORAL DAILY
Status: DISCONTINUED | OUTPATIENT
Start: 2018-01-21 | End: 2018-01-30 | Stop reason: HOSPADM

## 2018-01-20 RX ORDER — SODIUM CHLORIDE FOR INHALATION 0.9 %
3 VIAL, NEBULIZER (ML) INHALATION EVERY 4 HOURS PRN
Status: DISCONTINUED | OUTPATIENT
Start: 2018-01-20 | End: 2018-01-30 | Stop reason: HOSPADM

## 2018-01-20 RX ORDER — POTASSIUM CHLORIDE 14.9 MG/ML
20 INJECTION INTRAVENOUS ONCE
Status: COMPLETED | OUTPATIENT
Start: 2018-01-20 | End: 2018-01-21

## 2018-01-20 RX ORDER — LEVALBUTEROL 1.25 MG/.5ML
1.25 SOLUTION, CONCENTRATE RESPIRATORY (INHALATION) EVERY 4 HOURS PRN
Status: DISCONTINUED | OUTPATIENT
Start: 2018-01-20 | End: 2018-01-30 | Stop reason: HOSPADM

## 2018-01-20 RX ORDER — FUROSEMIDE 80 MG
80 TABLET ORAL DAILY
Status: DISCONTINUED | OUTPATIENT
Start: 2018-01-20 | End: 2018-01-20

## 2018-01-20 RX ORDER — LEVALBUTEROL 1.25 MG/.5ML
1.25 SOLUTION, CONCENTRATE RESPIRATORY (INHALATION)
Status: DISCONTINUED | OUTPATIENT
Start: 2018-01-20 | End: 2018-01-30 | Stop reason: HOSPADM

## 2018-01-20 RX ORDER — LEVALBUTEROL INHALATION SOLUTION 0.63 MG/3ML
0.63 SOLUTION RESPIRATORY (INHALATION) EVERY 8 HOURS PRN
Status: DISCONTINUED | OUTPATIENT
Start: 2018-01-20 | End: 2018-01-20

## 2018-01-20 RX ORDER — POTASSIUM CHLORIDE 14.9 MG/ML
20 INJECTION INTRAVENOUS ONCE
Status: COMPLETED | OUTPATIENT
Start: 2018-01-20 | End: 2018-01-20

## 2018-01-20 RX ORDER — POTASSIUM CHLORIDE 29.8 MG/ML
40 INJECTION INTRAVENOUS ONCE
Status: DISCONTINUED | OUTPATIENT
Start: 2018-01-20 | End: 2018-01-20

## 2018-01-20 RX ORDER — SODIUM CHLORIDE FOR INHALATION 0.9 %
3 VIAL, NEBULIZER (ML) INHALATION
Status: DISCONTINUED | OUTPATIENT
Start: 2018-01-20 | End: 2018-01-30 | Stop reason: HOSPADM

## 2018-01-20 RX ORDER — ONDANSETRON 2 MG/ML
4 INJECTION INTRAMUSCULAR; INTRAVENOUS EVERY 6 HOURS PRN
Status: DISCONTINUED | OUTPATIENT
Start: 2018-01-20 | End: 2018-01-30 | Stop reason: HOSPADM

## 2018-01-20 RX ORDER — GUAIFENESIN/DEXTROMETHORPHAN 100-10MG/5
10 SYRUP ORAL EVERY 4 HOURS PRN
Status: DISCONTINUED | OUTPATIENT
Start: 2018-01-20 | End: 2018-01-30 | Stop reason: HOSPADM

## 2018-01-20 RX ORDER — ACETAMINOPHEN 650 MG/1
325 SUPPOSITORY RECTAL ONCE AS NEEDED
Status: COMPLETED | OUTPATIENT
Start: 2018-01-20 | End: 2018-01-20

## 2018-01-20 RX ORDER — OSELTAMIVIR PHOSPHATE 30 MG/1
30 CAPSULE ORAL EVERY 12 HOURS SCHEDULED
Status: DISCONTINUED | OUTPATIENT
Start: 2018-01-20 | End: 2018-01-22

## 2018-01-20 RX ADMIN — LISINOPRIL 10 MG: 10 TABLET ORAL at 09:01

## 2018-01-20 RX ADMIN — OSELTAMIVIR PHOSPHATE 30 MG: 30 CAPSULE ORAL at 10:44

## 2018-01-20 RX ADMIN — INSULIN LISPRO 6 UNITS: 100 INJECTION, SOLUTION INTRAVENOUS; SUBCUTANEOUS at 12:15

## 2018-01-20 RX ADMIN — OXYCODONE HYDROCHLORIDE 5 MG: 5 TABLET ORAL at 14:11

## 2018-01-20 RX ADMIN — METHYLPREDNISOLONE SODIUM SUCCINATE 60 MG: 125 INJECTION, POWDER, FOR SOLUTION INTRAMUSCULAR; INTRAVENOUS at 05:40

## 2018-01-20 RX ADMIN — ACETAMINOPHEN 650 MG: 650 SUPPOSITORY RECTAL at 05:00

## 2018-01-20 RX ADMIN — HEPARIN SODIUM 5000 UNITS: 5000 INJECTION, SOLUTION INTRAVENOUS; SUBCUTANEOUS at 05:44

## 2018-01-20 RX ADMIN — IPRATROPIUM BROMIDE AND ALBUTEROL SULFATE 3 ML: .5; 3 SOLUTION RESPIRATORY (INHALATION) at 03:49

## 2018-01-20 RX ADMIN — OXYCODONE HYDROCHLORIDE 5 MG: 5 TABLET ORAL at 23:13

## 2018-01-20 RX ADMIN — POTASSIUM CHLORIDE 20 MEQ: 200 INJECTION, SOLUTION INTRAVENOUS at 15:22

## 2018-01-20 RX ADMIN — METHYLPREDNISOLONE SODIUM SUCCINATE 20 MG: 40 INJECTION, POWDER, FOR SOLUTION INTRAMUSCULAR; INTRAVENOUS at 23:31

## 2018-01-20 RX ADMIN — METHYLPREDNISOLONE SODIUM SUCCINATE 40 MG: 40 INJECTION, POWDER, FOR SOLUTION INTRAMUSCULAR; INTRAVENOUS at 15:21

## 2018-01-20 RX ADMIN — INSULIN LISPRO 10 UNITS: 100 INJECTION, SOLUTION INTRAVENOUS; SUBCUTANEOUS at 16:34

## 2018-01-20 RX ADMIN — OXYMETAZOLINE HYDROCHLORIDE 2 SPRAY: 5 SPRAY NASAL at 13:30

## 2018-01-20 RX ADMIN — ISODIUM CHLORIDE 6 ML: 0.03 SOLUTION RESPIRATORY (INHALATION) at 05:00

## 2018-01-20 RX ADMIN — IPRATROPIUM BROMIDE AND ALBUTEROL SULFATE 3 ML: .5; 3 SOLUTION RESPIRATORY (INHALATION) at 07:46

## 2018-01-20 RX ADMIN — OXYMETAZOLINE HYDROCHLORIDE 2 SPRAY: 5 SPRAY NASAL at 21:39

## 2018-01-20 RX ADMIN — CEFTRIAXONE 1000 MG: 1 INJECTION, SOLUTION INTRAVENOUS at 02:14

## 2018-01-20 RX ADMIN — POTASSIUM CHLORIDE 20 MEQ: 200 INJECTION, SOLUTION INTRAVENOUS at 10:31

## 2018-01-20 RX ADMIN — ATORVASTATIN CALCIUM 20 MG: 20 TABLET, FILM COATED ORAL at 16:36

## 2018-01-20 RX ADMIN — ACETAMINOPHEN 650 MG: 325 TABLET, FILM COATED ORAL at 17:45

## 2018-01-20 RX ADMIN — PRAMIPEXOLE DIHYDROCHLORIDE 1 MG: 0.5 TABLET ORAL at 09:01

## 2018-01-20 RX ADMIN — LEVALBUTEROL HYDROCHLORIDE 1.25 MG: 1.25 SOLUTION, CONCENTRATE RESPIRATORY (INHALATION) at 12:17

## 2018-01-20 RX ADMIN — HEPARIN SODIUM 5000 UNITS: 5000 INJECTION, SOLUTION INTRAVENOUS; SUBCUTANEOUS at 15:21

## 2018-01-20 RX ADMIN — ISODIUM CHLORIDE 3 ML: 0.03 SOLUTION RESPIRATORY (INHALATION) at 15:22

## 2018-01-20 RX ADMIN — PRAMIPEXOLE DIHYDROCHLORIDE 1 MG: 0.5 TABLET ORAL at 00:07

## 2018-01-20 RX ADMIN — LEVALBUTEROL HYDROCHLORIDE 2.5 MG: 1.25 SOLUTION, CONCENTRATE RESPIRATORY (INHALATION) at 05:00

## 2018-01-20 RX ADMIN — INSULIN LISPRO 3 UNITS: 100 INJECTION, SOLUTION INTRAVENOUS; SUBCUTANEOUS at 08:22

## 2018-01-20 RX ADMIN — INSULIN GLARGINE 50 UNITS: 100 INJECTION, SOLUTION SUBCUTANEOUS at 22:50

## 2018-01-20 RX ADMIN — INSULIN ASPART 40 UNITS: 100 INJECTION, SUSPENSION SUBCUTANEOUS at 09:00

## 2018-01-20 RX ADMIN — ONDANSETRON 4 MG: 2 INJECTION INTRAMUSCULAR; INTRAVENOUS at 02:41

## 2018-01-20 RX ADMIN — SODIUM CHLORIDE 500 ML: 0.9 INJECTION, SOLUTION INTRAVENOUS at 06:44

## 2018-01-20 RX ADMIN — ONDANSETRON 4 MG: 2 INJECTION INTRAMUSCULAR; INTRAVENOUS at 23:07

## 2018-01-20 RX ADMIN — ISODIUM CHLORIDE 3 ML: 0.03 SOLUTION RESPIRATORY (INHALATION) at 12:17

## 2018-01-20 RX ADMIN — AZITHROMYCIN 500 MG: 250 TABLET, FILM COATED ORAL at 21:38

## 2018-01-20 RX ADMIN — PRAMIPEXOLE DIHYDROCHLORIDE 1 MG: 0.5 TABLET ORAL at 15:21

## 2018-01-20 RX ADMIN — ISODIUM CHLORIDE 3 ML: 0.03 SOLUTION RESPIRATORY (INHALATION) at 21:45

## 2018-01-20 RX ADMIN — LEVALBUTEROL HYDROCHLORIDE 1.25 MG: 1.25 SOLUTION, CONCENTRATE RESPIRATORY (INHALATION) at 15:22

## 2018-01-20 RX ADMIN — PRAMIPEXOLE DIHYDROCHLORIDE 1 MG: 0.5 TABLET ORAL at 21:38

## 2018-01-20 RX ADMIN — LEVALBUTEROL HYDROCHLORIDE 1.25 MG: 1.25 SOLUTION, CONCENTRATE RESPIRATORY (INHALATION) at 21:45

## 2018-01-20 RX ADMIN — OSELTAMIVIR PHOSPHATE 30 MG: 30 CAPSULE ORAL at 21:39

## 2018-01-20 RX ADMIN — INSULIN LISPRO 6 UNITS: 100 INJECTION, SOLUTION INTRAVENOUS; SUBCUTANEOUS at 22:50

## 2018-01-20 RX ADMIN — HEPARIN SODIUM 5000 UNITS: 5000 INJECTION, SOLUTION INTRAVENOUS; SUBCUTANEOUS at 21:38

## 2018-01-20 RX ADMIN — INSULIN LISPRO 8 UNITS: 100 INJECTION, SOLUTION INTRAVENOUS; SUBCUTANEOUS at 16:36

## 2018-01-20 RX ADMIN — LEVOTHYROXINE SODIUM 125 MCG: 125 TABLET ORAL at 05:44

## 2018-01-20 RX ADMIN — INSULIN LISPRO 3 UNITS: 100 INJECTION, SOLUTION INTRAVENOUS; SUBCUTANEOUS at 00:02

## 2018-01-20 RX ADMIN — AZITHROMYCIN 500 MG: 250 TABLET, FILM COATED ORAL at 00:07

## 2018-01-20 RX ADMIN — INSULIN LISPRO 8 UNITS: 100 INJECTION, SOLUTION INTRAVENOUS; SUBCUTANEOUS at 12:15

## 2018-01-20 RX ADMIN — ACETAMINOPHEN 650 MG: 325 TABLET, FILM COATED ORAL at 23:31

## 2018-01-20 RX ADMIN — INSULIN LISPRO 10 UNITS: 100 INJECTION, SOLUTION INTRAVENOUS; SUBCUTANEOUS at 20:02

## 2018-01-20 RX ADMIN — ACETAMINOPHEN 650 MG: 325 TABLET, FILM COATED ORAL at 09:12

## 2018-01-20 RX ADMIN — ASPIRIN 325 MG: 325 TABLET ORAL at 09:01

## 2018-01-20 NOTE — PROGRESS NOTES
[facility] Progress Note  Patient: En Pickering 77 y o  female   MRN: [id number]  PCP: Kiana Carbajal MD  Unit/Bed#: [address] [place] [address] Encounter: [id number]  Date Of Visit: 01/20/18    Problem List:    Principal Problem:    Acute bronchitis  Active Problems:    T2DM (type 2 diabetes mellitus) ([facility] )    Asthma    Restless leg    HTN (hypertension)    HLD (hyperlipidemia)    Hypothyroidism    Sepsis ([facility] )      Assessment & Plan:    1  Acute bronchitis, likely secondary to suspected influenza, less likely bacterial-will continue Tamiflu and antibiotics at present  Follow-up influenza PCR and sputum culture  Follow blood culture  Urine Legionella pneumococcal antigen  Continue bronchodilators, IV steroids  Monitor respiratory status  Continue symptomatic treatment  Pulmonary evaluation  2  Acute on chronic hypoxic hypercapnic respiratory failure secondary to 1 and underlying obesity hypoventilation-continue supplemental oxygen  BiPAP as needed  3  Hypokalemia-replete and monitor  4  Diabetes mellitus type 2 with hemoglobin A1c of 9 2-uncontrolled secondary to steroid  Will monitor on Lantus, NovoLog and corrective scale while in the hospital   Monitor  5  Hypotension secondary to volume depletion and acute illness-improved  Lisinopril with holding parameters  Will hold off diuretics at present  Monitor blood pressure   6  Chronic diastolic CHF, chronic lower extremity edema-evidence of volume overload at present  Continue to hold Lasix and Zaroxolyn at present  7  Dyslipidemia-on Lipitor  8  History of TIA-continue aspirin and Lipitor  9  Hypothyroidism, post thyroidectomy-on Synthroid  10  History of ESBL Klebsiella bacteremia      VTE Pharmacologic Prophylaxis:   Pharmacologic: Heparin  Mechanical VTE Prophylaxis in Place: Yes    Patient Centered Rounds: I have performed bedside rounds with nursing staff today      Discussions with Specialists or Other Care Team Provider: Yes    Education and Discussions with Family / Patient:Yes    Time Spent for Care: 1 hour  More than 50% of total time spent on counseling and coordination of care as described above  Current Length of Stay: 1 day(s)    Current Patient Status: Inpatient , continues require close monitoring of the respiratory status    Discharge Plan:  Pending at present    Code Status: Level 3 - DNAR and DNI      Subjective:   Still with persistent shortness of breath and cough  Chest pain  Reports feeling slightly better than yesterday    Objective:   Mild distress due to persistent cough  Feels anxious/jittery after receiving bronchodilator treatment    Negative for Chest Pain, Palpitations, Abdominal Pain, Nausea, Vomiting, Constipation, Diarrhea, Dizziness  All other 10 review of systems negative and without drastic interval changes from yesterday  Vitals:   Temp (24hrs), Av °F (38 3 °C), Min:99 4 °F (37 4 °C), Max:102 9 °F (39 4 °C)    HR:  [] 111  Resp:  [18-22] 22  BP: (118-162)/(55-78) 139/63  SpO2:  [88 %-97 %] 94 %  Body mass index is 44 04 kg/m²  Input and Output Summary (last 24 hours): Intake/Output Summary (Last 24 hours) at 18 1105  Last data filed at 18 0644   Gross per 24 hour   Intake             1500 ml   Output                0 ml   Net             1500 ml       Physical Exam:     Physical Exam   Constitutional: She is oriented to person, place, and time  She appears well-developed  Morbidly obese   HENT:   Head: Normocephalic and atraumatic  Nose: Mucosal edema present  Eyes: Conjunctivae and EOM are normal  Pupils are equal, round, and reactive to light  Neck: Normal range of motion  Neck supple  No JVD present  Cardiovascular: Normal rate, regular rhythm and normal heart sounds  Exam reveals no gallop and no friction rub  No murmur heard  Pulmonary/Chest: Effort normal  No respiratory distress  She has decreased breath sounds  She has wheezes  She has rhonchi   She has no rales  She exhibits no tenderness  Abdominal: Soft  Bowel sounds are normal  She exhibits no distension  There is no tenderness  There is no rebound and no guarding  Musculoskeletal: She exhibits edema (Chronic)  Neurological: She is alert and oriented to person, place, and time  She is not disoriented  No cranial nerve deficit or sensory deficit  GCS eye subscore is 4  GCS verbal subscore is 5  GCS motor subscore is 6  Skin: Skin is warm and dry  No rash noted  Bilateral lower extremity chronic venous stasis dermatitis   Psychiatric: Her mood appears anxious  Cognition and memory are normal        Additional Data:     Labs:      Results from last 7 days  Lab Units 01/20/18 0628 01/19/18 1919   WBC Thousand/uL 6 70 6 70   HEMOGLOBIN g/dL 11 6* 12 9   HEMATOCRIT % 35 3* 38 8   PLATELETS Thousands/uL 148 156   NEUTROS PCT %  --  76*   LYMPHS PCT %  --  14   MONOS PCT %  --  9   EOS PCT %  --  1       Results from last 7 days  Lab Units 01/20/18 0628 01/19/18 1919   SODIUM mmol/L 140 139   POTASSIUM mmol/L 2 9* 3 6   CHLORIDE mmol/L 97* 97*   CO2 mmol/L 35* 37*   BUN mg/dL 14 15   CREATININE mg/dL 0 98 0 79   CALCIUM mg/dL 8 0* 8 8   TOTAL PROTEIN g/dL  --  7 0   BILIRUBIN TOTAL mg/dL  --  0 50   ALK PHOS U/L  --  72   ALT U/L  --  29   AST U/L  --  28   GLUCOSE RANDOM mg/dL 304* 251*       Results from last 7 days  Lab Units 01/19/18 1919   INR  0 99       * I Have Reviewed All Lab Data Listed Above  * Additional Pertinent Lab Tests Reviewed: All Labs For Current Hospital Admission Reviewed    Imaging:  Xr Chest 1 View Portable    Result Date: 1/19/2018  Narrative: CHEST INDICATION:  Flu symptoms  COMPARISON:  October 8, 2017 VIEWS:   AP frontal IMAGES:  1 FINDINGS:     Heart shadow appears unremarkable  Atherosclerotic vascular calcifications are noted  The lungs are clear  No pneumothorax or pleural effusion  Visualized osseous structures appear within normal limits for the patient's age  Impression: No active pulmonary disease  Workstation performed: [id number]     Xr Hip/pelv 1 Vw Right If Performed    Result Date: 12/21/2017  Narrative: C-ARM -    INDICATION: Procedure guidance COMPARISON:  10/20/2017 TECHNIQUE: FLUOROSCOPY TIME:   8 SECONDS 1 FLUOROSCOPIC IMAGES FINDINGS: Fluoroscopy provided for performance of a pain management procedure in the right hip  Osseous and soft tissue detail limited by technique  Impression:      Please refer to the separate procedure notes for additional details  Workstation performed: [id number]     Ct Head Without Contrast    Result Date: 1/9/2018  Narrative: CT BRAIN - WITHOUT CONTRAST INDICATION:  Headache  COMPARISON:  10/10/2017 TECHNIQUE:  CT examination of the brain was performed  In addition to axial images, coronal reformatted images were created and submitted for interpretation  Radiation dose length product (DLP) for this visit:  1134 76 mGy-cm   This examination, like all CT scans performed in the [facility], was performed utilizing techniques to minimize radiation dose exposure, including the use of iterative reconstruction and automated exposure control  IMAGE QUALITY:  Diagnostic  FINDINGS:  PARENCHYMA: No acute intracranial hemorrhage  No extra-axial fluid collection  No midline shift  Unchanged is the calcified lesion in the left parietal cortex seen most consistent with a meningioma  It measures approximately 1 6 x 1 4 cm in axial dimension, unchanged from prior examination  Mass effect on the left superior parietal lobule without substantial adjacent edema  Stable atrophy and patchy areas of periventricular hypoattenuation noted  While nonspecific, these likely reflect changes of chronic microvascular ischemia in a patient of this age  VENTRICLES AND EXTRA-AXIAL SPACES:  Ventricles are commensurate with the degree of volume loss  Basal cisterns are patent   Atherosclerotic calcification of the intracranial vasculature is noted  VISUALIZED ORBITS AND PARANASAL SINUSES:  Changes of bilateral lens replacements noted  The paranasal sinuses and mastoid air cells remain clear  CALVARIUM AND EXTRACRANIAL SOFT TISSUES:   Normal      Impression: No acute intracranial abnormality  Stable left parietal convexity meningioma  No demonstrable adjacent parenchymal edema  Workstation performed: [id number]     Ct Abdomen Pelvis With Contrast    Result Date: 1/9/2018  Narrative: CT ABDOMEN AND PELVIS WITH IV CONTRAST INDICATION:  Chronic abdominal pain  COMPARISON: 12/13/2017  TECHNIQUE:  CT examination of the abdomen and pelvis was performed  Reformatted images were created in axial, sagittal, and coronal planes  Radiation dose length product (DLP) for this visit:  734 605 387 mGy-cm   This examination, like all CT scans performed in the [facility], was performed utilizing techniques to minimize radiation dose exposure, including the use of iterative reconstruction and automated exposure control  IV Contrast:  100 mL of iohexol (OMNIPAQUE)         Enteric Contrast:  Enteric contrast was not administered  FINDINGS: ABDOMEN LOWER CHEST:  No significant abnormalities identified in the lower chest  LIVER/BILIARY TREE:  No biliary obstruction  GALLBLADDER:  Cholecystectomy  SPLEEN:  Unremarkable  PANCREAS:  Stable 11 mm cystic lesion in the body of the pancreas  Distal body and tail not visualized  Question prior resection  No pancreatic duct dilatation or evidence of pancreatitis  ADRENAL GLANDS:  Unremarkable  KIDNEYS/URETERS:  Stable fat-containing 15 mm right renal cortical lesion suggesting an angiomyolipoma  No evidence of pyelonephritis or obstructive uropathy  [facility] STOMACH AND BOWEL:  Stable moderate hiatal hernia  Fat-containing 4 cm periumbilical hernia, stable  Stool throughout the colon  No evidence of bowel obstruction, colitis or diverticulitis  APPENDIX:  No findings to suggest appendicitis   ABDOMINOPELVIC CAVITY:  No ascites or free intraperitoneal air  Stable shotty left para-aortic lymph nodes  VESSELS:  Unremarkable for patient's age  PELVIS REPRODUCTIVE ORGANS:  Patient is status post hysterectomy  URINARY BLADDER:  Unremarkable  ABDOMINAL WALL/INGUINAL REGIONS:  Unremarkable  OSSEOUS STRUCTURES:  No acute fracture or destructive osseous lesion  Impression: 1  Moderate amount of stool throughout the colon may indicate constipation  No evidence of bowel obstruction, colitis, diverticulitis or appendicitis  Moderate hiatal hernia and small fat-containing periumbilical hernia  2  Stable left millimeters cystic lesion in the body of the pancreas  No ductal dilatation  Possible  serous cystadenoma  3  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  No pyelonephritis or obstructive uropathy  Workstation performed: [id number]     Imaging Reports Reviewed by myself    Cultures:   Blood Culture:   Lab Results   Component Value Date    BLOODCX No Growth After 5 Days  06/11/2017    BLOODCX No Growth After 5 Days   06/11/2017    BLOODCX Klebsiella pneumoniae - ESBL (A) 06/07/2017    BLOODCX Klebsiella pneumoniae ESBL (A) 06/07/2017     Urine Culture:   Lab Results   Component Value Date    URINECX 20,000-29,000 cfu/ml Mixed Contaminants X3 04/15/2017     Sputum Culture: No components found for: SPUTUMCX  Wound Culture: No results found for: WOUNDCULT    Last 24 Hours Medication List:     acetaminophen 650 mg Oral Q6H [address]   aspirin 325 mg Oral Daily   atorvastatin 20 mg Oral Daily With Dinner   azithromycin 500 mg Oral Daily   cefTRIAXone 1,000 mg Intravenous Q24H   heparin (porcine) 5,000 Units Subcutaneous Q8H [address]   insulin glargine 50 Units Subcutaneous HS   insulin lispro 1-5 Units Subcutaneous HS   insulin lispro 1-6 Units Subcutaneous TID AC   insulin lispro 8 Units Subcutaneous TID With Meals   levalbuterol 1 25 mg Nebulization 4x Daily   And      sodium chloride 3 mL Nebulization 4x Daily   levothyroxine 125 mcg Oral Early Morning   lisinopril 10 mg Oral Daily   methylPREDNISolone sodium succinate 40 mg Intravenous Q8H [address]   oseltamivir 30 mg Oral Q12H FELIPE   oxymetazoline 2 spray Each Nare Q12H [address]   potassium chloride 20 mEq Intravenous Once   Followed by      potassium chloride 20 mEq Intravenous Once   pramipexole 1 mg Oral TID        Today, Patient Was Seen By: Carlos Perez MD    ** Please Note: Dragon 360 Dictation voice to text software may have been used in the creation of this document   **

## 2018-01-20 NOTE — PLAN OF CARE
CARDIOVASCULAR - ADULT     Maintains optimal cardiac output and hemodynamic stability Progressing     Absence of cardiac dysrhythmias or at baseline rhythm Progressing        Potential for Falls     Patient will remain free of falls Progressing        RESPIRATORY - ADULT     Achieves optimal ventilation and oxygenation Progressing

## 2018-01-20 NOTE — ED NOTES
Pt repositioned in bed, assist x 2   Pt in sitting position, boxed lunch provided as per dr Neela Garcia, RN  01/19/18 3824

## 2018-01-20 NOTE — RESPIRATORY THERAPY NOTE
RT Protocol Note  [place] Prior 77 y o  female MRN: [id number]  Unit/Bed#: [address] [place] [address] Encounter: [id number]    Assessment    Principal Problem:    Acute bronchitis  Active Problems:    T2DM (type 2 diabetes mellitus) ([place] [address] )    Asthma    Restless leg    HTN (hypertension)    HLD (hyperlipidemia)    Hypothyroidism    Sepsis ([place] [address] )      Home Pulmonary Medications:  Duo neb txs PRN andO2 2l/mnc @ HS    Past Medical History:   Diagnosis Date    Arthritis     Asthma     Diabetes mellitus ([place] [address] )     Disease of thyroid gland     Hypertension     Mitral valve regurgitation     Obesity hypoventilation syndrome (HCC)     Pain     right hip    Restless leg syndrome     Sleep related hypoxia     Thyroid cancer ([place] [address] )      Social History     Social History    Marital status: Single     Spouse name: N/A    Number of children: N/A    Years of education: N/A     Social History Main Topics    Smoking status: Never Smoker    Smokeless tobacco: Never Used    Alcohol use No    Drug use: No    Sexual activity: Yes     Partners: Male     Other Topics Concern    None     Social History Narrative    Lives with significant others  Uses walker outside  Subjective         Objective    Physical Exam:        Vitals:  Blood pressure 145/65, pulse (!) 120, temperature (!) 102 9 °F (39 4 °C), temperature source Oral, resp  rate 20, height 5' 2" (1 575 m), weight 109 kg (240 lb 12 8 oz), SpO2 95 %, not currently breastfeeding  Results from last 7 days  Lab Units 01/20/18  0444   PH ART  7 390   PCO2 ART mm Hg 59 7*   PO2 ART mm Hg 79 8   HCO3 ART mmol/L 35 3*   BASE EXC ART mmol/L 8 4   O2 CONTENT ART mL/dL 17 1   O2 HGB, ARTERIAL % 94 1   ABG SOURCE  Radial, Left   AILEEN TEST  Yes       Imaging and other studies: I have personally reviewed pertinent reports               Plan

## 2018-01-20 NOTE — PROGRESS NOTES
Pt became febrile with worsening respiratory symptoms  She did not receive her neb treatment initially due to nausea  Went to assess the pt at bedside  She had diffuse wheezing on exam  After receiving Xopenex she did improve however still displaying shortness of breath  Will start on Solumedrol and resume Duonebs and Xopenex prn  Discussed case with ICU team as well  Will reassess after 1 hour neb treatment completed  -- Interval Update    Post neb treatment her breathing has improved however her BP was more labile  Will discontinue her po Lasix and provide her with a NS bolus  Discussed case with ICU service  They do not recommend obtaining another LA at this time given her recent neb treatment  Will reassess after bolus is complete

## 2018-01-20 NOTE — PHYSICAL THERAPY NOTE
PT EVALUATION     01/20/18 1130   Pain Assessment   Pain Assessment No/denies pain   Home Living   Type of Home House   Bathroom Equipment Grab bars in [facility]   Additional Comments patient using roller walker out of house   Prior Function   Level of [place] Independent with ADLs and functional mobility   Lives With Spouse   Receives Help From Family   ADL Assistance Independent   IADLs Needs assistance   Comments patient reports daughters assisting in home with cleaning, meds and appointments but patient assists with cooking and has supervision for showering safety and would probably want shower chair  Restrictions/Precautions   Other Precautions Fall Risk;O2;Bed Alarm; Chair Alarm   General   Additional Pertinent History chart reviewed, patient admitted with flu  Now on contact isolation and presents as generally weak and deconditioned  Patient generally independent with ADLs prior to admission but requiring assist for showering     Family/Caregiver Present No   Cognition   Overall Cognitive Status WFL   Arousal/Participation Cooperative   Orientation Level Oriented X4   Following Commands Follows all commands and directions without difficulty   RLE Assessment   RLE Assessment (ROM WFL, strength 3+/5)   LLE Assessment   LLE Assessment (ROM WFL, strength 3+/5)   Coordination   Movements are Fluid and Coordinated 0   Bed Mobility   Supine to Sit 5  Supervision   Sit to Supine 5  Supervision   Transfers   Sit to Stand 4  Minimal assistance   Additional items Assist x 1   Stand to Sit 4  Minimal assistance   Additional items Assist x 1   Ambulation/Elevation   Gait Assistance 4  Minimal assist   Additional items Assist x 1   Assistive Device Rolling walker   Distance 10 feet with change in direction, narrow base of support, with SOB and coughing   Balance   Static Sitting Fair +   Dynamic Sitting Fair   Static Standing Fair -   Dynamic Standing Fair -   Ambulatory Fair -   Activity Tolerance   Activity Tolerance Patient limited by fatigue  (limited by coughing/SOB)   Nurse Made Aware yes   Assessment   Prognosis Good   Problem List Decreased strength;Decreased range of motion;Decreased endurance; Impaired balance;Decreased mobility; Decreased coordination  (SOB)   Assessment Patient seen for Physical Therapy evaluation  Patient admitted with Acute bronchitis  Comorbidities affecting patient's physical performance include: RLS, asthma, chronic pain, R hip bursitis, obesity, COPD  Personal factors affecting patient at time of initial evaluation include: ambulating with assistive device, inability to ambulate household distances, inability to navigate community distances, inability to navigate level surfaces without external assistance, inability to perform dynamic tasks in community, limited home support, inability to perform physical activity, inability to perform ADLS and inability to perform IADLS   Prior to admission, patient was independent with functional mobility with walker, independent with ADLS, requiring assist for IADLS, ambulating household distance, ambulating community distances and home with family assist   Please find objective findings from Physical Therapy assessment regarding body systems outlined above with impairments and limitations including weakness, decreased ROM, impaired balance, decreased endurance, impaired coordination, gait deviations, pain, decreased activity tolerance, decreased functional mobility tolerance, fall risk and SOB upon exertion  The Barthel Index was used as a functional outcome tool presenting with a score of 50 today indicating marked limitations of functional mobility and ADLS  Patient's clinical presentation is currently unstable/unpredictable as seen in patient's presentation of vital sign response, increased fall risk, new onset of impairment of functional mobility, decreased endurance and new onset of weakness   Pt would benefit from continued Physical Therapy treatment to address deficits as defined above and maximize level of functional mobility  As demonstrated by objective findings, the assigned level of complexity for this evaluation is high  Goals   Patient Goals breathe better, feel better , go home   STG Expiration Date (1 to 7 days)   Short Term Goal #1 transfers and gait with roller walker with supervision   Short Term Goal #2 gait endurance to 80 feet, strength BLEs 3+/4-   LTG Expiration Date (8 to 14 days)   Long Term Goal #1 transfers and gait with roller walker independently   Long Term Goal #2 gait endurance to 120 feet, strength BLEs 4-/5   Plan   Treatment/Interventions ADL retraining;Functional transfer training;LE strengthening/ROM; Elevations; Therapeutic exercise; Endurance training;Patient/family training;Equipment eval/education; Bed mobility;Gait training   PT Frequency (3-5x/week)   Recommendation   Recommendation (home with family and services/PT as needed)   Barthel Index   Feeding 10   Bathing 0   Grooming Score 0   Dressing Score 5   Bladder Score 10   Bowels Score 10   Toilet Use Score 5   Transfers (Bed/Chair) Score 10   Mobility (Level Surface) Score 0   Stairs Score 0   Barthel Index Score 50       PT TREATMENT  Time In:1130   Time Out:1140  Total Time: 10min      S:  Patient reports SOB and cough, feeling weak  O:  -sit to and from stand with min assist   -gait with roller walker with min assist, 30 feet with change in direction   -exercise completed: LAQs, hip flexion, ankle pumps, 10 reps, sitting  A:  Patient presents as generally weak and deconditioned and will benefit from continued PT  P: DC recommendation: home with family and services/PT as needed       Jayda Rincon, PT

## 2018-01-20 NOTE — CONSULTS
Consultation - Pulmonary Medicine   David Roe 77 y o  female MRN: [id number]  Unit/Bed#: 2 [place] [address] Encounter: [id number]      Assessment:  Acute influenza A infection  Asthmatic bronchitis secondary influenza A  Chronic hypercapnic respiratory failure secondary to obesity alveolar hypoventilation  ABG on 5 L per minute of oxygen early this morning showed a pH is 7 37, pCO2 64 and PO2 80 mm Hg  Chronic diastolic cardiac dysfunction  Morbid obesity  Diabetes mellitus type 2    Plan:   A concur with IV methylprednisolone 20 mg every 6 hours and nebulizer treatments with levalbuterol 1 25 mg every 6 hours  Sputum culture has been ordered  Patient has been started on Tamiflu  Supplement oxygen as ordered  Patient presently on 4 liters/minute and O2 saturation satisfactory at 94%  Will discontinue IV ceftriaxone as likely primary influenza a infection and cannot monitor azithromycin alone for right now  Urine for strep pneumonia antigen negative    History of Present Illness   Physician Requesting Consult: Ceasar Rubinstein, MD  Reason for Consult / Principal Problem:   Hx and PE limited by: none    HPI: David Roe is a 77y o  year old female who presents with complaints of sudden onset of weakness when she woke up yesterday morning and general malaise  She had chills and some body aches  She complained of nonproductive cough  She saw her family physician and was percent to the ER for further evaluation  She did have influenza vaccine and had prior pneumonia vaccine  She was also experiencing shortness of breath  Her temperature in ER was 102° and O2 saturation 91%  Portable chest x-ray shows mild cardiomegaly but no infiltrates  Nasal swab for influenza a was positive     Initial white blood cell count was 6 7 hemoglobin 12 9  Patient was given nebulizer treatment in the emergency room with DuoNeb  Her cough is mostly nonproductive    She has been started on IV ceftriaxone, azithromycin 500 mg p o , and Tamiflu  Patient presently is on 4 L of oxygen O2 saturation 94%  Normally she only uses 2 L of oxygen at bedtime for sleep-related hypoxemia  This is likely due to alveolar hypoventilation from obesity  Patient also has been started on IV methylprednisolone 20 mg every 6 hours for asthmatic bronchitis  She is also on nebulized treatments with levalbuterol to 1 25 mg q 6 hours  She has history of diabetes mellitus, hypertension, hypothyroidism, hyperlipidemia, and also obesity alveolar hypoventilation     She does have some compensated hypercapnia related to this she has a questionable history of asthma in the past   She does not use any regular inhaler therapy  She never smoked  Blood pressure has been running lower since admission  Earlier today was 115/55 mm Hg  Review of Systems   Constitutional:        Patient complains of general malaise, fever, chills, shortness of breath, and nonproductive cough which started yesterday morning   HENT: Negative for congestion, rhinorrhea and trouble swallowing  Eyes: Negative for pain, redness and itching  Respiratory: Positive for cough, shortness of breath and wheezing  Cardiovascular: Positive for leg swelling  Negative for chest pain  Gastrointestinal: Negative for abdominal pain, diarrhea and nausea  Endocrine: Negative for polyphagia  Genitourinary: Negative for dysuria  Musculoskeletal: Positive for myalgias  Negative for joint swelling  Skin: Negative for rash  Neurological: Negative for dizziness  Psychiatric/Behavioral: Negative for confusion         Historical Information   Past Medical History:   Diagnosis Date    Arthritis     Asthma     Diabetes mellitus ([address] )     Disease of thyroid gland     Hypertension     Mitral valve regurgitation     Obesity hypoventilation syndrome (HCC)     Pain     right hip    Restless leg syndrome     Sleep related hypoxia     Thyroid cancer [facility])      Past Surgical History:   Procedure Laterality Date    CHOLECYSTECTOMY      EYE SURGERY      [place] ERCP DX COLLECTION SPECIMEN BRUSHING/WASHING N/A 5/30/2017    Procedure: ENDOSCOPIC RETROGRADE CHOLANGIOPANCREATOGRAPHY (ERCP); SPHINCTEROTOMY;  Surgeon: Bailey Alexander MD;  Location: [facility];   Service: Gastroenterology    REPLACEMENT TOTAL KNEE BILATERAL      SKIN BIOPSY      melanoma of back    STEROID INJECTION HIP Right 12/21/2017    Procedure: TROCHANTERIC BURSA INJECTION RIGHT;  Surgeon: Alfredo Coreas MD;  [facility] MA[facility];  Service: Pain Management     THORACOTOMY Right     at least 40 years, for pericardial cyst    THYROIDECTOMY, PARTIAL      For thyroid cancer     Social History   History   Alcohol Use No     History   Drug Use No     History   Smoking Status    Never Smoker   Smokeless Tobacco    Never Used         Family History:   Family History   Problem Relation Age of Onset    Cancer Father     Diabetes Sister        Meds/Allergies     Current Facility-Administered Medications:     acetaminophen (TYLENOL) tablet 325 mg, 325 mg, Oral, Q6H PRN, Ceasar Rubinstein, MD    acetaminophen (TYLENOL) tablet 650 mg, 650 mg, Oral, Q6H [facility], Ceasar Rubinstein, MD, 650 mg at 01/20/18 0912    aspirin tablet 325 mg, 325 mg, Oral, Daily, Mian Rosales MD, 325 mg at 01/20/18 0901    atorvastatin (LIPITOR) tablet 20 mg, 20 mg, Oral, Daily With Aaliyah Leigh MD, 20 mg at 01/20/18 1636    azithromycin (ZITHROMAX) tablet 500 mg, 500 mg, Oral, Daily, Mian Rosales MD, 500 mg at 01/20/18 0007    cefTRIAXone (ROCEPHIN) IVPB (premix) 1,000 mg, 1,000 mg, Intravenous, Q24H, Mian Rosales MD, Last Rate: 100 mL/hr at 01/20/18 0214, 1,000 mg at 01/20/18 0214    dextromethorphan-guaiFENesin (ROBITUSSIN DM)  mg/5 mL oral syrup 10 mL, 10 mL, Oral, Q4H PRN, Ceasar Rubinstein, MD    heparin (porcine) subcutaneous injection 5,000 Units, 5,000 Units, Subcutaneous, Q8H [facility], 5,000 Units at 01/20/18 1521 **AND** Platelet count, , , Once, Ar Lee MD    insulin glargine (LANTUS) subcutaneous injection 50 Units, 50 Units, Subcutaneous, HS, Carlos Perez MD    insulin lispro (HumaLOG) 100 units/mL subcutaneous injection 1-5 Units, 1-5 Units, Subcutaneous, HS, Carlos Perez MD    insulin lispro (HumaLOG) 100 units/mL subcutaneous injection 1-6 Units, 1-6 Units, Subcutaneous, TID AC, 6 Units at 01/20/18 1215 **AND** Fingerstick Glucose (POCT), , , TID AC, Carlos Perez MD    insulin lispro (HumaLOG) 100 units/mL subcutaneous injection 8 Units, 8 Units, Subcutaneous, TID With Meals, Carlos Perez MD, 8 Units at 01/20/18 1636    levalbuterol (XOPENEX) inhalation solution 1 25 mg, 1 25 mg, Nebulization, 4x Daily, 1 25 mg at 01/20/18 1522 **AND** sodium chloride 0 9 % inhalation solution 3 mL, 3 mL, Nebulization, 4x Daily, Carlos Perez MD, 3 mL at 01/20/18 1522    levalbuterol (XOPENEX) inhalation solution 1 25 mg, 1 25 mg, Nebulization, Q4H PRN **AND** sodium chloride 0 9 % inhalation solution 3 mL, 3 mL, Nebulization, Q4H PRN, Carlos Perez MD    levothyroxine tablet 125 mcg, 125 mcg, Oral, Early Morning, Ar Lee MD, 125 mcg at 01/20/18 0544    [START ON 1/21/2018] lisinopril (ZESTRIL) tablet 10 mg, 10 mg, Oral, Daily, MD Donna Curryenzlew Brito  [START ON 1/21/2018] methylPREDNISolone sodium succinate (Solu-MEDROL) injection 20 mg, 20 mg, Intravenous, Q6H [address], Carlos Perez MD    ondansetron (ZOFRAN) injection 4 mg, 4 mg, Intravenous, Q6H PRN, Ar Lee MD, 4 mg at 01/20/18 0241    oseltamivir (TAMIFLU) capsule 30 mg, 30 mg, Oral, Q12H [address], Carlos Perez MD, 30 mg at 01/20/18 1044    oxyCODONE (ROXICODONE) IR tablet 5 mg, 5 mg, Oral, Q6H PRN, Carlos Perez MD, 5 mg at 01/20/18 1411    oxymetazoline (AFRIN) 0 05 % nasal spray 2 spray, 2 spray, Each Nare, Q12H [address], Carlos Perez MD, 2 spray at 01/20/18 1330    [COMPLETED] potassium chloride 20 mEq IVPB (premix), 20 mEq, Intravenous, Once, Last Rate: 50 mL/hr at 01/20/18 1031, 20 mEq at 01/20/18 1031 **FOLLOWED BY** potassium chloride 20 mEq IVPB (premix), 20 mEq, Intravenous, Once, Rubi Suarez MD, Last Rate: 50 mL/hr at 01/20/18 1522, 20 mEq at 01/20/18 1522    pramipexole (MIRAPEX) tablet 1 mg, 1 mg, Oral, TID, Merlinda Majestic, MD, 1 mg at 01/20/18 1521    Prior to Admission medications    Medication Sig Start Date End Date Taking?  Authorizing Provider   ascorbic acid (VITAMIN C) 500 mg tablet Take 500 mg by mouth daily   Yes Historical Provider, MD   aspirin 325 mg tablet Take 1 tablet by mouth daily 10/13/17  Yes Jacob Al MD   bisacodyl (DULCOLAX) 5 mg EC tablet Take 10 mg by mouth daily at bedtime   Yes Historical Provider, MD   COD LIVER OIL PO Take by mouth daily   Yes Historical Provider, MD   folic acid (FOLVITE) 1 mg tablet Take by mouth daily   Yes Historical Provider, MD   furosemide (LASIX) 40 mg tablet Take 80 mg by mouth daily   Yes Historical Provider, MD   furosemide (LASIX) 40 mg tablet Take 40 mg by mouth daily after lunch   Yes Historical Provider, MD   insulin aspart protamine-insulin aspart (NovoLOG 70/30) 100 units/mL injection Inject 40 Units under the skin 2 (two) times a day before meals   Yes Historical Provider, MD   levothyroxine (SYNTHROID) 125 mcg tablet Take 1 tablet by mouth daily   Yes Historical Provider, MD   lisinopril (ZESTRIL) 10 mg tablet Take 10 mg by mouth daily   Yes Historical Provider, MD   metolazone (ZAROXOLYN) 2 5 mg tablet Take 2 5 mg by mouth 3 (three) times a week   Yes Historical Provider, MD   Multiple Vitamins-Minerals (MULTIVITAMIN ADULT PO) Take 1 tablet by mouth daily 2/9/16  Yes Historical Provider, MD   oxyCODONE-acetaminophen (PERCOCET) 5-325 mg per tablet Take 1 tablet by mouth every 4 (four) hours as needed for moderate pain   Yes Historical Provider, MD   polyethylene glycol (MIRALAX) 17 g packet Take 17 g by mouth daily  Patient taking differently: Take 17 g by mouth 2 (two) times a day   6/17/17  Yes FATIMAH Maza   potassium chloride (K-DUR,KLOR-CON) 10 mEq tablet Take 2 tablets by mouth daily 6/17/17  Yes FATIMAH Maza   pramipexole (MIRAPEX) 1 mg tablet Take 1 tablet by mouth 3 (three) times a day 2/9/16  Yes Historical Provider, MD   acetaminophen (TYLENOL) 325 mg tablet Take 2 tablets by mouth every 6 (six) hours as needed for mild pain or headaches 6/17/17   FATIMAH Maza   ALPRAZolam [place]) 0 25 mg tablet Take 1 tablet by mouth 3 (three) times a day as needed for anxiety for up to 10 days 10/10/17 10/20/17  Franko Ramírez MD   atorvastatin (LIPITOR) 20 mg tablet Take 1 tablet by mouth daily with dinner 10/13/17   Mian Vargas MD   ipratropium-albuterol (DUONEB) 0 5-2 5 mg/mL Inhale 1 Container every 4 (four) hours as needed    Historical Provider, MD       Allergies   Allergen Reactions    Ketorolac Swelling    Latex Rash       Objective   Vitals: Blood pressure 122/60, pulse 96, temperature 97 9 °F (36 6 °C), temperature source Tympanic, resp  rate 22, height 5' 2" (1 575 m), weight 109 kg (240 lb 12 8 oz), SpO2 96 %, not currently breastfeeding  ,Body mass index is 44 04 kg/m²  Intake/Output Summary (Last 24 hours) at 01/20/18 1706  Last data filed at 01/20/18 0644   Gross per 24 hour   Intake             1500 ml   Output                0 ml   Net             1500 ml     Invasive Devices     Peripheral Intravenous Line            Peripheral IV 01/20/18 Right Forearm less than 1 day                Physical Exam   Constitutional: She is oriented to person, place, and time  Obese female who is awake, alert, no distress  On 4 L of oxygen O2 saturations 94%   HENT:   Head: Normocephalic  Nose: Nose normal    Mouth/Throat: Oropharynx is clear and moist  No oropharyngeal exudate  Eyes: Conjunctivae are normal    Neck: Neck supple  No JVD present  Cardiovascular: Normal rate, regular rhythm and normal heart sounds      Pulmonary/Chest: Effort normal    There are diffuse expiratory wheezes and some scattered rhonchi bilaterally  Abdominal: Soft  She exhibits no distension  There is no tenderness  There is no guarding  Musculoskeletal:   Mild edema lower extremities   Lymphadenopathy:     She has no cervical adenopathy  Neurological: She is alert and oriented to person, place, and time  Skin: Skin is warm and dry  No erythema  Psychiatric: She has a normal mood and affect   Her behavior is normal  Thought content normal        Lab Results: ABG: Lab Results   Component Value Date    PHART 7 390 01/20/2018    IHY9FJX 59 7 (HH) 01/20/2018    PO2ART 79 8 01/20/2018    POV9SCW 35 3 (H) 01/20/2018    BEART 8 4 01/20/2018    SOURCE Radial, Left 01/20/2018   , BNP: No results found for: BNP, CBC: Lab Results   Component Value Date    WBC 6 70 01/20/2018    HGB 11 6 (L) 01/20/2018    HCT 35 3 (L) 01/20/2018    MCV 95 01/20/2018     01/20/2018    ADJUSTEDWBC 10 30 04/04/2016    MCH 31 2 (H) 01/20/2018    MCHC 33 0 01/20/2018    RDW 15 3 (H) 01/20/2018    MPV 8 7 (L) 01/20/2018    NRBC 0 01/19/2018   , CMP: Lab Results   Component Value Date     01/20/2018     01/08/2016    K 2 9 (L) 01/20/2018    K 3 9 01/08/2016    CL 97 (L) 01/20/2018     01/08/2016    CO2 35 (H) 01/20/2018    CO2 34 (H) 01/08/2016    ANIONGAP 8 01/20/2018    ANIONGAP 10 8 01/08/2016    BUN 14 01/20/2018    BUN 15 01/08/2016    CREATININE 0 98 01/20/2018    CREATININE 0 9 01/08/2016    GLUCOSE 304 (H) 01/20/2018    GLUCOSE 164 (H) 01/08/2016    CALCIUM 8 0 (L) 01/20/2018    CALCIUM 8 5 01/08/2016    AST 28 01/19/2018    AST 16 01/08/2016    ALT 29 01/19/2018    ALT 29 01/08/2016    ALKPHOS 72 01/19/2018    ALKPHOS 72 01/08/2016    PROT 7 0 01/19/2018    PROT 7 0 01/08/2016    ALBUMIN 3 2 (L) 01/19/2018    ALBUMIN 3 7 01/08/2016    BILITOT 0 50 01/19/2018    BILITOT 0 3 01/08/2016    EGFR 55 01/20/2018   , PT/INR:   Lab Results   Component Value Date    INR 0 99 01/19/2018   , Troponin: No results found for: TROPONIN    Imaging Studies: I have personally reviewed pertinent reports  and I have personally reviewed pertinent films in PACS    EKG, Pathology, and Other Studies: I have personally reviewed pertinent reports  VTE Prophylaxis: Sequential compression device Tasha Cotter)     Code Status: Level 3 - DNAR and DNI    Counseling/Coordination of Care: Total floor / unit time spent today 30 minutes  Greater than 50% of total time was spent with the patient and / or family counseling and / or coordination of care   A description of the counseling / coordination of care: I reviewed patient's chart, chest radiograph, and discussed diagnosis and treatment with her

## 2018-01-20 NOTE — H&P
History and Physical - [facility] Internal Medicine    Patient Information: Andres Bergeron 77 y o  female MRN: [id number]  Unit/Bed#: ED 12 Encounter: [id number]  Admitting Physician: Warren Granados MD  PCP: Wallace Pratt MD  Date of Admission:  01/19/18    Chief Complaint:     Fever, chills, cough, shortness of breath     of Present Illness:    Andres Bergeron is a 77 y o  female with a PMH of Asthma, Obesity Hypoventilation Syndrome, DM, HTN, HLD and Hypothyroidism who presents with fever, chills, cough and shortness of breath  She states that she was in her usual state of health until earlier today  She states that she woke up with a sore throat and diffuse myalgias  As the day progressed she began to have a cough and shortness of breath  She went to her PCP who advised her to come to the ED  While here she received a dose of Tamiflu due to her symptoms  Currently she reports feeling only slightly better but still complains of significant shortness of breath, myalgias and coughing  She denies any chest pain but reports her usual diffuse, chronic abdominal pain  No other complaints or concerns  Review of Systems:    Review of Systems   Constitutional: Positive for chills and fever  Respiratory: Positive for cough and shortness of breath  Cardiovascular: Positive for leg swelling  Negative for chest pain  Gastrointestinal: Positive for abdominal pain  Negative for blood in stool  Genitourinary: Negative for hematuria  Musculoskeletal: Positive for arthralgias  Neurological: Negative for syncope  Psychiatric/Behavioral: Negative for confusion         Past Medical and Surgical History:     Past Medical History:   Diagnosis Date    Arthritis     Asthma     Diabetes mellitus ([address] )     Disease of thyroid gland     Hypertension     Mitral valve regurgitation     Obesity hypoventilation syndrome (HCC)     Pain     right hip    Restless leg syndrome     Sleep related hypoxia     Thyroid cancer [facility])        Past Surgical History:   Procedure Laterality Date    CHOLECYSTECTOMY      EYE SURGERY      [place] ERCP DX COLLECTION SPECIMEN BRUSHING/WASHING N/A 5/30/2017    Procedure: ENDOSCOPIC RETROGRADE CHOLANGIOPANCREATOGRAPHY (ERCP); SPHINCTEROTOMY;  Surgeon: Gianni Juarez MD;  Location:  GI LAB; Service: Gastroenterology    REPLACEMENT TOTAL KNEE BILATERAL      SKIN BIOPSY      melanoma of back    STEROID INJECTION HIP Right 12/21/2017    Procedure: TROCHANTERIC BURSA INJECTION RIGHT;  Surgeon: Marc Mims MD;  Location: [facility] MAIN OR;  Service: Pain Management     THORACOTOMY Right     at 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 Taking?    ALPRAZolam (XANAX) 0 25 mg tablet   No No   Sig: Take 1 tablet by mouth 3 (three) times a day as needed for anxiety for up to 10 days   COD LIVER OIL PO   Yes No   Sig: Take by mouth daily   Multiple Vitamins-Minerals (MULTIVITAMIN ADULT PO)   Yes No   Sig: Take 1 tablet by mouth daily   acetaminophen (TYLENOL) 325 mg tablet   No No   Sig: Take 2 tablets by mouth every 6 (six) hours as needed for mild pain or headaches   ascorbic acid (VITAMIN C) 500 mg tablet   Yes No   Sig: Take 500 mg by mouth daily   aspirin 325 mg tablet   No No   Sig: Take 1 tablet by mouth daily   atorvastatin (LIPITOR) 20 mg tablet   No No   Sig: Take 1 tablet by mouth daily with dinner   insulin aspart protamine-insulin aspart (NovoLOG 70/30) 100 units/mL injection   Yes No   Sig: Inject 40 Units under the skin 2 (two) times a day before meals   ipratropium-albuterol (DUONEB) 0 5-2 5 mg/mL   Yes No   Sig: Inhale 1 Container every 4 (four) hours as needed   levothyroxine (SYNTHROID) 125 mcg tablet   Yes No   Sig: Take 1 tablet by mouth daily   lisinopril (ZESTRIL) 10 mg tablet   Yes No   Sig: Take 10 mg by mouth daily oxyCODONE-acetaminophen (PERCOCET) 5-325 mg per tablet  Self Yes No   Sig: Take 1 tablet by mouth every 4 (four) hours as needed for moderate pain   polyethylene glycol (MIRALAX) 17 g packet   No No   Sig: Take 17 g by mouth daily   Patient taking differently: Take 17 g by mouth 2 (two) times a day     potassium chloride (K-DUR,KLOR-CON) 10 mEq tablet   No No   Sig: Take 2 tablets by mouth daily   pramipexole (MIRAPEX) 1 mg tablet   Yes No   Sig: Take 1 tablet by mouth 3 (three) times a day      Facility-Administered Medications: None       Allergies: Allergies   Allergen Reactions    Ketorolac Swelling    Latex Rash     History:     Marital Status: Single   History   Alcohol Use No     History   Smoking Status    Never Smoker   Smokeless Tobacco    Never Used     History   Drug Use No       Family History: Mother: Asthma   Father: healthy     Physical Exam:     Vitals:   Blood Pressure: 128/60 (01/19/18 2200)  Pulse: (!) 107 (01/19/18 2200)  Temperature: (!) 102 °F (38 9 °C) (01/19/18 1818)  Temp Source: Tympanic (01/19/18 1818)  Respirations: 18 (01/19/18 1818)  Height: 5' 2" (157 5 cm) (01/19/18 1818)  Weight - Scale: 105 kg (232 lb) (01/19/18 1818)  SpO2: 94 % (01/19/18 2200)    Physical Exam:   General: in no acute distress  HEENT: atraumatic, normocephalic  Skin: circular lesion on anterior right shin with no active drainage  CVS: tachycardic, no murmurs appreciated  Lungs: coarse rhonchi in bilateral upper lung fields, no wheezing   Abdomen: hard, distended, bowel sounds normal, nontender upon palpation, no guarding or rebound tenderness  Extremities: 2+ pitting edema to knees bilaterally, no calf swelling or tenderness  Neuro: alert and oriented x3  Psych:  cooperative      Lab Results: I have personally reviewed pertinent reports          Results from last 7 days  Lab Units 01/19/18 1919   WBC Thousand/uL 6 70   HEMOGLOBIN g/dL 12 9   HEMATOCRIT % 38 8   PLATELETS Thousands/uL 156   NEUTROS PCT % 76*   LYMPHS PCT % 14   MONOS PCT % 9   EOS PCT % 1       Results from last 7 days  Lab Units 01/19/18  1919   SODIUM mmol/L 139   POTASSIUM mmol/L 3 6   CHLORIDE mmol/L 97*   CO2 mmol/L 37*   BUN mg/dL 15   CREATININE mg/dL 0 79   CALCIUM mg/dL 8 8   TOTAL PROTEIN g/dL 7 0   BILIRUBIN TOTAL mg/dL 0 50   ALK PHOS U/L 72   ALT U/L 29   AST U/L 28   GLUCOSE RANDOM mg/dL 251*       Results from last 7 days  Lab Units 01/19/18  1919   INR  0 99       Imaging:     Xr Chest 1 View Portable    Result Date: 1/19/2018  Narrative: CHEST INDICATION:  Flu symptoms  COMPARISON:  October 8, 2017 VIEWS:   AP frontal IMAGES:  1 FINDINGS:     Heart shadow appears unremarkable  Atherosclerotic vascular calcifications are noted  The lungs are clear  No pneumothorax or pleural effusion  Visualized osseous structures appear within normal limits for the patient's age  Impression: No active pulmonary disease  Workstation performed: [id number]     Xr Hip/pelv 1 Vw Right If Performed    Result Date: 12/21/2017  Narrative: C-ARM -    INDICATION: Procedure guidance COMPARISON:  10/20/2017 TECHNIQUE: FLUOROSCOPY TIME:   8 SECONDS 1 FLUOROSCOPIC IMAGES FINDINGS: Fluoroscopy provided for performance of a pain management procedure in the right hip  Osseous and soft tissue detail limited by technique  Impression:      Please refer to the separate procedure notes for additional details  Workstation performed: [id number]     Ct Head Without Contrast    Result Date: 1/9/2018  Narrative: CT BRAIN - WITHOUT CONTRAST INDICATION:  Headache  COMPARISON:  10/10/2017 TECHNIQUE:  CT examination of the brain was performed  In addition to axial images, coronal reformatted images were created and submitted for interpretation  Radiation dose length product (DLP) for this visit:  1134 76 mGy-cm     This examination, like all CT scans performed in the [place], was performed utilizing techniques to minimize radiation dose exposure, including the use of iterative reconstruction and automated exposure control  IMAGE QUALITY:  Diagnostic  FINDINGS:  PARENCHYMA: No acute intracranial hemorrhage  No extra-axial fluid collection  No midline shift  Unchanged is the calcified lesion in the left parietal cortex seen most consistent with a meningioma  It measures approximately 1 6 x 1 4 cm in axial dimension, unchanged from prior examination  Mass effect on the left superior parietal lobule without substantial adjacent edema  Stable atrophy and patchy areas of periventricular hypoattenuation noted  While nonspecific, these likely reflect changes of chronic microvascular ischemia in a patient of this age  VENTRICLES AND EXTRA-AXIAL SPACES:  Ventricles are commensurate with the degree of volume loss  Basal cisterns are patent  Atherosclerotic calcification of the intracranial vasculature is noted  VISUALIZED ORBITS AND PARANASAL SINUSES:  Changes of bilateral lens replacements noted  The paranasal sinuses and mastoid air cells remain clear  CALVARIUM AND EXTRACRANIAL SOFT TISSUES:   Normal      Impression: No acute intracranial abnormality  Stable left parietal convexity meningioma  No demonstrable adjacent parenchymal edema  Workstation performed: [id number]     Ct Abdomen Pelvis With Contrast    Result Date: 1/9/2018  Narrative: CT ABDOMEN AND PELVIS WITH IV CONTRAST INDICATION:  Chronic abdominal pain  COMPARISON: 12/13/2017  TECHNIQUE:  CT examination of the abdomen and pelvis was performed  Reformatted images were created in axial, sagittal, and coronal planes  Radiation dose length product (DLP) for this visit:  734 605 387 mGy-cm   This examination, like all CT scans performed in the [facility], was performed utilizing techniques to minimize radiation dose exposure, including the use of iterative reconstruction and automated exposure control   IV Contrast:  100 mL of iohexol (OMNIPAQUE)         Enteric Contrast:  Enteric contrast was not administered  FINDINGS: ABDOMEN LOWER CHEST:  No significant abnormalities identified in the lower chest  LIVER/BILIARY TREE:  No biliary obstruction  GALLBLADDER:  Cholecystectomy  SPLEEN:  Unremarkable  PANCREAS:  Stable 11 mm cystic lesion in the body of the pancreas  Distal body and tail not visualized  Question prior resection  No pancreatic duct dilatation or evidence of pancreatitis  ADRENAL GLANDS:  Unremarkable  KIDNEYS/URETERS:  Stable fat-containing 15 mm right renal cortical lesion suggesting an angiomyolipoma  No evidence of pyelonephritis or obstructive uropathy  Lajean Cassette STOMACH AND BOWEL:  Stable moderate hiatal hernia  Fat-containing 4 cm periumbilical hernia, stable  Stool throughout the colon  No evidence of bowel obstruction, colitis or diverticulitis  APPENDIX:  No findings to suggest appendicitis  ABDOMINOPELVIC CAVITY:  No ascites or free intraperitoneal air  Stable shotty left para-aortic lymph nodes  VESSELS:  Unremarkable for patient's age  PELVIS REPRODUCTIVE ORGANS:  Patient is status post hysterectomy  URINARY BLADDER:  Unremarkable  ABDOMINAL WALL/INGUINAL REGIONS:  Unremarkable  OSSEOUS STRUCTURES:  No acute fracture or destructive osseous lesion  Impression: 1  Moderate amount of stool throughout the colon may indicate constipation  No evidence of bowel obstruction, colitis, diverticulitis or appendicitis  Moderate hiatal hernia and small fat-containing periumbilical hernia  2  Stable left millimeters cystic lesion in the body of the pancreas  No ductal dilatation  Possible  serous cystadenoma  3  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  No pyelonephritis or obstructive uropathy  Workstation performed: [id number]         Assessment/Plan    Zenia Palomo is a 77 y o  female with a PMH of Asthma, Obesity Hypoventilation Syndrome, DM, HTN, HLD and Hypothyroidism who presents with fever, chills, cough and shortness of breath      Acute Bronchitis with h/o Asthma and Obesity Hypoventilation Syndrome  - CXR reveals no acute process  She was given a dose of Tamiflu in the ED  At this time, will start on Ceftriaxone and Azithromycin, hold Tamiflu until rapid flu results obtained, order urine strep/legionella and start on Atrovent and Albuterol prn     Sepsis-POA as evidenced by fever and tachycardia  - LA is WNL  Resume management as above     HTN  - resume Norvasc and Lisinopril     HLD  - continue Statin    DM  - resume Insulin with SSI    Diastolic CHF  - resume Lasix and Lisinopril while tracking daily weights and I/Os    Hypothyroidism  - continue Synthroid    RLS  - resume [facility] Problem List:     Principal Problem:    Acute bronchitis  Active Problems:    T2DM (type 2 diabetes mellitus) ([facility] [address] )    Asthma    Restless leg    HTN (hypertension)    HLD (hyperlipidemia)    Hypothyroidism    Sepsis ([facility] )        VTE Prophylaxis: Heparin   Code Status: Prior    Anticipated Length of Stay:  Patient will be admitted on an Inpatient basis with an anticipated length of stay of atleast 2 midnights  Total Time for Visit, including Counseling / Coordination of Care: 30 minutes  Greater than 50% of this total time spent on direct patient counseling and coordination of care

## 2018-01-20 NOTE — PROGRESS NOTES
Patient complaining of headache and blurred vision, vital signs 98 1 temp, 86 Hr, 20 Respirations, 115/55 Bp, 96% O2 on 4L , paged Doctor  No new orders

## 2018-01-21 LAB
ALBUMIN SERPL BCP-MCNC: 2.8 G/DL (ref 3.5–5)
ALP SERPL-CCNC: 57 U/L (ref 46–116)
ALT SERPL W P-5'-P-CCNC: 27 U/L (ref 12–78)
ANION GAP SERPL CALCULATED.3IONS-SCNC: 3 MMOL/L (ref 4–13)
AST SERPL W P-5'-P-CCNC: 14 U/L (ref 5–45)
BASOPHILS # BLD AUTO: 0 THOUSANDS/ΜL (ref 0–0.1)
BASOPHILS NFR BLD AUTO: 0 % (ref 0–1)
BILIRUB SERPL-MCNC: 0.2 MG/DL (ref 0.2–1)
BUN SERPL-MCNC: 30 MG/DL (ref 5–25)
CALCIUM SERPL-MCNC: 8.1 MG/DL (ref 8.3–10.1)
CHLORIDE SERPL-SCNC: 98 MMOL/L (ref 100–108)
CO2 SERPL-SCNC: 36 MMOL/L (ref 21–32)
CREAT SERPL-MCNC: 1.02 MG/DL (ref 0.6–1.3)
EOSINOPHIL # BLD AUTO: 0 THOUSAND/ΜL (ref 0–0.61)
EOSINOPHIL NFR BLD AUTO: 0 % (ref 0–6)
ERYTHROCYTE [DISTWIDTH] IN BLOOD BY AUTOMATED COUNT: 15.3 % (ref 11.6–15.1)
GFR SERPL CREATININE-BSD FRML MDRD: 52 ML/MIN/1.73SQ M
GLUCOSE SERPL-MCNC: 368 MG/DL (ref 65–140)
GLUCOSE SERPL-MCNC: 382 MG/DL (ref 65–140)
GLUCOSE SERPL-MCNC: 388 MG/DL (ref 65–140)
GLUCOSE SERPL-MCNC: 420 MG/DL (ref 65–140)
GLUCOSE SERPL-MCNC: 425 MG/DL (ref 65–140)
GLUCOSE SERPL-MCNC: 441 MG/DL (ref 65–140)
HCT VFR BLD AUTO: 35.2 % (ref 37–47)
HGB BLD-MCNC: 11.4 G/DL (ref 12–16)
LYMPHOCYTES # BLD AUTO: 0.7 THOUSANDS/ΜL (ref 0.6–4.47)
LYMPHOCYTES NFR BLD AUTO: 8 % (ref 14–44)
MAGNESIUM SERPL-MCNC: 2.6 MG/DL (ref 1.6–2.6)
MCH RBC QN AUTO: 31 PG (ref 27–31)
MCHC RBC AUTO-ENTMCNC: 32.3 G/DL (ref 31.4–37.4)
MCV RBC AUTO: 96 FL (ref 82–98)
MONOCYTES # BLD AUTO: 0.3 THOUSAND/ΜL (ref 0.17–1.22)
MONOCYTES NFR BLD AUTO: 3 % (ref 4–12)
MRSA NOSE QL CULT: NORMAL
NEUTROPHILS # BLD AUTO: 8.1 THOUSANDS/ΜL (ref 1.85–7.62)
NEUTS SEG NFR BLD AUTO: 89 % (ref 43–75)
NRBC BLD AUTO-RTO: 0 /100 WBCS
PLATELET # BLD AUTO: 145 THOUSANDS/UL (ref 130–400)
PLATELET BLD QL SMEAR: ADEQUATE
PMV BLD AUTO: 8.7 FL (ref 8.9–12.7)
POTASSIUM SERPL-SCNC: 4.5 MMOL/L (ref 3.5–5.3)
PROT SERPL-MCNC: 6.1 G/DL (ref 6.4–8.2)
RBC # BLD AUTO: 3.67 MILLION/UL (ref 4.2–5.4)
SODIUM SERPL-SCNC: 137 MMOL/L (ref 136–145)
WBC # BLD AUTO: 9.1 THOUSAND/UL (ref 4.8–10.8)

## 2018-01-21 PROCEDURE — 92610 EVALUATE SWALLOWING FUNCTION: CPT

## 2018-01-21 PROCEDURE — 97167 OT EVAL HIGH COMPLEX 60 MIN: CPT

## 2018-01-21 PROCEDURE — G8996 SWALLOW CURRENT STATUS: HCPCS

## 2018-01-21 PROCEDURE — 82948 REAGENT STRIP/BLOOD GLUCOSE: CPT

## 2018-01-21 PROCEDURE — 80053 COMPREHEN METABOLIC PANEL: CPT | Performed by: INTERNAL MEDICINE

## 2018-01-21 PROCEDURE — 94760 N-INVAS EAR/PLS OXIMETRY 1: CPT

## 2018-01-21 PROCEDURE — 97535 SELF CARE MNGMENT TRAINING: CPT

## 2018-01-21 PROCEDURE — G8997 SWALLOW GOAL STATUS: HCPCS

## 2018-01-21 PROCEDURE — G8987 SELF CARE CURRENT STATUS: HCPCS

## 2018-01-21 PROCEDURE — G8988 SELF CARE GOAL STATUS: HCPCS

## 2018-01-21 PROCEDURE — 83735 ASSAY OF MAGNESIUM: CPT | Performed by: INTERNAL MEDICINE

## 2018-01-21 PROCEDURE — 94640 AIRWAY INHALATION TREATMENT: CPT

## 2018-01-21 PROCEDURE — 85025 COMPLETE CBC W/AUTO DIFF WBC: CPT | Performed by: INTERNAL MEDICINE

## 2018-01-21 RX ORDER — MAGNESIUM HYDROXIDE/ALUMINUM HYDROXICE/SIMETHICONE 120; 1200; 1200 MG/30ML; MG/30ML; MG/30ML
30 SUSPENSION ORAL EVERY 4 HOURS PRN
Status: DISCONTINUED | OUTPATIENT
Start: 2018-01-21 | End: 2018-01-30 | Stop reason: HOSPADM

## 2018-01-21 RX ORDER — METHYLPREDNISOLONE SODIUM SUCCINATE 40 MG/ML
20 INJECTION, POWDER, LYOPHILIZED, FOR SOLUTION INTRAMUSCULAR; INTRAVENOUS EVERY 8 HOURS SCHEDULED
Status: DISCONTINUED | OUTPATIENT
Start: 2018-01-21 | End: 2018-01-24

## 2018-01-21 RX ORDER — AMOXICILLIN 250 MG
2 CAPSULE ORAL
Status: DISCONTINUED | OUTPATIENT
Start: 2018-01-21 | End: 2018-01-22

## 2018-01-21 RX ORDER — POLYETHYLENE GLYCOL 3350 17 G/17G
17 POWDER, FOR SOLUTION ORAL DAILY
Status: DISCONTINUED | OUTPATIENT
Start: 2018-01-21 | End: 2018-01-22

## 2018-01-21 RX ADMIN — INSULIN LISPRO 8 UNITS: 100 INJECTION, SOLUTION INTRAVENOUS; SUBCUTANEOUS at 18:28

## 2018-01-21 RX ADMIN — INSULIN LISPRO 8 UNITS: 100 INJECTION, SOLUTION INTRAVENOUS; SUBCUTANEOUS at 11:46

## 2018-01-21 RX ADMIN — HEPARIN SODIUM 5000 UNITS: 5000 INJECTION, SOLUTION INTRAVENOUS; SUBCUTANEOUS at 05:46

## 2018-01-21 RX ADMIN — LEVALBUTEROL HYDROCHLORIDE 1.25 MG: 1.25 SOLUTION, CONCENTRATE RESPIRATORY (INHALATION) at 17:14

## 2018-01-21 RX ADMIN — ISODIUM CHLORIDE 3 ML: 0.03 SOLUTION RESPIRATORY (INHALATION) at 17:04

## 2018-01-21 RX ADMIN — POLYETHYLENE GLYCOL 3350 17 G: 17 POWDER, FOR SOLUTION ORAL at 11:24

## 2018-01-21 RX ADMIN — PRAMIPEXOLE DIHYDROCHLORIDE 1 MG: 0.5 TABLET ORAL at 08:56

## 2018-01-21 RX ADMIN — METHYLPREDNISOLONE SODIUM SUCCINATE 20 MG: 40 INJECTION, POWDER, FOR SOLUTION INTRAMUSCULAR; INTRAVENOUS at 13:47

## 2018-01-21 RX ADMIN — INSULIN LISPRO 6 UNITS: 100 INJECTION, SOLUTION INTRAVENOUS; SUBCUTANEOUS at 07:34

## 2018-01-21 RX ADMIN — PRAMIPEXOLE DIHYDROCHLORIDE 1 MG: 0.5 TABLET ORAL at 18:30

## 2018-01-21 RX ADMIN — AZITHROMYCIN 500 MG: 250 TABLET, FILM COATED ORAL at 21:42

## 2018-01-21 RX ADMIN — LEVALBUTEROL HYDROCHLORIDE 1.25 MG: 1.25 SOLUTION, CONCENTRATE RESPIRATORY (INHALATION) at 08:19

## 2018-01-21 RX ADMIN — LISINOPRIL 10 MG: 10 TABLET ORAL at 08:56

## 2018-01-21 RX ADMIN — OXYMETAZOLINE HYDROCHLORIDE 2 SPRAY: 5 SPRAY NASAL at 21:43

## 2018-01-21 RX ADMIN — ASPIRIN 325 MG: 325 TABLET ORAL at 08:56

## 2018-01-21 RX ADMIN — ISODIUM CHLORIDE 3 ML: 0.03 SOLUTION RESPIRATORY (INHALATION) at 08:19

## 2018-01-21 RX ADMIN — INSULIN LISPRO 8 UNITS: 100 INJECTION, SOLUTION INTRAVENOUS; SUBCUTANEOUS at 07:33

## 2018-01-21 RX ADMIN — LEVOTHYROXINE SODIUM 125 MCG: 125 TABLET ORAL at 05:46

## 2018-01-21 RX ADMIN — METHYLPREDNISOLONE SODIUM SUCCINATE 20 MG: 40 INJECTION, POWDER, FOR SOLUTION INTRAMUSCULAR; INTRAVENOUS at 05:46

## 2018-01-21 RX ADMIN — INSULIN GLARGINE 50 UNITS: 100 INJECTION, SOLUTION SUBCUTANEOUS at 21:43

## 2018-01-21 RX ADMIN — METHYLPREDNISOLONE SODIUM SUCCINATE 20 MG: 40 INJECTION, POWDER, FOR SOLUTION INTRAMUSCULAR; INTRAVENOUS at 21:42

## 2018-01-21 RX ADMIN — HEPARIN SODIUM 5000 UNITS: 5000 INJECTION, SOLUTION INTRAVENOUS; SUBCUTANEOUS at 13:47

## 2018-01-21 RX ADMIN — ONDANSETRON 4 MG: 2 INJECTION INTRAMUSCULAR; INTRAVENOUS at 23:23

## 2018-01-21 RX ADMIN — INSULIN LISPRO 6 UNITS: 100 INJECTION, SOLUTION INTRAVENOUS; SUBCUTANEOUS at 18:27

## 2018-01-21 RX ADMIN — GUAIFENESIN AND DEXTROMETHORPHAN 10 ML: 100; 10 SYRUP ORAL at 13:47

## 2018-01-21 RX ADMIN — Medication 2 TABLET: at 21:42

## 2018-01-21 RX ADMIN — ACETAMINOPHEN 650 MG: 325 TABLET, FILM COATED ORAL at 18:26

## 2018-01-21 RX ADMIN — ACETAMINOPHEN 650 MG: 325 TABLET, FILM COATED ORAL at 11:24

## 2018-01-21 RX ADMIN — OSELTAMIVIR PHOSPHATE 30 MG: 30 CAPSULE ORAL at 21:41

## 2018-01-21 RX ADMIN — ISODIUM CHLORIDE 3 ML: 0.03 SOLUTION RESPIRATORY (INHALATION) at 20:40

## 2018-01-21 RX ADMIN — PRAMIPEXOLE DIHYDROCHLORIDE 1 MG: 0.5 TABLET ORAL at 21:42

## 2018-01-21 RX ADMIN — INSULIN LISPRO 3 UNITS: 100 INJECTION, SOLUTION INTRAVENOUS; SUBCUTANEOUS at 01:35

## 2018-01-21 RX ADMIN — OSELTAMIVIR PHOSPHATE 30 MG: 30 CAPSULE ORAL at 08:56

## 2018-01-21 RX ADMIN — ACETAMINOPHEN 650 MG: 325 TABLET, FILM COATED ORAL at 05:46

## 2018-01-21 RX ADMIN — ACETAMINOPHEN 650 MG: 325 TABLET, FILM COATED ORAL at 23:23

## 2018-01-21 RX ADMIN — OXYMETAZOLINE HYDROCHLORIDE 2 SPRAY: 5 SPRAY NASAL at 11:24

## 2018-01-21 RX ADMIN — INSULIN LISPRO 18 UNITS: 100 INJECTION, SOLUTION INTRAVENOUS; SUBCUTANEOUS at 22:01

## 2018-01-21 RX ADMIN — LEVALBUTEROL HYDROCHLORIDE 1.25 MG: 1.25 SOLUTION, CONCENTRATE RESPIRATORY (INHALATION) at 20:41

## 2018-01-21 RX ADMIN — HEPARIN SODIUM 5000 UNITS: 5000 INJECTION, SOLUTION INTRAVENOUS; SUBCUTANEOUS at 21:43

## 2018-01-21 RX ADMIN — INSULIN LISPRO 6 UNITS: 100 INJECTION, SOLUTION INTRAVENOUS; SUBCUTANEOUS at 11:46

## 2018-01-21 RX ADMIN — GUAIFENESIN AND DEXTROMETHORPHAN 10 ML: 100; 10 SYRUP ORAL at 20:14

## 2018-01-21 RX ADMIN — ATORVASTATIN CALCIUM 20 MG: 20 TABLET, FILM COATED ORAL at 18:27

## 2018-01-21 NOTE — PROGRESS NOTES
[facility] Progress Note  Patient: Elizabet Rodriguez 77 y o  female   MRN: [id number]  PCP: Roseline Lombardo MD  Unit/Bed#: [address] [place] [address] Encounter: [id number]  Date Of Visit: 01/21/18    Problem List:    Principal Problem:    Acute bronchitis  Active Problems:    T2DM (type 2 diabetes mellitus) ([facility] )    Asthma    Restless leg    HTN (hypertension)    HLD (hyperlipidemia)    Hypothyroidism    Sepsis ([facility] )      Assessment & Plan:    1  Acute bronchitis, likely secondary to influenza A, less likely bacterial-continue Tamiflu (renally dosed) and azithromycin  Ceftriaxone was discontinued  Sputum culture, if possible  Blood cultures are negative so far  Urine Legionella pneumococcal antigen are negative  Continue bronchodilators, will decrease IV steroids  Monitor respiratory status  Continue symptomatic treatment  Pulmonary evaluation appreciated  2  Acute on chronic hypoxic hypercapnic respiratory failure secondary to 1 and underlying obesity hypoventilation-continue supplemental oxygen  BiPAP as needed  3  Hypokalemia-improved, monitor  4  Diabetes mellitus type 2 with hemoglobin A1c of 9 2-uncontrolled secondary to steroid  Will decrease IV steroids today  Continue Lantus, NovoLog and corrective scale while in the hospital   Monitor and adjust as needed  5  Hypotension secondary to volume depletion and acute illness-improving  Lisinopril with holding parameters  Monitor blood pressure  6  Chronic diastolic CHF, chronic lower extremity edema-no evidence of volume overload at present  Continue to hold Lasix and Zaroxolyn at present, resume in a m  if blood pressure remains stable  7  Dyslipidemia-on Lipitor  8  History of TIA-continue aspirin and Lipitor  9  Hypothyroidism, post thyroidectomy-on Synthroid  10  Restless leg syndrome-on Mirapex  11   History of ESBL Klebsiella bacteremia      VTE Pharmacologic Prophylaxis:   Pharmacologic: Heparin  Mechanical VTE Prophylaxis in Place: Yes    Patient Centered Rounds: I have performed bedside rounds with nursing staff today  Discussions with Specialists or Other Care Team Provider: Yes    Education and Discussions with Family / Patient:Yes    Time Spent for Care: 1 hour  More than 50% of total time spent on counseling and coordination of care as described above  Current Length of Stay: 2 day(s)    Current Patient Status: Inpatient , continues require close monitoring of the respiratory status    Discharge Plan:  Pending at present    Code Status: Level 3 - DNAR and DNI      Subjective:   Feels overall better today  Less shortness of breath and cough  Reports nasal congestion and right-sided maxillary sinus fullness      Objective:   Appears comfortable    Negative for Chest Pain, Palpitations, Abdominal Pain, Nausea, Vomiting, Diarrhea, Dizziness  All other 10 review of systems negative and without drastic interval changes from yesterday  Vitals:   Temp (24hrs), Av °F (36 7 °C), Min:97 4 °F (36 3 °C), Max:99 2 °F (37 3 °C)    HR:  [63-98] 63  Resp:  [18-22] 20  BP: ()/(53-60) 122/58  SpO2:  [94 %-98 %] 98 %  Body mass index is 44 27 kg/m²  Input and Output Summary (last 24 hours): Intake/Output Summary (Last 24 hours) at 18 0959  Last data filed at 18 0955   Gross per 24 hour   Intake              100 ml   Output             1600 ml   Net            -1500 ml       Physical Exam:     Physical Exam   Constitutional: She is oriented to person, place, and time  She appears well-developed  No distress  Morbidly obese   HENT:   Head: Normocephalic and atraumatic  Nose: Mucosal edema present  Right sinus exhibits no maxillary sinus tenderness  Eyes: Conjunctivae and EOM are normal  Pupils are equal, round, and reactive to light  Neck: Normal range of motion  Neck supple  No JVD present  Cardiovascular: Normal rate and regular rhythm  Exam reveals no gallop and no friction rub      Murmur heard   Pulmonary/Chest: Effort normal  No respiratory distress  She has decreased breath sounds  She has wheezes (Improving)  She has no rales  She exhibits no tenderness  Abdominal: Soft  Bowel sounds are normal  She exhibits no distension  There is no tenderness  There is no rebound and no guarding  Musculoskeletal: She exhibits edema (Chronic low bilateral lower extremity venous stasis/edema)  Neurological: She is alert and oriented to person, place, and time  No cranial nerve deficit  Skin: Skin is warm and dry  No rash noted  Psychiatric: She has a normal mood and affect  Additional Data:     Labs:      Results from last 7 days  Lab Units 01/21/18  0519   WBC Thousand/uL 9 10   HEMOGLOBIN g/dL 11 4*   HEMATOCRIT % 35 2*   PLATELETS Thousands/uL 145   NEUTROS PCT % 89*   LYMPHS PCT % 8*   MONOS PCT % 3*   EOS PCT % 0       Results from last 7 days  Lab Units 01/21/18  0519   SODIUM mmol/L 137   POTASSIUM mmol/L 4 5   CHLORIDE mmol/L 98*   CO2 mmol/L 36*   BUN mg/dL 30*   CREATININE mg/dL 1 02   CALCIUM mg/dL 8 1*   TOTAL PROTEIN g/dL 6 1*   BILIRUBIN TOTAL mg/dL 0 20   ALK PHOS U/L 57   ALT U/L 27   AST U/L 14   GLUCOSE RANDOM mg/dL 425*       Results from last 7 days  Lab Units 01/19/18  1919   INR  0 99       * I Have Reviewed All Lab Data Listed Above  * Additional Pertinent Lab Tests Reviewed: All Labs For Current Hospital Admission Reviewed    Imaging:  Xr Chest 1 View Portable    Result Date: 1/19/2018  Narrative: CHEST INDICATION:  Flu symptoms  COMPARISON:  October 8, 2017 VIEWS:   AP frontal IMAGES:  1 FINDINGS:     Heart shadow appears unremarkable  Atherosclerotic vascular calcifications are noted  The lungs are clear  No pneumothorax or pleural effusion  Visualized osseous structures appear within normal limits for the patient's age  Impression: No active pulmonary disease   Workstation performed: [id number]     Xr Hip/pelv 1 Vw Right If Performed    Result Date: 12/21/2017  Narrative: C-ARM -    INDICATION: Procedure guidance COMPARISON:  10/20/2017 TECHNIQUE: FLUOROSCOPY TIME:   8 SECONDS 1 FLUOROSCOPIC IMAGES FINDINGS: Fluoroscopy provided for performance of a pain management procedure in the right hip  Osseous and soft tissue detail limited by technique  Impression:      Please refer to the separate procedure notes for additional details  Workstation performed: [id number]     Ct Head Without Contrast    Result Date: 1/9/2018  Narrative: CT BRAIN - WITHOUT CONTRAST INDICATION:  Headache  COMPARISON:  10/10/2017 TECHNIQUE:  CT examination of the brain was performed  In addition to axial images, coronal reformatted images were created and submitted for interpretation  Radiation dose length product (DLP) for this visit:  1134 76 mGy-cm   This examination, like all CT scans performed in the [facility], was performed utilizing techniques to minimize radiation dose exposure, including the use of iterative reconstruction and automated exposure control  IMAGE QUALITY:  Diagnostic  FINDINGS:  PARENCHYMA: No acute intracranial hemorrhage  No extra-axial fluid collection  No midline shift  Unchanged is the calcified lesion in the left parietal cortex seen most consistent with a meningioma  It measures approximately 1 6 x 1 4 cm in axial dimension, unchanged from prior examination  Mass effect on the left superior parietal lobule without substantial adjacent edema  Stable atrophy and patchy areas of periventricular hypoattenuation noted  While nonspecific, these likely reflect changes of chronic microvascular ischemia in a patient of this age  VENTRICLES AND EXTRA-AXIAL SPACES:  Ventricles are commensurate with the degree of volume loss  Basal cisterns are patent  Atherosclerotic calcification of the intracranial vasculature is noted  VISUALIZED ORBITS AND PARANASAL SINUSES:  Changes of bilateral lens replacements noted   The paranasal sinuses and mastoid air cells remain clear  CALVARIUM AND EXTRACRANIAL SOFT TISSUES:   Normal      Impression: No acute intracranial abnormality  Stable left parietal convexity meningioma  No demonstrable adjacent parenchymal edema  Workstation performed: [id number]     Ct Abdomen Pelvis With Contrast    Result Date: 1/9/2018  Narrative: CT ABDOMEN AND PELVIS WITH IV CONTRAST INDICATION:  Chronic abdominal pain  COMPARISON: 12/13/2017  TECHNIQUE:  CT examination of the abdomen and pelvis was performed  Reformatted images were created in axial, sagittal, and coronal planes  Radiation dose length product (DLP) for this visit:  734 605 387 mGy-cm   This examination, like all CT scans performed in the [facility], was performed utilizing techniques to minimize radiation dose exposure, including the use of iterative reconstruction and automated exposure control  IV Contrast:  100 mL of iohexol (OMNIPAQUE)         Enteric Contrast:  Enteric contrast was not administered  FINDINGS: ABDOMEN LOWER CHEST:  No significant abnormalities identified in the lower chest  LIVER/BILIARY TREE:  No biliary obstruction  GALLBLADDER:  Cholecystectomy  SPLEEN:  Unremarkable  PANCREAS:  Stable 11 mm cystic lesion in the body of the pancreas  Distal body and tail not visualized  Question prior resection  No pancreatic duct dilatation or evidence of pancreatitis  ADRENAL GLANDS:  Unremarkable  KIDNEYS/URETERS:  Stable fat-containing 15 mm right renal cortical lesion suggesting an angiomyolipoma  No evidence of pyelonephritis or obstructive uropathy  Chris [place] STOMACH AND BOWEL:  Stable moderate hiatal hernia  Fat-containing 4 cm periumbilical hernia, stable  Stool throughout the colon  No evidence of bowel obstruction, colitis or diverticulitis  APPENDIX:  No findings to suggest appendicitis  ABDOMINOPELVIC CAVITY:  No ascites or free intraperitoneal air  Stable shotty left para-aortic lymph nodes  VESSELS:  Unremarkable for patient's age   PELVIS REPRODUCTIVE ORGANS:  Patient is status post hysterectomy  URINARY BLADDER:  Unremarkable  ABDOMINAL WALL/INGUINAL REGIONS:  Unremarkable  OSSEOUS STRUCTURES:  No acute fracture or destructive osseous lesion  Impression: 1  Moderate amount of stool throughout the colon may indicate constipation  No evidence of bowel obstruction, colitis, diverticulitis or appendicitis  Moderate hiatal hernia and small fat-containing periumbilical hernia  2  Stable left millimeters cystic lesion in the body of the pancreas  No ductal dilatation  Possible  serous cystadenoma  3  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  No pyelonephritis or obstructive uropathy  Workstation performed: [id number]     Imaging Reports Reviewed by myself    Cultures:   Blood Culture:   Lab Results   Component Value Date    BLOODCX No Growth at 24 hrs  01/19/2018    BLOODCX No Growth at 24 hrs  01/19/2018    BLOODCX No Growth After 5 Days  06/11/2017    BLOODCX No Growth After 5 Days   06/11/2017    BLOODCX Klebsiella pneumoniae - ESBL (A) 06/07/2017    BLOODCX Klebsiella pneumoniae ESBL (A) 06/07/2017     Urine Culture:   Lab Results   Component Value Date    URINECX 20,000-29,000 cfu/ml Mixed Contaminants X3 04/15/2017     Sputum Culture: No components found for: SPUTUMCX  Wound Culture: No results found for: WOUNDCULT    Last 24 Hours Medication List:     acetaminophen 650 mg Oral Q6H [address]   aspirin 325 mg Oral Daily   atorvastatin 20 mg Oral Daily With Dinner   azithromycin 500 mg Oral Daily   heparin (porcine) 5,000 Units Subcutaneous Q8H [address]   insulin glargine 50 Units Subcutaneous HS   insulin lispro 1-5 Units Subcutaneous HS   insulin lispro 1-6 Units Subcutaneous TID AC   insulin lispro 8 Units Subcutaneous TID With Meals   levalbuterol 1 25 mg Nebulization 4x Daily   And      sodium chloride 3 mL Nebulization 4x Daily   levothyroxine 125 mcg Oral Early Morning   lisinopril 10 mg Oral Daily   methylPREDNISolone sodium succinate 20 mg Intravenous Q8H [address]   oseltamivir 30 mg Oral Q12H [address]   oxymetazoline 2 spray Each Nare Q12H [address]   pramipexole 1 mg Oral TID        Today, Patient Was Seen By: Reid Maria MD    ** Please Note: Dragon 360 Dictation voice to text software may have been used in the creation of this document   **

## 2018-01-21 NOTE — OCCUPATIONAL THERAPY NOTE
Occupational Therapy Evaluation/Treatment     01/21/18 1015   Restrictions/Precautions   Other Precautions O2;Fall Risk; Chair Alarm; Bed Alarm   Pain Assessment   Pain Assessment No/denies pain   Home Living   Type of [address] One level  (no steps to enter )   Bathroom Shower/Tub Walk-in shower   Bathroom Equipment Grab bars in [facility]   Additional Comments pt uses RW outside home   Prior Function   Level of [place] Independent with ADLs and functional mobility; Needs assistance with IADLs  (supervision for showering and putting cream on legs)   Lives With Spouse   Receives Help From Family   ADL Assistance Independent  (supervision for shower )   IADLs Needs assistance   Comments Patient reports she has PT/OT at home for 6 weeks  Patient reports assist from family/daughters for cleaning, meds and appoinments  Patient does some cooking     ADL   Eating Assistance 5  Supervision/Setup   Grooming Assistance 5  Supervision/Setup   UB Bathing Assistance 4  Minimal Assistance   LB Bathing Assistance 4  Minimal Assistance   UB Dressing Assistance 4  Minimal Assistance   LB Dressing Assistance 4  Minimal Assistance   Toileting Assistance  4  Minimal Assistance   Bed Mobility   Sit to Supine 5  Supervision   Transfers   Sit to Stand 4  Minimal assistance   Stand to Sit 4  Minimal assistance   Stand pivot 4  Minimal assistance   Functional Mobility   Functional Mobility 5  Supervision   Additional Comments 10 feet   Additional items Rolling walker   Balance   Static Sitting Fair +   Dynamic Sitting Fair   Static Standing Fair   Dynamic Standing Fair -   Activity Tolerance   Activity Tolerance Patient limited by fatigue  (SOB)   Nurse Made Aware yes Cricket Forreston Assessment   RUE Assessment WFL  (4-/5)   LUE Assessment   LUE Assessment WFL  (4-/5)   Cognition   Overall Cognitive Status WFL   Arousal/Participation Cooperative   Attention Within functional limits   Orientation Level Oriented X4   Following Commands Follows all commands and directions without difficulty   Assessment   Limitation Decreased ADL status; Decreased UE strength;Decreased Safe judgement during ADL;Decreased endurance;Decreased self-care trans;Decreased high-level ADLs  (decreased balance and mobility )   Prognosis Good   Assessment Patient evaluated by Occupational Therapy  Patient admitted with Acute bronchitis  The patients occupational profile, medical and therapy history includes a extensive additional review of physical, cognitive, or psychosocial history related to current functional performance  Comorbidities affecting functional mobility and ADLS include:thyroid cancer, RLS, arthritis, diabetes, hypertension and TIA  Prior to admission, patient was independent with functional mobility with walker, supervision for showering,, independent with ADLS and requiring assist for IADLS  The evaluation identifies the following performance deficits: weakness, impaired balance, decreased endurance, increased fall risk, new onset of impairment of functional mobility, decreased ADLS, decreased IADLS, decreased activity tolerance, decreased safety awareness, impaired judgement, SOB upon exertion and decreased strength, that result in activity limitations and/or participation restrictions  This evaluation requires clinical decision making of high complexity, because the patient presents with comorbidites that affect occupational performance and required significant modification of tasks or assistance with consideration of multiple treatment options  The Barthel Index was used as a functional outcome tool presenting with a score of 50, indicating marked limitations of functional mobility and ADLS  Patient will benefit from skilled Occupational Therapy services to address above deficits and facilitate a safe return to prior level of function     Goals   Patient Goals feel better, go home   STG Time Frame (1-7 days)   Short Term Goal  Patient will increase standing tolerance to 3 minutes during functional activity; Patient will increase bed mobility to independent; Patient will increase functional mobility to and from bathroom with rolling walker independently to increase performance with ADLS; Patient will tolerate 8 minutes of UE ROM/strengthening to increase general activity tolerance and performance in ADLS/IADLS; Patient will improve functional activity tolerance to 10 minutes of sustained functional tasks to increase participation in basic self-care and decrease assistance level  LTG Time Frame (8-14 days)   Long Term Goal Patient will increase standing tolerance to 6 minutes during functional activity; Patient will tolerate 12 minutes of UE ROM/strengthening to increase general activity tolerance and performance in ADLS/IADLS; Patient will improve functional activity tolerance to 20 minutes of sustained functional tasks to increase participation in basic self-care and decrease assistance level  Functional Transfer Goals   Pt Will Perform All Functional Transfers (STG supervision LTG independent )   ADL Goals   Pt Will Perform Eating (STG independent )   Pt Will Perform Grooming (STG independent )   Pt Will Perform Bathing (STG supervision LTG independent )   Pt Will Perform UE Dressing (STG supervision LTG independent )   Pt Will Perform LE Dressing (STG supervision LTG independent )   Pt Will Perform Toileting (STG supervision LTG independent )   Plan   Treatment Interventions ADL retraining;Functional transfer training;UE strengthening/ROM; Endurance training;Patient/family training;Equipment evaluation/education; Activityengagement; Energy conservation   OT Frequency 3-5x/wk   Additional Treatment Session   Start Time 1005   End Time 8432   Treatment Assessment Patient completed ADL  Pt washed face with setup independent  UE bathing with setup independent    Sit to stand from commode min assist   Standing for nursing to complete LE bathing and hygiene with min assist for balance and dependent for bathing  Patient reports prior to OTs arrival pt completed perineal hygiene but needed further assist   Functional mobility 60 feet with RW supervision without O2  Patients O2 s/p functional mobility was 92%  Tolerated well  Patient is cooperative and motivated  Patient is generally weak and deconditioned  Patient is SOB with activity and would benefit from skilled OT services for endurance training, ADLS and IADLS       Recommendation   OT Discharge Recommendation Home with family support  (home PT/OT)   Barthel Index   Feeding 10   Bathing 0   Grooming Score 0   Dressing Score 5   Bladder Score 10   Bowels Score 10   Toilet Use Score 5   Transfers (Bed/Chair) Score 10   Mobility (Level Surface) Score 0   Stairs Score 0   Barthel Index Score 50

## 2018-01-21 NOTE — SPEECH THERAPY NOTE
Speech Language/Pathology     01/21/18 1621   Swallow Information   Current Risks for Dysphagia & Aspiration Respiratory compromise;General debilitation  (Pt  reports difficulty swallowing pills "for awhile"  )   Current Symptoms/Concerns (Pt  reports difficulty swallowing pills  Applesauce helps )   Current Diet Regular; Thin liquid  (Pt  reports reduced appetite, impaired taste buds )   Baseline Diet Regular; Thin liquids   Baseline Assessment   Behavior/Cognition Alert; Cooperative; Interactive;Lethargic   Speech/Language Status (WNL )   Patient Positioning Upright in bed   Swallow Mechanism Exam   Labial Symmetry WFL   Labial Strength WFL   Labial ROM WFL   Labial Sensation WFL   Facial Symmetry WFL   Facial Strength WFL   Facial ROM WFL   Facial Sensation WFL  (Pt  reports swelling in right interior cheek )   Lingual Symmetry WFL   Lingual Strength WFL   Lingual ROM WFL   Lingual Sensation WFL  (Pt  reports reduced taste )   Velum WFL   Gag (did not assess )   Mandible WFL   Dentition Adequate   Volitional Cough Strong   Tracheostomy No   Consistencies Assessed and Performance   Materials Admnistered Regular/Solid; Thin liquid   Materials Adminstered Comment (see above )   Oral Stage WFL   Phargngeal Stage WFL   Swallow Mechanics WFL;Swallow initation; Appears prompt;Good Larygneal rise   Esophageal Concerns No s/s reported   Strategies and Efficacy (Pt  reports good ability to swallow pills in applesauce )   Summary   Swallow Summary (Swallow skills safe/WFL regular solids/thin liquids )   Recommendations   Risk for Aspiration Mild   Recommendations Consider oral diet; Dysphagia treatment   Diet Solid Recommendation Regular consistency   Diet Liquid Recommendation Thin liquid   Recommended Form of Meds Whole; With puree   General Precautions Aspiration precautions;Upright as possible for all oral intake;Remain upright for 45 mins after meals   Compensatory Swallowing Strategies External pacing   Further Evaluations (MD may examine interior right cheek for swelling )   Results Reviewed with RN;PT/Family/Caregiver   Treatment Recommendations   Duration of treatment (one day )   Follow up treatments Assure diet tolerance   Dysphagia Goals Patient will tolerate recommended diet without observed clinical signs of oral/pharngeal dysphagia; Patient will utilize appropriate strategies for swallowing safety   Speech Therapy Prognosis   Prognosis Good   Prognosis Considerations Patient Participation Level; Availability of Services;Previous Level of Function

## 2018-01-21 NOTE — PLAN OF CARE
Problem: DISCHARGE PLANNING - CARE MANAGEMENT  Goal: Discharge to post-acute care or home with appropriate resources  INTERVENTIONS:  - Conduct assessment to determine patient/family and health care team treatment goals, and need for post-acute services based on payer coverage, community resources, and patient preferences, and barriers to discharge  - Address psychosocial, clinical, and financial barriers to discharge as identified in assessment in conjunction with the patient/family and health care team  - Arrange appropriate level of post-acute services according to patient's   needs and preference and payer coverage in collaboration with the physician and health care team  - Communicate with and update the patient/family, physician, and health care team regarding progress on the discharge plan  - Arrange appropriate transportation to post-acute venues  Return to home with VNA services  Outcome: Progressing

## 2018-01-21 NOTE — PROGRESS NOTES
Progress Note - Pulmonary   Laurence Zamarripa 77 y o  female MRN: [id number]  Unit/Bed#: [address] [place] [address] Encounter: [id number]    Assessment:  Acute influenza A tracheobronchitis  Asthmatic bronchitis secondary influenza a  Chronic hypercapnic hypoxemic respiratory failure secondary obesity alveolar hypoventilation  Patient does use 2 L of oxygen at bedtime  Chronic diastolic cardiac dysfunction  Morbid obesity  Diabetes mellitus type 2    Plan:  Would continue methylprednisolone  Patient on 20 mg IV every 8 hours  Continue course of Tamiflu as prescribed  Nebulizer treatments with left albuterol 4 times daily  Patient is needing oxygen during the day  Normally she is only on 2 L at bedtime  Five day course of p o  azithromycin for possible secondary bacterial bronchitis      Subjective:   Has some nonproductive cough  Also has shortness of breath with activity    Objective:     Vitals: Blood pressure 139/64, pulse 82, temperature 97 9 °F (36 6 °C), temperature source Oral, resp  rate 19, height 5' 2" (1 575 m), weight 110 kg (242 lb 1 oz), SpO2 96 %, not currently breastfeeding  ,Body mass index is 44 27 kg/m²  Intake/Output Summary (Last 24 hours) at 01/21/18 1331  Last data filed at 01/21/18 0955   Gross per 24 hour   Intake              100 ml   Output             1600 ml   Net            -1500 ml       Physical Exam: Physical Exam   Constitutional: She is oriented to person, place, and time  Patient is sitting at bedside with oxygen off  Room air O2 saturation is 87% with 2 L O2 saturation is 93%   HENT:   Head: Normocephalic  Nose: Nose normal    Mouth/Throat: Oropharynx is clear and moist  No oropharyngeal exudate  Eyes: Conjunctivae are normal    Neck: Neck supple  No JVD present  Cardiovascular: Normal rate, regular rhythm and normal heart sounds  Pulmonary/Chest: Effort normal    Lung sounds reveal few expiratory wheezes in mid to lower lung zones   Abdominal: Soft  She exhibits no distension  There is no tenderness  There is no guarding  Musculoskeletal:   Mild edema lower extremities   Lymphadenopathy:     She has no cervical adenopathy  Neurological: She is alert and oriented to person, place, and time  Skin: Skin is warm and dry  She is not diaphoretic  Psychiatric: She has a normal mood and affect  Her behavior is normal         Labs: I have personally reviewed pertinent lab results  , ABG: Lab Results   Component Value Date    PHART 7 390 01/20/2018    NIH1FMG 59 7 (HH) 01/20/2018    PO2ART 79 8 01/20/2018    UMC5XFS 35 3 (H) 01/20/2018    BEART 8 4 01/20/2018    SOURCE Radial, Left 01/20/2018   , BNP: No results found for: BNP, CBC: Lab Results   Component Value Date    WBC 9 10 01/21/2018    HGB 11 4 (L) 01/21/2018    HCT 35 2 (L) 01/21/2018    MCV 96 01/21/2018     01/21/2018    ADJUSTEDWBC 10 30 04/04/2016    MCH 31 0 01/21/2018    MCHC 32 3 01/21/2018    RDW 15 3 (H) 01/21/2018    MPV 8 7 (L) 01/21/2018    NRBC 0 01/21/2018   , CMP: Lab Results   Component Value Date     01/21/2018     01/08/2016    K 4 5 01/21/2018    K 3 9 01/08/2016    CL 98 (L) 01/21/2018     01/08/2016    CO2 36 (H) 01/21/2018    CO2 34 (H) 01/08/2016    ANIONGAP 3 (L) 01/21/2018    ANIONGAP 10 8 01/08/2016    BUN 30 (H) 01/21/2018    BUN 15 01/08/2016    CREATININE 1 02 01/21/2018    CREATININE 0 9 01/08/2016    GLUCOSE 425 (H) 01/21/2018    GLUCOSE 164 (H) 01/08/2016    CALCIUM 8 1 (L) 01/21/2018    CALCIUM 8 5 01/08/2016    AST 14 01/21/2018    AST 16 01/08/2016    ALT 27 01/21/2018    ALT 29 01/08/2016    ALKPHOS 57 01/21/2018    ALKPHOS 72 01/08/2016    PROT 6 1 (L) 01/21/2018    PROT 7 0 01/08/2016    ALBUMIN 2 8 (L) 01/21/2018    ALBUMIN 3 7 01/08/2016    BILITOT 0 20 01/21/2018    BILITOT 0 3 01/08/2016    EGFR 52 01/21/2018   , PT/INR:   Lab Results   Component Value Date    INR 0 99 01/19/2018   , Troponin: No results found for: TROPONIN    Imaging and other studies: I have personally reviewed pertinent reports     and I have personally reviewed pertinent films in PACS

## 2018-01-21 NOTE — CASE MANAGEMENT
Initial Clinical Review    Thank you,  [address] [facility] in the [facility] by Miguel Ángel Estevez for 2017  Network Utilization Review Department  Phone: [phone]; Fax [phone]  ATTENTION: The Network Utilization Review Department is now centralized for our 7 Facilities  Make a note that we have a new phone and fax numbers for our Department  Please call with any questions or concerns to [phone] and carefully follow the prompts so that you are directed to the right person  All voicemails are confidential  Fax any determinations, approvals, denials, and requests for initial or continue stay review clinical to [phone]  Due to HIGH CALL volume, it would be easier if you could please send faxed requests to expedite your requests and in part, help us provide discharge notifications faster  Admission: Date/Time/Statement: 1/19/18 @ 2149     Orders Placed This Encounter   Procedures    Inpatient Admission (expected length of stay for this patient is greater than two midnights)     Standing Status:   Standing     Number of Occurrences:   1     Order Specific Question:   Admitting Physician     Answer:   Wesley Sullivan [[id number]]     Order Specific Question:   Level of Care     Answer:   Med Surg [[id number]]     Order Specific Question:   Estimated length of stay     Answer:   More than 2 Midnights     Order Specific Question:   Certification     Answer:   I certify that inpatient services are medically necessary for this patient for a duration of greater than two midnights  See H&P and MD Progress Notes for additional information about the patient's course of treatment           ED: Date/Time/Mode of Arrival:   ED Arrival Information     Expected Arrival Acuity Means of Arrival Escorted By Service Admission Type    - 1/19/2018 17:49 Urgent Walk-In Spouse General Medicine Urgent    Arrival Complaint    flu symtoms          Chief Complaint:   Chief Complaint   Patient presents with    Flu Symptoms     woke up with chills,sore throat,general body aches, sent here by family doctor,has serous drainage from rt shin area also       History of Illness: Tashi Alvarez is a 77 y o  female  who presents with fever, chills, cough and shortness of breath  She states that she was in her usual state of health until earlier today  She states that she woke up with a sore throat and diffuse myalgias  As the day progressed she began to have a cough and shortness of breath  She went to her PCP who advised her to come to the ED  While here she received a dose of Tamiflu due to her symptoms  Currently she reports feeling only slightly better but still complains of significant shortness of breath, myalgias and coughing  She denies any chest pain but reports her usual diffuse, chronic abdominal pain    No other complaints or concerns         ED Vital Signs:   ED Triage Vitals [01/19/18 1818]   Temperature Pulse Respirations Blood Pressure SpO2   (!) 102 °F (38 9 °C) 105 18 162/78 91 % RA sat       Temp Source Heart Rate Source Patient Position - Orthostatic VS BP Location FiO2 (%)   Tympanic Monitor Sitting Left arm --      Pain Score       Worst Possible Pain        Wt Readings from Last 1 Encounters:   01/21/18 110 kg (242 lb 1 oz)       Vital Signs (abnormal):   01/19/18 2317  99 7 °F (37 6 °C)   107  20  129/62  93 %  3 L Nasal cannula  Lying   01/19/18 2249  99 4 °F (37 4 °C)   106  20  118/55  95 %  Nasal cannula  Sitting   01/19/18 2200  --   107  --  128/60  94 %  --  --         Abnormal Labs/Diagnostic Test Results:      Ref Range & Units 1/21/18 0519 1/20/18 1808 1/20/18 0628 1/19/18 1919   Sodium 136 - 145 mmol/L 137  138  140  139    Potassium 3 5 - 5 3 mmol/L 4 5  4 6CM  2 9   3 6CM    Chloride 100 - 108 mmol/L 98   96   97   97     CO2 21 - 32 mmol/L 36   36   35   37     Anion Gap 4 - 13 mmol/L 3   6  8  5    BUN 5 - 25 mg/dL 30   28   14  15    Creatinine 0 60 - 1 30 mg/dL 1 02 1 22CM  0 98CM  0 79CM    Glucose 65 - 140 mg/dL 425   487CM   304CM   251CM     Calcium 8 3 - 10 1 mg/dL 8 1   8 0   8 0   8 8    AST 5 - 45 U/L 14    28CM    ALT 12 - 78 U/L 27    29CM    Alkaline Phosphatase 46 - 116 U/L 57    72    Total Protein 6 4 - 8 2 g/dL 6 1     7 0    Albumin 3 5 - 5 0 g/dL 2 8     3 2     Total Bilirubin 0 20 - 1 00 mg/dL 0 20    0 50    eGFR ml/min/1 73sq m 52  42  55  71               Ref Range & Units 1/21/18 0519 1/20/18 0628 1/19/18 1919   WBC 4 80 - 10 80 Thousand/uL 9 10  6 70  6 70    RBC 4 20 - 5 40 Million/uL 3 67   3 72   4 12     Hemoglobin 12 0 - 16 0 g/dL 11 4   11 6   12 9    Hematocrit 37 0 - 47 0 % 35 2   35 3   38 8    MCV 82 - 98 fL 96  95  94    MCH 27 0 - 31 0 pg 31 0  31 2   31 2     MCHC 31 4 - 37 4 g/dL 32 3  33 0  33 2    RDW 11 6 - 15 1 % 15 3   15 3   15 2     MPV 8 9 - 12 7 fL 8 7   8 7   8 7     Platelets 831 - 826 Thousands/uL 145  148  156    nRBC /100 WBCs 0   0    Neutrophils Relative 43 - 75 % 89    76     Lymphocytes Relative 14 - 44 % 8    14    Monocytes Relative 4 - 12 % 3    9    Eosinophils Relative 0 - 6 % 0   1    Basophils Relative 0 - 1 % 0   0    Neutrophils Absolute 1 85 - 7 62 Thousands/µL 8 10    5 10    Lymphocytes Absolute 0 60 - 4 47 Thousands/µL 0 70   0 90    Monocytes Absolute 0 17 - 1 22 Thousand/µL 0 30   0 60    Eosinophils Absolute 0 00 - 0 61 Thousand/µL 0 00   0 10    Basophils Absolute 0 00 - 0 10 Thousands/µL 0 00   0 00            Blood Cultures   Flu type A - positive   CXR -  No acute     ED Treatment:   Medication Administration from 01/19/2018 1749 to 01/19/2018 2305       Date/Time Order Dose Route Action     01/19/2018 1851 acetaminophen (TYLENOL) tablet 650 mg 650 mg Oral Given     01/19/2018 1919 sodium chloride 0 9 % bolus 1,000 mL 1,000 mL Intravenous New Bag     01/19/2018 1909 ondansetron (ZOFRAN) injection 4 mg 4 mg Intravenous Given     01/19/2018 1930 ipratropium-albuterol (DUO-NEB) 0 5-2 5 mg/mL inhalation solution 3 mL 3 mL Nebulization Given     01/19/2018 2026 morphine (PF) 4 mg/mL injection 4 mg 4 mg Intravenous Given     01/19/2018 2231 oseltamivir (TAMIFLU) capsule 75 mg 75 mg Oral Given       Past Medical/Surgical History:   Past Medical History:   Diagnosis Date    Arthritis     Asthma     Diabetes mellitus ([facility] [address] )     Disease of thyroid gland     Hypertension     Mitral valve regurgitation     Obesity hypoventilation syndrome (HCC)     Pain     Restless leg syndrome     Sleep related hypoxia     Thyroid cancer ([facility] )        Admitting Diagnosis: Influenza [J11 1]  Flu-like symptoms [R68 89]    Age/Sex: 77 y o  female    Assessment/Plan:   Hilton Hernandez is a 77 y o  female with a PMH of Asthma, Obesity Hypoventilation Syndrome, DM, HTN, HLD and Hypothyroidism who presents with fever, chills, cough and shortness of breath      Acute Bronchitis with h/o Asthma and Obesity Hypoventilation Syndrome  - CXR reveals no acute process  She was given a dose of Tamiflu in the ED  At this time, will start on Ceftriaxone and Azithromycin, hold Tamiflu until rapid flu results obtained, order urine strep/legionella and start on Atrovent and Albuterol prn      Sepsis-POA as evidenced by fever and tachycardia  - LA is WNL    Resume management as above      HTN  - resume Norvasc and Lisinopril      HLD  - continue Statin     DM  - resume Insulin with SSI     Diastolic CHF  - resume Lasix and Lisinopril while tracking daily weights and I/Os     Hypothyroidism  - continue Synthroid     RLS  - resume Mirapex       Admission Orders:  Scheduled Meds:   acetaminophen 650 mg Oral Q6H [address]   aspirin 325 mg Oral Daily   atorvastatin 20 mg Oral Daily With Dinner   azithromycin 500 mg Oral Daily   heparin (porcine) 5,000 Units Subcutaneous Q8H [address]   insulin glargine 50 Units Subcutaneous HS   insulin lispro 1-5 Units Subcutaneous HS   insulin lispro 1-6 Units Subcutaneous TID AC   insulin lispro 8 Units Subcutaneous TID With Meals levalbuterol 1 25 mg Nebulization 4x Daily   And      sodium chloride 3 mL Nebulization 4x Daily   levothyroxine 125 mcg Oral Early Morning   lisinopril 10 mg Oral Daily   methylPREDNISolone sodium succinate 20 mg Intravenous Q8H [facility]   oseltamivir 30 mg Oral Q12H [facility]   oxymetazoline 2 spray Each Nare Q12H [facility]   polyethylene glycol 17 g Oral Daily   pramipexole 1 mg Oral TID   senna-docusate sodium 2 tablet Oral HS     Continuous Infusions:    PRN Meds:   acetaminophen    dextromethorphan-guaiFENesin    levalbuterol **AND** sodium chloride    ondansetron    oxyCODONE    Nursing Orders - Telem - VS q 4 - daily weights - I & O q shift - Cons carb diet - Bipap as needed - PT/OT eval - Speech therapy     _______________________________________________________________________________________________________________________________________________________  Continued Stay Review    Date: 1/20/2018    Vital Signs: /58   Pulse 63   Temp (!) 97 4 °F (36 3 °C) (Oral)   Resp 20   Ht 5' 2" (1 575 m)   Wt 110 kg (242 lb 1 oz)   LMP  (LMP Unknown)   SpO2 97%   BMI 44 27 kg/m²   01/20/18 2300  97 6 °F (36 4 °C)  86  20  128/58  94 %  Nasal cannula  Sitting   01/20/18 2145  --  71  20  --  98 %  Nasal cannula  --   01/20/18 2133  98 °F (36 7 °C)  85  18  125/56  97 %  Nasal cannula  Lying   01/20/18 2000  --  --  --  --  --  Nasal cannula  --   01/20/18 1715  98 1 °F (36 7 °C)  86  20  115/55  95 %  Nasal cannula  Sitting   01/20/18 1522  --  --  --  --  96 %  --  --   01/20/18 1435  97 9 °F (36 6 °C)  96  22  122/60  95 %  Nasal cannula  Lying   01/20/18 1217  --  --  --  --  95 %  --  --   01/20/18 1044  99 2 °F (37 3 °C)  98  22  121/56  97 %  Nasal cannula  Sitting   01/20/18 0856   101 °F (38 3 °C)   111  22  139/63  94 %  Nasal cannula  Sitting   01/20/18 0746  --  98  22  --  97 % 4 L  Nasal cannula  --   01/20/18 0507  --  --  --  --  95 %  --  --   01/20/18 0400   102 9 °F (39 4 °C)   120  --  145/65 88 %  3 L Nasal cannula  Lying       Medications:   Scheduled Meds:   acetaminophen 650 mg Oral Q6H [address]   aspirin 325 mg Oral Daily   atorvastatin 20 mg Oral Daily With Dinner   azithromycin 500 mg Oral Daily   heparin (porcine) 5,000 Units Subcutaneous Q8H [address]   insulin glargine 50 Units Subcutaneous HS   insulin lispro 1-5 Units Subcutaneous HS   insulin lispro 1-6 Units Subcutaneous TID AC   insulin lispro 8 Units Subcutaneous TID With Meals   levalbuterol 1 25 mg Nebulization 4x Daily   And      sodium chloride 3 mL Nebulization 4x Daily   levothyroxine 125 mcg Oral Early Morning   lisinopril 10 mg Oral Daily   methylPREDNISolone sodium succinate 20 mg Intravenous Q8H [address]   oseltamivir 30 mg Oral Q12H [address]   oxymetazoline 2 spray Each Nare Q12H [address]   polyethylene glycol 17 g Oral Daily   pramipexole 1 mg Oral TID   senna-docusate sodium 2 tablet Oral HS     Continuous Infusions:    PRN Meds:   acetaminophen    dextromethorphan-guaiFENesin    levalbuterol **AND** sodium chloride    Ondansetron iv  X 2     oxyCODONE  5 mg po  X 2     Nursing Orders - Telem - VS q 4 - daily weights - I & O q shift - Cons carb diet - Bipap as needed - PT/OT eval - Speech therapy       Abnormal Labs/Diagnostic Results:    Ref Range & Units 1/20/18 1808 1/20/18 0628   Sodium 136 - 145 mmol/L 138  140    Potassium 3 5 - 5 3 mmol/L 4 6CM  2 9     Chloride 100 - 108 mmol/L 96   97     CO2 21 - 32 mmol/L 36   35     Anion Gap 4 - 13 mmol/L 6  8    BUN 5 - 25 mg/dL 28   14    Creatinine 0 60 - 1 30 mg/dL 1 22CM  0 98CM    Glucose 65 - 140 mg/dL 487CM   304CM     Calcium 8 3 - 10 1 mg/dL 8 0   8 0     AST 5 - 45 U/L     ALT 12 - 78 U/L     Alkaline Phosphatase 46 - 116 U/L     Total Protein 6 4 - 8 2 g/dL     Albumin 3 5 - 5 0 g/dL     Total Bilirubin 0 20 - 1 00 mg/dL     eGFR ml/min/1 73sq m 42  55             Ref Range & Units 1/20/18 0628   WBC 4 80 - 10 80 Thousand/uL 6 70    RBC 4 20 - 5 40 Million/uL 3 72     Hemoglobin 12 0 - 16 0 g/dL 11 6     Hematocrit 37 0 - 47 0 % 35 3     MCV 82 - 98 fL 95    MCH 27 0 - 31 0 pg 31 2     MCHC 31 4 - 37 4 g/dL 33 0    RDW 11 6 - 15 1 % 15 3     MPV 8 9 - 12 7 fL 8 7     Platelets 802 - 668 Thousands/uL 148    nRBC /100 WBCs    Neutrophils Relative 43 - 75 %    Lymphocytes Relative 14 - 44 %    Monocytes Relative 4 - 12 %    Eosinophils Relative 0 - 6 %    Basophils Relative 0 - 1 %    Neutrophils Absolute 1 85 - 7 62 Thousands/µL    Lymphocytes Absolute 0 60 - 4 47 Thousands/µL    Monocytes Absolute 0 17 - 1 22 Thousand/µL    Eosinophils Absolute 0 00 - 0 61 Thousand/µL    Basophils Absolute 0 00 - 0 10 Thousands/µL             Ref Range & Units 1/20/18 0444   pH, Arterial 7 350 - 7 450 7 390    PH ART TC 7 350 - 7 450 7 365    pCO2, Arterial 36 0 - 44 0 mm Hg 59 7     PCO2 (TC) Arterial 36 0 - 44 0 mm Hg 64 3     pO2, Arterial 75 0 - 129 0 mm Hg 79 8    PO2 (TC) Arterial 75 0 - 129 0 mm Hg 89 2    HCO3, Arterial 22 0 - 28 0 mmol/L 35 3     Base Excess, Arterial mmol/L 8 4    O2 Content, Arterial 16 0 - 23 0 mL/dL 17 1    O2 HGB,Arterial  94 0 - 97 0 % 94 1    SOURCE   Radial, Left    AILEEN TEST  Yes    Temperature Degrees Fehrenheit 101 7    Nasal Cannula  5l/m nc              Sputum Culture  MRSA culture      Age/Sex: 77 y o  female     Assessment/Plan:   Pt became febrile with worsening respiratory symptoms  She did not receive her neb treatment initially due to nausea  Went to assess the pt at bedside  She had diffuse wheezing on exam  After receiving Xopenex she did improve however still displaying shortness of breath  Will start on Solumedrol and resume Duonebs and Xopenex prn  Discussed case with ICU team as well  Will reassess after 1 hour neb treatment completed     Assessment & Plan:     1  Acute bronchitis, likely secondary to suspected influenza, less likely bacterial-will continue Tamiflu and antibiotics at present  Follow-up influenza PCR and sputum culture  Follow blood culture  Urine Legionella pneumococcal antigen  Continue bronchodilators, IV steroids  Monitor respiratory status  Continue symptomatic treatment  Pulmonary evaluation  2  Acute on chronic hypoxic hypercapnic respiratory failure secondary to 1 and underlying obesity hypoventilation-continue supplemental oxygen  BiPAP as needed  3  Hypokalemia-replete and monitor  4  Diabetes mellitus type 2 with hemoglobin A1c of 9 2-uncontrolled secondary to steroid  Will monitor on Lantus, NovoLog and corrective scale while in the hospital   Monitor  5  Hypotension secondary to volume depletion and acute illness-improved  Lisinopril with holding parameters  Will hold off diuretics at present  Monitor blood pressure   6  Chronic diastolic CHF, chronic lower extremity edema-evidence of volume overload at present  Continue to hold Lasix and Zaroxolyn at present  7  Dyslipidemia-on Lipitor  8  History of TIA-continue aspirin and Lipitor  9  Hypothyroidism, post thyroidectomy-on Synthroid  10  History of ESBL Klebsiella bacteremia    Pulmonary consult - Plan:   A concur with IV methylprednisolone 20 mg every 6 hours and nebulizer treatments with levalbuterol 1 25 mg every 6 hours  Sputum culture has been ordered  Patient has been started on Tamiflu  Supplement oxygen as ordered  Patient presently on 4 liters/minute and O2 saturation satisfactory at 94%  Will discontinue IV ceftriaxone as likely primary influenza a infection and cannot monitor azithromycin alone for right now    Urine for strep pneumonia antigen negative       Discharge Plan: TBD      ___________________________________________________________________________________________________________________________________________________________________    Continued Stay Review    Date: 1/21/2018    Vital Signs: /58   Pulse 63   Temp (!) 97 4 °F (36 3 °C) (Oral)   Resp 20   Ht 5' 2" (1 575 m)   Wt 110 kg (242 lb 1 oz)   LMP  (LMP Unknown)   SpO2 97%   BMI 44 27 kg/m²   01/21/18 1051  --  --  --  --  97 %  Nasal cannula  --   01/21/18 1020  --  --  --  --  98 %  Nasal cannula  --   01/21/18 0856  --  --  --  122/58  --  --  --   01/21/18 0819  --  --  --  --  98 %  Nasal cannula  --   01/21/18 0743   97 4 °F (36 3 °C)  63  20  146/59  97 %  Nasal cannula  Sitting   01/21/18 0236  97 7 °F (36 5 °C)  65  18  95/53  96 % 4 L  Nasal cannula  Sitting       Medications:   Scheduled Meds:   acetaminophen 650 mg Oral Q6H [address]   aspirin 325 mg Oral Daily   atorvastatin 20 mg Oral Daily With Dinner   azithromycin 500 mg Oral Daily   heparin (porcine) 5,000 Units Subcutaneous Q8H [address]   insulin glargine 50 Units Subcutaneous HS   insulin lispro 1-5 Units Subcutaneous HS   insulin lispro 1-6 Units Subcutaneous TID AC   insulin lispro 8 Units Subcutaneous TID With Meals   levalbuterol 1 25 mg Nebulization 4x Daily   And      sodium chloride 3 mL Nebulization 4x Daily   levothyroxine 125 mcg Oral Early Morning   lisinopril 10 mg Oral Daily   methylPREDNISolone sodium succinate 20 mg Intravenous Q8H [address]   oseltamivir 30 mg Oral Q12H [facility]   oxymetazoline 2 spray Each Nare Q12H [address]   polyethylene glycol 17 g Oral Daily   pramipexole 1 mg Oral TID   senna-docusate sodium 2 tablet Oral HS     Continuous Infusions:    PRN Meds:   acetaminophen    dextromethorphan-guaiFENesin    levalbuterol **AND** sodium chloride    ondansetron    oxyCODONE     Nursing Orders - Telem - VS q 4 - daily weights - I & O q shift - Cons carb diet - Bipap as needed - PT/OT eval - Speech therapy       Abnormal Labs/Diagnostic Results:    Ref Range & Units 1/21/18 0519   Sodium 136 - 145 mmol/L 137    Potassium 3 5 - 5 3 mmol/L 4 5    Chloride 100 - 108 mmol/L 98     CO2 21 - 32 mmol/L 36     Anion Gap 4 - 13 mmol/L 3     BUN 5 - 25 mg/dL 30     Creatinine 0 60 - 1 30 mg/dL 1 02    Glucose 65 - 140 mg/dL 425     Calcium 8 3 - 10 1 mg/dL 8 1     AST 5 - 45 U/L 14    ALT 12 - 78 U/L 27    Alkaline Phosphatase 46 - 116 U/L 57    Total Protein 6 4 - 8 2 g/dL 6 1     Albumin 3 5 - 5 0 g/dL 2 8     Total Bilirubin 0 20 - 1 00 mg/dL 0 20    eGFR ml/min/1 73sq m 52             Ref Range & Units 1/21/18 0519   WBC 4 80 - 10 80 Thousand/uL 9 10    RBC 4 20 - 5 40 Million/uL 3 67     Hemoglobin 12 0 - 16 0 g/dL 11 4     Hematocrit 37 0 - 47 0 % 35 2     MCV 82 - 98 fL 96    MCH 27 0 - 31 0 pg 31 0    MCHC 31 4 - 37 4 g/dL 32 3    RDW 11 6 - 15 1 % 15 3     MPV 8 9 - 12 7 fL 8 7     Platelets 992 - 277 Thousands/uL 145    nRBC /100 WBCs 0    Neutrophils Relative 43 - 75 % 89     Lymphocytes Relative 14 - 44 % 8     Monocytes Relative 4 - 12 % 3     Eosinophils Relative 0 - 6 % 0    Basophils Relative 0 - 1 % 0    Neutrophils Absolute 1 85 - 7 62 Thousands/µL 8 10     Lymphocytes Absolute 0 60 - 4 47 Thousands/µL 0 70    Monocytes Absolute 0 17 - 1 22 Thousand/µL 0 30    Eosinophils Absolute 0 00 - 0 61 Thousand/µL 0 00    Basophils Absolute 0 00 - 0 10 Thousands/µL 0 00                Age/Sex: 77 y o  female     Assessment/Plan:  1  Acute bronchitis, likely secondary to influenza A, less likely bacterial-continue Tamiflu (renally dosed) and azithromycin  Ceftriaxone was discontinued  Sputum culture, if possible  Blood cultures are negative so far  Urine Legionella pneumococcal antigen are negative  Continue bronchodilators, will decrease IV steroids  Monitor respiratory status  Continue symptomatic treatment  Pulmonary evaluation appreciated  2  Acute on chronic hypoxic hypercapnic respiratory failure secondary to 1 and underlying obesity hypoventilation-continue supplemental oxygen  BiPAP as needed  3  Hypokalemia-improved, monitor  4  Diabetes mellitus type 2 with hemoglobin A1c of 9 2-uncontrolled secondary to steroid  Will decrease IV steroids today  Continue Lantus, NovoLog and corrective scale while in the hospital   Monitor and adjust as needed  5  Hypotension secondary to volume depletion and acute illness-improving  Lisinopril with holding parameters  Monitor blood pressure  6  Chronic diastolic CHF, chronic lower extremity edema-no evidence of volume overload at present  Continue to hold Lasix and Zaroxolyn at present, resume in a m  if blood pressure remains stable  7  Dyslipidemia-on Lipitor  8  History of TIA-continue aspirin and Lipitor  9  Hypothyroidism, post thyroidectomy-on Synthroid  10  Restless leg syndrome-on Mirapex  11   History of ESBL Klebsiella bacteremia          Discharge Plan: TBD

## 2018-01-21 NOTE — PROGRESS NOTES
Patient blood sugar 425 with blood draw  Dr Karen Mccoy aware, will continue with current treatment as planned for insulin ordered

## 2018-01-21 NOTE — SOCIAL WORK
DASH discussion completed  Discussed goals of making sure pt's needs are met upon discharge, pt's preferences are taken into account, pt understands her health condition, medications and symptoms to watch for after returning home and pt is aware of any follow up appointments recommended by hospital physician  CELIO spoke with Pt at the bedside  Pt having difficulty maintaining a wakeful state during our conversation  Pt lives with her SO and family, she has a cane, BSC, Walker, WC and O2 at home through [facility]  Unable to determine if pt has a Neb machine at home  Pt uses the [employer] in [place], [place]    She has had an OP Care Coordinator in the Past, CELIO will send email to advise of pt admission to the hospital

## 2018-01-22 ENCOUNTER — APPOINTMENT (OUTPATIENT)
Dept: PHYSICAL THERAPY | Facility: CLINIC | Age: 78
End: 2018-01-22
Payer: COMMERCIAL

## 2018-01-22 ENCOUNTER — APPOINTMENT (OUTPATIENT)
Dept: PULMONOLOGY | Facility: CLINIC | Age: 78
End: 2018-01-22
Payer: COMMERCIAL

## 2018-01-22 VITALS
SYSTOLIC BLOOD PRESSURE: 122 MMHG | DIASTOLIC BLOOD PRESSURE: 74 MMHG | BODY MASS INDEX: 46.37 KG/M2 | RESPIRATION RATE: 28 BRPM | HEART RATE: 96 BPM | TEMPERATURE: 98.6 F | WEIGHT: 230 LBS | OXYGEN SATURATION: 95 % | HEIGHT: 59 IN

## 2018-01-22 VITALS
HEART RATE: 98 BPM | BODY MASS INDEX: 45.77 KG/M2 | HEIGHT: 60 IN | DIASTOLIC BLOOD PRESSURE: 79 MMHG | SYSTOLIC BLOOD PRESSURE: 144 MMHG | WEIGHT: 233.13 LBS

## 2018-01-22 VITALS
DIASTOLIC BLOOD PRESSURE: 82 MMHG | SYSTOLIC BLOOD PRESSURE: 130 MMHG | HEART RATE: 90 BPM | TEMPERATURE: 98.5 F | HEIGHT: 60 IN | WEIGHT: 231 LBS | RESPIRATION RATE: 22 BRPM | BODY MASS INDEX: 45.35 KG/M2

## 2018-01-22 VITALS
WEIGHT: 228.38 LBS | RESPIRATION RATE: 24 BRPM | DIASTOLIC BLOOD PRESSURE: 54 MMHG | BODY MASS INDEX: 42.03 KG/M2 | HEIGHT: 62 IN | SYSTOLIC BLOOD PRESSURE: 132 MMHG | HEART RATE: 86 BPM | TEMPERATURE: 97.1 F

## 2018-01-22 VITALS
TEMPERATURE: 98.1 F | SYSTOLIC BLOOD PRESSURE: 128 MMHG | HEART RATE: 88 BPM | WEIGHT: 229 LBS | BODY MASS INDEX: 31.02 KG/M2 | RESPIRATION RATE: 20 BRPM | HEIGHT: 72 IN | DIASTOLIC BLOOD PRESSURE: 60 MMHG | OXYGEN SATURATION: 93 %

## 2018-01-22 VITALS
SYSTOLIC BLOOD PRESSURE: 128 MMHG | HEIGHT: 59 IN | DIASTOLIC BLOOD PRESSURE: 70 MMHG | TEMPERATURE: 99.2 F | HEART RATE: 106 BPM | BODY MASS INDEX: 48 KG/M2 | OXYGEN SATURATION: 91 % | RESPIRATION RATE: 24 BRPM | WEIGHT: 238.13 LBS

## 2018-01-22 VITALS
HEART RATE: 82 BPM | WEIGHT: 227 LBS | SYSTOLIC BLOOD PRESSURE: 124 MMHG | BODY MASS INDEX: 41.77 KG/M2 | DIASTOLIC BLOOD PRESSURE: 68 MMHG | OXYGEN SATURATION: 94 % | HEIGHT: 62 IN

## 2018-01-22 VITALS
HEIGHT: 60 IN | SYSTOLIC BLOOD PRESSURE: 118 MMHG | HEART RATE: 84 BPM | BODY MASS INDEX: 44.57 KG/M2 | OXYGEN SATURATION: 94 % | RESPIRATION RATE: 24 BRPM | WEIGHT: 227 LBS | DIASTOLIC BLOOD PRESSURE: 70 MMHG | TEMPERATURE: 98.9 F

## 2018-01-22 VITALS
HEART RATE: 86 BPM | BODY MASS INDEX: 46.97 KG/M2 | OXYGEN SATURATION: 94 % | DIASTOLIC BLOOD PRESSURE: 70 MMHG | HEIGHT: 59 IN | RESPIRATION RATE: 20 BRPM | SYSTOLIC BLOOD PRESSURE: 130 MMHG | TEMPERATURE: 99.2 F | WEIGHT: 233 LBS

## 2018-01-22 VITALS
WEIGHT: 226 LBS | TEMPERATURE: 97.3 F | HEIGHT: 59 IN | DIASTOLIC BLOOD PRESSURE: 70 MMHG | RESPIRATION RATE: 18 BRPM | BODY MASS INDEX: 45.56 KG/M2 | HEART RATE: 100 BPM | SYSTOLIC BLOOD PRESSURE: 122 MMHG

## 2018-01-22 VITALS
WEIGHT: 229 LBS | BODY MASS INDEX: 44.96 KG/M2 | DIASTOLIC BLOOD PRESSURE: 84 MMHG | HEART RATE: 80 BPM | HEIGHT: 60 IN | SYSTOLIC BLOOD PRESSURE: 132 MMHG

## 2018-01-22 LAB
ALBUMIN SERPL BCP-MCNC: 2.9 G/DL (ref 3.5–5)
ALP SERPL-CCNC: 59 U/L (ref 46–116)
ALT SERPL W P-5'-P-CCNC: 31 U/L (ref 12–78)
ANION GAP SERPL CALCULATED.3IONS-SCNC: 1 MMOL/L (ref 4–13)
AST SERPL W P-5'-P-CCNC: 17 U/L (ref 5–45)
BILIRUB SERPL-MCNC: 0.2 MG/DL (ref 0.2–1)
BUN SERPL-MCNC: 25 MG/DL (ref 5–25)
CALCIUM SERPL-MCNC: 8.5 MG/DL (ref 8.3–10.1)
CHLORIDE SERPL-SCNC: 100 MMOL/L (ref 100–108)
CO2 SERPL-SCNC: 40 MMOL/L (ref 21–32)
CREAT SERPL-MCNC: 0.76 MG/DL (ref 0.6–1.3)
ERYTHROCYTE [DISTWIDTH] IN BLOOD BY AUTOMATED COUNT: 15.5 % (ref 11.6–15.1)
GFR SERPL CREATININE-BSD FRML MDRD: 75 ML/MIN/1.73SQ M
GLUCOSE SERPL-MCNC: 283 MG/DL (ref 65–140)
GLUCOSE SERPL-MCNC: 295 MG/DL (ref 65–140)
GLUCOSE SERPL-MCNC: 318 MG/DL (ref 65–140)
GLUCOSE SERPL-MCNC: 371 MG/DL (ref 65–140)
GLUCOSE SERPL-MCNC: 404 MG/DL (ref 65–140)
GLUCOSE SERPL-MCNC: 419 MG/DL (ref 65–140)
HCT VFR BLD AUTO: 37.2 % (ref 37–47)
HGB BLD-MCNC: 11.9 G/DL (ref 12–16)
LYMPHOCYTES # BLD AUTO: 1.69 THOUSAND/UL (ref 0.6–4.47)
LYMPHOCYTES # BLD AUTO: 12 %
MAGNESIUM SERPL-MCNC: 2.7 MG/DL (ref 1.6–2.6)
MCH RBC QN AUTO: 30.6 PG (ref 27–31)
MCHC RBC AUTO-ENTMCNC: 32.1 G/DL (ref 31.4–37.4)
MCV RBC AUTO: 95 FL (ref 82–98)
MONOCYTES # BLD AUTO: 0.56 THOUSAND/UL (ref 0–1.22)
MONOCYTES NFR BLD AUTO: 4 % (ref 4–12)
NEUTS BAND NFR BLD MANUAL: 16 % (ref 0–8)
NEUTS SEG # BLD: 11.84 THOUSAND/UL (ref 1.81–6.82)
NEUTS SEG NFR BLD AUTO: 68 %
NRBC BLD AUTO-RTO: 0 /100 WBCS
PLATELET # BLD AUTO: 165 THOUSANDS/UL (ref 130–400)
PLATELET BLD QL SMEAR: ADEQUATE
PMV BLD AUTO: 8.9 FL (ref 8.9–12.7)
POTASSIUM SERPL-SCNC: 5.1 MMOL/L (ref 3.5–5.3)
PROT SERPL-MCNC: 6.4 G/DL (ref 6.4–8.2)
RBC # BLD AUTO: 3.9 MILLION/UL (ref 4.2–5.4)
SODIUM SERPL-SCNC: 141 MMOL/L (ref 136–145)
TOTAL CELLS COUNTED SPEC: 100
WBC # BLD AUTO: 14.1 THOUSAND/UL (ref 4.8–10.8)

## 2018-01-22 PROCEDURE — 82948 REAGENT STRIP/BLOOD GLUCOSE: CPT

## 2018-01-22 PROCEDURE — 85007 BL SMEAR W/DIFF WBC COUNT: CPT | Performed by: INTERNAL MEDICINE

## 2018-01-22 PROCEDURE — 85027 COMPLETE CBC AUTOMATED: CPT | Performed by: INTERNAL MEDICINE

## 2018-01-22 PROCEDURE — 97110 THERAPEUTIC EXERCISES: CPT

## 2018-01-22 PROCEDURE — 94760 N-INVAS EAR/PLS OXIMETRY 1: CPT

## 2018-01-22 PROCEDURE — 94640 AIRWAY INHALATION TREATMENT: CPT

## 2018-01-22 PROCEDURE — 83735 ASSAY OF MAGNESIUM: CPT | Performed by: INTERNAL MEDICINE

## 2018-01-22 PROCEDURE — 80053 COMPREHEN METABOLIC PANEL: CPT | Performed by: INTERNAL MEDICINE

## 2018-01-22 PROCEDURE — 97535 SELF CARE MNGMENT TRAINING: CPT

## 2018-01-22 RX ORDER — FUROSEMIDE 40 MG/1
40 TABLET ORAL
Status: DISCONTINUED | OUTPATIENT
Start: 2018-01-22 | End: 2018-01-22

## 2018-01-22 RX ORDER — HYDROCODONE POLISTIREX AND CHLORPHENIRAMINE POLISTIREX 10; 8 MG/5ML; MG/5ML
5 SUSPENSION, EXTENDED RELEASE ORAL
Status: DISCONTINUED | OUTPATIENT
Start: 2018-01-22 | End: 2018-01-30 | Stop reason: HOSPADM

## 2018-01-22 RX ORDER — FUROSEMIDE 40 MG/1
40 TABLET ORAL
Status: DISCONTINUED | OUTPATIENT
Start: 2018-01-22 | End: 2018-01-24

## 2018-01-22 RX ORDER — POLYETHYLENE GLYCOL 3350 17 G/17G
17 POWDER, FOR SOLUTION ORAL DAILY PRN
Status: DISCONTINUED | OUTPATIENT
Start: 2018-01-22 | End: 2018-01-30 | Stop reason: HOSPADM

## 2018-01-22 RX ORDER — AMOXICILLIN 250 MG
2 CAPSULE ORAL
Status: DISCONTINUED | OUTPATIENT
Start: 2018-01-22 | End: 2018-01-30 | Stop reason: HOSPADM

## 2018-01-22 RX ORDER — BENZONATATE 100 MG/1
100 CAPSULE ORAL 3 TIMES DAILY PRN
Status: DISCONTINUED | OUTPATIENT
Start: 2018-01-22 | End: 2018-01-30 | Stop reason: HOSPADM

## 2018-01-22 RX ORDER — INSULIN GLARGINE 100 [IU]/ML
60 INJECTION, SOLUTION SUBCUTANEOUS
Status: DISCONTINUED | OUTPATIENT
Start: 2018-01-22 | End: 2018-01-30 | Stop reason: HOSPADM

## 2018-01-22 RX ORDER — OSELTAMIVIR PHOSPHATE 75 MG/1
75 CAPSULE ORAL EVERY 12 HOURS SCHEDULED
Status: COMPLETED | OUTPATIENT
Start: 2018-01-22 | End: 2018-01-24

## 2018-01-22 RX ADMIN — OXYMETAZOLINE HYDROCHLORIDE 2 SPRAY: 5 SPRAY NASAL at 08:52

## 2018-01-22 RX ADMIN — LEVALBUTEROL HYDROCHLORIDE 1.25 MG: 1.25 SOLUTION, CONCENTRATE RESPIRATORY (INHALATION) at 08:04

## 2018-01-22 RX ADMIN — BENZONATATE 100 MG: 100 CAPSULE ORAL at 16:19

## 2018-01-22 RX ADMIN — LISINOPRIL 10 MG: 10 TABLET ORAL at 08:52

## 2018-01-22 RX ADMIN — ISODIUM CHLORIDE 3 ML: 0.03 SOLUTION RESPIRATORY (INHALATION) at 11:47

## 2018-01-22 RX ADMIN — OSELTAMIVIR PHOSPHATE 30 MG: 30 CAPSULE ORAL at 08:52

## 2018-01-22 RX ADMIN — ISODIUM CHLORIDE 3 ML: 0.03 SOLUTION RESPIRATORY (INHALATION) at 20:31

## 2018-01-22 RX ADMIN — INSULIN LISPRO 8 UNITS: 100 INJECTION, SOLUTION INTRAVENOUS; SUBCUTANEOUS at 16:19

## 2018-01-22 RX ADMIN — ISODIUM CHLORIDE 3 ML: 0.03 SOLUTION RESPIRATORY (INHALATION) at 08:04

## 2018-01-22 RX ADMIN — HEPARIN SODIUM 5000 UNITS: 5000 INJECTION, SOLUTION INTRAVENOUS; SUBCUTANEOUS at 05:05

## 2018-01-22 RX ADMIN — INSULIN LISPRO 4 UNITS: 100 INJECTION, SOLUTION INTRAVENOUS; SUBCUTANEOUS at 07:54

## 2018-01-22 RX ADMIN — OXYCODONE HYDROCHLORIDE 5 MG: 5 TABLET ORAL at 00:23

## 2018-01-22 RX ADMIN — POLYETHYLENE GLYCOL 3350 17 G: 17 POWDER, FOR SOLUTION ORAL at 08:52

## 2018-01-22 RX ADMIN — PRAMIPEXOLE DIHYDROCHLORIDE 1 MG: 0.5 TABLET ORAL at 16:19

## 2018-01-22 RX ADMIN — PRAMIPEXOLE DIHYDROCHLORIDE 1 MG: 0.5 TABLET ORAL at 08:52

## 2018-01-22 RX ADMIN — ACETAMINOPHEN 650 MG: 325 TABLET, FILM COATED ORAL at 17:50

## 2018-01-22 RX ADMIN — ASPIRIN 325 MG: 325 TABLET ORAL at 08:52

## 2018-01-22 RX ADMIN — FUROSEMIDE 40 MG: 40 TABLET ORAL at 16:19

## 2018-01-22 RX ADMIN — LEVALBUTEROL HYDROCHLORIDE 1.25 MG: 1.25 SOLUTION, CONCENTRATE RESPIRATORY (INHALATION) at 11:47

## 2018-01-22 RX ADMIN — INSULIN LISPRO 8 UNITS: 100 INJECTION, SOLUTION INTRAVENOUS; SUBCUTANEOUS at 11:32

## 2018-01-22 RX ADMIN — INSULIN LISPRO 8 UNITS: 100 INJECTION, SOLUTION INTRAVENOUS; SUBCUTANEOUS at 07:54

## 2018-01-22 RX ADMIN — LEVOTHYROXINE SODIUM 125 MCG: 125 TABLET ORAL at 05:04

## 2018-01-22 RX ADMIN — INSULIN LISPRO 6 UNITS: 100 INJECTION, SOLUTION INTRAVENOUS; SUBCUTANEOUS at 16:20

## 2018-01-22 RX ADMIN — GUAIFENESIN AND DEXTROMETHORPHAN 10 ML: 100; 10 SYRUP ORAL at 04:58

## 2018-01-22 RX ADMIN — INSULIN GLARGINE 60 UNITS: 100 INJECTION, SOLUTION SUBCUTANEOUS at 21:39

## 2018-01-22 RX ADMIN — ALUMINUM HYDROXIDE, MAGNESIUM HYDROXIDE, AND SIMETHICONE 30 ML: 200; 200; 20 SUSPENSION ORAL at 00:23

## 2018-01-22 RX ADMIN — INSULIN LISPRO 4 UNITS: 100 INJECTION, SOLUTION INTRAVENOUS; SUBCUTANEOUS at 21:39

## 2018-01-22 RX ADMIN — METHYLPREDNISOLONE SODIUM SUCCINATE 20 MG: 40 INJECTION, POWDER, FOR SOLUTION INTRAMUSCULAR; INTRAVENOUS at 21:36

## 2018-01-22 RX ADMIN — INSULIN LISPRO 6 UNITS: 100 INJECTION, SOLUTION INTRAVENOUS; SUBCUTANEOUS at 11:33

## 2018-01-22 RX ADMIN — LEVALBUTEROL HYDROCHLORIDE 1.25 MG: 1.25 SOLUTION, CONCENTRATE RESPIRATORY (INHALATION) at 20:31

## 2018-01-22 RX ADMIN — PRAMIPEXOLE DIHYDROCHLORIDE 1 MG: 0.5 TABLET ORAL at 21:38

## 2018-01-22 RX ADMIN — METHYLPREDNISOLONE SODIUM SUCCINATE 20 MG: 40 INJECTION, POWDER, FOR SOLUTION INTRAMUSCULAR; INTRAVENOUS at 05:04

## 2018-01-22 RX ADMIN — AZITHROMYCIN 500 MG: 250 TABLET, FILM COATED ORAL at 21:39

## 2018-01-22 RX ADMIN — HEPARIN SODIUM 5000 UNITS: 5000 INJECTION, SOLUTION INTRAVENOUS; SUBCUTANEOUS at 21:39

## 2018-01-22 RX ADMIN — ISODIUM CHLORIDE 3 ML: 0.03 SOLUTION RESPIRATORY (INHALATION) at 15:33

## 2018-01-22 RX ADMIN — ACETAMINOPHEN 650 MG: 325 TABLET, FILM COATED ORAL at 06:05

## 2018-01-22 RX ADMIN — OSELTAMIVIR PHOSPHATE 75 MG: 75 CAPSULE ORAL at 21:39

## 2018-01-22 RX ADMIN — OXYMETAZOLINE HYDROCHLORIDE 2 SPRAY: 5 SPRAY NASAL at 21:41

## 2018-01-22 RX ADMIN — METHYLPREDNISOLONE SODIUM SUCCINATE 20 MG: 40 INJECTION, POWDER, FOR SOLUTION INTRAMUSCULAR; INTRAVENOUS at 14:11

## 2018-01-22 RX ADMIN — OXYCODONE HYDROCHLORIDE 5 MG: 5 TABLET ORAL at 16:19

## 2018-01-22 RX ADMIN — HEPARIN SODIUM 5000 UNITS: 5000 INJECTION, SOLUTION INTRAVENOUS; SUBCUTANEOUS at 14:11

## 2018-01-22 RX ADMIN — AMIODARONE HYDROCHLORIDE 150 MG: 50 INJECTION, SOLUTION INTRAVENOUS at 11:24

## 2018-01-22 RX ADMIN — LEVALBUTEROL HYDROCHLORIDE 1.25 MG: 1.25 SOLUTION, CONCENTRATE RESPIRATORY (INHALATION) at 15:33

## 2018-01-22 RX ADMIN — ATORVASTATIN CALCIUM 20 MG: 20 TABLET, FILM COATED ORAL at 16:19

## 2018-01-22 RX ADMIN — ACETAMINOPHEN 650 MG: 325 TABLET, FILM COATED ORAL at 11:36

## 2018-01-22 NOTE — CASE MANAGEMENT
Continued Stay Review    Date: 1/22/18    Vital Signs: /81 (BP Location: Right arm)   Pulse 63   Temp 97 9 °F (36 6 °C) (Oral)   Resp 16   Ht 5' 2" (1 575 m)   Wt 110 kg (242 lb 1 oz)   LMP  (LMP Unknown)   SpO2 95%   BMI 44 27 kg/m²       TELE MON  LASIX 40MG   TUSSIONEX   TESSALON PERLES   Medications:   Scheduled Meds:   acetaminophen 650 mg Oral Q6H [address]   aspirin 325 mg Oral Daily   atorvastatin 20 mg Oral Daily With Dinner   azithromycin 500 mg Oral Daily   heparin (porcine) 5,000 Units Subcutaneous Q8H [address]   insulin glargine 50 Units Subcutaneous HS   insulin lispro 1-5 Units Subcutaneous HS   insulin lispro 1-6 Units Subcutaneous TID AC   insulin lispro 8 Units Subcutaneous TID With Meals   levalbuterol 1 25 mg Nebulization 4x Daily   And      sodium chloride 3 mL Nebulization 4x Daily   levothyroxine 125 mcg Oral Early Morning   lisinopril 10 mg Oral Daily   methylPREDNISolone sodium succinate 20 mg Intravenous Q8H [address]   oseltamivir 30 mg Oral Q12H FELIPE   oxymetazoline 2 spray Each Nare Q12H [address]   polyethylene glycol 17 g Oral Daily   pramipexole 1 mg Oral TID   senna-docusate sodium 2 tablet Oral HS     Continuous Infusions:    PRN Meds:   acetaminophen    aluminum-magnesium hydroxide-simethicone    dextromethorphan-guaiFENesin    levalbuterol **AND** sodium chloride    ondansetron    oxyCODONE    Abnormal Labs/Diagnostic Results:   CO2 40 GLUCOSE 295 ALB 2 9 MG 2 7 WBC 14 10 BANDS 16%  Age/Sex: 77 y o  female     Pulmonary  Assessment:  Acute influenza A tracheobronchitis  Asthmatic bronchitis secondary to influenza A - still having wheezing  Chronic diastolic cardiac dysfunction  Obesity alveolar hypoventilation with chronic hypercapnic hypoxemic respiratory failure  Diabetes mellitus type 2     Plan:  Would continue methylprednisolone 20 mg IV every 8 hours  Patient is on 5 day course of Tamiflu  Continue nebulizer treatment with levalbuterol 1 25 mg with ipratropium bromide every 6 hours  Complete 5 day course of azithromycin  Today is day #3  Will order benzonatate for her cough that could be used p r n  Cheryl Alvada Patient also with cough at night and requests antitussive agent      ATTENDING  Current Patient Status: Inpatient , continues require close monitoring of the respiratory status  Still reports persistent cough  Short of breath but slightly better   Assessment & Plan:     1  Acute bronchitis, likely secondary to influenza A, less likely bacterial-continue Tamiflu (changed to 75 mg as renal function improved) and azithromycin  Ceftriaxone was discontinued  Sputum culture, if possible  Blood cultures are negative so far  Urine Legionella pneumococcal antigen are negative  Continue bronchodilators, continue IV steroid at current dose  Monitor respiratory status  Continue symptomatic treatment  Add Tessalon  Pulmonary follow-up appreciated  2  Leukocytosis secondary to steroid-monitor  3  Acute on chronic hypoxic hypercapnic respiratory failure secondary to 1 and underlying obesity hypoventilation-continue supplemental oxygen  4  Hypokalemia-improved, remained stable  5  Diabetes mellitus type 2 with hemoglobin A1c of 9 2-uncontrolled secondary to steroid  Will increase Lantus to 60 units  Continue NovoLog and corrective scale while in the hospital   Monitor p o  intake  6  Hypotension secondary to volume depletion and acute illness-improved  Lisinopril with holding parameters  Monitor blood pressure  7  Chronic diastolic CHF, chronic lower extremity edema-blood pressure is stable  Will resume Lasix at 40 mg b i d  Hold Zaroxolyn for now  Monitor input output and renal function  8  Dyslipidemia-on Lipitor  9  History of TIA-continue aspirin and Lipitor  10  Hypothyroidism, post thyroidectomy-on Synthroid  11  Restless leg syndrome-on Mirapex  12  History of ESBL Klebsiella bacteremia  13   Deconditioning-PT/OT

## 2018-01-22 NOTE — OCCUPATIONAL THERAPY NOTE
OT TREATMENT     01/22/18 1403   Pain Assessment   Pain Assessment No/denies pain   Pain Score No Pain   ADL   Toileting Assistance  5  Supervision/Setup   Toileting Deficit Supervison/safety; Increased time to complete;Grab bar use   Bed Mobility   Supine to Sit 7  Independent   Transfers   Sit to Stand 5  Supervision   Stand to Sit 5  Supervision   Functional Mobility   Functional Mobility 5  Supervision   Additional Comments Pt ambulated from bed to bathroom toilet with SPV at RW level   Activity Tolerance   Activity Tolerance Patient tolerated treatment well   Assessment   Assessment Pt received asleep in bed, easily aroused  Wanted to use toilet  Managed bed mobility independently  AMbulated to bathroom at RW level with SPV  Slight SOB noted but pt tolerated well  Managed clothing mgmt and hygiene with SPV  Ambulated back to bed with SPV at RW level after standing to wash hands at sing  Plan   Treatment Interventions ADL retraining;Functional transfer training;UE strengthening/ROM; Endurance training;Cognitive reorientation;Equipment evaluation/education; Compensatory technique education; Energy conservation   OT Frequency 3-5x/wk   Recommendation   OT Discharge Recommendation ([facility] vs home with services)

## 2018-01-22 NOTE — PROGRESS NOTES
Progress Note - Pulmonary   Ayah Jernigan 77 y o  female MRN: [id number]  Unit/Bed#: [address] [place] [address] Encounter: [id number]    Assessment:  Acute influenza A tracheobronchitis  Asthmatic bronchitis secondary to influenza A - still having wheezing  Chronic diastolic cardiac dysfunction  Obesity alveolar hypoventilation with chronic hypercapnic hypoxemic respiratory failure  Diabetes mellitus type 2    Plan:  Would continue methylprednisolone 20 mg IV every 8 hours  Patient is on 5 day course of Tamiflu  Continue nebulizer treatment with levalbuterol 1 25 mg with ipratropium bromide every 6 hours  Complete 5 day course of azithromycin  Today is day #3  Will order benzonatate for her cough that could be used p r n  Demond Gallego Patient also with cough at night and requests antitussive agent      Subjective:   Patient feels tired and has cough which is mostly nonproductive    Objective:     Vitals: Blood pressure 158/67, pulse 76, temperature (!) 97 3 °F (36 3 °C), temperature source Oral, resp  rate 18, height 5' 2" (1 575 m), weight 110 kg (242 lb 1 oz), SpO2 97 %, not currently breastfeeding  ,Body mass index is 44 27 kg/m²  Intake/Output Summary (Last 24 hours) at 01/22/18 1428  Last data filed at 01/22/18 3058   Gross per 24 hour   Intake              480 ml   Output             1000 ml   Net             -520 ml       Physical Exam: Physical Exam   Constitutional:   Patient is overweight  On 3 L O2 saturation 99%  No distress  Sitting at bedside   HENT:   Head: Normocephalic  Nose: Nose normal    Mouth/Throat: Oropharynx is clear and moist  No oropharyngeal exudate  Eyes: Conjunctivae are normal  No scleral icterus  Neck: Neck supple  No JVD present  No tracheal deviation present  Cardiovascular: Normal rate, regular rhythm and normal heart sounds  Pulmonary/Chest: Effort normal    Lung sounds reveal some coarse expiratory wheezes in the mid to lower lung zones   Abdominal: Soft  She exhibits no distension  There is no guarding  Musculoskeletal:   Has some edema of lower extremities   Lymphadenopathy:     She has no cervical adenopathy  Neurological: She is alert  Skin: Skin is warm and dry  Psychiatric: She has a normal mood and affect  Her behavior is normal  Thought content normal         Labs: I have personally reviewed pertinent lab results  , ABG: Lab Results   Component Value Date    PHART 7 390 01/20/2018    DEF5YYT 59 7 (HH) 01/20/2018    PO2ART 79 8 01/20/2018    CPF5MSB 35 3 (H) 01/20/2018    BEART 8 4 01/20/2018    SOURCE Radial, Left 01/20/2018   , BNP: No results found for: BNP, CBC: Lab Results   Component Value Date    WBC 14 10 (H) 01/22/2018    HGB 11 9 (L) 01/22/2018    HCT 37 2 01/22/2018    MCV 95 01/22/2018     01/22/2018    ADJUSTEDWBC 10 30 04/04/2016    MCH 30 6 01/22/2018    MCHC 32 1 01/22/2018    RDW 15 5 (H) 01/22/2018    MPV 8 9 01/22/2018    NRBC 0 01/22/2018   , CMP: Lab Results   Component Value Date     01/22/2018     01/08/2016    K 5 1 01/22/2018    K 3 9 01/08/2016     01/22/2018     01/08/2016    CO2 40 (H) 01/22/2018    CO2 34 (H) 01/08/2016    ANIONGAP 1 (L) 01/22/2018    ANIONGAP 10 8 01/08/2016    BUN 25 01/22/2018    BUN 15 01/08/2016    CREATININE 0 76 01/22/2018    CREATININE 0 9 01/08/2016    GLUCOSE 295 (H) 01/22/2018    GLUCOSE 164 (H) 01/08/2016    CALCIUM 8 5 01/22/2018    CALCIUM 8 5 01/08/2016    AST 17 01/22/2018    AST 16 01/08/2016    ALT 31 01/22/2018    ALT 29 01/08/2016    ALKPHOS 59 01/22/2018    ALKPHOS 72 01/08/2016    PROT 6 4 01/22/2018    PROT 7 0 01/08/2016    ALBUMIN 2 9 (L) 01/22/2018    ALBUMIN 3 7 01/08/2016    BILITOT 0 20 01/22/2018    BILITOT 0 3 01/08/2016    EGFR 75 01/22/2018   , PT/INR:   Lab Results   Component Value Date    INR 0 99 01/19/2018   , Troponin: No results found for: TROPONIN    Imaging and other studies: I have personally reviewed pertinent reports     and I have personally reviewed pertinent films in PACS

## 2018-01-22 NOTE — PROGRESS NOTES
[facility] Progress Note  Patient: Lily Brush 77 y o  female   MRN: [id number]  PCP: Isa Weinberg MD  Unit/Bed#: [address] [place] [address] Encounter: [id number]  Date Of Visit: 01/22/18    Problem List:    Principal Problem:    Acute bronchitis  Active Problems:    T2DM (type 2 diabetes mellitus) ([facility] [address] )    Asthma    Restless leg    HTN (hypertension)    HLD (hyperlipidemia)    Hypothyroidism    Sepsis ([facility] )      Assessment & Plan:    1  Acute bronchitis, likely secondary to influenza A, less likely bacterial-continue Tamiflu (changed to 75 mg as renal function improved) and azithromycin  Ceftriaxone was discontinued  Sputum culture, if possible  Blood cultures are negative so far  Urine Legionella pneumococcal antigen are negative  Continue bronchodilators, continue IV steroid at current dose  Monitor respiratory status  Continue symptomatic treatment  Add Tessalon  Pulmonary follow-up appreciated  2  Leukocytosis secondary to steroid-monitor  3  Acute on chronic hypoxic hypercapnic respiratory failure secondary to 1 and underlying obesity hypoventilation-continue supplemental oxygen  4  Hypokalemia-improved, remained stable  5  Diabetes mellitus type 2 with hemoglobin A1c of 9 2-uncontrolled secondary to steroid  Will increase Lantus to 60 units  Continue NovoLog and corrective scale while in the hospital   Monitor p o  intake  6  Hypotension secondary to volume depletion and acute illness-improved  Lisinopril with holding parameters  Monitor blood pressure  7  Chronic diastolic CHF, chronic lower extremity edema-blood pressure is stable  Will resume Lasix at 40 mg b i d  Hold Zaroxolyn for now  Monitor input output and renal function  8  Dyslipidemia-on Lipitor  9  History of TIA-continue aspirin and Lipitor  10  Hypothyroidism, post thyroidectomy-on Synthroid  11  Restless leg syndrome-on Mirapex  12  History of ESBL Klebsiella bacteremia  13   Deconditioning-PT/OT      VTE Pharmacologic Prophylaxis:   Pharmacologic: Heparin  Mechanical VTE Prophylaxis in Place: Yes    Patient Centered Rounds: I have performed bedside rounds with nursing staff today  Discussions with Specialists or Other Care Team Provider: Yes Dr Julissa Gilbert    Education and Discussions with Family / Patient:Yes    Time Spent for Care: 1 hour  More than 50% of total time spent on counseling and coordination of care as described above  Current Length of Stay: 3 day(s)    Current Patient Status: Inpatient , continues require close monitoring of the respiratory status    Discharge Plan:  Pending at present    Code Status: Level 3 - DNAR and DNI      Subjective:   Still reports persistent cough  Short of breath but slightly better  Denies chest pain  Reports generalized weakness and poor p o  intake  Annoyed with incontinence related to cough    Objective:   Anxious    Negative for Chest Pain, Palpitations, Abdominal Pain, Nausea, Vomiting, Diarrhea, Dizziness  All other 10 review of systems negative and without drastic interval changes from yesterday  Vitals:   Temp (24hrs), Av °F (36 7 °C), Min:97 3 °F (36 3 °C), Max:98 4 °F (36 9 °C)    HR:  [63-88] 73  Resp:  [16-22] 18  BP: (135-161)/(67-86) 158/67  SpO2:  [94 %-97 %] 94 %  Body mass index is 44 27 kg/m²  Input and Output Summary (last 24 hours): Intake/Output Summary (Last 24 hours) at 18  Last data filed at 18 1901   Gross per 24 hour   Intake              480 ml   Output             2200 ml   Net            -1720 ml       Physical Exam:     Physical Exam   Constitutional: She is oriented to person, place, and time  She appears well-developed  No distress  Morbidly obese   HENT:   Head: Normocephalic and atraumatic  Nose: Nose normal    Eyes: Conjunctivae and EOM are normal  Pupils are equal, round, and reactive to light  Neck: Normal range of motion  Neck supple  No JVD present     Cardiovascular: Normal rate and regular rhythm  Exam reveals no gallop and no friction rub  Murmur heard  Pulmonary/Chest: Effort normal  No respiratory distress  She has decreased breath sounds  She has wheezes (Improving)  She has no rhonchi  She has no rales  She exhibits no tenderness  Abdominal: Soft  Bowel sounds are normal  She exhibits no distension  There is no tenderness  There is no rebound and no guarding  Musculoskeletal: Normal range of motion  She exhibits edema (Bilateral lower extremity with chronic venous stasis)  Neurological: She is alert and oriented to person, place, and time  No cranial nerve deficit  Skin: Skin is warm and dry  No rash noted  Psychiatric: She has a normal mood and affect  Additional Data:     Labs:      Results from last 7 days  Lab Units 01/22/18  0532 01/21/18  0519   WBC Thousand/uL 14 10* 9 10   HEMOGLOBIN g/dL 11 9* 11 4*   HEMATOCRIT % 37 2 35 2*   PLATELETS Thousands/uL 165 145   NEUTROS PCT %  --  89*   LYMPHS PCT %  --  8*   LYMPHO PCT % 12  --    MONOS PCT %  --  3*   MONO PCT MAN % 4  --    EOS PCT %  --  0       Results from last 7 days  Lab Units 01/22/18  0532   SODIUM mmol/L 141   POTASSIUM mmol/L 5 1   CHLORIDE mmol/L 100   CO2 mmol/L 40*   BUN mg/dL 25   CREATININE mg/dL 0 76   CALCIUM mg/dL 8 5   TOTAL PROTEIN g/dL 6 4   BILIRUBIN TOTAL mg/dL 0 20   ALK PHOS U/L 59   ALT U/L 31   AST U/L 17   GLUCOSE RANDOM mg/dL 295*       Results from last 7 days  Lab Units 01/19/18  1919   INR  0 99       * I Have Reviewed All Lab Data Listed Above  * Additional Pertinent Lab Tests Reviewed: All Labs For Current Hospital Admission Reviewed    Imaging:  Xr Chest 1 View Portable    Result Date: 1/19/2018  Narrative: CHEST INDICATION:  Flu symptoms  COMPARISON:  October 8, 2017 VIEWS:   AP frontal IMAGES:  1 FINDINGS:     Heart shadow appears unremarkable  Atherosclerotic vascular calcifications are noted  The lungs are clear  No pneumothorax or pleural effusion   Visualized osseous structures appear within normal limits for the patient's age  Impression: No active pulmonary disease  Workstation performed: [id number]     Xr Hip/pelv 1 Vw Right If Performed    Result Date: 12/21/2017  Narrative: C-ARM -    INDICATION: Procedure guidance COMPARISON:  10/20/2017 TECHNIQUE: FLUOROSCOPY TIME:   8 SECONDS 1 FLUOROSCOPIC IMAGES FINDINGS: Fluoroscopy provided for performance of a pain management procedure in the right hip  Osseous and soft tissue detail limited by technique  Impression:      Please refer to the separate procedure notes for additional details  Workstation performed: [id number]     Ct Head Without Contrast    Result Date: 1/9/2018  Narrative: CT BRAIN - WITHOUT CONTRAST INDICATION:  Headache  COMPARISON:  10/10/2017 TECHNIQUE:  CT examination of the brain was performed  In addition to axial images, coronal reformatted images were created and submitted for interpretation  Radiation dose length product (DLP) for this visit:  1134 76 mGy-cm   This examination, like all CT scans performed in the [facility], was performed utilizing techniques to minimize radiation dose exposure, including the use of iterative reconstruction and automated exposure control  IMAGE QUALITY:  Diagnostic  FINDINGS:  PARENCHYMA: No acute intracranial hemorrhage  No extra-axial fluid collection  No midline shift  Unchanged is the calcified lesion in the left parietal cortex seen most consistent with a meningioma  It measures approximately 1 6 x 1 4 cm in axial dimension, unchanged from prior examination  Mass effect on the left superior parietal lobule without substantial adjacent edema  Stable atrophy and patchy areas of periventricular hypoattenuation noted  While nonspecific, these likely reflect changes of chronic microvascular ischemia in a patient of this age  VENTRICLES AND EXTRA-AXIAL SPACES:  Ventricles are commensurate with the degree of volume loss  Basal cisterns are patent  Atherosclerotic calcification of the intracranial vasculature is noted  VISUALIZED ORBITS AND PARANASAL SINUSES:  Changes of bilateral lens replacements noted  The paranasal sinuses and mastoid air cells remain clear  CALVARIUM AND EXTRACRANIAL SOFT TISSUES:   Normal      Impression: No acute intracranial abnormality  Stable left parietal convexity meningioma  No demonstrable adjacent parenchymal edema  Workstation performed: [id number]     Ct Abdomen Pelvis With Contrast    Result Date: 1/9/2018  Narrative: CT ABDOMEN AND PELVIS WITH IV CONTRAST INDICATION:  Chronic abdominal pain  COMPARISON: 12/13/2017  TECHNIQUE:  CT examination of the abdomen and pelvis was performed  Reformatted images were created in axial, sagittal, and coronal planes  Radiation dose length product (DLP) for this visit:  734 605 387 mGy-cm   This examination, like all CT scans performed in the [place], was performed utilizing techniques to minimize radiation dose exposure, including the use of iterative reconstruction and automated exposure control  IV Contrast:  100 mL of iohexol (OMNIPAQUE)         Enteric Contrast:  Enteric contrast was not administered  FINDINGS: ABDOMEN LOWER CHEST:  No significant abnormalities identified in the lower chest  LIVER/BILIARY TREE:  No biliary obstruction  GALLBLADDER:  Cholecystectomy  SPLEEN:  Unremarkable  PANCREAS:  Stable 11 mm cystic lesion in the body of the pancreas  Distal body and tail not visualized  Question prior resection  No pancreatic duct dilatation or evidence of pancreatitis  ADRENAL GLANDS:  Unremarkable  KIDNEYS/URETERS:  Stable fat-containing 15 mm right renal cortical lesion suggesting an angiomyolipoma  No evidence of pyelonephritis or obstructive uropathy  Carole Blend STOMACH AND BOWEL:  Stable moderate hiatal hernia  Fat-containing 4 cm periumbilical hernia, stable  Stool throughout the colon  No evidence of bowel obstruction, colitis or diverticulitis   APPENDIX:  No findings to suggest appendicitis  ABDOMINOPELVIC CAVITY:  No ascites or free intraperitoneal air  Stable shotty left para-aortic lymph nodes  VESSELS:  Unremarkable for patient's age  PELVIS REPRODUCTIVE ORGANS:  Patient is status post hysterectomy  URINARY BLADDER:  Unremarkable  ABDOMINAL WALL/INGUINAL REGIONS:  Unremarkable  OSSEOUS STRUCTURES:  No acute fracture or destructive osseous lesion  Impression: 1  Moderate amount of stool throughout the colon may indicate constipation  No evidence of bowel obstruction, colitis, diverticulitis or appendicitis  Moderate hiatal hernia and small fat-containing periumbilical hernia  2  Stable left millimeters cystic lesion in the body of the pancreas  No ductal dilatation  Possible  serous cystadenoma  3  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  No pyelonephritis or obstructive uropathy  Workstation performed: [id number]     Imaging Reports Reviewed by myself    Cultures:   Blood Culture:   Lab Results   Component Value Date    BLOODCX No Growth at 48 hrs  01/19/2018    BLOODCX No Growth at 48 hrs  01/19/2018    BLOODCX No Growth After 5 Days  06/11/2017    BLOODCX No Growth After 5 Days   06/11/2017    BLOODCX Klebsiella pneumoniae - ESBL (A) 06/07/2017    BLOODCX Klebsiella pneumoniae ESBL (A) 06/07/2017     Urine Culture:   Lab Results   Component Value Date    URINECX 20,000-29,000 cfu/ml Mixed Contaminants X3 04/15/2017     Sputum Culture: No components found for: SPUTUMCX  Wound Culture: No results found for: WOUNDCULT    Last 24 Hours Medication List:     acetaminophen 650 mg Oral Q6H [address]   aspirin 325 mg Oral Daily   atorvastatin 20 mg Oral Daily With Dinner   azithromycin 500 mg Oral Daily   furosemide 40 mg Oral BID (diuretic)   heparin (porcine) 5,000 Units Subcutaneous Q8H [address]   insulin glargine 60 Units Subcutaneous HS   insulin lispro 1-5 Units Subcutaneous HS   insulin lispro 1-6 Units Subcutaneous TID AC   insulin lispro 8 Units Subcutaneous TID With Meals   levalbuterol 1 25 mg Nebulization 4x Daily   And      sodium chloride 3 mL Nebulization 4x Daily   levothyroxine 125 mcg Oral Early Morning   lisinopril 10 mg Oral Daily   methylPREDNISolone sodium succinate 20 mg Intravenous Q8H [address]   oseltamivir 75 mg Oral Q12H FELIPE   oxymetazoline 2 spray Each Nare Q12H [address]   polyethylene glycol 17 g Oral Daily   pramipexole 1 mg Oral TID        Today, Patient Was Seen By: Chester Goodpasture, MD    ** Please Note: Dragon 360 Dictation voice to text software may have been used in the creation of this document   **

## 2018-01-22 NOTE — PLAN OF CARE
CARDIOVASCULAR - ADULT     Maintains optimal cardiac output and hemodynamic stability Progressing     Absence of cardiac dysrhythmias or at baseline rhythm Progressing        DISCHARGE PLANNING - CARE MANAGEMENT     Discharge to post-acute care or home with appropriate resources Progressing        Potential for Falls     Patient will remain free of falls Progressing        RESPIRATORY - ADULT     Achieves optimal ventilation and oxygenation Progressing

## 2018-01-22 NOTE — PROGRESS NOTES
Clear drainage noted from patient's previously documented diabetic ulcer as well as more swelling and discoloration  Dr Eliza Saini aware, no new orders

## 2018-01-22 NOTE — PHYSICAL THERAPY NOTE
PT TREATMENT     01/22/18 1425   Pain Assessment   Pain Assessment No/denies pain   Restrictions/Precautions   Other Precautions Fall Risk;O2   General   Chart Reviewed Yes   Family/Caregiver Present No   Cognition   Overall Cognitive Status WFL   Arousal/Participation Cooperative   Subjective   Subjective patient reports feeling better than when admitted but still not feeling "right"   Transfers   Sit to Stand (supervision to independent)   Stand to Sit (supervision to independent)   Ambulation/Elevation   Gait Assistance 5  Supervision   Assistive Device Rolling walker   Distance 60 feet with change in direction, SOB limiting endurance, SpO2 at 85% on room air and recovered quickly to 95% on 2 liters  Gait steady but patient's own walker too high but cannot be adjusted , therefore , patient educated in proper fitting for another walker which patient has at home   Exercises   Hip Flexion Sitting;10 reps;Bilateral   Knee AROM Long Arc Quad Sitting;10 reps;Bilateral   Ankle Pumps Sitting;10 reps;Bilateral   Assessment   Assessment patient states SOB prior to admission  also but worsened now although demonstrating improving gait balance and endurance and will benefit from continued PT with progression as tolerated  Plan   Treatment/Interventions ADL retraining;Functional transfer training;LE strengthening/ROM; Therapeutic exercise; Endurance training;Patient/family training;Equipment eval/education;Gait training   PT Frequency (3-5x/week)   Recommendation   Recommendation (home with family and services/PT)

## 2018-01-23 VITALS
DIASTOLIC BLOOD PRESSURE: 64 MMHG | BODY MASS INDEX: 43.06 KG/M2 | HEART RATE: 84 BPM | TEMPERATURE: 98.1 F | SYSTOLIC BLOOD PRESSURE: 146 MMHG | HEIGHT: 62 IN | WEIGHT: 234 LBS

## 2018-01-23 LAB
ALBUMIN SERPL BCP-MCNC: 2.7 G/DL (ref 3.5–5)
ALP SERPL-CCNC: 52 U/L (ref 46–116)
ALT SERPL W P-5'-P-CCNC: 40 U/L (ref 12–78)
ANION GAP SERPL CALCULATED.3IONS-SCNC: 3 MMOL/L (ref 4–13)
AST SERPL W P-5'-P-CCNC: 16 U/L (ref 5–45)
BILIRUB SERPL-MCNC: 0.2 MG/DL (ref 0.2–1)
BUN SERPL-MCNC: 26 MG/DL (ref 5–25)
CALCIUM SERPL-MCNC: 8 MG/DL (ref 8.3–10.1)
CHLORIDE SERPL-SCNC: 102 MMOL/L (ref 100–108)
CO2 SERPL-SCNC: 38 MMOL/L (ref 21–32)
CREAT SERPL-MCNC: 0.76 MG/DL (ref 0.6–1.3)
GFR SERPL CREATININE-BSD FRML MDRD: 75 ML/MIN/1.73SQ M
GLUCOSE SERPL-MCNC: 291 MG/DL (ref 65–140)
GLUCOSE SERPL-MCNC: 313 MG/DL (ref 65–140)
GLUCOSE SERPL-MCNC: 389 MG/DL (ref 65–140)
GLUCOSE SERPL-MCNC: 417 MG/DL (ref 65–140)
GLUCOSE SERPL-MCNC: 456 MG/DL (ref 65–140)
MAGNESIUM SERPL-MCNC: 2.7 MG/DL (ref 1.6–2.6)
POTASSIUM SERPL-SCNC: 5.1 MMOL/L (ref 3.5–5.3)
PROT SERPL-MCNC: 5.9 G/DL (ref 6.4–8.2)
SODIUM SERPL-SCNC: 143 MMOL/L (ref 136–145)

## 2018-01-23 PROCEDURE — 83735 ASSAY OF MAGNESIUM: CPT | Performed by: INTERNAL MEDICINE

## 2018-01-23 PROCEDURE — 82948 REAGENT STRIP/BLOOD GLUCOSE: CPT

## 2018-01-23 PROCEDURE — 80053 COMPREHEN METABOLIC PANEL: CPT | Performed by: INTERNAL MEDICINE

## 2018-01-23 PROCEDURE — 94640 AIRWAY INHALATION TREATMENT: CPT

## 2018-01-23 PROCEDURE — 94760 N-INVAS EAR/PLS OXIMETRY 1: CPT

## 2018-01-23 PROCEDURE — 97110 THERAPEUTIC EXERCISES: CPT

## 2018-01-23 RX ADMIN — LEVALBUTEROL HYDROCHLORIDE 1.25 MG: 1.25 SOLUTION, CONCENTRATE RESPIRATORY (INHALATION) at 19:19

## 2018-01-23 RX ADMIN — ATORVASTATIN CALCIUM 20 MG: 20 TABLET, FILM COATED ORAL at 17:25

## 2018-01-23 RX ADMIN — LEVALBUTEROL HYDROCHLORIDE 1.25 MG: 1.25 SOLUTION, CONCENTRATE RESPIRATORY (INHALATION) at 15:55

## 2018-01-23 RX ADMIN — INSULIN LISPRO 6 UNITS: 100 INJECTION, SOLUTION INTRAVENOUS; SUBCUTANEOUS at 22:22

## 2018-01-23 RX ADMIN — AZITHROMYCIN 500 MG: 250 TABLET, FILM COATED ORAL at 22:20

## 2018-01-23 RX ADMIN — INSULIN LISPRO 4 UNITS: 100 INJECTION, SOLUTION INTRAVENOUS; SUBCUTANEOUS at 17:26

## 2018-01-23 RX ADMIN — ACETAMINOPHEN 650 MG: 325 TABLET, FILM COATED ORAL at 23:20

## 2018-01-23 RX ADMIN — ISODIUM CHLORIDE 3 ML: 0.03 SOLUTION RESPIRATORY (INHALATION) at 07:45

## 2018-01-23 RX ADMIN — HEPARIN SODIUM 5000 UNITS: 5000 INJECTION, SOLUTION INTRAVENOUS; SUBCUTANEOUS at 06:04

## 2018-01-23 RX ADMIN — INSULIN LISPRO 4 UNITS: 100 INJECTION, SOLUTION INTRAVENOUS; SUBCUTANEOUS at 08:04

## 2018-01-23 RX ADMIN — LEVALBUTEROL HYDROCHLORIDE 1.25 MG: 1.25 SOLUTION, CONCENTRATE RESPIRATORY (INHALATION) at 11:51

## 2018-01-23 RX ADMIN — BENZONATATE 100 MG: 100 CAPSULE ORAL at 00:28

## 2018-01-23 RX ADMIN — OXYCODONE HYDROCHLORIDE 5 MG: 5 TABLET ORAL at 22:23

## 2018-01-23 RX ADMIN — OXYCODONE HYDROCHLORIDE 5 MG: 5 TABLET ORAL at 04:13

## 2018-01-23 RX ADMIN — METHYLPREDNISOLONE SODIUM SUCCINATE 20 MG: 40 INJECTION, POWDER, FOR SOLUTION INTRAMUSCULAR; INTRAVENOUS at 22:22

## 2018-01-23 RX ADMIN — ACETAMINOPHEN 650 MG: 325 TABLET, FILM COATED ORAL at 06:04

## 2018-01-23 RX ADMIN — PRAMIPEXOLE DIHYDROCHLORIDE 1 MG: 0.5 TABLET ORAL at 09:08

## 2018-01-23 RX ADMIN — ISODIUM CHLORIDE 3 ML: 0.03 SOLUTION RESPIRATORY (INHALATION) at 11:51

## 2018-01-23 RX ADMIN — GUAIFENESIN AND DEXTROMETHORPHAN 10 ML: 100; 10 SYRUP ORAL at 09:21

## 2018-01-23 RX ADMIN — HEPARIN SODIUM 5000 UNITS: 5000 INJECTION, SOLUTION INTRAVENOUS; SUBCUTANEOUS at 14:56

## 2018-01-23 RX ADMIN — ASPIRIN 325 MG: 325 TABLET ORAL at 09:09

## 2018-01-23 RX ADMIN — ISODIUM CHLORIDE 3 ML: 0.03 SOLUTION RESPIRATORY (INHALATION) at 19:19

## 2018-01-23 RX ADMIN — LISINOPRIL 10 MG: 10 TABLET ORAL at 09:08

## 2018-01-23 RX ADMIN — LEVALBUTEROL HYDROCHLORIDE 1.25 MG: 1.25 SOLUTION, CONCENTRATE RESPIRATORY (INHALATION) at 07:45

## 2018-01-23 RX ADMIN — INSULIN GLARGINE 60 UNITS: 100 INJECTION, SOLUTION SUBCUTANEOUS at 22:21

## 2018-01-23 RX ADMIN — PRAMIPEXOLE DIHYDROCHLORIDE 1 MG: 0.5 TABLET ORAL at 22:23

## 2018-01-23 RX ADMIN — OSELTAMIVIR PHOSPHATE 75 MG: 75 CAPSULE ORAL at 09:09

## 2018-01-23 RX ADMIN — ACETAMINOPHEN 650 MG: 325 TABLET, FILM COATED ORAL at 00:29

## 2018-01-23 RX ADMIN — INSULIN LISPRO 8 UNITS: 100 INJECTION, SOLUTION INTRAVENOUS; SUBCUTANEOUS at 12:28

## 2018-01-23 RX ADMIN — FUROSEMIDE 40 MG: 40 TABLET ORAL at 17:25

## 2018-01-23 RX ADMIN — HEPARIN SODIUM 5000 UNITS: 5000 INJECTION, SOLUTION INTRAVENOUS; SUBCUTANEOUS at 22:21

## 2018-01-23 RX ADMIN — OSELTAMIVIR PHOSPHATE 75 MG: 75 CAPSULE ORAL at 22:23

## 2018-01-23 RX ADMIN — LEVOTHYROXINE SODIUM 125 MCG: 125 TABLET ORAL at 06:04

## 2018-01-23 RX ADMIN — METHYLPREDNISOLONE SODIUM SUCCINATE 20 MG: 40 INJECTION, POWDER, FOR SOLUTION INTRAMUSCULAR; INTRAVENOUS at 06:04

## 2018-01-23 RX ADMIN — METHYLPREDNISOLONE SODIUM SUCCINATE 20 MG: 40 INJECTION, POWDER, FOR SOLUTION INTRAMUSCULAR; INTRAVENOUS at 14:16

## 2018-01-23 RX ADMIN — ISODIUM CHLORIDE 3 ML: 0.03 SOLUTION RESPIRATORY (INHALATION) at 15:55

## 2018-01-23 RX ADMIN — ACETAMINOPHEN 650 MG: 325 TABLET, FILM COATED ORAL at 14:16

## 2018-01-23 RX ADMIN — FUROSEMIDE 40 MG: 40 TABLET ORAL at 09:08

## 2018-01-23 RX ADMIN — INSULIN LISPRO 6 UNITS: 100 INJECTION, SOLUTION INTRAVENOUS; SUBCUTANEOUS at 12:28

## 2018-01-23 RX ADMIN — INSULIN LISPRO 8 UNITS: 100 INJECTION, SOLUTION INTRAVENOUS; SUBCUTANEOUS at 08:04

## 2018-01-23 RX ADMIN — PRAMIPEXOLE DIHYDROCHLORIDE 1 MG: 0.5 TABLET ORAL at 17:25

## 2018-01-23 RX ADMIN — ACETAMINOPHEN 650 MG: 325 TABLET, FILM COATED ORAL at 18:43

## 2018-01-23 NOTE — RESULT NOTES
Discussion/Summary   I left a voicemail on the patient's machine  Please send results letter, bile duct changes are stable  Will repeat CT scan in 6 months to make sure nothing worsens  Please schedule office follow-up in 2 months with her daughters  Verified Results  * CT ABDOMEN PELVIS W CONTRAST [id number] 11:11AM Ilda Rick    Order Number: [id number]   Performing Comments: tripple phase ct, liver mass protocol   - Patient Instructions: To schedule this appointment, please contact Central Scheduling at [phone]  Test Name Result Flag Reference   CT ABDOMEN PELVIS W CONTRAST (Report)     CT ABDOMEN AND PELVIS WITH IV CONTRAST     INDICATION: Follow-up dilated intrahepatic bile ducts  COMPARISON: CT the abdomen and pelvis from September 18, 2017  MRI abdomen/MRCP from April 17, 2017  TECHNIQUE: CT examination of the abdomen and pelvis was performed  Reformatted images were created in axial, sagittal, and coronal planes  Radiation dose length product (DLP) for this visit: 1354 91 mGy-cm   This examination, like all CT scans performed in the [facility], was performed utilizing techniques to minimize radiation dose exposure, including the use of    iterative reconstruction and automated exposure control  IV Contrast: 50 mL of iohexol (OMNIPAQUE)   100 mL of iohexol (OMNIPAQUE)   Enteric Contrast: Enteric contrast was administered  FINDINGS:     ABDOMEN     LOWER CHEST: Lung bases clear  Large hiatal hernia  LIVER/BILIARY TREE: Normal size  No masses  Stable mild dilatation of the right intrahepatic bile ducts  Normal caliber common bile duct  GALLBLADDER: Postcholecystectomy  SPLEEN: Unremarkable  PANCREAS: Unremarkable  ADRENAL GLANDS: Unremarkable  KIDNEYS/URETERS: 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  No new renal masses  No calculus, hydronephrosis or ureterectasis       STOMACH AND BOWEL: Much of the gastric fundus with in a large hiatal hernia  Stomach otherwise normal in appearance  Small bowel normal in appearance  Diverticulosis in the distal descending colon and sigmoid without evidence of diverticulitis  Colon otherwise unremarkable  APPENDIX: No findings to suggest appendicitis  ABDOMINOPELVIC CAVITY: No lymphadenopathy or mass  No ascites  No extraluminal gas  VESSELS: Unremarkable  No aortic aneurysm  PELVIS     REPRODUCTIVE ORGANS: Post hysterectomy  No adnexal masses  URINARY BLADDER: Unremarkable  ABDOMINAL WALL/INGUINAL REGIONS: Small fat-containing umbilical hernia  Abdominal wall otherwise intact  OSSEOUS STRUCTURES: No acute fracture or destructive osseous lesion  IMPRESSION:     Isolated mild dilatation of right lobe intrahepatic ducts  No new abnormality in the abdomen or pelvis         Workstation performed: [id number]     Signed by:   Jesús James MD   12/15/17

## 2018-01-23 NOTE — PHYSICAL THERAPY NOTE
PT TREATMENT     01/23/18 1435   Pain Assessment   Pain Assessment No/denies pain   Restrictions/Precautions   Other Precautions O2;Fall Risk   General   Chart Reviewed Yes   Family/Caregiver Present No   Cognition   Arousal/Participation Cooperative   Subjective   Subjective patient reports feeling better with properly fitted walker   Transfers   Sit to Stand 5  Supervision   Additional items Assist x 1   Stand to Sit 5  Supervision   Additional items Assist x 1   Ambulation/Elevation   Gait Assistance 5  Supervision   Additional items Assist x 1   Assistive Device Rolling walker   Distance 100 feet with change in direction, SpO2 at 95% on 2 liters O2   Exercises   Hip Flexion Sitting;15 reps;Bilateral   Knee AROM Long Arc Quad Sitting;10 reps;Bilateral   Ankle Pumps Sitting;10 reps;Bilateral   Marching Standing;10 reps;Bilateral   Assessment   Assessment patient educated in proper height of walker and given properly fitted walker for use in room  Patient to have someone bring in walker from home for check of size  patient will benefit from continued PT with progression as tolerated   Plan   Treatment/Interventions ADL retraining;Functional transfer training;LE strengthening/ROM; Therapeutic exercise; Endurance training;Patient/family training;Equipment eval/education; Bed mobility;Gait training   PT Frequency (3-5x/week)   Recommendation   Recommendation (home with PT)

## 2018-01-23 NOTE — CASE MANAGEMENT
Continued Stay Review    Date: 1/23/18    Vital Signs: /67 (BP Location: Left arm)   Pulse 85   Temp 97 9 °F (36 6 °C) (Oral)   Resp 20   Ht 5' 2" (1 575 m)   Wt 112 kg (246 lb 4 1 oz)   LMP  (LMP Unknown)   SpO2 95%   BMI 45 04 kg/m²     Medications:   Scheduled Meds:   acetaminophen 650 mg Oral Q6H [facility]   aspirin 325 mg Oral Daily   atorvastatin 20 mg Oral Daily With Dinner   azithromycin 500 mg Oral Daily   furosemide 40 mg Oral BID (diuretic)   heparin (porcine) 5,000 Units Subcutaneous Q8H [facility]   insulin glargine 60 Units Subcutaneous HS   insulin lispro 1-5 Units Subcutaneous HS   insulin lispro 1-6 Units Subcutaneous TID AC   insulin lispro 8 Units Subcutaneous TID With Meals   levalbuterol 1 25 mg Nebulization 4x Daily   And      sodium chloride 3 mL Nebulization 4x Daily   levothyroxine 125 mcg Oral Early Morning   lisinopril 10 mg Oral Daily   methylPREDNISolone sodium succinate 20 mg Intravenous Q8H [facility]   oseltamivir 75 mg Oral Q12H [facility]EL[facility]   pramipexole 1 mg Oral TID     Continuous Infusions:    PRN Meds:   acetaminophen    aluminum-magnesium hydroxide-simethicone    benzonatate    dextromethorphan-guaiFENesin    hydrocodone-chlorpheniramine polistirex    levalbuterol **AND** sodium chloride    ondansetron    oxyCODONE    polyethylene glycol    senna-docusate sodium  Abnormal Labs/Diagnostic Results:   Co2 38 ANION GAP 3 BUN 26 CA 8 0 TPROT 5 9 ALB 2 7 MA 2 7  Age/Sex: 77 y o  female   Assessment/Plan:   Subjective:   Still SOB, still wheezing  Still has bad cough  Pulmonary/Chest: She is in respiratory distress  She has wheezes  She has rales  Wearing NC, increased work of breathing, conversational dyspnea   Musculoskeletal: She exhibits edema (pitting in BLE) and tenderness  · Acute bronchitis, likely from influenza A, less likely bacteria- slow to improve  cont tamiflu and azithro  Sputum cx not obtained as patient not having production  Blood cx NTD   Urine legionella/pneumococcal negative  Cont bronchodilators, IV steroids, wean as tolerated once improved  Pulmonary recs appreciated  · Acute on chronic hypoxic respiratory failure- cont supplemental O2, wean as tolerated  · DM type II, uncontrolled with hyperglycemia- HA1c 9 2  lantus increased to 60 units  Cont ISS   · Hypotension 2/2 volume depletion and acute illness- BP now stable  Cont lisinopril  · Chronic diastolic HF- resumed lasix PO  Holding zaroxolyn  · Dyslipidemia- cont statin  · H/o TIA- cont asa, statin  · Hypothyroid- cont synthroid  · RLS- cont mirapex  · H/o ESBL klebsiella bacteremia  · Deconditioning- PT/OT   Discharge Plan: TBD  Thank you,  [facility] in the [facility] by Miguel [facility]ngel Estevez for 2017  Network Utilization Review Department  Phone: [phone]; Fax [phone]  ATTENTION: The Network Utilization Review Department is now centralized for our 7 Facilities  Make a note that we have a new phone and fax numbers for our Department  Please call with any questions or concerns to [phone] and carefully follow the prompts so that you are directed to the right person  All voicemails are confidential  Fax any determinations, approvals, denials, and requests for initial or continue stay review clinical to [phone]  Due to HIGH CALL volume, it would be easier if you could please send faxed requests to expedite your requests and in part, help us provide discharge notifications faster

## 2018-01-23 NOTE — MISCELLANEOUS
Assessment   1  Diabetes mellitus with neuropathy (250 60,357 2) (E11 40)1   2  Constipation due to opioid therapy (564 09,E935 2) (K59 03,T40 2X5A)1   3  Abnormal CT scan, liver (793 3) (R93 2)1   4  Hypertension (401 9) (I10)1   5  1,2 History of Acute dyspnea (786 09) (R06 00)2      1 Amended By: Cris Rosario; Apr 26 2017 8:49 PM EST   2 Amended By: Cris Rosario; Jul 08 2017 8:27 PM EST    Chief Complaint  Chief Complaint Free Text Note Form: 04/21/2017 Telephone Communication Call to patient; completed  efrem/lpn  00/28/0676 Patient is here today for a hospital follow up, BOO White/TIARA        1 Amended By: Raj Valencia; Apr 26 2017 11:47 AM EST    History of Present Illness  TCM Communication [facility]: The patient is being contacted for follow-up after hospitalization  She was hospitalized at [place] [address]  The dates of hospitalization: 04/15/2017 - 04/21/2017, date of admission: 04/15/2017, date of discharge: 04/21/2017  Diagnosis: Patient went to emergency room for severe pain  Patient stated "I couldn't take it anymore "  She was discharged to home  Medications were not reviewed today  She scheduled a follow up appointment  Follow-up appointments with other specialists: Patient has a follow up appoinmentt scheduled with Dr Marika Crooks on 04/26/2017 @ 11:15  Symptoms: fatigue  Patient states "I have pain all over " Counseling was provided to the patient  Her daughter is also coordinating with her care  Topics counseled included importance of compliance with treatment  Communication performed and completed by Karlee Beckham LPN   HPI: PATIENT IS S/P [facility] [address] ABDOMINAL PAIN  1  1,2  REVIEWED [address]        1 Amended By: Cris Rosario; Apr 26 2017 8:27 PM EST   2 Amended By: Cris Rosario; Apr 30 2017 8:32 PM EST    Review of Systems  Complete-Female:   Constitutional:1  no fever1 , no chills1  and not feeling tired1   Eyes:1  no eyesight problems1      ENT:1  no nasal discharge1     Cardiovascular:1  no chest pain1 , the heart rate was not fast1  and no palpitations1   Respiratory:1  wheezing1  and shortness of breath during exertion1 , but no shortness of breath1   Gastrointestinal:1  no abdominal pain1 , no nausea1 , no vomiting1  and no diarrhea1   Genitourinary:1  no dysuria1   Musculoskeletal:1  no arthralgias1   Integumentary:1  no rashes1   Neurological:1  no headache1 , no numbness1 , no tingling1  and no dizziness1   Psychiatric:1  anxiety1 , but no depression1   Endocrine:1  no muscle weakness1   Hematologic/Lymphatic:1  no tendency for easy bleeding1   ROS Reviewed:   ROS reviewed  1        1 Amended By: Madeleine Bruner; Apr 30 2017 8:36 PM EST    Active Problems   1  Abnormal CT scan, liver (793 3) (R93 2)  2  Acquired hypothyroidism (244 9) (E03 9)  3  Acute dyspnea (786 09) (R06 00)  4  Acute on chronic systolic congestive heart failure (428 23,428 0) (I50 23)  5  Arthritis (716 90) (M19 90)  6  Asthma (493 90) (J45 909)  7  Change in bowel habits (787 99) (R19 4)  8  Chronic pain (338 29) (G89 29)  9  Diabetes mellitus with neuropathy (250 60,357 2) (E11 40)  10  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)  11  Edema (782 3) (R60 9)  12  Emphysema, unspecified (492 8) (J43 9)  13  Epiphora, unspecified laterality (375 20) (H04 209)  14  Fatigue (780 79) (R53 83)  15  Generalized colicky abdominal pain (789 7) (R10 83)  16  Hypertension (401 9) (I10)  17  Irritable bowel syndrome with diarrhea (564 1) (K58 0)  18  Nausea and/or vomiting (787 01) (R11 2)  19  Obesity (278 00) (E66 9)  20  Palpitations (785 1) (R00 2)  21  Refused influenza vaccine (V64 06) (Z28 21)  22  Restless leg syndrome (333 94) (G25 81)  23  Shortness of breath (786 05) (R06 02)  24  Sleep apnea (780 57) (G47 30)  25  Thyroid disease (246 9) (E07 9)    Past Medical History   1  History of Cellulitis of right lower extremity (682 6) (L03 115)  2   History of Depression, acute (296 20) (F32 9)  3  History of Emphysema, 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518 2) (J98 3)  4  History of aortic valve disorder (V12 59) (Z86 79)  5  History of congestive heart failure (V12 59) (Z86 79)  6  History of Restless leg syndrome (333 94) (G25 81)    Surgical History   1  History of Cholecystectomy  2  History of Eye Surgery  3  History of Resection Of Pericardial Cyst Or Tumor  4  History of Thoracoscopy (Therapeutic) W/ Excision Of Pericardial Cyst  5  History of Thyroid Surgery    Family History  Mother   1  Family history of Asthma  2  Denied: Family history of Colon cancer  3  Family history of   4  Denied: Family history of alcoholism  5  Denied: Family history of mental disorder  Father   10  Denied: Family history of Colon cancer  7  Family history of   6  Denied: Family history of alcoholism  9  Denied: Family history of mental disorder  10  Family history of Liver cancer    Social History    · Active advance directive (V4 89) (Z78 9)   · Caffeine use () (F15 90)   · Drinks coffee   · Feels safe at home   · Never a smoker   · No alcohol use   · No drug use   · Primary language is [place]   · Retired   · Seeing a dentist    Current Meds  1  Aspirin 81 MG TABS; TAKE 1 TABLET DAILY; Therapy: (Recorded:2014) to Recorded  2  Dulcolax TBEC; TAKE 2 TABLET Bedtime; Therapy: (Recorded:2017) to Recorded  3  DuoNeb 0 5-2 5 (3) MG/3ML Inhalation Solution; USE 1 UNIT DOSE IN NEBULIZER   EVERY 4 HOURS AS NEEDED; Therapy: (Recorded:2014) to Recorded  4  Folic Acid 1 MG Oral Tablet; take 1 tablet by mouth once daily; Therapy: 60GMY5656 to (Evaluate:34Wwa2015)  Requested for: 32FGV7424; Last   Rx:2017 Ordered  5  Furosemide 40 MG Oral Tablet; two tabs in the AM    one tablet in the afternoon; Therapy: (Recorded:2017) to Recorded  6  Humalog Pen SUSP; INJECT 40 UNIT Twice daily; Therapy: (Recorded:2017) to Recorded  7   MetOLazone 2 5 MG Oral Tablet; TAKE 1 TABLET DAILY ON Shira Franklin, AND   FRIDAY; Therapy: 19NMV0164 to (Last Rx:07Apr2017)  Requested for: 07Apr2017 Ordered  8  Multi-Vitamins Oral Tablet; TAKE 1 TABLET DAILY; Therapy: 43XUI8860 to Recorded  9  OxyCODONE HCl - 5 MG Oral Capsule; TAKE 1 CAPSULE EVERY 8 HOURS; Last   Rx:04Apr2017 Ordered  10  Potassium Chloride Bina ER 20 MEQ Oral Tablet Extended Release; TAKE 1 TABLET    DAILY  Requested for: 43YOD6918; Last Rx:11Mar2017 Ordered  11  Pramipexole Dihydrochloride 1 MG Oral Tablet; TAKE 1 TABLET 3 TIMES DAILY; Therapy: 10NXZ5444 to (Evaluate:37Agr2046)  Requested for: 87Tyr9621; Last    Rx:49Iqt4157 Ordered  12  PredniSONE 10 MG Oral Tablet; TAKE 1 TABLET DAILY; Therapy: (JDUFQYHZ:82HQD6900) to Recorded  13  Synthroid 125 MCG Oral Tablet; TAKE 1 TABLET EVERY MORNING; Therapy: (Recorded:36Elk3231) to Recorded  14  Vitamin C 500 MG Oral Tablet; TAKE 1 TABLET DAILY; Therapy: (Recorded:80Llg8095) to Recorded    Allergies   1  Latex Gloves MISC  2  Toradol Oral TABS   3  No Known Environmental Allergies  4  No Known Food Allergies    Future Appointments    Date/Time Provider Specialty Site   05/11/2017 11:10 AM LASHAE Greer   Gastroenterology Adult [facility]   04/26/2017 11:30 AM Jeff Arambula MD [address] [place] [address] PHYSICIANS     Signatures   Electronically signed by : Jesus Alberto Bearden MD; Apr 22 2017  9:37PM EST                       (Author)    Electronically signed by : Jesus Alberto Bearden MD; Jul 8 2017  8:27PM EST                       (Author)    Electronically signed by : Jesus Alberto Bearden MD; Jul 8 2017  8:27PM EST                       (Author)

## 2018-01-23 NOTE — PROGRESS NOTES
Assessment    1  Moderate obstructive sleep apnea (327 23) (G47 33)   2  Diastolic dysfunction (039 4) (I51 9)   3  Hypoventilation associated with obesity syndrome (278 03) (E66 2)   4  Dyspnea on exertion (786 09) (R06 09)    Plan  Dyspnea on exertion, Hypoventilation associated with obesity syndrome    · 1 Brittany Jimenez MD, Celia Ramires (Cardiology) Co-Management  *  Status: Active  Requested for:  97BWV2994   Ordered; For: Dyspnea on exertion, Hypoventilation associated with obesity syndrome; Ordered By: Paulina Martinez Performed:  Due: 01UXZ4425  Care Summary provided  : Yes    Discussion/Summary  Discussion Summary:   Hilda Lomeli has severe obstructive sleep apnea  She is hesitant to use CPAP but is willing to try  I have ordered CPAP and explained the anatomy the upper airway, diagnosis and treatment of the same  She reports having difficulty in the mask during the treatment study  I explained to her with her history of hypercapnic respiratory failure and severe LISBET this would be optimal therapy  Charly Kapadia did have 2D echo which showed grade 1 diastolic dysfunction and mild pulmonary artery hypertension she has chronic dyspnea and therefore I a m  referring her to cardiology  Complete PFT will be considered at next visit  She will return for compliance in 4-6 weeks  All questions were answered  Her daughter was here during today's exam    Counseling Documentation With Imm: The patient, patient's family was counseled regarding diagnostic results  Goals and Barriers: The patient has the current Goals: Patient will use CPAP every night  Weight loss will be attempted through caloric reduction and exercise  Patient's Capacity to Self-Care: Patient is able to Self-Care  Active Problems    1  Abnormal CT scan, liver (793 3) (R93 2)   2  Abnormal tumor markers (795 89) (R97 8)   3  Acquired hypothyroidism (244 9) (E03 9)   4  Acute hip pain, right (719 45) (M25 551)   5  Arthritis (716 90) (M19 90)   6   Asthma (493 90) (J45 909)   7  Balance problem (781 99) (R26 89)   8  Chronic pain (338 29) (G89 29)   9  Concussion (850 9) (S06 0X9A)   10  Constipation due to opioid therapy (564 09,E935 2) (K59 03,T40 2X5A)   11  Daytime hypersomnolence (780 54) (G47 19)   12  Diabetes mellitus with neuropathy (250 60,357 2) (E11 40)   13  Edema (782 3) (R60 9)   14  Elevated LFTs (790 6) (R79 89)   15  Generalized colicky abdominal pain (789 7) (R10 83)   16  Greater trochanteric bursitis of right hip (726 5) (M70 61)   17  Hyperglycemia (790 29) (R73 9)   18  Hypertension (401 9) (I10)   19  Hypoventilation associated with obesity syndrome (278 03) (E66 2)   20  Iliotibial band syndrome, right leg (728 89) (M76 31)   21  Irritable bowel syndrome with diarrhea (564 1) (K58 0)   22  Left hip pain (719 45) (M25 552)   23  Liver lesion (573 8) (K76 9)   24  Lung nodules (793 19) (R91 8)   25  Meningiomas, multiple (237 6) (D42 9)   26  Mild persistent asthma with acute exacerbation (493 92) (J45 31)   27  Moderate episode of recurrent major depressive disorder (296 32) (F33 1)   28  Moderate obstructive sleep apnea (327 23) (G47 33)   29  Morbid obesity due to excess calories (278 01) (E66 01)   30  Pancreatic cyst (577 2) (K86 2)   31  Pulmonary emphysema (492 8) (J43 9)   32  Pulmonary nodules (793 19) (R91 8)   33  Restless leg syndrome (333 94) (G25 81)   34  Sleep apnea (780 57) (G47 30)   35  Thyroid disease (246 9) (E07 9)    Chief Complaint  Chief Complaint Free Text Note Form: Jc Ferguson is here for results of sleep studies  She c/o SOB on slight exertion      History of Present Illness  HPI: Jc Ferguson is here for follow-up  She was hospitalized in September and has history of chronic hypercapnic respiratory failure  She did have a sleep study in October 2017  Apneic hypopnea index was 20 1 events per hour and was 46 events per hour during REM sleep  Additionally she had severe hypoxemia with oxygen below 89% for 171 minutes    Jaye did have treatment study done on November 8, 2017  She was titrated to 15 cm of water  It was noted that oxygen less than 89% was for 2 minutes  Ameena Bauman does have hypoventilation associated with obesity syndrome and asthma  Yolanda Mcleod PFT's done post hospitalization showed restrictive impairment  Review of Systems  Complete-Female - Pulm:   Constitutional: No fever, no chills, feels well, no tiredness, no recent weight gain or weight loss  Eyes: no complaints of vision problems  ENT: no rhinitis, no PND, no epistaxis  Cardiovascular: no palpitations, no chest pain  Respiratory: as noted in HPI  Gastrointestinal: no complaints of esophageal reflux, no abdominal pain  Genitourinary: no dysuria  Musculoskeletal: no arthralgias, no joint swelling, no myalgias  Integumentary: no rash, no lesions  Neurological: no headache, no fainting, no weakness  Psychiatric: no anxiety, no depression  Hematologic/Lymphatic: - no complaints of swollen glands  Past Medical History    1  History of Acute on chronic systolic congestive heart failure (428 23,428 0) (I50 23)   2  History of Anxiety (300 00) (F41 9)   3  History of Cellulitis of right lower extremity (682 6) (L03 115)   4  History of Confusion and disorientation (300 9) (F99)   5  History of Depression, acute (296 20) (F32 9)   6  History of Dry skin (701 1) (L85 3)   7  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)   8  History of Emphysema, 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518 2) (J98 3)   9  Excessive thirst (783 5) (R63 1)   10  Flu vaccine need (V04 81) (Z23)   11  H/O blurred vision (V12 49) (Z86 69)   12  History of Hair loss (704 00) (L65 9)   13  History of aortic valve disorder (V12 59) (Z86 79)   14  History of asthma (V12 69) (Z87 09)   15  History of cataract (V12 49) (Z86 69)   16  History of congestive heart failure (V12 59) (Z86 79)   17  History of depression (V11 8) (Z86 59)   18  History of eye injury (V15 59) (Z87 828)   19   History of fatigue (V13 89) (Z87 898)   20  History of hearing loss (V12 49) (Z86 69)   21  History of hypertension (V12 59) (Z86 79)   22  History of malignant neoplasm of skin (V10 83) (Z85 828)   23  History of malignant neoplasm of thyroid (V10 87) (Z85 850)   24  History of memory loss (V11 9) (Z86 59)   25  History of nasal congestion (V12 69) (Z87 09)   26  History of nasal discharge (V12 69) (Z87 09)   27  History of night sweats (V15 89) (Z87 898)   28  History of seasonal allergies (V15 09) (Z88 9)   29  History of thyroid disease (V12 29) (Z86 39)   30  History of tinnitus (V12 49) (Z86 69)   31  History of Leg pain (729 5) (M79 606)   32  History of Restless leg syndrome (333 94) (G25 81)   33  History of Skin cancer (melanoma) (172 9) (C43 9)   34  History of Sleep disorder (780 50) (G47 9)   35  History of Swelling of extremity (729 81) (M79 89)    Surgical History    1  History of Cholecystectomy   2  History of Eye Surgery   3  History of Hysterectomy   4  History of Removal Of Lesion   5  History of Resection Of Pericardial Cyst Or Tumor   6  History of Thoracoscopy (Therapeutic) W/ Excision Of Pericardial Cyst   7  History of Thyroid Surgery   8  History of Total Knee Replacement Left   9  History of Total Knee Replacement Right    Family History  Mother    1  Family history of Asthma   2  Denied: Family history of Colon cancer   3  Family history of    4  Denied: Family history of alcoholism   5  Denied: Family history of mental disorder  Father    10  Denied: Family history of Colon cancer   7  Family history of    6  Denied: Family history of alcoholism   9  Family history of arthritis (V17 7) (Z82 61)   10  Denied: Family history of mental disorder   11  Family history of Liver cancer  Sister    15  Family history of Alive and well   13  Family history of   Brother    15  Family history of Alive and well  Maternal Grandmother    15  Family history of   Maternal Grandfather    12  Family history of   Family History Reviewed: The family history was reviewed and updated today  Social History    · Active advance directive (V4) (Z78 9)   · copy not obtained   · Caffeine use (V4 89) (F15 90)   · Drinks coffee   · Feels safe at home   · Four children   · High school graduate   · Never a smoker   · No alcohol use   · No drug use   · Primary language is [place]   · Retired   · Seeing a dentist  Social History Reviewed: The social history was reviewed and updated today  Current Meds   1  Aspirin 325 MG Oral Tablet; TAKE 1 TABLET DAILY; Therapy: (Recorded:2017) to Recorded   2  Atorvastatin Calcium 20 MG Oral Tablet; Take 1 tablet daily; Therapy: 06DFL1351 to (Evaluate:2018)  Requested for: 99JVA1456; Last   Rx:2017 Ordered   3  Cod Liver Oil/Vitamins A & D Oral Capsule; Take 1 teaspoon daily; Therapy: (Recorded:2017) to Recorded   4  Demadex 20 MG Oral Tablet; usually 2x a day   3x for 3 days starting today then back to 2x daily; Therapy: 03PPY0907 to Recorded   5  Dicyclomine HCl - 10 MG Oral Capsule; TAKE 1 CAPSULE 3 TIMES DAILY  Requested   for: 16RVA6068; Last Rx:2017 Ordered   6  Dulcolax TBEC; take 2 tablet twice daily; Therapy: ((30) 7145 4794) to Recorded   7  DuoNeb 0 5-2 5 (3) MG/3ML Inhalation Solution; USE 1 UNIT DOSE IN NEBULIZER   EVERY 4 HOURS AS NEEDED; Therapy: (Recorded:27Qps4832) to Recorded   8  Escitalopram Oxalate 10 MG Oral Tablet; take 1 tablet every day; Therapy: 81PQV8916 to (Last Rx:2017)  Requested for: 40GID7955 Ordered   9  Folic Acid 1 MG Oral Tablet; take 1 tablet by mouth once daily; Therapy: 94UVP8276 to (Evaluate:45Otn9656)  Requested for: 15JXR2028; Last   Rx:2017 Ordered   10  Levothyroxine Sodium 125 MCG Oral Tablet; TAKE ONE TABLET BY MOUTH ONCE DAILY    AS DIRECTED  Requested for: 2017; Last KZ:88OFE2351 Ordered   11   Lisinopril 10 MG Oral Tablet; TAKE 1 TABLET DAILY  Requested for: 65SSI6232; Last    Rx:99Lmf3981 Ordered   12  Mirapex 1 MG Oral Tablet; TAKE 1 TABLET 3 TIMES DAILY; Therapy: 11ZFS4550 to Recorded   13  Movantik 25 MG Oral Tablet; Take 1 tablet prior to first meal of the day or two hours after    first meal  Requested for: 07PXQ3568; Last Rx:51Bil4275 Ordered   14  Multi-Vitamins Oral Tablet; TAKE 1 TABLET DAILY; Therapy: 90JTF5885 to Recorded   15  NovoLOG Mix 70/30 FlexPen (70-30) 100 UNIT/ML Subcutaneous Suspension    Pen-injector; INJECT SUBCUTANEOUSLY AS DIRECTED; Therapy: 81WRT2384 to (Last CT:94RVO6983)  Requested for: 27LXM9351 Ordered   16  Oxycodone-Acetaminophen 5-325 MG Oral Tablet; TAKE 1 TABLET EVERY 4 HOURS AS    NEEDED FOR PAIN;    Therapy: 76Fdb6638 to (Evaluate:23Nov2017); Last FT:51QTA5763 Ordered  Medication List Reviewed: The medication list was reviewed and updated today  Allergies    1  Latex Gloves MISC   2  Toradol Oral TABS    3  No Known Environmental Allergies   4  No Known Food Allergies    Vitals  Vital Signs    Recorded: 91VPX6209 02:34PM   Temperature 98 3 F, Oral   Heart Rate 98   Pulse Quality Normal   Respiration Quality Normal   Respiration 20   Systolic 287, RUE, Sitting   Diastolic 80, RUE, Sitting   Height 5 ft 2 in   Weight 232 lb    BMI Calculated 42 43   BSA Calculated 2 04   O2 Saturation 93, RA     Physical Exam    Constitutional   General appearance: No acute distress, well appearing and well nourished  Ears, Nose, Mouth, and Throat   Nasal mucosa, septum, and turbinates: Normal without edema or erythema  Lips, teeth, and gums: Normal, good dentition  Oropharynx: Normal with no erythema, edema, exudate or lesions  Neck   Neck: Supple, symmetric, trachea midline, no masses  Jugular veins: Normal     Pulmonary   Auscultation of lungs: Clear to auscultation, no rales, no crackles, no wheezing  Cardiovascular   Auscultation of heart: Normal rate and rhythm, normal S1 and S2, no murmurs  Examination of extremities for edema and/or varicosities: Normal     Abdomen   Abdomen: Soft, non-tender  Lymphatic   Palpation of lymph nodes in neck: No lymphadenopathy  Musculoskeletal   Gait and station: Normal     Digits and nails: Normal without clubbing or cyanosis  Neurologic   Mental Status: Normal  Not confused, no evidence of dementia, good comprehension, good concentration  Skin   Skin and subcutaneous tissue: Limited exam shows no rash  Psychiatric   Orientation to person, place and time: Normal     Mood and affect: Normal        Future Appointments    Date/Time Provider Specialty Site   04/06/2018 01:00 PM Lashon Brito [facility]   04/06/2018 01:15 PM Crys Walden MD PhD [facility]   12/08/2017 01:45 PM Siddhartha Finn MD Family Medicine [address]   02/08/2018 11:00 AM Gus Auguste DO Orthopedic Surgery [address]   12/13/2017 09:30 AM LASHAE Oliva   Pain Management [facility]     Signatures   Electronically signed by : JASWANT Rodriguez; Dec  5 2017  3:15PM EST                       (Author)

## 2018-01-23 NOTE — SOCIAL WORK
SW referral received to assist with DCP  STR placement is being recommended  Met with pt to discuss plans and facility options  Pt is agreeable to rehab placement  Pt reported that she has a previous admission at [facility] and would like to return for rehab this go round  Pt did not give additional choices as she would like to review the list for other facilities  SNF list left with pt  Referral made to [facility]  SW to follow-up tomorrow

## 2018-01-23 NOTE — MISCELLANEOUS
Assessment   1  Cellulitis of lower leg (682 6) (L03 119);4 1,3   2  Diabetes mellitus with neuropathy (250 60,357 2) (E11 40)4   3  Edema (782 3) (R60 9)4   4  Hypertension (401 9) (I10)4   5  Emphysema, unspecified (492 8) (J43 9)4   6  2,3   7  Obesity (278 00) (E66 9)4      1 Amended By: Rosemary Ambriz; Sep 03 2016 9:36 AM EST   2 Amended By: Kandi Soni; Sep 03 2016 9:43 AM EST   3 Amended By: Kandi Soni; Sep 03 2016 9:45 AM EST   4 Amended By: Rosemary Ambriz; Sep 04 2016 6:55 PM EST    Plan  Cellulitis of lower leg    · 1,3   1,3 1,2 2,3 1,2 2,3   Cellulitis of lower leg, Diabetes mellitus with neuropathy, Edema, Emphysema,  unspecified, Hypertension, Obesity    · Follow-up visit in 3 weeks Evaluation and Treatment  Follow-up  Status: Hold For -  Scheduling  Requested for: 41JNM56934   Ordered; For: Cellulitis of lower leg, Diabetes mellitus with neuropathy, Edema,   Emphysema, unspecified, Hypertension, Obesity; Ordered By: Rosemary Ambriz    Performed:   Due: 29MFR12056      1 Amended By: Rosemary Ambriz; Sep 03 2016 9:37 AM EST   2 Amended By: Kandi Soni; Sep 03 2016 9:44 AM EST   3 Amended By: Rosemary Ambriz; Sep 04 2016 6:49 PM EST    Discussion/Summary  Discussion Summary:   DISCUSSED ISSUES AT LENGTH  REVIEWED MEDICATIONS  DISCUSSED WEIGHT AND PAIN RELATIONSHIP  CONTINUE CURRENT MANAGEMENT  1  DAILY KEFLEX  2  RV 3 WEEKS1    Medication SE Review and Pt Understands Tx: The treatment plan was reviewed with the patient/guardian  The patient/guardian understands and agrees with the treatment plan1        1 Amended By: Rosemary Ambriz; Sep 04 2016 6:56 PM EST   2 Amended By: Rosemary Ambriz; Sep 04 2016 6:56 PM EST    Chief Complaint  Chief Complaint Free Text Note Form: 08/26/2016, Telephone communication Completed, BOO White/JENNIFER      History of Present Illness  TCM Communication [facility]: The patient is being contacted for follow-up after hospitalization  Hospital records were not available   She was hospitalized at [facility]  The dates of hospitalization:, date of admission: 08/15/2016, date of discharge: 08/25/2016  Diagnosis: Bilateral leg cellulitis  She was discharged to home, with home health services  Medications reviewed and updated today  She did not schedule a follow up appointment  The patient is currently asymptomatic  Counseling was provided to the patient  Patient will call us in two days to schedule f/u appt  Topics counseled included importance of compliance with treatment  Communication performed and completed by BOO White/JENNIFER   HPI: PATIENT RETURNS  S/P [facility] [place] FOR LOWER EXTREMITY CELLULITIS  FEELS MUCH BETTER  PAIN DOWN  WEIGHT DOWN  BREATHING BETTER    TRYING TO FOLLOW HER PRESCRIBED DIET  WATCHING [facility]    [place]  [address]        1 Amended By: Armin Castellanos; Sep 04 2016 6:50 PM EST    Review of Systems  Focused-Female:   Constitutional:1  no fever1 , no chills1  and not feeling tired1   ENT:1  no sore throat1  and no nasal discharge1   Cardiovascular: 1  lower extremity edema1 , but no chest pain1 , the heart rate was not fast1  and no palpitations1   Respiratory:1  shortness of breath during exertion1 , but no shortness of breath1 , no cough1  and no wheezing1   Gastrointestinal:1  no abdominal pain1 , no nausea1 , no constipation1  and no diarrhea1   Genitourinary:1  no incontinence1   Musculoskeletal:1  arthralgias1  and joint stiffness1 , but no joint swelling1   Integumentary:1  no rashes1 , no itching1 , no dry skin1  and no skin lesions1   Neurological:1  no headache1  and no dizziness1   ROS Reviewed:   ROS reviewed  1        1 Amended By: Armin Castellanos; Sep 04 2016 6:52 PM EST    Active Problems   1  Acquired hypothyroidism (244 9) (E03 9)  2  Cellulitis of lower leg (682 6) (L03 119)  3  Cellulitis of right lower extremity (682 6) (L03 115)  4  Chronic pain (338 29) (G89 29)  5  Diabetes mellitus with neuropathy (250 60,357  2) (E11 40)  6  Dizziness (780 4) (R42)  7  Edema (782 3) (R60 9)  8  Emphysema, unspecified (492 8) (J43 9)  9  History of congestive heart failure (V12 59) (Z86 79)  10  Hypertension (401 9) (I10)  11  Obesity (278 00) (E66 9)  12  Restless leg syndrome (333 94) (G25 81)  13  Screening for osteoporosis (V82 81) (Z13 820)  14  Sleep apnea (780 57) (G47 30)    Past Medical History   1  History of Depression, acute (296 20) (F32 9)  2  History of Emphysema, 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518 2) (J98 3)  3  History of aortic valve disorder (V12 59) (Z86 79)  4  History of congestive heart failure (V12 59) (Z86 79)  5  History of Restless leg syndrome (333 94) (G25 81)    Surgical History   1  History of Cholecystectomy  2  History of Eye Surgery  3  History of Resection Of Pericardial Cyst Or Tumor  4  History of Thoracoscopy (Therapeutic) W/ Excision Of Pericardial Cyst  5  History of Thyroid Surgery    Family History  Mother   1  Family history of Asthma  2  Family history of   3  Denied: Family history of alcoholism  4  Denied: Family history of mental disorder  Father   5  Family history of   6  Denied: Family history of alcoholism  7  Denied: Family history of mental disorder  8  Family history of Liver cancer    Social History    · Caffeine use (V49 89) (F15 90)   · Drinks coffee   · Never a smoker   · No alcohol use   · No drug use   · Primary language is [place]   · Retired   · Seeing a dentist    Current Meds  1  Aspirin 81 MG TABS; TAKE 1 TABLET DAILY; Therapy: (Recorded:51Epo9778) to Recorded  2  Cod Liver Oil Oral Oil; 1 teaspoon daily; Therapy: 49MIA2147 to Recorded  3  DuoNeb 0 5-2 5 (3) MG/3ML Inhalation Solution; USE 1 UNIT DOSE IN NEBULIZER   EVERY 4 HOURS AS NEEDED; Therapy: (Recorded:82Jke5636) to Recorded  4  Furosemide 40 MG Oral Tablet; monday, , and friday; Therapy: (Recorded:19Ovd3977) to Recorded  5   Lantus SoloStar 100 UNIT/ML Subcutaneous Solution Pen-injector; 27 units at 8 am and   8 pm;   Therapy: (Recorded:39Ujk6918) to Recorded  6  MiraLax Oral Powder; Therapy: (Recorded:45Kql1746) to Recorded  7  Multi-Vitamins Oral Tablet; TAKE 1 TABLET DAILY; Therapy: 73KRE9912 to Recorded  8  Potassium Chloride Bina ER 20 MEQ Oral Tablet Extended Release; TAKE 1 TABLET   DAILY  Requested for: 34NQO4061; Last Rx:93Frs8070 Ordered  9  Pramipexole Dihydrochloride 1 MG Oral Tablet; TAKE 1 TABLET 3 TIMES DAILY; Therapy: 95BFM2654 to (Evaluate:75Neh1375)  Requested for: 51Aiq8683; Last   Rx:20Iog9144 Ordered  10  Senokot 8 6 MG Oral Tablet; 3 tablets at bedtime; Therapy: 80LTQ5231 to Recorded  11  TraMADol HCl - 50 MG Oral Tablet; take 1 tablet by mouth every 4 hours if needed; Last    Rx:61Pza2686 Ordered  12  Venlafaxine HCl - 75 MG Oral Tablet; TAKE 1 TABLET DAILY; Therapy: 79TVL4191 to (Evaluate:09Apr2017)  Requested for: 14Apr2016; Last    Rx:47Upb7411 Ordered  13  Vitamin C 500 MG Oral Tablet; TAKE 1 TABLET DAILY; Therapy: (Recorded:38Epc2579) to Recorded    Allergies   1  Latex Gloves MISC  2  Toradol Oral TABS   3  No Known Environmental Allergies  4  No Known Food Allergies    Physical Exam    Constitutional1    General appearance: No acute distress, well appearing and well nourished  1    Eyes1    Conjunctiva and lids: No swelling, erythema or discharge  1  SL PALE1   Pupils and irises: Equal, round and reactive to light  1    Ears, Nose, Mouth, and Throat1    External inspection of ears and nose: Normal 1    Otoscopic examination: Abnormal  1  DULL1   Nasal mucosa, septum, and turbinates: Normal without edema or erythema  1    Oropharynx: Normal with no erythema, edema, exudate or lesions  1    Pulmonary1    Respiratory effort: No increased work of breathing or signs of respiratory distress  1    Auscultation of lungs: Abnormal  1  MINIMAL BIBASILAR CRACKLES1   Cardiovascular1    Auscultation of heart: Normal rate and rhythm, normal S1 and S2, without murmurs  1  SOFT TIBURCIO NOTED1     Examination of extremities for edema and/or varicosities: Abnormal  1  1+ EDEMA, STASIS CHANGES1   Carotid pulses: Normal 1  NO BRUITS1   Abdomen1    Abdomen: Abnormal  1  OBESE1   Liver and spleen: No hepatomegaly or splenomegaly  1    Lymphatic1    Palpation of lymph nodes in neck: No lymphadenopathy  1    Musculoskeletal1    Gait and station: Normal 1    Digits and nails: Abnormal  1  MILD DJD CHANGES1   Inspection/palpation of joints, bones, and muscles: Abnormal  1  MILD DJD CHANGES1   Skin1    Skin and subcutaneous tissue: Normal without rashes or lesions  1    Examination of the skin for lesions: Abnormal  1  LOWER LEG CELLULITIS RESOLVING1   Neurologic1    Cranial nerves: Cranial nerves 2-12 intact  1    Reflexes: 2+ and symmetric  1    Sensation: No sensory loss  1    Psychiatric1    Orientation to person, place, and time: Normal 1    Mood and affect: Normal 1          1 Amended By: Josselyn Iqbal; Sep 04 2016 6:54 PM EST    Signatures   Electronically signed by : Mian Stone MD; Aug 26 2016  2:46PM EST                       (Author)    Electronically signed by : Felipe Fay MD; Sep  4 2016  6:57PM EST                       (Author)

## 2018-01-23 NOTE — RESULT NOTES
Verified Results  (1) CBC/PLT/DIFF [id number] 03:48PM Sabrina Trevizop Order Number: [id number]     Test Name Result Flag Reference   WBC COUNT 8 60 Thousand/uL  4 80-10 80   RBC COUNT 4 03 Million/uL L 4 20-5 40   HEMOGLOBIN 12 5 g/dL  12 0-16 0   HEMATOCRIT 38 1 %  37 0-47 0   MCV 94 fL  82-98   MCH 31 0 pg  27 0-31 0   MCHC 32 8 g/dL  31 4-37 4   RDW 15 3 % H 11 6-15 1   MPV 9 5 fL  8 9-12 7   PLATELET COUNT 936 Thousands/uL  130-400   nRBC AUTOMATED 0 /100 WBCs     NEUTROPHILS RELATIVE PERCENT 75 %  43-75   LYMPHOCYTES RELATIVE PERCENT 20 %  14-44   MONOCYTES RELATIVE PERCENT 4 %  4-12   EOSINOPHILS RELATIVE PERCENT 2 %  0-6   BASOPHILS RELATIVE PERCENT 0 %  0-1   NEUTROPHILS ABSOLUTE COUNT 6 50 Thousands/? ??L  1 85-7 62   LYMPHOCYTES ABSOLUTE COUNT 1 70 Thousands/? ??L  0 60-4 47   MONOCYTES ABSOLUTE COUNT 0 30 Thousand/? ??L  0 17-1 22   EOSINOPHILS ABSOLUTE COUNT 0 10 Thousand/? ??L  0 00-0 61   BASOPHILS ABSOLUTE COUNT 0 00 Thousands/? ??L  0 00-0 10     (1) COMPREHENSIVE METABOLIC PANEL [id number] 39:54MH Sabrina Ulloa Order Number: [id number]     Test Name Result Flag Reference   GLUCOSE,RANDM 315 mg/dL H    If the patient is fasting, the ADA then defines impaired fasting glucose as > 100 mg/dL and diabetes as > or equal to 123 mg/dL  Specimen collection should occur prior to Sulfasalazine administration due to the potential for falsely depressed results  Specimen collection should occur prior to Sulfapyridine administration due to the potential for falsely elevated results     SODIUM 141 mmol/L  136-145   POTASSIUM 4 3 mmol/L  3 5-5 3   CHLORIDE 100 mmol/L  100-108   CARBON DIOXIDE 38 mmol/L H 21-32   ANION GAP (CALC) 3 mmol/L L 4-13   BLOOD UREA NITROGEN 27 mg/dL H 5-25   CREATININE 1 07 mg/dL  0 60-1 30   Standardized to IDMS reference method   CALCIUM 8 6 mg/dL  8 3-10 1   BILI, TOTAL 0 20 mg/dL  0 20-1 00   ALK PHOSPHATAS 67 U/L     ALT (SGPT) 29 U/L  12-78 Specimen collection should occur prior to Sulfasalazine administration due to the potential for falsely depressed results  AST(SGOT) 11 U/L  5-45   Specimen collection should occur prior to Sulfasalazine administration due to the potential for falsely depressed results  ALBUMIN 3 5 g/dL  3 5-5 0   TOTAL PROTEIN 6 6 g/dL  6 4-8 2   eGFR 50 ml/min/1 73sq m     [facility] Disease Education Program recommendations are as follows:  GFR calculation is accurate only with a steady state creatinine  Chronic Kidney disease less than 60 ml/min/1 73 sq  meters  Kidney failure less than 15 ml/min/1 73 sq  meters  (1) C-REACTIVE PROTEIN [id number] 03:48PM Dior Pesa Order Number: [id number]     Test Name Result Flag Reference   C-REACT PROTEIN 3 2 mg/L H <3 0     (1) CA 19-9 [id number] 03:48PM Dior Pesa Order Number: [id number]     Test Name Result Flag Reference   CA 19-9 23 U/mL  0 - 35   Roche ECLIA methodology  Values obtained with different assay methods or kits cannot be  used interchangeably  Results cannot be interpreted as absolute  evidence of the presence or absence of malignant disease  Performed at:  [address] [place] [address] [facility] [address]  [id number]  : Chastity Vidal MD, Phone:  [phone]     (1) T4, FREE [id number] 03:48PM Dior Pesa Order Number: [id number]     Test Name Result Flag Reference   T4,FREE 1 06 ng/dL  0 76-1 46   Specimen collection should occur prior to Sulfasalazine administration due to the potential for falsely elevated results       (1) THYROID MICROSOMAL ANTIBODY [id number] 03:48PM Dior Pesa Order Number: [id number]     Test Name Result Flag Reference   [facility] AB 7 IU/mL  0 - 34   Performed at:  [facility] [address]  [id number]  : Chastity Vidal MD, Phone:  [phone]     (1) THYROGLOBULIN/QUANT Dottie Dyke PANEL [id number] 03:48PM Jesus Mcneal Order Number: [id number]     Test Name Result Flag Reference   THYROGLOB AB <1 0 IU/mL  0 0 - 0 9   Thyroglobulin Antibody measured by [facility] Methodology    Performed at:  [address]  [id number]  : Barry Lao MD, Phone:  [phone]   THYROGLOBULIN-JC 4 5 ng/mL  1 5 - 38 5   According to the [facility] of Clinical Biochemistry, the  reference interval for Thyroglobulin (TG) should be related to  euthyroid patients and not for patients who underwent thyroidectomy  TG reference intervals for these patients depend on the residual  mass of the thyroid tissue left after surgery  Establishing a  post-operative baseline is recommended    The assay limit of quantitation is 0 1 ng/mL  Thyroglobulin measured by [facility] Immunometric Assay    Performed at:  [address]  [id number]  : Barry Lao MD, Phone:  [phone]

## 2018-01-23 NOTE — PLAN OF CARE

## 2018-01-23 NOTE — PROGRESS NOTES
[facility] Internal Medicine Progress Note  Patient: Zenia Palomo 77 y o  female   MRN: [id number]  PCP: Olya Boone MD  Unit/Bed#: [address] [place] [address] Encounter: [id number]  Date Of Visit: 01/23/18    Problem List:    Principal Problem:    Acute bronchitis  Active Problems:    T2DM (type 2 diabetes mellitus) ([facility] [address] )    Asthma    Restless leg    HTN (hypertension)    HLD (hyperlipidemia)    Hypothyroidism    Sepsis ([facility] )      Assessment & Plan:    · Acute bronchitis, likely from influenza A, less likely bacteria- slow to improve  cont tamiflu and azithro  Sputum cx not obtained as patient not having production  Blood cx NTD  Urine legionella/pneumococcal negative  Cont bronchodilators, IV steroids, wean as tolerated once improved  Pulmonary recs appreciated  · Acute on chronic hypoxic respiratory failure- cont supplemental O2, wean as tolerated  · DM type II, uncontrolled with hyperglycemia- HA1c 9 2  lantus increased to 60 units  Cont ISS   · Hypotension 2/2 volume depletion and acute illness- BP now stable  Cont lisinopril  · Chronic diastolic HF- resumed lasix PO  Holding zaroxolyn  · Dyslipidemia- cont statin  · H/o TIA- cont asa, statin  · Hypothyroid- cont synthroid  · RLS- cont mirapex  · H/o ESBL klebsiella bacteremia  · Deconditioning- PT/OT rec home with PT vs PEPITO      VTE Pharmacologic Prophylaxis:   Pharmacologic: Heparin  Mechanical VTE Prophylaxis in Place: Yes    Patient Centered Rounds: I have performed bedside rounds with nursing staff today  Discussions with Specialists or Other Care Team Provider: Yes    Education and Discussions with Family / Patient:Yes    Time Spent for Care: 30 minutes  More than 50% of total time spent on counseling and coordination of care as described above  Current Length of Stay: 4 day(s)    Current Patient Status: Inpatient     Discharge Plan: pending    Code Status: Level 3 - DNAR and DNI      Subjective:   Still SOB, still wheezing  Still has bad cough    Feels only minimally improved since arrival          Objective:       Vitals:   Temp (24hrs), Av °F (36 7 °C), Min:97 3 °F (36 3 °C), Max:98 8 °F (37 1 °C)    HR:  [67-86] 85  Resp:  [18-22] 20  BP: (135-158)/(66-86) 140/67  SpO2:  [91 %-98 %] 95 %  Body mass index is 45 04 kg/m²  Input and Output Summary (last 24 hours): Intake/Output Summary (Last 24 hours) at 18 1035  Last data filed at 18 0701   Gross per 24 hour   Intake                0 ml   Output             2850 ml   Net            -2850 ml       Physical Exam:     Physical Exam   Constitutional: She is oriented to person, place, and time  She appears well-developed  No distress  HENT:   Head: Normocephalic and atraumatic  Cardiovascular: Normal rate, regular rhythm and normal heart sounds  Pulmonary/Chest: She is in respiratory distress  She has wheezes  She has rales  Wearing NC, increased work of breathing, conversational dyspnea   Abdominal: Soft  Bowel sounds are normal  She exhibits no distension  There is no tenderness  There is no rebound  Musculoskeletal: She exhibits edema (pitting in BLE) and tenderness  She exhibits no deformity  Neurological: She is alert and oriented to person, place, and time  Skin: Skin is warm and dry  Psychiatric: She has a normal mood and affect  Her behavior is normal    Nursing note and vitals reviewed        Additional Data:     Labs:      Results from last 7 days  Lab Units 18  0532 18  0519   WBC Thousand/uL 14 10* 9 10   HEMOGLOBIN g/dL 11 9* 11 4*   HEMATOCRIT % 37 2 35 2*   PLATELETS Thousands/uL 165 145   NEUTROS PCT %  --  89*   LYMPHS PCT %  --  8*   LYMPHO PCT % 12  --    MONOS PCT %  --  3*   MONO PCT MAN % 4  --    EOS PCT %  --  0       Results from last 7 days  Lab Units 18  0613   SODIUM mmol/L 143   POTASSIUM mmol/L 5 1   CHLORIDE mmol/L 102   CO2 mmol/L 38*   BUN mg/dL 26*   CREATININE mg/dL 0 76   CALCIUM mg/dL 8 0*   TOTAL PROTEIN g/dL 5 9*   BILIRUBIN TOTAL mg/dL 0 20   ALK PHOS U/L 52   ALT U/L 40   AST U/L 16   GLUCOSE RANDOM mg/dL 313*       Results from last 7 days  Lab Units 01/19/18  1919   INR  0 99       * I Have Reviewed All Lab Data Listed Above  * Additional Pertinent Lab Tests Reviewed: All Labs For Current Hospital Admission Reviewed    Imaging:  Xr Chest 1 View Portable    Result Date: 1/19/2018  Narrative: CHEST INDICATION:  Flu symptoms  COMPARISON:  October 8, 2017 VIEWS:   AP frontal IMAGES:  1 FINDINGS:     Heart shadow appears unremarkable  Atherosclerotic vascular calcifications are noted  The lungs are clear  No pneumothorax or pleural effusion  Visualized osseous structures appear within normal limits for the patient's age  Impression: No active pulmonary disease  Workstation performed: [id number]     Ct Head Without Contrast    Result Date: 1/9/2018  Narrative: CT BRAIN - WITHOUT CONTRAST INDICATION:  Headache  COMPARISON:  10/10/2017 TECHNIQUE:  CT examination of the brain was performed  In addition to axial images, coronal reformatted images were created and submitted for interpretation  Radiation dose length product (DLP) for this visit:  1134 76 mGy-cm   This examination, like all CT scans performed in the [facility], was performed utilizing techniques to minimize radiation dose exposure, including the use of iterative reconstruction and automated exposure control  IMAGE QUALITY:  Diagnostic  FINDINGS:  PARENCHYMA: No acute intracranial hemorrhage  No extra-axial fluid collection  No midline shift  Unchanged is the calcified lesion in the left parietal cortex seen most consistent with a meningioma  It measures approximately 1 6 x 1 4 cm in axial dimension, unchanged from prior examination  Mass effect on the left superior parietal lobule without substantial adjacent edema  Stable atrophy and patchy areas of periventricular hypoattenuation noted   While nonspecific, these likely reflect changes of chronic microvascular ischemia in a patient of this age  VENTRICLES AND EXTRA-AXIAL SPACES:  Ventricles are commensurate with the degree of volume loss  Basal cisterns are patent  Atherosclerotic calcification of the intracranial vasculature is noted  VISUALIZED ORBITS AND PARANASAL SINUSES:  Changes of bilateral lens replacements noted  The paranasal sinuses and mastoid air cells remain clear  CALVARIUM AND EXTRACRANIAL SOFT TISSUES:   Normal      Impression: No acute intracranial abnormality  Stable left parietal convexity meningioma  No demonstrable adjacent parenchymal edema  Workstation performed: [id number]     Ct Abdomen Pelvis With Contrast    Result Date: 1/9/2018  Narrative: CT ABDOMEN AND PELVIS WITH IV CONTRAST INDICATION:  Chronic abdominal pain  COMPARISON: 12/13/2017  TECHNIQUE:  CT examination of the abdomen and pelvis was performed  Reformatted images were created in axial, sagittal, and coronal planes  Radiation dose length product (DLP) for this visit:  734 605 387 mGy-cm   This examination, like all CT scans performed in the [facility], was performed utilizing techniques to minimize radiation dose exposure, including the use of iterative reconstruction and automated exposure control  IV Contrast:  100 mL of iohexol (OMNIPAQUE)         Enteric Contrast:  Enteric contrast was not administered  FINDINGS: ABDOMEN LOWER CHEST:  No significant abnormalities identified in the lower chest  LIVER/BILIARY TREE:  No biliary obstruction  GALLBLADDER:  Cholecystectomy  SPLEEN:  Unremarkable  PANCREAS:  Stable 11 mm cystic lesion in the body of the pancreas  Distal body and tail not visualized  Question prior resection  No pancreatic duct dilatation or evidence of pancreatitis  ADRENAL GLANDS:  Unremarkable  KIDNEYS/URETERS:  Stable fat-containing 15 mm right renal cortical lesion suggesting an angiomyolipoma  No evidence of pyelonephritis or obstructive uropathy  Cullen Merle  STOMACH AND BOWEL:  Stable moderate hiatal hernia  Fat-containing 4 cm periumbilical hernia, stable  Stool throughout the colon  No evidence of bowel obstruction, colitis or diverticulitis  APPENDIX:  No findings to suggest appendicitis  ABDOMINOPELVIC CAVITY:  No ascites or free intraperitoneal air  Stable shotty left para-aortic lymph nodes  VESSELS:  Unremarkable for patient's age  PELVIS REPRODUCTIVE ORGANS:  Patient is status post hysterectomy  URINARY BLADDER:  Unremarkable  ABDOMINAL WALL/INGUINAL REGIONS:  Unremarkable  OSSEOUS STRUCTURES:  No acute fracture or destructive osseous lesion  Impression: 1  Moderate amount of stool throughout the colon may indicate constipation  No evidence of bowel obstruction, colitis, diverticulitis or appendicitis  Moderate hiatal hernia and small fat-containing periumbilical hernia  2  Stable left millimeters cystic lesion in the body of the pancreas  No ductal dilatation  Possible  serous cystadenoma  3  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  No pyelonephritis or obstructive uropathy  Workstation performed: [id number]     Imaging Reports Reviewed by myself    Cultures:   Blood Culture:   Lab Results   Component Value Date    BLOODCX No Growth at 72 hrs  01/19/2018    BLOODCX No Growth at 72 hrs  01/19/2018    BLOODCX No Growth After 5 Days  06/11/2017    BLOODCX No Growth After 5 Days   06/11/2017    BLOODCX Klebsiella pneumoniae - ESBL (A) 06/07/2017    BLOODCX Klebsiella pneumoniae ESBL (A) 06/07/2017     Urine Culture:   Lab Results   Component Value Date    URINECX 20,000-29,000 cfu/ml Mixed Contaminants X3 04/15/2017     Sputum Culture: No components found for: SPUTUMCX  Wound Culture: No results found for: WOUNDCULT    Last 24 Hours Medication List:     acetaminophen 650 mg Oral Q6H [address]   aspirin 325 mg Oral Daily   atorvastatin 20 mg Oral Daily With Dinner   azithromycin 500 mg Oral Daily   furosemide 40 mg Oral BID (diuretic)   heparin (porcine) 5,000 Units Subcutaneous Q8H [address]   insulin glargine 60 Units Subcutaneous HS   insulin lispro 1-5 Units Subcutaneous HS   insulin lispro 1-6 Units Subcutaneous TID AC   insulin lispro 8 Units Subcutaneous TID With Meals   levalbuterol 1 25 mg Nebulization 4x Daily   And      sodium chloride 3 mL Nebulization 4x Daily   levothyroxine 125 mcg Oral Early Morning   lisinopril 10 mg Oral Daily   methylPREDNISolone sodium succinate 20 mg Intravenous Q8H [address]   oseltamivir 75 mg Oral Q12H [address]   pramipexole 1 mg Oral TID        Today, Patient Was Seen By: Donell Vu MD    ** Please Note: Dragon 360 Dictation voice to text software may have been used in the creation of this document   **

## 2018-01-23 NOTE — PROGRESS NOTES
Patient complaint of feeling like food was stuck when swallowing  Called Dr Minerva Shine, she said she would come evaluate  Patient took a bite of jello and said pain was gone and she "feels better"

## 2018-01-24 ENCOUNTER — APPOINTMENT (OUTPATIENT)
Dept: PULMONOLOGY | Facility: CLINIC | Age: 78
End: 2018-01-24
Payer: COMMERCIAL

## 2018-01-24 VITALS
WEIGHT: 232 LBS | BODY MASS INDEX: 42.69 KG/M2 | TEMPERATURE: 99.9 F | RESPIRATION RATE: 18 BRPM | DIASTOLIC BLOOD PRESSURE: 71 MMHG | SYSTOLIC BLOOD PRESSURE: 163 MMHG | HEART RATE: 92 BPM | HEIGHT: 62 IN

## 2018-01-24 VITALS
WEIGHT: 234 LBS | OXYGEN SATURATION: 93 % | SYSTOLIC BLOOD PRESSURE: 140 MMHG | OXYGEN SATURATION: 92 % | HEART RATE: 84 BPM | RESPIRATION RATE: 20 BRPM | DIASTOLIC BLOOD PRESSURE: 80 MMHG | WEIGHT: 232 LBS | TEMPERATURE: 98.8 F | RESPIRATION RATE: 24 BRPM | DIASTOLIC BLOOD PRESSURE: 80 MMHG | BODY MASS INDEX: 43.06 KG/M2 | HEART RATE: 98 BPM | HEIGHT: 62 IN | SYSTOLIC BLOOD PRESSURE: 156 MMHG | HEIGHT: 62 IN | BODY MASS INDEX: 42.69 KG/M2 | TEMPERATURE: 98.3 F

## 2018-01-24 VITALS
HEIGHT: 62 IN | WEIGHT: 224 LBS | TEMPERATURE: 99.8 F | HEART RATE: 110 BPM | BODY MASS INDEX: 41.22 KG/M2 | OXYGEN SATURATION: 91 % | RESPIRATION RATE: 24 BRPM | SYSTOLIC BLOOD PRESSURE: 128 MMHG | DIASTOLIC BLOOD PRESSURE: 70 MMHG

## 2018-01-24 PROBLEM — A41.9 SEPSIS (HCC): Status: RESOLVED | Noted: 2018-01-19 | Resolved: 2018-01-24

## 2018-01-24 PROBLEM — J11.1: Status: ACTIVE | Noted: 2018-01-19

## 2018-01-24 LAB
ANION GAP SERPL CALCULATED.3IONS-SCNC: 2 MMOL/L (ref 4–13)
BACTERIA BLD CULT: NORMAL
BUN SERPL-MCNC: 25 MG/DL (ref 5–25)
CALCIUM SERPL-MCNC: 8.5 MG/DL (ref 8.3–10.1)
CHLORIDE SERPL-SCNC: 99 MMOL/L (ref 100–108)
CO2 SERPL-SCNC: 43 MMOL/L (ref 21–32)
CREAT SERPL-MCNC: 0.81 MG/DL (ref 0.6–1.3)
GFR SERPL CREATININE-BSD FRML MDRD: 69 ML/MIN/1.73SQ M
GLUCOSE SERPL-MCNC: 256 MG/DL (ref 65–140)
GLUCOSE SERPL-MCNC: 274 MG/DL (ref 65–140)
GLUCOSE SERPL-MCNC: 285 MG/DL (ref 65–140)
GLUCOSE SERPL-MCNC: 305 MG/DL (ref 65–140)
GLUCOSE SERPL-MCNC: 422 MG/DL (ref 65–140)
POTASSIUM SERPL-SCNC: 5 MMOL/L (ref 3.5–5.3)
SODIUM SERPL-SCNC: 144 MMOL/L (ref 136–145)

## 2018-01-24 PROCEDURE — 82948 REAGENT STRIP/BLOOD GLUCOSE: CPT

## 2018-01-24 PROCEDURE — 80048 BASIC METABOLIC PNL TOTAL CA: CPT | Performed by: STUDENT IN AN ORGANIZED HEALTH CARE EDUCATION/TRAINING PROGRAM

## 2018-01-24 PROCEDURE — 99232 SBSQ HOSP IP/OBS MODERATE 35: CPT | Performed by: STUDENT IN AN ORGANIZED HEALTH CARE EDUCATION/TRAINING PROGRAM

## 2018-01-24 PROCEDURE — 99232 SBSQ HOSP IP/OBS MODERATE 35: CPT | Performed by: INTERNAL MEDICINE

## 2018-01-24 PROCEDURE — 94660 CPAP INITIATION&MGMT: CPT

## 2018-01-24 PROCEDURE — 97110 THERAPEUTIC EXERCISES: CPT

## 2018-01-24 PROCEDURE — 94640 AIRWAY INHALATION TREATMENT: CPT

## 2018-01-24 PROCEDURE — 94760 N-INVAS EAR/PLS OXIMETRY 1: CPT

## 2018-01-24 RX ORDER — FUROSEMIDE 80 MG
80 TABLET ORAL DAILY
Status: DISCONTINUED | OUTPATIENT
Start: 2018-01-25 | End: 2018-01-28

## 2018-01-24 RX ORDER — METHYLPREDNISOLONE SODIUM SUCCINATE 40 MG/ML
20 INJECTION, POWDER, LYOPHILIZED, FOR SOLUTION INTRAMUSCULAR; INTRAVENOUS EVERY 12 HOURS SCHEDULED
Status: DISCONTINUED | OUTPATIENT
Start: 2018-01-24 | End: 2018-01-28

## 2018-01-24 RX ORDER — POLYETHYLENE GLYCOL 3350 17 G/17G
17 POWDER, FOR SOLUTION ORAL DAILY
Status: DISCONTINUED | OUTPATIENT
Start: 2018-01-24 | End: 2018-01-30 | Stop reason: HOSPADM

## 2018-01-24 RX ORDER — FUROSEMIDE 40 MG/1
40 TABLET ORAL
Status: DISCONTINUED | OUTPATIENT
Start: 2018-01-24 | End: 2018-01-27

## 2018-01-24 RX ORDER — POTASSIUM CHLORIDE 20 MEQ/1
20 TABLET, EXTENDED RELEASE ORAL DAILY
Status: DISCONTINUED | OUTPATIENT
Start: 2018-01-24 | End: 2018-01-24

## 2018-01-24 RX ORDER — FOLIC ACID 1 MG/1
1 TABLET ORAL DAILY
Status: DISCONTINUED | OUTPATIENT
Start: 2018-01-24 | End: 2018-01-30 | Stop reason: HOSPADM

## 2018-01-24 RX ORDER — METOLAZONE 5 MG/1
2.5 TABLET ORAL 3 TIMES WEEKLY
Status: DISCONTINUED | OUTPATIENT
Start: 2018-01-24 | End: 2018-01-30

## 2018-01-24 RX ORDER — ALPRAZOLAM 0.25 MG/1
0.25 TABLET ORAL 3 TIMES DAILY PRN
Status: DISCONTINUED | OUTPATIENT
Start: 2018-01-24 | End: 2018-01-30 | Stop reason: HOSPADM

## 2018-01-24 RX ADMIN — FUROSEMIDE 40 MG: 40 TABLET ORAL at 14:05

## 2018-01-24 RX ADMIN — LEVALBUTEROL HYDROCHLORIDE 1.25 MG: 1.25 SOLUTION, CONCENTRATE RESPIRATORY (INHALATION) at 15:38

## 2018-01-24 RX ADMIN — METOLAZONE 2.5 MG: 5 TABLET ORAL at 14:04

## 2018-01-24 RX ADMIN — PRAMIPEXOLE DIHYDROCHLORIDE 1 MG: 0.5 TABLET ORAL at 18:22

## 2018-01-24 RX ADMIN — ACETAMINOPHEN 650 MG: 325 TABLET, FILM COATED ORAL at 13:57

## 2018-01-24 RX ADMIN — LISINOPRIL 10 MG: 10 TABLET ORAL at 09:06

## 2018-01-24 RX ADMIN — OSELTAMIVIR PHOSPHATE 75 MG: 75 CAPSULE ORAL at 09:07

## 2018-01-24 RX ADMIN — METHYLPREDNISOLONE SODIUM SUCCINATE 20 MG: 40 INJECTION, POWDER, FOR SOLUTION INTRAMUSCULAR; INTRAVENOUS at 06:03

## 2018-01-24 RX ADMIN — HEPARIN SODIUM 5000 UNITS: 5000 INJECTION, SOLUTION INTRAVENOUS; SUBCUTANEOUS at 22:32

## 2018-01-24 RX ADMIN — INSULIN LISPRO 12 UNITS: 100 INJECTION, SOLUTION INTRAVENOUS; SUBCUTANEOUS at 14:01

## 2018-01-24 RX ADMIN — PRAMIPEXOLE DIHYDROCHLORIDE 1 MG: 0.5 TABLET ORAL at 22:32

## 2018-01-24 RX ADMIN — ACETAMINOPHEN 650 MG: 325 TABLET, FILM COATED ORAL at 18:23

## 2018-01-24 RX ADMIN — ISODIUM CHLORIDE 3 ML: 0.03 SOLUTION RESPIRATORY (INHALATION) at 07:37

## 2018-01-24 RX ADMIN — BISACODYL 10 MG: 5 TABLET, COATED ORAL at 22:32

## 2018-01-24 RX ADMIN — GUAIFENESIN AND DEXTROMETHORPHAN 10 ML: 100; 10 SYRUP ORAL at 06:17

## 2018-01-24 RX ADMIN — ACETAMINOPHEN 650 MG: 325 TABLET, FILM COATED ORAL at 23:17

## 2018-01-24 RX ADMIN — FOLIC ACID 1 MG: 1 TABLET ORAL at 14:08

## 2018-01-24 RX ADMIN — INSULIN LISPRO 8 UNITS: 100 INJECTION, SOLUTION INTRAVENOUS; SUBCUTANEOUS at 09:09

## 2018-01-24 RX ADMIN — LEVALBUTEROL HYDROCHLORIDE 1.25 MG: 1.25 SOLUTION, CONCENTRATE RESPIRATORY (INHALATION) at 11:30

## 2018-01-24 RX ADMIN — ASPIRIN 325 MG: 325 TABLET ORAL at 09:07

## 2018-01-24 RX ADMIN — BENZONATATE 100 MG: 100 CAPSULE ORAL at 14:08

## 2018-01-24 RX ADMIN — INSULIN LISPRO 20 UNITS: 100 INJECTION, SOLUTION INTRAVENOUS; SUBCUTANEOUS at 18:23

## 2018-01-24 RX ADMIN — HEPARIN SODIUM 5000 UNITS: 5000 INJECTION, SOLUTION INTRAVENOUS; SUBCUTANEOUS at 06:02

## 2018-01-24 RX ADMIN — LEVOTHYROXINE SODIUM 125 MCG: 125 TABLET ORAL at 06:03

## 2018-01-24 RX ADMIN — GUAIFENESIN AND DEXTROMETHORPHAN 10 ML: 100; 10 SYRUP ORAL at 14:08

## 2018-01-24 RX ADMIN — INSULIN LISPRO 4 UNITS: 100 INJECTION, SOLUTION INTRAVENOUS; SUBCUTANEOUS at 22:32

## 2018-01-24 RX ADMIN — ATORVASTATIN CALCIUM 20 MG: 20 TABLET, FILM COATED ORAL at 18:23

## 2018-01-24 RX ADMIN — LEVALBUTEROL HYDROCHLORIDE 1.25 MG: 1.25 SOLUTION, CONCENTRATE RESPIRATORY (INHALATION) at 20:08

## 2018-01-24 RX ADMIN — HEPARIN SODIUM 5000 UNITS: 5000 INJECTION, SOLUTION INTRAVENOUS; SUBCUTANEOUS at 13:56

## 2018-01-24 RX ADMIN — POLYETHYLENE GLYCOL 3350 17 G: 17 POWDER, FOR SOLUTION ORAL at 09:06

## 2018-01-24 RX ADMIN — PRAMIPEXOLE DIHYDROCHLORIDE 1 MG: 0.5 TABLET ORAL at 09:06

## 2018-01-24 RX ADMIN — INSULIN GLARGINE 60 UNITS: 100 INJECTION, SOLUTION SUBCUTANEOUS at 22:31

## 2018-01-24 RX ADMIN — METHYLPREDNISOLONE SODIUM SUCCINATE 20 MG: 40 INJECTION, POWDER, FOR SOLUTION INTRAMUSCULAR; INTRAVENOUS at 22:32

## 2018-01-24 RX ADMIN — ISODIUM CHLORIDE 3 ML: 0.03 SOLUTION RESPIRATORY (INHALATION) at 11:30

## 2018-01-24 RX ADMIN — ISODIUM CHLORIDE 3 ML: 0.03 SOLUTION RESPIRATORY (INHALATION) at 20:08

## 2018-01-24 RX ADMIN — HYDROCODONE POLISTIREX AND CHLORPHENIRAMINE POLISTIREX 5 ML: 10; 8 SUSPENSION, EXTENDED RELEASE ORAL at 23:20

## 2018-01-24 RX ADMIN — ACETAMINOPHEN 650 MG: 325 TABLET, FILM COATED ORAL at 06:03

## 2018-01-24 RX ADMIN — FUROSEMIDE 40 MG: 40 TABLET ORAL at 09:06

## 2018-01-24 RX ADMIN — LEVALBUTEROL HYDROCHLORIDE 1.25 MG: 1.25 SOLUTION, CONCENTRATE RESPIRATORY (INHALATION) at 07:37

## 2018-01-24 RX ADMIN — ISODIUM CHLORIDE 3 ML: 0.03 SOLUTION RESPIRATORY (INHALATION) at 15:38

## 2018-01-24 NOTE — PROGRESS NOTES
[address] Internal Medicine Progress Note  Patient: Mini Clarke 77 y o  female   MRN: [id number]  PCP: Jesus Alberto Bearden MD  Unit/Bed#: [address] [place] [address] Encounter: [id number]  Date Of Visit: 01/24/18    Problem List:    Principal Problem:    Influenzal tracheobronchitis  Active Problems:    Acute on chronic respiratory failure with hypoxemia (HCC)    COPD exacerbation (HCC)    T2DM (type 2 diabetes mellitus) ([facility] [address] )    Asthma    Restless leg    HTN (hypertension)    HLD (hyperlipidemia)    Hypothyroidism      Assessment & Plan:    · Acute influenza tracheobronchitis, less likely bacterial- slow to improve  Sputum cx not obtained as patient not having production  Blood cx NTD  Urine legionella/ pneumococcal negative  Completed course of Azithromycin  continue tamiflu 5 day course  Cont bronchodilators, Pulmonary recs noted  · Acute on chronic hypoxic respiratory failure- 2/2 above  cont supplemental O2, wean as tolerated  · Dysphagia- resolved  Likely transient esophageal dysmotility  Speech eval benign  Cont current diet  Encourage chewing well before swallowing  · DM type II, uncontrolled with hyperglycemia- HA1c 9 2  lantus increased to 60 units  Cont ISS   · Hypotension 2/2 volume depletion and acute illness- BP now stable  Cont lisinopril  · Chronic diastolic HF- resumed home dose of lasix PO  restarted zaroxolyn  · Dyslipidemia- cont statin  · H/o TIA- cont asa, statin  · Hypothyroid- cont synthroid  · RLS- cont mirapex  · H/o ESBL klebsiella bacteremia  · Deconditioning- PT/OT rec home with PT      VTE Pharmacologic Prophylaxis:   Pharmacologic: Heparin  Mechanical VTE Prophylaxis in Place: Yes    Patient Centered Rounds: I have performed bedside rounds with nursing staff today  Discussions with Specialists or Other Care Team Provider: Yes    Education and Discussions with Family / Patient:Yes    Time Spent for Care: 30 minutes    More than 50% of total time spent on counseling and coordination of care as described above  Current Length of Stay: 5 day(s)    Current Patient Status: Inpatient     Discharge Plan: home with PT once medically stable  Code Status: Level 3 - DNAR and DNI      Subjective:   Still SOB, still wheezing  Still has bad cough  Feels only minimally improved since arrival but still overall feels poorly  Had one time dysphagia last night  This AM has resolved  Objective:       Vitals:   Temp (24hrs), Av 1 °F (36 7 °C), Min:97 9 °F (36 6 °C), Max:98 2 °F (36 8 °C)    HR:  [70-86] 86  Resp:  [18-20] 20  BP: (147-195)/(58-88) 162/78  SpO2:  [88 %-100 %] 99 %  Body mass index is 43 97 kg/m²  Input and Output Summary (last 24 hours):     No intake or output data in the 24 hours ending 18 1132    Physical Exam:     Physical Exam   Constitutional: She is oriented to person, place, and time  She appears well-developed  No distress  HENT:   Head: Normocephalic and atraumatic  Cardiovascular: Normal rate, regular rhythm and normal heart sounds  Pulmonary/Chest: Effort normal  No respiratory distress  She has wheezes (expiratory)  She has no rales  Decreased breath sounds     Abdominal: Soft  Bowel sounds are normal  She exhibits no distension  There is no tenderness  There is no rebound  Musculoskeletal: She exhibits edema  She exhibits no tenderness or deformity  Neurological: She is alert and oriented to person, place, and time  Skin: Skin is warm and dry  Psychiatric: She has a normal mood and affect  Her behavior is normal    Nursing note and vitals reviewed        Additional Data:     Labs:      Results from last 7 days  Lab Units 18  0532 18  0519   WBC Thousand/uL 14 10* 9 10   HEMOGLOBIN g/dL 11 9* 11 4*   HEMATOCRIT % 37 2 35 2*   PLATELETS Thousands/uL 165 145   NEUTROS PCT %  --  89*   LYMPHS PCT %  --  8*   LYMPHO PCT % 12  --    MONOS PCT %  --  3*   MONO PCT MAN % 4  --    EOS PCT %  --  0       Results from last 7 days  Lab Units 01/24/18  0619 01/23/18  0613   SODIUM mmol/L 144 143   POTASSIUM mmol/L 5 0 5 1   CHLORIDE mmol/L 99* 102   CO2 mmol/L 43* 38*   BUN mg/dL 25 26*   CREATININE mg/dL 0 81 0 76   CALCIUM mg/dL 8 5 8 0*   TOTAL PROTEIN g/dL  --  5 9*   BILIRUBIN TOTAL mg/dL  --  0 20   ALK PHOS U/L  --  52   ALT U/L  --  40   AST U/L  --  16   GLUCOSE RANDOM mg/dL 274* 313*       Results from last 7 days  Lab Units 01/19/18  1919   INR  0 99       * I Have Reviewed All Lab Data Listed Above  * Additional Pertinent Lab Tests Reviewed: All Labs For Current Hospital Admission Reviewed    Imaging:  Xr Chest 1 View Portable    Result Date: 1/19/2018  Narrative: CHEST INDICATION:  Flu symptoms  COMPARISON:  October 8, 2017 VIEWS:   AP frontal IMAGES:  1 FINDINGS:     Heart shadow appears unremarkable  Atherosclerotic vascular calcifications are noted  The lungs are clear  No pneumothorax or pleural effusion  Visualized osseous structures appear within normal limits for the patient's age  Impression: No active pulmonary disease  Workstation performed: [id number]     Ct Head Without Contrast    Result Date: 1/9/2018  Narrative: CT BRAIN - WITHOUT CONTRAST INDICATION:  Headache  COMPARISON:  10/10/2017 TECHNIQUE:  CT examination of the brain was performed  In addition to axial images, coronal reformatted images were created and submitted for interpretation  Radiation dose length product (DLP) for this visit:  1134 76 mGy-cm   This examination, like all CT scans performed in the [facility], was performed utilizing techniques to minimize radiation dose exposure, including the use of iterative reconstruction and automated exposure control  IMAGE QUALITY:  Diagnostic  FINDINGS:  PARENCHYMA: No acute intracranial hemorrhage  No extra-axial fluid collection  No midline shift  Unchanged is the calcified lesion in the left parietal cortex seen most consistent with a meningioma   It measures approximately 1 6 x 1 4 cm in axial dimension, unchanged from prior examination  Mass effect on the left superior parietal lobule without substantial adjacent edema  Stable atrophy and patchy areas of periventricular hypoattenuation noted  While nonspecific, these likely reflect changes of chronic microvascular ischemia in a patient of this age  VENTRICLES AND EXTRA-AXIAL SPACES:  Ventricles are commensurate with the degree of volume loss  Basal cisterns are patent  Atherosclerotic calcification of the intracranial vasculature is noted  VISUALIZED ORBITS AND PARANASAL SINUSES:  Changes of bilateral lens replacements noted  The paranasal sinuses and mastoid air cells remain clear  CALVARIUM AND EXTRACRANIAL SOFT TISSUES:   Normal      Impression: No acute intracranial abnormality  Stable left parietal convexity meningioma  No demonstrable adjacent parenchymal edema  Workstation performed: [id number]     Ct Abdomen Pelvis With Contrast    Result Date: 1/9/2018  Narrative: CT ABDOMEN AND PELVIS WITH IV CONTRAST INDICATION:  Chronic abdominal pain  COMPARISON: 12/13/2017  TECHNIQUE:  CT examination of the abdomen and pelvis was performed  Reformatted images were created in axial, sagittal, and coronal planes  Radiation dose length product (DLP) for this visit:  734 605 387 mGy-cm   This examination, like all CT scans performed in the [facility], was performed utilizing techniques to minimize radiation dose exposure, including the use of iterative reconstruction and automated exposure control  IV Contrast:  100 mL of iohexol (OMNIPAQUE)         Enteric Contrast:  Enteric contrast was not administered  FINDINGS: ABDOMEN LOWER CHEST:  No significant abnormalities identified in the lower chest  LIVER/BILIARY TREE:  No biliary obstruction  GALLBLADDER:  Cholecystectomy  SPLEEN:  Unremarkable  PANCREAS:  Stable 11 mm cystic lesion in the body of the pancreas  Distal body and tail not visualized  Question prior resection   No pancreatic duct dilatation or evidence of pancreatitis  ADRENAL GLANDS:  Unremarkable  KIDNEYS/URETERS:  Stable fat-containing 15 mm right renal cortical lesion suggesting an angiomyolipoma  No evidence of pyelonephritis or obstructive uropathy  Hernando Manifold STOMACH AND BOWEL:  Stable moderate hiatal hernia  Fat-containing 4 cm periumbilical hernia, stable  Stool throughout the colon  No evidence of bowel obstruction, colitis or diverticulitis  APPENDIX:  No findings to suggest appendicitis  ABDOMINOPELVIC CAVITY:  No ascites or free intraperitoneal air  Stable shotty left para-aortic lymph nodes  VESSELS:  Unremarkable for patient's age  PELVIS REPRODUCTIVE ORGANS:  Patient is status post hysterectomy  URINARY BLADDER:  Unremarkable  ABDOMINAL WALL/INGUINAL REGIONS:  Unremarkable  OSSEOUS STRUCTURES:  No acute fracture or destructive osseous lesion  Impression: 1  Moderate amount of stool throughout the colon may indicate constipation  No evidence of bowel obstruction, colitis, diverticulitis or appendicitis  Moderate hiatal hernia and small fat-containing periumbilical hernia  2  Stable left millimeters cystic lesion in the body of the pancreas  No ductal dilatation  Possible  serous cystadenoma  3  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  No pyelonephritis or obstructive uropathy  Workstation performed: [id number]     Imaging Reports Reviewed by myself    Cultures:   Blood Culture:   Lab Results   Component Value Date    BLOODCX No Growth After 4 Days  01/19/2018    BLOODCX No Growth After 4 Days  01/19/2018    BLOODCX No Growth After 5 Days  06/11/2017    BLOODCX No Growth After 5 Days   06/11/2017    BLOODCX Klebsiella pneumoniae - ESBL (A) 06/07/2017    BLOODCX Klebsiella pneumoniae ESBL (A) 06/07/2017     Urine Culture:   Lab Results   Component Value Date    URINECX 20,000-29,000 cfu/ml Mixed Contaminants X3 04/15/2017     Sputum Culture: No components found for: SPUTUMCX  Wound Culture: No results found for: WOUNDCULT    Last 24 Hours Medication List:     acetaminophen 650 mg Oral Q6H [facility]   aspirin 325 mg Oral Daily   atorvastatin 20 mg Oral Daily With Dinner   bisacodyl 10 mg Oral HS   folic acid 1 mg Oral Daily   furosemide 40 mg Oral After Lunch   furosemide 80 mg Oral Daily   heparin (porcine) 5,000 Units Subcutaneous Q8H [facility]   insulin glargine 60 Units Subcutaneous HS   insulin lispro 1-6 Units Subcutaneous HS   insulin lispro 10 Units Subcutaneous TID With Meals   insulin lispro 4-20 Units Subcutaneous TID AC   levalbuterol 1 25 mg Nebulization 4x Daily   And      sodium chloride 3 mL Nebulization 4x Daily   levothyroxine 125 mcg Oral Early Morning   lisinopril 10 mg Oral Daily   methylPREDNISolone sodium succinate 20 mg Intravenous Q12H [facility]   metolazone 2 5 mg Oral Once per day on Mon Wed Fri   polyethylene glycol 17 g Oral Daily   potassium chloride 20 mEq Oral Daily   pramipexole 1 mg Oral TID        Today, Patient Was Seen By: Olivia Wei MD    ** Please Note: Dragon 360 Dictation voice to text software may have been used in the creation of this document   **

## 2018-01-24 NOTE — PLAN OF CARE
CARDIOVASCULAR - ADULT     Maintains optimal cardiac output and hemodynamic stability Progressing     Absence of cardiac dysrhythmias or at baseline rhythm Progressing        DISCHARGE PLANNING - CARE MANAGEMENT     Discharge to post-acute care or home with appropriate resources Progressing        Nutrition/Hydration-ADULT     Nutrient/Hydration intake appropriate for improving, restoring or maintaining nutritional needs Progressing        Potential for Falls     Patient will remain free of falls Progressing        RESPIRATORY - ADULT     Achieves optimal ventilation and oxygenation Progressing

## 2018-01-24 NOTE — PHYSICAL THERAPY NOTE
PT TREATMENT     01/24/18 1620   Pain Assessment   Pain Assessment Negron-Baker FACES   Negron-Baker FACES Pain Rating 4   Pain Location (head, arms, neck, back and legs)   Restrictions/Precautions   Other Precautions Fall Risk;Pain   General   Chart Reviewed Yes   Cognition   Overall Cognitive Status WFL   Subjective   Subjective 'I'm so wiped out"   Bed Mobility   Supine to Sit 5  Supervision   Sit to Supine 5  Supervision   Transfers   Sit to Stand 5  Supervision   Stand to Sit 5  Supervision   Stand pivot 5  Supervision   Additional items (to commode with walker)   Ambulation/Elevation   Gait pattern (slow cade, flexed posture)   Gait Assistance (supervision/min assist)   Assistive Device Rolling walker   Distance 100 feet with standing rest break   Balance   Static Sitting Fair   Dynamic Sitting Fair   Static Standing Fair   Dynamic Standing Poor   Endurance Deficit   Endurance Deficit (Sp02 94% HR 82 bpm at rest, Sp02 91% hr 104 bpm after activi)   Activity Tolerance   Activity Tolerance Patient limited by fatigue   Assessment   Prognosis Good   Problem List Decreased strength;Decreased endurance; Impaired balance;Decreased mobility   Assessment Pt requires increased time for all activity and extended rest breaks between activity  Pt reports she is unable to "make it" to the bathroom since being in the hospital and now needs a commode  Pt is able to ambulate short functional distances but then too fatigued to perform ADL/fucntional activity  Pt may benefit from STR as pt was ambulatory without device and independent with ADL PTA  Plan   Treatment/Interventions ADL retraining;Functional transfer training;LE strengthening/ROM; Therapeutic exercise; Endurance training;Patient/family training;Equipment eval/education;Gait training;Bed mobility   Progress Progressing toward goals   Recommendation   Recommendation (home PT/family assist vs STR)

## 2018-01-24 NOTE — RESPIRATORY THERAPY NOTE
RT Protocol Note  Oriana Marmolejo 77 y o  female MRN: [id number]  Unit/Bed#: [address] [address] Encounter: [id number]    Assessment    Principal Problem:    Acute bronchitis  Active Problems:    T2DM (type 2 diabetes mellitus) ([place] [address] )    Asthma    Restless leg    HTN (hypertension)    HLD (hyperlipidemia)    Hypothyroidism    Sepsis ([place] [address] )      Home Pulmonary Medications:  duoneb inhaler    Past Medical History:   Diagnosis Date    Arthritis     Asthma     Diabetes mellitus ([place] [address] )     Disease of thyroid gland     Hypertension     Mitral valve regurgitation     Obesity hypoventilation syndrome (HCC)     Pain     right hip    Restless leg syndrome     Sleep related hypoxia     Thyroid cancer ([place] [address] )      Social History     Social History    Marital status: Single     Spouse name: N/A    Number of children: N/A    Years of education: N/A     Social History Main Topics    Smoking status: Never Smoker    Smokeless tobacco: Never Used    Alcohol use No    Drug use: No    Sexual activity: Yes     Partners: Male     Other Topics Concern    None     Social History Narrative    Lives with significant others  Uses walker outside  Subjective    Subjective Data: Pt states she needs to cough up but can not, cough sounds dry    Objective    Physical Exam:   Assessment Type: Pre-treatment  General Appearance: Alert, Awake  Respiratory Pattern: Dyspnea with exertion  Chest Assessment: Chest expansion symmetrical  Bilateral Breath Sounds: Diminished, Expiratory wheezes  R Breath Sounds: Diminished, Expiratory wheezes  L Breath Sounds: Diminished  Cough: Non-productive, Dry, Strong  O2 Device: NC @ 2 l/m    Vitals:  Blood pressure 148/58, pulse 78, temperature 98 2 °F (36 8 °C), temperature source Oral, resp  rate 18, height 5' 2" (1 575 m), weight 112 kg (246 lb 4 1 oz), SpO2 95 %, not currently breastfeeding        Results from last 7 days  Lab Units 01/20/18  0444   PH ART  7 390   PCO2 ART mm Hg 59 7*   PO2 ART mm Hg 79 8 HCO3 ART mmol/L 35 3*   BASE EXC ART mmol/L 8 4   O2 CONTENT ART mL/dL 17 1   O2 HGB, ARTERIAL % 94 1   ABG SOURCE  Radial, Left   AILEEN TEST  Yes       Imaging and other studies: I have personally reviewed pertinent reports        O2 Device: NC @ 2 l/m     Plan             Resp Comments: pt awake and alert shannon tx well

## 2018-01-24 NOTE — SOCIAL WORK
Per PT/OT latest notes, pt is no longer appropriate for STR placement  Home care services is now being recommended  CM made aware

## 2018-01-24 NOTE — CASE MANAGEMENT
Continued Stay Review    Date: 1/24/18    Vital Signs: /78 (BP Location: Right arm)   Pulse 86   Temp 97 9 °F (36 6 °C) (Oral)   Resp 20   Ht 5' 2" (1 575 m)   Wt 109 kg (240 lb 6 oz)   LMP  (LMP Unknown)   SpO2 99%   BMI 43 97 kg/m²       CBC DIFF  Medications:   Scheduled Meds:   acetaminophen 650 mg Oral Q6H [facility]   aspirin 325 mg Oral Daily   atorvastatin 20 mg Oral Daily With Dinner   bisacodyl 10 mg Oral HS   folic acid 1 mg Oral Daily   furosemide 40 mg Oral After Lunch   [START ON 1/25/2018] furosemide 80 mg Oral Daily   heparin (porcine) 5,000 Units Subcutaneous Q8H [facility]   insulin glargine 60 Units Subcutaneous HS   insulin lispro 1-6 Units Subcutaneous HS   insulin lispro 10 Units Subcutaneous TID With Meals   insulin lispro 4-20 Units Subcutaneous TID AC   levalbuterol 1 25 mg Nebulization 4x Daily   And      sodium chloride 3 mL Nebulization 4x Daily   levothyroxine 125 mcg Oral Early Morning   lisinopril 10 mg Oral Daily   methylPREDNISolone sodium succinate 20 mg Intravenous Q12H [facility]   metolazone 2 5 mg Oral Once per day on Mon Wed Fri   polyethylene glycol 17 g Oral Daily   pramipexole 1 mg Oral TID     Continuous Infusions:    PRN Meds:   acetaminophen    ALPRAZolam    aluminum-magnesium hydroxide-simethicone    benzonatate    dextromethorphan-guaiFENesin    hydrocodone-chlorpheniramine polistirex    levalbuterol **AND** sodium chloride    ondansetron    oxyCODONE    polyethylene glycol    senna-docusate sodium    Abnormal Labs/Diagnostic Results: CL 99 CO2 43 ANION GAP 2 GLUCOSE 274     Age/Sex: 77 y o  female     PER PULMONARY  Complains of some persistent cough and overall not feeling well  Obese female who is awake alert   On 2 L of oxygen O2 saturation is 97%    There are some coarse expiratory wheezes in lower lung zones   Assessment:  Acute influenza A tracheobronchitis  Asthmatic bronchitis secondary influenza A    She still has some wheezing and feeling short of breath  Obesity alveolar hypoventilation with chronic hypercapnic hypoxemic respiratory failure  Diabetes mellitus type 2     Plan:  Can discontinue azithromycin as had 5 days of therapy with this  Will try decreasing methylprednisone to 20 mg IV every 8 hours  Continue nebulizer treatment of levalbuterol and ipratropium bromide every 6 hours     PER MD  Still SOB, still wheezing  Still has bad cough  Feels only minimally improved since arrival but still overall feels poorly  Had one time dysphagia last night  This AM has resolved  Assessment & Plan:  · Acute influenza tracheobronchitis, less likely bacterial- slow to improve  Sputum cx not obtained as patient not having production  Blood cx NTD  Urine legionella/ pneumococcal negative  Completed course of Azithromycin  continue tamiflu 5 day course  Cont bronchodilators, Pulmonary recs noted  · Acute on chronic hypoxic respiratory failure- 2/2 above  cont supplemental O2, wean as tolerated  · Dysphagia- resolved  Likely transient esophageal dysmotility  Speech eval benign  Cont current diet  Encourage chewing well before swallowing  · DM type II, uncontrolled with hyperglycemia- HA1c 9 2  lantus increased to 60 units  Cont ISS   · Hypotension 2/2 volume depletion and acute illness- BP now stable   Cont lisinopril  · Chronic diastolic HF- resumed home dose of lasix PO  restarted zaroxolyn  · Dyslipidemia- cont statin  · H/o TIA- cont asa, statin  · Hypothyroid- cont synthroid  · RLS- cont mirapex  · H/o ESBL klebsiella bacteremia  · Deconditioning- PT/OT rec home with PT

## 2018-01-24 NOTE — SPEECH THERAPY NOTE
F/U continued  Aware of report of (transient) dysphagia (c/o food being stuck after lunch yesterday)  Dr Aviva Puente came to evaluate pt  Patient took a bite of jello in MDs presence and said pain was gone and she "feels better"  No report or c/o dysphagia for dinner last night  Pt may have experienced transient esophageal stasis 2* dysmotility  Recommendation:  Continue current diet, encourage pt to chew very well and alternate food c sips of liquid (aid transit through esophagus)  No additional SLP f/u appears necessary  Pleases re-consult if additional questions/concerns arise

## 2018-01-24 NOTE — PROGRESS NOTES
Progress Note - Pulmonary   Roxanne Johnson 77 y o  female MRN: [id number]  Unit/Bed#: [address] [place] [address] Encounter: [id number]    Assessment:  Acute influenza A tracheobronchitis  Asthmatic bronchitis secondary influenza A  She still has some wheezing and feeling short of breath  Obesity alveolar hypoventilation with chronic hypercapnic hypoxemic respiratory failure  Diabetes mellitus type 2    Plan:  Can discontinue azithromycin as had 5 days of therapy with this  Will try decreasing methylprednisone to 20 mg IV every 8 hours  Continue nebulizer treatment of levalbuterol and ipratropium bromide every 6 hours      Subjective:   Complains of some persistent cough and overall not feeling well    Objective:     Vitals: Blood pressure 162/78, pulse 86, temperature 97 9 °F (36 6 °C), temperature source Oral, resp  rate 20, height 5' 2" (1 575 m), weight 109 kg (240 lb 6 oz), SpO2 100 %, not currently breastfeeding  ,Body mass index is 43 97 kg/m²  No intake or output data in the 24 hours ending 01/24/18 1102    Physical Exam: Physical Exam   Constitutional:   Obese female who is awake alert no distress  On 2 L of oxygen O2 saturation is 97%   HENT:   Head: Normocephalic  Nose: Nose normal    Mouth/Throat: Oropharynx is clear and moist  No oropharyngeal exudate  Eyes: Conjunctivae are normal    Neck: Neck supple  No JVD present  Cardiovascular: Normal rate, regular rhythm and normal heart sounds  Pulmonary/Chest:   There are some coarse expiratory wheezes in lower lung zones   Abdominal: Soft  She exhibits no distension  There is no guarding  Musculoskeletal:   Mild edema lower extremities   Lymphadenopathy:     She has no cervical adenopathy  Neurological: She is alert  Skin: Skin is warm and dry  Psychiatric: She has a normal mood and affect  Her behavior is normal  Thought content normal         Labs: I have personally reviewed pertinent lab results  , ABG:   Lab Results   Component Value Date    PHART 7 390 01/20/2018    NGM3DZS 59 7 (HH) 01/20/2018    PO2ART 79 8 01/20/2018    IFI2VWZ 35 3 (H) 01/20/2018    BEART 8 4 01/20/2018    SOURCE Radial, Left 01/20/2018   , BNP: No results found for: BNP, CBC:   Lab Results   Component Value Date    WBC 14 10 (H) 01/22/2018    HGB 11 9 (L) 01/22/2018    HCT 37 2 01/22/2018    MCV 95 01/22/2018     01/22/2018    ADJUSTEDWBC 10 30 04/04/2016    MCH 30 6 01/22/2018    MCHC 32 1 01/22/2018    RDW 15 5 (H) 01/22/2018    MPV 8 9 01/22/2018    NRBC 0 01/22/2018   , CMP:   Lab Results   Component Value Date     01/24/2018     01/08/2016    K 5 0 01/24/2018    K 3 9 01/08/2016    CL 99 (L) 01/24/2018     01/08/2016    CO2 43 (H) 01/24/2018    CO2 34 (H) 01/08/2016    ANIONGAP 2 (L) 01/24/2018    ANIONGAP 10 8 01/08/2016    BUN 25 01/24/2018    BUN 15 01/08/2016    CREATININE 0 81 01/24/2018    CREATININE 0 9 01/08/2016    GLUCOSE 274 (H) 01/24/2018    GLUCOSE 164 (H) 01/08/2016    CALCIUM 8 5 01/24/2018    CALCIUM 8 5 01/08/2016    AST 16 01/23/2018    AST 16 01/08/2016    ALT 40 01/23/2018    ALT 29 01/08/2016    ALKPHOS 52 01/23/2018    ALKPHOS 72 01/08/2016    PROT 5 9 (L) 01/23/2018    PROT 7 0 01/08/2016    BILITOT 0 20 01/23/2018    BILITOT 0 3 01/08/2016    EGFR 69 01/24/2018   , PT/INR:   Lab Results   Component Value Date    INR 0 99 01/19/2018   , Troponin: No results found for: TROPONIN    Imaging and other studies: I have personally reviewed pertinent reports     and I have personally reviewed pertinent films in PACS

## 2018-01-25 LAB
ANION GAP SERPL CALCULATED.3IONS-SCNC: 2 MMOL/L (ref 4–13)
BACTERIA BLD CULT: NORMAL
BASOPHILS # BLD AUTO: 0.1 THOUSANDS/ΜL (ref 0–0.1)
BASOPHILS NFR BLD AUTO: 1 % (ref 0–1)
BUN SERPL-MCNC: 26 MG/DL (ref 5–25)
CALCIUM SERPL-MCNC: 8.7 MG/DL (ref 8.3–10.1)
CHLORIDE SERPL-SCNC: 93 MMOL/L (ref 100–108)
CO2 SERPL-SCNC: 43 MMOL/L (ref 21–32)
CREAT SERPL-MCNC: 0.86 MG/DL (ref 0.6–1.3)
EOSINOPHIL # BLD AUTO: 0 THOUSAND/ΜL (ref 0–0.61)
EOSINOPHIL NFR BLD AUTO: 0 % (ref 0–6)
ERYTHROCYTE [DISTWIDTH] IN BLOOD BY AUTOMATED COUNT: 14.9 % (ref 11.6–15.1)
GFR SERPL CREATININE-BSD FRML MDRD: 64 ML/MIN/1.73SQ M
GLUCOSE SERPL-MCNC: 234 MG/DL (ref 65–140)
GLUCOSE SERPL-MCNC: 259 MG/DL (ref 65–140)
GLUCOSE SERPL-MCNC: 337 MG/DL (ref 65–140)
GLUCOSE SERPL-MCNC: 340 MG/DL (ref 65–140)
GLUCOSE SERPL-MCNC: 364 MG/DL (ref 65–140)
HCT VFR BLD AUTO: 40.1 % (ref 37–47)
HGB BLD-MCNC: 13.1 G/DL (ref 12–16)
LYMPHOCYTES # BLD AUTO: 1.3 THOUSANDS/ΜL (ref 0.6–4.47)
LYMPHOCYTES NFR BLD AUTO: 12 % (ref 14–44)
MCH RBC QN AUTO: 31.3 PG (ref 27–31)
MCHC RBC AUTO-ENTMCNC: 32.8 G/DL (ref 31.4–37.4)
MCV RBC AUTO: 96 FL (ref 82–98)
MONOCYTES # BLD AUTO: 0.3 THOUSAND/ΜL (ref 0.17–1.22)
MONOCYTES NFR BLD AUTO: 3 % (ref 4–12)
NEUTROPHILS # BLD AUTO: 8.9 THOUSANDS/ΜL (ref 1.85–7.62)
NEUTS SEG NFR BLD AUTO: 84 % (ref 43–75)
NRBC BLD AUTO-RTO: 0 /100 WBCS
PLATELET # BLD AUTO: 177 THOUSANDS/UL (ref 130–400)
PLATELET BLD QL SMEAR: ADEQUATE
PMV BLD AUTO: 8.3 FL (ref 8.9–12.7)
POTASSIUM SERPL-SCNC: 4.6 MMOL/L (ref 3.5–5.3)
RBC # BLD AUTO: 4.19 MILLION/UL (ref 4.2–5.4)
SODIUM SERPL-SCNC: 138 MMOL/L (ref 136–145)
WBC # BLD AUTO: 10.5 THOUSAND/UL (ref 4.8–10.8)

## 2018-01-25 PROCEDURE — 94640 AIRWAY INHALATION TREATMENT: CPT

## 2018-01-25 PROCEDURE — 97110 THERAPEUTIC EXERCISES: CPT

## 2018-01-25 PROCEDURE — 99232 SBSQ HOSP IP/OBS MODERATE 35: CPT | Performed by: STUDENT IN AN ORGANIZED HEALTH CARE EDUCATION/TRAINING PROGRAM

## 2018-01-25 PROCEDURE — 94660 CPAP INITIATION&MGMT: CPT

## 2018-01-25 PROCEDURE — 94760 N-INVAS EAR/PLS OXIMETRY 1: CPT

## 2018-01-25 PROCEDURE — 85025 COMPLETE CBC W/AUTO DIFF WBC: CPT | Performed by: STUDENT IN AN ORGANIZED HEALTH CARE EDUCATION/TRAINING PROGRAM

## 2018-01-25 PROCEDURE — 99232 SBSQ HOSP IP/OBS MODERATE 35: CPT | Performed by: INTERNAL MEDICINE

## 2018-01-25 PROCEDURE — 82948 REAGENT STRIP/BLOOD GLUCOSE: CPT

## 2018-01-25 PROCEDURE — 80048 BASIC METABOLIC PNL TOTAL CA: CPT | Performed by: STUDENT IN AN ORGANIZED HEALTH CARE EDUCATION/TRAINING PROGRAM

## 2018-01-25 RX ORDER — FLUTICASONE PROPIONATE 220 UG/1
1 AEROSOL, METERED RESPIRATORY (INHALATION) EVERY 12 HOURS SCHEDULED
Status: DISCONTINUED | OUTPATIENT
Start: 2018-01-25 | End: 2018-01-28

## 2018-01-25 RX ADMIN — METHYLPREDNISOLONE SODIUM SUCCINATE 20 MG: 40 INJECTION, POWDER, FOR SOLUTION INTRAMUSCULAR; INTRAVENOUS at 21:34

## 2018-01-25 RX ADMIN — INSULIN LISPRO 18 UNITS: 100 INJECTION, SOLUTION INTRAVENOUS; SUBCUTANEOUS at 10:49

## 2018-01-25 RX ADMIN — INSULIN LISPRO 5 UNITS: 100 INJECTION, SOLUTION INTRAVENOUS; SUBCUTANEOUS at 22:07

## 2018-01-25 RX ADMIN — ISODIUM CHLORIDE 3 ML: 0.03 SOLUTION RESPIRATORY (INHALATION) at 08:22

## 2018-01-25 RX ADMIN — INSULIN GLARGINE 60 UNITS: 100 INJECTION, SOLUTION SUBCUTANEOUS at 21:34

## 2018-01-25 RX ADMIN — HEPARIN SODIUM 5000 UNITS: 5000 INJECTION, SOLUTION INTRAVENOUS; SUBCUTANEOUS at 06:29

## 2018-01-25 RX ADMIN — GUAIFENESIN AND DEXTROMETHORPHAN 10 ML: 100; 10 SYRUP ORAL at 12:46

## 2018-01-25 RX ADMIN — PRAMIPEXOLE DIHYDROCHLORIDE 1 MG: 0.5 TABLET ORAL at 10:44

## 2018-01-25 RX ADMIN — ISODIUM CHLORIDE 3 ML: 0.03 SOLUTION RESPIRATORY (INHALATION) at 15:24

## 2018-01-25 RX ADMIN — BISACODYL 10 MG: 5 TABLET, COATED ORAL at 21:35

## 2018-01-25 RX ADMIN — FUROSEMIDE 40 MG: 40 TABLET ORAL at 15:24

## 2018-01-25 RX ADMIN — ASPIRIN 325 MG: 325 TABLET ORAL at 10:45

## 2018-01-25 RX ADMIN — METHYLPREDNISOLONE SODIUM SUCCINATE 20 MG: 40 INJECTION, POWDER, FOR SOLUTION INTRAMUSCULAR; INTRAVENOUS at 10:46

## 2018-01-25 RX ADMIN — ACETAMINOPHEN 650 MG: 325 TABLET, FILM COATED ORAL at 06:30

## 2018-01-25 RX ADMIN — INSULIN LISPRO 16 UNITS: 100 INJECTION, SOLUTION INTRAVENOUS; SUBCUTANEOUS at 12:37

## 2018-01-25 RX ADMIN — FLUTICASONE PROPIONATE 1 PUFF: 220 AEROSOL, METERED RESPIRATORY (INHALATION) at 21:34

## 2018-01-25 RX ADMIN — LEVALBUTEROL HYDROCHLORIDE 1.25 MG: 1.25 SOLUTION, CONCENTRATE RESPIRATORY (INHALATION) at 15:24

## 2018-01-25 RX ADMIN — ATORVASTATIN CALCIUM 20 MG: 20 TABLET, FILM COATED ORAL at 16:46

## 2018-01-25 RX ADMIN — LEVOTHYROXINE SODIUM 125 MCG: 125 TABLET ORAL at 06:29

## 2018-01-25 RX ADMIN — ALUMINUM HYDROXIDE, MAGNESIUM HYDROXIDE, AND SIMETHICONE 30 ML: 200; 200; 20 SUSPENSION ORAL at 21:33

## 2018-01-25 RX ADMIN — OXYCODONE HYDROCHLORIDE 5 MG: 5 TABLET ORAL at 12:46

## 2018-01-25 RX ADMIN — ACETAMINOPHEN 650 MG: 325 TABLET, FILM COATED ORAL at 12:36

## 2018-01-25 RX ADMIN — HEPARIN SODIUM 5000 UNITS: 5000 INJECTION, SOLUTION INTRAVENOUS; SUBCUTANEOUS at 21:34

## 2018-01-25 RX ADMIN — ACETAMINOPHEN 650 MG: 325 TABLET, FILM COATED ORAL at 19:00

## 2018-01-25 RX ADMIN — FUROSEMIDE 80 MG: 80 TABLET ORAL at 10:45

## 2018-01-25 RX ADMIN — FOLIC ACID 1 MG: 1 TABLET ORAL at 10:45

## 2018-01-25 RX ADMIN — LEVALBUTEROL HYDROCHLORIDE 1.25 MG: 1.25 SOLUTION, CONCENTRATE RESPIRATORY (INHALATION) at 11:40

## 2018-01-25 RX ADMIN — POLYETHYLENE GLYCOL 3350 17 G: 17 POWDER, FOR SOLUTION ORAL at 10:46

## 2018-01-25 RX ADMIN — FLUTICASONE PROPIONATE 1 PUFF: 220 AEROSOL, METERED RESPIRATORY (INHALATION) at 10:48

## 2018-01-25 RX ADMIN — LEVALBUTEROL HYDROCHLORIDE 1.25 MG: 1.25 SOLUTION, CONCENTRATE RESPIRATORY (INHALATION) at 19:42

## 2018-01-25 RX ADMIN — INSULIN LISPRO 16 UNITS: 100 INJECTION, SOLUTION INTRAVENOUS; SUBCUTANEOUS at 16:48

## 2018-01-25 RX ADMIN — PRAMIPEXOLE DIHYDROCHLORIDE 1 MG: 0.5 TABLET ORAL at 21:34

## 2018-01-25 RX ADMIN — ISODIUM CHLORIDE 3 ML: 0.03 SOLUTION RESPIRATORY (INHALATION) at 11:40

## 2018-01-25 RX ADMIN — PRAMIPEXOLE DIHYDROCHLORIDE 1 MG: 0.5 TABLET ORAL at 16:46

## 2018-01-25 RX ADMIN — LEVALBUTEROL HYDROCHLORIDE 1.25 MG: 1.25 SOLUTION, CONCENTRATE RESPIRATORY (INHALATION) at 08:22

## 2018-01-25 RX ADMIN — ISODIUM CHLORIDE 3 ML: 0.03 SOLUTION RESPIRATORY (INHALATION) at 19:42

## 2018-01-25 RX ADMIN — OXYCODONE HYDROCHLORIDE 5 MG: 5 TABLET ORAL at 01:23

## 2018-01-25 RX ADMIN — BENZONATATE 100 MG: 100 CAPSULE ORAL at 12:46

## 2018-01-25 RX ADMIN — LISINOPRIL 10 MG: 10 TABLET ORAL at 10:45

## 2018-01-25 RX ADMIN — HEPARIN SODIUM 5000 UNITS: 5000 INJECTION, SOLUTION INTRAVENOUS; SUBCUTANEOUS at 14:00

## 2018-01-25 NOTE — PLAN OF CARE
Problem: OCCUPATIONAL THERAPY ADULT  Goal: Performs self-care activities at highest level of function for planned discharge setting  See evaluation for individualized goals  Outcome: Progressing  Limitation: Decreased ADL status, Decreased UE strength, Decreased Safe judgement during ADL, Decreased endurance, Decreased self-care trans, Decreased high-level ADLs (decreased balance and mobility )  Prognosis: Good  Assessment: Pt's primary limitation is decreased functional activity tolerance, requiring frequent rests breaks to prevent SOB with exertion  SpO2 95% on RA after exercises  Pt also reports urinary incontinence due to medication, sometimes not even being able to get to commode quick enough  Education provided to pt regarding need to perform transfers with safe technique, even when she is in a hurry, to prevent falls  Cont OT per POC        OT Discharge Recommendation:  (Home with services vs STR)

## 2018-01-25 NOTE — CASE MANAGEMENT
Continued Stay Review    Date: 1/25/18    Vital Signs: /62 (BP Location: Right arm)   Pulse 78   Temp 98 6 °F (37 °C) (Oral)   Resp 16   Ht 5' 2" (1 575 m)   Wt 107 kg (236 lb 6 4 oz)   LMP  (LMP Unknown)   SpO2 95%   BMI 43 24 kg/m²       FLOVENT HRA   Medications:   Scheduled Meds:   acetaminophen 650 mg Oral Q6H [address]   aspirin 325 mg Oral Daily   atorvastatin 20 mg Oral Daily With Dinner   bisacodyl 10 mg Oral HS   fluticasone 1 puff Inhalation F08I FELIPE   folic acid 1 mg Oral Daily   furosemide 40 mg Oral After Lunch   furosemide 80 mg Oral Daily   heparin (porcine) 5,000 Units Subcutaneous Q8H [address]   insulin glargine 60 Units Subcutaneous HS   insulin lispro 1-6 Units Subcutaneous HS   insulin lispro 10 Units Subcutaneous TID With Meals   insulin lispro 4-20 Units Subcutaneous TID AC   levalbuterol 1 25 mg Nebulization 4x Daily   And      sodium chloride 3 mL Nebulization 4x Daily   levothyroxine 125 mcg Oral Early Morning   lisinopril 10 mg Oral Daily   methylPREDNISolone sodium succinate 20 mg Intravenous Q12H [address]   metolazone 2 5 mg Oral Once per day on Mon Wed Fri   polyethylene glycol 17 g Oral Daily   pramipexole 1 mg Oral TID     Continuous Infusions:    PRN Meds:   acetaminophen    ALPRAZolam    aluminum-magnesium hydroxide-simethicone    benzonatate    dextromethorphan-guaiFENesin    hydrocodone-chlorpheniramine polistirex    levalbuterol **AND** sodium chloride    ondansetron    oxyCODONE    polyethylene glycol    senna-docusate sodium    Abnormal Labs/Diagnostic Results: CL 93 CO2 43 BUN 26 GLUCOSE 259  NEUTROS PCT % 84*   LYMPHS PCT % 12*   MONOS PCT % 3*       Age/Sex: 77 y o  female     ATTENDING  Still feels poorly, feels SOB and tight in the chest with breathing  Very tired  Frustrated that shes not better yet     Acutely ill-appearing She has wheezes (Diffuse expiratory,Increased work of breathing    Assessment & Plan:     · Acute influenza tracheobronchitis, less likely bacterial- slow to improve  Sputum cx not obtained as patient not having production  Blood cx NTD  Urine legionella/ pneumococcal negative  Completed course of Azithromycin  On tamiflu 5 day course  Cont bronchodilators, IV steroids, added flovent today  pulm recs noted  · Acute on chronic hypoxic respiratory failure- 2/2 above  Weaned off supplemental O2 at rest, still requiring it with exertion  · Dysphagia- resolved  Likely transient esophageal dysmotility  Speech eval nml  Cont current diet  Encourage chewing well before swallowing  · DM type II, uncontrolled with hyperglycemia- HA1c 9 2  lantus increased to 60 units  Cont ISS   · Hypotension 2/2 volume depletion and acute illness- BP now stable  Cont lisinopril  · Chronic diastolic HF- resumed home dose of lasix PO  restarted zaroxolyn  · Dyslipidemia- cont statin  · H/o TIA- cont asa, statin  · Hypothyroid- cont synthroid  · RLS- cont mirapex  · H/o ESBL klebsiella bacteremia  · Deconditioning- PT/OT rec home with PT        VTE Pharmacologic Prophylaxis:   Pharmacologic: Heparin  Mechanical VTE Prophylaxis in Place:  Yes

## 2018-01-25 NOTE — PROGRESS NOTES
[facility] Progress Note  Patient: Jessie Nava 77 y o  female   MRN: [id number]  PCP: Marilee Caldwell MD  Unit/Bed#: [address] [place] [address] Encounter: [id number]  Date Of Visit: 01/25/18    Problem List:    Principal Problem:    Influenzal tracheobronchitis  Active Problems:    Acute on chronic respiratory failure with hypoxemia (HCC)    COPD exacerbation (HCC)    T2DM (type 2 diabetes mellitus) ([facility] [address] )    Asthma    Restless leg    HTN (hypertension)    HLD (hyperlipidemia)    Hypothyroidism      Assessment & Plan:    · Acute influenza tracheobronchitis, less likely bacterial- slow to improve  Sputum cx not obtained as patient not having production  Blood cx NTD  Urine legionella/ pneumococcal negative  Completed course of Azithromycin  On tamiflu 5 day course  Cont bronchodilators, IV steroids, added flovent today  pulm recs noted  · Acute on chronic hypoxic respiratory failure- 2/2 above  Weaned off supplemental O2 at rest, still requiring it with exertion  · Dysphagia- resolved  Likely transient esophageal dysmotility  Speech eval nml  Cont current diet  Encourage chewing well before swallowing  · DM type II, uncontrolled with hyperglycemia- HA1c 9 2  lantus increased to 60 units  Cont ISS   · Hypotension 2/2 volume depletion and acute illness- BP now stable  Cont lisinopril  · Chronic diastolic HF- resumed home dose of lasix PO  restarted zaroxolyn  · Dyslipidemia- cont statin  · H/o TIA- cont asa, statin  · Hypothyroid- cont synthroid  · RLS- cont mirapex  · H/o ESBL klebsiella bacteremia  · Deconditioning- PT/OT rec home with PT      VTE Pharmacologic Prophylaxis:   Pharmacologic: Heparin  Mechanical VTE Prophylaxis in Place: Yes    Patient Centered Rounds: I have performed bedside rounds with nursing staff today  Discussions with Specialists or Other Care Team Provider: Yes    Education and Discussions with Family / Patient:Yes    Time Spent for Care: 30 minutes    More than 50% of total time spent on counseling and coordination of care as described above  Current Length of Stay: 6 day(s)    Current Patient Status: Inpatient     Discharge Plan: home with PT once medically stable  Code Status: Level 3 - DNAR and DNI      Subjective:   Still feels poorly, feels SOB and tight in the chest with breathing  Very tired  Frustrated that shes not better yet  Objective:       Vitals:   Temp (24hrs), Av 3 °F (36 8 °C), Min:97 9 °F (36 6 °C), Max:98 7 °F (37 1 °C)    HR:  [68-93] 93  Resp:  [18] 18  BP: (136-176)/() 136/62  SpO2:  [92 %-99 %] 92 %  Body mass index is 43 24 kg/m²  Input and Output Summary (last 24 hours): Intake/Output Summary (Last 24 hours) at 18 1024  Last data filed at 18   Gross per 24 hour   Intake                0 ml   Output             1200 ml   Net            -1200 ml       Physical Exam:     Physical Exam   Constitutional: She is oriented to person, place, and time  She appears well-developed  No distress  Acutely ill-appearing   HENT:   Head: Normocephalic and atraumatic  Eyes:   Conjunctival injection   Cardiovascular: Normal rate, regular rhythm and normal heart sounds  Pulmonary/Chest: Effort normal  No respiratory distress  She has wheezes (Diffuse expiratory)  She has no rales  Increased work of breathing   Abdominal: Soft  Bowel sounds are normal  She exhibits no distension  There is no tenderness  There is no rebound  Musculoskeletal: She exhibits edema (Trace pitting)  She exhibits no tenderness or deformity  Neurological: She is alert and oriented to person, place, and time  Skin: Skin is warm and dry  BLE chronic venous stasis changes   Psychiatric: She has a normal mood and affect  Her behavior is normal    Nursing note and vitals reviewed        Additional Data:     Labs:      Results from last 7 days  Lab Units 18  0602   WBC Thousand/uL 10 50   HEMOGLOBIN g/dL 13 1   HEMATOCRIT % 40 1   PLATELETS Thousands/uL 177   NEUTROS PCT % 84*   LYMPHS PCT % 12*   MONOS PCT % 3*   EOS PCT % 0       Results from last 7 days  Lab Units 01/25/18  0602  01/23/18  0613   SODIUM mmol/L 138  < > 143   POTASSIUM mmol/L 4 6  < > 5 1   CHLORIDE mmol/L 93*  < > 102   CO2 mmol/L 43*  < > 38*   BUN mg/dL 26*  < > 26*   CREATININE mg/dL 0 86  < > 0 76   CALCIUM mg/dL 8 7  < > 8 0*   TOTAL PROTEIN g/dL  --   --  5 9*   BILIRUBIN TOTAL mg/dL  --   --  0 20   ALK PHOS U/L  --   --  52   ALT U/L  --   --  40   AST U/L  --   --  16   GLUCOSE RANDOM mg/dL 259*  < > 313*   < > = values in this interval not displayed  Results from last 7 days  Lab Units 01/19/18  1919   INR  0 99       * I Have Reviewed All Lab Data Listed Above  * Additional Pertinent Lab Tests Reviewed: All Labs For Current Hospital Admission Reviewed    Imaging:  Xr Chest 1 View Portable    Result Date: 1/19/2018  Narrative: CHEST INDICATION:  Flu symptoms  COMPARISON:  October 8, 2017 VIEWS:   AP frontal IMAGES:  1 FINDINGS:     Heart shadow appears unremarkable  Atherosclerotic vascular calcifications are noted  The lungs are clear  No pneumothorax or pleural effusion  Visualized osseous structures appear within normal limits for the patient's age  Impression: No active pulmonary disease  Workstation performed: [id number]     Ct Head Without Contrast    Result Date: 1/9/2018  Narrative: CT BRAIN - WITHOUT CONTRAST INDICATION:  Headache  COMPARISON:  10/10/2017 TECHNIQUE:  CT examination of the brain was performed  In addition to axial images, coronal reformatted images were created and submitted for interpretation  Radiation dose length product (DLP) for this visit:  1134 76 mGy-cm   This examination, like all CT scans performed in the [facility], was performed utilizing techniques to minimize radiation dose exposure, including the use of iterative reconstruction and automated exposure control  IMAGE QUALITY:  Diagnostic   FINDINGS: PARENCHYMA: No acute intracranial hemorrhage  No extra-axial fluid collection  No midline shift  Unchanged is the calcified lesion in the left parietal cortex seen most consistent with a meningioma  It measures approximately 1 6 x 1 4 cm in axial dimension, unchanged from prior examination  Mass effect on the left superior parietal lobule without substantial adjacent edema  Stable atrophy and patchy areas of periventricular hypoattenuation noted  While nonspecific, these likely reflect changes of chronic microvascular ischemia in a patient of this age  VENTRICLES AND EXTRA-AXIAL SPACES:  Ventricles are commensurate with the degree of volume loss  Basal cisterns are patent  Atherosclerotic calcification of the intracranial vasculature is noted  VISUALIZED ORBITS AND PARANASAL SINUSES:  Changes of bilateral lens replacements noted  The paranasal sinuses and mastoid air cells remain clear  CALVARIUM AND EXTRACRANIAL SOFT TISSUES:   Normal      Impression: No acute intracranial abnormality  Stable left parietal convexity meningioma  No demonstrable adjacent parenchymal edema  Workstation performed: [id number]     Ct Abdomen Pelvis With Contrast    Result Date: 1/9/2018  Narrative: CT ABDOMEN AND PELVIS WITH IV CONTRAST INDICATION:  Chronic abdominal pain  COMPARISON: 12/13/2017  TECHNIQUE:  CT examination of the abdomen and pelvis was performed  Reformatted images were created in axial, sagittal, and coronal planes  Radiation dose length product (DLP) for this visit:  734 605 387 mGy-cm   This examination, like all CT scans performed in the [facility], was performed utilizing techniques to minimize radiation dose exposure, including the use of iterative reconstruction and automated exposure control  IV Contrast:  100 mL of iohexol (OMNIPAQUE)         Enteric Contrast:  Enteric contrast was not administered   FINDINGS: ABDOMEN LOWER CHEST:  No significant abnormalities identified in the lower chest  LIVER/BILIARY TREE:  No biliary obstruction  GALLBLADDER:  Cholecystectomy  SPLEEN:  Unremarkable  PANCREAS:  Stable 11 mm cystic lesion in the body of the pancreas  Distal body and tail not visualized  Question prior resection  No pancreatic duct dilatation or evidence of pancreatitis  ADRENAL GLANDS:  Unremarkable  KIDNEYS/URETERS:  Stable fat-containing 15 mm right renal cortical lesion suggesting an angiomyolipoma  No evidence of pyelonephritis or obstructive uropathy  [place] STOMACH AND BOWEL:  Stable moderate hiatal hernia  Fat-containing 4 cm periumbilical hernia, stable  Stool throughout the colon  No evidence of bowel obstruction, colitis or diverticulitis  APPENDIX:  No findings to suggest appendicitis  ABDOMINOPELVIC CAVITY:  No ascites or free intraperitoneal air  Stable shotty left para-aortic lymph nodes  VESSELS:  Unremarkable for patient's age  PELVIS REPRODUCTIVE ORGANS:  Patient is status post hysterectomy  URINARY BLADDER:  Unremarkable  ABDOMINAL WALL/INGUINAL REGIONS:  Unremarkable  OSSEOUS STRUCTURES:  No acute fracture or destructive osseous lesion  Impression: 1  Moderate amount of stool throughout the colon may indicate constipation  No evidence of bowel obstruction, colitis, diverticulitis or appendicitis  Moderate hiatal hernia and small fat-containing periumbilical hernia  2  Stable left millimeters cystic lesion in the body of the pancreas  No ductal dilatation  Possible  serous cystadenoma  3  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  No pyelonephritis or obstructive uropathy  Workstation performed: [id number]     Imaging Reports Reviewed by myself    Cultures:   Blood Culture:   Lab Results   Component Value Date    BLOODCX No Growth After 5 Days  01/19/2018    BLOODCX No Growth After 5 Days  01/19/2018    BLOODCX No Growth After 5 Days  06/11/2017    BLOODCX No Growth After 5 Days   06/11/2017    BLOODCX Klebsiella pneumoniae - ESBL (A) 06/07/2017    BLOODCX Klebsiella pneumoniae ESBL (A) 06/07/2017     Urine Culture:   Lab Results   Component Value Date    URINECX 20,000-29,000 cfu/ml Mixed Contaminants X3 04/15/2017     Sputum Culture: No components found for: SPUTUMCX  Wound Culture: No results found for: WOUNDCULT    Last 24 Hours Medication List:     acetaminophen 650 mg Oral Q6H [address]   aspirin 325 mg Oral Daily   atorvastatin 20 mg Oral Daily With Dinner   bisacodyl 10 mg Oral HS   fluticasone 1 puff Inhalation Q00G [address]   folic acid 1 mg Oral Daily   furosemide 40 mg Oral After Lunch   furosemide 80 mg Oral Daily   heparin (porcine) 5,000 Units Subcutaneous Q8H [address]   insulin glargine 60 Units Subcutaneous HS   insulin lispro 1-6 Units Subcutaneous HS   insulin lispro 10 Units Subcutaneous TID With Meals   insulin lispro 4-20 Units Subcutaneous TID AC   levalbuterol 1 25 mg Nebulization 4x Daily   And      sodium chloride 3 mL Nebulization 4x Daily   levothyroxine 125 mcg Oral Early Morning   lisinopril 10 mg Oral Daily   methylPREDNISolone sodium succinate 20 mg Intravenous Q12H [address]   metolazone 2 5 mg Oral Once per day on Mon Wed Fri   polyethylene glycol 17 g Oral Daily   pramipexole 1 mg Oral TID        Today, Patient Was Seen By: Olivia Wei MD    ** Please Note: Dragon 360 Dictation voice to text software may have been used in the creation of this document   **

## 2018-01-25 NOTE — PROGRESS NOTES
Progress Note - Pulmonary   Ayah Jernigan 77 y o  female MRN: [id number]  Unit/Bed#: [address] [address] Encounter: [id number]    Assessment:  Acute influenza A tracheobronchitis - seems better  Asthmatic bronchitis secondary to influenza A  Appears to have less wheezing today  Obesity alveolar hypoventilation with chronic hypercapnic hypoxemic respiratory failure  Diabetes mellitus type 2    Plan:  Continue methylprednisolone 20 mg IV every 12 hours and start Flovent 220 mcg 1 puff b i d  Nebulizer treatments with levalbuterol 1 25 mg 4 times a day  I spoke with hospitalist  Patient completed course of azithromycin        Subjective:   Still has some cough but does feel somewhat better  Less short of breath    Objective:     Vitals: Blood pressure 140/66, pulse 93, temperature 98 °F (36 7 °C), temperature source Oral, resp  rate 18, height 5' 2" (1 575 m), weight 107 kg (236 lb 6 4 oz), SpO2 98 %, not currently breastfeeding  ,Body mass index is 43 24 kg/m²  Intake/Output Summary (Last 24 hours) at 01/25/18 1848  Last data filed at 01/25/18 1801   Gross per 24 hour   Intake              680 ml   Output             2800 ml   Net            -2120 ml       Physical Exam: Physical Exam   Constitutional: She is oriented to person, place, and time  Patient is awake alert no distress  On room air sitting O2 saturation 94%   HENT:   Head: Normocephalic  Nose: Nose normal    Mouth/Throat: Oropharynx is clear and moist    Eyes: Conjunctivae are normal    Neck: Neck supple  No JVD present  Cardiovascular: Normal rate, regular rhythm and normal heart sounds  Pulmonary/Chest: Effort normal    Lung sounds reveal some expiratory wheezes in both lower lobes   Abdominal: Soft  She exhibits no distension  There is no guarding  Musculoskeletal:   Mild edema lower extremities   Lymphadenopathy:     She has no cervical adenopathy  Neurological: She is alert and oriented to person, place, and time     Skin: Skin is warm and dry  No erythema  Psychiatric: She has a normal mood and affect  Her behavior is normal  Thought content normal         Labs: I have personally reviewed pertinent lab results  , ABG: Lab Results   Component Value Date    PHART 7 390 01/20/2018    KEY8NLD 59 7 (HH) 01/20/2018    PO2ART 79 8 01/20/2018    BLD0IVX 35 3 (H) 01/20/2018    BEART 8 4 01/20/2018    SOURCE Radial, Left 01/20/2018   , BNP: No results found for: BNP, CBC: Lab Results   Component Value Date    WBC 10 50 01/25/2018    HGB 13 1 01/25/2018    HCT 40 1 01/25/2018    MCV 96 01/25/2018     01/25/2018    ADJUSTEDWBC 10 30 04/04/2016    MCH 31 3 (H) 01/25/2018    MCHC 32 8 01/25/2018    RDW 14 9 01/25/2018    MPV 8 3 (L) 01/25/2018    NRBC 0 01/25/2018   , CMP: Lab Results   Component Value Date     01/25/2018     01/08/2016    K 4 6 01/25/2018    K 3 9 01/08/2016    CL 93 (L) 01/25/2018     01/08/2016    CO2 43 (H) 01/25/2018    CO2 34 (H) 01/08/2016    ANIONGAP 2 (L) 01/25/2018    ANIONGAP 10 8 01/08/2016    BUN 26 (H) 01/25/2018    BUN 15 01/08/2016    CREATININE 0 86 01/25/2018    CREATININE 0 9 01/08/2016    GLUCOSE 259 (H) 01/25/2018    GLUCOSE 164 (H) 01/08/2016    CALCIUM 8 7 01/25/2018    CALCIUM 8 5 01/08/2016    AST 16 01/23/2018    AST 16 01/08/2016    ALT 40 01/23/2018    ALT 29 01/08/2016    ALKPHOS 52 01/23/2018    ALKPHOS 72 01/08/2016    PROT 5 9 (L) 01/23/2018    PROT 7 0 01/08/2016    BILITOT 0 20 01/23/2018    BILITOT 0 3 01/08/2016    EGFR 64 01/25/2018   , PT/INR:   Lab Results   Component Value Date    INR 0 99 01/19/2018   , Troponin: No results found for: TROPONIN    Imaging and other studies: I have personally reviewed pertinent reports     and I have personally reviewed pertinent films in PACS

## 2018-01-25 NOTE — OCCUPATIONAL THERAPY NOTE
OT TREATMENT       01/25/18 1125   Restrictions/Precautions   Other Precautions Fall Risk;Pain   Pain Assessment   Pain Assessment No/denies pain   ADL   Where Assessed Edge of bed   Grooming Assistance 5  Supervision/Setup   LB Dressing Assistance 4  Minimal Assistance   Toileting Assistance  5  Supervision/Setup   Toileting Deficit Bedside commode   Functional Standing Tolerance   Time 2 minutes x 3   Activity BUE exercises   Comments Supervision   Bed Mobility   Supine to Sit 7  Independent   Sit to Supine 7  Independent   Transfers   Sit to Stand 5  Supervision   Stand to Sit 5  Supervision   Stand pivot 5  Supervision   Toilet Transfers   Toilet Transfer From Bed   Toilet Transfer Type To and from   Toilet Transfer to Standard bedside commode   Toilet Transfer Technique Stand pivot   Toilet Transfers Supervision   Therapeutic Excerise-Strength   UE Strength Yes   Right Upper Extremity- Strength   R Shoulder Flexion; Extension;ABduction; External rotation; Internal rotation   R Elbow Elbow flexion;Elbow extension   R Wrist Wrist flexion;Wrist extension   R Position Seated;Standing   Equipment [place]   R Weight/Reps/Sets 10 reps x 2 sets with 2#   Left Upper Extremity-Strength   L Shoulder Flexion; Extension;ABduction; External rotation; Internal rotation   L Elbow Elbow flexion;Elbow extension   L Wrist Wrist flexion;Wrist extension   L Position Seated  (standing)   Equipment [place]el   L Weights/Reps/Sets 10 reps x 2 sets with 2#   Cognition   Overall Cognitive Status WFL   Activity Tolerance   Activity Tolerance Patient limited by fatigue   Assessment   Assessment Pt's primary limitation is decreased functional activity tolerance, requiring frequent rests breaks to prevent SOB with exertion  SpO2 95% on RA after exercises  Pt also reports urinary incontinence due to medication, sometimes not even being able to get to commode quick enough   Education provided to pt regarding need to perform transfers with safe technique, even when she is in a hurry, to prevent falls  Cont OT per POC  Plan   Treatment Interventions ADL retraining;Functional transfer training;UE strengthening/ROM; Endurance training;Patient/family training;Equipment evaluation/education; Activityengagement; Energy conservation   OT Frequency 3-5x/wk   Recommendation   OT Discharge Recommendation (Home with services vs STR)

## 2018-01-26 ENCOUNTER — APPOINTMENT (OUTPATIENT)
Dept: PHYSICAL THERAPY | Facility: CLINIC | Age: 78
End: 2018-01-26
Payer: COMMERCIAL

## 2018-01-26 ENCOUNTER — APPOINTMENT (OUTPATIENT)
Dept: PULMONOLOGY | Facility: CLINIC | Age: 78
End: 2018-01-26
Payer: COMMERCIAL

## 2018-01-26 LAB
ANION GAP SERPL CALCULATED.3IONS-SCNC: 4 MMOL/L (ref 4–13)
BASOPHILS # BLD AUTO: 0 THOUSANDS/ΜL (ref 0–0.1)
BASOPHILS NFR BLD AUTO: 0 % (ref 0–1)
BUN SERPL-MCNC: 32 MG/DL (ref 5–25)
CALCIUM SERPL-MCNC: 9 MG/DL (ref 8.3–10.1)
CHLORIDE SERPL-SCNC: 91 MMOL/L (ref 100–108)
CO2 SERPL-SCNC: 40 MMOL/L (ref 21–32)
CREAT SERPL-MCNC: 0.96 MG/DL (ref 0.6–1.3)
EOSINOPHIL # BLD AUTO: 0 THOUSAND/ΜL (ref 0–0.61)
EOSINOPHIL NFR BLD AUTO: 0 % (ref 0–6)
ERYTHROCYTE [DISTWIDTH] IN BLOOD BY AUTOMATED COUNT: 15.5 % (ref 11.6–15.1)
GFR SERPL CREATININE-BSD FRML MDRD: 56 ML/MIN/1.73SQ M
GLUCOSE SERPL-MCNC: 210 MG/DL (ref 65–140)
GLUCOSE SERPL-MCNC: 259 MG/DL (ref 65–140)
GLUCOSE SERPL-MCNC: 301 MG/DL (ref 65–140)
GLUCOSE SERPL-MCNC: 313 MG/DL (ref 65–140)
GLUCOSE SERPL-MCNC: 323 MG/DL (ref 65–140)
HCT VFR BLD AUTO: 42 % (ref 37–47)
HGB BLD-MCNC: 13.9 G/DL (ref 12–16)
LYMPHOCYTES # BLD AUTO: 1.6 THOUSANDS/ΜL (ref 0.6–4.47)
LYMPHOCYTES NFR BLD AUTO: 11 % (ref 14–44)
MCH RBC QN AUTO: 31.3 PG (ref 27–31)
MCHC RBC AUTO-ENTMCNC: 33 G/DL (ref 31.4–37.4)
MCV RBC AUTO: 95 FL (ref 82–98)
MONOCYTES # BLD AUTO: 0.5 THOUSAND/ΜL (ref 0.17–1.22)
MONOCYTES NFR BLD AUTO: 3 % (ref 4–12)
NEUTROPHILS # BLD AUTO: 12.6 THOUSANDS/ΜL (ref 1.85–7.62)
NEUTS SEG NFR BLD AUTO: 86 % (ref 43–75)
NRBC BLD AUTO-RTO: 0 /100 WBCS
PLATELET # BLD AUTO: 224 THOUSANDS/UL (ref 130–400)
PLATELET BLD QL SMEAR: ADEQUATE
PMV BLD AUTO: 8.5 FL (ref 8.9–12.7)
POTASSIUM SERPL-SCNC: 4.4 MMOL/L (ref 3.5–5.3)
RBC # BLD AUTO: 4.43 MILLION/UL (ref 4.2–5.4)
SODIUM SERPL-SCNC: 135 MMOL/L (ref 136–145)
WBC # BLD AUTO: 14.6 THOUSAND/UL (ref 4.8–10.8)

## 2018-01-26 PROCEDURE — 85025 COMPLETE CBC W/AUTO DIFF WBC: CPT | Performed by: STUDENT IN AN ORGANIZED HEALTH CARE EDUCATION/TRAINING PROGRAM

## 2018-01-26 PROCEDURE — 99232 SBSQ HOSP IP/OBS MODERATE 35: CPT | Performed by: INTERNAL MEDICINE

## 2018-01-26 PROCEDURE — 97535 SELF CARE MNGMENT TRAINING: CPT

## 2018-01-26 PROCEDURE — 80048 BASIC METABOLIC PNL TOTAL CA: CPT | Performed by: STUDENT IN AN ORGANIZED HEALTH CARE EDUCATION/TRAINING PROGRAM

## 2018-01-26 PROCEDURE — 97110 THERAPEUTIC EXERCISES: CPT

## 2018-01-26 PROCEDURE — 82948 REAGENT STRIP/BLOOD GLUCOSE: CPT

## 2018-01-26 PROCEDURE — 94640 AIRWAY INHALATION TREATMENT: CPT

## 2018-01-26 PROCEDURE — 94760 N-INVAS EAR/PLS OXIMETRY 1: CPT

## 2018-01-26 PROCEDURE — 99232 SBSQ HOSP IP/OBS MODERATE 35: CPT | Performed by: STUDENT IN AN ORGANIZED HEALTH CARE EDUCATION/TRAINING PROGRAM

## 2018-01-26 RX ADMIN — PRAMIPEXOLE DIHYDROCHLORIDE 1 MG: 0.5 TABLET ORAL at 15:33

## 2018-01-26 RX ADMIN — FLUTICASONE PROPIONATE 1 PUFF: 220 AEROSOL, METERED RESPIRATORY (INHALATION) at 08:41

## 2018-01-26 RX ADMIN — METHYLPREDNISOLONE SODIUM SUCCINATE 20 MG: 40 INJECTION, POWDER, FOR SOLUTION INTRAMUSCULAR; INTRAVENOUS at 22:56

## 2018-01-26 RX ADMIN — HEPARIN SODIUM 5000 UNITS: 5000 INJECTION, SOLUTION INTRAVENOUS; SUBCUTANEOUS at 06:03

## 2018-01-26 RX ADMIN — HEPARIN SODIUM 5000 UNITS: 5000 INJECTION, SOLUTION INTRAVENOUS; SUBCUTANEOUS at 22:56

## 2018-01-26 RX ADMIN — LEVALBUTEROL HYDROCHLORIDE 1.25 MG: 1.25 SOLUTION, CONCENTRATE RESPIRATORY (INHALATION) at 19:40

## 2018-01-26 RX ADMIN — HEPARIN SODIUM 5000 UNITS: 5000 INJECTION, SOLUTION INTRAVENOUS; SUBCUTANEOUS at 13:31

## 2018-01-26 RX ADMIN — METHYLPREDNISOLONE SODIUM SUCCINATE 20 MG: 40 INJECTION, POWDER, FOR SOLUTION INTRAMUSCULAR; INTRAVENOUS at 08:42

## 2018-01-26 RX ADMIN — INSULIN LISPRO 15 UNITS: 100 INJECTION, SOLUTION INTRAVENOUS; SUBCUTANEOUS at 18:00

## 2018-01-26 RX ADMIN — ONDANSETRON 4 MG: 2 INJECTION INTRAMUSCULAR; INTRAVENOUS at 19:53

## 2018-01-26 RX ADMIN — ACETAMINOPHEN 650 MG: 325 TABLET, FILM COATED ORAL at 06:03

## 2018-01-26 RX ADMIN — ATORVASTATIN CALCIUM 20 MG: 20 TABLET, FILM COATED ORAL at 15:33

## 2018-01-26 RX ADMIN — ISODIUM CHLORIDE 3 ML: 0.03 SOLUTION RESPIRATORY (INHALATION) at 11:21

## 2018-01-26 RX ADMIN — LEVOTHYROXINE SODIUM 125 MCG: 125 TABLET ORAL at 06:03

## 2018-01-26 RX ADMIN — LEVALBUTEROL HYDROCHLORIDE 1.25 MG: 1.25 SOLUTION, CONCENTRATE RESPIRATORY (INHALATION) at 11:21

## 2018-01-26 RX ADMIN — ASPIRIN 325 MG: 325 TABLET ORAL at 08:40

## 2018-01-26 RX ADMIN — ACETAMINOPHEN 650 MG: 325 TABLET, FILM COATED ORAL at 13:29

## 2018-01-26 RX ADMIN — OXYCODONE HYDROCHLORIDE 5 MG: 5 TABLET ORAL at 23:12

## 2018-01-26 RX ADMIN — ACETAMINOPHEN 650 MG: 325 TABLET, FILM COATED ORAL at 23:02

## 2018-01-26 RX ADMIN — INSULIN LISPRO 20 UNITS: 100 INJECTION, SOLUTION INTRAVENOUS; SUBCUTANEOUS at 13:32

## 2018-01-26 RX ADMIN — ACETAMINOPHEN 650 MG: 325 TABLET, FILM COATED ORAL at 18:03

## 2018-01-26 RX ADMIN — ISODIUM CHLORIDE 3 ML: 0.03 SOLUTION RESPIRATORY (INHALATION) at 16:25

## 2018-01-26 RX ADMIN — POLYETHYLENE GLYCOL 3350 17 G: 17 POWDER, FOR SOLUTION ORAL at 08:42

## 2018-01-26 RX ADMIN — GUAIFENESIN AND DEXTROMETHORPHAN 10 ML: 100; 10 SYRUP ORAL at 22:57

## 2018-01-26 RX ADMIN — ALUMINUM HYDROXIDE, MAGNESIUM HYDROXIDE, AND SIMETHICONE 30 ML: 200; 200; 20 SUSPENSION ORAL at 15:30

## 2018-01-26 RX ADMIN — GUAIFENESIN AND DEXTROMETHORPHAN 10 ML: 100; 10 SYRUP ORAL at 13:30

## 2018-01-26 RX ADMIN — FUROSEMIDE 80 MG: 80 TABLET ORAL at 08:41

## 2018-01-26 RX ADMIN — BENZONATATE 100 MG: 100 CAPSULE ORAL at 08:41

## 2018-01-26 RX ADMIN — ISODIUM CHLORIDE 3 ML: 0.03 SOLUTION RESPIRATORY (INHALATION) at 07:22

## 2018-01-26 RX ADMIN — HYDROCODONE POLISTIREX AND CHLORPHENIRAMINE POLISTIREX 5 ML: 10; 8 SUSPENSION, EXTENDED RELEASE ORAL at 03:18

## 2018-01-26 RX ADMIN — INSULIN GLARGINE 60 UNITS: 100 INJECTION, SOLUTION SUBCUTANEOUS at 22:56

## 2018-01-26 RX ADMIN — ISODIUM CHLORIDE 3 ML: 0.03 SOLUTION RESPIRATORY (INHALATION) at 19:40

## 2018-01-26 RX ADMIN — FLUTICASONE PROPIONATE 1 PUFF: 220 AEROSOL, METERED RESPIRATORY (INHALATION) at 22:55

## 2018-01-26 RX ADMIN — ACETAMINOPHEN 650 MG: 325 TABLET, FILM COATED ORAL at 00:06

## 2018-01-26 RX ADMIN — FUROSEMIDE 40 MG: 40 TABLET ORAL at 13:30

## 2018-01-26 RX ADMIN — LEVALBUTEROL HYDROCHLORIDE 1.25 MG: 1.25 SOLUTION, CONCENTRATE RESPIRATORY (INHALATION) at 07:22

## 2018-01-26 RX ADMIN — BISACODYL 10 MG: 5 TABLET, COATED ORAL at 22:55

## 2018-01-26 RX ADMIN — FOLIC ACID 1 MG: 1 TABLET ORAL at 08:41

## 2018-01-26 RX ADMIN — GUAIFENESIN AND DEXTROMETHORPHAN 10 ML: 100; 10 SYRUP ORAL at 18:01

## 2018-01-26 RX ADMIN — LISINOPRIL 10 MG: 10 TABLET ORAL at 08:40

## 2018-01-26 RX ADMIN — PRAMIPEXOLE DIHYDROCHLORIDE 1 MG: 0.5 TABLET ORAL at 08:40

## 2018-01-26 RX ADMIN — LEVALBUTEROL HYDROCHLORIDE 1.25 MG: 1.25 SOLUTION, CONCENTRATE RESPIRATORY (INHALATION) at 16:24

## 2018-01-26 RX ADMIN — ISODIUM CHLORIDE 3 ML: 0.03 SOLUTION RESPIRATORY (INHALATION) at 16:27

## 2018-01-26 RX ADMIN — PRAMIPEXOLE DIHYDROCHLORIDE 1 MG: 0.5 TABLET ORAL at 22:57

## 2018-01-26 RX ADMIN — INSULIN LISPRO 12 UNITS: 100 INJECTION, SOLUTION INTRAVENOUS; SUBCUTANEOUS at 08:39

## 2018-01-26 RX ADMIN — METOLAZONE 2.5 MG: 5 TABLET ORAL at 08:43

## 2018-01-26 NOTE — PROGRESS NOTES
[facility] Progress Note  Patient: Zenia Palomo 77 y o  female   MRN: [id number]  PCP: Olya Boone MD  Unit/Bed#: [address] [place] [address] Encounter: [id number]  Date Of Visit: 01/26/18    Problem List:    Principal Problem:    Influenzal tracheobronchitis  Active Problems:    Acute on chronic respiratory failure with hypoxemia (HCC)    COPD exacerbation (HCC)    T2DM (type 2 diabetes mellitus) ([facility] [address] )    Asthma    Restless leg    HTN (hypertension)    HLD (hyperlipidemia)    Hypothyroidism      Assessment & Plan:    · Acute influenza tracheobronchitis, less likely bacterial- patient slow to improve  Sputum cx not obtained as patient not having production  Blood cx NTD  Urine legionella/ pneumococcal negative  Completed course of Azithromycin and tamiflu  Cont bronchodilators, cont current dose of IV steroids, added flovent  pulm recs noted  · Asthmatic bronchitis secondary to influenza A-  Still wheezing significantly  Requiring IV steroids  · Steroid induced leukocytosis today- patient afebrile  · Acute on chronic hypoxic respiratory failure- 2/2 above  Weaned off supplemental O2  · Dysphagia- resolved  Likely transient esophageal dysmotility  Speech eval nml  Cont diet  Encourage chewing well before swallowing  · DM type II, uncontrolled with hyperglycemia- HA1c 9 2  sugars increased 2/2 steroid use  lantus increased to 60 units  Cont ISS, increase coverage  · Hypotension 2/2 volume depletion and acute illness- BP stabilized   Cont lisinopril  · Chronic diastolic HF- resumed home dose of lasix PO  restarted zaroxolyn  · Dyslipidemia- cont statin  · H/o TIA- cont asa, statin  · Hypothyroid- cont synthroid  · RLS- cont mirapex  · H/o ESBL klebsiella bacteremia  · Deconditioning- PT/OT rec home with PT      VTE Pharmacologic Prophylaxis:   Pharmacologic: Heparin  Mechanical VTE Prophylaxis in Place: Yes    Patient Centered Rounds: I have performed bedside rounds with nursing staff today     Discussions with Specialists or Other Care Team Provider: Yes    Education and Discussions with Family / Patient:Yes    Time Spent for Care: 30 minutes  More than 50% of total time spent on counseling and coordination of care as described above  Current Length of Stay: 7 day(s)    Current Patient Status: Inpatient     Discharge Plan: STR once medically stable  Code Status: Level 3 - DNAR and DNI      Subjective:   Still feels poorly, still has cough, which is dry but somewhat better  No SOB, but tires out very easily when she exerts herself  Wants to go to rehab  Objective:       Vitals:   Temp (24hrs), Av 2 °F (36 8 °C), Min:98 °F (36 7 °C), Max:98 5 °F (36 9 °C)    HR:  [] 71  Resp:  [18] 18  BP: (129-142)/(66-78) 129/78  SpO2:  [93 %-98 %] 94 %  Body mass index is 43 24 kg/m²  Input and Output Summary (last 24 hours): Intake/Output Summary (Last 24 hours) at 18 0920  Last data filed at 18 0601   Gross per 24 hour   Intake              420 ml   Output             2700 ml   Net            -2280 ml       Physical Exam:     Physical Exam   Constitutional: She is oriented to person, place, and time  She appears well-developed  No distress  Appears acutely ill   HENT:   Head: Normocephalic and atraumatic  Eyes:   Conjunctival injection   Cardiovascular: Normal rate, regular rhythm and normal heart sounds  Pulmonary/Chest: Effort normal  No respiratory distress  She has wheezes  She has rales  Increased work of breathing   diffuse expiratory wheezing   Abdominal: Soft  Bowel sounds are normal  She exhibits no distension  There is no tenderness  There is no rebound  Musculoskeletal: She exhibits edema (Trace)  She exhibits no tenderness or deformity  Neurological: She is alert and oriented to person, place, and time  Skin: Skin is warm and dry  Psychiatric: She has a normal mood and affect   Her behavior is normal    Nursing note and vitals reviewed  Additional Data:     Labs:      Results from last 7 days  Lab Units 01/26/18  0609   WBC Thousand/uL 14 60*   HEMOGLOBIN g/dL 13 9   HEMATOCRIT % 42 0   PLATELETS Thousands/uL 224   NEUTROS PCT % 86*   LYMPHS PCT % 11*   MONOS PCT % 3*   EOS PCT % 0       Results from last 7 days  Lab Units 01/26/18  0609  01/23/18  0613   SODIUM mmol/L 135*  < > 143   POTASSIUM mmol/L 4 4  < > 5 1   CHLORIDE mmol/L 91*  < > 102   CO2 mmol/L 40*  < > 38*   BUN mg/dL 32*  < > 26*   CREATININE mg/dL 0 96  < > 0 76   CALCIUM mg/dL 9 0  < > 8 0*   TOTAL PROTEIN g/dL  --   --  5 9*   BILIRUBIN TOTAL mg/dL  --   --  0 20   ALK PHOS U/L  --   --  52   ALT U/L  --   --  40   AST U/L  --   --  16   GLUCOSE RANDOM mg/dL 323*  < > 313*   < > = values in this interval not displayed  Results from last 7 days  Lab Units 01/19/18  1919   INR  0 99       * I Have Reviewed All Lab Data Listed Above  * Additional Pertinent Lab Tests Reviewed: All Labs For Current Hospital Admission Reviewed    Imaging:  Xr Chest 1 View Portable    Result Date: 1/19/2018  Narrative: CHEST INDICATION:  Flu symptoms  COMPARISON:  October 8, 2017 VIEWS:   AP frontal IMAGES:  1 FINDINGS:     Heart shadow appears unremarkable  Atherosclerotic vascular calcifications are noted  The lungs are clear  No pneumothorax or pleural effusion  Visualized osseous structures appear within normal limits for the patient's age  Impression: No active pulmonary disease  Workstation performed: [id number]     Ct Head Without Contrast    Result Date: 1/9/2018  Narrative: CT BRAIN - WITHOUT CONTRAST INDICATION:  Headache  COMPARISON:  10/10/2017 TECHNIQUE:  CT examination of the brain was performed  In addition to axial images, coronal reformatted images were created and submitted for interpretation  Radiation dose length product (DLP) for this visit:  1134 76 mGy-cm     This examination, like all CT scans performed in the [facility], was performed utilizing techniques to minimize radiation dose exposure, including the use of iterative reconstruction and automated exposure control  IMAGE QUALITY:  Diagnostic  FINDINGS:  PARENCHYMA: No acute intracranial hemorrhage  No extra-axial fluid collection  No midline shift  Unchanged is the calcified lesion in the left parietal cortex seen most consistent with a meningioma  It measures approximately 1 6 x 1 4 cm in axial dimension, unchanged from prior examination  Mass effect on the left superior parietal lobule without substantial adjacent edema  Stable atrophy and patchy areas of periventricular hypoattenuation noted  While nonspecific, these likely reflect changes of chronic microvascular ischemia in a patient of this age  VENTRICLES AND EXTRA-AXIAL SPACES:  Ventricles are commensurate with the degree of volume loss  Basal cisterns are patent  Atherosclerotic calcification of the intracranial vasculature is noted  VISUALIZED ORBITS AND PARANASAL SINUSES:  Changes of bilateral lens replacements noted  The paranasal sinuses and mastoid air cells remain clear  CALVARIUM AND EXTRACRANIAL SOFT TISSUES:   Normal      Impression: No acute intracranial abnormality  Stable left parietal convexity meningioma  No demonstrable adjacent parenchymal edema  Workstation performed: [id number]     Ct Abdomen Pelvis With Contrast    Result Date: 1/9/2018  Narrative: CT ABDOMEN AND PELVIS WITH IV CONTRAST INDICATION:  Chronic abdominal pain  COMPARISON: 12/13/2017  TECHNIQUE:  CT examination of the abdomen and pelvis was performed  Reformatted images were created in axial, sagittal, and coronal planes  Radiation dose length product (DLP) for this visit:  734 605 387 mGy-cm   This examination, like all CT scans performed in the [facility], was performed utilizing techniques to minimize radiation dose exposure, including the use of iterative reconstruction and automated exposure control   IV Contrast:  100 mL of iohexol (OMNIPAQUE)         Enteric Contrast:  Enteric contrast was not administered  FINDINGS: ABDOMEN LOWER CHEST:  No significant abnormalities identified in the lower chest  LIVER/BILIARY TREE:  No biliary obstruction  GALLBLADDER:  Cholecystectomy  SPLEEN:  Unremarkable  PANCREAS:  Stable 11 mm cystic lesion in the body of the pancreas  Distal body and tail not visualized  Question prior resection  No pancreatic duct dilatation or evidence of pancreatitis  ADRENAL GLANDS:  Unremarkable  KIDNEYS/URETERS:  Stable fat-containing 15 mm right renal cortical lesion suggesting an angiomyolipoma  No evidence of pyelonephritis or obstructive uropathy  [facility] STOMACH AND BOWEL:  Stable moderate hiatal hernia  Fat-containing 4 cm periumbilical hernia, stable  Stool throughout the colon  No evidence of bowel obstruction, colitis or diverticulitis  APPENDIX:  No findings to suggest appendicitis  ABDOMINOPELVIC CAVITY:  No ascites or free intraperitoneal air  Stable shotty left para-aortic lymph nodes  VESSELS:  Unremarkable for patient's age  PELVIS REPRODUCTIVE ORGANS:  Patient is status post hysterectomy  URINARY BLADDER:  Unremarkable  ABDOMINAL WALL/INGUINAL REGIONS:  Unremarkable  OSSEOUS STRUCTURES:  No acute fracture or destructive osseous lesion  Impression: 1  Moderate amount of stool throughout the colon may indicate constipation  No evidence of bowel obstruction, colitis, diverticulitis or appendicitis  Moderate hiatal hernia and small fat-containing periumbilical hernia  2  Stable left millimeters cystic lesion in the body of the pancreas  No ductal dilatation  Possible  serous cystadenoma  3  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  No pyelonephritis or obstructive uropathy  Workstation performed: [id number]     Imaging Reports Reviewed by myself    Cultures:   Blood Culture:   Lab Results   Component Value Date    BLOODCX No Growth After 5 Days   01/19/2018    BLOODCX No Growth After 5 Days  01/19/2018    BLOODCX No Growth After 5 Days  06/11/2017    BLOODCX No Growth After 5 Days  06/11/2017    BLOODCX Klebsiella pneumoniae - ESBL (A) 06/07/2017    BLOODCX Klebsiella pneumoniae ESBL (A) 06/07/2017     Urine Culture:   Lab Results   Component Value Date    URINECX 20,000-29,000 cfu/ml Mixed Contaminants X3 04/15/2017     Sputum Culture: No components found for: SPUTUMCX  Wound Culture: No results found for: WOUNDCULT    Last 24 Hours Medication List:     acetaminophen 650 mg Oral Q6H [address]   aspirin 325 mg Oral Daily   atorvastatin 20 mg Oral Daily With Dinner   bisacodyl 10 mg Oral HS   fluticasone 1 puff Inhalation F15C [address]   folic acid 1 mg Oral Daily   furosemide 40 mg Oral After Lunch   furosemide 80 mg Oral Daily   heparin (porcine) 5,000 Units Subcutaneous Q8H [address]   insulin glargine 60 Units Subcutaneous HS   insulin lispro 1-6 Units Subcutaneous HS   insulin lispro 10 Units Subcutaneous TID With Meals   insulin lispro 4-20 Units Subcutaneous TID AC   levalbuterol 1 25 mg Nebulization 4x Daily   And      sodium chloride 3 mL Nebulization 4x Daily   levothyroxine 125 mcg Oral Early Morning   lisinopril 10 mg Oral Daily   methylPREDNISolone sodium succinate 20 mg Intravenous Q12H [address]   metolazone 2 5 mg Oral Once per day on Mon Wed Fri   polyethylene glycol 17 g Oral Daily   pramipexole 1 mg Oral TID        Today, Patient Was Seen By: Ashley Underwood MD    ** Please Note: Dragon 360 Dictation voice to text software may have been used in the creation of this document   **

## 2018-01-26 NOTE — PHYSICAL THERAPY NOTE
PT TREATMENT     01/26/18 1150   Pain Assessment   Pain Assessment 0-10   Pain Score 8  (upper back area)   Restrictions/Precautions   Other Precautions Droplet precautions; Fall Risk   General   Chart Reviewed Yes   Family/Caregiver Present No   Cognition   Arousal/Participation Cooperative   Subjective   Subjective patient reports "feeling terrible" today, thoracic pain from coughing    Transfers   Sit to Stand 7  Independent   Stand to Sit 7  Independent   Stand pivot 7  Independent   Ambulation/Elevation   Gait Assistance 5  Supervision   Assistive Device Rolling walker   Distance 120 feet with change in direction, SpO2 at 93% on room air with respiratory therapy present   gait steady without balance loss but with flexed forward posturing and SOB/coughing with all activity   Exercises   Hip Flexion Sitting;10 reps;Bilateral   Ankle Pumps Sitting;10 reps;Bilateral   Marching Standing;10 reps;Bilateral   Assessment   Assessment patient cooperative but with increased pain in thoracic area that remains constant in nature as per patient  Patient generally weak and deconditioned with SOB but SpO2 at 93% on room air with gait  Patient will benefit from continued PT   Plan   Treatment/Interventions ADL retraining;Functional transfer training;LE strengthening/ROM; Therapeutic exercise; Endurance training;Equipment eval/education; Bed mobility;Gait training;Patient/family training   PT Frequency (3-5x/week)   Recommendation   Recommendation (STR )

## 2018-01-26 NOTE — OCCUPATIONAL THERAPY NOTE
OT TREATMENT       01/26/18 1106   Restrictions/Precautions   Other Precautions Droplet precautions; Chair Alarm; Bed Alarm; Fall Risk   Pain Assessment   Pain Assessment No/denies pain   ADL   Where Assessed Sitting at sink   Grooming Assistance 5  Supervision/Setup   UB Bathing Assistance 5  Supervision/Setup   LB Bathing Assistance 3  Moderate Assistance   UB Dressing Assistance 5  Supervision/Setup   LB Dressing Assistance 3  Moderate Assistance   Toileting Assistance  5  Supervision/Setup   Functional Standing Tolerance   Time 5 minutes   Activity LB bathing at sink   Transfers   Sit to Stand 7  Independent   Stand to Sit 7  Independent   Stand pivot 7  Independent   Functional Mobility   Functional Mobility 5  Supervision   Additional items Rolling walker   Toilet Transfers   Toilet Transfer From (chair)   Toilet Transfer Type To and from   Toilet Transfer to Raised toilet seat with rails   Toilet Transfer Technique Ambulating   Toilet Transfers Supervision  (with RW)   Cognition   Overall Cognitive Status WFL   Activity Tolerance   Activity Tolerance Patient limited by fatigue   Medical Staff Made Aware yes   Assessment   Assessment Pt able to perform bathing from knees up with Supervision and set-up while seated in dront of sink  From Knees down, Mod A with cues and increased time due to hernia  Pt appeared only mildly SOB, but SpO2 88-89% and  after self care completed  Pt encouraged to rest and take deep breaths and recovered to SpO2 93% and  within 2-3 minutes  Pt would benefit from STR  Cont OT per POC  Plan   Treatment Interventions ADL retraining;Functional transfer training;UE strengthening/ROM; Endurance training;Patient/family training;Equipment evaluation/education; Activityengagement; Energy conservation   OT Frequency 3-5x/wk   Recommendation   OT Discharge Recommendation Short Term Rehab   Barthel Index   Feeding 10   Bathing 0   Grooming Score 0   Dressing Score 5   Bladder Score 10 Bowels Score 10   Toilet Use Score 5   Transfers (Bed/Chair) Score 15   Mobility (Level Surface) Score 10   Stairs Score 5   Barthel Index Score 70     Patient left OOB in chair with all needs within reach, tab alarm in place

## 2018-01-26 NOTE — PLAN OF CARE
Problem: DISCHARGE PLANNING - CARE MANAGEMENT  Goal: Discharge to post-acute care or home with appropriate resources  INTERVENTIONS:  - Conduct assessment to determine patient/family and health care team treatment goals, and need for post-acute services based on payer coverage, community resources, and patient preferences, and barriers to discharge  - Address psychosocial, clinical, and financial barriers to discharge as identified in assessment in conjunction with the patient/family and health care team  - Arrange appropriate level of post-acute services according to patient's   needs and preference and payer coverage in collaboration with the physician and health care team  - Communicate with and update the patient/family, physician, and health care team regarding progress on the discharge plan  - Arrange appropriate transportation to post-acute venues  Return to home with VNA services     1/26 Transfer to [facility] for rehab pending outcome of authorization request  Outcome: Progressing  Pt has been accepted by [facility] and currently there is a bed available  Rehab authorization request was submitted to [employer] earlier this afternoon  Authorization has not been received as of yet  If authorization for rehab received pt can transfer to [facility] when ready  If rehab authorization is denied pt's plan is to return home with [facility]   SW will continue to follow to assist with planning based on outcome of authorization request

## 2018-01-26 NOTE — CASE MANAGEMENT
Continued Stay Review    Date: 1/26/18    Vital Signs: /78 (BP Location: Left arm)   Pulse 71   Temp 98 °F (36 7 °C) (Oral)   Resp 18   Ht 5' 2" (1 575 m)   Wt 107 kg (236 lb 6 4 oz)   LMP  (LMP Unknown)   SpO2 94%   BMI 43 24 kg/m²     Medications:   Scheduled Meds:   acetaminophen 650 mg Oral Q6H [facility]   aspirin 325 mg Oral Daily   atorvastatin 20 mg Oral Daily With Dinner   bisacodyl 10 mg Oral HS   fluticasone 1 puff Inhalation R17T [address]   folic acid 1 mg Oral Daily   furosemide 40 mg Oral After Lunch   furosemide 80 mg Oral Daily   heparin (porcine) 5,000 Units Subcutaneous Q8H [facility]   insulin glargine 60 Units Subcutaneous HS   insulin lispro 10 Units Subcutaneous TID With Meals   insulin lispro 5-25 Units Subcutaneous TID AC   levalbuterol 1 25 mg Nebulization 4x Daily   And      sodium chloride 3 mL Nebulization 4x Daily   levothyroxine 125 mcg Oral Early Morning   lisinopril 10 mg Oral Daily   methylPREDNISolone sodium succinate 20 mg Intravenous Q12H [facility]   metolazone 2 5 mg Oral Once per day on Mon Wed Fri   polyethylene glycol 17 g Oral Daily   pramipexole 1 mg Oral TID     Continuous Infusions:    PRN Meds:   acetaminophen    ALPRAZolam    aluminum-magnesium hydroxide-simethicone    benzonatate    dextromethorphan-guaiFENesin    hydrocodone-chlorpheniramine polistirex    levalbuterol **AND** sodium chloride    ondansetron    oxyCODONE    polyethylene glycol    senna-docusate sodium    Abnormal Labs/Diagnostic Results:  CL 91 CO2 40 BUN 32 GLUCOSE 323 WBC 14 60     Age/Sex: 77 y o  female     ATTENDING  Still feels poorly, still has cough, which is dry but somewhat better  Principal Problem:    Influenzal tracheobronchitis  Active Problems:    Acute on chronic respiratory failure with hypoxemia (HCC)    COPD exacerbation (HCC)    T2DM (type 2 diabetes mellitus) ([facility] [address] )    Asthma    Restless leg    HTN (hypertension)    HLD (hyperlipidemia)    Hypothyroidism  Assessment & Plan:  · Acute influenza tracheobronchitis, less likely bacterial- patient slow to improve  Sputum cx not obtained as patient not having production  Blood cx NTD  Urine legionella/ pneumococcal negative  Completed course of Azithromycin and tamiflu  Cont bronchodilators, cont current dose of IV steroids, added flovent  pulm recs noted  · Asthmatic bronchitis secondary to influenza A-  Still wheezing significantly  Requiring IV steroids  · Steroid induced leukocytosis today- patient afebrile  · Acute on chronic hypoxic respiratory failure- 2/2 above  Weaned off supplemental O2  · Dysphagia- resolved  Likely transient esophageal dysmotility  Speech eval nml  Cont diet  Encourage chewing well before swallowing  · DM type II, uncontrolled with hyperglycemia- HA1c 9 2  sugars increased 2/2 steroid use  lantus increased to 60 units  Cont ISS, increase coverage  · Hypotension 2/2 volume depletion and acute illness- BP stabilized  Cont lisinopril  · Chronic diastolic HF- resumed home dose of lasix PO  restarted zaroxolyn  · Dyslipidemia- cont statin  · H/o TIA- cont asa, statin  · Hypothyroid- cont synthroid  · RLS- cont mirapex  · H/o ESBL klebsiella bacteremia        PULMONARY  Assessment:  Acute influenza A  Tracheobronchitis -  Slowly improving  asthmatic bronchitis secondary to influenza infection  This is gradually improving  Patient does have some persistent wheezing  Obesity alveolar hypoventilation with chronic hypercapnic hypoxemic respiratory failure  Diabetes mellitus type 2     Plan:   would continue IV methylprednisone 20 mg every 12 hours for today and continue Flovent 220 mcg 1 puff b i d   This was started yesterday    If patient hold stable then could consider starting prednisone soon   continue nebulizer treatments with levalbuterol 1 25 mg 4 times a day   oxygen 2 liters/minute

## 2018-01-26 NOTE — PROGRESS NOTES
Progress Note - Pulmonary   Laurence Zamarripa 77 y o  female MRN: [id number]  Unit/Bed#: [address] [place] [address] Encounter: [id number]    Assessment:  Acute influenza A  Tracheobronchitis -  Slowly improving  asthmatic bronchitis secondary to influenza infection  This is gradually improving  Patient does have some persistent wheezing  Obesity alveolar hypoventilation with chronic hypercapnic hypoxemic respiratory failure  Diabetes mellitus type 2    Plan:   would continue IV methylprednisone 20 mg every 12 hours for today and continue Flovent 220 mcg 1 puff b i d   This was started yesterday  If patient hold stable then could consider starting prednisone soon   continue nebulizer treatments with levalbuterol 1 25 mg 4 times a day   oxygen 2 liters/minute      Subjective:    patient has cough is somewhat better  Objective:     Vitals: Blood pressure (!) 171/77, pulse 101, temperature 97 8 °F (36 6 °C), temperature source Oral, resp  rate 22, height 5' 2" (1 575 m), weight 107 kg (236 lb 6 4 oz), SpO2 91 %, not currently breastfeeding  ,Body mass index is 43 24 kg/m²  Intake/Output Summary (Last 24 hours) at 01/26/18 1510  Last data filed at 01/26/18 1401   Gross per 24 hour   Intake             1200 ml   Output             1700 ml   Net             -500 ml       Physical Exam: Physical Exam   Constitutional: She is oriented to person, place, and time  Patient is awake, alert, no distress  On 2 L of oxygen O2 saturation is 95%   HENT:   Nose: Nose normal    Mouth/Throat: Oropharynx is clear and moist    Eyes: Conjunctivae are normal    Neck: Neck supple  No JVD present  Cardiovascular: Normal rate, regular rhythm and normal heart sounds  Pulmonary/Chest: Effort normal    Lung sounds reveal some mild expiratory wheezes in both lower lobes   Abdominal: Soft  There is no tenderness  There is no guarding  Musculoskeletal:   Mild edema lower extremities   Lymphadenopathy:     She has no cervical adenopathy  Neurological: She is alert and oriented to person, place, and time  Skin: Skin is warm  No rash noted  No erythema  Psychiatric: She has a normal mood and affect  Her behavior is normal  Thought content normal         Labs: I have personally reviewed pertinent lab results  , ABG: Lab Results   Component Value Date    PHART 7 390 01/20/2018    EGA4JVV 59 7 (HH) 01/20/2018    PO2ART 79 8 01/20/2018    ERR7QWF 35 3 (H) 01/20/2018    BEART 8 4 01/20/2018    SOURCE Radial, Left 01/20/2018   , BNP: No results found for: BNP, CBC: Lab Results   Component Value Date    WBC 14 60 (H) 01/26/2018    HGB 13 9 01/26/2018    HCT 42 0 01/26/2018    MCV 95 01/26/2018     01/26/2018    ADJUSTEDWBC 10 30 04/04/2016    MCH 31 3 (H) 01/26/2018    MCHC 33 0 01/26/2018    RDW 15 5 (H) 01/26/2018    MPV 8 5 (L) 01/26/2018    NRBC 0 01/26/2018   , CMP: Lab Results   Component Value Date     (L) 01/26/2018     01/08/2016    K 4 4 01/26/2018    K 3 9 01/08/2016    CL 91 (L) 01/26/2018     01/08/2016    CO2 40 (H) 01/26/2018    CO2 34 (H) 01/08/2016    ANIONGAP 4 01/26/2018    ANIONGAP 10 8 01/08/2016    BUN 32 (H) 01/26/2018    BUN 15 01/08/2016    CREATININE 0 96 01/26/2018    CREATININE 0 9 01/08/2016    GLUCOSE 323 (H) 01/26/2018    GLUCOSE 164 (H) 01/08/2016    CALCIUM 9 0 01/26/2018    CALCIUM 8 5 01/08/2016    AST 16 01/23/2018    AST 16 01/08/2016    ALT 40 01/23/2018    ALT 29 01/08/2016    ALKPHOS 52 01/23/2018    ALKPHOS 72 01/08/2016    PROT 5 9 (L) 01/23/2018    PROT 7 0 01/08/2016    BILITOT 0 20 01/23/2018    BILITOT 0 3 01/08/2016    EGFR 56 01/26/2018   , PT/INR:   Lab Results   Component Value Date    INR 0 99 01/19/2018   , Troponin: No results found for: TROPONIN    Imaging and other studies: I have personally reviewed pertinent reports     and I have personally reviewed pertinent films in PACS

## 2018-01-26 NOTE — RESPIRATORY THERAPY NOTE
Home Oxygen Qualifying Test       Patient name: Ambika Bourne        : 1940   Date of Test:  2018  Diagnosis:      Home Oxygen Test:    **Medicare Guidelines require item(s) 1-5 on all ambulatory patients or 1 and 2 on on-ambulatory patients  1   Baseline SPO2 on Room Air at rest 94 %  If = or < 88% on room air add O2 via NC and titrate patient  Patient must be ambulated with O2 and titrated to > 88% with exertion  2   SPO2 on Oxygen at rest  %  3   SPO2 during exercise on Room Air 93 %  4   SPO2 during exercise on Oxygen  % at a liter flow of  lpm     5   Exercise performed:    [x] Walking     [] Stairs     [] Duration 6 (min )     [] Distance 500 (ft )       []  Supplemental Home Oxygen is indicated  [x]  Client does not qualify for home oxygen        Lindalee Cockayne, RT

## 2018-01-26 NOTE — SOCIAL WORK
SW referral received to assist with DCP  STR placement is now being considered by pt and family again  Call received from pt's daughter [facility]  Referral was made earlier today  Pt has been [facility] and currently there is a bed available  Per Dr Michelle Lemons discharge is being anticipated for tomorrow, 1/27/18  Rehab authorization request was submitted to [facility] earlier this afternoon  Authorization has not been received as of yet  SW did review plan with pt  Also discussed plan if rehab stay was denied by [employer]  Pt stated she lives with her significant other and has had home care through [facility] in the past   So if rehab is denied her plan would be to return home with home care    SW will continue to follow to assist with planning based on outcome of authorization request

## 2018-01-27 LAB
ANION GAP SERPL CALCULATED.3IONS-SCNC: 4 MMOL/L (ref 4–13)
BASOPHILS # BLD AUTO: 0.1 THOUSANDS/ΜL (ref 0–0.1)
BASOPHILS NFR BLD AUTO: 1 % (ref 0–1)
BUN SERPL-MCNC: 51 MG/DL (ref 5–25)
CALCIUM SERPL-MCNC: 8.7 MG/DL (ref 8.3–10.1)
CHLORIDE SERPL-SCNC: 90 MMOL/L (ref 100–108)
CO2 SERPL-SCNC: 40 MMOL/L (ref 21–32)
CREAT SERPL-MCNC: 1.36 MG/DL (ref 0.6–1.3)
EOSINOPHIL # BLD AUTO: 0 THOUSAND/ΜL (ref 0–0.61)
EOSINOPHIL NFR BLD AUTO: 0 % (ref 0–6)
ERYTHROCYTE [DISTWIDTH] IN BLOOD BY AUTOMATED COUNT: 16.1 % (ref 11.6–15.1)
GFR SERPL CREATININE-BSD FRML MDRD: 37 ML/MIN/1.73SQ M
GLUCOSE SERPL-MCNC: 312 MG/DL (ref 65–140)
GLUCOSE SERPL-MCNC: 315 MG/DL (ref 65–140)
GLUCOSE SERPL-MCNC: 346 MG/DL (ref 65–140)
GLUCOSE SERPL-MCNC: 443 MG/DL (ref 65–140)
GLUCOSE SERPL-MCNC: 456 MG/DL (ref 65–140)
GLUCOSE SERPL-MCNC: 551 MG/DL (ref 65–140)
HCT VFR BLD AUTO: 42.7 % (ref 37–47)
HGB BLD-MCNC: 14 G/DL (ref 12–16)
LYMPHOCYTES # BLD AUTO: 1.4 THOUSANDS/ΜL (ref 0.6–4.47)
LYMPHOCYTES NFR BLD AUTO: 8 % (ref 14–44)
MCH RBC QN AUTO: 31.1 PG (ref 27–31)
MCHC RBC AUTO-ENTMCNC: 32.7 G/DL (ref 31.4–37.4)
MCV RBC AUTO: 95 FL (ref 82–98)
MONOCYTES # BLD AUTO: 0.2 THOUSAND/ΜL (ref 0.17–1.22)
MONOCYTES NFR BLD AUTO: 1 % (ref 4–12)
NEUTROPHILS # BLD AUTO: 15.2 THOUSANDS/ΜL (ref 1.85–7.62)
NEUTS SEG NFR BLD AUTO: 90 % (ref 43–75)
NRBC BLD AUTO-RTO: 0 /100 WBCS
PLATELET # BLD AUTO: 223 THOUSANDS/UL (ref 130–400)
PMV BLD AUTO: 8.7 FL (ref 8.9–12.7)
POTASSIUM SERPL-SCNC: 5.1 MMOL/L (ref 3.5–5.3)
RBC # BLD AUTO: 4.49 MILLION/UL (ref 4.2–5.4)
SODIUM SERPL-SCNC: 134 MMOL/L (ref 136–145)
WBC # BLD AUTO: 16.9 THOUSAND/UL (ref 4.8–10.8)

## 2018-01-27 PROCEDURE — 80048 BASIC METABOLIC PNL TOTAL CA: CPT | Performed by: STUDENT IN AN ORGANIZED HEALTH CARE EDUCATION/TRAINING PROGRAM

## 2018-01-27 PROCEDURE — 99232 SBSQ HOSP IP/OBS MODERATE 35: CPT | Performed by: STUDENT IN AN ORGANIZED HEALTH CARE EDUCATION/TRAINING PROGRAM

## 2018-01-27 PROCEDURE — 82948 REAGENT STRIP/BLOOD GLUCOSE: CPT

## 2018-01-27 PROCEDURE — 82947 ASSAY GLUCOSE BLOOD QUANT: CPT | Performed by: STUDENT IN AN ORGANIZED HEALTH CARE EDUCATION/TRAINING PROGRAM

## 2018-01-27 PROCEDURE — 85025 COMPLETE CBC W/AUTO DIFF WBC: CPT | Performed by: STUDENT IN AN ORGANIZED HEALTH CARE EDUCATION/TRAINING PROGRAM

## 2018-01-27 PROCEDURE — 99232 SBSQ HOSP IP/OBS MODERATE 35: CPT | Performed by: INTERNAL MEDICINE

## 2018-01-27 PROCEDURE — 94640 AIRWAY INHALATION TREATMENT: CPT

## 2018-01-27 PROCEDURE — 94760 N-INVAS EAR/PLS OXIMETRY 1: CPT

## 2018-01-27 RX ORDER — PANTOPRAZOLE SODIUM 40 MG/1
40 TABLET, DELAYED RELEASE ORAL
Status: DISCONTINUED | OUTPATIENT
Start: 2018-01-27 | End: 2018-01-30 | Stop reason: HOSPADM

## 2018-01-27 RX ADMIN — INSULIN LISPRO 3 UNITS: 100 INJECTION, SOLUTION INTRAVENOUS; SUBCUTANEOUS at 23:09

## 2018-01-27 RX ADMIN — METHYLPREDNISOLONE SODIUM SUCCINATE 20 MG: 40 INJECTION, POWDER, FOR SOLUTION INTRAMUSCULAR; INTRAVENOUS at 09:18

## 2018-01-27 RX ADMIN — GUAIFENESIN AND DEXTROMETHORPHAN 10 ML: 100; 10 SYRUP ORAL at 03:04

## 2018-01-27 RX ADMIN — BISACODYL 10 MG: 5 TABLET, COATED ORAL at 22:43

## 2018-01-27 RX ADMIN — INSULIN LISPRO 15 UNITS: 100 INJECTION, SOLUTION INTRAVENOUS; SUBCUTANEOUS at 14:11

## 2018-01-27 RX ADMIN — FLUTICASONE PROPIONATE 1 PUFF: 220 AEROSOL, METERED RESPIRATORY (INHALATION) at 22:42

## 2018-01-27 RX ADMIN — PRAMIPEXOLE DIHYDROCHLORIDE 1 MG: 0.5 TABLET ORAL at 22:48

## 2018-01-27 RX ADMIN — GUAIFENESIN AND DEXTROMETHORPHAN 10 ML: 100; 10 SYRUP ORAL at 12:07

## 2018-01-27 RX ADMIN — ASPIRIN 325 MG: 325 TABLET ORAL at 09:09

## 2018-01-27 RX ADMIN — ISODIUM CHLORIDE 3 ML: 0.03 SOLUTION RESPIRATORY (INHALATION) at 08:41

## 2018-01-27 RX ADMIN — ISODIUM CHLORIDE 3 ML: 0.03 SOLUTION RESPIRATORY (INHALATION) at 21:04

## 2018-01-27 RX ADMIN — OXYCODONE HYDROCHLORIDE 5 MG: 5 TABLET ORAL at 22:49

## 2018-01-27 RX ADMIN — OXYCODONE HYDROCHLORIDE 5 MG: 5 TABLET ORAL at 14:09

## 2018-01-27 RX ADMIN — HEPARIN SODIUM 5000 UNITS: 5000 INJECTION, SOLUTION INTRAVENOUS; SUBCUTANEOUS at 05:32

## 2018-01-27 RX ADMIN — HEPARIN SODIUM 5000 UNITS: 5000 INJECTION, SOLUTION INTRAVENOUS; SUBCUTANEOUS at 22:43

## 2018-01-27 RX ADMIN — INSULIN LISPRO 20 UNITS: 100 INJECTION, SOLUTION INTRAVENOUS; SUBCUTANEOUS at 12:01

## 2018-01-27 RX ADMIN — METHYLPREDNISOLONE SODIUM SUCCINATE 20 MG: 40 INJECTION, POWDER, FOR SOLUTION INTRAMUSCULAR; INTRAVENOUS at 22:43

## 2018-01-27 RX ADMIN — ISODIUM CHLORIDE 3 ML: 0.03 SOLUTION RESPIRATORY (INHALATION) at 15:51

## 2018-01-27 RX ADMIN — PANTOPRAZOLE SODIUM 40 MG: 40 TABLET, DELAYED RELEASE ORAL at 12:02

## 2018-01-27 RX ADMIN — LEVALBUTEROL HYDROCHLORIDE 1.25 MG: 1.25 SOLUTION, CONCENTRATE RESPIRATORY (INHALATION) at 11:49

## 2018-01-27 RX ADMIN — GUAIFENESIN AND DEXTROMETHORPHAN 10 ML: 100; 10 SYRUP ORAL at 22:50

## 2018-01-27 RX ADMIN — LEVALBUTEROL HYDROCHLORIDE 1.25 MG: 1.25 SOLUTION, CONCENTRATE RESPIRATORY (INHALATION) at 21:02

## 2018-01-27 RX ADMIN — ACETAMINOPHEN 650 MG: 325 TABLET, FILM COATED ORAL at 05:32

## 2018-01-27 RX ADMIN — FLUTICASONE PROPIONATE 1 PUFF: 220 AEROSOL, METERED RESPIRATORY (INHALATION) at 09:14

## 2018-01-27 RX ADMIN — HEPARIN SODIUM 5000 UNITS: 5000 INJECTION, SOLUTION INTRAVENOUS; SUBCUTANEOUS at 14:12

## 2018-01-27 RX ADMIN — LEVALBUTEROL HYDROCHLORIDE 1.25 MG: 1.25 SOLUTION, CONCENTRATE RESPIRATORY (INHALATION) at 08:40

## 2018-01-27 RX ADMIN — FUROSEMIDE 80 MG: 80 TABLET ORAL at 09:15

## 2018-01-27 RX ADMIN — FOLIC ACID 1 MG: 1 TABLET ORAL at 09:14

## 2018-01-27 RX ADMIN — BENZONATATE 100 MG: 100 CAPSULE ORAL at 05:32

## 2018-01-27 RX ADMIN — INSULIN LISPRO 20 UNITS: 100 INJECTION, SOLUTION INTRAVENOUS; SUBCUTANEOUS at 17:59

## 2018-01-27 RX ADMIN — LEVOTHYROXINE SODIUM 125 MCG: 125 TABLET ORAL at 05:32

## 2018-01-27 RX ADMIN — INSULIN GLARGINE 60 UNITS: 100 INJECTION, SOLUTION SUBCUTANEOUS at 22:43

## 2018-01-27 RX ADMIN — GUAIFENESIN AND DEXTROMETHORPHAN 10 ML: 100; 10 SYRUP ORAL at 18:03

## 2018-01-27 RX ADMIN — BENZONATATE 100 MG: 100 CAPSULE ORAL at 14:09

## 2018-01-27 RX ADMIN — POLYETHYLENE GLYCOL 3350 17 G: 17 POWDER, FOR SOLUTION ORAL at 09:18

## 2018-01-27 RX ADMIN — ISODIUM CHLORIDE 3 ML: 0.03 SOLUTION RESPIRATORY (INHALATION) at 11:49

## 2018-01-27 RX ADMIN — INSULIN LISPRO 20 UNITS: 100 INJECTION, SOLUTION INTRAVENOUS; SUBCUTANEOUS at 09:15

## 2018-01-27 RX ADMIN — LEVALBUTEROL HYDROCHLORIDE 1.25 MG: 1.25 SOLUTION, CONCENTRATE RESPIRATORY (INHALATION) at 15:51

## 2018-01-27 RX ADMIN — ONDANSETRON 4 MG: 2 INJECTION INTRAMUSCULAR; INTRAVENOUS at 12:02

## 2018-01-27 RX ADMIN — ATORVASTATIN CALCIUM 20 MG: 20 TABLET, FILM COATED ORAL at 17:59

## 2018-01-27 RX ADMIN — PRAMIPEXOLE DIHYDROCHLORIDE 1 MG: 0.5 TABLET ORAL at 18:00

## 2018-01-27 RX ADMIN — LISINOPRIL 10 MG: 10 TABLET ORAL at 09:18

## 2018-01-27 RX ADMIN — PRAMIPEXOLE DIHYDROCHLORIDE 1 MG: 0.5 TABLET ORAL at 09:18

## 2018-01-27 NOTE — PLAN OF CARE
CARDIOVASCULAR - ADULT     Maintains optimal cardiac output and hemodynamic stability Completed     Absence of cardiac dysrhythmias or at baseline rhythm Completed        Nutrition/Hydration-ADULT     Nutrient/Hydration intake appropriate for improving, restoring or maintaining nutritional needs Completed

## 2018-01-27 NOTE — PROGRESS NOTES
[facility] Progress Note  Patient: Solis Parish 77 y o  female   MRN: [id number]  PCP: Nhi Vasquez MD  Unit/Bed#: [address] [place] [address] Encounter: [id number]  Date Of Visit: 01/27/18    Problem List:    Principal Problem:    Influenzal tracheobronchitis  Active Problems:    Acute on chronic respiratory failure with hypoxemia (HCC)    COPD exacerbation (HCC)    T2DM (type 2 diabetes mellitus) ([address] )    Asthma    Restless leg    HTN (hypertension)    HLD (hyperlipidemia)    Hypothyroidism      Assessment & Plan:    · Acute influenza tracheobronchitis, less likely bacterial- patient slow to improve  Sputum cx not obtained as patient not having production  Blood cx NTD  Urine legionella/ pneumococcal negative  Completed course of Azithromycin and tamiflu  Cont bronchodilators, cont current dose of IV steroids, added flovent will increase dose  pulm recs noted  · Asthmatic bronchitis secondary to influenza A-  Still wheezing significantly  Requiring IV steroids as above  · Steroid induced leukocytosis- patient afebrile  · Acute on chronic hypoxic respiratory failure- 2/2 above  Weaned off supplemental O2  · Dysphagia- resolved  Likely transient esophageal dysmotility  Speech eval nml  Cont diet  Encourage chewing well before swallowing  · DM type II, uncontrolled with hyperglycemia- HA1c 9 2  Sugars further increased 2/2 steroid use  lantus increased to 60 units  Cont ISS, increased ISS coverage  · Hypotension 2/2 volume depletion and acute illness- BP stabilized   Cont lisinopril  · Chronic diastolic HF- resumed home dose of lasix PO  restarted zaroxolyn  · Dyslipidemia- cont statin  · H/o TIA- cont asa, statin  · Hypothyroid- cont synthroid  · RLS- cont mirapex  · H/o ESBL klebsiella bacteremia  · Deconditioning- PT/OT rec home with PT      VTE Pharmacologic Prophylaxis:   Pharmacologic: Heparin  Mechanical VTE Prophylaxis in Place: Yes    Patient Centered Rounds: I have performed bedside rounds with nursing staff today  Discussions with Specialists or Other Care Team Provider: Yes    Education and Discussions with Family / Patient:Yes    Time Spent for Care: 30 minutes  More than 50% of total time spent on counseling and coordination of care as described above  Current Length of Stay: 8 day(s)    Current Patient Status: Inpatient     Discharge Plan: home with VNA once stable  Code Status: Level 3 - DNAR and DNI      Subjective:   Still feels poorly, but SOB improved  Cough still present      Objective:       Vitals:   Temp (24hrs), Av 2 °F (36 8 °C), Min:97 7 °F (36 5 °C), Max:99 °F (37 2 °C)    HR:  [] 92  Resp:  [18-20] 20  BP: (114-131)/(58-73) 114/62  SpO2:  [94 %-97 %] 96 %  Body mass index is 42 43 kg/m²  Input and Output Summary (last 24 hours): Intake/Output Summary (Last 24 hours) at 18 1458  Last data filed at 18 1301   Gross per 24 hour   Intake             1500 ml   Output             3050 ml   Net            -1550 ml       Physical Exam:     Physical Exam   Constitutional: She is oriented to person, place, and time  She appears well-developed  No distress  Appears acutely ill   HENT:   Head: Normocephalic and atraumatic  Eyes:   Conjunctival injection   Cardiovascular: Normal rate, regular rhythm and normal heart sounds  Pulmonary/Chest: Effort normal  No respiratory distress  She has wheezes  She has rales  Increased work of breathing   diffuse expiratory wheezing   Abdominal: Soft  Bowel sounds are normal  She exhibits no distension  There is no tenderness  There is no rebound  Musculoskeletal: She exhibits edema (Trace)  She exhibits no tenderness or deformity  Neurological: She is alert and oriented to person, place, and time  Skin: Skin is warm and dry  Psychiatric: She has a normal mood and affect  Her behavior is normal    Nursing note and vitals reviewed        Additional Data:     Labs:      Results from last 7 days  Lab Units 01/27/18  0544   WBC Thousand/uL 16 90*   HEMOGLOBIN g/dL 14 0   HEMATOCRIT % 42 7   PLATELETS Thousands/uL 223   NEUTROS PCT % 90*   LYMPHS PCT % 8*   MONOS PCT % 1*   EOS PCT % 0       Results from last 7 days  Lab Units 01/27/18  1215 01/27/18  0544  01/23/18  0613   SODIUM mmol/L  --  134*  < > 143   POTASSIUM mmol/L  --  5 1  < > 5 1   CHLORIDE mmol/L  --  90*  < > 102   CO2 mmol/L  --  40*  < > 38*   BUN mg/dL  --  51*  < > 26*   CREATININE mg/dL  --  1 36*  < > 0 76   CALCIUM mg/dL  --  8 7  < > 8 0*   TOTAL PROTEIN g/dL  --   --   --  5 9*   BILIRUBIN TOTAL mg/dL  --   --   --  0 20   ALK PHOS U/L  --   --   --  52   ALT U/L  --   --   --  40   AST U/L  --   --   --  16   GLUCOSE RANDOM mg/dL 456* 346*  < > 313*   < > = values in this interval not displayed  * I Have Reviewed All Lab Data Listed Above  * Additional Pertinent Lab Tests Reviewed: All Labs For Current Hospital Admission Reviewed    Imaging:  Xr Chest 1 View Portable    Result Date: 1/19/2018  Narrative: CHEST INDICATION:  Flu symptoms  COMPARISON:  October 8, 2017 VIEWS:   AP frontal IMAGES:  1 FINDINGS:     Heart shadow appears unremarkable  Atherosclerotic vascular calcifications are noted  The lungs are clear  No pneumothorax or pleural effusion  Visualized osseous structures appear within normal limits for the patient's age  Impression: No active pulmonary disease  Workstation performed: [id number]     Ct Head Without Contrast    Result Date: 1/9/2018  Narrative: CT BRAIN - WITHOUT CONTRAST INDICATION:  Headache  COMPARISON:  10/10/2017 TECHNIQUE:  CT examination of the brain was performed  In addition to axial images, coronal reformatted images were created and submitted for interpretation  Radiation dose length product (DLP) for this visit:  1134 76 mGy-cm     This examination, like all CT scans performed in the [facility], was performed utilizing techniques to minimize radiation dose exposure, including the use of iterative reconstruction and automated exposure control  IMAGE QUALITY:  Diagnostic  FINDINGS:  PARENCHYMA: No acute intracranial hemorrhage  No extra-axial fluid collection  No midline shift  Unchanged is the calcified lesion in the left parietal cortex seen most consistent with a meningioma  It measures approximately 1 6 x 1 4 cm in axial dimension, unchanged from prior examination  Mass effect on the left superior parietal lobule without substantial adjacent edema  Stable atrophy and patchy areas of periventricular hypoattenuation noted  While nonspecific, these likely reflect changes of chronic microvascular ischemia in a patient of this age  VENTRICLES AND EXTRA-AXIAL SPACES:  Ventricles are commensurate with the degree of volume loss  Basal cisterns are patent  Atherosclerotic calcification of the intracranial vasculature is noted  VISUALIZED ORBITS AND PARANASAL SINUSES:  Changes of bilateral lens replacements noted  The paranasal sinuses and mastoid air cells remain clear  CALVARIUM AND EXTRACRANIAL SOFT TISSUES:   Normal      Impression: No acute intracranial abnormality  Stable left parietal convexity meningioma  No demonstrable adjacent parenchymal edema  Workstation performed: [id number]     Ct Abdomen Pelvis With Contrast    Result Date: 1/9/2018  Narrative: CT ABDOMEN AND PELVIS WITH IV CONTRAST INDICATION:  Chronic abdominal pain  COMPARISON: 12/13/2017  TECHNIQUE:  CT examination of the abdomen and pelvis was performed  Reformatted images were created in axial, sagittal, and coronal planes  Radiation dose length product (DLP) for this visit:  734 605 387 mGy-cm   This examination, like all CT scans performed in the [facility], was performed utilizing techniques to minimize radiation dose exposure, including the use of iterative reconstruction and automated exposure control   IV Contrast:  100 mL of iohexol (OMNIPAQUE)         Enteric Contrast:  Enteric contrast was not administered  FINDINGS: ABDOMEN LOWER CHEST:  No significant abnormalities identified in the lower chest  LIVER/BILIARY TREE:  No biliary obstruction  GALLBLADDER:  Cholecystectomy  SPLEEN:  Unremarkable  PANCREAS:  Stable 11 mm cystic lesion in the body of the pancreas  Distal body and tail not visualized  Question prior resection  No pancreatic duct dilatation or evidence of pancreatitis  ADRENAL GLANDS:  Unremarkable  KIDNEYS/URETERS:  Stable fat-containing 15 mm right renal cortical lesion suggesting an angiomyolipoma  No evidence of pyelonephritis or obstructive uropathy  Shade Goody STOMACH AND BOWEL:  Stable moderate hiatal hernia  Fat-containing 4 cm periumbilical hernia, stable  Stool throughout the colon  No evidence of bowel obstruction, colitis or diverticulitis  APPENDIX:  No findings to suggest appendicitis  ABDOMINOPELVIC CAVITY:  No ascites or free intraperitoneal air  Stable shotty left para-aortic lymph nodes  VESSELS:  Unremarkable for patient's age  PELVIS REPRODUCTIVE ORGANS:  Patient is status post hysterectomy  URINARY BLADDER:  Unremarkable  ABDOMINAL WALL/INGUINAL REGIONS:  Unremarkable  OSSEOUS STRUCTURES:  No acute fracture or destructive osseous lesion  Impression: 1  Moderate amount of stool throughout the colon may indicate constipation  No evidence of bowel obstruction, colitis, diverticulitis or appendicitis  Moderate hiatal hernia and small fat-containing periumbilical hernia  2  Stable left millimeters cystic lesion in the body of the pancreas  No ductal dilatation  Possible  serous cystadenoma  3  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  No pyelonephritis or obstructive uropathy  Workstation performed: [id number]     Imaging Reports Reviewed by myself    Cultures:   Blood Culture:   Lab Results   Component Value Date    BLOODCX No Growth After 5 Days  01/19/2018    BLOODCX No Growth After 5 Days  01/19/2018    BLOODCX No Growth After 5 Days   06/11/2017 BLOODCX No Growth After 5 Days  06/11/2017    BLOODCX Klebsiella pneumoniae - ESBL (A) 06/07/2017    BLOODCX Klebsiella pneumoniae ESBL (A) 06/07/2017     Urine Culture:   Lab Results   Component Value Date    URINECX 20,000-29,000 cfu/ml Mixed Contaminants X3 04/15/2017     Sputum Culture: No components found for: SPUTUMCX  Wound Culture: No results found for: WOUNDCULT    Last 24 Hours Medication List:     acetaminophen 650 mg Oral Q6H [address]   aspirin 325 mg Oral Daily   atorvastatin 20 mg Oral Daily With Dinner   bisacodyl 10 mg Oral HS   fluticasone 1 puff Inhalation K28E [address]   folic acid 1 mg Oral Daily   furosemide 80 mg Oral Daily   heparin (porcine) 5,000 Units Subcutaneous Q8H [address]   insulin glargine 60 Units Subcutaneous HS   insulin lispro 10 Units Subcutaneous TID With Meals   insulin lispro 5-25 Units Subcutaneous TID AC   levalbuterol 1 25 mg Nebulization 4x Daily   And      sodium chloride 3 mL Nebulization 4x Daily   levothyroxine 125 mcg Oral Early Morning   lisinopril 10 mg Oral Daily   methylPREDNISolone sodium succinate 20 mg Intravenous Q12H [address]   metolazone 2 5 mg Oral Once per day on Mon Wed Fri   pantoprazole 40 mg Oral Early Morning   polyethylene glycol 17 g Oral Daily   pramipexole 1 mg Oral TID        Today, Patient Was Seen By: Donell Vu MD    ** Please Note: Dragon 360 Dictation voice to text software may have been used in the creation of this document   **

## 2018-01-27 NOTE — SOCIAL WORK
SW RECEIVED VM THIS AM RE: STR, BEING DENIED POST PEER TO PEER REVIEW   UPDATE SENT TO [facility] FOR HOME CARE FOLLOW UP THIS AM

## 2018-01-28 LAB
ANION GAP SERPL CALCULATED.3IONS-SCNC: 2 MMOL/L (ref 4–13)
BUN SERPL-MCNC: 62 MG/DL (ref 5–25)
CALCIUM SERPL-MCNC: 8.2 MG/DL (ref 8.3–10.1)
CHLORIDE SERPL-SCNC: 91 MMOL/L (ref 100–108)
CO2 SERPL-SCNC: 41 MMOL/L (ref 21–32)
CREAT SERPL-MCNC: 1.43 MG/DL (ref 0.6–1.3)
ERYTHROCYTE [DISTWIDTH] IN BLOOD BY AUTOMATED COUNT: 15.9 % (ref 11.6–15.1)
GFR SERPL CREATININE-BSD FRML MDRD: 35 ML/MIN/1.73SQ M
GLUCOSE SERPL-MCNC: 330 MG/DL (ref 65–140)
GLUCOSE SERPL-MCNC: 342 MG/DL (ref 65–140)
GLUCOSE SERPL-MCNC: 368 MG/DL (ref 65–140)
GLUCOSE SERPL-MCNC: 398 MG/DL (ref 65–140)
GLUCOSE SERPL-MCNC: 399 MG/DL (ref 65–140)
GLUCOSE SERPL-MCNC: 400 MG/DL (ref 65–140)
HCT VFR BLD AUTO: 41.3 % (ref 37–47)
HGB BLD-MCNC: 13.6 G/DL (ref 12–16)
MCH RBC QN AUTO: 31.2 PG (ref 27–31)
MCHC RBC AUTO-ENTMCNC: 32.9 G/DL (ref 31.4–37.4)
MCV RBC AUTO: 95 FL (ref 82–98)
PLATELET # BLD AUTO: 237 THOUSANDS/UL (ref 130–400)
PMV BLD AUTO: 8.1 FL (ref 8.9–12.7)
POTASSIUM SERPL-SCNC: 5.2 MMOL/L (ref 3.5–5.3)
RBC # BLD AUTO: 4.34 MILLION/UL (ref 4.2–5.4)
SODIUM SERPL-SCNC: 134 MMOL/L (ref 136–145)
WBC # BLD AUTO: 16.3 THOUSAND/UL (ref 4.8–10.8)

## 2018-01-28 PROCEDURE — 97530 THERAPEUTIC ACTIVITIES: CPT

## 2018-01-28 PROCEDURE — 99232 SBSQ HOSP IP/OBS MODERATE 35: CPT | Performed by: INTERNAL MEDICINE

## 2018-01-28 PROCEDURE — 80048 BASIC METABOLIC PNL TOTAL CA: CPT | Performed by: STUDENT IN AN ORGANIZED HEALTH CARE EDUCATION/TRAINING PROGRAM

## 2018-01-28 PROCEDURE — 99232 SBSQ HOSP IP/OBS MODERATE 35: CPT | Performed by: STUDENT IN AN ORGANIZED HEALTH CARE EDUCATION/TRAINING PROGRAM

## 2018-01-28 PROCEDURE — 85027 COMPLETE CBC AUTOMATED: CPT | Performed by: STUDENT IN AN ORGANIZED HEALTH CARE EDUCATION/TRAINING PROGRAM

## 2018-01-28 PROCEDURE — 94640 AIRWAY INHALATION TREATMENT: CPT

## 2018-01-28 PROCEDURE — 97110 THERAPEUTIC EXERCISES: CPT

## 2018-01-28 PROCEDURE — 94760 N-INVAS EAR/PLS OXIMETRY 1: CPT

## 2018-01-28 PROCEDURE — 82948 REAGENT STRIP/BLOOD GLUCOSE: CPT

## 2018-01-28 RX ORDER — FLUTICASONE PROPIONATE 220 UG/1
2 AEROSOL, METERED RESPIRATORY (INHALATION) EVERY 12 HOURS SCHEDULED
Status: DISCONTINUED | OUTPATIENT
Start: 2018-01-28 | End: 2018-01-29

## 2018-01-28 RX ORDER — PREDNISONE 20 MG/1
60 TABLET ORAL DAILY
Status: DISCONTINUED | OUTPATIENT
Start: 2018-01-28 | End: 2018-01-29

## 2018-01-28 RX ORDER — GUAIFENESIN/DEXTROMETHORPHAN 100-10MG/5
SYRUP ORAL
Status: COMPLETED
Start: 2018-01-28 | End: 2018-01-28

## 2018-01-28 RX ADMIN — ALUMINUM HYDROXIDE, MAGNESIUM HYDROXIDE, AND SIMETHICONE 30 ML: 200; 200; 20 SUSPENSION ORAL at 11:26

## 2018-01-28 RX ADMIN — GUAIFENESIN AND DEXTROMETHORPHAN 10 ML: 100; 10 SYRUP ORAL at 03:15

## 2018-01-28 RX ADMIN — ISODIUM CHLORIDE 3 ML: 0.03 SOLUTION RESPIRATORY (INHALATION) at 11:12

## 2018-01-28 RX ADMIN — LEVALBUTEROL HYDROCHLORIDE 1.25 MG: 1.25 SOLUTION, CONCENTRATE RESPIRATORY (INHALATION) at 11:11

## 2018-01-28 RX ADMIN — PRAMIPEXOLE DIHYDROCHLORIDE 1 MG: 0.5 TABLET ORAL at 08:07

## 2018-01-28 RX ADMIN — PRAMIPEXOLE DIHYDROCHLORIDE 1 MG: 0.5 TABLET ORAL at 16:48

## 2018-01-28 RX ADMIN — GUAIFENESIN AND DEXTROMETHORPHAN 10 ML: 100; 10 SYRUP ORAL at 14:14

## 2018-01-28 RX ADMIN — INSULIN LISPRO 20 UNITS: 100 INJECTION, SOLUTION INTRAVENOUS; SUBCUTANEOUS at 07:49

## 2018-01-28 RX ADMIN — INSULIN LISPRO 25 UNITS: 100 INJECTION, SOLUTION INTRAVENOUS; SUBCUTANEOUS at 11:19

## 2018-01-28 RX ADMIN — LISINOPRIL 10 MG: 10 TABLET ORAL at 08:03

## 2018-01-28 RX ADMIN — LEVALBUTEROL HYDROCHLORIDE 1.25 MG: 1.25 SOLUTION, CONCENTRATE RESPIRATORY (INHALATION) at 15:54

## 2018-01-28 RX ADMIN — ISODIUM CHLORIDE 3 ML: 0.03 SOLUTION RESPIRATORY (INHALATION) at 19:15

## 2018-01-28 RX ADMIN — ASPIRIN 325 MG: 325 TABLET ORAL at 08:03

## 2018-01-28 RX ADMIN — HYDROCODONE POLISTIREX AND CHLORPHENIRAMINE POLISTIREX 5 ML: 10; 8 SUSPENSION, EXTENDED RELEASE ORAL at 22:06

## 2018-01-28 RX ADMIN — PANTOPRAZOLE SODIUM 40 MG: 40 TABLET, DELAYED RELEASE ORAL at 05:22

## 2018-01-28 RX ADMIN — POLYETHYLENE GLYCOL 3350 17 G: 17 POWDER, FOR SOLUTION ORAL at 08:02

## 2018-01-28 RX ADMIN — PRAMIPEXOLE DIHYDROCHLORIDE 1 MG: 0.5 TABLET ORAL at 22:00

## 2018-01-28 RX ADMIN — INSULIN LISPRO 4 UNITS: 100 INJECTION, SOLUTION INTRAVENOUS; SUBCUTANEOUS at 22:01

## 2018-01-28 RX ADMIN — FOLIC ACID 1 MG: 1 TABLET ORAL at 08:03

## 2018-01-28 RX ADMIN — INSULIN LISPRO 25 UNITS: 100 INJECTION, SOLUTION INTRAVENOUS; SUBCUTANEOUS at 16:47

## 2018-01-28 RX ADMIN — FLUTICASONE PROPIONATE 2 PUFF: 220 AEROSOL, METERED RESPIRATORY (INHALATION) at 22:00

## 2018-01-28 RX ADMIN — FLUTICASONE PROPIONATE 1 PUFF: 220 AEROSOL, METERED RESPIRATORY (INHALATION) at 08:03

## 2018-01-28 RX ADMIN — ATORVASTATIN CALCIUM 20 MG: 20 TABLET, FILM COATED ORAL at 16:47

## 2018-01-28 RX ADMIN — METHYLPREDNISOLONE SODIUM SUCCINATE 20 MG: 40 INJECTION, POWDER, FOR SOLUTION INTRAMUSCULAR; INTRAVENOUS at 08:03

## 2018-01-28 RX ADMIN — OXYCODONE HYDROCHLORIDE 5 MG: 5 TABLET ORAL at 14:14

## 2018-01-28 RX ADMIN — INSULIN GLARGINE 60 UNITS: 100 INJECTION, SOLUTION SUBCUTANEOUS at 22:00

## 2018-01-28 RX ADMIN — HEPARIN SODIUM 5000 UNITS: 5000 INJECTION, SOLUTION INTRAVENOUS; SUBCUTANEOUS at 05:21

## 2018-01-28 RX ADMIN — PREDNISONE 60 MG: 20 TABLET ORAL at 11:20

## 2018-01-28 RX ADMIN — BISACODYL 10 MG: 5 TABLET, COATED ORAL at 22:00

## 2018-01-28 RX ADMIN — ISODIUM CHLORIDE 3 ML: 0.03 SOLUTION RESPIRATORY (INHALATION) at 15:54

## 2018-01-28 RX ADMIN — HEPARIN SODIUM 5000 UNITS: 5000 INJECTION, SOLUTION INTRAVENOUS; SUBCUTANEOUS at 14:14

## 2018-01-28 RX ADMIN — HEPARIN SODIUM 5000 UNITS: 5000 INJECTION, SOLUTION INTRAVENOUS; SUBCUTANEOUS at 22:00

## 2018-01-28 RX ADMIN — FUROSEMIDE 80 MG: 80 TABLET ORAL at 08:03

## 2018-01-28 RX ADMIN — ISODIUM CHLORIDE 3 ML: 0.03 SOLUTION RESPIRATORY (INHALATION) at 07:27

## 2018-01-28 RX ADMIN — SODIUM CHLORIDE 1000 ML: 0.9 INJECTION, SOLUTION INTRAVENOUS at 10:12

## 2018-01-28 RX ADMIN — LEVOTHYROXINE SODIUM 125 MCG: 125 TABLET ORAL at 05:21

## 2018-01-28 RX ADMIN — LEVALBUTEROL HYDROCHLORIDE 1.25 MG: 1.25 SOLUTION, CONCENTRATE RESPIRATORY (INHALATION) at 07:27

## 2018-01-28 NOTE — PROGRESS NOTES
Progress Note - Pulmonary   Hilton Hernandez 77 y o  female MRN: [id number]  Unit/Bed#: [address] [address] Encounter: [id number]    Assessment:  Acute influenza  A with asthmatic bronchitis  Patient is improving but slowly  Obesity alveolar hypoventilation with chronic hypercapnic hypoxemic respiratory failure  Patient now to the point where she room air O2 saturation satisfactory on does use oxygen at night  Diabetes mellitus type 2  Morbid obesity  Chronic diastolic cardiac dysfunction    Plan:  Patient converted to p o  prednisone 60 mg per day  Will increase Flovent to 220 mcg 2 puffs twice a day and discussed per probably be discontinued within several days as patient continues to improve  Patient completed course of azithromycin  Patient normally uses 2 L of oxygen at nighttime for her sleep-related hypoxemia      Subjective:   Somewhat better  Does have some persistent weakness and nonproductive cough    Objective:     Vitals: Blood pressure 129/73, pulse 90, temperature (!) 97 °F (36 1 °C), temperature source Tympanic, resp  rate 18, height 5' 2" (1 575 m), weight 105 kg (231 lb), SpO2 92 %, not currently breastfeeding  ,Body mass index is 42 25 kg/m²  Intake/Output Summary (Last 24 hours) at 01/28/18 1441  Last data filed at 01/28/18 1012   Gross per 24 hour   Intake             1450 ml   Output              890 ml   Net              560 ml       Physical Exam: Physical Exam   Constitutional: She is oriented to person, place, and time  Patient is awake, sitting at bedside  Room air O2 saturation is 92%   HENT:   Head: Normocephalic  Right Ear: External ear normal    Nose: Nose normal    Mouth/Throat: No oropharyngeal exudate  Eyes: Conjunctivae are normal    Neck: Neck supple  No JVD present  Cardiovascular: Normal rate, regular rhythm and normal heart sounds  Pulmonary/Chest: Effort normal    There are a few expiratory wheezes in the lower lobes   Abdominal: Soft  She exhibits no distension   There is no guarding  Musculoskeletal: She exhibits no edema  Neurological: She is alert and oriented to person, place, and time  Skin: Skin is warm and dry  Psychiatric: She has a normal mood and affect  Her behavior is normal         Labs: I have personally reviewed pertinent lab results  , ABG: Lab Results   Component Value Date    PHART 7 390 01/20/2018    JXV3DGR 59 7 (HH) 01/20/2018    PO2ART 79 8 01/20/2018    OPE8EHC 35 3 (H) 01/20/2018    BEART 8 4 01/20/2018    SOURCE Radial, Left 01/20/2018   , BNP: No results found for: BNP, CBC: Lab Results   Component Value Date    WBC 16 30 (H) 01/28/2018    HGB 13 6 01/28/2018    HCT 41 3 01/28/2018    MCV 95 01/28/2018     01/28/2018    ADJUSTEDWBC 10 30 04/04/2016    MCH 31 2 (H) 01/28/2018    MCHC 32 9 01/28/2018    RDW 15 9 (H) 01/28/2018    MPV 8 1 (L) 01/28/2018    NRBC 0 01/27/2018   , CMP: Lab Results   Component Value Date     (L) 01/28/2018     (H) 08/01/2017    K 5 2 01/28/2018    K 3 9 08/01/2017    CL 91 (L) 01/28/2018    CL 96 08/01/2017    CO2 41 (H) 01/28/2018    CO2 35 (H) 08/01/2017    ANIONGAP 2 (L) 01/28/2018    ANIONGAP 10 8 01/08/2016    BUN 62 (H) 01/28/2018    BUN 23 08/01/2017    CREATININE 1 43 (H) 01/28/2018    CREATININE 0 78 08/01/2017    GLUCOSE 368 (H) 01/28/2018    GLUCOSE 144 (H) 08/01/2017    CALCIUM 8 2 (L) 01/28/2018    CALCIUM 9 1 08/01/2017    AST 16 01/23/2018    AST 18 08/01/2017    ALT 40 01/23/2018    ALT 26 08/01/2017    ALKPHOS 52 01/23/2018    ALKPHOS 70 08/01/2017    PROT 5 9 (L) 01/23/2018    PROT 6 9 08/01/2017    BILITOT 0 20 01/23/2018    BILITOT 0 2 08/01/2017    EGFR 35 01/28/2018   , PT/INR:   Lab Results   Component Value Date    INR 0 99 01/19/2018    INR 1 0 08/01/2017   , Troponin: No results found for: TROPONIN    Imaging and other studies: I have personally reviewed pertinent reports     and I have personally reviewed pertinent films in PACS

## 2018-01-28 NOTE — PHYSICAL THERAPY NOTE
PT TREATMENT     01/28/18 1110   Pain Assessment   Pain Assessment Negron-Baker FACES   Negron-Baker FACES Pain Rating 6   Pain Type Acute pain;Chronic pain   Pain Location Groin   Restrictions/Precautions   Other Precautions Pain; Fall Risk  (incontinence)   General   Chart Reviewed Yes   Cognition   Overall Cognitive Status WFL   Subjective   Subjective "If I have to go home I will just stay in bed"   Bed Mobility   Supine to Sit 5  Supervision   Sit to Supine 5  Supervision   Transfers   Sit to Stand 5  Supervision   Stand to Sit 5  Supervision   Stand pivot 5  Supervision   Toilet transfer 5  Supervision   Additional Comments pt ambulated to bathroom and toileted and washed hands with supervision with increased time required  Pt also donned and doffed underwear with min assist with increased time and effort   Ambulation/Elevation   Gait pattern Forward Flexion; Excessively slow   Gait Assistance 5  Supervision   Assistive Device Rolling walker   Distance 2x100 feet  (, Sp02 95% on RA after activity)   Balance   Static Sitting Fair   Dynamic Sitting Fair   Static Standing Fair   Dynamic Standing Fair   Activity Tolerance   Activity Tolerance Patient limited by fatigue;Patient limited by pain   Assessment   Prognosis Good   Problem List Decreased strength;Decreased endurance; Impaired balance;Decreased mobility;Pain   Assessment Pt's endurance is slowly improving but pt fatigues quickly and requires increased time for all activities  Plan   Treatment/Interventions ADL retraining;Functional transfer training;LE strengthening/ROM; Therapeutic exercise; Endurance training;Patient/family training;Equipment eval/education;Gait training   Progress Progressing toward goals   Recommendation   Recommendation (STR vs home PT/family assist)   Equipment Recommended (pt has a walker)

## 2018-01-28 NOTE — PROGRESS NOTES
Progress Note - Pulmonary date of service 1/27/18 - [facility] downtime  Vamshi Santiago 77 y o  female MRN: [id number]  Unit/Bed#: [address] [place] [address] Encounter: [id number]    Assessment:  Acute influenza a with asthmatic bronchitis patient is slowly improving  Obesity alveolar hypoventilation with chronic hypercapnic hypoxemic respiratory failure    Plan:  Continue methylprednisolone 20 mg IV every 12 hours and can  increase Flovent 220 mcg to 2 puffs twice a day if wheezing persists      Subjective:   Still some cough and wheezing    Objective:     Vitals: Blood pressure 127/65, pulse 89, temperature 97 5 °F (36 4 °C), temperature source Oral, resp  rate 20, height 5' 2" (1 575 m), weight 105 kg (231 lb), SpO2 98 %, not currently breastfeeding  ,Body mass index is 42 25 kg/m²  Intake/Output Summary (Last 24 hours) at 01/28/18 0916  Last data filed at 01/28/18 0600   Gross per 24 hour   Intake              950 ml   Output             1650 ml   Net             -700 ml       Physical Exam: Physical Exam   Constitutional:   Patient is sitting at bedside  No distress   HENT:   Head: Normocephalic and atraumatic  Eyes: Pupils are equal, round, and reactive to light  Neck: No JVD present  Cardiovascular: Normal rate, regular rhythm and normal heart sounds  Pulmonary/Chest: Effort normal    There are some coarse expiratory wheezes at the bases   Abdominal: Soft  She exhibits no distension  There is no tenderness  There is no guarding  Musculoskeletal: She exhibits no edema  Neurological: She is alert  Skin: Skin is warm and dry  Psychiatric: She has a normal mood and affect  Her behavior is normal         Labs: I have personally reviewed pertinent lab results  , ABG: Lab Results   Component Value Date    PHART 7 390 01/20/2018    DID6YWL 59 7 (HH) 01/20/2018    PO2ART 79 8 01/20/2018    IMX2RBS 35 3 (H) 01/20/2018    BEART 8 4 01/20/2018    SOURCE Radial, Left 01/20/2018   , BNP: No results found for: BNP, CBC: Lab Results   Component Value Date    WBC 16 30 (H) 01/28/2018    HGB 13 6 01/28/2018    HCT 41 3 01/28/2018    MCV 95 01/28/2018     01/28/2018    ADJUSTEDWBC 10 30 04/04/2016    MCH 31 2 (H) 01/28/2018    MCHC 32 9 01/28/2018    RDW 15 9 (H) 01/28/2018    MPV 8 1 (L) 01/28/2018    NRBC 0 01/27/2018   , CMP: Lab Results   Component Value Date     (L) 01/28/2018     (H) 08/01/2017    K 5 2 01/28/2018    K 3 9 08/01/2017    CL 91 (L) 01/28/2018    CL 96 08/01/2017    CO2 41 (H) 01/28/2018    CO2 35 (H) 08/01/2017    ANIONGAP 2 (L) 01/28/2018    ANIONGAP 10 8 01/08/2016    BUN 62 (H) 01/28/2018    BUN 23 08/01/2017    CREATININE 1 43 (H) 01/28/2018    CREATININE 0 78 08/01/2017    GLUCOSE 368 (H) 01/28/2018    GLUCOSE 144 (H) 08/01/2017    CALCIUM 8 2 (L) 01/28/2018    CALCIUM 9 1 08/01/2017    AST 16 01/23/2018    AST 18 08/01/2017    ALT 40 01/23/2018    ALT 26 08/01/2017    ALKPHOS 52 01/23/2018    ALKPHOS 70 08/01/2017    PROT 5 9 (L) 01/23/2018    PROT 6 9 08/01/2017    BILITOT 0 20 01/23/2018    BILITOT 0 2 08/01/2017    EGFR 35 01/28/2018   , PT/INR:   Lab Results   Component Value Date    INR 0 99 01/19/2018    INR 1 0 08/01/2017   , Troponin: No results found for: TROPONIN    Imaging and other studies: I have personally reviewed pertinent reports     and I have personally reviewed pertinent films in PACS

## 2018-01-28 NOTE — PROGRESS NOTES
[facility] Progress Note  Patient: Lily Brush 77 y o  female   MRN: [id number]  PCP: Isa Weinberg MD  Unit/Bed#: 2 [place] [address] Encounter: [id number]  Date Of Visit: 01/28/18    Problem List:    Principal Problem:    Influenzal tracheobronchitis  Active Problems:    Acute on chronic respiratory failure with hypoxemia (HCC)    COPD exacerbation (HCC)    T2DM (type 2 diabetes mellitus) ([facility] [address] )    Asthma    Restless leg    HTN (hypertension)    HLD (hyperlipidemia)    Hypothyroidism      Assessment & Plan:    · Acute influenza tracheobronchitis, less likely bacterial- patient slow to improve  Sputum cx not obtained as patient not having production  Blood cx NTD  Urine legionella/ pneumococcal negative  Completed course of Azithromycin and tamiflu  Cont bronchodilators, change IV to PO steroids, added flovent will increase dose  pulm recs noted  · Asthmatic bronchitis secondary to influenza A-  Wheezing resolved  Wean off IV steroids  · Steroid induced leukocytosis- patient afebrile  · Elevated creatinine- will give normal saline bolus, monitor serial labs  Hold lasix today  · Acute on chronic hypoxic respiratory failure- 2/2 above  Resolved  · Dysphagia- resolved  Likely transient esophageal dysmotility  Speech eval nml  Cont diet  Encourage chewing well before swallowing  · DM type II, uncontrolled with hyperglycemia- HA1c 9 2  Sugars further increased 2/2 steroid use  lantus increased to 60 units  Cont ISS, increased ISS coverage  · Hypotension 2/2 volume depletion and acute illness- BP stabilized  Cont lisinopril  · Chronic diastolic HF- resumed home dose of lasix PO  restarted zaroxolyn  · Dyslipidemia- cont statin  · H/o TIA- cont asa, statin  · Hypothyroid- cont synthroid  · RLS- cont mirapex  · H/o ESBL klebsiella bacteremia  · Deconditioning- PT/OT recommending STR but patient insurance has denied auth  Patient does not feel safe to go home, and has limited support  Cannot afford out of pocket pay for rehab  Disposition is an issue at this time, will consult with case management  VTE Pharmacologic Prophylaxis:   Pharmacologic: Heparin  Mechanical VTE Prophylaxis in Place: Yes    Patient Centered Rounds: I have performed bedside rounds with nursing staff today  Discussions with Specialists or Other Care Team Provider: Yes    Education and Discussions with Family / Patient:Yes    Time Spent for Care: 30 minutes  More than 50% of total time spent on counseling and coordination of care as described above  Current Length of Stay: 9 day(s)    Current Patient Status: Inpatient     Discharge Plan: home with VNA once stable  Code Status: Level 3 - DNAR and DNI      Subjective:   Still feels poorly, but starting to feel better  No SOB, cough nearly resolved  Has significant fatigue and tires very easily  Still feels she can barely take care of herself, has limited support at home, cannot make meals for herself, worried about having energy to do anything, says she will likely "go home and just lay there"  Objective:       Vitals:   Temp (24hrs), Av °F (36 7 °C), Min:97 5 °F (36 4 °C), Max:99 °F (37 2 °C)    HR:  [] 89  Resp:  [18-20] 20  BP: (114-131)/(61-73) 127/65  SpO2:  [96 %-98 %] 98 %  Body mass index is 42 25 kg/m²  Input and Output Summary (last 24 hours): Intake/Output Summary (Last 24 hours) at 18 0849  Last data filed at 18 0600   Gross per 24 hour   Intake              950 ml   Output             1650 ml   Net             -700 ml       Physical Exam:     Physical Exam   Constitutional: She is oriented to person, place, and time  She appears well-developed  No distress  Chronically ill-appearing, appears physical deconditioned   HENT:   Head: Normocephalic and atraumatic  Cardiovascular: Normal rate, regular rhythm and normal heart sounds  Pulmonary/Chest: Effort normal and breath sounds normal  No respiratory distress   She has no wheezes  She has no rales  Clear but decreased   Abdominal: Soft  Bowel sounds are normal  She exhibits no distension  There is no tenderness  There is no rebound  Musculoskeletal: She exhibits tenderness  She exhibits no edema or deformity  Neurological: She is alert and oriented to person, place, and time  Skin: Skin is warm and dry  Psychiatric: She has a normal mood and affect  Her behavior is normal    Nursing note and vitals reviewed  Additional Data:     Labs:      Results from last 7 days  Lab Units 01/28/18  0532 01/27/18  0544   WBC Thousand/uL 16 30* 16 90*   HEMOGLOBIN g/dL 13 6 14 0   HEMATOCRIT % 41 3 42 7   PLATELETS Thousands/uL 237 223   NEUTROS PCT %  --  90*   LYMPHS PCT %  --  8*   MONOS PCT %  --  1*   EOS PCT %  --  0       Results from last 7 days  Lab Units 01/28/18  0532  01/23/18  0613   SODIUM mmol/L 134*  < > 143   POTASSIUM mmol/L 5 2  < > 5 1   CHLORIDE mmol/L 91*  < > 102   CO2 mmol/L 41*  < > 38*   BUN mg/dL 62*  < > 26*   CREATININE mg/dL 1 43*  < > 0 76   CALCIUM mg/dL 8 2*  < > 8 0*   TOTAL PROTEIN g/dL  --   --  5 9*   BILIRUBIN TOTAL mg/dL  --   --  0 20   ALK PHOS U/L  --   --  52   ALT U/L  --   --  40   AST U/L  --   --  16   GLUCOSE RANDOM mg/dL 368*  < > 313*   < > = values in this interval not displayed  * I Have Reviewed All Lab Data Listed Above  * Additional Pertinent Lab Tests Reviewed: All Labs For Current Hospital Admission Reviewed    Imaging:  Xr Chest 1 View Portable    Result Date: 1/19/2018  Narrative: CHEST INDICATION:  Flu symptoms  COMPARISON:  October 8, 2017 VIEWS:   AP frontal IMAGES:  1 FINDINGS:     Heart shadow appears unremarkable  Atherosclerotic vascular calcifications are noted  The lungs are clear  No pneumothorax or pleural effusion  Visualized osseous structures appear within normal limits for the patient's age  Impression: No active pulmonary disease   Workstation performed: [id number]     Ct Head Without Contrast    Result Date: 1/9/2018  Narrative: CT BRAIN - WITHOUT CONTRAST INDICATION:  Headache  COMPARISON:  10/10/2017 TECHNIQUE:  CT examination of the brain was performed  In addition to axial images, coronal reformatted images were created and submitted for interpretation  Radiation dose length product (DLP) for this visit:  1134 76 mGy-cm   This examination, like all CT scans performed in the [facility], was performed utilizing techniques to minimize radiation dose exposure, including the use of iterative reconstruction and automated exposure control  IMAGE QUALITY:  Diagnostic  FINDINGS:  PARENCHYMA: No acute intracranial hemorrhage  No extra-axial fluid collection  No midline shift  Unchanged is the calcified lesion in the left parietal cortex seen most consistent with a meningioma  It measures approximately 1 6 x 1 4 cm in axial dimension, unchanged from prior examination  Mass effect on the left superior parietal lobule without substantial adjacent edema  Stable atrophy and patchy areas of periventricular hypoattenuation noted  While nonspecific, these likely reflect changes of chronic microvascular ischemia in a patient of this age  VENTRICLES AND EXTRA-AXIAL SPACES:  Ventricles are commensurate with the degree of volume loss  Basal cisterns are patent  Atherosclerotic calcification of the intracranial vasculature is noted  VISUALIZED ORBITS AND PARANASAL SINUSES:  Changes of bilateral lens replacements noted  The paranasal sinuses and mastoid air cells remain clear  CALVARIUM AND EXTRACRANIAL SOFT TISSUES:   Normal      Impression: No acute intracranial abnormality  Stable left parietal convexity meningioma  No demonstrable adjacent parenchymal edema  Workstation performed: [id number]     Ct Abdomen Pelvis With Contrast    Result Date: 1/9/2018  Narrative: CT ABDOMEN AND PELVIS WITH IV CONTRAST INDICATION:  Chronic abdominal pain  COMPARISON: 12/13/2017   TECHNIQUE:  CT examination of the abdomen and pelvis was performed  Reformatted images were created in axial, sagittal, and coronal planes  Radiation dose length product (DLP) for this visit:  734 605 387 mGy-cm   This examination, like all CT scans performed in the [facility], was performed utilizing techniques to minimize radiation dose exposure, including the use of iterative reconstruction and automated exposure control  IV Contrast:  100 mL of iohexol (OMNIPAQUE)         Enteric Contrast:  Enteric contrast was not administered  FINDINGS: ABDOMEN LOWER CHEST:  No significant abnormalities identified in the lower chest  LIVER/BILIARY TREE:  No biliary obstruction  GALLBLADDER:  Cholecystectomy  SPLEEN:  Unremarkable  PANCREAS:  Stable 11 mm cystic lesion in the body of the pancreas  Distal body and tail not visualized  Question prior resection  No pancreatic duct dilatation or evidence of pancreatitis  ADRENAL GLANDS:  Unremarkable  KIDNEYS/URETERS:  Stable fat-containing 15 mm right renal cortical lesion suggesting an angiomyolipoma  No evidence of pyelonephritis or obstructive uropathy  Monia Le STOMACH AND BOWEL:  Stable moderate hiatal hernia  Fat-containing 4 cm periumbilical hernia, stable  Stool throughout the colon  No evidence of bowel obstruction, colitis or diverticulitis  APPENDIX:  No findings to suggest appendicitis  ABDOMINOPELVIC CAVITY:  No ascites or free intraperitoneal air  Stable shotty left para-aortic lymph nodes  VESSELS:  Unremarkable for patient's age  PELVIS REPRODUCTIVE ORGANS:  Patient is status post hysterectomy  URINARY BLADDER:  Unremarkable  ABDOMINAL WALL/INGUINAL REGIONS:  Unremarkable  OSSEOUS STRUCTURES:  No acute fracture or destructive osseous lesion  Impression: 1  Moderate amount of stool throughout the colon may indicate constipation  No evidence of bowel obstruction, colitis, diverticulitis or appendicitis  Moderate hiatal hernia and small fat-containing periumbilical hernia   2  Stable left millimeters cystic lesion in the body of the pancreas  No ductal dilatation  Possible  serous cystadenoma  3  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  No pyelonephritis or obstructive uropathy  Workstation performed: [id number]     Imaging Reports Reviewed by myself    Cultures:   Blood Culture:   Lab Results   Component Value Date    BLOODCX No Growth After 5 Days  01/19/2018    BLOODCX No Growth After 5 Days  01/19/2018    BLOODCX No Growth After 5 Days  06/11/2017    BLOODCX No Growth After 5 Days   06/11/2017    BLOODCX Klebsiella pneumoniae - ESBL (A) 06/07/2017    BLOODCX Klebsiella pneumoniae ESBL (A) 06/07/2017     Urine Culture:   Lab Results   Component Value Date    URINECX 20,000-29,000 cfu/ml Mixed Contaminants X3 04/15/2017     Sputum Culture: No components found for: SPUTUMCX  Wound Culture: No results found for: WOUNDCULT    Last 24 Hours Medication List:     acetaminophen 650 mg Oral Q6H [address]   aspirin 325 mg Oral Daily   atorvastatin 20 mg Oral Daily With Dinner   bisacodyl 10 mg Oral HS   fluticasone 1 puff Inhalation M31J [address]   folic acid 1 mg Oral Daily   furosemide 80 mg Oral Daily   heparin (porcine) 5,000 Units Subcutaneous Q8H [address]   insulin glargine 60 Units Subcutaneous HS   insulin lispro 1-5 Units Subcutaneous HS   insulin lispro 10 Units Subcutaneous TID With Meals   insulin lispro 5-25 Units Subcutaneous TID AC   levalbuterol 1 25 mg Nebulization 4x Daily   And      sodium chloride 3 mL Nebulization 4x Daily   levothyroxine 125 mcg Oral Early Morning   lisinopril 10 mg Oral Daily   methylPREDNISolone sodium succinate 20 mg Intravenous Q12H [address]   metolazone 2 5 mg Oral Once per day on Mon Wed Fri   pantoprazole 40 mg Oral Early Morning   polyethylene glycol 17 g Oral Daily   pramipexole 1 mg Oral TID   sodium chloride 1,000 mL Intravenous Once        Today, Patient Was Seen By: Anitha Alcala MD    ** Please Note: Dragon 360 Dictation voice to text software may have been used in the creation of this document   **

## 2018-01-28 NOTE — SOCIAL WORK
[facility]  HZ MD TO CALL PCP, HOWEVER, PCP REPORTS NOT RECEIVING A CALL FROM THE MEDICAL DIRECTOR  CELIO SPOKE WITH EDGAR WHO INDICATED THAT THE MD NOTED THAT PT DOES NOT MEET SN LEVEL OF CARE AS SHE REQUIRES LITTLE ASSIST BASED ON THE PT NOTES  PER EDGAR, THE MD DID ATTEMPT TO REACH THE PCP VIA PROVIDED PHONE NUMBER, HOWEVER, THERE WAS NO ANSWER AND THEY WERE UNABLE TO CONNECT  CM CONFIRMED NUMBER THAT WAS PROVIDED AS THE PCP NUMBER  PER EDGAR, OPTION AT THIS TIME IS TO CALL IN AN APPEAL TO THE APPEAL LINE [phone]  CELIO ATTEMPTED TO MAKE THIS CALL, RECEIVED PERSON ON THE LINE WHO REFUSED TO TAKE THE CALL, INDICATED THAT THEY HAVE AN  TO ANSWER THESE CALLS AND ASKED THAT I CALL THE NUMBER AGAIN FOR THE  TO  THE PHONE  CELIO CALLED THE NUMBER AGAIN AND GOT THE BUSINESS ANSWERING SERVICES, WAS PROVIDED ANOTHER NUMBER FOR THE APPEAL [phone]  CELIO CALLED THE ABOVE NUMBER AND REMAINED ON HOLD THEN GOT DISCONNECTED  CELIO RETURNED THE CALL AND SPOKE WITH HEYDI, WAS ADVISED THAT APPEALS CAN ONLY BE DONE ON BUSINESS DAYS  WILL UPDATE SW, CONTINUE TO FOLLOW FOR DCP NEEDS AND CHANGES  CELIO RECEIVED A REQUEST TO CONTACT THE DTR RE: PT DENIAL FOR STR  SPOKE WITH OCHOA AND UPDATED HER TO THE REVIEW BY SHANE AND THE NEED FOR APPEAL  EXPLAINED THE PROCESS AND ANSWERED HER QUESTIONS      POOL WILL FOLLOW UP WITH PT AND HER DTR RE: DCP

## 2018-01-29 ENCOUNTER — APPOINTMENT (OUTPATIENT)
Dept: PULMONOLOGY | Facility: CLINIC | Age: 78
End: 2018-01-29
Payer: COMMERCIAL

## 2018-01-29 LAB
ANION GAP SERPL CALCULATED.3IONS-SCNC: 2 MMOL/L (ref 4–13)
BUN SERPL-MCNC: 58 MG/DL (ref 5–25)
CALCIUM SERPL-MCNC: 8.1 MG/DL (ref 8.3–10.1)
CHLORIDE SERPL-SCNC: 94 MMOL/L (ref 100–108)
CO2 SERPL-SCNC: 40 MMOL/L (ref 21–32)
CREAT SERPL-MCNC: 1.11 MG/DL (ref 0.6–1.3)
ERYTHROCYTE [DISTWIDTH] IN BLOOD BY AUTOMATED COUNT: 16.4 % (ref 11.6–15.1)
GFR SERPL CREATININE-BSD FRML MDRD: 47 ML/MIN/1.73SQ M
GLUCOSE SERPL-MCNC: 236 MG/DL (ref 65–140)
GLUCOSE SERPL-MCNC: 268 MG/DL (ref 65–140)
GLUCOSE SERPL-MCNC: 270 MG/DL (ref 65–140)
GLUCOSE SERPL-MCNC: 390 MG/DL (ref 65–140)
GLUCOSE SERPL-MCNC: 395 MG/DL (ref 65–140)
HCT VFR BLD AUTO: 41.5 % (ref 37–47)
HGB BLD-MCNC: 13.3 G/DL (ref 12–16)
MCH RBC QN AUTO: 30.8 PG (ref 27–31)
MCHC RBC AUTO-ENTMCNC: 32 G/DL (ref 31.4–37.4)
MCV RBC AUTO: 96 FL (ref 82–98)
PLATELET # BLD AUTO: 224 THOUSANDS/UL (ref 130–400)
PMV BLD AUTO: 8.8 FL (ref 8.9–12.7)
POTASSIUM SERPL-SCNC: 4.3 MMOL/L (ref 3.5–5.3)
RBC # BLD AUTO: 4.31 MILLION/UL (ref 4.2–5.4)
SODIUM SERPL-SCNC: 136 MMOL/L (ref 136–145)
WBC # BLD AUTO: 17.3 THOUSAND/UL (ref 4.8–10.8)

## 2018-01-29 PROCEDURE — 80048 BASIC METABOLIC PNL TOTAL CA: CPT | Performed by: STUDENT IN AN ORGANIZED HEALTH CARE EDUCATION/TRAINING PROGRAM

## 2018-01-29 PROCEDURE — 94760 N-INVAS EAR/PLS OXIMETRY 1: CPT

## 2018-01-29 PROCEDURE — 84145 PROCALCITONIN (PCT): CPT | Performed by: INTERNAL MEDICINE

## 2018-01-29 PROCEDURE — 99232 SBSQ HOSP IP/OBS MODERATE 35: CPT | Performed by: STUDENT IN AN ORGANIZED HEALTH CARE EDUCATION/TRAINING PROGRAM

## 2018-01-29 PROCEDURE — 99232 SBSQ HOSP IP/OBS MODERATE 35: CPT | Performed by: INTERNAL MEDICINE

## 2018-01-29 PROCEDURE — 94640 AIRWAY INHALATION TREATMENT: CPT

## 2018-01-29 PROCEDURE — 85027 COMPLETE CBC AUTOMATED: CPT | Performed by: STUDENT IN AN ORGANIZED HEALTH CARE EDUCATION/TRAINING PROGRAM

## 2018-01-29 PROCEDURE — 82948 REAGENT STRIP/BLOOD GLUCOSE: CPT

## 2018-01-29 RX ORDER — PREDNISONE 20 MG/1
TABLET ORAL
Qty: 13 TABLET | Refills: 0 | Status: CANCELLED | OUTPATIENT
Start: 2018-01-29

## 2018-01-29 RX ORDER — FAMOTIDINE 20 MG/1
20 TABLET, FILM COATED ORAL 2 TIMES DAILY
Status: DISCONTINUED | OUTPATIENT
Start: 2018-01-29 | End: 2018-01-29

## 2018-01-29 RX ORDER — FLUTICASONE PROPIONATE 220 UG/1
2 AEROSOL, METERED RESPIRATORY (INHALATION) EVERY 12 HOURS SCHEDULED
Qty: 1 G | Refills: 0 | Status: CANCELLED | OUTPATIENT
Start: 2018-01-29 | End: 2018-02-05

## 2018-01-29 RX ORDER — BUDESONIDE 0.5 MG/2ML
0.5 INHALANT ORAL
Status: DISCONTINUED | OUTPATIENT
Start: 2018-01-29 | End: 2018-01-30 | Stop reason: HOSPADM

## 2018-01-29 RX ORDER — PREDNISONE 20 MG/1
40 TABLET ORAL DAILY
Status: DISCONTINUED | OUTPATIENT
Start: 2018-01-30 | End: 2018-01-30 | Stop reason: HOSPADM

## 2018-01-29 RX ADMIN — PRAMIPEXOLE DIHYDROCHLORIDE 1 MG: 0.5 TABLET ORAL at 22:12

## 2018-01-29 RX ADMIN — LISINOPRIL 10 MG: 10 TABLET ORAL at 09:42

## 2018-01-29 RX ADMIN — LEVALBUTEROL 1.25 MG: 1.25 SOLUTION, CONCENTRATE RESPIRATORY (INHALATION) at 11:19

## 2018-01-29 RX ADMIN — ALUMINUM HYDROXIDE, MAGNESIUM HYDROXIDE, AND SIMETHICONE 30 ML: 200; 200; 20 SUSPENSION ORAL at 11:27

## 2018-01-29 RX ADMIN — LEVALBUTEROL HYDROCHLORIDE 1.25 MG: 1.25 SOLUTION, CONCENTRATE RESPIRATORY (INHALATION) at 15:32

## 2018-01-29 RX ADMIN — INSULIN LISPRO 10 UNITS: 100 INJECTION, SOLUTION INTRAVENOUS; SUBCUTANEOUS at 09:41

## 2018-01-29 RX ADMIN — INSULIN GLARGINE 60 UNITS: 100 INJECTION, SOLUTION SUBCUTANEOUS at 22:14

## 2018-01-29 RX ADMIN — PRAMIPEXOLE DIHYDROCHLORIDE 1 MG: 0.5 TABLET ORAL at 09:41

## 2018-01-29 RX ADMIN — FLUTICASONE PROPIONATE 2 PUFF: 220 AEROSOL, METERED RESPIRATORY (INHALATION) at 09:43

## 2018-01-29 RX ADMIN — ASPIRIN 325 MG: 325 TABLET ORAL at 09:43

## 2018-01-29 RX ADMIN — ATORVASTATIN CALCIUM 20 MG: 20 TABLET, FILM COATED ORAL at 17:25

## 2018-01-29 RX ADMIN — ALPRAZOLAM 0.25 MG: 0.25 TABLET ORAL at 17:26

## 2018-01-29 RX ADMIN — PRAMIPEXOLE DIHYDROCHLORIDE 1 MG: 0.5 TABLET ORAL at 17:25

## 2018-01-29 RX ADMIN — ISODIUM CHLORIDE 3 ML: 0.03 SOLUTION RESPIRATORY (INHALATION) at 21:22

## 2018-01-29 RX ADMIN — LEVALBUTEROL HYDROCHLORIDE 1.25 MG: 1.25 SOLUTION, CONCENTRATE RESPIRATORY (INHALATION) at 07:21

## 2018-01-29 RX ADMIN — INSULIN LISPRO 4 UNITS: 100 INJECTION, SOLUTION INTRAVENOUS; SUBCUTANEOUS at 22:17

## 2018-01-29 RX ADMIN — POLYETHYLENE GLYCOL 3350 17 G: 17 POWDER, FOR SOLUTION ORAL at 09:40

## 2018-01-29 RX ADMIN — GUAIFENESIN AND DEXTROMETHORPHAN 10 ML: 100; 10 SYRUP ORAL at 06:21

## 2018-01-29 RX ADMIN — HEPARIN SODIUM 5000 UNITS: 5000 INJECTION, SOLUTION INTRAVENOUS; SUBCUTANEOUS at 06:16

## 2018-01-29 RX ADMIN — BENZONATATE 100 MG: 100 CAPSULE ORAL at 12:07

## 2018-01-29 RX ADMIN — INSULIN LISPRO 25 UNITS: 100 INJECTION, SOLUTION INTRAVENOUS; SUBCUTANEOUS at 17:29

## 2018-01-29 RX ADMIN — LEVOTHYROXINE SODIUM 125 MCG: 125 TABLET ORAL at 06:17

## 2018-01-29 RX ADMIN — ISODIUM CHLORIDE 3 ML: 0.03 SOLUTION RESPIRATORY (INHALATION) at 15:32

## 2018-01-29 RX ADMIN — GUAIFENESIN AND DEXTROMETHORPHAN 10 ML: 100; 10 SYRUP ORAL at 00:46

## 2018-01-29 RX ADMIN — ISODIUM CHLORIDE 3 ML: 0.03 SOLUTION RESPIRATORY (INHALATION) at 07:21

## 2018-01-29 RX ADMIN — METOLAZONE 2.5 MG: 5 TABLET ORAL at 09:42

## 2018-01-29 RX ADMIN — ISODIUM CHLORIDE 3 ML: 0.03 SOLUTION RESPIRATORY (INHALATION) at 11:20

## 2018-01-29 RX ADMIN — FOLIC ACID 1 MG: 1 TABLET ORAL at 09:42

## 2018-01-29 RX ADMIN — HEPARIN SODIUM 5000 UNITS: 5000 INJECTION, SOLUTION INTRAVENOUS; SUBCUTANEOUS at 22:19

## 2018-01-29 RX ADMIN — PANTOPRAZOLE SODIUM 40 MG: 40 TABLET, DELAYED RELEASE ORAL at 06:17

## 2018-01-29 RX ADMIN — LEVALBUTEROL HYDROCHLORIDE 1.25 MG: 1.25 SOLUTION, CONCENTRATE RESPIRATORY (INHALATION) at 21:26

## 2018-01-29 RX ADMIN — PREDNISONE 60 MG: 20 TABLET ORAL at 09:42

## 2018-01-29 RX ADMIN — OXYCODONE HYDROCHLORIDE 5 MG: 5 TABLET ORAL at 00:50

## 2018-01-29 RX ADMIN — BUDESONIDE 0.5 MG: 0.5 INHALANT RESPIRATORY (INHALATION) at 21:22

## 2018-01-29 RX ADMIN — BISACODYL 10 MG: 5 TABLET, COATED ORAL at 22:12

## 2018-01-29 RX ADMIN — HYDROCODONE POLISTIREX AND CHLORPHENIRAMINE POLISTIREX 5 ML: 10; 8 SUSPENSION, EXTENDED RELEASE ORAL at 17:25

## 2018-01-29 RX ADMIN — HEPARIN SODIUM 5000 UNITS: 5000 INJECTION, SOLUTION INTRAVENOUS; SUBCUTANEOUS at 14:51

## 2018-01-29 RX ADMIN — INSULIN LISPRO 15 UNITS: 100 INJECTION, SOLUTION INTRAVENOUS; SUBCUTANEOUS at 13:14

## 2018-01-29 NOTE — SOCIAL WORK
SW following to assist with DCP  STR was being planned at the end of last week  However [employer] has denied rehab authorization  Notified Dr Tara Whiting over weekend  Discussed denial and plans with Dr Tara Whiting again today  Dr Tara Whiting given [facility] appeal phone number if she wants to attempt to appeal decision  Alternate plan discussed with pt was discharge home with home care  Followed up with [facility] to discuss potential discharge  [facility] currently has no nurse availability and recommended referral be made to [facility] of [address]  Referral was made and case was accepted  Pt aware of denial   Message left for pt's daughter, Brando Flannery, requesting call back to discuss plans  SW will continue to follow to monitor progress and assist with planning as needed

## 2018-01-29 NOTE — PROGRESS NOTES
[facility] Progress Note  Patient: Laurence Zamarripa 77 y o  female   MRN: [id number]  PCP: Casa Salter MD  Unit/Bed#: [address] [place] [address] Encounter: [id number]  Date Of Visit: 01/29/18    Problem List:    Principal Problem:    Influenzal tracheobronchitis  Active Problems:    COPD exacerbation ([facility] [address] )    T2DM (type 2 diabetes mellitus) ([facility] )    Asthma    Restless leg    Acute on chronic respiratory failure with hypoxemia ([facility] )    HTN (hypertension)    HLD (hyperlipidemia)    Hypothyroidism      Assessment & Plan:    · Acute influenza tracheobronchitis, less likely bacterial- patient very slow to improve  Sputum cx not obtained as patient not having production  Blood cx NTD  Urine legionella/ pneumococcal negative  Completed course of Azithromycin and tamiflu  Cont bronchodilators, changed IV to PO steroids  pulm recs noted  Repeat CXR ordered  · Asthmatic bronchitis secondary to influenza A-  Wheezing has initially resolved, now back  On PO steroids  · Reflux- symptoms possible 2/2 steroid use  On PPI, mylanta  Monitor closely  · Steroid induced leukocytosis- patient afebrile  · Elevated creatinine- normalized after NS bolus  · Acute on chronic hypoxic respiratory failure- 2/2 above  Resolved  · Dysphagia- resolved  Likely transient esophageal dysmotility  Speech eval nml  Cont diet  Encourage chewing well before swallowing  · DM type II, uncontrolled with hyperglycemia- HA1c 9 2  Sugars further increased 2/2 steroid use  lantus increased to 60 units  Cont ISS, increased ISS coverage  · Hypotension 2/2 volume depletion and acute illness- BP stabilized  Cont lisinopril  · Chronic diastolic HF- resumed home dose of lasix PO  restarted zaroxolyn  · Dyslipidemia- cont statin  · H/o TIA- cont asa, statin  · Hypothyroid- cont synthroid  · RLS- cont mirapex  · H/o ESBL klebsiella bacteremia  · Deconditioning- PT/OT recommending STR but patient insurance has denied auth   Patient does not feel safe to go home, and has limited support  Cannot afford out of pocket pay for rehab  Disposition is an issue at this time, will consult with case management  VTE Pharmacologic Prophylaxis:   Pharmacologic: Heparin  Mechanical VTE Prophylaxis in Place: Yes    Patient Centered Rounds: I have performed bedside rounds with nursing staff today  Discussions with Specialists or Other Care Team Provider: Yes    Education and Discussions with Family / Patient:Yes    Time Spent for Care: 30 minutes  More than 50% of total time spent on counseling and coordination of care as described above  Current Length of Stay: 10 day(s)    Current Patient Status: Inpatient     Discharge Plan: home with VNA once stable  Code Status: Level 3 - DNAR and DNI      Subjective:   Was starting to feel better but today feels bad again  Has heartburn, cough is worse, still has congestion and wheezing  No CP, SOB, palpitations  Objective:       Vitals:   Temp (24hrs), Av 6 °F (37 °C), Min:98 3 °F (36 8 °C), Max:98 7 °F (37 1 °C)    HR:  [88-99] 99  Resp:  [18] 18  BP: (125-161)/(58-75) 127/58  SpO2:  [94 %-98 %] 95 %  Body mass index is 42 66 kg/m²  Input and Output Summary (last 24 hours): Intake/Output Summary (Last 24 hours) at 18 1506  Last data filed at 18 1001   Gross per 24 hour   Intake              320 ml   Output             1475 ml   Net            -1155 ml       Physical Exam:     Physical Exam   Constitutional: She is oriented to person, place, and time  She appears well-developed  No distress  HENT:   Head: Normocephalic and atraumatic  Cardiovascular: Normal rate, regular rhythm and normal heart sounds  Pulmonary/Chest: Effort normal  No respiratory distress  She has wheezes  She has rales  Increased work of breathing, sating well on RA   Abdominal: Soft  Bowel sounds are normal  She exhibits no distension  There is no tenderness  There is no rebound and no guarding     Musculoskeletal: She exhibits edema  She exhibits no tenderness or deformity  Neurological: She is alert and oriented to person, place, and time  Skin: Skin is warm and dry  Psychiatric: She has a normal mood and affect  Her behavior is normal    Nursing note and vitals reviewed  Additional Data:     Labs:      Results from last 7 days  Lab Units 01/29/18  0548  01/27/18  0544   WBC Thousand/uL 17 30*  < > 16 90*   HEMOGLOBIN g/dL 13 3  < > 14 0   HEMATOCRIT % 41 5  < > 42 7   PLATELETS Thousands/uL 224  < > 223   NEUTROS PCT %  --   --  90*   LYMPHS PCT %  --   --  8*   MONOS PCT %  --   --  1*   EOS PCT %  --   --  0   < > = values in this interval not displayed  Results from last 7 days  Lab Units 01/29/18  0548  01/23/18  0613   SODIUM mmol/L 136  < > 143   POTASSIUM mmol/L 4 3  < > 5 1   CHLORIDE mmol/L 94*  < > 102   CO2 mmol/L 40*  < > 38*   BUN mg/dL 58*  < > 26*   CREATININE mg/dL 1 11  < > 0 76   CALCIUM mg/dL 8 1*  < > 8 0*   TOTAL PROTEIN g/dL  --   --  5 9*   BILIRUBIN TOTAL mg/dL  --   --  0 20   ALK PHOS U/L  --   --  52   ALT U/L  --   --  40   AST U/L  --   --  16   GLUCOSE RANDOM mg/dL 268*  < > 313*   < > = values in this interval not displayed  * I Have Reviewed All Lab Data Listed Above  * Additional Pertinent Lab Tests Reviewed: All Labs For Current Hospital Admission Reviewed    Imaging:  Xr Chest 1 View Portable    Result Date: 1/19/2018  Narrative: CHEST INDICATION:  Flu symptoms  COMPARISON:  October 8, 2017 VIEWS:   AP frontal IMAGES:  1 FINDINGS:     Heart shadow appears unremarkable  Atherosclerotic vascular calcifications are noted  The lungs are clear  No pneumothorax or pleural effusion  Visualized osseous structures appear within normal limits for the patient's age  Impression: No active pulmonary disease  Workstation performed: [id number]     Ct Head Without Contrast    Result Date: 1/9/2018  Narrative: CT BRAIN - WITHOUT CONTRAST INDICATION:  Headache   COMPARISON: 10/10/2017 TECHNIQUE:  CT examination of the brain was performed  In addition to axial images, coronal reformatted images were created and submitted for interpretation  Radiation dose length product (DLP) for this visit:  1134 76 mGy-cm   This examination, like all CT scans performed in the [facility], was performed utilizing techniques to minimize radiation dose exposure, including the use of iterative reconstruction and automated exposure control  IMAGE QUALITY:  Diagnostic  FINDINGS:  PARENCHYMA: No acute intracranial hemorrhage  No extra-axial fluid collection  No midline shift  Unchanged is the calcified lesion in the left parietal cortex seen most consistent with a meningioma  It measures approximately 1 6 x 1 4 cm in axial dimension, unchanged from prior examination  Mass effect on the left superior parietal lobule without substantial adjacent edema  Stable atrophy and patchy areas of periventricular hypoattenuation noted  While nonspecific, these likely reflect changes of chronic microvascular ischemia in a patient of this age  VENTRICLES AND EXTRA-AXIAL SPACES:  Ventricles are commensurate with the degree of volume loss  Basal cisterns are patent  Atherosclerotic calcification of the intracranial vasculature is noted  VISUALIZED ORBITS AND PARANASAL SINUSES:  Changes of bilateral lens replacements noted  The paranasal sinuses and mastoid air cells remain clear  CALVARIUM AND EXTRACRANIAL SOFT TISSUES:   Normal      Impression: No acute intracranial abnormality  Stable left parietal convexity meningioma  No demonstrable adjacent parenchymal edema  Workstation performed: [id number]     Ct Abdomen Pelvis With Contrast    Result Date: 1/9/2018  Narrative: CT ABDOMEN AND PELVIS WITH IV CONTRAST INDICATION:  Chronic abdominal pain  COMPARISON: 12/13/2017  TECHNIQUE:  CT examination of the abdomen and pelvis was performed       Reformatted images were created in axial, sagittal, and coronal planes  Radiation dose length product (DLP) for this visit:  734 605 387 mGy-cm   This examination, like all CT scans performed in the [facility], was performed utilizing techniques to minimize radiation dose exposure, including the use of iterative reconstruction and automated exposure control  IV Contrast:  100 mL of iohexol (OMNIPAQUE)         Enteric Contrast:  Enteric contrast was not administered  FINDINGS: ABDOMEN LOWER CHEST:  No significant abnormalities identified in the lower chest  LIVER/BILIARY TREE:  No biliary obstruction  GALLBLADDER:  Cholecystectomy  SPLEEN:  Unremarkable  PANCREAS:  Stable 11 mm cystic lesion in the body of the pancreas  Distal body and tail not visualized  Question prior resection  No pancreatic duct dilatation or evidence of pancreatitis  ADRENAL GLANDS:  Unremarkable  KIDNEYS/URETERS:  Stable fat-containing 15 mm right renal cortical lesion suggesting an angiomyolipoma  No evidence of pyelonephritis or obstructive uropathy  [place] STOMACH AND BOWEL:  Stable moderate hiatal hernia  Fat-containing 4 cm periumbilical hernia, stable  Stool throughout the colon  No evidence of bowel obstruction, colitis or diverticulitis  APPENDIX:  No findings to suggest appendicitis  ABDOMINOPELVIC CAVITY:  No ascites or free intraperitoneal air  Stable shotty left para-aortic lymph nodes  VESSELS:  Unremarkable for patient's age  PELVIS REPRODUCTIVE ORGANS:  Patient is status post hysterectomy  URINARY BLADDER:  Unremarkable  ABDOMINAL WALL/INGUINAL REGIONS:  Unremarkable  OSSEOUS STRUCTURES:  No acute fracture or destructive osseous lesion  Impression: 1  Moderate amount of stool throughout the colon may indicate constipation  No evidence of bowel obstruction, colitis, diverticulitis or appendicitis  Moderate hiatal hernia and small fat-containing periumbilical hernia  2  Stable left millimeters cystic lesion in the body of the pancreas  No ductal dilatation   Possible  serous cystadenoma  3  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  No pyelonephritis or obstructive uropathy  Workstation performed: [id number]     Imaging Reports Reviewed by myself    Cultures:   Blood Culture:   Lab Results   Component Value Date    BLOODCX No Growth After 5 Days  01/19/2018    BLOODCX No Growth After 5 Days  01/19/2018    BLOODCX No Growth After 5 Days  06/11/2017    BLOODCX No Growth After 5 Days  06/11/2017    BLOODCX Klebsiella pneumoniae - ESBL (A) 06/07/2017    BLOODCX Klebsiella pneumoniae ESBL (A) 06/07/2017     Urine Culture:   Lab Results   Component Value Date    URINECX 20,000-29,000 cfu/ml Mixed Contaminants X3 04/15/2017     Sputum Culture: No components found for: SPUTUMCX  Wound Culture: No results found for: WOUNDCULT    Last 24 Hours Medication List:     acetaminophen 650 mg Oral Q6H [facility]   aspirin 325 mg Oral Daily   atorvastatin 20 mg Oral Daily With Dinner   bisacodyl 10 mg Oral HS   budesonide 0 5 mg Nebulization D88G   folic acid 1 mg Oral Daily   heparin (porcine) 5,000 Units Subcutaneous Q8H [facility]   insulin glargine 60 Units Subcutaneous HS   insulin lispro 1-5 Units Subcutaneous HS   insulin lispro 10 Units Subcutaneous TID With Meals   insulin lispro 5-25 Units Subcutaneous TID AC   levalbuterol 1 25 mg Nebulization 4x Daily   And      sodium chloride 3 mL Nebulization 4x Daily   levothyroxine 125 mcg Oral Early Morning   lisinopril 10 mg Oral Daily   metolazone 2 5 mg Oral Once per day on Mon Wed Fri   pantoprazole 40 mg Oral Early Morning   polyethylene glycol 17 g Oral Daily   pramipexole 1 mg Oral TID   [START ON 1/30/2018] predniSONE 40 mg Oral Daily        Today, Patient Was Seen By: Bushra Faulkner MD    ** Please Note: Dragon 360 Dictation voice to text software may have been used in the creation of this document   **

## 2018-01-29 NOTE — NUTRITION
01/29/18 1709   Recommendations/Interventions   Summary will adjust diet to CCD 1 for tighter BG control while on steroid therapy   monitor BG levels   Interventions Diet: order adjustment   Nutrition Recommendations Other (specify)  (see summary abovr)   Nutrition Complexity Risk   Nutrition complexity level Moderate risk   Nutrition review: 02/01/18   Follow up date 02/05/18

## 2018-01-29 NOTE — PLAN OF CARE
Problem: DISCHARGE PLANNING - CARE MANAGEMENT  Goal: Discharge to post-acute care or home with appropriate resources  INTERVENTIONS:  - Conduct assessment to determine patient/family and health care team treatment goals, and need for post-acute services based on payer coverage, community resources, and patient preferences, and barriers to discharge  - Address psychosocial, clinical, and financial barriers to discharge as identified in assessment in conjunction with the patient/family and health care team  - Arrange appropriate level of post-acute services according to patient's   needs and preference and payer coverage in collaboration with the physician and health care team  - Communicate with and update the patient/family, physician, and health care team regarding progress on the discharge plan  - Arrange appropriate transportation to post-acute venues  Return to home with VNA services     [facility] pending outcome of authorization request     1/29 Rehab denied  Transfer home with home care    Outcome: Progressing  Current plan is to return home with home care services, [facility] of [address]

## 2018-01-29 NOTE — CASE MANAGEMENT
Continued Stay Review    Date: 1/29/18    Vital Signs: /58 (BP Location: Left arm, Patient Position: Sitting)   Pulse 99   Temp 98 3 °F (36 8 °C) (Oral)   Resp 18   Ht 5' 2" (1 575 m)   Wt 106 kg (233 lb 4 oz)   LMP  (LMP Unknown)   SpO2 95%   BMI 42 66 kg/m²       CXR   PROCALCITONIN  PULMICORT INHAL  Medications:   Scheduled Meds:   acetaminophen 650 mg Oral Q6H [address]   aspirin 325 mg Oral Daily   atorvastatin 20 mg Oral Daily With Dinner   bisacodyl 10 mg Oral HS   budesonide 0 5 mg Nebulization A98I   folic acid 1 mg Oral Daily   heparin (porcine) 5,000 Units Subcutaneous Q8H [address]   insulin glargine 60 Units Subcutaneous HS   insulin lispro 1-5 Units Subcutaneous HS   insulin lispro 10 Units Subcutaneous TID With Meals   insulin lispro 5-25 Units Subcutaneous TID AC   levalbuterol 1 25 mg Nebulization 4x Daily   And      sodium chloride 3 mL Nebulization 4x Daily   levothyroxine 125 mcg Oral Early Morning   lisinopril 10 mg Oral Daily   metolazone 2 5 mg Oral Once per day on Mon Wed Fri   pantoprazole 40 mg Oral Early Morning   polyethylene glycol 17 g Oral Daily   pramipexole 1 mg Oral TID   [START ON 1/30/2018] predniSONE 40 mg Oral Daily     Continuous Infusions:    PRN Meds:   acetaminophen    ALPRAZolam    aluminum-magnesium hydroxide-simethicone    benzonatate    dextromethorphan-guaiFENesin    hydrocodone-chlorpheniramine polistirex    levalbuterol **AND** sodium chloride    ondansetron    oxyCODONE    polyethylene glycol    senna-docusate sodium    Abnormal Labs/Diagnostic Results: WC 17 30  CL 94, CO2 40 BUN 58 GLUCOSE 268     Age/Sex: 77 y o  female     ATTENDING  Assessment & Plan:     · Acute influenza tracheobronchitis, less likely bacterial- patient very slow to improve  Sputum cx not obtained as patient not having production  Blood cx NTD  Urine legionella/ pneumococcal negative  Completed course of Azithromycin and tamiflu  Cont bronchodilators, changed IV to PO steroids  pulm recs noted  Repeat CXR ordered  · Asthmatic bronchitis secondary to influenza A-  Wheezing has initially resolved, now back  On PO steroids  · Reflux- symptoms possible 2/2 steroid use  On PPI, mylanta  Monitor closely  · Steroid induced leukocytosis- patient afebrile  · Elevated creatinine- normalized after NS bolus  · Acute on chronic hypoxic respiratory failure- 2/2 above  Resolved  · Dysphagia- resolved  Likely transient esophageal dysmotility  Speech eval nml  Cont diet  Encourage chewing well before swallowing  · DM type II, uncontrolled with hyperglycemia- HA1c 9 2  Sugars further increased 2/2 steroid use  lantus increased to 60 units  Cont ISS, increased ISS coverage  · Hypotension 2/2 volume depletion and acute illness- BP stabilized  Cont lisinopril  · Chronic diastolic HF- resumed home dose of lasix PO  restarted zaroxolyn  · Dyslipidemia- cont statin  · H/o TIA- cont asa, statin  · Hypothyroid- cont synthroid  · RLS- cont mirapex  · H/o ESBL klebsiella bacteremia  · Deconditioning- PT/OT recommending STR but patient insurance has denied auth  Patient does not feel safe to go home, and has limited support  Cannot afford out of pocket pay for rehab  Disposition is an issue at this time, will consult with case management  VTE Pharmacologic Prophylaxis:   Pharmacologic: Heparin  Mechanical VTE Prophylaxis in Place: Yes     PULMONOLOGY  She is having heartburn today and increasing cough and congestion with wheezing  She denies any fevers  No nausea vomiting or diarrhea  Assessment/Plan:   1    Influenza a with acute asthmatic bronchitis-periodically congestion and coughing which is persistent  Continue with Tussionex  Patient is on 60 mg of prednisone will decrease to 40 mg as this may be contributing to her heartburn  Continue with nebulizer therapy  Discontinue Flovent start inhaled budesonide  Flutter valve and incentive spirometry  There has been no recent chest imaging will obtain chest x-ray today  Check procalcitonin  2  Patient is having GERD symptoms she is already on Protonix and Mylanta  Will decrease prednisone and assess otherwise she may need to go back on low-dose Solu-Medrol  This may contribute to worsening asthma and wheezing  Closely monitor  3  Chronic diastolic dysfunction appears compensated-no recent imaging will obtain chest x-ray  4    Obesity alveolar hypoventilation with chronic hypercapnic and hypoxemic respiratory failure for which she uses oxygen at night 2 L

## 2018-01-29 NOTE — CASE MANAGEMENT
Continued Stay Review    Date: 1/27/18    Vitals: Blood pressure 127/65, pulse 89, temperature 97 5 °F (36 4 °C), temperature source Oral, resp  rate 20, height 5' 2" (1 575 m), weight 105 kg (231 lb), SpO2 98 %, not currently breastfeeding  ,Body mass index is 42 25 kg/m²        Medications:   Scheduled Meds:   acetaminophen 650 mg Oral Q6H [address]   aspirin 325 mg Oral Daily   atorvastatin 20 mg Oral Daily With Dinner   bisacodyl 10 mg Oral HS   budesonide 0 5 mg Nebulization J31I   folic acid 1 mg Oral Daily   heparin (porcine) 5,000 Units Subcutaneous Q8H [address]   insulin glargine 60 Units Subcutaneous HS   insulin lispro 1-5 Units Subcutaneous HS   insulin lispro 10 Units Subcutaneous TID With Meals   insulin lispro 5-25 Units Subcutaneous TID AC   levalbuterol 1 25 mg Nebulization 4x Daily   And      sodium chloride 3 mL Nebulization 4x Daily   levothyroxine 125 mcg Oral Early Morning   lisinopril 10 mg Oral Daily   metolazone 2 5 mg Oral Once per day on Mon Wed Fri   pantoprazole 40 mg Oral Early Morning   polyethylene glycol 17 g Oral Daily   pramipexole 1 mg Oral TID   [START ON 1/30/2018] predniSONE 40 mg Oral Daily     Continuous Infusions:    PRN Meds:   acetaminophen    ALPRAZolam    aluminum-magnesium hydroxide-simethicone    benzonatate    dextromethorphan-guaiFENesin    hydrocodone-chlorpheniramine polistirex    levalbuterol **AND** sodium chloride    ondansetron    oxyCODONE    polyethylene glycol    senna-docusate sodium    Abnormal Labs/Diagnostic Results:     Age/Sex: 77 y o  female     PULM  Assessment:  Acute influenza a with asthmatic bronchitis patient is slowly improving  Obesity alveolar hypoventilation with chronic hypercapnic hypoxemic respiratory failure     Plan:  Continue methylprednisolone 20 mg IV every 12 hours and can  increase Flovent 220 mcg to 2 puffs twice a day if wheezing persists    ATTENDING  Assessment & Plan:     · Acute influenza tracheobronchitis, less likely bacterial- patient slow to improve  Sputum cx not obtained as patient not having production  Blood cx NTD  Urine legionella/ pneumococcal negative  Completed course of Azithromycin and tamiflu  Cont bronchodilators, cont current dose of IV steroids, added flovent will increase dose  pulm recs noted  · Asthmatic bronchitis secondary to influenza A-  Still wheezing significantly  Requiring IV steroids as above  · Steroid induced leukocytosis- patient afebrile  · Acute on chronic hypoxic respiratory failure- 2/2 above  Weaned off supplemental O2  · Dysphagia- resolved  Likely transient esophageal dysmotility  Speech eval nml  Cont diet  Encourage chewing well before swallowing  · DM type II, uncontrolled with hyperglycemia- HA1c 9 2  Sugars further increased 2/2 steroid use  lantus increased to 60 units  Cont ISS, increased ISS coverage  · Hypotension 2/2 volume depletion and acute illness- BP stabilized   Cont lisinopril  · Chronic diastolic HF- resumed home dose of lasix PO  restarted zaroxolyn  · Dyslipidemia- cont statin  · H/o TIA- cont asa, statin  · Hypothyroid- cont synthroid  · RLS- cont mirapex  · H/o ESBL klebsiella bacteremia

## 2018-01-29 NOTE — CASE MANAGEMENT
Continued Stay Review    Date: 1/28/18    Vital Signs: /75 (BP Location: Left arm, Patient Position: Sitting)   Pulse 88   Temp 98 7 °F (37 1 °C) (Oral)   Resp 18   Ht 5' 2" (1 575 m)   Wt 106 kg (233 lb 4 oz)   LMP  (LMP Unknown)   SpO2 94%   BMI 42 66 kg/m²     GMF  OXYGEN  PT OT  IVNS  Medications:   Scheduled Meds:   acetaminophen 650 mg Oral Q6H FELIPE   aspirin 325 mg Oral Daily   atorvastatin 20 mg Oral Daily With Dinner   bisacodyl 10 mg Oral HS   budesonide 0 5 mg Nebulization K05B   folic acid 1 mg Oral Daily   heparin (porcine) 5,000 Units Subcutaneous Q8H [address]   insulin glargine 60 Units Subcutaneous HS   insulin lispro 1-5 Units Subcutaneous HS   insulin lispro 10 Units Subcutaneous TID With Meals   insulin lispro 5-25 Units Subcutaneous TID AC   levalbuterol 1 25 mg Nebulization 4x Daily   And      sodium chloride 3 mL Nebulization 4x Daily   levothyroxine 125 mcg Oral Early Morning   lisinopril 10 mg Oral Daily   metolazone 2 5 mg Oral Once per day on Mon Wed Fri   pantoprazole 40 mg Oral Early Morning   polyethylene glycol 17 g Oral Daily   pramipexole 1 mg Oral TID   [START ON 1/30/2018] predniSONE 40 mg Oral Daily     Continuous Infusions:    PRN Meds:   acetaminophen    ALPRAZolam    aluminum-magnesium hydroxide-simethicone    benzonatate    dextromethorphan-guaiFENesin    hydrocodone-chlorpheniramine polistirex    levalbuterol **AND** sodium chloride    ondansetron    oxyCODONE    polyethylene glycol    senna-docusate sodium    Abnormal Labs/Diagnostic Results:     Age/Sex: 77 y o  female     ATTENDING  Still feels poorlyStill feels she can barely take care of herself, has limited support at home, cannot make meals for herself, worried about having energy to do anything  Assessment & Plan:  · Acute influenza tracheobronchitis, less likely bacterial- patient slow to improve  Sputum cx not obtained as patient not having production  Blood cx NTD   Urine legionella/ pneumococcal negative  Completed course of Azithromycin and tamiflu  Cont bronchodilators, change IV to PO steroids, added flovent will increase dose  pulm recs noted  · Asthmatic bronchitis secondary to influenza A-  Wheezing resolved  Wean off IV steroids  · Steroid induced leukocytosis- patient afebrile  · Elevated creatinine- will give normal saline bolus, monitor serial labs  Hold lasix today  · Acute on chronic hypoxic respiratory failure- 2/2 above  Resolved  · Dysphagia- resolved  Likely transient esophageal dysmotility  Speech eval nml  Cont diet  Encourage chewing well before swallowing  · DM type II, uncontrolled with hyperglycemia- HA1c 9 2  Sugars further increased 2/2 steroid use  lantus increased to 60 units  Cont ISS, increased ISS coverage  · Hypotension 2/2 volume depletion and acute illness- BP stabilized  Cont lisinopril  · Chronic diastolic HF- resumed home dose of lasix PO  restarted zaroxolyn  · Dyslipidemia- cont statin  · H/o TIA- cont asa, statin  · Hypothyroid- cont synthroid  · RLS- cont mirapex  · H/o ESBL klebsiella bacteremia  · Deconditioning- PT/OT recommending STR but patient insurance has denied auth  Patient does not feel safe to go home, and has limited support  Cannot afford out of pocket pay for rehab  Disposition is an issue at this time, will consult with case management       PULMONOLOGY  Assessment:  Acute influenza  A with asthmatic bronchitis  Patient is improving but slowly  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

## 2018-01-29 NOTE — SOCIAL WORK
SW following to assist with DCP  Call received from pt's daughter, Angie Dugan, to discuss discharge plans  Daughter is disappointed that rehab was denied  However after discussing option for home care services daughter stated that she prefers to take pt to outpatient therapy and pulmonary rehab to get her out of the house  Between pt's two daughters and her significant other they were able to transport pt to outpatient therapy three times per week  She would also like to explore the [employer] program and water aerobics for her mom  SW will discuss plan for outpatient services with physician so prescriptions can be written upon discharge  Daughter's contact information placed on facesheet so daughter can be contacted with medical updates  Offered ongoing support and assistance as needed

## 2018-01-29 NOTE — PROGRESS NOTES
Progress Note - Pulmonary   Jessie Nava 77 y o  female MRN: [id number]  Unit/Bed#: 708 [facility] Encounter: [id number]      Assessment/Plan:     1  Influenza a with acute asthmatic bronchitis-periodically congestion and coughing which is persistent  Continue with Tussionex  Patient is on 60 mg of prednisone will decrease to 40 mg as this may be contributing to her heartburn  Continue with nebulizer therapy  Discontinue Flovent start inhaled budesonide  Flutter valve and incentive spirometry  There has been no recent chest imaging will obtain chest x-ray today  Check procalcitonin    2  Patient is having GERD symptoms she is already on Protonix and Mylanta  Will decrease prednisone and assess otherwise she may need to go back on low-dose Solu-Medrol  This may contribute to worsening asthma and wheezing  Closely monitor    3  Chronic diastolic dysfunction appears compensated-no recent imaging will obtain chest x-ray    4  Obesity alveolar hypoventilation with chronic hypercapnic and hypoxemic respiratory failure for which she uses oxygen at night 2 L  Discussed with Dr Lina Junior      ______________________________________________________________________    Subjective: Pt seen and examined at bedside  She is having heartburn today and increasing cough and congestion with wheezing  She denies any fevers    No nausea vomiting or diarrhea    Vitals:   Temp:  [97 °F (36 1 °C)-98 7 °F (37 1 °C)] 98 7 °F (37 1 °C)  HR:  [88-95] 88  Resp:  [18] 18  BP: (125-161)/(59-75) 161/75  Weight (last 2 days)     Date/Time   Weight    01/29/18 0548  106 (233 25)    01/28/18 0533  105 (231)    01/27/18 0536  105 (232)            SpO2: SpO2: 94 %, SpO2 Device: O2 Device: None (Room air)    Nutrition:        Diet Orders            Start     Ordered    01/20/18 1012  Room Service  Once     Question:  Type of Service  Answer:  Room Service-Appropriate    01/20/18 1012    01/19/18 2317  Diet George/CHO Controlled; Consistent Carbohydrate Diet Level 2 (5 carb servings/75 grams CHO/meal)  Diet effective now     Question Answer Comment   Diet Type George/CHO Controlled    George/CHO Controlled Consistent Carbohydrate Diet Level 2 (5 carb servings/75 grams CHO/meal)    RD to adjust diet per protocol? Yes        01/19/18 7656          Ins/Outs:   I/O       01/27 0701 - 01/28 0700 01/28 0701 - 01/29 0700 01/29 0701 - 01/30 0700    P  O  1450 320     I V  (mL/kg)       IV Piggyback  1000     Total Intake(mL/kg) 1450 (13 8) 1320 (12 5)     Urine (mL/kg/hr) 2050 (0 8) 1265 (0 5) 450 (0 9)    Total Output 2050 1265 450    Net -600 +55 -450                 Lines/Drains:  Invasive Devices     Peripheral Intravenous Line            Peripheral IV 01/24/18 Left Hand 5 days                 Active medications:  Scheduled Meds:  acetaminophen 650 mg Oral Q6H [address]   aspirin 325 mg Oral Daily   atorvastatin 20 mg Oral Daily With Dinner   bisacodyl 10 mg Oral HS   fluticasone 2 puff Inhalation D73C [address]   folic acid 1 mg Oral Daily   heparin (porcine) 5,000 Units Subcutaneous Q8H [address]   insulin glargine 60 Units Subcutaneous HS   insulin lispro 1-5 Units Subcutaneous HS   insulin lispro 10 Units Subcutaneous TID With Meals   insulin lispro 5-25 Units Subcutaneous TID AC   levalbuterol 1 25 mg Nebulization 4x Daily   And      sodium chloride 3 mL Nebulization 4x Daily   levothyroxine 125 mcg Oral Early Morning   lisinopril 10 mg Oral Daily   metolazone 2 5 mg Oral Once per day on Mon Wed Fri   pantoprazole 40 mg Oral Early Morning   polyethylene glycol 17 g Oral Daily   pramipexole 1 mg Oral TID   predniSONE 60 mg Oral Daily     PRN Meds:  acetaminophen 325 mg Q6H PRN   ALPRAZolam 0 25 mg TID PRN   aluminum-magnesium hydroxide-simethicone 30 mL Q4H PRN   benzonatate 100 mg TID PRN   dextromethorphan-guaiFENesin 10 mL Q4H PRN   hydrocodone-chlorpheniramine polistirex 5 mL HS PRN   levalbuterol 1 25 mg Q4H PRN   And     sodium chloride 3 mL Q4H PRN   ondansetron 4 mg Q6H PRN oxyCODONE 5 mg Q6H PRN   polyethylene glycol 17 g Daily PRN   senna-docusate sodium 2 tablet HS PRN     ____________________________________________________________________      Physical Exam   Constitutional: She is oriented to person, place, and time  She appears well-developed and well-nourished  No distress  HENT:   Head: Normocephalic and atraumatic  Mouth/Throat: Oropharyngeal exudate present  Mallampati-4   Eyes: EOM are normal  Pupils are equal, round, and reactive to light  Neck: Normal range of motion  Neck supple  No tracheal deviation present  No thyromegaly present  Cardiovascular: Normal rate and regular rhythm  No murmur heard  Pulmonary/Chest: Effort normal  No respiratory distress  She has wheezes (positive erythema)  She has rales (Diffuse/rhonchi)  She exhibits no tenderness  Abdominal: Soft  Bowel sounds are normal  She exhibits no distension  There is no tenderness  Musculoskeletal: Normal range of motion  She exhibits edema  Lymphadenopathy:     She has no cervical adenopathy  Neurological: She is alert and oriented to person, place, and time  No cranial nerve deficit  Skin: Skin is warm and dry  She is not diaphoretic  Psychiatric: She has a normal mood and affect  Her behavior is normal    Vitals reviewed            ____________________________________________________________________    Labs:   CBC:   Results from last 7 days  Lab Units 01/29/18  0548 01/28/18  0532 01/27/18  0544   WBC Thousand/uL 17 30* 16 30* 16 90*   HEMOGLOBIN g/dL 13 3 13 6 14 0   HEMATOCRIT % 41 5 41 3 42 7   MCV fL 96 95 95   PLATELETS Thousands/uL 224 237 223     CMP:   Results from last 7 days  Lab Units 01/29/18  0548 01/28/18  0532 01/27/18  1215 01/27/18  0544  01/23/18  0613   SODIUM mmol/L 136 134*  --  134*  < > 143   POTASSIUM mmol/L 4 3 5 2  --  5 1  < > 5 1   CHLORIDE mmol/L 94* 91*  --  90*  < > 102   CO2 mmol/L 40* 41*  --  40*  < > 38*   BUN mg/dL 58* 62*  --  51*  < > 26* CREATININE mg/dL 1 11 1 43*  --  1 36*  < > 0 76   GLUCOSE RANDOM mg/dL 268* 368* 456* 346*  < > 313*   CALCIUM mg/dL 8 1* 8 2*  --  8 7  < > 8 0*   AST U/L  --   --   --   --   --  16   ALT U/L  --   --   --   --   --  40   ALK PHOS U/L  --   --   --   --   --  52   TOTAL PROTEIN g/dL  --   --   --   --   --  5 9*   BILIRUBIN TOTAL mg/dL  --   --   --   --   --  0 20   EGFR ml/min/1 73sq m 47 35  --  37  < > 75   < > = values in this interval not displayed  Magnesium:   Results from last 7 days  Lab Units 01/23/18  0613   MAGNESIUM mg/dL 2 7*     Imaging: All imaging is reviewed by me      Micro: Lab Results   Component Value Date    BLOODCX No Growth After 5 Days  01/19/2018    BLOODCX No Growth After 5 Days  01/19/2018    BLOODCX No Growth After 5 Days  06/11/2017    BLOODCX No Growth After 5 Days   06/11/2017    URINECX 20,000-29,000 cfu/ml Mixed Contaminants X3 04/15/2017    MRSACULTURE  01/20/2018     No Methicillin Resistant Staphlyococcus aureus (MRSA) isolated            Code Status: Level 3 - DNAR and DNI

## 2018-01-29 NOTE — PLAN OF CARE
Problem: RESPIRATORY - ADULT  Goal: Achieves optimal ventilation and oxygenation  INTERVENTIONS:  - Assess for changes in respiratory status  - Assess for changes in mentation and behavior  - Position to facilitate oxygenation and minimize respiratory effort  - Oxygen administration by appropriate delivery method based on oxygen saturation (per order) or ABGs  - Encourage broncho-pulmonary hygiene including cough, deep breathe, Incentive Spirometry  - Respiratory Therapy support as indicated    poc due 2/2/18 4902

## 2018-01-30 ENCOUNTER — APPOINTMENT (INPATIENT)
Dept: RADIOLOGY | Facility: HOSPITAL | Age: 78
DRG: 871 | End: 2018-01-30
Payer: COMMERCIAL

## 2018-01-30 VITALS
HEIGHT: 62 IN | SYSTOLIC BLOOD PRESSURE: 122 MMHG | BODY MASS INDEX: 43.49 KG/M2 | HEART RATE: 90 BPM | RESPIRATION RATE: 18 BRPM | DIASTOLIC BLOOD PRESSURE: 58 MMHG | TEMPERATURE: 98 F | OXYGEN SATURATION: 94 % | WEIGHT: 236.33 LBS

## 2018-01-30 PROBLEM — J44.1 COPD EXACERBATION (HCC): Status: RESOLVED | Noted: 2017-09-19 | Resolved: 2018-01-30

## 2018-01-30 PROBLEM — J96.21 ACUTE ON CHRONIC RESPIRATORY FAILURE WITH HYPOXEMIA (HCC): Status: RESOLVED | Noted: 2017-09-19 | Resolved: 2018-01-30

## 2018-01-30 LAB
BASOPHILS # BLD AUTO: 0.1 THOUSANDS/ΜL (ref 0–0.1)
BASOPHILS NFR BLD AUTO: 0 % (ref 0–1)
EOSINOPHIL # BLD AUTO: 0 THOUSAND/ΜL (ref 0–0.61)
EOSINOPHIL NFR BLD AUTO: 0 % (ref 0–6)
ERYTHROCYTE [DISTWIDTH] IN BLOOD BY AUTOMATED COUNT: 15.8 % (ref 11.6–15.1)
ERYTHROCYTE [DISTWIDTH] IN BLOOD BY AUTOMATED COUNT: 16.8 % (ref 11.6–15.1)
GLUCOSE SERPL-MCNC: 147 MG/DL (ref 65–140)
GLUCOSE SERPL-MCNC: 163 MG/DL (ref 65–140)
HCT VFR BLD AUTO: 40.2 % (ref 37–47)
HCT VFR BLD AUTO: 41.1 % (ref 37–47)
HGB BLD-MCNC: 13.2 G/DL (ref 12–16)
HGB BLD-MCNC: 13.4 G/DL (ref 12–16)
LYMPHOCYTES # BLD AUTO: 2.5 THOUSANDS/ΜL (ref 0.6–4.47)
LYMPHOCYTES NFR BLD AUTO: 16 % (ref 14–44)
MCH RBC QN AUTO: 31.3 PG (ref 27–31)
MCH RBC QN AUTO: 31.4 PG (ref 27–31)
MCHC RBC AUTO-ENTMCNC: 32.6 G/DL (ref 31.4–37.4)
MCHC RBC AUTO-ENTMCNC: 32.8 G/DL (ref 31.4–37.4)
MCV RBC AUTO: 96 FL (ref 82–98)
MCV RBC AUTO: 96 FL (ref 82–98)
MONOCYTES # BLD AUTO: 0.8 THOUSAND/ΜL (ref 0.17–1.22)
MONOCYTES NFR BLD AUTO: 5 % (ref 4–12)
NEUTROPHILS # BLD AUTO: 12.7 THOUSANDS/ΜL (ref 1.85–7.62)
NEUTS SEG NFR BLD AUTO: 79 % (ref 43–75)
NRBC BLD AUTO-RTO: 0 /100 WBCS
PLATELET # BLD AUTO: 222 THOUSANDS/UL (ref 130–400)
PLATELET # BLD AUTO: 226 THOUSANDS/UL (ref 130–400)
PLATELET BLD QL SMEAR: ADEQUATE
PMV BLD AUTO: 8.4 FL (ref 8.9–12.7)
PMV BLD AUTO: 8.7 FL (ref 8.9–12.7)
RBC # BLD AUTO: 4.21 MILLION/UL (ref 4.2–5.4)
RBC # BLD AUTO: 4.26 MILLION/UL (ref 4.2–5.4)
WBC # BLD AUTO: 16 THOUSAND/UL (ref 4.8–10.8)
WBC # BLD AUTO: 16.1 THOUSAND/UL (ref 4.8–10.8)

## 2018-01-30 PROCEDURE — 97530 THERAPEUTIC ACTIVITIES: CPT

## 2018-01-30 PROCEDURE — 85025 COMPLETE CBC W/AUTO DIFF WBC: CPT | Performed by: INTERNAL MEDICINE

## 2018-01-30 PROCEDURE — 71045 X-RAY EXAM CHEST 1 VIEW: CPT

## 2018-01-30 PROCEDURE — 82948 REAGENT STRIP/BLOOD GLUCOSE: CPT

## 2018-01-30 PROCEDURE — 94760 N-INVAS EAR/PLS OXIMETRY 1: CPT

## 2018-01-30 PROCEDURE — 85027 COMPLETE CBC AUTOMATED: CPT | Performed by: STUDENT IN AN ORGANIZED HEALTH CARE EDUCATION/TRAINING PROGRAM

## 2018-01-30 PROCEDURE — 99239 HOSP IP/OBS DSCHRG MGMT >30: CPT | Performed by: INTERNAL MEDICINE

## 2018-01-30 PROCEDURE — 97535 SELF CARE MNGMENT TRAINING: CPT

## 2018-01-30 PROCEDURE — 94640 AIRWAY INHALATION TREATMENT: CPT

## 2018-01-30 PROCEDURE — 94761 N-INVAS EAR/PLS OXIMETRY MLT: CPT

## 2018-01-30 RX ORDER — HYDROCODONE POLISTIREX AND CHLORPHENIRAMINE POLISTIREX 10; 8 MG/5ML; MG/5ML
5 SUSPENSION, EXTENDED RELEASE ORAL
Qty: 120 ML | Refills: 0 | Status: SHIPPED | OUTPATIENT
Start: 2018-01-30 | End: 2018-02-02

## 2018-01-30 RX ORDER — GUAIFENESIN/DEXTROMETHORPHAN 100-10MG/5
10 SYRUP ORAL EVERY 4 HOURS PRN
Status: DISCONTINUED | OUTPATIENT
Start: 2018-01-30 | End: 2018-01-30 | Stop reason: HOSPADM

## 2018-01-30 RX ORDER — METOLAZONE 2.5 MG/1
TABLET ORAL
Refills: 0
Start: 2018-01-30 | End: 2018-02-08

## 2018-01-30 RX ORDER — BUDESONIDE 0.5 MG/2ML
0.5 INHALANT ORAL
Qty: 60 ML | Refills: 0 | Status: ON HOLD | OUTPATIENT
Start: 2018-01-30 | End: 2018-02-21 | Stop reason: ALTCHOICE

## 2018-01-30 RX ORDER — LEVALBUTEROL 1.25 MG/.5ML
1.25 SOLUTION, CONCENTRATE RESPIRATORY (INHALATION)
Qty: 1 EACH | Refills: 0 | Status: SHIPPED | OUTPATIENT
Start: 2018-01-30 | End: 2018-01-30

## 2018-01-30 RX ORDER — INSULIN ASPART 100 [IU]/ML
44 INJECTION, SUSPENSION SUBCUTANEOUS
Refills: 0
Start: 2018-01-30 | End: 2018-02-08

## 2018-01-30 RX ORDER — LEVALBUTEROL 1.25 MG/.5ML
1.25 SOLUTION, CONCENTRATE RESPIRATORY (INHALATION)
Qty: 45 ML | Refills: 0 | Status: ON HOLD | OUTPATIENT
Start: 2018-01-30 | End: 2018-02-26

## 2018-01-30 RX ORDER — FUROSEMIDE 40 MG/1
40 TABLET ORAL
Refills: 0
Start: 2018-01-30 | End: 2018-02-08

## 2018-01-30 RX ORDER — PREDNISONE 10 MG/1
TABLET ORAL
Qty: 30 TABLET | Refills: 0 | Status: ON HOLD | OUTPATIENT
Start: 2018-01-30 | End: 2018-02-23

## 2018-01-30 RX ORDER — GUAIFENESIN/DEXTROMETHORPHAN 100-10MG/5
10 SYRUP ORAL EVERY 4 HOURS PRN
Qty: 118 ML | Refills: 0 | Status: SHIPPED | OUTPATIENT
Start: 2018-01-30 | End: 2018-04-06

## 2018-01-30 RX ORDER — PANTOPRAZOLE SODIUM 40 MG/1
40 TABLET, DELAYED RELEASE ORAL
Qty: 14 TABLET | Refills: 0 | Status: SHIPPED | OUTPATIENT
Start: 2018-01-30 | End: 2018-02-14 | Stop reason: CLARIF

## 2018-01-30 RX ADMIN — PRAMIPEXOLE DIHYDROCHLORIDE 1 MG: 0.5 TABLET ORAL at 08:39

## 2018-01-30 RX ADMIN — LEVALBUTEROL HYDROCHLORIDE 1.25 MG: 1.25 SOLUTION, CONCENTRATE RESPIRATORY (INHALATION) at 07:58

## 2018-01-30 RX ADMIN — LEVALBUTEROL HYDROCHLORIDE 1.25 MG: 1.25 SOLUTION, CONCENTRATE RESPIRATORY (INHALATION) at 11:13

## 2018-01-30 RX ADMIN — ASPIRIN 325 MG: 325 TABLET ORAL at 08:40

## 2018-01-30 RX ADMIN — HEPARIN SODIUM 5000 UNITS: 5000 INJECTION, SOLUTION INTRAVENOUS; SUBCUTANEOUS at 05:34

## 2018-01-30 RX ADMIN — GUAIFENESIN AND DEXTROMETHORPHAN 10 ML: 100; 10 SYRUP ORAL at 00:27

## 2018-01-30 RX ADMIN — POLYETHYLENE GLYCOL 3350 17 G: 17 POWDER, FOR SOLUTION ORAL at 08:39

## 2018-01-30 RX ADMIN — ISODIUM CHLORIDE 3 ML: 0.03 SOLUTION RESPIRATORY (INHALATION) at 11:13

## 2018-01-30 RX ADMIN — ISODIUM CHLORIDE 3 ML: 0.03 SOLUTION RESPIRATORY (INHALATION) at 08:04

## 2018-01-30 RX ADMIN — PREDNISONE 40 MG: 20 TABLET ORAL at 08:43

## 2018-01-30 RX ADMIN — FOLIC ACID 1 MG: 1 TABLET ORAL at 08:40

## 2018-01-30 RX ADMIN — PANTOPRAZOLE SODIUM 40 MG: 40 TABLET, DELAYED RELEASE ORAL at 05:34

## 2018-01-30 RX ADMIN — LEVOTHYROXINE SODIUM 125 MCG: 125 TABLET ORAL at 05:34

## 2018-01-30 RX ADMIN — BUDESONIDE 0.5 MG: 0.5 INHALANT RESPIRATORY (INHALATION) at 07:57

## 2018-01-30 RX ADMIN — INSULIN LISPRO 5 UNITS: 100 INJECTION, SOLUTION INTRAVENOUS; SUBCUTANEOUS at 08:40

## 2018-01-30 NOTE — OCCUPATIONAL THERAPY NOTE
OT TREATMENT       01/30/18 1155   Restrictions/Precautions   Other Precautions Fall Risk;O2;Bed Alarm; Chair Alarm   Pain Assessment   Pain Assessment 0-10   ADL   Where Assessed Standing at sink   Grooming Assistance 5  Supervision/Setup   Toileting Assistance  7  Independent   Toileting Deficit Bedside commode   Functional Standing Tolerance   Time 20 seconds x 6   Activity BUE exercises   Comments Supervision   Transfers   Sit to Stand 7  Independent   Stand to Sit 7  Independent   Stand pivot 7  Independent   Toilet Transfers   Toilet Transfer From (chair)   Toilet Transfer Type To and from   Toilet Transfer to Standard bedside commode   Toilet Transfer Technique Stand pivot   Toilet Transfers Independent   Right Upper Extremity- Strength   R Shoulder Flexion; Extension;ABduction; External rotation; Internal rotation   R Elbow Elbow flexion;Elbow extension   R Wrist Wrist flexion;Wrist extension   R Position Standing   Equipment Dowel   R Weight/Reps/Sets 10 reps x 2 sets with 2#   Left Upper Extremity-Strength   L Shoulder Flexion; Extension;ABduction; External rotation; Internal rotation   L Elbow Elbow flexion;Elbow extension   L Wrist Wrist flexion;Wrist extension   L Position Seated   Equipment Dowel   L Weights/Reps/Sets 10 reps x 2 sets with 2#   Cognition   Overall Cognitive Status WFL   Activity Tolerance   Activity Tolerance Patient limited by fatigue   Medical Staff Made Aware yes   Assessment   Assessment Pt steadily improving  Still fatigues easily and required seated rest after only standing for max 30 second intervals, but no coughing or SOB exhibited  Plan   Treatment Interventions ADL retraining;Functional transfer training;UE strengthening/ROM; Endurance training;Patient/family training;Equipment evaluation/education; Activityengagement; Energy conservation   OT Frequency 3-5x/wk   Recommendation   OT Discharge Recommendation Home with family support  (Home care services)     Patient left OOB in chair with all needs within reach, tab alarm in place

## 2018-01-30 NOTE — MISCELLANEOUS
Chief Complaint  Chief Complaint Free Text Note Form: Telephone communication call to patient, [facility] for patient to return my call, Julian Gordillo for abdominal pain  Admitt on 01/09/2018, discharged on 01/09/2018, BOO White/JENNIFER  BRITTANY complete, 1  BOO Arthur       1 Amended By: Raj Valencia; Jan 18 2018 10:19 AM EST    History of Present Illness    TCM Communication [facility]: The patient is being contacted for  follow-up after hospitalization1  1   Hospital  records were not available1  1   She was hospitalized  at [address] [place] [address]  1   The  date of admission: 01/09/20181  ,  date of discharge: 01/10/28881  1   Diagnosis:1  neck, shoulder and lower back pain per patient1   She was discharged  to home1  1   Medications were not reviewed today  1    She scheduled a follow up appointment  1   Follow-up appointments with other specialists:  Dr Dudley Sanford  1   Patient is still having back, neck and shoulder pain, she would like to see Dr Marla Gimenez before her follow up with Dr Anthony King was provided to  the patient1  1   Topics counseled included 1  activities of daily living1  and importance of compliance with treatment1   Communication performed and completed by1  BOO White/TIARA        1 Amended ByFausto Valdez; Jan 18 2018 10:22 AM EST    Active Problems   1   Left hip pain (719 45) (M25 [id number])    Signatures   Electronically signed by : Mecca Byrne MD; Jan 12 2018  4:43PM EST                       (Author)    Electronically signed by : Nehal Meeks LPN; Jan 18 8219 66:31DQ EST                       (Co-author)    Electronically signed by : Mecca Byrne MD; Jan 18 2018  1:40PM EST                       (Author)

## 2018-01-30 NOTE — PHYSICAL THERAPY NOTE
PT TREATMENT     01/25/18 1520   Pain Assessment   Pain Assessment No/denies pain   Restrictions/Precautions   Other Precautions Fall Risk   General   Chart Reviewed Yes   Family/Caregiver Present No   Cognition   Arousal/Participation Cooperative   Subjective   Subjective patient reports feeling weak and deconditioned   Transfers   Sit to Stand 5  Supervision   Stand to Sit 5  Supervision   Ambulation/Elevation   Gait Assistance 5  Supervision   Assistive Device Rolling walker   Distance 150 feet with change in direction   Exercises   Hip Flexion Sitting;10 reps;Bilateral   Knee AROM Long Arc Quad Sitting;10 reps;Bilateral   Ankle Pumps Sitting;10 reps;Bilateral   Assessment   Assessment patient cooperative and motivated but continues to require assist at times with fatigue  Paitent will benefit from continued PT with progression as tolerated    Plan   Treatment/Interventions ADL retraining;Functional transfer training;LE strengthening/ROM; Therapeutic exercise; Endurance training;Bed mobility;Gait training   PT Frequency (3-5x/week)   Recommendation   Recommendation (STR)

## 2018-01-30 NOTE — RESPIRATORY THERAPY NOTE
Home Oxygen Qualifying Test       Patient name: Bairon Pearson        : 1940   Date of Test:  2018  Diagnosis:      Home Oxygen Test:    **Medicare Guidelines require item(s) 1-5 on all ambulatory patients or 1 and 2 on on-ambulatory patients  1   Baseline SPO2 on Room Air at rest 94 %  If = or < 88% on room air add O2 via NC and titrate patient  Patient must be ambulated with O2 and titrated to > 88% with exertion  2   SPO2 on Oxygen at rest NA %  3   SPO2 during exercise on Room Air 95-97% %  4   SPO2 during exercise on Oxygen  NA % at a liter flow of NA lpm     5   Exercise performed:    [x] Walking     [] Stairs     [x] Duration 6 (min )     [] Distance  (ft )       []  Supplemental Home Oxygen is indicated  [x]  Client does not qualify for home oxygen  Not needed for ambulation    Zora Camara, RT

## 2018-01-30 NOTE — INCIDENTAL FINDINGS
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

## 2018-01-30 NOTE — DISCHARGE SUMMARY
Discharge Summary - [place] [id number] Internal Medicine    Patient Information: Reshma Mcneal 77 y o  female MRN: [id number]  Unit/Bed#: [address] [place] [address] Encounter: [id number]    Discharging Physician / Practitioner: Morgan Mix MD  PCP: Luba Montes MD  Admission Date: 1/19/2018  Discharge Date: 01/30/18    Reason for Admission: Flu Symptoms (woke up with chills,sore throat,general body aches, sent here by family doctor,has serous drainage from rt shin area also)      Discharge Diagnoses:     Principal Problem:    Influenzal tracheobronchitis  Active Problems:    T2DM (type 2 diabetes mellitus) ([facility] )    Asthma    Restless leg    HTN (hypertension)    HLD (hyperlipidemia)    Hypothyroidism  Resolved Problems:    Acute on chronic respiratory failure with hypoxemia ([facility] )    COPD exacerbation ([facility] )    Sepsis ([facility] )      Consultations During Hospital Stay:  IP CONSULT TO PULMONOLOGY    Procedures Performed:     Home Oxygen Test:    **Medicare Guidelines require item(s) 1-5 on all ambulatory patients or 1 and 2 on on-ambulatory patients         1  Baseline SPO2 on Room Air at rest 94 %  If = or < 88% on room air add O2 via NC and titrate patient  Patient must be ambulated with O2 and titrated to > 88% with exertion      2  SPO2 on Oxygen at rest NA %      3  SPO2 during exercise on Room Air 95-97% %      4  SPO2 during exercise on Oxygen  NA % at a liter flow of NA lpm      5  Exercise performed:               [x] Walking     [] Stairs     [x] Duration 6 (min )     [] Distance  (ft )        []  Supplemental Home Oxygen is indicated      [x]  Client does not qualify for home oxygen  Not needed for ambulation    Significant Findings:     · Influenza A PCR -positive  · MRSA surveillance negative   · Blood cultures were negative    Imaging while in hospital:    Xr Chest Portable    Result Date: 1/30/2018  Narrative: CHEST INDICATION:  Acute asthma  Bronchitis   COMPARISON:  Chest radiographs January 19, 2018 VIEWS:   AP frontal IMAGES:  1 FINDINGS:     Heart shadow appears unremarkable  Atherosclerotic vascular calcifications are noted  The lungs are clear  No pneumothorax or pleural effusion  Visualized osseous structures appear within normal limits for the patient's age  Impression: No active pulmonary disease  Workstation performed: [id number]     Xr Chest 1 View Portable    Result Date: 1/19/2018  Narrative: CHEST INDICATION:  Flu symptoms  COMPARISON:  October 8, 2017 VIEWS:   AP frontal IMAGES:  1 FINDINGS:     Heart shadow appears unremarkable  Atherosclerotic vascular calcifications are noted  The lungs are clear  No pneumothorax or pleural effusion  Visualized osseous structures appear within normal limits for the patient's age  Impression: No active pulmonary disease  Workstation performed: [id number]     Ct Head Without Contrast    Result Date: 1/9/2018  Narrative: CT BRAIN - WITHOUT CONTRAST INDICATION:  Headache  COMPARISON:  10/10/2017 TECHNIQUE:  CT examination of the brain was performed  In addition to axial images, coronal reformatted images were created and submitted for interpretation  Radiation dose length product (DLP) for this visit:  1134 76 mGy-cm   This examination, like all CT scans performed in the [facility], was performed utilizing techniques to minimize radiation dose exposure, including the use of iterative reconstruction and automated exposure control  IMAGE QUALITY:  Diagnostic  FINDINGS:  PARENCHYMA: No acute intracranial hemorrhage  No extra-axial fluid collection  No midline shift  Unchanged is the calcified lesion in the left parietal cortex seen most consistent with a meningioma  It measures approximately 1 6 x 1 4 cm in axial dimension, unchanged from prior examination  Mass effect on the left superior parietal lobule without substantial adjacent edema  Stable atrophy and patchy areas of periventricular hypoattenuation noted   While nonspecific, these likely reflect changes of chronic microvascular ischemia in a patient of this age  VENTRICLES AND EXTRA-AXIAL SPACES:  Ventricles are commensurate with the degree of volume loss  Basal cisterns are patent  Atherosclerotic calcification of the intracranial vasculature is noted  VISUALIZED ORBITS AND PARANASAL SINUSES:  Changes of bilateral lens replacements noted  The paranasal sinuses and mastoid air cells remain clear  CALVARIUM AND EXTRACRANIAL SOFT TISSUES:   Normal      Impression: No acute intracranial abnormality  Stable left parietal convexity meningioma  No demonstrable adjacent parenchymal edema  Workstation performed: [id number]     Ct Abdomen Pelvis With Contrast    Result Date: 1/9/2018  Narrative: CT ABDOMEN AND PELVIS WITH IV CONTRAST INDICATION:  Chronic abdominal pain  COMPARISON: 12/13/2017  TECHNIQUE:  CT examination of the abdomen and pelvis was performed  Reformatted images were created in axial, sagittal, and coronal planes  Radiation dose length product (DLP) for this visit:  734 605 387 mGy-cm   This examination, like all CT scans performed in the [facility], was performed utilizing techniques to minimize radiation dose exposure, including the use of iterative reconstruction and automated exposure control  IV Contrast:  100 mL of iohexol (OMNIPAQUE)         Enteric Contrast:  Enteric contrast was not administered  FINDINGS: ABDOMEN LOWER CHEST:  No significant abnormalities identified in the lower chest  LIVER/BILIARY TREE:  No biliary obstruction  GALLBLADDER:  Cholecystectomy  SPLEEN:  Unremarkable  PANCREAS:  Stable 11 mm cystic lesion in the body of the pancreas  Distal body and tail not visualized  Question prior resection  No pancreatic duct dilatation or evidence of pancreatitis  ADRENAL GLANDS:  Unremarkable  KIDNEYS/URETERS:  Stable fat-containing 15 mm right renal cortical lesion suggesting an angiomyolipoma   No evidence of pyelonephritis or obstructive uropathy  Hernando Manifold STOMACH AND BOWEL:  Stable moderate hiatal hernia  Fat-containing 4 cm periumbilical hernia, stable  Stool throughout the colon  No evidence of bowel obstruction, colitis or diverticulitis  APPENDIX:  No findings to suggest appendicitis  ABDOMINOPELVIC CAVITY:  No ascites or free intraperitoneal air  Stable shotty left para-aortic lymph nodes  VESSELS:  Unremarkable for patient's age  PELVIS REPRODUCTIVE ORGANS:  Patient is status post hysterectomy  URINARY BLADDER:  Unremarkable  ABDOMINAL WALL/INGUINAL REGIONS:  Unremarkable  OSSEOUS STRUCTURES:  No acute fracture or destructive osseous lesion  Impression: 1  Moderate amount of stool throughout the colon may indicate constipation  No evidence of bowel obstruction, colitis, diverticulitis or appendicitis  Moderate hiatal hernia and small fat-containing periumbilical hernia  2  Stable left millimeters cystic lesion in the body of the pancreas  No ductal dilatation  Possible  serous cystadenoma  3  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  No pyelonephritis or obstructive uropathy  Workstation performed: [id number]       Incidental Findings:   Stable left parietal convexity meningioma  No demonstrable adjacent parenchymal edema  Stable left millimeters cystic lesion in the body of the pancreas  No ductal dilatation  Possible  serous cystadenoma  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   No pyelonephritis or obstructive uropathy    Test Results Pending at Discharge (will require follow up):   · As per After Visit Summary     Outpatient Tests Requested:  · BMP in 1 week    Complications:  None, see HPI    Hospital Course:     Marianna Roche is a 77 y o  female patient with multiple comorbidities including diastolic CHF, obesity, chronic hypercapnic respiratory failure secondary to obesity hypoventilation, dyslipidemia, TIA, hypothyroidism, poor compliance, uncontrolled diabetes mellitus type 2, who originally presented to the hospital on 1/19/2018 due to fever chills cough and shortness of breath  Patient was evaluated in emergency department and was noted to have influenza a with associated reactive airway disease and patient was subsequently admitted for further evaluation workup  Patient had prolonged hospitalization and slow recovery the the the from influenza associated bronchitis considering her multiple comorbidities and poor respiratory reserve  Course was also complicated because of significant hyperglycemia related to steroids  Following issues were addressed  1  Acute bronchitis, likely secondary to influenza A, less likely bacterial-completed Tamiflu and azithromycin  Blood cultures remain negative  Urine Legionella pneumococcal antigen are negative  patient was treated with bronchodilators, steroids with slow improvement in pulmonary status  symptomatic treatment was continued  Patient was closely followed by Pulmonary  Respiration wheezing gradually improved and steroids were tapered off  Patient is currently oxygenating well at rest and with ambulation does not require supplemental oxygen  Wheezing is significantly improved  Denies any shortness of breath and cough is improving  Patient will be discharged home, will be continued on bronchodilators and chest PT  They have was initially recommended but was not approved by the insurance  Visiting nurse will be arranged for monitoring of respiratory status and ensuring compliance with also monitoring of cardiorespiratory status and blood glucose  Plan of care were discussed in depth the family  2  Leukocytosis secondary to steroid-no evidence of ongoing infection  Repeat chest x-ray without any acute abnormalities  3  Acute on chronic hypoxic hypercapnic respiratory failure secondary to 1 and underlying obesity hypoventilation-continue supplemental oxygen at bedtime      4  Diabetes mellitus type 2 with hemoglobin A1c of 9 2-uncontrolled secondary to steroid  manage with Lantus and NovoLog and corrective scale while in the hospital    will resume NovoLog 70 30 at the time of discharge at 44 units twice a day  Advise close blood glucose monitoring 4 times a day while on prednisone  5  Hypotension secondary to volume depletion and acute illness-improved  home medication resumed, tolerating   Monitor blood pressure  6  Chronic diastolic CHF, chronic lower extremity edema-appears euvolemic  Will resume Lasix at 40 mg b i d  Panfilo Side Zaroxolyn on the as needed  BMP in 1 week after discharge  7  Dyslipidemia-on Lipitor  8  History of TIA-continue aspirin and Lipitor  9  Hypothyroidism, post thyroidectomy-on Synthroid  10  Restless leg syndrome-on Mirapex  11  History of ESBL Klebsiella bacteremia  12  Deconditioning-PT/OT  13  GERD-continue PPI  14  CKD-on baseline, monitor on diuresis    Condition at Discharge: stable     Discharge Day Visit / Exam:     Subjective:  I think I am starting getting better  Cough is improving  Denies shortness of breath and chest pain  Appetite is improving  Ambulating in hallway    Vitals: Blood Pressure: 122/58 (01/30/18 0836)  Pulse: 90 (01/30/18 0836)  Temperature: 98 °F (36 7 °C) (01/30/18 0836)  Temp Source: Oral (01/30/18 0836)  Respirations: 18 (01/30/18 0836)  Height: 5' 2" (157 5 cm) (01/22/18 1442)  Weight - Scale: 107 kg (236 lb 5 3 oz) (01/30/18 0555)  SpO2: 94 % (01/30/18 1113)  Exam:   Physical Exam   Constitutional: No distress  Morbidly obese   HENT:   Head: Normocephalic and atraumatic  Nose: Nose normal    Eyes: Conjunctivae and EOM are normal  Pupils are equal, round, and reactive to light  Neck: Normal range of motion  Neck supple  No JVD present  Cardiovascular: Normal rate and regular rhythm  Exam reveals no gallop and no friction rub  Murmur heard  Pulmonary/Chest: Effort normal  No respiratory distress  She has decreased breath sounds   She has wheezes (Significantly improved)  She has no rhonchi  She has no rales  She exhibits no tenderness  Good bilateral air entry   Abdominal: Soft  Bowel sounds are normal  She exhibits no distension  There is no tenderness  There is no rebound and no guarding  Musculoskeletal: She exhibits edema (Chronic venous stasis)  Neurological: She is alert  No cranial nerve deficit  Skin: Skin is warm and dry  No rash noted  Psychiatric: She has a normal mood and affect  Discharge instructions/Information to patient and family:(Discharge Medications and Follow up):   See after visit summary for information provided to patient and family  Provisions for Follow-Up Care:  See after visit summary for information related to follow-up care and any pertinent home health orders  Disposition: Home with [facility]    Planned Readmission:  No     Discharge Statement:  I spent 45 minutes discharging the patient  This time was spent on the day of discharge  I had direct contact with the patient on the day of discharge  Greater than 50% of the total time was spent examining patient, answering all patient questions, arranging and discussing plan of care with patient as well as directly providing post-discharge instructions  Additional time then spent on discharge activities  Coordinated with the family and pulmonary at the time of discharge  Discharge Medications:  See after visit summary for reconciled discharge medications provided to patient and family  ** Please Note: Dragon 360 Dictation voice to text software may have been used in the creation of this document   **

## 2018-01-30 NOTE — SOCIAL WORK
PT D/C TO HOME WITH [facility], ALL INFO SENT TO THEM, THEY WILL SEE PT UPON D/C TO HOME  PT AWARE AND AGREEABLE WITH DCP

## 2018-01-31 ENCOUNTER — APPOINTMENT (OUTPATIENT)
Dept: PULMONOLOGY | Facility: CLINIC | Age: 78
End: 2018-01-31
Payer: COMMERCIAL

## 2018-01-31 DIAGNOSIS — Z79.51 ASTHMA DEPENDENT ON INHALED STEROIDS: Primary | ICD-10-CM

## 2018-01-31 DIAGNOSIS — J45.909 ASTHMA DEPENDENT ON INHALED STEROIDS: Primary | ICD-10-CM

## 2018-01-31 LAB — PROCALCITONIN: 0.03 NG/ML (ref 0–0.08)

## 2018-01-31 RX ORDER — LEVALBUTEROL 1.25 MG/.5ML
1.25 SOLUTION, CONCENTRATE RESPIRATORY (INHALATION) 4 TIMES DAILY
Status: DISCONTINUED | OUTPATIENT
Start: 2018-01-31 | End: 2018-02-26 | Stop reason: HOSPADM

## 2018-02-01 ENCOUNTER — TRANSITIONAL CARE MANAGEMENT (OUTPATIENT)
Dept: FAMILY MEDICINE CLINIC | Facility: CLINIC | Age: 78
End: 2018-02-01

## 2018-02-05 DIAGNOSIS — I51.9 HEART DISEASE: ICD-10-CM

## 2018-02-07 ENCOUNTER — TELEPHONE (OUTPATIENT)
Dept: FAMILY MEDICINE CLINIC | Facility: CLINIC | Age: 78
End: 2018-02-07

## 2018-02-07 DIAGNOSIS — B37.0 ORAL CANDIDIASIS: Primary | ICD-10-CM

## 2018-02-07 RX ORDER — CLOTRIMAZOLE 10 MG/1
10 LOZENGE ORAL; TOPICAL 4 TIMES DAILY
Qty: 56 TABLET | Refills: 0 | Status: SHIPPED | OUTPATIENT
Start: 2018-02-07 | End: 2018-02-08 | Stop reason: SDUPTHER

## 2018-02-07 NOTE — TELEPHONE ENCOUNTER
DENIS CALLED AND SAID THAT SHE HAS THRUSH MOUTH FROM ALL THE MEDS SHE IS ON AND WANTED TO KNOW IF YOU COULD CALL SOMETHING IN FOR HER  SHE SAID YOU GAVE HER SOMETHING ONCE BEFORE   Ana Carrillo

## 2018-02-08 ENCOUNTER — OFFICE VISIT (OUTPATIENT)
Dept: FAMILY MEDICINE CLINIC | Facility: CLINIC | Age: 78
End: 2018-02-08
Payer: COMMERCIAL

## 2018-02-08 VITALS
SYSTOLIC BLOOD PRESSURE: 118 MMHG | RESPIRATION RATE: 20 BRPM | OXYGEN SATURATION: 95 % | HEIGHT: 69 IN | WEIGHT: 226 LBS | HEART RATE: 82 BPM | DIASTOLIC BLOOD PRESSURE: 76 MMHG | TEMPERATURE: 97.8 F | BODY MASS INDEX: 33.47 KG/M2

## 2018-02-08 DIAGNOSIS — L97.511 VENOUS STASIS ULCER OF OTHER PART OF RIGHT FOOT LIMITED TO BREAKDOWN OF SKIN, UNSPECIFIED WHETHER VARICOSE VEINS PRESENT (HCC): ICD-10-CM

## 2018-02-08 DIAGNOSIS — R60.9 EDEMA, UNSPECIFIED TYPE: ICD-10-CM

## 2018-02-08 DIAGNOSIS — Z09 HOSPITAL DISCHARGE FOLLOW-UP: Primary | ICD-10-CM

## 2018-02-08 DIAGNOSIS — I83.015 VENOUS STASIS ULCER OF OTHER PART OF RIGHT FOOT LIMITED TO BREAKDOWN OF SKIN, UNSPECIFIED WHETHER VARICOSE VEINS PRESENT (HCC): ICD-10-CM

## 2018-02-08 DIAGNOSIS — J11.1 INFLUENZA: ICD-10-CM

## 2018-02-08 DIAGNOSIS — B37.0 ORAL CANDIDIASIS: ICD-10-CM

## 2018-02-08 DIAGNOSIS — F41.9 ANXIETY: ICD-10-CM

## 2018-02-08 PROBLEM — I51.89 DIASTOLIC DYSFUNCTION: Status: ACTIVE | Noted: 2017-12-05

## 2018-02-08 PROBLEM — M19.90 ARTHRITIS: Status: ACTIVE | Noted: 2017-03-30

## 2018-02-08 PROBLEM — R79.89 ELEVATED LFTS: Status: ACTIVE | Noted: 2017-06-02

## 2018-02-08 PROBLEM — G47.33 MODERATE OBSTRUCTIVE SLEEP APNEA: Chronic | Status: ACTIVE | Noted: 2017-06-10

## 2018-02-08 PROBLEM — R26.89 BALANCE PROBLEM: Status: ACTIVE | Noted: 2017-11-08

## 2018-02-08 PROBLEM — M70.61 GREATER TROCHANTERIC BURSITIS OF RIGHT HIP: Status: ACTIVE | Noted: 2017-10-20

## 2018-02-08 PROBLEM — S06.0X9A CONCUSSION: Status: ACTIVE | Noted: 2017-11-08

## 2018-02-08 PROBLEM — D42.9 MENINGIOMAS, MULTIPLE (HCC): Status: ACTIVE | Noted: 2017-12-01

## 2018-02-08 PROBLEM — G89.4 CHRONIC PAIN DISORDER: Status: ACTIVE | Noted: 2017-12-13

## 2018-02-08 PROBLEM — F33.1 MODERATE EPISODE OF RECURRENT MAJOR DEPRESSIVE DISORDER (HCC): Status: ACTIVE | Noted: 2017-10-17

## 2018-02-08 PROBLEM — M76.31 ILIOTIBIAL BAND SYNDROME, RIGHT LEG: Status: ACTIVE | Noted: 2017-10-20

## 2018-02-08 PROBLEM — J45.31 MILD PERSISTENT ASTHMA WITH ACUTE EXACERBATION: Status: ACTIVE | Noted: 2017-03-30

## 2018-02-08 PROBLEM — F32.A DEPRESSION: Status: ACTIVE | Noted: 2017-12-01

## 2018-02-08 PROBLEM — R93.2 ABNORMAL CT SCAN, LIVER: Status: ACTIVE | Noted: 2017-04-20

## 2018-02-08 PROBLEM — G47.19 DAYTIME HYPERSOMNOLENCE: Status: ACTIVE | Noted: 2017-10-16

## 2018-02-08 PROBLEM — M15.9 PRIMARY OSTEOARTHRITIS INVOLVING MULTIPLE JOINTS: Status: ACTIVE | Noted: 2017-03-30

## 2018-02-08 PROCEDURE — 99495 TRANSJ CARE MGMT MOD F2F 14D: CPT | Performed by: FAMILY MEDICINE

## 2018-02-08 RX ORDER — IPRATROPIUM BROMIDE AND ALBUTEROL SULFATE 2.5; .5 MG/3ML; MG/3ML
1 SOLUTION RESPIRATORY (INHALATION) EVERY 4 HOURS PRN
COMMUNITY
End: 2018-02-26 | Stop reason: HOSPADM

## 2018-02-08 RX ORDER — LEVOTHYROXINE SODIUM 0.12 MG/1
1 TABLET ORAL DAILY
COMMUNITY
End: 2018-03-16 | Stop reason: SDUPTHER

## 2018-02-08 RX ORDER — LORAZEPAM 1 MG/1
TABLET ORAL
COMMUNITY
End: 2018-05-25 | Stop reason: ALTCHOICE

## 2018-02-08 RX ORDER — INSULIN ASPART 100 [IU]/ML
45 INJECTION, SUSPENSION SUBCUTANEOUS
COMMUNITY
Start: 2018-01-10 | End: 2018-05-25 | Stop reason: ALTCHOICE

## 2018-02-08 RX ORDER — LISINOPRIL 10 MG/1
1 TABLET ORAL DAILY
COMMUNITY
End: 2018-05-25 | Stop reason: ALTCHOICE

## 2018-02-08 RX ORDER — ESCITALOPRAM OXALATE 10 MG/1
10 TABLET ORAL DAILY
Qty: 30 TABLET | Refills: 3 | Status: SHIPPED | OUTPATIENT
Start: 2018-02-08 | End: 2018-08-08 | Stop reason: ALTCHOICE

## 2018-02-08 RX ORDER — DICYCLOMINE HYDROCHLORIDE 10 MG/1
1 CAPSULE ORAL 3 TIMES DAILY
COMMUNITY
End: 2018-06-05

## 2018-02-08 RX ORDER — ESCITALOPRAM OXALATE 10 MG/1
TABLET ORAL DAILY
COMMUNITY
End: 2018-02-08

## 2018-02-08 RX ORDER — FENTANYL 25 UG/H
PATCH TRANSDERMAL
Status: ON HOLD | COMMUNITY
Start: 2017-11-17 | End: 2018-02-26

## 2018-02-08 RX ORDER — ASPIRIN 325 MG
1 TABLET ORAL DAILY
COMMUNITY
End: 2018-03-27 | Stop reason: SDUPTHER

## 2018-02-08 RX ORDER — TORSEMIDE 20 MG/1
1 TABLET ORAL DAILY
COMMUNITY
Start: 2017-11-17 | End: 2018-02-08 | Stop reason: SDUPTHER

## 2018-02-08 RX ORDER — OXYCODONE HYDROCHLORIDE AND ACETAMINOPHEN 5; 325 MG/1; MG/1
TABLET ORAL EVERY 6 HOURS
COMMUNITY
Start: 2015-04-02 | End: 2018-02-26 | Stop reason: HOSPADM

## 2018-02-08 RX ORDER — METOLAZONE 2.5 MG/1
1 TABLET ORAL
COMMUNITY
Start: 2017-03-11 | End: 2018-06-05 | Stop reason: SDUPTHER

## 2018-02-08 RX ORDER — CLOTRIMAZOLE 10 MG/1
10 LOZENGE ORAL; TOPICAL 4 TIMES DAILY
Qty: 56 TABLET | Refills: 0 | Status: SHIPPED | OUTPATIENT
Start: 2018-02-08 | End: 2018-02-26 | Stop reason: HOSPADM

## 2018-02-08 RX ORDER — TORSEMIDE 20 MG/1
20 TABLET ORAL DAILY
Qty: 30 TABLET | Refills: 3 | Status: ON HOLD | OUTPATIENT
Start: 2018-02-08 | End: 2018-02-26

## 2018-02-08 RX ORDER — PRAMIPEXOLE DIHYDROCHLORIDE 1 MG/1
1 TABLET ORAL 3 TIMES DAILY
COMMUNITY
Start: 2017-11-17 | End: 2018-03-16 | Stop reason: SDUPTHER

## 2018-02-08 RX ORDER — ESCITALOPRAM OXALATE 10 MG/1
1 TABLET ORAL DAILY
COMMUNITY
Start: 2017-10-17 | End: 2018-02-08 | Stop reason: SDUPTHER

## 2018-02-08 RX ORDER — CLOTRIMAZOLE 10 MG/1
10 LOZENGE ORAL; TOPICAL
Qty: 30 TABLET | Refills: 1 | Status: SHIPPED | OUTPATIENT
Start: 2018-02-08 | End: 2018-02-22 | Stop reason: SDUPTHER

## 2018-02-08 RX ORDER — GABAPENTIN 100 MG/1
CAPSULE ORAL
Status: ON HOLD | COMMUNITY
Start: 2018-02-06 | End: 2018-02-26

## 2018-02-08 RX ORDER — LACTULOSE 10 G/15ML
15 SOLUTION ORAL; RECTAL DAILY PRN
COMMUNITY
End: 2018-05-25 | Stop reason: ALTCHOICE

## 2018-02-08 NOTE — PATIENT INSTRUCTIONS
CONTINUE CURRENT TREATMENT PROGRAM  MEDICATION AS DIRECTED  RENEWALS WERE PREFORMED  LOCAL WOUND CARE FOR STASIS ULCER  COMPRESSION  KEEP AREA CLEAN AND DRY  WATCH FOR SIGNS OF INFECTION  RV 4-5 DAYS FOR RE CHECK    CALL SOONER PRN

## 2018-02-08 NOTE — PROGRESS NOTES
Assessment/Plan:    No problem-specific Assessment & Plan notes found for this encounter  Diagnoses and all orders for this visit:    Hospital discharge follow-up    Edema, unspecified type  -     torsemide (DEMADEX) 20 mg tablet; Take 1 tablet (20 mg total) by mouth daily    Oral candidiasis  -     clotrimazole (MYCELEX) 10 mg jaswant; Take 1 tablet (10 mg total) by mouth 5 (five) times a day  -     clotrimazole (MYCELEX) 10 mg jaswant; Take 1 tablet (10 mg total) by mouth 4 (four) times a day for 14 days    Anxiety  -     escitalopram (LEXAPRO) 10 mg tablet; Take 1 tablet (10 mg total) by mouth daily    Influenza    Other orders  -     aspirin 325 mg tablet; Take 1 tablet by mouth daily  -     Discontinue: torsemide (DEMADEX) 20 mg tablet; Take 1 tablet by mouth daily  -     diclofenac sodium (VOLTAREN) 1 %; Place on the skin  -     dicyclomine (BENTYL) 10 mg capsule; Take 1 capsule by mouth 3 (three) times a day  -     ipratropium-albuterol (DUONEB) 0 5-2 5 mg/3 mL; Inhale 1 each every 4 (four) hours as needed  -     Discontinue: escitalopram (LEXAPRO) 10 mg tablet; Take by mouth daily  -     Discontinue: escitalopram (LEXAPRO) 10 mg tablet; Take 1 tablet by mouth daily  -     fentaNYL (DURAGESIC) 25 mcg/hr; Earliest Fill Date: 11/17/17   -     gabapentin (NEURONTIN) 100 mg capsule;   -     NOVOLOG MIX 70/30 FLEXPEN (70-30) 100 UNIT/ML SUPN;   -     lactulose 10 g/15 mL; Take 15 mL by mouth daily  -     LORazepam (ATIVAN) 1 mg tablet; Take by mouth  -     metoprolol tartrate (LOPRESSOR) 25 mg tablet; Take 1 tablet by mouth 2 (two) times a day  -     naloxegol oxalate (MOVANTIK) 25 MG tablet; Take by mouth  -     levothyroxine 125 mcg tablet; Take 1 tablet by mouth daily  -     lisinopril (ZESTRIL) 10 mg tablet; Take 1 tablet by mouth daily  -     metolazone (ZAROXOLYN) 2 5 mg tablet; Take 1 tablet by mouth  -     pramipexole (MIRAPEX) 1 mg tablet;  Take 1 tablet by mouth 3 (three) times a day  - oxyCODONE-acetaminophen (PERCOCET) 5-325 mg per tablet; Take by mouth every 6 (six) hours          Vitals:    02/08/18 1429   BP: 118/76   Pulse: 82   Resp: 20   Temp: 97 8 °F (36 6 °C)   SpO2: 95%       Subjective:      Patient ID: Brady Valencia is a 77 y o  female  PATIENT IS S/P [facility] ADMISSION FOR INFLUENZA, DEHYDRATION AND RESPIRATORY DISTRESS  PATIENT WAS DISCHARGES ON 1/30    PATIENT STATES SHE IS SLOWLY GETTING BETTER  LESS SOB AND FIGUEROA  DENIES ANY FEVER OR CHILLS  ACHYNESS STILL PRESENT    REVIEWED MEDICATIONS  PT BUMPED HER LEG AND A SMALL STASIS ULCER IS PRESNT  OOZING SLIGHTLY  NO FEVER OR CHILLS        The following portions of the patient's history were reviewed and updated as appropriate: allergies, current medications, past family history, past medical history, past social history, past surgical history and problem list     Review of Systems   Constitutional: Positive for fatigue  Negative for chills and fever  HENT: Negative for congestion, ear discharge, ear pain, mouth sores, postnasal drip, sore throat and trouble swallowing  Eyes: Negative for pain, discharge and visual disturbance  Respiratory: Positive for cough and shortness of breath  Negative for wheezing  Cardiovascular: Positive for leg swelling  Negative for chest pain and palpitations  Gastrointestinal: Negative for abdominal distention, abdominal pain, blood in stool, diarrhea and nausea  Endocrine: Negative for polydipsia, polyphagia and polyuria  Genitourinary: Negative for dysuria, frequency, hematuria and urgency  Musculoskeletal: Positive for arthralgias and gait problem  Negative for joint swelling  Skin: Negative for pallor and rash  Neurological: Negative for dizziness, syncope, speech difficulty, weakness, light-headedness, numbness and headaches  Hematological: Negative for adenopathy  Psychiatric/Behavioral: Negative for behavioral problems, confusion and sleep disturbance   The patient is nervous/anxious  Objective:     Physical Exam   Constitutional: She is oriented to person, place, and time  She appears well-developed and well-nourished  HENT:   Head: Normocephalic and atraumatic  Mouth/Throat: No oropharyngeal exudate  Eyes: Pupils are equal, round, and reactive to light  Neck: Normal range of motion  Neck supple  No JVD present  Cardiovascular: Normal rate and regular rhythm  Murmur heard  Pulmonary/Chest: Effort normal  She has rales  BIBASILAR CRACKLES   Abdominal: Soft  Bowel sounds are normal  There is no tenderness  There is no rebound  OBESE   Musculoskeletal: She exhibits edema and tenderness  MODERATE DJD   Lymphadenopathy:     She has no cervical adenopathy  Neurological: She is alert and oriented to person, place, and time  She exhibits normal muscle tone  Skin: Skin is warm and dry  MODERATE STASIS CHANGES    SMALL SUPERFICIAL ABRASION R LOWER LEG / STASIS ULCER   Psychiatric: She has a normal mood and affect   Her behavior is normal  Judgment and thought content normal           BRITTANY

## 2018-02-14 ENCOUNTER — OFFICE VISIT (OUTPATIENT)
Dept: PAIN MEDICINE | Facility: CLINIC | Age: 78
End: 2018-02-14
Payer: COMMERCIAL

## 2018-02-14 ENCOUNTER — OFFICE VISIT (OUTPATIENT)
Dept: GASTROENTEROLOGY | Facility: CLINIC | Age: 78
End: 2018-02-14
Payer: COMMERCIAL

## 2018-02-14 VITALS
SYSTOLIC BLOOD PRESSURE: 110 MMHG | HEART RATE: 103 BPM | RESPIRATION RATE: 18 BRPM | TEMPERATURE: 99.1 F | DIASTOLIC BLOOD PRESSURE: 64 MMHG | BODY MASS INDEX: 33.77 KG/M2 | WEIGHT: 228 LBS | HEIGHT: 69 IN

## 2018-02-14 VITALS
SYSTOLIC BLOOD PRESSURE: 116 MMHG | TEMPERATURE: 97.8 F | HEIGHT: 69 IN | HEART RATE: 103 BPM | DIASTOLIC BLOOD PRESSURE: 48 MMHG | WEIGHT: 232.6 LBS | RESPIRATION RATE: 20 BRPM | BODY MASS INDEX: 34.45 KG/M2

## 2018-02-14 DIAGNOSIS — K59.03 CONSTIPATION DUE TO OPIOID THERAPY: Primary | Chronic | ICD-10-CM

## 2018-02-14 DIAGNOSIS — G89.29 CHRONIC RIGHT-SIDED LOW BACK PAIN WITHOUT SCIATICA: ICD-10-CM

## 2018-02-14 DIAGNOSIS — R93.2 ABNORMAL CT SCAN, LIVER: ICD-10-CM

## 2018-02-14 DIAGNOSIS — K86.2 PANCREATIC CYST: Chronic | ICD-10-CM

## 2018-02-14 DIAGNOSIS — G89.4 CHRONIC PAIN SYNDROME: Primary | ICD-10-CM

## 2018-02-14 DIAGNOSIS — M54.50 CHRONIC RIGHT-SIDED LOW BACK PAIN WITHOUT SCIATICA: ICD-10-CM

## 2018-02-14 DIAGNOSIS — T40.2X5A CONSTIPATION DUE TO OPIOID THERAPY: Primary | Chronic | ICD-10-CM

## 2018-02-14 DIAGNOSIS — M46.1 SACROILIITIS (HCC): ICD-10-CM

## 2018-02-14 PROCEDURE — 99213 OFFICE O/P EST LOW 20 MIN: CPT | Performed by: INTERNAL MEDICINE

## 2018-02-14 PROCEDURE — 99214 OFFICE O/P EST MOD 30 MIN: CPT | Performed by: ANESTHESIOLOGY

## 2018-02-14 NOTE — PROGRESS NOTES
Pain Medicine Follow-Up Note    Assessment:  1  Chronic pain syndrome    2  Chronic right-sided low back pain without sciatica    3  Sacroiliitis ([address] )        Plan:  My impressions and treatment recommendations were discussed in detail with the patient who verbalized understanding and had no further questions  Given that the patient has signs and symptoms consistent with right sacroiliitis, discussed the rationale of undergoing a right sacroiliac joint injection since this could be potentially therapeutic  The procedures, its risks, and benefits were explained in detail to the patient  Risks include but are not limited to bleeding, infection, hematoma formation, abscess formation, weakness, headache, failure the pain to improve, nerve irritation or damage, and potential worsening of the pain  The patient verbalized understanding and wished to proceed with the procedure  I did discuss with the patient that she only had 2 days worth of relief following the right trochanteric bursa injection  If she only gets temporary relief following the right sacroiliac joint injection, I will have her follow up with a hip specialist to see if the right hip could be serving as the pathology for her pain  The patient also saw a rheumatologist, Dr Hunter Marmolejo, but I do not currently have any records from his office  She states that she was given a diagnosis, but is not sure what it is  She also was started on gabapentin through his office  Follow-up is planned in 4 time or sooner as warranted  Discharge instructions were provided  I personally saw and examined the patient and I agree with the above discussed plan of care  History of Present Illness:    Poornima Torres is a 77 y o  female who presents to [facility] for interval re-evaluation of the above stated pain complaints  The patient has a past medical and chronic pain history as outlined in the assessment section   She was last seen on January 16, 2018  At today's office visit, the patient's pain score is 10/10 on the verbal numerical pain rating scale  The patient states that her primary pain complaint involves her right buttocks and right hip region  She describes her pain as worse in the morning, evening, and night  Her pain is intermittent in nature and she reports the quality of her pain is burning, cramping, and pressure like    The patient states that she would like an injection at today's office visit because she is in such severe pain  Other than as stated above, the patient denies any interval changes in medications, medical condition, mental condition, symptoms, or allergies since the last office visit  Review of Systems:    Review of Systems   Respiratory: Positive for shortness of breath  Cardiovascular: Negative for chest pain  Gastrointestinal: Positive for constipation  Negative for diarrhea, nausea and vomiting  Musculoskeletal: Positive for gait problem (difficulty walking/ decreased range of motion) and joint swelling (joint stiffness)  Negative for arthralgias and myalgias  Skin: Negative for rash  Neurological: Positive for dizziness and weakness (muscle)  Negative for seizures  All other systems reviewed and are negative          Patient Active Problem List   Diagnosis    Acquired hypothyroidism    T[facility]DM (type 2 diabetes mellitus) [facility]    Lung nodules    Morbid obesity with BMI of 40 0-44 9, adult ([facility] )    [facility]    Acute on chronic systolic heart failure (HCC)    Asthma    Chronic pain    Constipation due to opioid therapy    Restless leg    Sleep apnea    Pancreatic cyst    Dyspnea on exertion    Anxiety    Obesity hypoventilation syndrome (HCC)    TIA (transient ischemic attack)    Pain in right hip    Trochanteric bursitis of right hip    Influenzal tracheobronchitis    HTN (hypertension)    HLD (hyperlipidemia)    Hypothyroidism    Abnormal CT scan, liver    Primary osteoarthritis involving multiple joints    Balance problem    Chronic pain disorder    Concussion    Daytime hypersomnolence    Depression    Diabetes mellitus with neuropathy (HCC)    Diastolic dysfunction    Edema    Elevated LFTs    Greater trochanteric bursitis of right hip    Iliotibial band syndrome, right leg    Influenza    Meningiomas, multiple ([facility]    Mild persistent asthma with acute exacerbation    Moderate episode of recurrent major depressive disorder ([facility] )    Morbid obesity due to excess calories ([facility] )    Pulmonary emphysema ([facility] )   [facility] discharge follow-up       Past Medical History:   Diagnosis Date    Arthritis     Asthma     Diabetes mellitus ([facility] )     Disease of thyroid gland     Hypertension     Mitral valve regurgitation     Obesity hypoventilation syndrome (HCC)     Pain     right hip    Restless leg syndrome     Sleep related hypoxia     Thyroid cancer ([facility] )        Past Surgical History:   Procedure Laterality Date    CHOLECYSTECTOMY      EYE SURGERY      DE ERCP DX COLLECTION SPECIMEN BRUSHING/WASHING N/A 5/30/2017    Procedure: ENDOSCOPIC RETROGRADE CHOLANGIOPANCREATOGRAPHY (ERCP); SPHINCTEROTOMY;  Surgeon: Rasheed Mckinley MD;  [facility]; Service: Gastroenterology    REPLACEMENT TOTAL KNEE BILATERAL      SKIN BIOPSY      melanoma of back    STEROID INJECTION HIP Right 12/21/2017    Procedure: TROCHANTERIC BURSA INJECTION RIGHT;  Surgeon: Los Fletcher MD;  Location[facility] MAIN OR;  Service: Pain Management     THORACOTOMY Right     at least 40 years, for pericardial cyst    THYROIDECTOMY, PARTIAL      For thyroid cancer       Family History   Problem Relation Age of Onset    Cancer Father     Diabetes Sister     Substance Abuse Neg Hx     Mental illness Neg Hx        Social History     Occupational History    Not on file       Social History Main Topics    Smoking status: Never Smoker    Smokeless tobacco: Never Used    Alcohol use No    Drug use: No    Sexual activity: Yes     Partners: Male         Current Outpatient Prescriptions:     ascorbic acid (VITAMIN C) 500 mg tablet, Take 500 mg by mouth daily, Disp: , Rfl:     aspirin 325 mg tablet, Take 1 tablet by mouth daily, Disp: , Rfl:     bisacodyl (DULCOLAX) 5 mg EC tablet, Take 10 mg by mouth daily at bedtime, Disp: , Rfl:     COD LIVER OIL PO, Take by mouth daily, Disp: , Rfl:     dextromethorphan-guaiFENesin (ROBITUSSIN DM)  mg/5 mL syrup, Take 10 mL by mouth every 4 (four) hours as needed for cough, Disp: 118 mL, Rfl: 0    escitalopram (LEXAPRO) 10 mg tablet, Take 1 tablet (10 mg total) by mouth daily, Disp: 30 tablet, Rfl: 3    folic acid (FOLVITE) 1 mg tablet, Take by mouth daily, Disp: , Rfl:     furosemide (LASIX) 40 mg tablet, Take 40 mg by mouth daily after lunch, Disp: , Rfl:     gabapentin (NEURONTIN) 100 mg capsule, , Disp: , Rfl:     ipratropium-albuterol (DUONEB) 0 5-2 5 mg/3 mL, Inhale 1 each every 4 (four) hours as needed, Disp: , Rfl:     levothyroxine 125 mcg tablet, Take 1 tablet by mouth daily, Disp: , Rfl:     metoprolol tartrate (LOPRESSOR) 25 mg tablet, Take 1 tablet by mouth 2 (two) times a day, Disp: , Rfl:     Multiple Vitamins-Minerals (MULTIVITAMIN ADULT PO), Take 1 tablet by mouth daily, Disp: , Rfl:     naloxegol oxalate (MOVANTIK) 25 MG tablet, Take by mouth, Disp: , Rfl:     NOVOLOG MIX 70/30 FLEXPEN (70-30) 100 UNIT/ML SUPN, , Disp: , Rfl:     polyethylene glycol (MIRALAX) 17 g packet, Take 17 g by mouth daily (Patient taking differently: Take 17 g by mouth 2 (two) times a day  ), Disp: 14 each, Rfl: 0    potassium chloride (K-DUR,KLOR-CON) 10 mEq tablet, Take 2 tablets by mouth daily, Disp: 30 tablet, Rfl: 0    pramipexole (MIRAPEX) 1 mg tablet, Take 1 tablet by mouth 3 (three) times a day, Disp: , Rfl:     acetaminophen (TYLENOL) 325 mg tablet, Take 2 tablets by mouth every 6 (six) hours as needed for mild pain or headaches, Disp: 30 tablet, Rfl: 0    ALPRAZolam (XANAX) 0 25 mg tablet, Take 1 tablet by mouth 3 (three) times a day as needed for anxiety for up to 10 days, Disp: 30 tablet, Rfl: 0    atorvastatin (LIPITOR) 20 mg tablet, Take 1 tablet by mouth daily with dinner, Disp: 30 tablet, Rfl: 0    budesonide (PULMICORT) 0 5 mg/2 mL nebulizer solution, Take 2 mL (0 5 mg total) by nebulization every 12 (twelve) hours, Disp: 60 mL, Rfl: 0    clotrimazole (MYCELEX) 10 mg jaswant, Take 1 tablet (10 mg total) by mouth 5 (five) times a day, Disp: 30 tablet, Rfl: 1    clotrimazole (MYCELEX) 10 mg jaswant, Take 1 tablet (10 mg total) by mouth 4 (four) times a day for 14 days, Disp: 56 tablet, Rfl: 0    diclofenac sodium (VOLTAREN) 1 %, Place on the skin, Disp: , Rfl:     dicyclomine (BENTYL) 10 mg capsule, Take 1 capsule by mouth 3 (three) times a day, Disp: , Rfl:     fentaNYL (DURAGESIC) 25 mcg/hr, , Disp: , Rfl:     lactulose 10 g/15 mL, Take 15 mL by mouth daily, Disp: , Rfl:     levalbuterol (XOPENEX) 1 25 mg/0 5 mL nebulizer solution, Take 0 5 mL (1 25 mg total) by nebulization 3 (three) times a day for 30 days, Disp: 45 mL, Rfl: 0    lisinopril (ZESTRIL) 10 mg tablet, Take 1 tablet by mouth daily, Disp: , Rfl:     LORazepam (ATIVAN) 1 mg tablet, Take by mouth, Disp: , Rfl:     metolazone (ZAROXOLYN) 2 5 mg tablet, Take 1 tablet by mouth, Disp: , Rfl:     oxyCODONE-acetaminophen (PERCOCET) 5-325 mg per tablet, Take 1 tablet by mouth every 4 (four) hours as needed for moderate pain, Disp: , Rfl:     oxyCODONE-acetaminophen (PERCOCET) 5-325 mg per tablet, Take by mouth every 6 (six) hours, Disp: , Rfl:     pantoprazole (PROTONIX) 40 mg tablet, Take 1 tablet (40 mg total) by mouth daily in the early morning for 14 days, Disp: 14 tablet, Rfl: 0    predniSONE 10 mg tablet, Take 40 mg/day for 3 days then 30mg/day for 3 days then 20mg/day for 3 days then 10mg/day for 3 days, Disp: 30 tablet, Rfl: 0   torsemide (DEMADEX) 20 mg tablet, Take 1 tablet (20 mg total) by mouth daily, Disp: 30 tablet, Rfl: 3    Current Facility-Administered Medications:     levalbuterol (XOPENEX) inhalation solution 1 25 mg, 1 25 mg, Nebulization, 4x Daily, FATIMAH Ayala    Allergies   Allergen Reactions    Ketorolac Swelling    Latex Rash       Physical Exam:    BP (!) 116/48 (BP Location: Left arm, Patient Position: Sitting, Cuff Size: Standard)   Pulse 103   Temp 97 8 °F (36 6 °C) (Oral)   Resp 20   Ht 5' 9" (1 753 m)   Wt 106 kg (232 lb 9 6 oz)   LMP  (LMP Unknown)   BMI 34 35 kg/m²     Constitutional:overweight  Eyes:anicteric  HEENT:grossly intact  Neck:supple, symmetric, trachea midline and no masses   Pulmonary:even and unlabored  Cardiovascular:No edema or pitting edema present  Skin:Normal without rashes or lesions and well hydrated  Psychiatric:Mood and affect appropriate  Neurologic:Cranial Nerves II-XII grossly intact  Musculoskeletal:normal     Lumbar Spine Exam    Appearance:  Normal lordosis  Palpation/Tenderness:  right sacroiliac joint tenderness, TIMI testing is positive on the right and negative on the left    Sensory:  no sensory deficits noted  Range of Motion:  Full range of motion with no pain or limitations in flexion, extension, lateral flexion and rotation

## 2018-02-14 NOTE — LETTER
February 14, 2018     Roz George MD  [address] [address]    Patient: Cal Lucero   YOB: 1940   Date of Visit: 2/14/2018       Dear Dr Balaji Perez: Thank you for referring Cal Lucero to me for evaluation  Below are my notes for this consultation  If you have questions, please do not hesitate to call me  I look forward to following your patient along with you           Sincerely,        Mei Ace MD        CC: No Recipients

## 2018-02-14 NOTE — PATIENT INSTRUCTIONS
Take dulcolax and miralax as you have been  Stop movantik  Take linzess 72mcg once daily, if not helping after 3 days, take 2 pills daily   Call next week with an updated  Follow up in 3 months

## 2018-02-14 NOTE — LETTER
February 14, 2018     Toya Obregon MD  [address] [place] [address] [address] [address]    Patient: Vamshi Santiago   YOB: 1940   Date of Visit: 2/14/2018       Dear Dr Macarena Whiting: Thank you for referring Vamshi Santiago to me for evaluation  Below are my notes for this consultation  If you have questions, please do not hesitate to call me  I look forward to following your patient along with you  Sincerely,        Adan Mosley MD        CC: No Recipients  Adan Mosley MD  2/14/2018 11:15 AM  Sign at close encounter  Pain Medicine Follow-Up Note    Assessment:  1  Chronic pain syndrome    2  Chronic right-sided low back pain without sciatica    3  Sacroiliitis ([facility] )        Plan:  My impressions and treatment recommendations were discussed in detail with the patient who verbalized understanding and had no further questions  Given that the patient has signs and symptoms consistent with right sacroiliitis, discussed the rationale of undergoing a right sacroiliac joint injection since this could be potentially therapeutic  The procedures, its risks, and benefits were explained in detail to the patient  Risks include but are not limited to bleeding, infection, hematoma formation, abscess formation, weakness, headache, failure the pain to improve, nerve irritation or damage, and potential worsening of the pain  The patient verbalized understanding and wished to proceed with the procedure  I did discuss with the patient that she only had 2 days worth of relief following the right trochanteric bursa injection  If she only gets temporary relief following the right sacroiliac joint injection, I will have her follow up with a hip specialist to see if the right hip could be serving as the pathology for her pain  The patient also saw a rheumatologist, Dr Ofelia Michelle, but I do not currently have any records from his office  She states that she was given a diagnosis, but is not sure what it is    She also was started on gabapentin through his office  Follow-up is planned in 4 time or sooner as warranted  Discharge instructions were provided  I personally saw and examined the patient and I agree with the above discussed plan of care  History of Present Illness:    Negar Shields is a 77 y o  female who presents to [facility] for interval re-evaluation of the above stated pain complaints  The patient has a past medical and chronic pain history as outlined in the assessment section  She was last seen on January 16, 2018  At today's office visit, the patient's pain score is 10/10 on the verbal numerical pain rating scale  The patient states that her primary pain complaint involves her right buttocks and right hip region  She describes her pain as worse in the morning, evening, and night  Her pain is intermittent in nature and she reports the quality of her pain is burning, cramping, and pressure like    The patient states that she would like an injection at today's office visit because she is in such severe pain  Other than as stated above, the patient denies any interval changes in medications, medical condition, mental condition, symptoms, or allergies since the last office visit  Review of Systems:    Review of Systems   Respiratory: Positive for shortness of breath  Cardiovascular: Negative for chest pain  Gastrointestinal: Positive for constipation  Negative for diarrhea, nausea and vomiting  Musculoskeletal: Positive for gait problem (difficulty walking/ decreased range of motion) and joint swelling (joint stiffness)  Negative for arthralgias and myalgias  Skin: Negative for rash  Neurological: Positive for dizziness and weakness (muscle)  Negative for seizures  All other systems reviewed and are negative          Patient Active Problem List   Diagnosis    Acquired hypothyroidism    T2DM (type 2 diabetes mellitus) ([facility] )    Lung nodules    Morbid obesity with BMI of 40 0-44 9, adult (HCC)    Opioid dependence ([address] )    Acute on chronic systolic heart failure (HCC)    Asthma    Chronic pain    Constipation due to opioid therapy    Restless leg    Sleep apnea    Pancreatic cyst    Dyspnea on exertion    Anxiety    Obesity hypoventilation syndrome (HCC)    TIA (transient ischemic attack)    Pain in right hip    Trochanteric bursitis of right hip    Influenzal tracheobronchitis    HTN (hypertension)    HLD (hyperlipidemia)    Hypothyroidism    Abnormal CT scan, liver    Primary osteoarthritis involving multiple joints    Balance problem    Chronic pain disorder    Concussion    Daytime hypersomnolence    Depression    Diabetes mellitus with neuropathy (HCC)    Diastolic dysfunction    Edema    Elevated LFTs    Greater trochanteric bursitis of right hip    Iliotibial band syndrome, right leg    Influenza    Meningiomas, multiple (HCC)    Mild persistent asthma with acute exacerbation    Moderate episode of recurrent major depressive disorder ([address] )    Morbid obesity due to excess calories ([address] )    Pulmonary emphysema ([facility]   [facility]       Past Medical History:   Diagnosis Date    Arthritis     Asthma     Diabetes mellitus ([facility]     Disease of thyroid gland     Hypertension     Mitral valve regurgitation     Obesity hypoventilation syndrome (HCC)     Pain     right hip    Restless leg syndrome     Sleep related hypoxia     Thyroid cancer (HCC)        Past Surgical History:   Procedure Laterality Date    CHOLECYSTECTOMY      EYE SURGERY      [place] ERCP DX COLLECTION SPECIMEN BRUSHING/WASHING N/A 5/30/2017    Procedure: ENDOSCOPIC RETROGRADE CHOLANGIOPANCREATOGRAPHY (ERCP); SPHINCTEROTOMY;  Surgeon: Solo Slaughter MD;  [facility];   Service: Gastroenterology    REPLACEMENT TOTAL KNEE BILATERAL      SKIN BIOPSY      melanoma of back    STEROID INJECTION HIP Right 12/21/2017    Procedure: TROCHANTERIC BURSA INJECTION RIGHT;  Surgeon: Otis Banegas MD;  Location[facility] MAIN OR;  Service: Pain Management     THORACOTOMY Right     at least 40 years, for pericardial cyst    THYROIDECTOMY, PARTIAL      For thyroid cancer       Family History   Problem Relation Age of Onset   Almaz Parks Cancer Father     Diabetes Sister     Substance Abuse Neg Hx     Mental illness Neg Hx        Social History     Occupational History    Not on file       Social History Main Topics    Smoking status: Never Smoker    Smokeless tobacco: Never Used    Alcohol use No    Drug use: No    Sexual activity: Yes     Partners: Male         Current Outpatient Prescriptions:     ascorbic acid (VITAMIN C) 500 mg tablet, Take 500 mg by mouth daily, Disp: , Rfl:     aspirin 325 mg tablet, Take 1 tablet by mouth daily, Disp: , Rfl:     bisacodyl (DULCOLAX) 5 mg EC tablet, Take 10 mg by mouth daily at bedtime, Disp: , Rfl:     COD LIVER OIL PO, Take by mouth daily, Disp: , Rfl:     dextromethorphan-guaiFENesin (ROBITUSSIN DM)  mg/5 mL syrup, Take 10 mL by mouth every 4 (four) hours as needed for cough, Disp: 118 mL, Rfl: 0    escitalopram (LEXAPRO) 10 mg tablet, Take 1 tablet (10 mg total) by mouth daily, Disp: 30 tablet, Rfl: 3    folic acid (FOLVITE) 1 mg tablet, Take by mouth daily, Disp: , Rfl:     furosemide (LASIX) 40 mg tablet, Take 40 mg by mouth daily after lunch, Disp: , Rfl:     gabapentin (NEURONTIN) 100 mg capsule, , Disp: , Rfl:     ipratropium-albuterol (DUONEB) 0 5-2 5 mg/3 mL, Inhale 1 each every 4 (four) hours as needed, Disp: , Rfl:     levothyroxine 125 mcg tablet, Take 1 tablet by mouth daily, Disp: , Rfl:     metoprolol tartrate (LOPRESSOR) 25 mg tablet, Take 1 tablet by mouth 2 (two) times a day, Disp: , Rfl:     Multiple Vitamins-Minerals (MULTIVITAMIN ADULT PO), Take 1 tablet by mouth daily, Disp: , Rfl:     naloxegol oxalate (MOVANTIK) 25 MG tablet, Take by mouth, Disp: , Rfl:   Yaniv Alvarado MIX 70/30 FLEXPEN (70-30) 100 UNIT/ML SUPN, , Disp: , Rfl:     polyethylene glycol (MIRALAX) 17 g packet, Take 17 g by mouth daily (Patient taking differently: Take 17 g by mouth 2 (two) times a day  ), Disp: 14 each, Rfl: 0    potassium chloride (K-DUR,KLOR-CON) 10 mEq tablet, Take 2 tablets by mouth daily, Disp: 30 tablet, Rfl: 0    pramipexole (MIRAPEX) 1 mg tablet, Take 1 tablet by mouth 3 (three) times a day, Disp: , Rfl:     acetaminophen (TYLENOL) 325 mg tablet, Take 2 tablets by mouth every 6 (six) hours as needed for mild pain or headaches, Disp: 30 tablet, Rfl: 0    ALPRAZolam (XANAX) 0 25 mg tablet, Take 1 tablet by mouth 3 (three) times a day as needed for anxiety for up to 10 days, Disp: 30 tablet, Rfl: 0    atorvastatin (LIPITOR) 20 mg tablet, Take 1 tablet by mouth daily with dinner, Disp: 30 tablet, Rfl: 0    budesonide (PULMICORT) 0 5 mg/2 mL nebulizer solution, Take 2 mL (0 5 mg total) by nebulization every 12 (twelve) hours, Disp: 60 mL, Rfl: 0    clotrimazole (MYCELEX) 10 mg jaswant, Take 1 tablet (10 mg total) by mouth 5 (five) times a day, Disp: 30 tablet, Rfl: 1    clotrimazole (MYCELEX) 10 mg jaswant, Take 1 tablet (10 mg total) by mouth 4 (four) times a day for 14 days, Disp: 56 tablet, Rfl: 0    diclofenac sodium (VOLTAREN) 1 %, Place on the skin, Disp: , Rfl:     dicyclomine (BENTYL) 10 mg capsule, Take 1 capsule by mouth 3 (three) times a day, Disp: , Rfl:     fentaNYL (DURAGESIC) 25 mcg/hr, , Disp: , Rfl:     lactulose 10 g/15 mL, Take 15 mL by mouth daily, Disp: , Rfl:     levalbuterol (XOPENEX) 1 25 mg/0 5 mL nebulizer solution, Take 0 5 mL (1 25 mg total) by nebulization 3 (three) times a day for 30 days, Disp: 45 mL, Rfl: 0    lisinopril (ZESTRIL) 10 mg tablet, Take 1 tablet by mouth daily, Disp: , Rfl:     LORazepam (ATIVAN) 1 mg tablet, Take by mouth, Disp: , Rfl:     metolazone (ZAROXOLYN) 2 5 mg tablet, Take 1 tablet by mouth, Disp: , Rfl:    oxyCODONE-acetaminophen (PERCOCET) 5-325 mg per tablet, Take 1 tablet by mouth every 4 (four) hours as needed for moderate pain, Disp: , Rfl:     oxyCODONE-acetaminophen (PERCOCET) 5-325 mg per tablet, Take by mouth every 6 (six) hours, Disp: , Rfl:     pantoprazole (PROTONIX) 40 mg tablet, Take 1 tablet (40 mg total) by mouth daily in the early morning for 14 days, Disp: 14 tablet, Rfl: 0    predniSONE 10 mg tablet, Take 40 mg/day for 3 days then 30mg/day for 3 days then 20mg/day for 3 days then 10mg/day for 3 days, Disp: 30 tablet, Rfl: 0    torsemide (DEMADEX) 20 mg tablet, Take 1 tablet (20 mg total) by mouth daily, Disp: 30 tablet, Rfl: 3    Current Facility-Administered Medications:     levalbuterol (XOPENEX) inhalation solution 1 25 mg, 1 25 mg, Nebulization, 4x Daily, FATIMAH Klein    Allergies   Allergen Reactions    Ketorolac Swelling    Latex Rash       Physical Exam:    BP (!) 116/48 (BP Location: Left arm, Patient Position: Sitting, Cuff Size: Standard)   Pulse 103   Temp 97 8 °F (36 6 °C) (Oral)   Resp 20   Ht 5' 9" (1 753 m)   Wt 106 kg (232 lb 9 6 oz)   LMP  (LMP Unknown)   BMI 34 35 kg/m²      Constitutional:overweight  Eyes:anicteric  HEENT:grossly intact  Neck:supple, symmetric, trachea midline and no masses   Pulmonary:even and unlabored  Cardiovascular:No edema or pitting edema present  Skin:Normal without rashes or lesions and well hydrated  Psychiatric:Mood and affect appropriate  Neurologic:Cranial Nerves II-XII grossly intact  Musculoskeletal:normal     Lumbar Spine Exam    Appearance:  Normal lordosis  Palpation/Tenderness:  right sacroiliac joint tenderness, TIMI testing is positive on the right and negative on the left    Sensory:  no sensory deficits noted  Range of Motion:  Full range of motion with no pain or limitations in flexion, extension, lateral flexion and rotation

## 2018-02-14 NOTE — PATIENT INSTRUCTIONS
Sacroiliac Joint Injection      What is the sacroiliac joint and why is a sacroiliac joint injection helpful? The sacroiliac joint is a large joint in the region of your low back and buttocks  When the joint becomes painful it can cause pain in its immediate region or it can refer pain into your groin, abdomen or leg  A sacroiliac joint injection serves several purposes  First, by placing numbing medicine into the joint, the amount of immediate pain relief you experience will help confirm or deny the joint as a source of your pain  Additionally, the temporary pain relief of the numbing medicine may better allow a physical therapist to treat the joint  Also, steroid will serve to reduce any presumed inflammation within your joint and further assist the physical therapist or chiropractor, if necessary  The procedure serves both diagnostic and therapeutic purposes  What happens during the procedure? Lying on your stomach, the skin on your buttocks will be well cleaned  The physician will numb a small area of skin which stings for a few seconds  Next, the physician will use X-ray guidance to direct a very small needle into the joint, and then he will inject several drops of contrast dye to confirm that the medicine goes into the joint  Then, a small amount of numbing medicine and anti-inflammatory steroid will be slowly injected  What happens after the procedure? A dressing may be applied to the injection site  You will remain in the office for about 15-20 minutes and the nurse will monitor your blood pressure and pulse  The nurse will review your discharge instructions and you will be able to go home  You may experience numbness or weakness to the affected limb for a few hours after the procedure  If this happens do not walk without assistance  Your physician may refer you to a physical therapist while the anti-inflammatory steroid is still working      General Pre/Post Instructions  Eating  You may eat a light, but not full meal at least one hour before the procedure, unless receiving intravenous sedation  If you are an insulin dependent diabetic do not alter your normal food intake  Medications  Take your routine medications before the procedure (such as high blood pressure and diabetes medications) except for those that need to be discontinued five days before the procedure such as aspirin and all anti-inflammatory medications (e g  Motrin/Ibuprofen, Aleve, Relafen, Daypro)  These medicines may be re-started the day after the procedure  You may take your regular pain medicine as needed before/after the procedure  If you are taking Coumadin, Heparin, Lovenox, Plavix or Ticlid you must notify the office so that the timing of stopping these medications can be explained  Exercise  You must bring a  with you  You may return to your current level of activities the next day including return to work  Things that may Delay the Procedure  If you are on antibiotics please notify our office; we may delay the procedure  If you have an active infection or fever we will not do the procedure

## 2018-02-21 ENCOUNTER — HOSPITAL ENCOUNTER (OUTPATIENT)
Facility: AMBULARY SURGERY CENTER | Age: 78
Setting detail: OUTPATIENT SURGERY
Discharge: HOME | End: 2018-02-21
Attending: ANESTHESIOLOGY | Admitting: ANESTHESIOLOGY
Payer: COMMERCIAL

## 2018-02-21 LAB — GLUCOSE SERPL-MCNC: 240 MG/DL (ref 65–140)

## 2018-02-21 PROCEDURE — 82948 REAGENT STRIP/BLOOD GLUCOSE: CPT

## 2018-02-21 NOTE — INTERVAL H&P NOTE
H&P updated  The patient was examined and is currently having active oral thrush along with uncontrolled diabetes mellitus  The patient will need to get her blood glucose under better control and complete her course of antifungal agents before proceeding with the injection  We are cancelling the procedure at today's visit

## 2018-02-21 NOTE — H&P (VIEW-ONLY)
Pain Medicine Follow-Up Note    Assessment:  1  Chronic pain syndrome    2  Chronic right-sided low back pain without sciatica    3  Sacroiliitis ([address] )        Plan:  My impressions and treatment recommendations were discussed in detail with the patient who verbalized understanding and had no further questions  Given that the patient has signs and symptoms consistent with right sacroiliitis, discussed the rationale of undergoing a right sacroiliac joint injection since this could be potentially therapeutic  The procedures, its risks, and benefits were explained in detail to the patient  Risks include but are not limited to bleeding, infection, hematoma formation, abscess formation, weakness, headache, failure the pain to improve, nerve irritation or damage, and potential worsening of the pain  The patient verbalized understanding and wished to proceed with the procedure  I did discuss with the patient that she only had 2 days worth of relief following the right trochanteric bursa injection  If she only gets temporary relief following the right sacroiliac joint injection, I will have her follow up with a hip specialist to see if the right hip could be serving as the pathology for her pain  The patient also saw a rheumatologist, Dr Arline Mix, but I do not currently have any records from his office  She states that she was given a diagnosis, but is not sure what it is  She also was started on gabapentin through his office  Follow-up is planned in 4 time or sooner as warranted  Discharge instructions were provided  I personally saw and examined the patient and I agree with the above discussed plan of care  History of Present Illness:    Brady Valencia is a 77 y o  female who presents to [facility] for interval re-evaluation of the above stated pain complaints  The patient has a past medical and chronic pain history as outlined in the assessment section   She was last seen on January 16, 2018  At today's office visit, the patient's pain score is 10/10 on the verbal numerical pain rating scale  The patient states that her primary pain complaint involves her right buttocks and right hip region  She describes her pain as worse in the morning, evening, and night  Her pain is intermittent in nature and she reports the quality of her pain is burning, cramping, and pressure like    The patient states that she would like an injection at today's office visit because she is in such severe pain  Other than as stated above, the patient denies any interval changes in medications, medical condition, mental condition, symptoms, or allergies since the last office visit  Review of Systems:    Review of Systems   Respiratory: Positive for shortness of breath  Cardiovascular: Negative for chest pain  Gastrointestinal: Positive for constipation  Negative for diarrhea, nausea and vomiting  Musculoskeletal: Positive for gait problem (difficulty walking/ decreased range of motion) and joint swelling (joint stiffness)  Negative for arthralgias and myalgias  Skin: Negative for rash  Neurological: Positive for dizziness and weakness (muscle)  Negative for seizures  All other systems reviewed and are negative          Patient Active Problem List   Diagnosis    Acquired hypothyroidism    T2DM (type 2 diabetes mellitus) [facility]    Lung nodules    Morbid obesity with BMI of 40 0-44 9, adult ([facility] )    [facility]    Acute on chronic systolic heart failure (HCC)    Asthma    Chronic pain    Constipation due to opioid therapy    Restless leg    Sleep apnea    Pancreatic cyst    Dyspnea on exertion    Anxiety    Obesity hypoventilation syndrome (HCC)    TIA (transient ischemic attack)    Pain in right hip    Trochanteric bursitis of right hip    Influenzal tracheobronchitis    HTN (hypertension)    HLD (hyperlipidemia)    Hypothyroidism    Abnormal CT scan, liver    Primary osteoarthritis involving multiple joints    Balance problem    Chronic pain disorder    Concussion    Daytime hypersomnolence    Depression    Diabetes mellitus with neuropathy (HCC)    Diastolic dysfunction    Edema    Elevated LFTs    Greater trochanteric bursitis of right hip    Iliotibial band syndrome, right leg    Influenza    Meningiomas, multiple ([facility] )    Mild persistent asthma with acute exacerbation    Moderate episode of recurrent major depressive disorder ([facility] )    Morbid obesity due to excess calories ([facility] )    Pulmonary emphysema ([facility] )   [facility] discharge follow-up       Past Medical History:   Diagnosis Date    Arthritis     Asthma     Diabetes mellitus ([facility] )     Disease of thyroid gland     Hypertension     Mitral valve regurgitation     Obesity hypoventilation syndrome (HCC)     Pain     right hip    Restless leg syndrome     Sleep related hypoxia     Thyroid cancer ([facility] )        Past Surgical History:   Procedure Laterality Date    CHOLECYSTECTOMY      EYE SURGERY      MO ERCP DX COLLECTION SPECIMEN BRUSHING/WASHING N/A 5/30/2017    Procedure: ENDOSCOPIC RETROGRADE CHOLANGIOPANCREATOGRAPHY (ERCP); SPHINCTEROTOMY;  Surgeon: Kourtney Gruber MD;  Location:  GI LAB; Service: Gastroenterology    REPLACEMENT TOTAL KNEE BILATERAL      SKIN BIOPSY      melanoma of back    STEROID INJECTION HIP Right 12/21/2017    Procedure: TROCHANTERIC BURSA INJECTION RIGHT;  Surgeon: Walt Hidalgo MD;  Location: [place] [address] MAIN OR;  Service: Pain Management     THORACOTOMY Right     at least 40 years, for pericardial cyst    THYROIDECTOMY, PARTIAL      For thyroid cancer       Family History   Problem Relation Age of Onset    Cancer Father     Diabetes Sister     Substance Abuse Neg Hx     Mental illness Neg Hx        Social History     Occupational History    Not on file       Social History Main Topics    Smoking status: Never Smoker    Smokeless tobacco: Never Used    Alcohol use No    Drug use: No    Sexual activity: Yes     Partners: Male         Current Outpatient Prescriptions:     ascorbic acid (VITAMIN C) 500 mg tablet, Take 500 mg by mouth daily, Disp: , Rfl:     aspirin 325 mg tablet, Take 1 tablet by mouth daily, Disp: , Rfl:     bisacodyl (DULCOLAX) 5 mg EC tablet, Take 10 mg by mouth daily at bedtime, Disp: , Rfl:     COD LIVER OIL PO, Take by mouth daily, Disp: , Rfl:     dextromethorphan-guaiFENesin (ROBITUSSIN DM)  mg/5 mL syrup, Take 10 mL by mouth every 4 (four) hours as needed for cough, Disp: 118 mL, Rfl: 0    escitalopram (LEXAPRO) 10 mg tablet, Take 1 tablet (10 mg total) by mouth daily, Disp: 30 tablet, Rfl: 3    folic acid (FOLVITE) 1 mg tablet, Take by mouth daily, Disp: , Rfl:     furosemide (LASIX) 40 mg tablet, Take 40 mg by mouth daily after lunch, Disp: , Rfl:     gabapentin (NEURONTIN) 100 mg capsule, , Disp: , Rfl:     ipratropium-albuterol (DUONEB) 0 5-2 5 mg/3 mL, Inhale 1 each every 4 (four) hours as needed, Disp: , Rfl:     levothyroxine 125 mcg tablet, Take 1 tablet by mouth daily, Disp: , Rfl:     metoprolol tartrate (LOPRESSOR) 25 mg tablet, Take 1 tablet by mouth 2 (two) times a day, Disp: , Rfl:     Multiple Vitamins-Minerals (MULTIVITAMIN ADULT PO), Take 1 tablet by mouth daily, Disp: , Rfl:     naloxegol oxalate (MOVANTIK) 25 MG tablet, Take by mouth, Disp: , Rfl:     NOVOLOG MIX 70/30 FLEXPEN (70-30) 100 UNIT/ML SUPN, , Disp: , Rfl:     polyethylene glycol (MIRALAX) 17 g packet, Take 17 g by mouth daily (Patient taking differently: Take 17 g by mouth 2 (two) times a day  ), Disp: 14 each, Rfl: 0    potassium chloride (K-DUR,KLOR-CON) 10 mEq tablet, Take 2 tablets by mouth daily, Disp: 30 tablet, Rfl: 0    pramipexole (MIRAPEX) 1 mg tablet, Take 1 tablet by mouth 3 (three) times a day, Disp: , Rfl:     acetaminophen (TYLENOL) 325 mg tablet, Take 2 tablets by mouth every 6 (six) hours as needed for mild pain or headaches, Disp: 30 tablet, Rfl: 0    ALPRAZolam (XANAX) 0 25 mg tablet, Take 1 tablet by mouth 3 (three) times a day as needed for anxiety for up to 10 days, Disp: 30 tablet, Rfl: 0    atorvastatin (LIPITOR) 20 mg tablet, Take 1 tablet by mouth daily with dinner, Disp: 30 tablet, Rfl: 0    budesonide (PULMICORT) 0 5 mg/2 mL nebulizer solution, Take 2 mL (0 5 mg total) by nebulization every 12 (twelve) hours, Disp: 60 mL, Rfl: 0    clotrimazole (MYCELEX) 10 mg jaswant, Take 1 tablet (10 mg total) by mouth 5 (five) times a day, Disp: 30 tablet, Rfl: 1    clotrimazole (MYCELEX) 10 mg jaswant, Take 1 tablet (10 mg total) by mouth 4 (four) times a day for 14 days, Disp: 56 tablet, Rfl: 0    diclofenac sodium (VOLTAREN) 1 %, Place on the skin, Disp: , Rfl:     dicyclomine (BENTYL) 10 mg capsule, Take 1 capsule by mouth 3 (three) times a day, Disp: , Rfl:     fentaNYL (DURAGESIC) 25 mcg/hr, , Disp: , Rfl:     lactulose 10 g/15 mL, Take 15 mL by mouth daily, Disp: , Rfl:     levalbuterol (XOPENEX) 1 25 mg/0 5 mL nebulizer solution, Take 0 5 mL (1 25 mg total) by nebulization 3 (three) times a day for 30 days, Disp: 45 mL, Rfl: 0    lisinopril (ZESTRIL) 10 mg tablet, Take 1 tablet by mouth daily, Disp: , Rfl:     LORazepam (ATIVAN) 1 mg tablet, Take by mouth, Disp: , Rfl:     metolazone (ZAROXOLYN) 2 5 mg tablet, Take 1 tablet by mouth, Disp: , Rfl:     oxyCODONE-acetaminophen (PERCOCET) 5-325 mg per tablet, Take 1 tablet by mouth every 4 (four) hours as needed for moderate pain, Disp: , Rfl:     oxyCODONE-acetaminophen (PERCOCET) 5-325 mg per tablet, Take by mouth every 6 (six) hours, Disp: , Rfl:     pantoprazole (PROTONIX) 40 mg tablet, Take 1 tablet (40 mg total) by mouth daily in the early morning for 14 days, Disp: 14 tablet, Rfl: 0    predniSONE 10 mg tablet, Take 40 mg/day for 3 days then 30mg/day for 3 days then 20mg/day for 3 days then 10mg/day for 3 days, Disp: 30 tablet, Rfl: 0   torsemide (DEMADEX) 20 mg tablet, Take 1 tablet (20 mg total) by mouth daily, Disp: 30 tablet, Rfl: 3    Current Facility-Administered Medications:     levalbuterol (XOPENEX) inhalation solution 1 25 mg, 1 25 mg, Nebulization, 4x Daily, [employer], CRNP    Allergies   Allergen Reactions    Ketorolac Swelling    Latex Rash       Physical Exam:    BP (!) 116/48 (BP Location: Left arm, Patient Position: Sitting, Cuff Size: Standard)   Pulse 103   Temp 97 8 °F (36 6 °C) (Oral)   Resp 20   Ht 5' 9" (1 753 m)   Wt 106 kg (232 lb 9 6 oz)   LMP  (LMP Unknown)   BMI 34 35 kg/m²     Constitutional:overweight  Eyes:anicteric  HEENT:grossly intact  Neck:supple, symmetric, trachea midline and no masses   Pulmonary:even and unlabored  Cardiovascular:No edema or pitting edema present  Skin:Normal without rashes or lesions and well hydrated  Psychiatric:Mood and affect appropriate  Neurologic:Cranial Nerves II-XII grossly intact  Musculoskeletal:normal     Lumbar Spine Exam    Appearance:  Normal lordosis  Palpation/Tenderness:  right sacroiliac joint tenderness, TIMI testing is positive on the right and negative on the left    Sensory:  no sensory deficits noted  Range of Motion:  Full range of motion with no pain or limitations in flexion, extension, lateral flexion and rotation

## 2018-02-22 ENCOUNTER — OFFICE VISIT (OUTPATIENT)
Dept: FAMILY MEDICINE CLINIC | Facility: CLINIC | Age: 78
End: 2018-02-22
Payer: COMMERCIAL

## 2018-02-22 ENCOUNTER — HOSPITAL ENCOUNTER (INPATIENT)
Facility: HOSPITAL | Age: 78
LOS: 4 days | Discharge: NURSING, REHAB OR LONG-TERM CARE | DRG: 202 | End: 2018-02-26
Attending: EMERGENCY MEDICINE | Admitting: INTERNAL MEDICINE
Payer: COMMERCIAL

## 2018-02-22 ENCOUNTER — APPOINTMENT (EMERGENCY)
Dept: RADIOLOGY | Facility: HOSPITAL | Age: 78
DRG: 202 | End: 2018-02-22
Payer: COMMERCIAL

## 2018-02-22 VITALS
OXYGEN SATURATION: 85 % | TEMPERATURE: 98.1 F | WEIGHT: 234.6 LBS | HEART RATE: 87 BPM | HEIGHT: 62 IN | DIASTOLIC BLOOD PRESSURE: 70 MMHG | RESPIRATION RATE: 18 BRPM | SYSTOLIC BLOOD PRESSURE: 130 MMHG | BODY MASS INDEX: 43.17 KG/M2

## 2018-02-22 DIAGNOSIS — J44.1 COPD EXACERBATION (HCC): ICD-10-CM

## 2018-02-22 DIAGNOSIS — E11.40 DIABETES MELLITUS WITH NEUROPATHY (HCC): ICD-10-CM

## 2018-02-22 DIAGNOSIS — J18.9 PNEUMONIA OF BOTH LUNGS DUE TO INFECTIOUS ORGANISM, UNSPECIFIED PART OF LUNG: ICD-10-CM

## 2018-02-22 DIAGNOSIS — I50.23 ACUTE ON CHRONIC SYSTOLIC HEART FAILURE (HCC): Chronic | ICD-10-CM

## 2018-02-22 DIAGNOSIS — Z09 HOSPITAL DISCHARGE FOLLOW-UP: ICD-10-CM

## 2018-02-22 DIAGNOSIS — G89.29 CHRONIC PAIN: Chronic | ICD-10-CM

## 2018-02-22 DIAGNOSIS — R06.03 RESPIRATORY DISTRESS: Primary | ICD-10-CM

## 2018-02-22 DIAGNOSIS — E66.01 MORBID OBESITY DUE TO EXCESS CALORIES (HCC): ICD-10-CM

## 2018-02-22 DIAGNOSIS — J45.901 EXACERBATION OF ASTHMA, UNSPECIFIED ASTHMA SEVERITY, UNSPECIFIED WHETHER PERSISTENT: ICD-10-CM

## 2018-02-22 DIAGNOSIS — R06.02 SHORTNESS OF BREATH: Primary | ICD-10-CM

## 2018-02-22 DIAGNOSIS — R60.9 EDEMA, UNSPECIFIED TYPE: ICD-10-CM

## 2018-02-22 PROBLEM — I50.30 DIASTOLIC CHF (HCC): Status: ACTIVE | Noted: 2018-02-22

## 2018-02-22 PROBLEM — R60.0 BILATERAL LOWER EXTREMITY EDEMA: Status: ACTIVE | Noted: 2018-02-22

## 2018-02-22 PROBLEM — E87.6 HYPOKALEMIA: Status: ACTIVE | Noted: 2018-02-22

## 2018-02-22 PROBLEM — E87.0 HYPERNATREMIA: Status: ACTIVE | Noted: 2018-02-22

## 2018-02-22 PROBLEM — I87.2 CHRONIC VENOUS STASIS DERMATITIS OF BOTH LOWER EXTREMITIES: Status: ACTIVE | Noted: 2018-02-22

## 2018-02-22 LAB
ALBUMIN SERPL BCP-MCNC: 3.3 G/DL (ref 3.5–5)
ALP SERPL-CCNC: 91 U/L (ref 46–116)
ALT SERPL W P-5'-P-CCNC: 45 U/L (ref 12–78)
ARTERIAL PATENCY WRIST A: YES
ARTERIAL PATENCY WRIST A: YES
AST SERPL W P-5'-P-CCNC: 20 U/L (ref 5–45)
BASE EXCESS BLDA CALC-SCNC: 14.1 MMOL/L
BASE EXCESS BLDA CALC-SCNC: 8.2 MMOL/L
BASOPHILS # BLD AUTO: 0 THOUSANDS/ΜL (ref 0–0.1)
BASOPHILS NFR BLD AUTO: 0 % (ref 0–1)
BILIRUB SERPL-MCNC: 0.3 MG/DL (ref 0.2–1)
BILIRUB UR QL STRIP: NEGATIVE
BODY TEMPERATURE: 97.9 DEGREES FEHRENHEIT
BODY TEMPERATURE: 98.5 DEGREES FEHRENHEIT
BUN SERPL-MCNC: 19 MG/DL (ref 5–25)
CALCIUM SERPL-MCNC: 9.1 MG/DL (ref 8.3–10.1)
CHLORIDE SERPL-SCNC: 97 MMOL/L (ref 100–108)
CLARITY UR: CLEAR
CO2 SERPL-SCNC: >45 MMOL/L (ref 21–32)
COLOR UR: YELLOW
CREAT SERPL-MCNC: 0.99 MG/DL (ref 0.6–1.3)
EOSINOPHIL # BLD AUTO: 0 THOUSAND/ΜL (ref 0–0.61)
EOSINOPHIL NFR BLD AUTO: 1 % (ref 0–6)
ERYTHROCYTE [DISTWIDTH] IN BLOOD BY AUTOMATED COUNT: 16.8 % (ref 11.6–15.1)
GFR SERPL CREATININE-BSD FRML MDRD: 54 ML/MIN/1.73SQ M
GLUCOSE SERPL-MCNC: 333 MG/DL (ref 65–140)
GLUCOSE SERPL-MCNC: 504 MG/DL (ref 65–140)
GLUCOSE SERPL-MCNC: 535 MG/DL (ref 65–140)
GLUCOSE SERPL-MCNC: 553 MG/DL (ref 65–140)
GLUCOSE SERPL-MCNC: 565 MG/DL (ref 65–140)
GLUCOSE UR STRIP-MCNC: ABNORMAL MG/DL
HCO3 BLDA-SCNC: 35.4 MMOL/L (ref 22–28)
HCO3 BLDA-SCNC: 41.4 MMOL/L (ref 22–28)
HCT VFR BLD AUTO: 37.5 % (ref 37–47)
HGB BLD-MCNC: 12.1 G/DL (ref 12–16)
HGB UR QL STRIP.AUTO: NEGATIVE
IPAP: 12
KETONES UR STRIP-MCNC: NEGATIVE MG/DL
LACTATE SERPL-SCNC: 1.2 MMOL/L (ref 0.5–2)
LEUKOCYTE ESTERASE UR QL STRIP: NEGATIVE
LYMPHOCYTES # BLD AUTO: 1.4 THOUSANDS/ΜL (ref 0.6–4.47)
LYMPHOCYTES NFR BLD AUTO: 19 % (ref 14–44)
MCH RBC QN AUTO: 31 PG (ref 27–31)
MCHC RBC AUTO-ENTMCNC: 32.4 G/DL (ref 31.4–37.4)
MCV RBC AUTO: 96 FL (ref 82–98)
MONOCYTES # BLD AUTO: 0.7 THOUSAND/ΜL (ref 0.17–1.22)
MONOCYTES NFR BLD AUTO: 9 % (ref 4–12)
NASAL CANNULA: ABNORMAL
NEUTROPHILS # BLD AUTO: 5.4 THOUSANDS/ΜL (ref 1.85–7.62)
NEUTS SEG NFR BLD AUTO: 72 % (ref 43–75)
NITRITE UR QL STRIP: NEGATIVE
NON VENT- BIPAP: ABNORMAL
NRBC BLD AUTO-RTO: 0 /100 WBCS
NT-PROBNP SERPL-MCNC: 139 PG/ML
O2 CT BLDA-SCNC: 14.9 ML/DL (ref 16–23)
O2 CT BLDA-SCNC: 16.2 ML/DL (ref 16–23)
OTHER FIO2: 28 %
OXYHGB MFR BLDA: 91.9 % (ref 94–97)
OXYHGB MFR BLDA: 94.1 % (ref 94–97)
PCO2 BLDA: 59.7 MM HG (ref 36–44)
PCO2 BLDA: 65.7 MM HG (ref 36–44)
PCO2 TEMP ADJ BLDA: 59.4 MM HG (ref 36–44)
PCO2 TEMP ADJ BLDA: 64.6 MM HG (ref 36–44)
PEEP MAX SETTING VENT: 6 CM[H2O]
PH BLD: 7.39 [PH] (ref 7.35–7.45)
PH BLD: 7.42 [PH] (ref 7.35–7.45)
PH BLDA: 7.39 [PH] (ref 7.35–7.45)
PH BLDA: 7.42 [PH] (ref 7.35–7.45)
PH UR STRIP.AUTO: 5.5 [PH] (ref 5–9)
PLATELET # BLD AUTO: 251 THOUSANDS/UL (ref 130–400)
PLATELET # BLD AUTO: 265 THOUSANDS/UL (ref 130–400)
PMV BLD AUTO: 7.8 FL (ref 8.9–12.7)
PMV BLD AUTO: 8.2 FL (ref 8.9–12.7)
PO2 BLD: 73.8 MM HG (ref 75–129)
PO2 BLD: 87.9 MM HG (ref 75–129)
PO2 BLDA: 74.3 MM HG (ref 75–129)
PO2 BLDA: 90.1 MM HG (ref 75–129)
POTASSIUM SERPL-SCNC: 3.4 MMOL/L (ref 3.5–5.3)
PROT SERPL-MCNC: 6.9 G/DL (ref 6.4–8.2)
PROT UR STRIP-MCNC: NEGATIVE MG/DL
RBC # BLD AUTO: 3.91 MILLION/UL (ref 4.2–5.4)
SODIUM SERPL-SCNC: 146 MMOL/L (ref 136–145)
SP GR UR STRIP.AUTO: 1.01 (ref 1–1.03)
SPECIMEN SOURCE: ABNORMAL
SPECIMEN SOURCE: ABNORMAL
TROPONIN I SERPL-MCNC: <0.02 NG/ML
TROPONIN I SERPL-MCNC: <0.02 NG/ML
UROBILINOGEN UR QL STRIP.AUTO: 0.2 E.U./DL
WBC # BLD AUTO: 7.6 THOUSAND/UL (ref 4.8–10.8)

## 2018-02-22 PROCEDURE — 94644 CONT INHLJ TX 1ST HOUR: CPT

## 2018-02-22 PROCEDURE — 94664 DEMO&/EVAL PT USE INHALER: CPT

## 2018-02-22 PROCEDURE — 82805 BLOOD GASES W/O2 SATURATION: CPT | Performed by: EMERGENCY MEDICINE

## 2018-02-22 PROCEDURE — 99285 EMERGENCY DEPT VISIT HI MDM: CPT

## 2018-02-22 PROCEDURE — 84484 ASSAY OF TROPONIN QUANT: CPT | Performed by: STUDENT IN AN ORGANIZED HEALTH CARE EDUCATION/TRAINING PROGRAM

## 2018-02-22 PROCEDURE — 36600 WITHDRAWAL OF ARTERIAL BLOOD: CPT

## 2018-02-22 PROCEDURE — 87040 BLOOD CULTURE FOR BACTERIA: CPT | Performed by: EMERGENCY MEDICINE

## 2018-02-22 PROCEDURE — 93005 ELECTROCARDIOGRAM TRACING: CPT | Performed by: EMERGENCY MEDICINE

## 2018-02-22 PROCEDURE — 71045 X-RAY EXAM CHEST 1 VIEW: CPT

## 2018-02-22 PROCEDURE — 87081 CULTURE SCREEN ONLY: CPT | Performed by: STUDENT IN AN ORGANIZED HEALTH CARE EDUCATION/TRAINING PROGRAM

## 2018-02-22 PROCEDURE — 99222 1ST HOSP IP/OBS MODERATE 55: CPT | Performed by: STUDENT IN AN ORGANIZED HEALTH CARE EDUCATION/TRAINING PROGRAM

## 2018-02-22 PROCEDURE — 85025 COMPLETE CBC W/AUTO DIFF WBC: CPT | Performed by: EMERGENCY MEDICINE

## 2018-02-22 PROCEDURE — 84484 ASSAY OF TROPONIN QUANT: CPT | Performed by: EMERGENCY MEDICINE

## 2018-02-22 PROCEDURE — 94760 N-INVAS EAR/PLS OXIMETRY 1: CPT

## 2018-02-22 PROCEDURE — 81003 URINALYSIS AUTO W/O SCOPE: CPT | Performed by: EMERGENCY MEDICINE

## 2018-02-22 PROCEDURE — 96374 THER/PROPH/DIAG INJ IV PUSH: CPT

## 2018-02-22 PROCEDURE — 94640 AIRWAY INHALATION TREATMENT: CPT

## 2018-02-22 PROCEDURE — 80053 COMPREHEN METABOLIC PANEL: CPT | Performed by: EMERGENCY MEDICINE

## 2018-02-22 PROCEDURE — 85049 AUTOMATED PLATELET COUNT: CPT | Performed by: STUDENT IN AN ORGANIZED HEALTH CARE EDUCATION/TRAINING PROGRAM

## 2018-02-22 PROCEDURE — 94660 CPAP INITIATION&MGMT: CPT

## 2018-02-22 PROCEDURE — 36415 COLL VENOUS BLD VENIPUNCTURE: CPT | Performed by: EMERGENCY MEDICINE

## 2018-02-22 PROCEDURE — 82947 ASSAY GLUCOSE BLOOD QUANT: CPT | Performed by: STUDENT IN AN ORGANIZED HEALTH CARE EDUCATION/TRAINING PROGRAM

## 2018-02-22 PROCEDURE — 82805 BLOOD GASES W/O2 SATURATION: CPT | Performed by: STUDENT IN AN ORGANIZED HEALTH CARE EDUCATION/TRAINING PROGRAM

## 2018-02-22 PROCEDURE — 83605 ASSAY OF LACTIC ACID: CPT | Performed by: EMERGENCY MEDICINE

## 2018-02-22 PROCEDURE — 82948 REAGENT STRIP/BLOOD GLUCOSE: CPT

## 2018-02-22 PROCEDURE — 83880 ASSAY OF NATRIURETIC PEPTIDE: CPT | Performed by: EMERGENCY MEDICINE

## 2018-02-22 RX ORDER — ASPIRIN 325 MG
325 TABLET ORAL DAILY
Status: DISCONTINUED | OUTPATIENT
Start: 2018-02-23 | End: 2018-02-26 | Stop reason: HOSPADM

## 2018-02-22 RX ORDER — FUROSEMIDE 40 MG/1
40 TABLET ORAL
Status: DISCONTINUED | OUTPATIENT
Start: 2018-02-23 | End: 2018-02-24

## 2018-02-22 RX ORDER — IPRATROPIUM BROMIDE AND ALBUTEROL SULFATE 2.5; .5 MG/3ML; MG/3ML
3 SOLUTION RESPIRATORY (INHALATION)
Status: DISCONTINUED | OUTPATIENT
Start: 2018-02-22 | End: 2018-02-24

## 2018-02-22 RX ORDER — METHYLPREDNISOLONE SODIUM SUCCINATE 125 MG/2ML
125 INJECTION, POWDER, LYOPHILIZED, FOR SOLUTION INTRAMUSCULAR; INTRAVENOUS ONCE
Status: COMPLETED | OUTPATIENT
Start: 2018-02-22 | End: 2018-02-22

## 2018-02-22 RX ORDER — OXYCODONE HYDROCHLORIDE AND ACETAMINOPHEN 5; 325 MG/1; MG/1
1 TABLET ORAL EVERY 6 HOURS PRN
Status: DISCONTINUED | OUTPATIENT
Start: 2018-02-22 | End: 2018-02-24

## 2018-02-22 RX ORDER — ALBUTEROL SULFATE 2.5 MG/3ML
2.5 SOLUTION RESPIRATORY (INHALATION) EVERY 6 HOURS PRN
Status: DISCONTINUED | OUTPATIENT
Start: 2018-02-22 | End: 2018-02-24 | Stop reason: SDUPTHER

## 2018-02-22 RX ORDER — POTASSIUM CHLORIDE 29.8 MG/ML
40 INJECTION INTRAVENOUS ONCE
Status: DISCONTINUED | OUTPATIENT
Start: 2018-02-22 | End: 2018-02-22 | Stop reason: SDUPTHER

## 2018-02-22 RX ORDER — ESCITALOPRAM OXALATE 10 MG/1
10 TABLET ORAL DAILY
Status: DISCONTINUED | OUTPATIENT
Start: 2018-02-23 | End: 2018-02-26 | Stop reason: HOSPADM

## 2018-02-22 RX ORDER — METHYLPREDNISOLONE SODIUM SUCCINATE 125 MG/2ML
60 INJECTION, POWDER, LYOPHILIZED, FOR SOLUTION INTRAMUSCULAR; INTRAVENOUS EVERY 8 HOURS SCHEDULED
Status: DISCONTINUED | OUTPATIENT
Start: 2018-02-23 | End: 2018-02-23

## 2018-02-22 RX ORDER — METOLAZONE 5 MG/1
2.5 TABLET ORAL DAILY
Status: DISCONTINUED | OUTPATIENT
Start: 2018-02-23 | End: 2018-02-26 | Stop reason: HOSPADM

## 2018-02-22 RX ORDER — LISINOPRIL 10 MG/1
10 TABLET ORAL DAILY
Status: DISCONTINUED | OUTPATIENT
Start: 2018-02-23 | End: 2018-02-26 | Stop reason: HOSPADM

## 2018-02-22 RX ORDER — TORSEMIDE 20 MG/1
20 TABLET ORAL DAILY
Status: DISCONTINUED | OUTPATIENT
Start: 2018-02-23 | End: 2018-02-24

## 2018-02-22 RX ORDER — SODIUM CHLORIDE FOR INHALATION 0.9 %
VIAL, NEBULIZER (ML) INHALATION
Status: COMPLETED
Start: 2018-02-22 | End: 2018-02-22

## 2018-02-22 RX ORDER — INSULIN ASPART 100 [IU]/ML
45 INJECTION, SUSPENSION SUBCUTANEOUS
Status: DISCONTINUED | OUTPATIENT
Start: 2018-02-22 | End: 2018-02-26 | Stop reason: HOSPADM

## 2018-02-22 RX ORDER — LEVOTHYROXINE SODIUM 0.12 MG/1
125 TABLET ORAL
Status: DISCONTINUED | OUTPATIENT
Start: 2018-02-23 | End: 2018-02-26 | Stop reason: HOSPADM

## 2018-02-22 RX ORDER — IPRATROPIUM BROMIDE AND ALBUTEROL SULFATE 2.5; .5 MG/3ML; MG/3ML
3 SOLUTION RESPIRATORY (INHALATION) ONCE
Status: COMPLETED | OUTPATIENT
Start: 2018-02-22 | End: 2018-02-22

## 2018-02-22 RX ORDER — HEPARIN SODIUM 5000 [USP'U]/ML
5000 INJECTION, SOLUTION INTRAVENOUS; SUBCUTANEOUS EVERY 8 HOURS SCHEDULED
Status: DISCONTINUED | OUTPATIENT
Start: 2018-02-22 | End: 2018-02-26 | Stop reason: HOSPADM

## 2018-02-22 RX ORDER — ATORVASTATIN CALCIUM 20 MG/1
20 TABLET, FILM COATED ORAL
Status: DISCONTINUED | OUTPATIENT
Start: 2018-02-22 | End: 2018-02-22

## 2018-02-22 RX ADMIN — ALBUTEROL SULFATE 10 MG: 2.5 SOLUTION RESPIRATORY (INHALATION) at 17:11

## 2018-02-22 RX ADMIN — Medication 20 MEQ: at 21:42

## 2018-02-22 RX ADMIN — INSULIN LISPRO 8 UNITS: 100 INJECTION, SOLUTION INTRAVENOUS; SUBCUTANEOUS at 23:57

## 2018-02-22 RX ADMIN — METOPROLOL TARTRATE 25 MG: 25 TABLET ORAL at 20:40

## 2018-02-22 RX ADMIN — IPRATROPIUM BROMIDE AND ALBUTEROL SULFATE 3 ML: .5; 3 SOLUTION RESPIRATORY (INHALATION) at 15:29

## 2018-02-22 RX ADMIN — INSULIN ASPART 45 UNITS: 100 INJECTION, SUSPENSION SUBCUTANEOUS at 21:31

## 2018-02-22 RX ADMIN — METHYLPREDNISOLONE SODIUM SUCCINATE 125 MG: 125 INJECTION, POWDER, FOR SOLUTION INTRAMUSCULAR; INTRAVENOUS at 15:29

## 2018-02-22 RX ADMIN — HEPARIN SODIUM 5000 UNITS: 5000 INJECTION, SOLUTION INTRAVENOUS; SUBCUTANEOUS at 21:31

## 2018-02-22 RX ADMIN — OXYCODONE HYDROCHLORIDE AND ACETAMINOPHEN 1 TABLET: 5; 325 TABLET ORAL at 21:32

## 2018-02-22 RX ADMIN — IPRATROPIUM BROMIDE AND ALBUTEROL SULFATE 3 ML: .5; 3 SOLUTION RESPIRATORY (INHALATION) at 20:52

## 2018-02-22 RX ADMIN — INSULIN LISPRO 5 UNITS: 100 INJECTION, SOLUTION INTRAVENOUS; SUBCUTANEOUS at 22:01

## 2018-02-22 RX ADMIN — ISODIUM CHLORIDE 3 ML: 0.03 SOLUTION RESPIRATORY (INHALATION) at 17:11

## 2018-02-22 RX ADMIN — Medication 20 MEQ: at 19:39

## 2018-02-22 NOTE — PROGRESS NOTES
Assessment/Plan:    No problem-specific Assessment & Plan notes found for this encounter  Diagnoses and all orders for this visit:    Shortness of breath    Pneumonia of both lungs due to infectious organism, unspecified part of lung    Acute on chronic systolic heart failure ([address] )    Morbid obesity due to excess calories [facility])    Hospital discharge follow-up          Subjective:      Patient ID: Farhana Gonzalez is a 77 y o  female  PATIENT IS S/P RECENT [facility] ADMISSION FOR INFLUENZA AND PNEUMONIA  FOR THE PAST 2 DAYS, PATIENT HAS NOTED INCREASED COUGH, SOB AND FIGUEROA  PATIENT HAS ALSO NOTICED A MARKED INCREASE IN HER LOWER EXTREMITY EDEMA    PT DENIES ANY SPECIFIC CHEST PAIN, DOES NOTE A GLOBAL BODY DISCOMFORT, MYALGIA  NOTES NO FEVER OR SHAKING CHILLS  HAS BEEN URINATING LARGE AMOUNTS OF URINE WHEN TAKING HER DIURETIC AND HAS BEEN INCONTINENT  PT HAS NOTED NO BLOOD  APPETITE HAS BEEN DOWN  HAS FELT INCREASINGLY MORE FATIGUED  PT DENIES ANY St, HAS HAD SL CONGESTION AND HAS NOTED NO RASHES    LEGS HAVE BEEN WEEPING FLUID        The following portions of the patient's history were reviewed and updated as appropriate: allergies, current medications, past family history, past medical history, past social history, past surgical history and problem list     Review of Systems   Constitutional: Positive for activity change, appetite change and fatigue  Negative for fever  HENT: Positive for congestion  Negative for mouth sores, postnasal drip, rhinorrhea, sinus pain, sinus pressure, sneezing, sore throat and trouble swallowing  Eyes: Negative for discharge  Respiratory: Positive for cough, shortness of breath and wheezing  Cardiovascular: Positive for leg swelling  Negative for chest pain and palpitations  Gastrointestinal: Positive for constipation  Negative for abdominal pain, anal bleeding, blood in stool, diarrhea, nausea, rectal pain and vomiting  Genitourinary: Positive for urgency  Negative for difficulty urinating and dysuria  Musculoskeletal: Positive for arthralgias, back pain, gait problem and myalgias  Negative for joint swelling, neck pain and neck stiffness  Skin: Negative for rash  Neurological: Positive for dizziness, weakness and light-headedness  Negative for syncope and speech difficulty  Psychiatric/Behavioral: Positive for confusion  The patient is nervous/anxious  Objective:      /70 (BP Location: Left arm, Patient Position: Sitting, Cuff Size: Large)   Pulse 87   Temp 98 1 °F (36 7 °C) (Temporal)   Resp 18   Ht 5' 2" (1 575 m)   Wt 106 kg (234 lb 9 6 oz)   LMP  (LMP Unknown)   SpO2 (!) 85%   BMI 42 91 kg/m²          Physical Exam   Constitutional: She appears distressed  HENT:   Head: Normocephalic and atraumatic  Right Ear: Hearing normal  A middle ear effusion is present  Left Ear: Hearing normal  A middle ear effusion is present  Nose: Mucosal edema present  Mouth/Throat: Uvula is midline  No posterior oropharyngeal erythema  Eyes: Pupils are equal, round, and reactive to light  Right eye exhibits no discharge  Left eye exhibits no discharge  Right conjunctiva is injected  Left conjunctiva is injected  Neck: Normal range of motion  Neck supple  No JVD present  No tracheal deviation present  No thyromegaly present  Cardiovascular: Normal rate and regular rhythm  Murmur heard  Pulmonary/Chest: Effort normal  She has wheezes  She has rales  She exhibits no tenderness  DECREASED AIR MOVEMENT  DULL IN BOTH BASES R>>L  SCATTERED RHONCHI AND WHEEZE  FINE RALES NOTED   Abdominal: Soft  Bowel sounds are normal  She exhibits distension  She exhibits no mass  There is no tenderness  There is no rebound and no guarding  Musculoskeletal: She exhibits edema and tenderness  She exhibits no deformity     4+ EDEMA  DAMP GUAZE BANDAGES ON LOWER LEG ON R  MARKED STASIS CHANGES  ECCHYMOTIC CHANGES R FOOT   Lymphadenopathy:     She has no cervical adenopathy  Neurological: She is alert  Skin: Skin is dry  No rash noted     Psychiatric:   APPEARS SL CONFUSED AND SOMNOLENT

## 2018-02-22 NOTE — PATIENT INSTRUCTIONS
DISCUSSED FINDINGS WITH PATIENT AND JIMI  RECOMMENDED ER EVALUATION WITH POSSIBLE ADMISSION FOR IMPENDING RESPIRATORY FAILURE    FURTHER PLANS PENDING EVALUATION

## 2018-02-22 NOTE — ED PROCEDURE NOTE
PROCEDURE  ECG 12 Lead Documentation  Date/Time: 2/22/2018 3:07 PM  Performed by: Bel Israel by: Helena Wilson     ECG reviewed by me, the ED Provider: yes    Patient location:  ED  Interpretation:     Interpretation: normal    Rate:     ECG rate:  77    ECG rate assessment: normal    Rhythm:     Rhythm: sinus rhythm    Ectopy:     Ectopy: none    Conduction:     Conduction: abnormal      Abnormal conduction: complete RBBB           Mika Mcdaniel DO  02/22/18 1507

## 2018-02-23 ENCOUNTER — APPOINTMENT (INPATIENT)
Dept: RADIOLOGY | Facility: HOSPITAL | Age: 78
DRG: 202 | End: 2018-02-23
Payer: COMMERCIAL

## 2018-02-23 PROBLEM — J96.10 CHRONIC RESPIRATORY FAILURE (HCC): Status: ACTIVE | Noted: 2018-02-23

## 2018-02-23 PROBLEM — Z91.19 NONCOMPLIANCE: Status: ACTIVE | Noted: 2018-02-23

## 2018-02-23 LAB
ANION GAP SERPL CALCULATED.3IONS-SCNC: 1 MMOL/L (ref 4–13)
ATRIAL RATE: 77 BPM
BUN SERPL-MCNC: 24 MG/DL (ref 5–25)
CALCIUM SERPL-MCNC: 8.5 MG/DL (ref 8.3–10.1)
CHLORIDE SERPL-SCNC: 103 MMOL/L (ref 100–108)
CO2 SERPL-SCNC: 42 MMOL/L (ref 21–32)
CREAT SERPL-MCNC: 1.07 MG/DL (ref 0.6–1.3)
ERYTHROCYTE [DISTWIDTH] IN BLOOD BY AUTOMATED COUNT: 16.9 % (ref 11.6–15.1)
GFR SERPL CREATININE-BSD FRML MDRD: 49 ML/MIN/1.73SQ M
GLUCOSE SERPL-MCNC: 355 MG/DL (ref 65–140)
GLUCOSE SERPL-MCNC: 357 MG/DL (ref 65–140)
GLUCOSE SERPL-MCNC: 376 MG/DL (ref 65–140)
GLUCOSE SERPL-MCNC: 418 MG/DL (ref 65–140)
GLUCOSE SERPL-MCNC: 419 MG/DL (ref 65–140)
GLUCOSE SERPL-MCNC: 443 MG/DL (ref 65–140)
GLUCOSE SERPL-MCNC: 455 MG/DL (ref 65–140)
HCT VFR BLD AUTO: 31.4 % (ref 37–47)
HGB BLD-MCNC: 10.1 G/DL (ref 12–16)
MCH RBC QN AUTO: 30.6 PG (ref 27–31)
MCHC RBC AUTO-ENTMCNC: 32.1 G/DL (ref 31.4–37.4)
MCV RBC AUTO: 95 FL (ref 82–98)
P AXIS: 50 DEGREES
PLATELET # BLD AUTO: 239 THOUSANDS/UL (ref 130–400)
PMV BLD AUTO: 8.1 FL (ref 8.9–12.7)
POTASSIUM SERPL-SCNC: 4 MMOL/L (ref 3.5–5.3)
PR INTERVAL: 182 MS
QRS AXIS: -18 DEGREES
QRSD INTERVAL: 126 MS
QT INTERVAL: 452 MS
QTC INTERVAL: 511 MS
RBC # BLD AUTO: 3.3 MILLION/UL (ref 4.2–5.4)
SODIUM SERPL-SCNC: 146 MMOL/L (ref 136–145)
T WAVE AXIS: 16 DEGREES
TROPONIN I SERPL-MCNC: <0.02 NG/ML
VENTRICULAR RATE: 77 BPM
WBC # BLD AUTO: 8.2 THOUSAND/UL (ref 4.8–10.8)

## 2018-02-23 PROCEDURE — 97163 PT EVAL HIGH COMPLEX 45 MIN: CPT

## 2018-02-23 PROCEDURE — 94760 N-INVAS EAR/PLS OXIMETRY 1: CPT

## 2018-02-23 PROCEDURE — 94640 AIRWAY INHALATION TREATMENT: CPT

## 2018-02-23 PROCEDURE — G8978 MOBILITY CURRENT STATUS: HCPCS

## 2018-02-23 PROCEDURE — 82948 REAGENT STRIP/BLOOD GLUCOSE: CPT

## 2018-02-23 PROCEDURE — G8987 SELF CARE CURRENT STATUS: HCPCS

## 2018-02-23 PROCEDURE — 99232 SBSQ HOSP IP/OBS MODERATE 35: CPT | Performed by: STUDENT IN AN ORGANIZED HEALTH CARE EDUCATION/TRAINING PROGRAM

## 2018-02-23 PROCEDURE — 97535 SELF CARE MNGMENT TRAINING: CPT

## 2018-02-23 PROCEDURE — 85027 COMPLETE CBC AUTOMATED: CPT | Performed by: STUDENT IN AN ORGANIZED HEALTH CARE EDUCATION/TRAINING PROGRAM

## 2018-02-23 PROCEDURE — G8988 SELF CARE GOAL STATUS: HCPCS

## 2018-02-23 PROCEDURE — 93010 ELECTROCARDIOGRAM REPORT: CPT | Performed by: INTERNAL MEDICINE

## 2018-02-23 PROCEDURE — 80048 BASIC METABOLIC PNL TOTAL CA: CPT | Performed by: STUDENT IN AN ORGANIZED HEALTH CARE EDUCATION/TRAINING PROGRAM

## 2018-02-23 PROCEDURE — 94150 VITAL CAPACITY TEST: CPT

## 2018-02-23 PROCEDURE — 93970 EXTREMITY STUDY: CPT | Performed by: SURGERY

## 2018-02-23 PROCEDURE — 97167 OT EVAL HIGH COMPLEX 60 MIN: CPT

## 2018-02-23 PROCEDURE — G8979 MOBILITY GOAL STATUS: HCPCS

## 2018-02-23 PROCEDURE — 93970 EXTREMITY STUDY: CPT

## 2018-02-23 PROCEDURE — 94618 PULMONARY STRESS TESTING: CPT

## 2018-02-23 RX ORDER — DICYCLOMINE HYDROCHLORIDE 10 MG/1
10 CAPSULE ORAL 3 TIMES DAILY
Status: DISCONTINUED | OUTPATIENT
Start: 2018-02-23 | End: 2018-02-26 | Stop reason: HOSPADM

## 2018-02-23 RX ORDER — GABAPENTIN 100 MG/1
100 CAPSULE ORAL DAILY
Status: DISCONTINUED | OUTPATIENT
Start: 2018-02-23 | End: 2018-02-26 | Stop reason: HOSPADM

## 2018-02-23 RX ORDER — TRAMADOL HYDROCHLORIDE 50 MG/1
50 TABLET ORAL ONCE AS NEEDED
Status: COMPLETED | OUTPATIENT
Start: 2018-02-23 | End: 2018-02-23

## 2018-02-23 RX ORDER — OXYCODONE HYDROCHLORIDE AND ACETAMINOPHEN 5; 325 MG/1; MG/1
1 TABLET ORAL EVERY 4 HOURS PRN
Status: DISCONTINUED | OUTPATIENT
Start: 2018-02-23 | End: 2018-02-23

## 2018-02-23 RX ORDER — POTASSIUM CHLORIDE 20 MEQ/1
20 TABLET, EXTENDED RELEASE ORAL DAILY
Status: DISCONTINUED | OUTPATIENT
Start: 2018-02-23 | End: 2018-02-26 | Stop reason: HOSPADM

## 2018-02-23 RX ORDER — POLYETHYLENE GLYCOL 3350 17 G/17G
17 POWDER, FOR SOLUTION ORAL DAILY
Status: DISCONTINUED | OUTPATIENT
Start: 2018-02-23 | End: 2018-02-26 | Stop reason: HOSPADM

## 2018-02-23 RX ORDER — METHYLPREDNISOLONE SODIUM SUCCINATE 40 MG/ML
40 INJECTION, POWDER, LYOPHILIZED, FOR SOLUTION INTRAMUSCULAR; INTRAVENOUS EVERY 8 HOURS SCHEDULED
Status: DISCONTINUED | OUTPATIENT
Start: 2018-02-23 | End: 2018-02-24

## 2018-02-23 RX ORDER — FOLIC ACID 1 MG/1
1 TABLET ORAL DAILY
Status: DISCONTINUED | OUTPATIENT
Start: 2018-02-23 | End: 2018-02-26 | Stop reason: HOSPADM

## 2018-02-23 RX ORDER — PRAMIPEXOLE DIHYDROCHLORIDE 0.5 MG/1
1 TABLET ORAL 3 TIMES DAILY
Status: DISCONTINUED | OUTPATIENT
Start: 2018-02-23 | End: 2018-02-26 | Stop reason: HOSPADM

## 2018-02-23 RX ORDER — MAGNESIUM HYDROXIDE/ALUMINUM HYDROXICE/SIMETHICONE 120; 1200; 1200 MG/30ML; MG/30ML; MG/30ML
15 SUSPENSION ORAL ONCE AS NEEDED
Status: COMPLETED | OUTPATIENT
Start: 2018-02-23 | End: 2018-02-23

## 2018-02-23 RX ADMIN — ESCITALOPRAM OXALATE 10 MG: 10 TABLET ORAL at 09:39

## 2018-02-23 RX ADMIN — METHYLPREDNISOLONE SODIUM SUCCINATE 40 MG: 40 INJECTION, POWDER, FOR SOLUTION INTRAMUSCULAR; INTRAVENOUS at 17:25

## 2018-02-23 RX ADMIN — DICYCLOMINE HYDROCHLORIDE 10 MG: 10 CAPSULE ORAL at 17:24

## 2018-02-23 RX ADMIN — ALUMINUM HYDROXIDE, MAGNESIUM HYDROXIDE, AND SIMETHICONE 15 ML: 200; 200; 20 SUSPENSION ORAL at 21:34

## 2018-02-23 RX ADMIN — TORSEMIDE 20 MG: 20 TABLET ORAL at 09:39

## 2018-02-23 RX ADMIN — GABAPENTIN 100 MG: 100 CAPSULE ORAL at 12:25

## 2018-02-23 RX ADMIN — LISINOPRIL 10 MG: 10 TABLET ORAL at 09:39

## 2018-02-23 RX ADMIN — METOLAZONE 2.5 MG: 5 TABLET ORAL at 09:39

## 2018-02-23 RX ADMIN — BISACODYL 10 MG: 5 TABLET, COATED ORAL at 21:27

## 2018-02-23 RX ADMIN — ASPIRIN 325 MG: 325 TABLET ORAL at 09:39

## 2018-02-23 RX ADMIN — TRAMADOL HYDROCHLORIDE 50 MG: 50 TABLET, FILM COATED ORAL at 01:45

## 2018-02-23 RX ADMIN — INSULIN LISPRO 6 UNITS: 100 INJECTION, SOLUTION INTRAVENOUS; SUBCUTANEOUS at 21:29

## 2018-02-23 RX ADMIN — PRAMIPEXOLE DIHYDROCHLORIDE 1 MG: 0.5 TABLET ORAL at 17:25

## 2018-02-23 RX ADMIN — INSULIN LISPRO 3 UNITS: 100 INJECTION, SOLUTION INTRAVENOUS; SUBCUTANEOUS at 08:25

## 2018-02-23 RX ADMIN — HEPARIN SODIUM 5000 UNITS: 5000 INJECTION, SOLUTION INTRAVENOUS; SUBCUTANEOUS at 21:30

## 2018-02-23 RX ADMIN — DICYCLOMINE HYDROCHLORIDE 10 MG: 10 CAPSULE ORAL at 21:28

## 2018-02-23 RX ADMIN — POLYETHYLENE GLYCOL 3350 17 G: 17 POWDER, FOR SOLUTION ORAL at 12:24

## 2018-02-23 RX ADMIN — FUROSEMIDE 40 MG: 40 TABLET ORAL at 14:34

## 2018-02-23 RX ADMIN — BISACODYL 5 MG: 5 TABLET, COATED ORAL at 01:45

## 2018-02-23 RX ADMIN — INSULIN LISPRO 6 UNITS: 100 INJECTION, SOLUTION INTRAVENOUS; SUBCUTANEOUS at 17:25

## 2018-02-23 RX ADMIN — HEPARIN SODIUM 5000 UNITS: 5000 INJECTION, SOLUTION INTRAVENOUS; SUBCUTANEOUS at 14:33

## 2018-02-23 RX ADMIN — METOPROLOL TARTRATE 25 MG: 25 TABLET ORAL at 17:26

## 2018-02-23 RX ADMIN — INSULIN LISPRO 6 UNITS: 100 INJECTION, SOLUTION INTRAVENOUS; SUBCUTANEOUS at 01:48

## 2018-02-23 RX ADMIN — INSULIN ASPART 45 UNITS: 100 INJECTION, SUSPENSION SUBCUTANEOUS at 08:26

## 2018-02-23 RX ADMIN — METOPROLOL TARTRATE 25 MG: 25 TABLET ORAL at 09:40

## 2018-02-23 RX ADMIN — IPRATROPIUM BROMIDE AND ALBUTEROL SULFATE 3 ML: .5; 3 SOLUTION RESPIRATORY (INHALATION) at 02:38

## 2018-02-23 RX ADMIN — IPRATROPIUM BROMIDE AND ALBUTEROL SULFATE 3 ML: .5; 3 SOLUTION RESPIRATORY (INHALATION) at 08:25

## 2018-02-23 RX ADMIN — IPRATROPIUM BROMIDE AND ALBUTEROL SULFATE 3 ML: .5; 3 SOLUTION RESPIRATORY (INHALATION) at 13:40

## 2018-02-23 RX ADMIN — INSULIN LISPRO 6 UNITS: 100 INJECTION, SOLUTION INTRAVENOUS; SUBCUTANEOUS at 12:26

## 2018-02-23 RX ADMIN — FOLIC ACID 1 MG: 1 TABLET ORAL at 12:25

## 2018-02-23 RX ADMIN — LEVOTHYROXINE SODIUM 125 MCG: 125 TABLET ORAL at 05:09

## 2018-02-23 RX ADMIN — IPRATROPIUM BROMIDE AND ALBUTEROL SULFATE 3 ML: .5; 3 SOLUTION RESPIRATORY (INHALATION) at 20:30

## 2018-02-23 RX ADMIN — INSULIN ASPART 45 UNITS: 100 INJECTION, SUSPENSION SUBCUTANEOUS at 17:24

## 2018-02-23 RX ADMIN — DICYCLOMINE HYDROCHLORIDE 10 MG: 10 CAPSULE ORAL at 12:25

## 2018-02-23 RX ADMIN — METHYLPREDNISOLONE SODIUM SUCCINATE 60 MG: 125 INJECTION, POWDER, FOR SOLUTION INTRAMUSCULAR; INTRAVENOUS at 05:10

## 2018-02-23 RX ADMIN — METHYLPREDNISOLONE SODIUM SUCCINATE 40 MG: 40 INJECTION, POWDER, FOR SOLUTION INTRAMUSCULAR; INTRAVENOUS at 21:33

## 2018-02-23 RX ADMIN — PRAMIPEXOLE DIHYDROCHLORIDE 1 MG: 0.5 TABLET ORAL at 21:28

## 2018-02-23 RX ADMIN — PRAMIPEXOLE DIHYDROCHLORIDE 1 MG: 0.5 TABLET ORAL at 12:25

## 2018-02-23 RX ADMIN — POTASSIUM CHLORIDE 20 MEQ: 1500 TABLET, EXTENDED RELEASE ORAL at 12:25

## 2018-02-23 RX ADMIN — HEPARIN SODIUM 5000 UNITS: 5000 INJECTION, SOLUTION INTRAVENOUS; SUBCUTANEOUS at 05:10

## 2018-02-23 RX ADMIN — INSULIN LISPRO 5 UNITS: 100 INJECTION, SOLUTION INTRAVENOUS; SUBCUTANEOUS at 05:18

## 2018-02-23 NOTE — ASSESSMENT & PLAN NOTE
-CXR reveals no acute process  -Pt was given Duonebs, Solumedrol and placed on BIPAP in the ED with improvement in her symptoms      -on Solumedrol, Duonebs and Albuterol prn    -symptoms improved, no need for pulmonary consult at this time  -patients symptoms appear related to some degree with noncompliance at home

## 2018-02-23 NOTE — CASE MANAGEMENT
Continued Stay Review    Date: 2/23/18    Vital Signs: /65 (BP Location: Right arm)   Pulse 90   Temp 98 °F (36 7 °C) (Oral)   Resp 18   Ht 5' 2" (1 575 m)   Wt 105 kg (231 lb 14 8 oz)   LMP  (LMP Unknown)   SpO2 93%   BMI 42 42 kg/m²       WOUND CARE  DULCOLOX  Medications:   Scheduled Meds:   Current Facility-Administered Medications:  albuterol 2 5 mg Nebulization Q6H PRN Dheeraj Andre MD   aspirin 325 mg Oral Daily Dheeraj Andre MD   bisacodyl 5 mg Oral Daily PRN Dheeraj Andre MD   escitalopram 10 mg Oral Daily Dheeraj Andre MD   furosemide 40 mg Oral After Una Russell MD   heparin (porcine) 5,000 Units Subcutaneous Q8H [address] Dheeraj Andre MD   insulin aspart protamine-insulin aspart 45 Units Subcutaneous BID AC Akira Harrington MD   insulin lispro 1-6 Units Subcutaneous TID AC Olivia Wei MD   insulin lispro 1-6 Units Subcutaneous HS Olivia Wei MD   ipratropium-albuterol 3 mL Nebulization Q6H Dheeraj Andre MD   levothyroxine 125 mcg Oral Early Morning Dheeraj Andre MD   lisinopril 10 mg Oral Daily Dheeraj Andre MD   methylPREDNISolone sodium succinate 60 mg Intravenous Q8H [address] Dheeraj Andre MD   metolazone 2 5 mg Oral Daily Dheeraj Andre MD   metoprolol tartrate 25 mg Oral BID Dheeraj Andre MD   oxyCODONE-acetaminophen 1 tablet Oral Q6H PRN Dheeraj Andre MD   torsemide 20 mg Oral Daily Dheeraj Andre MD     Continuous Infusions:    PRN Meds:   albuterol    bisacodyl    oxyCODONE-acetaminophen    Abnormal Labs/Diagnostic Results: GLUCOSE 535-357 ,  CO2 42   TROPONIN <0 02 X2 H/H 10 2/31 4     PEAK FLOW 160/200    Age/Sex: 77 y o  female     ATTENDING     [facility] )   Assessment & Plan     -supposed to be on continue with nasal cannula oxygen but does not use it all the time and has chronic dyspnea   -go to qualify iron shows that she needs 3 L continuously    -concentrator ordered and patient strongly advise to use oxygen as instructed as she is having the admissions for shortness of breath and respiratory failure      Noncompliance   Assessment & Plan     -patient not taking home medications as prescribed and is not able to function independently at home resulting in readmissions  -Had a long discussion with her regarding supervised living in nursing home versus assisted living facility  Case management consulted to assist       Bilateral lower extremity edema   Assessment & Plan     -likely secondary to her chronic venous stasis and hx of Diastolic CHF as well as noncompliance, she is not taking her lasix consistently at home   -She is not in acute CHF   -given a20 mg dose of IV Lasix on arrival  -Venous dopplers ordered to rule out DVT  -encourage leg elevation and resume home diuretic regimen for now       Chronic venous stasis dermatitis of both lower extremities   Assessment & Plan     -encourage leg elevation and resume diuretic regimen as above      Diastolic CHF ([facility] )   Assessment & Plan       -given  a one time dose of IV Lasix given her lower extremity edema     -Will follow daily weights and I/Os while resuming diuretic regimen      T2DM (type 2 diabetes mellitus) ([facility] )   Assessment & Plan     -cont home meds  -hyperglycemia associated with steroid use  Will wean steroids as quickly as possible increase insulin sliding scale to improved sugars      * Asthma exacerbation   Assessment & Plan         -Pt was given Duonebs, Solumedrol and placed on BIPAP in the ED with improvement in her symptoms     -on Solumedrol, Duonebs and Albuterol prn    -symptoms improved, no need for pulmonary consult at this time  -patients symptoms appear related to some degree with noncompliance at home        VTE Pharmacologic Prophylaxis:   Pharmacologic: Heparin  Mechanical VTE Prophylaxis in Place:  Yes

## 2018-02-23 NOTE — OCCUPATIONAL THERAPY NOTE
Occupational Therapy Evaluation/Treatment     02/23/18 1200   Note Type   Note type Eval/Treat   Restrictions/Precautions   Other Precautions Fall Risk;O2;Pain   Pain Assessment   Pain Assessment 0-10   Pain Score 8   Pain Type Chronic pain   Pain Location Leg   Pain Orientation Bilateral   Hospital Pain Intervention(s) Medication (See MAR); Repositioned; Ambulation/increased activity; Distraction   Response to Interventions some relief   Home Living   Type of [address] [facility] One level  (1 PETEY)   Bathroom Shower/Tub Walk-in shower   Bathroom Toilet Standard   Bathroom Equipment Grab bars in shower   [address] (rollator, home O2 2L at night only)   Prior Function   Level of [place] Needs assistance with ADLs and functional mobility; Needs assistance with IADLs   Lives With Spouse   Receives Help From Family   ADL Assistance Needs assistance  ([place] for showering and to wrap and put lotion BLE)   IADLs Needs assistance  (pt does occasional cooking)   Falls in the last 6 months 1 to 4   Comments ambulates with RW PTA   Psychosocial   Psychosocial (WDL) X   Patient Behaviors/Mood Anxious  ("frustrated", "I keep going around in circles  ")   Needs Expressed Physical;Emotional   Ability to Express Feelings Able to express   Ability to Express Needs Able to express   Ability to Express Thoughts Able to express   Ability to Understand Others Understands   Subjective   Subjective "I need help in the morning, not in the afternoon when they usually come" (re: HHA)   ADL   Where Assessed Chair   Eating Assistance 7  Independent   Grooming Assistance 4  Minimal Assistance   UB Bathing Assistance 4  Minimal Assistance   LB Bathing Assistance 3  Moderate Assistance   UB Dressing Assistance 3  Moderate Assistance   LB Dressing Assistance 2  Maximal [address]  3  Moderate Assistance   Transfers   Sit to Stand 5  Supervision   Stand to Sit 5  Supervision   Stand pivot 5  Supervision   Balance   Static Sitting Good   Dynamic Sitting Fair +   Static Standing Fair   Dynamic Standing Fair -   Ambulatory Fair  (with RW)   Activity Tolerance   Activity Tolerance Patient limited by fatigue;Patient limited by pain  (Limited by SOB)   Nurse Made Aware yes   RUE Assessment   RUE Assessment WFL  (gross strength 3+/5)   LUE Assessment   LUE Assessment WFL  (gross strength 3+/5)   Hand Function   Gross Motor Coordination Functional   Fine Motor Coordination Functional   Vision-Basic Assessment   Current Vision Wears glasses only for reading   Cognition   Arousal/Participation Alert; Cooperative   Attention Attends with cues to redirect   Orientation Level Oriented X4   Memory Within functional limits   Following Commands Follows multistep commands with increased time or repetition   Comments Pt anxious; expressed frustration about repeated hospitalizations   Assessment   Limitation Decreased ADL status; Decreased UE strength;Decreased endurance;Decreased self-care trans;Decreased high-level ADLs;Mood limitation  (decreased balance )   Prognosis Good   Assessment Patient evaluated by Occupational Therapy  Patient admitted with Asthma exacerbation  The patients occupational profile, medical and therapy history includes a extensive additional review of physical, cognitive, or psychosocial history related to current functional performance  Comorbidities affecting functional mobility and ADLS include: DM, Diastolic CHF, Asthma, Obesity Hypoventilation Syndrome, Depression, Anxiety, HTN, Hypothyroidism and HLD, recent hospitalization from 1/19/18-1/30/18 fro influenza  Prior to admission, patient was independent with functional mobility with RW, requiring assist for ADLS, requiring assist for IADLS, living with spouse in a 1 level home with 1 steps to enter and ambulating household distance    The evaluation identifies the following performance deficits: weakness, impaired balance, decreased endurance, increased fall risk, new onset of impairment of functional mobility, decreased ADLS, decreased IADLS, pain, decreased activity tolerance, decreased safety awareness, SOB upon exertion, decreased strength and impaired psychosocial skills, that result in activity limitations and/or participation restrictions  This evaluation requires clinical decision making of high complexity, because the patient presents with comorbidites that affect occupational performance and required significant modification of tasks or assistance with consideration of multiple treatment options  The Barthel Index was used as a functional outcome tool presenting with a score of 55, indicating marked limitations of functional mobility and ADLS  Patient will benefit from skilled Occupational Therapy services to address above deficits and facilitate a safe return to prior level of function  Goals   Patient Goals get better   STG Time Frame 3-5   Short Term Goal #1 Patient will perform functional mobility to and from bathroom with rolling walker and supervision, in order to increase stamina to tolerate ADL's; Patient will stand at sink x 4 minutes and perform ADL activities, without signs of fatigue, in order to increase stamina to return to prior level of function; Patient will tolerate 10 minutes of UE strengthening seated OOB, without signs of fatigue, in order to increase stamina to tolerate ADL's  LTG Time Frame 10-14   Long Term Goal #1 Patient will perform functional mobility to and from bathroom with rolling walker and independently, in order to increase stamina to tolerate ADL's; Patient will stand at sink x 7 minutes and perform ADL activities, without signs of fatigue, in order to increase stamina to return to prior level of function; Patient will tolerate 15 minutes of UE strengthening seated OOB, without signs of fatigue, in order to increase stamina to tolerate ADL's     Functional Transfer Goals   Pt Will Perform All Functional Transfers With mod indep; With assistive devices; With good judgment/safety  (LTG - Independent with AD)   ADL Goals   Pt Will Perform Bathing In shower/tub seat; With min assist;With adaptive equipment; With good judgment/safety  (LTG - Supervision)   Pt Will Perform LE Dressing In chair; With min assist;With adaptive equipment  (LTG - Indep with AD)   Pt Will Perform Toileting With stand by assist  (LTG - Independent)   Plan   Treatment Interventions ADL retraining;Functional transfer training;UE strengthening/ROM; Endurance training;Patient/family training;Equipment evaluation/education; Compensatory technique education; Energy conservation   OT Frequency 3-5x/wk   Additional Treatment Session   Start Time 1145   End Time 1200   Treatment Assessment Pt seen for ADL training  Education with pt begun regarding proper safety adaptation and remediation techniques during self-care or transfers  Instruct in assistive device options to improve LB bathing and dressing due to pt c/o difficulty bending over from hiatal hernia and pain  Pt demonstrated initial understanding, but will need repeated teaching to ensure carryover  Cont OT per POC  Recommendation   OT Discharge Recommendation (STR vs home with family and services)   Barthel Index   Feeding 10   Bathing 0   Grooming Score 0   Dressing Score 5   Bladder Score 10   Bowels Score 10   Toilet Use Score 5   Transfers (Bed/Chair) Score 10   Mobility (Level Surface) Score 0   Stairs Score 5   Barthel Index Score 55     Patient left OOB in chair with all needs within reach

## 2018-02-23 NOTE — ASSESSMENT & PLAN NOTE
-supposed to be on continue with nasal cannula oxygen but does not use it all the time and has chronic dyspnea   -go to qualify iron shows that she needs 3 L continuously    -concentrator ordered and patient strongly advise to use oxygen as instructed as she is having the admissions for shortness of breath and respiratory failure

## 2018-02-23 NOTE — ASSESSMENT & PLAN NOTE
-CXR reveals no acute process   BNP unremarkable  -given  a one time dose of IV Lasix given her lower extremity edema     -Will follow daily weights and I/Os while resuming diuretic regimen

## 2018-02-23 NOTE — ASSESSMENT & PLAN NOTE
-patient not taking home medications as prescribed and is not able to function independently at home resulting in readmissions  -Had a long discussion with her regarding supervised living in nursing home versus assisted living facility    Case management consulted to assist

## 2018-02-23 NOTE — ASSESSMENT & PLAN NOTE
- CXR reveals no acute process  Her respiratory symptoms are likely secondary to her Asthma given her scattered wheezing and marked improvement with nebs treatment  She will be given a one time dose of IV Lasix given her lower extremity edema    Will follow daily weights and I/Os while resuming diuretic regimen

## 2018-02-23 NOTE — ASSESSMENT & PLAN NOTE
- CXR reveals no acute process  Pt was given Duonebs, Solumedrol and placed on BIPAP with improvement in her symptoms  Will admit to Medicine, resume Solumedrol, Duonebs and Albuterol prn    If symptoms do not improve or worsen can consider Pulmonary consultation

## 2018-02-23 NOTE — PLAN OF CARE
Problem: DISCHARGE PLANNING - CARE MANAGEMENT  Goal: Discharge to post-acute care or home with appropriate resources  INTERVENTIONS:  - Conduct assessment to determine patient/family and health care team treatment goals, and need for post-acute services based on payer coverage, community resources, and patient preferences, and barriers to discharge  - Address psychosocial, clinical, and financial barriers to discharge as identified in assessment in conjunction with the patient/family and health care team  - Arrange appropriate level of post-acute services according to patient's   needs and preference and payer coverage in collaboration with the physician and health care team  - Communicate with and update the patient/family, physician, and health care team regarding progress on the discharge plan  - Arrange appropriate transportation to post-acute venues  Outcome: Adequate for Discharge  Plan:  Discharge to home with [facility]

## 2018-02-23 NOTE — PROGRESS NOTES
Re-checked patient's blood sugar and it is 535  Spoke with Dr Nicola Dela Cruz and decided it was okay to not draw a blood glucose for the lab per protocol because patient's sugar was this high earlier in the night and we did a lab draw then

## 2018-02-23 NOTE — H&P
History and Physical - [facility] Internal Medicine    Patient Information: Pushpa Aguirre 77 y o  female MRN: [id number]  Unit/Bed#: [address] [place] [address] Encounter: [id number]  Admitting Physician: Ar Lee MD  PCP: Dandre Kaye MD  Date of Admission:  02/22/18    Chief Complaint:     Shortness of breath, lower extremity swelling     of Present Illness:    Pushpa Aguirre is a 77 y o  female with a PMH of DM, Diastolic CHF, Asthma, Obesity Hypoventilation Syndrome, Depression, Anxiety, HTN, Hypothyroidism and HLD who presents with shortness of breath and leg swelling  Of note, the pt was recently discharged from our facility after being treated for Influenza and Tracheobronchitis  She states that she was doing well until approximately 3 days ago when she began to have shortness of breath  This was worse with movement  She denies any chest pain with these events  She denies any fevers  She also noticed increased swelling in her legs  She went to her PCP today for a routine visit who then recommended her coming to the ER  While here she was placed on BIPAP and received Solumedrol and Duonebs with improvement in her symptoms  Currently she reports mild shortness of breath but denies any chest pain, abdominal pain, melena or blood in her stools  No other complaints or concerns  Review of Systems:    Review of Systems   Constitutional: Negative for fever  Respiratory: Positive for shortness of breath and wheezing  Cardiovascular: Positive for leg swelling  Negative for chest pain  Gastrointestinal: Positive for abdominal pain  Negative for blood in stool  Genitourinary: Negative for hematuria  Musculoskeletal: Positive for back pain  Neurological: Negative for syncope  Psychiatric/Behavioral: Negative for confusion         Past Medical and Surgical History:     Past Medical History:   Diagnosis Date    Anxiety     Arthritis     Asthma     [facility] obstructive [facility] disease) [facility] [address] [facility]  Diabetes mellitus ([facility] [address] )     Disease of thyroid gland     Hypertension     Mitral valve regurgitation     Myocardial infarction     Obesity hypoventilation syndrome (HCC)     Pain     right hip    Restless leg syndrome     Sleep related hypoxia     Thyroid cancer [facility])        Past Surgical History:   Procedure Laterality Date    CHOLECYSTECTOMY      EYE SURGERY      ND ERCP DX COLLECTION SPECIMEN BRUSHING/WASHING N/A 5/30/2017    Procedure: ENDOSCOPIC RETROGRADE CHOLANGIOPANCREATOGRAPHY (ERCP); SPHINCTEROTOMY;  Surgeon: Danya Hawkins MD;  Location: [facility]; Service: Gastroenterology    REPLACEMENT TOTAL KNEE BILATERAL      SKIN BIOPSY      melanoma of back    STEROID INJECTION HIP Right 12/21/2017    Procedure: TROCHANTERIC BURSA INJECTION RIGHT;  Surgeon: Mia Esquivel MD;  Location: [facility] MAIN OR;  Service: Pain Management     THORACOTOMY Right     at 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 Taking?    COD LIVER OIL PO 2/21/2018 at Unknown time Self Yes Yes   Sig: Take by mouth daily   LORazepam (ATIVAN) 1 mg tablet Unknown at Unknown time Self Yes No   Sig: Take by mouth   Linaclotide (LINZESS) 72 MCG CAPS 2/22/2018 at 1000  No Yes   Sig: Take 72 mcg by mouth daily before breakfast   Multiple Vitamins-Minerals (MULTIVITAMIN ADULT PO) 2/22/2018 at 1000 Self Yes Yes   Sig: Take 1 tablet by mouth daily   NOVOLOG MIX 70/30 FLEXPEN (70-30) 100 UNIT/ML SUPN 2/22/2018 at 1000 Self Yes Yes   ascorbic acid (VITAMIN C) 500 mg tablet 2/22/2018 at 1000 Self Yes Yes   Sig: Take 500 mg by mouth daily   aspirin 325 mg tablet 2/22/2018 at 1000 Self Yes Yes   Sig: Take 1 tablet by mouth daily   bisacodyl (DULCOLAX) 5 mg EC tablet 2/21/2018 at Unknown time Self Yes Yes   Sig: Take 10 mg by mouth daily at bedtime   clotrimazole (MYCELEX) 10 mg jaswant 2/22/2018 at 1000  No Yes   Sig: Take 1 tablet (10 mg total) by mouth 4 (four) times a day for 14 days   dextromethorphan-guaiFENesin (ROBITUSSIN DM)  mg/5 mL syrup 2/21/2018 at Unknown time Self No Yes   Sig: Take 10 mL by mouth every 4 (four) hours as needed for cough   diclofenac sodium (VOLTAREN) 1 % Unknown at Unknown time Self Yes No   Sig: Place on the skin   dicyclomine (BENTYL) 10 mg capsule 2/21/2018 at Unknown time Self Yes Yes   Sig: Take 1 capsule by mouth 3 (three) times a day   escitalopram (LEXAPRO) 10 mg tablet 2/21/2018 at Unknown time  No Yes   Sig: Take 1 tablet (10 mg total) by mouth daily   fentaNYL (DURAGESIC) 25 mcg/hr 2/21/2018 at Unknown time Self Yes Yes   folic acid (FOLVITE) 1 mg tablet 2/22/2018 at 1000 Self Yes Yes   Sig: Take by mouth daily   furosemide (LASIX) 40 mg tablet 2/22/2018 at 1000 Self Yes Yes   Sig: Take 40 mg by mouth daily after lunch   gabapentin (NEURONTIN) 100 mg capsule 2/22/2018 at 1000 Self Yes Yes   ipratropium-albuterol (DUONEB) 0 5-2 5 mg/3 mL Unknown at Unknown time Self Yes No   Sig: Inhale 1 each every 4 (four) hours as needed   lactulose 10 g/15 mL Unknown at Unknown time Self Yes No   Sig: Take 15 mL by mouth daily as needed     levalbuterol (XOPENEX) 1 25 mg/0 5 mL nebulizer solution Unknown at Unknown time Self No No   Sig: Take 0 5 mL (1 25 mg total) by nebulization 3 (three) times a day for 30 days   levothyroxine 125 mcg tablet 2/22/2018 at 0600 Self Yes Yes   Sig: Take 1 tablet by mouth daily   lisinopril (ZESTRIL) 10 mg tablet 2/22/2018 at 1000 Self Yes Yes   Sig: Take 1 tablet by mouth daily   metolazone (ZAROXOLYN) 2 5 mg tablet 2/21/2018 at Unknown time Self Yes Yes   Sig: Take 1 tablet by mouth   metoprolol tartrate (LOPRESSOR) 25 mg tablet 2/22/2018 at 1000 Self Yes Yes   Sig: Take 1 tablet by mouth 2 (two) times a day   oxyCODONE-acetaminophen (PERCOCET) 5-325 mg per tablet Unknown at Unknown time Self Yes No   Sig: Take 1 tablet by mouth every 4 (four) hours as needed for moderate pain   oxyCODONE-acetaminophen (PERCOCET) 5-325 mg per tablet Unknown at Unknown time Self Yes No   Sig: Take by mouth every 6 (six) hours   polyethylene glycol (MIRALAX) 17 g packet 2/22/2018 at 1000 Self No Yes   Sig: Take 17 g by mouth daily   Patient taking differently: Take 17 g by mouth 2 (two) times a day     potassium chloride (K-DUR,KLOR-CON) 10 mEq tablet Unknown at Unknown time Self No No   Sig: Take 2 tablets by mouth daily   pramipexole (MIRAPEX) 1 mg tablet 2/22/2018 at 1000 Self Yes Yes   Sig: Take 1 tablet by mouth 3 (three) times a day   predniSONE 10 mg tablet Unknown at Unknown time Self No No   Sig: Take 40 mg/day for 3 days then 30mg/day for 3 days then 20mg/day for 3 days then 10mg/day for 3 days   torsemide (DEMADEX) 20 mg tablet 2/22/2018 at Unknown time  No Yes   Sig: Take 1 tablet (20 mg total) by mouth daily      Facility-Administered Medications Last Administration Doses Remaining   levalbuterol (XOPENEX) inhalation solution 1 25 mg None recorded 120          Allergies: Allergies   Allergen Reactions    Ketorolac Swelling    Latex Rash     History:     Marital Status: Single   History   Alcohol Use No     History   Smoking Status    Never Smoker   Smokeless Tobacco    Never Used     History   Drug Use No       Family History:     Mother: Asthma   Father: healthy     Physical Exam:     Vitals:   Blood Pressure: 130/61 (02/22/18 1922)  Pulse: 90 (02/22/18 1922)  Temperature: 98 1 °F (36 7 °C) (02/22/18 1922)  Temp Source: Tympanic (02/22/18 1447)  Respirations: 20 (02/22/18 1922)  Height: 5' 2" (157 5 cm) (02/22/18 1922)  Weight - Scale: 105 kg (232 lb) (02/22/18 1922)  SpO2: 95 % (02/22/18 1922)    Physical Exam:   General: in no acute distress  HEENT: atraumatic, normocephalic  CVS: RRR, no murmurs appreciated  Lungs: diffuse wheezing bilaterally, no rales or rhonchi  Abdomen: soft, nondistended, bowel sounds normal, nontender upon palpation, no guarding or rebound tenderness  Extremities: 3+ pitting edema extending to knees with bilateral erythema consistent with chronic venous stasis   Neuro: alert and oriented x3  Psych: calm, cooperative      Lab Results: I have personally reviewed pertinent reports  Results from last 7 days  Lab Units 02/22/18 2023 02/22/18  1526   WBC Thousand/uL  --  7 60   HEMOGLOBIN g/dL  --  12 1   HEMATOCRIT %  --  37 5   PLATELETS Thousands/uL 251 265   NEUTROS PCT %  --  72   LYMPHS PCT %  --  19   MONOS PCT %  --  9   EOS PCT %  --  1       Results from last 7 days  Lab Units 02/22/18  1526   SODIUM mmol/L 146*   POTASSIUM mmol/L 3 4*   CHLORIDE mmol/L 97*   CO2 mmol/L >45*   BUN mg/dL 19   CREATININE mg/dL 0 99   CALCIUM mg/dL 9 1   TOTAL PROTEIN g/dL 6 9   BILIRUBIN TOTAL mg/dL 0 30   ALK PHOS U/L 91   ALT U/L 45   AST U/L 20   GLUCOSE RANDOM mg/dL 333*           Imaging:     Xr Chest 1 View Portable    Result Date: 2/22/2018  Narrative: CHEST INDICATION: SOB COMPARISON:  January 30, 2018 EXAM PERFORMED/VIEWS:  XR CHEST PORTABLE IMAGES:  1 FINDINGS: Mild cardiomegaly  Pulmonary vessels unremarkable  Moderate size hiatal hernia  The lungs are clear  No pneumothorax or pleural effusion  Visualized osseous structures appear within normal limits for the patient's age  Impression: No acute cardiopulmonary disease  Workstation performed: [id number]     Xr Chest Portable    Result Date: 1/30/2018  Narrative: CHEST INDICATION:  Acute asthma  Bronchitis  COMPARISON:  Chest radiographs January 19, 2018 VIEWS:   AP frontal IMAGES:  1 FINDINGS:     Heart shadow appears unremarkable  Atherosclerotic vascular calcifications are noted  The lungs are clear  No pneumothorax or pleural effusion  Visualized osseous structures appear within normal limits for the patient's age  Impression: No active pulmonary disease   Workstation performed: [id number]         Assessment/Plan    * Asthma exacerbation Assessment & Plan    - CXR reveals no acute process  Pt was given Duonebs, Solumedrol and placed on BIPAP with improvement in her symptoms  Will admit to Medicine, resume Solumedrol, Duonebs and Albuterol prn  If symptoms do not improve or worsen can consider Pulmonary consultation         Bilateral lower extremity edema   Assessment & Plan    - likely secondary to her chronic venous stasis and hx of Diastolic CHF  She is not in acute CHF though there may be some slight worsening  Will give a 20 mg dose of IV Lasix, order venous dopplers to rule out DVT, encourage leg elevation and resume home diuretic regimen for now         Chronic venous stasis dermatitis of both lower extremities   Assessment & Plan    - will encourage leg elevation and resume diuretic regimen as above        Diastolic CHF ([facility] )   Assessment & Plan    - CXR reveals no acute process  Her respiratory symptoms are likely secondary to her Asthma given her scattered wheezing and marked improvement with nebs treatment  She will be given a one time dose of IV Lasix given her lower extremity edema  Will follow daily weights and I/Os while resuming diuretic regimen         Hypokalemia   Assessment & Plan    - can be secondary to diuretic use  Will order replacement and repeat K tomorrow morning          Hypernatremia   Assessment & Plan    - this is in the high-normal range    Will repeat Na tomorrow morning          Depression   Assessment & Plan    - continue Lexapro        Hypothyroidism   Assessment & Plan    - continue Synthroid          HTN (hypertension)   Assessment & Plan    - resume Lisinopril and Metoprolol         T2DM (type 2 diabetes mellitus) ([facility] )   Assessment & Plan    - will place on [facility] Problem List:     Principal Problem:    Asthma exacerbation  Active Problems:    Bilateral lower extremity edema    T2DM (type 2 diabetes mellitus) ([facility] )    HTN (hypertension)    Hypothyroidism    Depression Hypernatremia    Hypokalemia    Diastolic CHF (HCC)    Chronic venous stasis dermatitis of both lower extremities        VTE Prophylaxis: Heparin   Code Status: Level 3 - DNAR and DNI    Anticipated Length of Stay:  Patient will be admitted on an Inpatient basis with an anticipated length of stay of atleast 2 midnights  Total Time for Visit, including Counseling / Coordination of Care: 30 minutes  Greater than 50% of this total time spent on direct patient counseling and coordination of care

## 2018-02-23 NOTE — SOCIAL WORK
Met with pt and  Opal Napoles  Pt uses RW for ambulation  Has home oxygen from [address] which she states she uses only at night   feels pt needs it all the time  Requested home oxygen eval from primary physician  Pt is open to [facility] with [facility] [address]  Will make referral  Dr Charly Wheeler spoke to pt about rehab or assisted living  [facility], but would prefer to go home   offers much assistance to pt, but pt is "stubborn"  Pt has been noncompliant with her meds  Does not take her lasix appropriately as he wets many adult diapers  She also can not travel without stopping several times to urinate  Pt does not keep her legs up much and does not sleep well at night  Will not use her bipap as she does not sleep much  Has a nebulizer, but states the medications makes her hyper  Explained to pt that she should listen to her  more, he tries to keep her compliant  Explained role of cm, no other d/c needs anticipated  CM reviewed d/c planning process including the following: identifying help at home, patient preference for d/c planning needs, and availability of the treatment team to discuss questions or concerns patient and/or family may have regarding understanding of medications and recognizing signs and symptoms once discharged  CM also encouraged patient to follow up with all recommended appointments after discharge  Patient advised of importance for patient and family to participate in managing patient's medical well being

## 2018-02-23 NOTE — PROGRESS NOTES
Progress Note - [place] Prior 1940, 77 y o  female MRN: [id number]    Unit/Bed#: [address] [place] [address] Encounter: [id number]    Primary Care Provider: Nano Bronson MD   Date and time admitted to hospital: 2/22/2018  2:37 PM        Chronic respiratory failure ([facility] )   Assessment & Plan    -supposed to be on continue with nasal cannula oxygen but does not use it all the time and has chronic dyspnea   -go to qualify iron shows that she needs 3 L continuously  -concentrator ordered and patient strongly advise to use oxygen as instructed as she is having the admissions for shortness of breath and respiratory failure        Noncompliance   Assessment & Plan    -patient not taking home medications as prescribed and is not able to function independently at home resulting in readmissions  -Had a long discussion with her regarding supervised living in nursing home versus assisted living facility  Case management consulted to assist         Bilateral lower extremity edema   Assessment & Plan    -likely secondary to her chronic venous stasis and hx of Diastolic CHF as well as noncompliance, she is not taking her lasix consistently at home   -She is not in acute CHF   -given a20 mg dose of IV Lasix on arrival  -Venous dopplers ordered to rule out DVT  -encourage leg elevation and resume home diuretic regimen for now         Chronic venous stasis dermatitis of both lower extremities   Assessment & Plan    -encourage leg elevation and resume diuretic regimen as above        Diastolic CHF ([place] [address] )   Assessment & Plan    -CXR reveals no acute process  BNP unremarkable  -given  a one time dose of IV Lasix given her lower extremity edema     -Will follow daily weights and I/Os while resuming diuretic regimen        T2DM (type 2 diabetes mellitus) ([facility] )   Assessment & Plan    -cont home meds  -hyperglycemia associated with steroid use    Will wean steroids as quickly as possible increase insulin sliding scale to improved sugars * Asthma exacerbation   Assessment & Plan    -CXR reveals no acute process  -Pt was given Duonebs, Solumedrol and placed on BIPAP in the ED with improvement in her symptoms  -on Solumedrol, Duonebs and Albuterol prn    -symptoms improved, no need for pulmonary consult at this time  -patients symptoms appear related to some degree with noncompliance at home              VTE Pharmacologic Prophylaxis:   Pharmacologic: Heparin  Mechanical VTE Prophylaxis in Place: Yes    Patient Centered Rounds: I have performed bedside rounds with nursing staff today  Discussions with Specialists or Other Care Team Provider: Yes    Education and Discussions with Family / Patient:Yes    Time Spent for Care: 30 minutes  More than 50% of total time spent on counseling and coordination of care as described above  Current Length of Stay: 1 day(s)    Current Patient Status: Inpatient     Discharge Plan: pending    Code Status: Level 3 - DNAR and DNI      Subjective:   Corina Degroot feels better from a shortness of breath standpoint  Wants to know how she can get more help at home  Wants to have a live-in nurse or a daily visiting nurse  Feels that she does well when her  is around however he was recently hospitalized and she was all own she could not take care of herself  States she does not take her water pill every day because it makes her have to pee more  States she has an always use her oxygen continuously like she is supposed to  Objective:       Vitals:   Temp (24hrs), Av 2 °F (36 8 °C), Min:98 °F (36 7 °C), Max:98 5 °F (36 9 °C)    HR:  [79-94] 90  Resp:  [18-20] 18  BP: (116-135)/(51-65) 135/65  SpO2:  [92 %-95 %] 94 %  Body mass index is 42 34 kg/m²  Input and Output Summary (last 24 hours):        Intake/Output Summary (Last 24 hours) at 18 1727  Last data filed at 18 0900   Gross per 24 hour   Intake                0 ml   Output             1700 ml   Net            -1700 ml Physical Exam:     Physical Exam   Constitutional: She is oriented to person, place, and time  She appears well-developed  No distress  HENT:   Head: Normocephalic and atraumatic  Cardiovascular: Normal rate, regular rhythm and normal heart sounds  Pulmonary/Chest: Effort normal  No respiratory distress  She has wheezes  She has no rales  No conversational dyspnea  Faint scattered wheezes   Abdominal: Soft  Bowel sounds are normal  She exhibits no distension  There is no tenderness  There is no rebound  Musculoskeletal: She exhibits edema (Pitting edema in the bilateral lower extremities)  She exhibits no tenderness or deformity  Neurological: She is alert and oriented to person, place, and time  Skin: Skin is warm and dry  Psychiatric: Her behavior is normal    Anxious   Nursing note and vitals reviewed  Additional Data:     Labs:      Results from last 7 days  Lab Units 02/23/18  0502  02/22/18  1526   WBC Thousand/uL 8 20  --  7 60   HEMOGLOBIN g/dL 10 1*  --  12 1   HEMATOCRIT % 31 4*  --  37 5   PLATELETS Thousands/uL 239  < > 265   NEUTROS PCT %  --   --  72   LYMPHS PCT %  --   --  19   MONOS PCT %  --   --  9   EOS PCT %  --   --  1   < > = values in this interval not displayed  Results from last 7 days  Lab Units 02/23/18  0502  02/22/18  1526   SODIUM mmol/L 146*  --  146*   POTASSIUM mmol/L 4 0  --  3 4*   CHLORIDE mmol/L 103  --  97*   CO2 mmol/L 42*  --  >45*   BUN mg/dL 24  --  19   CREATININE mg/dL 1 07  --  0 99   CALCIUM mg/dL 8 5  --  9 1   TOTAL PROTEIN g/dL  --   --  6 9   BILIRUBIN TOTAL mg/dL  --   --  0 30   ALK PHOS U/L  --   --  91   ALT U/L  --   --  45   AST U/L  --   --  20   GLUCOSE RANDOM mg/dL 376*  < > 333*   < > = values in this interval not displayed  * I Have Reviewed All Lab Data Listed Above  * Additional Pertinent Lab Tests Reviewed:  All Labs For Current Hospital Admission Reviewed    Imaging:  Xr Chest 1 View Portable    Result Date: 2/22/2018  Narrative: CHEST INDICATION: SOB COMPARISON:  January 30, 2018 EXAM PERFORMED/VIEWS:  XR CHEST PORTABLE IMAGES:  1 FINDINGS: Mild cardiomegaly  Pulmonary vessels unremarkable  Moderate size hiatal hernia  The lungs are clear  No pneumothorax or pleural effusion  Visualized osseous structures appear within normal limits for the patient's age  Impression: No acute cardiopulmonary disease  Workstation performed: [id number]     Xr Chest Portable    Result Date: 1/30/2018  Narrative: CHEST INDICATION:  Acute asthma  Bronchitis  COMPARISON:  Chest radiographs January 19, 2018 VIEWS:   AP frontal IMAGES:  1 FINDINGS:     Heart shadow appears unremarkable  Atherosclerotic vascular calcifications are noted  The lungs are clear  No pneumothorax or pleural effusion  Visualized osseous structures appear within normal limits for the patient's age  Impression: No active pulmonary disease  Workstation performed: [id number]     Vas Lower Limb Venous Duplex Study, Complete Bilateral    Result Date: 2/23/2018  Narrative:  [facility] REPORT CLINICAL: Indications: Patient presents with bilateral lower extremity edema worsened in the past few dasy  Operative History: No history of cardiovascular surgery  Risk Factors: The patient has history of obesity, Diabetes Mellitus, Hypothyroidism, CHF and Chronic Venous Stasis  Clinical: The patient current BMI is 42 25, Weight is 231 lb and Height is 62 in  CONCLUSION:  Impression: RIGHT LOWER LIMB: No evidence of acute or chronic deep vein thrombosis  No evidence of superficial thrombophlebitis noted  Doppler evaluation shows a normal response to augmentation maneuvers  Popliteal, posterior tibial and anterior tibial arterial Doppler waveforms are triphasic  LEFT LOWER LIMB: No evidence of acute or chronic deep vein thrombosis  No evidence of superficial thrombophlebitis noted  Doppler evaluation shows a normal response to augmentation maneuvers  Popliteal, posterior tibial and anterior tibial arterial Doppler waveforms are triphasic  Technically difficult study secondary to body habitus  SIGNATURE: Electronically Signed by: Aylin Johnson on 2018-02-23 12:34:44 PM    Imaging Reports Reviewed by myself    Cultures:   Blood Culture:   Lab Results   Component Value Date    BLOODCX No Growth After 5 Days  01/19/2018    BLOODCX No Growth After 5 Days  01/19/2018    BLOODCX No Growth After 5 Days  06/11/2017    BLOODCX No Growth After 5 Days   06/11/2017    BLOODCX Klebsiella pneumoniae - ESBL (A) 06/07/2017    BLOODCX Klebsiella pneumoniae ESBL (A) 06/07/2017     Urine Culture:   Lab Results   Component Value Date    URINECX 20,000-29,000 cfu/ml Mixed Contaminants X3 04/15/2017     Sputum Culture: No components found for: SPUTUMCX  Wound Culture: No results found for: WOUNDCULT    Last 24 Hours Medication List:     Current Facility-Administered Medications:  albuterol 2 5 mg Nebulization Q6H PRN Karen Mcadams MD   aspirin 325 mg Oral Daily Karen Mcadams MD   bisacodyl 10 mg Oral HS Umair Lambert MD   bisacodyl 5 mg Oral Daily PRN Karen Mcadams MD   dicyclomine 10 mg Oral TID Umair Lambert MD   escitalopram 10 mg Oral Daily Karen Mcadams MD   folic acid 1 mg Oral Daily Umair Lambert MD   furosemide 40 mg Oral After Ana M Mata MD   gabapentin 100 mg Oral Daily Umair Lambert MD   heparin (porcine) 5,000 Units Subcutaneous Q8H [address] Karen Mcadams MD   insulin aspart protamine-insulin aspart 45 Units Subcutaneous BID AC Akira Delgado MD   insulin lispro 1-6 Units Subcutaneous TID AC Umair Lambert MD   insulin lispro 1-6 Units Subcutaneous HS Umair Lambert MD   ipratropium-albuterol 3 mL Nebulization Q6H Karen Mcadams MD   levothyroxine 125 mcg Oral Early Morning Karen Mcadams MD   lisinopril 10 mg Oral Daily Karen Mcadams MD   methylPREDNISolone sodium succinate 40 mg Intravenous Q8H [address] Stacey Walls MD   metolazone 2 5 mg Oral Daily Akira Ofelia Okeefe MD   metoprolol tartrate 25 mg Oral BID Samanta Garcia MD   oxyCODONE-acetaminophen 1 tablet Oral Q6H PRN Samanta Garcia MD   polyethylene glycol 17 g Oral Daily Benitez Guzman MD   potassium chloride 20 mEq Oral Daily Benitez Guzman MD   pramipexole 1 mg Oral TID Benitez Guzman MD   torsemide 20 mg Oral Daily Samanta Garcia MD        Today, Patient Was Seen By: Benitez Guzman MD    ** Please Note: Dragon 360 Dictation voice to text software may have been used in the creation of this document   **

## 2018-02-23 NOTE — CASE MANAGEMENT
Initial Clinical Review    Admission: Date/Time/Statement: 2/22/18 @ 1822     Orders Placed This Encounter   Procedures    Inpatient Admission (expected length of stay for this patient is greater than two midnights)     Standing Status:   Standing     Number of Occurrences:   1     Order Specific Question:   Admitting Physician     Answer:   Thania Ge     Order Specific Question:   Level of Care     Answer:   Med Surg [16]     Order Specific Question:   Estimated length of stay     Answer:   More than 2 Midnights     Order Specific Question:   Certification     Answer:   I certify that inpatient services are medically necessary for this patient for a duration of greater than two midnights  See H&P and MD Progress Notes for additional information about the patient's course of treatment  ED: Date/Time/Mode of Arrival:   ED Arrival Information     Expected Arrival Acuity Means of Arrival Escorted By Service Admission Type    - 2/22/2018 14:32 Urgent Wheelchair Spouse General Medicine Urgent    Arrival Complaint    shortness of breath          Chief Complaint:   Chief Complaint   Patient presents with    Shortness of Breath     pt presents to the ed with  pt was sent from pcp for low sats  pt states that she was recently discharged by the hospital for pneumonia        History of Illness: Vladimir Thorne is a 77 y o  female with a PMH of DM, Diastolic CHF, Asthma, Obesity Hypoventilation Syndrome, Depression, Anxiety, HTN, Hypothyroidism and HLD who presents with shortness of breath and leg swelling  Of note, the pt was recently discharged from our facility after being treated for Influenza and Tracheobronchitis  She states that she was doing well until approximately 3 days ago when she began to have shortness of breath  This was worse with movement  She denies any chest pain with these events  She denies any fevers  She also noticed increased swelling in her legs    She went to her PCP today for a routine visit who then recommended her coming to the ER  While here she was placed on BIPAP and received Solumedrol and Duonebs with improvement in her symptoms  Currently she reports shortness of breath but denies any chest pain, abdominal pain, melena or blood in her stools  No other complaints or concerns  ED Vital Signs:   ED Triage Vitals   Temperature Pulse Respirations Blood Pressure SpO2   02/22/18 1447 02/22/18 1447 02/22/18 1447 02/22/18 1447 02/22/18 1447   97 9 °F (36 6 °C) 79 18 161/74 99 %      Temp Source Heart Rate Source Patient Position - Orthostatic VS BP Location FiO2 (%)   02/22/18 1447 02/22/18 1447 02/22/18 2301 02/22/18 1447 --   Tympanic Monitor Lying Right arm       Pain Score       02/22/18 2131       8        Wt Readings from Last 1 Encounters:   02/23/18 105 kg (231 lb 14 8 oz)       VS  SATS 85%> BIPAP APPLIED IN ED RESPIRATORY DISTRESS  PCO2 65 7 HCO3 41 4     Abnormal Labs/Diagnostic Test Results:   SODIUM mmol/L 146*   POTASSIUM mmol/L 3 4*   CHLORIDE mmol/L 97*   CO2 mmol/L >45*     GLUCOSE RANDOM mg/dL 333*   CXR  No acute cardiopulmonary disease  ED Treatment:   Medication Administration from 02/22/2018 1432 to 02/22/2018 1851       Date/Time Order Dose Route Action Action by Comments     02/22/2018 1529 ipratropium-albuterol (DUO-NEB) 0 5-2 5 mg/3 mL inhalation solution 3 mL 3 mL Nebulization Given Toan Lerma RN      02/22/2018 1529 ipratropium-albuterol (DUO-NEB) 0 5-2 5 mg/3 mL inhalation solution 3 mL 3 mL Nebulization Given Toan Lerma RN      02/22/2018 1529 methylPREDNISolone sodium succinate (Solu-MEDROL) injection 125 mg 125 mg Intravenous Given Taon Lerma RN      02/22/2018 1711 albuterol inhalation solution 10 mg 10 mg Nebulization Given Ramón Tatum,       02/22/2018 1711 sodium chloride 0 9 % inhalation solution **AcuDose Override Pull** 3 mL  Given Ramón Tatum RT           Past Medical/Surgical History:    Active Ambulatory Problems     Diagnosis Date Noted    Acquired hypothyroidism 04/15/2017    T2DM (type 2 diabetes mellitus) ([address] ) 04/15/2017    Lung nodules 04/15/2017    Morbid obesity with BMI of 40 0-44 9, adult ([address] ) 04/18/2017    Opioid dependence ([facility] ) 04/18/2017    Acute on chronic systolic heart failure ([address] ) 06/10/2017    Asthma 06/10/2017    Chronic pain 06/10/2017    Constipation due to opioid therapy 06/10/2017    Restless leg 06/10/2017    Sleep apnea 06/10/2017    Pancreatic cyst 09/19/2017    Dyspnea on exertion 10/09/2017    Anxiety 10/09/2017    Obesity hypoventilation syndrome ([address] ) 10/11/2017    TIA (transient ischemic attack) 10/11/2017    Pain in right hip 12/21/2017    Trochanteric bursitis of right hip 12/21/2017    Influenzal tracheobronchitis 01/19/2018    HTN (hypertension) 01/19/2018    Hypothyroidism 01/19/2018    Abnormal CT scan, liver 04/20/2017    Primary osteoarthritis involving multiple joints 03/30/2017    Balance problem 11/08/2017    Chronic pain disorder 12/13/2017    Concussion 11/08/2017    Daytime hypersomnolence 10/16/2017    Depression 12/01/2017    Diabetes mellitus with neuropathy ([facility] ) 78/26/3382    Diastolic dysfunction 07/52/4637    Edema 01/07/2015    Elevated LFTs 06/02/2017    Greater trochanteric bursitis of right hip 10/20/2017    Iliotibial band syndrome, right leg 10/20/2017    Influenza 01/21/2018    Meningiomas, multiple ([facility] ) 12/01/2017    Mild persistent asthma with acute exacerbation 03/30/2017    Moderate episode of recurrent major depressive disorder ([address] ) 10/17/2017    Morbid obesity due to excess calories ([address] ) 11/18/2014    Pulmonary emphysema ([facility] ) 11/18/2014   [facility] discharge follow-up 02/08/2018    Chronic right-sided low back pain without sciatica 02/14/2018    Sacroiliitis ([address] ) 02/14/2018    Chronic pain syndrome 02/14/2018     Resolved Ambulatory Problems     Diagnosis Date Noted    Abdominal pain 04/15/2017    Nausea 04/15/2017    Hypokalemia 04/15/2017    Skin lesion of left leg 04/21/2017    Tachypnea 06/07/2017    SOB (shortness of breath) 06/07/2017    Severe sepsis ([facility] ) 06/07/2017    Non-ST elevation myocardial infarction (NSTEMI) ([facility] ) 06/07/2017    Bacteremia due to Klebsiella pneumoniae 06/08/2017    Cholangitis 06/09/2017    Respiratory failure with hypercapnia ([place] [address] ) 06/10/2017    Irritable bowel syndrome with diarrhea 06/10/2017    Meningioma ([place] [address] ) 06/16/2017    Acute on chronic respiratory failure with hypoxemia ([place] [address] ) 09/19/2017    COPD exacerbation ([place] [address] ) 09/19/2017    Sepsis ([place] [address] ) 01/19/2018     Past Medical History:   Diagnosis Date    Anxiety     Arthritis     Asthma     COPD (chronic obstructive pulmonary disease) ([place] [address] )     Diabetes mellitus ([place] [address] )     Disease of thyroid gland     Hypertension     Mitral valve regurgitation     Myocardial infarction     Obesity hypoventilation syndrome (HCC)     Pain     Restless leg syndrome     Sleep related hypoxia     Thyroid cancer ([place] [address] )        Admitting Diagnosis: Shortness of breath [R06 02]  Respiratory distress [R06 00]    Age/Sex: 77 y o  female    ATTENDING  Assessment/Plan  * Asthma exacerbation   Assessment & Plan     - CXR reveals no acute process  Pt was given Duonebs, Solumedrol and placed on BIPAP with improvement in her symptoms  Will admit to Medicine, resume Solumedrol, Duonebs and Albuterol prn  If symptoms do not improve or worsen can consider Pulmonary consultation       Bilateral lower extremity edema   Assessment & Plan     - likely secondary to her chronic venous stasis and hx of Diastolic CHF  She is not in acute CHF though there may be some slight worsening    Will give a 20 mg dose of IV Lasix, order venous dopplers to rule out DVT, encourage leg elevation and resume home diuretic regimen for now       Chronic venous stasis dermatitis of both lower extremities   Assessment & Plan     - will encourage leg elevation and resume diuretic regimen as above      Diastolic CHF [facility])   Assessment & Plan     - CXR reveals no acute process  Her respiratory symptoms are likely secondary to her Asthma given her scattered wheezing and marked improvement with nebs treatment  She will be given a one time dose of IV Lasix given her lower extremity edema  Will follow daily weights and I/Os while resuming diuretic regimen       Hypokalemia   Assessment & Plan     - can be secondary to diuretic use  Will order replacement and repeat K tomorrow morning         Hypernatremia   Assessment & Plan     - this is in the high-normal range    Will repeat Na tomorrow morning         Depression   Assessment & Plan     - continue Lexapro      Hypothyroidism   Assessment & Plan     - continue Synthroid         HTN (hypertension)   Assessment & Plan     - resume Lisinopril and Metoprolol       T2DM (type 2 diabetes mellitus) ([facility] )   Assessment & Plan     - will place on [facility]        Hospital Problem List:   Principal Problem:    Asthma exacerbation  Active Problems:    Bilateral lower extremity edema    T2DM (type 2 diabetes mellitus) (HCC)    HTN (hypertension)    Hypothyroidism    Depression    Hypernatremia    Hypokalemia    Diastolic CHF (HCC)    Chronic venous stasis dermatitis of both lower extremities  VTE Prophylaxis: Heparin   Code Status: Level 3 - DNAR and DNI  Anticipated Length of Stay:  Patient will be admitted on an Inpatient basis with an anticipated length of stay of atleast 2 midnights         Admission Orders:  GMF  BIPAP 10/5   VAS LOWER LIMB VENOUS DUPLEX  DAILY WEIGHT I/O  Scheduled Meds:   Current Facility-Administered Medications:  albuterol 2 5 mg Nebulization Q6H PRN Bossman Shi MD   aspirin 325 mg Oral Daily Bossman Shi MD   bisacodyl 5 mg Oral Daily PRN Bossman Shi MD   escitalopram 10 mg Oral Daily Bossman Shi MD   furosemide 40 mg Oral After Ros Hearn MD   heparin (porcine) 5,000 Units Subcutaneous Ruba Urbina MD   insulin aspart protamine-insulin aspart 45 Units Subcutaneous BID AC Akira Delgado MD   insulin lispro 1-5 Units Subcutaneous TID AC Akira Delgado MD   insulin lispro 1-5 Units Subcutaneous HS Lyubov Machado MD   ipratropium-albuterol 3 mL Nebulization Q6H Lyubov Machado MD   levothyroxine 125 mcg Oral Early Morning Lyubov Machado MD   lisinopril 10 mg Oral Daily Lyubov Machado MD   methylPREDNISolone sodium succinate 60 mg Intravenous Q8H [address] MD   metolazone 2 5 mg Oral Daily Lyubov Machado MD   metoprolol tartrate 25 mg Oral BID Lyubov Machado MD   oxyCODONE-acetaminophen 1 tablet Oral Q6H PRN Lyubov Machado MD   torsemide 20 mg Oral Daily Lyubov Machado MD     Continuous Infusions:    PRN Meds:   albuterol    bisacodyl    oxyCODONE-acetaminophen

## 2018-02-23 NOTE — SOCIAL WORK
Spoke to pt  Is unclear if pt has a portable device for home  Requires 3 liters n/c OTC per respiratory home oxygen kobe   Will send ecin to Chris bruner to assist in portable device if pt does not have one

## 2018-02-23 NOTE — RESPIRATORY THERAPY NOTE
Home Oxygen Qualifying Test       Patient name: Davion Banks        : 1940   Date of Test:  2018  Diagnosis:      Home Oxygen Test:    **Medicare Guidelines require item(s) 1-5 on all ambulatory patients or 1 and 2 on on-ambulatory patients  1   Baseline SPO2 on Room Air at rest 88 %  If = or < 88% on room air add O2 via NC and titrate patient  Patient must be ambulated with O2 and titrated to > 88% with exertion  2   SPO2 on Oxygen at rest 94 %  3   SPO2 during exercise on Room Air 86 %  4   SPO2 during exercise on Oxygen  93% at a liter flow of 3 lpm     5   Exercise performed:    [] Walking     [] Stairs     [] Duration 6 (min )     [] Distance 300(ft )       [x]  Supplemental Home Oxygen is indicated  []  Client does not qualify for home oxygen        Cinthia Green, RT

## 2018-02-23 NOTE — ASSESSMENT & PLAN NOTE
-likely secondary to her chronic venous stasis and hx of Diastolic CHF as well as noncompliance, she is not taking her lasix consistently at home   -She is not in acute CHF   -given a20 mg dose of IV Lasix on arrival  -Venous dopplers ordered to rule out DVT  -encourage leg elevation and resume home diuretic regimen for now

## 2018-02-23 NOTE — PHYSICAL THERAPY NOTE
PT EVALUATION       02/23/18 1047   Pain Assessment   Pain Assessment 0-10   Pain Score 8   Pain Type Chronic pain   Pain Location Generalized  (10/10 BLEs)   Home Living   Type of [address] [place] [address] One level  (1 PETEY)   Home Equipment (Rollator;home O2 2L at night only)   Prior Function   Level of [place] Needs assistance with ADLs and functional mobility; Needs assistance with IADLs  (amb w/out AD inside;uses RW outside)   Lives With Significant other   Receives Help From Family  (Significant other)   ADL Assistance Needs assistance   IADLs Needs assistance   Comments "I have to use the RW now, I can't do it without it"   Restrictions/Precautions   Other Precautions Fall Risk;O2;Pain   General   Additional Pertinent History Pt adm with SOB and leg swelling  Pt reports she was recently in hospital, was dc home, was "on my own" for 3 days as significant other was ill and pt states she was unable to care for herself  Family/Caregiver Present No   Cognition   Overall Cognitive Status WFL   Arousal/Participation Cooperative   Orientation Level Oriented X4   Following Commands Follows all commands and directions without difficulty   RLE Assessment   RLE Assessment WFL  (3 to 3+/5)   LLE Assessment   LLE Assessment WFL  (3 to 3+/5)   Transfers   Sit to Stand 5  Supervision   Stand to Sit 5  Supervision   Ambulation/Elevation   Gait pattern Short stride   Gait Assistance 5  Supervision   Assistive Device Rolling walker   Distance 50 feet;3L O2;SAO2 at rest 96% and with amb 94%  Pt minimally SOB at end of amb  Balance   Static Sitting Good   Static Standing Fair +  (with RW)   Dynamic Standing Fair +  (with RW)   Ambulatory Fair +  (with RW)   Activity Tolerance   Activity Tolerance Patient limited by pain; Patient limited by fatigue  (and SOB)   Assessment   Prognosis Good   Problem List Decreased strength;Decreased range of motion;Decreased endurance; Impaired balance;Decreased mobility;Obesity; Decreased skin integrity;Pain  (SOB)   Assessment Patient seen for Physical Therapy evaluation  Patient admitted with Asthma exacerbation  Comorbidities affecting patient's physical performance include: DM, CHF, TIA, asthma, obesity, pulmonary emphysema, HTN, HLD, chronic pain syndrome  Personal factors affecting patient at time of initial evaluation include: lives in one story house, ambulating with assistive device, stairs to enter home, inability to navigate community distances, inability to navigate level surfaces without external assistance, inability to perform dynamic tasks in community, anxiety, depression, inability to perform IADLS  and inability to live alone  Prior to admission, patient was independent with functional mobility with RW, requiring assist for ADLS, requiring assist for IADLS, living with significant other in a one level home with 1 steps to enter, ambulating household distance and ambulating community distances  Please find objective findings from Physical Therapy assessment regarding body systems outlined above with impairments and limitations including weakness, decreased ROM, impaired balance, decreased endurance, impaired coordination, gait deviations, pain, decreased activity tolerance, decreased functional mobility tolerance, decreased safety awareness, fall risk, SOB upon exertion and decreased skin integrity  The Barthel Index was used as a functional outcome tool presenting with a score of 55 today indicating marked limitations of functional mobility and ADLS  Patient's clinical presentation is currently unstable/unpredictable as seen in patient's presentation of vital sign response, changing level of pain, increased fall risk, new onset of impairment of functional mobility, decreased endurance and new onset of weakness  Pt would benefit from continued Physical Therapy treatment to address deficits as defined above and maximize level of functional mobility   As demonstrated by objective findings, the assigned level of complexity for this evaluation is high  Goals   Patient Goals Get better   STG Expiration Date (1-7 days)   Short Term Goal #1 bed mob - S; trans - I   Short Term Goal #2 amb with RW x 100 feet - S/I; up/down one step - S   LTG Expiration Date (8-14 days)   Long Term Goal #1 bed mob - I; amb with RW x 200 feet - I   Long Term Goal #2 up/down one step - I; balance with RW - G; strength LEs - 3+ to 4-/5   Plan   Treatment/Interventions ADL retraining;Functional transfer training;LE strengthening/ROM; Therapeutic exercise; Endurance training;Patient/family training;Bed mobility;Gait training   PT Frequency (3-5x/wk)   Recommendation   Recommendation (STR vs home with family and services)   Equipment Recommended (Pt has a RW)   Barthel Index   Feeding 10   Bathing 0   Grooming Score 0   Dressing Score 5   Bladder Score 10   Bowels Score 10   Toilet Use Score 5   Transfers (Bed/Chair) Score 10   Mobility (Level Surface) Score 0   Stairs Score 5   Barthel Index Score 55

## 2018-02-23 NOTE — ASSESSMENT & PLAN NOTE
-cont home meds  -hyperglycemia associated with steroid use    Will wean steroids as quickly as possible increase insulin sliding scale to improved sugars

## 2018-02-23 NOTE — ASSESSMENT & PLAN NOTE
- likely secondary to her chronic venous stasis and hx of Diastolic CHF  She is not in acute CHF though there may be some slight worsening    Will give a 20 mg dose of IV Lasix, order venous dopplers to rule out DVT, encourage leg elevation and resume home diuretic regimen for now

## 2018-02-23 NOTE — NUTRITION
02/23/18 1627   Assessment   Timepoint Initial  (BMI > 40, stg III-IV wounds)   Labs   List Completed Labs (, 355, 419, Na 146; meds- folcia berta, lasix, humalog, solumedrol)   Feeding Route   PO Independent   Adequacy of Intake   Nutrition Modality PO  (CCD 2)   Intake Meals %   Estimated Calorie Intake %   Estimated Protein Intake  %   Estimated Fluid Intake %   Nutrition Prognosis   Comorbid Concerns (morbid obesity, DM, CHF)   Nutrition Considerations (will monitor need for diet education)   PES Statement   Problem Clinical   Weight (3) Overweight/obesity NC-3 3   Overweight/ obesity specific to Obese, Class II (4)   Related to Energy intake > Energy output over time   As evidenced by: BMI   Patient Nutrition Goals   Goal meet PO needs   Goal Status initiated   Timeframe to complete goal by next f/u   Recommendations/Interventions   Summary Pt is currently on a CCD 2 diet and solumedrol with elevated BG levels  will adjust to CCD 1 for tighter BG control  ALso ntoed pt has CHF and +3 edema, but no cardiac diet ordered  Will add cardiac step 1 to control for sodium intake to aid in fluid status   Will continue to monitor po intake and compliance to diet orders   Malnutrition/BMI Present No  (morbid obesity previously documented in chart by MD)   Interventions Diet: order adjustment   Nutrition Recommendations Other (specify)  (see summary above)   Nutrition Complexity Risk   Nutrition complexity level Moderate risk   Nutrition review: 02/27/18   Follow up date 03/02/18

## 2018-02-23 NOTE — PLAN OF CARE
Problem: PAIN - ADULT  Goal: Verbalizes/displays adequate comfort level or baseline comfort level  Interventions:  - Encourage patient to monitor pain and request assistance  - Assess pain using appropriate pain scale  - Administer analgesics based on type and severity of pain and evaluate response  - Implement non-pharmacological measures as appropriate and evaluate response  - Consider cultural and social influences on pain and pain management  - Notify physician/advanced practitioner if interventions unsuccessful or patient reports new pain   Outcome: Progressing      Problem: INFECTION - ADULT  Goal: Absence or prevention of progression during hospitalization  INTERVENTIONS:  - Assess and monitor for signs and symptoms of infection  - Monitor lab/diagnostic results  - Monitor all insertion sites, i e  indwelling lines, tubes, and drains  - Monitor endotracheal (as able) and nasal secretions for changes in amount and color  - [place] appropriate cooling/warming therapies per order  - Administer medications as ordered  - Instruct and encourage patient and family to use good hand hygiene technique  - Identify and instruct in appropriate isolation precautions for identified infection/condition   Outcome: Progressing      Problem: SAFETY ADULT  Goal: Patient will remain free of falls  INTERVENTIONS:  - Assess patient frequently for physical needs  -  Identify cognitive and physical deficits and behaviors that affect risk of falls    -  [place] fall precautions as indicated by assessment   - Educate patient/family on patient safety including physical limitations  - Instruct patient to call for assistance with activity based on assessment  - Modify environment to reduce risk of injury  - Consider OT/PT consult to assist with strengthening/mobility    Outcome: Progressing    Goal: Maintain or return to baseline ADL function  INTERVENTIONS:  -  Assess patient's ability to carry out ADLs; assess patient's baseline for ADL function and identify physical deficits which impact ability to perform ADLs (bathing, care of mouth/teeth, toileting, grooming, dressing, etc )  - Assess/evaluate cause of self-care deficits   - Assess range of motion  - Assess patient's mobility; develop plan if impaired  - Assess patient's need for assistive devices and provide as appropriate  - Encourage maximum independence but intervene and supervise when necessary  ¯ Involve family in performance of ADLs  ¯ Assess for home care needs following discharge   ¯ Request OT consult to assist with ADL evaluation and planning for discharge  ¯ Provide patient education as appropriate   Outcome: Progressing    Goal: Maintain or return mobility status to optimal level  INTERVENTIONS:  - Assess patient's baseline mobility status (ambulation, transfers, stairs, etc )    - Identify cognitive and physical deficits and behaviors that affect mobility  - Identify mobility aids required to assist with transfers and/or ambulation (gait belt, sit-to-stand, lift, walker, cane, etc )  - [place] fall precautions as indicated by assessment  - Record patient progress and toleration of activity level on Mobility SBAR; progress patient to next Phase/Stage  - Instruct patient to call for assistance with activity based on assessment  - Request Rehabilitation consult to assist with strengthening/weightbearing, etc    Outcome: Progressing      Problem: DISCHARGE PLANNING  Goal: Discharge to home or other facility with appropriate resources  INTERVENTIONS:  - Identify barriers to discharge w/patient and caregiver  - Arrange for needed discharge resources and transportation as appropriate  - Identify discharge learning needs (meds, wound care, etc )  - Arrange for interpretive services to assist at discharge as needed  - Refer to Case Management Department for coordinating discharge planning if the patient needs post-hospital services based on physician/advanced practitioner order or complex needs related to functional status, cognitive ability, or social support system   Outcome: Progressing      Problem: Knowledge Deficit  Goal: Patient/family/caregiver demonstrates understanding of disease process, treatment plan, medications, and discharge instructions  Complete learning assessment and assess knowledge base  Interventions:  - Provide teaching at level of understanding  - Provide teaching via preferred learning methods   Outcome: Progressing      Problem: Potential for Falls  Goal: Patient will remain free of falls  INTERVENTIONS:  - Assess patient frequently for physical needs  -  Identify cognitive and physical deficits and behaviors that affect risk of falls  -  [place] fall precautions as indicated by assessment   - Educate patient/family on patient safety including physical limitations  - Instruct patient to call for assistance with activity based on assessment  - Modify environment to reduce risk of injury  - Consider OT/PT consult to assist with strengthening/mobility    Outcome: Progressing      Problem: Nutrition/Hydration-ADULT  Goal: Nutrient/Hydration intake appropriate for improving, restoring or maintaining nutritional needs  Monitor and assess patient's nutrition/hydration status for malnutrition (ex- brittle hair, bruises, dry skin, pale skin and conjunctiva, muscle wasting, smooth red tongue, and disorientation)  Collaborate with interdisciplinary team and initiate plan and interventions as ordered  Monitor patient's weight and dietary intake as ordered or per policy  Utilize nutrition screening tool and intervene per policy  Determine patient's food preferences and provide high-protein, high-caloric foods as appropriate       INTERVENTIONS:  - Monitor oral intake, urinary output, labs, and treatment plans  - Assess nutrition and hydration status and recommend course of action  - Evaluate amount of meals eaten  - Assist patient with eating if necessary   - Allow adequate time for meals  - Recommend/ encourage appropriate diets, oral nutritional supplements, and vitamin/mineral supplements  - Order, calculate, and assess calorie counts as needed  - Recommend, monitor, and adjust tube feedings and TPN/PPN based on assessed needs  - Assess need for intravenous fluids  - Provide specific nutrition/hydration education as appropriate  - Include patient/family/caregiver in decisions related to nutrition   Outcome: Not Progressing

## 2018-02-24 PROBLEM — R53.81 PHYSICAL DECONDITIONING: Status: ACTIVE | Noted: 2018-02-23

## 2018-02-24 LAB
GLUCOSE SERPL-MCNC: 307 MG/DL (ref 65–140)
GLUCOSE SERPL-MCNC: 316 MG/DL (ref 65–140)
GLUCOSE SERPL-MCNC: 479 MG/DL (ref 65–140)
GLUCOSE SERPL-MCNC: 498 MG/DL (ref 65–140)
GLUCOSE SERPL-MCNC: 526 MG/DL (ref 65–140)
MRSA NOSE QL CULT: NORMAL

## 2018-02-24 PROCEDURE — 94640 AIRWAY INHALATION TREATMENT: CPT

## 2018-02-24 PROCEDURE — 94760 N-INVAS EAR/PLS OXIMETRY 1: CPT

## 2018-02-24 PROCEDURE — 82948 REAGENT STRIP/BLOOD GLUCOSE: CPT

## 2018-02-24 PROCEDURE — 94150 VITAL CAPACITY TEST: CPT

## 2018-02-24 PROCEDURE — 82947 ASSAY GLUCOSE BLOOD QUANT: CPT | Performed by: STUDENT IN AN ORGANIZED HEALTH CARE EDUCATION/TRAINING PROGRAM

## 2018-02-24 PROCEDURE — 99232 SBSQ HOSP IP/OBS MODERATE 35: CPT | Performed by: STUDENT IN AN ORGANIZED HEALTH CARE EDUCATION/TRAINING PROGRAM

## 2018-02-24 RX ORDER — IPRATROPIUM BROMIDE AND ALBUTEROL SULFATE 2.5; .5 MG/3ML; MG/3ML
3 SOLUTION RESPIRATORY (INHALATION) EVERY 6 HOURS PRN
Status: DISCONTINUED | OUTPATIENT
Start: 2018-02-24 | End: 2018-02-25

## 2018-02-24 RX ORDER — METHYLPREDNISOLONE SODIUM SUCCINATE 40 MG/ML
20 INJECTION, POWDER, LYOPHILIZED, FOR SOLUTION INTRAMUSCULAR; INTRAVENOUS EVERY 12 HOURS SCHEDULED
Status: DISCONTINUED | OUTPATIENT
Start: 2018-02-24 | End: 2018-02-26

## 2018-02-24 RX ORDER — ACETAMINOPHEN 325 MG/1
650 TABLET ORAL EVERY 6 HOURS PRN
Status: DISCONTINUED | OUTPATIENT
Start: 2018-02-24 | End: 2018-02-26 | Stop reason: HOSPADM

## 2018-02-24 RX ORDER — TORSEMIDE 20 MG/1
40 TABLET ORAL DAILY
Status: DISCONTINUED | OUTPATIENT
Start: 2018-02-25 | End: 2018-02-26 | Stop reason: HOSPADM

## 2018-02-24 RX ORDER — TRAMADOL HYDROCHLORIDE 50 MG/1
50 TABLET ORAL EVERY 6 HOURS PRN
Status: DISCONTINUED | OUTPATIENT
Start: 2018-02-24 | End: 2018-02-26 | Stop reason: HOSPADM

## 2018-02-24 RX ORDER — METHYLPREDNISOLONE SODIUM SUCCINATE 40 MG/ML
20 INJECTION, POWDER, LYOPHILIZED, FOR SOLUTION INTRAMUSCULAR; INTRAVENOUS EVERY 8 HOURS SCHEDULED
Status: DISCONTINUED | OUTPATIENT
Start: 2018-02-24 | End: 2018-02-24

## 2018-02-24 RX ADMIN — HEPARIN SODIUM 5000 UNITS: 5000 INJECTION, SOLUTION INTRAVENOUS; SUBCUTANEOUS at 14:22

## 2018-02-24 RX ADMIN — PRAMIPEXOLE DIHYDROCHLORIDE 1 MG: 0.5 TABLET ORAL at 08:31

## 2018-02-24 RX ADMIN — METOPROLOL TARTRATE 25 MG: 25 TABLET ORAL at 17:00

## 2018-02-24 RX ADMIN — DICYCLOMINE HYDROCHLORIDE 10 MG: 10 CAPSULE ORAL at 21:29

## 2018-02-24 RX ADMIN — HEPARIN SODIUM 5000 UNITS: 5000 INJECTION, SOLUTION INTRAVENOUS; SUBCUTANEOUS at 21:30

## 2018-02-24 RX ADMIN — METHYLPREDNISOLONE SODIUM SUCCINATE 20 MG: 40 INJECTION, POWDER, FOR SOLUTION INTRAMUSCULAR; INTRAVENOUS at 14:22

## 2018-02-24 RX ADMIN — INSULIN LISPRO 3 UNITS: 100 INJECTION, SOLUTION INTRAVENOUS; SUBCUTANEOUS at 08:34

## 2018-02-24 RX ADMIN — INSULIN ASPART 45 UNITS: 100 INJECTION, SUSPENSION SUBCUTANEOUS at 16:14

## 2018-02-24 RX ADMIN — METOPROLOL TARTRATE 25 MG: 25 TABLET ORAL at 08:32

## 2018-02-24 RX ADMIN — TRAMADOL HYDROCHLORIDE 50 MG: 50 TABLET, FILM COATED ORAL at 19:06

## 2018-02-24 RX ADMIN — ESCITALOPRAM OXALATE 10 MG: 10 TABLET ORAL at 08:32

## 2018-02-24 RX ADMIN — LISINOPRIL 10 MG: 10 TABLET ORAL at 08:32

## 2018-02-24 RX ADMIN — HEPARIN SODIUM 5000 UNITS: 5000 INJECTION, SOLUTION INTRAVENOUS; SUBCUTANEOUS at 06:18

## 2018-02-24 RX ADMIN — BISACODYL 10 MG: 5 TABLET, COATED ORAL at 21:29

## 2018-02-24 RX ADMIN — INSULIN ASPART 45 UNITS: 100 INJECTION, SUSPENSION SUBCUTANEOUS at 08:34

## 2018-02-24 RX ADMIN — DICYCLOMINE HYDROCHLORIDE 10 MG: 10 CAPSULE ORAL at 08:32

## 2018-02-24 RX ADMIN — METOLAZONE 2.5 MG: 5 TABLET ORAL at 08:31

## 2018-02-24 RX ADMIN — DICYCLOMINE HYDROCHLORIDE 10 MG: 10 CAPSULE ORAL at 16:13

## 2018-02-24 RX ADMIN — INSULIN LISPRO 5 UNITS: 100 INJECTION, SOLUTION INTRAVENOUS; SUBCUTANEOUS at 12:49

## 2018-02-24 RX ADMIN — FOLIC ACID 1 MG: 1 TABLET ORAL at 08:32

## 2018-02-24 RX ADMIN — POLYETHYLENE GLYCOL 3350 17 G: 17 POWDER, FOR SOLUTION ORAL at 08:33

## 2018-02-24 RX ADMIN — TORSEMIDE 20 MG: 20 TABLET ORAL at 08:31

## 2018-02-24 RX ADMIN — PRAMIPEXOLE DIHYDROCHLORIDE 1 MG: 0.5 TABLET ORAL at 16:13

## 2018-02-24 RX ADMIN — METHYLPREDNISOLONE SODIUM SUCCINATE 40 MG: 40 INJECTION, POWDER, FOR SOLUTION INTRAMUSCULAR; INTRAVENOUS at 06:20

## 2018-02-24 RX ADMIN — PRAMIPEXOLE DIHYDROCHLORIDE 1 MG: 0.5 TABLET ORAL at 21:29

## 2018-02-24 RX ADMIN — IPRATROPIUM BROMIDE AND ALBUTEROL SULFATE 3 ML: .5; 3 SOLUTION RESPIRATORY (INHALATION) at 07:37

## 2018-02-24 RX ADMIN — ASPIRIN 325 MG: 325 TABLET ORAL at 08:32

## 2018-02-24 RX ADMIN — GABAPENTIN 100 MG: 100 CAPSULE ORAL at 08:32

## 2018-02-24 RX ADMIN — POTASSIUM CHLORIDE 20 MEQ: 1500 TABLET, EXTENDED RELEASE ORAL at 08:31

## 2018-02-24 RX ADMIN — METHYLPREDNISOLONE SODIUM SUCCINATE 20 MG: 40 INJECTION, POWDER, FOR SOLUTION INTRAMUSCULAR; INTRAVENOUS at 21:31

## 2018-02-24 RX ADMIN — INSULIN LISPRO 20 UNITS: 100 INJECTION, SOLUTION INTRAVENOUS; SUBCUTANEOUS at 17:00

## 2018-02-24 RX ADMIN — INSULIN LISPRO 12 UNITS: 100 INJECTION, SOLUTION INTRAVENOUS; SUBCUTANEOUS at 21:40

## 2018-02-24 RX ADMIN — LEVOTHYROXINE SODIUM 125 MCG: 125 TABLET ORAL at 06:17

## 2018-02-24 RX ADMIN — IPRATROPIUM BROMIDE AND ALBUTEROL SULFATE 3 ML: .5; 3 SOLUTION RESPIRATORY (INHALATION) at 13:05

## 2018-02-24 NOTE — ASSESSMENT & PLAN NOTE
-secondary to her chronic venous stasis and hx of Diastolic CHF as well as noncompliance, she is not taking her lasix consistently at home   -She is not in acute CHF   -given a20 mg dose of IV Lasix on arrival  -Venous dopplers negative for DVT  -encourage leg elevation  -stopped Lasix because of SE and increased torsemide dose, patient doing better on that

## 2018-02-24 NOTE — PROGRESS NOTES
Progress Note - Vamshi Santiago 1940, 77 y o  female MRN: [id number]    Unit/Bed#: [address] [address] Encounter: [id number]    Primary Care Provider: Toya Obregon MD   Date and time admitted to hospital: 2/22/2018  2:37 PM        Chronic respiratory failure ([address] )   Assessment & Plan    -supposed to be on continue with nasal cannula oxygen but does not use it all the time and has chronic dyspnea   -go to qualify iron shows that she needs 3 L continuously  -concentrator ordered and patient strongly advise to use oxygen as instructed as she is having the admissions for shortness of breath and respiratory failure        Physical deconditioning   Assessment & Plan    -patient not taking home medications as prescribed and is not able to function independently at home resulting in readmissions  -having daily extensive conversations with her regarding her care  Currently she feels unsafe going home but states that when she is better she does want to go home while at the time times saying that she can no longer care for herself  Patient has unrealistic expectations of her needs and care that can provided for her at home through services  Social work to assist with discharge planning          Bilateral lower extremity edema   Assessment & Plan    -likely secondary to her chronic venous stasis and hx of Diastolic CHF as well as noncompliance, she is not taking her lasix consistently at home   -She is not in acute CHF   -given a20 mg dose of IV Lasix on arrival  -Venous dopplers negative for DVT  -encourage leg elevation  -patient having side effects with Lasix with very sudden urinary urgency  Will DC Lasix and she is not likely to take it anyways  Increase torsemide dose instead        Chronic venous stasis dermatitis of both lower extremities   Assessment & Plan    -encourage leg elevation and diuretic regimen        Diastolic CHF (HCC)   Assessment & Plan    -CXR reveals no acute process   BNP unremarkable  -given a one time dose of IV Lasix given her lower extremity edema  -patient on torsemide and furosemide   -has not been taking the Lasix because of its effect on sudden urinary urgency  -patient unlikely to take it despite long discussion  Will DC Lasix and increase her torsemide dose instead  This should help with her symptoms while still allowing diuresis        T2DM (type 2 diabetes mellitus) ([facility] [address] )   Assessment & Plan    -cont home meds  -hyperglycemia associated with steroid use  Will wean steroids as quickly as possible increased insulin sliding scale again today because of continued hyperglycemia        * Asthma exacerbation   Assessment & Plan    -CXR reveals no acute process  -Pt was given Duonebs, Solumedrol and placed on BIPAP in the ED with improvement in her symptoms  -on Solumedrol, Duonebs and Albuterol prn    -symptoms improved, no need for pulmonary consult at this time  -patients symptoms appear related to some degree with noncompliance at home  -Solu-Medrol taper ongoing              VTE Pharmacologic Prophylaxis:   Pharmacologic: Heparin  Mechanical VTE Prophylaxis in Place: Yes    Patient Centered Rounds: I have performed bedside rounds with nursing staff today  Discussions with Specialists or Other Care Team Provider: Yes    Education and Discussions with Family / Patient:Yes    Time Spent for Care: 30 minutes  More than 50% of total time spent on counseling and coordination of care as described above  Current Length of Stay: 2 day(s)    Current Patient Status: Inpatient     Discharge Plan: pending    Code Status: Level 3 - DNAR and DNI      Subjective:   Jaye denies shortness of breath  She still undecided regarding her home situation  Does not want to go home and feels unsafe doing so but also does not want to go to a skilled nursing facility  Keeps going back and forth about her discharge planning  Does not know where she wants to go    Feels that she just wants to stay in the hospital because she does well here  I suspect she has unrealistic expectations regarding her medical conditions and her care  she does not want to take her Lasix because it makes her have sudden urge to urinate  Her Demadex does not do this  Also is not using her O2 continuously at home because she doesn't think she needs to, but has recurrent SOB  Objective:       Vitals:   Temp (24hrs), Av 9 °F (36 6 °C), Min:97 7 °F (36 5 °C), Max:98 2 °F (36 8 °C)    HR:  [62-86] 86  Resp:  [16-20] 20  BP: (100-137)/(51-62) 137/62  SpO2:  [92 %-96 %] 92 %  Body mass index is 42 54 kg/m²  Input and Output Summary (last 24 hours): Intake/Output Summary (Last 24 hours) at 18 1445  Last data filed at 18 1227   Gross per 24 hour   Intake              480 ml   Output             2480 ml   Net            -2000 ml       Physical Exam:     Physical Exam   Constitutional: She is oriented to person, place, and time  She appears well-developed  No distress  HENT:   Head: Normocephalic and atraumatic  Cardiovascular: Normal rate, regular rhythm and normal heart sounds  Pulmonary/Chest: Effort normal  No respiratory distress  She has no wheezes  She has no rales  No conversational dyspnea  Wearing 3L NC   Abdominal: Soft  Bowel sounds are normal  She exhibits no distension  There is no tenderness  There is no rebound  Musculoskeletal: She exhibits edema (Pitting edema in the bilateral lower extremities)  She exhibits no tenderness or deformity  Neurological: She is alert and oriented to person, place, and time  Skin: Skin is warm and dry  Psychiatric: Her behavior is normal    Anxious, poor judgment and insight   Nursing note and vitals reviewed        Additional Data:     Labs:      Results from last 7 days  Lab Units 18  0502  18  1526   WBC Thousand/uL 8 20  --  7 60   HEMOGLOBIN g/dL 10 1*  --  12 1   HEMATOCRIT % 31 4*  --  37 5   PLATELETS Thousands/uL 239  < > 265 NEUTROS PCT %  --   --  72   LYMPHS PCT %  --   --  19   MONOS PCT %  --   --  9   EOS PCT %  --   --  1   < > = values in this interval not displayed  Results from last 7 days  Lab Units 02/23/18  0502  02/22/18  1526   SODIUM mmol/L 146*  --  146*   POTASSIUM mmol/L 4 0  --  3 4*   CHLORIDE mmol/L 103  --  97*   CO2 mmol/L 42*  --  >45*   BUN mg/dL 24  --  19   CREATININE mg/dL 1 07  --  0 99   CALCIUM mg/dL 8 5  --  9 1   TOTAL PROTEIN g/dL  --   --  6 9   BILIRUBIN TOTAL mg/dL  --   --  0 30   ALK PHOS U/L  --   --  91   ALT U/L  --   --  45   AST U/L  --   --  20   GLUCOSE RANDOM mg/dL 376*  < > 333*   < > = values in this interval not displayed  * I Have Reviewed All Lab Data Listed Above  * Additional Pertinent Lab Tests Reviewed: All Labs For Current Hospital Admission Reviewed    Imaging:  Xr Chest 1 View Portable    Result Date: 2/22/2018  Narrative: CHEST INDICATION: SOB COMPARISON:  January 30, 2018 EXAM PERFORMED/VIEWS:  XR CHEST PORTABLE IMAGES:  1 FINDINGS: Mild cardiomegaly  Pulmonary vessels unremarkable  Moderate size hiatal hernia  The lungs are clear  No pneumothorax or pleural effusion  Visualized osseous structures appear within normal limits for the patient's age  Impression: No acute cardiopulmonary disease  Workstation performed: [id number]     Xr Chest Portable    Result Date: 1/30/2018  Narrative: CHEST INDICATION:  Acute asthma  Bronchitis  COMPARISON:  Chest radiographs January 19, 2018 VIEWS:   AP frontal IMAGES:  1 FINDINGS:     Heart shadow appears unremarkable  Atherosclerotic vascular calcifications are noted  The lungs are clear  No pneumothorax or pleural effusion  Visualized osseous structures appear within normal limits for the patient's age  Impression: No active pulmonary disease   Workstation performed: [id number]     Vas Lower Limb Venous Duplex Study, Complete Bilateral    Result Date: 2/23/2018  Narrative:  [facility] REPORT CLINICAL: Indications: Patient presents with bilateral lower extremity edema worsened in the past few dasy  Operative History: No history of cardiovascular surgery  Risk Factors: The patient has history of obesity, Diabetes Mellitus, Hypothyroidism, CHF and Chronic Venous Stasis  Clinical: The patient current BMI is 42 25, Weight is 231 lb and Height is 62 in  CONCLUSION:  Impression: RIGHT LOWER LIMB: No evidence of acute or chronic deep vein thrombosis  No evidence of superficial thrombophlebitis noted  Doppler evaluation shows a normal response to augmentation maneuvers  Popliteal, posterior tibial and anterior tibial arterial Doppler waveforms are triphasic  LEFT LOWER LIMB: No evidence of acute or chronic deep vein thrombosis  No evidence of superficial thrombophlebitis noted  Doppler evaluation shows a normal response to augmentation maneuvers  Popliteal, posterior tibial and anterior tibial arterial Doppler waveforms are triphasic  Technically difficult study secondary to body habitus  SIGNATURE: Electronically Signed by: Trent Krabbe on 2018-02-23 12:34:44 PM    Imaging Reports Reviewed by myself    Cultures:   Blood Culture:   Lab Results   Component Value Date    BLOODCX No Growth at 24 hrs  02/22/2018    BLOODCX No Growth at 24 hrs  02/22/2018    BLOODCX No Growth After 5 Days  01/19/2018    BLOODCX No Growth After 5 Days  01/19/2018    BLOODCX No Growth After 5 Days  06/11/2017    BLOODCX No Growth After 5 Days   06/11/2017    BLOODCX Klebsiella pneumoniae - ESBL (A) 06/07/2017    BLOODCX Klebsiella pneumoniae ESBL (A) 06/07/2017     Urine Culture:   Lab Results   Component Value Date    URINECX 20,000-29,000 cfu/ml Mixed Contaminants X3 04/15/2017     Sputum Culture: No components found for: SPUTUMCX  Wound Culture: No results found for: WOUNDCULT    Last 24 Hours Medication List:     Current Facility-Administered Medications:  aspirin 325 mg Oral Daily Michael Hopson MD   bisacodyl 10 mg Oral Sunitha Mcdaniel MD   bisacodyl 5 mg Oral Daily PRN Lyubov Machado MD   dicyclomine 10 mg Oral TID Gayle Noriega MD   escitalopram 10 mg Oral Daily Lyubov Machado MD   folic acid 1 mg Oral Daily Gayle Noriega MD   gabapentin 100 mg Oral Daily Gayle Noriega MD   heparin (porcine) 5,000 Units Subcutaneous Q8H [facility] Lyubov Machado MD   insulin aspart protamine-insulin aspart 45 Units Subcutaneous BID AC Akira Joseph MD   insulin lispro 4-20 Units Subcutaneous TID AC Gayle Noriega MD   insulin lispro 4-20 Units Subcutaneous HS Gayle Noriega MD   ipratropium-albuterol 3 mL Nebulization Q6H PRN Gayle Noriega MD   levothyroxine 125 mcg Oral Early Morning Lyubov Machado MD   lisinopril 10 mg Oral Daily Lyubov Machado MD   methylPREDNISolone sodium succinate 20 mg Intravenous Q12H 3630 Stacey Walls MD   metolazone 2 5 mg Oral Daily Lyubov Machado MD   metoprolol tartrate 25 mg Oral BID Lyubov Machado MD   oxyCODONE-acetaminophen 1 tablet Oral Q6H PRN Lyubov Machado MD   polyethylene glycol 17 g Oral Daily Gayle Noriega MD   potassium chloride 20 mEq Oral Daily Gayle Noriega MD   pramipexole 1 mg Oral TID [facility] torsemide 40 mg Oral Daily Gayle Noriega MD        Today, Patient Was Seen By: Gayle Noriega MD    ** Please Note: Dragon 360 Dictation voice to text software may have been used in the creation of this document   **

## 2018-02-24 NOTE — PROGRESS NOTES
Pt FS blood sugar 526  Random sugar blood draw ordered as per protocol  Dr Mendoza Wiseman notified  Pt algorithm was changed   PT on solumedrol

## 2018-02-24 NOTE — ASSESSMENT & PLAN NOTE
-patient not taking home medications as prescribed and is not able to function independently at home resulting in readmissions  -we had daily extensive conversations with her regarding her care  Patient has unrealistic expectations of her needs and care that can provided for her at home through services  - states that when she goes home she is not motivated and does very little, doesn't take her meds, doesn't use her O2  Here she has been more motivated because she is getting  nursing care constantly encouraging her    Both his and her concerns are that when she goes home she will not be able to do everything on her own without this constant encouragement  -PT OT recommends short-term rehab versus home with services    -she is amenable to short-term rehab and she was accepted to [facility]

## 2018-02-24 NOTE — PLAN OF CARE
DISCHARGE PLANNING     Discharge to home or other facility with appropriate resources Progressing        DISCHARGE PLANNING - CARE MANAGEMENT     Discharge to post-acute care or home with appropriate resources Progressing        INFECTION - ADULT     Absence or prevention of progression during hospitalization Progressing        Knowledge Deficit     Patient/family/caregiver demonstrates understanding of disease process, treatment plan, medications, and discharge instructions Progressing        Nutrition/Hydration-ADULT     Nutrient/Hydration intake appropriate for improving, restoring or maintaining nutritional needs Progressing        PAIN - ADULT     Verbalizes/displays adequate comfort level or baseline comfort level Progressing        Potential for Falls     Patient will remain free of falls Progressing        SAFETY ADULT     Patient will remain free of falls Progressing     Maintain or return to baseline ADL function Progressing     Maintain or return mobility status to optimal level Progressing

## 2018-02-24 NOTE — ASSESSMENT & PLAN NOTE
-CXR reveals no acute process  BNP unremarkable  -given  a one time dose of IV Lasix on arrival given her lower extremity edema  -patient was on torsemide and furosemide   -has not been taking the Lasix because of its effect of sudden urinary urgency  patient unlikely to take it on discharge because of side effects     -Discontinued Lasix and increased her torsemide dose instead  She feels this has helped with her sudden urge symptoms which are not as bad

## 2018-02-24 NOTE — ASSESSMENT & PLAN NOTE
-CXR reveals no acute process     -patients symptoms appear related to some degree with noncompliance at home  -Pt was given Duonebs, Solumedrol and placed on BIPAP for a short while in the ED with improvement in her symptoms  -patient was not taking her duo nebs as she thought they were giving her a nondescript and vague SE  Switched to PetSitnStay which improved her symptoms  -patient received IV Solu-Medrol with taper to p o

## 2018-02-25 LAB
GLUCOSE SERPL-MCNC: 167 MG/DL (ref 65–140)
GLUCOSE SERPL-MCNC: 301 MG/DL (ref 65–140)
GLUCOSE SERPL-MCNC: 360 MG/DL (ref 65–140)
GLUCOSE SERPL-MCNC: 388 MG/DL (ref 65–140)

## 2018-02-25 PROCEDURE — 94150 VITAL CAPACITY TEST: CPT

## 2018-02-25 PROCEDURE — 82948 REAGENT STRIP/BLOOD GLUCOSE: CPT

## 2018-02-25 PROCEDURE — 94660 CPAP INITIATION&MGMT: CPT

## 2018-02-25 PROCEDURE — 94640 AIRWAY INHALATION TREATMENT: CPT

## 2018-02-25 PROCEDURE — 99232 SBSQ HOSP IP/OBS MODERATE 35: CPT | Performed by: STUDENT IN AN ORGANIZED HEALTH CARE EDUCATION/TRAINING PROGRAM

## 2018-02-25 PROCEDURE — 94760 N-INVAS EAR/PLS OXIMETRY 1: CPT

## 2018-02-25 RX ORDER — LEVALBUTEROL 1.25 MG/.5ML
1.25 SOLUTION, CONCENTRATE RESPIRATORY (INHALATION) EVERY 6 HOURS PRN
Status: DISCONTINUED | OUTPATIENT
Start: 2018-02-25 | End: 2018-02-26 | Stop reason: HOSPADM

## 2018-02-25 RX ORDER — SODIUM CHLORIDE FOR INHALATION 0.9 %
3 VIAL, NEBULIZER (ML) INHALATION EVERY 6 HOURS PRN
Status: DISCONTINUED | OUTPATIENT
Start: 2018-02-25 | End: 2018-02-26 | Stop reason: HOSPADM

## 2018-02-25 RX ADMIN — METOPROLOL TARTRATE 25 MG: 25 TABLET ORAL at 17:13

## 2018-02-25 RX ADMIN — IPRATROPIUM BROMIDE AND ALBUTEROL SULFATE 3 ML: .5; 3 SOLUTION RESPIRATORY (INHALATION) at 07:16

## 2018-02-25 RX ADMIN — METHYLPREDNISOLONE SODIUM SUCCINATE 20 MG: 40 INJECTION, POWDER, FOR SOLUTION INTRAMUSCULAR; INTRAVENOUS at 09:14

## 2018-02-25 RX ADMIN — INSULIN LISPRO 16 UNITS: 100 INJECTION, SOLUTION INTRAVENOUS; SUBCUTANEOUS at 21:23

## 2018-02-25 RX ADMIN — LISINOPRIL 10 MG: 10 TABLET ORAL at 09:14

## 2018-02-25 RX ADMIN — HEPARIN SODIUM 5000 UNITS: 5000 INJECTION, SOLUTION INTRAVENOUS; SUBCUTANEOUS at 21:28

## 2018-02-25 RX ADMIN — LEVALBUTEROL 1.25 MG: 1.25 SOLUTION, CONCENTRATE RESPIRATORY (INHALATION) at 14:40

## 2018-02-25 RX ADMIN — DICYCLOMINE HYDROCHLORIDE 10 MG: 10 CAPSULE ORAL at 16:36

## 2018-02-25 RX ADMIN — GABAPENTIN 100 MG: 100 CAPSULE ORAL at 09:13

## 2018-02-25 RX ADMIN — ISODIUM CHLORIDE 3 ML: 0.03 SOLUTION RESPIRATORY (INHALATION) at 14:40

## 2018-02-25 RX ADMIN — INSULIN ASPART 45 UNITS: 100 INJECTION, SUSPENSION SUBCUTANEOUS at 17:15

## 2018-02-25 RX ADMIN — INSULIN LISPRO 4 UNITS: 100 INJECTION, SOLUTION INTRAVENOUS; SUBCUTANEOUS at 09:18

## 2018-02-25 RX ADMIN — PRAMIPEXOLE DIHYDROCHLORIDE 1 MG: 0.5 TABLET ORAL at 21:22

## 2018-02-25 RX ADMIN — ESCITALOPRAM OXALATE 10 MG: 10 TABLET ORAL at 09:12

## 2018-02-25 RX ADMIN — PRAMIPEXOLE DIHYDROCHLORIDE 1 MG: 0.5 TABLET ORAL at 09:12

## 2018-02-25 RX ADMIN — POTASSIUM CHLORIDE 20 MEQ: 1500 TABLET, EXTENDED RELEASE ORAL at 09:16

## 2018-02-25 RX ADMIN — POLYETHYLENE GLYCOL 3350 17 G: 17 POWDER, FOR SOLUTION ORAL at 09:16

## 2018-02-25 RX ADMIN — BISACODYL 10 MG: 5 TABLET, COATED ORAL at 21:36

## 2018-02-25 RX ADMIN — DICYCLOMINE HYDROCHLORIDE 10 MG: 10 CAPSULE ORAL at 09:12

## 2018-02-25 RX ADMIN — INSULIN LISPRO 16 UNITS: 100 INJECTION, SOLUTION INTRAVENOUS; SUBCUTANEOUS at 16:37

## 2018-02-25 RX ADMIN — HEPARIN SODIUM 5000 UNITS: 5000 INJECTION, SOLUTION INTRAVENOUS; SUBCUTANEOUS at 14:19

## 2018-02-25 RX ADMIN — FOLIC ACID 1 MG: 1 TABLET ORAL at 09:13

## 2018-02-25 RX ADMIN — PRAMIPEXOLE DIHYDROCHLORIDE 1 MG: 0.5 TABLET ORAL at 16:35

## 2018-02-25 RX ADMIN — INSULIN ASPART 45 UNITS: 100 INJECTION, SUSPENSION SUBCUTANEOUS at 09:17

## 2018-02-25 RX ADMIN — DICYCLOMINE HYDROCHLORIDE 10 MG: 10 CAPSULE ORAL at 21:15

## 2018-02-25 RX ADMIN — TORSEMIDE 40 MG: 20 TABLET ORAL at 09:17

## 2018-02-25 RX ADMIN — LEVOTHYROXINE SODIUM 125 MCG: 125 TABLET ORAL at 05:44

## 2018-02-25 RX ADMIN — INSULIN LISPRO 12 UNITS: 100 INJECTION, SOLUTION INTRAVENOUS; SUBCUTANEOUS at 12:39

## 2018-02-25 RX ADMIN — ASPIRIN 325 MG: 325 TABLET ORAL at 09:12

## 2018-02-25 RX ADMIN — METHYLPREDNISOLONE SODIUM SUCCINATE 20 MG: 40 INJECTION, POWDER, FOR SOLUTION INTRAMUSCULAR; INTRAVENOUS at 21:25

## 2018-02-25 RX ADMIN — TRAMADOL HYDROCHLORIDE 50 MG: 50 TABLET, FILM COATED ORAL at 21:51

## 2018-02-25 RX ADMIN — HEPARIN SODIUM 5000 UNITS: 5000 INJECTION, SOLUTION INTRAVENOUS; SUBCUTANEOUS at 05:45

## 2018-02-25 RX ADMIN — METOLAZONE 2.5 MG: 5 TABLET ORAL at 09:15

## 2018-02-25 RX ADMIN — METOPROLOL TARTRATE 25 MG: 25 TABLET ORAL at 09:15

## 2018-02-25 NOTE — PROGRESS NOTES
Progress Note - [place] Prior 1940, 77 y o  female MRN: [id number]    Unit/Bed#: [address] [address] Encounter: [id number]    Primary Care Provider: Nano Bronson MD   Date and time admitted to hospital: 2/22/2018  2:37 PM        Chronic respiratory failure [facility])   Assessment & Plan    -patient was using oxygen at night and is having chronic dyspnea  -O2 qualifying exam ordered:  She needs 3 L continuously  -concentrator ordered and patient strongly advise to use oxygen as instructed as she is having the admissions for shortness of breath and respiratory failure  -her pulmonologist Dr Radha Tejada was made aware        Physical deconditioning   Assessment & Plan    -patient not taking home medications as prescribed and is not able to function independently at home resulting in readmissions  -having daily extensive conversations with her regarding her care  Patient has unrealistic expectations of her needs and care that can provided for her at home through services  Social work to assist with discharge planning  -PT OT recommends short-term rehab versus home with services  Treatment ongoing          Bilateral lower extremity edema   Assessment & Plan    -likely secondary to her chronic venous stasis and hx of Diastolic CHF as well as noncompliance, she is not taking her lasix consistently at home   -She is not in acute CHF   -given a20 mg dose of IV Lasix on arrival  -Venous dopplers negative for DVT  -encourage leg elevation  -stopped Lasix and increased torsemide dose        Chronic venous stasis dermatitis of both lower extremities   Assessment & Plan    -encourage leg elevation and diuretic regimen        Diastolic CHF ([address] )   Assessment & Plan    -CXR reveals no acute process  BNP unremarkable  -given  a one time dose of IV Lasix given her lower extremity edema  -patient on torsemide and furosemide   -has not been taking the Lasix because of its effect on sudden urinary urgency   patient unlikely to take it on discharge because of side effects  -DC Lasix and increase her torsemide dose instead  This should help with her symptoms while still allowing diuresis        T2DM (type 2 diabetes mellitus) ([facility] [address] )   Assessment & Plan    -cont home meds  -hyperglycemia associated with steroid use    -improving with steroid wean        * Asthma exacerbation   Assessment & Plan    -CXR reveals no acute process  -Pt was given Duonebs, Solumedrol and placed on BIPAP in the ED with improvement in her symptoms  -on Solumedrol, Duonebs and Albuterol prn    -symptoms improved, no need for pulmonary consult at this time  -patients symptoms appear related to some degree with noncompliance at home  -Solu-Medrol taper ongoing              VTE Pharmacologic Prophylaxis:   Pharmacologic: Heparin  Mechanical VTE Prophylaxis in Place: Yes    Patient Centered Rounds: I have performed bedside rounds with nursing staff today  Discussions with Specialists or Other Care Team Provider: Yes    Education and Discussions with Family / Patient:Yes    Time Spent for Care: 30 minutes  More than 50% of total time spent on counseling and coordination of care as described above  Current Length of Stay: 3 day(s)    Current Patient Status: Inpatient     Discharge Plan: pending    Code Status: Level 3 - DNAR and DNI      Subjective:   Jaye starting to feel better and yet states there are multiple things wrong  Firstly, she does not want take the DuoNeb treatments for her asthma because they make her feel Merrily Sa something is wrong with me, they make my brain mental" but she cannot describe any further  Secondly, she still wants to try going home on discharge but at the same time states that she can not take care of herself  Does not want take the Lasix anymore  Happy that has been discontinued and her torsemide has been increased instead  Concerned about sudden urge to urinate          Objective:       Vitals:   Temp (24hrs), Av 4 °F (36 9 °C), Min:98 2 °F (36 8 °C), Max:98 5 °F (36 9 °C)    HR:  [69-83] 83  Resp:  [16-20] 20  BP: (108-138)/(52-69) 118/56  SpO2:  [93 %-98 %] 96 %  Body mass index is 42 22 kg/m²  Input and Output Summary (last 24 hours): Intake/Output Summary (Last 24 hours) at 02/25/18 1415  Last data filed at 02/25/18 0915   Gross per 24 hour   Intake                0 ml   Output             2200 ml   Net            -2200 ml       Physical Exam:     Physical Exam   Constitutional: She is oriented to person, place, and time  She appears well-developed  No distress  HENT:   Head: Normocephalic and atraumatic  Cardiovascular: Normal rate, regular rhythm and normal heart sounds  Pulmonary/Chest: Effort normal  No respiratory distress  She has wheezes  She has no rales  No conversational dyspnea  Wearing 3L NC     Abdominal: Soft  Bowel sounds are normal  She exhibits no distension  There is no tenderness  There is no rebound  Musculoskeletal: She exhibits edema (Pitting edema in the bilateral lower extremities)  She exhibits no tenderness or deformity  Neurological: She is alert and oriented to person, place, and time  Skin: Skin is warm and dry  Psychiatric: Her behavior is normal    Anxious, poor judgment and insight   Nursing note and vitals reviewed  Additional Data:     Labs:      Results from last 7 days  Lab Units 02/23/18  0502  02/22/18  1526   WBC Thousand/uL 8 20  --  7 60   HEMOGLOBIN g/dL 10 1*  --  12 1   HEMATOCRIT % 31 4*  --  37 5   PLATELETS Thousands/uL 239  < > 265   NEUTROS PCT %  --   --  72   LYMPHS PCT %  --   --  19   MONOS PCT %  --   --  9   EOS PCT %  --   --  1   < > = values in this interval not displayed      Results from last 7 days  Lab Units 02/24/18  1655 02/23/18  0502  02/22/18  1526   SODIUM mmol/L  --  146*  --  146*   POTASSIUM mmol/L  --  4 0  --  3 4*   CHLORIDE mmol/L  --  103  --  97*   CO2 mmol/L  --  42*  --  >45*   BUN mg/dL  --  24  --  19 CREATININE mg/dL  --  1 07  --  0 99   CALCIUM mg/dL  --  8 5  --  9 1   TOTAL PROTEIN g/dL  --   --   --  6 9   BILIRUBIN TOTAL mg/dL  --   --   --  0 30   ALK PHOS U/L  --   --   --  91   ALT U/L  --   --   --  45   AST U/L  --   --   --  20   GLUCOSE RANDOM mg/dL 498* 376*  < > 333*   < > = values in this interval not displayed  * I Have Reviewed All Lab Data Listed Above  * Additional Pertinent Lab Tests Reviewed: All Labs For Current Hospital Admission Reviewed    Imaging:  Xr Chest 1 View Portable    Result Date: 2/22/2018  Narrative: CHEST INDICATION: SOB COMPARISON:  January 30, 2018 EXAM PERFORMED/VIEWS:  XR CHEST PORTABLE IMAGES:  1 FINDINGS: Mild cardiomegaly  Pulmonary vessels unremarkable  Moderate size hiatal hernia  The lungs are clear  No pneumothorax or pleural effusion  Visualized osseous structures appear within normal limits for the patient's age  Impression: No acute cardiopulmonary disease  Workstation performed: [id number]     Xr Chest Portable    Result Date: 1/30/2018  Narrative: CHEST INDICATION:  Acute asthma  Bronchitis  COMPARISON:  Chest radiographs January 19, 2018 VIEWS:   AP frontal IMAGES:  1 FINDINGS:     Heart shadow appears unremarkable  Atherosclerotic vascular calcifications are noted  The lungs are clear  No pneumothorax or pleural effusion  Visualized osseous structures appear within normal limits for the patient's age  Impression: No active pulmonary disease  Workstation performed: [id number]     Vas Lower Limb Venous Duplex Study, Complete Bilateral    Result Date: 2/23/2018  Narrative:  [facility] REPORT CLINICAL: Indications: Patient presents with bilateral lower extremity edema worsened in the past few dasy  Operative History: No history of cardiovascular surgery  Risk Factors: The patient has history of obesity, Diabetes Mellitus, Hypothyroidism, CHF and Chronic Venous Stasis   Clinical: The patient current BMI is 42 25, Weight is 231 lb and Height is 62 in  CONCLUSION:  Impression: RIGHT LOWER LIMB: No evidence of acute or chronic deep vein thrombosis  No evidence of superficial thrombophlebitis noted  Doppler evaluation shows a normal response to augmentation maneuvers  Popliteal, posterior tibial and anterior tibial arterial Doppler waveforms are triphasic  LEFT LOWER LIMB: No evidence of acute or chronic deep vein thrombosis  No evidence of superficial thrombophlebitis noted  Doppler evaluation shows a normal response to augmentation maneuvers  Popliteal, posterior tibial and anterior tibial arterial Doppler waveforms are triphasic  Technically difficult study secondary to body habitus  SIGNATURE: Electronically Signed by: Albert Pierce on 2018-02-23 12:34:44 PM    Imaging Reports Reviewed by myself    Cultures:   Blood Culture:   Lab Results   Component Value Date    BLOODCX No Growth at 48 hrs  02/22/2018    BLOODCX No Growth at 48 hrs  02/22/2018    BLOODCX No Growth After 5 Days  01/19/2018    BLOODCX No Growth After 5 Days  01/19/2018    BLOODCX No Growth After 5 Days  06/11/2017    BLOODCX No Growth After 5 Days   06/11/2017    BLOODCX Klebsiella pneumoniae - ESBL (A) 06/07/2017    BLOODCX Klebsiella pneumoniae ESBL (A) 06/07/2017     Urine Culture:   Lab Results   Component Value Date    URINECX 20,000-29,000 cfu/ml Mixed Contaminants X3 04/15/2017     Sputum Culture: No components found for: SPUTUMCX  Wound Culture: No results found for: WOUNDCULT    Last 24 Hours Medication List:     Current Facility-Administered Medications:  acetaminophen 650 mg Oral Q6H PRN Bradley Marshall MD   aspirin 325 mg Oral Daily Leda Campos MD   bisacodyl 10 mg Oral HS Bradley Masrhall MD   bisacodyl 5 mg Oral Daily PRN Leda Campos MD   dicyclomine 10 mg Oral TID Bradley Marshall MD   escitalopram 10 mg Oral Daily Leda Campos MD   folic acid 1 mg Oral Daily Bradley Marshall MD   gabapentin 100 mg Oral Daily Bradley Marshall MD   heparin (porcine) 5,000 Units Subcutaneous Q8H Max Stringer MD   insulin aspart protamine-insulin aspart 45 Units Subcutaneous BID AC Akira Delgado MD   insulin lispro 4-20 Units Subcutaneous TID AC Anitha Alcala MD   insulin lispro 4-20 Units Subcutaneous HS Anitha Alcala MD   levalbuterol 1 25 mg Nebulization Q6H PRN Anitha Alcala MD   And       sodium chloride 3 mL Nebulization Q6H PRN Anitha Alcala MD   levothyroxine 125 mcg Oral Early Morning Christobal Kayser, MD   lisinopril 10 mg Oral Daily Christobal Kayser, MD   methylPREDNISolone sodium succinate 20 mg Intravenous Q12H [address], MD   metolazone 2 5 mg Oral Daily Christobal Kayser, MD   metoprolol tartrate 25 mg Oral BID Christobal Kayser, MD   polyethylene glycol 17 g Oral Daily Anitha Alcala MD   potassium chloride 20 mEq Oral Daily Anitha Alcala MD   pramipexole 1 mg Oral TID Anitha Alcala MD   torsemide 40 mg Oral Daily Anitha Alcala MD   traMADol 50 mg Oral Q6H PRN Anitha Alcala MD        Today, Patient Was Seen By: Anitha Alcala MD    ** Please Note: Dragon 360 Dictation voice to text software may have been used in the creation of this document   **

## 2018-02-26 VITALS
HEART RATE: 67 BPM | DIASTOLIC BLOOD PRESSURE: 65 MMHG | HEIGHT: 62 IN | RESPIRATION RATE: 18 BRPM | WEIGHT: 224.43 LBS | SYSTOLIC BLOOD PRESSURE: 137 MMHG | BODY MASS INDEX: 41.3 KG/M2 | TEMPERATURE: 97.4 F | OXYGEN SATURATION: 96 %

## 2018-02-26 PROBLEM — E11.65 TYPE 2 DIABETES MELLITUS WITH HYPERGLYCEMIA (HCC): Status: ACTIVE | Noted: 2017-04-15

## 2018-02-26 PROBLEM — F32.A ANXIETY AND DEPRESSION: Status: ACTIVE | Noted: 2018-02-26

## 2018-02-26 PROBLEM — F41.9 ANXIETY AND DEPRESSION: Status: ACTIVE | Noted: 2018-02-26

## 2018-02-26 LAB
ANION GAP SERPL CALCULATED.3IONS-SCNC: 8 MMOL/L (ref 4–13)
BUN SERPL-MCNC: 58 MG/DL (ref 5–25)
CALCIUM SERPL-MCNC: 8.8 MG/DL (ref 8.3–10.1)
CHLORIDE SERPL-SCNC: 94 MMOL/L (ref 100–108)
CO2 SERPL-SCNC: 39 MMOL/L (ref 21–32)
CREAT SERPL-MCNC: 1.07 MG/DL (ref 0.6–1.3)
ERYTHROCYTE [DISTWIDTH] IN BLOOD BY AUTOMATED COUNT: 16.5 % (ref 11.6–15.1)
GFR SERPL CREATININE-BSD FRML MDRD: 49 ML/MIN/1.73SQ M
GLUCOSE SERPL-MCNC: 245 MG/DL (ref 65–140)
GLUCOSE SERPL-MCNC: 260 MG/DL (ref 65–140)
GLUCOSE SERPL-MCNC: 339 MG/DL (ref 65–140)
GLUCOSE SERPL-MCNC: 458 MG/DL (ref 65–140)
HCT VFR BLD AUTO: 36.1 % (ref 37–47)
HGB BLD-MCNC: 11.7 G/DL (ref 12–16)
MCH RBC QN AUTO: 30.9 PG (ref 27–31)
MCHC RBC AUTO-ENTMCNC: 32.4 G/DL (ref 31.4–37.4)
MCV RBC AUTO: 95 FL (ref 82–98)
PLATELET # BLD AUTO: 291 THOUSANDS/UL (ref 130–400)
PMV BLD AUTO: 9.2 FL (ref 8.9–12.7)
POTASSIUM SERPL-SCNC: 4.2 MMOL/L (ref 3.5–5.3)
RBC # BLD AUTO: 3.78 MILLION/UL (ref 4.2–5.4)
SODIUM SERPL-SCNC: 141 MMOL/L (ref 136–145)
WBC # BLD AUTO: 11.1 THOUSAND/UL (ref 4.8–10.8)

## 2018-02-26 PROCEDURE — 97110 THERAPEUTIC EXERCISES: CPT

## 2018-02-26 PROCEDURE — 94150 VITAL CAPACITY TEST: CPT

## 2018-02-26 PROCEDURE — 94760 N-INVAS EAR/PLS OXIMETRY 1: CPT

## 2018-02-26 PROCEDURE — 99239 HOSP IP/OBS DSCHRG MGMT >30: CPT | Performed by: STUDENT IN AN ORGANIZED HEALTH CARE EDUCATION/TRAINING PROGRAM

## 2018-02-26 PROCEDURE — 80048 BASIC METABOLIC PNL TOTAL CA: CPT | Performed by: STUDENT IN AN ORGANIZED HEALTH CARE EDUCATION/TRAINING PROGRAM

## 2018-02-26 PROCEDURE — 82948 REAGENT STRIP/BLOOD GLUCOSE: CPT

## 2018-02-26 PROCEDURE — 85027 COMPLETE CBC AUTOMATED: CPT | Performed by: STUDENT IN AN ORGANIZED HEALTH CARE EDUCATION/TRAINING PROGRAM

## 2018-02-26 RX ORDER — FENTANYL 25 UG/H
1 PATCH TRANSDERMAL
Status: DISCONTINUED | OUTPATIENT
Start: 2018-02-26 | End: 2018-02-26 | Stop reason: HOSPADM

## 2018-02-26 RX ORDER — PREDNISONE 20 MG/1
TABLET ORAL
Refills: 0
Start: 2018-02-26 | End: 2018-03-29 | Stop reason: ALTCHOICE

## 2018-02-26 RX ORDER — TRAMADOL HYDROCHLORIDE 50 MG/1
50 TABLET ORAL EVERY 6 HOURS PRN
Qty: 15 TABLET | Refills: 0 | Status: SHIPPED | OUTPATIENT
Start: 2018-02-26 | End: 2018-03-22 | Stop reason: SDUPTHER

## 2018-02-26 RX ORDER — ACETAMINOPHEN 325 MG/1
650 TABLET ORAL EVERY 6 HOURS PRN
Qty: 30 TABLET | Refills: 0
Start: 2018-02-26 | End: 2018-10-11 | Stop reason: ALTCHOICE

## 2018-02-26 RX ORDER — GABAPENTIN 100 MG/1
100 CAPSULE ORAL DAILY
Qty: 5 CAPSULE | Refills: 0 | Status: SHIPPED | OUTPATIENT
Start: 2018-02-26 | End: 2018-05-23 | Stop reason: SDUPTHER

## 2018-02-26 RX ORDER — LEVALBUTEROL 1.25 MG/.5ML
1.25 SOLUTION, CONCENTRATE RESPIRATORY (INHALATION) EVERY 4 HOURS PRN
Qty: 45 ML | Refills: 0
Start: 2018-02-26 | End: 2018-03-28

## 2018-02-26 RX ORDER — FENTANYL 25 UG/H
1 PATCH TRANSDERMAL
Qty: 2 PATCH | Refills: 0 | Status: SHIPPED | OUTPATIENT
Start: 2018-02-26 | End: 2018-03-08

## 2018-02-26 RX ORDER — PREDNISONE 20 MG/1
40 TABLET ORAL DAILY
Status: DISCONTINUED | OUTPATIENT
Start: 2018-02-26 | End: 2018-02-26 | Stop reason: HOSPADM

## 2018-02-26 RX ORDER — TORSEMIDE 20 MG/1
40 TABLET ORAL DAILY
Qty: 30 TABLET | Refills: 0 | Status: SHIPPED | OUTPATIENT
Start: 2018-02-26 | End: 2018-05-25 | Stop reason: SDUPTHER

## 2018-02-26 RX ADMIN — ESCITALOPRAM OXALATE 10 MG: 10 TABLET ORAL at 08:06

## 2018-02-26 RX ADMIN — INSULIN LISPRO 16 UNITS: 100 INJECTION, SOLUTION INTRAVENOUS; SUBCUTANEOUS at 12:15

## 2018-02-26 RX ADMIN — INSULIN LISPRO 20 UNITS: 100 INJECTION, SOLUTION INTRAVENOUS; SUBCUTANEOUS at 16:40

## 2018-02-26 RX ADMIN — METOPROLOL TARTRATE 25 MG: 25 TABLET ORAL at 08:06

## 2018-02-26 RX ADMIN — POTASSIUM CHLORIDE 20 MEQ: 1500 TABLET, EXTENDED RELEASE ORAL at 08:06

## 2018-02-26 RX ADMIN — FENTANYL 1 PATCH: 25 PATCH, EXTENDED RELEASE TRANSDERMAL at 12:16

## 2018-02-26 RX ADMIN — METHYLPREDNISOLONE SODIUM SUCCINATE 20 MG: 40 INJECTION, POWDER, FOR SOLUTION INTRAMUSCULAR; INTRAVENOUS at 08:06

## 2018-02-26 RX ADMIN — PRAMIPEXOLE DIHYDROCHLORIDE 1 MG: 0.5 TABLET ORAL at 08:06

## 2018-02-26 RX ADMIN — METOPROLOL TARTRATE 25 MG: 25 TABLET ORAL at 17:10

## 2018-02-26 RX ADMIN — LISINOPRIL 10 MG: 10 TABLET ORAL at 08:06

## 2018-02-26 RX ADMIN — HEPARIN SODIUM 5000 UNITS: 5000 INJECTION, SOLUTION INTRAVENOUS; SUBCUTANEOUS at 13:43

## 2018-02-26 RX ADMIN — PREDNISONE 40 MG: 20 TABLET ORAL at 12:15

## 2018-02-26 RX ADMIN — HEPARIN SODIUM 5000 UNITS: 5000 INJECTION, SOLUTION INTRAVENOUS; SUBCUTANEOUS at 05:32

## 2018-02-26 RX ADMIN — GABAPENTIN 100 MG: 100 CAPSULE ORAL at 08:06

## 2018-02-26 RX ADMIN — TRAMADOL HYDROCHLORIDE 50 MG: 50 TABLET, FILM COATED ORAL at 04:35

## 2018-02-26 RX ADMIN — INSULIN ASPART 45 UNITS: 100 INJECTION, SUSPENSION SUBCUTANEOUS at 16:39

## 2018-02-26 RX ADMIN — PRAMIPEXOLE DIHYDROCHLORIDE 1 MG: 0.5 TABLET ORAL at 16:39

## 2018-02-26 RX ADMIN — FOLIC ACID 1 MG: 1 TABLET ORAL at 08:06

## 2018-02-26 RX ADMIN — LEVOTHYROXINE SODIUM 125 MCG: 125 TABLET ORAL at 05:32

## 2018-02-26 RX ADMIN — INSULIN LISPRO 8 UNITS: 100 INJECTION, SOLUTION INTRAVENOUS; SUBCUTANEOUS at 08:05

## 2018-02-26 RX ADMIN — METOLAZONE 2.5 MG: 5 TABLET ORAL at 08:06

## 2018-02-26 RX ADMIN — INSULIN ASPART 45 UNITS: 100 INJECTION, SUSPENSION SUBCUTANEOUS at 08:06

## 2018-02-26 RX ADMIN — ASPIRIN 325 MG: 325 TABLET ORAL at 08:06

## 2018-02-26 RX ADMIN — POLYETHYLENE GLYCOL 3350 17 G: 17 POWDER, FOR SOLUTION ORAL at 08:06

## 2018-02-26 RX ADMIN — DICYCLOMINE HYDROCHLORIDE 10 MG: 10 CAPSULE ORAL at 08:06

## 2018-02-26 RX ADMIN — TORSEMIDE 40 MG: 20 TABLET ORAL at 08:06

## 2018-02-26 RX ADMIN — DICYCLOMINE HYDROCHLORIDE 10 MG: 10 CAPSULE ORAL at 16:39

## 2018-02-26 NOTE — CASE MANAGEMENT
Notification of Discharge  This is a Notification of Discharge from our facility [address]  Please be advised that this patient has been discharge from our facility  Below you will find the admission and discharge date and time including the patients disposition  PRESENTATION DATE: 2/22/2018  2:37 PM  IP ADMISSION DATE: 2/22/18 1822  DISCHARGE DATE: 2/26/2018  8:18 PM  DISPOSITION: Released to SNF/TCU/SNU [address] [place] [address] in the [facility] by Miguel Ángel Estevez for 2017  Network Utilization Review Department  Phone: [phone]; Fax [phone]  ATTENTION: The Network Utilization Review Department is now centralized for our 7 Facilities  Make a note that we have a new phone and fax numbers for our Department  Please call with any questions or concerns to [phone] and carefully follow the prompts so that you are directed to the right person  All voicemails are confidential  Fax any determinations, approvals, denials, and requests for initial or continue stay review clinical to [phone]  Due to HIGH CALL volume, it would be easier if you could please send faxed requests to expedite your requests and in part, help us provide discharge notifications faster

## 2018-02-26 NOTE — CASE MANAGEMENT
Continued Stay Review    Date: 2/24/18    Vital Signs: /64 (BP Location: Right arm)   Pulse 70   Temp 99 4 °F (37 4 °C) (Oral)   Resp 22   Ht 5' 2" (1 575 m)   Wt 102 kg (224 lb 6 9 oz)   LMP  (LMP Unknown)   SpO2 94%   BMI 41 05 kg/m²     Medications:   Scheduled Meds:   Current Facility-Administered Medications:  acetaminophen 650 mg Oral Q6H PRN Memory Canavan, MD   aspirin 325 mg Oral Daily Tyler Loo MD   bisacodyl 10 mg Oral HS Memory Canavan, MD   bisacodyl 5 mg Oral Daily PRN Tyler Loo MD   dicyclomine 10 mg Oral TID Memory Canavan, MD   escitalopram 10 mg Oral Daily Tyler Loo MD   folic acid 1 mg Oral Daily Memory Canavan, MD   gabapentin 100 mg Oral Daily Memory Canavan, MD   heparin (porcine) 5,000 Units Subcutaneous Q8H [address] Tyler Loo MD   insulin aspart protamine-insulin aspart 45 Units Subcutaneous BID AC Akira Ambrosio MD   insulin lispro 4-20 Units Subcutaneous TID AC Memory Canavan, MD   insulin lispro 4-20 Units Subcutaneous HS Memory Canavan, MD   levalbuterol 1 25 mg Nebulization Q6H PRN Memory Canavan, MD   And       sodium chloride 3 mL Nebulization Q6H PRN Memory Canavan, MD   levothyroxine 125 mcg Oral Early Morning Tyler Loo MD   lisinopril 10 mg Oral Daily Tyler Loo MD   methylPREDNISolone sodium succinate 20 mg Intravenous Q12H [facility] [facility], MD   metolazone 2 5 mg Oral Daily Tyler Loo MD   metoprolol tartrate 25 mg Oral BID Tyler Loo MD   polyethylene glycol 17 g Oral Daily Memory Canavan, MD   potassium chloride 20 mEq Oral Daily Memory Canavan, MD   pramipexole 1 mg Oral TID Memory Canavan, MD   torsemide 40 mg Oral Daily Memory Canavan, MD   traMADol 50 mg Oral Q6H PRN Memory Canavan, MD     Continuous Infusions:    PRN Meds:   acetaminophen    bisacodyl    levalbuterol **AND** sodium chloride    traMADol    Abnormal Labs/Diagnostic Results: GLUCOSE 496    Age/Sex: 77 y o  female     ATTENDING  Pt FS blood sugar 526  Random sugar blood draw ordered as per protocol   Dr Juani Quintero notified  Pt algorithm was changed  PT on solumedrol  Chronic respiratory failure (HCC)   Assessment & Plan     -supposed to be on continue with nasal cannula oxygen but does not use it all the time and has chronic dyspnea   -go to qualify iron shows that she needs 3 L continuously  -concentrator ordered and patient strongly advise to use oxygen as instructed as she is having the admissions for shortness of breath and respiratory failure      Physical deconditioning   Assessment & Plan     -patient not taking home medications as prescribed and is not able to function independently at home resulting in readmissions  -having daily extensive conversations with her regarding her care  Currently she feels unsafe going home but states that when she is better she does want to go home while at the time times saying that she can no longer care for herself  Patient has unrealistic expectations of her needs and care that can provided for her at home through services  Social work to assist with discharge planning         Bilateral lower extremity edema   Assessment & Plan     -likely secondary to her chronic venous stasis and hx of Diastolic CHF as well as noncompliance, she is not taking her lasix consistently at home   -She is not in acute CHF   -given a20 mg dose of IV Lasix on arrival  -Venous dopplers negative for DVT  -encourage leg elevation  -patient having side effects with Lasix with very sudden urinary urgency  Will DC Lasix and she is not likely to take it anyways    Increase torsemide dose instead      Chronic venous stasis dermatitis of both lower extremities   Assessment & Plan     -encourage leg elevation and diuretic regimen      Diastolic CHF (HCC)   Assessment & Plan     -given  a one time dose of IV Lasix given her lower extremity edema     -patient on torsemide and furosemide   -has not been taking the Lasix because of its effect on sudden urinary urgency  -patient unlikely to take it despite long discussion  Will DC Lasix and increase her torsemide dose instead  This should help with her symptoms while still allowing diuresis      T2DM (type 2 diabetes mellitus) ([facility] [address] )   Assessment & Plan     -cont home meds  -hyperglycemia associated with steroid use    Will wean steroids as quickly as possible increased insulin sliding scale again today because of continued hyperglycemia      * Asthma exacerbation   Assessment & Plan     -CXR reveals no acute process     -Pt was given Duonebs, Solumedrol and placed on BIPAP in the ED with improvement in her symptoms     -on Solumedrol, Duonebs and Albuterol prn    -symptoms improved, no need for pulmonary consult at this time  -patients symptoms appear related to some degree with noncompliance at home  -Solu-Medrol taper ongoing

## 2018-02-26 NOTE — SOCIAL WORK
SW received authorization (#[id number]) from [employer]  Pt was approved for 3 days of level I care, starting today  Next review is 2/28  Clinical documents are to be faxed to [phone]  Authorization information has provided to Loree Arevalo RN Liaison [facility] [place]  Awaiting discharge order

## 2018-02-26 NOTE — PHYSICAL THERAPY NOTE
PT TREATMENT     02/26/18 1102   Pain Assessment   Pain Assessment No/denies pain   Restrictions/Precautions   Other Precautions Contact/isolation; Fall Risk;O2   General   Chart Reviewed Yes   Family/Caregiver Present Yes  (pt's significant other)   Subjective   Subjective "I just feel weak and tired"   Transfers   Sit to Stand 5  Supervision   Additional items Verbal cues   Stand to Sit 5  Supervision   Additional items Verbal cues   Ambulation/Elevation   Gait pattern Forward Flexion; Short stride   Gait Assistance 5  Supervision   Additional items Verbal cues; Tactile cues   Assistive Device Rolling walker   Distance 40 feet x 2;2L O2;SAO2 at rest on 2L 97% and with amb 94-95%;pt SOB at end of ambulation   Balance   Static Sitting Good   Static Standing Fair +  (with RW)   Dynamic Standing Fair +  (with RW)   Ambulatory Fair +  (with RW)   Activity Tolerance   Activity Tolerance Patient limited by fatigue  (and SOB)   Exercises   Hip Flexion 10 reps;Bilateral   Knee AROM Long Arc Quad 10 reps;Bilateral   Ankle Pumps 10 reps;Bilateral   Assessment   Assessment Pt is weak and deconditioned overall and has low endurance for functional mobility  Pt needs increased time to rest between al activities/mobility  Pt will continue to benefit from skilled PT services at [facility] to improve pt's strength, endurance and functional mobility prior to dc home  Pt is not safe for dc home at this point and will benefit from STR  Plan   Treatment/Interventions ADL retraining;Functional transfer training;LE strengthening/ROM; Therapeutic exercise; Endurance training;Patient/family training;Bed mobility;Gait training   PT Frequency (3-5x/wk)   Recommendation   Recommendation (STR recommended)   Equipment Recommended (cont amb with RW)

## 2018-02-26 NOTE — ASSESSMENT & PLAN NOTE
-continue fentanyl patch  -patient felt she was reacting poorly to Percocet so was switched to tramadol  -recommended follow-up with pain management after discharge

## 2018-02-26 NOTE — DISCHARGE SUMMARY
Discharge- [place] Prior 1940, 77 y o  female MRN: [id number]    Unit/Bed#: [address] [place] [address] Encounter: [id number]    Primary Care Provider: Nano Bronson MD   Date and time admitted to hospital: 2/22/2018  2:37 PM        Anxiety and depression   Assessment & Plan    -has been diagnosed by Psychiatry on prior admissions   -in the past patient resistant to taking medications because in think there is an issue  -starting to gain some insight that her anxiety and depression has something to do with her noncompliance and inability to care for herself  -agreeable to continue Lexapro now (although she has had this in the past and stopped at home) and Ativan p r n   -will need outpatient follow-up with Psychiatry        Chronic respiratory failure ([facility] [address] )   Assessment & Plan    -patient was using oxygen at night and is having chronic dyspnea  -O2 qualifying exam ordered:  She needs 3 L continuously  -concentrator ordered and patient strongly advise to use oxygen as instructed as she is having the admissions for shortness of breath and respiratory failure  -her pulmonologist Dr Radha Tejada was made aware        Physical deconditioning   Assessment & Plan    -patient not taking home medications as prescribed and is not able to function independently at home resulting in readmissions  -we had daily extensive conversations with her regarding her care  Patient has unrealistic expectations of her needs and care that can provided for her at home through services  - states that when she goes home she is not motivated and does very little, doesn't take her meds, doesn't use her O2  Here she has been more motivated because she is getting  nursing care constantly encouraging her    Both his and her concerns are that when she goes home she will not be able to do everything on her own without this constant encouragement  -PT OT recommends short-term rehab versus home with services    -she is amenable to short-term rehab and she was accepted to [facility]          Bilateral lower extremity edema   Assessment & Plan    -secondary to her chronic venous stasis and hx of Diastolic CHF as well as noncompliance, she is not taking her lasix consistently at home   -She is not in acute CHF   -given a20 mg dose of IV Lasix on arrival  -Venous dopplers negative for DVT  -encourage leg elevation  -stopped Lasix because of SE and increased torsemide dose, patient doing better on that        Chronic venous stasis dermatitis of both lower extremities   Assessment & Plan    -encourage leg elevation and diuretic regimen        Diastolic CHF ([facility] [address] )   Assessment & Plan    -CXR reveals no acute process  BNP unremarkable  -given  a one time dose of IV Lasix on arrival given her lower extremity edema  -patient was on torsemide and furosemide   -has not been taking the Lasix because of its effect of sudden urinary urgency  patient unlikely to take it on discharge because of side effects     -Discontinued Lasix and increased her torsemide dose instead  She feels this has helped with her sudden urge symptoms which are not as bad  Sleep apnea   Assessment & Plan    -patient not using her CPAP secondary to intolerance  -strongly encouraged nasal cannula q h s  Chronic pain   Assessment & Plan    -continue fentanyl patch  -patient felt she was reacting poorly to Percocet so was switched to tramadol  -recommended follow-up with pain management after discharge        Type 2 diabetes mellitus with hyperglycemia ([facility] [address] )   Assessment & Plan    -cont home meds  -hyperglycemia associated with steroid use  -improving with steroid wean        * Asthma exacerbation   Assessment & Plan    -CXR reveals no acute process     -patients symptoms appear related to some degree with noncompliance at home  -Pt was given Duonebs, Solumedrol and placed on BIPAP for a short while in the ED with improvement in her symptoms      -patient was not taking her duo nebs as she thought they were giving her a nondescript and vague SE  Switched to Txt4 which improved her symptoms  -patient received IV Solu-Medrol with taper to p o  Discharging Physician / Practitioner: Guero Enrique MD  PCP: Darlene Montes De Oca MD  Admission Date: 2/22/2018  Discharge Date: 02/26/18    Reason for Admission: Shortness of Breath (pt presents to the ed with  pt was sent from pcp for low sats  pt states that she was recently discharged by the hospital for pneumonia )        Resolved Problems  Date Reviewed: 2/22/2018    None          Consultations During Hospital Stay:  None    Procedures Performed:     · none    Significant Findings / Test Results:     ·   Xr Chest 1 View Portable  Result Date: 2/22/2018  Impression: No acute cardiopulmonary disease  Workstation performed: [id number]     Vas Lower Limb Venous Duplex Study, Complete Bilateral  Result Date: 2/23/2018  CONCLUSION:  Impression: RIGHT LOWER LIMB: No evidence of acute or chronic deep vein thrombosis  No evidence of superficial thrombophlebitis noted  Doppler evaluation shows a normal response to augmentation maneuvers  Popliteal, posterior tibial and anterior tibial arterial Doppler waveforms are triphasic  LEFT LOWER LIMB: No evidence of acute or chronic deep vein thrombosis  No evidence of superficial thrombophlebitis noted  Doppler evaluation shows a normal response to augmentation maneuvers  Popliteal, posterior tibial and anterior tibial arterial Doppler waveforms are triphasic  Technically difficult study secondary to body habitus    SIGNATURE: Electronically Signed by: Sae Castro on 2018-02-23 12:34:44 PM        Incidental Findings:   ·      Test Results Pending at Discharge (will require follow up):   ·      Outpatient Tests Requested:  ·     Complications:  none    Reason for Admission: SOB, deconditioning    Hospital Course:     Poornima Torres is a 77 y o  female patient with a PMH of DM, Diastolic CHF, Asthma, Obesity Hypoventilation Syndrome, Depression, Anxiety, HTN, Hypothyroidism and HLD  who originally presented to the hospital on 2/22/2018 due to  shortness of breath and leg swelling  Of note, the pt was recently discharged from our facility after being treated for Influenza and Tracheobronchitis  She states that she was doing well until approximately 3 days ago when she began to have shortness of breath  This was worse with movement  She denies any chest pain with these events  She denies any fevers  She also noticed increased swelling in her legs  She went to her PCP today for a routine visit who then recommended her coming to the ER  While here she was placed on BIPAP and received Solumedrol and Duonebs with improvement in her symptoms  Currently she reports mild shortness of breath but denies any chest pain, abdominal pain, melena or blood in her stools  No other complaints or concerns         Please see above list of diagnoses and related plan for additional information  Condition at Discharge: stable     Discharge Day Visit / Exam:     Subjective:  Nely Mckay feels back to normal from a breathing standpoint  She is still unrealistic about going home and being able to care for herself  Her  at bedside has encouraged her to go to STR  Vitals: Blood Pressure: 137/65 (02/26/18 1540)  Pulse: 67 (02/26/18 1540)  Temperature: (!) 97 4 °F (36 3 °C) (02/26/18 1540)  Temp Source: Oral (02/26/18 0755)  Respirations: 18 (02/26/18 1540)  Height: 5' 2" (157 5 cm) (02/22/18 1922)  Weight - Scale: 102 kg (224 lb 6 9 oz) (02/26/18 0600)  SpO2: 96 % (02/26/18 1540)  Exam:   Physical Exam  See daily note    Discharge instructions/Information to patient and family:   See after visit summary for information provided to patient and family  Provisions for Follow-Up Care:  See after visit summary for information related to follow-up care and any pertinent home health orders        Disposition:     Acute Rehab at [facility]    Planned Readmission: no     Discharge Statement:  I spent >30 minutes discharging the patient  This time was spent on the day of discharge  I had direct contact with the patient on the day of discharge  Greater than 50% of the total time was spent examining patient, answering all patient questions, arranging and discussing plan of care with patient as well as directly providing post-discharge instructions  Additional time then spent on discharge activities  Discharge Medications:  See after visit summary for reconciled discharge medications provided to patient and family        ** Please Note: This note has been constructed using a voice recognition system **

## 2018-02-26 NOTE — SOCIAL WORK
SW referral received to assist with DCP  STR placement is being recommended  Met with pt to discuss plans and facility options  Pt is agreeable to placement  She requested to have referrals sent to facilities near her home  [facility] [place] [address]  being first choice  Frantz Johnson Nurse Liaison from [place] [address]  was out to evaluate pt for placement  Pt accepted at [place] [address]  Authorization is necessary for placement  Authorization requested from [facility]) this morning  Still awaiting decision  Will update when a determination is made

## 2018-02-26 NOTE — PLAN OF CARE
Problem: PHYSICAL THERAPY ADULT  Goal: Performs mobility at highest level of function for planned discharge setting  See evaluation for individualized goals  Outcome: Progressing  Prognosis: Good  Problem List: Decreased strength, Decreased range of motion, Decreased endurance, Impaired balance, Decreased mobility, Obesity, Decreased skin integrity, Pain (SOB)  Assessment: Pt is weak and deconditioned overall and has low endurance for functional mobility  Pt will continue to benefit from skilled PT services at STR to improve pt's strength, endurance and functional mobility prior to dc home  Pt is not safe for dc home at this point and will benefit from STR  Recommendation:  (STR recommended)          See flowsheet documentation for full assessment

## 2018-02-26 NOTE — PROGRESS NOTES
Progress Note - Cinda Moncada 1940, 77 y o  female MRN: [id number]    Unit/Bed#: [address] [place] [address] Encounter: [id number]    Primary Care Provider: Anish Guevara MD   Date and time admitted to hospital: 2/22/2018  2:37 PM        Anxiety and depression   Assessment & Plan    -has been diagnosed by Psychiatry on prior admissions   -in the past patient resistant to taking medications because in think there is an issue  -starting to gain some insight that her anxiety and depression has something to do with her noncompliance and inability to care for herself  -agreeable to continue Lexapro now (although she has had this in the past and stopped at home) and Ativan p r n   -will need outpatient follow-up with Psychiatry        Chronic respiratory failure ([facility] [address] )   Assessment & Plan    -patient was using oxygen at night and is having chronic dyspnea  -O2 qualifying exam ordered:  She needs 3 L continuously  -concentrator ordered and patient strongly advise to use oxygen as instructed as she is having the admissions for shortness of breath and respiratory failure  -her pulmonologist Dr Kenya Hawk was made aware        Physical deconditioning   Assessment & Plan    -patient not taking home medications as prescribed and is not able to function independently at home resulting in readmissions  -having daily extensive conversations with her regarding her care  Patient has unrealistic expectations of her needs and care that can provided for her at home through services  Social work to assist with discharge planning   - at bedside today and states that when she goes home she is not motivated and does very little, doesn't take her meds, doesn't use her O2  Here she has been more motivated because she is getting  nursing care constantly encouraging her    Both his and her concerns are that when she goes home she will not be able to do everything on her own without this constant encouragement  -PT OT recommends short-term rehab versus home with services    -she is amenable to short-term rehab now, if she does not qualify she is amenable to looking into assisted living facilities          Bilateral lower extremity edema   Assessment & Plan    -secondary to her chronic venous stasis and hx of Diastolic CHF as well as noncompliance, she is not taking her lasix consistently at home   -She is not in acute CHF   -given a20 mg dose of IV Lasix on arrival  -Venous dopplers negative for DVT  -encourage leg elevation  -stopped Lasix because of SE and increased torsemide dose, patient doing better on that        Chronic venous stasis dermatitis of both lower extremities   Assessment & Plan    -encourage leg elevation and diuretic regimen        Diastolic CHF ([facility] [address] )   Assessment & Plan    -CXR reveals no acute process  BNP unremarkable  -given  a one time dose of IV Lasix on arrival given her lower extremity edema  -patient was on torsemide and furosemide   -has not been taking the Lasix because of its effect of sudden urinary urgency  patient unlikely to take it on discharge because of side effects     -Discontinued Lasix and increased her torsemide dose instead  She feels this has helped with her sudden urge symptoms which are not as bad today  Chronic pain   Assessment & Plan    -continue fentanyl patch  -patient does not want Percocet because she thinks it makes her feel poorly, asked for tramadol instead  -recommended follow-up with pain management after discharge        Type 2 diabetes mellitus with hyperglycemia (HCC)   Assessment & Plan    -cont home meds  -hyperglycemia associated with steroid use    -improving with steroid wean        * Asthma exacerbation   Assessment & Plan    -CXR reveals no acute process  -Pt was given Duonebs, Solumedrol and placed on BIPAP in the ED with improvement in her symptoms      -on Solumedrol, Duonebs and Albuterol prn    -symptoms improved, no need for pulmonary consult at this time  -patients symptoms appear related to some degree with noncompliance at home  -Solu-Medrol taper ongoing, will change to PO today              VTE Pharmacologic Prophylaxis:   Pharmacologic: Heparin  Mechanical VTE Prophylaxis in Place: Yes    Patient Centered Rounds: I have performed bedside rounds with nursing staff today  Discussions with Specialists or Other Care Team Provider: Yes    Education and Discussions with Family / Patient:Yes    Time Spent for Care: 30 minutes  More than 50% of total time spent on counseling and coordination of care as described above  Current Length of Stay: 4 day(s)    Current Patient Status: Inpatient     Discharge Plan: pending    Code Status: Level 3 - DNAR and DNI      Subjective:   Had another lengthy discussion with Daisha today  She again reiterates that she prefers to go home but also states she is not able to take care of herself there  States that when she goes home by 10:00 a m  in the morning she is barely taken her meds and done her morning routine and she is already exhausted and cannot complete ADLs of the rest of day  Her  is at bedside and states that when she is home she does not listen to his advice, is not motivated, and does not follow prescribed medications or instructions from doctors  Therefore she decompensates quickly  Here she has only motivated because she has constant encouragement from RN staff  She is now on amenable rehab  Objective:       Vitals:   Temp (24hrs), Av 4 °F (36 9 °C), Min:97 8 °F (36 6 °C), Max:99 4 °F (37 4 °C)    HR:  [66-71] 70  Resp:  [18-22] 22  BP: (108-135)/(57-64) 135/64  SpO2:  [94 %-98 %] 94 %  Body mass index is 41 05 kg/m²  Input and Output Summary (last 24 hours):        Intake/Output Summary (Last 24 hours) at 18 1100  Last data filed at 18 0601   Gross per 24 hour   Intake              300 ml   Output             2600 ml   Net            -2300 ml       Physical Exam: Physical Exam   Constitutional: She is oriented to person, place, and time  She appears well-developed  No distress  Chronically ill-appearing   HENT:   Head: Normocephalic and atraumatic  Cardiovascular: Normal rate, regular rhythm and normal heart sounds  Pulmonary/Chest: Effort normal and breath sounds normal  No respiratory distress  She has no wheezes  She has no rales  Wearing 3 L nasal cannula   Abdominal: Soft  Bowel sounds are normal  She exhibits no distension  There is no tenderness  There is no rebound and no guarding  Musculoskeletal: She exhibits edema  She exhibits no tenderness or deformity  Neurological: She is alert and oriented to person, place, and time  Skin: Skin is warm and dry  Psychiatric: She has a normal mood and affect  Her behavior is normal    Poor judgment and insight   Nursing note and vitals reviewed  Additional Data:     Labs:      Results from last 7 days  Lab Units 02/26/18  0542  02/22/18  1526   WBC Thousand/uL 11 10*  < > 7 60   HEMOGLOBIN g/dL 11 7*  < > 12 1   HEMATOCRIT % 36 1*  < > 37 5   PLATELETS Thousands/uL 291  < > 265   NEUTROS PCT %  --   --  72   LYMPHS PCT %  --   --  19   MONOS PCT %  --   --  9   EOS PCT %  --   --  1   < > = values in this interval not displayed  Results from last 7 days  Lab Units 02/26/18  0542  02/22/18  1526   SODIUM mmol/L 141  < > 146*   POTASSIUM mmol/L 4 2  < > 3 4*   CHLORIDE mmol/L 94*  < > 97*   CO2 mmol/L 39*  < > >45*   BUN mg/dL 58*  < > 19   CREATININE mg/dL 1 07  < > 0 99   CALCIUM mg/dL 8 8  < > 9 1   TOTAL PROTEIN g/dL  --   --  6 9   BILIRUBIN TOTAL mg/dL  --   --  0 30   ALK PHOS U/L  --   --  91   ALT U/L  --   --  45   AST U/L  --   --  20   GLUCOSE RANDOM mg/dL 260*  < > 333*   < > = values in this interval not displayed  * I Have Reviewed All Lab Data Listed Above  * Additional Pertinent Lab Tests Reviewed:  All Labs For Current Hospital Admission Reviewed    Imaging:  Xr Chest 1 View Portable    Result Date: 2/22/2018  Narrative: CHEST INDICATION: SOB COMPARISON:  January 30, 2018 EXAM PERFORMED/VIEWS:  XR CHEST PORTABLE IMAGES:  1 FINDINGS: Mild cardiomegaly  Pulmonary vessels unremarkable  Moderate size hiatal hernia  The lungs are clear  No pneumothorax or pleural effusion  Visualized osseous structures appear within normal limits for the patient's age  Impression: No acute cardiopulmonary disease  Workstation performed: [id number]     Xr Chest Portable    Result Date: 1/30/2018  Narrative: CHEST INDICATION:  Acute asthma  Bronchitis  COMPARISON:  Chest radiographs January 19, 2018 VIEWS:   AP frontal IMAGES:  1 FINDINGS:     Heart shadow appears unremarkable  Atherosclerotic vascular calcifications are noted  The lungs are clear  No pneumothorax or pleural effusion  Visualized osseous structures appear within normal limits for the patient's age  Impression: No active pulmonary disease  Workstation performed: [id number]     Vas Lower Limb Venous Duplex Study, Complete Bilateral    Result Date: 2/23/2018  Narrative:  [facility] REPORT CLINICAL: Indications: Patient presents with bilateral lower extremity edema worsened in the past few dasy  Operative History: No history of cardiovascular surgery  Risk Factors: The patient has history of obesity, Diabetes Mellitus, Hypothyroidism, CHF and Chronic Venous Stasis  Clinical: The patient current BMI is 42 25, Weight is 231 lb and Height is 62 in  CONCLUSION:  Impression: RIGHT LOWER LIMB: No evidence of acute or chronic deep vein thrombosis  No evidence of superficial thrombophlebitis noted  Doppler evaluation shows a normal response to augmentation maneuvers  Popliteal, posterior tibial and anterior tibial arterial Doppler waveforms are triphasic  LEFT LOWER LIMB: No evidence of acute or chronic deep vein thrombosis  No evidence of superficial thrombophlebitis noted   Doppler evaluation shows a normal response to augmentation maneuvers  Popliteal, posterior tibial and anterior tibial arterial Doppler waveforms are triphasic  Technically difficult study secondary to body habitus  SIGNATURE: Electronically Signed by: Alyx Boston on 2018-02-23 12:34:44 PM    Imaging Reports Reviewed by myself    Cultures:   Blood Culture:   Lab Results   Component Value Date    BLOODCX No Growth at 72 hrs  02/22/2018    BLOODCX No Growth at 72 hrs  02/22/2018    BLOODCX No Growth After 5 Days  01/19/2018    BLOODCX No Growth After 5 Days  01/19/2018    BLOODCX No Growth After 5 Days  06/11/2017    BLOODCX No Growth After 5 Days   06/11/2017    BLOODCX Klebsiella pneumoniae - ESBL (A) 06/07/2017    BLOODCX Klebsiella pneumoniae ESBL (A) 06/07/2017     Urine Culture:   Lab Results   Component Value Date    URINECX 20,000-29,000 cfu/ml Mixed Contaminants X3 04/15/2017     Sputum Culture: No components found for: SPUTUMCX  Wound Culture: No results found for: WOUNDCULT    Last 24 Hours Medication List:     Current Facility-Administered Medications:  acetaminophen 650 mg Oral Q6H PRN Rahul Baird MD   aspirin 325 mg Oral Daily Shamika Harris MD   bisacodyl 10 mg Oral HS Rahul Baird MD   bisacodyl 5 mg Oral Daily PRN Shamika Harris MD   dicyclomine 10 mg Oral TID Rahul Baird MD   escitalopram 10 mg Oral Daily Shamika Harris MD   folic acid 1 mg Oral Daily Rahul Baird MD   gabapentin 100 mg Oral Daily Rahul Baird MD   heparin (porcine) 5,000 Units Subcutaneous Q8H [address] Shamika Harris MD   insulin aspart protamine-insulin aspart 45 Units Subcutaneous BID AC Akira Delgado MD   insulin lispro 4-20 Units Subcutaneous TID AC Rahul Baird MD   insulin lispro 4-20 Units Subcutaneous HS Rahul Baird MD   levalbuterol 1 25 mg Nebulization Q6H PRN Rahul Baird MD   And       sodium chloride 3 mL Nebulization Q6H PRN Rahul Baird MD   levothyroxine 125 mcg Oral Early Morning Shamika Harris MD   lisinopril 10 mg Oral Daily Shamika Harris MD methylPREDNISolone sodium succinate 20 mg Intravenous Q12H 3630 Stacey Walls MD   metolazone 2 5 mg Oral Daily Juan R Snow MD   metoprolol tartrate 25 mg Oral BID Juan R Snow MD   polyethylene glycol 17 g Oral Daily Dwayne Peoples MD   potassium chloride 20 mEq Oral Daily Dwayne Peoples MD   pramipexole 1 mg Oral TID Dwayne Peoples MD   torsemide 40 mg Oral Daily Dwayne Peoples MD   traMADol 50 mg Oral Q6H PRN Dwayne Peoples MD        Today, Patient Was Seen By: Dwayne Peoples MD    ** Please Note: Dragon 360 Dictation voice to text software may have been used in the creation of this document   **

## 2018-02-26 NOTE — [place] UNIVERSAL TRANSFER FORM
[place] UN[place] FOR[place]  (ALL ITEMS MUST BE COMPLETED)    1  TRANSFER FROM: [address] [facility]      TRANSFER TO: [facility]    2  DATE OF TRANSFER: 2/26/2018                        TIME OF TRANSFER: 1845    3  PATIENT NAME: Berto Sy,        YOB: 1940                             GENDER: female    4  LANGUAGE:   English    5  PHYSICIAN NAME:  Olivia Wei MD                   PHONE: [phone]    6  CODE STATUS: Level 3 - DNAR and DNI        Out of Hospital DNR Attached: No    7  :                                      :  Extended Emergency Contact Information  Primary Emergency Contact: Avila Trevino   [facility] [address] Phone: [phone]  Relation: Significant Other  Secondary Emergency Contact: Lilli Hoang  Address: [place]   [place]  Mobile Phone: [phone]  Relation: Daughter           Health Care Representative/Proxy:  No           Legal Guardian:  No             NAME OF:           HEALTH CARE REPRESENTATIVE/PROXY:                                         OR           LEGAL GUARDIAN, IF NOT :                                               PHONE:  (Day)           (Night)                        (Cell)    8  REASON FOR TRANSFER: (Must include brief medical history and recent changes in physical function or cognition ) STR            V/S: /65   Pulse 67   Temp (!) 97 4 °F (36 3 °C)   Resp 18   Ht 5' 2" (1 575 m)   Wt 102 kg (224 lb 6 9 oz)   LMP  (LMP Unknown)   SpO2 96%   BMI 41 05 kg/m²           PAIN: Yes, Site BAck    9  PRIMARY DIAGNOSIS: Asthma exacerbation      Secondary Diagnosis:         Pacemaker: No      Internal Defib: No          Mental Health Diagnosis (if Applicable):    10  RESTRAINTS: No     11  RESPIRATORY NEEDS: Nasal Cannula    12  ISOLATION/PRECAUTION: ESBL    13  ALLERGY: Ketorolac and Latex    14  SENSORY:       Vision Good    15   SKIN CONDITION: Yes:  Diabetic    16  DIET: Regular    17  IV ACCESS: None    18  PERSONAL ITEMS SENT WITH PATIENT: None    19  ATTACHED DOCUMENTS: MUST ATTACH CURRENT MEDICATION INFORMATION Face Sheet, MAR, Medication Reconciliation, Diagnostic Studies, Labs, Discharge Summary, PT Note, OT Note, ST Note and HX/PE    20  AT RISK ALERTS:Falls        HARM TO: N/A    21  WEIGHT BEARING STATUS:         Left Leg: Limited        Right Leg: Limited    22  MENTAL STATUS:Alert and Oriented    23  FUNCTION:        Walk: With Help        Transfer: With Help        Toilet: With Help        Feed: Self    24  IMMUNIZATIONS/SCREENING:     Immunization History   Administered Date(s) Administered    Influenza Split High Dose Preservative Free IM 10/05/2017    Influenza TIV (IM) 09/09/2014    Pneumococcal Conjugate 13-Valent 06/17/2017    Pneumococcal Polysaccharide PPV23 04/04/2016       25  BOWEL: Continent    26  BLADDER: Continent    27   SENDING FACILITY CONTACT: Zack Hoffman                   Title: RN        Unit: [address] [facility]        Phone: [phone] [address] (if known):        Title:        Unit:         Phone:         FORM PREFILLED BY (if applicable)       Title:       Unit:        Phone:         FORM COMPLETED BY Zack Hoffman RN      Title: KATIE      Phone: [phone]

## 2018-02-26 NOTE — ASSESSMENT & PLAN NOTE
-has been diagnosed by Psychiatry on prior admissions   -in the past patient resistant to taking medications because in think there is an issue  -starting to gain some insight that her anxiety and depression has something to do with her noncompliance and inability to care for herself  -agreeable to continue Lexapro now (although she has had this in the past and stopped at home) and Ativan p r n   -will need outpatient follow-up with Psychiatry

## 2018-02-26 NOTE — CASE MANAGEMENT
Continued Stay Review    Date: 2/25/18    Vital Signs: /64 (BP Location: Right arm)   Pulse 70   Temp 99 4 °F (37 4 °C) (Oral)   Resp 22   Ht 5' 2" (1 575 m)   Wt 102 kg (224 lb 6 9 oz)   LMP  (LMP Unknown)   SpO2 94%   BMI 41 05 kg/m²     Medications:   Scheduled Meds:   Current Facility-Administered Medications:  acetaminophen 650 mg Oral Q6H PRN Breanne Middleton MD   aspirin 325 mg Oral Daily Babatunde Calvillo MD   bisacodyl 10 mg Oral HS Breanne Middleton MD   bisacodyl 5 mg Oral Daily PRN Babatunde Calvillo MD   dicyclomine 10 mg Oral TID Breanne Middleton MD   escitalopram 10 mg Oral Daily Babatunde Calvillo MD   folic acid 1 mg Oral Daily Breanne Middleton MD   gabapentin 100 mg Oral Daily Breanne Middleton MD   heparin (porcine) 5,000 Units Subcutaneous Q8H [facility] Babatunde Calvillo MD   insulin aspart protamine-insulin aspart 45 Units Subcutaneous BID AC Akira Rizo MD   insulin lispro 4-20 Units Subcutaneous TID AC Breanne Middleton MD   insulin lispro 4-20 Units Subcutaneous HS Breanne Middleton MD   levalbuterol 1 25 mg Nebulization Q6H PRN Breanne Middleton MD   And       sodium chloride 3 mL Nebulization Q6H PRN Beranne Middleton MD   levothyroxine 125 mcg Oral Early Morning Babatunde Calvillo MD   lisinopril 10 mg Oral Daily Babatunde Calvillo MD   methylPREDNISolone sodium succinate 20 mg Intravenous Q12H [address], MD   metolazone 2 5 mg Oral Daily Babatudne Calvillo MD   metoprolol tartrate 25 mg Oral BID Babatunde Calvillo MD   polyethylene glycol 17 g Oral Daily Breanne Middleton MD   potassium chloride 20 mEq Oral Daily Breanne Middleton MD   pramipexole 1 mg Oral TID Breanne Middleton MD   torsemide 40 mg Oral Daily Breanne Middleton MD   traMADol 50 mg Oral Q6H PRN Breanne Middleton MD     Continuous Infusions:    PRN Meds:   acetaminophen    bisacodyl    levalbuterol **AND** sodium chloride    traMADol    Abnormal Labs/Diagnostic Results:     Age/Sex: 77 y o  female     Assessment/Plan:   Chronic respiratory failure (HCC)   Assessment & Plan     -patient was using oxygen at night and is having chronic dyspnea  -O2 qualifying exam ordered:  She needs 3 L continuously  -concentrator ordered and patient strongly advise to use oxygen as instructed as she is having the admissions for shortness of breath and respiratory failure  -her pulmonologist Dr Balwinder Storey was made aware          Physical deconditioning   Assessment & Plan     -patient not taking home medications as prescribed and is not able to function independently at home resulting in readmissions  -having daily extensive conversations with her regarding her care  Patient has unrealistic expectations of her needs and care that can provided for her at home through services  Social work to assist with discharge planning  -PT OT recommends short-term rehab versus home with services  Treatment ongoing             Bilateral lower extremity edema   Assessment & Plan     -likely secondary to her chronic venous stasis and hx of Diastolic CHF as well as noncompliance, she is not taking her lasix consistently at home   -She is not in acute CHF   -given a20 mg dose of IV Lasix on arrival  -Venous dopplers negative for DVT  -encourage leg elevation  -stopped Lasix and increased torsemide dose          Chronic venous stasis dermatitis of both lower extremities   Assessment & Plan     -encourage leg elevation and diuretic regimen          Diastolic CHF (HCC)   Assessment & Plan     -CXR reveals no acute process  BNP unremarkable  -given  a one time dose of IV Lasix given her lower extremity edema     -patient on torsemide and furosemide   -has not been taking the Lasix because of its effect on sudden urinary urgency  patient unlikely to take it on discharge because of side effects  -DC Lasix and increase her torsemide dose instead  This should help with her symptoms while still allowing diuresis          T2DM (type 2 diabetes mellitus) ([facility] [address] )   Assessment & Plan     -cont home meds  -hyperglycemia associated with steroid use  -improving with steroid wean          * Asthma exacerbation   Assessment & Plan     -CXR reveals no acute process     -Pt was given Duonebs, Solumedrol and placed on BIPAP in the ED with improvement in her symptoms     -on Solumedrol, Duonebs and Albuterol prn    -symptoms improved, no need for pulmonary consult at this time  -patients symptoms appear related to some degree with noncompliance at home  -Solu-Medrol taper ongoing            VTE Pharmacologic Prophylaxis:   Pharmacologic: Heparin  Mechanical VTE Prophylaxis in Place:  Yes       Discharge Plan: TBD

## 2018-02-27 LAB
BACTERIA BLD CULT: NORMAL
BACTERIA BLD CULT: NORMAL

## 2018-02-28 ENCOUNTER — TRANSITIONAL CARE MANAGEMENT (OUTPATIENT)
Dept: FAMILY MEDICINE CLINIC | Facility: CLINIC | Age: 78
End: 2018-02-28

## 2018-02-28 ENCOUNTER — PREP FOR PROCEDURE (OUTPATIENT)
Dept: PAIN MEDICINE | Facility: CLINIC | Age: 78
End: 2018-02-28

## 2018-02-28 DIAGNOSIS — M54.50 LOW BACK PAIN WITHOUT SCIATICA, UNSPECIFIED BACK PAIN LATERALITY, UNSPECIFIED CHRONICITY: ICD-10-CM

## 2018-02-28 DIAGNOSIS — G89.4 CHRONIC PAIN SYNDROME: Primary | ICD-10-CM

## 2018-02-28 DIAGNOSIS — M46.1 SACROILIITIS (HCC): ICD-10-CM

## 2018-03-01 ENCOUNTER — TELEPHONE (OUTPATIENT)
Dept: PULMONOLOGY | Facility: MEDICAL CENTER | Age: 78
End: 2018-03-01

## 2018-03-06 NOTE — PROGRESS NOTES
Progress Notes      Hide copied text  Hover for attribution information  Cardiac/Pulmonary Rehabilitation Plan of Care                                                             Discharge     Today's date: 3/6/2018  Visits: 11  Patient name: Zenia Palomo                                       : 1940  Age: 77 y o  MRN: [id number]  Referring Physician: Yaneli Madrdi  Provider: [address]  Clinician: Himanshu Haynes RN     Dx: [id number] [id number] Asthma  Date of onset: 10/19/2017     Medication compliance: Yes              Comments: none  Fall Risk: Yes              Comments: walks with walker, dyspnea with minimal exertion     EXERCISE/ACTIVITY     Cardiovascular:              Min: 49              METS: 1 6              Hr: 80              RPE: 13              O2 sat: 95                     Modalities: Treadmill, UBE, NuStep and Recumbent bike  Strength trainin-3 days / week, 12-15 repitations  and 1-2 sets per modality               Modalities: Lateral raise, Arm curl and frontal raise, shoulder shrugs and upright rows  EKG changes: N/A  Dyspnea score: 3  Home activity: none  Goals: increase endurance to walk a longer distance than 376 feet in 6 minutes post pulmonary rehabilitation, decrease discomfort in right hip, increase activity to decrease weight by 6 pounds short term goal  Education: explained Pr, six minute walk test how lungs function, how to use, nu-step treadmill, recumbent bike and arm ergometer     Plan: ambulate on treadmill for 2 to 10 minutes at 0 6 mph with 0% incline, nu-step 20 minutes at level 3, recumbent bike 10 minutes at level 1 arm ergometer level 1 for 5 to 10 minutes increase time and resistance as tolerated  Readiness to change: 7     NUTRITION     Weight control:               Starting weight: 228 5              Current weight:     Waist circumference:               Startin              Current:    Diabetes: yes  Lipid management: WNL as per patient  Goals: decrease weight by 6 pounds in 12 weeks  Education: rate your plate, discussed sodium in food, DM  Plan: decrease food portions and increase mobility  Readiness to change: 4     PSYCHOSOCIAL     Emotional:    Self-reported stress level: 10   Social support: daughter and significant other  Goals: take medication as prescribed, accept one day at a time, continue to ask for help when needed, Informed Colton Martinez of PHQ9 score 20  Education: discussed depression  Plan: provided positive encouragement  Readiness to change: 5     OTHER CORE COMPONENTS      Tobacco:       History   Smoking Status    Never Smoker   Smokeless Tobacco    Never Used      Blood pressure:               Resting:                Exercise:    Goals: none  Education: none  Plan: none  Readiness to change: 1     Comments:  Mrs Jesus Allison daughter stated she had pneumonia and was in an inpatient rehabilitation at this time  Unable to complete outcome assessment due to Mrs Jesus Allison has not attended pulmonary Rehabilitation since 1/17/2018  No change to previous note  She has been discharged from the program at this time

## 2018-03-14 ENCOUNTER — TELEPHONE (OUTPATIENT)
Dept: GASTROENTEROLOGY | Facility: AMBULARY SURGERY CENTER | Age: 78
End: 2018-03-14

## 2018-03-14 NOTE — TELEPHONE ENCOUNTER
Left message to have patient call and reschedule appointment on 5/25 with Dr Amada Rodriguez at [facility]

## 2018-03-16 ENCOUNTER — OFFICE VISIT (OUTPATIENT)
Dept: FAMILY MEDICINE CLINIC | Facility: CLINIC | Age: 78
End: 2018-03-16
Payer: COMMERCIAL

## 2018-03-16 VITALS
TEMPERATURE: 98 F | HEIGHT: 62 IN | HEART RATE: 88 BPM | DIASTOLIC BLOOD PRESSURE: 70 MMHG | WEIGHT: 220.8 LBS | BODY MASS INDEX: 40.63 KG/M2 | RESPIRATION RATE: 24 BRPM | SYSTOLIC BLOOD PRESSURE: 140 MMHG

## 2018-03-16 DIAGNOSIS — R06.00 DYSPNEA ON EXERTION: ICD-10-CM

## 2018-03-16 DIAGNOSIS — E03.9 ACQUIRED HYPOTHYROIDISM: ICD-10-CM

## 2018-03-16 DIAGNOSIS — E11.65 TYPE 2 DIABETES MELLITUS WITH HYPERGLYCEMIA, WITH LONG-TERM CURRENT USE OF INSULIN (HCC): ICD-10-CM

## 2018-03-16 DIAGNOSIS — G47.19 DAYTIME HYPERSOMNOLENCE: ICD-10-CM

## 2018-03-16 DIAGNOSIS — G25.81 RESTLESS LEG SYNDROME: Primary | ICD-10-CM

## 2018-03-16 DIAGNOSIS — Z09 HOSPITAL DISCHARGE FOLLOW-UP: ICD-10-CM

## 2018-03-16 DIAGNOSIS — Z79.4 TYPE 2 DIABETES MELLITUS WITH HYPERGLYCEMIA, WITH LONG-TERM CURRENT USE OF INSULIN (HCC): ICD-10-CM

## 2018-03-16 PROCEDURE — 99214 OFFICE O/P EST MOD 30 MIN: CPT | Performed by: FAMILY MEDICINE

## 2018-03-16 RX ORDER — LEVOTHYROXINE SODIUM 0.12 MG/1
125 TABLET ORAL DAILY
Qty: 90 TABLET | Refills: 1 | Status: SHIPPED | OUTPATIENT
Start: 2018-03-16 | End: 2018-10-31 | Stop reason: SDUPTHER

## 2018-03-16 RX ORDER — PRAMIPEXOLE DIHYDROCHLORIDE 1 MG/1
1 TABLET ORAL 3 TIMES DAILY
Qty: 90 TABLET | Refills: 1 | Status: SHIPPED | OUTPATIENT
Start: 2018-03-16 | End: 2018-05-10 | Stop reason: SDUPTHER

## 2018-03-16 RX ORDER — ALBUTEROL SULFATE 1.25 MG/3ML
1 SOLUTION RESPIRATORY (INHALATION) 3 TIMES DAILY PRN
Qty: 60 VIAL | Refills: 0 | Status: SHIPPED | OUTPATIENT
Start: 2018-03-16 | End: 2018-04-03 | Stop reason: SDUPTHER

## 2018-03-17 PROBLEM — M46.1 SACROILIITIS (HCC): Status: RESOLVED | Noted: 2018-02-14 | Resolved: 2018-03-17

## 2018-03-17 PROBLEM — J11.1: Status: RESOLVED | Noted: 2018-01-19 | Resolved: 2018-03-17

## 2018-03-17 PROBLEM — E87.6 HYPOKALEMIA: Status: RESOLVED | Noted: 2018-02-22 | Resolved: 2018-03-17

## 2018-03-17 PROBLEM — M70.61 GREATER TROCHANTERIC BURSITIS OF RIGHT HIP: Status: RESOLVED | Noted: 2017-10-20 | Resolved: 2018-03-17

## 2018-03-17 PROBLEM — E87.0 HYPERNATREMIA: Status: RESOLVED | Noted: 2018-02-22 | Resolved: 2018-03-17

## 2018-03-17 PROBLEM — J45.901 ASTHMA EXACERBATION: Status: RESOLVED | Noted: 2018-02-22 | Resolved: 2018-03-17

## 2018-03-17 PROBLEM — R79.89 ELEVATED LFTS: Status: RESOLVED | Noted: 2017-06-02 | Resolved: 2018-03-17

## 2018-03-17 PROBLEM — J11.1 INFLUENZA: Status: RESOLVED | Noted: 2018-01-21 | Resolved: 2018-03-17

## 2018-03-17 PROBLEM — M25.551 PAIN IN RIGHT HIP: Status: RESOLVED | Noted: 2017-12-21 | Resolved: 2018-03-17

## 2018-03-17 PROBLEM — M70.61 TROCHANTERIC BURSITIS OF RIGHT HIP: Status: RESOLVED | Noted: 2017-12-21 | Resolved: 2018-03-17

## 2018-03-17 PROBLEM — M76.31 ILIOTIBIAL BAND SYNDROME, RIGHT LEG: Status: RESOLVED | Noted: 2017-10-20 | Resolved: 2018-03-17

## 2018-03-17 PROBLEM — J45.31 MILD PERSISTENT ASTHMA WITH ACUTE EXACERBATION: Status: RESOLVED | Noted: 2017-03-30 | Resolved: 2018-03-17

## 2018-03-17 PROBLEM — G45.9 TIA (TRANSIENT ISCHEMIC ATTACK): Status: RESOLVED | Noted: 2017-10-11 | Resolved: 2018-03-17

## 2018-03-17 NOTE — ASSESSMENT & PLAN NOTE
PATIENT IS S/P PEP[facility]O ADMISSION FOR RESPIRATORY FAILURE  PATIENT CONTINUES TO HAVE INCREASING SOB  HAS BEEN PLACED ON HOME O2    - DISCUSSED OPTIONS  - RECOMMENDED CONTINUOUS O2 USE  - NEBULIZED ALBUTEROL TREATMENTS  - RV Monday OR Tuesday  - IF WORSE, MAY NEED INPATIENT EVAL

## 2018-03-17 NOTE — PATIENT INSTRUCTIONS
CONTINUE CURRENT TREATMENT PLAN  MONITOR SODIUM AND CARBOHYDRATE INTAKE  NEB ALBUTEROL TREATMENTS EVERY 4-6 HOURS    RV ON Monday OR Tuesday  CALL SOONER PRN  MAY NEED ER EVAL IF SOB WORSENS

## 2018-03-17 NOTE — PROGRESS NOTES
Assessment/Plan:    Type 2 diabetes mellitus with hyperglycemia ([facility])  REVIEWED GLUCOSE READINGS  DISCUSSED CURRENT TREATMENT REGIMEN  TRYING TO MONITOR CARBOHYDRATE INTAKE    - CONTINUE CURRENT REGIMEN NOW  - MAY CONSIDER SLIDING SCALE HUMALOG TREATMENT  - MONITOR SUGARS    Dyspnea on exertion  PATIENT IS S/P PEPITO ADMISSION FOR RESPIRATORY FAILURE  PATIENT CONTINUES TO HAVE INCREASING SOB  HAS BEEN PLACED ON HOME O2    - DISCUSSED OPTIONS  - RECOMMENDED CONTINUOUS O2 USE  - NEBULIZED ALBUTEROL TREATMENTS  - RV Monday OR Tuesday  - IF WORSE, MAY NEED INPATIENT EVAL    Daytime hypersomnolence  INCREASED SOMNOLENCE DURING THE DAY  ? Co2 RETAINING    - ENCOURAGED ACTIVITY DURING THE DAY  - NEBULIZED ALBUTEROL TREATMENTS  - ER EVAL IF [facility] discharge follow-up  S/P [facility]  DISCHARGE FOR PEPITO       Diagnoses and all orders for this visit:    Restless leg syndrome  -     pramipexole (MIRAPEX) 1 mg tablet; Take 1 tablet (1 mg total) by mouth 3 (three) times a day    Acquired hypothyroidism  -     levothyroxine 125 mcg tablet; Take 1 tablet (125 mcg total) by mouth daily    Dyspnea on exertion  -     albuterol (ACCUNEB) 1 25 MG/3ML nebulizer solution; Take 3 mL (1 25 mg total) by nebulization 3 (three) times a day as needed for wheezing    Hospital discharge follow-up    Type 2 diabetes mellitus with hyperglycemia, with long-term current use of insulin ([facility])    Daytime hypersomnolence          Subjective:      Patient ID: Jonah Wu is a 77 y o  female  HPI    The following portions of the patient's history were reviewed and updated as appropriate: allergies, current medications, past family history, past medical history, past social history, past surgical history and problem list     Review of Systems   Constitutional: Positive for activity change, appetite change and fatigue  Negative for chills and fever     HENT: Negative for congestion, ear discharge, ear pain, mouth sores, postnasal drip, sore throat and trouble swallowing  Eyes: Negative for pain, discharge and visual disturbance  Respiratory: Positive for cough, shortness of breath and wheezing  Cardiovascular: Negative for chest pain, palpitations and leg swelling  Gastrointestinal: Negative for abdominal distention, abdominal pain, blood in stool, diarrhea, nausea and vomiting  Endocrine: Negative for polydipsia, polyphagia and polyuria  Genitourinary: Negative for dysuria, frequency, hematuria and urgency  Musculoskeletal: Positive for arthralgias, gait problem and neck stiffness  Negative for joint swelling  Skin: Negative for pallor and rash  Neurological: Positive for weakness  Negative for dizziness, syncope, speech difficulty, light-headedness, numbness and headaches  Hematological: Negative for adenopathy  Psychiatric/Behavioral: Negative for confusion and sleep disturbance  The patient is nervous/anxious  Objective:      /70 (BP Location: Right arm, Patient Position: Sitting, Cuff Size: Large)   Pulse 88   Temp 98 °F (36 7 °C) (Temporal)   Resp (!) 24   Ht 5' 2" (1 575 m)   Wt 100 kg (220 lb 12 8 oz)   LMP  (LMP Unknown)   BMI 40 38 kg/m²          Physical Exam   Constitutional: She is oriented to person, place, and time  She appears well-developed and well-nourished  She appears distressed  MILDLY SOMNULENT   HENT:   Head: Normocephalic and atraumatic  Mouth/Throat: Oropharynx is clear and moist    Eyes: Pupils are equal, round, and reactive to light  Right eye exhibits no discharge  Left eye exhibits no discharge  CONJ PALE   Neck: Normal range of motion  Neck supple  No JVD present  No thyromegaly present  Cardiovascular: Normal rate and regular rhythm  Murmur heard  Pulmonary/Chest:   DECREASE AIR MOVEMENT WITH DULLNESS IN BOTH BASES  FINE RALES IN BOTH BASES  NO WHEEZING  NOT GREAT RESPIRATORY EFFORT   Abdominal: Soft  Bowel sounds are normal  She exhibits no distension and no mass   There is no tenderness  OBESE   Musculoskeletal: She exhibits edema  MODERATE DJD CHANGES  DIFFICULT TO MOVE AROUND  APPEARS DYSPNEIC  NO JOINT SWELLING OR REDNESS   Lymphadenopathy:     She has no cervical adenopathy  Neurological: She is alert and oriented to person, place, and time  No cranial nerve deficit  Skin: Skin is warm  Rash noted     BOTH LOWER EXTREMITIES HAVE STASIS CHANGES  DRESSINGS ON BOTH LEGS  DRIED SEROUS DRAINAGE  FOLLOWED BY WOUND CARE   Psychiatric: Her behavior is normal  Judgment and thought content normal    TIRED  AROUSABLE

## 2018-03-17 NOTE — ASSESSMENT & PLAN NOTE
REVIEWED GLUCOSE READINGS  DISCUSSED CURRENT TREATMENT REGIMEN  TRYING TO MONITOR CARBOHYDRATE INTAKE    - CONTINUE CURRENT REGIMEN NOW  - MAY CONSIDER SLIDING SCALE HUMALOG TREATMENT  - MONITOR SUGARS

## 2018-03-17 NOTE — ASSESSMENT & PLAN NOTE
INCREASED SOMNOLENCE DURING THE DAY  ?  Co2 RETAINING    - ENCOURAGED ACTIVITY DURING THE DAY  - NEBULIZED ALBUTEROL TREATMENTS  - ER EVAL IF WORSE

## 2018-03-19 ENCOUNTER — TRANSCRIBE ORDERS (OUTPATIENT)
Dept: PULMONOLOGY | Facility: MEDICAL CENTER | Age: 78
End: 2018-03-19

## 2018-03-19 ENCOUNTER — TELEPHONE (OUTPATIENT)
Dept: FAMILY MEDICINE CLINIC | Facility: CLINIC | Age: 78
End: 2018-03-19

## 2018-03-19 NOTE — TELEPHONE ENCOUNTER
I WILL DISCUSS THIS WITH HER WHEEN SHE COMES IN  I DON'T WANT TO DECREASE HER BREATHING WITH THE PAIN MEDICATION

## 2018-03-22 ENCOUNTER — OFFICE VISIT (OUTPATIENT)
Dept: FAMILY MEDICINE CLINIC | Facility: CLINIC | Age: 78
End: 2018-03-22
Payer: COMMERCIAL

## 2018-03-22 VITALS
SYSTOLIC BLOOD PRESSURE: 132 MMHG | TEMPERATURE: 97 F | HEART RATE: 86 BPM | RESPIRATION RATE: 16 BRPM | BODY MASS INDEX: 40.48 KG/M2 | HEIGHT: 62 IN | OXYGEN SATURATION: 90 % | DIASTOLIC BLOOD PRESSURE: 62 MMHG | WEIGHT: 220 LBS

## 2018-03-22 DIAGNOSIS — R06.00 DYSPNEA ON EXERTION: Primary | ICD-10-CM

## 2018-03-22 DIAGNOSIS — G89.4 CHRONIC PAIN SYNDROME: ICD-10-CM

## 2018-03-22 DIAGNOSIS — I10 ESSENTIAL HYPERTENSION: ICD-10-CM

## 2018-03-22 DIAGNOSIS — E11.65 TYPE 2 DIABETES MELLITUS WITH HYPERGLYCEMIA, WITH LONG-TERM CURRENT USE OF INSULIN (HCC): ICD-10-CM

## 2018-03-22 DIAGNOSIS — G47.19 DAYTIME HYPERSOMNOLENCE: ICD-10-CM

## 2018-03-22 DIAGNOSIS — Z79.4 TYPE 2 DIABETES MELLITUS WITH HYPERGLYCEMIA, WITH LONG-TERM CURRENT USE OF INSULIN (HCC): ICD-10-CM

## 2018-03-22 PROCEDURE — 3725F SCREEN DEPRESSION PERFORMED: CPT | Performed by: FAMILY MEDICINE

## 2018-03-22 PROCEDURE — 99214 OFFICE O/P EST MOD 30 MIN: CPT | Performed by: FAMILY MEDICINE

## 2018-03-22 RX ORDER — PREDNISONE 10 MG/1
TABLET ORAL
COMMUNITY
Start: 2018-03-16 | End: 2018-04-06

## 2018-03-22 RX ORDER — TRAMADOL HYDROCHLORIDE 50 MG/1
50 TABLET ORAL EVERY 6 HOURS PRN
Qty: 45 TABLET | Refills: 0 | Status: SHIPPED | OUTPATIENT
Start: 2018-03-22 | End: 2018-03-29 | Stop reason: SDUPTHER

## 2018-03-22 NOTE — PATIENT INSTRUCTIONS
CONTINUE CURRENT TREATMENT PLAN  MONITOR SODIUM AND CARBOHYDRATE INTAKE  NEB ALBUTEROL TREATMENTS EVERY 4-6 HOURS    RV 1 WEEK  CALL SOONER PRN  MAY NEED ER EVAL IF SOB WORSENS

## 2018-03-22 NOTE — PROGRESS NOTES
Assessment/Plan:    Type 2 diabetes mellitus with hyperglycemia (HCC)  DISCUSSED CURRENT TREATMENT REGIMEN  TRYING TO MONITOR CARBOHYDRATE INTAKE    - CONTINUE CURRENT REGIMEN NOW  - MAY CONSIDER SLIDING SCALE HUMALOG TREATMENT  - MONITOR SUGARS    HTN (hypertension)  IMPROVED  DENIES ANY Cp, PALPITATIONS  NO DIZZINESS    - CONTINUE BID DEMADEX  - RV 1 WEEK    Dyspnea on exertion  PATIENT CONTINUES TO HAVE INCREASING SOB  HAS BEEN PLACED ON HOME O2 - USING IT MORE  FEELING SL BETTER  COMPLIANT WITH NEB TX    - DISCUSSED OPTIONS  - RECOMMENDED CONTINUOUS O2 USE  - NEBULIZED ALBUTEROL TREATMENTS  - RV  1 WEEK  - IF WORSE, MAY NEED INPATIENT EVAL    Daytime hypersomnolence  IMPROVED  SEEMS TO BE MORE AWAKE      Chronic pain syndrome  DISCUSSED HER PAIN   VERY UNCOMFORTABLE    - TRIAL OF TRAMADOL - HELPED IN THE PAST MILDLY       Diagnoses and all orders for this visit:    Dyspnea on exertion    Chronic pain syndrome  -     traMADol (ULTRAM) 50 mg tablet; Take 1 tablet (50 mg total) by mouth every 6 (six) hours as needed for moderate pain    Daytime hypersomnolence    Type 2 diabetes mellitus with hyperglycemia, with long-term current use of insulin (HCC)    Essential hypertension    Other orders  -     predniSONE 10 mg tablet;           Subjective:      Patient ID: David Roe is a 77 y o  female  HPI    The following portions of the patient's history were reviewed and updated as appropriate: allergies, current medications, past family history, past medical history, past social history, past surgical history and problem list     Review of Systems   Constitutional: Positive for fatigue  Negative for activity change, appetite change, chills and fever  HENT: Negative for congestion, ear discharge, ear pain, mouth sores, postnasal drip, sore throat and trouble swallowing  Eyes: Negative for pain, discharge and visual disturbance  Respiratory: Positive for cough, shortness of breath and wheezing  Cardiovascular: Negative for chest pain, palpitations and leg swelling  Gastrointestinal: Negative for abdominal distention, abdominal pain, blood in stool, diarrhea, nausea and vomiting  Endocrine: Negative for polydipsia, polyphagia and polyuria  Genitourinary: Negative for dysuria, frequency, hematuria and urgency  Musculoskeletal: Positive for arthralgias, gait problem and neck stiffness  Negative for joint swelling  Skin: Negative for pallor and rash  Neurological: Positive for weakness  Negative for dizziness, syncope, speech difficulty, light-headedness, numbness and headaches  Hematological: Negative for adenopathy  Psychiatric/Behavioral: Negative for confusion and sleep disturbance  The patient is nervous/anxious  Objective:      /62 (BP Location: Right arm, Patient Position: Sitting, Cuff Size: Large)   Pulse 86   Temp (!) 97 °F (36 1 °C) (Temporal)   Resp 16   Ht 5' 2" (1 575 m)   Wt 99 8 kg (220 lb)   LMP  (LMP Unknown)   SpO2 90%   BMI 40 24 kg/m²          Physical Exam   Constitutional: She is oriented to person, place, and time  She appears well-developed and well-nourished  She appears distressed  MILDLY SOMNULENT   HENT:   Head: Normocephalic and atraumatic  Mouth/Throat: Oropharynx is clear and moist    Eyes: Pupils are equal, round, and reactive to light  Right eye exhibits no discharge  Left eye exhibits no discharge  CONJ PALE   Neck: Normal range of motion  Neck supple  No JVD present  No thyromegaly present  Cardiovascular: Normal rate and regular rhythm  Murmur heard  Pulmonary/Chest:   DECREASE AIR MOVEMENT WITH DULLNESS IN BOTH BASES  FINE RALES IN BOTH BASES  NO WHEEZING   Abdominal: Soft  Bowel sounds are normal  She exhibits no distension and no mass  There is no tenderness  OBESE   Musculoskeletal: She exhibits edema     MODERATE DJD CHANGES  DIFFICULT TO MOVE AROUND  NO JOINT SWELLING OR REDNESS   Lymphadenopathy:     She has no cervical adenopathy  Neurological: She is alert and oriented to person, place, and time  No cranial nerve deficit  Skin: Skin is warm  Rash noted     BOTH LOWER EXTREMITIES HAVE STASIS CHANGES  DRESSINGS ON BOTH LEGS  FOLLOWED BY WOUND CARE   Psychiatric: Her behavior is normal  Judgment and thought content normal

## 2018-03-27 ENCOUNTER — OFFICE VISIT (OUTPATIENT)
Dept: PULMONOLOGY | Facility: MEDICAL CENTER | Age: 78
End: 2018-03-27
Payer: COMMERCIAL

## 2018-03-27 VITALS
HEART RATE: 68 BPM | SYSTOLIC BLOOD PRESSURE: 138 MMHG | BODY MASS INDEX: 41.04 KG/M2 | HEIGHT: 62 IN | DIASTOLIC BLOOD PRESSURE: 64 MMHG | OXYGEN SATURATION: 91 % | WEIGHT: 223 LBS | RESPIRATION RATE: 12 BRPM | TEMPERATURE: 98.5 F

## 2018-03-27 DIAGNOSIS — R06.02 SHORTNESS OF BREATH: ICD-10-CM

## 2018-03-27 DIAGNOSIS — E66.2 OBESITY HYPOVENTILATION SYNDROME (HCC): Primary | ICD-10-CM

## 2018-03-27 DIAGNOSIS — G47.33 OBSTRUCTIVE SLEEP APNEA SYNDROME: Chronic | ICD-10-CM

## 2018-03-27 DIAGNOSIS — I50.32 CHRONIC DIASTOLIC CONGESTIVE HEART FAILURE (HCC): ICD-10-CM

## 2018-03-27 PROCEDURE — 99214 OFFICE O/P EST MOD 30 MIN: CPT | Performed by: NURSE PRACTITIONER

## 2018-03-27 RX ORDER — ALPRAZOLAM 0.25 MG/1
TABLET ORAL
Status: ON HOLD | COMMUNITY
End: 2018-03-30

## 2018-03-27 RX ORDER — INSULIN ASPART 100 [IU]/ML
INJECTION, SUSPENSION SUBCUTANEOUS
COMMUNITY
End: 2018-05-25 | Stop reason: ALTCHOICE

## 2018-03-27 NOTE — ASSESSMENT & PLAN NOTE
Danita Newton was diagnosed with obstructive sleep apnea  She had her test done on October 2017  Apneic hypopnea index was 20 events per hour  She also had nocturnal hypoxia with oxygen below 89% for 171 minutes  She went on for treatment study on November 8, 2017  She was titrated to 15 cm of water pressure  With her CPAP oxygen throughout the study was 94 7%  The amount of sleep time less than or equal to 89 was 2 minutes  Patient felt she was not able to tolerate the CPAP at the treatment study  She never received delivery of the CPAP  According to patient she is sleepy using supplemental oxygen at night   I did explain to patient and her daughter that it would be of benefit to use CPAP  Perhaps desensitization would be considered  She is going to follow up with Cardiology

## 2018-03-27 NOTE — ASSESSMENT & PLAN NOTE
Hilda Lomeli is here today status post hospitalization from February 2018 at [facility]  She has had a 2D echo in the past for which follow-up was suggested  Done on October 11, 2017 ejection fraction was 60-65%  There was left ventricular relaxation or grade 1 diastolic dysfunction  Mild tricuspid regurgitation with estimated PA pressure 35 mm of mercury  Hilda Lomeli and her daughter here today and have agreed to follow up Cardiology  This has been ordered in the past however she has been hospitalized since her last visit here on December 5, 2017  I believe that some of her shortness of breath is secondary to diastolic dysfunction  Patient was diagnosed with moderate severe obstructive sleep apnea but was unable to tolerate  She is using 2 L of supplemental oxygen at night and currently was evaluated for daytime oxygen for which she will need 2 L

## 2018-03-27 NOTE — PATIENT INSTRUCTIONS
Dyspnea   WHAT YOU NEED TO KNOW:   Dyspnea is breathing difficulty or discomfort  You may have labored, painful, or shallow breathing  You may feel breathless or short of breath  Dyspnea can occur during rest or with activity  You may have dyspnea for a short time, or it might become chronic  Dyspnea is often a symptom of a disease or condition  DISCHARGE INSTRUCTIONS:   Return to the emergency department if:   · Your signs and symptoms are the same or worse within 24 hours of treatment  · You have shaking chills or a fever over 102°F      · You have new pain, pressure, or tightness in your chest      · You have a new or worse cough or wheezing, or you cough up blood  · You feel like you cannot get enough air  · The skin over your ribs or on your neck sinks in when you breathe  · You have a severe headache with vomiting and abdominal pain  · You feel confused or dizzy  Contact your healthcare provider or specialist if:   · You have questions or concerns about your condition or care  Medicines:   · Medicines  may be used to treat the cause of your dyspnea  Medicines may reduce swelling in your airway or decrease extra fluid from around your heart or lungs  Other medicines may be used to decrease anxiety and help you feel calm and relaxed  · Take your medicine as directed  Contact your healthcare provider if you think your medicine is not helping or if you have side effects  Tell him or her if you are allergic to any medicine  Keep a list of the medicines, vitamins, and herbs you take  Include the amounts, and when and why you take them  Bring the list or the pill bottles to follow-up visits  Carry your medicine list with you in case of an emergency  Manage long-term dyspnea:   · Create an action plan  You and your healthcare provider can work together to create a plan for how to handle episodes of dyspnea   The plan can include daily activities, treatment changes, and what to do if you have severe breathing problems  · Lean forward on your elbows when you sit  This helps your lungs expand and may make it easier to breathe  · Use pursed-lip breathing any time you feel short of breath  Breathe in through your nose and then slowly breathe out through your mouth with your lips slightly puckered  It should take you twice as long to breathe out as it did to breathe in  · Do not smoke  Nicotine and other chemicals in cigarettes and cigars can cause lung damage and make it harder to breathe  Ask your healthcare provider for information if you currently smoke and need help to quit  E-cigarettes or smokeless tobacco still contain nicotine  Talk to your healthcare provider before you use these products  · Reach or maintain a healthy weight  Your healthcare provider can help you create a safe weight loss plan if you are overweight  · Exercise as directed  Exercise can help your lungs work more easily  Exercise can also help you lose weight if needed  Try to get at least 30 minutes of exercise most days of the week  Your healthcare provider can help you create an exercise plan that is safe for you  Follow up with your healthcare provider or specialist as directed:  Write down your questions so you remember to ask them during your visits  © 2017 [address]  Information is for End User's use only and may not be sold, redistributed or otherwise used for commercial purposes  All illustrations and images included in CareNotes® are the copyrighted property of [employer]  or [employer] Áng[employer]  The above information is an  only  It is not intended as medical advice for individual conditions or treatments  Talk to your doctor, nurse or pharmacist before following any medical regimen to see if it is safe and effective for you

## 2018-03-27 NOTE — PROGRESS NOTES
Assessment/Plan:     Problem List Items Addressed This Visit        Respiratory    Sleep apnea (Chronic)       Yuki Feliz was diagnosed with obstructive sleep apnea  She had her test done on October 2017  Apneic hypopnea index was 20 events per hour  She also had nocturnal hypoxia with oxygen below 89% for 171 minutes  She went on for treatment study on November 8, 2017  She was titrated to 15 cm of water pressure  With her CPAP oxygen throughout the study was 94 7%  The amount of sleep time less than or equal to 89 was 2 minutes  Patient felt she was not able to tolerate the CPAP at the treatment study  She never received delivery of the CPAP  According to patient she is sleepy using supplemental oxygen at night   I did explain to patient and her daughter that it would be of benefit to use CPAP  Perhaps desensitization would be considered  She is going to follow up with Cardiology  Cardiovascular and Mediastinum    Diastolic CHF ([facility] [address] )       Yuki Feliz is here today status post hospitalization from February 2018 at [address] [place] [address]  She has had a 2D echo in the past for which follow-up was suggested  Done on October 11, 2017 ejection fraction was 60-65%  There was left ventricular relaxation or grade 1 diastolic dysfunction  Mild tricuspid regurgitation with estimated PA pressure 35 mm of mercury  Yuki Feliz and her daughter here today and have agreed to follow up Cardiology  This has been ordered in the past however she has been hospitalized since her last visit here on December 5, 2017  I believe that some of her shortness of breath is secondary to diastolic dysfunction  Patient was diagnosed with moderate severe obstructive sleep apnea but was unable to tolerate  She is using 2 L of supplemental oxygen at night and currently was evaluated for daytime oxygen for which she will need 2 L              Other    Obesity hypoventilation syndrome (HCC) - Primary (Chronic) Shortness of breath            Return in about 6 months (around 9/27/2018)  All questions are answered to the patient's satisfaction and understanding  She verbalizes understanding  She is encouraged to call with any further questions or concerns  Portions of the record may have been created with voice recognition software  Occasional wrong word or "sound a like" substitutions may have occurred due to the inherent limitations of voice recognition software  Read the chart carefully and recognize, using context, where substitutions have occurred  ______________________________________________________________________    Chief Complaint:   Chief Complaint   Patient presents with    Follow-up    Cough     non productive    Shortness of Breath     occurs all the time       Patient ID: Dev Delong is a 77 y o  y o  female has a past medical history of Anxiety; Arthritis; Asthma; COPD (chronic obstructive pulmonary disease) ([facility] [address] ); Diabetes mellitus ([facility] [address] ); Disease of thyroid gland; Hypertension; Mitral valve regurgitation; Myocardial infarction; Obesity hypoventilation syndrome ([address] ); Pain; Restless leg syndrome; Sleep related hypoxia; and Thyroid cancer ([facility] [address] )  3/27/2018  Dev Delong is here today for hospital follow-up  She has a history of diastolic dysfunction, and severe obstructive sleep apnea  She has mild pulmonary artery hypertension and hypo ventilation associated with obesity syndrome  She was recently admitted to [facility] from  From February 22, 2018 and was therefore anxiety and depression as well as chronic respiratory failure  She does use 3 L of oxygen and apparently at last hospitalization was qualified for daytime oxygen  She had chest x-ray done that showed no acute process, BMP was unremarkable  She does have history of sleep apnea but was unable to use her CPAP as she cannot tolerated  Shortness of Breath   This is a chronic problem   The current episode started more than 1 year ago  The problem has been rapidly improving  The symptoms are aggravated by exercise and lying flat  The patient has no known risk factors for DVT/PE  She has tried beta agonist inhalers for the symptoms  The treatment provided mild relief  Her past medical history is significant for a heart failure  Review of Systems   Constitutional: Negative  HENT: Negative  Eyes: Negative  Respiratory: Positive for shortness of breath  Cardiovascular: Negative  Gastrointestinal: Negative  Endocrine: Negative  Genitourinary: Negative  Musculoskeletal: Negative  Skin: Negative  Allergic/Immunologic: Negative  Neurological: Negative  Hematological: Negative  Psychiatric/Behavioral: Negative  Smoking history: She reports that she has never smoked  She has never used smokeless tobacco     The following portions of the patient's history were reviewed and updated as appropriate:   All reviewed      Immunization History   Administered Date(s) Administered    Influenza Split High Dose Preservative Free IM 10/05/2017    Influenza TIV (IM) 09/09/2014    Pneumococcal Conjugate 13-Valent 06/17/2017    Pneumococcal Polysaccharide PPV23 04/04/2016     Current Outpatient Prescriptions   Medication Sig Dispense Refill    acetaminophen (TYLENOL) 325 mg tablet Take 2 tablets (650 mg total) by mouth every 6 (six) hours as needed for mild pain, headaches or fever 30 tablet 0    ALPRAZolam (XANAX) 0 25 mg tablet Take by mouth daily at bedtime as needed for anxiety      ascorbic acid (VITAMIN C) 500 mg tablet Take 500 mg by mouth daily      bisacodyl (DULCOLAX) 5 mg EC tablet Take 10 mg by mouth daily at bedtime      COD LIVER OIL PO Take by mouth daily      dextromethorphan-guaiFENesin (ROBITUSSIN DM)  mg/5 mL syrup Take 10 mL by mouth every 4 (four) hours as needed for cough 118 mL 0    diclofenac sodium (VOLTAREN) 1 % Place on the skin      folic acid (FOLVITE) 1 mg tablet Take by mouth daily      insulin aspart protamine-insulin aspart (NovoLOG 70/30) 100 units/mL injection Inject under the skin 2 (two) times a day before meals      levalbuterol (XOPENEX) 1 25 mg/0 5 mL nebulizer solution Take 0 5 mL (1 25 mg total) by nebulization every 4 (four) hours as needed for wheezing for up to 30 days 45 mL 0    levothyroxine 125 mcg tablet Take 1 tablet (125 mcg total) by mouth daily 90 tablet 1    lisinopril (ZESTRIL) 10 mg tablet Take 1 tablet by mouth daily      metolazone (ZAROXOLYN) 2 5 mg tablet Take 1 tablet by mouth      Multiple Vitamins-Minerals (MULTIVITAMIN ADULT PO) Take 1 tablet by mouth daily      NOVOLOG MIX 70/30 FLEXPEN (70-30) 100 UNIT/ML SUPN 45 Units 2 (two) times a day before meals        polyethylene glycol (MIRALAX) 17 g packet Take 17 g by mouth daily (Patient taking differently: Take 17 g by mouth 2 (two) times a day  ) 14 each 0    potassium chloride (K-DUR,KLOR-CON) 10 mEq tablet Take 2 tablets by mouth daily 30 tablet 0    pramipexole (MIRAPEX) 1 mg tablet Take 1 tablet (1 mg total) by mouth 3 (three) times a day 90 tablet 1    predniSONE 10 mg tablet       albuterol (ACCUNEB) 1 25 MG/3ML nebulizer solution Take 3 mL (1 25 mg total) by nebulization 3 (three) times a day as needed for wheezing 60 vial 0    dicyclomine (BENTYL) 10 mg capsule Take 1 capsule by mouth 3 (three) times a day      escitalopram (LEXAPRO) 10 mg tablet Take 1 tablet (10 mg total) by mouth daily 30 tablet 3    gabapentin (NEURONTIN) 100 mg capsule Take 1 capsule (100 mg total) by mouth daily 5 capsule 0    insulin lispro (HumaLOG) 100 units/mL injection Inject 4-20 Units under the skin 3 (three) times a day before meals  0    insulin lispro (HumaLOG) 100 units/mL injection Inject 4-20 Units under the skin daily at bedtime  0    lactulose 10 g/15 mL Take 15 mL by mouth daily as needed        Linaclotide (LINZESS) 72 MCG CAPS Take 72 mcg by mouth daily before breakfast 30 capsule 6    LORazepam (ATIVAN) 1 mg tablet Take by mouth      metoprolol tartrate (LOPRESSOR) 25 mg tablet Take 1 tablet by mouth 2 (two) times a day      predniSONE 20 mg tablet Take 40 mg daily for 2 days then 30 mg daily for 3 days then 20 mg daily for 3 days then 10 mg daily for 3 days  0    torsemide (DEMADEX) 20 mg tablet Take 2 tablets (40 mg total) by mouth daily 30 tablet 0    traMADol (ULTRAM) 50 mg tablet Take 1 tablet (50 mg total) by mouth every 6 (six) hours as needed for moderate pain 45 tablet 0     No current facility-administered medications for this visit  Allergies: Ketorolac and Latex    Objective:  Vitals:    03/27/18 0802   BP: 138/64   BP Location: Left arm   Patient Position: Sitting   Cuff Size: Adult   Pulse: 68   Resp: 12   Temp: 98 5 °F (36 9 °C)   TempSrc: Oral   SpO2: 91%   Weight: 101 kg (223 lb)   Height: 5' 2" (1 575 m)   Oxygen Therapy  SpO2: 91 %    Wt Readings from Last 3 Encounters:   03/27/18 101 kg (223 lb)   03/22/18 99 8 kg (220 lb)   03/16/18 100 kg (220 lb 12 8 oz)     Body mass index is 40 79 kg/m²  Physical Exam   Constitutional: She is oriented to person, place, and time  She appears well-developed and well-nourished  obese   HENT:   Head: Normocephalic and atraumatic  Mallampati 4   Eyes: Conjunctivae are normal  Pupils are equal, round, and reactive to light  Neck: Normal range of motion  Neck supple  Cardiovascular: Normal rate and regular rhythm  Pulmonary/Chest: Effort normal and breath sounds normal    Abdominal: Soft  Musculoskeletal: Normal range of motion  She exhibits edema  Edema bilateral lower extremities   Neurological: She is alert and oriented to person, place, and time  Skin: Skin is dry  Psychiatric: She has a normal mood and affect  Her behavior is normal  Thought content normal        Lab Review:   not applicable      Diagnostics:  I have personally reviewed pertinent reports        OffiSpirometry Results:     ESS:    No results found

## 2018-03-27 NOTE — ASSESSMENT & PLAN NOTE
Sariah Burrell is here today for hospital follow-up  She has diastolic dysfunction, and hypoventilation associated with obesity syndrome  Her last visit here was December 5, 2017  I did refer her at that time for cardiology evaluation for which she   Did not go as she was hospitalized  Sariah Burrell was recently evaluated for daytime oxygen while hospitalized and is using 2-3 L  Sariah Burrell was evaluated today during visit for supplemental oxygen with ambulation  Room air oxygen was 93%  With ambulation went to 88% on room air  On 2 L she went to 94%  She will use an benefit supplemental oxygen

## 2018-03-29 ENCOUNTER — OFFICE VISIT (OUTPATIENT)
Dept: FAMILY MEDICINE CLINIC | Facility: CLINIC | Age: 78
End: 2018-03-29
Payer: COMMERCIAL

## 2018-03-29 ENCOUNTER — TELEPHONE (OUTPATIENT)
Dept: PAIN MEDICINE | Facility: CLINIC | Age: 78
End: 2018-03-29

## 2018-03-29 VITALS
BODY MASS INDEX: 40.85 KG/M2 | RESPIRATION RATE: 20 BRPM | DIASTOLIC BLOOD PRESSURE: 60 MMHG | TEMPERATURE: 98.9 F | HEIGHT: 62 IN | WEIGHT: 222 LBS | OXYGEN SATURATION: 91 % | HEART RATE: 100 BPM | SYSTOLIC BLOOD PRESSURE: 138 MMHG

## 2018-03-29 DIAGNOSIS — E66.2 OBESITY HYPOVENTILATION SYNDROME (HCC): Chronic | ICD-10-CM

## 2018-03-29 DIAGNOSIS — J96.10 CHRONIC RESPIRATORY FAILURE, UNSPECIFIED WHETHER WITH HYPOXIA OR HYPERCAPNIA (HCC): ICD-10-CM

## 2018-03-29 DIAGNOSIS — Z79.4 TYPE 2 DIABETES MELLITUS WITH DIABETIC NEUROPATHY, WITH LONG-TERM CURRENT USE OF INSULIN (HCC): Primary | ICD-10-CM

## 2018-03-29 DIAGNOSIS — E11.40 TYPE 2 DIABETES MELLITUS WITH DIABETIC NEUROPATHY, WITH LONG-TERM CURRENT USE OF INSULIN (HCC): Primary | ICD-10-CM

## 2018-03-29 DIAGNOSIS — G89.4 CHRONIC PAIN SYNDROME: ICD-10-CM

## 2018-03-29 PROBLEM — E11.65 TYPE 2 DIABETES MELLITUS WITH HYPERGLYCEMIA (HCC): Status: RESOLVED | Noted: 2017-04-15 | Resolved: 2018-03-29

## 2018-03-29 PROBLEM — G47.19 DAYTIME HYPERSOMNOLENCE: Status: RESOLVED | Noted: 2017-10-16 | Resolved: 2018-03-29

## 2018-03-29 PROBLEM — F32.A DEPRESSION: Status: RESOLVED | Noted: 2017-12-01 | Resolved: 2018-03-29

## 2018-03-29 PROBLEM — M54.50 LOW BACK PAIN WITHOUT SCIATICA: Status: ACTIVE | Noted: 2018-03-29

## 2018-03-29 PROBLEM — I50.23 ACUTE ON CHRONIC SYSTOLIC HEART FAILURE (HCC): Chronic | Status: RESOLVED | Noted: 2017-06-10 | Resolved: 2018-03-29

## 2018-03-29 PROBLEM — S06.0X9A CONCUSSION: Status: RESOLVED | Noted: 2017-11-08 | Resolved: 2018-03-29

## 2018-03-29 PROBLEM — Z09 HOSPITAL DISCHARGE FOLLOW-UP: Status: RESOLVED | Noted: 2018-02-08 | Resolved: 2018-03-29

## 2018-03-29 PROBLEM — M46.1 SACROILIITIS (HCC): Status: ACTIVE | Noted: 2018-03-29

## 2018-03-29 PROCEDURE — 99214 OFFICE O/P EST MOD 30 MIN: CPT | Performed by: FAMILY MEDICINE

## 2018-03-29 RX ORDER — TRAMADOL HYDROCHLORIDE 50 MG/1
100 TABLET ORAL EVERY 6 HOURS PRN
Qty: 120 TABLET | Refills: 1 | Status: SHIPPED | OUTPATIENT
Start: 2018-03-29 | End: 2018-05-25 | Stop reason: ALTCHOICE

## 2018-03-29 NOTE — TELEPHONE ENCOUNTER
Pt called to schedule [facility]t [facility] today ([id number])  She was originally scheduled back on 2/21/18 but cancelled due to illness    Please advise if it is ok to reschedule if nothing has changed or if you need to see her in the office again first   Thanks

## 2018-03-29 NOTE — ASSESSMENT & PLAN NOTE
STABLE  FEELS BETTER  COUGHING LESS  STATES SHE IS COMPLIANT WITH MEDICATIONS  SAW PULMONARY THIS WEEK  USING OXYGEN - ENCOURAGED INCREASE USE    - CONTINUE CURRENT TREATMENT PLAN  - NEBULIZER USE Q4-6 HOURS  - RV 3 WEEKS

## 2018-03-29 NOTE — PROGRESS NOTES
Assessment/Plan:    Problem List Items Addressed This Visit     Obesity hypoventilation syndrome (HCC) (Chronic)    Diabetes mellitus with neuropathy (HCC) - Primary     TRYING TO WATCH CARBOHYDRATES  SUGARS HAVE BEEN MUCH BETTER  SUPPOT GIVEN    - CONTINUE CURRENT INSULIN REGIMEN         Chronic pain syndrome     CONTINUES TO BE IN A LOT OF DISCOMFORT  LOWER EXTREMITIES - FOLLOWED BY WOUND CARE  PAIN MANAGEMENT NOT ADEQUATE AT THIS TIME    - INCREASE TRAMADOL TO 50 MG  1-2 TABS EVERY 6 HOURS         Relevant Medications    traMADol (ULTRAM) 50 mg tablet    Chronic respiratory failure (HCC)     STABLE  FEELS BETTER  COUGHING LESS  STATES SHE IS COMPLIANT WITH MEDICATIONS  SAW PULMONARY THIS WEEK  USING OXYGEN - ENCOURAGED INCREASE USE    - CONTINUE CURRENT TREATMENT PLAN  - NEBULIZER USE Q4-6 HOURS  - RV 3 WEEKS               Patient Instructions   CONTINUE CURRENT TREATMENT  WILL INCREASE TRAMADOL TO 1-2 TABS EVERY 6 HRS PRN PAIN  ENCOURAGED CONTINUED USE OF NEB TREATMENTS  STRESSED COMPLIANCE WITH DIET - CARBOHYDRATE AND SODIUM INTAKE  SUPPORT GIVEN  RV 3 WEEKS      Return in about 3 weeks (around 4/19/2018) for Recheck  Subjective:      Patient ID: David Roe is a 77 y o  female  Chief Complaint   Patient presents with    medication check       HPI    The following portions of the patient's history were reviewed and updated as appropriate: allergies, current medications, past family history, past medical history, past social history, past surgical history and problem list     Review of Systems   Constitutional: Positive for fatigue  Negative for activity change, appetite change, chills and fever  HENT: Negative for congestion, ear discharge, ear pain, mouth sores, postnasal drip, sore throat and trouble swallowing  Eyes: Negative for pain, discharge and visual disturbance  Respiratory: Positive for cough and shortness of breath  Negative for wheezing      Cardiovascular: Negative for chest pain, palpitations and leg swelling  Gastrointestinal: Negative for abdominal distention, abdominal pain, blood in stool, diarrhea, nausea and vomiting  Endocrine: Negative for polydipsia, polyphagia and polyuria  Genitourinary: Negative for dysuria, frequency, hematuria and urgency  Musculoskeletal: Positive for arthralgias and gait problem  Negative for joint swelling and neck stiffness  Skin: Negative for pallor and rash  Neurological: Positive for weakness  Negative for dizziness, syncope, speech difficulty, light-headedness, numbness and headaches  Hematological: Negative for adenopathy  Psychiatric/Behavioral: Negative for confusion and sleep disturbance  The patient is nervous/anxious            Current Outpatient Prescriptions   Medication Sig Dispense Refill    acetaminophen (TYLENOL) 325 mg tablet Take 2 tablets (650 mg total) by mouth every 6 (six) hours as needed for mild pain, headaches or fever 30 tablet 0    albuterol (ACCUNEB) 1 25 MG/3ML nebulizer solution Take 3 mL (1 25 mg total) by nebulization 3 (three) times a day as needed for wheezing 60 vial 0    ascorbic acid (VITAMIN C) 500 mg tablet Take 500 mg by mouth daily      bisacodyl (DULCOLAX) 5 mg EC tablet Take 10 mg by mouth daily at bedtime      COD LIVER OIL PO Take by mouth daily      dextromethorphan-guaiFENesin (ROBITUSSIN DM)  mg/5 mL syrup Take 10 mL by mouth every 4 (four) hours as needed for cough 118 mL 0    dicyclomine (BENTYL) 10 mg capsule Take 1 capsule by mouth 3 (three) times a day      escitalopram (LEXAPRO) 10 mg tablet Take 1 tablet (10 mg total) by mouth daily 30 tablet 3    folic acid (FOLVITE) 1 mg tablet Take by mouth daily      gabapentin (NEURONTIN) 100 mg capsule Take 1 capsule (100 mg total) by mouth daily 5 capsule 0    insulin aspart protamine-insulin aspart (NovoLOG 70/30) 100 units/mL injection Inject under the skin 2 (two) times a day before meals      insulin lispro (HumaLOG) 100 units/mL injection Inject 4-20 Units under the skin 3 (three) times a day before meals  0    levothyroxine 125 mcg tablet Take 1 tablet (125 mcg total) by mouth daily 90 tablet 1    Linaclotide (LINZESS) 72 MCG CAPS Take 72 mcg by mouth daily before breakfast 30 capsule 6    Multiple Vitamins-Minerals (MULTIVITAMIN ADULT PO) Take 1 tablet by mouth daily      NOVOLOG MIX 70/30 FLEXPEN (70-30) 100 UNIT/ML SUPN 45 Units 2 (two) times a day before meals        polyethylene glycol (MIRALAX) 17 g packet Take 17 g by mouth daily (Patient taking differently: Take 17 g by mouth 2 (two) times a day  ) 14 each 0    potassium chloride (K-DUR,KLOR-CON) 10 mEq tablet Take 2 tablets by mouth daily 30 tablet 0    pramipexole (MIRAPEX) 1 mg tablet Take 1 tablet (1 mg total) by mouth 3 (three) times a day 90 tablet 1    torsemide (DEMADEX) 20 mg tablet Take 2 tablets (40 mg total) by mouth daily 30 tablet 0    traMADol (ULTRAM) 50 mg tablet Take 2 tablets (100 mg total) by mouth every 6 (six) hours as needed for moderate pain for up to 30 days 120 tablet 1    ALPRAZolam (XANAX) 0 25 mg tablet Take by mouth daily at bedtime as needed for anxiety      diclofenac sodium (VOLTAREN) 1 % Place on the skin      insulin lispro (HumaLOG) 100 units/mL injection Inject 4-20 Units under the skin daily at bedtime  0    lactulose 10 g/15 mL Take 15 mL by mouth daily as needed        lisinopril (ZESTRIL) 10 mg tablet Take 1 tablet by mouth daily      LORazepam (ATIVAN) 1 mg tablet Take by mouth      metolazone (ZAROXOLYN) 2 5 mg tablet Take 1 tablet by mouth      metoprolol tartrate (LOPRESSOR) 25 mg tablet Take 1 tablet by mouth 2 (two) times a day      predniSONE 10 mg tablet        No current facility-administered medications for this visit          Objective:    /60 (BP Location: Right arm, Patient Position: Sitting, Cuff Size: Standard)   Pulse 100   Temp 98 9 °F (37 2 °C) (Temporal)   Resp 20   Ht 5' 2" (1 575 m) Wt 101 kg (222 lb)   LMP  (LMP Unknown)   SpO2 91%   BMI 40 60 kg/m²        Physical Exam   Constitutional: She is oriented to person, place, and time  She appears well-developed and well-nourished  She appears distressed  HENT:   Head: Normocephalic and atraumatic  Mouth/Throat: Oropharynx is clear and moist    Eyes: Pupils are equal, round, and reactive to light  Right eye exhibits no discharge  Left eye exhibits no discharge  CONJ PALE   Neck: Normal range of motion  Neck supple  No JVD present  No thyromegaly present  Cardiovascular: Normal rate and regular rhythm  Murmur heard  Pulmonary/Chest:   DECREASE AIR MOVEMENT WITH DULLNESS IN BOTH BASES  FINE RALES IN BOTH BASES  NO WHEEZING   Abdominal: Soft  Bowel sounds are normal  She exhibits no distension and no mass  There is no tenderness  OBESE   Musculoskeletal: She exhibits edema  MODERATE DJD CHANGES  DIFFICULT TO MOVE AROUND  NO JOINT SWELLING OR REDNESS   Lymphadenopathy:     She has no cervical adenopathy  Neurological: She is alert and oriented to person, place, and time  No cranial nerve deficit  Skin: Skin is warm  No rash noted     BOTH LOWER EXTREMITIES HAVE STASIS CHANGES  DRESSINGS ON BOTH LEGS  FOLLOWED BY WOUND CARE   Psychiatric: Her behavior is normal  Judgment and thought content normal               Juanita Downing MD

## 2018-03-29 NOTE — ASSESSMENT & PLAN NOTE
TRYING TO WATCH CARBOHYDRATES  SUGARS HAVE BEEN MUCH BETTER  SUPPOT GIVEN    - CONTINUE CURRENT INSULIN REGIMEN

## 2018-03-29 NOTE — ASSESSMENT & PLAN NOTE
CONTINUES TO BE IN A LOT OF DISCOMFORT  LOWER EXTREMITIES - FOLLOWED BY WOUND CARE  PAIN MANAGEMENT NOT ADEQUATE AT THIS TIME    - INCREASE TRAMADOL TO 50 MG  1-2 TABS EVERY 6 HOURS

## 2018-03-29 NOTE — PATIENT INSTRUCTIONS
CONTINUE CURRENT TREATMENT  WILL INCREASE TRAMADOL TO 1-2 TABS EVERY 6 HRS PRN PAIN  ENCOURAGED CONTINUED USE OF NEB TREATMENTS  STRESSED COMPLIANCE WITH DIET - CARBOHYDRATE AND SODIUM INTAKE  SUPPORT GIVEN  RV 3 WEEKS

## 2018-03-30 ENCOUNTER — HOSPITAL ENCOUNTER (OUTPATIENT)
Dept: RADIOLOGY | Facility: HOSPITAL | Age: 78
Setting detail: OUTPATIENT SURGERY
Discharge: HOME | End: 2018-03-30
Payer: COMMERCIAL

## 2018-03-30 ENCOUNTER — HOSPITAL ENCOUNTER (OUTPATIENT)
Facility: AMBULARY SURGERY CENTER | Age: 78
Setting detail: OUTPATIENT SURGERY
Discharge: HOME | End: 2018-03-30
Attending: ANESTHESIOLOGY | Admitting: ANESTHESIOLOGY
Payer: COMMERCIAL

## 2018-03-30 VITALS
RESPIRATION RATE: 20 BRPM | HEART RATE: 84 BPM | OXYGEN SATURATION: 94 % | TEMPERATURE: 98.3 F | SYSTOLIC BLOOD PRESSURE: 157 MMHG | DIASTOLIC BLOOD PRESSURE: 77 MMHG

## 2018-03-30 DIAGNOSIS — Z92.241 S/P EPIDURAL STEROID INJECTION: ICD-10-CM

## 2018-03-30 PROCEDURE — 72200 X-RAY EXAM SI JOINTS: CPT

## 2018-03-30 PROCEDURE — 27096 INJECT SACROILIAC JOINT: CPT | Performed by: ANESTHESIOLOGY

## 2018-03-30 RX ORDER — METHYLPREDNISOLONE ACETATE 80 MG/ML
INJECTION, SUSPENSION INTRA-ARTICULAR; INTRALESIONAL; INTRAMUSCULAR; SOFT TISSUE AS NEEDED
Status: DISCONTINUED | OUTPATIENT
Start: 2018-03-30 | End: 2018-03-30 | Stop reason: HOSPADM

## 2018-03-30 RX ORDER — LIDOCAINE WITH 8.4% SOD BICARB 0.9%(10ML)
SYRINGE (ML) INJECTION AS NEEDED
Status: DISCONTINUED | OUTPATIENT
Start: 2018-03-30 | End: 2018-03-30 | Stop reason: HOSPADM

## 2018-03-30 RX ORDER — BUPIVACAINE HYDROCHLORIDE 2.5 MG/ML
INJECTION, SOLUTION EPIDURAL; INFILTRATION; INTRACAUDAL AS NEEDED
Status: DISCONTINUED | OUTPATIENT
Start: 2018-03-30 | End: 2018-03-30 | Stop reason: HOSPADM

## 2018-03-30 NOTE — OP NOTE
ATTENDING PHYSICIAN:  Kay Gaitan MD     PROCEDURE: Right sacroiliac joint injection with steroid and local anesthetic under fluoroscopic guidance  PREPROCEDURE DIAGNOSIS:  Sacroiliitis  POSTPROCEDURE DIAGNOSIS: Sacroiliitis  ANESTHESIA:  Local     ESTIMATED BLOOD LOSS:  Minimal     COMPLICATIONS:  None  LOCATION:  [facility]  CONSENT:  Today's procedure, its potential benefits as well as its risks and potential side effects were reviewed  Discussed risks of the procedure including bleeding, infection, nerve irritation or damage, reactions to the medications, headache, failure of the pain to improve, and potential worsening of the pain were explained to the patient who verbalized understanding and who wished to proceed  Written informed consent was thereby obtained  DESCRIPTION OF THE PROCEDURE:  After written informed consent was obtained, the patient was taken to the fluoroscopy suite and placed in the prone position  Anatomical landmarks were identified by way of fluoroscopy in multiple views  The skin overlying the sacroiliac region was prepped using antiseptic and draped in the usual sterile fashion  Strict aseptic technique was utilized  The skin and subcutaneous tissues at the needle entry site were infiltrated with 5 mL of 1% preservative-free lidocaine using a 25-gauge 1-1/2-inch needle  A 22-gauge 3-1/2-inch needle was then incrementally advanced using multiple fluoroscopic views into the inferior posterior pole of the sacroiliac joint  Proper needle placement was confirmed with the aid of fluoroscopy in the AP and lateral views  After negative aspiration, contrast was injected which delineated the sacroiliac joint under fluoroscopy in the AP view  After negative aspiration was reconfirmed, a 3 mL mixture consisting of 2 mL of preservative-free 0 25% bupivacaine mixed with 1 mL of 80 mg/mL of Depo-Medrol was slowly injected      The patient tolerated the procedure well and all needles were removed with the tips intact  Hemostasis was maintained  There were no apparent paresthesias or complications  The skin was wiped clean and a Band-Aid was placed as appropriate  The patient was monitored for an appropriate period of time following the procedure and remained hemodynamically stable and neurovascularly intact following the procedure  The patient was ultimately discharged to home with supervision in good condition and instructed to call the office in a few days for an update or sooner as warranted  I was present and participated in all key and critical portions of this procedure      Kay Gaitan MD  3/30/2018  9:52 AM

## 2018-03-30 NOTE — H&P
History of Present Illness: The patient is a 77 y o  female who presents with complaints of right-sided low back pain  Patient Active Problem List   Diagnosis    Acquired hypothyroidism    Lung nodules    Morbid obesity with BMI of 40 0-44 9, adult ([place] [address] )    Opioid dependence ([place] [address] )    Asthma    Constipation due to opioid therapy    Restless leg    Sleep apnea    Pancreatic cyst    Anxiety    Obesity hypoventilation syndrome ([place] [address] )    HTN (hypertension)    Primary osteoarthritis involving multiple joints    Balance problem    Diabetes mellitus with neuropathy (HCC)    Diastolic dysfunction    Meningiomas, multiple (HCC)    Moderate episode of recurrent major depressive disorder ([place] [address] )    Pulmonary emphysema (HCC)    Chronic right-sided low back pain without sciatica    Chronic pain syndrome    Diastolic CHF (HCC)    Chronic venous stasis dermatitis of both lower extremities    Bilateral lower extremity edema    Physical deconditioning    Chronic respiratory failure (HCC)    Sacroiliitis (HCC)    Low back pain without sciatica       Past Medical History:   Diagnosis Date    Anxiety     Arthritis     Asthma     COPD (chronic obstructive pulmonary disease) (HCC)     Diabetes mellitus ([place] [address] )     Disease of thyroid gland     Hypertension     Mitral valve regurgitation     Myocardial infarction     Obesity hypoventilation syndrome (HCC)     Pain     right hip    Restless leg syndrome     Sleep related hypoxia     Thyroid cancer (HCC)        Past Surgical History:   Procedure Laterality Date    CHOLECYSTECTOMY      EYE SURGERY      NC ERCP DX COLLECTION SPECIMEN BRUSHING/WASHING N/A 5/30/2017    Procedure: ENDOSCOPIC RETROGRADE CHOLANGIOPANCREATOGRAPHY (ERCP); SPHINCTEROTOMY;  Surgeon: Tayla King MD;  Location: [facility];   Service: Gastroenterology    REPLACEMENT TOTAL KNEE BILATERAL      SKIN BIOPSY      melanoma of back    STEROID INJECTION HIP Right 12/21/2017    Procedure: TROCHANTERIC BURSA INJECTION RIGHT;  Surgeon: Merle Hwang MD;  Location: [facility] MAIN OR;  Service: Pain Management     THORACOTOMY Right     at least 40 years, for pericardial cyst    THYROIDECTOMY, PARTIAL      For thyroid cancer       No current facility-administered medications for this encounter  Allergies   Allergen Reactions    Ketorolac Swelling    Latex Rash       Physical Exam:   Vitals:    03/30/18 0924   BP: (!) 186/81   Pulse: 66   Resp: 18   Temp: 98 3 °F (36 8 °C)   SpO2: 94%     General: Awake, Alert, Oriented x 3, Mood and affect appropriate  Respiratory: Respirations even and unlabored  Cardiovascular: Peripheral pulses intact; no edema  Musculoskeletal Exam:  Tenderness over the right sacroiliac joint    ASA Score:  3    Assessment:  Right sacroiliitis    Plan:  Proceed with right sacroiliac joint injection

## 2018-03-30 NOTE — DISCHARGE INSTRUCTIONS
Steroid Joint Injection   WHAT YOU NEED TO KNOW:   A steroid joint injection is a procedure to inject steroid medicine into a joint  Steroid medicine decreases pain and inflammation  The injection may also contain an anesthetic (numbing medicine) to decrease pain  It may be done to treat conditions such as arthritis, gout, or carpal tunnel syndrome  The injections may be given in your knee, ankle, shoulder, elbow, wrist, ankle or sacroiliac joint  1  Do not apply heat to any area that is numb  If you have discomfort or soreness at the injection site, you may apply ice today, 20 minutes on and 20 minutes off  Tomorrow you may use ice or warm, moist heat  Do not apply ice or heat directly to the skin  2  You may have an increase or change in the discomfort for 36-48 hours after your treatment  Apply ice and continue with any pain medicine you have been prescribed  3  Do not do anything strenuous today  You may shower, but no tub baths or hot tubs today  You may resume your normal activities tomorrow, but do not overdo it  Resume normal activities slowly when you are feeling better  4  If you experience redness, drainage or swelling at the injection site, or if you develop a fever above 100 degrees, please call [facility] at [phone] or go to the Emergency Room  5  Continue to take all routine medicines prescribed by your primary care physician unless otherwise instructed by our staff  Most blood thinners should be started again according to your regularly scheduled dosing  If you have any questions, please give our office a call  If you have a problem specifically related to your procedure, please call our office at [phone]  Problems not related to your procedure should be directed to your primary care physician

## 2018-04-03 ENCOUNTER — HOSPITAL ENCOUNTER (OUTPATIENT)
Dept: RADIOLOGY | Facility: HOSPITAL | Age: 78
Discharge: HOME | End: 2018-04-03
Attending: NEUROLOGICAL SURGERY
Payer: COMMERCIAL

## 2018-04-03 DIAGNOSIS — R06.00 DYSPNEA ON EXERTION: ICD-10-CM

## 2018-04-03 DIAGNOSIS — D42.9 NEOPLASM OF UNCERTAIN BEHAVIOR OF MENINGES (HCC): ICD-10-CM

## 2018-04-03 PROCEDURE — A9585 GADOBUTROL INJECTION: HCPCS | Performed by: NEUROLOGICAL SURGERY

## 2018-04-03 PROCEDURE — 70553 MRI BRAIN STEM W/O & W/DYE: CPT

## 2018-04-03 RX ORDER — ALBUTEROL SULFATE 1.25 MG/3ML
1 SOLUTION RESPIRATORY (INHALATION) 3 TIMES DAILY PRN
Qty: 60 VIAL | Refills: 0 | Status: SHIPPED | OUTPATIENT
Start: 2018-04-03 | End: 2018-04-20 | Stop reason: SDUPTHER

## 2018-04-03 RX ADMIN — GADOBUTROL 10 ML: 604.72 INJECTION INTRAVENOUS at 14:25

## 2018-04-04 ENCOUNTER — TELEPHONE (OUTPATIENT)
Dept: NEUROSURGERY | Facility: CLINIC | Age: 78
End: 2018-04-04

## 2018-04-04 NOTE — TELEPHONE ENCOUNTER
Margie  staff, called reporting radiology called with results from the MRI brain on 4/3/18  The results include: There are 2 extra-axial masses identified compatible with meningioma which are similar in size to the prior study  The left prior occipital junction meningioma has adjacent marrow enhancement which has progressed from the prior study suggesting some degree of osseous invasion  There is no parenchymal edema noted      Patient has a [address] [facility] appointment on 4/6/18 with Dr Chris Faustin and Seven Pena PA-C

## 2018-04-06 ENCOUNTER — OFFICE VISIT (OUTPATIENT)
Dept: NEUROSURGERY | Facility: CLINIC | Age: 78
End: 2018-04-06
Payer: COMMERCIAL

## 2018-04-06 VITALS
RESPIRATION RATE: 22 BRPM | BODY MASS INDEX: 40.12 KG/M2 | HEART RATE: 75 BPM | SYSTOLIC BLOOD PRESSURE: 123 MMHG | TEMPERATURE: 98.6 F | DIASTOLIC BLOOD PRESSURE: 56 MMHG | WEIGHT: 218 LBS | HEIGHT: 62 IN

## 2018-04-06 DIAGNOSIS — D32.9 MENINGIOMA (HCC): ICD-10-CM

## 2018-04-06 DIAGNOSIS — R93.0 ABNORMAL MRI OF HEAD: Primary | ICD-10-CM

## 2018-04-06 DIAGNOSIS — D33.0 BENIGN NEOPLASM OF SUPRATENTORIAL REGION OF BRAIN (HCC): ICD-10-CM

## 2018-04-06 PROCEDURE — 99214 OFFICE O/P EST MOD 30 MIN: CPT | Performed by: NEUROLOGICAL SURGERY

## 2018-04-06 NOTE — PATIENT INSTRUCTIONS
Continue all usual activities without restriction  Further follow-up with Neurosurgery will be on an as needed basis  Return to Neurosurgery should there be any new neurologic symptoms or other changes

## 2018-04-06 NOTE — PROGRESS NOTES
Assessment/Plan:    Very pleasant 65-year-old female, accompanied by her daughter, returns for six-month follow-up, to review MRI of the brain 4/3/18  The study was carefully reviewed in detail by Dr Karson Bhardwaj, and compared with prior study of 10/11/17  The prior identified left lateral aspect of the middle cranial fossa mass as well as the left parietal mass remain unchanged in size, they are partially calcified  Clinically the patient remains asymptomatic of these lesions  Without focal neurologic deficit on examination  The patient does have a history of occipital headaches which were felt to be unrelated to these masses  Dr Karson Bhardwaj reviewed with the patient and her daughter options for management/surveillance, via continued serial MRI verses return as needed  Further follow-up with Neurosurgery given the patient's age and the apparent stability of these lesions will be on an as needed basis  The patient and her daughter do understand should she have any new symptoms, any changes in neurologic function, she is to return for reassessment and Re imaging based on the findings  These findings, impressions and recommendations are reviewed in great detail with the patient and her daughter, they expressed understanding and agreement, their questions were answered completely and to their satisfaction  Diagnoses and all orders for this visit:    Abnormal MRI of head    Benign neoplasm of supratentorial region of brain ([address] )    Meningioma (HCC)          Subjective:      Patient ID: Lowell Bustamante is a 77 y o  female  Very pleasant 65-year-old female, accompanied by her daughter, returns for six-month follow-up, history of meningiomas x2  The patient has had updated MRI of the brain as requested 4/3/18      The patient reports no new changes or symptom, she denies gait or balance disturbance, motor or sensory difficulties in the upper or lower extremities, bowel or bladder incontinence, difficulty with memory or mentation, difficulty with executive function  She does reported history of occipital headaches which were infrequent managed with Tylenol typically  She has a history of a fall in the summer of 2017, she was seen at 1 hospital and had imaging of her head, CT scan which revealed the meningiomas  She had subsequent MRI of the brain for further clarification/definition of these lesions  She repeats no other interval changes in her medical or surgical history        The following portions of the patient's history were reviewed and updated as appropriate: allergies, current medications, past family history, past medical history, past social history and past surgical history  Review of Systems   HENT: Positive for ear pain (left ear pain due from headaches)  Negative for congestion, dental problem, drooling, ear discharge, facial swelling, hearing loss, mouth sores, nosebleeds, postnasal drip, rhinorrhea, sinus pain, sinus pressure, sneezing, sore throat, tinnitus, trouble swallowing and voice change  Eyes: Positive for photophobia and visual disturbance (blurry/double)  Negative for pain, discharge, redness and itching  Respiratory: Positive for shortness of breath and wheezing  Negative for apnea, cough, choking, chest tightness and stridor  Cardiovascular: Negative  Gastrointestinal: Positive for abdominal pain (has IBS)  Negative for abdominal distention, anal bleeding, blood in stool, constipation, diarrhea, nausea, rectal pain and vomiting  Endocrine: Negative  Genitourinary: Negative  Musculoskeletal: Positive for arthralgias, back pain, gait problem, joint swelling, myalgias, neck pain and neck stiffness  Skin: Negative  Allergic/Immunologic: Negative  Neurological: Positive for dizziness (when she gets up), weakness (legs and fingers/hands), light-headedness and headaches (constant headaches)   Negative for tremors, seizures, syncope, facial asymmetry, speech difficulty and numbness  Hematological: Negative  Psychiatric/Behavioral: Negative  Objective:    Physical Exam   Constitutional: She is oriented to person, place, and time  She appears well-developed  HENT:   Head: Normocephalic and atraumatic  Cardiovascular: Normal rate, regular rhythm and normal heart sounds  Pulmonary/Chest: Effort normal and breath sounds normal    Neurological: She is alert and oriented to person, place, and time  She has a normal Finger-Nose-Finger Test    Skin: Skin is dry  Psychiatric: She has a normal mood and affect  Neurologic Exam     Mental Status   Oriented to person, place, and time     Level of consciousness: alert    Motor Exam   Muscle bulk: normal  Right arm pronator drift: absent  Left arm pronator drift: absent    Sensory Exam   Light touch normal      Gait, Coordination, and Reflexes     Gait  Gait: shuffling (Utilizes a 4 point walker to assist with gait balance)    Coordination   Finger to nose coordination: normal

## 2018-04-06 NOTE — LETTER
April 6, 2018     Mena Silva MD  [address] [address]    Patient: Sarah Hou   YOB: 1940   Date of Visit: 4/6/2018       Dear Dr Frankie Sampson: Thank you for referring Sarah Hou to me for evaluation  Below are my notes for this consultation  If you have questions, please do not hesitate to call me  I look forward to following your patient along with you  Sincerely,        Jeanette Gallego MD        CC: No Recipients  Lorena Peres PA-C  4/6/2018  2:31 PM  Sign at close encounter  Assessment/Plan:    Very pleasant 50-year-old female, accompanied by her daughter, returns for six-month follow-up, to review MRI of the brain 4/3/18  The study was carefully reviewed in detail by Dr Luis Manuel Hernández, and compared with prior study of 10/11/17  The prior identified left lateral aspect of the middle cranial fossa mass as well as the left parietal mass remain unchanged in size, they are partially calcified  Clinically the patient remains asymptomatic of these lesions  Without focal neurologic deficit on examination  The patient does have a history of occipital headaches which were felt to be unrelated to these masses  Dr Luis Manuel Heránndez reviewed with the patient and her daughter options for management/surveillance, via continued serial MRI verses return as needed  Further follow-up with Neurosurgery given the patient's age and the apparent stability of these lesions will be on an as needed basis  The patient and her daughter do understand should she have any new symptoms, any changes in neurologic function, she is to return for reassessment and Re imaging based on the findings  These findings, impressions and recommendations are reviewed in great detail with the patient and her daughter, they expressed understanding and agreement, their questions were answered completely and to their satisfaction          Diagnoses and all orders for this visit:    Abnormal MRI of head    Benign neoplasm of supratentorial region of brain ([facility] [address] )    Meningioma (HCC)          Subjective:      Patient ID: Benjamin Da Silva is a 77 y o  female  Very pleasant 79-year-old female, accompanied by her daughter, returns for six-month follow-up, history of meningiomas x2  The patient has had updated MRI of the brain as requested 4/3/18  The patient reports no new changes or symptom, she denies gait or balance disturbance, motor or sensory difficulties in the upper or lower extremities, bowel or bladder incontinence, difficulty with memory or mentation, difficulty with executive function  She does reported history of occipital headaches which were infrequent managed with Tylenol typically  She has a history of a fall in the summer of 2017, she was seen at 1 hospital and had imaging of her head, CT scan which revealed the meningiomas  She had subsequent MRI of the brain for further clarification/definition of these lesions  She repeats no other interval changes in her medical or surgical history        The following portions of the patient's history were reviewed and updated as appropriate: allergies, current medications, past family history, past medical history, past social history and past surgical history  Review of Systems   HENT: Positive for ear pain (left ear pain due from headaches)  Negative for congestion, dental problem, drooling, ear discharge, facial swelling, hearing loss, mouth sores, nosebleeds, postnasal drip, rhinorrhea, sinus pain, sinus pressure, sneezing, sore throat, tinnitus, trouble swallowing and voice change  Eyes: Positive for photophobia and visual disturbance (blurry/double)  Negative for pain, discharge, redness and itching  Respiratory: Positive for shortness of breath and wheezing  Negative for apnea, cough, choking, chest tightness and stridor  Cardiovascular: Negative  Gastrointestinal: Positive for abdominal pain (has IBS)   Negative for abdominal distention, anal bleeding, blood in stool, constipation, diarrhea, nausea, rectal pain and vomiting  Endocrine: Negative  Genitourinary: Negative  Musculoskeletal: Positive for arthralgias, back pain, gait problem, joint swelling, myalgias, neck pain and neck stiffness  Skin: Negative  Allergic/Immunologic: Negative  Neurological: Positive for dizziness (when she gets up), weakness (legs and fingers/hands), light-headedness and headaches (constant headaches)  Negative for tremors, seizures, syncope, facial asymmetry, speech difficulty and numbness  Hematological: Negative  Psychiatric/Behavioral: Negative  Objective:    Physical Exam   Constitutional: She is oriented to person, place, and time  She appears well-developed  HENT:   Head: Normocephalic and atraumatic  Cardiovascular: Normal rate, regular rhythm and normal heart sounds  Pulmonary/Chest: Effort normal and breath sounds normal    Neurological: She is alert and oriented to person, place, and time  She has a normal Finger-Nose-Finger Test    Skin: Skin is dry  Psychiatric: She has a normal mood and affect  Neurologic Exam     Mental Status   Oriented to person, place, and time     Level of consciousness: alert    Motor Exam   Muscle bulk: normal  Right arm pronator drift: absent  Left arm pronator drift: absent    Sensory Exam   Light touch normal      Gait, Coordination, and Reflexes     Gait  Gait: shuffling (Utilizes a 4 point walker to assist with gait balance)    Coordination   Finger to nose coordination: normal

## 2018-04-09 ENCOUNTER — TELEPHONE (OUTPATIENT)
Dept: PAIN MEDICINE | Facility: CLINIC | Age: 78
End: 2018-04-09

## 2018-04-09 NOTE — TELEPHONE ENCOUNTER
S/p Right Sacroiliac Injection by AS on 3/30/18   No follow up scheduled     S/w pt and she states that she is doing very good  She had 100% relief no pain  She did mention that she is having burning in her back but not from the inj, she been had this feeling and wants to know who she can see about this?

## 2018-04-09 NOTE — TELEPHONE ENCOUNTER
S/w pt, she has not seen her PCP regarding the burning in her back, she is going to call them to make an appt regarding the matter

## 2018-04-12 DIAGNOSIS — I10 ESSENTIAL HYPERTENSION: Primary | ICD-10-CM

## 2018-04-12 RX ORDER — POTASSIUM CHLORIDE 750 MG/1
20 TABLET, EXTENDED RELEASE ORAL DAILY
Qty: 30 TABLET | Refills: 0
Start: 2018-04-12 | End: 2018-04-24

## 2018-04-20 ENCOUNTER — TELEPHONE (OUTPATIENT)
Dept: GASTROENTEROLOGY | Facility: CLINIC | Age: 78
End: 2018-04-20

## 2018-04-20 DIAGNOSIS — R06.00 DYSPNEA ON EXERTION: ICD-10-CM

## 2018-04-21 RX ORDER — ALBUTEROL SULFATE 1.25 MG/3ML
1 SOLUTION RESPIRATORY (INHALATION) 3 TIMES DAILY PRN
Qty: 60 VIAL | Refills: 0 | Status: SHIPPED | OUTPATIENT
Start: 2018-04-21 | End: 2019-11-26 | Stop reason: ALTCHOICE

## 2018-04-23 ENCOUNTER — TELEPHONE (OUTPATIENT)
Dept: FAMILY MEDICINE CLINIC | Facility: CLINIC | Age: 78
End: 2018-04-23

## 2018-04-24 ENCOUNTER — OFFICE VISIT (OUTPATIENT)
Dept: CARDIOLOGY CLINIC | Facility: CLINIC | Age: 78
End: 2018-04-24
Payer: COMMERCIAL

## 2018-04-24 ENCOUNTER — HOSPITAL ENCOUNTER (OUTPATIENT)
Dept: PULMONOLOGY | Facility: HOSPITAL | Age: 78
Discharge: HOME | End: 2018-04-24
Payer: COMMERCIAL

## 2018-04-24 ENCOUNTER — TRANSCRIBE ORDERS (OUTPATIENT)
Dept: ADMINISTRATIVE | Facility: HOSPITAL | Age: 78
End: 2018-04-24

## 2018-04-24 VITALS
WEIGHT: 223 LBS | OXYGEN SATURATION: 91 % | BODY MASS INDEX: 41.04 KG/M2 | SYSTOLIC BLOOD PRESSURE: 128 MMHG | HEART RATE: 82 BPM | DIASTOLIC BLOOD PRESSURE: 64 MMHG | HEIGHT: 62 IN

## 2018-04-24 DIAGNOSIS — G47.33 OBSTRUCTIVE SLEEP APNEA SYNDROME: Chronic | ICD-10-CM

## 2018-04-24 DIAGNOSIS — I87.2 CHRONIC VENOUS STASIS DERMATITIS OF BOTH LOWER EXTREMITIES: ICD-10-CM

## 2018-04-24 DIAGNOSIS — R06.02 SHORTNESS OF BREATH: ICD-10-CM

## 2018-04-24 DIAGNOSIS — E78.2 MIXED HYPERLIPIDEMIA: ICD-10-CM

## 2018-04-24 DIAGNOSIS — I50.33 ACUTE ON CHRONIC DIASTOLIC CHF (CONGESTIVE HEART FAILURE) (HCC): Primary | ICD-10-CM

## 2018-04-24 PROBLEM — R60.0 BILATERAL LOWER EXTREMITY EDEMA: Status: RESOLVED | Noted: 2018-02-22 | Resolved: 2018-04-24

## 2018-04-24 PROCEDURE — 94729 DIFFUSING CAPACITY: CPT

## 2018-04-24 PROCEDURE — 94726 PLETHYSMOGRAPHY LUNG VOLUMES: CPT | Performed by: INTERNAL MEDICINE

## 2018-04-24 PROCEDURE — 93000 ELECTROCARDIOGRAM COMPLETE: CPT | Performed by: INTERNAL MEDICINE

## 2018-04-24 PROCEDURE — 99214 OFFICE O/P EST MOD 30 MIN: CPT | Performed by: INTERNAL MEDICINE

## 2018-04-24 PROCEDURE — 94060 EVALUATION OF WHEEZING: CPT

## 2018-04-24 PROCEDURE — 94729 DIFFUSING CAPACITY: CPT | Performed by: INTERNAL MEDICINE

## 2018-04-24 PROCEDURE — 94060 EVALUATION OF WHEEZING: CPT | Performed by: INTERNAL MEDICINE

## 2018-04-24 PROCEDURE — 94760 N-INVAS EAR/PLS OXIMETRY 1: CPT

## 2018-04-24 PROCEDURE — 94726 PLETHYSMOGRAPHY LUNG VOLUMES: CPT

## 2018-04-24 RX ORDER — ALBUTEROL SULFATE 2.5 MG/3ML
2.5 SOLUTION RESPIRATORY (INHALATION) ONCE
Status: DISCONTINUED | OUTPATIENT
Start: 2018-04-24 | End: 2018-04-28 | Stop reason: HOSPADM

## 2018-04-24 RX ORDER — POTASSIUM CHLORIDE 750 MG/1
10 CAPSULE, EXTENDED RELEASE ORAL DAILY
COMMUNITY
End: 2018-05-23 | Stop reason: SDUPTHER

## 2018-04-24 NOTE — PROGRESS NOTES
Cardiology Follow Up  Nava Torres  1940  [id number]  [facility] [address] [facility] [address]    1  Acute on chronic diastolic CHF (congestive heart failure) ([facility] )     2  Chronic venous stasis dermatitis of both lower extremities  Comprehensive metabolic panel    Magnesium   3  Obstructive sleep apnea syndrome        Discussion/Plan:    Lymphedema/venous ulcer/bilateral lower ext edema- remains   -- compression sock  -- compression shoes  -- diuretic regimen  -- 2gm sodium diet discussed  Education given    Diastolic hf- moderate edema on exam  discussed etiology  multifactorial   - wt loss   - add metoprolol 25mg bid   - sodium control  tartget 2gm daily   - continue torsemide 20mg twice a day + metolazone three times a weeks   - elevation of legs   - discussed wt journal; daily weight    Dm2-tight control    Sleep apnea  -- not using cpap at night/oxygen    Physical deconditioning    Meningioma   - followed by neurosurgery      - wt loss     Interval History:  Has trouble with swelling in her legs  Her diet is not consistent  Discussed compliant  NO falls  NO bleeding    No fever or chills    Patient Active Problem List   Diagnosis    Acquired hypothyroidism    Lung nodules    Morbid obesity with BMI of 40 0-44 9, adult ([facility] )    Opioid dependence ([facility] )    Asthma    Constipation due to opioid therapy    Restless leg    Sleep apnea    Pancreatic cyst    Anxiety    Obesity hypoventilation syndrome ([facility] )    HTN (hypertension)    Primary osteoarthritis involving multiple joints    Balance problem    Diabetes mellitus with neuropathy ([facility] )    Diastolic dysfunction    Meningiomas, multiple (HCC)    Moderate episode of recurrent major depressive disorder ([facility] )    Pulmonary emphysema (HCC)    Chronic right-sided low back pain without sciatica    Chronic pain syndrome    Diastolic CHF (HCC)    Chronic venous stasis dermatitis of both lower extremities    Bilateral lower extremity edema    Physical deconditioning    Chronic respiratory failure (HCC)    Sacroiliitis (HCC)    Low back pain without sciatica     Past Medical History:   Diagnosis Date    Anxiety     Arthritis     Asthma     COPD (chronic obstructive pulmonary disease) ([facility] )     Diabetes mellitus (HCC)     Disease of thyroid gland     Hypertension     Mitral valve regurgitation     Myocardial infarction (HCC)     Obesity hypoventilation syndrome (HCC)     Pain     right hip    Restless leg syndrome     Sleep related hypoxia     Thyroid cancer ([facility] )      Social History     Social History    Marital status: Single     Spouse name: N/A    Number of children: N/A    Years of education: N/A     Occupational History    Not on file  Social History Main Topics    Smoking status: Never Smoker    Smokeless tobacco: Never Used    Alcohol use No    Drug use: No    Sexual activity: Not Currently     Partners: Male     Other Topics Concern    Not on file     Social History Narrative    Lives with significant others  Uses walker outside  Family History   Problem Relation Age of Onset    Cancer Father     Diabetes Sister     Substance Abuse Neg Hx     Mental illness Neg Hx      Past Surgical History:   Procedure Laterality Date    CHOLECYSTECTOMY      EYE SURGERY      TN ERCP DX COLLECTION SPECIMEN BRUSHING/WASHING N/A 5/30/2017    Procedure: ENDOSCOPIC RETROGRADE CHOLANGIOPANCREATOGRAPHY (ERCP); SPHINCTEROTOMY;  Surgeon: Abiodun Ward MD;  Location: [facility];   Service: Gastroenterology   Jamse Fusi JOINT Right 3/30/2018    Procedure: Right Sacroiliac Injection;  Surgeon: Jun Adkins MD;  Location: [facility] MAIN OR;  Service: Pain Management     REPLACEMENT TOTAL KNEE BILATERAL      SKIN BIOPSY      melanoma of back    STEROID INJECTION HIP Right 12/21/2017    Procedure: TROCHANTERIC BURSA INJECTION RIGHT;  Surgeon: Hayden Schneider MD;  Location: [facility] MAIN OR;  Service: Pain Management     THORACOTOMY Right     at least 40 years, for pericardial cyst    THYROIDECTOMY, PARTIAL      For thyroid cancer       Current Outpatient Prescriptions:     acetaminophen (TYLENOL) 325 mg tablet, Take 2 tablets (650 mg total) by mouth every 6 (six) hours as needed for mild pain, headaches or fever, Disp: 30 tablet, Rfl: 0    albuterol (ACCUNEB) 1 25 MG/3ML nebulizer solution, Take 3 mL (1 25 mg total) by nebulization 3 (three) times a day as needed for wheezing (J45 [id number]), Disp: 60 vial, Rfl: 0    ascorbic acid (VITAMIN C) 500 mg tablet, Take 500 mg by mouth daily, Disp: , Rfl:     bisacodyl (DULCOLAX) 5 mg EC tablet, Take 10 mg by mouth daily at bedtime, Disp: , Rfl:     COD LIVER OIL PO, Take by mouth daily, Disp: , Rfl:     dicyclomine (BENTYL) 10 mg capsule, Take 1 capsule by mouth 3 (three) times a day, Disp: , Rfl:     escitalopram (LEXAPRO) 10 mg tablet, Take 1 tablet (10 mg total) by mouth daily, Disp: 30 tablet, Rfl: 3    folic acid (FOLVITE) 1 mg tablet, Take by mouth daily, Disp: , Rfl:     gabapentin (NEURONTIN) 100 mg capsule, Take 1 capsule (100 mg total) by mouth daily, Disp: 5 capsule, Rfl: 0    insulin aspart protamine-insulin aspart (NovoLOG 70/30) 100 units/mL injection, Inject under the skin 2 (two) times a day before meals, Disp: , Rfl:     insulin lispro (HumaLOG) 100 units/mL injection, Inject 4-20 Units under the skin 3 (three) times a day before meals, Disp: , Rfl: 0    insulin lispro (HumaLOG) 100 units/mL injection, Inject 4-20 Units under the skin daily at bedtime, Disp: , Rfl: 0    lactulose 10 g/15 mL, Take 15 mL by mouth daily as needed  , Disp: , Rfl:     levothyroxine 125 mcg tablet, Take 1 tablet (125 mcg total) by mouth daily, Disp: 90 tablet, Rfl: 1    LORazepam (ATIVAN) 1 mg tablet, Take by mouth, Disp: , Rfl:     metolazone (ZAROXOLYN) 2 5 mg tablet, Take 1 tablet by mouth, Disp: , Rfl:     metoprolol tartrate (LOPRESSOR) 25 mg tablet, Take 1 tablet by mouth 2 (two) times a day, Disp: , Rfl:     Multiple Vitamins-Minerals (MULTIVITAMIN ADULT PO), Take 1 tablet by mouth daily, Disp: , Rfl:     NOVOLOG MIX 70/30 FLEXPEN (70-30) 100 UNIT/ML SUPN, 45 Units 2 (two) times a day before meals  , Disp: , Rfl:     polyethylene glycol (MIRALAX) 17 g packet, Take 17 g by mouth daily (Patient taking differently: Take 17 g by mouth 2 (two) times a day  ), Disp: 14 each, Rfl: 0    potassium chloride (MICRO-K) 10 MEQ CR capsule, Take 10 mEq by mouth daily, Disp: , Rfl:     pramipexole (MIRAPEX) 1 mg tablet, Take 1 tablet (1 mg total) by mouth 3 (three) times a day, Disp: 90 tablet, Rfl: 1    torsemide (DEMADEX) 20 mg tablet, Take 2 tablets (40 mg total) by mouth daily, Disp: 30 tablet, Rfl: 0    traMADol (ULTRAM) 50 mg tablet, Take 2 tablets (100 mg total) by mouth every 6 (six) hours as needed for moderate pain for up to 30 days, Disp: 120 tablet, Rfl: 1    Linaclotide (LINZESS) 72 MCG CAPS, Take 72 mcg by mouth daily before breakfast, Disp: 30 capsule, Rfl: 6    lisinopril (ZESTRIL) 10 mg tablet, Take 1 tablet by mouth daily, Disp: , Rfl:   No current facility-administered medications for this visit  Facility-Administered Medications Ordered in Other Visits:     albuterol inhalation solution 2 5 mg, 2 5 mg, Nebulization, Once, Edith Krishnamurthy CRNP  Allergies   Allergen Reactions    Ketorolac Swelling     Toradol    Latex Rash       Review of Systems:  Review of Systems   Constitutional: Negative  Negative for activity change, appetite change, chills, diaphoresis, fatigue, fever and unexpected weight change  HENT: Negative    Negative for congestion, dental problem, drooling, ear discharge, ear pain, facial swelling, hearing loss, mouth sores, nosebleeds, postnasal drip, rhinorrhea, sinus pain, sinus pressure, sneezing, sore throat, tinnitus, trouble swallowing and voice change  Eyes: Negative  Negative for photophobia, pain, redness, itching and visual disturbance  Respiratory: Positive for shortness of breath  Negative for apnea, cough, choking, chest tightness, wheezing and stridor  Cardiovascular: Positive for leg swelling  Negative for chest pain and palpitations  Gastrointestinal: Negative  Negative for abdominal distention, abdominal pain, anal bleeding, blood in stool, constipation, diarrhea, nausea, rectal pain and vomiting  Endocrine: Negative  Negative for cold intolerance, heat intolerance, polydipsia, polyphagia and polyuria  Genitourinary: Negative  Negative for decreased urine volume, difficulty urinating, dyspareunia, dysuria, enuresis, flank pain, frequency, genital sores, hematuria, menstrual problem, pelvic pain, urgency, vaginal bleeding, vaginal discharge and vaginal pain  Musculoskeletal: Negative  Negative for arthralgias, back pain, gait problem, joint swelling, myalgias, neck pain and neck stiffness  Skin: Negative  Negative for color change, pallor, rash and wound  Allergic/Immunologic: Negative  Negative for environmental allergies, food allergies and immunocompromised state  Neurological: Negative  Negative for dizziness, tremors, seizures, syncope, facial asymmetry, speech difficulty, weakness, light-headedness, numbness and headaches  Hematological: Negative  Negative for adenopathy  Does not bruise/bleed easily  Psychiatric/Behavioral: Negative  Negative for agitation, behavioral problems, confusion, decreased concentration, dysphoric mood, hallucinations, self-injury, sleep disturbance and suicidal ideas  The patient is not nervous/anxious and is not hyperactive  All other systems reviewed and are negative        Vitals:    04/24/18 1355 04/24/18 1357 04/24/18 1359   BP: 120/66 118/60 128/64   BP Location: Right arm Right arm Right arm   Patient Position: Sitting Standing Standing   Cuff Size: Large Large Large   Pulse: 74 78 82   SpO2: 91%     Weight: 101 kg (223 lb)     Height: 5' 2" (1 575 m)       Physical Exam:  Physical Exam   Constitutional: She is oriented to person, place, and time  No distress  HENT:   Head: Normocephalic and atraumatic  Right Ear: External ear normal    Left Ear: External ear normal    Eyes: Conjunctivae are normal  Pupils are equal, round, and reactive to light  Right eye exhibits no discharge  Left eye exhibits no discharge  No scleral icterus  Neck: Normal range of motion  Neck supple  No JVD present  No tracheal deviation present  No thyromegaly present  Cardiovascular: Normal rate and regular rhythm  Exam reveals gallop  Exam reveals no friction rub  Murmur heard  Pulmonary/Chest: Effort normal and breath sounds normal  No stridor  No respiratory distress  She has no wheezes  She has no rales  She exhibits no tenderness  Abdominal: Soft  Bowel sounds are normal  She exhibits no distension and no mass  There is no tenderness  There is no rebound and no guarding  Musculoskeletal: Normal range of motion  She exhibits no edema, tenderness or deformity  Neurological: She is alert and oriented to person, place, and time  She has normal reflexes  No cranial nerve deficit  She exhibits normal muscle tone  Coordination normal    Skin: Skin is warm and dry  No rash noted  She is not diaphoretic  No erythema  No pallor  Psychiatric: She has a normal mood and affect   Her behavior is normal  Judgment and thought content normal        Labs:     Lab Results   Component Value Date    WBC 11 10 (H) 02/26/2018    HGB 11 7 (L) 02/26/2018    HCT 36 1 (L) 02/26/2018    MCV 95 02/26/2018     02/26/2018     Lab Results   Component Value Date     02/26/2018    K 4 2 02/26/2018    CL 94 (L) 02/26/2018    CO2 39 (H) 02/26/2018    ANIONGAP 8 02/26/2018    BUN 58 (H) 02/26/2018    CREATININE 1 07 02/26/2018    GLUCOSE 260 (H) 02/26/2018    GLUF 271 (H) 04/16/2017 CALCIUM 8 8 2018    AST 20 2018    ALT 45 2018    ALKPHOS 91 2018    PROT 6 9 2018    BILITOT 0 30 2018    EGFR 49 2018     Lab Results   Component Value Date    CHOL 195 10/11/2017    CHOL 154 2016    CHOL 173 2016     Lab Results   Component Value Date    HDL 47 10/11/2017    HDL 48 2016    HDL 45 2016     Lab Results   Component Value Date    LDLCALC 91 10/11/2017    LDLCALC 87 2016    LDLCALC 96 2016     Lab Results   Component Value Date    TRIG 286 (H) 10/11/2017    TRIG 95 2016    TRIG 158 (H) 2016     Lab Results   Component Value Date    HGBA1C 9 2 2017       Imaging & Testing   I have personally reviewed pertinent reports  EKG: Personally reviewed  Normal sinus rhythm rbbb    Cardiac testing:   Results for orders placed during the hospital encounter of 10/08/17   Echo complete with contrast if indicated    Narrative Karri 39  [address] [facility] [address]  [phone]    Transthoracic Echocardiogram  2D, M-mode, Doppler, and Color Doppler    Study date:  11-Oct-2017    Patient: Jo Brito  MR number: [id number]  Account number: [de-identified]  : 1940  Age: 65 years  Gender: Female  Status: Routine  Location: Bedside  Height: 62 in  Weight: 226 6 lb  BP: 134/ 86 mmHg    Indications: CVA    Diagnoses: I67 9 - Cerebrovascular disease, unspecified    Sonographer:  Dashawn Hyde  Primary Physician:  Ruddy Sewell MD  Group:  [facility]  Interpreting Physician:  Hmoe Flowers MD    SUMMARY    PROCEDURE INFORMATION:  This was a technically difficult study  LEFT VENTRICLE:  Systolic function was normal  Ejection fraction was estimated in the range of 60 % to 65 % to be 60 %  Although no diagnostic regional wall motion abnormality was identified, this possibility cannot be completely excluded on the basis of this study    Wall thickness was mildly increased  There was mild concentric hypertrophy  Doppler parameters were consistent with abnormal left ventricular relaxation (grade 1 diastolic dysfunction)  ATRIAL SEPTUM:  No ovious defect or patent foramen ovale was identified by echo contrast, but limited study due to body habitus  MITRAL VALVE:  There was mild regurgitation  AORTIC VALVE:  There was trace regurgitation  TRICUSPID VALVE:  There was mild regurgitation  Estimated peak PA pressure was 35 mmHg  HISTORY: PRIOR HISTORY: HTN, DM, Asthma, Obesity, Hypoxia, Thyroid Cancer    PROCEDURE: The procedure was performed at the bedside  This was a routine study  The transthoracic approach was used  The study included complete 2D imaging, M-mode, complete spectral Doppler, and color Doppler  The heart rate was 82 bpm,  at the start of the study  This was a technically difficult study  LEFT VENTRICLE: Size was normal  Systolic function was normal  Ejection fraction was estimated in the range of 60 % to 65 % to be 60 %  Although no diagnostic regional wall motion abnormality was identified, this possibility cannot be  completely excluded on the basis of this study  Wall thickness was mildly increased  There was mild concentric hypertrophy  DOPPLER: Doppler parameters were consistent with abnormal left ventricular relaxation (grade 1 diastolic  dysfunction)  RIGHT VENTRICLE: The size was normal  Systolic function was normal     LEFT ATRIUM: Size was normal     ATRIAL SEPTUM: No ovious defect or patent foramen ovale was identified by echo contrast, but limited study due to body habitus  RIGHT ATRIUM: Size was normal     MITRAL VALVE: There was normal leaflet separation  DOPPLER: The transmitral velocity was within the normal range  There was no evidence for stenosis  There was mild regurgitation  AORTIC VALVE: The valve was trileaflet  Leaflets exhibited mildly increased thickness and normal cuspal separation   DOPPLER: Transaortic velocity was within the normal range  There was no evidence for stenosis  There was trace  regurgitation  TRICUSPID VALVE: DOPPLER: There was mild regurgitation  Estimated peak PA pressure was 35 mmHg  PULMONIC VALVE: DOPPLER: There was trace regurgitation  PERICARDIUM: There was no thickening or calcification  There was no pericardial effusion  AORTA: The root exhibited normal size  SYSTEMIC VEINS: IVC: Respirophasic changes were normal  HEPATIC VEINS: The hepatic veins were not well visualized  SYSTEM MEASUREMENT TABLES    2D mode  AoR Diam 2D: 3 6 cm  LA Diam (2D): 3 6 cm  LA/Ao (2D): 1  FS (2D Teich): 30 8 %  IVSd (2D): 1 27 cm  LVDEV: 57 cm³  LVESV: 23 2 cm³  LVIDd(2D): 3 67 cm  LVISd (2D): 2 54 cm  LVPWd (2D): 1 26 cm  SV (Teich): 33 8 cm³    Apical four chamber  LVEF A4C: 63 %    Unspecified Scan Mode  MV Peak A Frank: 914 mm/s  MV Peak E Frank  Mean: 563 mm/s  MVA (PHT): 3 24 cm squared  PHT: 68 ms  Max P mm[Hg]  V Max: 2590 mm/s  Vmax: 2620 mm/s  RA Area: 12 5 cm squared  RA Volume: 24 cm³  TAPSE: 1 9 cm    [employer] Accredited [facility]    Prepared and electronically signed by    Chaim Barber MD  Signed 11-Oct-2017 14:22:19       No results found for this or any previous visit  Meron Garcia  Please call with any questions or suggestions    A description of the counseling:   Goals and Barriers:  Patient's ability to self care:  Medication side effect reviewed with patient in detail and all their questions answered  "This note has been constructed using a voice recognition system  Therefore there may be syntax, spelling, and/or grammatical errors   Please call if you have any questions  "

## 2018-04-25 ENCOUNTER — OFFICE VISIT (OUTPATIENT)
Dept: GASTROENTEROLOGY | Facility: CLINIC | Age: 78
End: 2018-04-25
Payer: COMMERCIAL

## 2018-04-25 VITALS
DIASTOLIC BLOOD PRESSURE: 68 MMHG | TEMPERATURE: 98.3 F | WEIGHT: 223 LBS | HEART RATE: 84 BPM | BODY MASS INDEX: 41.04 KG/M2 | SYSTOLIC BLOOD PRESSURE: 126 MMHG | HEIGHT: 62 IN

## 2018-04-25 DIAGNOSIS — K83.8 DILATION OF BILIARY TRACT: ICD-10-CM

## 2018-04-25 DIAGNOSIS — K59.09 CHRONIC CONSTIPATION: Primary | ICD-10-CM

## 2018-04-25 PROCEDURE — 99213 OFFICE O/P EST LOW 20 MIN: CPT | Performed by: INTERNAL MEDICINE

## 2018-04-25 NOTE — PROGRESS NOTES
[facility]  Jaime Akhtar 77 y o  female MRN: [id number]        Assessment & Plan:    Pleasant 76-year-old female well known to us with a history of chronic constipation, previously on chronic narcotics we had initially seen her for chronic constipation and abdominal pain  She was also found to have intrahepatic biliary dilatation of unclear etiology with normal LFTs  She continues to have chronic abdominal pain, however her bowel movements are more regular than the previously were  Her abdominal exam is actually softer than had been in the past in her pain does seem to be somewhat improved  He is unclear some of her pain is related to her back versus her abdomen  1   Abnormal CT scan with biliary dilatation colon of unclear etiology, suspect this is benign pathology  We had attempted biopsy which led to cholangitis  Her biliary dilatation had been stable  She had not tolerated MRIs in the past   -will repeat CT scan in the next 2 months  -if the patient is indicated to have an MRI of her back in the near future then I would recommend scheduling an MRI of her abdomen and liver at the same time, we can schedule this at [place] with general anesthesia in order to get improved picture of her liver    2  Chronic constipation:  Somewhat improved on 145 mcg of Linzess daily and MiraLax  - I gave the patient a sample of Linzess 290 MCG to see if the higher dose will be more effective in alleviating the constipation  She will follow up in 2 months      _____________________________________________________________    CC: abdominal pain    HPI:  Jaime Akhtar is a 77 y  o female who is here to follow up regarding her abdominal pain and constipation  As you know this is a 76-year-old female with a history of COPD, recurrent admissions for bronchitis, pneumonia, also has a history of abnormal CT scan with intrahepatic biliary dilatation, chronic constipation       She is still experiencing abdominal pain that radiates to her back  This pain is significantly affecting her quality of life  The constipation is still present, she takes linzess and Miralax which help her have 2 bowel movements a day but she is unable to empty completely  She has been maintaining a low-salt diet, and is taking her medications as prescribed  ROS:  The remainder of the ROS was negative except for the pertinent positives mentioned in HPI        Allergies: Ketorolac and Latex    Medications:   Current Outpatient Prescriptions:     acetaminophen (TYLENOL) 325 mg tablet, Take 2 tablets (650 mg total) by mouth every 6 (six) hours as needed for mild pain, headaches or fever, Disp: 30 tablet, Rfl: 0    albuterol (ACCUNEB) 1 25 MG/3ML nebulizer solution, Take 3 mL (1 25 mg total) by nebulization 3 (three) times a day as needed for wheezing (J45 909), Disp: 60 vial, Rfl: 0    ascorbic acid (VITAMIN C) 500 mg tablet, Take 500 mg by mouth daily, Disp: , Rfl:     bisacodyl (DULCOLAX) 5 mg EC tablet, Take 10 mg by mouth daily at bedtime, Disp: , Rfl:     COD LIVER OIL PO, Take by mouth daily, Disp: , Rfl:     dicyclomine (BENTYL) 10 mg capsule, Take 1 capsule by mouth 3 (three) times a day, Disp: , Rfl:     escitalopram (LEXAPRO) 10 mg tablet, Take 1 tablet (10 mg total) by mouth daily, Disp: 30 tablet, Rfl: 3    folic acid (FOLVITE) 1 mg tablet, Take by mouth daily, Disp: , Rfl:     gabapentin (NEURONTIN) 100 mg capsule, Take 1 capsule (100 mg total) by mouth daily, Disp: 5 capsule, Rfl: 0    insulin aspart protamine-insulin aspart (NovoLOG 70/30) 100 units/mL injection, Inject under the skin 2 (two) times a day before meals, Disp: , Rfl:     insulin lispro (HumaLOG) 100 units/mL injection, Inject 4-20 Units under the skin 3 (three) times a day before meals, Disp: , Rfl: 0    insulin lispro (HumaLOG) 100 units/mL injection, Inject 4-20 Units under the skin daily at bedtime, Disp: , Rfl: 0    lactulose 10 g/15 mL, Take 15 mL by mouth daily as needed  , Disp: , Rfl:     levothyroxine 125 mcg tablet, Take 1 tablet (125 mcg total) by mouth daily, Disp: 90 tablet, Rfl: 1    Linaclotide (LINZESS) 72 MCG CAPS, Take 72 mcg by mouth daily before breakfast, Disp: 30 capsule, Rfl: 6    lisinopril (ZESTRIL) 10 mg tablet, Take 1 tablet by mouth daily, Disp: , Rfl:     LORazepam (ATIVAN) 1 mg tablet, Take by mouth, Disp: , Rfl:     metolazone (ZAROXOLYN) 2 5 mg tablet, Take 1 tablet by mouth, Disp: , Rfl:     metoprolol tartrate (LOPRESSOR) 25 mg tablet, Take 1 tablet by mouth 2 (two) times a day, Disp: , Rfl:     Multiple Vitamins-Minerals (MULTIVITAMIN ADULT PO), Take 1 tablet by mouth daily, Disp: , Rfl:     NOVOLOG MIX 70/30 FLEXPEN (70-30) 100 UNIT/ML SUPN, 45 Units 2 (two) times a day before meals  , Disp: , Rfl:     polyethylene glycol (MIRALAX) 17 g packet, Take 17 g by mouth daily (Patient taking differently: Take 17 g by mouth 2 (two) times a day  ), Disp: 14 each, Rfl: 0    potassium chloride (MICRO-K) 10 MEQ CR capsule, Take 10 mEq by mouth daily, Disp: , Rfl:     pramipexole (MIRAPEX) 1 mg tablet, Take 1 tablet (1 mg total) by mouth 3 (three) times a day, Disp: 90 tablet, Rfl: 1    torsemide (DEMADEX) 20 mg tablet, Take 2 tablets (40 mg total) by mouth daily, Disp: 30 tablet, Rfl: 0    traMADol (ULTRAM) 50 mg tablet, Take 2 tablets (100 mg total) by mouth every 6 (six) hours as needed for moderate pain for up to 30 days, Disp: 120 tablet, Rfl: 1  No current facility-administered medications for this visit       Facility-Administered Medications Ordered in Other Visits:     albuterol inhalation solution 2 5 mg, 2 5 mg, Nebulization, Once, Mcdaniel Route    Past Medical History:   Diagnosis Date    Anxiety     Arthritis     Asthma     [facility]     [facility]     Disease of thyroid gland     Hypertension     Mitral valve regurgitation     Myocardial infarction ([facility]     Obesity hypoventilation syndrome (HCC)     Pain     right hip    Restless leg syndrome     Sleep related hypoxia     Thyroid cancer [facility])        Past Surgical History:   Procedure Laterality Date    CHOLECYSTECTOMY      EYE SURGERY      [place] ERCP DX COLLECTION SPECIMEN BRUSHING/WASHING N/A 5/30/2017    Procedure: ENDOSCOPIC RETROGRADE CHOLANGIOPANCREATOGRAPHY (ERCP); SPHINCTEROTOMY;  Surgeon: Yi Barrios MD;  Location:  GI LAB; Service: Gastroenterology   Alvie Edu JOINT Right 3/30/2018    Procedure: Right Sacroiliac Injection;  Surgeon: Octavio Johnson MD;  Location: [facility] MAIN OR;  Service: Pain Management     REPLACEMENT TOTAL KNEE BILATERAL      SKIN BIOPSY      melanoma of back    STEROID INJECTION HIP Right 12/21/2017    Procedure: TROCHANTERIC BURSA INJECTION RIGHT;  Surgeon: Octavio Johnson MD;  Location: [facility] MAIN OR;  Service: Pain Management     THORACOTOMY Right     at least 40 years, for pericardial cyst    THYROIDECTOMY, PARTIAL      For thyroid cancer       Family History   Problem Relation Age of Onset    Cancer Father     Diabetes Sister     Substance Abuse Neg Hx     Mental illness Neg Hx         reports that she has never smoked  She has never used smokeless tobacco  She reports that she does not drink alcohol or use drugs        Physical Exam:    /68 (BP Location: Left arm, Patient Position: Sitting, Cuff Size: Standard)   Pulse 84   Temp 98 3 °F (36 8 °C)   Ht 5' 2" (1 575 m)   Wt 101 kg (223 lb)   LMP  (LMP Unknown)   BMI 40 79 kg/m²     Gen: wn/wd, NAD, obese, ambulates with a walker   HEENT: anicteric, MMM, no cervical LAD  CVS: RRR, no m/r/g  CHEST: CTA b/l  ABD: +BS, soft, NT,ND, no hepatosplenomegaly  EXT:   2+ bilateral lower extremit edema, right lower extremity greater than left  NEURO: aaox3  SKIN: NO rashes    Attestation:   By signing my name below, I, Leo Sanchez, attest that this documentation has been prepared under the direction and in the presence of Lani Schroeder MD  Electronically Signed: Pancho Kramer  04/25/2018  I, Lani Schroeder, personally performed the services described in this documentation  All medical record entries made by the scribe were at my direction and in my presence  I have reviewed the chart and discharge instructions and agree that the record reflects my personal performance and is accurate and complete  Lani Schroeder MD  04/25/2018

## 2018-04-25 NOTE — LETTER
April 25, 2018     Ruddy Sewell MD  [address] [address]    Patient: Saritha Rodriguez   YOB: 1940   Date of Visit: 4/25/2018       Dear Dr Adelita Hernandez: Thank you for referring Saritha Rodriguez to me for evaluation  Below are my notes for this consultation  If you have questions, please do not hesitate to call me  I look forward to following your patient along with you  Sincerely,        Lara Mann MD        CC: No Recipients  Lara Mann MD  4/25/2018  3:03 PM  Sign at close encounter  [facility]  Saritha Rodriguez 77 y o  female MRN: [id number]        Assessment & Plan:    Pleasant 76-year-old female well known to us with a history of chronic constipation, previously on chronic narcotics we had initially seen her for chronic constipation and abdominal pain  She was also found to have intrahepatic biliary dilatation of unclear etiology with normal LFTs  She continues to have chronic abdominal pain, however her bowel movements are more regular than the previously were  Her abdominal exam is actually softer than had been in the past in her pain does seem to be somewhat improved  He is unclear some of her pain is related to her back versus her abdomen  1   Abnormal CT scan with biliary dilatation colon of unclear etiology, suspect this is benign pathology  We had attempted biopsy which led to cholangitis  Her biliary dilatation had been stable  She had not tolerated MRIs in the past   -will repeat CT scan in the next 2 months  -if the patient is indicated to have an MRI of her back in the near future then I would recommend scheduling an MRI of her abdomen and liver at the same time, we can schedule this at [place] with general anesthesia in order to get improved picture of her liver    2    Chronic constipation:  Somewhat improved on 145 mcg of Linzess daily and MiraLax  - I gave the patient a sample of Linzess 290 MCG to see if the higher dose will be more effective in alleviating the constipation  She will follow up in 2 months      _____________________________________________________________    CC: abdominal pain    HPI:  Yandy High is a 77 y  o female who is here to follow up regarding her abdominal pain and constipation  As you know this is a 59-year-old female with a history of COPD, recurrent admissions for bronchitis, pneumonia, also has a history of abnormal CT scan with intrahepatic biliary dilatation, chronic constipation  She is still experiencing abdominal pain that radiates to her back  This pain is significantly affecting her quality of life  The constipation is still present, she takes linzess and Miralax which help her have 2 bowel movements a day but she is unable to empty completely  She has been maintaining a low-salt diet, and is taking her medications as prescribed  ROS:  The remainder of the ROS was negative except for the pertinent positives mentioned in HPI        Allergies: Ketorolac and Latex    Medications:   Current Outpatient Prescriptions:     acetaminophen (TYLENOL) 325 mg tablet, Take 2 tablets (650 mg total) by mouth every 6 (six) hours as needed for mild pain, headaches or fever, Disp: 30 tablet, Rfl: 0    albuterol (ACCUNEB) 1 25 MG/3ML nebulizer solution, Take 3 mL (1 25 mg total) by nebulization 3 (three) times a day as needed for wheezing (J45 909), Disp: 60 vial, Rfl: 0    ascorbic acid (VITAMIN C) 500 mg tablet, Take 500 mg by mouth daily, Disp: , Rfl:     bisacodyl (DULCOLAX) 5 mg EC tablet, Take 10 mg by mouth daily at bedtime, Disp: , Rfl:     COD LIVER OIL PO, Take by mouth daily, Disp: , Rfl:     dicyclomine (BENTYL) 10 mg capsule, Take 1 capsule by mouth 3 (three) times a day, Disp: , Rfl:     escitalopram (LEXAPRO) 10 mg tablet, Take 1 tablet (10 mg total) by mouth daily, Disp: 30 tablet, Rfl: 3    folic acid (FOLVITE) 1 mg tablet, Take by mouth daily, Disp: , Rfl:     gabapentin (NEURONTIN) 100 mg capsule, Take 1 capsule (100 mg total) by mouth daily, Disp: 5 capsule, Rfl: 0    insulin aspart protamine-insulin aspart (NovoLOG 70/30) 100 units/mL injection, Inject under the skin 2 (two) times a day before meals, Disp: , Rfl:     insulin lispro (HumaLOG) 100 units/mL injection, Inject 4-20 Units under the skin 3 (three) times a day before meals, Disp: , Rfl: 0    insulin lispro (HumaLOG) 100 units/mL injection, Inject 4-20 Units under the skin daily at bedtime, Disp: , Rfl: 0    lactulose 10 g/15 mL, Take 15 mL by mouth daily as needed  , Disp: , Rfl:     levothyroxine 125 mcg tablet, Take 1 tablet (125 mcg total) by mouth daily, Disp: 90 tablet, Rfl: 1    Linaclotide (LINZESS) 72 MCG CAPS, Take 72 mcg by mouth daily before breakfast, Disp: 30 capsule, Rfl: 6    lisinopril (ZESTRIL) 10 mg tablet, Take 1 tablet by mouth daily, Disp: , Rfl:     LORazepam (ATIVAN) 1 mg tablet, Take by mouth, Disp: , Rfl:     metolazone (ZAROXOLYN) 2 5 mg tablet, Take 1 tablet by mouth, Disp: , Rfl:     metoprolol tartrate (LOPRESSOR) 25 mg tablet, Take 1 tablet by mouth 2 (two) times a day, Disp: , Rfl:     Multiple Vitamins-Minerals (MULTIVITAMIN ADULT PO), Take 1 tablet by mouth daily, Disp: , Rfl:     NOVOLOG MIX 70/30 FLEXPEN (70-30) 100 UNIT/ML SUPN, 45 Units 2 (two) times a day before meals  , Disp: , Rfl:     polyethylene glycol (MIRALAX) 17 g packet, Take 17 g by mouth daily (Patient taking differently: Take 17 g by mouth 2 (two) times a day  ), Disp: 14 each, Rfl: 0    potassium chloride (MICRO-K) 10 MEQ CR capsule, Take 10 mEq by mouth daily, Disp: , Rfl:     pramipexole (MIRAPEX) 1 mg tablet, Take 1 tablet (1 mg total) by mouth 3 (three) times a day, Disp: 90 tablet, Rfl: 1    torsemide (DEMADEX) 20 mg tablet, Take 2 tablets (40 mg total) by mouth daily, Disp: 30 tablet, Rfl: 0    traMADol (ULTRAM) 50 mg tablet, Take 2 tablets (100 mg total) by mouth every 6 (six) hours as needed for moderate pain for up to 30 days, Disp: 120 tablet, Rfl: 1  No current facility-administered medications for this visit  Facility-Administered Medications Ordered in Other Visits:     albuterol inhalation solution 2 5 mg, 2 5 mg, Nebulization, Once, Sundeep Porras    Past Medical History:   Diagnosis Date    Anxiety     Arthritis     Asthma     [facility]     Diabetes mellitus ([place] [address] )     Disease of thyroid gland     Hypertension     Mitral valve regurgitation     Myocardial infarction ([facility] )     Obesity hypoventilation syndrome (HCC)     Pain     right hip    Restless leg syndrome     Sleep related hypoxia     Thyroid cancer ([facility] )        Past Surgical History:   Procedure Laterality Date    CHOLECYSTECTOMY      EYE SURGERY      [place] ERCP DX COLLECTION SPECIMEN BRUSHING/WASHING N/A 5/30/2017    Procedure: ENDOSCOPIC RETROGRADE CHOLANGIOPANCREATOGRAPHY (ERCP); SPHINCTEROTOMY;  Surgeon: Raul Hunter MD;  Location:  GI LAB; Service: Gastroenterology   Vignesh Kwaku JOINT Right 3/30/2018    Procedure: Right Sacroiliac Injection;  Surgeon: Cecily Medina MD;  Location: [facility] MAIN OR;  Service: Pain Management     REPLACEMENT TOTAL KNEE BILATERAL      SKIN BIOPSY      melanoma of back    STEROID INJECTION HIP Right 12/21/2017    Procedure: TROCHANTERIC BURSA INJECTION RIGHT;  Surgeon: Cecily Medina MD;  Location: [place] [address] MAIN OR;  Service: Pain Management     THORACOTOMY Right     at least 40 years, for pericardial cyst    THYROIDECTOMY, PARTIAL      For thyroid cancer       Family History   Problem Relation Age of Onset    Cancer Father     Diabetes Sister     Substance Abuse Neg Hx     Mental illness Neg Hx         reports that she has never smoked  She has never used smokeless tobacco  She reports that she does not drink alcohol or use drugs        Physical Exam:    /68 (BP Location: Left arm, Patient Position: Sitting, Cuff Size: Standard)   Pulse 84   Temp 98 3 °F (36 8 °C)   Ht 5' 2" (1 575 m)   Wt 101 kg (223 lb)   LMP  (LMP Unknown)   BMI 40 79 kg/m²      Gen: wn/wd, NAD, obese, ambulates with a walker   HEENT: anicteric, MMM, no cervical LAD  CVS: RRR, no m/r/g  CHEST: CTA b/l  ABD: +BS, soft, NT,ND, no hepatosplenomegaly  EXT:   2+ bilateral lower extremit edema, right lower extremity greater than left  NEURO: aaox3  SKIN: NO rashes    Attestation:   By signing my name below, I, Tosha Gale, attest that this documentation has been prepared under the direction and in the presence of Mariella Crocker MD  Electronically Signed: Pancho Jolly  04/25/2018  I, Mariella Crocker, personally performed the services described in this documentation  All medical record entries made by the scribe were at my direction and in my presence  I have reviewed the chart and discharge instructions and agree that the record reflects my personal performance and is accurate and complete  Mariella Crocker MD  04/25/2018

## 2018-04-27 ENCOUNTER — OFFICE VISIT (OUTPATIENT)
Dept: FAMILY MEDICINE CLINIC | Facility: CLINIC | Age: 78
End: 2018-04-27
Payer: COMMERCIAL

## 2018-04-27 VITALS
BODY MASS INDEX: 40.85 KG/M2 | TEMPERATURE: 98.3 F | SYSTOLIC BLOOD PRESSURE: 124 MMHG | OXYGEN SATURATION: 87 % | HEIGHT: 62 IN | HEART RATE: 86 BPM | RESPIRATION RATE: 16 BRPM | WEIGHT: 222 LBS | DIASTOLIC BLOOD PRESSURE: 66 MMHG

## 2018-04-27 DIAGNOSIS — G89.29 CHRONIC RIGHT-SIDED LOW BACK PAIN WITHOUT SCIATICA: Primary | ICD-10-CM

## 2018-04-27 DIAGNOSIS — Z79.4 OTHER SPECIFIED DIABETES MELLITUS WITH DIABETIC NEUROPATHY, WITH LONG-TERM CURRENT USE OF INSULIN (HCC): ICD-10-CM

## 2018-04-27 DIAGNOSIS — G89.4 CHRONIC PAIN SYNDROME: ICD-10-CM

## 2018-04-27 DIAGNOSIS — I10 ESSENTIAL HYPERTENSION: ICD-10-CM

## 2018-04-27 DIAGNOSIS — E13.40 OTHER SPECIFIED DIABETES MELLITUS WITH DIABETIC NEUROPATHY, WITH LONG-TERM CURRENT USE OF INSULIN (HCC): ICD-10-CM

## 2018-04-27 DIAGNOSIS — M54.50 CHRONIC RIGHT-SIDED LOW BACK PAIN WITHOUT SCIATICA: Primary | ICD-10-CM

## 2018-04-27 PROBLEM — B35.1 ONYCHOMYCOSIS: Status: ACTIVE | Noted: 2018-04-27

## 2018-04-27 PROBLEM — I87.2 PERIPHERAL VENOUS INSUFFICIENCY: Status: ACTIVE | Noted: 2018-04-27

## 2018-04-27 PROBLEM — I73.9 PERIPHERAL VASCULAR DISEASE (HCC): Status: ACTIVE | Noted: 2018-04-27

## 2018-04-27 LAB
SL AMB  POCT GLUCOSE, UA: NORMAL
SL AMB LEUKOCYTE ESTERASE,UA: NORMAL
SL AMB POCT BILIRUBIN,UA: NORMAL
SL AMB POCT BLOOD,UA: NORMAL
SL AMB POCT CLARITY,UA: CLEAR
SL AMB POCT COLOR,UA: YELLOW
SL AMB POCT HEMOGLOBIN AIC: 9.1
SL AMB POCT KETONES,UA: NORMAL
SL AMB POCT NITRITE,UA: NORMAL
SL AMB POCT PH,UA: 7
SL AMB POCT SPECIFIC GRAVITY,UA: 1
SL AMB POCT URINE PROTEIN: NORMAL
SL AMB POCT UROBILINOGEN: NORMAL

## 2018-04-27 PROCEDURE — 99214 OFFICE O/P EST MOD 30 MIN: CPT | Performed by: FAMILY MEDICINE

## 2018-04-27 PROCEDURE — 83036 HEMOGLOBIN GLYCOSYLATED A1C: CPT | Performed by: FAMILY MEDICINE

## 2018-04-27 PROCEDURE — 81003 URINALYSIS AUTO W/O SCOPE: CPT | Performed by: FAMILY MEDICINE

## 2018-04-27 RX ORDER — POTASSIUM CHLORIDE 1500 MG/1
TABLET, FILM COATED, EXTENDED RELEASE ORAL
COMMUNITY
End: 2018-05-25 | Stop reason: ALTCHOICE

## 2018-04-27 RX ORDER — LOSARTAN POTASSIUM 25 MG/1
TABLET ORAL
COMMUNITY
End: 2018-05-25 | Stop reason: ALTCHOICE

## 2018-04-27 RX ORDER — TRIAMCINOLONE ACETONIDE 1 MG/G
CREAM TOPICAL
COMMUNITY
End: 2018-05-25 | Stop reason: ALTCHOICE

## 2018-04-27 RX ORDER — ONDANSETRON 4 MG/1
TABLET, ORALLY DISINTEGRATING ORAL
COMMUNITY
End: 2018-05-25 | Stop reason: ALTCHOICE

## 2018-04-27 RX ORDER — AZITHROMYCIN 250 MG/1
TABLET, FILM COATED ORAL
COMMUNITY
End: 2018-05-25 | Stop reason: ALTCHOICE

## 2018-04-27 RX ORDER — PRAMIPEXOLE DIHYDROCHLORIDE 1 MG/1
TABLET ORAL
COMMUNITY
End: 2018-05-25 | Stop reason: ALTCHOICE

## 2018-04-27 RX ORDER — FUROSEMIDE 40 MG/1
TABLET ORAL
COMMUNITY
End: 2018-05-25 | Stop reason: ALTCHOICE

## 2018-04-27 RX ORDER — SULINDAC 200 MG/1
TABLET ORAL
COMMUNITY
End: 2018-04-27 | Stop reason: SDUPTHER

## 2018-04-27 RX ORDER — TRAMADOL HYDROCHLORIDE 50 MG/1
TABLET ORAL
COMMUNITY
End: 2018-04-27 | Stop reason: SDUPTHER

## 2018-04-27 RX ORDER — VENLAFAXINE 75 MG/1
TABLET ORAL
COMMUNITY
End: 2018-07-18 | Stop reason: HOSPADM

## 2018-04-27 RX ORDER — LOTEPREDNOL ETABONATE 5 MG/G
GEL OPHTHALMIC
COMMUNITY
End: 2018-05-25 | Stop reason: ALTCHOICE

## 2018-04-27 RX ORDER — METHYLPREDNISOLONE 4 MG/1
TABLET ORAL
COMMUNITY
End: 2018-05-25 | Stop reason: ALTCHOICE

## 2018-04-27 RX ORDER — LEVOTHYROXINE SODIUM 0.12 MG/1
TABLET ORAL
COMMUNITY
End: 2018-05-25 | Stop reason: ALTCHOICE

## 2018-04-27 RX ORDER — SULFAMETHOXAZOLE AND TRIMETHOPRIM 800; 160 MG/1; MG/1
TABLET ORAL
COMMUNITY
End: 2018-05-25 | Stop reason: ALTCHOICE

## 2018-04-27 RX ORDER — OXYCODONE HCL 5 MG/5 ML
SOLUTION, ORAL ORAL
COMMUNITY
End: 2018-05-25 | Stop reason: ALTCHOICE

## 2018-04-27 RX ORDER — LACTULOSE 10 G/15ML
SOLUTION ORAL
COMMUNITY
End: 2018-10-11 | Stop reason: ALTCHOICE

## 2018-04-27 RX ORDER — PREDNISONE 20 MG/1
TABLET ORAL
COMMUNITY
End: 2018-05-25 | Stop reason: ALTCHOICE

## 2018-04-27 RX ORDER — POLYETHYLENE GLYCOL 3350
POWDER (GRAM) MISCELLANEOUS
COMMUNITY
End: 2018-05-25 | Stop reason: ALTCHOICE

## 2018-04-27 RX ORDER — GABAPENTIN 100 MG/1
CAPSULE ORAL
COMMUNITY
End: 2018-05-25 | Stop reason: ALTCHOICE

## 2018-04-27 RX ORDER — ALPRAZOLAM 0.5 MG/1
TABLET ORAL
COMMUNITY
End: 2018-05-25 | Stop reason: ALTCHOICE

## 2018-04-27 RX ORDER — AMOXICILLIN 500 MG/1
CAPSULE ORAL
COMMUNITY
End: 2018-07-13

## 2018-04-27 RX ORDER — SODIUM FLUORIDE 5 MG/ML
PASTE, DENTIFRICE DENTAL
COMMUNITY
End: 2018-05-25 | Stop reason: ALTCHOICE

## 2018-04-27 RX ORDER — FUROSEMIDE 20 MG/1
TABLET ORAL
COMMUNITY
End: 2018-05-25 | Stop reason: ALTCHOICE

## 2018-04-27 RX ORDER — FENTANYL 12 UG/H
PATCH TRANSDERMAL
COMMUNITY
End: 2018-05-25 | Stop reason: ALTCHOICE

## 2018-04-27 RX ORDER — ERYTHROMYCIN 5 MG/G
OINTMENT OPHTHALMIC
COMMUNITY
End: 2018-05-25 | Stop reason: ALTCHOICE

## 2018-04-27 RX ORDER — CEFUROXIME AXETIL 250 MG/1
TABLET ORAL
COMMUNITY
End: 2018-05-25 | Stop reason: ALTCHOICE

## 2018-04-27 RX ORDER — ATORVASTATIN CALCIUM 20 MG/1
TABLET, FILM COATED ORAL
COMMUNITY
End: 2018-05-25

## 2018-04-28 NOTE — PROGRESS NOTES
Assessment/Plan:    Problem List Items Addressed This Visit     HTN (hypertension)     STABLE  DENIES ANY CP, SOB, PALPITATIONS  ON SODIUM RESTRITION - 2 GM DAILY - TRYING TO BE COMPLIANT  EDEMA IMPROVED    - CONTINUE TO MONITOR DIETARY SODIUM INTAKE  - INCREASE PHYSICAL ACTIVITY  - COMPLIANCE WITH MEDICATION             Relevant Medications    furosemide (LASIX) 20 mg tablet    furosemide (LASIX) 40 mg tablet    losartan (COZAAR) 25 mg tablet    Diabetes mellitus with neuropathy (HCC)     STABLE  STATES SUGARS ARE IMPROVING  TRYING TO MONITOR CARBOHYDRATE INTAKE    - MONITOR DIETARY INTAKE OF CARBOHYDRATES  - HEMOGLOBIN A1C TODAY  - URINE FOR MICROALBUMINURIA  - FOOT CARE         Relevant Orders    POCT urine dip auto non-scope (Completed)    POCT hemoglobin A1c (Completed)    Chronic right-sided low back pain without sciatica - Primary     PAIN CONTINUES  REVIEWED OPTIONS  WORKING WITH PAIN MANAGEMENT  ? CONSIDERING RFA    - STRESSED FLUID AND WEIGHT MANAGEMENT         Relevant Orders    MRI lumbar spine wo contrast    Chronic pain syndrome     STABLE  REVIEWED PRECAUTIONS         Relevant Orders    MRI lumbar spine wo contrast          Patient Instructions   CONTINUE CURRENT TREATMENT  SUPPORT GIVEN  FOOT CARE  MONITOR DIETARY SODIUM AND CARBOHYDRATE INTAKE  RV 4 WEEKS, SOONER PRN      Return in about 4 weeks (around 5/25/2018) for Recheck  Subjective:      Patient ID: Yasmin Sutton is a 77 y o  female      Chief Complaint   Patient presents with    Follow-up       PATIENT RETURNS FOR ROUTINE EVALUATION EVALUATION OF PATIENT'S MEDICAL ISSUES    INDIVIDUAL MEDICAL ISSUES WITH THEIR CURRENT STATUS, ASSESSMENT AND PLANS ARE LISTED ABOVE          The following portions of the patient's history were reviewed and updated as appropriate: allergies, current medications, past family history, past medical history, past social history, past surgical history and problem list     Review of Systems   Constitutional: Positive for fatigue  Negative for chills and fever  HENT: Negative for congestion, ear discharge, ear pain, mouth sores, postnasal drip, sore throat and trouble swallowing  Eyes: Negative for pain, discharge and visual disturbance  Respiratory: Positive for shortness of breath  Negative for cough and wheezing  Cardiovascular: Positive for leg swelling  Negative for chest pain and palpitations  Gastrointestinal: Negative for abdominal distention, abdominal pain, blood in stool, diarrhea, nausea and vomiting  Endocrine: Negative for polydipsia, polyphagia and polyuria  Genitourinary: Negative for dysuria, frequency, hematuria and urgency  Musculoskeletal: Positive for arthralgias, back pain, gait problem and myalgias  Negative for joint swelling  Skin: Negative for pallor and rash  Neurological: Positive for weakness  Negative for dizziness, syncope, speech difficulty, light-headedness, numbness and headaches  Hematological: Negative for adenopathy  Psychiatric/Behavioral: Negative for behavioral problems, confusion and sleep disturbance  The patient is not nervous/anxious            Current Outpatient Prescriptions   Medication Sig Dispense Refill    acetaminophen (TYLENOL) 325 mg tablet Take 2 tablets (650 mg total) by mouth every 6 (six) hours as needed for mild pain, headaches or fever 30 tablet 0    albuterol (ACCUNEB) 1 25 MG/3ML nebulizer solution Take 3 mL (1 25 mg total) by nebulization 3 (three) times a day as needed for wheezing (J45 909) 60 vial 0    ALPRAZolam (XANAX) 0 5 mg tablet alprazolam 0 5 mg tablet      amoxicillin (AMOXIL) 500 mg capsule amoxicillin 500 mg caps      ascorbic acid (VITAMIN C) 500 mg tablet Take 500 mg by mouth daily      atorvastatin (LIPITOR) 20 mg tablet atorvastatin 20 mg tablet      azithromycin (ZITHROMAX) 250 mg tablet azithromycin 250 mg tablet      bisacodyl (DULCOLAX) 5 mg EC tablet Take 10 mg by mouth daily at bedtime      cefuroxime (CEFTIN) 250 mg tablet cefuroxime axetil 250 mg tablet      clotrimazole (MYCELEX) 10 mg jaswant clotrimazole 10 mg lozg      COD LIVER OIL PO Take by mouth daily      dicyclomine (BENTYL) 10 mg capsule Take 1 capsule by mouth 3 (three) times a day      erythromycin (ILOTYCIN) ophthalmic ointment erythromycin 5 mg/gram (0 5 %) eye ointment      escitalopram (LEXAPRO) 10 mg tablet Take 1 tablet (10 mg total) by mouth daily 30 tablet 3    fentaNYL (DURAGESIC) 12 mcg/hr TD 72 hr patch fentanyl 12 mcg/hr transdermal patch      folic acid (FOLVITE) 1 mg tablet Take by mouth daily      furosemide (LASIX) 20 mg tablet furosemide 20 mg tablet      furosemide (LASIX) 40 mg tablet furosemide 40 mg tablet      gabapentin (NEURONTIN) 100 mg capsule Take 1 capsule (100 mg total) by mouth daily 5 capsule 0    gabapentin (NEURONTIN) 100 mg capsule gabapentin 100 mg caps      insulin aspart protamine-insulin aspart (NovoLOG 70/30) 100 units/mL injection Inject under the skin 2 (two) times a day before meals      insulin lispro (HumaLOG) 100 units/mL injection Inject 4-20 Units under the skin 3 (three) times a day before meals  0    insulin lispro (HumaLOG) 100 units/mL injection Inject 4-20 Units under the skin daily at bedtime  0    lactulose (CHRONULAC) 10 g/15 mL solution lactulose 10 gm/15ml soln      lactulose 10 g/15 mL Take 15 mL by mouth daily as needed        levothyroxine (SYNTHROID) 125 mcg tablet synthroid 125 mcg tabs      levothyroxine 125 mcg tablet Take 1 tablet (125 mcg total) by mouth daily 90 tablet 1    levothyroxine 125 mcg tablet levothyroxine sodium 125 mcg tabs      lifitegrast (XIIDRA) 5 % op solution Xiidra 5 % eye drops in a dropperette      Linaclotide (LINZESS) 72 MCG CAPS Take 72 mcg by mouth daily before breakfast 30 capsule 6    lisinopril (ZESTRIL) 10 mg tablet Take 1 tablet by mouth daily      LORazepam (ATIVAN) 1 mg tablet Take by mouth      losartan (COZAAR) 25 mg tablet losartan 25 mg tablet      loteprednol etabonate (LOTEMAX) 0 5 % ophth gel Lotemax 0 5 % eye gel drops      methylprednisolone (MEDROL) 4 mg tablet methylprednisolone 4 mg tablets in a dose pack      metolazone (ZAROXOLYN) 2 5 mg tablet Take 1 tablet by mouth      metoprolol tartrate (LOPRESSOR) 25 mg tablet Take 1 tablet by mouth 2 (two) times a day      Multiple Vitamins-Minerals (MULTIVITAMIN ADULT PO) Take 1 tablet by mouth daily      naloxegol oxalate (MOVANTIK) 25 MG tablet Movantik 25 mg tablet      Needle, Disp, (EASY TOUCH FLIPLOCK NEEDLES) 31G X 5/16" MISC Easy Touch 31 gauge x 3/16" needle      NOVOLOG MIX 70/30 FLEXPEN (70-30) 100 UNIT/ML SUPN 45 Units 2 (two) times a day before meals        ondansetron (ZOFRAN-ODT) 4 mg disintegrating tablet ondansetron 4 mg disintegrating tablet      oxyCODONE (ROXICODONE) 5 mg/5 mL solution oxycodone 5 mg tablet      polyethylene glycol (MIRALAX) 17 g packet Take 17 g by mouth daily (Patient taking differently: Take 17 g by mouth 2 (two) times a day  ) 14 each 0    Polyethylene Glycol 3350 POWD polyethylene glycol 3350  powd      potassium chloride (MICRO-K) 10 MEQ CR capsule Take 10 mEq by mouth daily      Potassium Chloride ER 20 MEQ TBCR potassium chloride er 20 meq tbcr      pramipexole (MIRAPEX) 1 mg tablet Take 1 tablet (1 mg total) by mouth 3 (three) times a day 90 tablet 1    pramipexole (MIRAPEX) 1 mg tablet pramipexole dihydrochloride 1 mg tabs      predniSONE 20 mg tablet prednisone 20 mg tablet      SODIUM FLUORIDE, DENTAL GEL, (DENTA 5000 PLUS) 1 1 % CREA denta 5000 plus 1 1 % crea      sulfamethoxazole-trimethoprim (BACTRIM DS) 800-160 mg per tablet sulfamethoxazole/trimethoprim ds 800-160 mg tabs      torsemide (DEMADEX) 20 mg tablet Take 2 tablets (40 mg total) by mouth daily 30 tablet 0    traMADol (ULTRAM) 50 mg tablet Take 2 tablets (100 mg total) by mouth every 6 (six) hours as needed for moderate pain for up to 30 days 120 tablet 1    triamcinolone (KENALOG) 0 1 % cream triamcinolone acetonide 0 1 % topical cream      venlafaxine (EFFEXOR) 75 mg tablet venlafaxine 75 mg tablet      glucose blood test strip Accu-Chek Rachel Plus test strips      HYDROcodone-acetaminophen (ZOLVIT)  mg/15 mL solution hydrocodone polistirex/chlorpheniramine polistirex 10-8 mg/5ml suer       No current facility-administered medications for this visit  Objective:    /66 (BP Location: Left arm, Patient Position: Sitting, Cuff Size: Large)   Pulse 86   Temp 98 3 °F (36 8 °C) (Temporal)   Resp 16   Ht 5' 2" (1 575 m)   Wt 101 kg (222 lb)   LMP  (LMP Unknown)   SpO2 (!) 87%   BMI 40 60 kg/m²        Physical Exam   Constitutional: She is oriented to person, place, and time  She appears well-developed and well-nourished  HENT:   Head: Normocephalic and atraumatic  Eyes: Conjunctivae and EOM are normal  Pupils are equal, round, and reactive to light  Right eye exhibits no discharge  Left eye exhibits no discharge  Neck: Normal range of motion  Neck supple  No thyromegaly present  Cardiovascular: Normal rate, regular rhythm and normal heart sounds  Pulses are no weak pulses  No murmur heard  Pulses:       Dorsalis pedis pulses are 2+ on the right side, and 2+ on the left side  Posterior tibial pulses are 2+ on the right side, and 2+ on the left side  Pulmonary/Chest: Effort normal and breath sounds normal  No respiratory distress  She has no wheezes  She has no rales  Abdominal: Soft  Bowel sounds are normal  There is no tenderness  OBESE   Musculoskeletal: She exhibits edema  She exhibits no tenderness  MODERATE DJD   Feet:   Right Foot:   Skin Integrity: Negative for ulcer, skin breakdown, erythema, warmth, callus or dry skin  Left Foot:   Skin Integrity: Negative for ulcer, skin breakdown, erythema, warmth, callus or dry skin  Lymphadenopathy:     She has no cervical adenopathy     Neurological: She is alert and oriented to person, place, and time  Skin: Skin is warm and dry  No rash noted  No erythema  Psychiatric: She has a normal mood and affect  Her behavior is normal  Judgment and thought content normal           Patient's shoes and socks removed  Right Foot/Ankle   Right Foot Inspection  Skin Exam: skin normal and skin intact no dry skin, no warmth, no callus, no erythema, no maceration, no abnormal color, no pre-ulcer, no ulcer and no callus                          Toe Exam: ROM and strength within normal limits  Sensory       Monofilament testing: diminished  Vascular    The right DP pulse is 2+  The right PT pulse is 2+  Left Foot/Ankle  Left Foot Inspection  Skin Exam: skin normal and skin intactno dry skin, no warmth, no erythema, no maceration, normal color, no pre-ulcer, no ulcer and no callus                         Toe Exam: ROM and strength within normal limits                   Sensory       Monofilament: diminished  Vascular    The left DP pulse is 2+  The left PT pulse is 2+  Assign Risk Category:  No deformity present; Loss of protective sensation;  No weak pulses       Risk: 1      Ofe Sparks MD

## 2018-04-28 NOTE — ASSESSMENT & PLAN NOTE
STABLE  DENIES ANY CP, SOB, PALPITATIONS  ON SODIUM RESTRITION - 2 GM DAILY - TRYING TO BE COMPLIANT  EDEMA IMPROVED    - CONTINUE TO MONITOR DIETARY SODIUM INTAKE  - INCREASE PHYSICAL ACTIVITY  - COMPLIANCE WITH MEDICATION

## 2018-04-28 NOTE — ASSESSMENT & PLAN NOTE
STABLE  STATES SUGARS ARE IMPROVING  TRYING TO MONITOR CARBOHYDRATE INTAKE    - MONITOR DIETARY INTAKE OF CARBOHYDRATES  - HEMOGLOBIN A1C TODAY  - URINE FOR MICROALBUMINURIA  - FOOT CARE

## 2018-04-28 NOTE — PATIENT INSTRUCTIONS
CONTINUE CURRENT TREATMENT  SUPPORT GIVEN  FOOT CARE  MONITOR DIETARY SODIUM AND CARBOHYDRATE INTAKE  RV 4 WEEKS, SOONER PRN

## 2018-04-28 NOTE — ASSESSMENT & PLAN NOTE
PAIN CONTINUES  REVIEWED OPTIONS  WORKING WITH PAIN MANAGEMENT  ?  CONSIDERING RFA    - STRESSED FLUID AND WEIGHT MANAGEMENT

## 2018-04-30 ENCOUNTER — TELEPHONE (OUTPATIENT)
Dept: GASTROENTEROLOGY | Facility: CLINIC | Age: 78
End: 2018-04-30

## 2018-04-30 DIAGNOSIS — K83.8 DILATION OF BILIARY TRACT: Primary | ICD-10-CM

## 2018-04-30 DIAGNOSIS — J45.20 MILD INTERMITTENT ASTHMA WITHOUT COMPLICATION: Primary | ICD-10-CM

## 2018-04-30 NOTE — TELEPHONE ENCOUNTER
MRI was ordered by another doctor for back  Per Dr Isaias Horne note, he would order an MRI abdomen to be done at the same time as the back MRI under general anesthesia at [place]  Please call patient and assisted her in setting this up  MRI abdomen order was placed   There is already a BUN/Cr ordered by other physician on 4/24 that the patient will need to have done prior to MRIs

## 2018-04-30 NOTE — TELEPHONE ENCOUNTER
DR Jennifer Watters PT      Pt called central scheduling to schedule her mri for her back but they are requesting a script from Dr Marilou Oneill for what was discussed at the time of the office visit (MRI OF HER ABD AND LIVER)   Pt didn't know what script but is requesting to be put to sleep for her MRI

## 2018-04-30 NOTE — PROGRESS NOTES
Patient had complete PFT  I reviewed results with her  She does have increase in residual volume as well as slight increase in total lung capacity  There was no obstructive defect seen on PFT  I did tell her that I would be ordering Spiriva 1 puff daily  She was in agreement

## 2018-05-09 ENCOUNTER — TELEPHONE (OUTPATIENT)
Dept: FAMILY MEDICINE CLINIC | Facility: CLINIC | Age: 78
End: 2018-05-09

## 2018-05-10 DIAGNOSIS — G25.81 RESTLESS LEG SYNDROME: ICD-10-CM

## 2018-05-10 RX ORDER — PRAMIPEXOLE DIHYDROCHLORIDE 1 MG/1
1 TABLET ORAL 3 TIMES DAILY
Qty: 90 TABLET | Refills: 3 | Status: SHIPPED | OUTPATIENT
Start: 2018-05-10 | End: 2019-07-22 | Stop reason: SDUPTHER

## 2018-05-21 ENCOUNTER — TELEPHONE (OUTPATIENT)
Dept: GASTROENTEROLOGY | Facility: CLINIC | Age: 78
End: 2018-05-21

## 2018-05-21 NOTE — TELEPHONE ENCOUNTER
I will contact insurance company to initiate [facility] for MRI ABDOMEN   The MRI for Spine was denied

## 2018-05-21 NOTE — TELEPHONE ENCOUNTER
Dr Coni Dorantes pt    Pt called in stating she received a letter stating that her MRI Abdomen scheduled on 5/30 was denied  Please advise     [phone]

## 2018-05-22 ENCOUNTER — TELEPHONE (OUTPATIENT)
Dept: GASTROENTEROLOGY | Facility: CLINIC | Age: 78
End: 2018-05-22

## 2018-05-23 DIAGNOSIS — K83.9 BILE DUCT ABNORMALITY: Primary | ICD-10-CM

## 2018-05-23 DIAGNOSIS — I10 ESSENTIAL HYPERTENSION: Primary | ICD-10-CM

## 2018-05-23 DIAGNOSIS — G89.29 OTHER CHRONIC PAIN: ICD-10-CM

## 2018-05-23 RX ORDER — GABAPENTIN 100 MG/1
100 CAPSULE ORAL 3 TIMES DAILY
Qty: 180 CAPSULE | Refills: 3 | Status: ON HOLD | OUTPATIENT
Start: 2018-05-23 | End: 2018-09-09

## 2018-05-23 RX ORDER — POTASSIUM CHLORIDE 750 MG/1
10 CAPSULE, EXTENDED RELEASE ORAL DAILY
Qty: 90 CAPSULE | Refills: 3 | Status: SHIPPED | OUTPATIENT
Start: 2018-05-23 | End: 2019-09-04

## 2018-05-23 NOTE — TELEPHONE ENCOUNTER
I've ordered basic metabolic panel    Thank you I personally performed the service described in the documentation recorded by the scribe in my presence, and it accurately and completely records my words and actions.

## 2018-05-25 DIAGNOSIS — R60.9 EDEMA, UNSPECIFIED TYPE: ICD-10-CM

## 2018-05-25 RX ORDER — ASPIRIN 325 MG
325 TABLET ORAL DAILY
COMMUNITY
End: 2018-10-11 | Stop reason: ALTCHOICE

## 2018-05-25 NOTE — PRE-PROCEDURE INSTRUCTIONS
Pre-Surgery Instructions:   Medication Instructions    acetaminophen (TYLENOL) 325 mg tablet Instructed patient per Anesthesia Guidelines   albuterol (ACCUNEB) 1 25 MG/3ML nebulizer solution Instructed patient per Anesthesia Guidelines   amoxicillin (AMOXIL) 500 mg capsule Instructed patient per Anesthesia Guidelines   ascorbic acid (VITAMIN C) 500 mg tablet Instructed patient per Anesthesia Guidelines   aspirin 325 mg tablet Instructed patient per Anesthesia Guidelines   COD LIVER OIL PO Instructed patient per Anesthesia Guidelines   escitalopram (LEXAPRO) 10 mg tablet Instructed patient per Anesthesia Guidelines   folic acid (FOLVITE) 1 mg tablet Instructed patient per Anesthesia Guidelines   gabapentin (NEURONTIN) 100 mg capsule Patient was instructed to contact Physician for medication instruction   glucose blood test strip Instructed patient per Anesthesia Guidelines   insulin lispro (HumaLOG) 100 units/mL injection Instructed patient per Anesthesia Guidelines   lactulose (CHRONULAC) 10 g/15 mL solution Instructed patient per Anesthesia Guidelines   levothyroxine 125 mcg tablet Instructed patient per Anesthesia Guidelines   Linaclotide (LINZESS) 67 MCG CAPS Instructed patient per Anesthesia Guidelines   metoprolol tartrate (LOPRESSOR) 25 mg tablet Instructed patient per Anesthesia Guidelines   Multiple Vitamins-Minerals (MULTIVITAMIN ADULT PO) Instructed patient per Anesthesia Guidelines   Needle, Disp, (EASY TOUCH FLIPLOCK NEEDLES) 31G X 5/16" MISC Instructed patient per Anesthesia Guidelines   potassium chloride (MICRO-K) 10 MEQ CR capsule Instructed patient per Anesthesia Guidelines   pramipexole (MIRAPEX) 1 mg tablet Instructed patient per Anesthesia Guidelines   torsemide (DEMADEX) 20 mg tablet Instructed patient per Anesthesia Guidelines

## 2018-05-27 RX ORDER — TORSEMIDE 20 MG/1
40 TABLET ORAL 2 TIMES DAILY
Qty: 60 TABLET | Refills: 1 | Status: SHIPPED | OUTPATIENT
Start: 2018-05-27 | End: 2018-07-06 | Stop reason: SDUPTHER

## 2018-05-29 ENCOUNTER — ANESTHESIA EVENT (OUTPATIENT)
Dept: RADIOLOGY | Facility: HOSPITAL | Age: 78
End: 2018-05-29

## 2018-05-29 ENCOUNTER — OFFICE VISIT (OUTPATIENT)
Dept: FAMILY MEDICINE CLINIC | Facility: CLINIC | Age: 78
End: 2018-05-29
Payer: COMMERCIAL

## 2018-05-29 VITALS
DIASTOLIC BLOOD PRESSURE: 78 MMHG | HEIGHT: 62 IN | TEMPERATURE: 99.2 F | RESPIRATION RATE: 18 BRPM | OXYGEN SATURATION: 88 % | BODY MASS INDEX: 40.48 KG/M2 | HEART RATE: 84 BPM | SYSTOLIC BLOOD PRESSURE: 138 MMHG | WEIGHT: 220 LBS

## 2018-05-29 DIAGNOSIS — I87.2 CHRONIC VENOUS STASIS DERMATITIS OF BOTH LOWER EXTREMITIES: ICD-10-CM

## 2018-05-29 DIAGNOSIS — K86.2 PANCREATIC CYST: Chronic | ICD-10-CM

## 2018-05-29 DIAGNOSIS — E11.40 TYPE 2 DIABETES MELLITUS WITH DIABETIC NEUROPATHY, WITH LONG-TERM CURRENT USE OF INSULIN (HCC): ICD-10-CM

## 2018-05-29 DIAGNOSIS — J43.9 PULMONARY EMPHYSEMA, UNSPECIFIED EMPHYSEMA TYPE (HCC): Primary | ICD-10-CM

## 2018-05-29 DIAGNOSIS — Z79.4 TYPE 2 DIABETES MELLITUS WITH DIABETIC NEUROPATHY, WITH LONG-TERM CURRENT USE OF INSULIN (HCC): ICD-10-CM

## 2018-05-29 DIAGNOSIS — J96.10 CHRONIC RESPIRATORY FAILURE, UNSPECIFIED WHETHER WITH HYPOXIA OR HYPERCAPNIA (HCC): ICD-10-CM

## 2018-05-29 LAB
AMBIG ABBREV DEFAULT: NORMAL
BUN SERPL-MCNC: 14 MG/DL (ref 8–27)
BUN/CREAT SERPL: 18 (ref 12–28)
CALCIUM SERPL-MCNC: 8.8 MG/DL (ref 8.7–10.3)
CHLORIDE SERPL-SCNC: 99 MMOL/L (ref 96–106)
CHOLEST SERPL-MCNC: 167 MG/DL (ref 100–199)
CO2 SERPL-SCNC: 28 MMOL/L (ref 18–29)
CREAT SERPL-MCNC: 0.79 MG/DL (ref 0.57–1)
GLUCOSE SERPL-MCNC: 167 MG/DL (ref 65–99)
HDLC SERPL-MCNC: 38 MG/DL
LDLC SERPL CALC-MCNC: 99 MG/DL (ref 0–99)
POTASSIUM SERPL-SCNC: 4.6 MMOL/L (ref 3.5–5.2)
SL AMB EGFR AFRICAN AMERICAN: 82 ML/MIN/1.73
SL AMB EGFR NON AFRICAN AMERICAN: 71 ML/MIN/1.73
SODIUM SERPL-SCNC: 141 MMOL/L (ref 134–144)
TRIGL SERPL-MCNC: 148 MG/DL (ref 0–149)

## 2018-05-29 PROCEDURE — 99214 OFFICE O/P EST MOD 30 MIN: CPT | Performed by: FAMILY MEDICINE

## 2018-05-29 RX ORDER — SULFAMETHOXAZOLE AND TRIMETHOPRIM 800; 160 MG/1; MG/1
TABLET ORAL EVERY 12 HOURS
COMMUNITY
End: 2018-06-04 | Stop reason: ALTCHOICE

## 2018-05-29 RX ORDER — POTASSIUM CHLORIDE 750 MG/1
CAPSULE, EXTENDED RELEASE ORAL
COMMUNITY
End: 2018-05-30 | Stop reason: SDUPTHER

## 2018-05-29 NOTE — ASSESSMENT & PLAN NOTE
PATIENT RETURNS  HAVING MRI OF THE PANCREAS TOMORROW  NEEDS CLEARANCE  BW ALREADY PERFORMED  FURTHER PLANS PENDING MRI

## 2018-05-29 NOTE — PROGRESS NOTES
Assessment/Plan:    Problem List Items Addressed This Visit     Pancreatic cyst (Chronic)     PATIENT RETURNS  HAVING MRI OF THE PANCREAS TOMORROW  NEEDS CLEARANCE  BW ALREADY PERFORMED  FURTHER PLANS PENDING MRI         Diabetes mellitus with neuropathy (HCC)     STABLE  CHECKING SUGARS  'S  ENCOURAGEMENT GIVEN         Pulmonary emphysema ([address] ) - Primary     BREATHING STABLE  MINIMAL COUGH  FIGUEROA, BUT IMPROVED  NO SPUTUM PRODUCTION         Relevant Medications    tiotropium (SPIRIVA HANDIHALER) 18 mcg inhalation capsule    Chronic venous stasis dermatitis of both lower extremities     LEGS ARE WRAPPED  SOME SMALL BLISTERS - WEEPING  FOLLOWED BY WOUND CARE  NO FEVER OR CHILLS         Chronic respiratory failure ([address] )          Patient Instructions   CONTINUE CURRENT TREATMENT PLAN  ELEVATE LEGS  NO CONTRAINDICATIONS TO PERFORM MRI  RV 6 WEEKS, SOONER PRN      Return in about 6 weeks (around 7/10/2018) for Recheck  Subjective:      Patient ID: Christo Kinney is a 77 y o  female  Chief Complaint   Patient presents with    Follow-up     check lungs for MRI        PATIENT RETURNS FOR ROUTINE EVALUATION OF PATIENT'S MEDICAL ISSUES    INDIVIDUAL MEDICAL ISSUES WITH THEIR CURRENT STATUS, ASSESSMENT AND PLANS ARE LISTED ABOVE          The following portions of the patient's history were reviewed and updated as appropriate: allergies, current medications, past family history, past medical history, past social history, past surgical history and problem list     Review of Systems   Constitutional: Negative for chills, fatigue and fever  HENT: Negative for congestion, ear discharge, ear pain, mouth sores, postnasal drip, sore throat and trouble swallowing  Eyes: Negative for pain, discharge and visual disturbance  Respiratory: Positive for cough and shortness of breath  Negative for wheezing  Cardiovascular: Positive for leg swelling  Negative for chest pain and palpitations     Gastrointestinal: Negative for abdominal distention, abdominal pain, blood in stool, diarrhea, nausea and vomiting  Endocrine: Negative for polydipsia, polyphagia and polyuria  Genitourinary: Negative for dysuria, frequency, hematuria and urgency  Musculoskeletal: Positive for arthralgias and gait problem  Negative for joint swelling  Skin: Positive for wound  Negative for pallor and rash  Neurological: Negative for dizziness, syncope, speech difficulty, weakness, light-headedness, numbness and headaches  Hematological: Negative for adenopathy  Psychiatric/Behavioral: Negative for behavioral problems, confusion and sleep disturbance  The patient is not nervous/anxious            Current Outpatient Prescriptions   Medication Sig Dispense Refill    acetaminophen (TYLENOL) 325 mg tablet Take 2 tablets (650 mg total) by mouth every 6 (six) hours as needed for mild pain, headaches or fever 30 tablet 0    albuterol (ACCUNEB) 1 25 MG/3ML nebulizer solution Take 3 mL (1 25 mg total) by nebulization 3 (three) times a day as needed for wheezing (J45 909) 60 vial 0    amoxicillin (AMOXIL) 500 mg capsule amoxicillin 500 mg caps      ascorbic acid (VITAMIN C) 500 mg tablet Take 500 mg by mouth daily      aspirin 325 mg tablet Take 325 mg by mouth daily      COD LIVER OIL PO Take by mouth daily      dicyclomine (BENTYL) 10 mg capsule Take 1 capsule by mouth 3 (three) times a day      escitalopram (LEXAPRO) 10 mg tablet Take 1 tablet (10 mg total) by mouth daily 30 tablet 3    folic acid (FOLVITE) 1 mg tablet Take by mouth daily      gabapentin (NEURONTIN) 100 mg capsule Take 1 capsule (100 mg total) by mouth 3 (three) times a day 180 capsule 3    glucose blood test strip Accu-Chek Rachel Plus test strips      insulin lispro (HumaLOG) 100 units/mL injection Inject 4-20 Units under the skin 3 (three) times a day before meals (Patient taking differently: Inject under the skin 3 (three) times a day Sliding scale )  0    lactulose (CHRONULAC) 10 g/15 mL solution lactulose 10 gm/15ml soln      levothyroxine 125 mcg tablet Take 1 tablet (125 mcg total) by mouth daily 90 tablet 1    Linaclotide (LINZESS) 72 MCG CAPS Take 72 mcg by mouth daily before breakfast 30 capsule 6    metolazone (ZAROXOLYN) 2 5 mg tablet Take 1 tablet by mouth      metoprolol tartrate (LOPRESSOR) 25 mg tablet Take 1 tablet by mouth 2 (two) times a day      Multiple Vitamins-Minerals (MULTIVITAMIN ADULT PO) Take 1 tablet by mouth daily      Needle, Disp, (EASY TOUCH FLIPLOCK NEEDLES) 31G X 5/16" MISC Easy Touch 31 gauge x 3/16" needle      polyethylene glycol (MIRALAX) 17 g packet Take 17 g by mouth daily (Patient taking differently: Take 17 g by mouth 2 (two) times a day  ) 14 each 0    potassium chloride (MICRO-K) 10 MEQ CR capsule Take 1 capsule (10 mEq total) by mouth daily 90 capsule 3    potassium chloride (MICRO-K) 10 MEQ CR capsule potassium chloride er 10 meq cpcr      pramipexole (MIRAPEX) 1 mg tablet Take 1 tablet (1 mg total) by mouth 3 (three) times a day 90 tablet 3    sulfamethoxazole-trimethoprim (BACTRIM DS) 800-160 mg per tablet every 12 (twelve) hours      tiotropium (SPIRIVA HANDIHALER) 18 mcg inhalation capsule spiriva handihaler 18 mcg caps      torsemide (DEMADEX) 20 mg tablet Take 2 tablets (40 mg total) by mouth 2 (two) times a day mwf 60 tablet 1    venlafaxine (EFFEXOR) 75 mg tablet venlafaxine 75 mg tablet       No current facility-administered medications for this visit  Objective:    /78 (BP Location: Left arm, Patient Position: Sitting, Cuff Size: Extra-Large)   Pulse 84   Temp 99 2 °F (37 3 °C) (Temporal)   Resp 18   Ht 5' 2" (1 575 m)   Wt 99 8 kg (220 lb)   LMP  (LMP Unknown)   SpO2 (!) 88%   BMI 40 24 kg/m²        Physical Exam   Constitutional: She is oriented to person, place, and time  She appears well-developed and well-nourished  HENT:   Head: Normocephalic and atraumatic     Eyes: Conjunctivae and EOM are normal  Pupils are equal, round, and reactive to light  Right eye exhibits no discharge  Left eye exhibits no discharge  Neck: Normal range of motion  Neck supple  No thyromegaly present  Cardiovascular: Normal rate, regular rhythm and normal heart sounds  No murmur heard  Pulmonary/Chest: Effort normal and breath sounds normal  No respiratory distress  She has no wheezes  She has no rales  MINIMAL BIBASILAR CRACKLES     Abdominal: Soft  Bowel sounds are normal  There is no tenderness  OBESE   Musculoskeletal: She exhibits edema  She exhibits no tenderness  MODERATE DJD  LOWER LEGS WRAPPED WITH COMPRESSION DRESSINGS   Lymphadenopathy:     She has no cervical adenopathy  Neurological: She is alert and oriented to person, place, and time  Skin: Skin is warm and dry  No rash noted  No erythema  Psychiatric: She has a normal mood and affect   Her behavior is normal  Judgment and thought content normal               Zayra Kendall MD

## 2018-05-29 NOTE — ANESTHESIA PREPROCEDURE EVALUATION
Temp-100 3 this am  99 2 Yesterday  No Respiratory or  Symptoms, [id number]  Review of Systems/Medical History          Cardiovascular  Hypertension , Valvular problems/murmurs: Mild MR  , Past MI , CHF ,   Comment: EF-60%-2017,  Pulmonary  COPD , Asthma , Sleep apnea , Oxygen dependent (Nightly 2/l) nasal cannula,   Comment: Chronic Resp  Failure =SaO2 88% yesterday  GI/Hepatic            Endo/Other  Diabetes , History of thyroid disease (THyroid Ca Hx) ,   Obesity (BMI-40)  morbid obesity   GYN       Hematology   Musculoskeletal    Arthritis     Neurology   Psychology   Anxiety, Depression ,   Chronic opioid dependence   Comment: Claustrophobia                   Physical Exam    Airway    Mallampati score: II  TM Distance: >3 FB  Neck ROM: full     Dental   No notable dental hx     Cardiovascular      Pulmonary  Breath sounds clear to auscultation,     Other Findings        Anesthesia Plan  ASA Score- 4     Anesthesia Type- general with ASA Monitors  Additional Monitors:   Airway Plan: LMA  Plan Factors-Patient not instructed to abstain from smoking on day of procedure  Patient did not smoke on day of surgery  Induction- intravenous  Postoperative Plan-     Informed Consent- Anesthetic plan and risks discussed with patient  I personally reviewed this patient with the CRNA  Discussed and agreed on the Anesthesia Plan with the CRNA  Gian Arana

## 2018-05-29 NOTE — PATIENT INSTRUCTIONS
CONTINUE CURRENT TREATMENT PLAN  ELEVATE LEGS  NO CONTRAINDICATIONS TO PERFORM MRI  RV 6 WEEKS, SOONER PRN

## 2018-05-30 ENCOUNTER — HOSPITAL ENCOUNTER (OUTPATIENT)
Dept: RADIOLOGY | Facility: HOSPITAL | Age: 78
Discharge: HOME | End: 2018-05-30
Payer: COMMERCIAL

## 2018-05-30 ENCOUNTER — ANESTHESIA (OUTPATIENT)
Dept: RADIOLOGY | Facility: HOSPITAL | Age: 78
End: 2018-05-30

## 2018-05-30 VITALS
WEIGHT: 218 LBS | DIASTOLIC BLOOD PRESSURE: 88 MMHG | SYSTOLIC BLOOD PRESSURE: 148 MMHG | HEIGHT: 61 IN | TEMPERATURE: 100.3 F | HEART RATE: 87 BPM | OXYGEN SATURATION: 92 % | RESPIRATION RATE: 18 BRPM | BODY MASS INDEX: 41.16 KG/M2

## 2018-05-30 DIAGNOSIS — K83.8 DILATION OF BILIARY TRACT: ICD-10-CM

## 2018-05-30 LAB — GLUCOSE SERPL-MCNC: 183 MG/DL (ref 65–140)

## 2018-05-30 PROCEDURE — 82948 REAGENT STRIP/BLOOD GLUCOSE: CPT

## 2018-05-30 PROCEDURE — A9585 GADOBUTROL INJECTION: HCPCS | Performed by: PHYSICIAN ASSISTANT

## 2018-05-30 PROCEDURE — 74183 MRI ABD W/O CNTR FLWD CNTR: CPT

## 2018-05-30 RX ORDER — PROPOFOL 10 MG/ML
INJECTION, EMULSION INTRAVENOUS AS NEEDED
Status: DISCONTINUED | OUTPATIENT
Start: 2018-05-30 | End: 2018-05-30 | Stop reason: SURG

## 2018-05-30 RX ORDER — LIDOCAINE HYDROCHLORIDE 10 MG/ML
INJECTION, SOLUTION INFILTRATION; PERINEURAL AS NEEDED
Status: DISCONTINUED | OUTPATIENT
Start: 2018-05-30 | End: 2018-05-30 | Stop reason: SURG

## 2018-05-30 RX ORDER — ONDANSETRON 2 MG/ML
4 INJECTION INTRAMUSCULAR; INTRAVENOUS EVERY 6 HOURS PRN
Status: DISCONTINUED | OUTPATIENT
Start: 2018-05-30 | End: 2018-05-31 | Stop reason: HOSPADM

## 2018-05-30 RX ORDER — SODIUM CHLORIDE, SODIUM LACTATE, POTASSIUM CHLORIDE, CALCIUM CHLORIDE 600; 310; 30; 20 MG/100ML; MG/100ML; MG/100ML; MG/100ML
125 INJECTION, SOLUTION INTRAVENOUS CONTINUOUS
Status: DISCONTINUED | OUTPATIENT
Start: 2018-05-30 | End: 2018-05-31 | Stop reason: HOSPADM

## 2018-05-30 RX ADMIN — LIDOCAINE HYDROCHLORIDE 50 MG: 10 INJECTION, SOLUTION INFILTRATION; PERINEURAL at 11:33

## 2018-05-30 RX ADMIN — SODIUM CHLORIDE, SODIUM LACTATE, POTASSIUM CHLORIDE, AND CALCIUM CHLORIDE 125 ML/HR: .6; .31; .03; .02 INJECTION, SOLUTION INTRAVENOUS at 10:40

## 2018-05-30 RX ADMIN — GADOBUTROL 10 ML: 604.72 INJECTION INTRAVENOUS at 12:39

## 2018-05-30 RX ADMIN — PROPOFOL 200 MG: 10 INJECTION, EMULSION INTRAVENOUS at 11:33

## 2018-05-30 NOTE — ANESTHESIA POSTPROCEDURE EVALUATION
Post-Op Assessment Note      CV Status:  Stable    Mental Status:  Somnolent    Hydration Status:  Stable    PONV Controlled:  None    Airway Patency:  Patent and adequate    Post Op Vitals Reviewed: Yes          Staff: Anesthesiologist, CRNA           BP   126/80   Temp   97 1   Pulse  98   Resp   18   SpO2   94%

## 2018-05-30 NOTE — PROGRESS NOTES
Pt awake and alert  NC O2 4L applied, HOB 60 degrees  Weaned pt off O2 prior to transfer  Answered all of pt's questions  Pt notified of transfer to [facility], called [facility] spoke with Mary Jenkins  Notified Tacho Brown of transfer to [facility]

## 2018-05-30 NOTE — ADDENDUM NOTE
Addendum  created 05/30/18 1414 by Guerita Flores MD    Anesthesia Event edited, Anesthesia Staff edited

## 2018-05-30 NOTE — PROGRESS NOTES
Assumed care of patient at 1310 hours  Patient is S/P MRI  In supine position with HOB elevated to 60 degrees  In NAD  Pt's daughter Duncan Never at bedside

## 2018-06-04 ENCOUNTER — OFFICE VISIT (OUTPATIENT)
Dept: FAMILY MEDICINE CLINIC | Facility: CLINIC | Age: 78
End: 2018-06-04
Payer: COMMERCIAL

## 2018-06-04 VITALS
SYSTOLIC BLOOD PRESSURE: 120 MMHG | DIASTOLIC BLOOD PRESSURE: 70 MMHG | BODY MASS INDEX: 41.99 KG/M2 | HEART RATE: 88 BPM | OXYGEN SATURATION: 89 % | HEIGHT: 61 IN | TEMPERATURE: 98.7 F | WEIGHT: 222.4 LBS | RESPIRATION RATE: 22 BRPM

## 2018-06-04 DIAGNOSIS — E66.2 OBESITY HYPOVENTILATION SYNDROME (HCC): Chronic | ICD-10-CM

## 2018-06-04 DIAGNOSIS — G89.4 CHRONIC PAIN SYNDROME: Primary | ICD-10-CM

## 2018-06-04 DIAGNOSIS — M15.9 PRIMARY OSTEOARTHRITIS INVOLVING MULTIPLE JOINTS: ICD-10-CM

## 2018-06-04 DIAGNOSIS — Z79.4 TYPE 2 DIABETES MELLITUS WITH DIABETIC NEUROPATHY, WITH LONG-TERM CURRENT USE OF INSULIN (HCC): ICD-10-CM

## 2018-06-04 DIAGNOSIS — E11.40 TYPE 2 DIABETES MELLITUS WITH DIABETIC NEUROPATHY, WITH LONG-TERM CURRENT USE OF INSULIN (HCC): ICD-10-CM

## 2018-06-04 DIAGNOSIS — I10 ESSENTIAL HYPERTENSION: ICD-10-CM

## 2018-06-04 PROBLEM — F11.20 OPIOID DEPENDENCE (HCC): Chronic | Status: RESOLVED | Noted: 2017-04-18 | Resolved: 2018-06-04

## 2018-06-04 PROCEDURE — 99214 OFFICE O/P EST MOD 30 MIN: CPT | Performed by: FAMILY MEDICINE

## 2018-06-04 RX ORDER — TRAMADOL HYDROCHLORIDE 50 MG/1
50 TABLET ORAL EVERY 6 HOURS PRN
Qty: 45 TABLET | Refills: 0 | Status: SHIPPED | OUTPATIENT
Start: 2018-06-04 | End: 2018-06-18 | Stop reason: SDUPTHER

## 2018-06-04 NOTE — PROGRESS NOTES
Assessment/Plan:    Problem List Items Addressed This Visit     Obesity hypoventilation syndrome (HCC) (Chronic)     STABLE, NO REAL CHANGES         HTN (hypertension)     STABLE  DENIES ANY CP, PALPITATIONS  SOB AT TIMES, FIGUEROA  EDEMA SL BETTER    - CONTINUE CURRENT TREATMENT PLAN         Primary osteoarthritis involving multiple joints     CONTINUES TO HAVE A LOT OF DISCOMFORT  REVIEWED PAIN MANAGEMENT  WILL RENEW TRAMADOL  WILL MAKE APPOINTMENT WITH PAIN MANAGEMENT         Diabetes mellitus with neuropathy (HCC)     REVIEWED PREV LAB STUDIES  DISCUSSED MODIFICATION OF DIETARY CARBOHYDRATE INTAKE  ENCOURAGED TO TRY AND INCREASE PHYSICAL ACTIVITY    - CONTINUE CURRENT INSULIN DOSE  - ENCOURAGED PHYSICAL ACTIVITY         Chronic pain syndrome - Primary    Relevant Medications    traMADol (ULTRAM) 50 mg tablet          Patient Instructions   Continue current treatment plan  Monitor sodium intake  Reviewed lab results  rv 1 m, sooner prn      No Follow-up on file  Subjective:      Patient ID: Roscoe Christopher is a 77 y o  female  Chief Complaint   Patient presents with    Follow-up     medication check       PATIENT RETURNS FOR ROUTINE EVALUATION OF PATIENT'S MEDICAL ISSUES    INDIVIDUAL MEDICAL ISSUES WITH THEIR CURRENT STATUS, ASSESSMENT AND PLANS ARE LISTED ABOVE          The following portions of the patient's history were reviewed and updated as appropriate: allergies, current medications, past family history, past medical history, past social history, past surgical history and problem list     Review of Systems   Constitutional: Positive for fatigue  Negative for chills and fever  HENT: Negative for congestion, ear discharge, ear pain, mouth sores, postnasal drip, sore throat and trouble swallowing  Eyes: Negative for pain, discharge and visual disturbance  Respiratory: Positive for cough, shortness of breath and wheezing  Cardiovascular: Positive for leg swelling   Negative for chest pain and palpitations  Gastrointestinal: Negative for abdominal distention, abdominal pain, blood in stool, diarrhea, nausea and vomiting  Endocrine: Negative for polydipsia, polyphagia and polyuria  Genitourinary: Negative for dysuria, frequency, hematuria and urgency  Musculoskeletal: Positive for arthralgias, back pain, gait problem and neck stiffness  Negative for joint swelling  Skin: Negative for pallor and rash  Neurological: Negative for dizziness, syncope, speech difficulty, weakness, light-headedness, numbness and headaches  Hematological: Negative for adenopathy  Psychiatric/Behavioral: Negative for behavioral problems, confusion and sleep disturbance  The patient is nervous/anxious            Current Outpatient Prescriptions   Medication Sig Dispense Refill    acetaminophen (TYLENOL) 325 mg tablet Take 2 tablets (650 mg total) by mouth every 6 (six) hours as needed for mild pain, headaches or fever 30 tablet 0    albuterol (ACCUNEB) 1 25 MG/3ML nebulizer solution Take 3 mL (1 25 mg total) by nebulization 3 (three) times a day as needed for wheezing (J45 [id number]) 60 vial 0    ascorbic acid (VITAMIN C) 500 mg tablet Take 500 mg by mouth daily      aspirin 325 mg tablet Take 325 mg by mouth daily      COD LIVER OIL PO Take by mouth daily      dicyclomine (BENTYL) 10 mg capsule Take 1 capsule by mouth 3 (three) times a day      escitalopram (LEXAPRO) 10 mg tablet Take 1 tablet (10 mg total) by mouth daily 30 tablet 3    folic acid (FOLVITE) 1 mg tablet Take by mouth daily      gabapentin (NEURONTIN) 100 mg capsule Take 1 capsule (100 mg total) by mouth 3 (three) times a day 180 capsule 3    glucose blood test strip Accu-Chek Rachel Plus test strips      insulin lispro (HumaLOG) 100 units/mL injection Inject 4-20 Units under the skin 3 (three) times a day before meals (Patient taking differently: Inject 45 Units under the skin 2 (two) times a day Sliding scale )  0    lactulose (CHRONULAC) 10 g/15 mL solution lactulose 10 gm/15ml soln PRN      levothyroxine 125 mcg tablet Take 1 tablet (125 mcg total) by mouth daily 90 tablet 1    Linaclotide (LINZESS) 72 MCG CAPS Take 72 mcg by mouth daily before breakfast 30 capsule 6    metolazone (ZAROXOLYN) 2 5 mg tablet Take 1 tablet by mouth      metoprolol tartrate (LOPRESSOR) 25 mg tablet Take 1 tablet by mouth 2 (two) times a day      Multiple Vitamins-Minerals (MULTIVITAMIN ADULT PO) Take 1 tablet by mouth daily      Needle, Disp, (EASY TOUCH FLIPLOCK NEEDLES) 31G X 5/16" MISC Easy Touch 31 gauge x 3/16" needle      polyethylene glycol (MIRALAX) 17 g packet Take 17 g by mouth daily (Patient taking differently: Take 17 g by mouth 2 (two) times a day  ) 14 each 0    potassium chloride (MICRO-K) 10 MEQ CR capsule Take 1 capsule (10 mEq total) by mouth daily 90 capsule 3    pramipexole (MIRAPEX) 1 mg tablet Take 1 tablet (1 mg total) by mouth 3 (three) times a day 90 tablet 3    tiotropium (SPIRIVA HANDIHALER) 18 mcg inhalation capsule spiriva handihaler 18 mcg caps      torsemide (DEMADEX) 20 mg tablet Take 2 tablets (40 mg total) by mouth 2 (two) times a day mwf 60 tablet 1    venlafaxine (EFFEXOR) 75 mg tablet venlafaxine 75 mg tablet      amoxicillin (AMOXIL) 500 mg capsule amoxicillin 500 mg caps      traMADol (ULTRAM) 50 mg tablet Take 1 tablet (50 mg total) by mouth every 6 (six) hours as needed for moderate pain 45 tablet 0     No current facility-administered medications for this visit  Objective:    /70 (BP Location: Left arm, Patient Position: Sitting, Cuff Size: Large)   Pulse 88   Temp 98 7 °F (37 1 °C) (Temporal)   Resp 22   Ht 5' 1" (1 549 m)   Wt 101 kg (222 lb 6 4 oz)   LMP  (LMP Unknown)   SpO2 (!) 89%   BMI 42 02 kg/m²        Physical Exam   Constitutional: She is oriented to person, place, and time  She appears well-developed and well-nourished  HENT:   Head: Normocephalic and atraumatic     Eyes: Conjunctivae and EOM are normal  Pupils are equal, round, and reactive to light  Right eye exhibits no discharge  Left eye exhibits no discharge  Neck: Normal range of motion  Neck supple  No thyromegaly present  Cardiovascular: Normal rate, regular rhythm and normal heart sounds  No murmur heard  Pulmonary/Chest: Effort normal  No respiratory distress  She has no wheezes  She has no rales  COARSE BS  DECREASED BS BOTH BASES  PROLONGED EXP PHASE   Abdominal: Soft  Bowel sounds are normal  There is no tenderness  OBESE   Musculoskeletal: She exhibits edema and tenderness  MODERATE DJD CHANGES   Lymphadenopathy:     She has no cervical adenopathy  Neurological: She is alert and oriented to person, place, and time  Skin: Skin is warm and dry  No rash noted  No erythema  Psychiatric: She has a normal mood and affect   Her behavior is normal  Judgment and thought content normal               Ronald Harris MD

## 2018-06-04 NOTE — ASSESSMENT & PLAN NOTE
REVIEWED PREV LAB STUDIES  DISCUSSED MODIFICATION OF DIETARY CARBOHYDRATE INTAKE  ENCOURAGED TO TRY AND INCREASE PHYSICAL ACTIVITY    - CONTINUE CURRENT INSULIN DOSE  - ENCOURAGED PHYSICAL ACTIVITY

## 2018-06-04 NOTE — ASSESSMENT & PLAN NOTE
CONTINUES TO HAVE A LOT OF DISCOMFORT  REVIEWED PAIN MANAGEMENT  WILL RENEW TRAMADOL  WILL MAKE APPOINTMENT WITH PAIN MANAGEMENT

## 2018-06-04 NOTE — ASSESSMENT & PLAN NOTE
STABLE  DENIES ANY CP, PALPITATIONS  SOB AT TIMES, FIGUEROA  EDEMA SL BETTER    - CONTINUE CURRENT TREATMENT PLAN

## 2018-06-05 ENCOUNTER — OFFICE VISIT (OUTPATIENT)
Dept: CARDIOLOGY CLINIC | Facility: CLINIC | Age: 78
End: 2018-06-05
Payer: COMMERCIAL

## 2018-06-05 VITALS
BODY MASS INDEX: 41.54 KG/M2 | OXYGEN SATURATION: 93 % | DIASTOLIC BLOOD PRESSURE: 62 MMHG | WEIGHT: 220 LBS | HEIGHT: 61 IN | SYSTOLIC BLOOD PRESSURE: 118 MMHG | HEART RATE: 88 BPM

## 2018-06-05 DIAGNOSIS — I50.33 ACUTE ON CHRONIC DIASTOLIC HF (HEART FAILURE) (HCC): Primary | ICD-10-CM

## 2018-06-05 PROCEDURE — 99214 OFFICE O/P EST MOD 30 MIN: CPT | Performed by: INTERNAL MEDICINE

## 2018-06-05 RX ORDER — METOLAZONE 2.5 MG/1
TABLET ORAL
Qty: 90 TABLET | Refills: 1 | Status: SHIPPED | OUTPATIENT
Start: 2018-06-05 | End: 2018-09-07

## 2018-06-05 NOTE — PROGRESS NOTES
Cardiology Follow Up  Olivia Dubon  1940  [id number]  [address]  [address] [address]    No diagnosis found  Discussion/Plan:    Lymphedema/venous ulcer/bilateral lower ext edema- remains   -- compression sock  -- compression shoes  -- diuretic regimen  -- 2gm sodium diet discussed  Education given    Diastolic hf- improved volume status  Watching salt intake  discussed etiology  multifactorial   - wt loss   - metoprolol 25mg bid   - sodium control  tartget 2gm daily   - continue torsemide 20mg twice a day + metolazone three times a weeks   - elevation of legs   - discussed wt journal; daily weight    Dm2-A1c-9  1- want a tighter control  -- discussed low carb diet  -- possible need to see dietician  Poor insight     Sleep apnea  -- not using cpap at night/oxygen    Physical deconditioning    Meningioma   - followed by neurosurgery      - wt loss     Interval History:  Has trouble with swelling in her legs  Her diet is not consistent  Discussed compliant  NO falls  NO bleeding  No fever or chills    6/5:  She chronically feels fatigue and with severe back pain  Her edema is better  Using walker to get around  She is wearing oxygen at night  Discussed about tight blood dugar control        Patient Active Problem List   Diagnosis    Acquired hypothyroidism    Lung nodules    Morbid obesity with BMI of 40 0-44 9, adult ([facility] )    Asthma    Constipation due to opioid therapy    Restless leg    Sleep apnea    Pancreatic cyst    Anxiety    Obesity hypoventilation syndrome ([facility] )    HTN (hypertension)    Primary osteoarthritis involving multiple joints    Balance problem    Diabetes mellitus with neuropathy (HCC)    Diastolic dysfunction    Meningiomas, multiple (HCC)    Moderate episode of recurrent major depressive disorder ([facility] )    Pulmonary emphysema ([facility] )    Chronic right-sided low back pain without sciatica    Chronic pain syndrome    Diastolic CHF (HCC)    Chronic venous stasis dermatitis of both lower extremities    Physical deconditioning    Chronic respiratory failure (HCC)    Sacroiliitis (HCC)    Chronic constipation    Dilation of biliary tract    Onychomycosis    Peripheral vascular disease (HCC)    Peripheral venous insufficiency     Past Medical History:   Diagnosis Date    Anxiety     Aortic valve disorder     Arthritis     Asthma     Cataract     COPD [facility] disease) [facility]     Diabetes mellitus ([place] [address] )     Disease of thyroid gland     Ectropion of left eye     last assessed: 10/17/2016    Emphysema, 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[facility])     Hearing loss     History of malignant neoplasm of thyroid     Hypertension     Malignant neoplasm of skin     Memory loss     Mitral valve regurgitation     Myocardial infarction ([place] [address] )     Obesity hypoventilation syndrome (HCC)     Pain     right hip    Restless leg syndrome     Seasonal allergies     Skin cancer (melanoma) ([place] [address] )     Sleep related hypoxia     Thyroid cancer ([place] [address] )      Social History     Social History    Marital status: Single     Spouse name: N/A    Number of children: 4    Years of education: high school graduate     Occupational History    retired      Social History Main Topics    Smoking status: Never Smoker    Smokeless tobacco: Never Used    Alcohol use No    Drug use: No    Sexual activity: Not Currently     Partners: Male     Other Topics Concern    Not on file     Social History Narrative    Lives with significant others  Uses walker outside      Active advance directive-copy no obtained    Caffeine use    Drinks coffee    Feels safe at home    Seeing a dentist      Family History   Problem Relation Age of Onset    Cancer Father     Arthritis Father     Liver cancer Father     Diabetes Sister     Asthma Mother     No Known Problems Brother  Substance Abuse Neg Hx     Mental illness Neg Hx      Past Surgical History:   Procedure Laterality Date    CHOLECYSTECTOMY      EYE SURGERY      HYSTERECTOMY      OTHER SURGICAL HISTORY      removal of lesion    PERICARDIUM SURGERY      resection of pericardial cyst or tumor    [place] ERCP DX COLLECTION SPECIMEN BRUSHING/WASHING N/A 5/30/2017    Procedure: ENDOSCOPIC RETROGRADE CHOLANGIOPANCREATOGRAPHY (ERCP); SPHINCTEROTOMY;  Surgeon: Natalie Weeks MD;  Location:  GI LAB;   Service: Gastroenterology   Sarthak [place] JOINT Right 3/30/2018    Procedure: Right Sacroiliac Injection;  Surgeon: Jr Otoole MD;  Location: [facility] MAIN OR;  Service: Pain Management     REPLACEMENT TOTAL KNEE Left     REPLACEMENT TOTAL KNEE Right     REPLACEMENT TOTAL KNEE BILATERAL      SKIN BIOPSY      melanoma of back    STEROID INJECTION HIP Right 12/21/2017    Procedure: TROCHANTERIC BURSA INJECTION RIGHT;  Surgeon: Jr Otoole MD;  Location: [facility] MAIN OR;  Service: Pain Management     THORACOSCOPY      (therapeutic) w/excision of pericardial cyst    THORACOTOMY Right     at least 40 years, for pericardial cyst    THYROID SURGERY      THYROIDECTOMY, PARTIAL      For thyroid cancer       Current Outpatient Prescriptions:     acetaminophen (TYLENOL) 325 mg tablet, Take 2 tablets (650 mg total) by mouth every 6 (six) hours as needed for mild pain, headaches or fever, Disp: 30 tablet, Rfl: 0    albuterol (ACCUNEB) 1 25 MG/3ML nebulizer solution, Take 3 mL (1 25 mg total) by nebulization 3 (three) times a day as needed for wheezing (J45 [id number]), Disp: 60 vial, Rfl: 0    amoxicillin (AMOXIL) 500 mg capsule, amoxicillin 500 mg caps, Disp: , Rfl:     ascorbic acid (VITAMIN C) 500 mg tablet, Take 500 mg by mouth daily, Disp: , Rfl:     aspirin 325 mg tablet, Take 325 mg by mouth daily, Disp: , Rfl:     COD LIVER OIL PO, Take by mouth daily, Disp: , Rfl:     dicyclomine (BENTYL) 10 mg capsule, Take 1 capsule by mouth 3 (three) times a day, Disp: , Rfl:     escitalopram (LEXAPRO) 10 mg tablet, Take 1 tablet (10 mg total) by mouth daily, Disp: 30 tablet, Rfl: 3    folic acid (FOLVITE) 1 mg tablet, Take by mouth daily, Disp: , Rfl:     gabapentin (NEURONTIN) 100 mg capsule, Take 1 capsule (100 mg total) by mouth 3 (three) times a day, Disp: 180 capsule, Rfl: 3    glucose blood test strip, Accu-Chek Rachel Plus test strips, Disp: , Rfl:     insulin lispro (HumaLOG) 100 units/mL injection, Inject 4-20 Units under the skin 3 (three) times a day before meals (Patient taking differently: Inject 45 Units under the skin 2 (two) times a day Sliding scale ), Disp: , Rfl: 0    lactulose (CHRONULAC) 10 g/15 mL solution, lactulose 10 gm/15ml soln PRN, Disp: , Rfl:     levothyroxine 125 mcg tablet, Take 1 tablet (125 mcg total) by mouth daily, Disp: 90 tablet, Rfl: 1    Linaclotide (LINZESS) 72 MCG CAPS, Take 72 mcg by mouth daily before breakfast, Disp: 30 capsule, Rfl: 6    metolazone (ZAROXOLYN) 2 5 mg tablet, Take 1 tablet by mouth, Disp: , Rfl:     metoprolol tartrate (LOPRESSOR) 25 mg tablet, Take 1 tablet by mouth 2 (two) times a day, Disp: , Rfl:     Multiple Vitamins-Minerals (MULTIVITAMIN ADULT PO), Take 1 tablet by mouth daily, Disp: , Rfl:     Needle, Disp, (EASY TOUCH FLIPLOCK NEEDLES) 31G X 5/16" MISC, Easy Touch 31 gauge x 3/16" needle, Disp: , Rfl:     polyethylene glycol (MIRALAX) 17 g packet, Take 17 g by mouth daily (Patient taking differently: Take 17 g by mouth 2 (two) times a day  ), Disp: 14 each, Rfl: 0    potassium chloride (MICRO-K) 10 MEQ CR capsule, Take 1 capsule (10 mEq total) by mouth daily, Disp: 90 capsule, Rfl: 3    pramipexole (MIRAPEX) 1 mg tablet, Take 1 tablet (1 mg total) by mouth 3 (three) times a day, Disp: 90 tablet, Rfl: 3    tiotropium (SPIRIVA HANDIHALER) 18 mcg inhalation capsule, spiriva handihaler 18 mcg caps, Disp: , Rfl:     torsemide (DEMADEX) 20 mg tablet, Take 2 tablets (40 mg total) by mouth 2 (two) times a day [facility], Disp: 60 tablet, Rfl: 1    traMADol (ULTRAM) 50 mg tablet, Take 1 tablet (50 mg total) by mouth every 6 (six) hours as needed for moderate pain, Disp: 45 tablet, Rfl: 0    venlafaxine (EFFEXOR) 75 mg tablet, venlafaxine 75 mg tablet, Disp: , Rfl:   Allergies   Allergen Reactions    Ketorolac Swelling     Toradol    Latex Rash    Wound Dressing Adhesive Rash       Review of Systems:  Review of Systems   Constitutional: Positive for fatigue  Negative for activity change, appetite change, chills, diaphoresis, fever and unexpected weight change  HENT: Negative  Negative for congestion, dental problem, drooling, ear discharge, ear pain, facial swelling, hearing loss, mouth sores, nosebleeds, postnasal drip, rhinorrhea, sinus pain, sinus pressure, sneezing, sore throat, tinnitus, trouble swallowing and voice change  Eyes: Negative  Negative for photophobia, pain, redness, itching and visual disturbance  Respiratory: Positive for shortness of breath  Negative for apnea, cough, choking, chest tightness, wheezing and stridor  Cardiovascular: Positive for leg swelling  Negative for chest pain and palpitations  Gastrointestinal: Negative  Negative for abdominal distention, abdominal pain, anal bleeding, blood in stool, constipation, diarrhea, nausea, rectal pain and vomiting  Endocrine: Negative  Negative for cold intolerance, heat intolerance, polydipsia, polyphagia and polyuria  Genitourinary: Negative  Negative for decreased urine volume, difficulty urinating, dyspareunia, dysuria, enuresis, flank pain, frequency, genital sores, hematuria, menstrual problem, pelvic pain, urgency, vaginal bleeding, vaginal discharge and vaginal pain  Musculoskeletal: Positive for arthralgias, back pain and gait problem  Negative for joint swelling, myalgias, neck pain and neck stiffness  Skin: Negative  Negative for color change, pallor, rash and wound  Allergic/Immunologic: Negative  Negative for environmental allergies, food allergies and immunocompromised state  Neurological: Negative for dizziness, tremors, seizures, syncope, facial asymmetry, speech difficulty, weakness, light-headedness, numbness and headaches  Hematological: Negative  Negative for adenopathy  Does not bruise/bleed easily  Psychiatric/Behavioral: Negative  Negative for agitation, behavioral problems, confusion, decreased concentration, dysphoric mood, hallucinations, self-injury, sleep disturbance and suicidal ideas  The patient is not nervous/anxious and is not hyperactive  All other systems reviewed and are negative  There were no vitals filed for this visit  Physical Exam:  Physical Exam   Constitutional: She is oriented to person, place, and time  No distress  HENT:   Head: Normocephalic and atraumatic  Right Ear: External ear normal    Left Ear: External ear normal    Eyes: Conjunctivae are normal  Pupils are equal, round, and reactive to light  Right eye exhibits no discharge  Left eye exhibits no discharge  No scleral icterus  Neck: Normal range of motion  Neck supple  No JVD present  No tracheal deviation present  No thyromegaly present  Cardiovascular: Normal rate and regular rhythm  Exam reveals gallop  Exam reveals no friction rub  Murmur heard  Pulmonary/Chest: Effort normal and breath sounds normal  No stridor  No respiratory distress  She has no wheezes  She has no rales  She exhibits no tenderness  Abdominal: Soft  Bowel sounds are normal  She exhibits no distension and no mass  There is no tenderness  There is no rebound and no guarding  Musculoskeletal: Normal range of motion  She exhibits no edema, tenderness or deformity  Neurological: She is alert and oriented to person, place, and time  She has normal reflexes  No cranial nerve deficit  She exhibits normal muscle tone  Coordination normal    Skin: Skin is warm and dry  No rash noted  She is not diaphoretic  No erythema  No pallor  Psychiatric: She has a normal mood and affect  Her behavior is normal  Judgment and thought content normal        Labs:     Lab Results   Component Value Date    WBC 11 10 (H) 2018    HGB 11 7 (L) 2018    HCT 36 1 (L) 2018    MCV 95 2018     2018     Lab Results   Component Value Date     2018    K 4 2 2018    CL 94 (L) 2018    CO2 39 (H) 2018    ANIONGAP 8 2018    BUN 14 2018    CREATININE 0 79 2018    GLUCOSE 260 (H) 2018    GLUF 271 (H) 2017    CALCIUM 8 8 2018    AST 20 2018    ALT 45 2018    ALKPHOS 91 2018    PROT 6 9 2018    BILITOT 0 30 2018    EGFR 49 2018     Lab Results   Component Value Date    CHOL 195 10/11/2017    CHOL 154 2016    CHOL 173 2016     Lab Results   Component Value Date    HDL 47 10/11/2017    HDL 48 2016    HDL 45 2016     Lab Results   Component Value Date    LDLCALC 91 10/11/2017    LDLCALC 87 2016    LDLCALC 96 2016     Lab Results   Component Value Date    TRIG 148 2018    TRIG 286 (H) 10/11/2017    TRIG 95 2016     Lab Results   Component Value Date    HGBA1C 9 1% 2018       Imaging & Testing   I have personally reviewed pertinent reports  EKG: Personally reviewed      Normal sinus rhythm rbbb    Cardiac testing:   Results for orders placed during the hospital encounter of 10/08/17   Echo complete with contrast if indicated    Narrative [address]  [address] [facility] [address] [phone]    Transthoracic Echocardiogram  2D, M-mode, Doppler, and Color Doppler    Study date:  11-Oct-2017    Patient: Lauren Taylor  MR number: [id number]  Account number: [de-identified]  : 1940  Age: 65 years  Gender: Female  Status: Routine  Location: Bedside  Height: 62 in  Weight: 226 6 lb  BP: 134/ 86 mmHg    Indications: CVA    Diagnoses: I67 9 - Cerebrovascular disease, unspecified    Sonographer:  Dashawn Locke  Primary Physician:  Tom Diza MD  Group:  Nikolai Montesinos  Interpreting Physician:  Olivia Cornell MD    SUMMARY    PROCEDURE INFORMATION:  This was a technically difficult study  LEFT VENTRICLE:  Systolic function was normal  Ejection fraction was estimated in the range of 60 % to 65 % to be 60 %  Although no diagnostic regional wall motion abnormality was identified, this possibility cannot be completely excluded on the basis of this study  Wall thickness was mildly increased  There was mild concentric hypertrophy  Doppler parameters were consistent with abnormal left ventricular relaxation (grade 1 diastolic dysfunction)  ATRIAL SEPTUM:  No ovious defect or patent foramen ovale was identified by echo contrast, but limited study due to body habitus  MITRAL VALVE:  There was mild regurgitation  AORTIC VALVE:  There was trace regurgitation  TRICUSPID VALVE:  There was mild regurgitation  Estimated peak PA pressure was 35 mmHg  HISTORY: PRIOR HISTORY: HTN, DM, Asthma, Obesity, Hypoxia, Thyroid Cancer    PROCEDURE: The procedure was performed at the bedside  This was a routine study  The transthoracic approach was used  The study included complete 2D imaging, M-mode, complete spectral Doppler, and color Doppler  The heart rate was 82 bpm,  at the start of the study  This was a technically difficult study  LEFT VENTRICLE: Size was normal  Systolic function was normal  Ejection fraction was estimated in the range of 60 % to 65 % to be 60 %  Although no diagnostic regional wall motion abnormality was identified, this possibility cannot be  completely excluded on the basis of this study  Wall thickness was mildly increased  There was mild concentric hypertrophy   DOPPLER: Doppler parameters were consistent with abnormal left ventricular relaxation (grade 1 diastolic  dysfunction)  RIGHT VENTRICLE: The size was normal  Systolic function was normal     LEFT ATRIUM: Size was normal     ATRIAL SEPTUM: No ovious defect or patent foramen ovale was identified by echo contrast, but limited study due to body habitus  RIGHT ATRIUM: Size was normal     MITRAL VALVE: There was normal leaflet separation  DOPPLER: The transmitral velocity was within the normal range  There was no evidence for stenosis  There was mild regurgitation  AORTIC VALVE: The valve was trileaflet  Leaflets exhibited mildly increased thickness and normal cuspal separation  DOPPLER: Transaortic velocity was within the normal range  There was no evidence for stenosis  There was trace  regurgitation  TRICUSPID VALVE: DOPPLER: There was mild regurgitation  Estimated peak PA pressure was 35 mmHg  PULMONIC VALVE: DOPPLER: There was trace regurgitation  PERICARDIUM: There was no thickening or calcification  There was no pericardial effusion  AORTA: The root exhibited normal size  SYSTEMIC VEINS: IVC: Respirophasic changes were normal  HEPATIC VEINS: The hepatic veins were not well visualized  SYSTEM MEASUREMENT TABLES    2D mode  AoR Diam 2D: 3 6 cm  LA Diam (2D): 3 6 cm  LA/Ao (2D): 1  FS (2D Teich): 30 8 %  IVSd (2D): 1 27 cm  LVDEV: 57 cm³  LVESV: 23 2 cm³  LVIDd(2D): 3 67 cm  LVISd (2D): 2 54 cm  LVPWd (2D): 1 26 cm  SV (Teich): 33 8 cm³    Apical four chamber  LVEF A4C: 63 %    Unspecified Scan Mode  MV Peak A Frank: 914 mm/s  MV Peak E Frank  Mean: 563 mm/s  MVA (PHT): 3 24 cm squared  PHT: 68 ms  Max P mm[Hg]  V Max: 2590 mm/s  Vmax: 2620 mm/s  RA Area: 12 5 cm squared  RA Volume: 24 cm³  TAPSE: 1 9 cm    [employer] Accredited [facility]    Prepared and electronically signed by    Mathew Sarabia MD  Signed 11-Oct-2017 14:22:19       No results found for this or any previous visit      Kristyn Garcia  Please call with any questions or suggestions    A description of the counseling:   Goals and Barriers:  Patient's ability to self care:  Medication side effect reviewed with patient in detail and all their questions answered  "This note has been constructed using a voice recognition system  Therefore there may be syntax, spelling, and/or grammatical errors   Please call if you have any questions  "

## 2018-06-06 ENCOUNTER — TELEPHONE (OUTPATIENT)
Dept: GASTROENTEROLOGY | Facility: CLINIC | Age: 78
End: 2018-06-06

## 2018-06-06 NOTE — TELEPHONE ENCOUNTER
----- Message from Kendall James MD sent at 6/6/2018  9:39 AM EDT -----  I left a voicemail for the patient  Please inform her that her MRI is unchanged, her bile duct is still mildly dilated but not any worsened  The cysts in her pancreas are also stable  Due to her age I would not recommend much more aggressive evaluation  We can consider getting an ultrasound every 1 to 2 years  I would be happy to see her back in follow-up with regards to her chronic constipation or any other issues or symptoms she may have  Please have her call with any questions or concerns

## 2018-06-14 ENCOUNTER — OFFICE VISIT (OUTPATIENT)
Dept: URGENT CARE | Facility: CLINIC | Age: 78
End: 2018-06-14
Payer: COMMERCIAL

## 2018-06-14 VITALS
HEART RATE: 94 BPM | SYSTOLIC BLOOD PRESSURE: 116 MMHG | TEMPERATURE: 98.1 F | WEIGHT: 214.6 LBS | DIASTOLIC BLOOD PRESSURE: 56 MMHG | BODY MASS INDEX: 39.49 KG/M2 | HEIGHT: 62 IN | OXYGEN SATURATION: 95 % | RESPIRATION RATE: 20 BRPM

## 2018-06-14 DIAGNOSIS — L03.115 BILATERAL CELLULITIS OF LOWER LEG: Primary | ICD-10-CM

## 2018-06-14 DIAGNOSIS — L03.116 BILATERAL CELLULITIS OF LOWER LEG: Primary | ICD-10-CM

## 2018-06-14 DIAGNOSIS — L03.115 CELLULITIS OF RIGHT FOOT: ICD-10-CM

## 2018-06-14 PROCEDURE — 99214 OFFICE O/P EST MOD 30 MIN: CPT | Performed by: FAMILY MEDICINE

## 2018-06-14 NOTE — PATIENT INSTRUCTIONS
Patient has chronic venous stasis of bilateral lower extremities and now appears to have cellulitis of both lower legs and the right foot associated with a rash of unclear etiology  I believe the patient may have multiple skin conditions  I have explained to the patient that at this time she requires further evaluation including blood work, IV antibiotics, and preferably a dermatology consult  I have low suspicion for a DVT at this time, however she will likely require a doppler US to rule out any blood clots  I have referred her to the ER at this time for further work up  She verbalizes understanding and agrees w/ treatment plan, she has chosen to go to [facility] ER and will be driven by a family member

## 2018-06-18 DIAGNOSIS — G89.4 CHRONIC PAIN SYNDROME: ICD-10-CM

## 2018-06-18 RX ORDER — TRAMADOL HYDROCHLORIDE 50 MG/1
50 TABLET ORAL EVERY 6 HOURS PRN
Qty: 45 TABLET | Refills: 0 | Status: SHIPPED | OUTPATIENT
Start: 2018-06-18 | End: 2018-08-10 | Stop reason: SDUPTHER

## 2018-06-21 ENCOUNTER — TELEPHONE (OUTPATIENT)
Dept: GASTROENTEROLOGY | Facility: CLINIC | Age: 78
End: 2018-06-21

## 2018-06-21 NOTE — PROGRESS NOTES
[facility]        NAME: Martin Lu is a 77 y o  female  : 1940    MRN: [id number]  DATE: 2018  TIME: 9:25 AM    Assessment and Plan   Bilateral cellulitis of lower leg [L03 116, L03 115]  1  Bilateral cellulitis of lower leg     2  Cellulitis of right foot           Patient Instructions     Patient Instructions   Patient has chronic venous stasis of bilateral lower extremities and now appears to have cellulitis of both lower legs and the right foot associated with a rash of unclear etiology  I believe the patient may have multiple skin conditions  I have explained to the patient that at this time she requires further evaluation including blood work, IV antibiotics, and preferably a dermatology consult  I have low suspicion for a DVT at this time, however she will likely require a doppler US to rule out any blood clots  I have referred her to the ER at this time for further work up  She verbalizes understanding and agrees w/ treatment plan, she has chosen to go to [facility] ER and will be driven by a family member  Chief Complaint     Chief Complaint   Patient presents with    Rash     Pt here with skin peeling and red skin  on both feet and ankle area, pt states it is very painful  Pt states it has been on going x2 weeks  Pt has been seeing  a foot  doctor  Dr Kendra Goel in [place]  History of Present Illness       76 yo female presents requesting a "cream" for her dry skin on both lower legs  Patient states the skin on her legs has been dry and peeling for the past few weeks  She describes it as painful and burning  She has had no fevers or chills  She denies any drainage from the skin  No numbness/tingling or weakness of the legs  Denies any burns or injuries to the legs  No bone/joint pains or weakness  She has been using a moisturizer lotion, otherwise has not attempted any other treatment   She states she called her PCPs office to get an appt with them, however they instructed her to come to the urgent care  Review of Systems   Review of Systems   Constitutional: Negative  Musculoskeletal: Negative  Skin:        As noted in HPI   Neurological: Negative            Current Medications       Current Outpatient Prescriptions:     albuterol (ACCUNEB) 1 25 MG/3ML nebulizer solution, Take 3 mL (1 25 mg total) by nebulization 3 (three) times a day as needed for wheezing (J45 909), Disp: 60 vial, Rfl: 0    amoxicillin (AMOXIL) 500 mg capsule, amoxicillin 500 mg caps, Disp: , Rfl:     ascorbic acid (VITAMIN C) 500 mg tablet, Take 500 mg by mouth daily, Disp: , Rfl:     aspirin 325 mg tablet, Take 325 mg by mouth daily, Disp: , Rfl:     COD LIVER OIL PO, Take by mouth daily, Disp: , Rfl:     escitalopram (LEXAPRO) 10 mg tablet, Take 1 tablet (10 mg total) by mouth daily, Disp: 30 tablet, Rfl: 3    folic acid (FOLVITE) 1 mg tablet, Take by mouth daily, Disp: , Rfl:     gabapentin (NEURONTIN) 100 mg capsule, Take 1 capsule (100 mg total) by mouth 3 (three) times a day, Disp: 180 capsule, Rfl: 3    glucose blood test strip, Accu-Chek Rachel Plus test strips, Disp: , Rfl:     insulin lispro (HumaLOG) 100 units/mL injection, Inject 4-20 Units under the skin 3 (three) times a day before meals (Patient taking differently: Inject 45 Units under the skin 2 (two) times a day Sliding scale ), Disp: , Rfl: 0    lactulose (CHRONULAC) 10 g/15 mL solution, lactulose 10 gm/15ml soln PRN, Disp: , Rfl:     levothyroxine 125 mcg tablet, Take 1 tablet (125 mcg total) by mouth daily, Disp: 90 tablet, Rfl: 1    Linaclotide (LINZESS) 72 MCG CAPS, Take 72 mcg by mouth daily before breakfast, Disp: 30 capsule, Rfl: 6    metolazone (ZAROXOLYN) 2 5 mg tablet, Take one tablet three times a week, Disp: 90 tablet, Rfl: 1    metoprolol tartrate (LOPRESSOR) 25 mg tablet, Take 1 tablet by mouth 2 (two) times a day, Disp: , Rfl:     Multiple Vitamins-Minerals (MULTIVITAMIN ADULT PO), Take 1 tablet by mouth daily, Disp: , Rfl:     Needle, Disp, (EASY TOUCH FLIPLOCK NEEDLES) 31G X 5/16" MISC, Easy Touch 31 gauge x 3/16" needle, Disp: , Rfl:     polyethylene glycol (MIRALAX) 17 g packet, Take 17 g by mouth daily (Patient taking differently: Take 17 g by mouth 2 (two) times a day  ), Disp: 14 each, Rfl: 0    potassium chloride (MICRO-K) 10 MEQ CR capsule, Take 1 capsule (10 mEq total) by mouth daily, Disp: 90 capsule, Rfl: 3    pramipexole (MIRAPEX) 1 mg tablet, Take 1 tablet (1 mg total) by mouth 3 (three) times a day, Disp: 90 tablet, Rfl: 3    tiotropium (SPIRIVA HANDIHALER) 18 mcg inhalation capsule, spiriva handihaler 18 mcg caps, Disp: , Rfl:     torsemide (DEMADEX) 20 mg tablet, Take 2 tablets (40 mg total) by mouth 2 (two) times a day mwf, Disp: 60 tablet, Rfl: 1    venlafaxine (EFFEXOR) 75 mg tablet, venlafaxine 75 mg tablet, Disp: , Rfl:     acetaminophen (TYLENOL) 325 mg tablet, Take 2 tablets (650 mg total) by mouth every 6 (six) hours as needed for mild pain, headaches or fever, Disp: 30 tablet, Rfl: 0    traMADol (ULTRAM) 50 mg tablet, Take 1 tablet (50 mg total) by mouth every 6 (six) hours as needed for moderate pain, Disp: 45 tablet, Rfl: 0    Current Allergies     Allergies as of 06/14/2018 - Reviewed 06/14/2018   Allergen Reaction Noted    Ketorolac Swelling     Latex Rash 11/18/2014    Wound dressing adhesive Rash             The following portions of the patient's history were reviewed and updated as appropriate: allergies, current medications, past family history, past medical history, past social history, past surgical history and problem list      Past Medical History:   Diagnosis Date    Anxiety     Aortic valve disorder     Arthritis     Asthma     Cataract     [facility] [address]     Diabetes mellitus [facility] [address] [facility]     Disease of thyroid gland     Dry eye syndrome     Ectropion of left eye     last assessed: 10/17/2016    Emphysema, 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[facility] [address]     Hearing loss     History of malignant neoplasm of thyroid     Hypertension     Malignant neoplasm of skin     Memory loss     Mitral valve regurgitation     Myocardial infarction (HCC)     Obesity hypoventilation syndrome (HCC)     Pain     right hip    Restless leg syndrome     Seasonal allergies     Skin cancer (melanoma) (HCC)     Sleep related hypoxia     Thyroid cancer ([facility] [address] [facility]        Past Surgical History:   Procedure Laterality Date    CHOLECYSTECTOMY      EYE SURGERY      HYSTERECTOMY      OTHER SURGICAL HISTORY      removal of lesion    PERICARDIUM SURGERY      resection of pericardial cyst or tumor    [place] ERCP DX COLLECTION SPECIMEN BRUSHING/WASHING N/A 5/30/2017    Procedure: ENDOSCOPIC RETROGRADE CHOLANGIOPANCREATOGRAPHY (ERCP); SPHINCTEROTOMY;  Surgeon: Kody Lozano MD;  Location:  GI LAB; Service: Gastroenterology   Minnie Beat JOINT Right 3/30/2018    Procedure: Right Sacroiliac Injection;  Surgeon: Tiffany Hamilton MD;  Location: [facility] MAIN OR;  Service: Pain Management     REPLACEMENT TOTAL KNEE Left     REPLACEMENT TOTAL KNEE Right     REPLACEMENT TOTAL KNEE BILATERAL      SKIN BIOPSY      melanoma of back    STEROID INJECTION HIP Right 12/21/2017    Procedure: TROCHANTERIC BURSA INJECTION RIGHT;  Surgeon: Tiffany Hamilton MD;  Location: [facility] MAIN OR;  Service: Pain Management     THORACOSCOPY      (therapeutic) w/excision of pericardial cyst    THORACOTOMY Right     at 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, for pericardial cyst    THYROID SURGERY      THYROIDECTOMY, PARTIAL      For thyroid cancer       Family History   Problem Relation Age of Onset    Cancer Father     Arthritis Father     Liver cancer Father     Diabetes Sister     Asthma Mother     No Known Problems Brother     Substance Abuse Neg Hx     Mental illness Neg Hx          Medications have been verified          Objective   /56 (BP Location: Left arm, Patient Position: Sitting, Cuff Size: Large)   Pulse 94   Temp 98 1 °F (36 7 °C) (Tympanic)   Resp 20   Ht 5' 2" (1 575 m)   Wt 97 3 kg (214 lb 9 6 oz)   LMP  (LMP Unknown)   SpO2 95%   BMI 39 25 kg/m²        Physical Exam     Physical Exam   Constitutional: She is oriented to person, place, and time  Vital signs are normal  She appears well-developed and well-nourished  She is active  Non-toxic appearance  She does not have a sickly appearance  She does not appear ill  No distress  Neurological: She is alert and oriented to person, place, and time  Skin: She is not diaphoretic  BL lower extremities appear to have a chronic venous dermatitis  Dry, flaking, and peeling skin noted of BL lower extremities from the knee down including BL feet  There is an intense underlying erythema of the skin, and the skin is warm to touch  The skin of the right foot is intensely erythematous and has a fine papular rash covering the dorsal surface  There is no rash noted on the palms or soles  There is no calf tenderness  There are no open wounds or sores  No drainage from the skin  Sensations are intact  BL dorsalis pedis pulses are palpable  Psychiatric: She has a normal mood and affect  Her behavior is normal  Judgment and thought content normal    Nursing note and vitals reviewed

## 2018-06-21 NOTE — TELEPHONE ENCOUNTER
Left patient voicemail to inform her, her secondary insurance isn't par in our office and will be responsible for any bills not covered by insurance

## 2018-06-27 ENCOUNTER — TRANSCRIBE ORDERS (OUTPATIENT)
Dept: ADMINISTRATIVE | Facility: HOSPITAL | Age: 78
End: 2018-06-27

## 2018-06-27 DIAGNOSIS — R52 PAIN: Primary | ICD-10-CM

## 2018-06-29 ENCOUNTER — HOSPITAL ENCOUNTER (OUTPATIENT)
Dept: RADIOLOGY | Facility: HOSPITAL | Age: 78
Discharge: HOME | End: 2018-06-29

## 2018-06-29 ENCOUNTER — TELEPHONE (OUTPATIENT)
Dept: GASTROENTEROLOGY | Facility: CLINIC | Age: 78
End: 2018-06-29

## 2018-06-29 ENCOUNTER — OFFICE VISIT (OUTPATIENT)
Dept: GASTROENTEROLOGY | Facility: CLINIC | Age: 78
End: 2018-06-29
Payer: COMMERCIAL

## 2018-06-29 VITALS
SYSTOLIC BLOOD PRESSURE: 134 MMHG | BODY MASS INDEX: 40.74 KG/M2 | TEMPERATURE: 97.8 F | HEIGHT: 62 IN | HEART RATE: 90 BPM | WEIGHT: 221.4 LBS | DIASTOLIC BLOOD PRESSURE: 86 MMHG

## 2018-06-29 DIAGNOSIS — K83.8 DILATION OF BILIARY TRACT: ICD-10-CM

## 2018-06-29 DIAGNOSIS — K86.2 PANCREATIC CYST: Chronic | ICD-10-CM

## 2018-06-29 DIAGNOSIS — R52 PAIN: ICD-10-CM

## 2018-06-29 DIAGNOSIS — K59.09 CHRONIC CONSTIPATION: Primary | ICD-10-CM

## 2018-06-29 DIAGNOSIS — I83.813 VARICOSE VEINS OF BILATERAL LOWER EXTREMITIES WITH PAIN: ICD-10-CM

## 2018-06-29 DIAGNOSIS — R60.9 CHRONIC EDEMA: ICD-10-CM

## 2018-06-29 PROCEDURE — 99213 OFFICE O/P EST LOW 20 MIN: CPT | Performed by: INTERNAL MEDICINE

## 2018-06-29 RX ORDER — ONDANSETRON 4 MG/1
4 TABLET, ORALLY DISINTEGRATING ORAL EVERY 6 HOURS PRN
Qty: 30 TABLET | Refills: 1 | Status: SHIPPED | OUTPATIENT
Start: 2018-06-29 | End: 2018-10-11 | Stop reason: ALTCHOICE

## 2018-06-29 RX ORDER — TRIAMCINOLONE ACETONIDE 1 MG/G
CREAM TOPICAL
COMMUNITY
Start: 2018-06-26 | End: 2018-07-13

## 2018-06-29 RX ORDER — OXYCODONE HYDROCHLORIDE AND ACETAMINOPHEN 5; 325 MG/1; MG/1
TABLET ORAL
COMMUNITY
Start: 2018-06-22 | End: 2018-07-18 | Stop reason: HOSPADM

## 2018-06-29 RX ORDER — TRIAMCINOLONE ACETONIDE 1 MG/G
CREAM TOPICAL
COMMUNITY
End: 2018-07-13

## 2018-06-29 NOTE — TELEPHONE ENCOUNTER
Spoke to patient about her out of network benefits   Patient stated she was seen in the office before so is aware if she receives bill from [facility] since ins is not par

## 2018-06-29 NOTE — PROGRESS NOTES
[address] [facility]  Olivia Dubon 77 y o  female MRN: [id number]            Assessment & Plan:    65-year-old female with history of chronic pain on narcotics, CHF, COPD, pancreatic cyst and intrahepatic biliary dilatation  The patient recently stopped many of her medications including her psych medications due to concerns for side effects  She was instructed to follow up with PCP to discuss this  1   Chronic constipation:  Functional but also component of chronic narcotic use  -continue Linzess, MiraLax, senna which seems to keep her regular  -associated with constipation she has right upper quadrant pain, should improve as her bowel movements regularly    2  History of pancreatic cyst and intrahepatic biliary dilatation:  Has been stable on imaging, had a recent MRI under general anesthesia which did not demonstrate any concerning findings  -the patient and her daughter aware that there is a small risk that this has malignant transformation potential over years, due to stability would continue to follow this closely with imaging studies in the next 1 to 2 years  -she is also aware that she is at high risk for any further evaluation and management high, we will proceed generally conservatively    The patient will follow-up in 6 months          _____________________________________________________________        CC:  Here for follow-up    HPI:  Olivia Dubon is a 77 y  o female who is here for follow-up of abdominal pain  As you know this is a 65-year-old female with a history of chronic pain, has had chronic constipation secondary to narcotic use  Has had biliary dilatation pancreatic cysts  Has been under fairly good regimen with her bowels with regular bowel movements as long she takes Linzess, MiraLax, senna  For some reason she had completed her Linzess prescription several days earlier was unable to get up for about 4 days    Due to this she had increasing constipation with no bowel movements, right upper quadrant abdominal pain which is normal for her in this situation  She also developed nausea  She was able to  some Linzess yesterday, she had diarrhea today  She presents today with her daughter  The patient has continued joint pain, has been taking Percocet without any significant improvement  Her weight has been stable  She is trying to get evaluated by Pain Management  ROS:  The remainder of the ROS was negative except for the pertinent positives mentioned in HPI        Allergies: Ketorolac; Latex; and Wound dressing adhesive    Medications:   Current Outpatient Prescriptions:     acetaminophen (TYLENOL) 325 mg tablet, Take 2 tablets (650 mg total) by mouth every 6 (six) hours as needed for mild pain, headaches or fever, Disp: 30 tablet, Rfl: 0    albuterol (ACCUNEB) 1 25 MG/3ML nebulizer solution, Take 3 mL (1 25 mg total) by nebulization 3 (three) times a day as needed for wheezing (J45 909), Disp: 60 vial, Rfl: 0    amoxicillin (AMOXIL) 500 mg capsule, amoxicillin 500 mg caps, Disp: , Rfl:     ascorbic acid (VITAMIN C) 500 mg tablet, Take 500 mg by mouth daily, Disp: , Rfl:     aspirin 325 mg tablet, Take 325 mg by mouth daily, Disp: , Rfl:     COD LIVER OIL PO, Take by mouth daily, Disp: , Rfl:     escitalopram (LEXAPRO) 10 mg tablet, Take 1 tablet (10 mg total) by mouth daily, Disp: 30 tablet, Rfl: 3    folic acid (FOLVITE) 1 mg tablet, Take by mouth daily, Disp: , Rfl:     gabapentin (NEURONTIN) 100 mg capsule, Take 1 capsule (100 mg total) by mouth 3 (three) times a day, Disp: 180 capsule, Rfl: 3    glucose blood test strip, Accu-Chek Rachel Plus test strips, Disp: , Rfl:     insulin lispro (HumaLOG) 100 units/mL injection, Inject 4-20 Units under the skin 3 (three) times a day before meals (Patient taking differently: Inject 45 Units under the skin 2 (two) times a day Sliding scale ), Disp: , Rfl: 0    lactulose (CHRONULAC) 10 g/15 mL solution, lactulose 10 gm/15ml soln PRN, Disp: , Rfl:     levothyroxine 125 mcg tablet, Take 1 tablet (125 mcg total) by mouth daily, Disp: 90 tablet, Rfl: 1    Linaclotide (LINZESS) 72 MCG CAPS, Take 72 mcg by mouth daily before breakfast, Disp: 30 capsule, Rfl: 6    metolazone (ZAROXOLYN) 2 5 mg tablet, Take one tablet three times a week, Disp: 90 tablet, Rfl: 1    metoprolol tartrate (LOPRESSOR) 25 mg tablet, Take 1 tablet by mouth 2 (two) times a day, Disp: , Rfl:     Multiple Vitamins-Minerals (MULTIVITAMIN ADULT PO), Take 1 tablet by mouth daily, Disp: , Rfl:     Needle, Disp, (EASY TOUCH FLIPLOCK NEEDLES) 31G X 5/16" MISC, Easy Touch 31 gauge x 3/16" needle, Disp: , Rfl:     oxyCODONE-acetaminophen (PERCOCET) 5-325 mg per tablet, , Disp: , Rfl:     polyethylene glycol (MIRALAX) 17 g packet, Take 17 g by mouth daily (Patient taking differently: Take 17 g by mouth 2 (two) times a day  ), Disp: 14 each, Rfl: 0    potassium chloride (MICRO-K) 10 MEQ CR capsule, Take 1 capsule (10 mEq total) by mouth daily, Disp: 90 capsule, Rfl: 3    pramipexole (MIRAPEX) 1 mg tablet, Take 1 tablet (1 mg total) by mouth 3 (three) times a day, Disp: 90 tablet, Rfl: 3    tiotropium (SPIRIVA HANDIHALER) 18 mcg inhalation capsule, spiriva handihaler 18 mcg caps, Disp: , Rfl:     torsemide (DEMADEX) 20 mg tablet, Take 2 tablets (40 mg total) by mouth 2 (two) times a day mwf, Disp: 60 tablet, Rfl: 1    traMADol (ULTRAM) 50 mg tablet, Take 1 tablet (50 mg total) by mouth every 6 (six) hours as needed for moderate pain, Disp: 45 tablet, Rfl: 0    triamcinolone (KENALOG) 0 1 % cream, , Disp: , Rfl:     triamcinolone (KENALOG) 0 1 % cream, triamcinolone acetonide 0 1 % crea, Disp: , Rfl:     venlafaxine (EFFEXOR) 75 mg tablet, venlafaxine 75 mg tablet, Disp: , Rfl:     ondansetron (ZOFRAN-ODT) 4 mg disintegrating tablet, Take 1 tablet (4 mg total) by mouth every 6 (six) hours as needed for nausea or vomiting, Disp: 30 tablet, Rfl: 1    Past Medical History:   Diagnosis Date    Anxiety     Aortic valve disorder     Arthritis     Asthma     Cataract     [facility]) [facility] [address]     Diabetes mellitus ([facility] [address]     Disease of thyroid gland     Dry eye syndrome     Ectropion of left eye     last assessed: 10/17/2016    Emphysema, 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HCC)     Hearing loss     History of malignant neoplasm of thyroid     Hypertension     Malignant neoplasm of skin     Memory loss     Mitral valve regurgitation     Myocardial infarction ([facility] [address] [facility]     Obesity hypoventilation syndrome (HCC)     Pain     right hip    Restless leg syndrome     Seasonal allergies     Skin cancer (melanoma) ([facility] [address] [facility]     Sleep related hypoxia     Thyroid cancer ([facility] [address] [facility]        Past Surgical History:   Procedure Laterality Date    CHOLECYSTECTOMY      EYE SURGERY      HYSTERECTOMY      OTHER SURGICAL HISTORY      removal of lesion    PERICARDIUM SURGERY      resection of pericardial cyst or tumor    [place] ERCP DX COLLECTION SPECIMEN BRUSHING/WASHING N/A 5/30/2017    Procedure: ENDOSCOPIC RETROGRADE CHOLANGIOPANCREATOGRAPHY (ERCP); SPHINCTEROTOMY;  Surgeon: Horacio Wilson MD;  Location:  GI LAB;   Service: Gastroenterology   Ardie Payer JOINT Right 3/30/2018    Procedure: Right Sacroiliac Injection;  Surgeon: Ava Corrales MD;  Location: [facility] MAIN OR;  Service: Pain Management     REPLACEMENT TOTAL KNEE Left     REPLACEMENT TOTAL KNEE Right     REPLACEMENT TOTAL KNEE BILATERAL      SKIN BIOPSY      melanoma of back    STEROID INJECTION HIP Right 12/21/2017    Procedure: TROCHANTERIC BURSA INJECTION RIGHT;  Surgeon: Ava Corrales MD;  Location: [place] [address] MAIN OR;  Service: Pain Management     THORACOSCOPY      (therapeutic) w/excision of pericardial cyst    THORACOTOMY Right     at least 40 years, for pericardial cyst    THYROID SURGERY      THYROIDECTOMY, PARTIAL      For thyroid cancer       Family History   Problem Relation Age of Onset    Cancer Father     Arthritis Father     Liver cancer Father     Diabetes Sister     Asthma Mother     No Known Problems Brother     Substance Abuse Neg Hx     Mental illness Neg Hx         reports that she has never smoked  She has never used smokeless tobacco  She reports that she does not drink alcohol or use drugs        Physical Exam:    /86 (BP Location: Left arm, Patient Position: Sitting, Cuff Size: Standard)   Pulse 90   Temp 97 8 °F (36 6 °C) (Tympanic)   Ht 5' 2" (1 575 m)   Wt 100 kg (221 lb 6 4 oz)   LMP  (LMP Unknown)   BMI 40 49 kg/m²     Gen: wn/wd, NAD, obese, chronically ill-appearing  HEENT: anicteric, MMM, no cervical LAD  CVS: RRR, no m/r/g  CHEST: CTA b/l  ABD: +BS, soft, NT,ND, no hepatosplenomegaly  EXT:  Bilateral lower extremity edema, left greater than right with dressing  NEURO: aaox3  SKIN: NO rashes

## 2018-06-29 NOTE — LETTER
June 29, 2018     Mena Silva MD  [address] [address]    Patient: Sarah Hou   YOB: 1940   Date of Visit: 6/29/2018       Dear Dr Frankie Sampson: Thank you for referring Sarah Hou to me for evaluation  Below are my notes for this consultation  If you have questions, please do not hesitate to call me  I look forward to following your patient along with you  Sincerely,        Shreya Hurtado MD        CC: No Recipients  Shreya Hurtado MD  6/29/2018 11:44 AM  Sign at close encounter  [address] [facility]  Sarah Hou 77 y o  female MRN: [id number]            Assessment & Plan:    21-year-old female with history of chronic pain on narcotics, CHF, COPD, pancreatic cyst and intrahepatic biliary dilatation  The patient recently stopped many of her medications including her psych medications due to concerns for side effects  She was instructed to follow up with PCP to discuss this  1   Chronic constipation:  Functional but also component of chronic narcotic use  -continue Linzess, MiraLax, senna which seems to keep her regular  -associated with constipation she has right upper quadrant pain, should improve as her bowel movements regularly    2  History of pancreatic cyst and intrahepatic biliary dilatation:  Has been stable on imaging, had a recent MRI under general anesthesia which did not demonstrate any concerning findings  -the patient and her daughter aware that there is a small risk that this has malignant transformation potential over years, due to stability would continue to follow this closely with imaging studies in the next 1 to 2 years  -she is also aware that she is at high risk for any further evaluation and management high, we will proceed generally conservatively    The patient will follow-up in 6 months          _____________________________________________________________        CC:  Here for follow-up    HPI:  Sarah Hou is a 77 y  o female who is here for follow-up of abdominal pain  As you know this is a 79-year-old female with a history of chronic pain, has had chronic constipation secondary to narcotic use  Has had biliary dilatation pancreatic cysts  Has been under fairly good regimen with her bowels with regular bowel movements as long she takes Linzess, MiraLax, senna  For some reason she had completed her Linzess prescription several days earlier was unable to get up for about 4 days  Due to this she had increasing constipation with no bowel movements, right upper quadrant abdominal pain which is normal for her in this situation  She also developed nausea  She was able to  some Linzess yesterday, she had diarrhea today  She presents today with her daughter  The patient has continued joint pain, has been taking Percocet without any significant improvement  Her weight has been stable  She is trying to get evaluated by Pain Management  ROS:  The remainder of the ROS was negative except for the pertinent positives mentioned in HPI        Allergies: Ketorolac; Latex; and Wound dressing adhesive    Medications:   Current Outpatient Prescriptions:     acetaminophen (TYLENOL) 325 mg tablet, Take 2 tablets (650 mg total) by mouth every 6 (six) hours as needed for mild pain, headaches or fever, Disp: 30 tablet, Rfl: 0    albuterol (ACCUNEB) 1 25 MG/3ML nebulizer solution, Take 3 mL (1 25 mg total) by nebulization 3 (three) times a day as needed for wheezing (J45 909), Disp: 60 vial, Rfl: 0    amoxicillin (AMOXIL) 500 mg capsule, amoxicillin 500 mg caps, Disp: , Rfl:     ascorbic acid (VITAMIN C) 500 mg tablet, Take 500 mg by mouth daily, Disp: , Rfl:     aspirin 325 mg tablet, Take 325 mg by mouth daily, Disp: , Rfl:     COD LIVER OIL PO, Take by mouth daily, Disp: , Rfl:     escitalopram (LEXAPRO) 10 mg tablet, Take 1 tablet (10 mg total) by mouth daily, Disp: 30 tablet, Rfl: 3    folic acid (FOLVITE) 1 mg tablet, Take by mouth daily, Disp: , Rfl:     gabapentin (NEURONTIN) 100 mg capsule, Take 1 capsule (100 mg total) by mouth 3 (three) times a day, Disp: 180 capsule, Rfl: 3    glucose blood test strip, Accu-Chek Rachel Plus test strips, Disp: , Rfl:     insulin lispro (HumaLOG) 100 units/mL injection, Inject 4-20 Units under the skin 3 (three) times a day before meals (Patient taking differently: Inject 45 Units under the skin 2 (two) times a day Sliding scale ), Disp: , Rfl: 0    lactulose (CHRONULAC) 10 g/15 mL solution, lactulose 10 gm/15ml soln PRN, Disp: , Rfl:     levothyroxine 125 mcg tablet, Take 1 tablet (125 mcg total) by mouth daily, Disp: 90 tablet, Rfl: 1    Linaclotide (LINZESS) 72 MCG CAPS, Take 72 mcg by mouth daily before breakfast, Disp: 30 capsule, Rfl: 6    metolazone (ZAROXOLYN) 2 5 mg tablet, Take one tablet three times a week, Disp: 90 tablet, Rfl: 1    metoprolol tartrate (LOPRESSOR) 25 mg tablet, Take 1 tablet by mouth 2 (two) times a day, Disp: , Rfl:     Multiple Vitamins-Minerals (MULTIVITAMIN ADULT PO), Take 1 tablet by mouth daily, Disp: , Rfl:     Needle, Disp, (EASY TOUCH FLIPLOCK NEEDLES) 31G X 5/16" MISC, Easy Touch 31 gauge x 3/16" needle, Disp: , Rfl:     oxyCODONE-acetaminophen (PERCOCET) 5-325 mg per tablet, , Disp: , Rfl:     polyethylene glycol (MIRALAX) 17 g packet, Take 17 g by mouth daily (Patient taking differently: Take 17 g by mouth 2 (two) times a day  ), Disp: 14 each, Rfl: 0    potassium chloride (MICRO-K) 10 MEQ CR capsule, Take 1 capsule (10 mEq total) by mouth daily, Disp: 90 capsule, Rfl: 3    pramipexole (MIRAPEX) 1 mg tablet, Take 1 tablet (1 mg total) by mouth 3 (three) times a day, Disp: 90 tablet, Rfl: 3    tiotropium (SPIRIVA HANDIHALER) 18 mcg inhalation capsule, spiriva handihaler 18 mcg caps, Disp: , Rfl:     torsemide (DEMADEX) 20 mg tablet, Take 2 tablets (40 mg total) by mouth 2 (two) times a day mwf, Disp: 60 tablet, Rfl: 1    traMADol (ULTRAM) 50 mg tablet, Take 1 tablet (50 mg total) by mouth every 6 (six) hours as needed for moderate pain, Disp: 45 tablet, Rfl: 0    triamcinolone (KENALOG) 0 1 % cream, , Disp: , Rfl:     triamcinolone (KENALOG) 0 1 % cream, triamcinolone acetonide 0 1 % crea, Disp: , Rfl:     venlafaxine (EFFEXOR) 75 mg tablet, venlafaxine 75 mg tablet, Disp: , Rfl:     ondansetron (ZOFRAN-ODT) 4 mg disintegrating tablet, Take 1 tablet (4 mg total) by mouth every 6 (six) hours as needed for nausea or vomiting, Disp: 30 tablet, Rfl: 1    Past Medical History:   Diagnosis Date    Anxiety     Aortic valve disorder     Arthritis     Asthma     Cataract     [facility] disease) [facility]     Diabetes mellitus ([facility]     Disease of thyroid gland     Dry eye syndrome     Ectropion of left eye     last assessed: 10/17/2016    Emphysema, 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HCC)     Hearing loss     History of malignant neoplasm of thyroid     Hypertension     Malignant neoplasm of skin     Memory loss     Mitral valve regurgitation     Myocardial infarction ([address] )     Obesity hypoventilation syndrome (HCC)     Pain     right hip    Restless leg syndrome     Seasonal allergies     Skin cancer (melanoma) ([facility]     Sleep related hypoxia     Thyroid cancer ([facility]        Past Surgical History:   Procedure Laterality Date    CHOLECYSTECTOMY      EYE SURGERY      HYSTERECTOMY      OTHER SURGICAL HISTORY      removal of lesion    PERICARDIUM SURGERY      resection of pericardial cyst or tumor    RI ERCP DX COLLECTION SPECIMEN BRUSHING/WASHING N/A 5/30/2017    Procedure: ENDOSCOPIC RETROGRADE CHOLANGIOPANCREATOGRAPHY (ERCP); SPHINCTEROTOMY;  Surgeon: Jimmy Johnston MD;  Location: [facility];   Service: Gastroenterology   Natalya Catena JOINT Right 3/30/2018    Procedure: Right Sacroiliac Injection;  Surgeon: Katt Gutierres MD;  Location: [facility] MAIN OR;  Service: Pain Management     REPLACEMENT TOTAL KNEE Left     REPLACEMENT TOTAL KNEE Right     REPLACEMENT TOTAL KNEE BILATERAL      SKIN BIOPSY      melanoma of back    STEROID INJECTION HIP Right 12/21/2017    Procedure: TROCHANTERIC BURSA INJECTION RIGHT;  Surgeon: Katt Gutierres MD;  Location: [place] [address] MAIN OR;  Service: Pain Management     THORACOSCOPY      (therapeutic) w/excision of pericardial cyst    THORACOTOMY Right     at least 40 years, for pericardial cyst    THYROID SURGERY      THYROIDECTOMY, PARTIAL      For thyroid cancer       Family History   Problem Relation Age of Onset    Cancer Father     Arthritis Father     Liver cancer Father     Diabetes Sister     Asthma Mother     No Known Problems Brother     Substance Abuse Neg Hx     Mental illness Neg Hx         reports that she has never smoked  She has never used smokeless tobacco  She reports that she does not drink alcohol or use drugs        Physical Exam:    /86 (BP Location: Left arm, Patient Position: Sitting, Cuff Size: Standard)   Pulse 90   Temp 97 8 °F (36 6 °C) (Tympanic)   Ht 5' 2" (1 575 m)   Wt 100 kg (221 lb 6 4 oz)   LMP  (LMP Unknown)   BMI 40 49 kg/m²      Gen: wn/wd, NAD, obese, chronically ill-appearing  HEENT: anicteric, MMM, no cervical LAD  CVS: RRR, no m/r/g  CHEST: CTA b/l  ABD: +BS, soft, NT,ND, no hepatosplenomegaly  EXT:  Bilateral lower extremity edema, left greater than right with dressing  NEURO: aaox3  SKIN: NO rashes

## 2018-07-06 DIAGNOSIS — R60.9 EDEMA, UNSPECIFIED TYPE: ICD-10-CM

## 2018-07-08 RX ORDER — TORSEMIDE 20 MG/1
TABLET ORAL
Qty: 120 TABLET | Refills: 3 | Status: SHIPPED | OUTPATIENT
Start: 2018-07-08 | End: 2018-07-18 | Stop reason: HOSPADM

## 2018-07-11 ENCOUNTER — HOSPITAL ENCOUNTER (OUTPATIENT)
Dept: RADIOLOGY | Facility: HOSPITAL | Age: 78
Discharge: HOME | End: 2018-07-11
Payer: COMMERCIAL

## 2018-07-11 PROCEDURE — 93970 EXTREMITY STUDY: CPT

## 2018-07-12 ENCOUNTER — OFFICE VISIT (OUTPATIENT)
Dept: PAIN MEDICINE | Facility: CLINIC | Age: 78
End: 2018-07-12
Payer: COMMERCIAL

## 2018-07-12 VITALS
HEART RATE: 83 BPM | DIASTOLIC BLOOD PRESSURE: 52 MMHG | SYSTOLIC BLOOD PRESSURE: 120 MMHG | BODY MASS INDEX: 39.82 KG/M2 | WEIGHT: 216.4 LBS | TEMPERATURE: 98.8 F | RESPIRATION RATE: 20 BRPM | HEIGHT: 62 IN

## 2018-07-12 DIAGNOSIS — G89.29 CHRONIC RIGHT-SIDED LOW BACK PAIN WITHOUT SCIATICA: ICD-10-CM

## 2018-07-12 DIAGNOSIS — M54.50 CHRONIC RIGHT-SIDED LOW BACK PAIN WITHOUT SCIATICA: ICD-10-CM

## 2018-07-12 DIAGNOSIS — M25.50 MULTIPLE JOINT PAIN: ICD-10-CM

## 2018-07-12 DIAGNOSIS — G89.4 CHRONIC PAIN SYNDROME: Primary | ICD-10-CM

## 2018-07-12 DIAGNOSIS — M25.552 BILATERAL HIP PAIN: ICD-10-CM

## 2018-07-12 DIAGNOSIS — M25.551 BILATERAL HIP PAIN: ICD-10-CM

## 2018-07-12 DIAGNOSIS — M54.2 NECK PAIN: ICD-10-CM

## 2018-07-12 DIAGNOSIS — M54.12 CERVICAL RADICULOPATHY: ICD-10-CM

## 2018-07-12 PROCEDURE — 99214 OFFICE O/P EST MOD 30 MIN: CPT | Performed by: ANESTHESIOLOGY

## 2018-07-12 PROCEDURE — 93970 EXTREMITY STUDY: CPT | Performed by: SURGERY

## 2018-07-12 RX ORDER — ERYTHROMYCIN 5 MG/G
OINTMENT OPHTHALMIC
COMMUNITY
Start: 2018-07-11 | End: 2018-07-13

## 2018-07-12 NOTE — PROGRESS NOTES
Pain Medicine Follow-Up Note    Assessment:  1  Chronic pain syndrome    2  Neck pain    3  Cervical radiculopathy    4  Chronic right-sided low back pain without sciatica    5  Bilateral hip pain    6  Multiple joint pain        Plan:  Orders Placed This Encounter   Procedures    Ambulatory referral to Physical Therapy     Standing Status:   Future     Standing Expiration Date:   1/12/2019     Referral Priority:   Routine     Referral Type:   Physical Therapy     Referral Reason:   Specialty Services Required     Requested Specialty:   Physical Therapy     Number of Visits Requested:   1     Expiration Date:   7/12/2019    Ambulatory referral to Orthopedic Surgery     Standing Status:   Future     Standing Expiration Date:   1/12/2019     Referral Priority:   Routine     Referral Type:   Consult - AMB     Referral Reason:   Specialty Services Required     Referred to Provider:   Malika Smith DO     Requested Specialty:   Orthopedic Surgery     Number of Visits Requested:   1     Expiration Date:   7/12/2019       New Medications Ordered This Visit   Medications    erythromycin (ILOTYCIN) ophthalmic ointment     My impressions and treatment recommendations were discussed in detail with the patient who verbalized understanding and had no further questions  The patient had a right sacroiliac joint injection on March 30, 2018  She reports no significant benefit following this injection  She did previously have a right trochanteric bursa injection with relief for a few days  At this point, I felt a reasonable to have the patient see Dr Caitlin Orr for evaluation for hip pathology as the possible cause for her hip pain  In addition, since the patient is complaining of cervical pain with cervical radiculopathy as well as low back pain with lower extremity possible radicular symptoms, I felt a reasonable to have her undergo physical therapy 2-3 times per week for 4-6 weeks    If the patient does not respond to physical therapy, will obtain a MRI of the cervical as well as an MRI of the lumbar spine  Follow-up is planned with the patient in 6 weeks time or sooner as warranted  Discharge instructions were provided  I personally saw and examined the patient and I agree with the above discussed plan of care  History of Present Illness:    Alex Jean Baptiste is a 77 y o  female who presents to [facility] for interval re-evaluation of the above stated pain complaints  The patient has a past medical and chronic pain history as outlined in the assessment section  She was last seen on March 30, 2018 at which time she underwent a right sacroiliac joint injection  At today's office visit, the patient's pain score is 10/10 on the verbal numerical pain rating scale  The patient states that her pain is worse in the morning, evening, and night  Her pain is intermittent in nature and she reports the quality of her pain as burning    Patient is primarily complaining of multiple joint pain involving her body both above and below the diaphragm bilaterally  She is primarily complaining of neck pain with bilateral upper extremity radicular symptoms  She also complains of right hip pain as well as left hip pain  Other than as stated above, the patient denies any interval changes in medications, medical condition, mental condition, symptoms, or allergies since the last office visit  Review of Systems:    Review of Systems   Respiratory: Positive for shortness of breath  Cardiovascular: Negative for chest pain  Gastrointestinal: Positive for constipation, nausea and vomiting  Negative for diarrhea  Musculoskeletal: Positive for gait problem (difficulty walking/ decreased range of motion) and joint swelling (joint stiffness)  Negative for arthralgias and myalgias  Skin: Positive for rash  Neurological: Positive for dizziness and weakness (muscle weakness)  Negative for seizures     All other systems reviewed and are negative          Patient Active Problem List   Diagnosis    Nausea    Acquired hypothyroidism    Lung nodules    Morbid obesity with BMI of 40 0-44 9, adult ([facility]    Asthma    Constipation due to opioid therapy    Restless leg    Sleep apnea    Pancreatic cyst    Anxiety    Obesity hypoventilation syndrome (HCC)    Bilateral hip pain    HTN (hypertension)    Primary osteoarthritis involving multiple joints    Balance problem    Diabetes mellitus with neuropathy (HCC)    Diastolic dysfunction    Meningiomas, multiple (HCC)    Moderate episode of recurrent major depressive disorder ([facility]    Pulmonary emphysema (HCC)    Chronic right-sided low back pain without sciatica    Chronic pain syndrome    Diastolic CHF (HCC)    Chronic venous stasis dermatitis of both lower extremities    Physical deconditioning    Chronic respiratory failure (HCC)    Sacroiliitis (HCC)    Chronic constipation    Dilation of biliary tract    Onychomycosis    Peripheral vascular disease (HCC)    Peripheral venous insufficiency    Bilateral cellulitis of lower leg    Cellulitis of right foot    Neck pain    Cervical radiculopathy    Multiple joint pain       Past Medical History:   Diagnosis Date    Anxiety     Aortic valve disorder     Arthritis     Asthma     Cataract     COPD (chronic obstructive pulmonary disease) ([facility]     Diabetes mellitus ([facility]     Disease of thyroid gland     Dry eye syndrome     Ectropion of left eye     last assessed: 10/17/2016    Emphysema, 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HCC)     Hearing loss     History of malignant neoplasm of thyroid     Hypertension     Malignant neoplasm of skin     Memory loss     Mitral valve regurgitation     Myocardial infarction (HCC)     Obesity hypoventilation syndrome (HCC)     Pain     right hip    Restless leg syndrome     Seasonal allergies     Skin cancer (melanoma) ([facility]     Sleep related hypoxia     Thyroid cancer [facility])        Past Surgical History:   Procedure Laterality Date    CHOLECYSTECTOMY      EYE SURGERY      HYSTERECTOMY      OTHER SURGICAL HISTORY      removal of lesion    PERICARDIUM SURGERY      resection of pericardial cyst or tumor    RI ERCP DX COLLECTION SPECIMEN BRUSHING/WASHING N/A 5/30/2017    Procedure: ENDOSCOPIC RETROGRADE CHOLANGIOPANCREATOGRAPHY (ERCP); SPHINCTEROTOMY;  Surgeon: Amy Ramos MD;  Location:  GI LAB;   Service: Gastroenterology   Nicky Jallohk JOINT Right 3/30/2018    Procedure: Right Sacroiliac Injection;  Surgeon: Kay Gaitan MD;  Location: [facility] MAIN OR;  Service: Pain Management     REPLACEMENT TOTAL KNEE Left     REPLACEMENT TOTAL KNEE Right     REPLACEMENT TOTAL KNEE BILATERAL      SKIN BIOPSY      melanoma of back    STEROID INJECTION HIP Right 12/21/2017    Procedure: TROCHANTERIC BURSA INJECTION RIGHT;  Surgeon: Kay Gaitan MD;  Location: [place] [address] MAIN OR;  Service: Pain Management     THORACOSCOPY      (therapeutic) w/excision of pericardial cyst    THORACOTOMY Right     at 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, for pericardial cyst    THYROID SURGERY      THYROIDECTOMY, PARTIAL      For thyroid cancer       Family History   Problem Relation Age of Onset    Cancer Father     Arthritis Father     Liver cancer Father     Diabetes Sister     Asthma Mother     No Known Problems Brother     Substance Abuse Neg Hx     Mental illness Neg Hx        Social History     Occupational History    retired      Social History Main Topics    Smoking status: Never Smoker    Smokeless tobacco: Never Used    Alcohol use No    Drug use: No    Sexual activity: Not Currently     Partners: Male         Current Outpatient Prescriptions:     acetaminophen (TYLENOL) 325 mg tablet, Take 2 tablets (650 mg total) by mouth every 6 (six) hours as needed for mild pain, headaches or fever, Disp: 30 tablet, Rfl: 0    albuterol (ACCUNEB) 1 25 MG/3ML nebulizer solution, Take 3 mL (1 25 mg total) by nebulization 3 (three) times a day as needed for wheezing (J45 909), Disp: 60 vial, Rfl: 0    amoxicillin (AMOXIL) 500 mg capsule, amoxicillin 500 mg caps, Disp: , Rfl:     ascorbic acid (VITAMIN C) 500 mg tablet, Take 500 mg by mouth daily, Disp: , Rfl:     aspirin 325 mg tablet, Take 325 mg by mouth daily, Disp: , Rfl:     COD LIVER OIL PO, Take by mouth daily, Disp: , Rfl:     erythromycin (ILOTYCIN) ophthalmic ointment, , Disp: , Rfl:     escitalopram (LEXAPRO) 10 mg tablet, Take 1 tablet (10 mg total) by mouth daily, Disp: 30 tablet, Rfl: 3    folic acid (FOLVITE) 1 mg tablet, Take by mouth daily, Disp: , Rfl:     gabapentin (NEURONTIN) 100 mg capsule, Take 1 capsule (100 mg total) by mouth 3 (three) times a day, Disp: 180 capsule, Rfl: 3    glucose blood test strip, Accu-Chek Rachel Plus test strips, Disp: , Rfl:     insulin lispro (HumaLOG) 100 units/mL injection, Inject 4-20 Units under the skin 3 (three) times a day before meals (Patient taking differently: Inject 45 Units under the skin 2 (two) times a day Sliding scale ), Disp: , Rfl: 0    lactulose (CHRONULAC) 10 g/15 mL solution, lactulose 10 gm/15ml soln PRN, Disp: , Rfl:     levothyroxine 125 mcg tablet, Take 1 tablet (125 mcg total) by mouth daily, Disp: 90 tablet, Rfl: 1    Linaclotide (LINZESS) 72 MCG CAPS, Take 72 mcg by mouth daily before breakfast, Disp: 30 capsule, Rfl: 6    metolazone (ZAROXOLYN) 2 5 mg tablet, Take one tablet three times a week, Disp: 90 tablet, Rfl: 1    metoprolol tartrate (LOPRESSOR) 25 mg tablet, Take 1 tablet by mouth 2 (two) times a day, Disp: , Rfl:     Multiple Vitamins-Minerals (MULTIVITAMIN ADULT PO), Take 1 tablet by mouth daily, Disp: , Rfl:     Needle, Disp, (EASY TOUCH FLIPLOCK NEEDLES) 31G X 5/16" MISC, Easy Touch 31 gauge x 3/16" needle, Disp: , Rfl:     ondansetron (ZOFRAN-ODT) 4 mg disintegrating tablet, Take 1 tablet (4 mg total) by mouth every 6 (six) hours as needed for nausea or vomiting, Disp: 30 tablet, Rfl: 1    oxyCODONE-acetaminophen (PERCOCET) 5-325 mg per tablet, , Disp: , Rfl:     polyethylene glycol (MIRALAX) 17 g packet, Take 17 g by mouth daily (Patient taking differently: Take 17 g by mouth 2 (two) times a day  ), Disp: 14 each, Rfl: 0    potassium chloride (MICRO-K) 10 MEQ CR capsule, Take 1 capsule (10 mEq total) by mouth daily, Disp: 90 capsule, Rfl: 3    pramipexole (MIRAPEX) 1 mg tablet, Take 1 tablet (1 mg total) by mouth 3 (three) times a day, Disp: 90 tablet, Rfl: 3    tiotropium (SPIRIVA HANDIHALER) 18 mcg inhalation capsule, spiriva handihaler 18 mcg caps, Disp: , Rfl:     torsemide (DEMADEX) 20 mg tablet, TAKE 2 TABLETS BY MOUTH TWO TIMES DAILY MONDAY,  WEDNESDAY, AND FRIDAY, Disp: 120 tablet, Rfl: 3    traMADol (ULTRAM) 50 mg tablet, Take 1 tablet (50 mg total) by mouth every 6 (six) hours as needed for moderate pain, Disp: 45 tablet, Rfl: 0    triamcinolone (KENALOG) 0 1 % cream, , Disp: , Rfl:     triamcinolone (KENALOG) 0 1 % cream, triamcinolone acetonide 0 1 % crea, Disp: , Rfl:     venlafaxine (EFFEXOR) 75 mg tablet, venlafaxine 75 mg tablet, Disp: , Rfl:     Allergies   Allergen Reactions    Ketorolac Swelling     Toradol    Latex Rash    Wound Dressing Adhesive Rash       Physical Exam:    /52 (BP Location: Left arm, Patient Position: Sitting, Cuff Size: Standard)   Pulse 83   Temp 98 8 °F (37 1 °C) (Oral)   Resp 20   Ht 5' 2" (1 575 m)   Wt 98 2 kg (216 lb 6 4 oz)   LMP  (LMP Unknown)   BMI 39 58 kg/m²     Constitutional:obese  Eyes:anicteric  HEENT:grossly intact  Neck:supple, symmetric, trachea midline and no masses   Pulmonary:even and unlabored  Cardiovascular:No edema or pitting edema present  Skin:Normal without rashes or lesions and well hydrated  Psychiatric:Mood and affect appropriate  Neurologic:Cranial Nerves II-XII grossly intact  Musculoskeletal:antalgic and ambulates with the assistance of a walker Cervical Spine Exam    Appearance:  Normal lordosis  Palpation/Tenderness:  no tenderness or spasm  Sensory:  no sensory deficits noted  Range of Motion:  Flexion:  No limitation  without pain  Extension:  No limitation  without pain  Lateral Flexion - Left:  No limitation  without pain  Lateral Flexion - Right:  No limitation  without pain  Rotation - Left:  No limitation  without pain  Rotation - Right:  No limitation  without pain  Motor Strength:  Left Arm Flexion  5/5  Left Arm Extension  5/5  Right Arm Flexion  5/5  Right Arm Extension  5/5  Left Wrist Flexion  5/5  Left Wrist Extension  5/5  Left Finger Abduction  5/5  Right Finger Abduction  5/5  Left Pincer Grasp  5/5  Right Pincer Grasp  5/5  Left    5/5  Right   5/5  Reflexes:  Left Biceps:  2+   Right Biceps:  2+   Left Brachioradialis:  2+   Right Brachioradialis:  2+   Left Triceps:  2+   Right Triceps:  2+   Special Tests:  Left Spurlings:  negative  Right Spurlings  negative      Orders Placed This Encounter   Procedures    Ambulatory referral to Physical Therapy    Ambulatory referral to Orthopedic Surgery

## 2018-07-12 NOTE — LETTER
July 12, 2018     Vignesh Green MD  [address] [address]    Patient: Olivia Dubon   YOB: 1940   Date of Visit: 7/12/2018       Dear Dr Adan Elmore: Thank you for referring Olivia Dubon to me for evaluation  Below are my notes for this consultation  If you have questions, please do not hesitate to call me  I look forward to following your patient along with you  Sincerely,        Buck Coleman MD        CC: No Recipients  Buck Coleman MD  7/12/2018 10:29 AM  Sign at close encounter  Pain Medicine Follow-Up Note    Assessment:  1  Chronic pain syndrome    2  Neck pain    3  Cervical radiculopathy    4  Chronic right-sided low back pain without sciatica    5  Bilateral hip pain    6  Multiple joint pain        Plan:  Orders Placed This Encounter   Procedures    Ambulatory referral to Physical Therapy     Standing Status:   Future     Standing Expiration Date:   1/12/2019     Referral Priority:   Routine     Referral Type:   Physical Therapy     Referral Reason:   Specialty Services Required     Requested Specialty:   Physical Therapy     Number of Visits Requested:   1     Expiration Date:   7/12/2019    Ambulatory referral to Orthopedic Surgery     Standing Status:   Future     Standing Expiration Date:   1/12/2019     Referral Priority:   Routine     Referral Type:   Consult - AMB     Referral Reason:   Specialty Services Required     Referred to Provider:   Burt Madison DO     Requested Specialty:   Orthopedic Surgery     Number of Visits Requested:   1     Expiration Date:   7/12/2019       New Medications Ordered This Visit   Medications    erythromycin (ILOTYCIN) ophthalmic ointment     My impressions and treatment recommendations were discussed in detail with the patient who verbalized understanding and had no further questions  The patient had a right sacroiliac joint injection on March 30, 2018  She reports no significant benefit following this injection  She did previously have a right trochanteric bursa injection with relief for a few days  At this point, I felt a reasonable to have the patient see Dr Kaveh Dotson for evaluation for hip pathology as the possible cause for her hip pain  In addition, since the patient is complaining of cervical pain with cervical radiculopathy as well as low back pain with lower extremity possible radicular symptoms, I felt a reasonable to have her undergo physical therapy 2-3 times per week for 4-6 weeks  If the patient does not respond to physical therapy, will obtain a MRI of the cervical as well as an MRI of the lumbar spine  Follow-up is planned with the patient in 6 weeks time or sooner as warranted  Discharge instructions were provided  I personally saw and examined the patient and I agree with the above discussed plan of care  History of Present Illness:    Sondra Farrell is a 77 y o  female who presents to [facility] for interval re-evaluation of the above stated pain complaints  The patient has a past medical and chronic pain history as outlined in the assessment section  She was last seen on March 30, 2018 at which time she underwent a right sacroiliac joint injection  At today's office visit, the patient's pain score is 10/10 on the verbal numerical pain rating scale  The patient states that her pain is worse in the morning, evening, and night  Her pain is intermittent in nature and she reports the quality of her pain as burning    Patient is primarily complaining of multiple joint pain involving her body both above and below the diaphragm bilaterally  She is primarily complaining of neck pain with bilateral upper extremity radicular symptoms  She also complains of right hip pain as well as left hip pain  Other than as stated above, the patient denies any interval changes in medications, medical condition, mental condition, symptoms, or allergies since the last office visit  Review of Systems:    Review of Systems   Respiratory: Positive for shortness of breath  Cardiovascular: Negative for chest pain  Gastrointestinal: Positive for constipation, nausea and vomiting  Negative for diarrhea  Musculoskeletal: Positive for gait problem (difficulty walking/ decreased range of motion) and joint swelling (joint stiffness)  Negative for arthralgias and myalgias  Skin: Positive for rash  Neurological: Positive for dizziness and weakness (muscle weakness)  Negative for seizures  All other systems reviewed and are negative          Patient Active Problem List   Diagnosis    Nausea    Acquired hypothyroidism    Lung nodules    Morbid obesity with BMI of 40 0-44 9, adult ([facility] )    Asthma    Constipation due to opioid therapy    Restless leg    Sleep apnea    Pancreatic cyst    Anxiety    Obesity hypoventilation syndrome (HCC)    Bilateral hip pain    HTN (hypertension)    Primary osteoarthritis involving multiple joints    Balance problem    Diabetes mellitus with neuropathy (HCC)    Diastolic dysfunction    Meningiomas, multiple (HCC)    Moderate episode of recurrent major depressive disorder ([place] [address] )    Pulmonary emphysema (HCC)    Chronic right-sided low back pain without sciatica    Chronic pain syndrome    Diastolic CHF (HCC)    Chronic venous stasis dermatitis of both lower extremities    Physical deconditioning    Chronic respiratory failure (HCC)    Sacroiliitis (HCC)    Chronic constipation    Dilation of biliary tract    Onychomycosis    Peripheral vascular disease (HCC)    Peripheral venous insufficiency    Bilateral cellulitis of lower leg    Cellulitis of right foot    Neck pain    Cervical radiculopathy    Multiple joint pain       Past Medical History:   Diagnosis Date    Anxiety     Aortic valve disorder     Arthritis     Asthma     Cataract     COPD (chronic obstructive pulmonary disease) ([place] [address] )     Diabetes mellitus ([place] [address] )  Disease of thyroid gland     Dry eye syndrome     Ectropion of left eye     last assessed: 10/17/2016    Emphysema, 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HCC)     Hearing loss     History of malignant neoplasm of thyroid     Hypertension     Malignant neoplasm of skin     Memory loss     Mitral valve regurgitation     Myocardial infarction [facility] )     Obesity hypoventilation syndrome (HCC)     Pain     right hip    Restless leg syndrome     Seasonal allergies     Skin cancer (melanoma) ([facility] )     Sleep related hypoxia     Thyroid cancer ([facility] )        Past Surgical History:   Procedure Laterality Date    CHOLECYSTECTOMY      EYE SURGERY      HYSTERECTOMY      OTHER SURGICAL HISTORY      removal of lesion    PERICARDIUM SURGERY      resection of pericardial cyst or tumor    MT ERCP DX COLLECTION SPECIMEN BRUSHING/WASHING N/A 5/30/2017    Procedure: ENDOSCOPIC RETROGRADE CHOLANGIOPANCREATOGRAPHY (ERCP); SPHINCTEROTOMY;  Surgeon: Tayla King MD;  Location: [facility];   Service: Gastroenterology   [place] JOINT Right 3/30/2018    Procedure: Right Sacroiliac Injection;  Surgeon: Christo Hernandez MD;  Location: [facility] MAIN OR;  Service: Pain Management     REPLACEMENT TOTAL KNEE Left     REPLACEMENT TOTAL KNEE Right     REPLACEMENT TOTAL KNEE BILATERAL      SKIN BIOPSY      melanoma of back    STEROID INJECTION HIP Right 12/21/2017    Procedure: TROCHANTERIC BURSA INJECTION RIGHT;  Surgeon: Christo Hernandez MD;  Location: [facility] MAIN OR;  Service: Pain Management     THORACOSCOPY      (therapeutic) w/excision of pericardial cyst    THORACOTOMY Right     at least 40 years, for pericardial cyst    THYROID SURGERY      THYROIDECTOMY, PARTIAL      For thyroid cancer       Family History   Problem Relation Age of Onset    Cancer Father     Arthritis Father     Liver cancer Father     Diabetes Sister     Asthma Mother     No Known Problems Brother     Substance Abuse Neg Hx     Mental illness Neg Hx Social History     Occupational History    retired      Social History Main Topics    Smoking status: Never Smoker    Smokeless tobacco: Never Used    Alcohol use No    Drug use: No    Sexual activity: Not Currently     Partners: Male         Current Outpatient Prescriptions:     acetaminophen (TYLENOL) 325 mg tablet, Take 2 tablets (650 mg total) by mouth every 6 (six) hours as needed for mild pain, headaches or fever, Disp: 30 tablet, Rfl: 0    albuterol (ACCUNEB) 1 25 MG/3ML nebulizer solution, Take 3 mL (1 25 mg total) by nebulization 3 (three) times a day as needed for wheezing (J45 [id number]), Disp: 60 vial, Rfl: 0    amoxicillin (AMOXIL) 500 mg capsule, amoxicillin 500 mg caps, Disp: , Rfl:     ascorbic acid (VITAMIN C) 500 mg tablet, Take 500 mg by mouth daily, Disp: , Rfl:     aspirin 325 mg tablet, Take 325 mg by mouth daily, Disp: , Rfl:     COD LIVER OIL PO, Take by mouth daily, Disp: , Rfl:     erythromycin (ILOTYCIN) ophthalmic ointment, , Disp: , Rfl:     escitalopram (LEXAPRO) 10 mg tablet, Take 1 tablet (10 mg total) by mouth daily, Disp: 30 tablet, Rfl: 3    folic acid (FOLVITE) 1 mg tablet, Take by mouth daily, Disp: , Rfl:     gabapentin (NEURONTIN) 100 mg capsule, Take 1 capsule (100 mg total) by mouth 3 (three) times a day, Disp: 180 capsule, Rfl: 3    glucose blood test strip, Accu-Chek Rachel Plus test strips, Disp: , Rfl:     insulin lispro (HumaLOG) 100 units/mL injection, Inject 4-20 Units under the skin 3 (three) times a day before meals (Patient taking differently: Inject 45 Units under the skin 2 (two) times a day Sliding scale ), Disp: , Rfl: 0    lactulose (CHRONULAC) 10 g/15 mL solution, lactulose 10 gm/15ml soln PRN, Disp: , Rfl:     levothyroxine 125 mcg tablet, Take 1 tablet (125 mcg total) by mouth daily, Disp: 90 tablet, Rfl: 1    Linaclotide (LINZESS) 72 MCG CAPS, Take 72 mcg by mouth daily before breakfast, Disp: 30 capsule, Rfl: 6    metolazone (ZAROXOLYN) 2 5 mg tablet, Take one tablet three times a week, Disp: 90 tablet, Rfl: 1    metoprolol tartrate (LOPRESSOR) 25 mg tablet, Take 1 tablet by mouth 2 (two) times a day, Disp: , Rfl:     Multiple Vitamins-Minerals (MULTIVITAMIN ADULT PO), Take 1 tablet by mouth daily, Disp: , Rfl:     Needle, Disp, (EASY TOUCH FLIPLOCK NEEDLES) 31G X 5/16" MISC, Easy Touch 31 gauge x 3/16" needle, Disp: , Rfl:     ondansetron (ZOFRAN-ODT) 4 mg disintegrating tablet, Take 1 tablet (4 mg total) by mouth every 6 (six) hours as needed for nausea or vomiting, Disp: 30 tablet, Rfl: 1    oxyCODONE-acetaminophen (PERCOCET) 5-325 mg per tablet, , Disp: , Rfl:     polyethylene glycol (MIRALAX) 17 g packet, Take 17 g by mouth daily (Patient taking differently: Take 17 g by mouth 2 (two) times a day  ), Disp: 14 each, Rfl: 0    potassium chloride (MICRO-K) 10 MEQ CR capsule, Take 1 capsule (10 mEq total) by mouth daily, Disp: 90 capsule, Rfl: 3    pramipexole (MIRAPEX) 1 mg tablet, Take 1 tablet (1 mg total) by mouth 3 (three) times a day, Disp: 90 tablet, Rfl: 3    tiotropium (SPIRIVA HANDIHALER) 18 mcg inhalation capsule, spiriva handihaler 18 mcg caps, Disp: , Rfl:     torsemide (DEMADEX) 20 mg tablet, TAKE 2 TABLETS BY MOUTH TWO TIMES DAILY MONDAY,  WEDNESDAY, AND FRIDAY, Disp: 120 tablet, Rfl: 3    traMADol (ULTRAM) 50 mg tablet, Take 1 tablet (50 mg total) by mouth every 6 (six) hours as needed for moderate pain, Disp: 45 tablet, Rfl: 0    triamcinolone (KENALOG) 0 1 % cream, , Disp: , Rfl:     triamcinolone (KENALOG) 0 1 % cream, triamcinolone acetonide 0 1 % crea, Disp: , Rfl:     venlafaxine (EFFEXOR) 75 mg tablet, venlafaxine 75 mg tablet, Disp: , Rfl:     Allergies   Allergen Reactions    Ketorolac Swelling     Toradol    Latex Rash    Wound Dressing Adhesive Rash       Physical Exam:    /52 (BP Location: Left arm, Patient Position: Sitting, Cuff Size: Standard)   Pulse 83   Temp 98 8 °F (37 1 °C) (Oral)   Resp 20   Ht 5' 2" (1 575 m)   Wt 98 2 kg (216 lb 6 4 oz)   LMP  (LMP Unknown)   BMI 39 58 kg/m²      Constitutional:obese  Eyes:anicteric  HEENT:grossly intact  Neck:supple, symmetric, trachea midline and no masses   Pulmonary:even and unlabored  Cardiovascular:No edema or pitting edema present  Skin:Normal without rashes or lesions and well hydrated  Psychiatric:Mood and affect appropriate  Neurologic:Cranial Nerves II-XII grossly intact  Musculoskeletal:antalgic and ambulates with the assistance of a walker     Cervical Spine Exam    Appearance:  Normal lordosis  Palpation/Tenderness:  no tenderness or spasm  Sensory:  no sensory deficits noted  Range of Motion:  Flexion:  No limitation  without pain  Extension:  No limitation  without pain  Lateral Flexion - Left:  No limitation  without pain  Lateral Flexion - Right:  No limitation  without pain  Rotation - Left:  No limitation  without pain  Rotation - Right:  No limitation  without pain  Motor Strength:  Left Arm Flexion  5/5  Left Arm Extension  5/5  Right Arm Flexion  5/5  Right Arm Extension  5/5  Left Wrist Flexion  5/5  Left Wrist Extension  5/5  Left Finger Abduction  5/5  Right Finger Abduction  5/5  Left Pincer Grasp  5/5  Right Pincer Grasp  5/5  Left    5/5  Right   5/5  Reflexes:  Left Biceps:  2+   Right Biceps:  2+   Left Brachioradialis:  2+   Right Brachioradialis:  2+   Left Triceps:  2+   Right Triceps:  2+   Special Tests:  Left Spurlings:  negative  Right Spurlings  negative      Orders Placed This Encounter   Procedures    Ambulatory referral to Physical Therapy    Ambulatory referral to Orthopedic Surgery

## 2018-07-13 ENCOUNTER — APPOINTMENT (EMERGENCY)
Dept: RADIOLOGY | Facility: HOSPITAL | Age: 78
DRG: 871 | End: 2018-07-13
Payer: COMMERCIAL

## 2018-07-13 ENCOUNTER — HOSPITAL ENCOUNTER (INPATIENT)
Facility: HOSPITAL | Age: 78
LOS: 5 days | Discharge: NURSING, REHAB OR LONG-TERM CARE | DRG: 871 | End: 2018-07-18
Attending: EMERGENCY MEDICINE | Admitting: INTERNAL MEDICINE
Payer: COMMERCIAL

## 2018-07-13 DIAGNOSIS — IMO0002 UNCONTROLLED DIABETES MELLITUS: ICD-10-CM

## 2018-07-13 DIAGNOSIS — E08.40 DIABETES MELLITUS DUE TO UNDERLYING CONDITION WITH DIABETIC NEUROPATHY, UNSPECIFIED WHETHER LONG TERM INSULIN USE (HCC): Chronic | ICD-10-CM

## 2018-07-13 DIAGNOSIS — R65.20 SEVERE SEPSIS (HCC): ICD-10-CM

## 2018-07-13 DIAGNOSIS — K59.09 CHRONIC CONSTIPATION: ICD-10-CM

## 2018-07-13 DIAGNOSIS — H10.9 CONJUNCTIVITIS: ICD-10-CM

## 2018-07-13 DIAGNOSIS — I73.9 PERIPHERAL VASCULAR DISEASE (HCC): Chronic | ICD-10-CM

## 2018-07-13 DIAGNOSIS — F41.9 ANXIETY: Chronic | ICD-10-CM

## 2018-07-13 DIAGNOSIS — A41.9 SEVERE SEPSIS (HCC): ICD-10-CM

## 2018-07-13 DIAGNOSIS — J42 CHRONIC BRONCHITIS, UNSPECIFIED CHRONIC BRONCHITIS TYPE (HCC): ICD-10-CM

## 2018-07-13 DIAGNOSIS — F32.A DEPRESSION: ICD-10-CM

## 2018-07-13 DIAGNOSIS — A41.9 SEPSIS (HCC): Primary | ICD-10-CM

## 2018-07-13 DIAGNOSIS — R09.02 HYPOXIA: ICD-10-CM

## 2018-07-13 DIAGNOSIS — I50.32 CHRONIC DIASTOLIC CONGESTIVE HEART FAILURE (HCC): Chronic | ICD-10-CM

## 2018-07-13 PROBLEM — I51.89 DIASTOLIC DYSFUNCTION: Status: RESOLVED | Noted: 2017-12-05 | Resolved: 2018-07-13

## 2018-07-13 LAB
ALBUMIN SERPL BCP-MCNC: 3.2 G/DL (ref 3.5–5)
ALP SERPL-CCNC: 88 U/L (ref 46–116)
ALT SERPL W P-5'-P-CCNC: 31 U/L (ref 12–78)
ANION GAP SERPL CALCULATED.3IONS-SCNC: 3 MMOL/L (ref 4–13)
APTT PPP: 22 SECONDS (ref 24–33)
ARTERIAL PATENCY WRIST A: YES
ARTERIAL PATENCY WRIST A: YES
AST SERPL W P-5'-P-CCNC: 25 U/L (ref 5–45)
ATRIAL RATE: 114 BPM
ATRIAL RATE: 115 BPM
BASE EXCESS BLDA CALC-SCNC: 10.4 MMOL/L
BASE EXCESS BLDA CALC-SCNC: 10.8 MMOL/L
BASOPHILS # BLD AUTO: 0.02 THOUSANDS/ΜL (ref 0–0.1)
BASOPHILS NFR BLD AUTO: 0 % (ref 0–1)
BILIRUB SERPL-MCNC: 0.5 MG/DL (ref 0.2–1)
BODY TEMPERATURE: 102.6 DEGREES FEHRENHEIT
BODY TEMPERATURE: 98.8 DEGREES FEHRENHEIT
BUN SERPL-MCNC: 36 MG/DL (ref 5–25)
CALCIUM SERPL-MCNC: 8.4 MG/DL (ref 8.3–10.1)
CHLORIDE SERPL-SCNC: 87 MMOL/L (ref 100–108)
CO2 SERPL-SCNC: 40 MMOL/L (ref 21–32)
CREAT SERPL-MCNC: 1.71 MG/DL (ref 0.6–1.3)
EOSINOPHIL # BLD AUTO: 0 THOUSAND/ΜL (ref 0–0.61)
EOSINOPHIL NFR BLD AUTO: 0 % (ref 0–6)
ERYTHROCYTE [DISTWIDTH] IN BLOOD BY AUTOMATED COUNT: 15.3 % (ref 11.6–15.1)
GFR SERPL CREATININE-BSD FRML MDRD: 28 ML/MIN/1.73SQ M
GLUCOSE SERPL-MCNC: 305 MG/DL (ref 65–140)
GLUCOSE SERPL-MCNC: 482 MG/DL (ref 65–140)
HCO3 BLDA-SCNC: 38.1 MMOL/L (ref 22–28)
HCO3 BLDA-SCNC: 38.8 MMOL/L (ref 22–28)
HCT VFR BLD AUTO: 41.5 % (ref 34.8–46.1)
HGB BLD-MCNC: 13.1 G/DL (ref 11.5–15.4)
IMM GRANULOCYTES # BLD AUTO: 0.03 THOUSAND/UL (ref 0–0.2)
IMM GRANULOCYTES NFR BLD AUTO: 0 % (ref 0–2)
INR PPP: 1.37 (ref 0.86–1.16)
LACTATE SERPL-SCNC: 2.1 MMOL/L (ref 0.5–2)
LACTATE SERPL-SCNC: 2.4 MMOL/L (ref 0.5–2)
LACTATE SERPL-SCNC: 2.7 MMOL/L (ref 0.5–2)
LACTATE SERPL-SCNC: 2.8 MMOL/L (ref 0.5–2)
LYMPHOCYTES # BLD AUTO: 1.59 THOUSANDS/ΜL (ref 0.6–4.47)
LYMPHOCYTES NFR BLD AUTO: 16 % (ref 14–44)
MCH RBC QN AUTO: 29.4 PG (ref 26.8–34.3)
MCHC RBC AUTO-ENTMCNC: 31.6 G/DL (ref 31.4–37.4)
MCV RBC AUTO: 93 FL (ref 82–98)
MONOCYTES # BLD AUTO: 0.85 THOUSAND/ΜL (ref 0.17–1.22)
MONOCYTES NFR BLD AUTO: 8 % (ref 4–12)
NEUTROPHILS # BLD AUTO: 7.64 THOUSANDS/ΜL (ref 1.85–7.62)
NEUTS SEG NFR BLD AUTO: 76 % (ref 43–75)
NRBC BLD AUTO-RTO: 0 /100 WBCS
NT-PROBNP SERPL-MCNC: 273 PG/ML
O2 CT BLDA-SCNC: 16 ML/DL (ref 16–23)
O2 CT BLDA-SCNC: 17 ML/DL (ref 16–23)
OTHER FIO2: 40 %
OXYHGB MFR BLDA: 89 % (ref 94–97)
OXYHGB MFR BLDA: 93.8 % (ref 94–97)
P AXIS: 51 DEGREES
P AXIS: 68 DEGREES
PCO2 BLDA: 65.8 MM HG (ref 36–44)
PCO2 BLDA: 69.5 MM HG (ref 36–44)
PCO2 TEMP ADJ BLDA: 69.8 MM HG (ref 36–44)
PCO2 TEMP ADJ BLDA: 72.4 MM HG (ref 36–44)
PH BLD: 7.35 [PH] (ref 7.35–7.45)
PH BLD: 7.36 [PH] (ref 7.35–7.45)
PH BLDA: 7.37 [PH] (ref 7.35–7.45)
PH BLDA: 7.38 [PH] (ref 7.35–7.45)
PLATELET # BLD AUTO: 219 THOUSANDS/UL (ref 149–390)
PMV BLD AUTO: 10.8 FL (ref 8.9–12.7)
PO2 BLD: 62.9 MM HG (ref 75–129)
PO2 BLD: 98.1 MM HG (ref 75–129)
PO2 BLDA: 62.5 MM HG (ref 75–129)
PO2 BLDA: 84.8 MM HG (ref 75–129)
POTASSIUM SERPL-SCNC: 3.1 MMOL/L (ref 3.5–5.3)
PR INTERVAL: 168 MS
PR INTERVAL: 204 MS
PROT SERPL-MCNC: 7.7 G/DL (ref 6.4–8.2)
PROTHROMBIN TIME: 14.1 SECONDS (ref 9.4–11.7)
QRS AXIS: -40 DEGREES
QRS AXIS: -43 DEGREES
QRSD INTERVAL: 124 MS
QRSD INTERVAL: 130 MS
QT INTERVAL: 376 MS
QT INTERVAL: 382 MS
QTC INTERVAL: 518 MS
QTC INTERVAL: 528 MS
RBC # BLD AUTO: 4.45 MILLION/UL (ref 3.81–5.12)
SODIUM SERPL-SCNC: 130 MMOL/L (ref 136–145)
SPECIMEN SOURCE: ABNORMAL
SPECIMEN SOURCE: ABNORMAL
T WAVE AXIS: 14 DEGREES
T WAVE AXIS: 20 DEGREES
TROPONIN I SERPL-MCNC: <0.02 NG/ML
VENTRICULAR RATE: 114 BPM
VENTRICULAR RATE: 115 BPM
WBC # BLD AUTO: 10.13 THOUSAND/UL (ref 4.31–10.16)

## 2018-07-13 PROCEDURE — 94760 N-INVAS EAR/PLS OXIMETRY 1: CPT

## 2018-07-13 PROCEDURE — 85025 COMPLETE CBC W/AUTO DIFF WBC: CPT | Performed by: EMERGENCY MEDICINE

## 2018-07-13 PROCEDURE — 83605 ASSAY OF LACTIC ACID: CPT | Performed by: INTERNAL MEDICINE

## 2018-07-13 PROCEDURE — 82948 REAGENT STRIP/BLOOD GLUCOSE: CPT

## 2018-07-13 PROCEDURE — 93010 ELECTROCARDIOGRAM REPORT: CPT | Performed by: INTERNAL MEDICINE

## 2018-07-13 PROCEDURE — 99291 CRITICAL CARE FIRST HOUR: CPT

## 2018-07-13 PROCEDURE — 99223 1ST HOSP IP/OBS HIGH 75: CPT | Performed by: INTERNAL MEDICINE

## 2018-07-13 PROCEDURE — 94640 AIRWAY INHALATION TREATMENT: CPT

## 2018-07-13 PROCEDURE — 84484 ASSAY OF TROPONIN QUANT: CPT | Performed by: EMERGENCY MEDICINE

## 2018-07-13 PROCEDURE — 82805 BLOOD GASES W/O2 SATURATION: CPT | Performed by: PHYSICIAN ASSISTANT

## 2018-07-13 PROCEDURE — 71045 X-RAY EXAM CHEST 1 VIEW: CPT

## 2018-07-13 PROCEDURE — 85610 PROTHROMBIN TIME: CPT | Performed by: EMERGENCY MEDICINE

## 2018-07-13 PROCEDURE — 87040 BLOOD CULTURE FOR BACTERIA: CPT | Performed by: EMERGENCY MEDICINE

## 2018-07-13 PROCEDURE — 94660 CPAP INITIATION&MGMT: CPT

## 2018-07-13 PROCEDURE — 85730 THROMBOPLASTIN TIME PARTIAL: CPT | Performed by: EMERGENCY MEDICINE

## 2018-07-13 PROCEDURE — 36600 WITHDRAWAL OF ARTERIAL BLOOD: CPT

## 2018-07-13 PROCEDURE — 80053 COMPREHEN METABOLIC PANEL: CPT | Performed by: EMERGENCY MEDICINE

## 2018-07-13 PROCEDURE — 36415 COLL VENOUS BLD VENIPUNCTURE: CPT | Performed by: EMERGENCY MEDICINE

## 2018-07-13 PROCEDURE — 83605 ASSAY OF LACTIC ACID: CPT | Performed by: EMERGENCY MEDICINE

## 2018-07-13 PROCEDURE — 96360 HYDRATION IV INFUSION INIT: CPT

## 2018-07-13 PROCEDURE — 82805 BLOOD GASES W/O2 SATURATION: CPT | Performed by: EMERGENCY MEDICINE

## 2018-07-13 PROCEDURE — 96365 THER/PROPH/DIAG IV INF INIT: CPT

## 2018-07-13 PROCEDURE — 93005 ELECTROCARDIOGRAM TRACING: CPT

## 2018-07-13 PROCEDURE — 87081 CULTURE SCREEN ONLY: CPT | Performed by: INTERNAL MEDICINE

## 2018-07-13 PROCEDURE — 83880 ASSAY OF NATRIURETIC PEPTIDE: CPT | Performed by: EMERGENCY MEDICINE

## 2018-07-13 RX ORDER — SODIUM CHLORIDE FOR INHALATION 0.9 %
3 VIAL, NEBULIZER (ML) INHALATION
Status: DISCONTINUED | OUTPATIENT
Start: 2018-07-13 | End: 2018-07-15

## 2018-07-13 RX ORDER — ONDANSETRON 4 MG/1
4 TABLET, ORALLY DISINTEGRATING ORAL EVERY 6 HOURS PRN
Status: DISCONTINUED | OUTPATIENT
Start: 2018-07-13 | End: 2018-07-18 | Stop reason: HOSPADM

## 2018-07-13 RX ORDER — FOLIC ACID 1 MG/1
1 TABLET ORAL DAILY
Status: DISCONTINUED | OUTPATIENT
Start: 2018-07-14 | End: 2018-07-18 | Stop reason: HOSPADM

## 2018-07-13 RX ORDER — SODIUM CHLORIDE 9 MG/ML
500 INJECTION, SOLUTION INTRAVENOUS ONCE
Status: COMPLETED | OUTPATIENT
Start: 2018-07-13 | End: 2018-07-13

## 2018-07-13 RX ORDER — ACETAMINOPHEN 325 MG/1
650 TABLET ORAL ONCE
Status: COMPLETED | OUTPATIENT
Start: 2018-07-13 | End: 2018-07-13

## 2018-07-13 RX ORDER — IPRATROPIUM BROMIDE AND ALBUTEROL SULFATE 2.5; .5 MG/3ML; MG/3ML
SOLUTION RESPIRATORY (INHALATION)
Status: COMPLETED
Start: 2018-07-13 | End: 2018-07-13

## 2018-07-13 RX ORDER — LEVOTHYROXINE SODIUM 0.12 MG/1
125 TABLET ORAL DAILY
Status: DISCONTINUED | OUTPATIENT
Start: 2018-07-14 | End: 2018-07-18 | Stop reason: HOSPADM

## 2018-07-13 RX ORDER — ASPIRIN 325 MG
325 TABLET ORAL DAILY
Status: DISCONTINUED | OUTPATIENT
Start: 2018-07-14 | End: 2018-07-18 | Stop reason: HOSPADM

## 2018-07-13 RX ORDER — PRAMIPEXOLE DIHYDROCHLORIDE 0.5 MG/1
1 TABLET ORAL 3 TIMES DAILY
Status: DISCONTINUED | OUTPATIENT
Start: 2018-07-13 | End: 2018-07-18 | Stop reason: HOSPADM

## 2018-07-13 RX ORDER — SODIUM CHLORIDE 9 MG/ML
1000 INJECTION, SOLUTION INTRAVENOUS ONCE
Status: COMPLETED | OUTPATIENT
Start: 2018-07-13 | End: 2018-07-13

## 2018-07-13 RX ORDER — SODIUM CHLORIDE FOR INHALATION 0.9 %
3 VIAL, NEBULIZER (ML) INHALATION EVERY 6 HOURS PRN
Status: DISCONTINUED | OUTPATIENT
Start: 2018-07-13 | End: 2018-07-15

## 2018-07-13 RX ORDER — IPRATROPIUM BROMIDE AND ALBUTEROL SULFATE 2.5; .5 MG/3ML; MG/3ML
3 SOLUTION RESPIRATORY (INHALATION) ONCE
Status: COMPLETED | OUTPATIENT
Start: 2018-07-13 | End: 2018-07-13

## 2018-07-13 RX ORDER — ESCITALOPRAM OXALATE 10 MG/1
10 TABLET ORAL DAILY
Status: DISCONTINUED | OUTPATIENT
Start: 2018-07-14 | End: 2018-07-18 | Stop reason: HOSPADM

## 2018-07-13 RX ORDER — POTASSIUM CHLORIDE 29.8 MG/ML
40 INJECTION INTRAVENOUS ONCE
Status: COMPLETED | OUTPATIENT
Start: 2018-07-13 | End: 2018-07-14

## 2018-07-13 RX ORDER — LEVALBUTEROL 1.25 MG/.5ML
1.25 SOLUTION, CONCENTRATE RESPIRATORY (INHALATION) EVERY 6 HOURS PRN
Status: DISCONTINUED | OUTPATIENT
Start: 2018-07-13 | End: 2018-07-15

## 2018-07-13 RX ORDER — VENLAFAXINE 37.5 MG/1
75 TABLET ORAL DAILY
Status: DISCONTINUED | OUTPATIENT
Start: 2018-07-14 | End: 2018-07-18

## 2018-07-13 RX ORDER — LEVOFLOXACIN 5 MG/ML
750 INJECTION, SOLUTION INTRAVENOUS ONCE
Status: COMPLETED | OUTPATIENT
Start: 2018-07-13 | End: 2018-07-13

## 2018-07-13 RX ORDER — LEVALBUTEROL INHALATION SOLUTION 0.63 MG/3ML
1.25 SOLUTION RESPIRATORY (INHALATION)
Status: DISCONTINUED | OUTPATIENT
Start: 2018-07-13 | End: 2018-07-14

## 2018-07-13 RX ORDER — POLYETHYLENE GLYCOL 3350 17 G/17G
17 POWDER, FOR SOLUTION ORAL DAILY
Status: DISCONTINUED | OUTPATIENT
Start: 2018-07-14 | End: 2018-07-13

## 2018-07-13 RX ORDER — IPRATROPIUM BROMIDE AND ALBUTEROL SULFATE 2.5; .5 MG/3ML; MG/3ML
3 SOLUTION RESPIRATORY (INHALATION)
Status: DISCONTINUED | OUTPATIENT
Start: 2018-07-13 | End: 2018-07-13

## 2018-07-13 RX ORDER — POLYETHYLENE GLYCOL 3350 17 G/17G
17 POWDER, FOR SOLUTION ORAL DAILY
Status: DISCONTINUED | OUTPATIENT
Start: 2018-07-14 | End: 2018-07-18 | Stop reason: HOSPADM

## 2018-07-13 RX ORDER — SODIUM CHLORIDE, SODIUM GLUCONATE, SODIUM ACETATE, POTASSIUM CHLORIDE, MAGNESIUM CHLORIDE, SODIUM PHOSPHATE, DIBASIC, AND POTASSIUM PHOSPHATE .53; .5; .37; .037; .03; .012; .00082 G/100ML; G/100ML; G/100ML; G/100ML; G/100ML; G/100ML; G/100ML
1000 INJECTION, SOLUTION INTRAVENOUS ONCE
Status: COMPLETED | OUTPATIENT
Start: 2018-07-13 | End: 2018-07-13

## 2018-07-13 RX ORDER — DOCUSATE SODIUM 100 MG/1
100 CAPSULE, LIQUID FILLED ORAL 2 TIMES DAILY
Status: DISCONTINUED | OUTPATIENT
Start: 2018-07-13 | End: 2018-07-18 | Stop reason: HOSPADM

## 2018-07-13 RX ORDER — HEPARIN SODIUM 5000 [USP'U]/ML
5000 INJECTION, SOLUTION INTRAVENOUS; SUBCUTANEOUS EVERY 8 HOURS SCHEDULED
Status: DISCONTINUED | OUTPATIENT
Start: 2018-07-13 | End: 2018-07-18 | Stop reason: HOSPADM

## 2018-07-13 RX ORDER — SENNOSIDES 8.6 MG
1 TABLET ORAL
Status: DISCONTINUED | OUTPATIENT
Start: 2018-07-13 | End: 2018-07-18 | Stop reason: HOSPADM

## 2018-07-13 RX ADMIN — SODIUM CHLORIDE 500 ML/HR: 0.9 INJECTION, SOLUTION INTRAVENOUS at 19:02

## 2018-07-13 RX ADMIN — LEVOFLOXACIN 750 MG: 5 INJECTION, SOLUTION INTRAVENOUS at 17:05

## 2018-07-13 RX ADMIN — POTASSIUM CHLORIDE 40 MEQ: 400 INJECTION, SOLUTION INTRAVENOUS at 23:01

## 2018-07-13 RX ADMIN — SODIUM CHLORIDE 1000 ML/HR: 0.9 INJECTION, SOLUTION INTRAVENOUS at 16:59

## 2018-07-13 RX ADMIN — IPRATROPIUM BROMIDE AND ALBUTEROL SULFATE 3 ML: .5; 3 SOLUTION RESPIRATORY (INHALATION) at 20:03

## 2018-07-13 RX ADMIN — MEROPENEM 1000 MG: 1 INJECTION, POWDER, FOR SOLUTION INTRAVENOUS at 23:21

## 2018-07-13 RX ADMIN — HEPARIN SODIUM 5000 UNITS: 5000 INJECTION, SOLUTION INTRAVENOUS; SUBCUTANEOUS at 22:07

## 2018-07-13 RX ADMIN — IPRATROPIUM BROMIDE AND ALBUTEROL SULFATE 3 ML: 2.5; .5 SOLUTION RESPIRATORY (INHALATION) at 16:13

## 2018-07-13 RX ADMIN — SODIUM CHLORIDE, SODIUM GLUCONATE, SODIUM ACETATE, POTASSIUM CHLORIDE, MAGNESIUM CHLORIDE, SODIUM PHOSPHATE, DIBASIC, AND POTASSIUM PHOSPHATE 1000 ML: .53; .5; .37; .037; .03; .012; .00082 INJECTION, SOLUTION INTRAVENOUS at 22:07

## 2018-07-13 RX ADMIN — SODIUM CHLORIDE 12 UNITS/HR: 9 INJECTION, SOLUTION INTRAVENOUS at 19:03

## 2018-07-13 RX ADMIN — ACETAMINOPHEN 650 MG: 325 TABLET, FILM COATED ORAL at 17:04

## 2018-07-13 RX ADMIN — CEFEPIME HYDROCHLORIDE 1000 MG: 1 INJECTION, SOLUTION INTRAVENOUS at 20:56

## 2018-07-13 RX ADMIN — IPRATROPIUM BROMIDE AND ALBUTEROL SULFATE 3 ML: .5; 3 SOLUTION RESPIRATORY (INHALATION) at 16:13

## 2018-07-13 NOTE — SEPSIS NOTE
Sepsis Note   Jaime Akhtar 77 y o  female MRN: [id number]  Unit/Bed#: ED CT1 Encounter: [id number]            Initial Sepsis Screening     Row Name 07/13/18 1950                Is the patient's history suggestive of a new or worsening infection? (!)  Yes (Proceed)  -EK        Suspected source of infection pneumonia;urinary tract infection  -EK        Are two or more of the following signs & symptoms of infection both present and new to the patient? (!)  Yes (Proceed)  -EK        Indicate SIRS criteria Hyperthemia > 38 3C (100 9F); Tachypnea > 20 resp per min; Tachycardia > 90 bpm  -EK        If the answer is yes to both questions, suspicion of sepsis is present          If severe sepsis is present AND tissue hypoperfusion perists in the hour after fluid resuscitation or lactate > 4, the patient meets criteria for SEPTIC SHOCK          Are any of the following organ dysfunction criteria present within 6 hours of suspected infection and SIRS criteria that are NOT considered to be chronic conditions? (!)  Yes  -EK        Organ dysfunction Creatinine > 0 5 mg/dl ABOVE BASELINE;Lactate > 2 0 mmol/L;Acute respiratory failure (new need for invasive or non-invasive mechanical ventilation)  -EK        Date of presentation of severe sepsis 07/13/18  -EK        Time of presentation of severe sepsis 1720  -EK        Tissue hypoperfusion persists in the hour after crystalloid fluid administration, evidenced, by either:          Was hypotension present within one hour of the conclusion of crystalloid fluid administration?  No  -EK        Date of presentation of septic shock          Time of presentation of septic shock            User Key  (r) = Recorded By, (t) = Taken By, (c) = Cosigned By    [address] Name Provider Type    EK Yaneli Owusu Nurse Practitioner

## 2018-07-13 NOTE — H&P
History and Physical - Critical Care/ Stepdown   Analisa Lockhart 77 y o  female MRN: [id number]  Unit/Bed#: ED CT1 Encounter: [id number]    Reason for Admission / Chief Complaint: Sepsis, SOB, Hypoxia    History of Present Illness:  Analisa Lockhart is a 77 y o  female who presents to the ED today with shortness of breath, cough, fever, and chills on and off for 1 week per patient  Daughter states she has only been sick for about 2 days  Patient has nebulizer and inhalers at home, which were not helping  She is usually on 2 L nasal cannula during the day and at night at home  Had BiPAP or CPAP in past but did not tolerate  Patient denies any recent sick contacts  Notes she is having on and off fever and chills  Reports she has been coughing as well  Patient lives with her significant other in [address] [place] [address]  Daughter states she had seen Podiatry twice this week and had dressing placed 2 days ago to be left on for 1 week  She also states that her mother saw GI this week as well  Daughter states family has history of GI issues with constipation, and that she sees Dr Chloe Sawyer of GI for possibly irritable bowel with constipation  Has not had a bowel movement in several days, and when she does has small, hard, marble like stools  She denies abdominal tenderness, and did not grimace to palpation  Does have bloating  History obtained from the patient and & her daughter  Of note, daughter that is here today is not involved in her medical care, while her other children who are not here are involved in her care, but cannot be here right now      Past Medical History:  Past Medical History:   Diagnosis Date    Anxiety     Aortic valve disorder     Arthritis     Asthma     Cataract     [facility]     COPD [facility]chronic obstructive pulmonary disease) [facility]     Diabetes mellitus [facility]     Disease of thyroid gland     Dry eye syndrome     Ectropion of left eye     last assessed: 10/17/2016    Emphysema, 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HCC)     Hearing loss     History of malignant neoplasm of thyroid     Hypertension     Malignant neoplasm of skin     Memory loss     Mitral valve regurgitation     Myocardial infarction (HCC)     Obesity hypoventilation syndrome (HCC)     Pain     right hip    Restless leg syndrome     Seasonal allergies     [facility]     Sleep related hypoxia     Thyroid cancer ([facility] )      Past Surgical History:  Past Surgical History:   Procedure Laterality Date    CHOLECYSTECTOMY      EYE SURGERY      HYSTERECTOMY      OTHER SURGICAL HISTORY      removal of lesion    PERICARDIUM SURGERY      resection of pericardial cyst or tumor    TN ERCP DX COLLECTION SPECIMEN BRUSHING/WASHING N/A 5/30/2017    Procedure: ENDOSCOPIC RETROGRADE CHOLANGIOPANCREATOGRAPHY (ERCP); SPHINCTEROTOMY;  Surgeon: Chemo Clemons MD;  Location:  GI LAB;   Service: Gastroenterology   Maryjane Jesus JOINT Right 3/30/2018    Procedure: Right Sacroiliac Injection;  Surgeon: Alessio Whitman MD;  Location: [facility] MAIN OR;  Service: Pain Management     REPLACEMENT TOTAL KNEE Left     REPLACEMENT TOTAL KNEE Right     REPLACEMENT TOTAL KNEE BILATERAL      SKIN BIOPSY      melanoma of back    STEROID INJECTION HIP Right 12/21/2017    Procedure: TROCHANTERIC BURSA INJECTION RIGHT;  Surgeon: Alessio Whitman MD;  Location: [place] [address] MAIN OR;  Service: Pain Management     THORACOSCOPY      (therapeutic) w/excision of pericardial cyst    THORACOTOMY Right     at 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, for pericardial cyst    THYROID SURGERY      THYROIDECTOMY, PARTIAL      For thyroid cancer     Past Family History:  Family History   Problem Relation Age of Onset    Cancer Father     Arthritis Father     Liver cancer Father     Diabetes Sister     Asthma Mother     No Known Problems Brother     Substance Abuse Neg Hx     Mental illness Neg Hx      Social History:  History   Smoking Status    Never Smoker   Smokeless Tobacco    Never Used      History   Alcohol Use No     History   Drug Use No     Marital Status: Single  Exercise History: None    Medications:  Current Facility-Administered Medications   Medication Dose Route Frequency    [START ON 7/14/2018] aspirin tablet 325 mg  325 mg Oral Daily    cefepime (MAXIPIME) IVPB (premix) 1,000 mg  1,000 mg Intravenous Q24H    [START ON 7/14/2018] escitalopram (LEXAPRO) tablet 10 mg  10 mg Oral Daily    [START ON 5/71/7671] folic acid (FOLVITE) tablet 1 mg  1 mg Oral Daily    heparin (porcine) subcutaneous injection 5,000 Units  5,000 Units Subcutaneous Q8H [facility]    insulin regular (HumuLIN R,NovoLIN R) 1 Units/mL in sodium chloride 0 9 % 100 mL infusion  0 3-21 Units/hr Intravenous Titrated    [START ON 7/14/2018] levothyroxine tablet 125 mcg  125 mcg Oral Daily    [START ON 7/14/2018] Linaclotide CAPS 72 mcg  72 mcg Oral Daily Before Breakfast    [START ON 7/14/2018] metoprolol tartrate (LOPRESSOR) tablet 25 mg  25 mg Oral BID    ondansetron (ZOFRAN-ODT) dispersible tablet 4 mg  4 mg Oral Q6H PRN    [START ON 7/14/2018] polyethylene glycol (MIRALAX) packet 17 g  17 g Oral Daily    pramipexole (MIRAPEX) tablet 1 mg  1 mg Oral TID    [START ON 7/14/2018] venlafaxine (EFFEXOR) tablet 75 mg  75 mg Oral Daily     Home medications:  Prior to Admission medications    Medication Sig Start Date End Date Taking?  Authorizing Provider   albuterol (ACCUNEB) 1 25 MG/3ML nebulizer solution Take 3 mL (1 25 mg total) by nebulization 3 (three) times a day as needed for wheezing (J45 909) 4/21/18  Yes Krysta Talamantes MD   ascorbic acid (VITAMIN C) 500 mg tablet Take 1,000 mg by mouth daily     Yes Historical Provider, MD   aspirin 325 mg tablet Take 325 mg by mouth daily   Yes Historical Provider, MD   COD LIVER OIL PO Take by mouth daily   Yes Historical Provider, MD   escitalopram (LEXAPRO) 10 mg tablet Take 1 tablet (10 mg total) by mouth daily 2/8/18  Yes Marcel Heimlich Argentina Bliss MD   folic acid (FOLVITE) 1 mg tablet Take by mouth daily   Yes Historical Provider, MD   gabapentin (NEURONTIN) 100 mg capsule Take 1 capsule (100 mg total) by mouth 3 (three) times a day 5/23/18  Yes Aura Batista MD   insulin lispro (HumaLOG) 100 units/mL injection Inject 4-20 Units under the skin 3 (three) times a day before meals  Patient taking differently: Inject 45 Units under the skin 2 (two) times a day Sliding scale  2/27/18  Yes Gagan Ruggiero MD   levothyroxine 125 mcg tablet Take 1 tablet (125 mcg total) by mouth daily 3/16/18  Yes Aura Batista MD   Linaclotide [facility]) 72 MCG CAPS Take 72 mcg by mouth daily before breakfast 2/14/18  Yes Phyllis Manzo MD   Multiple Vitamins-Minerals (MULTIVITAMIN ADULT PO) Take 1 tablet by mouth daily 2/9/16  Yes Historical Provider, MD   pramipexole (MIRAPEX) 1 mg tablet Take 1 tablet (1 mg total) by mouth 3 (three) times a day 5/10/18  Yes Aura Batista MD   torsemide (DEMADEX) 20 mg tablet TAKE 2 TABLETS BY MOUTH TWO TIMES DAILY MONDAY,  [facility], AND FRIDAY 7/8/18  Yes Aura Batista MD   acetaminophen (TYLENOL) 325 mg tablet Take 2 tablets (650 mg total) by mouth every 6 (six) hours as needed for mild pain, headaches or fever 2/26/18   Gagan Ruggiero MD   lactulose (CHRONULAC) 10 g/15 mL solution lactulose 10 gm/15ml soln PRN    Historical Provider, MD   metolazone (ZAROXOLYN) 2 5 mg tablet Take one tablet three times a week 6/5/18   Any Zamorano MD   metoprolol tartrate (LOPRESSOR) 25 mg tablet Take 1 tablet by mouth 2 (two) times a day 12/27/17   Historical Provider, MD   ondansetron (ZOFRAN-ODT) 4 mg disintegrating tablet Take 1 tablet (4 mg total) by mouth every 6 (six) hours as needed for nausea or vomiting 6/29/18   Phyllis Manzo MD   oxyCODONE-acetaminophen (PERCOCET) 5-325 mg per tablet  6/22/18   Historical Provider, MD   polyethylene glycol (MIRALAX) 17 g packet Take 17 g by mouth daily  Patient taking differently: Take 17 g by mouth 2 (two) times a day   6/17/17   FATIMAH Beatty   potassium chloride (MICRO-K) 10 MEQ CR capsule Take 1 capsule (10 mEq total) by mouth daily 5/23/18   Pollo Brito MD   tiotropium (SPIRIVA HANDIHALER) 18 mcg inhalation capsule spiriva handihaler 18 mcg caps    Historical Provider, MD   traMADol (ULTRAM) 50 mg tablet Take 1 tablet (50 mg total) by mouth every 6 (six) hours as needed for moderate pain 6/18/18   Pollo Brito MD   venlafaxine [facility]) 75 mg tablet venlafaxine 75 mg tablet    Historical Provider, MD   amoxicillin (AMOXIL) 500 mg capsule amoxicillin 500 mg caps  7/13/18  Historical Provider, MD   erythromycin (ILOTYCIN) ophthalmic ointment  7/11/18 7/13/18  Historical Provider, MD   glucose blood test strip Accu-Chek Rachel Plus test strips  7/13/18  Historical Provider, MD   Needle, Disp, (EASY TOUCH FLIPLOCK NEEDLES) 31G X 5/16" MISC Easy Touch 31 gauge x 3/16" needle  7/13/18  Historical Provider, MD   triamcinolone (KENALOG) 0 1 % cream  6/26/18 7/13/18  Historical Provider, MD   triamcinolone (KENALOG) 0 1 % cream triamcinolone acetonide 0 1 % crea  7/13/18  Historical Provider, MD     Allergies: Allergies   Allergen Reactions    Ketorolac Swelling     Toradol    Latex Rash    Wound Dressing Adhesive Rash     ROS:   Review of Systems   Constitutional: Positive for chills, diaphoresis, fatigue and fever  HENT: Negative for congestion, postnasal drip, sinus pain and sore throat  Respiratory: Positive for cough, chest tightness, shortness of breath and wheezing  Negative for choking  Cardiovascular: Positive for leg swelling (Chronic bilateral lower extremity edema, near baseline  )  Negative for chest pain and palpitations  Gastrointestinal: Positive for abdominal distention and constipation (Small, marble like stools no large BM recently  )  Negative for abdominal pain, diarrhea, nausea and vomiting  Genitourinary: Positive for dysuria, frequency and urgency   Negative for decreased urine volume  Musculoskeletal: Positive for arthralgias (Chronic full body pain, worse this week  )  Negative for back pain  Skin: Positive for color change and wound  Negative for rash  Bilateral lower extremities diffusely red and swollen  Patient states is normal, daughter says there slightly worse than normal with wounds that are not always there  Right leg worse than left  Painful to patient to remove dressings  Neurological: Positive for weakness (More weak than normal )  Negative for dizziness, tremors, light-headedness, numbness and headaches  Hematological: Bruises/bleeds easily (On Xarelto  )  Psychiatric/Behavioral: Negative for decreased concentration  The patient is not nervous/anxious  Vitals:  Vitals:    18 1815 18 1830 18 1845 18 1900   BP: 108/58 126/58 117/56 121/60   BP Location: Left arm Left arm Left arm Left arm   Pulse: 96 96 92 88   Resp: (!) 28 22 (!) 29 (!) 24   Temp:    99 1 °F (37 3 °C)   TempSrc:    Tympanic   SpO2: (!) 89% 92% 91% (!) 88%   Weight:         Temperature:   Temp (24hrs), Av 7 °F (38 7 °C), Min:99 1 °F (37 3 °C), Max:103 7 °F (39 8 °C)  Current Temperature: 99 1 °F (37 3 °C)    Weights:   IBW: -92 5 kg  Body mass index is 39 52 kg/m²      Hemodynamic Monitoring:  N/A     Non-Invasive/Invasive Ventilation Settings:  Respiratory    Lab Data (Last 4 hours)       1728            pH, Arterial       7 380           pCO2, Arterial       (!)65 8  Comment:  Verified by repeat analysis           pO2, Arterial       84 8           HCO3, Arterial       (!)38 1           Base Excess, Arterial       10 4                O2/Vent Data        1650   Most Recent        Non-Invasive Ventilation Mode BiPAP  BiPAP                Lab Results   Component Value Date    PHART 7 380 2018    [id number] 65 8 (HH) 2018    PO2ART 84 8 2018    VRK3FTK 38 1 (H) 2018    BEART 10 4 2018    SOURCE Radial, Right 07/13/2018     SpO2: SpO2: (!) 88 %  , SpO2 Device: O2 Device:  (bipap)     Physical Exam:  Physical Exam   Constitutional: She is oriented to person, place, and time  She appears well-developed and well-nourished  She appears distressed  Appears fatigued, diaphoretic, & is morbidly obese  Patient on BiPAP  HENT:   Head: Normocephalic and atraumatic  Right Ear: External ear normal    Left Ear: External ear normal    Eyes: Conjunctivae are normal  Right eye exhibits no discharge  Left eye exhibits no discharge  No scleral icterus  Neck: Normal range of motion  Neck supple  Cardiovascular: Normal rate, regular rhythm, normal heart sounds and intact distal pulses  Exam reveals no gallop and no friction rub  No murmur heard  Pulmonary/Chest: No stridor  She is in respiratory distress  She has no wheezes  She exhibits no tenderness  Coarse breath sounds bilaterally  Abdominal: Soft  Bowel sounds are normal  She exhibits distension  She exhibits no mass  There is no tenderness  There is no rebound and no guarding  No hernia  Genitourinary:   Genitourinary Comments: Of note groin and urine both had a foul odor  Musculoskeletal: Normal range of motion  She exhibits edema (Bilateral lower extremity, below the knee 2+ pitting edema  ) and tenderness  She exhibits no deformity  Lymphadenopathy:     She has no cervical adenopathy  Neurological: She is alert and oriented to person, place, and time  No cranial nerve deficit  She exhibits normal muscle tone  Coordination normal    Skin: Skin is warm  Rash noted  She is diaphoretic  There is erythema  Bilateral lower extremity below the knee erythema  Small skin breakdown wounds present near ankles bilaterally worse on right greater than left  Leaking plasma and small blood  Psychiatric: She has a normal mood and affect  Vitals reviewed      Labs:    Results from last 7 days  Lab Units 07/13/18  1615   WBC Thousand/uL 10 13   HEMOGLOBIN g/dL 13 1   HEMATOCRIT % 41 5   PLATELETS Thousands/uL 219   NEUTROS PCT % 76*   MONOS PCT % 8        Results from last 7 days  Lab Units 07/13/18  1641   SODIUM mmol/L 130*   POTASSIUM mmol/L 3 1*   CHLORIDE mmol/L 87*   CO2 mmol/L 40*   BUN mg/dL 36*   CREATININE mg/dL 1 71*   CALCIUM mg/dL 8 4   TOTAL PROTEIN g/dL 7 7   BILIRUBIN TOTAL mg/dL 0 50   ALK PHOS U/L 88   ALT U/L 31   AST U/L 25   GLUCOSE RANDOM mg/dL 482*                Results from last 7 days  Lab Units 07/13/18  1617   INR  1 37*   PTT seconds 22*       Results from last 7 days  Lab Units 07/13/18  1803 07/13/18  1617   LACTIC ACID mmol/L 2 1* 2 7*       0  Lab Value Date/Time   TROPONINI <0 02 07/13/2018 1621   TROPONINI <0 02 02/22/2018 2345   TROPONINI <0 02 02/22/2018 2023   TROPONINI <0 02 02/22/2018 1539   TROPONINI <0 02 01/09/2018 1750   TROPONINI <0 02 10/10/2017 2153   TROPONINI <0 02 10/08/2017 1908   TROPONINI <0 02 09/18/2017 1908   TROPONINI 0 78 (H) 06/07/2017 1743   TROPONINI 0 89 (H) 06/07/2017 1454   TROPONINI 1 12 (H) 06/07/2017 1109   TROPONINI 0 33 (H) 06/07/2017 0700   TROPONINI <0 02 04/20/2017 0941   TROPONINI <0 02 04/20/2017 0631     Imaging: CXR: No acute cardiopulmonary disease  I have personally reviewed pertinent reports  EKG: Sinus Tachycardia with PACs  115 bpm  RBBB  This was personally reviewed by myself  Micro:  Blood Culture:   Lab Results   Component Value Date    BLOODCX No Growth After 5 Days  02/22/2018    BLOODCX No Growth After 5 Days  02/22/2018    BLOODCX No Growth After 5 Days  01/19/2018    BLOODCX No Growth After 5 Days  01/19/2018    BLOODCX No Growth After 5 Days  06/11/2017    BLOODCX No Growth After 5 Days   06/11/2017    BLOODCX Klebsiella pneumoniae - ESBL (A) 06/07/2017    BLOODCX Klebsiella pneumoniae ESBL (A) 06/07/2017     Urine Culture:   Lab Results   Component Value Date    URINECX 20,000-29,000 cfu/ml Mixed Contaminants X3 04/15/2017     Sputum Culture: No components found for: SPUTUMCX  Wound Culure: No results found for: WOUNDCULT  ______________________________________________________________________  Assessment:   Principal Problem:    Severe sepsis ([facility] )  Active Problems:    Pulmonary emphysema (HCC)    Chronic respiratory failure (HCC)    Asthma    Chronic venous stasis dermatitis of both lower extremities    Sleep apnea    Obesity hypoventilation syndrome (HCC)    HTN (hypertension)    Diabetes mellitus with neuropathy ([facility])    Chronic right-sided low back pain without sciatica    Peripheral vascular disease ([facility])    Constipation due to opioid therapy    Acquired hypothyroidism    Anxiety    Moderate episode of recurrent major depressive disorder ([place] [address] )    Morbid obesity with BMI of 40 0-44 9, adult ([facility])  Resolved Problems:    * No resolved hospital problems  *    * Severe sepsis ([facility])   Assessment & Plan    · Suspect 2/2 UTI vs  PNA   · BCx, UCx, UA, Sputum Cx  · Will trend lactic acid, POA 2 7  · CXR- No acute cardiopulmonary disease  · On PE: Pt has cough, SOB, sputum production, fever, chills  · Treated with Levaquin 1 time in the ED, will start cefepime for broad coverage  · H/O CHF - given 1 L IV NS bolus in ED        Chronic venous stasis dermatitis of both lower extremities   Assessment & Plan    · Dress wounds with adaptix & gauze & cling wrap        Obesity hypoventilation syndrome (HCC)   Assessment & Plan    · BiPAP q h s  · Respiratory protocol        Diabetes mellitus with neuropathy [facility])   Assessment & Plan    Lab Results   Component Value Date    HGBA1C 9 1% 04/27/2018     No results for input(s): POCGLU in the last 72 hours      Blood Sugar Average: Last 72 hrs:     · Start insulin sliding scale with weight based algorithm  · Check sugars achs        HTN (hypertension)   Assessment & Plan    · Continue home metoprolol 25 mg b i d   · Monitor telemetry and blood pressure while admitted        Constipation due to opioid therapy   Assessment & Plan    · Hold home Linzess 72 mcg by mouth daily  · Start bowel regimen - senna, Colace, MiraLax  · Stool charting         Acquired hypothyroidism   Assessment & Plan    · Continue home Synthroid 125 mcg daily        Moderate episode of recurrent major depressive disorder (HCC)   Assessment & Plan    · Continue Lexapro 10 mg daily and Effexor 75 mg daily        Anxiety   Assessment & Plan    · Continue Lexapro 10 mg daily & Effexor 75 mg daily          Morbid obesity with BMI of 40 0-44 9, adult (HCC)   Assessment & Plan    · Diabetic and cardiac diet          Family:  · Family updated: yes     Disposition: Admit to ICU  ______________________________________________________________________    VTE Pharmacologic Prophylaxis: Heparin  VTE Mechanical Prophylaxis: foot pump applied    Invasive lines and devices: Invasive Devices     Peripheral Intravenous Line            Peripheral IV 07/13/18 Left Antecubital less than 1 day    Peripheral IV 07/13/18 Right Antecubital less than 1 day              Code Status: Level 3 - DNAR and DNI  POA:    POLST:      Given critical illness, patient length of stay will require greater than two midnights      Signature: Charlotte Schwartz DO  Date: 7/13/2018

## 2018-07-13 NOTE — ASSESSMENT & PLAN NOTE
Lab Results   Component Value Date    HGBA1C 9 1% 04/27/2018     No results for input(s): POCGLU in the last 72 hours      Blood Sugar Average: Last 72 hrs:     · Cont insulin sliding scale with weight based algorithm, stop infusion  · Check sugars achs

## 2018-07-13 NOTE — ED NOTES
Received pt aaox3 on bipap 12/6 28% wet cough noted, skin warm and dry, on cardiac monitor with SR noted  spo2 varies between 88-92%, md aware and okay with parameters  Expiratory wheezing auscultated , breath sounds diminished b/l, resp tachypneic and dyspneic  Pt denies any pain at this time  IV#18g noted right ac with NS 500ml bolus infusing without difficulty  Left ac with IV#18g with regular insulin infusing at 12 units/hr    2++ b/l LE edema noted , open wounds noted to b/l LE anterior aspect weeping serosanguinous drainage, call bell in reach     Candace Collins RN  07/13/18 5180

## 2018-07-13 NOTE — ED PROVIDER NOTES
History  Chief Complaint   Patient presents with    Shortness of Breath     as per daughter, patient was fine at the beginning of the week  Needs to be checked for blood clots as per daughter  Gone "down hill" since yesterday  Patient has respiratory problems including COPD and according to her daughter has been getting worsening shortness of breath over the last several days this week  Patient arrives here today tachypneic and satting in the 60s and 70s%  Patient is only  able to speak in short sentences  She denies any chest pain  Patient is noted to have a fever on arrival            Prior to Admission Medications   Prescriptions Last Dose Informant Patient Reported? Taking?    COD LIVER OIL PO  Self Yes Yes   Sig: Take by mouth daily   Linaclotide (LINZESS) 72 MCG CAPS  Self No Yes   Sig: Take 72 mcg by mouth daily before breakfast   Multiple Vitamins-Minerals (MULTIVITAMIN ADULT PO)  Self Yes Yes   Sig: Take 1 tablet by mouth daily   acetaminophen (TYLENOL) 325 mg tablet  Self No No   Sig: Take 2 tablets (650 mg total) by mouth every 6 (six) hours as needed for mild pain, headaches or fever   albuterol (ACCUNEB) 1 25 MG/3ML nebulizer solution  Self No Yes   Sig: Take 3 mL (1 25 mg total) by nebulization 3 (three) times a day as needed for wheezing (J45 909)   ascorbic acid (VITAMIN C) 500 mg tablet  Self Yes Yes   Sig: Take 1,000 mg by mouth daily     aspirin 325 mg tablet  Self Yes Yes   Sig: Take 325 mg by mouth daily   escitalopram (LEXAPRO) 10 mg tablet  Self No Yes   Sig: Take 1 tablet (10 mg total) by mouth daily   folic acid (FOLVITE) 1 mg tablet  Self Yes Yes   Sig: Take by mouth daily   gabapentin (NEURONTIN) 100 mg capsule  Self No Yes   Sig: Take 1 capsule (100 mg total) by mouth 3 (three) times a day   insulin lispro (HumaLOG) 100 units/mL injection  Self No Yes   Sig: Inject 4-20 Units under the skin 3 (three) times a day before meals   Patient taking differently: Inject 45 Units under the skin 2 (two) times a day Sliding scale    lactulose (CHRONULAC) 10 g/15 mL solution  Self Yes No   Sig: lactulose 10 gm/15ml soln PRN   levothyroxine 125 mcg tablet  Self No Yes   Sig: Take 1 tablet (125 mcg total) by mouth daily   metolazone (ZAROXOLYN) 2 5 mg tablet   No No   Sig: Take one tablet three times a week   metoprolol tartrate (LOPRESSOR) 25 mg tablet  Self Yes No   Sig: Take 1 tablet by mouth 2 (two) times a day   ondansetron (ZOFRAN-ODT) 4 mg disintegrating tablet   No No   Sig: Take 1 tablet (4 mg total) by mouth every 6 (six) hours as needed for nausea or vomiting   oxyCODONE-acetaminophen (PERCOCET) 5-325 mg per tablet   Yes No   polyethylene glycol (MIRALAX) 17 g packet  Self No No   Sig: Take 17 g by mouth daily   Patient taking differently: Take 17 g by mouth 2 (two) times a day     potassium chloride (MICRO-K) 10 MEQ CR capsule  Self No No   Sig: Take 1 capsule (10 mEq total) by mouth daily   pramipexole (MIRAPEX) 1 mg tablet  Self No Yes   Sig: Take 1 tablet (1 mg total) by mouth 3 (three) times a day   tiotropium (SPIRIVA HANDIHALER) 18 mcg inhalation capsule  Self Yes No   Sig: spiriva handihaler 18 mcg caps   torsemide (DEMADEX) 20 mg tablet   No Yes   Sig: TAKE 2 TABLETS BY MOUTH TWO TIMES DAILY MONDAY,  WEDNESDAY, AND FRIDAY   traMADol (ULTRAM) 50 mg tablet   No No   Sig: Take 1 tablet (50 mg total) by mouth every 6 (six) hours as needed for moderate pain   venlafaxine (EFFEXOR) 75 mg tablet  Self Yes No   Sig: venlafaxine 75 mg tablet      Facility-Administered Medications: None       Past Medical History:   Diagnosis Date    Anxiety     Aortic valve disorder     Arthritis     Asthma     Cataract     [facility] failure[facility]     COPD (chronic obstructive pulmonary disease) [facility]     Diabetes mellitus ([facility])     Disease of thyroid gland     Dry eye syndrome     Ectropion of left eye     last assessed: 10/17/2016    Emphysema, 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[facility])     Hearing loss     History of malignant neoplasm of thyroid     Hypertension     Malignant neoplasm of skin     Memory loss     Mitral valve regurgitation     Myocardial infarction (HCC)     Obesity hypoventilation syndrome (HCC)     Pain     right hip    Restless leg syndrome     Seasonal allergies     Skin cancer (melanoma) (HCC)     Sleep related hypoxia     Thyroid cancer ([address] )        Past Surgical History:   Procedure Laterality Date    CHOLECYSTECTOMY      EYE SURGERY      HYSTERECTOMY      OTHER SURGICAL HISTORY      removal of lesion    PERICARDIUM SURGERY      resection of pericardial cyst or tumor    AL ERCP DX COLLECTION SPECIMEN BRUSHING/WASHING N/A 5/30/2017    Procedure: ENDOSCOPIC RETROGRADE CHOLANGIOPANCREATOGRAPHY (ERCP); SPHINCTEROTOMY;  Surgeon: Horacio Wilson MD;  Location:  GI LAB; Service: Gastroenterology   Art[employer] Payer JOINT Right 3/30/2018    Procedure: Right Sacroiliac Injection;  Surgeon: Ava Corrales MD;  Location: [facility] MAIN OR;  Service: Pain Management     REPLACEMENT TOTAL KNEE Left     REPLACEMENT TOTAL KNEE Right     REPLACEMENT TOTAL KNEE BILATERAL      SKIN BIOPSY      melanoma of back    STEROID INJECTION HIP Right 12/21/2017    Procedure: TROCHANTERIC BURSA INJECTION RIGHT;  Surgeon: Ava Corrales MD;  Location: [facility] MAIN OR;  Service: Pain Management     THORACOSCOPY      (therapeutic) w/excision of pericardial cyst    THORACOTOMY Right     at 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, for pericardial cyst    THYROID SURGERY      THYROIDECTOMY, PARTIAL      For thyroid cancer       Family History   Problem Relation Age of Onset    Cancer Father     Arthritis Father     Liver cancer Father     Diabetes Sister     Asthma Mother     No Known Problems Brother     Substance Abuse Neg Hx     Mental illness Neg Hx      I have reviewed and agree with the history as documented      Social History   Substance Use Topics    Smoking status: Never Smoker    Smokeless tobacco: Never Used    Alcohol use No        Review of Systems   Unable to perform ROS: Acuity of condition   Constitutional: Positive for fever  Respiratory: Positive for cough, shortness of breath and wheezing  Cardiovascular: Negative for chest pain  Gastrointestinal: Negative for abdominal pain  Physical Exam  Physical Exam   Constitutional: She is oriented to person, place, and time  She appears well-developed and well-nourished  HENT:   Head: Normocephalic and atraumatic  Eyes: Conjunctivae are normal    Neck: Normal range of motion  Neck supple  Cardiovascular: Regular rhythm  Murmur heard  Tachycardic   Pulmonary/Chest: She is in respiratory distress  She has wheezes  Abdominal: Soft  She exhibits no distension  There is no tenderness  Musculoskeletal: Normal range of motion  She exhibits edema  Neurological: She is alert and oriented to person, place, and time  Skin: Skin is warm and dry  Psychiatric: She has a normal mood and affect  Her behavior is normal    Nursing note and vitals reviewed        Vital Signs  ED Triage Vitals   Temperature Pulse Respirations Blood Pressure SpO2   07/13/18 1602 07/13/18 1602 07/13/18 1602 07/13/18 1602 07/13/18 1602   (!) 103 4 °F (39 7 °C) (!) 116 (!) 34 (!) 187/81 (!) 64 %      Temp Source Heart Rate Source Patient Position - Orthostatic VS BP Location FiO2 (%)   07/13/18 1602 07/13/18 1602 07/13/18 1602 07/13/18 1602 --   Tympanic Monitor Sitting Left arm       Pain Score       07/13/18 1704       No Pain           Vitals:    07/13/18 1700 07/13/18 1715 07/13/18 1730 07/13/18 1745   BP: (!) 181/72 169/74 165/72 129/62   Pulse: (!) 112 (!) 110 104 104   Patient Position - Orthostatic VS: Sitting Sitting Sitting Sitting       Visual Acuity      ED Medications  Medications   levofloxacin (LEVAQUIN) IVPB (premix) 750 mg (750 mg Intravenous New Bag 7/13/18 1705)   ipratropium-albuterol (DUO-NEB) 0 5-2 5 mg/3 mL inhalation solution 3 mL (3 mL Nebulization Given 7/13/18 1613)   acetaminophen (TYLENOL) tablet 650 mg (650 mg Oral Given 7/13/18 1704)   sodium chloride 0 9 % infusion (1,000 mL/hr Intravenous New Bag 7/13/18 1659)       Diagnostic Studies  Results Reviewed     Procedure Component Value Units Date/Time    Blood gas, arterial [[id number]]  (Abnormal) Collected:  07/13/18 1728    Lab Status:  Final result Specimen:  Blood, Arterial from Radial, Right Updated:  07/13/18 1744     pH, Arterial 7 380     PH ART TC 7 348 (L)     pCO2, Arterial 65 8 (HH) mm Hg      PCO2 (TC) Arterial 72 4 (HH) mm Hg      pO2, Arterial 84 8 mm Hg      PO2 (TC) Arterial 98 1 mm Hg      HCO3, Arterial 38 1 (H) mmol/L      Base Excess, Arterial 10 4 mmol/L      O2 Content, Arterial 17 0 mL/dL      O2 HGB,Arterial  93 8 (L) %      SOURCE Radial, Right     AILEEN TEST Yes     Temperature 102 6 Degrees Fehrenheit      Non Vent type BIPAP --     Non Vent type- CPAP --     Other FIO2 40 %     Lactic acid, plasma [[id number]]     Lab Status:  No result Specimen:  Blood     B-type natriuretic peptide [[id number]]  (Normal) Collected:  07/13/18 1641    Lab Status:  Final result Specimen:  Blood from Arm, Right Updated:  07/13/18 1711     NT-proBNP 273 pg/mL     Comprehensive metabolic panel [[id number]]  (Abnormal) Collected:  07/13/18 1641    Lab Status:  Final result Specimen:  Blood from Arm, Right Updated:  07/13/18 1704     Sodium 130 (L) mmol/L      Potassium 3 1 (L) mmol/L      Chloride 87 (L) mmol/L      CO2 40 (H) mmol/L      Anion Gap 3 (L) mmol/L      BUN 36 (H) mg/dL      Creatinine 1 71 (H) mg/dL      Glucose 482 (H) mg/dL      Calcium 8 4 mg/dL      AST 25 U/L      ALT 31 U/L      Alkaline Phosphatase 88 U/L      Total Protein 7 7 g/dL      Albumin 3 2 (L) g/dL      Total Bilirubin 0 50 mg/dL      eGFR 28 ml/min/1 73sq m     Narrative:         National Kidney Disease Education Program recommendations are as follows:  GFR calculation is accurate only with a steady state creatinine  Chronic Kidney disease less than 60 ml/min/1 73 sq  meters  Kidney failure less than 15 ml/min/1 73 sq  meters  Protime-INR [[id number]]  (Abnormal) Collected:  07/13/18 1617    Lab Status:  Final result Specimen:  Blood from Arm, Right Updated:  07/13/18 1701     Protime 14 1 (H) seconds      INR 1 37 (H)    APTT [[id number]]  (Abnormal) Collected:  07/13/18 1617    Lab Status:  Final result Specimen:  Blood from Arm, Right Updated:  07/13/18 1701     PTT 22 (L) seconds     Lactic acid, plasma [[id number]]  (Abnormal) Collected:  07/13/18 1617    Lab Status:  Final result Specimen:  Blood from Arm, Right Updated:  07/13/18 1657     LACTIC ACID 2 7 (HH) mmol/L     Narrative:         Result may be elevated if tourniquet was used during collection  CBC and differential [[id number]]  (Abnormal) Collected:  07/13/18 1615    Lab Status:  Final result Specimen:  Blood from Arm, Right Updated:  07/13/18 1655     WBC 10 13 Thousand/uL      RBC 4 45 Million/uL      Hemoglobin 13 1 g/dL      Hematocrit 41 5 %      MCV 93 fL      MCH 29 4 pg      MCHC 31 6 g/dL      RDW 15 3 (H) %      MPV 10 8 fL      Platelets 843 Thousands/uL      nRBC 0 /100 WBCs      Neutrophils Relative 76 (H) %      Immat GRANS % 0 %      Lymphocytes Relative 16 %      Monocytes Relative 8 %      Eosinophils Relative 0 %      Basophils Relative 0 %      Neutrophils Absolute 7 64 (H) Thousands/µL      Immature Grans Absolute 0 03 Thousand/uL      Lymphocytes Absolute 1 59 Thousands/µL      Monocytes Absolute 0 85 Thousand/µL      Eosinophils Absolute 0 00 Thousand/µL      Basophils Absolute 0 02 Thousands/µL     Troponin I [[id number]]  (Normal) Collected:  07/13/18 1621    Lab Status:  Final result Specimen:  Blood from Arm, Right Updated:  07/13/18 1650     Troponin I <0 02 ng/mL     Blood culture #2 [[id number]] Collected:  07/13/18 1617    Lab Status:   In process Specimen:  Blood from Arm, Right Updated:  07/13/18 1627    Blood culture #1 [[id number]] Collected: 07/13/18 1617    Lab Status: In process Specimen:  Blood from Arm, Right Updated:  07/13/18 1627    UA w Reflex to Microscopic w Reflex to Culture [[id number]]     Lab Status:  No result Specimen:  Urine                  XR chest 1 view portable    (Results Pending)              Procedures  ECG 12 Lead Documentation  Date/Time: 7/13/2018 4:07 PM  Performed by: Ihsan Ryan  Authorized by: Ihsan Ryan     Indications / Diagnosis:  Respiratory distress  ECG reviewed by me, the ED Provider: yes    Patient location:  ED  Interpretation:     Interpretation: abnormal    Rate:     ECG rate:  115    ECG rate assessment: tachycardic    Rhythm:     Rhythm: sinus tachycardia    Ectopy:     Ectopy: PAC    QRS:     QRS axis:  Left    QRS intervals:  Normal  Conduction:     Conduction: abnormal      Abnormal conduction: complete RBBB    ST segments:     ST segments:  Normal  T waves:     T waves: normal             Phone Contacts  ED Phone Contact    ED Course                               MDM  Number of Diagnoses or Management Options  Hypoxia:   Sepsis ([facility] [address] ):   Uncontrolled diabetes mellitus [facility]):   Diagnosis management comments: Patient appears to have sepsis likely from a pulmonary source  Will hydrate and provide antibiotics  Admit to step-down    Of note patient is retaining carbon dioxide and will tried a decrease her FiO2 and oxygen saturations to the low 90s    CritCare Time    Disposition  Final diagnoses:   Sepsis ([facility] [address] )   Hypoxia   Uncontrolled diabetes mellitus ([facility] [address] )     Time reflects when diagnosis was documented in both MDM as applicable and the Disposition within this note     Time User Action Codes Description Comment    7/13/2018  5:40 PM Rosalba Espinosa Add [A41 9] Sepsis ([facility] [address] )     7/13/2018  5:40 PM Rosalba Espinosa Add [R09 02] Hypoxia     7/13/2018  5:40 PM Yohana FLORES Add [E11 65] Uncontrolled diabetes mellitus [facility])       ED Disposition     ED Disposition Condition Comment    Admit  Case was discussed withDr lowry and the patient's admission status was agreed to be Admission Status: inpatient status to the service of Dr Amy Espino   Follow-up Information    None         Patient's Medications   Discharge Prescriptions    No medications on file     No discharge procedures on file      ED Provider  Electronically Signed by           Urbano Quintanilla MD  07/13/18 [phone]

## 2018-07-13 NOTE — ASSESSMENT & PLAN NOTE
· Home diuretics on hold: Demadex and Metazolone, consider restarting tomorrow  · Will monitor her fluid state   · Strict I/O's

## 2018-07-13 NOTE — ED NOTES
Asked to keep patient's O2 saturation between 88-91% as per critical care team and Dr Radha Gan, RN  07/13/18 2139

## 2018-07-13 NOTE — ASSESSMENT & PLAN NOTE
· Suspect 2/2 UTI, will straight cath to obtain specimen  · BCx, UCx pending  · Lactic acid cleared to 1 6  · CXR- No acute cardiopulmonary disease  · Treated with Levaquin 1 time in the ED, changed to Cefepime until cultures back

## 2018-07-14 ENCOUNTER — APPOINTMENT (INPATIENT)
Dept: NON INVASIVE DIAGNOSTICS | Facility: HOSPITAL | Age: 78
DRG: 871 | End: 2018-07-14
Payer: COMMERCIAL

## 2018-07-14 PROBLEM — I50.30 DIASTOLIC CHF (HCC): Chronic | Status: ACTIVE | Noted: 2018-02-22

## 2018-07-14 PROBLEM — M54.50 CHRONIC RIGHT-SIDED LOW BACK PAIN WITHOUT SCIATICA: Chronic | Status: ACTIVE | Noted: 2018-02-14

## 2018-07-14 PROBLEM — I10 HTN (HYPERTENSION): Chronic | Status: ACTIVE | Noted: 2018-01-19

## 2018-07-14 PROBLEM — G89.29 CHRONIC RIGHT-SIDED LOW BACK PAIN WITHOUT SCIATICA: Chronic | Status: ACTIVE | Noted: 2018-02-14

## 2018-07-14 PROBLEM — N39.0 URINARY TRACT INFECTION WITHOUT HEMATURIA: Status: ACTIVE | Noted: 2018-07-14

## 2018-07-14 PROBLEM — I73.9 PERIPHERAL VASCULAR DISEASE (HCC): Chronic | Status: ACTIVE | Noted: 2018-04-27

## 2018-07-14 PROBLEM — I87.2 CHRONIC VENOUS STASIS DERMATITIS OF BOTH LOWER EXTREMITIES: Chronic | Status: ACTIVE | Noted: 2018-02-22

## 2018-07-14 PROBLEM — J96.10 CHRONIC RESPIRATORY FAILURE (HCC): Chronic | Status: ACTIVE | Noted: 2018-02-23

## 2018-07-14 PROBLEM — F41.9 ANXIETY: Chronic | Status: ACTIVE | Noted: 2017-10-09

## 2018-07-14 PROBLEM — R11.0 NAUSEA: Status: RESOLVED | Noted: 2017-04-15 | Resolved: 2018-07-14

## 2018-07-14 PROBLEM — F33.1 MODERATE EPISODE OF RECURRENT MAJOR DEPRESSIVE DISORDER (HCC): Chronic | Status: ACTIVE | Noted: 2017-10-17

## 2018-07-14 LAB
ANION GAP SERPL CALCULATED.3IONS-SCNC: 3 MMOL/L (ref 4–13)
ANION GAP SERPL CALCULATED.3IONS-SCNC: 4 MMOL/L (ref 4–13)
BASOPHILS # BLD AUTO: 0.02 THOUSANDS/ΜL (ref 0–0.1)
BASOPHILS NFR BLD AUTO: 0 % (ref 0–1)
BUN SERPL-MCNC: 34 MG/DL (ref 5–25)
BUN SERPL-MCNC: 37 MG/DL (ref 5–25)
CALCIUM SERPL-MCNC: 7.5 MG/DL (ref 8.3–10.1)
CALCIUM SERPL-MCNC: 7.6 MG/DL (ref 8.3–10.1)
CHLORIDE SERPL-SCNC: 96 MMOL/L (ref 100–108)
CHLORIDE SERPL-SCNC: 97 MMOL/L (ref 100–108)
CO2 SERPL-SCNC: 37 MMOL/L (ref 21–32)
CO2 SERPL-SCNC: 39 MMOL/L (ref 21–32)
CREAT SERPL-MCNC: 1.39 MG/DL (ref 0.6–1.3)
CREAT SERPL-MCNC: 1.48 MG/DL (ref 0.6–1.3)
EOSINOPHIL # BLD AUTO: 0.01 THOUSAND/ΜL (ref 0–0.61)
EOSINOPHIL NFR BLD AUTO: 0 % (ref 0–6)
ERYTHROCYTE [DISTWIDTH] IN BLOOD BY AUTOMATED COUNT: 15.5 % (ref 11.6–15.1)
GFR SERPL CREATININE-BSD FRML MDRD: 33 ML/MIN/1.73SQ M
GFR SERPL CREATININE-BSD FRML MDRD: 36 ML/MIN/1.73SQ M
GLUCOSE SERPL-MCNC: 114 MG/DL (ref 65–140)
GLUCOSE SERPL-MCNC: 120 MG/DL (ref 65–140)
GLUCOSE SERPL-MCNC: 135 MG/DL (ref 65–140)
GLUCOSE SERPL-MCNC: 157 MG/DL (ref 65–140)
GLUCOSE SERPL-MCNC: 159 MG/DL (ref 65–140)
GLUCOSE SERPL-MCNC: 170 MG/DL (ref 65–140)
GLUCOSE SERPL-MCNC: 171 MG/DL (ref 65–140)
GLUCOSE SERPL-MCNC: 172 MG/DL (ref 65–140)
GLUCOSE SERPL-MCNC: 173 MG/DL (ref 65–140)
GLUCOSE SERPL-MCNC: 214 MG/DL (ref 65–140)
GLUCOSE SERPL-MCNC: 325 MG/DL (ref 65–140)
HCT VFR BLD AUTO: 36.5 % (ref 34.8–46.1)
HGB BLD-MCNC: 11.2 G/DL (ref 11.5–15.4)
IMM GRANULOCYTES # BLD AUTO: 0.03 THOUSAND/UL (ref 0–0.2)
IMM GRANULOCYTES NFR BLD AUTO: 0 % (ref 0–2)
LACTATE SERPL-SCNC: 1.6 MMOL/L (ref 0.5–2)
LACTATE SERPL-SCNC: 2.8 MMOL/L (ref 0.5–2)
LYMPHOCYTES # BLD AUTO: 1.16 THOUSANDS/ΜL (ref 0.6–4.47)
LYMPHOCYTES NFR BLD AUTO: 14 % (ref 14–44)
MAGNESIUM SERPL-MCNC: 2.4 MG/DL (ref 1.6–2.6)
MCH RBC QN AUTO: 29.2 PG (ref 26.8–34.3)
MCHC RBC AUTO-ENTMCNC: 30.7 G/DL (ref 31.4–37.4)
MCV RBC AUTO: 95 FL (ref 82–98)
MONOCYTES # BLD AUTO: 1.05 THOUSAND/ΜL (ref 0.17–1.22)
MONOCYTES NFR BLD AUTO: 12 % (ref 4–12)
NEUTROPHILS # BLD AUTO: 6.25 THOUSANDS/ΜL (ref 1.85–7.62)
NEUTS SEG NFR BLD AUTO: 74 % (ref 43–75)
NRBC BLD AUTO-RTO: 0 /100 WBCS
PHOSPHATE SERPL-MCNC: 3.9 MG/DL (ref 2.3–4.1)
PLATELET # BLD AUTO: 178 THOUSANDS/UL (ref 149–390)
PMV BLD AUTO: 10.6 FL (ref 8.9–12.7)
POTASSIUM SERPL-SCNC: 3 MMOL/L (ref 3.5–5.3)
POTASSIUM SERPL-SCNC: 3.4 MMOL/L (ref 3.5–5.3)
RBC # BLD AUTO: 3.83 MILLION/UL (ref 3.81–5.12)
SODIUM SERPL-SCNC: 137 MMOL/L (ref 136–145)
SODIUM SERPL-SCNC: 139 MMOL/L (ref 136–145)
TROPONIN I SERPL-MCNC: 0.02 NG/ML
WBC # BLD AUTO: 8.52 THOUSAND/UL (ref 4.31–10.16)

## 2018-07-14 PROCEDURE — 80048 BASIC METABOLIC PNL TOTAL CA: CPT | Performed by: NURSE PRACTITIONER

## 2018-07-14 PROCEDURE — 99232 SBSQ HOSP IP/OBS MODERATE 35: CPT | Performed by: INTERNAL MEDICINE

## 2018-07-14 PROCEDURE — 94760 N-INVAS EAR/PLS OXIMETRY 1: CPT

## 2018-07-14 PROCEDURE — 94640 AIRWAY INHALATION TREATMENT: CPT

## 2018-07-14 PROCEDURE — 85025 COMPLETE CBC W/AUTO DIFF WBC: CPT | Performed by: NURSE PRACTITIONER

## 2018-07-14 PROCEDURE — 36600 WITHDRAWAL OF ARTERIAL BLOOD: CPT

## 2018-07-14 PROCEDURE — 82948 REAGENT STRIP/BLOOD GLUCOSE: CPT

## 2018-07-14 PROCEDURE — 84100 ASSAY OF PHOSPHORUS: CPT | Performed by: NURSE PRACTITIONER

## 2018-07-14 PROCEDURE — 94660 CPAP INITIATION&MGMT: CPT

## 2018-07-14 PROCEDURE — 84484 ASSAY OF TROPONIN QUANT: CPT | Performed by: NURSE PRACTITIONER

## 2018-07-14 PROCEDURE — 93306 TTE W/DOPPLER COMPLETE: CPT

## 2018-07-14 PROCEDURE — 83605 ASSAY OF LACTIC ACID: CPT | Performed by: INTERNAL MEDICINE

## 2018-07-14 PROCEDURE — 94664 DEMO&/EVAL PT USE INHALER: CPT

## 2018-07-14 PROCEDURE — 93306 TTE W/DOPPLER COMPLETE: CPT | Performed by: INTERNAL MEDICINE

## 2018-07-14 PROCEDURE — 83735 ASSAY OF MAGNESIUM: CPT | Performed by: NURSE PRACTITIONER

## 2018-07-14 PROCEDURE — 87086 URINE CULTURE/COLONY COUNT: CPT | Performed by: STUDENT IN AN ORGANIZED HEALTH CARE EDUCATION/TRAINING PROGRAM

## 2018-07-14 RX ORDER — TRAMADOL HYDROCHLORIDE 50 MG/1
50 TABLET ORAL EVERY 6 HOURS PRN
Status: DISCONTINUED | OUTPATIENT
Start: 2018-07-14 | End: 2018-07-18

## 2018-07-14 RX ORDER — LEVALBUTEROL 1.25 MG/.5ML
1.25 SOLUTION, CONCENTRATE RESPIRATORY (INHALATION)
Status: DISCONTINUED | OUTPATIENT
Start: 2018-07-14 | End: 2018-07-18 | Stop reason: HOSPADM

## 2018-07-14 RX ORDER — POTASSIUM CHLORIDE 20 MEQ/1
40 TABLET, EXTENDED RELEASE ORAL ONCE
Status: COMPLETED | OUTPATIENT
Start: 2018-07-14 | End: 2018-07-14

## 2018-07-14 RX ORDER — METOCLOPRAMIDE HYDROCHLORIDE 5 MG/ML
10 INJECTION INTRAMUSCULAR; INTRAVENOUS ONCE
Status: DISCONTINUED | OUTPATIENT
Start: 2018-07-14 | End: 2018-07-18 | Stop reason: HOSPADM

## 2018-07-14 RX ORDER — MAGNESIUM SULFATE HEPTAHYDRATE 40 MG/ML
2 INJECTION, SOLUTION INTRAVENOUS ONCE
Status: COMPLETED | OUTPATIENT
Start: 2018-07-14 | End: 2018-07-14

## 2018-07-14 RX ORDER — POTASSIUM CHLORIDE 20MEQ/15ML
40 LIQUID (ML) ORAL ONCE
Status: COMPLETED | OUTPATIENT
Start: 2018-07-14 | End: 2018-07-14

## 2018-07-14 RX ORDER — SODIUM CHLORIDE FOR INHALATION 0.9 %
3 VIAL, NEBULIZER (ML) INHALATION EVERY 6 HOURS PRN
Status: DISCONTINUED | OUTPATIENT
Start: 2018-07-14 | End: 2018-07-15 | Stop reason: SDUPTHER

## 2018-07-14 RX ORDER — LEVALBUTEROL 1.25 MG/.5ML
1.25 SOLUTION, CONCENTRATE RESPIRATORY (INHALATION)
Status: DISCONTINUED | OUTPATIENT
Start: 2018-07-14 | End: 2018-07-14

## 2018-07-14 RX ADMIN — MEROPENEM 1000 MG: 1 INJECTION, POWDER, FOR SOLUTION INTRAVENOUS at 12:06

## 2018-07-14 RX ADMIN — LEVOTHYROXINE SODIUM 125 MCG: 125 TABLET ORAL at 05:23

## 2018-07-14 RX ADMIN — LEVALBUTEROL HYDROCHLORIDE 1.25 MG: 1.25 SOLUTION, CONCENTRATE RESPIRATORY (INHALATION) at 14:23

## 2018-07-14 RX ADMIN — IPRATROPIUM BROMIDE 0.5 MG: 0.5 SOLUTION RESPIRATORY (INHALATION) at 20:16

## 2018-07-14 RX ADMIN — HEPARIN SODIUM 5000 UNITS: 5000 INJECTION, SOLUTION INTRAVENOUS; SUBCUTANEOUS at 21:55

## 2018-07-14 RX ADMIN — POTASSIUM CHLORIDE 40 MEQ: 1500 TABLET, EXTENDED RELEASE ORAL at 13:46

## 2018-07-14 RX ADMIN — HEPARIN SODIUM 5000 UNITS: 5000 INJECTION, SOLUTION INTRAVENOUS; SUBCUTANEOUS at 13:46

## 2018-07-14 RX ADMIN — LEVALBUTEROL HYDROCHLORIDE 1.25 MG: 1.25 SOLUTION, CONCENTRATE RESPIRATORY (INHALATION) at 07:25

## 2018-07-14 RX ADMIN — ISODIUM CHLORIDE 3 ML: 0.03 SOLUTION RESPIRATORY (INHALATION) at 07:26

## 2018-07-14 RX ADMIN — ISODIUM CHLORIDE 3 ML: 0.03 SOLUTION RESPIRATORY (INHALATION) at 14:23

## 2018-07-14 RX ADMIN — ISODIUM CHLORIDE 3 ML: 0.03 SOLUTION RESPIRATORY (INHALATION) at 17:53

## 2018-07-14 RX ADMIN — LEVALBUTEROL HYDROCHLORIDE 1.25 MG: 0.63 SOLUTION RESPIRATORY (INHALATION) at 02:42

## 2018-07-14 RX ADMIN — ISODIUM CHLORIDE 3 ML: 0.03 SOLUTION RESPIRATORY (INHALATION) at 02:43

## 2018-07-14 RX ADMIN — INSULIN LISPRO 2 UNITS: 100 INJECTION, SOLUTION INTRAVENOUS; SUBCUTANEOUS at 21:55

## 2018-07-14 RX ADMIN — POTASSIUM CHLORIDE 40 MEQ: 20 SOLUTION ORAL at 02:47

## 2018-07-14 RX ADMIN — MEROPENEM 1000 MG: 1 INJECTION, POWDER, FOR SOLUTION INTRAVENOUS at 23:50

## 2018-07-14 RX ADMIN — INSULIN LISPRO 8 UNITS: 100 INJECTION, SOLUTION INTRAVENOUS; SUBCUTANEOUS at 16:31

## 2018-07-14 RX ADMIN — IPRATROPIUM BROMIDE 0.5 MG: 0.5 SOLUTION RESPIRATORY (INHALATION) at 07:24

## 2018-07-14 RX ADMIN — METOPROLOL TARTRATE 25 MG: 50 TABLET ORAL at 09:05

## 2018-07-14 RX ADMIN — PRAMIPEXOLE DIHYDROCHLORIDE 1 MG: 0.5 TABLET ORAL at 09:05

## 2018-07-14 RX ADMIN — ASPIRIN 325 MG: 325 TABLET ORAL at 09:05

## 2018-07-14 RX ADMIN — IPRATROPIUM BROMIDE 0.5 MG: 0.5 SOLUTION RESPIRATORY (INHALATION) at 02:41

## 2018-07-14 RX ADMIN — DOCUSATE SODIUM 100 MG: 100 CAPSULE, LIQUID FILLED ORAL at 17:41

## 2018-07-14 RX ADMIN — MAGNESIUM SULFATE HEPTAHYDRATE 2 G: 40 INJECTION, SOLUTION INTRAVENOUS at 02:26

## 2018-07-14 RX ADMIN — LEVALBUTEROL HYDROCHLORIDE 1.25 MG: 1.25 SOLUTION, CONCENTRATE RESPIRATORY (INHALATION) at 17:53

## 2018-07-14 RX ADMIN — PRAMIPEXOLE DIHYDROCHLORIDE 1 MG: 0.5 TABLET ORAL at 21:55

## 2018-07-14 RX ADMIN — TRAMADOL HYDROCHLORIDE 50 MG: 50 TABLET, FILM COATED ORAL at 13:48

## 2018-07-14 RX ADMIN — ESCITALOPRAM OXALATE 10 MG: 10 TABLET ORAL at 09:05

## 2018-07-14 RX ADMIN — VENLAFAXINE 75 MG: 37.5 TABLET ORAL at 09:06

## 2018-07-14 RX ADMIN — ONDANSETRON 4 MG: 4 TABLET, ORALLY DISINTEGRATING ORAL at 17:39

## 2018-07-14 RX ADMIN — SENNOSIDES 8.6 MG: 8.6 TABLET, FILM COATED ORAL at 21:55

## 2018-07-14 RX ADMIN — LEVALBUTEROL HYDROCHLORIDE 1.25 MG: 1.25 SOLUTION, CONCENTRATE RESPIRATORY (INHALATION) at 20:16

## 2018-07-14 RX ADMIN — PRAMIPEXOLE DIHYDROCHLORIDE 1 MG: 0.5 TABLET ORAL at 16:31

## 2018-07-14 RX ADMIN — ISODIUM CHLORIDE 3 ML: 0.03 SOLUTION RESPIRATORY (INHALATION) at 20:16

## 2018-07-14 RX ADMIN — HEPARIN SODIUM 5000 UNITS: 5000 INJECTION, SOLUTION INTRAVENOUS; SUBCUTANEOUS at 05:23

## 2018-07-14 RX ADMIN — FOLIC ACID 1 MG: 1 TABLET ORAL at 09:06

## 2018-07-14 RX ADMIN — INSULIN LISPRO 2 UNITS: 100 INJECTION, SOLUTION INTRAVENOUS; SUBCUTANEOUS at 12:08

## 2018-07-14 RX ADMIN — POLYETHYLENE GLYCOL 3350 17 G: 17 POWDER, FOR SOLUTION ORAL at 09:05

## 2018-07-14 RX ADMIN — IPRATROPIUM BROMIDE 0.5 MG: 0.5 SOLUTION RESPIRATORY (INHALATION) at 14:23

## 2018-07-14 RX ADMIN — METOPROLOL TARTRATE 25 MG: 50 TABLET ORAL at 17:41

## 2018-07-14 RX ADMIN — DOCUSATE SODIUM 100 MG: 100 CAPSULE, LIQUID FILLED ORAL at 09:05

## 2018-07-14 NOTE — CASE MANAGEMENT
Initial Clinical Review  Admission: Date/Time/Statement: 7/13/18 @ 1742   Orders Placed This Encounter   Procedures    Inpatient Admission (expected length of stay for this patient is greater than two midnights)     Standing Status:   Standing     Number of Occurrences:   1     Order Specific Question:   Admitting Physician     Answer:   Brandi Fallon     Order Specific Question:   Level of Care     Answer:   Level 1 Stepdown [13]     Order Specific Question:   Estimated length of stay     Answer:   More than 2 Midnights     Order Specific Question:   Certification     Answer:   I certify that inpatient services are medically necessary for this patient for a duration of greater than two midnights  See H&P and MD Progress Notes for additional information about the patient's course of treatment  ED: Date/Time/Mode of Arrival:   ED Arrival Information     Expected Arrival Acuity Means of Arrival Escorted By Service Admission Type    - 7/13/2018 15:56 Emergent Wheelchair Family Member Critical Care/ICU Emergency    Arrival Complaint    SOB      Chief Complaint:   Chief Complaint   Patient presents with    Shortness of Breath     as per daughter, patient was fine at the beginning of the week  Needs to be checked for blood clots as per daughter  Gone "down hill" since yesterday  History of Illness:   Patient has respiratory problems including COPD and according to her daughter has been getting worsening shortness of breath over the last several days this week  Patient arrives here today tachypneic and satting in the 60s and 70s%  Patient is only  able to speak in short sentences  She denies any chest pain   Patient is noted to have a fever on arrival  ED Vital Signs:   ED Triage Vitals   Temperature Pulse Respirations Blood Pressure SpO2   07/13/18 1602 07/13/18 1602 07/13/18 1602 07/13/18 1602 07/13/18 1602   (!) 103 4 °F (39 7 °C) (!) 116 (!) 34 (!) 187/81 (!) 64 %      Temp Source Heart Rate Source Patient Position - Orthostatic VS BP Location FiO2 (%)   07/13/18 1602 07/13/18 1602 07/13/18 1602 07/13/18 1602 07/13/18 1944   Tympanic Monitor Sitting Left arm 28      Pain Score       07/13/18 1704       No Pain        Wt Readings from Last 1 Encounters:   07/14/18 96 9 kg (213 lb 10 oz)   Vital Signs (abnormal):   RR 29, 32, 32, 25  Abnormal Labs/Diagnostic Test Results:   PT 14 1 INR 1 37 PTT 22 LACTIC ACID 2 7  K 3 1 CL 87 CO2 40 ANION GAP 3 BUN 36 CR 1 71 GLUC 482 ALB 3 2 GFR 28   pH, Arterial 7 350 - 7 450 7 365     PH ART TC 7 350 - 7 450 7 364     pCO2, Arterial 36 0 - 44 0 mm Hg 69 5   HH    Comment: Verified by repeat analysis   PCO2 (TC) Arterial 36 0 - 44 0 mm Hg 69 8   HH    Comment: Verified by repeat analysis   pO2, Arterial 75 0 - 129 0 mm Hg 62 5   L    PO2 (TC) Arterial 75 0 - 129 0 mm Hg 62 9   L    HCO3, Arterial 22 0 - 28 0 mmol/L 38 8   H    Base Excess, Arterial mmol/L 10 8     O2 Content, Arterial 16 0 - 23 0 mL/dL 16 0     O2 HGB,Arterial  94 0 - 97 0 % 89 0   L    SOURCE  Radial, Right     AILEEN TEST  Yes     Temperature Degrees Fehrenheit 98 8     Non Vent type BIPAP      Comment: 12/628%   CXR=No acute cardiopulmonary disease  EKG=  Ventricular Rate     Atrial Rate     AL Interval ms 204    QRSD Interval ms 124    QT Interval ms 376    QTC Interval ms 518    P [place] degrees 68    QRS [place] degrees -43    T Wave Axis degrees 20      Sinus tachycardia with Premature atrial complexes  Left axis deviation  Right bundle branch block  Abnormal ECG  When compared with ECG of 22-FEB-2018 15:02,  Premature atrial complexes are now Present      ED Treatment:   Medication Administration from 07/13/2018 1556 to 07/13/2018 2121       Date/Time Order Dose Route Action Action by Comments     07/13/2018 1613 ipratropium-albuterol (DUO-NEB) 0 5-2 5 mg/3 mL inhalation solution 3 mL 3 mL Nebulization Given Abdifatah Baez RN      07/13/2018 1906 levofloxacin (LEVAQUIN) IVPB (premix) 750 mg 0 mg Intravenous Stopped Maricel Ro RN      07/13/2018 1705 levofloxacin (LEVAQUIN) IVPB (premix) 750 mg 750 mg Intravenous New Bag Maricel Ro RN      07/13/2018 1704 acetaminophen (TYLENOL) tablet 650 mg 650 mg Oral Given Maricel Ro RN      07/13/2018 1804 sodium chloride 0 9 % infusion 0 mL/hr Intravenous Stopped Maricel Ro RN      07/13/2018 1659 sodium chloride 0 9 % infusion 1,000 mL/hr Intravenous New Bag Maricel Ro RN      07/13/2018 2105 insulin regular (HumuLIN R,NovoLIN R) 1 Units/mL in sodium chloride 0 9 % 100 mL infusion 8 Units/hr Intravenous Rate/Dose Change Jeremie Vargas RN      07/13/2018 1903 insulin regular (HumuLIN R,NovoLIN R) 1 Units/mL in sodium chloride 0 9 % 100 mL infusion 12 Units/hr Intravenous [address] Maricel Ro RN      07/13/2018 1953 sodium chloride 0 9 % infusion 0 mL/hr Intravenous Stopped Jeremie Vargas RN      07/13/2018 1902 sodium chloride 0 9 % infusion 500 mL/hr Intravenous New Bag Maricel Ro RN      07/13/2018 2056 cefepime (MAXIPIME) IVPB (premix) 1,000 mg 1,000 mg Intravenous [address] Francie Sharma RN      07/13/2018 2003 ipratropium-albuterol (DUO-NEB) 0 5-2 5 mg/3 mL inhalation solution **AcuDose Override Pull** 3 mL  Given Jeremie Vargas RN       Past Medical/Surgical History:    Active Ambulatory Problems     Diagnosis Date Noted    Acquired hypothyroidism 04/15/2017    Lung nodules 04/15/2017    Morbid obesity with BMI of 40 0-44 9, adult ([facility] ) 04/18/2017    Severe sepsis ([facility] ) 06/07/2017    Asthma 06/10/2017    Constipation due to opioid therapy 06/10/2017    Restless leg 06/10/2017    Sleep apnea 06/10/2017    Pancreatic cyst 09/19/2017    Anxiety 10/09/2017    Obesity hypoventilation syndrome ([facility] ) 10/11/2017    Bilateral hip pain 12/21/2017    HTN (hypertension) 01/19/2018    Primary osteoarthritis involving multiple joints 03/30/2017    Balance problem 11/08/2017    Diabetes mellitus with neuropathy ([facility] ) 01/23/2015    Meningiomas, multiple ([address] ) 12/01/2017    Moderate episode of recurrent major depressive disorder ([facility] ) 10/17/2017    Pulmonary emphysema ([facility] ) 11/18/2014    Chronic right-sided low back pain without sciatica 02/14/2018    Chronic pain syndrome 26/53/7416    Diastolic CHF ([facility] ) 14/76/8846    Chronic venous stasis dermatitis of both lower extremities 02/22/2018    Physical deconditioning 02/23/2018    Chronic respiratory failure ([address] ) 02/23/2018    Sacroiliitis ([facility] ) 03/29/2018    Chronic constipation 04/25/2018    Dilation of biliary tract 04/25/2018    Onychomycosis 04/27/2018    Peripheral vascular disease [facility] 04/27/2018    Peripheral venous insufficiency 04/27/2018    Bilateral cellulitis of lower leg 06/14/2018    Cellulitis of right foot 06/14/2018    Neck pain 07/12/2018    Cervical radiculopathy 07/12/2018    Multiple joint pain 07/12/2018     Resolved Ambulatory Problems     Diagnosis Date Noted    Abdominal pain 04/15/2017    Nausea 04/15/2017    Hypokalemia 04/15/2017    Type 2 diabetes mellitus with hyperglycemia ([facility] ) 04/15/2017    Opioid dependence ([facility] ) 04/18/2017    Skin lesion of left leg 04/21/2017    Tachypnea 06/07/2017    SOB (shortness of breath) 06/07/2017    Non-ST elevation myocardial infarction (NSTEMI) ([facility] ) 06/07/2017    Bacteremia due to Klebsiella pneumoniae 06/08/2017    Cholangitis 06/09/2017    Respiratory failure with hypercapnia ([facility] ) 06/10/2017    Acute on chronic systolic heart failure ([address] ) 06/10/2017    Irritable bowel syndrome with diarrhea 06/10/2017    Meningioma ([facility] ) 06/16/2017    Acute on chronic respiratory failure with hypoxemia ([facility] ) 09/19/2017    COPD exacerbation ([facility] ) 09/19/2017    TIA (transient ischemic attack) 10/11/2017    Trochanteric bursitis of right hip 12/21/2017    Influenzal tracheobronchitis 01/19/2018    Sepsis ([facility] ) 01/19/2018    Concussion 11/08/2017    Daytime hypersomnolence 10/16/2017    Depression 79/05/0984    Diastolic dysfunction 26/76/7989    Elevated LFTs 06/02/2017    Greater trochanteric bursitis of right hip 10/20/2017    Iliotibial band syndrome, right leg 10/20/2017    Influenza 01/21/2018    Mild persistent asthma with acute exacerbation 03/30/2017   [facility] discharge follow-up 02/08/2018    Sacroiliitis ([facility] ) 02/14/2018    Asthma exacerbation 02/22/2018    Hypernatremia 02/22/2018    Hypokalemia 02/22/2018    Bilateral lower extremity edema 02/22/2018     Past Medical History:   Diagnosis Date    Anxiety     Aortic valve disorder     Arthritis     Asthma     Cataract     [facility]congestive [facility] failure) [facility]     COPD (chronic obstructive pulmonary disease) ([facility])     Diabetes mellitus ([address] )     Disease of thyroid gland     Dry eye syndrome     Ectropion of left eye     Emphysema, 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[facility])     Hearing loss     History of malignant neoplasm of thyroid     Hypertension     Malignant neoplasm of skin     Memory loss     Mitral valve regurgitation     Myocardial infarction ([facility]     Obesity hypoventilation syndrome ([facility])     Pain     Restless leg syndrome     Seasonal allergies     Skin cancer (melanoma) ([address] )     Sleep related hypoxia     Thyroid cancer ([address] )     Urinary tract infection without hematuria 7/14/2018   Admitting Diagnosis: Shortness of breath [R06 02]  Uncontrolled diabetes mellitus ([address] ) [E11 65]  Hypoxia [R09 02]  Sepsis ([facility] ) [A41 9]  Age/Sex: 77 y o  female  Assessment/Plan:   Constitutional: Positive for chills, diaphoresis, fatigue and fever  Respiratory: Positive for cough, chest tightness, shortness of breath and wheezing  Coarse breath sounds bilaterally  Cardiovascular: Positive for leg swelling (Chronic bilateral lower extremity edema, near baseline  )  Gastrointestinal: Positive for abdominal distention and constipation (Small, marble like stools no large BM recently  )  Genitourinary: Positive for dysuria, frequency and urgency  Of note groin and urine both had a foul odor  Musculoskeletal: Positive for arthralgias (Chronic full body pain, worse this week  )  Skin: Positive for color change and wound  Bilateral lower extremities diffusely red and swollen  Patient states is normal, daughter says there slightly worse than normal with wounds that are not always there  Right leg worse than left  Painful to patient to remove dressings  Leaking plasma and small blood  Neurological: Positive for weakness (More weak than normal )  Hematological: Bruises/bleeds easily (On Xarelto  )     Severe sepsis (HCC)   Assessment & Plan     · Suspect 2/2 UTI vs  PNA   · BCx, UCx, UA, Sputum Cx  · Will trend lactic acid, POA 2 7  · CXR- No acute cardiopulmonary disease  · On PE: Pt has cough, SOB, sputum production, fever, chills  · Treated with Levaquin 1 time in the ED, will start cefepime for broad coverage  · H/O CHF - given 1 L IV NS bolus in ED   Admission Orders:  ICU LEVEL 1 STEPDOWN  O2 TO KEEP SATS>92%; ON BIPAP  ACCUCHECKS WITH COVERAGE SCALE  NEURO CHECKS Q4H  VENODYNES  NPO  CONSULT ID  Scheduled Meds:   Current Facility-Administered Medications:  aspirin 325 mg Oral Daily FATIMAH Orosco    cefepime 1,000 mg Intravenous Q24H FATIMAH Perez Last Rate: Stopped (07/13/18 2146)   docusate sodium 100 mg Oral BID FATIMAH Orosco    escitalopram 10 mg Oral Daily FATIMAH Orosco    folic acid 1 mg Oral Daily FATIMAH Orosco    heparin (porcine) 5,000 Units Subcutaneous Q8H [facility] FATIMAH Orosco    insulin regular (HumuLIN R,NovoLIN R) infusion 0 3-21 Units/hr Intravenous Titrated FATIMAH Perez Last Rate: 3 Units/hr (07/14/18 9658)   ipratropium 0 5 mg Nebulization Q6H FATIMAH Orosco    levalbuterol 1 25 mg Nebulization Q6H FATIMAH Orosco    levalbuterol 1 25 mg Nebulization Q6H PRN Amando Monsalve, FATIMAH    And        sodium chloride 3 mL Nebulization Q6H PRN Amando Monsalve, CRKWESI    levothyroxine 125 mcg Oral Daily Amando Monsalve, FATIMAH    Linaclotide 72 mcg Oral Daily Before Breakfast Amando Monsalve, FATIMAH    meropenem 1,000 mg Intravenous Q12H Pati Dalal PA-C Last Rate: Stopped (07/13/18 9961)   metoprolol tartrate 25 mg Oral BID Mary FATIMAH Noguera    ondansetron 4 mg Oral Q6H PRN Mary FATIMAH Noguera    polyethylene glycol 17 g Oral Daily Mary R Kayy, FATIMAH    pramipexole 1 mg Oral TID Mary SOLIS Sultana, FATIMAH    senna 1 tablet Oral HS Mary SOLIS Sultana, FATIMAH    sodium chloride 3 mL Nebulization Q6H Mary SOLIS Sultana, FATIMAH    venlafaxine 75 mg Oral Daily Mary FATIMAH Noguera    Continuous Infusions:   insulin regular (HumuLIN R,NovoLIN R) infusion 0 3-21 Units/hr Last Rate: 3 Units/hr (07/14/18 0408)   PRN Meds: levalbuterol **AND** sodium chloride    ondansetron  Thank you,  Juan Manuel Dixon  [facility] Utilization Review Department  Phone: [phone]; Fax [phone]  ATTENTION: The Network Utilization Review Department is now centralized for our 9 Facilities  Make a note that we have a new phone and fax numbers for our Department  Please call with any questions or concerns to [phone] and carefully follow the prompts so that you are directed to the right person  All voicemails are confidential  Fax any determinations, approvals, denials, and requests for initial or continue stay review clinical to [phone]  Due to HIGH CALL volume, it would be easier if you could please send faxed requests to expedite your requests and in part, help us provide discharge notifications faster

## 2018-07-14 NOTE — SOCIAL WORK
DASH discussion completed  Discussed goals of making sure pt's needs are met upon discharge, pt's preferences are taken into account, pt understands her health condition, medications and symptoms to watch for after returning home and pt is aware of any follow up appointments recommended by hospital physician  Spoke with the pt at the bedside  Pt stated that she lives with her  and noted that she has DME in the home including a , walker, commode and WC  Pt has O2 through [address] and noted that she has [facility] but has been DC from their services  She is not sure she will need them upon DC, will continue to follow up and refer if needed

## 2018-07-14 NOTE — CONSULTS
Consultation - Infectious Disease   Venus Garcia 77 y o  female MRN: [id number]  Unit/Bed#: ICU 02 Encounter: [id number]    Assessment:  Sepsis likely urinary origin  EL  Stasis dermatitis in legs  History of of ESBL positive Klebsiella bacteremia in June last year  Plan:  Await urine culture  IV meropenem      History of Present Illness   Physician Requesting Consult: Brandyn Mcpherson DO  Reason for Consult / Principal Problem:  Shortness of breath  Hx and PE limited by:  Lethargic  HPI: Venus Garcia is a 77y o  year old female who presents with shortness of breath, cough, fever and chills on and off for 1 week  Patient at high fever 103 7 on admission  She has wheezing and stasis dermatitis in both legs  Lactic acid 2 7 on arrival  EL with creatinine 1 71, baseline is 0 79  History of ESBL positive Klebsiella bacteremia in June last year  Consults    Review of Systems   Constitutional: Positive for activity change, appetite change, chills, fatigue and fever  Respiratory: Positive for cough, shortness of breath and wheezing  Skin: Positive for rash  Neurological: Positive for weakness  All other systems reviewed and are negative        Historical Information   Past Medical History:   Diagnosis Date    Anxiety     Aortic valve disorder     Arthritis     Asthma     Cataract     [facility]est[facility] failure) [facility]     COPD (chronic obstructive pulmonary disease) ([address]     Diabetes mellitus [facility]     Disease of thyroid gland     Dry eye syndrome     Ectropion of left eye     last assessed: 10/17/2016    Emphysema, 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[facility])     Hearing loss     History of malignant neoplasm of thyroid     Hypertension     Malignant neoplasm of skin     Memory loss     Mitral valve regurgitation     Myocardial infarction ([address] )     Obesity hypoventilation syndrome (HCC)     Pain     right hip    Restless leg syndrome     Seasonal allergies     Skin cancer (melanoma) ([facility] [address] [facility]     Sleep related hypoxia     [facility] [address] [facility]     Urinary tract infection without hematuria 7/14/2018     Past Surgical History:   Procedure Laterality Date    CHOLECYSTECTOMY      EYE SURGERY      HYSTERECTOMY      OTHER SURGICAL HISTORY      removal of lesion    PERICARDIUM SURGERY      resection of pericardial cyst or tumor    LA ERCP DX COLLECTION SPECIMEN BRUSHING/WASHING N/A 5/30/2017    Procedure: ENDOSCOPIC RETROGRADE CHOLANGIOPANCREATOGRAPHY (ERCP); SPHINCTEROTOMY;  Surgeon: Ynes Panchal MD;  Location:  GI LAB;   Service: Gastroenterology   Derral Shirts JOINT Right 3/30/2018    Procedure: Right Sacroiliac Injection;  Surgeon: Olimpia Romero MD;  Location: [facility] MAIN OR;  Service: Pain Management     REPLACEMENT TOTAL KNEE Left     REPLACEMENT TOTAL KNEE Right     REPLACEMENT TOTAL KNEE BILATERAL      SKIN BIOPSY      melanoma of back    STEROID INJECTION HIP Right 12/21/2017    Procedure: TROCHANTERIC BURSA INJECTION RIGHT;  Surgeon: Olimpia Romero MD;  Location: [place] [address] MAIN OR;  Service: Pain Management     THORACOSCOPY      (therapeutic) w/excision of pericardial cyst    THORACOTOMY Right     at least 40 years, for pericardial cyst    THYROID SURGERY      THYROIDECTOMY, PARTIAL      For thyroid cancer     Social History   History   Alcohol Use No     History   Drug Use No     History   Smoking Status    Never Smoker   Smokeless Tobacco    Never Used     Family History:   Family History   Problem Relation Age of Onset    Cancer Father     Arthritis Father     Liver cancer Father     Diabetes Sister     Asthma Mother     No Known Problems Brother     Substance Abuse Neg Hx     Mental illness Neg Hx        Meds/Allergies   all current active meds have been reviewed    Allergies   Allergen Reactions    Ketorolac Swelling     Toradol    Latex Rash    Wound Dressing Adhesive Rash       Objective       Intake/Output Summary (Last 24 hours) at 07/14/18 1233  Last data filed at 07/14/18 1212   Gross per 24 hour   Intake          3134 57 ml   Output              600 ml   Net          2534 57 ml       Invasive Devices:   Peripheral IV 07/13/18 Left Antecubital (Active)   Site Assessment Clean;Dry; Intact 7/14/2018  8:00 AM   Dressing Type Transparent 7/14/2018  8:00 AM   Line Status Infusing 7/14/2018  8:00 AM   Dressing Status Clean;Dry; Intact 7/14/2018  8:00 AM       Peripheral IV 07/13/18 Right Antecubital (Active)   Site Assessment Clean;Dry; Intact 7/14/2018  8:00 AM   Dressing Type Transparent 7/14/2018  8:00 AM   Line Status Blood return noted; Infusing 7/14/2018  8:00 AM   Dressing Status Clean;Dry; Intact 7/14/2018  8:00 AM       PHYSICAL EXAM:  Vitals:    07/14/18 0728 07/14/18 0905 07/14/18 1000 07/14/18 1203   BP:  153/70 96/50    BP Location:       Pulse: 85 97 68    Resp: 22  (!) 47    Temp:    98 8 °F (37 1 °C)   TempSrc:    Temporal   SpO2: 99%  (!) 85%    Weight:       Height:           Physical Exam   Constitutional: She appears well-developed and well-nourished  She appears lethargic  Obese   HENT:   Head: Normocephalic and atraumatic  Eyes: Pupils are equal, round, and reactive to light  No scleral icterus  Neck: Normal range of motion  Neck supple  Cardiovascular: Normal heart sounds  Pulmonary/Chest: She is in respiratory distress  Abdominal: Soft  Bowel sounds are normal  She exhibits no distension  Musculoskeletal: Normal range of motion  Neurological: She appears lethargic  No cranial nerve deficit  Skin: Skin is warm  Rash noted  Stasis dermatitis both legs   Psychiatric: Cognition and memory are impaired  Nursing note and vitals reviewed        Lab Results: CBC: Lab Results   Component Value Date    WBC 8 52 07/14/2018    WBC 10 13 07/13/2018    WBC 11 10 (H) 02/26/2018    WBC 8 20 02/23/2018    WBC 7 60 02/22/2018    WBC 16 00 (H) 01/30/2018    WBC 16 10 (H) 01/30/2018    RBC 3 83 07/14/2018    RBC 4 45 07/13/2018    RBC 3 78 (L) 02/26/2018 RBC 3 30 (L) 02/23/2018    RBC 3 91 (L) 02/22/2018    RBC 4 21 01/30/2018    RBC 4 26 01/30/2018       CMP: Lab Results   Component Value Date     07/14/2018     07/14/2018     (L) 07/13/2018     02/26/2018     (H) 02/23/2018     (H) 02/22/2018     01/29/2018    CL 97 (L) 07/14/2018    CL 96 (L) 07/14/2018    CL 87 (L) 07/13/2018    CL 94 (L) 02/26/2018     02/23/2018    CL 97 (L) 02/22/2018    CL 94 (L) 01/29/2018    CO2 39 (H) 07/14/2018    CO2 37 (H) 07/14/2018    CO2 40 (H) 07/13/2018    CO2 39 (H) 02/26/2018    CO2 42 (H) 02/23/2018    CO2 >45 (HH) 02/22/2018    CO2 40 (H) 01/29/2018    ANIONGAP 3 (L) 07/14/2018    ANIONGAP 4 07/14/2018    ANIONGAP 3 (L) 07/13/2018    ANIONGAP 8 02/26/2018    ANIONGAP 1 (L) 02/23/2018    ANIONGAP  02/22/2018      Comment:      Unable to calculate    ANIONGAP 2 (L) 01/29/2018    BUN 34 (H) 07/14/2018    BUN 37 (H) 07/14/2018    BUN 36 (H) 07/13/2018    BUN 14 05/25/2018    BUN 58 (H) 02/26/2018    BUN 24 02/23/2018    BUN 19 02/22/2018    CREATININE 1 39 (H) 07/14/2018    CREATININE 1 48 (H) 07/14/2018    CREATININE 1 71 (H) 07/13/2018    CREATININE 0 79 05/25/2018    CREATININE 1 07 02/26/2018    CREATININE 1 07 02/23/2018    CREATININE 0 99 02/22/2018    GLUCOSE 171 (H) 07/14/2018    GLUCOSE 120 07/14/2018    GLUCOSE 482 (H) 07/13/2018    GLUCOSE 260 (H) 02/26/2018    GLUCOSE 498 (H) 02/24/2018    GLUCOSE 376 (H) 02/23/2018    GLUCOSE 553 (HH) 02/22/2018    CALCIUM 7 5 (L) 07/14/2018    CALCIUM 7 6 (L) 07/14/2018    CALCIUM 8 4 07/13/2018    CALCIUM 8 8 02/26/2018    CALCIUM 8 5 02/23/2018    CALCIUM 9 1 02/22/2018    CALCIUM 8 1 (L) 01/29/2018    AST 25 07/13/2018    AST 20 02/22/2018    AST 16 01/23/2018    AST 17 01/22/2018    AST 14 01/21/2018    AST 28 01/19/2018    AST 11 11/30/2017    ALT 31 07/13/2018    ALT 45 02/22/2018    ALT 40 01/23/2018    ALT 31 01/22/2018    ALT 27 01/21/2018    ALT 29 01/19/2018    ALT 29 11/30/2017    ALKPHOS 88 07/13/2018    ALKPHOS 91 02/22/2018    ALKPHOS 52 01/23/2018    ALKPHOS 59 01/22/2018    ALKPHOS 57 01/21/2018    ALKPHOS 72 01/19/2018    ALKPHOS 67 11/30/2017    PROT 7 7 07/13/2018    PROT 6 9 02/22/2018    PROT 5 9 (L) 01/23/2018    PROT 6 4 01/22/2018    PROT 6 1 (L) 01/21/2018    PROT 7 0 01/19/2018    PROT 6 6 11/30/2017    BILITOT 0 50 07/13/2018    BILITOT 0 30 02/22/2018    BILITOT 0 20 01/23/2018    BILITOT 0 20 01/22/2018    BILITOT 0 20 01/21/2018    BILITOT 0 50 01/19/2018    BILITOT 0 20 11/30/2017    EGFR 36 07/14/2018    EGFR 33 07/14/2018    EGFR 28 07/13/2018    EGFR 49 02/26/2018    EGFR 49 02/23/2018    EGFR 54 02/22/2018    EGFR 47 01/29/2018    Lactic Acid: Lab Results   Component Value Date    LACTICACID 1 6 07/14/2018    LACTICACID 2 8 (HH) 07/14/2018    LACTICACID 2 8 (HH) 07/13/2018    LACTICACID 2 4 (HH) 07/13/2018    LACTICACID 2 1 (HH) 07/13/2018    LACTICACID 2 7 (HH) 07/13/2018    LACTICACID 1 2 02/22/2018       Blood Culture:   Lab Results   Component Value Date    BLOODCX No Growth After 5 Days  02/22/2018    BLOODCX No Growth After 5 Days  02/22/2018    BLOODCX No Growth After 5 Days  01/19/2018    BLOODCX No Growth After 5 Days  01/19/2018    BLOODCX No Growth After 5 Days  06/11/2017    BLOODCX No Growth After 5 Days   06/11/2017    BLOODCX Klebsiella pneumoniae - ESBL (A) 06/07/2017    BLOODCX Klebsiella pneumoniae ESBL (A) 06/07/2017       Urine Culture:   Lab Results   Component Value Date    URINECX 20,000-29,000 cfu/ml Mixed Contaminants X3 04/15/2017       Urinalysis: Lab Results   Component Value Date    COLORU Yellow 02/22/2018    COLORU Yellow 07/12/2016    CLARITYU Clear 02/22/2018    CLARITYU Transparent 07/12/2016    SPECGRAV 1 015 02/22/2018    SPECGRAV 1 025 07/12/2016    PHUR 5 5 02/22/2018    PHUR 6 0 07/12/2016    LEUKOCYTESUR Negative 02/22/2018    LEUKOCYTESUR Negative 07/12/2016    NITRITE Negative 02/22/2018    NITRITE Negative 07/12/2016    PROTEINUA Negative 02/22/2018    PROTEINUA Negative 07/12/2016    GLUCOSEU 500 (1/2%) (A) 02/22/2018    KETONESU Negative 02/22/2018    KETONESU Negative 07/12/2016    BILIRUBINUR Negative 02/22/2018    BILIRUBINUR Negative 07/12/2016    BLOODU Negative 02/22/2018    BLOODU Negative 07/12/2016    AMYLASE: No results found for: AMYLASE HEMOGLOBIN A1C:   Lab Results   Component Value Date    HGBA1C 9 1% 04/27/2018       SEDRATE: No results found for: SEDRATE    CRP:   Lab Results   Component Value Date    CRP 3 2 (H) 11/30/2017       HEPATITIS: No results found for: HAV, HEPAIGM, HEPBIGM, HEPBCAB, HBEAG, HEPCAB    PPD: No results found for: PPD    Wound Culture: No results found for: WOUNDCULT    Sputum Culture: No results found for: SPUTUMCULTUR    CBC: Lab Results   Component Value Date    WBC 8 52 07/14/2018    WBC 9 1 08/01/2017    RBC 3 83 07/14/2018    RBC 4 45 01/08/2016     CMP: Lab Results   Component Value Date     07/14/2018     (H) 08/01/2017    CL 97 (L) 07/14/2018    CL 96 08/01/2017    CO2 39 (H) 07/14/2018    CO2 35 (H) 08/01/2017    ANIONGAP 3 (L) 07/14/2018    ANIONGAP 10 8 01/08/2016    BUN 34 (H) 07/14/2018    BUN 14 05/25/2018    CREATININE 1 39 (H) 07/14/2018    CREATININE 0 78 08/01/2017    GLUCOSE 171 (H) 07/14/2018    GLUCOSE 144 (H) 08/01/2017    CALCIUM 7 5 (L) 07/14/2018    CALCIUM 9 1 08/01/2017    AST 25 07/13/2018    AST 18 08/01/2017    ALT 31 07/13/2018    ALT 26 08/01/2017    ALKPHOS 88 07/13/2018    ALKPHOS 70 08/01/2017    PROT 7 7 07/13/2018    PROT 6 9 08/01/2017    BILITOT 0 50 07/13/2018    BILITOT 0 2 08/01/2017    EGFR 36 07/14/2018     Blood Culture:   Lab Results   Component Value Date    BLOODCX No Growth After 5 Days   02/22/2018     Urine Culture:   Lab Results   Component Value Date    URINECX 20,000-29,000 cfu/ml Mixed Contaminants X3 04/15/2017     Urinalysis: Lab Results   Component Value Date    COLORU Yellow 02/22/2018    COLORU Yellow 07/12/2016    CLARITYU Clear 02/22/2018    CLARITYU Transparent 07/12/2016    SPECGRAV 1 015 02/22/2018    SPECGRAV 1 025 07/12/2016    PHUR 5 5 02/22/2018    PHUR 6 0 07/12/2016    LEUKOCYTESUR Negative 02/22/2018    LEUKOCYTESUR Negative 07/12/2016    NITRITE Negative 02/22/2018    NITRITE Negative 07/12/2016    PROTEINUA Negative 02/22/2018    PROTEINUA Negative 07/12/2016    GLUCOSEU 500 (1/2%) (A) 02/22/2018    KETONESU Negative 02/22/2018    KETONESU Negative 07/12/2016    BILIRUBINUR Negative 02/22/2018    BILIRUBINUR Negative 07/12/2016    BLOODU Negative 02/22/2018    BLOODU Negative 07/12/2016     Wound Culture: No results found for: WOUNDCULT  Sputum Culture: No results found for: SPUTUMCULTUR  Lactic Acid:   Lab Results   Component Value Date    LACTICACID 1 6 07/14/2018       Imaging Studies:  Xr Chest 1 View Portable    Result Date: 7/13/2018  Narrative: CHEST INDICATION:   sepsis  "SOB" COMPARISON:  AP chest 2/22/2018 EXAM PERFORMED/VIEWS:  XR CHEST PORTABLE FINDINGS: Cardiomediastinal silhouette appears unremarkable  The lungs are clear  No pneumothorax or pleural effusion  Osseous structures appear within normal limits for patient age  Impression: No acute cardiopulmonary disease  Workstation performed: [id number]     Vas Reflux Lower Limb Venous Duplex Study With Reflux Assessment, Complete Bilateral    Result Date: 7/12/2018  Narrative:  [facility] REPORT CLINICAL: Indications: Patient presents with history of Bilateral lower extremity varicose veins and ulcerations on ankles  Operative History: No history of cardiovascular surgery  FINDINGS:  Segment         Right   Left                            AP(mm)  AP(mm)  GSV Inguinal      11 7    13 8  GSV Mid Thigh      5 4     6 4  GSV Dist Thigh             3 8  GSV Knee           5 3     3 1  SSV Mid Calf               3 1     CONCLUSION:  Impression: RIGHT LIMB: No evidence of deep venous incompetence   The great saphenous vein is competent  The great saphenous vein remains within the saphenous compartment in the thigh  The small saphenous vein is competent and communicates with the popliteal vein  There is no evidence of incompetent perforators in the thigh or calf  There is no evidence of deep vein thrombosis in the CFV, the proximal PFV, the femoral vein and the popliteal vein  LEFT LIMB: No evidence of deep venous incompetence  The great saphenous vein is competent  The great saphenous vein remains within the saphenous compartment in the thigh  The small saphenous vein is competent and communicates with the popliteal vein  There is no evidence of incompetent perforators in the thigh or calf  There is no evidence of deep vein thrombosis in the CFV, the proximal PFV, the femoral vein and the popliteal vein  Study performed with patient in standing and steep Reverse Trendelenburg  Tech note: Despite large diameter veins unable to provoke reflux by augmentation and valsalva maneuvers secondary to pulsatile venous flow  Bandaging over wounds was not removed, therefore the bilateral ankles and distal calfs were not scanned  SIGNATURE: Electronically Signed by: Amparo Hernández MD on 2018-07-12 08:04:12 PM       EKG, Pathology, and Other Studies: I have personally reviewed pertinent reports  and I have personally reviewed pertinent films in PACS  [unfilled]  * Cannot find OR log *    Assessment/Plan         Counseling / Coordination of Care  Total floor / unit time spent today 60 minutes  Greater than 50% of total time was spent with the patient and / or family counseling and / or coordination of care  A description of the counseling / coordination of care: Spoke to ICU staff

## 2018-07-14 NOTE — NUTRITION
07/14/18 0903   Recommendations/Interventions   Nutrition Recommendations Initiate diet  (When safe to initiate PO diet, recommend CCD1, Cardiac Step 1)

## 2018-07-14 NOTE — PROGRESS NOTES
Daily Progress Note - Critical Care/ Stepdown   Molly Abbott 77 y o  female MRN: [id number]  Unit/Bed#: ICU 02 Encounter: [id number]    ______________________________________________________________________  Assessment:   Principal Problem:    Severe sepsis ([facility] )  Active Problems:    Urinary tract infection without hematuria    Chronic respiratory failure (HCC)    Obesity hypoventilation syndrome (HCC)    Pulmonary emphysema (HCC)    Diastolic CHF ([facility] )    Acquired hypothyroidism    Morbid obesity with BMI of 40 0-44 9, adult ([facility])    Constipation due to opioid therapy    Anxiety    HTN (hypertension)    Diabetes mellitus with neuropathy ([facility])    Moderate episode of recurrent major depressive disorder ([facility])    Chronic venous stasis dermatitis of both lower extremities  Resolved Problems:    * No resolved hospital problems  *      * Severe sepsis ([facility])   Assessment & Plan    · Suspect 2/2 UTI, will straight cath to obtain specimen  · BCx, UCx pending  · Lactic acid cleared to 1 6  · CXR- No acute cardiopulmonary disease  · Treated with Levaquin 1 time in the ED, changed to Cefepime until cultures back        Urinary tract infection without hematuria   Assessment & Plan    · U/A, C&S sent via straight cath  · Cont Meropenum for now        Chronic respiratory failure (HCC)   Assessment & Plan    · Weaned off BIpap, tolerating nasal cannula        Diastolic CHF ([facility])   Assessment & Plan    · Home diuretics on hold: Demadex and Metazolone, consider restarting tomorrow  · Will monitor her fluid state   · Strict I/O's        Pulmonary emphysema (HCC)   Assessment & Plan    · Cont home inhalers, nebs prn        Obesity hypoventilation syndrome (HCC)   Assessment & Plan    · BiPAP q h s    · Respiratory protocol        Chronic venous stasis dermatitis of both lower extremities   Assessment & Plan    · Dress wounds with adaptix & gauze & cling wrap        Moderate episode of recurrent major depressive disorder ([facility] ) Assessment & Plan    · Cont Lexapro 10 mg daily and Effexor 75 mg daily        Diabetes mellitus with neuropathy [facility])   Assessment & Plan    Lab Results   Component Value Date    HGBA1C 9 1% 04/27/2018     No results for input(s): POCGLU in the last 72 hours      Blood Sugar Average: Last 72 hrs:     · Cont insulin sliding scale with weight based algorithm, stop infusion  · Check sugars achs        HTN (hypertension)   Assessment & Plan    · Continue home Metoprolol 25 mg b i d   · Monitor telemetry and blood pressure while admitted        Anxiety   Assessment & Plan    · Cont Lexapro 10 mg daily & Effexor 75 mg daily          Constipation due to opioid therapy   Assessment & Plan    · Hold home Linzess 72 mcg by mouth daily  · Cont bowel regimen - senna, Colace, Miralax        Morbid obesity with BMI of 40 0-44 9, adult (HCC)   Assessment & Plan    · Diabetic and cardiac diet  · Nutrition c/s as needed        Acquired hypothyroidism   Assessment & Plan    · Cont home Synthroid 125 mcg daily        Chronic right-sided low back pain without sciatica   Assessment & Plan    · Holding her home opiates as her mentation is slightly slowed  · Will address if needed            Plan:    Neuro:   · Delirium: CAM ICU positive no   · Regulate sleep/wake cycle    CV:   · Rhythm: NSR  · Follow rhythm on telemetry    Pulm:   · Tolerating nasal cannula    GI:   · Nutrition/diet plan: Diabetic/ Cardiac Step 1  · Stress ulcer prophylaxis: No prophylaxis needed  · Bowel regimen: Miralax  · Last BM: prior to admission    FEN:   · POtassium slightly low 3 4, will replete, other labs stable  · Electrolytes repleted: yes  · Goal: K >4 0, Mag >2 0, and Phos >3 0    :   · Indwelling Cr present: no     ID:   · Cont Antibx, ID c/s pending, hx ESBL Klebsiella bacteremia in past  · Abx ordered: Meropenum  · Day # 1 of 7 day course  · Trend temps and WBC count    Heme:   · Hgb stable  · Trend hgb and plts as needed    Endo:   · BS stable, will transition off drip and back to diet control w/ SS coverage  · Glycemic control plan: Blood glucose controlled on current regimen    Msk/Skin:  · Mobility goal: OOB as tolerated  · PT consult: yes  · OT consult: yes  · Frequent turning and off-loading    Family:  · Family updated within 24 hours: yes   · Family meeting planned today: no     Lines:  · PIV    VTE Prophylaxis:  · Pharmacologic Prophylaxis: Heparin  · Mechanical Prophylaxis: sequential compression device    Disposition: Transfer to general medical floor    Code Status: Level 3 - DNAR and DNI    Counseling / Coordination of Care  Total time spent today 38 minutes  Greater than 50% of total time was spent with the patient and / or family counseling and / or coordination of care  A description of the counseling / coordination of care: Reviewing records, evaluating patient, writing note, placing orders  ______________________________________________________________________    HPI/24hr events: no issues overnight, weaned off bipap; tolerating nasal cannula; offers no c/o pain or SOB this morning; denies any dizziness    ______________________________________________________________________    Physical Exam:   Physical Exam   Constitutional: She is oriented to person, place, and time  She appears well-developed and well-nourished  No distress  HENT:   Head: Normocephalic  Eyes: Pupils are equal, round, and reactive to light  Neck: Normal range of motion  Cardiovascular: Normal rate and regular rhythm  Pulmonary/Chest: Effort normal  No tachypnea  No respiratory distress  She has decreased breath sounds in the right middle field, the right lower field, the left middle field and the left lower field  Abdominal: Soft  Bowel sounds are normal  She exhibits no distension  There is no tenderness  Musculoskeletal: Normal range of motion  She exhibits edema  Neurological: She is alert and oriented to person, place, and time     Skin: Skin is warm and dry  Capillary refill takes less than 2 seconds  She is not diaphoretic  Multiple small area of skin breakdown to ankles, R>L, no active bleeding, mild weeping noted   Psychiatric: She has a normal mood and affect  Her behavior is normal    Nursing note and vitals reviewed  ______________________________________________________________________  Vitals:    18 0143 18 0905 18 1000 18 1203   BP:  153/70 96/50    BP Location:       Pulse: 85 97 68    Resp: 22  (!) 47    Temp:    98 8 °F (37 1 °C)   TempSrc:    Temporal   SpO2: 99%  (!) 85%    Weight:       Height:                  Temperature:   Temp (24hrs), Av 1 °F (37 8 °C), Min:97 8 °F (36 6 °C), Max:103 7 °F (39 8 °C)    Current Temperature: 98 8 °F (37 1 °C)    Weights:   IBW: 50 1 kg    Body mass index is 39 07 kg/m²  Weight (last 2 days)     Date/Time   Weight    18 0547  96 9 (213 63)    18 2123  96 4 (212 52)    18 1603  98 (216 05)              Hemodynamic Monitoring:  N/A       Non-Invasive/Invasive Ventilation Settings:  Respiratory    Lab Data (Last 4 hours)    None         O2/Vent Data (Last 4 hours)    None              Lab Results   Component Value Date    PHART 7 365 2018    PTN8SND 69 5 (HH) 2018    PO2ART 62 5 (L) 2018    EQR0KRB 38 8 (H) 2018    BEART 10 8 2018    SOURCE Radial, Right 2018     SpO2: SpO2: (!) 85 %  , SpO2 Device: O2 Device: Nasal cannula    Intake and Outputs:  I/O        07 -  0700  07 -  0700  07 - 07/15 0700    P  O   100     I V  (mL/kg)  2578 6 (26 6) 6 (0 1)    IV Piggyback  450     Total Intake(mL/kg)  3128 6 (32 3) 6 (0 1)    Urine (mL/kg/hr)   600 (1 1)    Total Output     600    Net   +3128 6 -594 1           Unmeasured Urine Occurrence  4 x 1 x          Nutrition:        Diet Orders            Start     Ordered    18 1034  Diet George/CHO Controlled; Consistent Carbohydrate Diet Level 2 (5 carb servings/75 grams CHO/meal); Cardiac Step 1  Diet effective now     Question Answer Comment   Diet Type George/CHO Controlled    George/CHO Controlled Consistent Carbohydrate Diet Level 2 (5 carb servings/75 grams CHO/meal)    Other Restriction(s): Cardiac Step 1    RD to adjust diet per protocol?  Yes        07/14/18 1035          Labs:     Results from last 7 days  Lab Units 07/14/18  0529 07/13/18  1615   WBC Thousand/uL 8 52 10 13   HEMOGLOBIN g/dL 11 2* 13 1   HEMATOCRIT % 36 5 41 5   PLATELETS Thousands/uL 178 219   NEUTROS PCT % 74 76*   MONOS PCT % 12 8       Results from last 7 days  Lab Units 07/14/18  0529 07/14/18  0109 07/13/18  1641   SODIUM mmol/L 139 137 130*   POTASSIUM mmol/L 3 4* 3 0* 3 1*   CHLORIDE mmol/L 97* 96* 87*   CO2 mmol/L 39* 37* 40*   BUN mg/dL 34* 37* 36*   CREATININE mg/dL 1 39* 1 48* 1 71*   CALCIUM mg/dL 7 5* 7 6* 8 4   TOTAL PROTEIN g/dL  --   --  7 7   BILIRUBIN TOTAL mg/dL  --   --  0 50   ALK PHOS U/L  --   --  88   ALT U/L  --   --  31   AST U/L  --   --  25   GLUCOSE RANDOM mg/dL 171* 120 482*       Results from last 7 days  Lab Units 07/14/18  0529   MAGNESIUM mg/dL 2 4     Lab Results   Component Value Date    PHOS 3 9 07/14/2018    PHOS 4 4 (H) 10/11/2017    PHOS 4 3 (H) 09/20/2017        Results from last 7 days  Lab Units 07/13/18  1617   INR  1 37*   PTT seconds 22*       0  Lab Value Date/Time   TROPONINI 0 02 07/14/2018 0109   TROPONINI <0 02 07/13/2018 1621   TROPONINI <0 02 02/22/2018 2345   TROPONINI <0 02 02/22/2018 2023   TROPONINI <0 02 02/22/2018 1539   TROPONINI <0 02 01/09/2018 1750   TROPONINI <0 02 10/10/2017 2153   TROPONINI <0 02 10/08/2017 1908   TROPONINI <0 02 09/18/2017 1908   TROPONINI 0 78 (H) 06/07/2017 1743   TROPONINI 0 89 (H) 06/07/2017 1454   TROPONINI 1 12 (H) 06/07/2017 1109   TROPONINI 0 33 (H) 06/07/2017 0700   TROPONINI <0 02 04/20/2017 0941   TROPONINI <0 02 04/20/2017 0631       Results from last 7 days  Lab Units 07/14/18  0234 07/14/18  0010 07/13/18  2219   LACTIC ACID mmol/L 1 6 2 8* 2 8*     ABG:  Lab Results   Component Value Date    PHART 7 365 07/13/2018    LJA8JKF 69 5 (HH) 07/13/2018    PO2ART 62 5 (L) 07/13/2018    OFM8FEB 38 8 (H) 07/13/2018    BEART 10 8 07/13/2018    SOURCE Radial, Right 07/13/2018       Imaging: CXR: NAD I have personally reviewed pertinent reports  EKG: NSR    Micro:  Lab Results   Component Value Date    BLOODCX No Growth After 5 Days  02/22/2018    BLOODCX No Growth After 5 Days  02/22/2018    BLOODCX No Growth After 5 Days  01/19/2018    URINECX 20,000-29,000 cfu/ml Mixed Contaminants X3 04/15/2017       Allergies:    Allergies   Allergen Reactions    Ketorolac Swelling     Toradol    Latex Rash    Wound Dressing Adhesive Rash     Medications:   Scheduled Meds:  Current Facility-Administered Medications:  aspirin 325 mg Oral Daily FATIMAH Orosco    cefepime 1,000 mg Intravenous Q24H FATIMAH Mercedes Last Rate: Stopped (07/13/18 2146)   docusate sodium 100 mg Oral BID FATIMAH Diamond    escitalopram 10 mg Oral Daily FATIMAH Orosco    folic acid 1 mg Oral Daily FATIMAH Orosco    heparin (porcine) 5,000 Units Subcutaneous Q8H [facility] FATIMAH Orosco    insulin lispro 1-6 Units Subcutaneous HS FATIMAH Raza    insulin lispro 2-12 Units Subcutaneous TID AC FATIMAH Raza    ipratropium 0 5 mg Nebulization Q6H FATIMAH Orosco    levalbuterol 1 25 mg Nebulization Q6H FATIMAH Orosco    levalbuterol 1 25 mg Nebulization Q6H PRN FATIMAH Orosco    And        sodium chloride 3 mL Nebulization Q6H PRN FATIMAH Mercedes    levothyroxine 125 mcg Oral Daily FATIMAH Mercedes    Linaclotide 72 mcg Oral Daily Before Breakfast FATIMAH Orosco    meropenem 1,000 mg Intravenous Q12H Bakari Montes PA-C Last Rate: 1,000 mg (07/14/18 1206)   metoprolol tartrate 25 mg Oral BID FATIMAH Vega    ondansetron 4 mg Oral Q6H PRN FATIMAH Vega    polyethylene glycol 17 g Oral Daily FATIMAH Orosco    pramipexole 1 mg Oral TID FATIMAH Orosco    senna 1 tablet Oral HS FATIMAH Orosco    sodium chloride 3 mL Nebulization Q6H FATIMAH Orosco    traMADol 50 mg Oral Q6H PRN FATIMAH Black    venlafaxine 75 mg Oral Daily FATIMAH Orosco      Continuous Infusions:   PRN Meds:    levalbuterol 1 25 mg Q6H PRN   And     sodium chloride 3 mL Q6H PRN   ondansetron 4 mg Q6H PRN   traMADol 50 mg Q6H PRN       Invasive lines and devices:   Invasive Devices     Peripheral Intravenous Line            Peripheral IV 07/13/18 Left Antecubital less than 1 day    Peripheral IV 07/13/18 Right Antecubital less than 1 day                   SIGNATURE: FATIMAH Black  DATE: July 14, 2018

## 2018-07-14 NOTE — ED NOTES
Pt left at this time to ICU on bipap with continuous cardiac monitoring in place, ER nurse and respiratory therapist at bedside during transport   IV insulin drip continues to infuse without difficulty, now at 8units/hr per parameters      Nikunj Ayala RN  07/13/18 0478

## 2018-07-14 NOTE — PLAN OF CARE
DISCHARGE PLANNING     Discharge to home or other facility with appropriate resources Progressing        INFECTION - ADULT     Absence or prevention of progression during hospitalization Progressing        METABOLIC, FLUID AND ELECTROLYTES - ADULT     Electrolytes maintained within normal limits Progressing     Fluid balance maintained Progressing        Nutrition/Hydration-ADULT     Nutrient/Hydration intake appropriate for improving, restoring or maintaining nutritional needs Progressing        Potential for Falls     Patient will remain free of falls Progressing        RESPIRATORY - ADULT     Achieves optimal ventilation and oxygenation Progressing        SAFETY ADULT     Maintain or return to baseline ADL function Progressing     Maintain or return mobility status to optimal level Progressing        SKIN/TISSUE INTEGRITY - ADULT     Skin integrity remains intact Progressing     Incision(s), wounds(s) or drain site(s) healing without S/S of infection Progressing     Oral mucous membranes remain intact Progressing

## 2018-07-14 NOTE — PROGRESS NOTES
Transfer Note - ICU/Stepdown Transfer to [facility]/MS tele   Nava Torres 77 y o  female MRN: [id number]  [facility]   Unit/Bed#: ICU 02 Encounter: [id number]    Code Status: Level 3 - DNAR and DNI    Reason for ICU/Stepdown admission: Severe Sepsis/ Bipap    Active problems: Principal Problem:    Severe sepsis ([facility] )  Active Problems:    Urinary tract infection without hematuria    Chronic respiratory failure (HCC)    Obesity hypoventilation syndrome (HCC)    Pulmonary emphysema (HCC)    Diastolic CHF ([facility] )    Acquired hypothyroidism    Morbid obesity with BMI of 40 0-44 9, adult (HCC)    Constipation due to opioid therapy    Anxiety    HTN (hypertension)    Diabetes mellitus with neuropathy ([facility])    Moderate episode of recurrent major depressive disorder ([facility])    Chronic venous stasis dermatitis of both lower extremities  Resolved Problems:    * No resolved hospital problems  *      * Severe sepsis (HCC)   Assessment & Plan    · Suspect 2/2 UTI, will straight cath to obtain specimen  · BCx, UCx pending  · Lactic acid cleared to 1 6  · CXR- No acute cardiopulmonary disease  · Treated with Levaquin 1 time in the ED, changed to Cefepime until cultures back        Urinary tract infection without hematuria   Assessment & Plan    · U/A, C&S sent via straight cath  · Cont Meropenum for now        Chronic respiratory failure (HCC)   Assessment & Plan    · Weaned off BIpap, tolerating nasal cannula        Diastolic CHF (HCC)   Assessment & Plan    · Home diuretics on hold: Demadex and Metazolone, consider restarting tomorrow  · Will monitor her fluid state   · Strict I/O's        Pulmonary emphysema (HCC)   Assessment & Plan    · Cont home inhalers, nebs prn        Obesity hypoventilation syndrome (HCC)   Assessment & Plan    · BiPAP q h s    · Respiratory protocol        Chronic venous stasis dermatitis of both lower extremities   Assessment & Plan    · Dress wounds with adaptix & gauze & cling wrap Moderate episode of recurrent major depressive disorder (HCC)   Assessment & Plan    · Cont Lexapro 10 mg daily and Effexor 75 mg daily        Diabetes mellitus with neuropathy [facility])   Assessment & Plan    Lab Results   Component Value Date    HGBA1C 9 1% 04/27/2018     No results for input(s): POCGLU in the last 72 hours  Blood Sugar Average: Last 72 hrs:     · Cont insulin sliding scale with weight based algorithm, stop infusion  · Check sugars achs        HTN (hypertension)   Assessment & Plan    · Continue home Metoprolol 25 mg b i d   · Monitor telemetry and blood pressure while admitted        Anxiety   Assessment & Plan    · Cont Lexapro 10 mg daily & Effexor 75 mg daily          Constipation due to opioid therapy   Assessment & Plan    · Hold home Linzess 72 mcg by mouth daily  · Cont bowel regimen - senna, Colace, Miralax        Morbid obesity with BMI of 40 0-44 9, adult (HCC)   Assessment & Plan    · Diabetic and cardiac diet  · Nutrition c/s as needed        Acquired hypothyroidism   Assessment & Plan    · Cont home Synthroid 125 mcg daily        Chronic right-sided low back pain without sciatica   Assessment & Plan    · Holding her home opiates as her mentation is slightly slowed  · Will address if needed            Consultants:   · ID    History of Present Illness/Summary of clinical course: per H&P, 77 y o  female who presents to the ED today with shortness of breath, cough, fever, and chills on and off for 1 week per patient  Daughter states she has only been sick for about 2 days  Patient has nebulizer and inhalers at home, which were not helping  She is usually on 2 L nasal cannula during the day and at night at home  Had BiPAP or CPAP in past but did not tolerate  Patient denies any recent sick contacts  Notes she is having on and off fever and chills  Reports she has been coughing as well  She was placed on bipap in the ED, shortly after arriving in the ICU she was transitioned to [facility]   She hilary any SOB this morning  Straight cath done for urine sample  Please refer to today's progress note for further clinical details  Recent or scheduled procedures: None    Outstanding/pending diagnostics: cultures- blood and urine       Mobilization Plan: OOB as tolerated    Nutrition Plan: Diabetic/ Cadiac    Discharge Plan: Home     Specific Diagnosis Plan:    Sepsis: Source: Urine, Antibiotics: Meropenum, Length:  7 days    Need for Infectious Disease consult: Yes  Hyperglycemia:  Converting from IV to subcutaneous insulin: Yes placed back on diet and sliding scale    [  ] Family aware of transfer out of critical care: no     Spoke with Dr Michele Skelton regarding transfer @ [phone]  Patient accepted to their service      FATIMAH Vasquez

## 2018-07-14 NOTE — PLAN OF CARE
Problem: DISCHARGE PLANNING - CARE MANAGEMENT  Goal: Discharge to post-acute care or home with appropriate resources  INTERVENTIONS:  - Conduct assessment to determine patient/family and health care team treatment goals, and need for post-acute services based on payer coverage, community resources, and patient preferences, and barriers to discharge  - Address psychosocial, clinical, and financial barriers to discharge as identified in assessment in conjunction with the patient/family and health care team  - Arrange appropriate level of post-acute services according to patient's   needs and preference and payer coverage in collaboration with the physician and health care team  - Communicate with and update the patient/family, physician, and health care team regarding progress on the discharge plan  - Arrange appropriate transportation to post-acute venues  Return to home with poss services  Outcome: Progressing

## 2018-07-15 PROBLEM — M54.2 NECK PAIN: Status: RESOLVED | Noted: 2018-07-12 | Resolved: 2018-07-15

## 2018-07-15 PROBLEM — J96.21 ACUTE ON CHRONIC RESPIRATORY FAILURE WITH HYPOXIA AND HYPERCAPNIA (HCC): Status: ACTIVE | Noted: 2018-02-23

## 2018-07-15 PROBLEM — L03.115 BILATERAL CELLULITIS OF LOWER LEG: Status: RESOLVED | Noted: 2018-06-14 | Resolved: 2018-07-15

## 2018-07-15 PROBLEM — L03.115 CELLULITIS OF RIGHT FOOT: Status: RESOLVED | Noted: 2018-06-14 | Resolved: 2018-07-15

## 2018-07-15 PROBLEM — J96.22 ACUTE ON CHRONIC RESPIRATORY FAILURE WITH HYPOXIA AND HYPERCAPNIA (HCC): Status: ACTIVE | Noted: 2018-02-23

## 2018-07-15 PROBLEM — L03.116 BILATERAL CELLULITIS OF LOWER LEG: Status: RESOLVED | Noted: 2018-06-14 | Resolved: 2018-07-15

## 2018-07-15 LAB
ANION GAP SERPL CALCULATED.3IONS-SCNC: 0 MMOL/L (ref 4–13)
BACTERIA UR CULT: NORMAL
BASOPHILS # BLD AUTO: 0.01 THOUSANDS/ΜL (ref 0–0.1)
BASOPHILS NFR BLD AUTO: 0 % (ref 0–1)
BUN SERPL-MCNC: 22 MG/DL (ref 5–25)
CALCIUM SERPL-MCNC: 7.8 MG/DL (ref 8.3–10.1)
CHLORIDE SERPL-SCNC: 96 MMOL/L (ref 100–108)
CO2 SERPL-SCNC: 42 MMOL/L (ref 21–32)
CREAT SERPL-MCNC: 0.94 MG/DL (ref 0.6–1.3)
EOSINOPHIL # BLD AUTO: 0 THOUSAND/ΜL (ref 0–0.61)
EOSINOPHIL NFR BLD AUTO: 0 % (ref 0–6)
ERYTHROCYTE [DISTWIDTH] IN BLOOD BY AUTOMATED COUNT: 15.1 % (ref 11.6–15.1)
GFR SERPL CREATININE-BSD FRML MDRD: 58 ML/MIN/1.73SQ M
GLUCOSE SERPL-MCNC: 238 MG/DL (ref 65–140)
GLUCOSE SERPL-MCNC: 244 MG/DL (ref 65–140)
GLUCOSE SERPL-MCNC: 302 MG/DL (ref 65–140)
GLUCOSE SERPL-MCNC: 304 MG/DL (ref 65–140)
GLUCOSE SERPL-MCNC: 351 MG/DL (ref 65–140)
HCT VFR BLD AUTO: 37.5 % (ref 34.8–46.1)
HGB BLD-MCNC: 11.2 G/DL (ref 11.5–15.4)
IMM GRANULOCYTES # BLD AUTO: 0.01 THOUSAND/UL (ref 0–0.2)
IMM GRANULOCYTES NFR BLD AUTO: 0 % (ref 0–2)
LYMPHOCYTES # BLD AUTO: 1.6 THOUSANDS/ΜL (ref 0.6–4.47)
LYMPHOCYTES NFR BLD AUTO: 26 % (ref 14–44)
MAGNESIUM SERPL-MCNC: 2.6 MG/DL (ref 1.6–2.6)
MCH RBC QN AUTO: 29.1 PG (ref 26.8–34.3)
MCHC RBC AUTO-ENTMCNC: 29.9 G/DL (ref 31.4–37.4)
MCV RBC AUTO: 97 FL (ref 82–98)
MONOCYTES # BLD AUTO: 0.66 THOUSAND/ΜL (ref 0.17–1.22)
MONOCYTES NFR BLD AUTO: 11 % (ref 4–12)
MRSA NOSE QL CULT: NORMAL
NEUTROPHILS # BLD AUTO: 3.82 THOUSANDS/ΜL (ref 1.85–7.62)
NEUTS SEG NFR BLD AUTO: 63 % (ref 43–75)
NRBC BLD AUTO-RTO: 0 /100 WBCS
PLATELET # BLD AUTO: 168 THOUSANDS/UL (ref 149–390)
PMV BLD AUTO: 10.9 FL (ref 8.9–12.7)
POTASSIUM SERPL-SCNC: 3.7 MMOL/L (ref 3.5–5.3)
RBC # BLD AUTO: 3.85 MILLION/UL (ref 3.81–5.12)
SODIUM SERPL-SCNC: 138 MMOL/L (ref 136–145)
WBC # BLD AUTO: 6.1 THOUSAND/UL (ref 4.31–10.16)

## 2018-07-15 PROCEDURE — 99232 SBSQ HOSP IP/OBS MODERATE 35: CPT | Performed by: INTERNAL MEDICINE

## 2018-07-15 PROCEDURE — 80048 BASIC METABOLIC PNL TOTAL CA: CPT | Performed by: NURSE PRACTITIONER

## 2018-07-15 PROCEDURE — 94640 AIRWAY INHALATION TREATMENT: CPT

## 2018-07-15 PROCEDURE — 82948 REAGENT STRIP/BLOOD GLUCOSE: CPT

## 2018-07-15 PROCEDURE — 94760 N-INVAS EAR/PLS OXIMETRY 1: CPT

## 2018-07-15 PROCEDURE — 99232 SBSQ HOSP IP/OBS MODERATE 35: CPT | Performed by: STUDENT IN AN ORGANIZED HEALTH CARE EDUCATION/TRAINING PROGRAM

## 2018-07-15 PROCEDURE — 83735 ASSAY OF MAGNESIUM: CPT | Performed by: NURSE PRACTITIONER

## 2018-07-15 PROCEDURE — 94660 CPAP INITIATION&MGMT: CPT

## 2018-07-15 PROCEDURE — 85025 COMPLETE CBC W/AUTO DIFF WBC: CPT | Performed by: NURSE PRACTITIONER

## 2018-07-15 RX ORDER — ERYTHROMYCIN 5 MG/G
0.5 OINTMENT OPHTHALMIC EVERY 12 HOURS SCHEDULED
Status: DISCONTINUED | OUTPATIENT
Start: 2018-07-15 | End: 2018-07-15

## 2018-07-15 RX ORDER — METOLAZONE 5 MG/1
2.5 TABLET ORAL
Status: DISCONTINUED | OUTPATIENT
Start: 2018-07-16 | End: 2018-07-18 | Stop reason: HOSPADM

## 2018-07-15 RX ORDER — LEVALBUTEROL 1.25 MG/.5ML
1.25 SOLUTION, CONCENTRATE RESPIRATORY (INHALATION) EVERY 2 HOUR PRN
Status: DISCONTINUED | OUTPATIENT
Start: 2018-07-15 | End: 2018-07-18 | Stop reason: HOSPADM

## 2018-07-15 RX ORDER — GABAPENTIN 100 MG/1
100 CAPSULE ORAL 3 TIMES DAILY
Status: DISCONTINUED | OUTPATIENT
Start: 2018-07-15 | End: 2018-07-18 | Stop reason: HOSPADM

## 2018-07-15 RX ORDER — METHYLPREDNISOLONE SODIUM SUCCINATE 40 MG/ML
40 INJECTION, POWDER, LYOPHILIZED, FOR SOLUTION INTRAMUSCULAR; INTRAVENOUS EVERY 8 HOURS SCHEDULED
Status: DISCONTINUED | OUTPATIENT
Start: 2018-07-15 | End: 2018-07-15

## 2018-07-15 RX ORDER — TORSEMIDE 20 MG/1
40 TABLET ORAL
Status: DISCONTINUED | OUTPATIENT
Start: 2018-07-16 | End: 2018-07-18 | Stop reason: HOSPADM

## 2018-07-15 RX ORDER — SODIUM CHLORIDE FOR INHALATION 0.9 %
3 VIAL, NEBULIZER (ML) INHALATION EVERY 2 HOUR PRN
Status: DISCONTINUED | OUTPATIENT
Start: 2018-07-15 | End: 2018-07-18 | Stop reason: HOSPADM

## 2018-07-15 RX ORDER — METHYLPREDNISOLONE SODIUM SUCCINATE 40 MG/ML
20 INJECTION, POWDER, LYOPHILIZED, FOR SOLUTION INTRAMUSCULAR; INTRAVENOUS EVERY 8 HOURS SCHEDULED
Status: DISCONTINUED | OUTPATIENT
Start: 2018-07-15 | End: 2018-07-17

## 2018-07-15 RX ORDER — ERYTHROMYCIN 5 MG/G
0.5 OINTMENT OPHTHALMIC EVERY 12 HOURS SCHEDULED
Status: DISCONTINUED | OUTPATIENT
Start: 2018-07-15 | End: 2018-07-18 | Stop reason: HOSPADM

## 2018-07-15 RX ADMIN — LEVALBUTEROL HYDROCHLORIDE 1.25 MG: 1.25 SOLUTION, CONCENTRATE RESPIRATORY (INHALATION) at 20:25

## 2018-07-15 RX ADMIN — MEROPENEM 1000 MG: 1 INJECTION, POWDER, FOR SOLUTION INTRAVENOUS at 22:20

## 2018-07-15 RX ADMIN — LEVOTHYROXINE SODIUM 125 MCG: 125 TABLET ORAL at 05:49

## 2018-07-15 RX ADMIN — GABAPENTIN 100 MG: 100 CAPSULE ORAL at 21:51

## 2018-07-15 RX ADMIN — ERYTHROMYCIN 0.5 INCH: 5 OINTMENT OPHTHALMIC at 21:46

## 2018-07-15 RX ADMIN — LEVALBUTEROL HYDROCHLORIDE 1.25 MG: 1.25 SOLUTION, CONCENTRATE RESPIRATORY (INHALATION) at 11:23

## 2018-07-15 RX ADMIN — PRAMIPEXOLE DIHYDROCHLORIDE 1 MG: 0.5 TABLET ORAL at 21:46

## 2018-07-15 RX ADMIN — HEPARIN SODIUM 5000 UNITS: 5000 INJECTION, SOLUTION INTRAVENOUS; SUBCUTANEOUS at 13:13

## 2018-07-15 RX ADMIN — PRAMIPEXOLE DIHYDROCHLORIDE 1 MG: 0.5 TABLET ORAL at 17:18

## 2018-07-15 RX ADMIN — ISODIUM CHLORIDE 3 ML: 0.03 SOLUTION RESPIRATORY (INHALATION) at 02:43

## 2018-07-15 RX ADMIN — FOLIC ACID 1 MG: 1 TABLET ORAL at 09:04

## 2018-07-15 RX ADMIN — POLYETHYLENE GLYCOL 3350 17 G: 17 POWDER, FOR SOLUTION ORAL at 09:08

## 2018-07-15 RX ADMIN — DOCUSATE SODIUM 100 MG: 100 CAPSULE, LIQUID FILLED ORAL at 09:04

## 2018-07-15 RX ADMIN — SENNOSIDES 8.6 MG: 8.6 TABLET, FILM COATED ORAL at 21:51

## 2018-07-15 RX ADMIN — IPRATROPIUM BROMIDE 0.5 MG: 0.5 SOLUTION RESPIRATORY (INHALATION) at 07:43

## 2018-07-15 RX ADMIN — INSULIN LISPRO 4 UNITS: 100 INJECTION, SOLUTION INTRAVENOUS; SUBCUTANEOUS at 09:04

## 2018-07-15 RX ADMIN — METOPROLOL TARTRATE 25 MG: 50 TABLET ORAL at 17:18

## 2018-07-15 RX ADMIN — MEROPENEM 1000 MG: 1 INJECTION, POWDER, FOR SOLUTION INTRAVENOUS at 12:12

## 2018-07-15 RX ADMIN — LEVALBUTEROL HYDROCHLORIDE 1.25 MG: 1.25 SOLUTION, CONCENTRATE RESPIRATORY (INHALATION) at 07:43

## 2018-07-15 RX ADMIN — HEPARIN SODIUM 5000 UNITS: 5000 INJECTION, SOLUTION INTRAVENOUS; SUBCUTANEOUS at 21:49

## 2018-07-15 RX ADMIN — METHYLPREDNISOLONE SODIUM SUCCINATE 20 MG: 40 INJECTION, POWDER, FOR SOLUTION INTRAMUSCULAR; INTRAVENOUS at 13:16

## 2018-07-15 RX ADMIN — ASPIRIN 325 MG: 325 TABLET ORAL at 09:04

## 2018-07-15 RX ADMIN — INSULIN LISPRO 12 UNITS: 100 INJECTION, SOLUTION INTRAVENOUS; SUBCUTANEOUS at 13:16

## 2018-07-15 RX ADMIN — GABAPENTIN 100 MG: 100 CAPSULE ORAL at 17:19

## 2018-07-15 RX ADMIN — IPRATROPIUM BROMIDE 0.5 MG: 0.5 SOLUTION RESPIRATORY (INHALATION) at 15:17

## 2018-07-15 RX ADMIN — METOPROLOL TARTRATE 25 MG: 50 TABLET ORAL at 09:04

## 2018-07-15 RX ADMIN — LEVALBUTEROL HYDROCHLORIDE 1.25 MG: 1.25 SOLUTION, CONCENTRATE RESPIRATORY (INHALATION) at 15:18

## 2018-07-15 RX ADMIN — IPRATROPIUM BROMIDE 0.5 MG: 0.5 SOLUTION RESPIRATORY (INHALATION) at 02:43

## 2018-07-15 RX ADMIN — HEPARIN SODIUM 5000 UNITS: 5000 INJECTION, SOLUTION INTRAVENOUS; SUBCUTANEOUS at 05:49

## 2018-07-15 RX ADMIN — METHYLPREDNISOLONE SODIUM SUCCINATE 20 MG: 40 INJECTION, POWDER, FOR SOLUTION INTRAMUSCULAR; INTRAVENOUS at 21:46

## 2018-07-15 RX ADMIN — ESCITALOPRAM OXALATE 10 MG: 10 TABLET ORAL at 09:04

## 2018-07-15 RX ADMIN — INSULIN LISPRO 6 UNITS: 100 INJECTION, SOLUTION INTRAVENOUS; SUBCUTANEOUS at 21:51

## 2018-07-15 RX ADMIN — INSULIN LISPRO 12 UNITS: 100 INJECTION, SOLUTION INTRAVENOUS; SUBCUTANEOUS at 17:18

## 2018-07-15 RX ADMIN — DOCUSATE SODIUM 100 MG: 100 CAPSULE, LIQUID FILLED ORAL at 17:18

## 2018-07-15 RX ADMIN — IPRATROPIUM BROMIDE 0.5 MG: 0.5 SOLUTION RESPIRATORY (INHALATION) at 20:25

## 2018-07-15 RX ADMIN — VENLAFAXINE 75 MG: 37.5 TABLET ORAL at 09:04

## 2018-07-15 RX ADMIN — PRAMIPEXOLE DIHYDROCHLORIDE 1 MG: 0.5 TABLET ORAL at 09:04

## 2018-07-15 RX ADMIN — IPRATROPIUM BROMIDE 0.5 MG: 0.5 SOLUTION RESPIRATORY (INHALATION) at 11:23

## 2018-07-15 NOTE — PLAN OF CARE
DISCHARGE PLANNING     Discharge to home or other facility with appropriate resources Progressing        DISCHARGE PLANNING - CARE MANAGEMENT     Discharge to post-acute care or home with appropriate resources Progressing        INFECTION - ADULT     Absence or prevention of progression during hospitalization Progressing        METABOLIC, FLUID AND ELECTROLYTES - ADULT     Electrolytes maintained within normal limits Progressing     Fluid balance maintained Progressing        Nutrition/Hydration-ADULT     Nutrient/Hydration intake appropriate for improving, restoring or maintaining nutritional needs Progressing        Potential for Falls     Patient will remain free of falls Progressing        RESPIRATORY - ADULT     Achieves optimal ventilation and oxygenation Progressing        SAFETY ADULT     Maintain or return to baseline ADL function Progressing     Maintain or return mobility status to optimal level Progressing        SKIN/TISSUE INTEGRITY - ADULT     Skin integrity remains intact Progressing     Incision(s), wounds(s) or drain site(s) healing without S/S of infection Progressing     Oral mucous membranes remain intact Progressing

## 2018-07-15 NOTE — PROGRESS NOTES
Progress Note - Anju Fuentes 1940, 77 y o  female MRN: [id number]    Unit/Bed#: [address] [address] Encounter: [id number]    Primary Care Provider: Ronald Harris MD   Date and time admitted to hospital: 7/13/2018  4:00 PM        Acute on chronic respiratory failure with hypoxia and hypercapnia ([facility] )   Assessment & Plan    · Patient presented with fevers, SOB, hypoxia sating 64% on RA, requiring BIPAP  · Normally on 2L O2 continuous  · Possible early PNA  · Admitted to the stepdown, Weaned off BIPAP and transferred to [facility]        Physical deconditioning   Assessment & Plan    · PT/OT to evalute        Chronic venous stasis dermatitis of both lower extremities   Assessment & Plan    · Cont wound care with adaptic, gauze and cling wrap        Diastolic CHF ([facility] 75 )   Assessment & Plan    · Diuretics were on hold, will restart MWF per her schedule  · Monitor I/O, daily weights        Chronic obstructive pulmonary disease with acute exacerbation (HCC)   Assessment & Plan    · Cont home inhalers, nebs  · Has diffuse wheezing, started on solumedrol 20mg q8hr and wean as tolerated           Moderate episode of recurrent major depressive disorder ([facility] )   Assessment & Plan    · Cont lexapro/effexor        Diabetes mellitus with neuropathy [facility])   Assessment & Plan    Lab Results   Component Value Date    HGBA1C 9 1% 04/27/2018       Recent Labs      07/14/18   1611  07/14/18   2057  07/15/18   0743  07/15/18   1129   POCGLU  325*  214*  238*  302*       Blood Sugar Average: Last 72 hrs:  (P) 648 [id number]     · Required insulin infusion in ICU  · Now on ISS, increase coverage because started steroids        HTN (hypertension)   Assessment & Plan    · Cont metoprolol  · Holding torsemide        Obesity hypoventilation syndrome (HCC)   Assessment & Plan    · BIPAP qHS        Anxiety   Assessment & Plan    · Cont lexapro, effexor        Constipation due to opioid therapy   Assessment & Plan    · Hold home linzess, senna, colace, miralax        Morbid obesity with BMI of 40 0-44 9, adult ([facility] )   Assessment & Plan    · Cardiac/diabetic diet  · Nutrition consulted to follow        Acquired hypothyroidism   Assessment & Plan    · Cont synthroid        * Severe sepsis ([facility] )   Assessment & Plan    · Suspect 2/2 UTI vs early PNA  · Hx of ESBL positive klebsiella bacteremia 2017  · CXR- No acute cardiopulmonary disease  + vascular congestion  · ID consulted  · On IV meropenem  · BCx, UCx pending              VTE Pharmacologic Prophylaxis:   Pharmacologic: Heparin  Mechanical VTE Prophylaxis in Place: Yes    Patient Centered Rounds: I have performed bedside rounds with nursing staff today  Discussions with Specialists or Other Care Team Provider: Yes    Education and Discussions with Family / Patient:Yes    Time Spent for Care: 45 minutes  More than 50% of total time spent on counseling and coordination of care as described above  Current Length of Stay: 2 day(s)    Current Patient Status: Inpatient     Discharge Plan:  Pending    Code Status: Level 3 - DNAR and DNI      Subjective:   Transferred from ICU yesterday  This morning complains of shortness of breath and wheezing  Objective:       Vitals:   Temp (24hrs), Av 6 °F (37 °C), Min:97 °F (36 1 °C), Max:99 8 °F (37 7 °C)    HR:  [71-99] 76  Resp:  [18-28] 18  BP: (123-140)/(56-71) 123/60  SpO2:  [91 %-98 %] 96 %  Body mass index is 39 11 kg/m²  Input and Output Summary (last 24 hours): Intake/Output Summary (Last 24 hours) at 07/15/18 1317  Last data filed at 07/15/18 1001   Gross per 24 hour   Intake                0 ml   Output              500 ml   Net             -500 ml       Physical Exam:     Physical Exam   Constitutional: She is oriented to person, place, and time  She appears well-developed  No distress  Chronically ill-appearing   HENT:   Head: Normocephalic and atraumatic  Cardiovascular: Normal rate, regular rhythm and normal heart sounds  Pulmonary/Chest: Effort normal  No respiratory distress  She has wheezes (Diffuse expiratory)  She has no rales  Abdominal: Soft  Bowel sounds are normal  She exhibits no distension  There is no tenderness  There is no rebound and no guarding  Musculoskeletal: She exhibits edema  She exhibits no tenderness or deformity  Neurological: She is alert and oriented to person, place, and time  Skin: Skin is warm and dry  Psychiatric: She has a normal mood and affect  Her behavior is normal    Nursing note and vitals reviewed  Additional Data:     Labs:      Results from last 7 days  Lab Units 07/15/18  0545   WBC Thousand/uL 6 10   HEMOGLOBIN g/dL 11 2*   HEMATOCRIT % 37 5   PLATELETS Thousands/uL 168   NEUTROS PCT % 63   LYMPHS PCT % 26   MONOS PCT % 11   EOS PCT % 0       Results from last 7 days  Lab Units 07/15/18  0545  07/13/18  1641   SODIUM mmol/L 138  < > 130*   POTASSIUM mmol/L 3 7  < > 3 1*   CHLORIDE mmol/L 96*  < > 87*   CO2 mmol/L 42*  < > 40*   BUN mg/dL 22  < > 36*   CREATININE mg/dL 0 94  < > 1 71*   CALCIUM mg/dL 7 8*  < > 8 4   TOTAL PROTEIN g/dL  --   --  7 7   BILIRUBIN TOTAL mg/dL  --   --  0 50   ALK PHOS U/L  --   --  88   ALT U/L  --   --  31   AST U/L  --   --  25   GLUCOSE RANDOM mg/dL 244*  < > 482*   < > = values in this interval not displayed  Results from last 7 days  Lab Units 07/13/18  1617   INR  1 37*       * I Have Reviewed All Lab Data Listed Above  * Additional Pertinent Lab Tests Reviewed: All Labs Within Last 24 Hours Reviewed    Imaging:  Xr Chest 1 View Portable    Result Date: 7/13/2018  Narrative: CHEST INDICATION:   sepsis  "SOB" COMPARISON:  AP chest 2/22/2018 EXAM PERFORMED/VIEWS:  XR CHEST PORTABLE FINDINGS: Cardiomediastinal silhouette appears unremarkable  The lungs are clear  No pneumothorax or pleural effusion  Osseous structures appear within normal limits for patient age  Impression: No acute cardiopulmonary disease   Workstation performed: [id number]     Vas Reflux Lower Limb Venous Duplex Study With Reflux Assessment, Complete Bilateral    Result Date: 7/12/2018  Narrative:  [facility] REPORT CLINICAL: Indications: Patient presents with history of Bilateral lower extremity varicose veins and ulcerations on ankles  Operative History: No history of cardiovascular surgery  FINDINGS:  Segment         Right   Left                            AP(mm)  AP(mm)  GSV Inguinal      11 7    13 8  GSV Mid Thigh      5 4     6 4  GSV Dist Thigh             3 8  GSV Knee           5 3     3 1  SSV Mid Calf               3 1     CONCLUSION:  Impression: RIGHT LIMB: No evidence of deep venous incompetence  The great saphenous vein is competent  The great saphenous vein remains within the saphenous compartment in the thigh  The small saphenous vein is competent and communicates with the popliteal vein  There is no evidence of incompetent perforators in the thigh or calf  There is no evidence of deep vein thrombosis in the CFV, the proximal PFV, the femoral vein and the popliteal vein  LEFT LIMB: No evidence of deep venous incompetence  The great saphenous vein is competent  The great saphenous vein remains within the saphenous compartment in the thigh  The small saphenous vein is competent and communicates with the popliteal vein  There is no evidence of incompetent perforators in the thigh or calf  There is no evidence of deep vein thrombosis in the CFV, the proximal PFV, the femoral vein and the popliteal vein  Study performed with patient in standing and steep Reverse Trendelenburg  Tech note: Despite large diameter veins unable to provoke reflux by augmentation and valsalva maneuvers secondary to pulsatile venous flow  Bandaging over wounds was not removed, therefore the bilateral ankles and distal calfs were not scanned    SIGNATURE: Electronically Signed by: Balwinder Douglas MD on 2018-07-12 08:04:12 PM    Imaging Reports Reviewed by myself    Cultures: Blood Culture:   Lab Results   Component Value Date    BLOODCX No Growth at 24 hrs  07/13/2018    BLOODCX No Growth at 24 hrs  07/13/2018    BLOODCX No Growth After 5 Days  02/22/2018    BLOODCX No Growth After 5 Days  02/22/2018    BLOODCX No Growth After 5 Days  01/19/2018    BLOODCX No Growth After 5 Days  01/19/2018    BLOODCX No Growth After 5 Days  06/11/2017    BLOODCX No Growth After 5 Days   06/11/2017     Urine Culture:   Lab Results   Component Value Date    URINECX 20,000-29,000 cfu/ml Mixed Contaminants X3 04/15/2017     Sputum Culture: No components found for: SPUTUMCX  Wound Culture: No results found for: WOUNDCULT    Last 24 Hours Medication List:     Current Facility-Administered Medications:  aspirin 325 mg Oral Daily FATIMAH Orosco    docusate sodium 100 mg Oral BID FATIMAH Orosco    escitalopram 10 mg Oral Daily FATIMAH Orosco    folic acid 1 mg Oral Daily FATIMAH Orosco    gabapentin 100 mg Oral TID Sloan Sierra MD    heparin (porcine) 5,000 Units Subcutaneous [facility] FATIMAH Orosco    insulin lispro 1-6 Units Subcutaneous HS FATIMAH Malik    insulin lispro 4-20 Units Subcutaneous TID With Meals Sloan Sierra MD    ipratropium 0 5 mg Nebulization 4x Daily Sloan Sierra MD    levalbuterol 1 25 mg Nebulization 4x Daily FATIMAH Healy    levalbuterol 1 25 mg Nebulization Q2H PRN Sloan Sierra MD    And        sodium chloride 3 mL Nebulization Q2H PRN Sloan Sierra MD    levothyroxine 125 mcg Oral Daily FATIMAH Wilson    Linaclotide 72 mcg Oral Daily Before Breakfast FATIMAH Orosco    meropenem 1,000 mg Intravenous Q12H Gina Gutierrez PA-C Last Rate: 1,000 mg (07/15/18 1212)   methylPREDNISolone sodium succinate 20 mg Intravenous Q8H 3630 Stacey Walls MD    metoclopramide 10 mg Intravenous Once FATIMAH Healy    [START ON 7/16/2018] metolazone 2 5 mg Oral Once per day on Mon Wed Fri Sloan Sierra MD metoprolol tartrate 25 mg Oral BID FATIMAH Orosco    ondansetron 4 mg Oral Q6H PRN Regine FATIMAH Verduzco    polyethylene glycol 17 g Oral Daily FATIMAH Orosco    pramipexole 1 mg Oral TID FATIMAH Orosco    senna 1 tablet Oral HS FATIMAH Orosco    [START ON 7/16/2018] torsemide 40 mg Oral Once per day on Mon Wed Fri Consuelo Coles MD    traMADol 50 mg Oral Q6H PRN Hyla RingFATIMAH    venlafaxine 75 mg Oral Daily FATIMAH Mathur         Today, Patient Was Seen By: Consuelo Coles MD    ** Please Note: Dragon 360 Dictation voice to text software may have been used in the creation of this document   **

## 2018-07-15 NOTE — ASSESSMENT & PLAN NOTE
· Patient presented with fevers, SOB, hypoxia sating 64% on RA, requiring BIPAP    · Normally on 2L O2 continuous  · Possible early PNA  · Admitted to the stepdown, Weaned off BIPAP and transferred to [facility]

## 2018-07-15 NOTE — ASSESSMENT & PLAN NOTE
· Cont home inhalers, nebs  · Has diffuse wheezing, started on solumedrol 20mg q8hr and wean as tolerated

## 2018-07-15 NOTE — ASSESSMENT & PLAN NOTE
· Suspect 2/2 UTI vs early PNA  · Hx of ESBL positive klebsiella bacteremia June 2017  · CXR- No acute cardiopulmonary disease   + vascular congestion  · ID consulted  · On IV meropenem  · BCx, UCx pending

## 2018-07-15 NOTE — PROGRESS NOTES
Progress Note - Infectious Disease   Dane Han 77 y o  female MRN: [id number]  Unit/Bed#: [address] [place] [address] Encounter: [id number]        Assessment:  Creatinine improved  Urine culture in progress  MRSA Screen is negative  Chronic Bronchitis  Plan:  Observe on Meropenem      Subjective/Objective     Subjective: SOB improved    HR:  [71-99] 76  Resp:  [18-28] 18  BP: (123-140)/(56-71) 123/60  SpO2:  [91 %-98 %] 96 %      Objective: see PE    Meds/Allergies     Current Facility-Administered Medications:     aspirin tablet 325 mg, 325 mg, Oral, Daily, FATIMAH Orosco, 325 mg at 07/15/18 0790    docusate sodium (COLACE) capsule 100 mg, 100 mg, Oral, BID, FATIMAH Orosco, 100 mg at 07/15/18 0904    escitalopram (LEXAPRO) tablet 10 mg, 10 mg, Oral, Daily, FATIMAH Orosco, 10 mg at 93/46/70 0277    folic acid (FOLVITE) tablet 1 mg, 1 mg, Oral, Daily, FATIMAH Orosco, 1 mg at 07/15/18 3761    gabapentin (NEURONTIN) capsule 100 mg, 100 mg, Oral, TID, Adelaide Leal MD    heparin (porcine) subcutaneous injection 5,000 Units, 5,000 Units, Subcutaneous, Q8H [address], 5,000 Units at 07/15/18 1313 **AND** [CANCELED] Platelet count, , , Once, FATIMAH Orosco    insulin lispro (HumaLOG) 100 units/mL subcutaneous injection 1-6 Units, 1-6 Units, Subcutaneous, HS, Rolene Chantel, CRNP, 2 Units at 07/14/18 2155    insulin lispro (HumaLOG) 100 units/mL subcutaneous injection 4-20 Units, 4-20 Units, Subcutaneous, TID With Meals, 12 Units at 07/15/18 1316 **AND** Fingerstick Glucose (POCT), , , TID AC, Adelaide Leal MD    ipratropium (ATROVENT) 0 02 % inhalation solution 0 5 mg, 0 5 mg, Nebulization, 4x Daily, Adelaide Leal MD, 0 5 mg at 07/15/18 1123    levalbuterol (XOPENEX) inhalation solution 1 25 mg, 1 25 mg, Nebulization, 4x Daily, 1 25 mg at 07/15/18 1123 **AND** [DISCONTINUED] sodium chloride 0 9 % inhalation solution 3 mL, 3 mL, Nebulization, Q6H PRN, ResFEDE MeltonNP   levalbuterol (XOPENEX) inhalation solution 1 25 mg, 1 25 mg, Nebulization, Q2H PRN **AND** sodium chloride 0 9 % inhalation solution 3 mL, 3 mL, Nebulization, Q2H PRN, Elissa Herrera MD    levothyroxine tablet 125 mcg, 125 mcg, Oral, Daily, FATIMAH Sams, 125 mcg at 07/15/18 0549    Linaclotide CAPS 72 mcg, 72 mcg, Oral, Daily Before Breakfast, FATIMAH Orosco    meropenem (MERREM) 1,000 mg in sodium chloride 0 9 % 100 mL IVPB, 1,000 mg, Intravenous, Q12H, Gina Gutierrez PA-C, Last Rate: 100 mL/hr at 07/15/18 1212, 1,000 mg at 07/15/18 1212    methylPREDNISolone sodium succinate (Solu-MEDROL) injection 20 mg, 20 mg, Intravenous, Q8H [address], Elissa Herrera MD, 20 mg at 07/15/18 1316    metoclopramide (REGLAN) injection 10 mg, 10 mg, Intravenous, Once, FATIMAH Healy    [START ON 7/16/2018] metolazone (ZAROXOLYN) tablet 2 5 mg, 2 5 mg, Oral, Once per day on Mon Wed Fri, Elissa Herrera MD    metoprolol tartrate (LOPRESSOR) tablet 25 mg, 25 mg, Oral, BID, FATIMAH Orosco, 25 mg at 07/15/18 0904    ondansetron (ZOFRAN-ODT) dispersible tablet 4 mg, 4 mg, Oral, Q6H PRN, FATIMAH Orosco, 4 mg at 07/14/18 1739    polyethylene glycol (MIRALAX) packet 17 g, 17 g, Oral, Daily, FATIMAH Orosco, 17 g at 07/15/18 0908    pramipexole (MIRAPEX) tablet 1 mg, 1 mg, Oral, TID, FATIMAH Orosco, 1 mg at 07/15/18 0904    senna (SENOKOT) tablet 8 6 mg, 1 tablet, Oral, HS, FATIMAH Orosco, 8 6 mg at 07/14/18 2155    [START ON 7/16/2018] torsemide (DEMADEX) tablet 40 mg, 40 mg, Oral, Once per day on Mon Wed Fri, Elissa Herrera MD    traMADol Lindsay Pile) tablet 50 mg, 50 mg, Oral, Q6H PRN, FATIMAH Pritchett, 50 mg at 07/14/18 1348    venlafaxine (EFFEXOR) tablet 75 mg, 75 mg, Oral, Daily, FATIMAH Orosco, 75 mg at 07/15/18 9015  Allergies   Allergen Reactions    Ketorolac Swelling     Toradol    Latex Rash    Wound Dressing Adhesive Rash     all current active meds have been reviewed    all current active meds have been reviewed    Physical Exam: Physical Exam   Constitutional: She is oriented to person, place, and time  She appears well-developed and well-nourished  HENT:   Head: Normocephalic and atraumatic  Mouth/Throat: No oropharyngeal exudate  Eyes: Pupils are equal, round, and reactive to light  No scleral icterus  Neck: Neck supple  No thyromegaly present  Cardiovascular: Normal heart sounds  Pulmonary/Chest: She has wheezes  Abdominal: Soft  Musculoskeletal: Normal range of motion  Neurological: She is alert and oriented to person, place, and time  No cranial nerve deficit  Skin: Skin is warm  Psychiatric: She has a normal mood and affect  Nursing note and vitals reviewed          Temp (24hrs), Av 6 °F (37 °C), Min:97 °F (36 1 °C), Max:99 8 °F (37 7 °C)  Current: Temperature: 98 3 °F (36 8 °C)    Invasive Devices     Peripheral Intravenous Line            Peripheral IV 18 Left Antecubital 1 day    Peripheral IV 18 Right Antecubital 1 day                            Lab, Imaging and other studies:      CBC: Lab Results   Component Value Date    WBC 6 10 07/15/2018    WBC 9 1 2017    RBC 3 85 07/15/2018    RBC 4 45 2016       Results from last 7 days  Lab Units 07/15/18  0545 18  0529 18  1615   PLATELETS Thousands/uL 168 178 219     CMP: Lab Results   Component Value Date     07/15/2018     (H) 2017    CL 96 (L) 07/15/2018    CL 96 2017    CO2 42 (H) 07/15/2018    CO2 35 (H) 2017    ANIONGAP 0 (L) 07/15/2018    ANIONGAP 10 8 2016    BUN 22 07/15/2018    BUN 14 2018    CREATININE 0 94 07/15/2018    CREATININE 0 78 2017    GLUCOSE 244 (H) 07/15/2018    GLUCOSE 144 (H) 2017    CALCIUM 7 8 (L) 07/15/2018    CALCIUM 9 1 2017    AST 25 2018    AST 18 2017    ALT 31 2018    ALT 26 2017    ALKPHOS 88 07/13/2018    ALKPHOS 70 08/01/2017    PROT 7 7 07/13/2018    PROT 6 9 08/01/2017    BILITOT 0 50 07/13/2018    BILITOT 0 2 08/01/2017    EGFR 58 07/15/2018       Urinalysis: Lab Results   Component Value Date    COLORU Yellow 02/22/2018    COLORU Yellow 07/12/2016    CLARITYU Clear 02/22/2018    CLARITYU Transparent 07/12/2016    SPECGRAV 1 015 02/22/2018    SPECGRAV 1 025 07/12/2016    PHUR 5 5 02/22/2018    PHUR 6 0 07/12/2016    LEUKOCYTESUR Negative 02/22/2018    LEUKOCYTESUR Negative 07/12/2016    NITRITE Negative 02/22/2018    NITRITE Negative 07/12/2016    PROTEINUA Negative 02/22/2018    PROTEINUA Negative 07/12/2016    GLUCOSEU 500 (1/2%) (A) 02/22/2018    KETONESU Negative 02/22/2018    KETONESU Negative 07/12/2016    BILIRUBINUR Negative 02/22/2018    BILIRUBINUR Negative 07/12/2016    BLOODU Negative 02/22/2018    BLOODU Negative 07/12/2016       Lactic Acid:   Lab Results   Component Value Date    LACTICACID 1 6 07/14/2018        Cultures    Results from last 7 days  Lab Units 07/13/18  2205 07/13/18  1617   BLOOD CULTURE   --  No Growth at 24 hrs  No Growth at 24 hrs  MRSA CULTURE ONLY  No Methicillin Resistant Staphlyococcus aureus (MRSA) isolated  --        I have personally reviewed pertinent reports        Principal Problem:    Severe sepsis ([facility] )  Active Problems:    Acquired hypothyroidism    Morbid obesity with BMI of 40 0-44 9, adult (HCC)    Constipation due to opioid therapy    Anxiety    Obesity hypoventilation syndrome (HCC)    HTN (hypertension)    Diabetes mellitus with neuropathy (HCC)    Moderate episode of recurrent major depressive disorder (HCC)    Chronic obstructive pulmonary disease with acute exacerbation (HCC)    Diastolic CHF (HCC)    Chronic venous stasis dermatitis of both lower extremities    Physical deconditioning    Acute on chronic respiratory failure with hypoxia and hypercapnia (HCC)    Urinary tract infection without hematuria

## 2018-07-15 NOTE — PROGRESS NOTES
Progress Note - Pulmonary   Malva Chavez 77 y o  female MRN: [id number]  Unit/Bed#: [address] [address] Encounter: [id number]    Assessment:  Acute on chronic hypercapnic hypoxemic respiratory failure has improved  Patient now is back to her baseline dose of 2 L of oxygen during the day and she will continue to try using BiPAP 12/6 40% oxygen at bedtime  Obesity alveolar hypoventilation syndrome  Chronic bronchitis  Chronic diastolic cardiac dysfunction  Severe sepsis likely urinary origin  Urine culture was obtained after patient ready been started on antibiotic therapy  Acute kidney injury improved  Serum creatinine down to 0 94    Plan:  Continue methylprednisone 20 mg IV every 8 hours today and if patient hold stable can decrease dose tomorrow  Patient is on IV meropenem  Nebulizer treatments with levalbuterol 1 25 mg and Atrovent 0 5 mg 4 times a day  Patient is agreeable to continue to try to use BiPAP at nighttime for ventilatory support      Subjective:   Patient states her cough is better and she feels better today  Objective:     Vitals: Blood pressure 123/60, pulse 76, temperature 98 3 °F (36 8 °C), temperature source Oral, resp  rate 18, height 5' 2" (1 575 m), weight 97 kg (213 lb 13 5 oz), SpO2 96 %, not currently breastfeeding  ,Body mass index is 39 11 kg/m²  Intake/Output Summary (Last 24 hours) at 07/15/18 1326  Last data filed at 07/15/18 1001   Gross per 24 hour   Intake                0 ml   Output              500 ml   Net             -500 ml       Physical Exam: Physical Exam   Constitutional: She is oriented to person, place, and time  She appears well-developed and well-nourished  No distress  On 2 L of oxygen O2 saturation is 95%   HENT:   Head: Normocephalic  Nose: Nose normal    Mouth/Throat: Oropharynx is clear and moist  No oropharyngeal exudate  Eyes: Conjunctivae are normal  Pupils are equal, round, and reactive to light  Neck: Neck supple  No JVD present   No tracheal deviation present  Cardiovascular: Normal rate, regular rhythm and normal heart sounds  Pulmonary/Chest: Effort normal    There are few coarse expiratory wheezes in the lower lobes   Abdominal: Soft  She exhibits no distension  There is no tenderness  There is no guarding  Musculoskeletal:   Mild edema lower tibial regions   Lymphadenopathy:     She has no cervical adenopathy  Neurological: She is alert and oriented to person, place, and time  Skin: Skin is warm and dry  No rash noted  Psychiatric: She has a normal mood and affect  Her behavior is normal  Thought content normal         Labs: I have personally reviewed pertinent lab results  , ABG: Lab Results   Component Value Date    PHART 7 365 07/13/2018    MBM4PRW 69 5 (HH) 07/13/2018    PO2ART 62 5 (L) 07/13/2018    PKX1TQI 38 8 (H) 07/13/2018    BEART 10 8 07/13/2018    SOURCE Radial, Right 07/13/2018   , BNP: No results found for: BNP, CBC: Lab Results   Component Value Date    WBC 6 10 07/15/2018    HGB 11 2 (L) 07/15/2018    HCT 37 5 07/15/2018    MCV 97 07/15/2018     07/15/2018    ADJUSTEDWBC 10 30 04/04/2016    MCH 29 1 07/15/2018    MCHC 29 9 (L) 07/15/2018    RDW 15 1 07/15/2018    MPV 10 9 07/15/2018    NRBC 0 07/15/2018   , CMP: Lab Results   Component Value Date     07/15/2018     (H) 08/01/2017    K 3 7 07/15/2018    K 3 9 08/01/2017    CL 96 (L) 07/15/2018    CL 96 08/01/2017    CO2 42 (H) 07/15/2018    CO2 35 (H) 08/01/2017    ANIONGAP 0 (L) 07/15/2018    ANIONGAP 10 8 01/08/2016    BUN 22 07/15/2018    BUN 14 05/25/2018    CREATININE 0 94 07/15/2018    CREATININE 0 78 08/01/2017    GLUCOSE 244 (H) 07/15/2018    GLUCOSE 144 (H) 08/01/2017    CALCIUM 7 8 (L) 07/15/2018    CALCIUM 9 1 08/01/2017    AST 25 07/13/2018    AST 18 08/01/2017    ALT 31 07/13/2018    ALT 26 08/01/2017    ALKPHOS 88 07/13/2018    ALKPHOS 70 08/01/2017    PROT 7 7 07/13/2018    PROT 6 9 08/01/2017    BILITOT 0 50 07/13/2018    BILITOT 0 2 08/01/2017    EGFR 58 07/15/2018   , PT/INR:   Lab Results   Component Value Date    INR 1 37 (H) 07/13/2018    INR 1 0 08/01/2017   , Troponin: No results found for: TROPONIN    Imaging and other studies: I have personally reviewed pertinent reports     and I have personally reviewed pertinent films in PACS

## 2018-07-15 NOTE — SOCIAL WORK
CM spoke with the pt and her  at the bedside  Per the , she has been progressively worsening at home and he feels she needs STR prior to her return to the home  He stated that [facility] and she did really well there, he would like to pursue another bed there for when she is DC  Referral placed through [facility]

## 2018-07-15 NOTE — ASSESSMENT & PLAN NOTE
Lab Results   Component Value Date    HGBA1C 9 1% 04/27/2018       Recent Labs      07/14/18   1611  07/14/18   2057  07/15/18   0743  07/15/18   1129   POCGLU  325*  214*  238*  302*       Blood Sugar Average: Last 72 hrs:  (P) 329 [id number]     · Required insulin infusion in ICU  · Now on ISS, increase coverage because started steroids

## 2018-07-16 LAB
ANION GAP SERPL CALCULATED.3IONS-SCNC: 0 MMOL/L (ref 4–13)
BUN SERPL-MCNC: 19 MG/DL (ref 5–25)
CALCIUM SERPL-MCNC: 8.3 MG/DL (ref 8.3–10.1)
CHLORIDE SERPL-SCNC: 94 MMOL/L (ref 100–108)
CO2 SERPL-SCNC: 42 MMOL/L (ref 21–32)
CREAT SERPL-MCNC: 0.9 MG/DL (ref 0.6–1.3)
ERYTHROCYTE [DISTWIDTH] IN BLOOD BY AUTOMATED COUNT: 14.8 % (ref 11.6–15.1)
GFR SERPL CREATININE-BSD FRML MDRD: 61 ML/MIN/1.73SQ M
GLUCOSE SERPL-MCNC: 223 MG/DL (ref 65–140)
GLUCOSE SERPL-MCNC: 317 MG/DL (ref 65–140)
GLUCOSE SERPL-MCNC: 342 MG/DL (ref 65–140)
GLUCOSE SERPL-MCNC: 398 MG/DL (ref 65–140)
GLUCOSE SERPL-MCNC: 438 MG/DL (ref 65–140)
HCT VFR BLD AUTO: 37.1 % (ref 34.8–46.1)
HGB BLD-MCNC: 11 G/DL (ref 11.5–15.4)
MCH RBC QN AUTO: 28.9 PG (ref 26.8–34.3)
MCHC RBC AUTO-ENTMCNC: 29.6 G/DL (ref 31.4–37.4)
MCV RBC AUTO: 98 FL (ref 82–98)
PLATELET # BLD AUTO: 169 THOUSANDS/UL (ref 149–390)
PMV BLD AUTO: 11.4 FL (ref 8.9–12.7)
POTASSIUM SERPL-SCNC: 4.4 MMOL/L (ref 3.5–5.3)
RBC # BLD AUTO: 3.8 MILLION/UL (ref 3.81–5.12)
SODIUM SERPL-SCNC: 136 MMOL/L (ref 136–145)
WBC # BLD AUTO: 6.2 THOUSAND/UL (ref 4.31–10.16)

## 2018-07-16 PROCEDURE — 80048 BASIC METABOLIC PNL TOTAL CA: CPT | Performed by: STUDENT IN AN ORGANIZED HEALTH CARE EDUCATION/TRAINING PROGRAM

## 2018-07-16 PROCEDURE — 85027 COMPLETE CBC AUTOMATED: CPT | Performed by: STUDENT IN AN ORGANIZED HEALTH CARE EDUCATION/TRAINING PROGRAM

## 2018-07-16 PROCEDURE — 97163 PT EVAL HIGH COMPLEX 45 MIN: CPT

## 2018-07-16 PROCEDURE — G8987 SELF CARE CURRENT STATUS: HCPCS

## 2018-07-16 PROCEDURE — 99232 SBSQ HOSP IP/OBS MODERATE 35: CPT | Performed by: STUDENT IN AN ORGANIZED HEALTH CARE EDUCATION/TRAINING PROGRAM

## 2018-07-16 PROCEDURE — 97167 OT EVAL HIGH COMPLEX 60 MIN: CPT

## 2018-07-16 PROCEDURE — G8988 SELF CARE GOAL STATUS: HCPCS

## 2018-07-16 PROCEDURE — G8979 MOBILITY GOAL STATUS: HCPCS

## 2018-07-16 PROCEDURE — 99232 SBSQ HOSP IP/OBS MODERATE 35: CPT | Performed by: INTERNAL MEDICINE

## 2018-07-16 PROCEDURE — 82948 REAGENT STRIP/BLOOD GLUCOSE: CPT

## 2018-07-16 PROCEDURE — 94640 AIRWAY INHALATION TREATMENT: CPT

## 2018-07-16 PROCEDURE — 97110 THERAPEUTIC EXERCISES: CPT

## 2018-07-16 PROCEDURE — 94760 N-INVAS EAR/PLS OXIMETRY 1: CPT

## 2018-07-16 PROCEDURE — G8978 MOBILITY CURRENT STATUS: HCPCS

## 2018-07-16 RX ADMIN — LEVOTHYROXINE SODIUM 125 MCG: 125 TABLET ORAL at 05:22

## 2018-07-16 RX ADMIN — IPRATROPIUM BROMIDE 0.5 MG: 0.5 SOLUTION RESPIRATORY (INHALATION) at 20:59

## 2018-07-16 RX ADMIN — POLYETHYLENE GLYCOL 3350 17 G: 17 POWDER, FOR SOLUTION ORAL at 08:51

## 2018-07-16 RX ADMIN — HEPARIN SODIUM 5000 UNITS: 5000 INJECTION, SOLUTION INTRAVENOUS; SUBCUTANEOUS at 14:25

## 2018-07-16 RX ADMIN — INSULIN LISPRO 10 UNITS: 100 INJECTION, SOLUTION INTRAVENOUS; SUBCUTANEOUS at 17:01

## 2018-07-16 RX ADMIN — METOPROLOL TARTRATE 25 MG: 50 TABLET ORAL at 17:00

## 2018-07-16 RX ADMIN — GABAPENTIN 100 MG: 100 CAPSULE ORAL at 08:52

## 2018-07-16 RX ADMIN — DOCUSATE SODIUM 100 MG: 100 CAPSULE, LIQUID FILLED ORAL at 17:00

## 2018-07-16 RX ADMIN — GABAPENTIN 100 MG: 100 CAPSULE ORAL at 20:56

## 2018-07-16 RX ADMIN — IPRATROPIUM BROMIDE 0.5 MG: 0.5 SOLUTION RESPIRATORY (INHALATION) at 12:15

## 2018-07-16 RX ADMIN — TORSEMIDE 40 MG: 20 TABLET ORAL at 08:52

## 2018-07-16 RX ADMIN — MEROPENEM 1000 MG: 1 INJECTION, POWDER, FOR SOLUTION INTRAVENOUS at 22:15

## 2018-07-16 RX ADMIN — IPRATROPIUM BROMIDE 0.5 MG: 0.5 SOLUTION RESPIRATORY (INHALATION) at 07:42

## 2018-07-16 RX ADMIN — LEVALBUTEROL HYDROCHLORIDE 1.25 MG: 1.25 SOLUTION, CONCENTRATE RESPIRATORY (INHALATION) at 20:59

## 2018-07-16 RX ADMIN — LEVALBUTEROL HYDROCHLORIDE 1.25 MG: 1.25 SOLUTION, CONCENTRATE RESPIRATORY (INHALATION) at 07:42

## 2018-07-16 RX ADMIN — PRAMIPEXOLE DIHYDROCHLORIDE 1 MG: 0.5 TABLET ORAL at 08:51

## 2018-07-16 RX ADMIN — PRAMIPEXOLE DIHYDROCHLORIDE 1 MG: 0.5 TABLET ORAL at 17:00

## 2018-07-16 RX ADMIN — ERYTHROMYCIN 0.5 INCH: 5 OINTMENT OPHTHALMIC at 20:56

## 2018-07-16 RX ADMIN — INSULIN LISPRO 16 UNITS: 100 INJECTION, SOLUTION INTRAVENOUS; SUBCUTANEOUS at 12:00

## 2018-07-16 RX ADMIN — INSULIN LISPRO 10 UNITS: 100 INJECTION, SOLUTION INTRAVENOUS; SUBCUTANEOUS at 12:01

## 2018-07-16 RX ADMIN — GABAPENTIN 100 MG: 100 CAPSULE ORAL at 17:00

## 2018-07-16 RX ADMIN — METOPROLOL TARTRATE 25 MG: 50 TABLET ORAL at 08:52

## 2018-07-16 RX ADMIN — METOLAZONE 2.5 MG: 5 TABLET ORAL at 08:52

## 2018-07-16 RX ADMIN — MEROPENEM 1000 MG: 1 INJECTION, POWDER, FOR SOLUTION INTRAVENOUS at 11:59

## 2018-07-16 RX ADMIN — METHYLPREDNISOLONE SODIUM SUCCINATE 20 MG: 40 INJECTION, POWDER, FOR SOLUTION INTRAMUSCULAR; INTRAVENOUS at 21:01

## 2018-07-16 RX ADMIN — FOLIC ACID 1 MG: 1 TABLET ORAL at 08:51

## 2018-07-16 RX ADMIN — IPRATROPIUM BROMIDE 0.5 MG: 0.5 SOLUTION RESPIRATORY (INHALATION) at 15:40

## 2018-07-16 RX ADMIN — TRAMADOL HYDROCHLORIDE 50 MG: 50 TABLET, FILM COATED ORAL at 23:18

## 2018-07-16 RX ADMIN — INSULIN LISPRO 8 UNITS: 100 INJECTION, SOLUTION INTRAVENOUS; SUBCUTANEOUS at 17:01

## 2018-07-16 RX ADMIN — HEPARIN SODIUM 5000 UNITS: 5000 INJECTION, SOLUTION INTRAVENOUS; SUBCUTANEOUS at 05:21

## 2018-07-16 RX ADMIN — LEVALBUTEROL HYDROCHLORIDE 1.25 MG: 1.25 SOLUTION, CONCENTRATE RESPIRATORY (INHALATION) at 12:16

## 2018-07-16 RX ADMIN — INSULIN LISPRO 25 UNITS: 100 INJECTION, SOLUTION INTRAVENOUS; SUBCUTANEOUS at 07:45

## 2018-07-16 RX ADMIN — LEVALBUTEROL HYDROCHLORIDE 1.25 MG: 1.25 SOLUTION, CONCENTRATE RESPIRATORY (INHALATION) at 15:40

## 2018-07-16 RX ADMIN — TRAMADOL HYDROCHLORIDE 50 MG: 50 TABLET, FILM COATED ORAL at 04:23

## 2018-07-16 RX ADMIN — SENNOSIDES 8.6 MG: 8.6 TABLET, FILM COATED ORAL at 21:04

## 2018-07-16 RX ADMIN — ASPIRIN 325 MG: 325 TABLET ORAL at 08:52

## 2018-07-16 RX ADMIN — VENLAFAXINE 75 MG: 37.5 TABLET ORAL at 08:51

## 2018-07-16 RX ADMIN — METHYLPREDNISOLONE SODIUM SUCCINATE 20 MG: 40 INJECTION, POWDER, FOR SOLUTION INTRAMUSCULAR; INTRAVENOUS at 14:26

## 2018-07-16 RX ADMIN — METHYLPREDNISOLONE SODIUM SUCCINATE 20 MG: 40 INJECTION, POWDER, FOR SOLUTION INTRAMUSCULAR; INTRAVENOUS at 05:22

## 2018-07-16 RX ADMIN — PRAMIPEXOLE DIHYDROCHLORIDE 1 MG: 0.5 TABLET ORAL at 20:55

## 2018-07-16 RX ADMIN — ERYTHROMYCIN 0.5 INCH: 5 OINTMENT OPHTHALMIC at 08:52

## 2018-07-16 RX ADMIN — ESCITALOPRAM OXALATE 10 MG: 10 TABLET ORAL at 08:52

## 2018-07-16 RX ADMIN — HEPARIN SODIUM 5000 UNITS: 5000 INJECTION, SOLUTION INTRAVENOUS; SUBCUTANEOUS at 21:00

## 2018-07-16 RX ADMIN — DOCUSATE SODIUM 100 MG: 100 CAPSULE, LIQUID FILLED ORAL at 08:52

## 2018-07-16 NOTE — ASSESSMENT & PLAN NOTE
Cont bronchodilators  Continues to improve slowly  Will transition to oral prednisone at lower dose  Pulmonary input appreciated  Continue level albuterol, Atrovent and chest PT  Supplemental oxygen as needed  PT/OT

## 2018-07-16 NOTE — ASSESSMENT & PLAN NOTE
Per  she cannot take care of herself and he cant care for her either  Does not take her diuretic because cant get to the bathroom fast enough  PT/OT, anticipate rehab

## 2018-07-16 NOTE — PROGRESS NOTES
Progress Note - Pulmonary   Jaime Akhtar 77 y o  female MRN: [id number]  Unit/Bed#: [address] [place] [address] Encounter: [id number]    Assessment:  Acute on chronic hypercapnic hypoxemic respiratory failure improved  Patient did use BiPAP for short appeared of time last night  Now is on 2 L of oxygen  Obesity alveolar hypoventilation syndrome  Chronic bronchitis with mild exacerbation  Severe sepsis likely urinary origin  Urine culture was negative however the urine culture was obtained a day after patient had ready been started on IV antibiotic therapy  No pneumonia visible on her chest x-ray  Chronic diastolic congestive heart failure    Plan:  Patient is on IV meropenem  S patient still has some wheezing would continue Solu-Medrol 20 mg IV every 8 hr  Nebulized treatments with levalbuterol 1 25 mg and Atrovent 0 5 mg q i d  Will order portable chest x-ray for tomorrow a m  to make sure no infiltrates developed since initial chest x-ray done in ER  Subjective:   Has some intermittent nonproductive cough  Less short of breath with activity    Objective:     Vitals: Blood pressure 128/62, pulse 63, temperature 98 4 °F (36 9 °C), temperature source Oral, resp  rate 16, height 5' 2" (1 575 m), weight 97 kg (213 lb 13 5 oz), SpO2 97 %, not currently breastfeeding  ,Body mass index is 39 11 kg/m²  Intake/Output Summary (Last 24 hours) at 07/16/18 1744  Last data filed at 07/16/18 1501   Gross per 24 hour   Intake                0 ml   Output             1270 ml   Net            -1270 ml       Physical Exam: Physical Exam   Constitutional: She is oriented to person, place, and time  She appears well-developed and well-nourished  No distress  On 2 L O2 saturation 96%  No distress  Patient is alert   HENT:   Head: Normocephalic  Nose: Nose normal    Mouth/Throat: Oropharynx is clear and moist  No oropharyngeal exudate  Eyes: Conjunctivae are normal  Pupils are equal, round, and reactive to light     Neck: Neck supple  No JVD present  No tracheal deviation present  Cardiovascular: Normal rate, regular rhythm and normal heart sounds  Pulmonary/Chest: Effort normal    There are few expiratory wheezes in both lower lobes   Abdominal: Soft  She exhibits no distension  There is no tenderness  There is no guarding  Musculoskeletal:   Mild edema both lower extremities   Lymphadenopathy:     She has no cervical adenopathy  Neurological: She is alert and oriented to person, place, and time  Skin: Skin is warm and dry  No rash noted  Psychiatric: She has a normal mood and affect  Her behavior is normal  Thought content normal         Labs: I have personally reviewed pertinent lab results  , ABG: Lab Results   Component Value Date    PHART 7 365 07/13/2018    GLG8YNT 69 5 (HH) 07/13/2018    PO2ART 62 5 (L) 07/13/2018    YGW5WDS 38 8 (H) 07/13/2018    BEART 10 8 07/13/2018    SOURCE Radial, Right 07/13/2018   , BNP: No results found for: BNP, CBC: Lab Results   Component Value Date    WBC 6 20 07/16/2018    HGB 11 0 (L) 07/16/2018    HCT 37 1 07/16/2018    MCV 98 07/16/2018     07/16/2018    ADJUSTEDWBC 10 30 04/04/2016    MCH 28 9 07/16/2018    MCHC 29 6 (L) 07/16/2018    RDW 14 8 07/16/2018    MPV 11 4 07/16/2018    NRBC 0 07/15/2018   , CMP: Lab Results   Component Value Date     07/16/2018     (H) 08/01/2017    K 4 4 07/16/2018    K 3 9 08/01/2017    CL 94 (L) 07/16/2018    CL 96 08/01/2017    CO2 42 (H) 07/16/2018    CO2 35 (H) 08/01/2017    ANIONGAP 0 (L) 07/16/2018    ANIONGAP 10 8 01/08/2016    BUN 19 07/16/2018    BUN 14 05/25/2018    CREATININE 0 90 07/16/2018    CREATININE 0 78 08/01/2017    GLUCOSE 438 (H) 07/16/2018    GLUCOSE 144 (H) 08/01/2017    CALCIUM 8 3 07/16/2018    CALCIUM 9 1 08/01/2017    AST 25 07/13/2018    AST 18 08/01/2017    ALT 31 07/13/2018    ALT 26 08/01/2017    ALKPHOS 88 07/13/2018    ALKPHOS 70 08/01/2017    PROT 7 7 07/13/2018    PROT 6 9 08/01/2017    BILITOT 0 50 07/13/2018    BILITOT 0 2 08/01/2017    EGFR 61 07/16/2018   , PT/INR:   Lab Results   Component Value Date    INR 1 37 (H) 07/13/2018    INR 1 0 08/01/2017   , Troponin: No results found for: TROPONIN    Imaging and other studies: I have personally reviewed pertinent reports     and I have personally reviewed pertinent films in PACS

## 2018-07-16 NOTE — CASE MANAGEMENT
Continued Stay Review    Date: 7/16/18    Vital Signs: /63 (BP Location: Right arm)   Pulse 69   Temp 98 °F (36 7 °C) (Oral)   Resp 18   Ht 5' 2" (1 575 m)   Wt 97 kg (213 lb 13 5 oz)   LMP  (LMP Unknown)   SpO2 94%   BMI 39 11 kg/m²     Medications:   Scheduled Meds:   Current Facility-Administered Medications:  aspirin 325 mg Oral Daily FATIMAH Orosco    docusate sodium 100 mg Oral BID FATIMAH Orosco    erythromycin 0 5 inch Both Eyes Q12H 3630 Stacey Walls MD    escitalopram 10 mg Oral Daily FATIMAH Orosco    folic acid 1 mg Oral Daily FATIMAH Orosco    gabapentin 100 mg Oral TID Ana M Nesbitt MD    heparin (porcine) 5,000 Units Subcutaneous Q8H [facility] FATIMAH Orosco    insulin lispro 10 Units Subcutaneous TID With Meals Ana M Nesbitt MD    insulin lispro 4-20 Units Subcutaneous TID With Meals Ana M Nesbitt MD    ipratropium 0 5 mg Nebulization 4x Daily Ana M Nesbitt MD    levalbuterol 1 25 mg Nebulization 4x Daily FATIMAH Healy    levalbuterol 1 25 mg Nebulization Q2H PRN Ana M Nesbitt MD    And        sodium chloride 3 mL Nebulization Q2H PRN Ana M Nesbitt MD    levothyroxine 125 mcg Oral Daily FATIMAH Mcneil    Linaclotide 72 mcg Oral Daily Before Breakfast FATIMAH Mcneil    meropenem 1,000 mg Intravenous Q12H Gina Gutierrez PA-C Last Rate: 1,000 mg (07/16/18 1159)   methylPREDNISolone sodium succinate 20 mg Intravenous Q8H 3630 Stacey Walls MD    metoclopramide 10 mg Intravenous Once FATIMAH Healy    metolazone 2 5 mg Oral Once per day on Mon Wed Fri Ana M Nesbitt MD    metoprolol tartrate 25 mg Oral BID FATIMAH Orosco    ondansetron 4 mg Oral Q6H PRN Blease Rota FATIMAH Sultana    polyethylene glycol 17 g Oral Daily FATIMAH Orosco    pramipexole 1 mg Oral TID FATIMAH Orosco    senna 1 tablet Oral HS FATIMAH Orosco    torsemide 40 mg Oral Once per day on Mon Wed Fri Henry Matthews MD    traMADol 50 mg Oral Q6H PRN FATIMAH Carlson    venlafaxine 75 mg Oral Daily FATIMAH Orosco      Continuous Infusions:    PRN Meds: levalbuterol **AND** sodium chloride    ondansetron    traMADol    Abnormal Labs/Diagnostic Results: GLUCOSE  438>398 CL 94 CO2 42      Age/Sex: 77 y o  female     ATTENDING  Severe sepsis ([facility] [address] )  · Source unclear, possible early PNA  · Hx of ESBL positive klebsiella bacteremia June 2017  · CXR- No acute cardiopulmonary disease  + vascular congestion  · Urine culture with no growth  · Blood cx no growth to day  · ID consulted  · On IV meropenem day 4  Diastolic CHF (HCC)   Assessment & Plan     · Cont diuretic per MWF schedule  · Monitor I/O, daily weights     Chronic obstructive pulmonary disease with acute exacerbation (HCC)   Assessment & Plan     · Cont home inhalers, nebs  · Had diffuse wheezing, started on solumedrol 20mg q8hr and wean as tolerated  Recent Labs      07/14/18   2057  07/15/18   0743  07/15/18   1129  07/15/18   1637   POCGLU  214*  238*  302*  304*     Blood Sugar Average: Last 72 hrs:  (P) 212 [id number]   · Required insulin infusion in ICU  · Now on ISS, increase coverage because started steroids    ID  Assessment:  Less SOB  Wheezing improved  Mental status improved  Blood and urine cultures are negative  Plan:  Continue IV antibiotics for 1 more day     PULMONARY  Assessment:  Acute on chronic hypercapnic hypoxemic respiratory failure improved  Patient did use BiPAP for short appeared of time last night  Now is on 2 L of oxygen  Obesity alveolar hypoventilation syndrome  Chronic bronchitis with mild exacerbation  Severe sepsis likely urinary origin  Urine culture was negative however the urine culture was obtained a day after patient had ready been started on IV antibiotic therapy    No pneumonia visible on her chest x-ray  Chronic diastolic congestive heart failure     Plan:  Patient is on IV meropenem  S patient still has some wheezing would continue Solu-Medrol 20 mg IV every 8 hr  Nebulized treatments with levalbuterol 1 25 mg and Atrovent 0 5 mg q i d  Will order portable chest x-ray for tomorrow a m  to make sure no infiltrates developed since initial chest x-ray done in ER

## 2018-07-16 NOTE — PROGRESS NOTES
Progress Note - Infectious Disease   Analisa Lockhart 77 y o  female MRN: [id number]  Unit/Bed#: [address] [place] [address] Encounter: [id number]        Assessment:  Less SOB  Wheezing improved  Mental status improved  Blood and urine cultures are negative    Plan:  Continue IV antibiotics for 1 more day      Subjective/Objective     Subjective:  Feels better    HR:  [69-78] 69  Resp:  [16-23] 18  BP: (125-137)/(62-78) 137/63  SpO2:  [94 %-98 %] 94 %      Objective: see PE    Meds/Allergies     Current Facility-Administered Medications:     aspirin tablet 325 mg, 325 mg, Oral, Daily, FATIMAH Orosco, 325 mg at 07/16/18 7987    docusate sodium (COLACE) capsule 100 mg, 100 mg, Oral, BID, FATIMAH Orosco, 100 mg at 07/16/18 0852    erythromycin (ILOTYCIN) 0 5 % ophthalmic ointment 0 5 inch, 0 5 inch, Both Eyes, Q12H [address], Mathew Mello MD, 0 5 inch at 07/16/18 0852    escitalopram (LEXAPRO) tablet 10 mg, 10 mg, Oral, Daily, FATIMAH Orosco, 10 mg at 40/83/78 5055    folic acid (FOLVITE) tablet 1 mg, 1 mg, Oral, Daily, FATIMAH Orosco, 1 mg at 07/16/18 0851    gabapentin (NEURONTIN) capsule 100 mg, 100 mg, Oral, TID, Mathew Mello MD, 100 mg at 07/16/18 0852    heparin (porcine) subcutaneous injection 5,000 Units, 5,000 Units, Subcutaneous, Q8H [address], 5,000 Units at 07/16/18 0521 **AND** [CANCELED] Platelet count, , , Once, FATIMAH Orosco    insulin lispro (HumaLOG) 100 units/mL subcutaneous injection 10 Units, 10 Units, Subcutaneous, TID With Meals, Mathew Mello MD, 10 Units at 07/16/18 1201    insulin lispro (HumaLOG) 100 units/mL subcutaneous injection 4-20 Units, 4-20 Units, Subcutaneous, TID With Meals, 16 Units at 07/16/18 1200 **AND** Fingerstick Glucose (POCT), , , TID AC, Mathew Mello MD    ipratropium (ATROVENT) 0 02 % inhalation solution 0 5 mg, 0 5 mg, Nebulization, 4x Daily, Mathew Mello MD, 0 5 mg at 07/16/18 1215    levalbuterol (XOPENEX) inhalation solution 1 25 mg, 1 25 mg, Nebulization, 4x Daily, 1 25 mg at 07/16/18 1216 **AND** [DISCONTINUED] sodium chloride 0 9 % inhalation solution 3 mL, 3 mL, Nebulization, Q6H PRN, FATIMAH Healy    levalbuterol (XOPENEX) inhalation solution 1 25 mg, 1 25 mg, Nebulization, Q2H PRN **AND** sodium chloride 0 9 % inhalation solution 3 mL, 3 mL, Nebulization, Q2H PRN, Vero Daley MD    levothyroxine tablet 125 mcg, 125 mcg, Oral, Daily, FATIMAH Flynn, 125 mcg at 07/16/18 0522    Linaclotide CAPS 72 mcg, 72 mcg, Oral, Daily Before Breakfast, FATIMAH Orosco    meropenem (MERREM) 1,000 mg in sodium chloride 0 9 % 100 mL IVPB, 1,000 mg, Intravenous, Q12H, Gina Gutierrez PA-C, Last Rate: 100 mL/hr at 07/16/18 1159, 1,000 mg at 07/16/18 1159    methylPREDNISolone sodium succinate (Solu-MEDROL) injection 20 mg, 20 mg, Intravenous, Q8H [address], Vero Daley MD, 20 mg at 07/16/18 0522    metoclopramide (REGLAN) injection 10 mg, 10 mg, Intravenous, Once, FATIMAH Healy    metolazone (ZAROXOLYN) tablet 2 5 mg, 2 5 mg, Oral, Once per day on Mon Wed Fri, Vero Daley MD, 2 5 mg at 07/16/18 0577    metoprolol tartrate (LOPRESSOR) tablet 25 mg, 25 mg, Oral, BID, FATIMAH Orosco, 25 mg at 07/16/18 0852    ondansetron (ZOFRAN-ODT) dispersible tablet 4 mg, 4 mg, Oral, Q6H PRN, FATIMAH Orosco, 4 mg at 07/14/18 1739    polyethylene glycol (MIRALAX) packet 17 g, 17 g, Oral, Daily, FATIMAH Orosco, 17 g at 07/16/18 0851    pramipexole (MIRAPEX) tablet 1 mg, 1 mg, Oral, TID, FATIMAH Orosco, 1 mg at 07/16/18 0851    senna (SENOKOT) tablet 8 6 mg, 1 tablet, Oral, HS, FATIMAH Orosco, 8 6 mg at 07/15/18 2151    torsemide (DEMADEX) tablet 40 mg, 40 mg, Oral, Once per day on Mon Wed Fri, Vero Daley MD, 40 mg at 07/16/18 7487    traMADol (ULTRAM) tablet 50 mg, 50 mg, Oral, Q6H PRN, FATIMAH Leggett, 50 mg at 07/16/18 6001    venlafaxine [facility]) tablet 75 mg, 75 mg, Oral, Daily, Khushboo Villatoro Kayy, FATIMAH, 75 mg at 18  Allergies   Allergen Reactions    Ketorolac Swelling     Toradol    Latex Rash    Wound Dressing Adhesive Rash     all current active meds have been reviewed    all current active meds have been reviewed    Physical Exam: Physical Exam   Constitutional: She appears well-developed and well-nourished  HENT:   Head: Normocephalic and atraumatic  Eyes: Pupils are equal, round, and reactive to light  No scleral icterus  Neck: Neck supple  Cardiovascular: Normal heart sounds  Pulmonary/Chest:   Kyphosis  Poor inspiratory effort  Less wheezing   Abdominal: Soft  Musculoskeletal: She exhibits no deformity  Neurological: She is alert  Skin: Skin is warm  Psychiatric: She has a normal mood and affect  Nursing note and vitals reviewed          Temp (24hrs), Av 9 °F (36 6 °C), Min:97 7 °F (36 5 °C), Max:98 °F (36 7 °C)  Current: Temperature: 98 °F (36 7 °C)    Invasive Devices     Peripheral Intravenous Line            Peripheral IV 18 Left Antecubital 2 days    Peripheral IV 18 Right Antecubital 2 days                            Lab, Imaging and other studies:      CBC: Lab Results   Component Value Date    WBC 6 20 2018    WBC 9 1 2017    RBC 3 80 (L) 2018    RBC 4 45 2016       Results from last 7 days  Lab Units 18  0609 07/15/18  0545 18  0529 18  1615   PLATELETS Thousands/uL 169 168 178 219     CMP: Lab Results   Component Value Date     2018     (H) 2017    CL 94 (L) 2018    CL 96 2017    CO2 42 (H) 2018    CO2 35 (H) 2017    ANIONGAP 0 (L) 2018    ANIONGAP 10 8 2016    BUN 19 2018    BUN 14 2018    CREATININE 0 90 2018    CREATININE 0 78 2017    GLUCOSE 438 (H) 2018    GLUCOSE 144 (H) 2017    CALCIUM 8 3 2018    CALCIUM 9 1 2017    AST 25 07/13/2018    AST 18 08/01/2017    ALT 31 07/13/2018    ALT 26 08/01/2017    ALKPHOS 88 07/13/2018    ALKPHOS 70 08/01/2017    PROT 7 7 07/13/2018    PROT 6 9 08/01/2017    BILITOT 0 50 07/13/2018    BILITOT 0 2 08/01/2017    EGFR 61 07/16/2018       Urinalysis: Lab Results   Component Value Date    COLORU Yellow 02/22/2018    COLORU Yellow 07/12/2016    CLARITYU Clear 02/22/2018    CLARITYU Transparent 07/12/2016    SPECGRAV 1 015 02/22/2018    SPECGRAV 1 025 07/12/2016    PHUR 5 5 02/22/2018    PHUR 6 0 07/12/2016    LEUKOCYTESUR Negative 02/22/2018    LEUKOCYTESUR Negative 07/12/2016    NITRITE Negative 02/22/2018    NITRITE Negative 07/12/2016    PROTEINUA Negative 02/22/2018    PROTEINUA Negative 07/12/2016    GLUCOSEU 500 (1/2%) (A) 02/22/2018    KETONESU Negative 02/22/2018    KETONESU Negative 07/12/2016    BILIRUBINUR Negative 02/22/2018    BILIRUBINUR Negative 07/12/2016    BLOODU Negative 02/22/2018    BLOODU Negative 07/12/2016       Lactic Acid:   Lab Results   Component Value Date    LACTICACID 1 6 07/14/2018        Cultures    Results from last 7 days  Lab Units 07/14/18  1213 07/13/18  2205 07/13/18  1617   BLOOD CULTURE   --   --  No Growth at 48 hrs  No Growth at 48 hrs  URINE CULTURE  No Growth <1000 cfu/mL  --   --    MRSA CULTURE ONLY   --  No Methicillin Resistant Staphlyococcus aureus (MRSA) isolated  --        I have personally reviewed pertinent reports  Principal Problem:    Severe sepsis ([facility] [address] )  Active Problems:     Morbid obesity with BMI of 40 0-44 9, adult (HCC)    Obesity hypoventilation syndrome (HCC)    HTN (hypertension)    Diabetes mellitus with neuropathy (HCC)    Moderate episode of recurrent major depressive disorder (HCC)    Chronic obstructive pulmonary disease with acute exacerbation (HCC)    Diastolic CHF (HCC)    Chronic venous stasis dermatitis of both lower extremities    Physical deconditioning    Acute on chronic respiratory failure with hypoxia and hypercapnia ([facility] )

## 2018-07-16 NOTE — ASSESSMENT & PLAN NOTE
Source unclear, possibly UTI, less likely early PNA  Hx of ESBL positive klebsiella bacteremia June 2017  CXR- No acute cardiopulmonary disease   On presentation and on repeated chest x-ray on 07/17  Urine culture with no growth, but was collected after initiation of antibiotics  Blood cx no growth   ID consulted, input appreciated  Treated with IV meropenem day 5, completing today  Procalcitonin less than 0 07

## 2018-07-16 NOTE — ASSESSMENT & PLAN NOTE
Overwhelmed due to her physical condition and multiple medical problems  Counseled  Seen by Psychiatry recommended to continue Lexapro and Ativan as needed    Venlafaxine was discontinued  Follow-up Psychiatry as outpatient

## 2018-07-16 NOTE — ASSESSMENT & PLAN NOTE
Likely secondary to severe sepsis with underlying obesity hypoventilation and COPD,? Poor compliance with meds   Presented with fevers, SOB, hypoxia sating 64% on RA, requiring BIPAP    Normally on 2L O2 supplemental oxygen  Admitted to the stepdown, Weaned off BIPAP and transferred to [facility]  Oxygenation is improving  Continue supplemental oxygen  Now at baseline

## 2018-07-16 NOTE — CASE MANAGEMENT
Continued Stay Review    Date: 7/15/18    Vital Signs: /63 (BP Location: Right arm)   Pulse 69   Temp 98 °F (36 7 °C) (Oral)   Resp 18   Ht 5' 2" (1 575 m)   Wt 97 kg (213 lb 13 5 oz)   LMP  (LMP Unknown)   SpO2 94%   BMI 39 11 kg/m²     GMF  OXYGEN   BIPAP HS   CBC BMP   Medications:   Scheduled Meds:   Current Facility-Administered Medications:  aspirin 325 mg Oral Daily FATIMAH Orosco    docusate sodium 100 mg Oral BID FATIMAH Orosco    erythromycin 0 5 inch Both Eyes Q12H 3630 Stacey Walls MD    escitalopram 10 mg Oral Daily FATIMAH Orosco    folic acid 1 mg Oral Daily FATIMAH Orosco    gabapentin 100 mg Oral TID Pipo Bird MD    heparin (porcine) 5,000 Units Subcutaneous Q8H [address] FATIMAH Orosco    insulin lispro 10 Units Subcutaneous TID With Meals Pipo Bird MD    insulin lispro 4-20 Units Subcutaneous TID With Meals Pipo Bird MD    ipratropium 0 5 mg Nebulization 4x Daily Pipo Bird MD    levalbuterol 1 25 mg Nebulization 4x Daily FATIMAH Healy    levalbuterol 1 25 mg Nebulization Q2H PRN Pipo Bird MD    And        sodium chloride 3 mL Nebulization Q2H PRN Pipo Bird MD    levothyroxine 125 mcg Oral Daily FATIMAH Benz    Linaclotide 72 mcg Oral Daily Before Breakfast FATIMAH Benz    meropenem 1,000 mg Intravenous Q12H Gina Gutierrez PA-C Last Rate: 1,000 mg (07/16/18 1159)   methylPREDNISolone sodium succinate 20 mg Intravenous Q8H 3630 Stacey Walls MD    metoclopramide 10 mg Intravenous Once FATIMAH Healy    metolazone 2 5 mg Oral Once per day on Mon Wed Fri Pipo Bird MD    metoprolol tartrate 25 mg Oral BID FATIMAH Orosco    ondansetron 4 mg Oral Q6H PRN FATIMAH Orosco    polyethylene glycol 17 g Oral Daily FATIMAH Orosco    pramipexole 1 mg Oral TID FATIMAH Orosco    senna 1 tablet Oral HS FATIMAH Benz torsemide 40 mg Oral Once per day on Mon Wed Fri Delia Gentile MD    traMADol 50 mg Oral Q6H PRN FATIMAH Raza    venlafaxine 75 mg Oral Daily FATIMAH Orosco      Continuous Infusions:    PRN Meds: levalbuterol **AND** sodium chloride    ondansetron    traMADol    Abnormal Labs/Diagnostic Results:     Age/Sex: 77 y o  female     ATTENDING  Acute on chronic respiratory failure with hypoxia and hypercapnia (HCC)   Assessment & Plan     · Patient presented with fevers, SOB, hypoxia sating 64% on RA, requiring BIPAP  · Normally on 2L O2 continuous  · Possible early PNA  · Admitted to the stepdown, Weaned off BIPAP and transferred to [facility]      Physical deconditioning   Assessment & Plan     · PT/OT to evalute      Chronic venous stasis dermatitis of both lower extremities   Assessment & Plan     · Cont wound care with adaptic, gauze and cling wrap      Diastolic CHF ([address] )   Assessment & Plan     · Diuretics were on hold, will restart MWF per her schedule  · Monitor I/O, daily weights      Chronic obstructive pulmonary disease with acute exacerbation (HCC)   Assessment & Plan     · Cont home inhalers, nebs  · Has diffuse wheezing, started on solumedrol 20mg q8hr and wean as tolerated      Moderate episode of recurrent major depressive disorder (HCC)   Assessment & Plan     · Cont lexapro/effexor      Diabetes mellitus with neuropathy    · Required insulin infusion in ICU  · Now on ISS, increase coverage because started steroids  HTN (hypertension)   Assessment & Plan     · Cont metoprolol  · Holding torsemide       Obesity hypoventilation syndrome (HCC)   Assessment & Plan     · BIPAP qHS   Anxiety   Assessment & Plan     · Cont lexapro, effexor      Constipation due to opioid therapy   Assessment & Plan     · Hold home linzess, senna, colace, miralax      Morbid obesity with BMI of 40 0-44 9, adult (HCC)   Assessment & Plan     · Cardiac/diabetic diet  · Nutrition consulted to follow   Acquired hypothyroidism   Assessment & Plan     · Cont synthroid   * Severe sepsis [facility])   Assessment & Plan     · Suspect 2/2 UTI vs early PNA  · Hx of ESBL positive klebsiella bacteremia June 2017  · CXR- No acute cardiopulmonary disease  + vascular congestion  · ID consulted  · On IV meropenem  · BCx, UCx pending     VTE Pharmacologic Prophylaxis:   Pharmacologic: Heparin  Mechanical VTE Prophylaxis in Place: Yes      PULMONARY  Acute on chronic hypercapnic hypoxemic respiratory failure has improved  Patient now is back to her baseline dose of 2 L of oxygen during the day and she will continue to try using BiPAP 12/6 40% oxygen at bedtime   Severe sepsis likely urinary origin  Urine culture was obtained after patient ready been started on antibiotic therapy  Acute kidney injury improved    Serum creatinine down to 0 94  Plan:  Continue methylprednisone 20 mg IV every 8 hours today and if patient hold stable can decrease dose tomorrow  Patient is on IV meropenem  Nebulizer treatments with levalbuterol 1 25 mg and Atrovent 0 5 mg 4 times a day  Patient is agreeable to continue to try to use BiPAP at nighttime for ventilatory support     ID  Assessment:  Creatinine improved  Urine culture in progress  MRSA Screen is negative  Chronic Bronchitis  Plan:  Observe on Meropenem

## 2018-07-16 NOTE — PROGRESS NOTES
Progress Note - Ashley Carroll 1940, 77 y o  female MRN: [id number]    Unit/Bed#: 2 [place] [address] Encounter: [id number]    Primary Care Provider: Pollo Brito MD   Date and time admitted to hospital: 7/13/2018  4:00 PM        * Severe sepsis [facility])   Assessment & Plan    · Source unclear, possible early PNA  · Hx of ESBL positive klebsiella bacteremia June 2017  · CXR- No acute cardiopulmonary disease  + vascular congestion  · Urine culture with no growth  · Blood cx no growth to day  · ID consulted  · On IV meropenem day 4        Acute on chronic respiratory failure with hypoxia and hypercapnia (HCC)   Assessment & Plan    · Patient presented with fevers, SOB, hypoxia sating 64% on RA, requiring BIPAP  · Normally on 2L O2 continuous  · Possible early PNA vs noncompliance with diuretic   · Admitted to the stepdown, Weaned off BIPAP and transferred to [facility]  · Currently back to baseline O2  Physical deconditioning   Assessment & Plan    · Per  she cannot take care of herself and he cant care for her either  · Does not take her diuretic because cant get to the bathroom fast enough  · PT/OT to evalute, family wants long term placement        Chronic venous stasis dermatitis of both lower extremities   Assessment & Plan    · Cont wound care with adaptic, gauze and cling wrap        Diastolic CHF ([address] )   Assessment & Plan    · Cont diuretic per MWF schedule  · Monitor I/O, daily weights        Chronic obstructive pulmonary disease with acute exacerbation (HCC)   Assessment & Plan    · Cont home inhalers, nebs  · Had diffuse wheezing, started on solumedrol 20mg q8hr and wean as tolerated           Moderate episode of recurrent major depressive disorder [facility])   Assessment & Plan    · Cont lexapro/effexor        Diabetes mellitus with neuropathy [facility])   Assessment & Plan    Lab Results   Component Value Date    HGBA1C 9 1% 04/27/2018       Recent Labs      07/14/18   2057  07/15/18   0743  07/15/18   1129 07/15/18   1637   POCGLU  214*  238*  302*  304*       Blood Sugar Average: Last 72 hrs:  (P) 924 [id number]     · Required insulin infusion in ICU  · Now on ISS, increase coverage because started steroids        HTN (hypertension)   Assessment & Plan    · Cont metoprolol        Obesity hypoventilation syndrome (HCC)   Assessment & Plan    · BIPAP qHS        Anxiety   Assessment & Plan    · Cont lexapro, effexor        Morbid obesity with BMI of 40 0-44 9, adult ([facility] [address] )   Assessment & Plan    · Cardiac/diabetic diet  · Nutrition consulted to follow        Acquired hypothyroidism   Assessment & Plan    · Cont synthroid              VTE Pharmacologic Prophylaxis:   Pharmacologic: Heparin  Mechanical VTE Prophylaxis in Place: Yes    Patient Centered Rounds: I have performed bedside rounds with nursing staff today  Discussions with Specialists or Other Care Team Provider: Yes    Education and Discussions with Family / Patient:Yes    Time Spent for Care: 45 minutes  More than 50% of total time spent on counseling and coordination of care as described above  Current Length of Stay: 3 day(s)    Current Patient Status: Inpatient     Discharge Plan:  Pending    Code Status: Level 3 - DNAR and DNI      Subjective:   SOB improving, feels close to baseline again   at bedside and doesn't want her going back home because cant care for herself and he cant care for her  She agrees she need higher level of care, wants SNF      Objective:       Vitals:   Temp (24hrs), Av 9 °F (36 6 °C), Min:97 7 °F (36 5 °C), Max:98 °F (36 7 °C)    HR:  [69-78] 69  Resp:  [16-23] 18  BP: (125-137)/(62-78) 137/63  SpO2:  [94 %-98 %] 94 %  Body mass index is 39 11 kg/m²  Input and Output Summary (last 24 hours):        Intake/Output Summary (Last 24 hours) at 18 0938  Last data filed at 18 0601   Gross per 24 hour   Intake                0 ml   Output             1320 ml   Net            -1320 ml       Physical Exam:     Physical Exam   Constitutional: She is oriented to person, place, and time  She appears well-developed  No distress  Chronically ill-appearing   HENT:   Head: Normocephalic and atraumatic  Cardiovascular: Normal rate, regular rhythm and normal heart sounds  Pulmonary/Chest: Effort normal  No respiratory distress  She has wheezes (Diffuse expiratory)  She has no rales  Abdominal: Soft  Bowel sounds are normal  She exhibits no distension  There is no tenderness  There is no rebound and no guarding  Musculoskeletal: She exhibits edema  She exhibits no tenderness or deformity  Neurological: She is alert and oriented to person, place, and time  Skin: Skin is warm and dry  Psychiatric: She has a normal mood and affect  Her behavior is normal    Nursing note and vitals reviewed  Additional Data:     Labs:      Results from last 7 days  Lab Units 07/16/18  0609 07/15/18  0545   WBC Thousand/uL 6 20 6 10   HEMOGLOBIN g/dL 11 0* 11 2*   HEMATOCRIT % 37 1 37 5   PLATELETS Thousands/uL 169 168   NEUTROS PCT %  --  63   LYMPHS PCT %  --  26   MONOS PCT %  --  11   EOS PCT %  --  0       Results from last 7 days  Lab Units 07/16/18  0609  07/13/18  1641   SODIUM mmol/L 136  < > 130*   POTASSIUM mmol/L 4 4  < > 3 1*   CHLORIDE mmol/L 94*  < > 87*   CO2 mmol/L 42*  < > 40*   BUN mg/dL 19  < > 36*   CREATININE mg/dL 0 90  < > 1 71*   CALCIUM mg/dL 8 3  < > 8 4   TOTAL PROTEIN g/dL  --   --  7 7   BILIRUBIN TOTAL mg/dL  --   --  0 50   ALK PHOS U/L  --   --  88   ALT U/L  --   --  31   AST U/L  --   --  25   GLUCOSE RANDOM mg/dL 438*  < > 482*   < > = values in this interval not displayed  Results from last 7 days  Lab Units 07/13/18  1617   INR  1 37*       * I Have Reviewed All Lab Data Listed Above  * Additional Pertinent Lab Tests Reviewed: All Labs Within Last 24 Hours Reviewed    Imaging:  Xr Chest 1 View Portable    Result Date: 7/13/2018  Narrative: CHEST INDICATION:   sepsis    "SOB" COMPARISON:  AP chest 2/22/2018 EXAM PERFORMED/VIEWS:  XR CHEST PORTABLE FINDINGS: Cardiomediastinal silhouette appears unremarkable  The lungs are clear  No pneumothorax or pleural effusion  Osseous structures appear within normal limits for patient age  Impression: No acute cardiopulmonary disease  Workstation performed: [id number]     Vas Reflux Lower Limb Venous Duplex Study With Reflux Assessment, Complete Bilateral    Result Date: 7/12/2018  Narrative:  [facility] REPORT CLINICAL: Indications: Patient presents with history of Bilateral lower extremity varicose veins and ulcerations on ankles  Operative History: No history of cardiovascular surgery  FINDINGS:  Segment         Right   Left                            AP(mm)  AP(mm)  GSV Inguinal      11 7    13 8  GSV Mid Thigh      5 4     6 4  GSV Dist Thigh             3 8  GSV Knee           5 3     3 1  SSV Mid Calf               3 1     CONCLUSION:  Impression: RIGHT LIMB: No evidence of deep venous incompetence  The great saphenous vein is competent  The great saphenous vein remains within the saphenous compartment in the thigh  The small saphenous vein is competent and communicates with the popliteal vein  There is no evidence of incompetent perforators in the thigh or calf  There is no evidence of deep vein thrombosis in the CFV, the proximal PFV, the femoral vein and the popliteal vein  LEFT LIMB: No evidence of deep venous incompetence  The great saphenous vein is competent  The great saphenous vein remains within the saphenous compartment in the thigh  The small saphenous vein is competent and communicates with the popliteal vein  There is no evidence of incompetent perforators in the thigh or calf  There is no evidence of deep vein thrombosis in the CFV, the proximal PFV, the femoral vein and the popliteal vein  Study performed with patient in standing and steep Reverse Trendelenburg   Tech note: Despite large diameter veins unable to provoke reflux by augmentation and valsalva maneuvers secondary to pulsatile venous flow  Bandaging over wounds was not removed, therefore the bilateral ankles and distal calfs were not scanned  SIGNATURE: Electronically Signed by: Adelita Au MD on 2018-07-12 08:04:12 PM    Imaging Reports Reviewed by myself    Cultures:   Blood Culture:   Lab Results   Component Value Date    BLOODCX No Growth at 48 hrs  07/13/2018    BLOODCX No Growth at 48 hrs  07/13/2018    BLOODCX No Growth After 5 Days  02/22/2018    BLOODCX No Growth After 5 Days  02/22/2018    BLOODCX No Growth After 5 Days  01/19/2018    BLOODCX No Growth After 5 Days  01/19/2018    BLOODCX No Growth After 5 Days  06/11/2017    BLOODCX No Growth After 5 Days   06/11/2017     Urine Culture:   Lab Results   Component Value Date    URINECX No Growth <1000 cfu/mL 07/14/2018    URINECX 20,000-29,000 cfu/ml Mixed Contaminants X3 04/15/2017     Sputum Culture: No components found for: SPUTUMCX  Wound Culture: No results found for: WOUNDCULT    Last 24 Hours Medication List:     Current Facility-Administered Medications:  aspirin 325 mg Oral Daily FATIMAH Orosco    docusate sodium 100 mg Oral BID FATIMAH Orosco    erythromycin 0 5 inch Both Eyes Q12H [facility] [facility], [facility]    escitalopram 10 mg Oral Daily FATIMAH Orosco    folic acid 1 mg Oral Daily FATIMAH Orosco    gabapentin 100 mg Oral TID Ana M Nesbitt MD    heparin (porcine) 5,000 Units Subcutaneous Q8H [facility] FATIMAH Orosco    insulin lispro 10 Units Subcutaneous TID With Meals Ana M Nesbitt MD    insulin lispro 4-20 Units Subcutaneous TID With Meals Ana M Nesbitt MD    ipratropium 0 5 mg Nebulization 4x Daily Ana M Nesbitt MD    levalbuterol 1 25 mg Nebulization 4x Daily FATIMAH Healy    levalbuterol 1 25 mg Nebulization Q2H PRN Ana M Nesbitt MD    And        sodium chloride 3 mL Nebulization Q2H PRN Ana M Nesbitt MD    levothyroxine 125 mcg Oral Daily FATIMAH Benz    Linaclotide 72 mcg Oral Daily Before Breakfast FATIMAH Benz    meropenem 1,000 mg Intravenous Q12H Guera James PA-C Last Rate: 1,000 mg (07/15/18 2220)   methylPREDNISolone sodium succinate 20 mg Intravenous Q8H 3630 Stacey Walls MD    metoclopramide 10 mg Intravenous Once FATIMAH Healy    metolazone 2 5 mg Oral Once per day on Mon Wed Fri Germania Remi Da Silva MD    metoprolol tartrate 25 mg Oral BID FATIMAH Orosco    ondansetron 4 mg Oral Q6H PRN Carmelina FATIMAH Garvey    polyethylene glycol 17 g Oral Daily FATIMAH Orosco    pramipexole 1 mg Oral TID FATIMAH Orosco    senna 1 tablet Oral HS FATIMAH Orosco    torsemide 40 mg Oral Once per day on Mon Wed Fri Pipo Bird MD    traMADol 50 mg Oral Q6H PRN FATIMAH Wong    venlafaxine 75 mg Oral Daily FATIMAH Benz         Today, Patient Was Seen By: Pipo Bird MD    ** Please Note: Dragon 360 Dictation voice to text software may have been used in the creation of this document   **

## 2018-07-16 NOTE — OCCUPATIONAL THERAPY NOTE
OT EVALUATION     07/16/18 1125   Note Type   Note type Eval only   Restrictions/Precautions   Other Precautions Contact/isolation; Chair Alarm; Bed Alarm; Impulsive; Fall Risk;O2;Pain   Pain Assessment   Pain Assessment 0-10   Pain Score 3   Pain Type Acute pain   Pain Location Generalized   Hospital Pain Intervention(s) Repositioned;Distraction   Home Living   Type of [address] [place] [address] One level  (1 PETEY w/out rail)   Bathroom Shower/Tub Walk-in shower   Bathroom Toilet Standard   Bathroom Equipment Grab bars in shower;Hand-held shower; Shower chair;Commode   Bathroom Accessibility Accessible via walker   [address] [place] [address],[address]; Wheelchair-manual  (rollator, 2L O2 @ night)   Additional Comments patient using roller walker prior to admission   Prior Function   Level of [place] Independent with ADLs and functional mobility; Needs assistance with IADLs   Lives With Spouse   Receives Help From Family   ADL Assistance Independent   IADLs Needs assistance   Falls in the last 6 months 1 to 4   Comments patient independent with ADLs and assists with cooking/cleaning at times but with limited endurance and "falling asleep while standing in kitchen at times"   Psychosocial   Psychosocial (WDL) WDL   Subjective   Subjective "I guess I didnt sleep well last night   I can't stay awake "   ADL   Where Assessed Edge of bed   Eating Assistance 4  Minimal Assistance   Grooming Assistance 4  Minimal Assistance   UB Bathing Assistance 3  Moderate Assistance   LB Bathing Assistance 3  Moderate Assistance   UB Dressing Assistance 3  Moderate Assistance   LB Dressing Assistance 2  Maximal Assistance   Toileting Assistance  3  Moderate Assistance   Bed Mobility   Supine to Sit 4  Minimal assistance   Additional items Assist x 1;Verbal cues;LE management   Sit to Supine 4  Minimal assistance   Additional items Assist x 1;Verbal cues;LE management   Transfers   Sit to Stand 3  Moderate assistance   Additional items Assist x 1;Impulsive;Verbal cues   Stand to Sit 4  Minimal assistance   Additional items Assist x 1;Verbal cues   Stand pivot 4  Minimal assistance   Additional items Assist x 1; Impulsive;Verbal cues   Balance   Static Sitting Fair   Dynamic Sitting Fair -   Static Standing Fair -   Dynamic Standing Poor   Ambulatory Poor   Activity Tolerance   Activity Tolerance Patient limited by fatigue;Patient limited by pain  (Limited by lethargy)   Nurse Made Aware yes   RUE Assessment   RUE Assessment WFL  (gross strength 3+/5)   LUE Assessment   LUE Assessment WFL  (gross strength 3+/5)   Hand Function   Gross Motor Coordination Functional   Fine Motor Coordination Functional   Vision-Basic Assessment   Current Vision Wears glasses only for reading   Cognition   Overall Cognitive Status Impaired   Arousal/Participation Lethargic   Attention Attends with cues to redirect   Orientation Level Oriented to person;Oriented to place; Disoriented to time;Disoriented to situation   Memory Decreased short term memory;Decreased recall of recent events   Following Commands Follows multistep commands with increased time or repetition   Comments lethargic, difficulty keeping eyes open, impulsive   Assessment   Limitation Decreased ADL status; Decreased UE strength;Decreased Safe judgement during ADL;Decreased cognition;Decreased endurance;Decreased self-care trans;Decreased high-level ADLs  (decreased balance)   Prognosis Good   Assessment Patient evaluated by Occupational Therapy  Patient admitted with Severe sepsis ([facility] [address] )  The patients occupational profile, medical and therapy history includes a extensive additional review of physical, cognitive, or psychosocial history related to current functional performance    Comorbidities affecting functional mobility and ADLS include:DM, Diastolic CHF, Asthma, Depression, Anxiety, HTN, Hypothyroidism and HLD, influenza, UTI, pain, gait dysfunction, arthritis, RLS, COPD, obesity alveolar hypoventilation syndrome with chronic hypercapnia hypoxemia  Prior to admission, patient was independent with functional mobility with RW, independent with ADLS, requiring assist for IADLS, living with  in a 1 level home with 1 steps to enter and ambulating household distance  The evaluation identifies the following performance deficits: weakness, impaired balance, decreased endurance, increased fall risk, new onset of impairment of functional mobility, decreased ADLS, decreased IADLS, pain, decreased activity tolerance, decreased safety awareness, SOB upon exertion, decreased cognition and decreased strength, that result in activity limitations and/or participation restrictions  This evaluation requires clinical decision making of high complexity, because the patient presents with comorbidites that affect occupational performance and required significant modification of tasks or assistance with consideration of multiple treatment options  The Barthel Index was used as a functional outcome tool presenting with a score of 35, indicating marked limitations of functional mobility and ADLS  Patient will benefit from skilled Occupational Therapy services to address above deficits and facilitate a safe return to prior level of function  Goals   Patient Goals "I know I need rehab "   STG Time Frame (1-7)   Short Term Goal #1 Patient will increase standing tolerance to 3 minutes during ADL task to decrease assistance level and decrease fall risk; Patient will increase bed mobility to supervision in preparation for ADLS and transfers;  Patient will increase functional mobility to and from bathroom with rolling walker with min assist to increase performance with ADLS and to use a toilet; Patient will tolerate 10 minutes of UE ROM/strengthening to increase general activity tolerance and performance in ADLS/IADLS; Patient will improve functional activity tolerance to 10 minutes of sustained functional tasks to increase participation in basic self-care and decrease assistance level;  Patient will be able to to verbalize understanding and perform energy conservation/proper body mechanics during ADLS and functional mobility at least 75% of the time with minimal cueing to decrease signs of fatigue and increase stamina to return to prior level of function  LTG Time Frame (8-14)   Long Term Goal #1 Patient will increase standing tolerance to 5 minutes during ADL task to decrease assistance level and decrease fall risk; Patient will increase bed mobility to independent in preparation for ADLS and transfers; Patient will increase functional mobility to and from bathroom with rolling walker independently to increase performance with ADLS and to use a toilet; Patient will tolerate 15 minutes of UE ROM/strengthening to increase general activity tolerance and performance in ADLS/IADLS; Patient will improve functional activity tolerance to 15 minutes of sustained functional tasks to increase participation in basic self-care and decrease assistance level;  Patient will be able to to verbalize understanding and perform energy conservation/proper body mechanics during ADLS and functional mobility at least 90% of the time with nocueing to decrease signs of fatigue and increase stamina to return to prior level of function  Functional Transfer Goals   Pt Will Transfer To Bedside Commode With min assist;With assistive device; With good judgment/safety  (LTG- Independent with AD)   ADL Goals   Pt Will Perform Grooming In chair; With stand by assist  (LTG - Independent)   Pt Will Perform Bathing In shower/tub seat; With min assist;With good judgment/safety  (LTG - Supervision with AE)   Pt Will Perform LE Dressing In chair; With mod assist;With adaptive equipment  (LT- [place])   Pt Will Perform Toileting With min assist  (LTG - Independent)   Plan   Treatment Interventions ADL retraining;Functional transfer training;UE strengthening/ROM; Endurance training;Cognitive reorientation;Patient/family training;Equipment evaluation/education; Compensatory technique education;Cardiac education; Energy conservation   Goal Expiration Date 07/30/18   Treatment Day 0   OT Frequency 3-5x/wk   Barthel Index   Feeding 5   Bathing 0   Grooming Score 0   Dressing Score 5   Bladder Score 5   Bowels Score 5   Toilet Use Score 5   Transfers (Bed/Chair) Score 10   Mobility (Level Surface) Score 0   Stairs Score 0   Barthel Index Score 35   Licensure   [place] License Number  Daniih Irlanda Houser [address], OTR/L, [place] Lic# [id number]

## 2018-07-16 NOTE — PHYSICAL THERAPY NOTE
PT EVALUATION   07/16/18 1020   Note Type   Note type Eval/Treat   Pain Assessment   Pain Assessment 0-10   Pain Score 3  ("all over" type pain)   Home Living   Type of [address] [facility] One level;Stairs to enter without rails  (one step to enter in doorway)   [address] [facility]; Wheelchair-manual  (lift chair, patient sleeps in )   Additional Comments patient using roller walker prior to admission   Prior Function   Level of [place] Independent with ADLs and functional mobility   Lives With Spouse   Receives Help From Family   ADL Assistance Independent   IADLs Needs assistance   Comments patient and significant other report patient independent with ADLs and assists with cooking/cleaning at times but with limited endurance and "falling asleep while standing in kitchen at times"   Restrictions/Precautions   Other Precautions Chair Alarm; Bed Alarm;Cognitive; Fall Risk;Pain   General   Additional Pertinent History chart reviewed, patient admitted with SOB, uncontrolled DM, hypoxia  Patient now presents as confused at times and offering inconsistent information concerning function at times  Spouse states that patient wishes to care for self but with limitations prior to admission  Family/Caregiver Present Yes   Cognition   Overall Cognitive Status Impaired   Arousal/Participation Cooperative   Orientation Level Oriented to person;Disoriented to place; Disoriented to time;Disoriented to situation   Following Commands Follows multistep commands with increased time or repetition   Comments patient easily distracted at times and requires verbal cuing to stay on task, patient aslo with limited safety awareness at times   RLE Assessment   RLE Assessment (ROM WFL, strength 3+/5)   LLE Assessment   LLE Assessment (ROM WFL, strength 3+/5)   Coordination   Movements are Fluid and Coordinated 0   Bed Mobility   Supine to Sit 4  Minimal assistance   Additional items Assist x 1;Verbal cues;LE management   Sit to Supine 4  Minimal assistance   Additional items Assist x 1;Verbal cues;LE management   Transfers   Sit to Stand 4  Minimal assistance   Additional items Assist x 1; Impulsive;Verbal cues   Stand to Sit 4  Minimal assistance   Additional items Assist x 1;Verbal cues   Ambulation/Elevation   Gait Assistance 4  Minimal assist   Additional items Assist x 1;Verbal cues; Tactile cues   Assistive Device Rolling walker   Distance 10 feet from bed to commode x1, 15x1, unsteady gait and verbal cuing required for proper walker usage    Balance   Static Sitting Fair   Dynamic Sitting Fair -   Static Standing Fair -   Dynamic Standing Poor   Ambulatory Poor   Activity Tolerance   Activity Tolerance Patient limited by fatigue;Patient limited by pain  (limited by cognition)   Nurse Made Aware yes   Assessment   Prognosis Good   Problem List Decreased strength;Decreased range of motion;Decreased endurance; Impaired balance;Decreased coordination;Decreased mobility; Decreased cognition; Impaired judgement;Decreased safety awareness;Pain   Assessment Patient seen for Physical Therapy evaluation  Patient admitted with Severe sepsis ([facility] [address] )  Comorbidities affecting patient's physical performance include:UTI, pain, gait dysfunction, arthritis, RLS, anxiety, asthma, CHF,COPD, DM  Personal factors affecting patient at time of initial evaluation include: ambulating with assistive device, stairs to enter home, communication issues, inability to ambulate household distances, inability to navigate community distances, inability to navigate level surfaces without external assistance, inability to perform dynamic tasks in community, decreased cognition, limited home support, positive fall history, inability to perform physical activity, inability to perform ADLS and inability to perform IADLS    Prior to admission, patient was independent with functional mobility with walker, independent with ADLS, requiring assist for IADLS, ambulating household distance and home with family assist   Please find objective findings from Physical Therapy assessment regarding body systems outlined above with impairments and limitations including weakness, decreased ROM, impaired balance, decreased endurance, impaired coordination, gait deviations, pain, decreased activity tolerance, decreased functional mobility tolerance, decreased safety awareness, impaired judgement, fall risk and decreased cognition  The Barthel Index was used as a functional outcome tool presenting with a score of 35 today indicating marked limitations of functional mobility and ADLS  Patient's clinical presentation is currently unstable/unpredictable as seen in patient's presentation of vital sign response, changing level of pain, varying levels of cognitive performance, increased fall risk, new onset of impairment of functional mobility, decreased endurance and new onset of weakness  Pt would benefit from continued Physical Therapy treatment to address deficits as defined above and maximize level of functional mobility  As demonstrated by objective findings, the assigned level of complexity for this evaluation is high  Goals   Patient Goals get stronger and go home   STG Expiration Date 07/23/18   Short Term Goal #1 transfers and gait with roller walker with supervision   Short Term Goal #2 gait endurance to 80 feet, strength BLEs 3+/4-   LTG Expiration Date 07/30/18   Long Term Goal #1 transfers and gait with roller walker independently   Long Term Goal #2 gait endurance to functional household distances, strength BLEs 4-/5 all to meet patient's goal of improved strength and returning home   Treatment Day 1   Plan   Treatment/Interventions ADL retraining;Functional transfer training;LE strengthening/ROM; Therapeutic exercise; Endurance training;Cognitive reorientation;Patient/family training;Equipment eval/education; Bed mobility;Gait training; Compensatory technique education   PT Frequency 5x/wk Recommendation   Recommendation (STR)   Barthel Index   Feeding 5   Bathing 0   Grooming Score 0   Dressing Score 5   Bladder Score 5   Bowels Score 5   Toilet Use Score 5   Transfers (Bed/Chair) Score 10   Mobility (Level Surface) Score 0   Stairs Score 0   Barthel Index Score 28   Licensure   [place] License Number  Jorje Hogan PT [id number]       PT TREATMENT  Time SN:4115  Time Out:1020  Total Time: 25min      S:  Patient confused at times and with "pain all over", 3/10   O:  -sit to and from stand with min assist of 1  -gait with roller walker with min assist of 1, endurance 20 feet with change in direction and shuffling type gait patterning  -toileting due to incontinence completed with max assist of 1 required for hygiene  -exercise completed: sitting, LAQs, ankle pumps, hip flexion all 10 reps sitting on bed edge  A:  Patient cooperative but confused and poor historian, unable to complete toileting hygiene with out assist and also requiring assist for transfers and gait  Patient will benefit from continued PT with progression as tolerated     P:  AZIZA recommendation: Ephraim Barragan [place] [id number]

## 2018-07-16 NOTE — SOCIAL WORK
Left vm this morning for [facility] about appropriate bed for pt  Was told she was ready for d/c  Sent message via blinkbox also  No response  Called to [facility] [address]  Liaison for the facility, awaited call back  Rec'd message that pt could be accepted tomorrow  Will follow

## 2018-07-16 NOTE — ASSESSMENT & PLAN NOTE
Lab Results   Component Value Date    HGBA1C 9 1% 04/27/2018       Recent Labs      07/17/18   1554  07/17/18   2121  07/18/18   0738  07/18/18   1203   POCGLU  242*  254*  199*  397*       Blood Sugar Average: Last 72 hrs:  (P) 323 4     · Required insulin infusion in ICU  · Remained with significant hyperglycemia on sliding scale and pre meal insulin on IV steroids  · Added basal insulin with better control  · Continue pre meal insulin and corrective scale  · Monitor and adjust as needed

## 2018-07-16 NOTE — CASE MANAGEMENT
Continued Stay Review    Date: 7/14/18    Vital Signs: /63 (BP Location: Right arm)   Pulse 69   Temp 98 °F (36 7 °C) (Oral)   Resp 18   Ht 5' 2" (1 575 m)   Wt 97 kg (213 lb 13 5 oz)   LMP  (LMP Unknown)   SpO2 94%   BMI 39 11 kg/m²     ICU  URINE CX  SPUTUM GSCS  ECHO  IV MAG SULFATE 2GM  IV KCL 40 MEQ   CONSULT ID  TROPONIN BMP LACTIC ACID BMP  IV MEROPENEM 1000  ECHO  IV REGLAN  TRANSFER >GMF  Medications:   Scheduled Meds:   Current Facility-Administered Medications:  aspirin 325 mg Oral Daily FATIMAH Orosco    docusate sodium 100 mg Oral BID FATIMAH Orosco    erythromycin 0 5 inch Both Eyes Q12H 3630 Stacey Walls MD    escitalopram 10 mg Oral Daily FATIMAH Orosco    folic acid 1 mg Oral Daily FATIMAH Orosco    gabapentin 100 mg Oral TID Ralph Cabrera MD    heparin (porcine) 5,000 Units Subcutaneous Q8H [address] FATIMAH Orosco    insulin lispro 10 Units Subcutaneous TID With Meals Ralph Cabrera MD    insulin lispro 4-20 Units Subcutaneous TID With Meals Ralph Cabrera MD    ipratropium 0 5 mg Nebulization 4x Daily Ralph Cabrera MD    levalbuterol 1 25 mg Nebulization 4x Daily FATIMAH Healy    levalbuterol 1 25 mg Nebulization Q2H PRN Ralph Cabrera MD    And        sodium chloride 3 mL Nebulization Q2H PRN Ralph Cabrera MD    levothyroxine 125 mcg Oral Daily FATIMAH Perez    Linaclotide 72 mcg Oral Daily Before Breakfast FATIMAH Perez    meropenem 1,000 mg Intravenous Q12H Gina Gutierrez PA-C Last Rate: 1,000 mg (07/15/18 2220)   methylPREDNISolone sodium succinate 20 mg Intravenous Q8H 3630 Stacey Walls MD    metoclopramide 10 mg Intravenous Once FATIMAH Healy    metolazone 2 5 mg Oral Once per day on Mon Wed Fri Ralph Cabrera MD    metoprolol tartrate 25 mg Oral BID FATIMAH Orosco    ondansetron 4 mg Oral Q6H PRN Heather Sandoval-FATIMAH Costa    polyethylene glycol 17 g Oral Daily Emerita Crowder FATIMAH    pramipexole 1 mg Oral TID FATIMAH Orosco    senna 1 tablet Oral HS FATIMAH Orosco    torsemide 40 mg Oral Once per day on Mon Wed Fri Franco Jovel MD    traMADol 50 mg Oral Q6H PRN FATIMAH Rodriguez    venlafaxine 75 mg Oral Daily FATIMAH Orosco      Continuous Infusions:    PRN Meds: levalbuterol **AND** sodium chloride    ondansetron    traMADol    Abnormal Labs/Diagnostic Results: K 3 4 CL 97 CO2 39 ANION GAP 3 BUN/CR 34/1 39 GLUCOSE 171 CA 7 5   ECHO EF 55-60%  Age/Sex: 77 y o  female     ICU  * Severe sepsis ([facility] [address] )   Assessment & Plan     · Suspect 2/2 UTI, will straight cath to obtain specimen  · BCx, UCx pending  · Lactic acid cleared to 1 6  · CXR- No acute cardiopulmonary disease  · Treated with Levaquin 1 time in the ED, changed to Cefepime until cultures back      Urinary tract infection without hematuria   Assessment & Plan     · U/A, C&S sent via straight cath  · Cont Meropenum for now      Chronic respiratory failure (HCC)   Assessment & Plan     · Weaned off BIpap, tolerating nasal cannula      Diastolic CHF (HCC)   Assessment & Plan     · Home diuretics on hold: Demadex and Metazolone, consider restarting tomorrow  · Will monitor her fluid state   · Strict I/O's      Pulmonary emphysema (HCC)   Assessment & Plan     · Cont home inhalers, nebs prn      Obesity hypoventilation syndrome (HCC)   Assessment & Plan     · BiPAP q h s    · Respiratory protocol      Chronic venous stasis dermatitis of both lower extremities   Assessment & Plan     · Dress wounds with adaptix & gauze & cling wrap      Moderate episode of recurrent major depressive disorder (HCC)   Assessment & Plan     · Cont Lexapro 10 mg daily and Effexor 75 mg daily      Diabetes mellitus with neuropathy (HCC)  · Cont insulin sliding scale with weight based algorithm, stop infusion  · Check sugars achs   HTN (hypertension)   Assessment & Plan     · Continue home Metoprolol 25 mg b i d  · Monitor telemetry and blood pressure while admitted      Anxiety   Assessment & Plan     · Cont Lexapro 10 mg daily & Effexor 75 mg daily   Constipation due to opioid therapy   Assessment & Plan     · Hold home Linzess 72 mcg by mouth daily  · Cont bowel regimen - senna, Colace, Miralax   Morbid obesity with BMI of 40 0-44 9, adult (HCC)   Assessment & Plan     · Diabetic and cardiac diet  · Nutrition c/s as needed   Acquired hypothyroidism   Assessment & Plan     · Cont home Synthroid 125 mcg daily   Chronic right-sided low back pain without sciatica   Assessment & Plan     · Holding her home opiates as her mentation is slightly slowed  · Will address if needed    Disposition: Transfer to general medical floor    ID Ifrah Villeda is a 77y o  year old female who presents with shortness of breath, cough, fever and chills on and off for 1 week  Patient at high fever 103 7 on admission  She has wheezing and stasis dermatitis in both legs  Lactic acid 2 7 on arrival  EL with creatinine 1 71, baseline is 0 79  History of ESBL positive Klebsiella bacteremia in June last year    Assessment:  Sepsis likely urinary origin  EL  Stasis dermatitis in legs  History of of ESBL positive Klebsiella bacteremia in June last year  Plan:  Await urine culture  IV meropenem

## 2018-07-17 ENCOUNTER — APPOINTMENT (INPATIENT)
Dept: RADIOLOGY | Facility: HOSPITAL | Age: 78
DRG: 871 | End: 2018-07-17
Payer: COMMERCIAL

## 2018-07-17 LAB
ARTERIAL PATENCY WRIST A: YES
BASE EXCESS BLDA CALC-SCNC: 17.4 MMOL/L
BODY TEMPERATURE: 97.6 DEGREES FEHRENHEIT
BUN SERPL-MCNC: 29 MG/DL (ref 5–25)
CALCIUM SERPL-MCNC: 8.5 MG/DL (ref 8.3–10.1)
CHLORIDE SERPL-SCNC: 89 MMOL/L (ref 100–108)
CO2 SERPL-SCNC: >45 MMOL/L (ref 21–32)
CREAT SERPL-MCNC: 1.06 MG/DL (ref 0.6–1.3)
ERYTHROCYTE [DISTWIDTH] IN BLOOD BY AUTOMATED COUNT: 14.6 % (ref 11.6–15.1)
GFR SERPL CREATININE-BSD FRML MDRD: 50 ML/MIN/1.73SQ M
GLUCOSE SERPL-MCNC: 242 MG/DL (ref 65–140)
GLUCOSE SERPL-MCNC: 254 MG/DL (ref 65–140)
GLUCOSE SERPL-MCNC: 350 MG/DL (ref 65–140)
GLUCOSE SERPL-MCNC: 445 MG/DL (ref 65–140)
GLUCOSE SERPL-MCNC: 489 MG/DL (ref 65–140)
GLUCOSE SERPL-MCNC: 497 MG/DL (ref 65–140)
HCO3 BLDA-SCNC: 45.2 MMOL/L (ref 22–28)
HCT VFR BLD AUTO: 39.2 % (ref 34.8–46.1)
HGB BLD-MCNC: 11.8 G/DL (ref 11.5–15.4)
MCH RBC QN AUTO: 29.1 PG (ref 26.8–34.3)
MCHC RBC AUTO-ENTMCNC: 30.1 G/DL (ref 31.4–37.4)
MCV RBC AUTO: 97 FL (ref 82–98)
NASAL CANNULA: ABNORMAL
O2 CT BLDA-SCNC: 17.7 ML/DL (ref 16–23)
OXYHGB MFR BLDA: 95.1 % (ref 94–97)
PCO2 BLDA: 69.2 MM HG (ref 36–44)
PCO2 TEMP ADJ BLDA: 67.4 MM HG (ref 36–44)
PH BLD: 7.44 [PH] (ref 7.35–7.45)
PH BLDA: 7.45 [PH] (ref 7.35–7.45)
PLATELET # BLD AUTO: 183 THOUSANDS/UL (ref 149–390)
PMV BLD AUTO: 11.5 FL (ref 8.9–12.7)
PO2 BLD: 88.6 MM HG (ref 75–129)
PO2 BLDA: 92 MM HG (ref 75–129)
POTASSIUM SERPL-SCNC: 4 MMOL/L (ref 3.5–5.3)
PROCALCITONIN SERPL-MCNC: <0.05 NG/ML
RBC # BLD AUTO: 4.06 MILLION/UL (ref 3.81–5.12)
SODIUM SERPL-SCNC: 134 MMOL/L (ref 136–145)
SPECIMEN SOURCE: ABNORMAL
WBC # BLD AUTO: 7.99 THOUSAND/UL (ref 4.31–10.16)

## 2018-07-17 PROCEDURE — 94760 N-INVAS EAR/PLS OXIMETRY 1: CPT

## 2018-07-17 PROCEDURE — 80048 BASIC METABOLIC PNL TOTAL CA: CPT | Performed by: STUDENT IN AN ORGANIZED HEALTH CARE EDUCATION/TRAINING PROGRAM

## 2018-07-17 PROCEDURE — 85027 COMPLETE CBC AUTOMATED: CPT | Performed by: STUDENT IN AN ORGANIZED HEALTH CARE EDUCATION/TRAINING PROGRAM

## 2018-07-17 PROCEDURE — 82948 REAGENT STRIP/BLOOD GLUCOSE: CPT

## 2018-07-17 PROCEDURE — 94640 AIRWAY INHALATION TREATMENT: CPT

## 2018-07-17 PROCEDURE — 82805 BLOOD GASES W/O2 SATURATION: CPT | Performed by: NURSE PRACTITIONER

## 2018-07-17 PROCEDURE — 99232 SBSQ HOSP IP/OBS MODERATE 35: CPT | Performed by: INTERNAL MEDICINE

## 2018-07-17 PROCEDURE — 36600 WITHDRAWAL OF ARTERIAL BLOOD: CPT

## 2018-07-17 PROCEDURE — 84145 PROCALCITONIN (PCT): CPT | Performed by: INTERNAL MEDICINE

## 2018-07-17 PROCEDURE — 71045 X-RAY EXAM CHEST 1 VIEW: CPT

## 2018-07-17 RX ORDER — INSULIN GLARGINE 100 [IU]/ML
20 INJECTION, SOLUTION SUBCUTANEOUS EVERY 12 HOURS SCHEDULED
Status: DISCONTINUED | OUTPATIENT
Start: 2018-07-17 | End: 2018-07-18 | Stop reason: HOSPADM

## 2018-07-17 RX ORDER — METHYLPREDNISOLONE SODIUM SUCCINATE 40 MG/ML
20 INJECTION, POWDER, LYOPHILIZED, FOR SOLUTION INTRAMUSCULAR; INTRAVENOUS EVERY 12 HOURS SCHEDULED
Status: DISCONTINUED | OUTPATIENT
Start: 2018-07-17 | End: 2018-07-18

## 2018-07-17 RX ADMIN — INSULIN LISPRO 20 UNITS: 100 INJECTION, SOLUTION INTRAVENOUS; SUBCUTANEOUS at 08:13

## 2018-07-17 RX ADMIN — PRAMIPEXOLE DIHYDROCHLORIDE 1 MG: 0.5 TABLET ORAL at 17:50

## 2018-07-17 RX ADMIN — IPRATROPIUM BROMIDE 0.5 MG: 0.5 SOLUTION RESPIRATORY (INHALATION) at 11:29

## 2018-07-17 RX ADMIN — FOLIC ACID 1 MG: 1 TABLET ORAL at 09:02

## 2018-07-17 RX ADMIN — LEVOTHYROXINE SODIUM 125 MCG: 125 TABLET ORAL at 05:19

## 2018-07-17 RX ADMIN — HEPARIN SODIUM 5000 UNITS: 5000 INJECTION, SOLUTION INTRAVENOUS; SUBCUTANEOUS at 05:20

## 2018-07-17 RX ADMIN — HEPARIN SODIUM 5000 UNITS: 5000 INJECTION, SOLUTION INTRAVENOUS; SUBCUTANEOUS at 21:45

## 2018-07-17 RX ADMIN — INSULIN GLARGINE 20 UNITS: 100 INJECTION, SOLUTION SUBCUTANEOUS at 21:47

## 2018-07-17 RX ADMIN — IPRATROPIUM BROMIDE 0.5 MG: 0.5 SOLUTION RESPIRATORY (INHALATION) at 19:21

## 2018-07-17 RX ADMIN — ESCITALOPRAM OXALATE 10 MG: 10 TABLET ORAL at 09:03

## 2018-07-17 RX ADMIN — LEVALBUTEROL HYDROCHLORIDE 1.25 MG: 1.25 SOLUTION, CONCENTRATE RESPIRATORY (INHALATION) at 11:29

## 2018-07-17 RX ADMIN — SENNOSIDES 8.6 MG: 8.6 TABLET, FILM COATED ORAL at 21:45

## 2018-07-17 RX ADMIN — GABAPENTIN 100 MG: 100 CAPSULE ORAL at 09:03

## 2018-07-17 RX ADMIN — DOCUSATE SODIUM 100 MG: 100 CAPSULE, LIQUID FILLED ORAL at 17:52

## 2018-07-17 RX ADMIN — INSULIN GLARGINE 20 UNITS: 100 INJECTION, SOLUTION SUBCUTANEOUS at 12:03

## 2018-07-17 RX ADMIN — INSULIN LISPRO 16 UNITS: 100 INJECTION, SOLUTION INTRAVENOUS; SUBCUTANEOUS at 12:06

## 2018-07-17 RX ADMIN — INSULIN LISPRO 10 UNITS: 100 INJECTION, SOLUTION INTRAVENOUS; SUBCUTANEOUS at 08:14

## 2018-07-17 RX ADMIN — ASPIRIN 325 MG: 325 TABLET ORAL at 09:01

## 2018-07-17 RX ADMIN — IPRATROPIUM BROMIDE 0.5 MG: 0.5 SOLUTION RESPIRATORY (INHALATION) at 15:53

## 2018-07-17 RX ADMIN — INSULIN LISPRO 8 UNITS: 100 INJECTION, SOLUTION INTRAVENOUS; SUBCUTANEOUS at 17:49

## 2018-07-17 RX ADMIN — GABAPENTIN 100 MG: 100 CAPSULE ORAL at 17:50

## 2018-07-17 RX ADMIN — METHYLPREDNISOLONE SODIUM SUCCINATE 20 MG: 40 INJECTION, POWDER, FOR SOLUTION INTRAMUSCULAR; INTRAVENOUS at 21:44

## 2018-07-17 RX ADMIN — PRAMIPEXOLE DIHYDROCHLORIDE 1 MG: 0.5 TABLET ORAL at 09:03

## 2018-07-17 RX ADMIN — LEVALBUTEROL HYDROCHLORIDE 1.25 MG: 1.25 SOLUTION, CONCENTRATE RESPIRATORY (INHALATION) at 08:06

## 2018-07-17 RX ADMIN — ERYTHROMYCIN 0.5 INCH: 5 OINTMENT OPHTHALMIC at 21:44

## 2018-07-17 RX ADMIN — METOPROLOL TARTRATE 25 MG: 50 TABLET ORAL at 17:50

## 2018-07-17 RX ADMIN — VENLAFAXINE 75 MG: 37.5 TABLET ORAL at 09:00

## 2018-07-17 RX ADMIN — MEROPENEM 1000 MG: 1 INJECTION, POWDER, FOR SOLUTION INTRAVENOUS at 12:03

## 2018-07-17 RX ADMIN — IPRATROPIUM BROMIDE 0.5 MG: 0.5 SOLUTION RESPIRATORY (INHALATION) at 08:06

## 2018-07-17 RX ADMIN — METHYLPREDNISOLONE SODIUM SUCCINATE 20 MG: 40 INJECTION, POWDER, FOR SOLUTION INTRAMUSCULAR; INTRAVENOUS at 05:21

## 2018-07-17 RX ADMIN — PRAMIPEXOLE DIHYDROCHLORIDE 1 MG: 0.5 TABLET ORAL at 21:44

## 2018-07-17 RX ADMIN — LEVALBUTEROL HYDROCHLORIDE 1.25 MG: 1.25 SOLUTION, CONCENTRATE RESPIRATORY (INHALATION) at 15:54

## 2018-07-17 RX ADMIN — GABAPENTIN 100 MG: 100 CAPSULE ORAL at 21:45

## 2018-07-17 RX ADMIN — MEROPENEM 1000 MG: 1 INJECTION, POWDER, FOR SOLUTION INTRAVENOUS at 23:51

## 2018-07-17 RX ADMIN — DOCUSATE SODIUM 100 MG: 100 CAPSULE, LIQUID FILLED ORAL at 09:03

## 2018-07-17 RX ADMIN — POLYETHYLENE GLYCOL 3350 17 G: 17 POWDER, FOR SOLUTION ORAL at 09:00

## 2018-07-17 RX ADMIN — LEVALBUTEROL HYDROCHLORIDE 1.25 MG: 1.25 SOLUTION, CONCENTRATE RESPIRATORY (INHALATION) at 19:21

## 2018-07-17 RX ADMIN — HEPARIN SODIUM 5000 UNITS: 5000 INJECTION, SOLUTION INTRAVENOUS; SUBCUTANEOUS at 15:10

## 2018-07-17 RX ADMIN — ERYTHROMYCIN 0.5 INCH: 5 OINTMENT OPHTHALMIC at 09:01

## 2018-07-17 RX ADMIN — METOPROLOL TARTRATE 25 MG: 50 TABLET ORAL at 09:00

## 2018-07-17 NOTE — PROGRESS NOTES
[address] Internal Medicine Progress Note  Patient: April Feil 77 y o  female   MRN: [id number]  PCP: Elsa Henderson MD  Unit/Bed#: 2 705 [facility] Encounter: [id number]  Date Of Visit: 07/17/18    Problem List:    Principal Problem:    Severe sepsis ([facility] )  Active Problems:    Diabetes mellitus with neuropathy ([facility] )    Chronic obstructive pulmonary disease with acute exacerbation ([place] [address] )    Acute on chronic respiratory failure with hypoxia and hypercapnia ([place] [address] )    Morbid obesity with BMI of 40 0-44 9, adult ([facility])    Diastolic CHF ([facility] )    Physical deconditioning    Obesity hypoventilation syndrome ([facility])    HTN (hypertension)    Moderate episode of recurrent major depressive disorder ([facility])    Chronic venous stasis dermatitis of both lower extremities      Assessment & Plan:    * Severe sepsis [facility])   Assessment & Plan    Source unclear, possibly UTI, less likely early PNA  Hx of ESBL positive klebsiella bacteremia June 2017  CXR- No acute cardiopulmonary disease  On presentation and on repeated chest x-ray today  Urine culture with no growth, but was collected after initiation of antibiotics  Blood cx no growth to day  ID consulted, input appreciated  On IV meropenem day 5, completing today  Procalcitonin less than 0 07          Acute on chronic respiratory failure with hypoxia and hypercapnia ([facility])   Assessment & Plan    Likely secondary to severe sepsis with underlying obesity hypoventilation and COPD,? Noncompliance with diuretics   Presented with fevers, SOB, hypoxia sating 64% on RA, requiring BIPAP    Normally on 2L O2 continuous  Admitted to the stepdown, Weaned off BIPAP and transferred to [facility]  Oxygenation is improving  Continue supplemental oxygen        Chronic obstructive pulmonary disease with acute exacerbation ([facility])   Assessment & Plan    Cont bronchodilators  Slowly improving  Taper IV Solu-Medrol to q 12 hours  Pulmonary input appreciated        Diabetes mellitus with neuropathy [facility])   Assessment & Plan Lab Results   Component Value Date    HGBA1C 9 1% 04/27/2018       Recent Labs      07/17/18   0950  07/17/18   1129  07/17/18   1554  07/17/18   2121   POCGLU  445*  350*  242*  254*       Blood Sugar Average: Last 72 hrs:  (P) 236 [id number]     · Required insulin infusion in ICU  · Remains with significant hyperglycemia on sliding scale and pre meal insulin  · Will add basal insulin, tapering steroid as above  · Monitor        Physical deconditioning   Assessment & Plan    Per  she cannot take care of herself and he cant care for her either  Does not take her diuretic because cant get to the bathroom fast enough  PT/OT, anticipate rehab        Diastolic CHF (HCC)   Assessment & Plan    · Cont diuretic per MWF schedule  · Monitor I/O, daily weights        Morbid obesity with BMI of 40 0-44 9, adult (HCC)   Assessment & Plan    · Cardiac/diabetic diet  · Nutrition consulted to follow        Chronic venous stasis dermatitis of both lower extremities   Assessment & Plan    · Cont wound care with adaptic, gauze and cling wrap  · Recent venous Doppler negative for DVT        Moderate episode of recurrent major depressive disorder (HCC)   Assessment & Plan    · Cont lexapro/effexor        HTN (hypertension)   Assessment & Plan    · Cont metoprolol        Obesity hypoventilation syndrome (HCC)   Assessment & Plan    · BIPAP qHS        Anxiety   Assessment & Plan    · Cont lexapro, effexor        Acquired hypothyroidism   Assessment & Plan    · Cont synthroid              VTE Pharmacologic Prophylaxis:   Pharmacologic: Heparin  Mechanical VTE Prophylaxis in Place: Yes    Patient Centered Rounds: I have performed bedside rounds with nursing staff today  Discussions with Specialists or Other Care Team Provider: Yes, Dr Lon Chauhan    Education and Discussions with Family / Patient:Yes family at bedside    Time Spent for Care: 45 minutes    More than 50% of total time spent on counseling and coordination of care as described above  Current Length of Stay: 4 day(s)    Current Patient Status: Inpatient     Discharge Plan:  Anticipate rehab in next 24-48 hours    Code Status: Level 3 - DNAR and DNI    Certification Statement: The patient will continue to require additional inpatient hospital stay due to Monitoring of respiratory status and hyperglycemia      Subjective:   Reports that her breathing is slowly improving, less cough  Still with wheezing  Remains with significant hyperglycemia    Objective:   Not in distress      Negative for Chest Pain, Palpitations, Shortness of Breath, Abdominal Pain, Nausea, Vomiting, Constipation, Diarrhea, Dizziness  All other 10 review of systems negative and without drastic interval changes from yesterday  Vitals:   Temp (24hrs), Av °F (36 7 °C), Min:97 6 °F (36 4 °C), Max:98 4 °F (36 9 °C)    HR:  [63-72] 69  Resp:  [16-18] 18  BP: (122-145)/(60-64) 145/64  SpO2:  [94 %-100 %] 98 %  Body mass index is 38 71 kg/m²  Input and Output Summary (last 24 hours): Intake/Output Summary (Last 24 hours) at 18 1225  Last data filed at 18 1021   Gross per 24 hour   Intake                0 ml   Output             1500 ml   Net            -1500 ml       Physical Exam:     Physical Exam   Constitutional: She appears well-developed  No distress  Morbidly obese   HENT:   Head: Normocephalic and atraumatic  Nose: Nose normal    Eyes: Conjunctivae and EOM are normal  Pupils are equal, round, and reactive to light  Neck: Normal range of motion  Neck supple  No JVD present  Cardiovascular: Normal rate and regular rhythm  Exam reveals no gallop and no friction rub  Murmur heard  Pulmonary/Chest: Effort normal  No respiratory distress  She has decreased breath sounds  She has wheezes (Scattered)  She has no rhonchi  She has no rales  She exhibits no tenderness  Abdominal: Soft  Bowel sounds are normal  She exhibits no distension  There is no tenderness  There is no rebound and no guarding  Musculoskeletal: She exhibits edema  Chronic venous stasis   Neurological: She is alert  No cranial nerve deficit  Skin: Skin is warm and dry  No rash noted  Psychiatric: She has a normal mood and affect  Additional Data:     Labs:      Results from last 7 days  Lab Units 07/17/18  0434  07/15/18  0545   WBC Thousand/uL 7 99  < > 6 10   HEMOGLOBIN g/dL 11 8  < > 11 2*   HEMATOCRIT % 39 2  < > 37 5   PLATELETS Thousands/uL 183  < > 168   NEUTROS PCT %  --   --  63   LYMPHS PCT %  --   --  26   MONOS PCT %  --   --  11   EOS PCT %  --   --  0   < > = values in this interval not displayed  Results from last 7 days  Lab Units 07/17/18  0434  07/13/18  1641   SODIUM mmol/L 134*  < > 130*   POTASSIUM mmol/L 4 0  < > 3 1*   CHLORIDE mmol/L 89*  < > 87*   CO2 mmol/L >45*  < > 40*   BUN mg/dL 29*  < > 36*   CREATININE mg/dL 1 06  < > 1 71*   CALCIUM mg/dL 8 5  < > 8 4   TOTAL PROTEIN g/dL  --   --  7 7   BILIRUBIN TOTAL mg/dL  --   --  0 50   ALK PHOS U/L  --   --  88   ALT U/L  --   --  31   AST U/L  --   --  25   GLUCOSE RANDOM mg/dL 497*  < > 482*   < > = values in this interval not displayed  Results from last 7 days  Lab Units 07/13/18  1617   INR  1 37*       * I Have Reviewed All Lab Data Listed Above  * Additional Pertinent Lab Tests Reviewed: All Labs For Current Hospital Admission Reviewed    Imaging:  Xr Chest Portable    Result Date: 7/17/2018  Narrative: CHEST INDICATION:   Cough  COMPARISON:  7/13/2018 EXAM PERFORMED/VIEWS:  XR CHEST PORTABLE FINDINGS: The cardiac silhouette is without change from the prior study  Mild subsegmental atelectasis, left lower lung field  Lungs otherwise stable  No pneumothorax, no significant effusion  No pulmonary edema  Osseous structures appear within normal limits for patient age       Impression: Mild left lower lung field subsegmental atelectasis, otherwise no acute pulmonary disease or change from prior Workstation performed: [id number]     Xr Chest 1 View Portable    Result Date: 7/13/2018  Narrative: CHEST INDICATION:   sepsis  "SOB" COMPARISON:  AP chest 2/22/2018 EXAM PERFORMED/VIEWS:  XR CHEST PORTABLE FINDINGS: Cardiomediastinal silhouette appears unremarkable  The lungs are clear  No pneumothorax or pleural effusion  Osseous structures appear within normal limits for patient age  Impression: No acute cardiopulmonary disease  Workstation performed: [id number]     Vas Reflux Lower Limb Venous Duplex Study With Reflux Assessment, Complete Bilateral    Result Date: 7/12/2018  Narrative:  [facility] REPORT CLINICAL: Indications: Patient presents with history of Bilateral lower extremity varicose veins and ulcerations on ankles  Operative History: No history of cardiovascular surgery  FINDINGS:  Segment         Right   Left                            AP(mm)  AP(mm)  GSV Inguinal      11 7    13 8  GSV Mid Thigh      5 4     6 4  GSV Dist Thigh             3 8  GSV Knee           5 3     3 1  SSV Mid Calf               3 1     CONCLUSION:  Impression: RIGHT LIMB: No evidence of deep venous incompetence  The great saphenous vein is competent  The great saphenous vein remains within the saphenous compartment in the thigh  The small saphenous vein is competent and communicates with the popliteal vein  There is no evidence of incompetent perforators in the thigh or calf  There is no evidence of deep vein thrombosis in the CFV, the proximal PFV, the femoral vein and the popliteal vein  LEFT LIMB: No evidence of deep venous incompetence  The great saphenous vein is competent  The great saphenous vein remains within the saphenous compartment in the thigh  The small saphenous vein is competent and communicates with the popliteal vein  There is no evidence of incompetent perforators in the thigh or calf   There is no evidence of deep vein thrombosis in the CFV, the proximal PFV, the femoral vein and the popliteal vein Study performed with patient in standing and steep Reverse Trendelenburg  Tech note: Despite large diameter veins unable to provoke reflux by augmentation and valsalva maneuvers secondary to pulsatile venous flow  Bandaging over wounds was not removed, therefore the bilateral ankles and distal calfs were not scanned  SIGNATURE: Electronically Signed by: Raj Cee MD on 2018-07-12 08:04:12 PM    Imaging Reports Reviewed by myself    Cultures:   Blood Culture:   Lab Results   Component Value Date    BLOODCX No Growth at 72 hrs  07/13/2018    BLOODCX No Growth at 72 hrs  07/13/2018    BLOODCX No Growth After 5 Days  02/22/2018    BLOODCX No Growth After 5 Days  02/22/2018    BLOODCX No Growth After 5 Days  01/19/2018    BLOODCX No Growth After 5 Days  01/19/2018    BLOODCX No Growth After 5 Days  06/11/2017    BLOODCX No Growth After 5 Days   06/11/2017     Urine Culture:   Lab Results   Component Value Date    URINECX No Growth <1000 cfu/mL 07/14/2018    URINECX 20,000-29,000 cfu/ml Mixed Contaminants X3 04/15/2017     Sputum Culture: No components found for: SPUTUMCX  Wound Culture: No results found for: WOUNDCULT    Last 24 Hours Medication List:     Current Facility-Administered Medications:  aspirin 325 mg Oral Daily FATIMAH Orosco    docusate sodium 100 mg Oral BID FATIMAH Orosco    erythromycin 0 5 inch Both Eyes Q12H 3630 Stacey Walls MD    escitalopram 10 mg Oral Daily FATIMAH Orosco    folic acid 1 mg Oral Daily FATIMAH Orosco    gabapentin 100 mg Oral TID Zohaib Estrada MD    heparin (porcine) 5,000 Units Subcutaneous Q8H [facility] FATIMAH Orosco    insulin glargine 20 Units Subcutaneous Q12H [facility] Richi Isbell MD    insulin lispro 14 Units Subcutaneous TID With Meals Richi Isbell MD    insulin lispro 4-20 Units Subcutaneous TID With Meals Zohaib Estrada MD    ipratropium 0 5 mg Nebulization 4x Daily Zohaib Estrada MD    levalbuterol 1 25 mg Nebulization 4x Daily FATIMAH Healy    levalbuterol 1 25 mg Nebulization Q2H PRN Padmini Obando MD    And        sodium chloride 3 mL Nebulization Q2H PRN Padmini Obando MD    levothyroxine 125 mcg Oral Daily FATIMAH Vaz    Linaclotide 72 mcg Oral Daily Before Breakfast FATIMAH Vaz    meropenem 1,000 mg Intravenous Q12H Gina Gutierrez PA-C Last Rate: 1,000 mg (07/17/18 1203)   methylPREDNISolone sodium succinate 20 mg Intravenous Q8H [facility]30 [facility]    metoclopramide 10 mg Intravenous Once FATIMAH Healy    metolazone 2 5 mg Oral Once per day on Mon Wed Fri Germania Dupont MD    metoprolol tartrate 25 mg Oral BID FATIMAH Orosco    ondansetron 4 mg Oral Q6H PRN Matilde FATIMAH Sharpe    polyethylene glycol 17 g Oral Daily FATIMAH Orosco    pramipexole 1 mg Oral TID FATIMAH Orosco    senna 1 tablet Oral HS FATIMAH Orosco    torsemide 40 mg Oral Once per day on Mon Wed Fri Padmini Obando MD    traMADol 50 mg Oral Q6H PRN FATIMAH Baron    venlafaxine 75 mg Oral Daily FATIMAH Vaz         Today, Patient Was Seen By: Aguila Lu MD    ** Please Note:  Dictation voice to text software may have been used in the creation of this document   **

## 2018-07-17 NOTE — PROGRESS NOTES
Progress Note - Infectious Disease   Sourav Montejo 77 y o  female MRN: [id number]  Unit/Bed#: [address] [place] [address] Encounter: [id number]        Assessment:  Clinically improved  Chronic bronchitis  Possible sepsis of urinary origin    Plan:   On meropenem   will discuss with primary service      Subjective/Objective     Subjective:  Breathing is better    HR:  [63-72] 70  Resp:  [16-19] 19  BP: (122-145)/(60-64) 125/62  SpO2:  [91 %-100 %] 91 %      Objective: see PE    Meds/Allergies     Current Facility-Administered Medications:     aspirin tablet 325 mg, 325 mg, Oral, Daily, FATIMAH Orosco, 325 mg at 07/17/18 0901    docusate sodium (COLACE) capsule 100 mg, 100 mg, Oral, BID, FATIMAH Orosco, 100 mg at 07/17/18 0903    erythromycin (ILOTYCIN) 0 5 % ophthalmic ointment 0 5 inch, 0 5 inch, Both Eyes, Q12H [address], Kirstin Mensah MD, 0 5 inch at 07/17/18 0901    escitalopram (LEXAPRO) tablet 10 mg, 10 mg, Oral, Daily, FATIMAH Orosco, 10 mg at 41/08/42 9486    folic acid (FOLVITE) tablet 1 mg, 1 mg, Oral, Daily, FATIMAH Orosco, 1 mg at 07/17/18 0902    gabapentin (NEURONTIN) capsule 100 mg, 100 mg, Oral, TID, Kirstin Mensah MD, 100 mg at 07/17/18 0903    heparin (porcine) subcutaneous injection 5,000 Units, 5,000 Units, Subcutaneous, Q8H [address], 5,000 Units at 07/17/18 0520 **AND** [CANCELED] Platelet count, , , Once, FATIMAH Orosco    insulin glargine (LANTUS) subcutaneous injection 20 Units 0 2 mL, 20 Units, Subcutaneous, Q12H [address], Fallon Bauer MD, 20 Units at 07/17/18 1203    insulin lispro (HumaLOG) 100 units/mL subcutaneous injection 14 Units, 14 Units, Subcutaneous, TID With Meals, Fallon Bauer MD, 14 Units at 07/17/18 1204    insulin lispro (HumaLOG) 100 units/mL subcutaneous injection 4-20 Units, 4-20 Units, Subcutaneous, TID With Meals, 16 Units at 07/17/18 1206 **AND** Fingerstick Glucose (POCT), , , TID AC, Kisrtin Mensah MD    ipratropium (ATROVENT) 0 02 % inhalation solution 0 5 mg, 0 5 mg, Nebulization, 4x Daily, Yary Pearson MD, 0 5 mg at 07/17/18 1129    levalbuterol (Ogden Settles) inhalation solution 1 25 mg, 1 25 mg, Nebulization, 4x Daily, 1 25 mg at 07/17/18 1129 **AND** [DISCONTINUED] sodium chloride 0 9 % inhalation solution 3 mL, 3 mL, Nebulization, Q6H PRN, FATIMAH Healy    levalbuterol (XOPENEX) inhalation solution 1 25 mg, 1 25 mg, Nebulization, Q2H PRN **AND** sodium chloride 0 9 % inhalation solution 3 mL, 3 mL, Nebulization, Q2H PRN, Yary Pearson MD    levothyroxine tablet 125 mcg, 125 mcg, Oral, Daily, Estel Angie, FATIMAH, 125 mcg at 07/17/18 0519    Linaclotide CAPS 72 mcg, 72 mcg, Oral, Daily Before Breakfast, FATIMAH Orosco    meropenem (MERREM) 1,000 mg in sodium chloride 0 9 % 100 mL IVPB, 1,000 mg, Intravenous, Q12H, Gina Gutierrez PA-C, Last Rate: 100 mL/hr at 07/17/18 1203, 1,000 mg at 07/17/18 1203    methylPREDNISolone sodium succinate (Solu-MEDROL) injection 20 mg, 20 mg, Intravenous, Q12H [address], Lyly Kent MD    metoclopramide (REGLAN) injection 10 mg, 10 mg, Intravenous, Once, FATIMAH Healy    metolazone (ZAROXOLYN) tablet 2 5 mg, 2 5 mg, Oral, Once per day on Mon Wed Fri, Yary Pearson MD, 2 5 mg at 07/16/18 1647    metoprolol tartrate (LOPRESSOR) tablet 25 mg, 25 mg, Oral, BID, FATIMAH Orosco, 25 mg at 07/17/18 0900    ondansetron (ZOFRAN-ODT) dispersible tablet 4 mg, 4 mg, Oral, Q6H PRN, FATIMAH Orosco, 4 mg at 07/14/18 1739    polyethylene glycol (MIRALAX) packet 17 g, 17 g, Oral, Daily, FATIMAH Orosco, 17 g at 07/17/18 0900    pramipexole (MIRAPEX) tablet 1 mg, 1 mg, Oral, TID, FATIMAH Orosco, 1 mg at 07/17/18 2885    senna (SENOKOT) tablet 8 6 mg, 1 tablet, Oral, HS, FATIMAH Orosco, 8 6 mg at 07/16/18 2104    torsemide (DEMADEX) tablet 40 mg, 40 mg, Oral, Once per day on Mon Wed Fri, Yary Pearson MD, 40 mg at 07/16/18 4643    traMADol (ULTRAM) tablet 50 mg, 50 mg, Oral, Q6H PRN, FATIMAH Clemons, 50 mg at 18 2318    venlafaxine [facility]) tablet 75 mg, 75 mg, Oral, Daily, Soct FATIMAH Cameron, 75 mg at 18 0900  Allergies   Allergen Reactions    Ketorolac Swelling     Toradol    Latex Rash    Wound Dressing Adhesive Rash     all current active meds have been reviewed    all current active meds have been reviewed    Physical Exam: Physical Exam   Constitutional: She appears well-developed  HENT:   Head: Normocephalic  Eyes: Pupils are equal, round, and reactive to light  No scleral icterus  Neck: Neck supple  Cardiovascular: Normal heart sounds  Pulmonary/Chest: She is in respiratory distress  Poor chest expansion  Poor inspiratory effort  Mild wheeze   Abdominal: Soft  Musculoskeletal: She exhibits no deformity  Skin: Skin is warm  Psychiatric: She has a normal mood and affect  Nursing note and vitals reviewed          Temp (24hrs), Av 1 °F (36 7 °C), Min:97 6 °F (36 4 °C), Max:98 5 °F (36 9 °C)  Current: Temperature: 98 5 °F (36 9 °C)    Invasive Devices     Peripheral Intravenous Line            Peripheral IV 18 Left Antecubital 3 days    Peripheral IV 18 Right Antecubital 3 days                            Lab, Imaging and other studies:      CBC: Lab Results   Component Value Date    WBC 7 99 2018    WBC 9 1 2017    RBC 4 06 2018    RBC 4 45 2016       Results from last 7 days  Lab Units 18  0434 18  0609 07/15/18  0545 18  0529 18  1615   PLATELETS Thousands/uL 183 169 168 178 219     CMP: Lab Results   Component Value Date     (L) 2018     (H) 2017    CL 89 (L) 2018    CL 96 2017    CO2 >45 (HH) 2018    CO2 35 (H) 2017    ANIONGAP  2018      Comment:      Unable to calculate    ANIONGAP 10 8 2016    BUN 29 (H) 2018    BUN 14 2018    CREATININE 1 06 07/17/2018    CREATININE 0 78 08/01/2017    GLUCOSE 497 (H) 07/17/2018    GLUCOSE 144 (H) 08/01/2017    CALCIUM 8 5 07/17/2018    CALCIUM 9 1 08/01/2017    AST 25 07/13/2018    AST 18 08/01/2017    ALT 31 07/13/2018    ALT 26 08/01/2017    ALKPHOS 88 07/13/2018    ALKPHOS 70 08/01/2017    PROT 7 7 07/13/2018    PROT 6 9 08/01/2017    BILITOT 0 50 07/13/2018    BILITOT 0 2 08/01/2017    EGFR 50 07/17/2018       Urinalysis: Lab Results   Component Value Date    COLORU Yellow 02/22/2018    COLORU Yellow 07/12/2016    CLARITYU Clear 02/22/2018    CLARITYU Transparent 07/12/2016    SPECGRAV 1 015 02/22/2018    SPECGRAV 1 025 07/12/2016    PHUR 5 5 02/22/2018    PHUR 6 0 07/12/2016    LEUKOCYTESUR Negative 02/22/2018    LEUKOCYTESUR Negative 07/12/2016    NITRITE Negative 02/22/2018    NITRITE Negative 07/12/2016    PROTEINUA Negative 02/22/2018    PROTEINUA Negative 07/12/2016    GLUCOSEU 500 (1/2%) (A) 02/22/2018    KETONESU Negative 02/22/2018    KETONESU Negative 07/12/2016    BILIRUBINUR Negative 02/22/2018    BILIRUBINUR Negative 07/12/2016    BLOODU Negative 02/22/2018    BLOODU Negative 07/12/2016       Lactic Acid:   Lab Results   Component Value Date    LACTICACID 1 6 07/14/2018        Cultures    Results from last 7 days  Lab Units 07/14/18  1213 07/13/18  2205 07/13/18  1617   BLOOD CULTURE   --   --  No Growth at 72 hrs  No Growth at 72 hrs  URINE CULTURE  No Growth <1000 cfu/mL  --   --    MRSA CULTURE ONLY   --  No Methicillin Resistant Staphlyococcus aureus (MRSA) isolated  --        I have personally reviewed pertinent reports  Principal Problem:    Severe sepsis ([facility] )  Active Problems:     Morbid obesity with BMI of 40 0-44 9, adult (HCC)    Obesity hypoventilation syndrome (HCC)    HTN (hypertension)    Diabetes mellitus with neuropathy (HCC)    Moderate episode of recurrent major depressive disorder (HCC)    Chronic obstructive pulmonary disease with acute exacerbation ([facility])    Diastolic CHF ([facility] )    Chronic venous stasis dermatitis of both lower extremities    Physical deconditioning    Acute on chronic respiratory failure with hypoxia and hypercapnia ([facility] )

## 2018-07-17 NOTE — PROGRESS NOTES
Progress Note - Pulmonary   Cate Scot 77 y o  female MRN: [id number]  Unit/Bed#: [address] [address] Encounter: [id number]    Assessment:  Acute on chronic hypercapnic hypoxemic respiratory failure improved  Patient did not use BiPAP last night and is doing well  She is on her baseline dose of 2 L of oxygen  Obesity alveolar hypoventilation syndrome  Chronic bronchitis with mild exacerbation  Severe sepsis likely urinary origin  Urine culture was not sent until after patient had been started on IV antibiotic therapy  Chest x-ray showed no evidence of pneumonia  Chronic diastolic congestive heart failure    Plan:  Can decrease methylprednisone to 20 mg IV every 12 hours  I discussed this with hospitalist Dr Ortiz Hands  Continue nebulized treatments levalbuterol 1 25 mg and Atrovent 0 5 mg q i d  Portable chest x-ray done today was reviewed  There is some minimal subsegmental atelectasis left lower lobe  No evidence of pneumonia      Subjective:   Breathing and her cough improved    Objective:     Vitals: Blood pressure 145/64, pulse 69, temperature 98 °F (36 7 °C), temperature source Oral, resp  rate 18, height 5' 2" (1 575 m), weight 96 kg (211 lb 10 3 oz), SpO2 98 %, not currently breastfeeding  ,Body mass index is 38 71 kg/m²  Intake/Output Summary (Last 24 hours) at 07/17/18 1310  Last data filed at 07/17/18 1021   Gross per 24 hour   Intake                0 ml   Output             1500 ml   Net            -1500 ml       Physical Exam: Physical Exam   Constitutional: She is oriented to person, place, and time  She appears well-developed and well-nourished  No distress  HENT:   Head: Normocephalic  Nose: Nose normal    Mouth/Throat: Oropharynx is clear and moist  No oropharyngeal exudate  Eyes: Conjunctivae are normal  Pupils are equal, round, and reactive to light  Neck: Neck supple  No JVD present  No tracheal deviation present  Cardiovascular: Normal rate, regular rhythm and normal heart sounds  Pulmonary/Chest: Effort normal    Lung sounds are decreased with few faint expiratory wheezes at bases   Abdominal: Soft  She exhibits no distension  There is no tenderness  There is no guarding  Musculoskeletal: She exhibits no edema  Lymphadenopathy:     She has no cervical adenopathy  Neurological: She is alert and oriented to person, place, and time  Skin: Skin is warm and dry  No rash noted  Psychiatric: She has a normal mood and affect  Her behavior is normal  Thought content normal         Labs: I have personally reviewed pertinent lab results  , ABG: Lab Results   Component Value Date    PHART 7 447 07/17/2018    GNK6NDP 69 2 (HH) 07/17/2018    PO2ART 92 0 07/17/2018    BCP6CPS 45 2 (H) 07/17/2018    BEART 17 4 07/17/2018    SOURCE Radial, Right 07/17/2018   , BNP: No results found for: BNP, CBC: Lab Results   Component Value Date    WBC 7 99 07/17/2018    HGB 11 8 07/17/2018    HCT 39 2 07/17/2018    MCV 97 07/17/2018     07/17/2018    ADJUSTEDWBC 10 30 04/04/2016    MCH 29 1 07/17/2018    MCHC 30 1 (L) 07/17/2018    RDW 14 6 07/17/2018    MPV 11 5 07/17/2018    NRBC 0 07/15/2018   , CMP: Lab Results   Component Value Date     (L) 07/17/2018     (H) 08/01/2017    K 4 0 07/17/2018    K 3 9 08/01/2017    CL 89 (L) 07/17/2018    CL 96 08/01/2017    CO2 >45 (HH) 07/17/2018    CO2 35 (H) 08/01/2017    ANIONGAP  07/17/2018      Comment:      Unable to calculate    ANIONGAP 10 8 01/08/2016    BUN 29 (H) 07/17/2018    BUN 14 05/25/2018    CREATININE 1 06 07/17/2018    CREATININE 0 78 08/01/2017    GLUCOSE 497 (H) 07/17/2018    GLUCOSE 144 (H) 08/01/2017    CALCIUM 8 5 07/17/2018    CALCIUM 9 1 08/01/2017    AST 25 07/13/2018    AST 18 08/01/2017    ALT 31 07/13/2018    ALT 26 08/01/2017    ALKPHOS 88 07/13/2018    ALKPHOS 70 08/01/2017    PROT 7 7 07/13/2018    PROT 6 9 08/01/2017    BILITOT 0 50 07/13/2018    BILITOT 0 2 08/01/2017    EGFR 50 07/17/2018   , PT/INR:   Lab Results Component Value Date    INR 1 37 (H) 07/13/2018    INR 1 0 08/01/2017   , Troponin: No results found for: TROPONIN    Imaging and other studies: I have personally reviewed pertinent reports     and I have personally reviewed pertinent films in PACS

## 2018-07-17 NOTE — PROGRESS NOTES
Dr Munira Giraldo returned page  No new orders at this time for Blood sugar 489    Recheck blood sugar in a couple of hours per MD

## 2018-07-18 VITALS
RESPIRATION RATE: 19 BRPM | OXYGEN SATURATION: 93 % | SYSTOLIC BLOOD PRESSURE: 172 MMHG | HEIGHT: 62 IN | BODY MASS INDEX: 37.36 KG/M2 | WEIGHT: 203.04 LBS | TEMPERATURE: 98 F | DIASTOLIC BLOOD PRESSURE: 81 MMHG | HEART RATE: 80 BPM

## 2018-07-18 PROBLEM — H10.9 CONJUNCTIVITIS: Status: ACTIVE | Noted: 2018-07-18

## 2018-07-18 PROBLEM — A41.9 SEVERE SEPSIS (HCC): Status: RESOLVED | Noted: 2017-06-07 | Resolved: 2018-07-18

## 2018-07-18 PROBLEM — J96.21 ACUTE ON CHRONIC RESPIRATORY FAILURE WITH HYPOXIA AND HYPERCAPNIA (HCC): Status: RESOLVED | Noted: 2018-02-23 | Resolved: 2018-07-18

## 2018-07-18 PROBLEM — J96.22 ACUTE ON CHRONIC RESPIRATORY FAILURE WITH HYPOXIA AND HYPERCAPNIA (HCC): Status: RESOLVED | Noted: 2018-02-23 | Resolved: 2018-07-18

## 2018-07-18 PROBLEM — R65.20 SEVERE SEPSIS (HCC): Status: RESOLVED | Noted: 2017-06-07 | Resolved: 2018-07-18

## 2018-07-18 PROBLEM — F32.A DEPRESSION: Status: ACTIVE | Noted: 2017-10-09

## 2018-07-18 PROBLEM — H10.9 CONJUNCTIVITIS: Status: RESOLVED | Noted: 2018-07-18 | Resolved: 2018-07-18

## 2018-07-18 LAB
ANION GAP SERPL CALCULATED.3IONS-SCNC: 0 MMOL/L (ref 4–13)
BACTERIA BLD CULT: NORMAL
BACTERIA BLD CULT: NORMAL
BUN SERPL-MCNC: 23 MG/DL (ref 5–25)
CALCIUM SERPL-MCNC: 9 MG/DL (ref 8.3–10.1)
CHLORIDE SERPL-SCNC: 93 MMOL/L (ref 100–108)
CO2 SERPL-SCNC: 45 MMOL/L (ref 21–32)
CREAT SERPL-MCNC: 0.87 MG/DL (ref 0.6–1.3)
ERYTHROCYTE [DISTWIDTH] IN BLOOD BY AUTOMATED COUNT: 14.9 % (ref 11.6–15.1)
GFR SERPL CREATININE-BSD FRML MDRD: 64 ML/MIN/1.73SQ M
GLUCOSE SERPL-MCNC: 198 MG/DL (ref 65–140)
GLUCOSE SERPL-MCNC: 199 MG/DL (ref 65–140)
GLUCOSE SERPL-MCNC: 226 MG/DL (ref 65–140)
GLUCOSE SERPL-MCNC: 397 MG/DL (ref 65–140)
HCT VFR BLD AUTO: 42 % (ref 34.8–46.1)
HGB BLD-MCNC: 12.8 G/DL (ref 11.5–15.4)
MCH RBC QN AUTO: 29.2 PG (ref 26.8–34.3)
MCHC RBC AUTO-ENTMCNC: 30.5 G/DL (ref 31.4–37.4)
MCV RBC AUTO: 96 FL (ref 82–98)
PLATELET # BLD AUTO: 195 THOUSANDS/UL (ref 149–390)
PMV BLD AUTO: 10.4 FL (ref 8.9–12.7)
POTASSIUM SERPL-SCNC: 3.4 MMOL/L (ref 3.5–5.3)
RBC # BLD AUTO: 4.39 MILLION/UL (ref 3.81–5.12)
SODIUM SERPL-SCNC: 138 MMOL/L (ref 136–145)
WBC # BLD AUTO: 8.31 THOUSAND/UL (ref 4.31–10.16)

## 2018-07-18 PROCEDURE — 85027 COMPLETE CBC AUTOMATED: CPT | Performed by: STUDENT IN AN ORGANIZED HEALTH CARE EDUCATION/TRAINING PROGRAM

## 2018-07-18 PROCEDURE — 99239 HOSP IP/OBS DSCHRG MGMT >30: CPT | Performed by: INTERNAL MEDICINE

## 2018-07-18 PROCEDURE — 94640 AIRWAY INHALATION TREATMENT: CPT

## 2018-07-18 PROCEDURE — 94760 N-INVAS EAR/PLS OXIMETRY 1: CPT

## 2018-07-18 PROCEDURE — 80048 BASIC METABOLIC PNL TOTAL CA: CPT | Performed by: STUDENT IN AN ORGANIZED HEALTH CARE EDUCATION/TRAINING PROGRAM

## 2018-07-18 PROCEDURE — 82948 REAGENT STRIP/BLOOD GLUCOSE: CPT

## 2018-07-18 RX ORDER — TRAMADOL HYDROCHLORIDE 50 MG/1
50 TABLET ORAL EVERY 4 HOURS PRN
Status: DISCONTINUED | OUTPATIENT
Start: 2018-07-18 | End: 2018-07-18 | Stop reason: HOSPADM

## 2018-07-18 RX ORDER — POTASSIUM CHLORIDE 20 MEQ/1
20 TABLET, EXTENDED RELEASE ORAL ONCE
Status: COMPLETED | OUTPATIENT
Start: 2018-07-18 | End: 2018-07-18

## 2018-07-18 RX ORDER — TORSEMIDE 20 MG/1
40 TABLET ORAL 3 TIMES WEEKLY
Refills: 0
Start: 2018-07-18 | End: 2018-08-08 | Stop reason: ALTCHOICE

## 2018-07-18 RX ORDER — LORAZEPAM 0.5 MG/1
0.5 TABLET ORAL EVERY 8 HOURS PRN
Qty: 30 TABLET | Refills: 0
Start: 2018-07-18 | End: 2019-11-26 | Stop reason: ALTCHOICE

## 2018-07-18 RX ORDER — LORAZEPAM 0.5 MG/1
0.5 TABLET ORAL EVERY 8 HOURS PRN
Status: DISCONTINUED | OUTPATIENT
Start: 2018-07-18 | End: 2018-07-18 | Stop reason: HOSPADM

## 2018-07-18 RX ORDER — DOCUSATE SODIUM 100 MG/1
100 CAPSULE, LIQUID FILLED ORAL 2 TIMES DAILY
Qty: 10 CAPSULE | Refills: 0
Start: 2018-07-18 | End: 2018-10-11 | Stop reason: ALTCHOICE

## 2018-07-18 RX ORDER — INSULIN GLARGINE 100 [IU]/ML
20 INJECTION, SOLUTION SUBCUTANEOUS EVERY 12 HOURS SCHEDULED
Qty: 10 ML | Refills: 0
Start: 2018-07-18 | End: 2018-08-10 | Stop reason: SDUPTHER

## 2018-07-18 RX ORDER — LEVALBUTEROL 1.25 MG/.5ML
1.25 SOLUTION, CONCENTRATE RESPIRATORY (INHALATION)
Qty: 1 EACH | Refills: 0
Start: 2018-07-18 | End: 2018-08-08 | Stop reason: ALTCHOICE

## 2018-07-18 RX ORDER — ERYTHROMYCIN 5 MG/G
0.5 OINTMENT OPHTHALMIC EVERY 12 HOURS SCHEDULED
Qty: 3.5 G | Refills: 0
Start: 2018-07-18 | End: 2018-07-19

## 2018-07-18 RX ORDER — HEPARIN SODIUM 5000 [USP'U]/ML
5000 INJECTION, SOLUTION INTRAVENOUS; SUBCUTANEOUS EVERY 8 HOURS SCHEDULED
Qty: 1 ML | Refills: 0
Start: 2018-07-18 | End: 2018-08-08 | Stop reason: ALTCHOICE

## 2018-07-18 RX ORDER — PREDNISONE 10 MG/1
TABLET ORAL
Qty: 30 TABLET | Refills: 0
Start: 2018-07-18 | End: 2018-08-08 | Stop reason: ALTCHOICE

## 2018-07-18 RX ORDER — METHYLPREDNISOLONE SODIUM SUCCINATE 40 MG/ML
20 INJECTION, POWDER, LYOPHILIZED, FOR SOLUTION INTRAMUSCULAR; INTRAVENOUS DAILY
Status: DISCONTINUED | OUTPATIENT
Start: 2018-07-19 | End: 2018-07-18 | Stop reason: HOSPADM

## 2018-07-18 RX ORDER — SENNOSIDES 8.6 MG
1 TABLET ORAL
Qty: 120 EACH | Refills: 0
Start: 2018-07-18 | End: 2018-08-08 | Stop reason: ALTCHOICE

## 2018-07-18 RX ADMIN — HEPARIN SODIUM 5000 UNITS: 5000 INJECTION, SOLUTION INTRAVENOUS; SUBCUTANEOUS at 14:13

## 2018-07-18 RX ADMIN — IPRATROPIUM BROMIDE 0.5 MG: 0.5 SOLUTION RESPIRATORY (INHALATION) at 11:10

## 2018-07-18 RX ADMIN — LEVALBUTEROL HYDROCHLORIDE 1.25 MG: 1.25 SOLUTION, CONCENTRATE RESPIRATORY (INHALATION) at 11:12

## 2018-07-18 RX ADMIN — TORSEMIDE 40 MG: 20 TABLET ORAL at 09:51

## 2018-07-18 RX ADMIN — IPRATROPIUM BROMIDE 0.5 MG: 0.5 SOLUTION RESPIRATORY (INHALATION) at 07:41

## 2018-07-18 RX ADMIN — VENLAFAXINE 75 MG: 37.5 TABLET ORAL at 09:50

## 2018-07-18 RX ADMIN — PRAMIPEXOLE DIHYDROCHLORIDE 1 MG: 0.5 TABLET ORAL at 16:35

## 2018-07-18 RX ADMIN — POLYETHYLENE GLYCOL 3350 17 G: 17 POWDER, FOR SOLUTION ORAL at 09:51

## 2018-07-18 RX ADMIN — LEVOTHYROXINE SODIUM 125 MCG: 125 TABLET ORAL at 06:40

## 2018-07-18 RX ADMIN — HEPARIN SODIUM 5000 UNITS: 5000 INJECTION, SOLUTION INTRAVENOUS; SUBCUTANEOUS at 06:40

## 2018-07-18 RX ADMIN — INSULIN LISPRO 16 UNITS: 100 INJECTION, SOLUTION INTRAVENOUS; SUBCUTANEOUS at 12:49

## 2018-07-18 RX ADMIN — IPRATROPIUM BROMIDE 0.5 MG: 0.5 SOLUTION RESPIRATORY (INHALATION) at 16:08

## 2018-07-18 RX ADMIN — PRAMIPEXOLE DIHYDROCHLORIDE 1 MG: 0.5 TABLET ORAL at 09:50

## 2018-07-18 RX ADMIN — TRAMADOL HYDROCHLORIDE 50 MG: 50 TABLET, FILM COATED ORAL at 15:42

## 2018-07-18 RX ADMIN — METOLAZONE 2.5 MG: 5 TABLET ORAL at 09:56

## 2018-07-18 RX ADMIN — LEVALBUTEROL HYDROCHLORIDE 1.25 MG: 1.25 SOLUTION, CONCENTRATE RESPIRATORY (INHALATION) at 07:40

## 2018-07-18 RX ADMIN — TRAMADOL HYDROCHLORIDE 50 MG: 50 TABLET, FILM COATED ORAL at 09:50

## 2018-07-18 RX ADMIN — MEROPENEM 1000 MG: 1 INJECTION, POWDER, FOR SOLUTION INTRAVENOUS at 12:13

## 2018-07-18 RX ADMIN — POTASSIUM CHLORIDE 20 MEQ: 1500 TABLET, EXTENDED RELEASE ORAL at 09:56

## 2018-07-18 RX ADMIN — LEVALBUTEROL HYDROCHLORIDE 1.25 MG: 1.25 SOLUTION, CONCENTRATE RESPIRATORY (INHALATION) at 16:08

## 2018-07-18 RX ADMIN — DOCUSATE SODIUM 100 MG: 100 CAPSULE, LIQUID FILLED ORAL at 09:48

## 2018-07-18 RX ADMIN — ESCITALOPRAM OXALATE 10 MG: 10 TABLET ORAL at 09:49

## 2018-07-18 RX ADMIN — INSULIN LISPRO 4 UNITS: 100 INJECTION, SOLUTION INTRAVENOUS; SUBCUTANEOUS at 16:49

## 2018-07-18 RX ADMIN — METHYLPREDNISOLONE SODIUM SUCCINATE 20 MG: 40 INJECTION, POWDER, FOR SOLUTION INTRAMUSCULAR; INTRAVENOUS at 09:56

## 2018-07-18 RX ADMIN — INSULIN LISPRO 4 UNITS: 100 INJECTION, SOLUTION INTRAVENOUS; SUBCUTANEOUS at 10:59

## 2018-07-18 RX ADMIN — ASPIRIN 325 MG: 325 TABLET ORAL at 09:48

## 2018-07-18 RX ADMIN — METOPROLOL TARTRATE 25 MG: 50 TABLET ORAL at 09:50

## 2018-07-18 RX ADMIN — INSULIN GLARGINE 20 UNITS: 100 INJECTION, SOLUTION SUBCUTANEOUS at 11:31

## 2018-07-18 RX ADMIN — GABAPENTIN 100 MG: 100 CAPSULE ORAL at 16:35

## 2018-07-18 RX ADMIN — GABAPENTIN 100 MG: 100 CAPSULE ORAL at 09:49

## 2018-07-18 RX ADMIN — FOLIC ACID 1 MG: 1 TABLET ORAL at 09:49

## 2018-07-18 NOTE — SOCIAL WORK
[facility]

## 2018-07-18 NOTE — PROGRESS NOTES
Progress Note - Infectious Disease   Nava Torres 77 y o  female MRN: [id number]  Unit/Bed#: [address] [place] [address] Encounter: [id number]        Assessment:  No fever  Normal WBC  Normal procalcitonin  Chronic respiratory failure  Cultures are negative      Plan:  Observe off antibiotics    Discussed with Dr Nury Patel    Subjective/Objective     Subjective:  Improved    HR:  [71-80] 80  Resp:  [18-19] 19  BP: (136-172)/(67-81) 172/81  SpO2:  [93 %-99 %] 93 %      Objective: see PE    Meds/Allergies     Current Facility-Administered Medications:     aspirin tablet 325 mg, 325 mg, Oral, Daily, FATIMAH Orosco, 325 mg at 07/18/18 0948    docusate sodium (COLACE) capsule 100 mg, 100 mg, Oral, BID, FATIMAH Orosco, 100 mg at 07/18/18 0948    erythromycin (ILOTYCIN) 0 5 % ophthalmic ointment 0 5 inch, 0 5 inch, Both Eyes, Q12H [address], Mayda Gottlieb MD, 0 5 inch at 07/17/18 2144    escitalopram (LEXAPRO) tablet 10 mg, 10 mg, Oral, Daily, FATIMAH Orosco, 10 mg at 12/54/11 9978    folic acid (FOLVITE) tablet 1 mg, 1 mg, Oral, Daily, FATIMAH Orosco, 1 mg at 07/18/18 0949    gabapentin (NEURONTIN) capsule 100 mg, 100 mg, Oral, TID, Mayda Gottlieb MD, 100 mg at 07/18/18 0949    heparin (porcine) subcutaneous injection 5,000 Units, 5,000 Units, Subcutaneous, Q8H [address], 5,000 Units at 07/18/18 0640 **AND** [CANCELED] Platelet count, , , Once, FATIMAH Orosco    insulin glargine (LANTUS) subcutaneous injection 20 Units 0 2 mL, 20 Units, Subcutaneous, Q12H [address], Blanquita Hooker MD, 20 Units at 07/18/18 1131    insulin lispro (HumaLOG) 100 units/mL subcutaneous injection 14 Units, 14 Units, Subcutaneous, TID With Meals, Blanquita Hooker MD, 14 Units at 07/18/18 1249    insulin lispro (HumaLOG) 100 units/mL subcutaneous injection 4-20 Units, 4-20 Units, Subcutaneous, TID With Meals, 16 Units at 07/18/18 1249 **AND** Fingerstick Glucose (POCT), , , TID AC, Mayda Gottlieb MD    ipratropium (ATROVENT) 0 02 % inhalation solution 0 5 mg, 0 5 mg, Nebulization, 4x Daily, Mathew Mello MD, 0 5 mg at 07/18/18 1110    levalbuterol (Iman Garden) inhalation solution 1 25 mg, 1 25 mg, Nebulization, 4x Daily, 1 25 mg at 07/18/18 1112 **AND** [DISCONTINUED] sodium chloride 0 9 % inhalation solution 3 mL, 3 mL, Nebulization, Q6H PRN, FATIMAH Healy    levalbuterol (XOPENEX) inhalation solution 1 25 mg, 1 25 mg, Nebulization, Q2H PRN **AND** sodium chloride 0 9 % inhalation solution 3 mL, 3 mL, Nebulization, Q2H PRN, Mathew Mello MD    levothyroxine tablet 125 mcg, 125 mcg, Oral, Daily, FATIMAH Brown, 125 mcg at 07/18/18 0640    Linaclotide CAPS 72 mcg, 72 mcg, Oral, Daily Before Breakfast, FATIMAH Orosco    LORazepam (ATIVAN) tablet 0 5 mg, 0 5 mg, Oral, Q8H PRN, Manuel Diego MD  roni Chattaroy  [START ON 7/19/2018] methylPREDNISolone sodium succinate (Solu-MEDROL) injection 20 mg, 20 mg, Intravenous, Daily, Abner Stewart MD    metoclopramide (REGLAN) injection 10 mg, 10 mg, Intravenous, Once, FATIMAH Healy    metolazone (ZAROXOLYN) tablet 2 5 mg, 2 5 mg, Oral, Once per day on Mon Wed Fri, Mathew Mello MD, 2 5 mg at 07/18/18 0665    metoprolol tartrate (LOPRESSOR) tablet 25 mg, 25 mg, Oral, BID, FATIMAH Orosco, 25 mg at 07/18/18 0950    ondansetron (ZOFRAN-ODT) dispersible tablet 4 mg, 4 mg, Oral, Q6H PRN, FATIMAH Orosco, 4 mg at 07/14/18 1739    polyethylene glycol (MIRALAX) packet 17 g, 17 g, Oral, Daily, FATIMAH Orosco, 17 g at 07/18/18 0951    pramipexole (MIRAPEX) tablet 1 mg, 1 mg, Oral, TID, FATIMAH Orosco, 1 mg at 07/18/18 0950    senna (SENOKOT) tablet 8 6 mg, 1 tablet, Oral, HS, FATIMAH Orosco, 8 6 mg at 07/17/18 2145    torsemide (DEMADEX) tablet 40 mg, 40 mg, Oral, Once per day on Mon Wed Fri, Mathew Mello MD, 40 mg at 07/18/18 0951    traMADol (ULTRAM) tablet 50 mg, 50 mg, Oral, Q4H PRN, Linda Chaim Gómez MD  Allergies   Allergen Reactions    Ketorolac Swelling     Toradol    Latex Rash    Wound Dressing Adhesive Rash     all current active meds have been reviewed    all current active meds have been reviewed    Physical Exam: Physical Exam   Constitutional: She appears well-developed  HENT:   Head: Normocephalic  Eyes: Pupils are equal, round, and reactive to light  No scleral icterus  Neck: Normal range of motion  No thyromegaly present  Cardiovascular: Normal heart sounds  Pulmonary/Chest: She is in respiratory distress  Abdominal: Soft  Musculoskeletal: She exhibits no deformity  Neurological: She is alert  Skin: Skin is warm  Psychiatric: She has a normal mood and affect  Nursing note and vitals reviewed          Temp (24hrs), Av 8 °F (36 6 °C), Min:97 2 °F (36 2 °C), Max:98 1 °F (36 7 °C)  Current: Temperature: 98 °F (36 7 °C)    Invasive Devices     Peripheral Intravenous Line            Peripheral IV 18 Left Antecubital 4 days    Peripheral IV 18 Right Antecubital 4 days                            Lab, Imaging and other studies:      CBC: Lab Results   Component Value Date    WBC 8 31 2018    WBC 9 1 2017    RBC 4 39 2018    RBC 4 45 2016       Results from last 7 days  Lab Units 18  0734 18  0434 18  0609 07/15/18  0545 18  0529 18  1615   PLATELETS Thousands/uL 195 183 169 168 178 219     CMP: Lab Results   Component Value Date     2018     (H) 2017    CL 93 (L) 2018    CL 96 2017    CO2 45 (H) 2018    CO2 35 (H) 2017    ANIONGAP 0 (L) 2018    ANIONGAP 10 8 2016    BUN 23 2018    BUN 14 2018    CREATININE 0 87 2018    CREATININE 0 78 2017    GLUCOSE 198 (H) 2018    GLUCOSE 144 (H) 2017    CALCIUM 9 0 2018    CALCIUM 9 1 2017    AST 25 2018    AST 18 2017    ALT 31 2018    ALT 26 08/01/2017    ALKPHOS 88 07/13/2018    ALKPHOS 70 08/01/2017    PROT 7 7 07/13/2018    PROT 6 9 08/01/2017    BILITOT 0 50 07/13/2018    BILITOT 0 2 08/01/2017    EGFR 64 07/18/2018       Urinalysis: Lab Results   Component Value Date    COLORU Yellow 02/22/2018    COLORU Yellow 07/12/2016    CLARITYU Clear 02/22/2018    CLARITYU Transparent 07/12/2016    SPECGRAV 1 015 02/22/2018    SPECGRAV 1 025 07/12/2016    PHUR 5 5 02/22/2018    PHUR 6 0 07/12/2016    LEUKOCYTESUR Negative 02/22/2018    LEUKOCYTESUR Negative 07/12/2016    NITRITE Negative 02/22/2018    NITRITE Negative 07/12/2016    PROTEINUA Negative 02/22/2018    PROTEINUA Negative 07/12/2016    GLUCOSEU 500 (1/2%) (A) 02/22/2018    KETONESU Negative 02/22/2018    KETONESU Negative 07/12/2016    BILIRUBINUR Negative 02/22/2018    BILIRUBINUR Negative 07/12/2016    BLOODU Negative 02/22/2018    BLOODU Negative 07/12/2016       Lactic Acid:   Lab Results   Component Value Date    LACTICACID 1 6 07/14/2018        Cultures    Results from last 7 days  Lab Units 07/14/18  1213 07/13/18  2205 07/13/18  1617   BLOOD CULTURE   --   --  No Growth After 4 Days  No Growth After 4 Days  URINE CULTURE  No Growth <1000 cfu/mL  --   --    MRSA CULTURE ONLY   --  No Methicillin Resistant Staphlyococcus aureus (MRSA) isolated  --        I have personally reviewed pertinent reports  Principal Problem:    Severe sepsis ([facility] )  Active Problems:     Morbid obesity with BMI of 40 0-44 9, adult (HCC)    RLS (restless legs syndrome)    Depression    Obesity hypoventilation syndrome (HCC)    HTN (hypertension)    Diabetes mellitus with neuropathy (HCC)    Moderate episode of recurrent major depressive disorder (HCC)    Chronic obstructive pulmonary disease with acute exacerbation (HCC)    Diastolic CHF (HCC)    Chronic venous stasis dermatitis of both lower extremities    Physical deconditioning    Acute on chronic respiratory failure with hypoxia and hypercapnia ([facility] )

## 2018-07-18 NOTE — SOCIAL WORK
Did receive auth today from insurance, info given to [facility]  admissions  Auth number is [id number], level one for 3 days  Update on 7/20/18  Update to Kristina Peters, facility already had her info

## 2018-07-18 NOTE — ASSESSMENT & PLAN NOTE
On pramipexole and gabapentin  Reports intermittent worsening of symptoms  Would recommend increasing physical activity, PT/OT  Consider transitioning to Requip if remains symptomatic

## 2018-07-18 NOTE — [place] UNIVERSAL TRANSFER FORM
[place] UNIVERSAL TRANSFER FORM  (ALL ITEMS MUST BE COMPLETED)    1  TRANSFER FROM: [address] [facility]      [facility]  2  DATE OF TRANSFER: 7/18/2018                        TIME OF TRANSFER:     3  PATIENT NAME: Guero Chi,        YOB: 1940                             GENDER: female    4  LANGUAGE:   English    5  PHYSICIAN NAME:  Natalie Alicea MD                   PHONE: [phone]    6  CODE STATUS: Level 3 - DNAR and DNI        Out of Hospital DNR Attached: No    7  :                                      :  Extended Emergency Contact Information  Primary Emergency Contact: Avila Trevino   [address] Phone: [phone]  Relation: Significant Other  Secondary Emergency Contact: Lilli Hoang  Address: [place]   [place]  Mobile Phone: [phone]  Relation: Alyx Crawford Representative/Proxy:  Yes           Legal Guardian:  No             NAME OF:            HEALTH CARE REPRESENTATIVE/PROXY:                                         OR           LEGAL GUARDIAN, IF NOT :    Tanna Campbell                                           PHONE:  (Day)           (Night)                        (Cell) [phone]    8  REASON FOR TRANSFER: (Must include brief medical history and recent changes in physical function or cognition ) rehab            V/S: BP (!) 172/81 (BP Location: Right arm)   Pulse 80   Temp 98 °F (36 7 °C) (Oral)   Resp 19   Ht 5' 2" (1 575 m)   Wt 92 1 kg (203 lb 0 7 oz)   LMP  (LMP Unknown)   SpO2 93%   BMI 37 14 kg/m²           PAIN: Yes, Rating 8    9  PRIMARY DIAGNOSIS: Severe sepsis ([address] )      Secondary Diagnosis:         Pacemaker: No      Internal Defib: No          Mental Health Diagnosis (if Applicable):    10  RESTRAINTS: No     11  RESPIRATORY NEEDS: Oxygen Device nasal cannula,    12  ISOLATION/PRECAUTION: ESBL    13  ALLERGY: Ketorolac; Latex; and Wound dressing adhesive    14  SENSORY:       Vision Good, Hearing Good  and Speech Clear    15  SKIN CONDITION: Yes:  Otherexcoriation and reddness     16  DIET: Special (describe)diabetic and cardiac    17  IV ACCESS: None    18  PERSONAL ITEMS SENT WITH PATIENT: Otherclothing, cell phone, footwear    19  ATTACHED DOCUMENTS: MUST ATTACH CURRENT MEDICATION INFORMATION Face Sheet, MAR, Medication Reconciliation, TAR, POS, Diagnostic Studies, Labs, Respiratory Care, Advanced Directive, Code Status, Discharge Summary, PT Note, OT Note, ST Note and HX/PE    20  AT RISK ALERTS:Falls        HARM TO: N/A    21  WEIGHT BEARING STATUS:         Left Leg: Limited        Right Leg: Limited    22  MENTAL STATUS:Alert and Forgetful    23  FUNCTION:        Walk: With Help        Transfer: With Help        Toilet: With Help        Feed: Self    24  IMMUNIZATIONS/SCREENING:     Immunization History   Administered Date(s) Administered    Influenza Split High Dose Preservative Free IM 10/05/2017    Influenza TIV (IM) 09/09/2014    Pneumococcal Conjugate 13-Valent 06/17/2017    Pneumococcal Polysaccharide PPV23 04/04/2016       25  BOWEL: Continent and Date Last BM7/18/18    32  BLADDER: Incontinent    27   SENDING FACILITY CONTACT: Guillermo Villarreal                   Title: RN        Unit: [address] [place] [address]        Phone: [phone] (if known):        Title:        Unit:         Phone:         FORM PREFILLED BY (if applicable)       Title:       Unit:        Phone:         FORM COMPLETED BY Guillermo Villarreal RN      Title: KATIE      Phone: [phone]

## 2018-07-18 NOTE — SOCIAL WORK
Per Arcelia Abbasi pt is cleared for dc to [facility]  CM arranged for a 3:30 BLS  p/u w/[facility]  CM informed RN Jana Gaucher of same

## 2018-07-18 NOTE — CONSULTS
Consultation - Gabriela Hancock 77 y o  female MRN: [id number]    Unit/Bed#: [address] [place] [address] Encounter: [id number]      Identifying Data:  77years old white female is admitted at [facility] on July 13, 2018 with complaining of shortness of breath medical evaluation and treatment is in progress  Psychiatric consultation is asked for the depression anxiety  Patient examined history physical medications labs reviewed and noted discussed with the  at bedside  Patient is a poor historian but she was able to communicate her feelings and she reports that she feels depressed and sad because she cannot do things what she used to do and ongoing physical problems bothers her  Patient is on 2 antidepressant medications she was not aware and she could not tell me how long she has been taking it obviously she is getting medications from her family physician because she denies seen a psychiatrist   After the discussion I recommended that I would like to stop 1 depression medicine and stay with 1 medicine only for depression she agreed  Patient is a poor historian she lives with the  she denies smoking denies abusing alcohol or drugs patient has 3 daughters and she was working at [employer] for 28 years patient has 12th grade education she is retired patient is suffering from depression anxiety aortic wall disorder arthritis asthma cataract CHF COPD diabetes thyroid problem emphysema hearing loss thyroid cancer by history hypertension skin malignancy some memory loss mitral valve regurgitation history of myocardial infarction obesity chronic pain the hip pain restless leg syndrome skin cancer melanoma cholecystectomy eye surgery hysterectomy pericardium surgery bilateral total knee replacement steroid injection and hip thoracotomy thyroid surgery allergic to Toradol latex and adhesive tape      Chief Complaints:  Shortness of breath and depression    Family History:  Patient denies  Family History   Problem Relation Age of Onset    Cancer Father     Arthritis Father     Liver cancer Father     Diabetes Sister     Asthma Mother     No Known Problems Brother     Substance Abuse Neg Hx     Mental illness Neg Hx        Legal History:  Patient denies  Mental Status Exam:  77years old white female is resting in a Jackeline chair alert awake oriented to place and person little hard of hearing and confabulates with the questions some memory loss but she was able to tell me her age she knows that she is at the hospital for trouble in breathing poor sleep poor appetite complaining of not feeling good feels sad and depressed gets anxious and nervous wake somatic complaints  Patient denies auditory or visual hallucinations  Patient denies suicidal or homicidal ideations  No paranoia and no delusion elucidated  Poor concentration  Insight and judgments are adequate  History of Present Illness     HPI: Jose Mathis is a 77y o  year old female who presents with trouble in breathing    Consults      Historical Information   Past Psychiatric History:  Patient is on 2 antidepressant medications from family physician since long time which she is not aware of she never seen a psychiatrist no psychiatric admissions no suicide attempts in the past patient has no history of drug and alcohol abuse no DUIs no legal problems  Currently patient is on Lexapro 10 mg daily and Effexor XR 75 mg daily from the family physician  Patient is not under psychiatric care  Patient admits being depressed and sad since long time      Past Medical History:   Diagnosis Date    Anxiety     Aortic valve disorder     Arthritis     Asthma     Cataract     [facility]     COPD (chronic obstructive pulmonary disease) [facility]     Diabetes mellitus [facility] [address] [facility]     Disease of thyroid gland     Dry eye syndrome     Ectropion of left eye     last assessed: 10/17/2016    Emphysema, 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[address] )     Hearing loss     History of malignant neoplasm of thyroid     Hypertension     Malignant neoplasm of skin     Memory loss     Mitral valve regurgitation     Myocardial infarction (HCC)     Obesity hypoventilation syndrome (HCC)     Pain     right hip    Restless leg syndrome     Seasonal allergies     Skin cancer (melanoma) (HCC)     Sleep related hypoxia     Thyroid cancer ([facility] [address] [facility]     Urinary tract infection without hematuria 7/14/2018     Past Surgical History:   Procedure Laterality Date    CHOLECYSTECTOMY      EYE SURGERY      HYSTERECTOMY      OTHER SURGICAL HISTORY      removal of lesion    PERICARDIUM SURGERY      resection of pericardial cyst or tumor    HI ERCP DX COLLECTION SPECIMEN BRUSHING/WASHING N/A 5/30/2017    Procedure: ENDOSCOPIC RETROGRADE CHOLANGIOPANCREATOGRAPHY (ERCP); SPHINCTEROTOMY;  Surgeon: Lg Arnold MD;  Location:  GI LAB;   Service: Gastroenterology   Adalberto Bryson JOINT Right 3/30/2018    Procedure: Right Sacroiliac Injection;  Surgeon: Jolly Mcclure MD;  Location: [facility] MAIN OR;  Service: Pain Management     REPLACEMENT TOTAL KNEE Left     REPLACEMENT TOTAL KNEE Right     REPLACEMENT TOTAL KNEE BILATERAL      SKIN BIOPSY      melanoma of back    STEROID INJECTION HIP Right 12/21/2017    Procedure: TROCHANTERIC BURSA INJECTION RIGHT;  Surgeon: Jolly Mcclure MD;  Location: [place] [address] MAIN OR;  Service: Pain Management     THORACOSCOPY      (therapeutic) w/excision of pericardial cyst    THORACOTOMY Right     at least 40 years, for pericardial cyst    THYROID SURGERY      THYROIDECTOMY, PARTIAL      For thyroid cancer     Social History   History   Alcohol Use No     History   Drug Use No     History   Smoking Status    Never Smoker   Smokeless Tobacco    Never Used       Meds/Allergies   current meds:   Current Facility-Administered Medications   Medication Dose Route Frequency    aspirin tablet 325 mg  325 mg Oral Daily  docusate sodium (COLACE) capsule 100 mg  100 mg Oral BID    erythromycin (ILOTYCIN) 0 5 % ophthalmic ointment 0 5 inch  0 5 inch Both Eyes Q12H [facility]    escitalopram (LEXAPRO) tablet 10 mg  10 mg Oral Daily    folic acid (FOLVITE) tablet 1 mg  1 mg Oral Daily    gabapentin (NEURONTIN) capsule 100 mg  100 mg Oral TID    heparin (porcine) subcutaneous injection 5,000 Units  5,000 Units Subcutaneous Q8H [facility]    insulin glargine (LANTUS) subcutaneous injection 20 Units 0 2 mL  20 Units Subcutaneous Q12H [facility]    insulin lispro (HumaLOG) 100 units/mL subcutaneous injection 14 Units  14 Units Subcutaneous TID With Meals    insulin lispro (HumaLOG) 100 units/mL subcutaneous injection 4-20 Units  4-20 Units Subcutaneous TID With Meals    ipratropium (ATROVENT) 0 02 % inhalation solution 0 5 mg  0 5 mg Nebulization 4x Daily    levalbuterol (XOPENEX) inhalation solution 1 25 mg  1 25 mg Nebulization 4x Daily    levalbuterol (XOPENEX) inhalation solution 1 25 mg  1 25 mg Nebulization Q2H PRN    And    sodium chloride 0 9 % inhalation solution 3 mL  3 mL Nebulization Q2H PRN    levothyroxine tablet 125 mcg  125 mcg Oral Daily    Linaclotide CAPS 72 mcg  72 mcg Oral Daily Before Breakfast    meropenem (MERREM) 1,000 mg in sodium chloride 0 9 % 100 mL IVPB  1,000 mg Intravenous Q12H    methylPREDNISolone sodium succinate (Solu-MEDROL) injection 20 mg  20 mg Intravenous Q12H [facility]    metoclopramide (REGLAN) injection 10 mg  10 mg Intravenous Once    metolazone (ZAROXOLYN) tablet 2 5 mg  2 5 mg Oral Once per day on Mon Wed Fri    metoprolol tartrate (LOPRESSOR) tablet 25 mg  25 mg Oral BID    ondansetron (ZOFRAN-ODT) dispersible tablet 4 mg  4 mg Oral Q6H PRN    polyethylene glycol (MIRALAX) packet 17 g  17 g Oral Daily    pramipexole (MIRAPEX) tablet 1 mg  1 mg Oral TID    senna (SENOKOT) tablet 8 6 mg  1 tablet Oral HS    torsemide (DEMADEX) tablet 40 mg  40 mg Oral Once per day on Mon Wed Fri    traMADol (ULTRAM) tablet 50 mg  50 mg Oral Q6H PRN    venlafaxine (EFFEXOR) tablet 75 mg  75 mg Oral Daily    and PTA meds:    Prescriptions Prior to Admission   Medication    albuterol (ACCUNEB) 1 25 MG/3ML nebulizer solution    ascorbic acid (VITAMIN C) 500 mg tablet    aspirin 325 mg tablet    COD LIVER OIL PO    escitalopram (LEXAPRO) 10 mg tablet    folic acid (FOLVITE) 1 mg tablet    gabapentin (NEURONTIN) 100 mg capsule    insulin lispro (HumaLOG) 100 units/mL injection    levothyroxine 125 mcg tablet    Linaclotide (LINZESS) 72 MCG CAPS    Multiple Vitamins-Minerals (MULTIVITAMIN ADULT PO)    pramipexole (MIRAPEX) 1 mg tablet    torsemide (DEMADEX) 20 mg tablet    acetaminophen (TYLENOL) 325 mg tablet    lactulose (CHRONULAC) 10 g/15 mL solution    metolazone (ZAROXOLYN) 2 5 mg tablet    metoprolol tartrate (LOPRESSOR) 25 mg tablet    ondansetron (ZOFRAN-ODT) 4 mg disintegrating tablet    oxyCODONE-acetaminophen (PERCOCET) 5-325 mg per tablet    polyethylene glycol (MIRALAX) 17 g packet    potassium chloride (MICRO-K) 10 MEQ CR capsule    tiotropium (SPIRIVA HANDIHALER) 18 mcg inhalation capsule    traMADol (ULTRAM) 50 mg tablet    venlafaxine (EFFEXOR) 75 mg tablet     Allergies   Allergen Reactions    Ketorolac Swelling     Toradol    Latex Rash    Wound Dressing Adhesive Rash       Objective   Vitals: Blood pressure 140/67, pulse 71, temperature (!) 97 2 °F (36 2 °C), temperature source Axillary, resp  rate 18, height 5' 2" (1 575 m), weight 92 1 kg (203 lb 0 7 oz), SpO2 99 %, not currently breastfeeding  Routine Lab Results:   Admission on 07/13/2018   No results displayed because visit has over 200 results  Diagnosis:  Major depression recurrent  Mild dementia with depression  Anxiety  Multiple medical problems including cancers  Plan:  Continue Lexapro 10 mg daily  Discontinue Effexor XR 75 mg daily    Ativan 0 5 mg p  o  q 8 hours p r n  for anxiety  Psychotherapy  Psychiatric follow-up recommended after the discharge  Discussed with the   I will follow up during the hospital stay      Katlyn Beaver MD

## 2018-07-18 NOTE — SOCIAL WORK
Per Shelli Pinto CM pt is cleared for dc to [facility]  CM arranged for 3:30pm BLS p/u w/ [facility], pt is on 2L 02   [facility] notified of pts laxtex allergy  CM informed pt of same, pt is [facility]  She stated she has been there before and likes it there  CM offered to notify family of her transfer  Pt requested CM notify her daughter Oli Cates  CM left Lilli a vm with same

## 2018-07-18 NOTE — DISCHARGE SUMMARY
Discharge Summary - [facility]    Patient Information: Analisa Lockhart 77 y o  female MRN: [id number]  Unit/Bed#: [address] [place] [address] Encounter: [id number]    Discharging Physician / Practitioner: Abner Stewart MD  PCP: Lina Horta MD  Admission Date: 7/13/2018  Discharge Date: 07/18/18    Reason for Admission: Shortness of Breath (as per daughter, patient was fine at the beginning of the week  Needs to be checked for blood clots as per daughter  Gone "down hill" since yesterday )      Discharge Diagnoses:     Principal Problem (Resolved):    Severe sepsis ([facility] [address] )  Active Problems:    Diabetes mellitus with neuropathy ([facility] [address] )    Chronic bronchitis ([facility] [address] )    Morbid obesity with BMI of 40 0-44 9, adult (HCC)    Diastolic CHF ([facility] [address] )    Physical deconditioning    RLS (restless legs syndrome)    Depression    Obesity hypoventilation syndrome (HCC)    HTN (hypertension)    Moderate episode of recurrent major depressive disorder ([facility])    Chronic venous stasis dermatitis of both lower extremities  Resolved Problems:    Acute on chronic respiratory failure with hypoxia and hypercapnia ([facility])        * Severe sepsis (HCC)resolved as of 7/18/2018   Assessment & Plan    Source unclear, possibly UTI, less likely early PNA  Hx of ESBL positive klebsiella bacteremia June 2017  CXR- No acute cardiopulmonary disease   On presentation and on repeated chest x-ray on 07/17  Urine culture with no growth, but was collected after initiation of antibiotics  Blood cx no growth   ID consulted, input appreciated  Treated with IV meropenem day 5, completing today  Procalcitonin less than 0 07          Chronic bronchitis (HCC)   Assessment & Plan    Cont bronchodilators  Continues to improve slowly  Will transition to oral prednisone at lower dose  Pulmonary input appreciated  Continue level albuterol, Atrovent and chest PT  Supplemental oxygen as needed  PT/OT        Diabetes mellitus with neuropathy [facility])   Assessment & Plan    Lab Results   Component Value Date    HGBA1C 9 1% 04/27/2018       Recent Labs      07/17/18   1554  07/17/18   2121  07/18/18   0738  07/18/18   1203   POCGLU  242*  254*  199*  397*       Blood Sugar Average: Last 72 hrs:  (P) 323 4     · Required insulin infusion in ICU  · Remained with significant hyperglycemia on sliding scale and pre meal insulin on IV steroids  · Added basal insulin with better control  · Continue pre meal insulin and corrective scale  · Monitor and adjust as needed        Acute on chronic respiratory failure with hypoxia and hypercapnia (HCC)resolved as of 7/18/2018   Assessment & Plan    Likely secondary to severe sepsis with underlying obesity hypoventilation and COPD,? Poor compliance with meds   Presented with fevers, SOB, hypoxia sating 64% on RA, requiring BIPAP    Normally on 2L O2 supplemental oxygen  [facility]  Oxygenation is improving  Continue supplemental oxygen  Now at baseline        Physical deconditioning   Assessment & Plan    Per  she cannot take care of herself and he cant care for her either  Does not take her diuretic because cant get to the bathroom fast enough  PT/OT, anticipate rehab        Diastolic CHF ([facility] [address] )   Assessment & Plan    · Cont diuretic per MWF schedule  · Monitor I/O, daily weights        Morbid obesity with BMI of 40 0-44 9, adult (HCC)   Assessment & Plan    · Cardiac/diabetic diet  · Nutrition consulted to follow        Chronic venous stasis dermatitis of both lower extremities   Assessment & Plan    · Cont wound care with adaptic, gauze and cling wrap  · Recent venous Doppler negative for DVT        Moderate episode of recurrent major depressive disorder (HCC)   Assessment & Plan    · Cont lexapro/effexor        HTN (hypertension)   Assessment & Plan    · Cont metoprolol        Obesity hypoventilation syndrome ([facility] [address] )   Assessment & Plan    Discussed with Pulmonary, poor tolerance to BiPAP at night  Recommended supplemental oxygen        Depression   Assessment & Plan    Overwhelmed due to her physical condition and multiple medical problems  Counseled  Seen by Psychiatry recommended to continue Lexapro and Ativan as needed  Venlafaxine was discontinued  Follow-up Psychiatry as outpatient        RLS (restless legs syndrome)   Assessment & Plan    On pramipexole and gabapentin  Reports intermittent worsening of symptoms  Would recommend increasing physical activity, PT/OT  Consider transitioning to Requip if remains symptomatic          Acquired hypothyroidism   Assessment & Plan    · Cont synthroid            Consultations During Hospital Stay:  IP CONSULT TO CASE MANAGEMENT  IP CONSULT TO CASE MANAGEMENT  IP CONSULT TO INFECTIOUS DISEASES  IP CONSULT TO PSYCHIATRY    Procedures Performed:     · None    Significant Findings:     · Blood cultures were negative  · Urine culture showed no growth  · MRSA surveillance was negative    Imaging while in hospital:    Xr Chest Portable    Result Date: 7/17/2018  Narrative: CHEST INDICATION:   Cough  COMPARISON:  7/13/2018 EXAM PERFORMED/VIEWS:  XR CHEST PORTABLE FINDINGS: The cardiac silhouette is without change from the prior study  Mild subsegmental atelectasis, left lower lung field  Lungs otherwise stable  No pneumothorax, no significant effusion  No pulmonary edema  Osseous structures appear within normal limits for patient age  Impression: Mild left lower lung field subsegmental atelectasis, otherwise no acute pulmonary disease or change from prior Workstation performed: [id number]     Xr Chest 1 View Portable    Result Date: 7/13/2018  Narrative: CHEST INDICATION:   sepsis  "SOB" COMPARISON:  AP chest 2/22/2018 EXAM PERFORMED/VIEWS:  XR CHEST PORTABLE FINDINGS: Cardiomediastinal silhouette appears unremarkable  The lungs are clear  No pneumothorax or pleural effusion  Osseous structures appear within normal limits for patient age       Impression: No acute cardiopulmonary disease  Workstation performed: [id number]     Vas Reflux Lower Limb Venous Duplex Study With Reflux Assessment, Complete Bilateral    Result Date: 7/12/2018  Narrative:  [facility] REPORT CLINICAL: Indications: Patient presents with history of Bilateral lower extremity varicose veins and ulcerations on ankles  Operative History: No history of cardiovascular surgery  FINDINGS:  Segment         Right   Left                            AP(mm)  AP(mm)  GSV Inguinal      11 7    13 8  GSV Mid Thigh      5 4     6 4  GSV Dist Thigh             3 8  GSV Knee           5 3     3 1  SSV Mid Calf               3 1     CONCLUSION:  Impression: RIGHT LIMB: No evidence of deep venous incompetence  The great saphenous vein is competent  The great saphenous vein remains within the saphenous compartment in the thigh  The small saphenous vein is competent and communicates with the popliteal vein  There is no evidence of incompetent perforators in the thigh or calf  There is no evidence of deep vein thrombosis in the CFV, the proximal PFV, the femoral vein and the popliteal vein  LEFT LIMB: No evidence of deep venous incompetence  The great saphenous vein is competent  The great saphenous vein remains within the saphenous compartment in the thigh  The small saphenous vein is competent and communicates with the popliteal vein  There is no evidence of incompetent perforators in the thigh or calf  There is no evidence of deep vein thrombosis in the CFV, the proximal PFV, the femoral vein and the popliteal vein  Study performed with patient in standing and steep Reverse Trendelenburg  Tech note: Despite large diameter veins unable to provoke reflux by augmentation and valsalva maneuvers secondary to pulsatile venous flow  Bandaging over wounds was not removed, therefore the bilateral ankles and distal calfs were not scanned    SIGNATURE: Electronically Signed by: Gaetana Meckel, MD on 2018-07-12 08:04:12 PM      Incidental Findings:   · None    Test Results Pending at Discharge (will require follow up):   · As per After Visit Summary     Outpatient Tests Requested:  · Monitor BMP    Complications:  See HPI    Hospital Course:     Anju Fuentes is a 77 y o  female patient with multiple comorbidities including obesity hypoventilation, chronic respiratory failure, chronic bronchitis, hypertension, diastolic CHF, uncontrolled diabetes mellitus type 2, depression/anxiety, restless leg syndrome who originally presented to the hospital on 7/13/2018 due to shortness of breath, cough fever and chills  Patient was evaluated emergency room noted to be in severe sepsis source of infection was presumed to be UTI versus lower respiratory tract infection  Patient was admitted to step-down unit, required BiPAP support with close monitoring of the respiratory status  Was treated with bronchodilators, IV antibiotics  Based on prior cultures antibiotics were changed to meropenem  Patient clinically improved and subsequently transferred to the floor  Patient required IV steroids for chronic bronchitis exacerbation which associated hyperglycemia  Insulin regimen was optimized and patient was started on long and short-acting insulin  Patient improved clinically, respiratory status gradually improved to the baseline  Sepsis resolved  Patient was evaluated by Physical therapy and recommended rehab  Patient will be discharged to rehab will continue on PT/OT  Will also need require optimization of the pulmonary status with chest PT, incentive spirometry  Patient will be continued on supplemental oxygen for obesity hypoventilation as she is not able to tolerate BiPAP  Close blood sugar monitoring and optimization of the insulin regimen  Please see above list of diagnoses and related plan for additional information         Condition at Discharge: fair     Discharge Day Visit / Exam:     Subjective:  Reports that her breathing is improving  Less cough mainly dry  Denies any chest pain or shortness of breath  Sitting up in the chair  Complains of diffuse pain and restless leg symptoms      Vitals: Blood Pressure: (!) 172/81 (07/18/18 1346)  Pulse: 80 (07/18/18 1346)  Temperature: 98 °F (36 7 °C) (07/18/18 1346)  Temp Source: Oral (07/18/18 1346)  Respirations: 19 (07/18/18 1346)  Height: 5' 2" (157 5 cm) (07/13/18 2123)  Weight - Scale: 92 1 kg (203 lb 0 7 oz) (07/18/18 0600)  SpO2: 93 % (07/18/18 1346)  Exam:   Physical Exam   Constitutional: She is oriented to person, place, and time  She appears well-developed  No distress  Morbid obese, Deconditioned   HENT:   Head: Normocephalic and atraumatic  Nose: Nose normal    Eyes: Conjunctivae and EOM are normal  Pupils are equal, round, and reactive to light  Neck: Normal range of motion  Neck supple  No JVD present  Cardiovascular: Normal rate and regular rhythm  Exam reveals no gallop and no friction rub  Murmur heard  Pulmonary/Chest: Effort normal  No respiratory distress  She has decreased breath sounds  She has no wheezes (Few scattered)  She has no rhonchi  She has no rales  She exhibits no tenderness  Abdominal: Soft  Bowel sounds are normal  She exhibits no distension  There is no tenderness  There is no rebound and no guarding  Musculoskeletal: She exhibits edema (Chronic venous stasis and congestion)  Neurological: She is alert and oriented to person, place, and time  She displays no tremor  No cranial nerve deficit or sensory deficit  GCS eye subscore is 4  GCS verbal subscore is 5  GCS motor subscore is 6  Skin: Skin is warm and dry  No rash noted  Psychiatric: She has a normal mood and affect  Discharge instructions/Information to patient and family:(Discharge Medications and Follow up):   See after visit summary for information provided to patient and family        Provisions for Follow-Up Care:  See after visit summary for information related to follow-up care and any pertinent home health orders  Disposition: Home    Planned Readmission:  No     Discharge Statement:  I spent 40 minutes discharging the patient  This time was spent on the day of discharge  I had direct contact with the patient on the day of discharge  Greater than 50% of the total time was spent examining patient, answering all patient questions, arranging and discussing plan of care with patient as well as directly providing post-discharge instructions  Additional time then spent on discharge activities  Coordinated with ID and Pulmonary at the time of discharge  Discharge Medications:  See after visit summary for reconciled discharge medications provided to patient and family  ** Please Note:  Dictation voice to text software may have been used in the creation of this document   **

## 2018-08-08 ENCOUNTER — OFFICE VISIT (OUTPATIENT)
Dept: FAMILY MEDICINE CLINIC | Facility: CLINIC | Age: 78
End: 2018-08-08
Payer: COMMERCIAL

## 2018-08-08 VITALS
SYSTOLIC BLOOD PRESSURE: 118 MMHG | RESPIRATION RATE: 18 BRPM | WEIGHT: 210.8 LBS | DIASTOLIC BLOOD PRESSURE: 60 MMHG | HEIGHT: 59 IN | OXYGEN SATURATION: 98 % | BODY MASS INDEX: 42.5 KG/M2 | HEART RATE: 71 BPM | TEMPERATURE: 98 F

## 2018-08-08 DIAGNOSIS — J20.9 ACUTE BRONCHITIS, UNSPECIFIED ORGANISM: Primary | ICD-10-CM

## 2018-08-08 DIAGNOSIS — A41.9 SEPSIS, DUE TO UNSPECIFIED ORGANISM: ICD-10-CM

## 2018-08-08 DIAGNOSIS — R06.02 SHORTNESS OF BREATH: ICD-10-CM

## 2018-08-08 PROCEDURE — 99496 TRANSJ CARE MGMT HIGH F2F 7D: CPT | Performed by: FAMILY MEDICINE

## 2018-08-08 RX ORDER — ERYTHROMYCIN 5 MG/G
OINTMENT OPHTHALMIC
COMMUNITY
End: 2018-08-08 | Stop reason: ALTCHOICE

## 2018-08-08 RX ORDER — LORATADINE 10 MG/1
10 TABLET ORAL DAILY PRN
COMMUNITY
End: 2018-10-11 | Stop reason: ALTCHOICE

## 2018-08-08 NOTE — PROGRESS NOTES
Assessment/Plan:  Acute bronchitis resolved  Shortness of breath resolved  His sepsis resolved likely secondary to UTI  Chest x-ray next week  Begin physical therapy next week  Reviewed medication regimen  Call with any questions or concerns  Call immediately for change in symptomatology  Follow-up 1 week  Subjective:     Patient ID: Sourav Montejo is a 77 y o  female  This is a 78-year-old female who presents after hospitalization for sepsis and shortness of breath  Review of Systems   Constitutional: Negative  HENT: Negative  Respiratory: Negative  Cardiovascular: Negative  Neurological: Negative  Objective:     Physical Exam   Constitutional: She is oriented to person, place, and time  She appears well-developed and well-nourished  HENT:   Head: Normocephalic and atraumatic  Cardiovascular: Normal rate and regular rhythm  Exam reveals no gallop and no friction rub  Murmur: 1/6 systolic murmur  Pulmonary/Chest: Effort normal and breath sounds normal  No respiratory distress  She has no wheezes  Rales: Few rhonchi  She exhibits no tenderness  Lymphadenopathy:     She has no cervical adenopathy  Neurological: She is alert and oriented to person, place, and time  No cranial nerve deficit  Skin: Erythema: cerumen impaction

## 2018-08-10 ENCOUNTER — TELEPHONE (OUTPATIENT)
Dept: FAMILY MEDICINE CLINIC | Facility: CLINIC | Age: 78
End: 2018-08-10

## 2018-08-10 DIAGNOSIS — Z79.4 TYPE 2 DIABETES MELLITUS WITH DIABETIC POLYNEUROPATHY, WITH LONG-TERM CURRENT USE OF INSULIN (HCC): Primary | ICD-10-CM

## 2018-08-10 DIAGNOSIS — G89.4 CHRONIC PAIN SYNDROME: ICD-10-CM

## 2018-08-10 DIAGNOSIS — E11.42 TYPE 2 DIABETES MELLITUS WITH DIABETIC POLYNEUROPATHY, WITH LONG-TERM CURRENT USE OF INSULIN (HCC): Primary | ICD-10-CM

## 2018-08-10 RX ORDER — TRAMADOL HYDROCHLORIDE 50 MG/1
50 TABLET ORAL EVERY 6 HOURS PRN
Qty: 45 TABLET | Refills: 0 | Status: SHIPPED | OUTPATIENT
Start: 2018-08-10 | End: 2018-10-11 | Stop reason: ALTCHOICE

## 2018-08-10 RX ORDER — INSULIN GLARGINE 100 [IU]/ML
20 INJECTION, SOLUTION SUBCUTANEOUS EVERY 12 HOURS SCHEDULED
Qty: 10 ML | Refills: 3 | Status: SHIPPED | OUTPATIENT
Start: 2018-08-10 | End: 2018-10-11 | Stop reason: ALTCHOICE

## 2018-08-17 ENCOUNTER — TELEPHONE (OUTPATIENT)
Dept: FAMILY MEDICINE CLINIC | Facility: CLINIC | Age: 78
End: 2018-08-17

## 2018-08-17 ENCOUNTER — OFFICE VISIT (OUTPATIENT)
Dept: FAMILY MEDICINE CLINIC | Facility: CLINIC | Age: 78
End: 2018-08-17
Payer: COMMERCIAL

## 2018-08-17 VITALS
BODY MASS INDEX: 44.23 KG/M2 | TEMPERATURE: 98.7 F | HEART RATE: 104 BPM | SYSTOLIC BLOOD PRESSURE: 142 MMHG | WEIGHT: 219.4 LBS | DIASTOLIC BLOOD PRESSURE: 60 MMHG | HEIGHT: 59 IN | OXYGEN SATURATION: 90 % | RESPIRATION RATE: 18 BRPM

## 2018-08-17 DIAGNOSIS — L03.116 CELLULITIS OF BOTH LOWER EXTREMITIES: ICD-10-CM

## 2018-08-17 DIAGNOSIS — L03.115 CELLULITIS OF BOTH LOWER EXTREMITIES: ICD-10-CM

## 2018-08-17 DIAGNOSIS — R60.9 PERIPHERAL EDEMA: Primary | ICD-10-CM

## 2018-08-17 PROCEDURE — 99213 OFFICE O/P EST LOW 20 MIN: CPT | Performed by: FAMILY MEDICINE

## 2018-08-17 RX ORDER — INSULIN ASPART 100 [IU]/ML
40 INJECTION, SUSPENSION SUBCUTANEOUS
COMMUNITY
Start: 2018-08-11 | End: 2018-10-22

## 2018-08-17 RX ORDER — CEFADROXIL 500 MG/1
500 CAPSULE ORAL 2 TIMES DAILY
Qty: 20 CAPSULE | Refills: 0 | Status: SHIPPED | OUTPATIENT
Start: 2018-08-17 | End: 2018-08-27

## 2018-08-17 RX ORDER — FUROSEMIDE 20 MG/1
20 TABLET ORAL DAILY
Qty: 30 TABLET | Refills: 0 | Status: SHIPPED | OUTPATIENT
Start: 2018-08-17 | End: 2018-08-24 | Stop reason: SDUPTHER

## 2018-08-17 NOTE — TELEPHONE ENCOUNTER
Needs a rx for Eazy Touch Strips  Tests 4 x a day  #1 Box   3 refills  OUR [facility]

## 2018-08-17 NOTE — PROGRESS NOTES
Assessment/Plan:  Peripheral edema  Cellulitis lower extremities  Furosemide 20 milligrams daily  Duricef 500 milligrams b i d  for 10 days  Elevate extremities  Compression stockings daily  Follow-up 1 week  Call with any questions or concerns  Call immediately for a change in symptomatology  Subjective:     Patient ID: Jyothi Johnson is a 77 y o  female  This is a 74-year-old female who is following up for peripheral edema  Several weeks ago the patient was hospitalized for sepsis  Review of Systems   Constitutional: Negative  Respiratory: Negative  Cardiovascular: Positive for leg swelling  Neurological: Negative  Objective:     Physical Exam   Constitutional: She is oriented to person, place, and time  She appears well-developed and well-nourished  HENT:   Head: Normocephalic and atraumatic  Cardiovascular: Normal rate, regular rhythm and normal heart sounds  Exam reveals no gallop and no friction rub  Murmur: 4-9/1 systolic murmur  Pulmonary/Chest: Effort normal and breath sounds normal  No respiratory distress  She has no wheezes  She has no rales  She exhibits no tenderness  +2/4 peripheral edema with erythema of both lower extremities below the knee  Neurological: She is alert and oriented to person, place, and time  No cranial nerve deficit

## 2018-08-24 ENCOUNTER — OFFICE VISIT (OUTPATIENT)
Dept: FAMILY MEDICINE CLINIC | Facility: CLINIC | Age: 78
End: 2018-08-24
Payer: COMMERCIAL

## 2018-08-24 VITALS
HEIGHT: 59 IN | HEART RATE: 82 BPM | RESPIRATION RATE: 20 BRPM | OXYGEN SATURATION: 92 % | DIASTOLIC BLOOD PRESSURE: 60 MMHG | BODY MASS INDEX: 43.95 KG/M2 | SYSTOLIC BLOOD PRESSURE: 118 MMHG | TEMPERATURE: 98.4 F | WEIGHT: 218 LBS

## 2018-08-24 DIAGNOSIS — I51.89 DIASTOLIC DYSFUNCTION: ICD-10-CM

## 2018-08-24 DIAGNOSIS — R26.89 BALANCE PROBLEMS: Primary | ICD-10-CM

## 2018-08-24 DIAGNOSIS — R60.9 PERIPHERAL EDEMA: ICD-10-CM

## 2018-08-24 PROCEDURE — 99213 OFFICE O/P EST LOW 20 MIN: CPT | Performed by: FAMILY MEDICINE

## 2018-08-24 RX ORDER — FUROSEMIDE 20 MG/1
20 TABLET ORAL DAILY
Qty: 30 TABLET | Refills: 0 | Status: SHIPPED | OUTPATIENT
Start: 2018-08-24 | End: 2019-01-14 | Stop reason: SDUPTHER

## 2018-08-24 NOTE — PROGRESS NOTES
Assessment/Plan:  Peripheral edema  Continue current medications  Follow-up next week  BMP today as Lasix may need to be increased  Continue to elevate extremities  Begin PT  The patient also knees to see her pulmonary physician to adjust her CPAP mask since she is unable to use it  Call with any questions or concerns  Call immediately for change in symptomatology  Diagnoses and all orders for this visit:    Peripheral edema  -     Basic metabolic panel  -     furosemide (LASIX) 20 mg tablet; Take 1 tablet (20 mg total) by mouth daily for 30 days    Diastolic dysfunction  -     metoprolol tartrate (LOPRESSOR) 25 mg tablet; Take 1 tablet (25 mg total) by mouth 2 (two) times a day for 90 days          Subjective:     Patient ID: Suzette Escoto is a 77 y o  female  This is a 19-year-old female who is following up for evaluation peripheral edema and cellulitis  Review of Systems   Constitutional: Negative  Respiratory: Negative  Cardiovascular: Positive for leg swelling  Negative for chest pain and palpitations  Neurological: Negative  Objective:     Physical Exam   Constitutional: She appears well-developed and well-nourished  HENT:   Head: Normocephalic and atraumatic  Cardiovascular: Normal rate, regular rhythm and normal heart sounds  Exam reveals no gallop and no friction rub  Murmur: 1/6 systolic murmur  +2/4 peripheral edema  Slight erythema persists  Pulmonary/Chest: Effort normal and breath sounds normal  No respiratory distress  She has no wheezes  She has no rales  She exhibits no tenderness

## 2018-08-25 LAB
BUN SERPL-MCNC: 13 MG/DL (ref 8–27)
BUN/CREAT SERPL: 16 (ref 12–28)
CALCIUM SERPL-MCNC: 8 MG/DL (ref 8.7–10.3)
CHLORIDE SERPL-SCNC: 95 MMOL/L (ref 96–106)
CO2 SERPL-SCNC: 31 MMOL/L (ref 20–29)
CREAT SERPL-MCNC: 0.83 MG/DL (ref 0.57–1)
GLUCOSE SERPL-MCNC: 268 MG/DL (ref 65–99)
POTASSIUM SERPL-SCNC: 4.1 MMOL/L (ref 3.5–5.2)
SL AMB EGFR AFRICAN AMERICAN: 78 ML/MIN/1.73
SL AMB EGFR NON AFRICAN AMERICAN: 67 ML/MIN/1.73
SODIUM SERPL-SCNC: 139 MMOL/L (ref 134–144)

## 2018-08-30 ENCOUNTER — OFFICE VISIT (OUTPATIENT)
Dept: FAMILY MEDICINE CLINIC | Facility: CLINIC | Age: 78
End: 2018-08-30
Payer: COMMERCIAL

## 2018-08-30 VITALS
OXYGEN SATURATION: 91 % | WEIGHT: 225 LBS | DIASTOLIC BLOOD PRESSURE: 68 MMHG | TEMPERATURE: 98.7 F | BODY MASS INDEX: 45.36 KG/M2 | SYSTOLIC BLOOD PRESSURE: 130 MMHG | HEIGHT: 59 IN | HEART RATE: 80 BPM | RESPIRATION RATE: 20 BRPM

## 2018-08-30 DIAGNOSIS — L97.909 CHRONIC CUTANEOUS VENOUS STASIS ULCER (HCC): ICD-10-CM

## 2018-08-30 DIAGNOSIS — I50.32 CHRONIC DIASTOLIC CONGESTIVE HEART FAILURE (HCC): Chronic | ICD-10-CM

## 2018-08-30 DIAGNOSIS — I87.2 CHRONIC VENOUS STASIS DERMATITIS OF BOTH LOWER EXTREMITIES: Chronic | ICD-10-CM

## 2018-08-30 DIAGNOSIS — M15.9 PRIMARY OSTEOARTHRITIS INVOLVING MULTIPLE JOINTS: ICD-10-CM

## 2018-08-30 DIAGNOSIS — I83.009 CHRONIC CUTANEOUS VENOUS STASIS ULCER (HCC): ICD-10-CM

## 2018-08-30 DIAGNOSIS — E66.01 MORBID OBESITY WITH BMI OF 40.0-44.9, ADULT (HCC): Chronic | ICD-10-CM

## 2018-08-30 DIAGNOSIS — R60.9 PERIPHERAL EDEMA: Primary | ICD-10-CM

## 2018-08-30 DIAGNOSIS — Z23 NEED FOR INFLUENZA VACCINATION: ICD-10-CM

## 2018-08-30 DIAGNOSIS — I10 ESSENTIAL HYPERTENSION: Chronic | ICD-10-CM

## 2018-08-30 PROCEDURE — 90471 IMMUNIZATION ADMIN: CPT

## 2018-08-30 PROCEDURE — 99214 OFFICE O/P EST MOD 30 MIN: CPT | Performed by: FAMILY MEDICINE

## 2018-08-30 PROCEDURE — 90662 IIV NO PRSV INCREASED AG IM: CPT

## 2018-08-30 PROCEDURE — 3075F SYST BP GE 130 - 139MM HG: CPT | Performed by: FAMILY MEDICINE

## 2018-08-30 PROCEDURE — 3078F DIAST BP <80 MM HG: CPT | Performed by: FAMILY MEDICINE

## 2018-08-30 RX ORDER — ALBUTEROL SULFATE 90 UG/1
AEROSOL, METERED RESPIRATORY (INHALATION)
COMMUNITY
End: 2018-10-11 | Stop reason: ALTCHOICE

## 2018-08-30 RX ORDER — AZELASTINE HYDROCHLORIDE 137 UG/1
SPRAY, METERED NASAL
COMMUNITY
Start: 2018-08-29 | End: 2018-10-11 | Stop reason: ALTCHOICE

## 2018-08-30 RX ORDER — CEFADROXIL 500 MG/1
CAPSULE ORAL
COMMUNITY
End: 2018-09-07

## 2018-08-30 NOTE — PATIENT INSTRUCTIONS
CONTINUE CURRENT TREATMENT PLAN  INCREASE LASIX TO 40 MG DAILY  MONITOR DIETARY SODIUM INTAKE  CALL Tuesday WITH WEIGHT  RV 2 WEEKS FOR BW

## 2018-08-30 NOTE — PROGRESS NOTES
Assessment/Plan:    Problem List Items Addressed This Visit        Cardiovascular and Mediastinum    HTN (hypertension) (Chronic)    Diastolic CHF (HCC) (Chronic)       Musculoskeletal and Integument    Chronic venous stasis dermatitis of both lower extremities (Chronic)    Primary osteoarthritis involving multiple joints    Chronic cutaneous venous stasis ulcer ([address] )       Other    Morbid obesity with BMI of 40 0-44 9, adult (HCC) (Chronic)      Other Visit Diagnoses     Peripheral edema    -  Primary          Patient Instructions   CONTINUE CURRENT TREATMENT PLAN  INCREASE LASIX TO 40 MG DAILY  MONITOR DIETARY SODIUM INTAKE  CALL Tuesday WITH WEIGHT  RV 2 WEEKS FOR BW      Return in about 2 weeks (around 9/13/2018) for Recheck  Subjective:      Patient ID: Jyothi Johnson is a 77 y o  female  Chief Complaint   Patient presents with    Follow-up     one week       PATIENT RETURNS    REVIEWED MULTIPLE MEDICAL ISSUES  MOST CONCERNED ABOUT PERIPHERAL EDEMA  ON LASIX  HAS GAINED 7 LBS SINCE LAST VISIT  REVIEWED ISSUES WITH MEDICATIONS AND DIET    STASIS ULCERS - FOLLOWED BY WOUND CARE  REVIEWED STRATEGIES    DM   SUGARS IN 'S  REVIEWED MEDICATIONS  MONITOR DIETARY INTAKE OF CARBOHYDRATES        The following portions of the patient's history were reviewed and updated as appropriate: allergies, current medications, past family history, past medical history, past social history, past surgical history and problem list     Review of Systems   Constitutional: Positive for fatigue  Negative for chills and fever  HENT: Negative for congestion, ear discharge, ear pain, mouth sores, postnasal drip, sore throat and trouble swallowing  Eyes: Negative for pain, discharge and visual disturbance  Respiratory: Positive for cough and shortness of breath  Negative for wheezing  Cardiovascular: Positive for leg swelling  Negative for chest pain and palpitations  Gastrointestinal: Positive for abdominal distention  Negative for abdominal pain, blood in stool, diarrhea, nausea and vomiting  Endocrine: Negative for polydipsia, polyphagia and polyuria  Genitourinary: Positive for decreased urine volume  Negative for dysuria, frequency, hematuria and urgency  Musculoskeletal: Positive for arthralgias, back pain and gait problem  Negative for joint swelling  Skin: Negative for pallor and rash  Neurological: Negative for dizziness, syncope, speech difficulty, weakness, light-headedness, numbness and headaches  Hematological: Negative for adenopathy  Psychiatric/Behavioral: Negative for behavioral problems, confusion and sleep disturbance  The patient is nervous/anxious            Current Outpatient Prescriptions   Medication Sig Dispense Refill    acetaminophen (TYLENOL) 325 mg tablet Take 2 tablets (650 mg total) by mouth every 6 (six) hours as needed for mild pain, headaches or fever 30 tablet 0    albuterol (ACCUNEB) 1 25 MG/3ML nebulizer solution Take 3 mL (1 25 mg total) by nebulization 3 (three) times a day as needed for wheezing (J45 [id number]) 60 vial 0    ascorbic acid (VITAMIN C) 500 mg tablet Take 1,000 mg by mouth daily        aspirin 325 mg tablet Take 325 mg by mouth daily      COD LIVER OIL PO Take by mouth daily      docusate sodium (COLACE) 100 mg capsule Take 1 capsule (100 mg total) by mouth 2 (two) times a day 10 capsule 0    folic acid (FOLVITE) 1 mg tablet Take by mouth daily      furosemide (LASIX) 20 mg tablet Take 1 tablet (20 mg total) by mouth daily for 30 days 30 tablet 0    gabapentin (NEURONTIN) 100 mg capsule Take 1 capsule (100 mg total) by mouth 3 (three) times a day 180 capsule 3    insulin glargine (LANTUS) 100 units/mL subcutaneous injection Inject 20 Units under the skin every 12 (twelve) hours 10 mL 3    ipratropium (ATROVENT) 0 02 % nebulizer solution Take 1 vial (0 5 mg total) by nebulization 4 (four) times a day  0    lactulose (CHRONULAC) 10 g/15 mL solution lactulose 10 gm/15ml soln PRN      levothyroxine 125 mcg tablet Take 1 tablet (125 mcg total) by mouth daily 90 tablet 1    Linaclotide (LINZESS) 72 MCG CAPS Take 72 mcg by mouth daily before breakfast 30 capsule 6    loratadine (CLARITIN) 10 mg tablet Take 10 mg by mouth daily      metolazone (ZAROXOLYN) 2 5 mg tablet Take one tablet three times a week 90 tablet 1    metoprolol tartrate (LOPRESSOR) 25 mg tablet Take 1 tablet (25 mg total) by mouth 2 (two) times a day for 90 days 180 tablet 3    Multiple Vitamins-Minerals (MULTIVITAMIN ADULT PO) Take 1 tablet by mouth daily      NOVOLOG MIX 70/30 FLEXPEN (70-30) 100 units/mL injection pen       ondansetron (ZOFRAN-ODT) 4 mg disintegrating tablet Take 1 tablet (4 mg total) by mouth every 6 (six) hours as needed for nausea or vomiting 30 tablet 1    polyethylene glycol (MIRALAX) 17 g packet Take 17 g by mouth daily (Patient taking differently: Take 17 g by mouth 2 (two) times a day  ) 14 each 0    potassium chloride (MICRO-K) 10 MEQ CR capsule Take 1 capsule (10 mEq total) by mouth daily 90 capsule 3    pramipexole (MIRAPEX) 1 mg tablet Take 1 tablet (1 mg total) by mouth 3 (three) times a day 90 tablet 3    traMADol (ULTRAM) 50 mg tablet Take 1 tablet (50 mg total) by mouth every 6 (six) hours as needed for moderate pain 45 tablet 0    VENTOLIN  (90 Base) MCG/ACT inhaler       albuterol (VENTOLIN HFA) 90 mcg/act inhaler ventolin hfa 108 (90 base) mcg/actaers      Azelastine HCl 137 MCG/SPRAY SOLN       cefadroxil (DURICEF) 500 mg capsule cefadroxil 500 mg caps      LORazepam (ATIVAN) 0 5 mg tablet Take 1 tablet (0 5 mg total) by mouth every 8 (eight) hours as needed for anxiety for up to 10 days 30 tablet 0     No current facility-administered medications for this visit          Objective:    /68   Pulse 80   Temp 98 7 °F (37 1 °C) (Temporal)   Resp 20   Ht 4' 11" (1 499 m)   Wt 102 kg (225 lb)   LMP  (LMP Unknown)   SpO2 91%   BMI 45 44 kg/m² Physical Exam   Constitutional: She is oriented to person, place, and time  She appears well-developed and well-nourished  OBESE   HENT:   Head: Normocephalic and atraumatic  Eyes: Conjunctivae and EOM are normal  Pupils are equal, round, and reactive to light  Right eye exhibits no discharge  Left eye exhibits no discharge  Neck: Normal range of motion  Neck supple  No thyromegaly present  Cardiovascular: Normal rate, regular rhythm and normal heart sounds  No murmur heard  Pulmonary/Chest: Effort normal  No respiratory distress  She has no wheezes  She has no rales  COARSE BS  SL DULL IN BASES   Abdominal: Soft  Bowel sounds are normal  There is no tenderness  Musculoskeletal: Normal range of motion  She exhibits edema  She exhibits no tenderness  2-3 + EDEMA   Lymphadenopathy:     She has no cervical adenopathy  Neurological: She is alert and oriented to person, place, and time  Skin: Skin is warm and dry  Rash noted  No erythema  STASIS DERM   Psychiatric: She has a normal mood and affect   Her behavior is normal  Judgment and thought content normal               Shea Caballero MD

## 2018-08-31 ENCOUNTER — TELEPHONE (OUTPATIENT)
Dept: FAMILY MEDICINE CLINIC | Facility: CLINIC | Age: 78
End: 2018-08-31

## 2018-08-31 NOTE — TELEPHONE ENCOUNTER
Following up - This morning her blood sugar was 376 after eating and before her insulin  Heart rate was at 94 beats per minute    Weight was 220 without shoes  Kimberly Arias wanted to check to make sure that she is to have continues oxygen  Patient states she is only to have at night

## 2018-08-31 NOTE — TELEPHONE ENCOUNTER
O2 USE SHOULD ALEAYS BE USED AT NIGHT  DAYTIME USE SHOULD BE ENCOURAGED IF SHE IS NOT ACTIVE OR IF SHE GETS SOB    THANKS

## 2018-08-31 NOTE — TELEPHONE ENCOUNTER
Informed Dayton your message regarding the oxygen  He had another question  Hemalatha Pulley was complaining of her back pain 6 out of 10 radiating from back to hands  She is requesting an adjustment in her pain med    She is currently on [address], [address] [place] [address]

## 2018-09-04 ENCOUNTER — TELEPHONE (OUTPATIENT)
Dept: FAMILY MEDICINE CLINIC | Facility: CLINIC | Age: 78
End: 2018-09-04

## 2018-09-04 NOTE — TELEPHONE ENCOUNTER
Spoke to Demetris Aden thinks its the same all with her  Eliazar Figures is not eating what she is supposed to be eating  She isn't using her A C  To help her breathing  Also states not using the oxygen at night  Demetris Aden states Eliazar Fox has an excuse for everything  She will bring her tomorrow

## 2018-09-04 NOTE — TELEPHONE ENCOUNTER
VENESSA from the [facility] wanted to report Etienne Saliva pain has gotten worse since last Friday  She is reporting 8-9 out of 10  She is also presents with light headedness and SOB on minimal activity and blurred vision and heavy fatigue  Etienne Saliva also complains the pain is keeping her up at night  She has a follow up with you scheduled for Thursday

## 2018-09-04 NOTE — ASSESSMENT & PLAN NOTE
STABLE  DENIES ANY CP, SOB, PALPITATIONS, OR HEADACHE  NOTES NO WATER RETENTION  COMPLIANT WITH MEDICATION  NO CONCERNS    - CONTINUE CURRENT TREATMENT PLAN  - MONITOR DIETARY SODIUM INTAKE  - ENCOURAGE PHYSICAL ACTIVITY  - RV 3 WEEKS

## 2018-09-04 NOTE — TELEPHONE ENCOUNTER
Bob Pinzon called back and said,  Shayna mustafa come tomorrow because she has the foot doctor  Also her weight is 228

## 2018-09-05 ENCOUNTER — TELEPHONE (OUTPATIENT)
Dept: FAMILY MEDICINE CLINIC | Facility: CLINIC | Age: 78
End: 2018-09-05

## 2018-09-05 NOTE — TELEPHONE ENCOUNTER
Wants to know if she can take an extra water pill  Will that harm her? Manuel Jason has an appointment on Friday and her daughter cannot be there  Want to know if a change needs to be made if the water pill is not working

## 2018-09-06 ENCOUNTER — OFFICE VISIT (OUTPATIENT)
Dept: FAMILY MEDICINE CLINIC | Facility: CLINIC | Age: 78
End: 2018-09-06
Payer: COMMERCIAL

## 2018-09-06 VITALS
SYSTOLIC BLOOD PRESSURE: 140 MMHG | DIASTOLIC BLOOD PRESSURE: 60 MMHG | HEIGHT: 59 IN | TEMPERATURE: 98.9 F | BODY MASS INDEX: 45.48 KG/M2 | RESPIRATION RATE: 24 BRPM | WEIGHT: 225.6 LBS | HEART RATE: 89 BPM | OXYGEN SATURATION: 93 %

## 2018-09-06 DIAGNOSIS — E66.2 OBESITY HYPOVENTILATION SYNDROME (HCC): Primary | Chronic | ICD-10-CM

## 2018-09-06 DIAGNOSIS — R60.9 PERIPHERAL EDEMA: ICD-10-CM

## 2018-09-06 DIAGNOSIS — M15.9 PRIMARY OSTEOARTHRITIS INVOLVING MULTIPLE JOINTS: ICD-10-CM

## 2018-09-06 PROBLEM — N39.0 URINARY TRACT INFECTION WITHOUT HEMATURIA: Status: RESOLVED | Noted: 2018-07-14 | Resolved: 2018-09-06

## 2018-09-06 PROCEDURE — 96374 THER/PROPH/DIAG INJ IV PUSH: CPT | Performed by: FAMILY MEDICINE

## 2018-09-06 PROCEDURE — 99213 OFFICE O/P EST LOW 20 MIN: CPT | Performed by: FAMILY MEDICINE

## 2018-09-06 RX ORDER — CEPHALEXIN 500 MG/1
500 CAPSULE ORAL EVERY 6 HOURS SCHEDULED
COMMUNITY
Start: 2018-09-05 | End: 2018-10-11 | Stop reason: ALTCHOICE

## 2018-09-06 RX ORDER — FUROSEMIDE 10 MG/ML
30 INJECTION INTRAMUSCULAR; INTRAVENOUS ONCE
Status: SHIPPED | OUTPATIENT
Start: 2018-09-06

## 2018-09-07 ENCOUNTER — APPOINTMENT (EMERGENCY)
Dept: RADIOLOGY | Facility: HOSPITAL | Age: 78
End: 2018-09-07
Payer: COMMERCIAL

## 2018-09-07 ENCOUNTER — TELEPHONE (OUTPATIENT)
Dept: FAMILY MEDICINE CLINIC | Facility: CLINIC | Age: 78
End: 2018-09-07

## 2018-09-07 ENCOUNTER — OFFICE VISIT (OUTPATIENT)
Dept: FAMILY MEDICINE CLINIC | Facility: CLINIC | Age: 78
End: 2018-09-07
Payer: COMMERCIAL

## 2018-09-07 ENCOUNTER — HOSPITAL ENCOUNTER (OUTPATIENT)
Facility: HOSPITAL | Age: 78
Setting detail: OBSERVATION
Discharge: HOME HEALTH | End: 2018-09-09
Attending: EMERGENCY MEDICINE | Admitting: STUDENT IN AN ORGANIZED HEALTH CARE EDUCATION/TRAINING PROGRAM
Payer: COMMERCIAL

## 2018-09-07 VITALS
OXYGEN SATURATION: 88 % | BODY MASS INDEX: 45.72 KG/M2 | HEART RATE: 98 BPM | SYSTOLIC BLOOD PRESSURE: 140 MMHG | HEIGHT: 59 IN | WEIGHT: 226.8 LBS | DIASTOLIC BLOOD PRESSURE: 60 MMHG | TEMPERATURE: 97.9 F

## 2018-09-07 DIAGNOSIS — R60.9 PERIPHERAL EDEMA: Primary | ICD-10-CM

## 2018-09-07 DIAGNOSIS — L97.909 CHRONIC CUTANEOUS VENOUS STASIS ULCER (HCC): ICD-10-CM

## 2018-09-07 DIAGNOSIS — R53.81 PHYSICAL DECONDITIONING: ICD-10-CM

## 2018-09-07 DIAGNOSIS — I87.2 CHRONIC VENOUS STASIS DERMATITIS OF BOTH LOWER EXTREMITIES: Chronic | ICD-10-CM

## 2018-09-07 DIAGNOSIS — M15.9 PRIMARY OSTEOARTHRITIS INVOLVING MULTIPLE JOINTS: ICD-10-CM

## 2018-09-07 DIAGNOSIS — E66.2 OBESITY HYPOVENTILATION SYNDROME (HCC): Chronic | ICD-10-CM

## 2018-09-07 DIAGNOSIS — E66.01 MORBID OBESITY WITH BMI OF 40.0-44.9, ADULT (HCC): Chronic | ICD-10-CM

## 2018-09-07 DIAGNOSIS — F32.A DEPRESSION: ICD-10-CM

## 2018-09-07 DIAGNOSIS — R09.02 HYPOXIA: ICD-10-CM

## 2018-09-07 DIAGNOSIS — J96.12 CHRONIC RESPIRATORY FAILURE WITH HYPOXIA AND HYPERCAPNIA (HCC): ICD-10-CM

## 2018-09-07 DIAGNOSIS — G89.29 OTHER CHRONIC PAIN: ICD-10-CM

## 2018-09-07 DIAGNOSIS — I83.009 CHRONIC CUTANEOUS VENOUS STASIS ULCER (HCC): ICD-10-CM

## 2018-09-07 DIAGNOSIS — J42 CHRONIC BRONCHITIS, UNSPECIFIED CHRONIC BRONCHITIS TYPE (HCC): ICD-10-CM

## 2018-09-07 DIAGNOSIS — R06.00 DYSPNEA: Primary | ICD-10-CM

## 2018-09-07 DIAGNOSIS — J96.11 CHRONIC RESPIRATORY FAILURE WITH HYPOXIA AND HYPERCAPNIA (HCC): ICD-10-CM

## 2018-09-07 DIAGNOSIS — R06.00 DYSPNEA, UNSPECIFIED TYPE: ICD-10-CM

## 2018-09-07 LAB
ALBUMIN SERPL BCP-MCNC: 3 G/DL (ref 3.5–5)
ALP SERPL-CCNC: 76 U/L (ref 46–116)
ALT SERPL W P-5'-P-CCNC: 25 U/L (ref 12–78)
ANION GAP SERPL CALCULATED.3IONS-SCNC: 4 MMOL/L (ref 4–13)
APTT PPP: 27 SECONDS (ref 24–33)
ARTERIAL PATENCY WRIST A: YES
AST SERPL W P-5'-P-CCNC: 34 U/L (ref 5–45)
ATRIAL RATE: 84 BPM
BASE EXCESS BLDA CALC-SCNC: 3.2 MMOL/L
BASOPHILS # BLD AUTO: 0.03 THOUSANDS/ΜL (ref 0–0.1)
BASOPHILS NFR BLD AUTO: 0 % (ref 0–1)
BILIRUB SERPL-MCNC: 0.2 MG/DL (ref 0.2–1)
BODY TEMPERATURE: 98.1 DEGREES FEHRENHEIT
BUN SERPL-MCNC: 16 MG/DL (ref 5–25)
CALCIUM SERPL-MCNC: 8.5 MG/DL (ref 8.3–10.1)
CHLORIDE SERPL-SCNC: 102 MMOL/L (ref 100–108)
CO2 SERPL-SCNC: 36 MMOL/L (ref 21–32)
CREAT SERPL-MCNC: 0.81 MG/DL (ref 0.6–1.3)
EOSINOPHIL # BLD AUTO: 0.3 THOUSAND/ΜL (ref 0–0.61)
EOSINOPHIL NFR BLD AUTO: 3 % (ref 0–6)
ERYTHROCYTE [DISTWIDTH] IN BLOOD BY AUTOMATED COUNT: 16.2 % (ref 11.6–15.1)
GFR SERPL CREATININE-BSD FRML MDRD: 69 ML/MIN/1.73SQ M
GLUCOSE SERPL-MCNC: 134 MG/DL (ref 65–140)
GLUCOSE SERPL-MCNC: 156 MG/DL (ref 65–140)
GLUCOSE SERPL-MCNC: 378 MG/DL (ref 65–140)
GLUCOSE SERPL-MCNC: 91 MG/DL (ref 65–140)
HCO3 BLDA-SCNC: 28.5 MMOL/L (ref 22–28)
HCT VFR BLD AUTO: 37.6 % (ref 34.8–46.1)
HGB BLD-MCNC: 11.4 G/DL (ref 11.5–15.4)
IMM GRANULOCYTES # BLD AUTO: 0.06 THOUSAND/UL (ref 0–0.2)
IMM GRANULOCYTES NFR BLD AUTO: 1 % (ref 0–2)
INR PPP: 0.94 (ref 0.86–1.16)
LYMPHOCYTES # BLD AUTO: 1.75 THOUSANDS/ΜL (ref 0.6–4.47)
LYMPHOCYTES NFR BLD AUTO: 18 % (ref 14–44)
MCH RBC QN AUTO: 29.6 PG (ref 26.8–34.3)
MCHC RBC AUTO-ENTMCNC: 30.3 G/DL (ref 31.4–37.4)
MCV RBC AUTO: 98 FL (ref 82–98)
MONOCYTES # BLD AUTO: 0.64 THOUSAND/ΜL (ref 0.17–1.22)
MONOCYTES NFR BLD AUTO: 7 % (ref 4–12)
NASAL CANNULA: 3
NEUTROPHILS # BLD AUTO: 6.91 THOUSANDS/ΜL (ref 1.85–7.62)
NEUTS SEG NFR BLD AUTO: 71 % (ref 43–75)
NRBC BLD AUTO-RTO: 0 /100 WBCS
NT-PROBNP SERPL-MCNC: 141 PG/ML
O2 CT BLDA-SCNC: 16.7 ML/DL (ref 16–23)
OXYHGB MFR BLDA: 93.2 % (ref 94–97)
P AXIS: 53 DEGREES
PCO2 BLDA: 46.4 MM HG (ref 36–44)
PCO2 TEMP ADJ BLDA: 45.8 MM HG (ref 36–44)
PH BLD: 7.41 [PH] (ref 7.35–7.45)
PH BLDA: 7.41 [PH] (ref 7.35–7.45)
PLATELET # BLD AUTO: 211 THOUSANDS/UL (ref 149–390)
PMV BLD AUTO: 10.5 FL (ref 8.9–12.7)
PO2 BLD: 72.3 MM HG (ref 75–129)
PO2 BLDA: 73.8 MM HG (ref 75–129)
POTASSIUM SERPL-SCNC: 3.9 MMOL/L (ref 3.5–5.3)
PR INTERVAL: 184 MS
PROT SERPL-MCNC: 7.6 G/DL (ref 6.4–8.2)
PROTHROMBIN TIME: 9.9 SECONDS (ref 9.4–11.7)
QRS AXIS: -8 DEGREES
QRSD INTERVAL: 122 MS
QT INTERVAL: 418 MS
QTC INTERVAL: 493 MS
RBC # BLD AUTO: 3.85 MILLION/UL (ref 3.81–5.12)
SODIUM SERPL-SCNC: 142 MMOL/L (ref 136–145)
SPECIMEN SOURCE: ABNORMAL
T WAVE AXIS: 21 DEGREES
TROPONIN I SERPL-MCNC: <0.02 NG/ML
TSH SERPL DL<=0.05 MIU/L-ACNC: 1 UIU/ML (ref 0.36–3.74)
VENTRICULAR RATE: 84 BPM
WBC # BLD AUTO: 9.69 THOUSAND/UL (ref 4.31–10.16)

## 2018-09-07 PROCEDURE — 71045 X-RAY EXAM CHEST 1 VIEW: CPT

## 2018-09-07 PROCEDURE — 36600 WITHDRAWAL OF ARTERIAL BLOOD: CPT

## 2018-09-07 PROCEDURE — 85025 COMPLETE CBC W/AUTO DIFF WBC: CPT | Performed by: EMERGENCY MEDICINE

## 2018-09-07 PROCEDURE — 82948 REAGENT STRIP/BLOOD GLUCOSE: CPT

## 2018-09-07 PROCEDURE — 99213 OFFICE O/P EST LOW 20 MIN: CPT | Performed by: FAMILY MEDICINE

## 2018-09-07 PROCEDURE — 83880 ASSAY OF NATRIURETIC PEPTIDE: CPT | Performed by: EMERGENCY MEDICINE

## 2018-09-07 PROCEDURE — 94640 AIRWAY INHALATION TREATMENT: CPT

## 2018-09-07 PROCEDURE — 87081 CULTURE SCREEN ONLY: CPT | Performed by: STUDENT IN AN ORGANIZED HEALTH CARE EDUCATION/TRAINING PROGRAM

## 2018-09-07 PROCEDURE — 93005 ELECTROCARDIOGRAM TRACING: CPT

## 2018-09-07 PROCEDURE — 80053 COMPREHEN METABOLIC PANEL: CPT | Performed by: EMERGENCY MEDICINE

## 2018-09-07 PROCEDURE — 85610 PROTHROMBIN TIME: CPT | Performed by: EMERGENCY MEDICINE

## 2018-09-07 PROCEDURE — 94760 N-INVAS EAR/PLS OXIMETRY 1: CPT

## 2018-09-07 PROCEDURE — 36415 COLL VENOUS BLD VENIPUNCTURE: CPT | Performed by: EMERGENCY MEDICINE

## 2018-09-07 PROCEDURE — 85730 THROMBOPLASTIN TIME PARTIAL: CPT | Performed by: EMERGENCY MEDICINE

## 2018-09-07 PROCEDURE — 84443 ASSAY THYROID STIM HORMONE: CPT | Performed by: STUDENT IN AN ORGANIZED HEALTH CARE EDUCATION/TRAINING PROGRAM

## 2018-09-07 PROCEDURE — 84484 ASSAY OF TROPONIN QUANT: CPT | Performed by: EMERGENCY MEDICINE

## 2018-09-07 PROCEDURE — 99285 EMERGENCY DEPT VISIT HI MDM: CPT

## 2018-09-07 PROCEDURE — 82805 BLOOD GASES W/O2 SATURATION: CPT | Performed by: STUDENT IN AN ORGANIZED HEALTH CARE EDUCATION/TRAINING PROGRAM

## 2018-09-07 PROCEDURE — 94664 DEMO&/EVAL PT USE INHALER: CPT

## 2018-09-07 PROCEDURE — 84484 ASSAY OF TROPONIN QUANT: CPT | Performed by: STUDENT IN AN ORGANIZED HEALTH CARE EDUCATION/TRAINING PROGRAM

## 2018-09-07 PROCEDURE — 1111F DSCHRG MED/CURRENT MED MERGE: CPT | Performed by: FAMILY MEDICINE

## 2018-09-07 PROCEDURE — 71275 CT ANGIOGRAPHY CHEST: CPT

## 2018-09-07 PROCEDURE — 93010 ELECTROCARDIOGRAM REPORT: CPT | Performed by: INTERNAL MEDICINE

## 2018-09-07 PROCEDURE — 99220 PR INITIAL OBSERVATION CARE/DAY 70 MINUTES: CPT | Performed by: STUDENT IN AN ORGANIZED HEALTH CARE EDUCATION/TRAINING PROGRAM

## 2018-09-07 PROCEDURE — 1036F TOBACCO NON-USER: CPT | Performed by: FAMILY MEDICINE

## 2018-09-07 RX ORDER — IPRATROPIUM BROMIDE AND ALBUTEROL SULFATE 2.5; .5 MG/3ML; MG/3ML
3 SOLUTION RESPIRATORY (INHALATION) ONCE
Status: COMPLETED | OUTPATIENT
Start: 2018-09-07 | End: 2018-09-07

## 2018-09-07 RX ORDER — POLYETHYLENE GLYCOL 3350 17 G/17G
17 POWDER, FOR SOLUTION ORAL 2 TIMES DAILY
Status: DISCONTINUED | OUTPATIENT
Start: 2018-09-07 | End: 2018-09-09 | Stop reason: HOSPADM

## 2018-09-07 RX ORDER — CALCIUM CARBONATE 200(500)MG
1000 TABLET,CHEWABLE ORAL DAILY PRN
Status: DISCONTINUED | OUTPATIENT
Start: 2018-09-07 | End: 2018-09-09 | Stop reason: HOSPADM

## 2018-09-07 RX ORDER — FUROSEMIDE 10 MG/ML
40 INJECTION INTRAMUSCULAR; INTRAVENOUS ONCE
Status: COMPLETED | OUTPATIENT
Start: 2018-09-07 | End: 2018-09-07

## 2018-09-07 RX ORDER — GABAPENTIN 100 MG/1
100 CAPSULE ORAL 3 TIMES DAILY
Status: DISCONTINUED | OUTPATIENT
Start: 2018-09-07 | End: 2018-09-09 | Stop reason: HOSPADM

## 2018-09-07 RX ORDER — TRAMADOL HYDROCHLORIDE 50 MG/1
50 TABLET ORAL EVERY 6 HOURS PRN
Status: DISCONTINUED | OUTPATIENT
Start: 2018-09-07 | End: 2018-09-09 | Stop reason: HOSPADM

## 2018-09-07 RX ORDER — POTASSIUM CHLORIDE 20 MEQ/1
40 TABLET, EXTENDED RELEASE ORAL DAILY
Status: DISCONTINUED | OUTPATIENT
Start: 2018-09-07 | End: 2018-09-09 | Stop reason: HOSPADM

## 2018-09-07 RX ORDER — ACETAMINOPHEN 325 MG/1
650 TABLET ORAL EVERY 6 HOURS PRN
Status: DISCONTINUED | OUTPATIENT
Start: 2018-09-07 | End: 2018-09-09 | Stop reason: HOSPADM

## 2018-09-07 RX ORDER — FOLIC ACID 1 MG/1
1 TABLET ORAL DAILY
Status: DISCONTINUED | OUTPATIENT
Start: 2018-09-07 | End: 2018-09-09 | Stop reason: HOSPADM

## 2018-09-07 RX ORDER — ASPIRIN 325 MG
325 TABLET ORAL DAILY
Status: DISCONTINUED | OUTPATIENT
Start: 2018-09-07 | End: 2018-09-09 | Stop reason: HOSPADM

## 2018-09-07 RX ORDER — DOCUSATE SODIUM 100 MG/1
100 CAPSULE, LIQUID FILLED ORAL 2 TIMES DAILY
Status: DISCONTINUED | OUTPATIENT
Start: 2018-09-07 | End: 2018-09-09 | Stop reason: HOSPADM

## 2018-09-07 RX ORDER — FUROSEMIDE 40 MG/1
40 TABLET ORAL DAILY
Status: DISCONTINUED | OUTPATIENT
Start: 2018-09-07 | End: 2018-09-07

## 2018-09-07 RX ORDER — LORATADINE 10 MG/1
10 TABLET ORAL DAILY
Status: DISCONTINUED | OUTPATIENT
Start: 2018-09-07 | End: 2018-09-09 | Stop reason: HOSPADM

## 2018-09-07 RX ORDER — ONDANSETRON 2 MG/ML
4 INJECTION INTRAMUSCULAR; INTRAVENOUS EVERY 6 HOURS PRN
Status: DISCONTINUED | OUTPATIENT
Start: 2018-09-07 | End: 2018-09-09 | Stop reason: HOSPADM

## 2018-09-07 RX ORDER — METHYLPREDNISOLONE SODIUM SUCCINATE 125 MG/2ML
125 INJECTION, POWDER, LYOPHILIZED, FOR SOLUTION INTRAMUSCULAR; INTRAVENOUS ONCE
Status: COMPLETED | OUTPATIENT
Start: 2018-09-07 | End: 2018-09-07

## 2018-09-07 RX ORDER — LORAZEPAM 0.5 MG/1
0.5 TABLET ORAL EVERY 8 HOURS PRN
Status: DISCONTINUED | OUTPATIENT
Start: 2018-09-07 | End: 2018-09-09 | Stop reason: HOSPADM

## 2018-09-07 RX ORDER — LEVOTHYROXINE SODIUM 0.12 MG/1
125 TABLET ORAL
Status: DISCONTINUED | OUTPATIENT
Start: 2018-09-07 | End: 2018-09-09 | Stop reason: HOSPADM

## 2018-09-07 RX ORDER — AZELASTINE 1 MG/ML
1 SPRAY, METERED NASAL 2 TIMES DAILY
Status: DISCONTINUED | OUTPATIENT
Start: 2018-09-07 | End: 2018-09-09 | Stop reason: HOSPADM

## 2018-09-07 RX ORDER — INSULIN GLARGINE 100 [IU]/ML
20 INJECTION, SOLUTION SUBCUTANEOUS EVERY 12 HOURS SCHEDULED
Status: DISCONTINUED | OUTPATIENT
Start: 2018-09-07 | End: 2018-09-09 | Stop reason: HOSPADM

## 2018-09-07 RX ORDER — PRAMIPEXOLE DIHYDROCHLORIDE 0.5 MG/1
1 TABLET ORAL 3 TIMES DAILY
Status: DISCONTINUED | OUTPATIENT
Start: 2018-09-07 | End: 2018-09-09 | Stop reason: HOSPADM

## 2018-09-07 RX ORDER — ALBUTEROL SULFATE 2.5 MG/3ML
1.25 SOLUTION RESPIRATORY (INHALATION) EVERY 4 HOURS PRN
Status: DISCONTINUED | OUTPATIENT
Start: 2018-09-07 | End: 2018-09-09 | Stop reason: HOSPADM

## 2018-09-07 RX ADMIN — IPRATROPIUM BROMIDE 0.5 MG: 0.5 SOLUTION RESPIRATORY (INHALATION) at 20:55

## 2018-09-07 RX ADMIN — INSULIN GLARGINE 20 UNITS: 100 INJECTION, SOLUTION SUBCUTANEOUS at 21:51

## 2018-09-07 RX ADMIN — IPRATROPIUM BROMIDE 0.5 MG: 0.5 SOLUTION RESPIRATORY (INHALATION) at 16:42

## 2018-09-07 RX ADMIN — LEVOTHYROXINE SODIUM 125 MCG: 125 TABLET ORAL at 17:39

## 2018-09-07 RX ADMIN — IPRATROPIUM BROMIDE AND ALBUTEROL SULFATE 3 ML: .5; 3 SOLUTION RESPIRATORY (INHALATION) at 14:28

## 2018-09-07 RX ADMIN — IOHEXOL 85 ML: 350 INJECTION, SOLUTION INTRAVENOUS at 13:28

## 2018-09-07 RX ADMIN — POTASSIUM CHLORIDE 40 MEQ: 1500 TABLET, EXTENDED RELEASE ORAL at 17:38

## 2018-09-07 RX ADMIN — INSULIN LISPRO 5 UNITS: 100 INJECTION, SOLUTION INTRAVENOUS; SUBCUTANEOUS at 21:56

## 2018-09-07 RX ADMIN — GABAPENTIN 100 MG: 100 CAPSULE ORAL at 17:38

## 2018-09-07 RX ADMIN — FOLIC ACID 1 MG: 1 TABLET ORAL at 17:38

## 2018-09-07 RX ADMIN — PRAMIPEXOLE DIHYDROCHLORIDE 1 MG: 0.5 TABLET ORAL at 17:38

## 2018-09-07 RX ADMIN — LORAZEPAM 0.5 MG: 0.5 TABLET ORAL at 21:52

## 2018-09-07 RX ADMIN — LORATADINE 10 MG: 10 TABLET ORAL at 17:43

## 2018-09-07 RX ADMIN — METOPROLOL TARTRATE 25 MG: 25 TABLET ORAL at 18:51

## 2018-09-07 RX ADMIN — ASPIRIN 325 MG: 325 TABLET ORAL at 17:38

## 2018-09-07 RX ADMIN — PRAMIPEXOLE DIHYDROCHLORIDE 1 MG: 0.5 TABLET ORAL at 21:51

## 2018-09-07 RX ADMIN — DOCUSATE SODIUM 100 MG: 100 CAPSULE, LIQUID FILLED ORAL at 18:50

## 2018-09-07 RX ADMIN — POLYETHYLENE GLYCOL 3350 17 G: 17 POWDER, FOR SOLUTION ORAL at 18:50

## 2018-09-07 RX ADMIN — AZELASTINE HYDROCHLORIDE 1 SPRAY: 137 SPRAY, METERED NASAL at 17:39

## 2018-09-07 RX ADMIN — METHYLPREDNISOLONE SODIUM SUCCINATE 125 MG: 125 INJECTION, POWDER, FOR SOLUTION INTRAMUSCULAR; INTRAVENOUS at 14:28

## 2018-09-07 RX ADMIN — TRAMADOL HYDROCHLORIDE 50 MG: 50 TABLET, FILM COATED ORAL at 20:36

## 2018-09-07 RX ADMIN — GABAPENTIN 100 MG: 100 CAPSULE ORAL at 21:51

## 2018-09-07 RX ADMIN — FUROSEMIDE 40 MG: 10 INJECTION, SOLUTION INTRAMUSCULAR; INTRAVENOUS at 17:39

## 2018-09-07 NOTE — PLAN OF CARE
Problem: DISCHARGE PLANNING - CARE MANAGEMENT  Goal: Discharge to post-acute care or home with appropriate resources  INTERVENTIONS:  - Conduct assessment to determine patient/family and health care team treatment goals, and need for post-acute services based on payer coverage, community resources, and patient preferences, and barriers to discharge  - Address psychosocial, clinical, and financial barriers to discharge as identified in assessment in conjunction with the patient/family and health care team  - Arrange appropriate level of post-acute services according to patient's   needs and preference and payer coverage in collaboration with the physician and health care team  - Communicate with and update the patient/family, physician, and health care team regarding progress on the discharge plan  - Arrange appropriate transportation to post-acute venues  Return to home with services     Outcome: Progressing

## 2018-09-07 NOTE — TELEPHONE ENCOUNTER
Wanted you to know they were suppose to start physical therapy this week  They will start next week

## 2018-09-07 NOTE — RESPIRATORY THERAPY NOTE
copd booklet education covered with patient for s&s of exacerbation and zone tool pt states has book at home

## 2018-09-07 NOTE — ASSESSMENT & PLAN NOTE
· Had CPAP titration for 15mg Hg but patient cannot tolerate CPAP  · Cont NC qHS  · Pulmonary consulted

## 2018-09-07 NOTE — LETTER
September 9, 2018     Krysta Talamantes MD  [address] [address]    Patient: Sourav Montejo   YOB: 1940   Date of Visit: 9/7/2018       Dear Dr Marleen Gusman:    I have the pleasure of seeing Chi Gallagher during her recent hospitalization at [address]  She was admitted for dyspnea but was not found to have an acute pathology  As you may know she has chronic hypoxemic hypercapnia respiratory failure from chronic bronchitis, among other co morbidities  I have had a long discussion with her and her  regarding her medical conditions and care  She is extremely deconditioned and cannot care for herself at home, and this is contributing at least in part to her returns to hospital setting  We have advised that she look into assisted living or skilled nursing home solutions for her long term care  She stated that she wished to discuss this issue with you and to get the process started through your office as an outpatient  She along with her  will be following up with you after hospitalization  If you have questions, please do not hesitate to call me  Sincerely,        Kirstin Mensah MD   [facility] internal Medicine Hospitalist [facility]      CC: No Recipients  Annabelle Berry MD  9/9/2018 12:10 PM  Signed   Seen by Dr Annabelle Berry    Doing better  Shortness of breath  Leg swelling    Superficial wounds with some cellulitis and dermatitis    Wound dressing in place    Continue supportive symptomatic treatment and local wound care    Emily Gray DO  9/9/2018 10:34 AM  Addendum  Progress Note - Pulmonary   Sourav Montejo 77 y o  female MRN: [id number]  Unit/Bed#: [address] [address] Encounter: [id number]    Assessment:   Chronic bronchitis -  Patient never smoked but does get intermittent wheeze and has intermittent chronic cough  Has faint wheeze at the bases of both lungs    Will add Flovent 220 mcg 1 puff daily with spacer and she can stay on this at home until  She is reassessed and our  Pulmonary office  chronic hypercapnic hypoxemic respiratory failure secondary obesity alveolar hypoventilation  Patient uses 3 L of oxygen at home  Moderate to large hiatal hernia with  intermittent GERD  Chronic diastolic congestive heart failure   Restless leg syndrome   Morbid obesity   moderate LISBET with AHI of 20 but could not tolerate CPAP    Plan:   I will start Flovent 220 mcg 1 puff daily with spacer  Patient may have some benefit in her cough and intermittent wheezing with this agent   At home she uses nebulizer with ipratropium bromide 0 5 mg 4 times a day and intermittently will use Ventolin inhaler  She states the albuterol in her nebulizer makes her anxious so she does not use that   Oxygen 3 liters/minute    Subjective:   No events overnight  Patient does get lower back pain states at home after she has been lying down for a while  She has been trying to wean off the Percocet but states the Percocet does help the pain  She does have some  Shortness of breath with activity but this is not worse than usual    Objective:     Vitals: Blood pressure 143/84, pulse 86, temperature 98 2 °F (36 8 °C), temperature source Tympanic, resp  rate 20, height 5' 2" (1 575 m), weight 103 kg (226 lb 10 1 oz), SpO2 95 %, not currently breastfeeding  ,Body mass index is 41 45 kg/m²  Intake/Output Summary (Last 24 hours) at 09/09/18 1026  Last data filed at 09/09/18 0601   Gross per 24 hour   Intake              250 ml   Output              500 ml   Net             -250 ml       Physical Exam: Physical Exam   Constitutional: She is oriented to person, place, and time  She appears well-developed and well-nourished  No distress  On 2 5 L O2 saturations 93%   HENT:   Head: Normocephalic  Nose: Nose normal    Mouth/Throat: Oropharynx is clear and moist  No oropharyngeal exudate  Eyes: Conjunctivae are normal  Pupils are equal, round, and reactive to light  Neck: Neck supple   No JVD present  No tracheal deviation present  Cardiovascular: Normal rate, regular rhythm and normal heart sounds  Pulmonary/Chest: Effort normal    Lung sounds reveal some faint expiratory wheezes at the bases  Abdominal: Soft  She exhibits no distension  There is no tenderness  There is no guarding  Musculoskeletal:   Minimal edema lower extremities   Lymphadenopathy:     She has no cervical adenopathy  Neurological: She is alert and oriented to person, place, and time  Skin: Skin is warm and dry  No rash noted  Psychiatric: She has a normal mood and affect  Her behavior is normal  Thought content normal         Labs: I have personally reviewed pertinent lab results  , ABG: Lab Results   Component Value Date    PHART 7 406 09/07/2018    WAZ9MMP 46 4 (H) 09/07/2018    PO2ART 73 8 (L) 09/07/2018    OUR8JAO 28 5 (H) 09/07/2018    BEART 3 2 09/07/2018    SOURCE Radial, Right 09/07/2018   , BNP: No results found for: BNP, CBC: Lab Results   Component Value Date    WBC 11 07 (H) 09/09/2018    HGB 10 8 (L) 09/09/2018    HCT 36 7 09/09/2018     (H) 09/09/2018     09/09/2018    ADJUSTEDWBC 10 30 04/04/2016    MCH 29 8 09/09/2018    MCHC 29 4 (L) 09/09/2018    RDW 16 2 (H) 09/09/2018    MPV 10 7 09/09/2018    NRBC 0 09/07/2018   , CMP: Lab Results   Component Value Date     09/09/2018     (H) 08/01/2017    K 4 7 09/09/2018    K 3 9 08/01/2017     09/09/2018    CL 95 (L) 08/24/2018    CO2 35 (H) 09/09/2018    CO2 31 (H) 08/24/2018    ANIONGAP 10 8 01/08/2016    BUN 22 09/09/2018    BUN 13 08/24/2018    CREATININE 0 80 09/09/2018    CREATININE 0 78 08/01/2017    GLUCOSE 144 (H) 08/01/2017    CALCIUM 8 5 09/09/2018    CALCIUM 9 1 08/01/2017    AST 34 09/07/2018    AST 18 08/01/2017    ALT 25 09/07/2018    ALT 26 08/01/2017    ALKPHOS 76 09/07/2018    ALKPHOS 70 08/01/2017    PROT 6 9 08/01/2017    BILITOT 0 2 08/01/2017    EGFR 70 09/09/2018   , PT/INR:   Lab Results   Component Value Date    INR 0 94 09/07/2018    INR 1 0 08/01/2017   , Troponin: No results found for: TROPONIN    Imaging and other studies: I have personally reviewed pertinent reports  and I have personally reviewed pertinent films in PACS      Joe Dunn MD  9/8/2018  3:31 PM  Signed      Progress Note - Molly Abbott 1940, 77 y o  female MRN: [id number]    Unit/Bed#: [address] Encounter: [id number]    Primary Care Provider: Lora Cooper MD   Date and time admitted to hospital: 9/7/2018 11:28 AM        * Dyspnea   Assessment & Plan    · Acute on chronic  Multifactorial given DHF, COPD, hypoventilation obesity syndrome and noncompliance at home  · CTA negative for PE  · No evidence of major fluid overload or COPD exacerbation  · Serial troponins negative  · ABG appears at baseline  · Cont home breathing treatments, added flovent  · Received dose of IV solumedrol in ED, monitor  · Pulmonary consulted to follow  · ambulating pulse ox done, she will need 3L continuous o2        Venous stasis ulcer ([address] )   Assessment & Plan    · Bilateral lower extremity venous stasis ulcers  · Follows up outpatient with [facility]  · Venous duplex on July 2018 with no evidence of DVT or vein incompetence  · Surgery consulted, recommend local wound care  · Continue local wound care, follow up with outpatient wound clinic        Chronic respiratory failure with hypoxia and hypercapnia ([address] )   Assessment & Plan    · As noted above        Physical deconditioning   Assessment & Plan    · Patient has had multiple admissions in the past due in part to deconditioning  · She may need a long term higher level of care, as she does well when she gets [facility] but at home she appears to decompensate with return to hospital  · She and her  will pursue options as outpt for long term care  Diastolic CHF [facility])   Assessment & Plan    · Last echo 7/2018  showed EF was 60-65%    There was left ventricular relaxation or grade 1 diastolic dysfunction  Mild tricuspid regurgitation  · Volume status difficult to assess,  on admission, CTA showed Bibasilar atelectasis, She has chronic peripheral edema which appears at baseline but her weight from last admission 7/2018 was 92kg, today 103kg  · One dose of IV lasix given on admission  · Monitor I/O and daily weights  · Monitor lytes and renal function        Diabetes mellitus with neuropathy [facility])   Assessment & Plan    Lab Results   Component Value Date    HGBA1C 9 1% 04/27/2018       Recent Labs      09/07/18   1621  09/07/18   2031  09/08/18   0740  09/08/18   1146   POCGLU  134  378*  349*  410*       Blood Sugar Average: Last 72 hrs:  (P) 272 4     Cont home insulin  ISS  POC sugars  diabetic diet        HTN (hypertension)   Assessment & Plan    · BP elevated on admission  · Resume home meds, monitor  · Blood pressure trend improving        Obesity hypoventilation syndrome (HCC)   Assessment & Plan    Cannot tolerate CPAP, cont nocturnal O2        Sleep apnea   Assessment & Plan    · Had CPAP titration for 15mg Hg but patient cannot tolerate CPAP  · Cont NC qHS  · Pulmonary consulted        RLS (restless legs syndrome)   Assessment & Plan    Cont pramipexole and gabapentin        Constipation due to opioid therapy   Assessment & Plan    Cont home meds          Morbid obesity with BMI of 40 0-44 9, adult (HCC)   Assessment & Plan    · BMI 42 54  · Needs Lifestyle modification, weight loss        Acquired hypothyroidism   Assessment & Plan    · Cont synthroid  · TSH 1 00              VTE Pharmacologic Prophylaxis:   Pharmacologic: Enoxaparin (Lovenox)  Mechanical VTE Prophylaxis in Place: Yes    Patient Centered Rounds: I have performed bedside rounds with nursing staff today  Discussions with Specialists or Other Care Team Provider: Yes    Education and Discussions with Family / Patient:Yes    Time Spent for Care: 30 minutes    More than 50% of total time spent on counseling and coordination of care as described above  Current Length of Stay: 0 day(s)    Current Patient Status: Observation     Discharge Plan:  Pending    Code Status: Level 1 - Full Code      Subjective: Has multiple complaints today, still SOB, also has chronic BLE pain which is aggravated today, tired, didn't sleep well last night, overwhelmed at her diagnoses and last of improvement in symptoms  Objective:       Vitals:   Temp (24hrs), Av 2 °F (36 8 °C), Min:98 1 °F (36 7 °C), Max:98 5 °F (36 9 °C)    HR:  [72-90] 72  Resp:  [18-22] 18  BP: (146-181)/(72-89) 146/89  SpO2:  [93 %-96 %] 95 %  Body mass index is 40 79 kg/m²  Input and Output Summary (last 24 hours): Intake/Output Summary (Last 24 hours) at 18 1528  Last data filed at 18 2301   Gross per 24 hour   Intake                0 ml   Output              950 ml   Net             -950 ml       Physical Exam:     Physical Exam   Constitutional: She is oriented to person, place, and time  She appears well-developed  No distress  Chronically ill-appearing   HENT:   Head: Normocephalic and atraumatic  Cardiovascular: Normal rate, regular rhythm and normal heart sounds  Pulmonary/Chest: Effort normal and breath sounds normal  No respiratory distress  She has no wheezes  She has no rales  Wearing nasal oxygen  Decreased breath sounds, poor air entry and poor effort  No wheezing   Abdominal: Soft  Bowel sounds are normal  She exhibits no distension  There is no tenderness  There is no rebound and no guarding  Musculoskeletal: She exhibits edema  She exhibits no tenderness or deformity  Neurological: She is alert and oriented to person, place, and time  Skin: Skin is warm and dry  Bilateral lower extremities wrapped   Psychiatric: She has a normal mood and affect  Her behavior is normal    Nursing note and vitals reviewed        Additional Data:     Labs:      Results from last 7 days  Lab Units 18  3952 09/07/18  1146   WBC Thousand/uL 12 08* 9 69   HEMOGLOBIN g/dL 11 0* 11 4*   HEMATOCRIT % 36 5 37 6   PLATELETS Thousands/uL 210 211   NEUTROS PCT %  --  71   LYMPHS PCT %  --  18   MONOS PCT %  --  7   EOS PCT %  --  3       Results from last 7 days  Lab Units 09/08/18  0543 09/07/18  1146   SODIUM mmol/L 137 142   POTASSIUM mmol/L 5 0 3 9   CHLORIDE mmol/L 101 102   CO2 mmol/L 34* 36*   BUN mg/dL 21 16   CREATININE mg/dL 0 89 0 81   CALCIUM mg/dL 8 3 8 5   ALK PHOS U/L  --  76   ALT U/L  --  25   AST U/L  --  34       Results from last 7 days  Lab Units 09/07/18  1146   INR  0 94       * I Have Reviewed All Lab Data Listed Above  * Additional Pertinent Lab Tests Reviewed: All Labs For Current Hospital Admission Reviewed    Imaging:  Xr Chest 1 View Portable    Result Date: 9/7/2018  Narrative: CHEST INDICATION:   sob  COMPARISON:  07/17/2018 EXAM PERFORMED/VIEWS:  XR CHEST PORTABLE 1 image FINDINGS: Cardiomediastinal silhouette appears enlarged  No evidence of heart failure  The lungs are clear  No pneumothorax or pleural effusion  Osseous structures appear within normal limits for patient age  Impression: No acute cardiopulmonary disease  Workstation performed: [id number]     Cta Ed Chest Pe Study    Result Date: 9/7/2018  Narrative: CTA - CHEST WITH IV CONTRAST - PULMONARY ANGIOGRAM INDICATION:   Chest pain, acute, PE suspected, high pretest prob  COMPARISON: None  TECHNIQUE: CTA examination of the chest was performed using angiographic technique according to a protocol specifically tailored to evaluate for pulmonary embolism  Axial, sagittal, and coronal 2D reformatted images were created from the source data and  submitted for interpretation  In addition, coronal 3D MIP postprocessing was performed on the acquisition scanner  Radiation dose length product (DLP) for this visit:  708 4 mGy-cm     This examination, like all CT scans performed in the [facility], was performed utilizing techniques to minimize radiation dose exposure, including the use of iterative reconstruction and automated exposure control  IV Contrast:  85 mL of iohexol (OMNIPAQUE)  FINDINGS: PULMONARY ARTERIAL TREE:  No central or segmental pulmonary emboli  Limited evaluation of subsegmental vessels due to respiratory motion  LUNGS:  Bibasilar atelectasis  No pulmonary nodules  PLEURA:  Unremarkable  HEART/AORTA:  Mild cardiomegaly  MEDIASTINUM AND JENY:  Large hiatal hernia  CHEST WALL AND LOWER NECK:   Unremarkable  VISUALIZED STRUCTURES IN THE UPPER ABDOMEN:  Unremarkable  OSSEOUS STRUCTURES:  No acute fracture or destructive osseous lesion  Impression: 1  No central or segmental pulmonary embolism  Suboptimal evaluation of subsegmental vessels  2   Large hiatal hernia  3   Bibasilar atelectasis  Workstation performed: [id number]     Imaging Reports Reviewed by myself    Cultures:   Blood Culture:   Lab Results   Component Value Date    BLOODCX No Growth After 5 Days  07/13/2018    BLOODCX No Growth After 5 Days  07/13/2018    BLOODCX No Growth After 5 Days  02/22/2018    BLOODCX No Growth After 5 Days  02/22/2018    BLOODCX No Growth After 5 Days  01/19/2018    BLOODCX No Growth After 5 Days  01/19/2018    BLOODCX No Growth After 5 Days  06/11/2017    BLOODCX No Growth After 5 Days   06/11/2017     Urine Culture:   Lab Results   Component Value Date    URINECX No Growth <1000 cfu/mL 07/14/2018    URINECX 20,000-29,000 cfu/ml Mixed Contaminants X3 04/15/2017     Sputum Culture: No components found for: SPUTUMCX  Wound Culture: No results found for: WOUNDCULT    Last 24 Hours Medication List:     Current Facility-Administered Medications:  acetaminophen 650 mg Oral Q6H PRN Laxmi Alvarado MD   albuterol 1 25 mg Nebulization Q4H PRN Laxmi Alvarado MD   aspirin 325 mg Oral Daily Laxmi Alvarado MD   azelastine 1 spray Each Nare BID Laxmi Alvarado MD   calcium carbonate 1,000 mg Oral Daily PRN Laxmi Alvarado MD   clotrimazole  Topical Daily Keke Larose MD   docusate sodium 100 mg Oral BID Consuelo Coles MD   enoxaparin 40 mg Subcutaneous Daily Consuelo Coles MD   folic acid 1 mg Oral Daily Consuelo Coles MD   gabapentin 100 mg Oral TID Consuelo Coles MD   insulin glargine 20 Units Subcutaneous Q12H 3630 Stacey Walls MD   insulin lispro 1-5 Units Subcutaneous HS FATIMAH Chisholm   insulin lispro 1-6 Units Subcutaneous TID AC Consuelo Coles MD   ipratropium 0 5 mg Nebulization 4x Daily Consuelo Coles MD   levothyroxine 125 mcg Oral Early Morning Consuelo Coles, MD   Linaclotide 72 mcg Oral Daily Before Breakfast Consuelo Coles MD   loratadine 10 mg Oral Daily Consuelo Coles MD   LORazepam 0 5 mg Oral Q8H PRN Consuelo Coles MD   metoprolol tartrate 25 mg Oral BID Consuelo Coles MD   morphine injection 2 mg Intravenous Once Consuelo Coles MD   ondansetron 4 mg Intravenous Q6H PRN Consuelo Coles MD   polyethylene glycol 17 g Oral BID Consuelo Coles MD   potassium chloride 40 mEq Oral Daily Consuelo Coles MD   pramipexole 1 mg Oral TID Consuelo Coles MD   traMADol 50 mg Oral Q6H PRN Consuelo Coles MD        Today, Patient Was Seen By: Consuelo Coles MD    ** Please Note: Dragon 360 Dictation voice to text software may have been used in the creation of this document   **

## 2018-09-07 NOTE — ASSESSMENT & PLAN NOTE
· Last echo 7/2018  showed EF was 60-65%  There was left ventricular relaxation or grade 1 diastolic dysfunction  Mild tricuspid regurgitation  · Volume status difficult to assess,  on admission, CTA showed Bibasilar atelectasis, She has chronic peripheral edema which appears at baseline but her weight from last admission 7/2018 was 92kg, today 103kg     · One dose of IV lasix given on admission  · Monitor I/O and daily weights  · Monitor lytes and renal function

## 2018-09-07 NOTE — PROGRESS NOTES
Assessment/Plan:    Problem List Items Addressed This Visit        Musculoskeletal and Integument    Primary osteoarthritis involving multiple joints       Other    Obesity hypoventilation syndrome (HCC) - Primary (Chronic)    Peripheral edema          Patient Instructions   REST  ELEVATE LEGS  IV LASIX   RV TOMORROW    IF NOT BETTER, MAY CONSIDER ER EVAL      Return in about 1 day (around 9/7/2018) for Recheck  Subjective:      Patient ID: Venus Garcia is a 77 y o  female  Chief Complaint   Patient presents with    Sore Throat    Leg Pain       PATIENT RETURNS  NO IMPROVEMENT   ACTUALLY FEELING WORSE  INCREASED FIGUEROA  ACHY ALL OVER  NO FEVER OR CHILLS    LEGS WORSE WITH SOME WEEPING        The following portions of the patient's history were reviewed and updated as appropriate: allergies, current medications, past family history, past medical history, past social history, past surgical history and problem list     Review of Systems   Constitutional: Positive for fatigue  Negative for chills and fever  HENT: Negative for congestion, ear discharge, ear pain, mouth sores, postnasal drip, sore throat and trouble swallowing  Eyes: Negative for pain, discharge and visual disturbance  Respiratory: Positive for shortness of breath  Negative for cough and wheezing  Cardiovascular: Positive for leg swelling  Negative for chest pain and palpitations  Gastrointestinal: Negative for abdominal distention, abdominal pain, blood in stool, diarrhea, nausea and vomiting  Endocrine: Negative for polydipsia, polyphagia and polyuria  Genitourinary: Negative for dysuria, frequency, hematuria and urgency  Musculoskeletal: Positive for arthralgias and gait problem  Negative for joint swelling  Skin: Negative for pallor and rash  Neurological: Positive for weakness  Negative for dizziness, syncope, speech difficulty, light-headedness, numbness and headaches  Hematological: Negative for adenopathy  Psychiatric/Behavioral: Negative for behavioral problems, confusion and sleep disturbance  The patient is not nervous/anxious            Current Outpatient Prescriptions   Medication Sig Dispense Refill    acetaminophen (TYLENOL) 325 mg tablet Take 2 tablets (650 mg total) by mouth every 6 (six) hours as needed for mild pain, headaches or fever 30 tablet 0    albuterol (ACCUNEB) 1 25 MG/3ML nebulizer solution Take 3 mL (1 25 mg total) by nebulization 3 (three) times a day as needed for wheezing (J45 909) 60 vial 0    albuterol (VENTOLIN HFA) 90 mcg/act inhaler ventolin hfa 108 (90 base) mcg/actaers      ascorbic acid (VITAMIN C) 500 mg tablet Take 1,000 mg by mouth daily        aspirin 325 mg tablet Take 325 mg by mouth daily      Azelastine HCl 137 MCG/SPRAY SOLN       cefadroxil (DURICEF) 500 mg capsule cefadroxil 500 mg caps      cephalexin (KEFLEX) 500 mg capsule       COD LIVER OIL PO Take by mouth daily      docusate sodium (COLACE) 100 mg capsule Take 1 capsule (100 mg total) by mouth 2 (two) times a day 10 capsule 0    folic acid (FOLVITE) 1 mg tablet Take by mouth daily      furosemide (LASIX) 20 mg tablet Take 1 tablet (20 mg total) by mouth daily for 30 days 30 tablet 0    gabapentin (NEURONTIN) 100 mg capsule Take 1 capsule (100 mg total) by mouth 3 (three) times a day 180 capsule 3    insulin glargine (LANTUS) 100 units/mL subcutaneous injection Inject 20 Units under the skin every 12 (twelve) hours 10 mL 3    ipratropium (ATROVENT) 0 02 % nebulizer solution Take 1 vial (0 5 mg total) by nebulization 4 (four) times a day  0    lactulose (CHRONULAC) 10 g/15 mL solution lactulose 10 gm/15ml soln PRN      levothyroxine 125 mcg tablet Take 1 tablet (125 mcg total) by mouth daily 90 tablet 1    Linaclotide (LINZESS) 72 MCG CAPS Take 72 mcg by mouth daily before breakfast 30 capsule 6    loratadine (CLARITIN) 10 mg tablet Take 10 mg by mouth daily      metolazone (ZAROXOLYN) 2 5 mg tablet Take one tablet three times a week 90 tablet 1    metoprolol tartrate (LOPRESSOR) 25 mg tablet Take 1 tablet (25 mg total) by mouth 2 (two) times a day for 90 days 180 tablet 3    Multiple Vitamins-Minerals (MULTIVITAMIN ADULT PO) Take 1 tablet by mouth daily      NOVOLOG MIX 70/30 FLEXPEN (70-30) 100 units/mL injection pen       ondansetron (ZOFRAN-ODT) 4 mg disintegrating tablet Take 1 tablet (4 mg total) by mouth every 6 (six) hours as needed for nausea or vomiting 30 tablet 1    polyethylene glycol (MIRALAX) 17 g packet Take 17 g by mouth daily (Patient taking differently: Take 17 g by mouth 2 (two) times a day  ) 14 each 0    potassium chloride (MICRO-K) 10 MEQ CR capsule Take 1 capsule (10 mEq total) by mouth daily 90 capsule 3    pramipexole (MIRAPEX) 1 mg tablet Take 1 tablet (1 mg total) by mouth 3 (three) times a day 90 tablet 3    traMADol (ULTRAM) 50 mg tablet Take 1 tablet (50 mg total) by mouth every 6 (six) hours as needed for moderate pain 45 tablet 0    VENTOLIN  (90 Base) MCG/ACT inhaler       LORazepam (ATIVAN) 0 5 mg tablet Take 1 tablet (0 5 mg total) by mouth every 8 (eight) hours as needed for anxiety for up to 10 days 30 tablet 0     No current facility-administered medications for this visit  Objective:    /60 (BP Location: Left arm, Patient Position: Sitting, Cuff Size: Extra-Large)   Pulse 89   Temp 98 9 °F (37 2 °C) (Temporal)   Resp (!) 24   Ht 4' 11" (1 499 m)   Wt 102 kg (225 lb 9 6 oz)   LMP  (LMP Unknown)   SpO2 93%   BMI 45 57 kg/m²        Physical Exam   Constitutional: She is oriented to person, place, and time  She appears well-developed and well-nourished  HENT:   Head: Normocephalic and atraumatic  Eyes: Conjunctivae and EOM are normal  Pupils are equal, round, and reactive to light  Right eye exhibits no discharge  Left eye exhibits no discharge  Neck: Normal range of motion  Neck supple  No thyromegaly present     Cardiovascular: Normal rate, regular rhythm and normal heart sounds  No murmur heard  Pulmonary/Chest: Effort normal  No respiratory distress  She has no wheezes  She has rales  BASILAR RALES  PROLONGED EXP PHASE   Abdominal: Soft  Bowel sounds are normal  There is no tenderness  OBESE   Musculoskeletal: Normal range of motion  She exhibits edema  She exhibits no tenderness  LEGS WRAPPED   Lymphadenopathy:     She has no cervical adenopathy  Neurological: She is alert and oriented to person, place, and time  Skin: Skin is warm and dry  No rash noted  No erythema  Psychiatric: She has a normal mood and affect   Her behavior is normal  Judgment and thought content normal               Ronald Harris MD

## 2018-09-07 NOTE — ASSESSMENT & PLAN NOTE
Lab Results   Component Value Date    HGBA1C 9 1% 04/27/2018       Recent Labs      09/07/18   1415  09/07/18   1621   POCGLU  91  134       Blood Sugar Average: Last 72 hrs:  (P) 112 5     Cont home insulin  ISS  POC sugars  diabetic diet

## 2018-09-07 NOTE — ED PROVIDER NOTES
History  Chief Complaint   Patient presents with    Shortness of Breath     patient c/o SOB with exertion, worse over the past several days  had a dose of IV Lasix 40 mg at PMD yesterday, but symtpoms are not improving and she gained 1 8 pounds since yesterday  78F hx COPD, CHF, DM, HTN c/o worsening dyspnea on exertion and LE edema as well as decreased urination over the past 2 weeks  Had lasix increased from 20mg to 40mg two weeks ago, f/u with PCP yesterday, had increased about 4lbs, so got 40mg IV lasix and today pt gained another 1 8lbs  Didn't urinate much overnight  Sent here from PCP office for IV diuresis  +orthopnea  Prior to Admission Medications   Prescriptions Last Dose Informant Patient Reported? Taking?    Azelastine HCl 137 MCG/SPRAY SOLN  Self Yes Yes   COD LIVER OIL PO  Self Yes Yes   Sig: Take by mouth daily   LORazepam (ATIVAN) 0 5 mg tablet   No Yes   Sig: Take 1 tablet (0 5 mg total) by mouth every 8 (eight) hours as needed for anxiety for up to 10 days   Linaclotide (LINZESS) 72 MCG CAPS  Self No Yes   Sig: Take 72 mcg by mouth daily before breakfast   Multiple Vitamins-Minerals (MULTIVITAMIN ADULT PO)  Self Yes Yes   Sig: Take 1 tablet by mouth daily   NOVOLOG MIX 70/30 FLEXPEN (70-30) 100 units/mL injection pen  Self Yes Yes   VENTOLIN  (90 Base) MCG/ACT inhaler  Self Yes Yes   acetaminophen (TYLENOL) 325 mg tablet  Self No Yes   Sig: Take 2 tablets (650 mg total) by mouth every 6 (six) hours as needed for mild pain, headaches or fever   albuterol (ACCUNEB) 1 25 MG/3ML nebulizer solution  Self No Yes   Sig: Take 3 mL (1 25 mg total) by nebulization 3 (three) times a day as needed for wheezing (J45 909)   albuterol (VENTOLIN HFA) 90 mcg/act inhaler  Self Yes Yes   Sig: ventolin hfa 108 (90 base) mcg/actaers   ascorbic acid (VITAMIN C) 500 mg tablet  Self Yes Yes   Sig: Take 1,000 mg by mouth daily     aspirin 325 mg tablet  Self Yes Yes   Sig: Take 325 mg by mouth daily   cephalexin (KEFLEX) 500 mg capsule  Self Yes Yes   Sig: Take 500 mg by mouth every 6 (six) hours     docusate sodium (COLACE) 100 mg capsule  Self No Yes   Sig: Take 1 capsule (100 mg total) by mouth 2 (two) times a day   folic acid (FOLVITE) 1 mg tablet  Self Yes Yes   Sig: Take by mouth daily   furosemide (LASIX) 20 mg tablet  Self No Yes   Sig: Take 1 tablet (20 mg total) by mouth daily for 30 days   Patient taking differently: Take 40 mg by mouth daily     gabapentin (NEURONTIN) 100 mg capsule  Self No Yes   Sig: Take 1 capsule (100 mg total) by mouth 3 (three) times a day   insulin glargine (LANTUS) 100 units/mL subcutaneous injection  Self No Yes   Sig: Inject 20 Units under the skin every 12 (twelve) hours   ipratropium (ATROVENT) 0 02 % nebulizer solution  Self No Yes   Sig: Take 1 vial (0 5 mg total) by nebulization 4 (four) times a day   lactulose (CHRONULAC) 10 g/15 mL solution  Self Yes Yes   Sig: lactulose 10 gm/15ml soln PRN   levothyroxine 125 mcg tablet  Self No Yes   Sig: Take 1 tablet (125 mcg total) by mouth daily   loratadine (CLARITIN) 10 mg tablet  Self Yes Yes   Sig: Take 10 mg by mouth daily as needed     metoprolol tartrate (LOPRESSOR) 25 mg tablet  Self No Yes   Sig: Take 1 tablet (25 mg total) by mouth 2 (two) times a day for 90 days   ondansetron (ZOFRAN-ODT) 4 mg disintegrating tablet  Self No Yes   Sig: Take 1 tablet (4 mg total) by mouth every 6 (six) hours as needed for nausea or vomiting   polyethylene glycol (MIRALAX) 17 g packet  Self No Yes   Sig: Take 17 g by mouth daily   Patient taking differently: Take 17 g by mouth 2 (two) times a day     potassium chloride (MICRO-K) 10 MEQ CR capsule  Self No Yes   Sig: Take 1 capsule (10 mEq total) by mouth daily   pramipexole (MIRAPEX) 1 mg tablet  Self No Yes   Sig: Take 1 tablet (1 mg total) by mouth 3 (three) times a day   traMADol (ULTRAM) 50 mg tablet  Self No Yes   Sig: Take 1 tablet (50 mg total) by mouth every 6 (six) hours as needed for moderate pain      Facility-Administered Medications Last Administration Doses Remaining   furosemide (LASIX) injection 30 mg None recorded 1          Past Medical History:   Diagnosis Date    Anxiety     Aortic valve disorder     Arthritis     Asthma     Cataract     [facility]     COPD [facility]ch[facility] disease) [facility]     Diabetes mellitus [facility]     Disease of thyroid gland     Dry eye syndrome     Ectropion of left eye     last assessed: 10/17/2016    Emphysema, 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[facility])     Hearing loss     History of malignant neoplasm of thyroid     Hypertension     Malignant neoplasm of skin     Memory loss     Mitral valve regurgitation     Myocardial infarction ([facility]     Obesity hypoventilation syndrome (HCC)     Pain     right hip    Restless leg syndrome     Seasonal allergies     Skin cancer (melanoma) ([facility]     Sleep related hypoxia     Thyroid cancer ([facility]     Urinary tract infection without hematuria 7/14/2018       Past Surgical History:   Procedure Laterality Date    CHOLECYSTECTOMY      EYE SURGERY      HYSTERECTOMY      OTHER SURGICAL HISTORY      removal of lesion    PERICARDIUM SURGERY      resection of pericardial cyst or tumor    CO ERCP DX COLLECTION SPECIMEN BRUSHING/WASHING N/A 5/30/2017    Procedure: ENDOSCOPIC RETROGRADE CHOLANGIOPANCREATOGRAPHY (ERCP); SPHINCTEROTOMY;  Surgeon: Luis Manuel Saucedo MD;  Location: [facility];   Service: Gastroenterology   [facility] JOINT Right 3/30/2018    Procedure: Right Sacroiliac Injection;  Surgeon: Yoselyn Patel MD;  Location: [facility] MAIN OR;  Service: Pain Management     REPLACEMENT TOTAL KNEE Left     REPLACEMENT TOTAL KNEE Right     REPLACEMENT TOTAL KNEE BILATERAL      SKIN BIOPSY      melanoma of back    STEROID INJECTION HIP Right 12/21/2017    Procedure: TROCHANTERIC BURSA INJECTION RIGHT;  Surgeon: Yoselyn Patel MD;  Location: [facility] MA[facility]; Service: Pain Management     THORACOSCOPY      (therapeutic) w/excision of pericardial cyst    THORACOTOMY Right     at least 40 years, for pericardial cyst    THYROID SURGERY      THYROIDECTOMY, PARTIAL      For thyroid cancer       Family History   Problem Relation Age of Onset    Cancer Father     Arthritis Father     Liver cancer Father     Diabetes Sister     Asthma Mother     No Known Problems Brother     Substance Abuse Neg Hx     Mental illness Neg Hx      I have reviewed and agree with the history as documented  Social History   Substance Use Topics    Smoking status: Never Smoker    Smokeless tobacco: Never Used    Alcohol use No        Review of Systems   Constitutional: Negative for fever  Respiratory: Positive for cough and shortness of breath  Cardiovascular: Negative for chest pain and palpitations  Gastrointestinal: Negative for abdominal pain  Musculoskeletal: Negative for back pain  Neurological: Negative for headaches  All other systems reviewed and are negative  Physical Exam  Physical Exam   Constitutional: She is oriented to person, place, and time  She appears well-developed  HENT:   Mouth/Throat: Oropharynx is clear and moist    Eyes: Conjunctivae are normal    Neck: Neck supple  Cardiovascular: Normal rate and regular rhythm  Pulmonary/Chest: Effort normal  No respiratory distress  Diminished air entry   Abdominal: Soft  Bowel sounds are normal    Musculoskeletal: She exhibits edema (bilat)  Neurological: She is alert and oriented to person, place, and time  Skin: Skin is warm and dry  Psychiatric: She has a normal mood and affect  Vitals reviewed        Vital Signs  ED Triage Vitals   Temperature Pulse Respirations Blood Pressure SpO2   09/07/18 1141 09/07/18 1141 09/07/18 1141 09/07/18 1130 09/07/18 1141   98 °F (36 7 °C) 84 (!) 26 (!) 195/82 (!) 86 %      Temp Source Heart Rate Source Patient Position - Orthostatic VS BP Location FiO2 (%)   09/07/18 1141 09/07/18 1141 09/07/18 1141 09/07/18 1141 --   Tympanic Monitor Sitting Left arm       Pain Score       09/07/18 1141       6           Vitals:    09/07/18 1400 09/07/18 1415 09/07/18 1430 09/07/18 1545   BP:    (!) 181/82   Pulse: 78 80 82 90   Patient Position - Orthostatic VS:    Lying       Visual Acuity      ED Medications  Medications   acetaminophen (TYLENOL) tablet 650 mg (not administered)   albuterol inhalation solution 1 25 mg (not administered)   aspirin tablet 325 mg (not administered)   azelastine (ASTELIN) 0 1 % nasal spray 1 spray (not administered)   docusate sodium (COLACE) capsule 100 mg (not administered)   folic acid (FOLVITE) tablet 1 mg (not administered)   gabapentin (NEURONTIN) capsule 100 mg (not administered)   insulin glargine (LANTUS) subcutaneous injection 20 Units 0 2 mL (not administered)   ipratropium (ATROVENT) 0 02 % inhalation solution 0 5 mg (0 5 mg Nebulization Given 9/7/18 1642)   levothyroxine tablet 125 mcg (not administered)   Linaclotide CAPS 72 mcg (not administered)   loratadine (CLARITIN) tablet 10 mg (not administered)   LORazepam (ATIVAN) tablet 0 5 mg (not administered)   metoprolol tartrate (LOPRESSOR) tablet 25 mg (not administered)   polyethylene glycol (MIRALAX) packet 17 g (not administered)   potassium chloride (K-DUR,KLOR-CON) CR tablet 40 mEq (not administered)   pramipexole (MIRAPEX) tablet 1 mg (not administered)   traMADol (ULTRAM) tablet 50 mg (not administered)   ondansetron (ZOFRAN) injection 4 mg (not administered)   calcium carbonate (TUMS) chewable tablet 1,000 mg (not administered)   enoxaparin (LOVENOX) subcutaneous injection 40 mg (not administered)   insulin lispro (HumaLOG) 100 units/mL subcutaneous injection 1-6 Units (not administered)   furosemide (LASIX) injection 40 mg (not administered)   iohexol (OMNIPAQUE) 350 MG/ML injection (MULTI-DOSE) 85 mL (85 mL Intravenous Given 9/7/18 1328)   methylPREDNISolone sodium succinate (Solu-MEDROL) injection 125 mg (125 mg Intravenous Given 9/7/18 1428)   ipratropium-albuterol (DUO-NEB) 0 5-2 5 mg/3 mL inhalation solution 3 mL (3 mL Nebulization Given 9/7/18 1428)   ipratropium-albuterol (DUO-NEB) 0 5-2 5 mg/3 mL inhalation solution 3 mL (3 mL Nebulization Given 9/7/18 1428)       Diagnostic Studies  Results Reviewed     Procedure Component Value Units Date/Time    TSH, 3rd generation [[id number]]  (Normal) Collected:  09/07/18 1146    Lab Status:  Final result Specimen:  Blood from Arm, Right Updated:  09/07/18 1657     TSH 3RD GENERATON 1 004 uIU/mL     Narrative:         Patients undergoing fluorescein dye angiography may retain small amounts of fluorescein in the body for 48-72 hours post procedure  Samples containing fluorescein can produce falsely depressed TSH values  If the patient had this procedure,a specimen should be resubmitted post fluorescein clearance            The recommended reference ranges for TSH during pregnancy are as follows:  First trimester 0 1 to 2 5 uIU/mL  Second trimester  0 2 to 3 0 uIU/mL  Third trimester 0 3 to 3 0 uIU/m      Blood gas, arterial [[id number]]     Lab Status:  No result Specimen:  Blood, Arterial     Fingerstick Glucose (POCT) [[id number]]  (Normal) Collected:  09/07/18 1415    Lab Status:  Final result Updated:  09/07/18 1417     POC Glucose 91 mg/dl     NT-BNP PRO [[id number]]  (Normal) Collected:  09/07/18 1146    Lab Status:  Final result Specimen:  Blood from Arm, Right Updated:  09/07/18 1221     NT-proBNP 141 pg/mL     Troponin I [[id number]]  (Normal) Collected:  09/07/18 1146    Lab Status:  Final result Specimen:  Blood from Arm, Right Updated:  09/07/18 1218     Troponin I <0 02 ng/mL     Comprehensive metabolic panel [[id number]]  (Abnormal) Collected:  09/07/18 1146    Lab Status:  Final result Specimen:  Blood from Arm, Right Updated:  09/07/18 1215     Sodium 142 mmol/L      Potassium 3 9 mmol/L      Chloride 102 mmol/L      CO2 36 (H) mmol/L ANION GAP 4 mmol/L      BUN 16 mg/dL      Creatinine 0 81 mg/dL      Glucose 156 (H) mg/dL      Calcium 8 5 mg/dL      AST 34 U/L      ALT 25 U/L      Alkaline Phosphatase 76 U/L      Total Protein 7 6 g/dL      Albumin 3 0 (L) g/dL      Total Bilirubin 0 20 mg/dL      eGFR 69 ml/min/1 73sq m     Narrative:         National Kidney Disease Education Program recommendations are as follows:  GFR calculation is accurate only with a steady state creatinine  Chronic Kidney disease less than 60 ml/min/1 73 sq  meters  Kidney failure less than 15 ml/min/1 73 sq  meters  Protime-INR [[id number]]  (Normal) Collected:  09/07/18 1146    Lab Status:  Final result Specimen:  Blood from Arm, Right Updated:  09/07/18 1212     Protime 9 9 seconds      INR 0 94    APTT [[id number]]  (Normal) Collected:  09/07/18 1146    Lab Status:  Final result Specimen:  Blood from Arm, Right Updated:  09/07/18 1212     PTT 27 seconds     CBC and differential [[id number]]  (Abnormal) Collected:  09/07/18 1146    Lab Status:  Final result Specimen:  Blood from Arm, Right Updated:  09/07/18 1157     WBC 9 69 Thousand/uL      RBC 3 85 Million/uL      Hemoglobin 11 4 (L) g/dL      Hematocrit 37 6 %      MCV 98 fL      MCH 29 6 pg      MCHC 30 3 (L) g/dL      RDW 16 2 (H) %      MPV 10 5 fL      Platelets 769 Thousands/uL      nRBC 0 /100 WBCs      Neutrophils Relative 71 %      Immat GRANS % 1 %      Lymphocytes Relative 18 %      Monocytes Relative 7 %      Eosinophils Relative 3 %      Basophils Relative 0 %      Neutrophils Absolute 6 91 Thousands/µL      Immature Grans Absolute 0 06 Thousand/uL      Lymphocytes Absolute 1 75 Thousands/µL      Monocytes Absolute 0 64 Thousand/µL      Eosinophils Absolute 0 30 Thousand/µL      Basophils Absolute 0 03 Thousands/µL                  CTA ED chest PE study   Final Result by Fco Wilkins MD (09/07 1344)         1  No central or segmental pulmonary embolism    Suboptimal evaluation of subsegmental vessels  2   Large hiatal hernia  3   Bibasilar atelectasis  Workstation performed: [id number]         XR chest 1 view portable   Final Result by Donnie Lopez MD (09/07 1223)      No acute cardiopulmonary disease  Workstation performed: [id number]                    Procedures  Procedures       Phone Contacts  ED Phone Contact    ED Course                               MDM  Number of Diagnoses or Management Options  Dyspnea:   Hypoxia:   Diagnosis management comments: CXR clear, BNP normal   CTA negative for PE  Pt started on steroids and nebs  Probably multifactorial etiology of dyspnea, COPD / CHF / Obesity hypovent syndrome / deconditioning  Placed on obs under Dr Niya Ambrosio for further mgmt  CritCare Time    Disposition  Final diagnoses:   Dyspnea   Hypoxia     Time reflects when diagnosis was documented in both MDM as applicable and the Disposition within this note     Time User Action Codes Description Comment    9/7/2018  2:13 PM Merril Georgia Add [R06 00] Dyspnea     9/7/2018  2:13 PM Merriwoodrow Verde Add [R09 02] Hypoxia     9/7/2018  4:05 PM SOLIS Corey 82 [E66 2] Obesity hypoventilation syndrome ([facility] )     9/7/2018  4:05 PM Germania Callahan Modify [E66 2] Obesity hypoventilation syndrome ([facility] )     9/7/2018  4:05 PM Eulah Most [E66 2] Obesity hypoventilation syndrome [facility])       ED Disposition     ED Disposition Condition Comment    Admit  Case was discussed with Dr Niya Ambrosio and the patient's admission status was agreed to be Admission Status: observation status to the service of Dr Niya Ambrosio          Follow-up Information    None         Current Discharge Medication List      CONTINUE these medications which have NOT CHANGED    Details   acetaminophen (TYLENOL) 325 mg tablet Take 2 tablets (650 mg total) by mouth every 6 (six) hours as needed for mild pain, headaches or fever  Qty: 30 tablet, Refills: 0    Associated Diagnoses: Chronic pain      albuterol (ACCUNEB) 1 25 MG/3ML nebulizer solution Take 3 mL (1 25 mg total) by nebulization 3 (three) times a day as needed for wheezing (J45 909)  Qty: 60 vial, Refills: 0    Associated Diagnoses: Dyspnea on exertion      !! albuterol (VENTOLIN HFA) 90 mcg/act inhaler ventolin hfa 108 (90 base) mcg/actaers      ascorbic acid (VITAMIN C) 500 mg tablet Take 1,000 mg by mouth daily        aspirin 325 mg tablet Take 325 mg by mouth daily      Azelastine HCl 137 MCG/SPRAY SOLN       cephalexin (KEFLEX) 500 mg capsule Take 500 mg by mouth every 6 (six) hours        COD LIVER OIL PO Take by mouth daily      docusate sodium (COLACE) 100 mg capsule Take 1 capsule (100 mg total) by mouth 2 (two) times a day  Qty: 10 capsule, Refills: 0    Associated Diagnoses: Chronic constipation      folic acid (FOLVITE) 1 mg tablet Take by mouth daily      furosemide (LASIX) 20 mg tablet Take 1 tablet (20 mg total) by mouth daily for 30 days  Qty: 30 tablet, Refills: 0    Associated Diagnoses: Peripheral edema      gabapentin (NEURONTIN) 100 mg capsule Take 1 capsule (100 mg total) by mouth 3 (three) times a day  Qty: 180 capsule, Refills: 3    Associated Diagnoses: Other chronic pain      insulin glargine (LANTUS) 100 units/mL subcutaneous injection Inject 20 Units under the skin every 12 (twelve) hours  Qty: 10 mL, Refills: 3    Associated Diagnoses: Type 2 diabetes mellitus with diabetic polyneuropathy, with long-term current use of insulin ([facility])      ipratropium (ATROVENT) 0 02 % nebulizer solution Take 1 vial (0 5 mg total) by nebulization 4 (four) times a day  Refills: 0    Associated Diagnoses: Chronic bronchitis, unspecified chronic bronchitis type (HCC)      lactulose (CHRONULAC) 10 g/15 mL solution lactulose 10 gm/15ml soln PRN      levothyroxine 125 mcg tablet Take 1 tablet (125 mcg total) by mouth daily  Qty: 90 tablet, Refills: 1    Associated Diagnoses: Acquired hypothyroidism      Linaclotide (LINZESS) 72 MCG CAPS Take 72 mcg by mouth daily before breakfast  Qty: 30 capsule, Refills: 6    Associated Diagnoses: Constipation due to opioid therapy      loratadine (CLARITIN) 10 mg tablet Take 10 mg by mouth daily as needed        LORazepam (ATIVAN) 0 5 mg tablet Take 1 tablet (0 5 mg total) by mouth every 8 (eight) hours as needed for anxiety for up to 10 days  Qty: 30 tablet, Refills: 0    Associated Diagnoses: Anxiety      metoprolol tartrate (LOPRESSOR) 25 mg tablet Take 1 tablet (25 mg total) by mouth 2 (two) times a day for 90 days  Qty: 180 tablet, Refills: 3    Associated Diagnoses: Diastolic dysfunction      Multiple Vitamins-Minerals (MULTIVITAMIN ADULT PO) Take 1 tablet by mouth daily      NOVOLOG MIX 70/30 FLEXPEN (70-30) 100 units/mL injection pen       ondansetron (ZOFRAN-ODT) 4 mg disintegrating tablet Take 1 tablet (4 mg total) by mouth every 6 (six) hours as needed for nausea or vomiting  Qty: 30 tablet, Refills: 1    Associated Diagnoses: Pancreatic cyst      polyethylene glycol (MIRALAX) 17 g packet Take 17 g by mouth daily  Qty: 14 each, Refills: 0      potassium chloride (MICRO-K) 10 MEQ CR capsule Take 1 capsule (10 mEq total) by mouth daily  Qty: 90 capsule, Refills: 3    Associated Diagnoses: Essential hypertension      pramipexole (MIRAPEX) 1 mg tablet Take 1 tablet (1 mg total) by mouth 3 (three) times a day  Qty: 90 tablet, Refills: 3    Associated Diagnoses: Restless leg syndrome      traMADol (ULTRAM) 50 mg tablet Take 1 tablet (50 mg total) by mouth every 6 (six) hours as needed for moderate pain  Qty: 45 tablet, Refills: 0    Associated Diagnoses: Chronic pain syndrome      !! VENTOLIN  (90 Base) MCG/ACT inhaler        !! - Potential duplicate medications found  Please discuss with provider  No discharge procedures on file      ED Provider  Electronically Signed by           Chris Rubio DO  09/07/18 6703

## 2018-09-07 NOTE — ASSESSMENT & PLAN NOTE
· Acute on chronic  Multifactorial given DHF, COPD, hypoventilation obesity syndrome  · CTA negative for PE  · No evidence of major fluid overload or COPD exacerbation  · Admit to obs  · Serial troponins  · Check ABG  · Monitor O2 overnight  · Cont home breathing treatments  · Received dose of IV solumedrol in ED, monitor     · Pulmonary consulted, recs appreciated

## 2018-09-07 NOTE — TELEPHONE ENCOUNTER
Non urgent message    Wanted to let you know  Since Etienne Barrientos saw you today, she declined her visiting nurse

## 2018-09-07 NOTE — RESPIRATORY THERAPY NOTE
RT Protocol Note  Burt Gant 77 y o  female MRN: [id number]  Unit/Bed#: 2 [place] [address] Encounter: [id number]    Assessment    Principal Problem:    Dyspnea  Active Problems:    Acquired hypothyroidism    HTN (hypertension)      Home Pulmonary Medications:  Nebs, inhalers, oxygen  Home Devices/Therapy: Home O2    Past Medical History:   Diagnosis Date    Anxiety     Aortic valve disorder     Arthritis     Asthma     Cataract     [facility]     COPD (chronic obstructive pulmonary disease) [facility]     Diabetes mellitus [facility]     Disease of thyroid gland     Dry eye syndrome     Ectropion of left eye     last assessed: 10/17/2016    Emphysema, 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[facility]     Hearing loss     History of malignant neoplasm of thyroid     Hypertension     Malignant neoplasm of skin     Memory loss     Mitral valve regurgitation     Myocardial infarction ([facility] )     Obesity hypoventilation syndrome ([facility]     Pain     right hip    Restless leg syndrome     Seasonal allergies     Skin cancer (melanoma) ([place] [address] )     Sleep related hypoxia     Thyroid cancer ([facility] )     Urinary tract infection without hematuria 7/14/2018     Social History     Social History    Marital status: Single     Spouse name: N/A    Number of children: 4    Years of education: high school graduate     Occupational History    retired      Social History Main Topics    Smoking status: Never Smoker    Smokeless tobacco: Never Used    Alcohol use No    Drug use: No    Sexual activity: Not Currently     Partners: Male     Other Topics Concern    None     Social History Narrative    Lives with significant others  Uses walker outside      Active advance directive-copy no obtained    Caffeine use    Drinks coffee    Feels safe at home    Seeing a dentist       Subjective    Subjective Data: pt states breathing is heavy today same as every day    Objective    Physical Exam:   Assessment Type: Pre-treatment  General Appearance: Alert, Awake  Respiratory Pattern: Labored  Chest Assessment: Chest expansion symmetrical  Bilateral Breath Sounds: Diminished  R Breath Sounds: Diminished  L Breath Sounds: Diminished  Location Specific: No  Cough: Non-productive, Dry, Strong  O2 Device: nc 3 lpm    Vitals:  Blood pressure (!) 181/82, pulse 90, temperature 98 1 °F (36 7 °C), temperature source Oral, resp  rate 22, height 5' 2" (1 575 m), weight 105 kg (232 lb 9 4 oz), SpO2 96 %, not currently breastfeeding  Imaging and other studies: I have personally reviewed pertinent reports  O2 Device: nc 3 lpm     Plan    Respiratory Plan: No distress/Pulmonary history        Resp Comments: no distress noted

## 2018-09-07 NOTE — SOCIAL WORK
DASH discussion completed  Discussed goals of making sure pt's needs are met upon discharge, pt's preferences are taken into account, pt understands her health condition, medications and symptoms to watch for after returning home and pt is aware of any follow up appointments recommended by hospital physician  CM spoke with the pt at the bedside  Pt lives alone and has her kids coming MWF and over the weekends  She has DME with a cane, walker, commode and transfer chair  Pt has [facility] [place] coming to the home for RN, will need PT/OT  Pt stated that Pain Management will not do anything for her back until she is able to have therapies  Will refer out to [facility] for services upon DC  Pt does not drive her family assists, she uses the [employer] for her medications

## 2018-09-07 NOTE — H&P
H&P- Lowell Bustamante 1940, 77 y o  female MRN: [id number]    Unit/Bed#: [address] Encounter: [id number]    Primary Care Provider: Cornelius Magdaleno MD   Date and time admitted to hospital: 9/7/2018 11:28 AM        * Dyspnea   Assessment & Plan    · Acute on chronic  Multifactorial given DHF, COPD, hypoventilation obesity syndrome  · CTA negative for PE  · No evidence of major fluid overload or COPD exacerbation  · Admit to obs  · Serial troponins  · Check ABG  · Monitor O2 overnight  · Cont home breathing treatments  · Received dose of IV solumedrol in ED, monitor  · Pulmonary consulted, recs appreciated        Chronic respiratory failure with hypoxia and hypercapnia (HCC)   Assessment & Plan    · ABG pending given worsening hypoxia  · Pulmonary consulted        Physical deconditioning   Assessment & Plan    · Patient has had multiple admissions in the past, all recommending STR  · PT/OT eval  · She may need a long term higher level of care, but current she is not interested in that        Diastolic CHF [facility])   Assessment & Plan    · Last echo 7/2018  showed EF was 60-65%  There was left ventricular relaxation or grade 1 diastolic dysfunction  Mild tricuspid regurgitation  · Volume status difficult to assess,  on admission, CTA showed Bibasilar atelectasis, She has chronic peripheral edema which appears at baseline but her weight from last admission 7/2018 was 92kg, today 103kg  · One dose of IV lasix given on admission  · Monitor I/O and daily weights  · Monitor lytes and renal function        Diabetes mellitus with neuropathy [facility])   Assessment & Plan    Lab Results   Component Value Date    HGBA1C 9 1% 04/27/2018       Recent Labs      09/07/18   1415  09/07/18   1621   POCGLU  91  134       Blood Sugar Average: Last 72 hrs:  (P) 112 5     Cont home insulin  ISS  POC sugars  diabetic diet        HTN (hypertension)   Assessment & Plan    · BP elevated on admission  · Resume home meds, monitor  May need med adjustment        Obesity hypoventilation syndrome (HCC)   Assessment & Plan    Cannot tolerate CPAP, cont nocturnal O2        Sleep apnea   Assessment & Plan    · Had CPAP titration for 15mg Hg but patient cannot tolerate CPAP  · Cont NC qHS  · Pulmonary consulted        RLS (restless legs syndrome)   Assessment & Plan    Cont pramipexole and gabapentin        Constipation due to opioid therapy   Assessment & Plan    Cont home meds          Morbid obesity with BMI of 40 0-44 9, adult (HCC)   Assessment & Plan    · BMI 42 54  · Needs Lifestyle modification, weight loss        Acquired hypothyroidism   Assessment & Plan    · Cont synthroid  · TSH 1 00                VTE Prophylaxis: Enoxaparin (Lovenox)  / sequential compression device   Code Status: Level 1 - Full Code    Anticipated Length of Stay:  Patient will be admitted on an Observation basis with an anticipated length of stay of  < 2 midnights  Justification for Hospital Stay:     Total Time for Visit, including Counseling / Coordination of Care: 45 minutes  Greater than 50% of this total time spent on direct patient counseling and coordination of care  Chief Complaint:   Shortness of Breath (patient c/o SOB with exertion, worse over the past several days  had a dose of IV Lasix 40 mg at PMD yesterday, but symtpoms are not improving and she gained 1 8 pounds since yesterday )      History of Present Illness:    Alvin Byrne is a 77 y o  female with a PMH of hypothyroidism, morbid obesity, asthma, chronic hypoxic and hypoxemic respiratory failure, LISBET, obesity hypoventilation syndrome, HTN,T2DM on long term insulin, depression, who presents with progressively worsening shortness of breath for several weeks  She has a history of chronic shortness of breath but this is worse than baseline  She was admitted in July for respiratory failure secondary to CHF exacerbation and was discharged to rehab  She was there for just over a month    2 weeks after arriving home she began to decompensate  She states she takes her medications as instructed including her diuretic and breathing treatments  She says she checks her weight daily but can't tell me what it is now but states she has been noticing progressively worsening swelling in her legs with weeping  She also noticed she was not urinating as much as she normally does on her usual dose of diuretic  She has been to her PCP several times and her Lasix was increased from 20 mg to 40 mg daily but she has not had any improvement in her symptoms  Review of Systems:    Review of Systems    Past Medical and Surgical History:     Past Medical History:   Diagnosis Date    Anxiety     Aortic valve disorder     Arthritis     Asthma     Cataract     [facility]     COPD (chronic obstructive pulmonary disease) ([address]     Diabetes mellitus ([address] )     Disease of thyroid gland     Dry eye syndrome     Ectropion of left eye     last assessed: 10/17/2016    Emphysema, 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[facility])     Hearing loss     History of malignant neoplasm of thyroid     Hypertension     Malignant neoplasm of skin     Memory loss     Mitral valve regurgitation     Myocardial infarction ([address] )     Obesity hypoventilation syndrome ([facility])     Pain     right hip    Restless leg syndrome     Seasonal allergies     Skin cancer (melanoma) ([address]     Sleep related hypoxia     Thyroid cancer ([address] )     Urinary tract infection without hematuria 7/14/2018       Past Surgical History:   Procedure Laterality Date    CHOLECYSTECTOMY      EYE SURGERY      HYSTERECTOMY      OTHER SURGICAL HISTORY      removal of lesion    PERICARDIUM SURGERY      resection of pericardial cyst or tumor    DC ERCP DX COLLECTION SPECIMEN BRUSHING/WASHING N/A 5/30/2017    Procedure: ENDOSCOPIC RETROGRADE CHOLANGIOPANCREATOGRAPHY (ERCP); SPHINCTEROTOMY;  Surgeon: Ihsan Dupont MD;  Location: [facility];   Service: Gastroenterology    MD INJECTION,SACROILIAC JOINT Right 3/30/2018    Procedure: Right Sacroiliac Injection;  Surgeon: Ritesh Hernandez MD;  Location[facility] MAIN [facility];  Service: Pain Management     REPLACEMENT TOTAL KNEE Left     REPLACEMENT TOTAL KNEE Right     REPLACEMENT TOTAL KNEE BILATERAL      SKIN BIOPSY      melanoma of back    STEROID INJECTION HIP Right 12/21/2017    Procedure: TROCHANTERIC BURSA INJECTION RIGHT;  Surgeon: Ritesh Hernandez MD;  Location: [place] [address] MAIN OR;  Service: Pain Management     THORACOSCOPY      (therapeutic) w/excision of pericardial cyst    THORACOTOMY Right     at least 40 years, for pericardial cyst    THYROID SURGERY      THYROIDECTOMY, PARTIAL      For thyroid cancer       Meds/Allergies:    Prior to Admission medications    Medication Sig Start Date End Date Taking?  Authorizing Provider   acetaminophen (TYLENOL) 325 mg tablet Take 2 tablets (650 mg total) by mouth every 6 (six) hours as needed for mild pain, headaches or fever 2/26/18  Yes Consuelo Coles MD   albuterol (ACCUNEB) 1 25 MG/3ML nebulizer solution Take 3 mL (1 25 mg total) by nebulization 3 (three) times a day as needed for wheezing (J45 909) 4/21/18  Yes Joby Zarate MD   albuterol (VENTOLIN HFA) 90 mcg/act inhaler ventolin hfa 108 (90 base) mcg/actaers   Yes Historical Provider, MD   ascorbic acid (VITAMIN C) 500 mg tablet Take 1,000 mg by mouth daily     Yes Historical Provider, MD   aspirin 325 mg tablet Take 325 mg by mouth daily   Yes Historical Provider, MD   Azelastine HCl 137 MCG/SPRAY SOLN  8/29/18  Yes Historical Provider, MD   cephalexin (KEFLEX) 500 mg capsule Take 500 mg by mouth every 6 (six) hours   9/5/18  Yes Historical Provider, MD   COD LIVER OIL PO Take by mouth daily   Yes Historical Provider, MD   docusate sodium (COLACE) 100 mg capsule Take 1 capsule (100 mg total) by mouth 2 (two) times a day 7/18/18  Yes Selvin Barboza MD   folic acid (FOLVITE) 1 mg tablet Take by mouth daily   Yes Historical Provider, MD   furosemide (LASIX) 20 mg tablet Take 1 tablet (20 mg total) by mouth daily for 30 days  Patient taking differently: Take 40 mg by mouth daily   8/24/18 9/23/18 Yes Edilia Mendoza MD   gabapentin (NEURONTIN) 100 mg capsule Take 1 capsule (100 mg total) by mouth 3 (three) times a day 5/23/18  Yes Cornelius Magdaleno MD   insulin glargine (LANTUS) 100 units/mL subcutaneous injection Inject 20 Units under the skin every 12 (twelve) hours 8/10/18  Yes Cornelius Magdaleno MD   ipratropium (ATROVENT) 0 02 % nebulizer solution Take 1 vial (0 5 mg total) by nebulization 4 (four) times a day 7/18/18  Yes Hang Vickers MD   lactulose (CHRONULAC) 10 g/15 mL solution lactulose 10 gm/15ml soln PRN   Yes Historical Provider, MD   levothyroxine 125 mcg tablet Take 1 tablet (125 mcg total) by mouth daily 3/16/18  Yes Cornelius Magdaleno MD   Linaclotide [facility]) 72 MCG CAPS Take 72 mcg by mouth daily before breakfast 2/14/18  Yes Rach Pham MD   loratadine (CLARITIN) 10 mg tablet Take 10 mg by mouth daily as needed     Yes Historical Provider, MD   LORazepam (ATIVAN) 0 5 mg tablet Take 1 tablet (0 5 mg total) by mouth every 8 (eight) hours as needed for anxiety for up to 10 days 7/18/18 9/7/18 Yes Hang Vickers MD   metoprolol tartrate (LOPRESSOR) 25 mg tablet Take 1 tablet (25 mg total) by mouth 2 (two) times a day for 90 days 8/24/18 11/22/18 Yes Edilia Mendoza MD   Multiple Vitamins-Minerals (MULTIVITAMIN ADULT PO) Take 1 tablet by mouth daily 2/9/16  Yes Historical Provider, MD   NOVOLOG MIX 70/30 FLEXPEN (70-30) 100 units/mL injection pen  8/11/18  Yes Historical Provider, MD   ondansetron (ZOFRAN-ODT) 4 mg disintegrating tablet Take 1 tablet (4 mg total) by mouth every 6 (six) hours as needed for nausea or vomiting 6/29/18  Yes Rach Pham MD   polyethylene glycol (MIRALAX) 17 g packet Take 17 g by mouth daily  Patient taking differently: Take 17 g by mouth 2 (two) times a day   6/17/17  Yes Veronica Edwards FATIMAH Pena   potassium chloride (MICRO-K) 10 MEQ CR capsule Take 1 capsule (10 mEq total) by mouth daily 5/23/18  Yes Sarah Ny MD   pramipexole (MIRAPEX) 1 mg tablet Take 1 tablet (1 mg total) by mouth 3 (three) times a day 5/10/18  Yes Sarah Ny MD   traMADol [place] Dec) 50 mg tablet Take 1 tablet (50 mg total) by mouth every 6 (six) hours as needed for moderate pain 8/10/18  Yes Sarah Ny MD   VENTOLIN  (51 Base) MCG/ACT inhaler  8/17/18  Yes Historical Provider, MD   cefadroxil (DURICEF) 500 mg capsule cefadroxil 500 mg caps  9/7/18  Historical Provider, MD   metolazone (ZAROXOLYN) 2 5 mg tablet Take one tablet three times a week 6/5/18 9/7/18  Jose Mcrae MD       Allergies: Allergies   Allergen Reactions    Ketorolac Swelling     Toradol    Latex Rash    Wound Dressing Adhesive Rash       Social History:     Marital Status: Single   Substance Use History:   History   Alcohol Use No     History   Smoking Status    Never Smoker   Smokeless Tobacco    Never Used     History   Drug Use No       Family History:    non-contributory    Physical Exam:     Vitals:   Blood Pressure: (!) 181/82 (09/07/18 1545)  Pulse: 90 (09/07/18 1545)  Temperature: 98 1 °F (36 7 °C) (09/07/18 1545)  Temp Source: Oral (09/07/18 1545)  Respirations: 22 (09/07/18 1545)  Height: 5' 2" (157 5 cm) (09/07/18 1545)  Weight - Scale: 105 kg (232 lb 9 4 oz) (09/07/18 1545)  SpO2: 94 % (09/07/18 1545)    Physical Exam   Constitutional: She is oriented to person, place, and time  She appears well-developed  No distress  Chronically ill-appearing   HENT:   Head: Normocephalic and atraumatic  Cardiovascular: Normal rate, regular rhythm and normal heart sounds  Pulmonary/Chest: No respiratory distress  She has no wheezes  She has no rales  Poor inspiratory effort, prolonged expiratory phase, decreased breath sounds   Abdominal: Soft  Bowel sounds are normal  She exhibits no distension  There is no tenderness  There is no rebound and no guarding  Musculoskeletal: She exhibits edema  Legs wrapped in Ace bandages  Neurological: She is alert and oriented to person, place, and time  Skin: Skin is warm and dry  Psychiatric: She has a normal mood and affect  Her behavior is normal    Nursing note and vitals reviewed  Additional Data:     Lab Results: I have personally reviewed pertinent reports  Results from last 7 days  Lab Units 09/07/18  1146   WBC Thousand/uL 9 69   HEMOGLOBIN g/dL 11 4*   HEMATOCRIT % 37 6   PLATELETS Thousands/uL 211   NEUTROS PCT % 71   LYMPHS PCT % 18   MONOS PCT % 7   EOS PCT % 3       Results from last 7 days  Lab Units 09/07/18  1146   SODIUM mmol/L 142   POTASSIUM mmol/L 3 9   CHLORIDE mmol/L 102   CO2 mmol/L 36*   BUN mg/dL 16   CREATININE mg/dL 0 81   CALCIUM mg/dL 8 5   ALK PHOS U/L 76   ALT U/L 25   AST U/L 34       Results from last 7 days  Lab Units 09/07/18  1146   INR  0 94       Imaging: I have personally reviewed pertinent reports  CTA ED chest PE study   Final Result by Cyndi Preston MD (09/07 1344)         1  No central or segmental pulmonary embolism  Suboptimal evaluation of subsegmental vessels  2   Large hiatal hernia  3   Bibasilar atelectasis  Workstation performed: [id number]         XR chest 1 view portable   Final Result by Cyndi Preston MD (09/07 1223)      No acute cardiopulmonary disease  Workstation performed: [id number]             CTA ED chest PE study   Final Result         1  No central or segmental pulmonary embolism  Suboptimal evaluation of subsegmental vessels  2   Large hiatal hernia  3   Bibasilar atelectasis  Workstation performed: [id number]         XR chest 1 view portable   Final Result      No acute cardiopulmonary disease              Workstation performed: [id number]             EKG, Pathology, and Other Studies Reviewed on Admission:   · EKG:     Allscripts / Epic Records Reviewed: Yes     ** Please Note: This note has been constructed using a voice recognition system   **

## 2018-09-08 PROBLEM — I83.009 VENOUS STASIS ULCER (HCC): Status: ACTIVE | Noted: 2018-09-08

## 2018-09-08 PROBLEM — L97.909 VENOUS STASIS ULCER (HCC): Status: ACTIVE | Noted: 2018-09-08

## 2018-09-08 LAB
ANION GAP SERPL CALCULATED.3IONS-SCNC: 2 MMOL/L (ref 4–13)
BUN SERPL-MCNC: 21 MG/DL (ref 5–25)
CALCIUM SERPL-MCNC: 8.3 MG/DL (ref 8.3–10.1)
CHLORIDE SERPL-SCNC: 101 MMOL/L (ref 100–108)
CO2 SERPL-SCNC: 34 MMOL/L (ref 21–32)
CREAT SERPL-MCNC: 0.89 MG/DL (ref 0.6–1.3)
ERYTHROCYTE [DISTWIDTH] IN BLOOD BY AUTOMATED COUNT: 15.9 % (ref 11.6–15.1)
GFR SERPL CREATININE-BSD FRML MDRD: 62 ML/MIN/1.73SQ M
GLUCOSE SERPL-MCNC: 317 MG/DL (ref 65–140)
GLUCOSE SERPL-MCNC: 325 MG/DL (ref 65–140)
GLUCOSE SERPL-MCNC: 349 MG/DL (ref 65–140)
GLUCOSE SERPL-MCNC: 376 MG/DL (ref 65–140)
GLUCOSE SERPL-MCNC: 410 MG/DL (ref 65–140)
HCT VFR BLD AUTO: 36.5 % (ref 34.8–46.1)
HGB BLD-MCNC: 11 G/DL (ref 11.5–15.4)
MCH RBC QN AUTO: 29.6 PG (ref 26.8–34.3)
MCHC RBC AUTO-ENTMCNC: 30.1 G/DL (ref 31.4–37.4)
MCV RBC AUTO: 98 FL (ref 82–98)
PLATELET # BLD AUTO: 210 THOUSANDS/UL (ref 149–390)
PMV BLD AUTO: 10.8 FL (ref 8.9–12.7)
POTASSIUM SERPL-SCNC: 5 MMOL/L (ref 3.5–5.3)
RBC # BLD AUTO: 3.71 MILLION/UL (ref 3.81–5.12)
SODIUM SERPL-SCNC: 137 MMOL/L (ref 136–145)
WBC # BLD AUTO: 12.08 THOUSAND/UL (ref 4.31–10.16)

## 2018-09-08 PROCEDURE — G8987 SELF CARE CURRENT STATUS: HCPCS

## 2018-09-08 PROCEDURE — 99225 PR SBSQ OBSERVATION CARE/DAY 25 MINUTES: CPT | Performed by: STUDENT IN AN ORGANIZED HEALTH CARE EDUCATION/TRAINING PROGRAM

## 2018-09-08 PROCEDURE — 97163 PT EVAL HIGH COMPLEX 45 MIN: CPT

## 2018-09-08 PROCEDURE — G8978 MOBILITY CURRENT STATUS: HCPCS

## 2018-09-08 PROCEDURE — 99214 OFFICE O/P EST MOD 30 MIN: CPT | Performed by: INTERNAL MEDICINE

## 2018-09-08 PROCEDURE — 94760 N-INVAS EAR/PLS OXIMETRY 1: CPT

## 2018-09-08 PROCEDURE — G8988 SELF CARE GOAL STATUS: HCPCS

## 2018-09-08 PROCEDURE — 97167 OT EVAL HIGH COMPLEX 60 MIN: CPT

## 2018-09-08 PROCEDURE — G8979 MOBILITY GOAL STATUS: HCPCS

## 2018-09-08 PROCEDURE — 97110 THERAPEUTIC EXERCISES: CPT

## 2018-09-08 PROCEDURE — 94640 AIRWAY INHALATION TREATMENT: CPT

## 2018-09-08 PROCEDURE — 85027 COMPLETE CBC AUTOMATED: CPT | Performed by: STUDENT IN AN ORGANIZED HEALTH CARE EDUCATION/TRAINING PROGRAM

## 2018-09-08 PROCEDURE — 80048 BASIC METABOLIC PNL TOTAL CA: CPT | Performed by: STUDENT IN AN ORGANIZED HEALTH CARE EDUCATION/TRAINING PROGRAM

## 2018-09-08 PROCEDURE — 82948 REAGENT STRIP/BLOOD GLUCOSE: CPT

## 2018-09-08 RX ORDER — CLOTRIMAZOLE 1 %
CREAM (GRAM) TOPICAL 2 TIMES DAILY
Status: DISCONTINUED | OUTPATIENT
Start: 2018-09-08 | End: 2018-09-08

## 2018-09-08 RX ORDER — LEVALBUTEROL INHALATION SOLUTION 0.63 MG/3ML
0.63 SOLUTION RESPIRATORY (INHALATION) 4 TIMES DAILY
Status: DISCONTINUED | OUTPATIENT
Start: 2018-09-08 | End: 2018-09-09 | Stop reason: HOSPADM

## 2018-09-08 RX ORDER — CLOTRIMAZOLE 1 %
CREAM (GRAM) TOPICAL DAILY
Status: DISCONTINUED | OUTPATIENT
Start: 2018-09-08 | End: 2018-09-09 | Stop reason: HOSPADM

## 2018-09-08 RX ORDER — MORPHINE SULFATE 2 MG/ML
2 INJECTION, SOLUTION INTRAMUSCULAR; INTRAVENOUS ONCE
Status: COMPLETED | OUTPATIENT
Start: 2018-09-08 | End: 2018-09-08

## 2018-09-08 RX ADMIN — CLOTRIMAZOLE: 10 CREAM TOPICAL at 17:03

## 2018-09-08 RX ADMIN — AZELASTINE HYDROCHLORIDE 1 SPRAY: 137 SPRAY, METERED NASAL at 17:02

## 2018-09-08 RX ADMIN — ENOXAPARIN SODIUM 40 MG: 40 INJECTION SUBCUTANEOUS at 08:13

## 2018-09-08 RX ADMIN — TRAMADOL HYDROCHLORIDE 50 MG: 50 TABLET, FILM COATED ORAL at 22:47

## 2018-09-08 RX ADMIN — METOPROLOL TARTRATE 25 MG: 25 TABLET ORAL at 17:02

## 2018-09-08 RX ADMIN — IPRATROPIUM BROMIDE 0.5 MG: 0.5 SOLUTION RESPIRATORY (INHALATION) at 15:17

## 2018-09-08 RX ADMIN — LORAZEPAM 0.5 MG: 0.5 TABLET ORAL at 13:47

## 2018-09-08 RX ADMIN — PRAMIPEXOLE DIHYDROCHLORIDE 1 MG: 0.5 TABLET ORAL at 21:18

## 2018-09-08 RX ADMIN — LORATADINE 10 MG: 10 TABLET ORAL at 08:15

## 2018-09-08 RX ADMIN — DOCUSATE SODIUM 100 MG: 100 CAPSULE, LIQUID FILLED ORAL at 08:15

## 2018-09-08 RX ADMIN — PRAMIPEXOLE DIHYDROCHLORIDE 1 MG: 0.5 TABLET ORAL at 08:14

## 2018-09-08 RX ADMIN — METOPROLOL TARTRATE 25 MG: 25 TABLET ORAL at 08:15

## 2018-09-08 RX ADMIN — ASPIRIN 325 MG: 325 TABLET ORAL at 08:14

## 2018-09-08 RX ADMIN — LORAZEPAM 0.5 MG: 0.5 TABLET ORAL at 23:37

## 2018-09-08 RX ADMIN — INSULIN GLARGINE 20 UNITS: 100 INJECTION, SOLUTION SUBCUTANEOUS at 08:15

## 2018-09-08 RX ADMIN — INSULIN LISPRO 3 UNITS: 100 INJECTION, SOLUTION INTRAVENOUS; SUBCUTANEOUS at 22:41

## 2018-09-08 RX ADMIN — DOCUSATE SODIUM 100 MG: 100 CAPSULE, LIQUID FILLED ORAL at 17:02

## 2018-09-08 RX ADMIN — LEVALBUTEROL HYDROCHLORIDE 0.63 MG: 0.63 SOLUTION RESPIRATORY (INHALATION) at 19:49

## 2018-09-08 RX ADMIN — LEVOTHYROXINE SODIUM 125 MCG: 125 TABLET ORAL at 06:23

## 2018-09-08 RX ADMIN — POTASSIUM CHLORIDE 40 MEQ: 1500 TABLET, EXTENDED RELEASE ORAL at 08:15

## 2018-09-08 RX ADMIN — INSULIN LISPRO 5 UNITS: 100 INJECTION, SOLUTION INTRAVENOUS; SUBCUTANEOUS at 17:03

## 2018-09-08 RX ADMIN — GABAPENTIN 100 MG: 100 CAPSULE ORAL at 17:02

## 2018-09-08 RX ADMIN — TRAMADOL HYDROCHLORIDE 50 MG: 50 TABLET, FILM COATED ORAL at 03:16

## 2018-09-08 RX ADMIN — POLYETHYLENE GLYCOL 3350 17 G: 17 POWDER, FOR SOLUTION ORAL at 08:13

## 2018-09-08 RX ADMIN — FOLIC ACID 1 MG: 1 TABLET ORAL at 08:14

## 2018-09-08 RX ADMIN — IPRATROPIUM BROMIDE 0.5 MG: 0.5 SOLUTION RESPIRATORY (INHALATION) at 07:23

## 2018-09-08 RX ADMIN — GABAPENTIN 100 MG: 100 CAPSULE ORAL at 08:14

## 2018-09-08 RX ADMIN — TRAMADOL HYDROCHLORIDE 50 MG: 50 TABLET, FILM COATED ORAL at 13:45

## 2018-09-08 RX ADMIN — INSULIN LISPRO 5 UNITS: 100 INJECTION, SOLUTION INTRAVENOUS; SUBCUTANEOUS at 08:16

## 2018-09-08 RX ADMIN — AZELASTINE HYDROCHLORIDE 1 SPRAY: 137 SPRAY, METERED NASAL at 11:50

## 2018-09-08 RX ADMIN — MORPHINE SULFATE 2 MG: 2 INJECTION, SOLUTION INTRAMUSCULAR; INTRAVENOUS at 19:31

## 2018-09-08 RX ADMIN — IPRATROPIUM BROMIDE 0.5 MG: 0.5 SOLUTION RESPIRATORY (INHALATION) at 11:32

## 2018-09-08 RX ADMIN — PRAMIPEXOLE DIHYDROCHLORIDE 1 MG: 0.5 TABLET ORAL at 17:02

## 2018-09-08 RX ADMIN — INSULIN GLARGINE 20 UNITS: 100 INJECTION, SOLUTION SUBCUTANEOUS at 22:41

## 2018-09-08 RX ADMIN — ALBUTEROL SULFATE 1.25 MG: 2.5 SOLUTION RESPIRATORY (INHALATION) at 15:18

## 2018-09-08 RX ADMIN — ACETAMINOPHEN 650 MG: 325 TABLET, FILM COATED ORAL at 00:00

## 2018-09-08 RX ADMIN — GABAPENTIN 100 MG: 100 CAPSULE ORAL at 21:18

## 2018-09-08 RX ADMIN — INSULIN LISPRO 6 UNITS: 100 INJECTION, SOLUTION INTRAVENOUS; SUBCUTANEOUS at 11:49

## 2018-09-08 RX ADMIN — IPRATROPIUM BROMIDE 0.5 MG: 0.5 SOLUTION RESPIRATORY (INHALATION) at 19:49

## 2018-09-08 RX ADMIN — IPRATROPIUM BROMIDE 0.5 MG: 0.5 SOLUTION RESPIRATORY (INHALATION) at 17:53

## 2018-09-08 NOTE — PROGRESS NOTES
Assessment/Plan:    Problem List Items Addressed This Visit        Musculoskeletal and Integument    Chronic venous stasis dermatitis of both lower extremities (Chronic)    Primary osteoarthritis involving multiple joints    Chronic cutaneous venous stasis ulcer (HCC)       Other    Obesity hypoventilation syndrome (HCC) (Chronic)    Peripheral edema - Primary          Patient Instructions   DISCUSSED ISSUES WITH PATIENT AND DAUGHTER  PATIENT FAILED OUTPATIENT IMPROVEMENT  RECOMMEND INPATIENT MANAGEMENT    PATIENT WILL BE SENT TO [facility] ER FOR FURTHER EVALUATION AND POSSIBLE INPATIENT MANAGEMENT      No Follow-up on file  Subjective:      Patient ID: Terri Ramirez is a 77 y o  female  Chief Complaint   Patient presents with    Shortness of Breath       PATIENT RETURNS  RECEIVED DOSE OF IV LASIX YESTERDAY  DID EXPERIENCE SOME MILD DIURESIS  FEELS NO BETTER  HAD A BAD NIGHT BREATHING  NO WEIGHT LOSS  ACHY ALL OVER    DENIES ANY FEVER OR CHILLS  LEG WOUNDS CONTINUE TO WEEP    REVIEWED ISSUES WITH PT AND DAUGHTER  RECOMMEND ER EVAL WITH PROBABLE INPATIENT MANAGEMENT AT THIS TIME        The following portions of the patient's history were reviewed and updated as appropriate: allergies, current medications, past family history, past medical history, past social history, past surgical history and problem list     Review of Systems   Constitutional: Positive for fatigue  Negative for chills and fever  HENT: Negative for congestion, ear discharge, ear pain, mouth sores, postnasal drip, sore throat and trouble swallowing  Eyes: Negative for pain, discharge and visual disturbance  Respiratory: Positive for cough, shortness of breath and wheezing  Cardiovascular: Positive for leg swelling  Negative for chest pain and palpitations  Gastrointestinal: Negative for abdominal distention, abdominal pain, blood in stool, diarrhea and nausea  Endocrine: Negative for polydipsia, polyphagia and polyuria  Genitourinary: Negative for dysuria, frequency, hematuria and urgency  Musculoskeletal: Positive for arthralgias, gait problem and neck pain  Negative for joint swelling  Skin: Negative for pallor and rash  Neurological: Negative for dizziness, syncope, speech difficulty, weakness, light-headedness, numbness and headaches  Hematological: Negative for adenopathy  Psychiatric/Behavioral: Negative for behavioral problems, confusion and sleep disturbance  The patient is nervous/anxious  No current facility-administered medications for this visit  No current outpatient prescriptions on file       Facility-Administered Medications Ordered in Other Visits   Medication Dose Route Frequency Provider Last Rate Last Dose    acetaminophen (TYLENOL) tablet 650 mg  650 mg Oral Q6H PRN Merline Sans, MD   650 mg at 09/08/18 0000    albuterol inhalation solution 1 25 mg  1 25 mg Nebulization Q4H PRN Merline Sans, MD        aspirin tablet 325 mg  325 mg Oral Daily Merline Sans, MD   325 mg at 09/08/18 0814    azelastine (ASTELIN) 0 1 % nasal spray 1 spray  1 spray Each Nare BID Merline Sans, MD   1 spray at 09/07/18 1739    calcium carbonate (TUMS) chewable tablet 1,000 mg  1,000 mg Oral Daily PRN Merline Sans, MD        docusate sodium (COLACE) capsule 100 mg  100 mg Oral BID Merline Sans, MD   100 mg at 09/08/18 0815    enoxaparin (LOVENOX) subcutaneous injection 40 mg  40 mg Subcutaneous Daily Merline Sans, MD   40 mg at 75/65/39 0694    folic acid (FOLVITE) tablet 1 mg  1 mg Oral Daily Merline Sans, MD   1 mg at 09/08/18 0814    gabapentin (NEURONTIN) capsule 100 mg  100 mg Oral TID Merline Sans, MD   100 mg at 09/08/18 0814    insulin glargine (LANTUS) subcutaneous injection 20 Units 0 2 mL  20 Units Subcutaneous Q12H 3630 Stacey Walls MD   20 Units at 09/08/18 0815    insulin lispro (HumaLOG) 100 units/mL subcutaneous injection 1-5 Units  1-5 Units Subcutaneous HS FATIMAH Menon   5 Units at 09/07/18 2156    insulin lispro (HumaLOG) 100 units/mL subcutaneous injection 1-6 Units  1-6 Units Subcutaneous TID [facility] Jay Camara MD   5 Units at 09/08/18 0816    ipratropium (ATROVENT) 0 02 % inhalation solution 0 5 mg  0 5 mg Nebulization 4x Daily Jay Camara MD   0 5 mg at 09/08/18 3946    levothyroxine tablet 125 mcg  125 mcg Oral Early Morning Jay Camara MD   125 mcg at 09/08/18 6756    Linaclotide CAPS 72 mcg  72 mcg Oral Daily Before 216 Jaguar Nunez MD        loratadine (CLARITIN) tablet 10 mg  10 mg Oral Daily Jay Camara MD   10 mg at 09/08/18 0815    LORazepam (ATIVAN) tablet 0 5 mg  0 5 mg Oral Q8H PRN Jay Camara MD   0 5 mg at 09/07/18 2152    metoprolol tartrate (LOPRESSOR) tablet 25 mg  25 mg Oral BID Jay Camara MD   25 mg at 09/08/18 0815    ondansetron (ZOFRAN) injection 4 mg  4 mg Intravenous Q6H PRN Jay Camara MD        polyethylene glycol (MIRALAX) packet 17 g  17 g Oral BID Jay Camara MD   17 g at 09/08/18 0813    potassium chloride (K-DUR,KLOR-CON) CR tablet 40 mEq  40 mEq Oral Daily Jay Camara MD   40 mEq at 09/08/18 0815    pramipexole (MIRAPEX) tablet 1 mg  1 mg Oral TID Jay Camara MD   1 mg at 09/08/18 0814    traMADol (ULTRAM) tablet 50 mg  50 mg Oral Q6H PRN Jay Camara MD   50 mg at 09/08/18 0316       Objective:    /60 (BP Location: Left arm, Patient Position: Sitting, Cuff Size: Extra-Large)   Pulse 98   Temp 97 9 °F (36 6 °C) (Temporal)   Ht 4' 11" (1 499 m)   Wt 103 kg (226 lb 12 8 oz)   LMP  (LMP Unknown)   SpO2 (!) 88%   BMI 45 81 kg/m²        Physical Exam   Constitutional: She is oriented to person, place, and time  She appears well-developed and well-nourished  HENT:   Head: Normocephalic and atraumatic  Eyes: Conjunctivae and EOM are normal  Pupils are equal, round, and reactive to light  Right eye exhibits no discharge  Left eye exhibits no discharge  Neck: Normal range of motion  Neck supple  No thyromegaly present     Cardiovascular: Normal rate, regular rhythm and normal heart sounds  No murmur heard  Pulmonary/Chest: Effort normal  No respiratory distress  She has wheezes  She has rales  BIBASILAR FINE RALES  SL EXP WHEEZE   Abdominal: Soft  Bowel sounds are normal  There is no tenderness  OBESE   Musculoskeletal: She exhibits edema  She exhibits no tenderness  Lymphadenopathy:     She has no cervical adenopathy  Neurological: She is alert and oriented to person, place, and time  Skin: Skin is warm and dry  No rash noted  No erythema  Psychiatric: She has a normal mood and affect   Her behavior is normal  Judgment and thought content normal           PULSE O2 ON RA 88%    Aura Batista MD

## 2018-09-08 NOTE — PHYSICAL THERAPY NOTE
PT EVALUATION  Time: 12:25- 12:35   09/08/18 1031   Pain Assessment   Pain Assessment 0-10   Pain Score 8   Pain Type Acute pain;Chronic pain   Pain Location Leg   Pain Orientation Bilateral;Lower   Home Living   Type of [address] [facility] One level; Able to live on main level with bedroom/bathroom  (1 PETEY)   Bathroom Shower/Tub Tub/shower unit   [employer] Tub transfer bench;Grab bars around toilet;Commode;Hand-held [address]; Wheelchair-manual;Cane;Grab bars; Hospital bed  (Oxygen PRN)   Prior Function   Level of Jones Needs assistance with ADLs and functional mobility   Lives With Spouse   Receives Help From Family  (and VNA)   ADL Assistance Needs assistance  (shower)   IADLs Needs assistance   Falls in the last 6 months 0   Restrictions/Precautions   Other Precautions Contact/isolation; Chair Alarm; Bed Alarm;O2;Fall Risk;Pain   General   Family/Caregiver Present (yes  and daughter and grand dtr)   Cognition   Overall Cognitive Status WFL   Arousal/Participation Cooperative   Orientation Level Oriented X4   Following Commands Follows all commands and directions without difficulty   RLE Assessment   RLE Assessment WFL  (strenght grossly 3+/5)   LLE Assessment   LLE Assessment WFL  (strength grossly 3+/5)   Transfers   Sit to Stand 4  Minimal assistance   Additional items Verbal cues   Stand to Sit 4  Minimal assistance   Additional items Verbal cues;Armrests   Ambulation/Elevation   Gait pattern (WFLs)   Gait Assistance 5  Supervision   Additional items Verbal cues   Assistive Device Rolling walker   Distance 50 feet with change in direction on RA   Balance   Static Sitting Fair   Dynamic Sitting Fair   Static Standing Fair -   Dynamic Standing Fair -   Ambulatory Fair -   Activity Tolerance   Activity Tolerance Patient limited by fatigue;Patient limited by pain  (wheezing with activity on 2L of O2)   Nurse Made Aware yesSotero Anum   Assessment   Prognosis Good Problem List Decreased strength;Decreased endurance;Decreased safety awareness; Obesity;Pain;Decreased skin integrity   Assessment Patient seen for Physical Therapy evaluation  Patient admitted with Dyspnea  Comorbidities affecting patient's physical performance include: Anxiety, Arthihritis, Asthma, COPD, DM, Emphysema, Obesity hypoventilation syndrome  Personal factors affecting patient at time of initial evaluation include: lives in multi story house, ambulating with assistive device, stairs to enter home, inability to ambulate household distances, inability to navigate community distances, inability to navigate level surfaces without external assistance, anxiety, compliance, limited insight into impairments, inability to perform ADLS and inability to perform IADLS   Prior to admission, patient was independent with functional mobility with rolling walker, requiring assist for ADLS, requiring assist for IADLS, living with  in a multi level home with one steps to enter, ambulating household distance, home with family assist and lives in a multilevel house but has 1st floor setup  Please find objective findings from Physical Therapy assessment regarding body systems outlined above with impairments and limitations including weakness, decreased endurance, pain, decreased activity tolerance, decreased functional mobility tolerance, impaired judgement, fall risk, SOB upon exertion and decreased skin integrity  The Barthel Index was used as a functional outcome tool presenting with a score of 50 today indicating marked limitations of functional mobility and ADLS  Patient's clinical presentation is currently unstable/unpredictable as seen in patient's presentation of changing level of pain, increased fall risk, new onset of impairment of functional mobility and decreased endurance   Pt would benefit from continued Physical Therapy treatment to address deficits as defined above and maximize level of functional mobility  As demonstrated by objective findings, the assigned level of complexity for this evaluation is high  Goals   Patient Goals To have the endurance to be able to do things around the house again   STG Expiration Date 09/15/18   Short Term Goal #1 Indep  bedmobs and transfers without SOB to allow pt to be mobilie in her home   Short Term Goal #2 Indep  amb  60 feet with Rw with good balance and no SOB on RA to allow pt to navigate in her home with better endurance for activities  LTG Expiration Date 09/22/18   Long Term Goal #1 Indep  amb  100 feet with RW without SOB to allow pt to perform functional activities around her home  Treatment Day 1   Plan   Treatment/Interventions Functional transfer training;LE strengthening/ROM; Therapeutic exercise; Endurance training;Patient/family training;Equipment eval/education; Bed mobility;Gait training;Spoke to nursing;OT;Family   PT Frequency 5x/wk   Recommendation   Recommendation 24 hour supervision/assist;Home PT; Home with family support  (VNA services as prior to admit)   Additional Comments PT at home to assist pt to monitor O2 sats and educate on same   Barthel Index   Feeding 10   Bathing 0   Grooming Score 0   Dressing Score 5   Bladder Score 10   Bowels Score 10   Toilet Use Score 5   Transfers (Bed/Chair) Score 10   Mobility (Level Surface) Score 0   Stairs Score 0   Barthel Index Score 50   Licensure   [place] License Number  Mónica Shell PT  [id number]   PT TREATMENT  12:35-12:45  S:  Pt does not think she has a problem with her oxygen use at home, however her  feels that she does not utilize O2 correctly  Falls asleep when company is there and cannot speak full sentences without taking breaths in between  O: Pt Ambulated 50 feet with RW on RA  O2 sats decreased to 88%, recovered to 94% with applying O2 at 2L  Pt  Completed: seated hip flexion, LAQs and APs all 10 reps each bilaterally    Pt demonstrated the exercises that she does at home: a combo of hip flexion into knee extension, abduct the hip then back to the start  She trys to complete 30 reps without fatigue  Instructed to change to exercises as demonstrated this session and to do less reps and more often during day to improve endurance  Can increase reps as tolerated  Pt verbalized understanding  Family in agreement  A: tolerated well, however pt may not be using O2 effectively at home  Will monitor with PT sessions   P; Cont  PT  Recommend discharge to home with services as needed and 24 hour supervision  Sharlene Harris PT  [id number]

## 2018-09-08 NOTE — ASSESSMENT & PLAN NOTE
· Bilateral lower extremity venous stasis ulcers  · Follows up outpatient with Unna boots  · Venous duplex on July 2018 with no evidence of DVT or vein incompetence  · Surgery consulted, recommend local wound care  · Continue local wound care, follow up with outpatient wound clinic

## 2018-09-08 NOTE — ASSESSMENT & PLAN NOTE
· Patient has had multiple admissions in the past, all recommending STR  · PT/OT eval  · She may need a long term higher level of care, but current she is not interested in that

## 2018-09-08 NOTE — CONSULTS
Consultation - General Surgery   Nava Torres 77 y o  female MRN: [id number]  Unit/Bed#: [facility] Encounter: [id number]: Noy Morillo MD      Physician Requesting Consult: Mayda Gottlieb MD  Reason for Consult / Principal Problem:   Bilateral lower extremity venous stasis wound    Chief Complaint:   Shortness of breath  Chronic respiratory failure with COPD  Diastolic congestive heart failure  Diabetes mellitus  Chronic nonhealing wound both lower extremity with edema dermatitis    HPI:  Nava Torres is a 77 y o  female who presents with PMH of hypothyroidism, morbid obesity, asthma, chronic hypoxic and hypoxemic respiratory failure, LISBET, obesity hypoventilation syndrome, HTN,T2DM on long term insulin, depression, who presents with progressively worsening shortness of breath for several weeks  She has a history of chronic shortness of breath but this is worse than baseline  She was admitted in July for respiratory failure secondary to CHF exacerbation and was discharged to rehab  She was there for just over a month  2 weeks after arriving home she began to decompensate  She states she takes her medications as instructed including her diuretic and breathing treatments    She says she checks her weight daily but can't tell me what it is now but states she has been noticing progressively worsening swelling in her legs with weeping    Patient has been seeing physician and outpatient basis and had a bilateral Unna boot placed which was scheduled to be removed on redness day her Lasix dose has been increased the because of for leg swelling and shortness of breath    Surgical consult was obtained for the wound care    Historical Information   Past Medical History:   Diagnosis Date    Anxiety     Aortic valve disorder     Arthritis     Asthma     Cataract     [facility]     COPD [facility]chronic obstructive pulmonary disease) [facility]     Diabetes mellitus ([facility] )     Disease of thyroid gland  Dry eye syndrome     Ectropion of left eye     last assessed: 10/17/2016    Emphysema, 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HCC)     Hearing loss     History of malignant neoplasm of thyroid     Hypertension     Malignant neoplasm of skin     Memory loss     Mitral valve regurgitation     Myocardial infarction (HCC)     Obesity hypoventilation syndrome (HCC)     Pain     right hip    Restless leg syndrome     Seasonal allergies     [facility]     Sleep related hypoxia     Thyroid cancer [facility]     Urinary tract infection without hematuria 7/14/2018     Past Surgical History:   Procedure Laterality Date    CHOLECYSTECTOMY      EYE SURGERY      HYSTERECTOMY      OTHER SURGICAL HISTORY      removal of lesion    PERICARDIUM SURGERY      resection of pericardial cyst or tumor    MS ERCP DX COLLECTION SPECIMEN BRUSHING/WASHING N/A 5/30/2017    Procedure: ENDOSCOPIC RETROGRADE CHOLANGIOPANCREATOGRAPHY (ERCP); SPHINCTEROTOMY;  Surgeon: Kenisha Cueva MD;  Location: [facility];   Service: Gastroenterology   Esta Hams JOINT Right 3/30/2018    Procedure: Right Sacroiliac Injection;  Surgeon: Zaheer Uribe MD;  Location: [facility] MAIN OR;  Service: Pain Management     REPLACEMENT TOTAL KNEE Left     REPLACEMENT TOTAL KNEE Right     REPLACEMENT TOTAL KNEE BILATERAL      SKIN BIOPSY      melanoma of back    STEROID INJECTION HIP Right 12/21/2017    Procedure: TROCHANTERIC BURSA INJECTION RIGHT;  Surgeon: Zaheer Uribe MD;  Location: [facility] MA[facility];  Service: Pain Management     THORACOSCOPY      (therapeutic) w/excision of pericardial cyst    THORACOTOMY Right     at least 40 years, for pericardial cyst    THYROID SURGERY      THYROIDECTOMY, PARTIAL      For thyroid cancer     Social History   History   Alcohol Use No     History   Drug Use No     History   Smoking Status    Never Smoker   Smokeless Tobacco    Never Used     Family History:   Family History   Problem Relation Age of Onset    Cancer Father     Arthritis Father     Liver cancer Father     Diabetes Sister     Asthma Mother     No Known Problems Brother     Substance Abuse Neg Hx     Mental illness Neg Hx        Meds/Allergies     Current Facility-Administered Medications:     acetaminophen (TYLENOL) tablet 650 mg, 650 mg, Oral, Q6H PRN, Gallo Quinteros MD, 650 mg at 09/08/18 0000    albuterol inhalation solution 1 25 mg, 1 25 mg, Nebulization, Q4H PRN, Gallo Quinteros MD    aspirin tablet 325 mg, 325 mg, Oral, Daily, Gallo Quinteros MD, 325 mg at 09/08/18 0814    azelastine (ASTELIN) 0 1 % nasal spray 1 spray, 1 spray, Each Nare, BID, Gallo Quinteros MD, 1 spray at 09/08/18 1150    calcium carbonate (TUMS) chewable tablet 1,000 mg, 1,000 mg, Oral, Daily PRN, Gallo Quinteros MD    docusate sodium (COLACE) capsule 100 mg, 100 mg, Oral, BID, Gallo Quinteros MD, 100 mg at 09/08/18 0815    enoxaparin (LOVENOX) subcutaneous injection 40 mg, 40 mg, Subcutaneous, Daily, Gallo Quinteros MD, 40 mg at 66/09/73 2929    folic acid (FOLVITE) tablet 1 mg, 1 mg, Oral, Daily, Gallo Quinteros MD, 1 mg at 09/08/18 0814    gabapentin (NEURONTIN) capsule 100 mg, 100 mg, Oral, TID, Gallo Quinteros MD, 100 mg at 09/08/18 0814    insulin glargine (LANTUS) subcutaneous injection 20 Units 0 2 mL, 20 Units, Subcutaneous, Q12H [facility], Gallo Quinteros MD, 20 Units at 09/08/18 0815    insulin lispro (HumaLOG) 100 units/mL subcutaneous injection 1-5 Units, 1-5 Units, Subcutaneous, HS, FATIMAH Savage, 5 Units at 09/07/18 2156    insulin lispro (HumaLOG) 100 units/mL subcutaneous injection 1-6 Units, 1-6 Units, Subcutaneous, TID AC, 6 Units at 09/08/18 1149 **AND** Fingerstick Glucose (POCT), , , TID AC, Gallo Quinteros MD    ipratropium (ATROVENT) 0 02 % inhalation solution 0 5 mg, 0 5 mg, Nebulization, 4x Daily, Gallo Quinteros MD, 0 5 mg at 09/08/18 1132    levothyroxine tablet 125 mcg, 125 mcg, Oral, Early Morning, Gallo Quinteros MD, 125 mcg at 09/08/18 0623    Linaclotide CAPS 67 mcg, 72 mcg, Oral, Daily Before Breakfast, Charlie Zaragoza MD    loratadine (CLARITIN) tablet 10 mg, 10 mg, Oral, Daily, Charlie Zaragoza MD, 10 mg at 09/08/18 0815    LORazepam (ATIVAN) tablet 0 5 mg, 0 5 mg, Oral, Q8H PRN, Charlie Zaragoza MD, 0 5 mg at 09/08/18 1347    metoprolol tartrate (LOPRESSOR) tablet 25 mg, 25 mg, Oral, BID, Charlie Zaragoza MD, 25 mg at 09/08/18 0815    morphine injection 2 mg, 2 mg, Intravenous, Once, Charlie Zaragoza MD    ondansetron [facility]) injection 4 mg, 4 mg, Intravenous, Q6H PRN, Charlie Zaragoza MD    polyethylene glycol (MIRALAX) packet 17 g, 17 g, Oral, BID, Charlie Zaragoza MD, 17 g at 09/08/18 0813    potassium chloride (K-DUR,KLOR-CON) CR tablet 40 mEq, 40 mEq, Oral, Daily, Charlie Zaragoza MD, 40 mEq at 09/08/18 0815    pramipexole (MIRAPEX) tablet 1 mg, 1 mg, Oral, TID, Charlie Zaragoza MD, 1 mg at 09/08/18 7697    traMADol (ULTRAM) tablet 50 mg, 50 mg, Oral, Q6H PRN, Charlie Zaragoza MD, 50 mg at 09/08/18 1345   Allergies   Allergen Reactions    Ketorolac Swelling     Toradol    Latex Rash    Wound Dressing Adhesive Rash       REVIEW OF SYSTEMS  Constitutional:  Denies fever or chills feels weak unable to walk  Eyes:  Denies change in visual acuity   HENT:  Denies nasal congestion or sore throat   Respiratory:  Complaining of cough or shortness of breath   Cardiovascular:  Complaining of shortness of breath and the bilateral lower extremity edema and the leaking fluid from both legs wound  GI:  Denies abdominal pain, nausea, vomiting, bloody stools or diarrhea   :  Denies dysuria, frequency, difficulty in micturition and nocturia  Musculoskeletal:  Denies back pain or joint pain    Neurologic:  Denies headache, focal weakness or sensory changes   Endocrine:  Denies polyuria or polydipsia   Lymphatic:  Denies swollen glands   Psychiatric:  Denies depression or anxiety     Objective   PHYSICAL EXAMS  Current Vitals:   Blood Pressure: 146/89 (09/08/18 1423)  Pulse: 72 (09/08/18 1423)  Temperature: 98 1 °F (36 7 °C) (09/08/18 1423)  Temp Source: Oral (09/08/18 1423)  Respirations: 18 (09/08/18 1423)  Height: 5' 2" (157 5 cm) (09/07/18 1545)  Weight - Scale: 101 kg (223 lb) (09/08/18 0600)  SpO2: 95 % (09/08/18 1423) Body mass index is 40 79 kg/m²  I/Os:I/O last 3 completed shifts:  In: -   Out: 950 [Urine:950]      No intake/output data recorded  General:  Patient is not in acute distress, laying in the bed comfortably, awake, alert responding to commands, well oriented to time place and person family members are at the bedside  HEENT:  Both pupils normal-size atraumatic, normocephalic, nonicteric  Neck:  JVP not raised  Trachea central  Respiratory: Audible wheezes on nasal oxygen wound decreased breath sounds on both lower basis  Cardiovascular:  S1-S2 normal without any murmur   GI:  Abdomen soft nontender  Tolerating p o   Diet  Rectal: deferred  Genitalia: deferred  Musculoskeletal:  Leg wounds edema dermatitis  Lymphatic:  No cervical or inguinal lymphadenopathy  Neurologic:  Patient is awake alert, responding to command, well-oriented to time and place and person moving all extremities   Psychiatric:  Patient awake alert doesn't appear depressed affect normal  Extremities: extremities oral edema both lower extremity both the legs were dressed with the Unna boot drainage greenish plus plus  Wound/Incision:  Edema both lower extremities  Dermatitis both lower extremities  Right lower extremity venous stasis ulcer  Multiple small 1 cm x 2 cm size wounds over the anterior shin border with the oozing fluid with 2+ pitting edema and dermatitis extending from ankle to knee greenish discharge    Left lower extremity venous stasis ulcer  Multiple small 1 cm x 2 cm size wounds over the anterior shin border with the oozing fluid with 2+ pitting edema and dermatitis extending from ankle to knee greenish discharge    Lab Results and Cultures:   CBC with diff:   Lab Results   Component Value Date    WBC 12 08 (H) 09/08/2018    HGB 11 0 (L) 09/08/2018    HCT 36 5 09/08/2018    MCV 98 09/08/2018     09/08/2018    ADJUSTEDWBC 10 30 04/04/2016    MCH 29 6 09/08/2018    MCHC 30 1 (L) 09/08/2018    RDW 15 9 (H) 09/08/2018    MPV 10 8 09/08/2018    NRBC 0 09/07/2018   ,   BMP/CMP:  Lab Results   Component Value Date     09/08/2018     (H) 08/01/2017    K 5 0 09/08/2018    K 3 9 08/01/2017     09/08/2018    CL 95 (L) 08/24/2018    CO2 34 (H) 09/08/2018    CO2 31 (H) 08/24/2018    ANIONGAP 10 8 01/08/2016    BUN 21 09/08/2018    BUN 13 08/24/2018    CREATININE 0 89 09/08/2018    CREATININE 0 78 08/01/2017    GLUCOSE 144 (H) 08/01/2017    CALCIUM 8 3 09/08/2018    CALCIUM 9 1 08/01/2017    AST 34 09/07/2018    AST 18 08/01/2017    ALT 25 09/07/2018    ALT 26 08/01/2017    ALKPHOS 76 09/07/2018    ALKPHOS 70 08/01/2017    PROT 6 9 08/01/2017    BILITOT 0 2 08/01/2017    EGFR 62 09/08/2018     Coags:   Lab Results   Component Value Date    PTT 27 09/07/2018    INR 0 94 09/07/2018    INR 1 0 08/01/2017   ,  Blood Culture:   Lab Results   Component Value Date    BLOODCX No Growth After 5 Days  07/13/2018    BLOODCX No Growth After 5 Days   07/13/2018   ,   Urinalysis:   Lab Results   Component Value Date    COLORU Yellow 02/22/2018    COLORU Yellow 07/12/2016    CLARITYU Clear 02/22/2018    CLARITYU Transparent 07/12/2016    SPECGRAV 1 015 02/22/2018    SPECGRAV 1 025 07/12/2016    PHUR 5 5 02/22/2018    PHUR 6 0 07/12/2016    LEUKOCYTESUR Negative 02/22/2018    LEUKOCYTESUR Negative 07/12/2016    NITRITE neg 04/27/2018    NITRITE Negative 02/22/2018    NITRITE Negative 07/12/2016    PROTEINUA Negative 02/22/2018    PROTEINUA Negative 07/12/2016    GLUCOSEU normal 04/27/2018    GLUCOSEU 500 (1/2%) (A) 02/22/2018    KETONESU neg 04/27/2018    KETONESU Negative 02/22/2018    KETONESU Negative 07/12/2016    BILIRUBINUR Negative 02/22/2018    BILIRUBINUR Negative 07/12/2016    BLOODU Negative 02/22/2018    BLOODU Negative 07/12/2016   ,   Urine Culture:   Lab Results   Component Value Date    URINECX No Growth <1000 cfu/mL 07/14/2018   ,   Wound Culure: No results found for: WOUNDCULT    Imaging: Xr Chest 1 View Portable    Result Date: 9/7/2018  Impression: No acute cardiopulmonary disease  Workstation performed: [id number]     Cta Ed Chest Pe Study    Result Date: 9/7/2018  Impression: 1  No central or segmental pulmonary embolism  Suboptimal evaluation of subsegmental vessels  2   Large hiatal hernia  3   Bibasilar atelectasis  Workstation performed: [id number]     VTE Pharmacologic Prophylaxis: Enoxaparin (Lovenox)  VTE Mechanical Prophylaxis: reason for no mechanical VTE prophylaxis Patient has bilateral lower extremity swelling edema and venous stasis ulcers    Admitting Diagnosis: Dyspnea [R06 00]  Hypoxia [R09 02]  Borderline low oxygen saturation level [R79 81]  Code Status: Level 1 - Full Code  Length Of Stay: 0 day(s)    Assessment:  Bilateral lower extremity venous stasis ulcers  Being followed as outpatient basis had Unna boots  Large hiatal hernia  Plan:  Local wound care with the Adaptic and the max sorb dressing  Apply Lotrisone cream to the healthy non ulcer flaky skin  gentle mild compression with Ace bandage  venous duplex done on the July 11, 2018 no evidence of a DVT or incompetent vein both lower extremities swelling Will be related to diastolic heart failure  Will follow    Counseling / Coordination of Care  Total floor / unit time spent today 30minutes  Greater than 50% of total time was spent with the patient and / or family counseling and / or coordination of care  Thank you for allowing me to participate in this patients care  Please do not hesitate to call with any additional questions  Richi Castro MD MS FRCS FACS  09/08/18  2:56 PM    Portions of the record may have been created with voice recognition software   Occasional wrong word or "sound a like" substitutions may have occurred due to the inherent limitations of voice recognition software  Read the chart carefully and recognize, using context, where substitutions have occurred

## 2018-09-08 NOTE — ASSESSMENT & PLAN NOTE
· Acute on chronic  Multifactorial given DHF, COPD, hypoventilation obesity syndrome  · CTA negative for PE  · No evidence of major fluid overload or COPD exacerbation  · Serial troponins negative  · ABG noted  · Monitor O2 overnight  · Cont home breathing treatments  · Received dose of IV solumedrol in ED, monitor     · Pulmonary consulted, recs appreciated

## 2018-09-08 NOTE — ASSESSMENT & PLAN NOTE
Lab Results   Component Value Date    HGBA1C 9 1% 04/27/2018       Recent Labs      09/07/18   1621  09/07/18   2031  09/08/18   0740  09/08/18   1146   POCGLU  134  378*  349*  410*       Blood Sugar Average: Last 72 hrs:  (P) 272 4     Cont home insulin  ISS  POC sugars  diabetic diet

## 2018-09-08 NOTE — PROGRESS NOTES
Progress Note - Ashley Carroll 1940, 77 y o  female MRN: [id number]    Unit/Bed#: [address] Encounter: [id number]    Primary Care Provider: Pollo Brito MD   Date and time admitted to hospital: 9/7/2018 11:28 AM        * Dyspnea   Assessment & Plan    · Acute on chronic  Multifactorial given DHF, COPD, hypoventilation obesity syndrome  · CTA negative for PE  · No evidence of major fluid overload or COPD exacerbation  · Serial troponins negative  · ABG noted  · Monitor O2 overnight  · Cont home breathing treatments  · Received dose of IV solumedrol in ED, monitor  · Pulmonary consulted, recs appreciated        Venous stasis ulcer ([facility] [address] )   Assessment & Plan    · Bilateral lower extremity venous stasis ulcers  · Follows up outpatient with [facility]  · Venous duplex on July 2018 with no evidence of DVT or vein incompetence  · Surgery consulted, recommend local wound care  · Continue local wound care, follow up with outpatient wound clinic        Chronic respiratory failure with hypoxia and hypercapnia ([facility] )   Assessment & Plan    · Pulmonary consulted        Physical deconditioning   Assessment & Plan    · Patient has had multiple admissions in the past, all recommending STR  · PT/OT eval  · She may need a long term higher level of care, but current she is not interested in that        Diastolic CHF [facility])   Assessment & Plan    · Last echo 7/2018  showed EF was 60-65%  There was left ventricular relaxation or grade 1 diastolic dysfunction  Mild tricuspid regurgitation  · Volume status difficult to assess,  on admission, CTA showed Bibasilar atelectasis, She has chronic peripheral edema which appears at baseline but her weight from last admission 7/2018 was 92kg, today 103kg     · One dose of IV lasix given on admission  · Monitor I/O and daily weights  · Monitor lytes and renal function        Diabetes mellitus with neuropathy [facility])   Assessment & Plan    Lab Results   Component Value Date    HGBA1C 9 1% 2018       Recent Labs      18   1621  18   2031  18   0740  18   1146   POCGLU  134  378*  349*  410*       Blood Sugar Average: Last 72 hrs:  (P) 272 4     Cont home insulin  ISS  POC sugars  diabetic diet        HTN (hypertension)   Assessment & Plan    · BP elevated on admission  · Resume home meds, monitor  · Blood pressure trend improving        Obesity hypoventilation syndrome (HCC)   Assessment & Plan    Cannot tolerate CPAP, cont nocturnal O2        Sleep apnea   Assessment & Plan    · Had CPAP titration for 15mg Hg but patient cannot tolerate CPAP  · Cont NC qHS  · Pulmonary consulted        RLS (restless legs syndrome)   Assessment & Plan    Cont pramipexole and gabapentin        Constipation due to opioid therapy   Assessment & Plan    Cont home meds          Morbid obesity with BMI of 40 0-44 9, adult (HCC)   Assessment & Plan    · BMI 42 54  · Needs Lifestyle modification, weight loss        Acquired hypothyroidism   Assessment & Plan    · Cont synthroid  · TSH 1 00              VTE Pharmacologic Prophylaxis:   Pharmacologic: Enoxaparin (Lovenox)  Mechanical VTE Prophylaxis in Place: Yes    Patient Centered Rounds: I have performed bedside rounds with nursing staff today  Discussions with Specialists or Other Care Team Provider: Yes    Education and Discussions with Family / Patient:Yes    Time Spent for Care: 30 minutes  More than 50% of total time spent on counseling and coordination of care as described above  Current Length of Stay: 0 day(s)    Current Patient Status: Observation     Discharge Plan:  Pending    Code Status: Level 1 - Full Code      Subjective: Has multiple complaints today, still SOB, also has chronic BLE pain which is aggravated today, tired, didn't sleep well last night, overwhelmed at her diagnoses and last of improvement in symptoms         Objective:       Vitals:   Temp (24hrs), Av 2 °F (36 8 °C), Min:98 1 °F (36 7 °C), Max:98 5 °F (36 9 °C)    HR:  [72-90] 72  Resp:  [18-22] 18  BP: (146-181)/(72-89) 146/89  SpO2:  [93 %-96 %] 95 %  Body mass index is 40 79 kg/m²  Input and Output Summary (last 24 hours): Intake/Output Summary (Last 24 hours) at 09/08/18 1528  Last data filed at 09/07/18 2301   Gross per 24 hour   Intake                0 ml   Output              950 ml   Net             -950 ml       Physical Exam:     Physical Exam   Constitutional: She is oriented to person, place, and time  She appears well-developed  No distress  Chronically ill-appearing   HENT:   Head: Normocephalic and atraumatic  Cardiovascular: Normal rate, regular rhythm and normal heart sounds  Pulmonary/Chest: Effort normal and breath sounds normal  No respiratory distress  She has no wheezes  She has no rales  Wearing nasal oxygen  Decreased breath sounds, poor air entry and poor effort  No wheezing   Abdominal: Soft  Bowel sounds are normal  She exhibits no distension  There is no tenderness  There is no rebound and no guarding  Musculoskeletal: She exhibits edema  She exhibits no tenderness or deformity  Neurological: She is alert and oriented to person, place, and time  Skin: Skin is warm and dry  Bilateral lower extremities wrapped   Psychiatric: She has a normal mood and affect  Her behavior is normal    Nursing note and vitals reviewed        Additional Data:     Labs:      Results from last 7 days  Lab Units 09/08/18  0543 09/07/18  1146   WBC Thousand/uL 12 08* 9 69   HEMOGLOBIN g/dL 11 0* 11 4*   HEMATOCRIT % 36 5 37 6   PLATELETS Thousands/uL 210 211   NEUTROS PCT %  --  71   LYMPHS PCT %  --  18   MONOS PCT %  --  7   EOS PCT %  --  3       Results from last 7 days  Lab Units 09/08/18  0543 09/07/18  1146   SODIUM mmol/L 137 142   POTASSIUM mmol/L 5 0 3 9   CHLORIDE mmol/L 101 102   CO2 mmol/L 34* 36*   BUN mg/dL 21 16   CREATININE mg/dL 0 89 0 81   CALCIUM mg/dL 8 3 8 5   ALK PHOS U/L  --  76   ALT U/L  --  25 AST U/L  --  34       Results from last 7 days  Lab Units 09/07/18  1146   INR  0 94       * I Have Reviewed All Lab Data Listed Above  * Additional Pertinent Lab Tests Reviewed: All Labs For Current Hospital Admission Reviewed    Imaging:  Xr Chest 1 View Portable    Result Date: 9/7/2018  Narrative: CHEST INDICATION:   sob  COMPARISON:  07/17/2018 EXAM PERFORMED/VIEWS:  XR CHEST PORTABLE 1 image FINDINGS: Cardiomediastinal silhouette appears enlarged  No evidence of heart failure  The lungs are clear  No pneumothorax or pleural effusion  Osseous structures appear within normal limits for patient age  Impression: No acute cardiopulmonary disease  Workstation performed: [id number]     Cta Ed Chest Pe Study    Result Date: 9/7/2018  Narrative: CTA - CHEST WITH IV CONTRAST - PULMONARY ANGIOGRAM INDICATION:   Chest pain, acute, PE suspected, high pretest prob  COMPARISON: None  TECHNIQUE: CTA examination of the chest was performed using angiographic technique according to a protocol specifically tailored to evaluate for pulmonary embolism  Axial, sagittal, and coronal 2D reformatted images were created from the source data and  submitted for interpretation  In addition, coronal 3D MIP postprocessing was performed on the acquisition scanner  Radiation dose length product (DLP) for this visit:  708 4 mGy-cm   This examination, like all CT scans performed in the [facility], was performed utilizing techniques to minimize radiation dose exposure, including the use of iterative reconstruction and automated exposure control  IV Contrast:  85 mL of iohexol (OMNIPAQUE)  FINDINGS: PULMONARY ARTERIAL TREE:  No central or segmental pulmonary emboli  Limited evaluation of subsegmental vessels due to respiratory motion  LUNGS:  Bibasilar atelectasis  No pulmonary nodules  PLEURA:  Unremarkable  HEART/AORTA:  Mild cardiomegaly  MEDIASTINUM AND JENY:  Large hiatal hernia   CHEST WALL AND LOWER NECK: Unremarkable  VISUALIZED STRUCTURES IN THE UPPER ABDOMEN:  Unremarkable  OSSEOUS STRUCTURES:  No acute fracture or destructive osseous lesion  Impression: 1  No central or segmental pulmonary embolism  Suboptimal evaluation of subsegmental vessels  2   Large hiatal hernia  3   Bibasilar atelectasis  Workstation performed: [id number]     Imaging Reports Reviewed by myself    Cultures:   Blood Culture:   Lab Results   Component Value Date    BLOODCX No Growth After 5 Days  07/13/2018    BLOODCX No Growth After 5 Days  07/13/2018    BLOODCX No Growth After 5 Days  02/22/2018    BLOODCX No Growth After 5 Days  02/22/2018    BLOODCX No Growth After 5 Days  01/19/2018    BLOODCX No Growth After 5 Days  01/19/2018    BLOODCX No Growth After 5 Days  06/11/2017    BLOODCX No Growth After 5 Days   06/11/2017     Urine Culture:   Lab Results   Component Value Date    URINECX No Growth <1000 cfu/mL 07/14/2018    URINECX 20,000-29,000 cfu/ml Mixed Contaminants X3 04/15/2017     Sputum Culture: No components found for: SPUTUMCX  Wound Culture: No results found for: WOUNDCULT    Last 24 Hours Medication List:     Current Facility-Administered Medications:  acetaminophen 650 mg Oral Q6H PRN Consuelo Coles MD   albuterol 1 25 mg Nebulization Q4H PRN Consuelo Coles MD   aspirin 325 mg Oral Daily Consuelo Coles MD   azelastine 1 spray Each Nare BID Consuelo Coles MD   calcium carbonate 1,000 mg Oral Daily PRN Consuelo Coles MD   clotrimazole  Topical Daily Keke Larose MD   docusate sodium 100 mg Oral BID Consuelo Coles MD   enoxaparin 40 mg Subcutaneous Daily Consuelo Coles MD   folic acid 1 mg Oral Daily Consuelo Coles MD   gabapentin 100 mg Oral TID Consuelo Coles MD   insulin glargine 20 Units Subcutaneous Q12H 3630 Stacey Walls MD   insulin lispro 1-5 Units Subcutaneous HS FATIMAH Chisholm   insulin lispro 1-6 Units Subcutaneous TID AC Consuelo Coles MD   ipratropium 0 5 mg Nebulization 4x Daily Consuelo Coles MD   levothyroxine 125 mcg Oral Early Morning Vero Daley MD   Linaclotide 72 mcg Oral Daily Before Breakfast Vero Daley MD   loratadine 10 mg Oral Daily Vero Daley MD   LORazepam 0 5 mg Oral Q8H PRN Vero Daley MD   metoprolol tartrate 25 mg Oral BID Vero Daley MD   morphine injection 2 mg Intravenous Once Vero Daley MD   ondansetron 4 mg Intravenous Q6H PRN Vero Daley MD   polyethylene glycol 17 g Oral BID Vero Daley MD   potassium chloride 40 mEq Oral Daily Vero Daley MD   pramipexole 1 mg Oral TID Vero Daley MD   traMADol 50 mg Oral Q6H PRN Vero Daley MD        Today, Patient Was Seen By: Vero Daley MD    ** Please Note: Dragon 360 Dictation voice to text software may have been used in the creation of this document   **

## 2018-09-08 NOTE — OCCUPATIONAL THERAPY NOTE
OT EVALUATION     09/08/18 1230   Note Type   Note type Eval only   Restrictions/Precautions   Other Precautions Contact/isolation;Pain; Fall Risk;O2;Telemetry; Chair Alarm; Bed Alarm  (pt is SOB  O2 2 L via NC, SPO2% 92-94 at rest, 88% w activit)   Pain Assessment   Pain Assessment 0-10   Pain Score 8   Pain Type Acute pain;Chronic pain   Pain Location Leg  (pt also reports back pain that radiates to her head)   Pain Orientation Bilateral   Home Living   Type of [address] One level;Performs ADLs on one level; Able to live on main level with bedroom/bathroom;Stairs to enter with rails  (1 PETEY)   Bathroom Shower/Tub Tub/shower unit   Bathroom Toilet Standard   Bathroom Equipment Tub transfer bench;Hand-held shower;Grab bars around Fabrizio Walker;Cane;Grab bars; Hospital bed; Wheelchair-manual  (oxygen nebulizers)   Prior Function   Level of [place] Needs assistance with IADLs; Needs assistance with ADLs and functional mobility   Lives With Spouse   Receives Help From Family  (VNA)   ADL Assistance Needs assistance  (shower)   IADLs Needs assistance   Falls in the last 6 months 0   Comments pt would like to have more energy to do more things, enjoys cooking   Subjective   Subjective "I'd like to be able to go visit my daughter "   ADL   Where Assessed Chair   Eating Assistance 5  Supervision/Setup   Grooming Assistance 4  Minimal Assistance   UB Dressing Assistance 4  Minimal Assistance   LB Dressing Assistance 2  Maximal [address]  3  Moderate Assistance   Bed Mobility   Rolling R 4  Minimal assistance   Rolling L 4  Minimal assistance   Supine to Sit 4  Minimal assistance   Sit to Supine 3  Moderate assistance   Transfers   Sit to Stand 4  Minimal assistance   Stand to Sit 4  Minimal assistance   Stand pivot 4  Minimal assistance   Toilet transfer 4  Minimal assistance   Functional Mobility   Functional Mobility 4  Minimal assistance   Additional Comments 40 feet   Additional items Rolling walker   Balance   Static Sitting Fair   Dynamic Sitting Fair   Static Standing Fair -   Dynamic Standing Fair -   Activity Tolerance   Activity Tolerance Patient limited by fatigue;Patient limited by pain  (pt very limited  very SOB and poor endurance)   RUE Assessment   RUE Assessment (ROM: WFL MMT: 4-/5)   LUE Assessment   LUE Assessment (ROM: WFL MMT: 4-/5)   Cognition   Overall Cognitive Status WFL   Arousal/Participation Alert; Cooperative   Attention Within functional limits   Orientation Level Oriented X4   Memory Within functional limits   Following Commands Follows one step commands without difficulty   Assessment   Limitation Decreased ADL status; Decreased UE strength;Decreased endurance;Decreased high-level ADLs; Decreased self-care trans  (decreased SPO2 with activity  SOB, labored breathing)   Prognosis Fair   Assessment Patient evaluated by Occupational Therapy  Patient admitted with Dyspnea  The patients occupational profile, medical and therapy history includes a extensive additional review of physical, cognitive, or psychosocial history related to current functional performance  Comorbidities affecting functional mobility and ADLS include: arthritis, chronic back pain, depression, diabetes, hypertension, hypothyroid, neuropathy, obesity and PVD, peripheral edema, sleep apnea, diastolic CHF, chronic cutaneous venous stasis ulcer, restless legs, chronic respiratory failure with hypoxia  Prior to admission, patient was requiring assist for functional mobility with rolling walker, requiring assist for ADLS, requiring assist for IADLS, living with spouse in a single level home with 1 steps to enter and ambulating household distance    The evaluation identifies the following performance deficits: weakness, impaired balance, decreased endurance, increased fall risk, decreased ADLS, decreased IADLS, pain, decreased activity tolerance, SOB upon exertion, decreased sensation and decreased strength, that result in activity limitations and/or participation restrictions  This evaluation requires clinical decision making of high complexity, because the patient presents with comorbidites that affect occupational performance and required significant modification of tasks or assistance with consideration of multiple treatment options  The Barthel Index was used as a functional outcome tool presenting with a score of 50, indicating marked limitations of functional mobility and ADLS  Patient will benefit from skilled Occupational Therapy services to address above deficits and facilitate a safe return to prior level of function  Goals   Patient Goals "To be able to do things at home; cook or go get a pedicure "   STG Time Frame (9/15/18)   Short Term Goal #1 Goals established to promote patient goal of "being able to do things at home; cook or go get a pedicure":  Patient will increase standing tolerance to 3 minutes during ADL task to decrease assistance level and decrease fall risk; Patient will increase bed mobility to supervision in preparation for ADLS and transfers;  Patient will increase functional mobility to and from bathroom with rolling walker with min assist to increase performance with ADLS and to use a toilet; Patient will tolerate 5 minutes of UE ROM/strengthening to increase general activity tolerance and performance in ADLS/IADLS; Patient will improve functional activity tolerance to 5 minutes of sustained functional tasks to increase participation in basic self-care and decrease assistance level;  Patient will be able to to verbalize understanding and perform energy conservation/proper body mechanics during ADLS and functional mobility at least 25% of the time with minimal cueing to decrease signs of fatigue and increase stamina to return to prior level of function;   LTG Time Frame (9/22/18)   Long Term Goal #1 Patient will increase standing tolerance to 5 minutes during ADL task to decrease assistance level and decrease fall risk; Patient will increase bed mobility to independent in preparation for ADLS and transfers; Patient will increase functional mobility to and from bathroom with rolling walker with supervision to increase performance with ADLS and to use a toilet; Patient will tolerate 8 minutes of UE ROM/strengthening to increase general activity tolerance and performance in ADLS/IADLS; Patient will improve functional activity tolerance to 8 minutes of sustained functional tasks to increase participation in basic self-care and decrease assistance level;  Patient will be able to to verbalize understanding and perform energy conservation/proper body mechanics during ADLS and functional mobility at least 50% of the time with minimalcueing to decrease signs of fatigue and increase stamina to return to prior level of function; Functional Transfer Goals   Pt Will Perform All Functional Transfers (STG: supervision LTG: independent)   ADL Goals   Pt Will Perform Grooming (STG: supervision and set up LTG: independent)   Pt Will Perform UE Dressing (STG: supervision LTG: independent)   Pt Will Perform LE Dressing (STG: MIN assist LTG: supervision; trial LE AE)   Pt Will Perform Toileting (STG: supervision LTG: independent)   Plan   Treatment Interventions ADL retraining;Functional transfer training;UE strengthening/ROM; Endurance training;Patient/family training;Equipment evaluation/education; Compensatory technique education; Activityengagement; Energy conservation   Goal Expiration Date 09/22/18   OT Frequency 3-5x/wk   Recommendation   OT Discharge Recommendation 24 hour supervision/assist  (Home PT/OT, VNA)   Equipment Recommended (LE AE)   Barthel Index   Feeding 10   Bathing 0   Grooming Score 0   Dressing Score 5   Bladder Score 10   Bowels Score 10   Toilet Use Score 5   Transfers (Bed/Chair) Score 10   Mobility (Level Surface) Score 0   Stairs Score 0   Barthel Index Score 48   Licensure   [address] Number  (Manuel Rojo, OTR/L [facility]# [id number])

## 2018-09-08 NOTE — PATIENT INSTRUCTIONS
DISCUSSED ISSUES WITH PATIENT AND DAUGHTER  PATIENT FAILED OUTPATIENT IMPROVEMENT  RECOMMEND INPATIENT MANAGEMENT    PATIENT WILL BE SENT TO [facility] ER FOR FURTHER EVALUATION AND POSSIBLE INPATIENT MANAGEMENT

## 2018-09-08 NOTE — CONSULTS
Consultation - Pulmonary Medicine   Casa Root 77 y o  female MRN: [id number]  Unit/Bed#: [address] [place] [address] Encounter: [id number]      Assessment:  Dyspnea  She does have a moderate to large hiatal hernia may be getting intermittent GERD causing some cough and shortness of breath  Lung sounds are clear present time on I do not believe she is having an acute exacerbation of her chronic bronchitis  Mild leukocytosis today due to IV Solu-Medrol given in the ER yesterday  No evidence of pulmonary embolism or pneumonia by CTA of chest done yesterday   Moderate obstructive sleep apnea for which patient could not tolerate CPAP therapy  Chronic hypercapnic hypoxemic respiratory failure secondary to obesity alveolar hypoventilation  Blood gas done on admission showed only mild hypercapnia  Chronic diastolic cardiac dysfunction  Moderate to large hiatal hernia with intermittent GERD  Morbid obesity  Restless leg syndrome    Plan: At this time would hold off on any further IV corticosteroid dosing she is not wheezing  I did add levalbuterol 0 63 mg to her nebulizer treatments with Atrovent 4 times a day  She states she cannot tolerate regular albuterol as this makes her anxious  Can keep oxygen at 3 liters/minute  I spoke with her daughter Chris Maya who is at the bedside          History of Present Illness   Physician Requesting Consult: Elissa Herrera MD  Reason for Consult / Principal Problem: dyspnea  Hx and PE limited by: none    HPI: Casa Root is a 77y o  year old female who presents with complaints of some increased shortness of breath over the past several days from her baseline  She feels as though she was having some increasing leg swelling  She had her furosemide dose increased from 20-40 mg daily with no improvement  She has occasional wheezing  She does use nebulizer at home with ipratropium up to 4 times a day sometimes    She does not use albuterol with as she says after wall she started to get jittery and anxious from the albuterol  No new cough, fever, or chills  She does have home oxygen which he uses at 3 liters/minute at bedtime and sometimes with activity during the day  No chest pain  She states she recently saw an ENT physician, Dr Hilary Casillas, for nasal congestion sinus pressure  She was prescribed an oral antibiotic for an acute sinusitis which she has been taking for the past 4 days or so  She does get some postnasal drainage  Jaye never smoked  She does get periodic gastric reflux and does admit to some periodic burning in her chest from this  Blood gas done on 3 L of oxygen showed pH of 7 41, pCO2 46 and PO2 of 74 on 3 L of oxygen  During admission in July her pCO2 ran in the mid 60-72 mm Hg range  Her white blood cell count initially was 9 7 hemoglobin 11 4 and platelet count 816  CBC today shows white blood cell count of 12 1  BUN and creatinine are 21 and 0 89 with sodium of 137 potassium 5 0  Troponin was negative x3 and brain atrophy peptide level good at 141  CTA of the chest was done and there is no evidence of pulmonary embolism  There is some minimal bibasilar atelectasis and is a moderate to large hiatal hernia  She does have history of chronic hypercapnic hypoxemic respiratory failure likely due to obesity alveolar hypoventilation syndrome  She also has history of chronic bronchitis  She does have chronic diastolic congestive heart failure and diabetes mellitus type 2  She has history of GERD  She did have a sleep study done October 2017 showed showed moderate OS A  AHI was 20 with oxygen raymond of 77% and mean O2 saturation of 90%  On a CPAP titration study she required a CPAP pressure of 15  She cannot tolerate the CPAP see she thus uses oxygen at nighttime instead    Also has history of hypertension and restless leg syndrome    Echocardiogram done July 2000 18 showed left ventricular systolic function to be normal with ejection fraction of 60% and there was grade 1 diastolic cardiac dysfunction  Estimated pulmonary artery pressure was 45 mm Hg  Review of Systems   Constitutional: Negative for activity change, appetite change, chills, fever and unexpected weight change  HENT: Negative for congestion, dental problem, postnasal drip, sinus pain, sinus pressure, sore throat and voice change  Eyes: Negative for visual disturbance  Respiratory: Positive for shortness of breath  Cardiovascular: Positive for leg swelling  Negative for chest pain and palpitations  Gastrointestinal: Negative for abdominal pain, diarrhea, nausea and vomiting  Genitourinary: Negative for difficulty urinating  Musculoskeletal: Negative for arthralgias  Skin: Negative for rash and wound  Allergic/Immunologic: Negative for environmental allergies and food allergies  Neurological: Negative for dizziness, light-headedness and headaches  Hematological: Negative for adenopathy  Psychiatric/Behavioral: Negative for agitation, behavioral problems and confusion         Historical Information   Past Medical History:   Diagnosis Date    Anxiety     Aortic valve disorder     Arthritis     Asthma     Cataract     [facility]     COPD (chronic obstructive pulmonary disease) ([facility]     Diabetes mellitus ([facility]     Disease of thyroid gland     Dry eye syndrome     Ectropion of left eye     last assessed: 10/17/2016    Emphysema, 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[facility])     Hearing loss     History of malignant neoplasm of thyroid     Hypertension     Malignant neoplasm of skin     Memory loss     Mitral valve regurgitation     Myocardial infarction ([address]     Obesity hypoventilation syndrome ([facility])     Pain     right hip    Restless leg syndrome     Seasonal allergies     Skin cancer (melanoma) ([address]     Sleep related hypoxia     Thyroid cancer ([address] )     Urinary tract infection without hematuria 7/14/2018     Past Surgical History:   Procedure Laterality Date    CHOLECYSTECTOMY      EYE SURGERY      HYSTERECTOMY      OTHER SURGICAL HISTORY      removal of lesion    PERICARDIUM SURGERY      resection of pericardial cyst or tumor    [place] ERCP DX COLLECTION SPECIMEN BRUSHING/WASHING N/A 5/30/2017    Procedure: ENDOSCOPIC RETROGRADE CHOLANGIOPANCREATOGRAPHY (ERCP); SPHINCTEROTOMY;  Surgeon: Glen Molina MD;  Location:  GI LAB;   Service: Gastroenterology   Dante Shutter JOINT Right 3/30/2018    Procedure: Right Sacroiliac Injection;  Surgeon: Winnie Parish MD;  Location: [facility] MAIN OR;  Service: Pain Management     REPLACEMENT TOTAL KNEE Left     REPLACEMENT TOTAL KNEE Right     REPLACEMENT TOTAL KNEE BILATERAL      SKIN BIOPSY      melanoma of back    STEROID INJECTION HIP Right 12/21/2017    Procedure: TROCHANTERIC BURSA INJECTION RIGHT;  Surgeon: Winnie Parish MD;  Location[facility] MA[facility];  Service: Pain Management     THORACOSCOPY      (therapeutic) w/excision of pericardial cyst    THORACOTOMY Right     at 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, for pericardial cyst    THYROID SURGERY      THYROIDECTOMY, PARTIAL      For thyroid cancer     Social History   History   Alcohol Use No     History   Drug Use No     History   Smoking Status    Never Smoker   Smokeless Tobacco    Never Used         Family History:   Family History   Problem Relation Age of Onset    Cancer Father     Arthritis Father     Liver cancer Father     Diabetes Sister     Asthma Mother     No Known Problems Brother     Substance Abuse Neg Hx     Mental illness Neg Hx        Meds/Allergies     Current Facility-Administered Medications:     acetaminophen (TYLENOL) tablet 650 mg, 650 mg, Oral, Q6H PRN, Florecita Hopson MD, 650 mg at 09/08/18 0000    albuterol inhalation solution 1 25 mg, 1 25 mg, Nebulization, Q4H PRN, Florecita Hopson MD, 1 25 mg at 09/08/18 1518    aspirin tablet 325 mg, 325 mg, Oral, Daily, Florecita Hopson MD, 325 mg at 09/08/18 0814    azelastine (ASTELIN) 0 1 % nasal spray 1 spray, 1 spray, Each Nare, BID, Henry Matthews MD, 1 spray at 09/08/18 1702    calcium carbonate (TUMS) chewable tablet 1,000 mg, 1,000 mg, Oral, Daily PRN, Henry Matthews MD    clotrimazole (LOTRIMIN) 1 % cream, , Topical, Daily, Demetrice Hickey MD    docusate sodium (COLACE) capsule 100 mg, 100 mg, Oral, BID, Henry Matthews MD, 100 mg at 09/08/18 1702    enoxaparin (LOVENOX) subcutaneous injection 40 mg, 40 mg, Subcutaneous, Daily, Henry Matthews MD, 40 mg at 24/87/55 6416    folic acid (FOLVITE) tablet 1 mg, 1 mg, Oral, Daily, Henry Matthews MD, 1 mg at 09/08/18 4529    gabapentin (NEURONTIN) capsule 100 mg, 100 mg, Oral, TID, Henry Matthews MD, 100 mg at 09/08/18 1702    insulin glargine (LANTUS) subcutaneous injection 20 Units 0 2 mL, 20 Units, Subcutaneous, Q12H [address], Henry Matthews MD, 20 Units at 09/08/18 0815    insulin lispro (HumaLOG) 100 units/mL subcutaneous injection 1-5 Units, 1-5 Units, Subcutaneous, HS, [place], CRNP, 5 Units at 09/07/18 2156    insulin lispro (HumaLOG) 100 units/mL subcutaneous injection 1-6 Units, 1-6 Units, Subcutaneous, TID AC, 5 Units at 09/08/18 1703 **AND** Fingerstick Glucose (POCT), , , TID AC, Henry Matthews MD    levalbuterol Rosalynd Billings) inhalation solution 0 63 mg, 0 63 mg, Nebulization, 4x Daily **AND** ipratropium (ATROVENT) 0 02 % inhalation solution 0 5 mg, 0 5 mg, Nebulization, 4x Daily, Nury Blanks, DO, 0 5 mg at 09/08/18 1753    levothyroxine tablet 125 mcg, 125 mcg, Oral, Early Morning, Henry Matthews MD, 125 mcg at 09/08/18 5110    Linaclotide CAPS 72 mcg, 72 mcg, Oral, Daily Before Breakfast, Henry Matthews MD    loratadine (CLARITIN) tablet 10 mg, 10 mg, Oral, Daily, Henry Matthews MD, 10 mg at 09/08/18 0815    LORazepam (ATIVAN) tablet 0 5 mg, 0 5 mg, Oral, Q8H PRN, Henry Matthews MD, 0 5 mg at 09/08/18 1347    metoprolol tartrate (LOPRESSOR) tablet 25 mg, 25 mg, Oral, BID, Henry Matthews MD, 25 mg at 09/08/18 1702    morphine injection 2 mg, 2 mg, Intravenous, Once, Florecita Hoposn MD    ondansetron [facility]) injection 4 mg, 4 mg, Intravenous, Q6H PRN, Florecita Hopson MD    polyethylene glycol (MIRALAX) packet 17 g, 17 g, Oral, BID, Florecita Hopson MD, 17 g at 09/08/18 0813    potassium chloride (K-DUR,KLOR-CON) CR tablet 40 mEq, 40 mEq, Oral, Daily, Florecita Hopson MD, 40 mEq at 09/08/18 0815    pramipexole (MIRAPEX) tablet 1 mg, 1 mg, Oral, TID, Florecita Hopson MD, 1 mg at 09/08/18 1702    traMADol (ULTRAM) tablet 50 mg, 50 mg, Oral, Q6H PRN, Florecita Hopson MD, 50 mg at 09/08/18 1345    Prior to Admission medications    Medication Sig Start Date End Date Taking?  Authorizing Provider   acetaminophen (TYLENOL) 325 mg tablet Take 2 tablets (650 mg total) by mouth every 6 (six) hours as needed for mild pain, headaches or fever 2/26/18  Yes Florecita Hopson MD   albuterol (ACCUNEB) 1 25 MG/3ML nebulizer solution Take 3 mL (1 25 mg total) by nebulization 3 (three) times a day as needed for wheezing (J45 909) 4/21/18  Yes Cornelius Magdaleno MD   albuterol (VENTOLIN HFA) 90 mcg/act inhaler ventolin hfa 108 (90 base) mcg/actaers   Yes Historical Provider, MD   ascorbic acid (VITAMIN C) 500 mg tablet Take 1,000 mg by mouth daily     Yes Historical Provider, MD   aspirin 325 mg tablet Take 325 mg by mouth daily   Yes Historical Provider, MD   Azelastine HCl 137 MCG/SPRAY SOLN  8/29/18  Yes Historical Provider, MD   cephalexin (KEFLEX) 500 mg capsule Take 500 mg by mouth every 6 (six) hours   9/5/18  Yes Historical Provider, MD   COD LIVER OIL PO Take by mouth daily   Yes Historical Provider, MD   docusate sodium (COLACE) 100 mg capsule Take 1 capsule (100 mg total) by mouth 2 (two) times a day 7/18/18  Yes Hang Vickers MD   folic acid (FOLVITE) 1 mg tablet Take by mouth daily   Yes Historical Provider, MD   furosemide (LASIX) 20 mg tablet Take 1 tablet (20 mg total) by mouth daily for 30 days  Patient taking differently: Take 40 mg by mouth daily   8/24/18 9/23/18 Yes Edilia Mendoza MD   gabapentin (NEURONTIN) 100 mg capsule Take 1 capsule (100 mg total) by mouth 3 (three) times a day 5/23/18  Yes Hugo Hudson MD   insulin glargine (LANTUS) 100 units/mL subcutaneous injection Inject 20 Units under the skin every 12 (twelve) hours 8/10/18  Yes Hugo Hudson MD   ipratropium (ATROVENT) 0 02 % nebulizer solution Take 1 vial (0 5 mg total) by nebulization 4 (four) times a day 7/18/18  Yes Sony Gottlieb MD   lactulose ([facility]) 10 g/15 mL solution lactulose 10 gm/15ml soln PRN   Yes Historical Provider, MD   levothyroxine 125 mcg tablet Take 1 tablet (125 mcg total) by mouth daily 3/16/18  Yes Hugo Hudson MD   Linaclotide [facility]) 72 MCG CAPS Take 72 mcg by mouth daily before breakfast 2/14/18  Yes Hiral Cuenca MD   loratadine (CLARITIN) 10 mg tablet Take 10 mg by mouth daily as needed     Yes Historical Provider, MD   LORazepam (ATIVAN) 0 5 mg tablet Take 1 tablet (0 5 mg total) by mouth every 8 (eight) hours as needed for anxiety for up to 10 days 7/18/18 9/7/18 Yes Sony Gottlieb MD   metoprolol tartrate (LOPRESSOR) 25 mg tablet Take 1 tablet (25 mg total) by mouth 2 (two) times a day for 90 days 8/24/18 11/22/18 Yes Alessio Joyce MD   Multiple Vitamins-Minerals (MULTIVITAMIN ADULT PO) Take 1 tablet by mouth daily 2/9/16  Yes Historical Provider, MD   NOVOLOG MIX 70/30 FLEXPEN (70-30) 100 units/mL injection pen  8/11/18  Yes Historical Provider, MD   ondansetron (ZOFRAN-ODT) 4 mg disintegrating tablet Take 1 tablet (4 mg total) by mouth every 6 (six) hours as needed for nausea or vomiting 6/29/18  Yes Hiral Cuenca MD   polyethylene glycol (MIRALAX) 17 g packet Take 17 g by mouth daily  Patient taking differently: Take 17 g by mouth 2 (two) times a day   6/17/17  Yes FATIMAH Pelayo   potassium chloride (MICRO-K) 10 MEQ CR capsule Take 1 capsule (10 mEq total) by mouth daily 5/23/18  Yes Hugo Hudson MD   pramipexole (MIRAPEX) 1 mg tablet Take 1 tablet (1 mg total) by mouth 3 (three) times a day 5/10/18  Yes Vignesh Green MD   traMADol Renae Leaks) 50 mg tablet Take 1 tablet (50 mg total) by mouth every 6 (six) hours as needed for moderate pain 8/10/18  Yes Vignesh Green MD   VENTOLIN  (15 Base) MCG/ACT inhaler  8/17/18  Yes Historical Provider, MD       Allergies   Allergen Reactions    Ketorolac Swelling     Toradol    Latex Rash    Wound Dressing Adhesive Rash       Objective   Vitals: Blood pressure 146/89, pulse 72, temperature 98 1 °F (36 7 °C), temperature source Oral, resp  rate 18, height 5' 2" (1 575 m), weight 101 kg (223 lb), SpO2 95 %, not currently breastfeeding  ,Body mass index is 40 79 kg/m²  Intake/Output Summary (Last 24 hours) at 09/08/18 1757  Last data filed at 09/07/18 2301   Gross per 24 hour   Intake                0 ml   Output              950 ml   Net             -950 ml     Invasive Devices     Peripheral Intravenous Line            Peripheral IV 09/07/18 Right Antecubital 1 day                Physical Exam   Constitutional: She is oriented to person, place, and time  She appears well-developed and well-nourished  No distress  Obese female  On 2 L O2 saturation is 95%   HENT:   Head: Normocephalic  Nose: Nose normal    Mouth/Throat: Oropharynx is clear and moist  No oropharyngeal exudate  Eyes: Conjunctivae are normal  Pupils are equal, round, and reactive to light  Neck: Neck supple  No JVD present  No tracheal deviation present  Cardiovascular: Normal rate, regular rhythm and normal heart sounds  Pulmonary/Chest: Effort normal    Lung sounds are mildly decreased but clear to auscultation   Abdominal: Soft  She exhibits no distension  There is no tenderness  There is no guarding  Musculoskeletal:   Some mild edema of the lower extremities and her chronic venous stasis changes of skin of lower extremities   Lymphadenopathy:     She has no cervical adenopathy  Neurological: She is alert and oriented to person, place, and time     Skin: Skin is warm and dry  No rash noted  Psychiatric: She has a normal mood and affect  Her behavior is normal  Thought content normal        Lab Results: ABG: Lab Results   Component Value Date    PHART 7 406 09/07/2018    ULF1SHZ 46 4 (H) 09/07/2018    PO2ART 73 8 (L) 09/07/2018    VES8DXQ 28 5 (H) 09/07/2018    BEART 3 2 09/07/2018    SOURCE Radial, Right 09/07/2018   , BNP: No results found for: BNP, CBC: Lab Results   Component Value Date    WBC 12 08 (H) 09/08/2018    HGB 11 0 (L) 09/08/2018    HCT 36 5 09/08/2018    MCV 98 09/08/2018     09/08/2018    ADJUSTEDWBC 10 30 04/04/2016    MCH 29 6 09/08/2018    MCHC 30 1 (L) 09/08/2018    RDW 15 9 (H) 09/08/2018    MPV 10 8 09/08/2018    NRBC 0 09/07/2018   , CMP: Lab Results   Component Value Date     09/08/2018     (H) 08/01/2017    K 5 0 09/08/2018    K 3 9 08/01/2017     09/08/2018    CL 95 (L) 08/24/2018    CO2 34 (H) 09/08/2018    CO2 31 (H) 08/24/2018    ANIONGAP 10 8 01/08/2016    BUN 21 09/08/2018    BUN 13 08/24/2018    CREATININE 0 89 09/08/2018    CREATININE 0 78 08/01/2017    GLUCOSE 144 (H) 08/01/2017    CALCIUM 8 3 09/08/2018    CALCIUM 9 1 08/01/2017    AST 34 09/07/2018    AST 18 08/01/2017    ALT 25 09/07/2018    ALT 26 08/01/2017    ALKPHOS 76 09/07/2018    ALKPHOS 70 08/01/2017    PROT 6 9 08/01/2017    BILITOT 0 2 08/01/2017    EGFR 62 09/08/2018   , PT/INR:   Lab Results   Component Value Date    INR 0 94 09/07/2018    INR 1 0 08/01/2017   , Troponin: No results found for: TROPONIN    Imaging Studies: I have personally reviewed pertinent reports  and I have personally reviewed pertinent films in PACS    EKG, Pathology, and Other Studies: I have personally reviewed pertinent reports  VTE Prophylaxis: Enoxaparin (Lovenox)    Code Status: Level 1 - Full Code    Counseling/Coordination of Care: Total floor / unit time spent today 30 minutes   Greater than 50% of total time was spent with the patient and / or family counseling and / or coordination of care   A description of the counseling / coordination of care: I reviewed patient's chart, CT of chest, and spoke with her and her daughter

## 2018-09-08 NOTE — CASE MANAGEMENT
Thank you,  Molly [facility] Review Department  Phone: [phone]; Fax [phone]  ATTENTION: Please call with any questions or concerns to [phone]  and carefully follow the prompts so that you are directed to the right person  Send all requests for admission clinical reviews, approved or denied determinations and any other requests to fax [phone]  All voicemails are confidential        Initial Clinical Review    Admission: Date/Time/Statement: 9/7/18 AT 1417 OBSERVATION     09/07/18 1415  Place in Observation (expected length of stay for this patient is less than two midnights) (ED Bridging Orders Panel) Once     Transfer Service: General Medicine       Question Answer Comment   Admitting Physician Sylvester Martinez    Level of Care Level 1 Stepdown        09/07/18 1417       ED: Date/Time/Mode of Arrival:   ED Arrival Information     Expected Arrival Acuity Means of Arrival Escorted By Service Admission Type    - 9/7/2018 11:22 Emergent Walk-In Family Member General Medicine Emergency    Arrival Complaint    low oxygen level          Chief Complaint:   Chief Complaint   Patient presents with    Shortness of Breath     patient c/o SOB with exertion, worse over the past several days  had a dose of IV Lasix 40 mg at PMD yesterday, but symtpoms are not improving and she gained 1 8 pounds since yesterday  Chief Complaint:     Shortness of Breath (patient c/o SOB with exertion, worse over the past several days  had a dose of IV Lasix 40 mg at PMD yesterday, but symtpoms are not improving and she gained 1 8 pounds since yesterday )        History of Present Illness:   Saritha Rodriguez is a 77 y o  female with a PMH of hypothyroidism, morbid obesity, asthma, chronic hypoxic and hypoxemic respiratory failure, LISBET, obesity hypoventilation syndrome, HTN,T2DM on long term insulin, depression, who presents with progressively worsening shortness of breath for several weeks  She has a history of chronic shortness of breath but this is worse than baseline  She was admitted in July for respiratory failure secondary to CHF exacerbation and was discharged to rehab  She was there for just over a month  2 weeks after arriving home she began to decompensate  She states she takes her medications as instructed including her diuretic and breathing treatments  She says she checks her weight daily but can't tell me what it is now but states she has been noticing progressively worsening swelling in her legs with weeping  She also noticed she was not urinating as much as she normally does on her usual dose of diuretic  She has been to her PCP several times and her Lasix was increased from 20 mg to 40 mg daily but she has not had any improvement in her symptoms  Physical Exam   Constitutional: She is oriented to person, place, and time  She appears well-developed  No distress  Chronically ill-appearing   Cardiovascular: Normal rate, regular rhythm and normal heart sounds  Pulmonary/Chest: No respiratory distress  She has no wheezes  She has no rales  Poor inspiratory effort, prolonged expiratory phase, decreased breath sounds   Abdominal: Soft  Bowel sounds are normal  She exhibits no distension  There is no tenderness  There is no rebound and no guarding  Musculoskeletal: She exhibits edema     Legs wrapped in Ace bandages         ED Vital Signs:   ED Triage Vitals   Temperature Pulse Respirations Blood Pressure SpO2   09/07/18 1141 09/07/18 1141 09/07/18 1141 09/07/18 1130 09/07/18 1141   98 °F (36 7 °C) 84 (!) 26 (!) 195/82 (!) 86 %      Temp Source Heart Rate Source Patient Position - Orthostatic VS BP Location FiO2 (%)   09/07/18 1141 09/07/18 1141 09/07/18 1141 09/07/18 1141 --   Tympanic Monitor Sitting Left arm       Pain Score       09/07/18 1141       6        Wt Readings from Last 1 Encounters:   09/08/18 101 kg (223 lb)      09/07 0701 09/08 0700 09/08 0701 09/08 6421 Temperature (°F) 97  998 5 98 2      Pulse 7898 74      Respirations 2129 22      Blood Pressure 140/60195/82 176/85      SpO2 (%) 86100 9596        Respiratory Data  Blood Gases   (Last 48 hours)     09/07 1724                     pH, Arterial  7 406                pCO2, Arterial  46 4                pO2, Arterial  73 8                HCO3, Arterial  28 5                   Respiratory Data       09/07 1642  Most Recent                   O2 Device Nasal c  None (R  O2 Flow Rate (L/min) (L/min) 2  2                  LABS/Diagnostic Test Results:   Results from last 7 days  Lab Units 09/07/18  1146   WBC Thousand/uL 9 69   HEMOGLOBIN g/dL 11 4*   HEMATOCRIT % 37 6   PLATELETS Thousands/uL 211   NEUTROS PCT % 71   LYMPHS PCT % 18   MONOS PCT % 7   EOS PCT % 3       Results from last 7 days  Lab Units 09/07/18  1146   SODIUM mmol/L 142   POTASSIUM mmol/L 3 9   CHLORIDE mmol/L 102   CO2 mmol/L 36*   BUN mg/dL 16   CREATININE mg/dL 0 81   CALCIUM mg/dL 8 5   ALK PHOS U/L 76   ALT U/L 25   AST U/L 34       Results from last 7 days  Lab Units 09/07/18  1146   INR   0 94       CT CHEST -  1   No central or segmental pulmonary embolism   Suboptimal evaluation of subsegmental vessels  2   Large hiatal hernia  3   Bibasilar atelectasis          ED Treatment:   Medication Administration from 09/07/2018 1122 to 09/07/2018 1525       Date/Time Order Dose Route Action Action by Comments     09/07/2018 1328 iohexol (OMNIPAQUE) 350 MG/ML injection (MULTI-DOSE) 85 mL 85 mL Intravenous Given Tammy Loza      09/07/2018 1428 methylPREDNISolone sodium succinate (Solu-MEDROL) injection 125 mg 125 mg Intravenous Given Marcellus Ellis RN      09/07/2018 1428 ipratropium-albuterol (DUO-NEB) 0 5-2 5 mg/3 mL inhalation solution 3 mL 3 mL Nebulization Given Marcellus Ellis RN      09/07/2018 1428 ipratropium-albuterol (DUO-NEB) 0 5-2 5 mg/3 mL inhalation solution 3 mL 3 mL Nebulization Given Marcellus Ellis RN Past Medical/Surgical History:    Active Ambulatory Problems     Diagnosis Date Noted    Acquired hypothyroidism 04/15/2017    Lung nodules 04/15/2017    Morbid obesity with BMI of 40 0-44 9, adult ([address] ) 04/18/2017    Dyspnea 06/07/2017    Asthma 06/10/2017    Constipation due to opioid therapy 06/10/2017    RLS (restless legs syndrome) 06/10/2017    Sleep apnea 06/10/2017    Pancreatic cyst 09/19/2017    Depression 10/09/2017    Obesity hypoventilation syndrome ([address] ) 10/11/2017    Bilateral hip pain 12/21/2017    HTN (hypertension) 01/19/2018    Primary osteoarthritis involving multiple joints 03/30/2017    Balance problem 11/08/2017    Diabetes mellitus with neuropathy ([facility] ) 01/23/2015    Meningiomas, multiple ([facility] ) 12/01/2017    Moderate episode of recurrent major depressive disorder ([facility] ) 10/17/2017    Chronic bronchitis ([facility] ) 11/18/2014    Chronic right-sided low back pain without sciatica 02/14/2018    Chronic pain syndrome 70/11/3035    Diastolic CHF ([facility] ) 16/78/3394    Chronic venous stasis dermatitis of both lower extremities 02/22/2018    Physical deconditioning 02/23/2018    Chronic respiratory failure with hypoxia and hypercapnia (HCC) 02/23/2018    Sacroiliitis ([address] ) 03/29/2018    Dilation of biliary tract 04/25/2018    Onychomycosis 04/27/2018    Peripheral vascular disease ([facility] ) 04/27/2018    Peripheral venous insufficiency 04/27/2018    Cervical radiculopathy 07/12/2018    Multiple joint pain 07/12/2018    Anxiety     Chronic cutaneous venous stasis ulcer ([facility] ) 08/30/2018    Peripheral edema 09/06/2018     Resolved Ambulatory Problems     Diagnosis Date Noted    Abdominal pain 04/15/2017    Nausea 04/15/2017    Hypokalemia 04/15/2017    Type 2 diabetes mellitus with hyperglycemia ([address] ) 04/15/2017    Opioid dependence ([facility] ) 04/18/2017    Skin lesion of left leg 04/21/2017    Tachypnea 06/07/2017    Severe sepsis ([address] ) 06/07/2017    Non-ST elevation myocardial infarction (NSTEMI) ([address] ) 06/07/2017    Bacteremia due to Klebsiella pneumoniae 06/08/2017    Cholangitis 06/09/2017    Respiratory failure with hypercapnia ([address] ) 06/10/2017    Acute on chronic systolic heart failure ([address] ) 06/10/2017    Irritable bowel syndrome with diarrhea 06/10/2017    Meningioma ([address] ) 06/16/2017    Acute on chronic respiratory failure with hypoxemia ([address] ) 09/19/2017    COPD exacerbation ([address] ) 09/19/2017    TIA (transient ischemic attack) 10/11/2017    Trochanteric bursitis of right hip 12/21/2017    Influenzal tracheobronchitis 01/19/2018    Sepsis ([address] ) 01/19/2018    Concussion 11/08/2017    Daytime hypersomnolence 10/16/2017    Depression 91/24/4472    Diastolic dysfunction 89/88/6267    Elevated LFTs 06/02/2017    Greater trochanteric bursitis of right hip 10/20/2017    Iliotibial band syndrome, right leg 10/20/2017    Influenza 01/21/2018    Mild persistent asthma with acute exacerbation 03/30/2017   [facility] discharge follow-up 02/08/2018    Sacroiliitis ([facility] ) 02/14/2018    Asthma exacerbation 02/22/2018    Hypernatremia 02/22/2018    Hypokalemia 02/22/2018    Bilateral lower extremity edema 02/22/2018    Bilateral cellulitis of lower leg 06/14/2018    Cellulitis of right foot 06/14/2018    Neck pain 07/12/2018    Urinary tract infection without hematuria 07/14/2018    Conjunctivitis 07/18/2018     Past Medical History:   Diagnosis Date    Anxiety     Aortic valve disorder     Arthritis     Asthma     Cataract     CHF (congestive heart failure) ([address] )     COPD (chronic obstructive pulmonary disease) ([facility] )     Diabetes mellitus ([address] )     Disease of thyroid gland     Dry eye syndrome     Ectropion of left eye     Emphysema, 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[address] )     Hearing loss     History of malignant neoplasm of thyroid     Hypertension     Malignant neoplasm of skin     Memory loss     Mitral valve regurgitation     Myocardial infarction ([address] )  Obesity hypoventilation syndrome (HCC)     Pain     Restless leg syndrome     Seasonal allergies     Skin cancer (melanoma) (HCC)     Sleep related hypoxia     Thyroid cancer ([facility] )     Urinary tract infection without hematuria 7/14/2018       Admitting Diagnosis: Dyspnea [R06 00]  Hypoxia [R09 02]  Borderline low oxygen saturation level [R79 81]    Age/Sex: 77 y o  female      Assessment/Plan:   * Dyspnea   Assessment & Plan     · Acute on chronic  Multifactorial given DHF, COPD, hypoventilation obesity syndrome  · CTA negative for PE  · No evidence of major fluid overload or COPD exacerbation  · Admit to obs  · Serial troponins  · Check ABG  · Monitor O2 overnight  · Cont home breathing treatments  · Received dose of IV solumedrol in ED, monitor  · Pulmonary consulted, recs appreciated          Chronic respiratory failure with hypoxia and hypercapnia (HCC)   Assessment & Plan     · ABG pending given worsening hypoxia  · Pulmonary consulted          Physical deconditioning   Assessment & Plan     · Patient has had multiple admissions in the past, all recommending STR  · PT/OT eval  · She may need a long term higher level of care, but current she is not interested in that          Diastolic CHF [facility])   Assessment & Plan     · Last echo 7/2018  showed EF was 60-65%   There was left ventricular relaxation or grade 1 diastolic dysfunction   Mild tricuspid regurgitation  · Volume status difficult to assess,  on admission, CTA showed Bibasilar atelectasis, She has chronic peripheral edema which appears at baseline but her weight from last admission 7/2018 was 92kg, today 103kg     · One dose of IV lasix given on admission  · Monitor I/O and daily weights  · Monitor lytes and renal function          Diabetes mellitus with neuropathy [facility])   Assessment & Plan             Lab Results   Component Value Date     HGBA1C 9 1% 04/27/2018              Recent Labs      09/07/18   1415  09/07/18   1621   POCGLU 91  134         Blood Sugar Average: Last 72 hrs:  (P) 112  5      Cont home insulin  ISS  POC sugars  diabetic diet          HTN (hypertension)   Assessment & Plan     · BP elevated on admission  · Resume home meds, monitor  May need med adjustment          Obesity hypoventilation syndrome (HCC)   Assessment & Plan     Cannot tolerate CPAP, cont nocturnal O2          Sleep apnea   Assessment & Plan     · Had CPAP titration for 15mg Hg but patient cannot tolerate CPAP  · Cont NC qHS  · Pulmonary consulted          RLS (restless legs syndrome)   Assessment & Plan     Cont pramipexole and gabapentin          Constipation due to opioid therapy   Assessment & Plan     Cont home meds             Morbid obesity with BMI of 40 0-44 9, adult (HCC)   Assessment & Plan     · BMI 42 54  · Needs Lifestyle modification, weight loss          Acquired hypothyroidism   Assessment & Plan     · Cont synthroid  · TSH 1 00              VTE Prophylaxis: Enoxaparin (Lovenox)  / sequential compression device   Code Status: Level 1 - Full Code     Anticipated Length of Stay:  Patient will be admitted on an Observation basis with an anticipated length of stay of  < 2 midnights  Admission Orders:  9/7/18 AT 1417 OBSERVATION   TELEMETRY  VS Q4HRS    Up as Tolerated  SCD    Nasal O2 at 2 L/min  Continuous Pulse Oximetry      Diet George/CHO Controlled; Consistent Carbohydrate Diet Level 2 (5 carb servings/75 grams CHO/meal);  Fluid Restriction 1500 ML         Scheduled Meds:   Current Facility-Administered Medications:  acetaminophen 650 mg Oral Q6H PRN Stanislaw Moreland MD   albuterol 1 25 mg Nebulization Q4H PRN Stanislaw Moreland MD   aspirin 325 mg Oral Daily Stanislaw Moreland MD   azelastine 1 spray Each Nare BID Stanislaw Moreladn MD   calcium carbonate 1,000 mg Oral Daily PRN Stanislaw Moreland MD   docusate sodium 100 mg Oral BID Stanislaw Moreland MD   enoxaparin 40 mg Subcutaneous Daily Stanislaw Moreland MD   folic acid 1 mg Oral Daily Stanislaw Moreland MD   gabapentin 100 mg Oral TID Hyacinth Sainz MD   insulin glargine 20 Units Subcutaneous Q12H 3630 Stacey Walls MD   insulin lispro 1-5 Units Subcutaneous HS FATIMAH Joel   insulin lispro 1-6 Units Subcutaneous TID AC Hyacinth Sainz MD   ipratropium 0 5 mg Nebulization 4x Daily Hyacinth Sainz MD   levothyroxine 125 mcg Oral Early Morning Hyacinth Sainz MD   Linaclotide 72 mcg Oral Daily Before Breakfast Hyacinth Sainz MD   loratadine 10 mg Oral Daily Hyacinth Sainz MD   LORazepam 0 5 mg Oral Q8H PRN Hyacinth Sainz MD   metoprolol tartrate 25 mg Oral BID Hyacinth Sainz MD   ondansetron 4 mg Intravenous Q6H PRN Hyacinth Sainz MD   polyethylene glycol 17 g Oral BID Hyacinth Sainz MD   potassium chloride 40 mEq Oral Daily Hyacinth Sianz MD   pramipexole 1 mg Oral TID Hyacinth Sainz MD   traMADol 50 mg Oral Q6H PRN Hyacinth Sainz MD     PRN Meds:      Acetaminophen 650 mg q6hrs prn given x 1/ 24 hrs    albuterol    calcium carbonate    LORazepam 0 5 mg q8hrs prn given x 1/ 24 hrs    ondansetron    traMADol 50 mg q6hrs prn given x 2/ 24 hrs    CONSULT PULM    CONSULT WOUND CARE    PT EVAL    OT EVAL

## 2018-09-08 NOTE — PLAN OF CARE
Nutrition/Hydration-ADULT     Nutrient/Hydration intake appropriate for improving, restoring or maintaining nutritional needs Completed          CARDIOVASCULAR - ADULT     Maintains optimal cardiac output and hemodynamic stability Progressing     Absence of cardiac dysrhythmias or at baseline rhythm Progressing        DISCHARGE PLANNING - CARE MANAGEMENT     Discharge to post-acute care or home with appropriate resources Progressing        METABOLIC, FLUID AND ELECTROLYTES - ADULT     Electrolytes maintained within normal limits Progressing     Fluid balance maintained Progressing     Glucose maintained within target range Progressing        Potential for Falls     Patient will remain free of falls Progressing        Prexisting or High Potential for Compromised Skin Integrity     Skin integrity is maintained or improved Progressing        RESPIRATORY - ADULT     Achieves optimal ventilation and oxygenation Progressing        SKIN/TISSUE INTEGRITY - ADULT     Skin integrity remains intact Progressing     Incision(s), wounds(s) or drain site(s) healing without S/S of infection Progressing

## 2018-09-09 VITALS
TEMPERATURE: 98.2 F | HEART RATE: 86 BPM | WEIGHT: 226.63 LBS | SYSTOLIC BLOOD PRESSURE: 143 MMHG | DIASTOLIC BLOOD PRESSURE: 84 MMHG | OXYGEN SATURATION: 96 % | BODY MASS INDEX: 41.71 KG/M2 | RESPIRATION RATE: 20 BRPM | HEIGHT: 62 IN

## 2018-09-09 PROBLEM — D72.829 LEUKOCYTOSIS: Status: ACTIVE | Noted: 2018-09-09

## 2018-09-09 LAB
ANION GAP SERPL CALCULATED.3IONS-SCNC: 0 MMOL/L (ref 4–13)
BUN SERPL-MCNC: 22 MG/DL (ref 5–25)
CALCIUM SERPL-MCNC: 8.5 MG/DL (ref 8.3–10.1)
CHLORIDE SERPL-SCNC: 103 MMOL/L (ref 100–108)
CO2 SERPL-SCNC: 35 MMOL/L (ref 21–32)
CREAT SERPL-MCNC: 0.8 MG/DL (ref 0.6–1.3)
ERYTHROCYTE [DISTWIDTH] IN BLOOD BY AUTOMATED COUNT: 16.2 % (ref 11.6–15.1)
GFR SERPL CREATININE-BSD FRML MDRD: 70 ML/MIN/1.73SQ M
GLUCOSE SERPL-MCNC: 208 MG/DL (ref 65–140)
GLUCOSE SERPL-MCNC: 238 MG/DL (ref 65–140)
GLUCOSE SERPL-MCNC: 250 MG/DL (ref 65–140)
HCT VFR BLD AUTO: 36.7 % (ref 34.8–46.1)
HGB BLD-MCNC: 10.8 G/DL (ref 11.5–15.4)
MAGNESIUM SERPL-MCNC: 2.4 MG/DL (ref 1.6–2.6)
MCH RBC QN AUTO: 29.8 PG (ref 26.8–34.3)
MCHC RBC AUTO-ENTMCNC: 29.4 G/DL (ref 31.4–37.4)
MCV RBC AUTO: 101 FL (ref 82–98)
MRSA NOSE QL CULT: NORMAL
PLATELET # BLD AUTO: 192 THOUSANDS/UL (ref 149–390)
PMV BLD AUTO: 10.7 FL (ref 8.9–12.7)
POTASSIUM SERPL-SCNC: 4.7 MMOL/L (ref 3.5–5.3)
RBC # BLD AUTO: 3.62 MILLION/UL (ref 3.81–5.12)
SODIUM SERPL-SCNC: 138 MMOL/L (ref 136–145)
WBC # BLD AUTO: 11.07 THOUSAND/UL (ref 4.31–10.16)

## 2018-09-09 PROCEDURE — 83735 ASSAY OF MAGNESIUM: CPT | Performed by: STUDENT IN AN ORGANIZED HEALTH CARE EDUCATION/TRAINING PROGRAM

## 2018-09-09 PROCEDURE — 85027 COMPLETE CBC AUTOMATED: CPT | Performed by: STUDENT IN AN ORGANIZED HEALTH CARE EDUCATION/TRAINING PROGRAM

## 2018-09-09 PROCEDURE — 80048 BASIC METABOLIC PNL TOTAL CA: CPT | Performed by: STUDENT IN AN ORGANIZED HEALTH CARE EDUCATION/TRAINING PROGRAM

## 2018-09-09 PROCEDURE — 99212 OFFICE O/P EST SF 10 MIN: CPT | Performed by: INTERNAL MEDICINE

## 2018-09-09 PROCEDURE — 94760 N-INVAS EAR/PLS OXIMETRY 1: CPT

## 2018-09-09 PROCEDURE — 82948 REAGENT STRIP/BLOOD GLUCOSE: CPT

## 2018-09-09 PROCEDURE — 94640 AIRWAY INHALATION TREATMENT: CPT

## 2018-09-09 PROCEDURE — 99217 PR OBSERVATION CARE DISCHARGE MANAGEMENT: CPT | Performed by: STUDENT IN AN ORGANIZED HEALTH CARE EDUCATION/TRAINING PROGRAM

## 2018-09-09 RX ORDER — GABAPENTIN 100 MG/1
300 CAPSULE ORAL 3 TIMES DAILY
Qty: 180 CAPSULE | Refills: 0 | Status: SHIPPED | OUTPATIENT
Start: 2018-09-09 | End: 2018-10-22 | Stop reason: DRUGHIGH

## 2018-09-09 RX ORDER — FLUTICASONE PROPIONATE 220 UG/1
1 AEROSOL, METERED RESPIRATORY (INHALATION) DAILY
Status: DISCONTINUED | OUTPATIENT
Start: 2018-09-09 | End: 2018-09-09 | Stop reason: HOSPADM

## 2018-09-09 RX ORDER — FLUTICASONE PROPIONATE 220 UG/1
1 AEROSOL, METERED RESPIRATORY (INHALATION) DAILY
Qty: 1 INHALER | Refills: 0 | Status: SHIPPED | OUTPATIENT
Start: 2018-09-09 | End: 2018-10-11 | Stop reason: ALTCHOICE

## 2018-09-09 RX ADMIN — ASPIRIN 325 MG: 325 TABLET ORAL at 08:43

## 2018-09-09 RX ADMIN — LEVALBUTEROL HYDROCHLORIDE 0.63 MG: 0.63 SOLUTION RESPIRATORY (INHALATION) at 12:04

## 2018-09-09 RX ADMIN — INSULIN LISPRO 2 UNITS: 100 INJECTION, SOLUTION INTRAVENOUS; SUBCUTANEOUS at 08:44

## 2018-09-09 RX ADMIN — IPRATROPIUM BROMIDE 0.5 MG: 0.5 SOLUTION RESPIRATORY (INHALATION) at 07:43

## 2018-09-09 RX ADMIN — LEVOTHYROXINE SODIUM 125 MCG: 125 TABLET ORAL at 06:44

## 2018-09-09 RX ADMIN — TRAMADOL HYDROCHLORIDE 50 MG: 50 TABLET, FILM COATED ORAL at 07:51

## 2018-09-09 RX ADMIN — ALBUTEROL SULFATE 1.25 MG: 2.5 SOLUTION RESPIRATORY (INHALATION) at 01:34

## 2018-09-09 RX ADMIN — GABAPENTIN 100 MG: 100 CAPSULE ORAL at 08:43

## 2018-09-09 RX ADMIN — FOLIC ACID 1 MG: 1 TABLET ORAL at 08:43

## 2018-09-09 RX ADMIN — ENOXAPARIN SODIUM 40 MG: 40 INJECTION SUBCUTANEOUS at 08:44

## 2018-09-09 RX ADMIN — DOCUSATE SODIUM 100 MG: 100 CAPSULE, LIQUID FILLED ORAL at 08:46

## 2018-09-09 RX ADMIN — IPRATROPIUM BROMIDE 0.5 MG: 0.5 SOLUTION RESPIRATORY (INHALATION) at 12:04

## 2018-09-09 RX ADMIN — METOPROLOL TARTRATE 25 MG: 25 TABLET ORAL at 08:43

## 2018-09-09 RX ADMIN — LORATADINE 10 MG: 10 TABLET ORAL at 08:43

## 2018-09-09 RX ADMIN — AZELASTINE HYDROCHLORIDE 1 SPRAY: 137 SPRAY, METERED NASAL at 08:43

## 2018-09-09 RX ADMIN — PRAMIPEXOLE DIHYDROCHLORIDE 1 MG: 0.5 TABLET ORAL at 08:43

## 2018-09-09 RX ADMIN — LORAZEPAM 0.5 MG: 0.5 TABLET ORAL at 08:43

## 2018-09-09 RX ADMIN — INSULIN LISPRO 3 UNITS: 100 INJECTION, SOLUTION INTRAVENOUS; SUBCUTANEOUS at 13:36

## 2018-09-09 RX ADMIN — POTASSIUM CHLORIDE 40 MEQ: 1500 TABLET, EXTENDED RELEASE ORAL at 08:43

## 2018-09-09 RX ADMIN — CLOTRIMAZOLE: 10 CREAM TOPICAL at 08:46

## 2018-09-09 RX ADMIN — LEVALBUTEROL HYDROCHLORIDE 0.63 MG: 0.63 SOLUTION RESPIRATORY (INHALATION) at 07:43

## 2018-09-09 RX ADMIN — FLUTICASONE PROPIONATE 1 PUFF: 220 AEROSOL, METERED RESPIRATORY (INHALATION) at 12:02

## 2018-09-09 RX ADMIN — POLYETHYLENE GLYCOL 3350 17 G: 17 POWDER, FOR SOLUTION ORAL at 08:43

## 2018-09-09 RX ADMIN — INSULIN GLARGINE 20 UNITS: 100 INJECTION, SOLUTION SUBCUTANEOUS at 08:43

## 2018-09-09 NOTE — ASSESSMENT & PLAN NOTE
· Patient has had multiple admissions in the past due in part to deconditioning  · She may need a long term higher level of care, as she does well when she gets discharged to [facility] but at home she appears to decompensate with return to hospital  · She and her  will pursue options as outpt for long term care    · Letter sent to her PCP to advise him that she will be following up to discuss long-term placement

## 2018-09-09 NOTE — PROGRESS NOTES
Seen by Dr Javed Ybarra    Doing better  Shortness of breath  Leg swelling    Superficial wounds with some cellulitis and dermatitis    Wound dressing in place    Continue supportive symptomatic treatment and local wound care

## 2018-09-09 NOTE — DISCHARGE INSTRUCTIONS
· wear your oxygen, 3L at all times  · You have been started on an inhaler to take every day  · Take all medications as prescribed  · Your dose of Neurontin has been increased for your pain  · You need to follow up with Dr Jillian Bustamante regarding her chronic pain  ·

## 2018-09-09 NOTE — ASSESSMENT & PLAN NOTE
Lab Results   Component Value Date    HGBA1C 9 1% 04/27/2018       Recent Labs      09/08/18   1642  09/08/18   2129  09/09/18   0739  09/09/18   1216   POCGLU  317*  325*  208*  250*       Blood Sugar Average: Last 72 hrs:  (P) 760 [id number]     Cont home insulin  ISS  POC sugars  diabetic diet

## 2018-09-09 NOTE — DISCHARGE SUMMARY
Discharge- Tayo Arriola 1940, 77 y o  female MRN: [id number]    Unit/Bed#: [address] Encounter: [id number]    Primary Care Provider: Elisabet Obregon MD   Date and time admitted to hospital: 9/7/2018 11:28 AM        * Dyspnea   Assessment & Plan    · Acute on chronic  Multifactorial given DHF, COPD, hypoventilation obesity syndrome and noncompliance at home  · CTA negative for PE  · No evidence of major fluid overload or COPD exacerbation  · Serial troponins negative  · ABG appears at baseline  · Cont home breathing treatments, added flovent  · Received dose of IV solumedrol in ED, but did not appear to be in exacerbation so did not require further steroids  · Pulmonary consulted to follow  · ambulating pulse ox done, she will need 3L continuous o2  · She will need close follow-up and monitoring with Pulmonary and her PCP to prevent recurrent admissions        Leukocytosis   Assessment & Plan    · After admission patient had a leukocytosis of with a rising white count from 9 6-12  · She did receive a loading dose of Solu-Medrol in the ED  She had no evidence of developing infection so leukocytosis likely from steroids  · White count started to downtrend without intervention and was 10 8 on discharge        Chronic respiratory failure with hypoxia and hypercapnia (HCC)   Assessment & Plan    · Pulmonary consulted        Physical deconditioning   Assessment & Plan    · Patient has had multiple admissions in the past due in part to deconditioning  · She may need a long term higher level of care, as she does well when she gets discharged to [facility] but at home she appears to decompensate with return to hospital  · She and her  will pursue options as outpt for long term care  · Letter sent to her PCP to advise him that she will be following up to discuss long-term placement        Diastolic CHF [facility])   Assessment & Plan    · Last echo 7/2018  showed EF was 60-65%    There was left ventricular relaxation or grade 1 diastolic dysfunction  Mild tricuspid regurgitation  · Volume status difficult to assess,  on admission, CTA showed Bibasilar atelectasis, She has chronic peripheral edema which appears at baseline but her weight from last admission 7/2018 was 92kg, today 103kg  · One dose of IV lasix given on admission  · Monitor I/O and daily weights  · Monitor lytes and renal function        Diabetes mellitus with neuropathy [facility])   Assessment & Plan    Lab Results   Component Value Date    HGBA1C 9 1% 04/27/2018       Recent Labs      09/08/18   1642  09/08/18   2129  09/09/18   0739  09/09/18   1216   POCGLU  317*  325*  208*  250*       Blood Sugar Average: Last 72 hrs:  (P) 555 [id number]     Cont home insulin  ISS  POC sugars  diabetic diet        HTN (hypertension)   Assessment & Plan    · BP elevated on admission  · Resume home meds, monitor  · Blood pressure trend improving        Obesity hypoventilation syndrome (HCC)   Assessment & Plan    Cannot tolerate CPAP, cont nocturnal O2        Sleep apnea   Assessment & Plan    · Had CPAP titration for 15mg Hg but patient cannot tolerate CPAP  · Cont NC qHS  · Pulmonary consulted        RLS (restless legs syndrome)   Assessment & Plan    Cont pramipexole and gabapentin        Constipation due to opioid therapy   Assessment & Plan    Cont home meds          Morbid obesity with BMI of 40 0-44 9, adult (HCC)   Assessment & Plan    · BMI 42 54  · Needs Lifestyle modification, weight loss        Acquired hypothyroidism   Assessment & Plan    · Cont synthroid  · TSH 1 00              Discharging Physician / Practitioner: Adelaide Leal MD  PCP: Artie Delacruz MD  Admission Date: 9/7/2018  Discharge Date: 09/09/18    Reason for Admission: Shortness of Breath (patient c/o SOB with exertion, worse over the past several days    had a dose of IV Lasix 40 mg at PMD yesterday, but symtpoms are not improving and she gained 1 8 pounds since yesterday )        Resolved Problems  Date Reviewed: 9/8/2018    None          Consultations During Hospital Stay:  Lauren Leonardo TO PULMONOLOGY  IP CONSULT TO WOUND CARE    Procedures Performed:     · Ambulating pulse ox:  O2 at baseline RA 86%, O2 during rest on RA 83%, O2 at rest with 3 L nasal cannula 96%, O2 during exercise with 3 L nasal cannula 92%    Significant Findings / Test Results:     · ABG:  PH 7 406, CO2 45 8, O2 73 8, bicarb 28 5  · Serial troponins negative  ·   Xr Chest 1 View Portable  Result Date: 9/7/2018  Impression: No acute cardiopulmonary disease  Workstation performed: [id number]     Cta Ed Chest Pe Study  Result Date: 9/7/2018  Impression: 1  No central or segmental pulmonary embolism  Suboptimal evaluation of subsegmental vessels  2   Large hiatal hernia  3   Bibasilar atelectasis  Workstation performed: [id number]       Incidental Findings:   ·      Test Results Pending at Discharge (will require follow up):   ·      Outpatient Tests Requested:  ·     Complications:  none    Reason for Admission: SOB    Hospital Course:     Terri Ramirez is a 77 y o  female patient with a PMH of hypothyroidism, morbid obesity, asthma, chronic hypoxic and hypoxemic respiratory failure, LISBET, obesity hypoventilation syndrome, HTN,T2DM on long term insulin, depression and noncompliance who originally presented to the hospital on 9/7/2018 due to progressively worsening shortness of breath for several weeks  She has a history of chronic shortness of breath but this is worse than baseline  She was admitted in July for respiratory failure secondary to CHF exacerbation and was discharged to rehab  She was there for just over a month  2 weeks after arriving home she began to decompensate  She states she takes her medications as instructed including her diuretic and breathing treatments    She says she checks her weight daily but can't tell me what it is now but states she has been noticing progressively worsening swelling in her legs with weeping  She also noticed she was not urinating as much as she normally does on her usual dose of diuretic  She has been to her PCP several times and her Lasix was increased from 20 mg to 40 mg daily but she has not had any improvement in her symptoms  Please see above list of diagnoses and related plan for additional information  Condition at Discharge: fair     Discharge Day Visit / Exam:     Subjective: On morning rounds patient was found without her oxygen  States she has had off for about an hour 2  She was complaining of shortness of breath  Upon placing it back on shortness of breath resolved  Long discussion had regarding compliance with oxygen at all times   and friends at bedside were concerned about her going home  She does not do well for prolonged periods at home  Per her  she does not take care of herself, often removed her oxygen, misses multiple doses of her breathing treatments and her diuretics  When in nursing home her shortness of breath is well controlled and feels generally better  Patient does admit to this as well  She is however feeling overwhelmed about the prospect of going to skilled nursing facility long-term and feels she needs more time to think about it  Wants to discuss it with her primary caregiver and be transitioned as an outpatient if she decides to do so  Vitals: Blood Pressure: 143/84 (09/09/18 0820)  Pulse: 86 (09/09/18 0820)  Temperature: 98 2 °F (36 8 °C) (09/09/18 0820)  Temp Source: Tympanic (09/09/18 0820)  Respirations: 20 (09/09/18 0820)  Height: 5' 2" (157 5 cm) (09/07/18 1545)  Weight - Scale: 103 kg (226 lb 10 1 oz) (09/09/18 0550)  SpO2: 96 % (09/09/18 1202)  Exam:   Physical Exam  Constitutional: She is oriented to person, place, and time  She appears well-developed  No distress  Chronically ill-appearing   HENT:   Head: Normocephalic and atraumatic  Cardiovascular: Normal rate, regular rhythm and normal heart sounds  Pulmonary/Chest: Effort normal and breath sounds normal  No respiratory distress  She has no wheezes  She has no rales  Wearing nasal oxygen  Decreased breath sounds, poor air entry and poor effort  No wheezing   Abdominal: Soft  Bowel sounds are normal  She exhibits no distension  There is no tenderness  There is no rebound and no guarding  Musculoskeletal: She exhibits edema  She exhibits no tenderness or deformity  Neurological: She is alert and oriented to person, place, and time  Skin: Skin is warm and dry  Bilateral lower extremities wrapped   Psychiatric: She has a normal mood and affect  Her behavior is normal    Nursing note and vitals reviewed  Discharge instructions/Information to patient and family:   See after visit summary for information provided to patient and family  Provisions for Follow-Up Care:  See after visit summary for information related to follow-up care and any pertinent home health orders  Disposition:     Home with VNA Services (Reminder: Complete face to face encounter)    Planned Readmission: no     Discharge Statement:  I spent >30 minutes discharging the patient  This time was spent on the day of discharge  I had direct contact with the patient on the day of discharge  Greater than 50% of the total time was spent examining patient, answering all patient questions, arranging and discussing plan of care with patient as well as directly providing post-discharge instructions  Additional time then spent on discharge activities  Discharge Medications:  See after visit summary for reconciled discharge medications provided to patient and family        ** Please Note: This note has been constructed using a voice recognition system **

## 2018-09-09 NOTE — NURSING NOTE
Pt d/c from unit to home  D/c instructions and medications reviewed with the pt, all questions answered, pt verbalized understanding  IV removed prior to d/c  Pt left the unit via wheelchair accompanied by the PCA and her boyfriend  Pt left the unit with all her belongings in her possession

## 2018-09-09 NOTE — PLAN OF CARE
CARDIOVASCULAR - ADULT     Maintains optimal cardiac output and hemodynamic stability Progressing     Absence of cardiac dysrhythmias or at baseline rhythm Progressing        DISCHARGE PLANNING - CARE MANAGEMENT     Discharge to post-acute care or home with appropriate resources Progressing        METABOLIC, FLUID AND ELECTROLYTES - ADULT     Electrolytes maintained within normal limits Progressing     Fluid balance maintained Progressing     Glucose maintained within target range Progressing        Potential for Falls     Patient will remain free of falls Progressing        Prexisting or High Potential for Compromised Skin Integrity     Skin integrity is maintained or improved Progressing        RESPIRATORY - ADULT     Achieves optimal ventilation and oxygenation Progressing        SKIN/TISSUE INTEGRITY - ADULT     Skin integrity remains intact Progressing     Incision(s), wounds(s) or drain site(s) healing without S/S of infection Progressing

## 2018-09-09 NOTE — PROGRESS NOTES
Progress Note - Pulmonary   Vira Tineo 77 y o  female MRN: [id number]  Unit/Bed#: [address] [address] Encounter: [id number]    Assessment:   Chronic bronchitis -  Patient never smoked but does get intermittent wheeze and has intermittent chronic cough  Has faint wheeze at the bases of both lungs  Will add Flovent 220 mcg 1 puff daily with spacer and she can stay on this at home until  She is reassessed and our  Pulmonary office  chronic hypercapnic hypoxemic respiratory failure secondary obesity alveolar hypoventilation  Patient uses 3 L of oxygen at home  Moderate to large hiatal hernia with  intermittent GERD  Chronic diastolic congestive heart failure   Restless leg syndrome   Morbid obesity   moderate LISBET with AHI of 20 but could not tolerate CPAP    Plan:   I will start Flovent 220 mcg 1 puff daily with spacer  Patient may have some benefit in her cough and intermittent wheezing with this agent   At home she uses nebulizer with ipratropium bromide 0 5 mg 4 times a day and intermittently will use Ventolin inhaler  She states the albuterol in her nebulizer makes her anxious so she does not use that   Oxygen 3 liters/minute    Subjective:   No events overnight  Patient does get lower back pain states at home after she has been lying down for a while  She has been trying to wean off the Percocet but states the Percocet does help the pain  She does have some  Shortness of breath with activity but this is not worse than usual    Objective:     Vitals: Blood pressure 143/84, pulse 86, temperature 98 2 °F (36 8 °C), temperature source Tympanic, resp  rate 20, height 5' 2" (1 575 m), weight 103 kg (226 lb 10 1 oz), SpO2 95 %, not currently breastfeeding  ,Body mass index is 41 45 kg/m²        Intake/Output Summary (Last 24 hours) at 09/09/18 1026  Last data filed at 09/09/18 0601   Gross per 24 hour   Intake              250 ml   Output              500 ml   Net             -250 ml       Physical Exam: Physical Exam Constitutional: She is oriented to person, place, and time  She appears well-developed and well-nourished  No distress  On 2 5 L O2 saturations 93%   HENT:   Head: Normocephalic  Nose: Nose normal    Mouth/Throat: Oropharynx is clear and moist  No oropharyngeal exudate  Eyes: Conjunctivae are normal  Pupils are equal, round, and reactive to light  Neck: Neck supple  No JVD present  No tracheal deviation present  Cardiovascular: Normal rate, regular rhythm and normal heart sounds  Pulmonary/Chest: Effort normal    Lung sounds reveal some faint expiratory wheezes at the bases  Abdominal: Soft  She exhibits no distension  There is no tenderness  There is no guarding  Musculoskeletal:   Minimal edema lower extremities   Lymphadenopathy:     She has no cervical adenopathy  Neurological: She is alert and oriented to person, place, and time  Skin: Skin is warm and dry  No rash noted  Psychiatric: She has a normal mood and affect  Her behavior is normal  Thought content normal         Labs: I have personally reviewed pertinent lab results  , ABG: Lab Results   Component Value Date    PHART 7 406 09/07/2018    [id number] 46 4 (H) 09/07/2018    PO2ART 73 8 (L) 09/07/2018    [id number] 28 5 (H) 09/07/2018    BEART 3 2 09/07/2018    SOURCE Radial, Right 09/07/2018   , BNP: No results found for: BNP, CBC: Lab Results   Component Value Date    WBC 11 07 (H) 09/09/2018    HGB 10 8 (L) 09/09/2018    HCT 36 7 09/09/2018     (H) 09/09/2018     09/09/2018    ADJUSTEDWBC 10 30 04/04/2016    MCH 29 8 09/09/2018    MCHC 29 4 (L) 09/09/2018    RDW 16 2 (H) 09/09/2018    MPV 10 7 09/09/2018    NRBC 0 09/07/2018   , CMP: Lab Results   Component Value Date     09/09/2018     (H) 08/01/2017    K 4 7 09/09/2018    K 3 9 08/01/2017     09/09/2018    CL 95 (L) 08/24/2018    CO2 35 (H) 09/09/2018    CO2 31 (H) 08/24/2018    ANIONGAP 10 8 01/08/2016    BUN 22 09/09/2018    BUN 13 08/24/2018 CREATININE 0 80 09/09/2018    CREATININE 0 78 08/01/2017    GLUCOSE 144 (H) 08/01/2017    CALCIUM 8 5 09/09/2018    CALCIUM 9 1 08/01/2017    AST 34 09/07/2018    AST 18 08/01/2017    ALT 25 09/07/2018    ALT 26 08/01/2017    ALKPHOS 76 09/07/2018    ALKPHOS 70 08/01/2017    PROT 6 9 08/01/2017    BILITOT 0 2 08/01/2017    EGFR 70 09/09/2018   , PT/INR:   Lab Results   Component Value Date    INR 0 94 09/07/2018    INR 1 0 08/01/2017   , Troponin: No results found for: TROPONIN    Imaging and other studies: I have personally reviewed pertinent reports     and I have personally reviewed pertinent films in PACS

## 2018-09-09 NOTE — SOCIAL WORK
CM spoke with PCP, pt reported not having a concentrator at home for o2 use  CM paged the service rep for assistance re: home needs  CM spoke with Johnathancharo Junior at [facility], he is unable to access pt record but will go to the home if needed to arrange for a concentrator  Asked CM to verify if anyone is at home  CM spoke with the pt, they do have a concentrator but would like a portable one to leave the home  Pt has no port o2 with her, service will deliver a tank to the hospital room, will talk to PCP for a port o2 concentrator eval in the home for increased portability once pt is DC  PCP and unit RN aware    [employer] referral placed for Chris to reassess pt portability this week

## 2018-09-09 NOTE — CASE MANAGEMENT
Thank you,  [address]  Utilization Review Department  Phone: [phone]; Fax [phone]  ATTENTION: Please call with any questions or concerns to [phone]  and carefully follow the prompts so that you are directed to the right person  Send all requests for admission clinical reviews, approved or denied determinations and any other requests to fax [phone]  All voicemails are confidential          Continued Stay Review    Date:  9/9/18 Sunday CONTINUED OBSERVATION    07/18 1415  Place in Observation (expected length of stay for this patient is less than two midnights) (ED Bridging Orders Panel) Once     Transfer Service: General Medicine       Question Answer Comment   Admitting Physician Susan Chatterjee    Level of Care Level 1 Stepdown        09/07/18 1417       Vital Signs: /84 (BP Location: Left arm)   Pulse 86   Temp 98 2 °F (36 8 °C) (Tympanic)   Resp 20   Ht 5' 2" (1 575 m)   Wt 103 kg (226 lb 10 1 oz)   LMP  (LMP Unknown)   SpO2 95%   BMI 41 45 kg/m²       Intake/Output Summary (Last 24 hours) at 09/09/18 1026    Gross per 24 hour   Intake              250 ml   Output              500 ml   Net             -250 ml     Diet George/CHO Controlled; Consistent Carbohydrate Diet Level 2 (5 carb servings/75 grams CHO/meal);  Fluid Restriction 1500 ML          IV ACCESS      Medications:   Scheduled Meds:   Current Facility-Administered Medications:  acetaminophen 650 mg Oral Q6H PRN Ana M Nesbitt MD   albuterol 1 25 mg Nebulization Q4H PRN Ana M Nesbitt MD   aspirin 325 mg Oral Daily Ana M Nesbitt MD   azelastine 1 spray Each Nare BID Ana M Nesbitt MD   calcium carbonate 1,000 mg Oral Daily PRN Ana M Nesbitt MD   clotrimazole  Topical Daily Kacey Rodriguez MD   docusate sodium 100 mg Oral BID Ana M Nesbitt MD   enoxaparin 40 mg Subcutaneous Daily Ana M Nesbitt MD   fluticasone 1 puff Inhalation Daily Francis Moreland DO   folic acid 1 mg Oral Daily Ana M Nesbitt MD   gabapentin 100 mg Oral TID Padmini Obando MD   insulin glargine 20 Units Subcutaneous Q12H 3630 Stacey Walls MD   insulin lispro 1-5 Units Subcutaneous HS Qamar AyalaFATIMAH   insulin lispro 1-6 Units Subcutaneous TID AC Padmini Obando MD   levalbuterol 0 63 mg Nebulization 4x Daily Mike Bitter, DO   And       ipratropium 0 5 mg Nebulization 4x Daily Mike Bitter, DO   levothyroxine 125 mcg Oral Early Morning Padmini Obando MD   Linaclotide 72 mcg Oral Daily Before Breakfast Padmini Obando MD   loratadine 10 mg Oral Daily Padmini Obando MD   LORazepam 0 5 mg Oral Q8H PRN Padmini Obando MD   metoprolol tartrate 25 mg Oral BID Padmini Obando MD   ondansetron 4 mg Intravenous Q6H PRN Padmini Obando MD   polyethylene glycol 17 g Oral BID Padmini Obando MD   potassium chloride 40 mEq Oral Daily Padmini Obando MD   pramipexole 1 mg Oral TID Padmini Obando MD   traMADol 50 mg Oral Q6H PRN Padmini Obando MD       PRN Meds:     Acetaminophen 650 mg q6hrs prn given x 1/ 24 hrs    NEB Tx with albuterol 1 25 mg q4hrs prn given x 2/ 24 hrs    calcium carbonate    LORazepam 0 5 mg q8hrs prn given x 3/ 24     ondansetron    traMADol 50 mg q6hrs prn given x 3/ 24 hr      LABSDiagnostic Results:   PHART 7 406 09/07/2018      [id number] 46 4 (H) 09/07/2018     PO2ART 73 8 (L) 09/07/2018     [id number] 28 5 (H) 09/07/2018     BEART 3 2 09/07/2018     SOURCE Radial, Right 09/07/2018         , BNP: No results found for: BNP, CBC: Lab Results   Component Value Date     WBC 11 07 (H) 09/09/2018     HGB 10 8 (L) 09/09/2018     HCT 36 7 09/09/2018      (H) 09/09/2018      09/09/2018     ADJUSTEDWBC 10 30 04/04/2016     MCH 29 8 09/09/2018     MCHC 29 4 (L) 09/09/2018     RDW 16 2 (H) 09/09/2018     MPV 10 7 09/09/2018     NRBC 0 09/07/2018         , CMP: Lab Results   Component Value Date      09/09/2018      (H) 08/01/2017     K 4 7 09/09/2018     K 3 9 08/01/2017      09/09/2018     CL 95 (L) 08/24/2018     CO2 35 (H) 09/09/2018     CO2 31 (H) 08/24/2018     ANIONGAP 10 8 01/08/2016     BUN 22 09/09/2018     BUN 13 08/24/2018     CREATININE 0 80 09/09/2018     CREATININE 0 78 08/01/2017     GLUCOSE 144 (H) 08/01/2017     CALCIUM 8 5 09/09/2018     CALCIUM 9 1 08/01/2017     AST 34 09/07/2018     AST 18 08/01/2017     ALT 25 09/07/2018     ALT 26 08/01/2017     ALKPHOS 76 09/07/2018     ALKPHOS 70 08/01/2017     PROT 6 9 08/01/2017     BILITOT 0 2 08/01/2017     EGFR 70 09/09/2018   , PT/INR:         Lab Results   Component Value Date     INR 0 94 09/07/2018       Age/Sex: 77 y o  female       Assessment/ Plan:  Assessment:   Chronic bronchitis -  Patient never smoked but does get intermittent wheeze and has intermittent chronic cough  Has faint wheeze at the bases of both lungs  Will add Flovent 220 mcg 1 puff daily with spacer and she can stay on this at home until  She is reassessed and our  Pulmonary office  chronic hypercapnic hypoxemic respiratory failure secondary obesity alveolar hypoventilation  Patient uses 3 L of oxygen at home  Moderate to large hiatal hernia with  intermittent GERD  Chronic diastolic congestive heart failure   Restless leg syndrome   Morbid obesity   moderate LISBET with AHI of 20 but could not tolerate CPAP     Plan:   I will start Flovent 220 mcg 1 puff daily with spacer  Patient may have some benefit in her cough and intermittent wheezing with this agent   At home she uses nebulizer with ipratropium bromide 0 5 mg 4 times a day and intermittently will use Ventolin inhaler  She states the albuterol in her nebulizer makes her anxious so she does not use that   Oxygen 3 liters/minute           Discharge Plan:   ANTICIPATE DISCHARGE HOME  WITH [facility] / HOME PT + 24 HOUR SUPERVISION - WHEN MEDICALLY CLEARED    *PER PT 9/8/18  Plan   Treatment/Interventions Functional transfer training;LE strengthening/ROM; Therapeutic exercise; Endurance training;Patient/family training;Equipment eval/education; Bed mobility;Gait training;Spoke to nursing;OT;Family   PT Frequency 5x/wk   Recommendation   Recommendation 24 hour supervision/assist;Home PT; Home with family support  (VNA services as prior to admit)     CASE MANAGEMENT FOLLOWING CLOSELY FOR ALL DISCHARGE NEEDS

## 2018-09-09 NOTE — ASSESSMENT & PLAN NOTE
· After admission patient had a leukocytosis of with a rising white count from 9 6-12  · She did receive a loading dose of Solu-Medrol in the ED    She had no evidence of developing infection so leukocytosis likely from steroids  · White count started to downtrend without intervention and was 10 8 on discharge

## 2018-09-09 NOTE — RESPIRATORY THERAPY NOTE
Home Oxygen Qualifying Test       Patient name: Roscoe Christopher        : 1940   Date of Test:  2018  Diagnosis:      Home Oxygen Test:    **Medicare Guidelines require item(s) 1-5 on all ambulatory patients or 1 and 2 on non-ambulatory patients  1   Baseline SPO2 on Room Air at rest 86%  2   SPO2 during exercise on Room Air 83%  During exercise monitor SpO2  If SPO2 increases >=88% with ambulation do not add supplemental             oxygen  If < 88% on room air add O2 via NC and titrate patient  Patient must be ambulated with O2 and titrated to > 88% with exertion  3   SPO2 on Oxygen at rest 96 % 3 lpm     4   SPO2 during exercise on Oxygen  92% a liter flow of 3 lpm     5   Exercise performed:          walking  6 min         [x]  Supplemental Home Oxygen is indicated  []  Client does not qualify for home oxygen        Respiratory Additional Notes- Patient short of breath after walk    [facility], RT

## 2018-09-09 NOTE — ASSESSMENT & PLAN NOTE
· Acute on chronic  Multifactorial given DHF, COPD, hypoventilation obesity syndrome and noncompliance at home  · CTA negative for PE  · No evidence of major fluid overload or COPD exacerbation  · Serial troponins negative  · ABG appears at baseline  · Cont home breathing treatments, added flovent  · Received dose of IV solumedrol in ED, but did not appear to be in exacerbation so did not require further steroids     · Pulmonary consulted to follow  · ambulating pulse ox done, she will need 3L continuous o2  · She will need close follow-up and monitoring with Pulmonary and her PCP to prevent recurrent admissions

## 2018-09-09 NOTE — PLAN OF CARE
CARDIOVASCULAR - ADULT     Maintains optimal cardiac output and hemodynamic stability Adequate for Discharge     Absence of cardiac dysrhythmias or at baseline rhythm Adequate for Discharge        DISCHARGE PLANNING - CARE MANAGEMENT     Discharge to post-acute care or home with appropriate resources Adequate for Discharge        METABOLIC, FLUID AND ELECTROLYTES - ADULT     Electrolytes maintained within normal limits Adequate for Discharge     Fluid balance maintained Adequate for Discharge     Glucose maintained within target range Adequate for Discharge        Potential for Falls     Patient will remain free of falls Adequate for Discharge        Prexisting or High Potential for Compromised Skin Integrity     Skin integrity is maintained or improved Adequate for Discharge        RESPIRATORY - ADULT     Achieves optimal ventilation and oxygenation Adequate for Discharge        SKIN/TISSUE INTEGRITY - ADULT     Skin integrity remains intact Adequate for Discharge     Incision(s), wounds(s) or drain site(s) healing without S/S of infection Adequate for Discharge

## 2018-09-10 ENCOUNTER — OFFICE VISIT (OUTPATIENT)
Dept: FAMILY MEDICINE CLINIC | Facility: CLINIC | Age: 78
End: 2018-09-10
Payer: COMMERCIAL

## 2018-09-10 VITALS
BODY MASS INDEX: 44.49 KG/M2 | SYSTOLIC BLOOD PRESSURE: 140 MMHG | WEIGHT: 226.6 LBS | HEART RATE: 90 BPM | TEMPERATURE: 98 F | DIASTOLIC BLOOD PRESSURE: 80 MMHG | HEIGHT: 60 IN | RESPIRATION RATE: 20 BRPM | OXYGEN SATURATION: 97 %

## 2018-09-10 DIAGNOSIS — G47.33 OBSTRUCTIVE SLEEP APNEA: Primary | ICD-10-CM

## 2018-09-10 DIAGNOSIS — R60.9 PERIPHERAL EDEMA: ICD-10-CM

## 2018-09-10 DIAGNOSIS — I51.89 DIASTOLIC DYSFUNCTION: ICD-10-CM

## 2018-09-10 DIAGNOSIS — E66.2 OBESITY HYPOVENTILATION SYNDROME (HCC): ICD-10-CM

## 2018-09-10 PROCEDURE — 99496 TRANSJ CARE MGMT HIGH F2F 7D: CPT | Performed by: FAMILY MEDICINE

## 2018-09-10 NOTE — PROGRESS NOTES
Assessment/Plan:   Obstructive sleep apnea  Diastolic dysfunction  Peripheral edema  Venous stasis disease  Obesity hypoventilation syndrome  The patient at this point is not using CPAP because the mask is uncomfortable  She is also in severe pain because of the peripheral edema  The patient was transferred to the emergency department for further evaluation  Long-term the patient needs to be restarted on CPAP and treated for diastolic dysfunction  Subjective:     Patient ID: Dandre Huston is a 77 y o  female  This is a 56-year-old female presents for follow-up after hospitalization for shortness of breath  Review of Systems   Constitutional: Negative  HENT: Negative  Respiratory: Negative  Cardiovascular: Positive for leg swelling  Gastrointestinal: Negative  Neurological: Negative  Objective:     Physical Exam   Constitutional: She is oriented to person, place, and time  She appears well-developed and well-nourished  HENT:   Head: Normocephalic and atraumatic  Cardiovascular: Normal rate, regular rhythm and normal heart sounds  Exam reveals no gallop and no friction rub  Murmur: 1/6 systolic murmur  Pulmonary/Chest: Effort normal and breath sounds normal  No respiratory distress  She has no wheezes  She has no rales  She exhibits no tenderness  Plus two to 3/4 peripheral edema which is weeping  Neurological: She is alert and oriented to person, place, and time  No cranial nerve deficit

## 2018-09-18 ENCOUNTER — TELEPHONE (OUTPATIENT)
Dept: FAMILY MEDICINE CLINIC | Facility: CLINIC | Age: 78
End: 2018-09-18

## 2018-09-18 NOTE — TELEPHONE ENCOUNTER
Just giving you a heads up  Dane Han was to have an appointment tomorrow with Dr Bernice Mcclain   Her daughter called  She is going into [facility]  It is anticipated that she will be there 3 days  But will be re-evaluated  The daughter did schedule and appointment for next Wednesday    Daughter is suppose to keep us updated on her discharge date

## 2018-09-27 ENCOUNTER — TELEPHONE (OUTPATIENT)
Dept: FAMILY MEDICINE CLINIC | Facility: CLINIC | Age: 78
End: 2018-09-27

## 2018-09-27 NOTE — TELEPHONE ENCOUNTER
Rosa Isela from [facility] evaluated Christi Padilla and thinks she is a hospice candidate for COPD  Gatito Flowers wanted to make sure you agree with this? Also wanted to know if you felt she had any other issues she would be a candidate for ?   Please advise Jeffy Armando [phone]

## 2018-10-09 ENCOUNTER — TELEPHONE (OUTPATIENT)
Dept: FAMILY MEDICINE CLINIC | Facility: CLINIC | Age: 78
End: 2018-10-09

## 2018-10-09 NOTE — TELEPHONE ENCOUNTER
Alice Poole from Hospice would like to speak to you regarding Almita Chaney' meds and anxiety level  Alice Poole feels she is a big fall risk  Alice Poole states Almita Chaney fell the other day while having lorazepam in her system and she is in a house that Alice Poole feels is really unsafe   Please call back and ask fr Alice Poole [phone]

## 2018-10-10 ENCOUNTER — TELEPHONE (OUTPATIENT)
Dept: FAMILY MEDICINE CLINIC | Facility: CLINIC | Age: 78
End: 2018-10-10

## 2018-10-10 NOTE — TELEPHONE ENCOUNTER
Bubba Degroot called again  She would like a call back today  Bubba Degroot states Jayda Farr is not doing well and has several things that need to be addressed  Bubba Degroot said she can speak to your nurse if you don't have time    [phone]

## 2018-10-11 DIAGNOSIS — G89.4 CHRONIC PAIN SYNDROME: Primary | ICD-10-CM

## 2018-10-11 RX ORDER — OXYCODONE HYDROCHLORIDE AND ACETAMINOPHEN 5; 325 MG/1; MG/1
1-2 TABLET ORAL EVERY 6 HOURS PRN
Qty: 30 TABLET | Refills: 0 | Status: SHIPPED | OUTPATIENT
Start: 2018-10-11 | End: 2018-10-24 | Stop reason: SDUPTHER

## 2018-10-11 NOTE — TELEPHONE ENCOUNTER
UPDATE:    Pt was discharged recently from [address] [place] [address], and is now on Hospice with [facility] for COPD only, but is also has Cardiac conditions as well as uncontrolled Diabetes  Patient is uncomfortable at the present time  Jarrod Luna is looking for your insight and orders  As she only can follow Doctors orders  She will be able to refill an take care of only certain medications that are related to her COPD condition, all the others she will need you to fill through her regular pharmacy  Jarrod Luna states:  Pt is incapable of doing a sliding scale of her insulin  Her average is in the mid 200's  Pt is having severe bilateral lower extremity edema, and takes Lasix 20mg twice per day  CURRENT MEDS:    1  Potassium 20meq 1 daily, and on Mon/Wed/Fri she takes another pill in the afternoon  2  Gabapentin 300mg 1 pill three times per day  3  Aspirin 81mg daily  4  Folic Acid 1mg daily  5  Melatonin 5mg 1 at bedtime  6  Senna S  2 daily  7  Linzess 72mg daily  8  Novalog 70/30 35 units twice per day  8  Levothyroxin 125mcg daily  8  Metoprolol 25mg 1 twice daily  9  Pramipexol 0 25mg 1 three times per day  10  Furosemide 20mg 1 twice per day  11  Metolazone 2 5 mg 1 three times per week  12  Albuterol Sulfate Nebulizer 1 25 in 3mL Three times per day as needed  13  Percocet 5/325 for pain as needed  14  Lorazapam 1mg as needed    PLEASE ADVISE:    *Can they increase her Novalog? If so to what? *is it okay that she is taking the Percocet? PER DR TEMPLETON:    1  Increase her Novalog to 40 units twice per day  2  She should only use either the Percocet or Lorazapam, not both  One or the other  I notified Jarrod Luna today  She asked for Dr Alexy Thomson to send in a script to [facility] for the Percocet 5/325mg 1-2 q6-8h prn pain  Also that pt states her pain level is 10/10 in her legs only      rt

## 2018-10-17 ENCOUNTER — TELEPHONE (OUTPATIENT)
Dept: FAMILY MEDICINE CLINIC | Facility: CLINIC | Age: 78
End: 2018-10-17

## 2018-10-17 NOTE — TELEPHONE ENCOUNTER
Needs to discuss another medication change for her mother per the hospice nurse      Regarding the Lorazepam at night -   Not taking the percocet  Possibly increasing the Gabapentin    Does the Daughter have to come in or can you speak to her on the phone    [phone]

## 2018-10-19 ENCOUNTER — TELEPHONE (OUTPATIENT)
Dept: FAMILY MEDICINE CLINIC | Facility: CLINIC | Age: 78
End: 2018-10-19

## 2018-10-19 NOTE — TELEPHONE ENCOUNTER
Ned Arias, From [facility] called and stated the pt Raad Elliott) is in extreme pain and unable to sleep she stated she is very uncomfortable and butch needs to know what she should do about this before the weekend   She would like a call back as soon as possible at [phone]

## 2018-10-19 NOTE — TELEPHONE ENCOUNTER
No she is not using the Oxycodone  States that effects her bowels, even with taking Murelax  She stated that you told her that she cannot take oxycodone and lorazepam together  She said that since she is not taking the oxycodone, Josefina Alanis took the Lorazepam     She took 1 and stated that it did nothing for her so she took another 1 hour after taking the first     She also appeared to have symptoms of Conjunctivitis this morning    Can you send in a prescription to the [address] [place], [place] [address] has an appointment on Monday at 8:15, her daughter and the hospice nurse will be in attendance also

## 2018-10-19 NOTE — TELEPHONE ENCOUNTER
PLEASE CALL BACK    IS SHE USING HER OXYCODONE FOR HER PAIN - PLEASE HAVE HER CONTINUE THAT  I CAN ALWAYS SEE HER TOMORROW IN THE OFFICE IF THEY WANT

## 2018-10-20 DIAGNOSIS — H10.33 ACUTE CONJUNCTIVITIS OF BOTH EYES, UNSPECIFIED ACUTE CONJUNCTIVITIS TYPE: Primary | ICD-10-CM

## 2018-10-20 RX ORDER — TOBRAMYCIN AND DEXAMETHASONE 3; 1 MG/ML; MG/ML
2 SUSPENSION/ DROPS OPHTHALMIC 4 TIMES DAILY
Qty: 5 ML | Refills: 0 | Status: SHIPPED | OUTPATIENT
Start: 2018-10-20 | End: 2020-01-14 | Stop reason: ALTCHOICE

## 2018-10-22 ENCOUNTER — OFFICE VISIT (OUTPATIENT)
Dept: FAMILY MEDICINE CLINIC | Facility: CLINIC | Age: 78
End: 2018-10-22
Payer: COMMERCIAL

## 2018-10-22 VITALS
HEART RATE: 87 BPM | HEIGHT: 59 IN | BODY MASS INDEX: 43.71 KG/M2 | DIASTOLIC BLOOD PRESSURE: 60 MMHG | SYSTOLIC BLOOD PRESSURE: 124 MMHG | WEIGHT: 216.8 LBS | RESPIRATION RATE: 18 BRPM | TEMPERATURE: 98.4 F | OXYGEN SATURATION: 92 %

## 2018-10-22 DIAGNOSIS — F41.9 ANXIETY: Chronic | ICD-10-CM

## 2018-10-22 DIAGNOSIS — Z79.4 DIABETES MELLITUS DUE TO UNDERLYING CONDITION WITH DIABETIC NEUROPATHY, WITH LONG-TERM CURRENT USE OF INSULIN (HCC): Primary | ICD-10-CM

## 2018-10-22 DIAGNOSIS — E66.2 OBESITY HYPOVENTILATION SYNDROME (HCC): Chronic | ICD-10-CM

## 2018-10-22 DIAGNOSIS — M79.2 NEUROPATHIC PAIN: ICD-10-CM

## 2018-10-22 DIAGNOSIS — T40.2X5A CONSTIPATION DUE TO OPIOID THERAPY: Chronic | ICD-10-CM

## 2018-10-22 DIAGNOSIS — J96.12 CHRONIC RESPIRATORY FAILURE WITH HYPOXIA AND HYPERCAPNIA (HCC): ICD-10-CM

## 2018-10-22 DIAGNOSIS — G89.4 CHRONIC PAIN SYNDROME: ICD-10-CM

## 2018-10-22 DIAGNOSIS — R60.9 PERIPHERAL EDEMA: ICD-10-CM

## 2018-10-22 DIAGNOSIS — R09.81 SINUS CONGESTION: ICD-10-CM

## 2018-10-22 DIAGNOSIS — E08.40 DIABETES MELLITUS DUE TO UNDERLYING CONDITION WITH DIABETIC NEUROPATHY, WITH LONG-TERM CURRENT USE OF INSULIN (HCC): Primary | ICD-10-CM

## 2018-10-22 DIAGNOSIS — K59.03 CONSTIPATION DUE TO OPIOID THERAPY: Chronic | ICD-10-CM

## 2018-10-22 DIAGNOSIS — J96.11 CHRONIC RESPIRATORY FAILURE WITH HYPOXIA AND HYPERCAPNIA (HCC): ICD-10-CM

## 2018-10-22 PROCEDURE — 4040F PNEUMOC VAC/ADMIN/RCVD: CPT | Performed by: NURSE PRACTITIONER

## 2018-10-22 PROCEDURE — 99214 OFFICE O/P EST MOD 30 MIN: CPT | Performed by: NURSE PRACTITIONER

## 2018-10-22 PROCEDURE — 1160F RVW MEDS BY RX/DR IN RCRD: CPT | Performed by: NURSE PRACTITIONER

## 2018-10-22 PROCEDURE — 3008F BODY MASS INDEX DOCD: CPT | Performed by: NURSE PRACTITIONER

## 2018-10-22 RX ORDER — AMOXICILLIN 500 MG/1
CAPSULE ORAL
COMMUNITY
Start: 2018-10-15 | End: 2019-09-06 | Stop reason: ALTCHOICE

## 2018-10-22 RX ORDER — FLUTICASONE PROPIONATE 50 MCG
2 SPRAY, SUSPENSION (ML) NASAL DAILY
Qty: 16 G | Refills: 0 | Status: SHIPPED | OUTPATIENT
Start: 2018-10-22 | End: 2019-09-06 | Stop reason: ALTCHOICE

## 2018-10-22 RX ORDER — CETIRIZINE HYDROCHLORIDE 10 MG/1
10 TABLET ORAL
Qty: 30 TABLET | Refills: 2 | Status: SHIPPED | OUTPATIENT
Start: 2018-10-22 | End: 2019-09-06 | Stop reason: ALTCHOICE

## 2018-10-22 RX ORDER — GABAPENTIN 600 MG/1
600 TABLET ORAL 3 TIMES DAILY
Qty: 90 TABLET | Refills: 2 | Status: SHIPPED | OUTPATIENT
Start: 2018-10-22 | End: 2019-09-04

## 2018-10-22 NOTE — ASSESSMENT & PLAN NOTE
Affecting pt's sleep, melatonin not effective for management  Discussed use of lorazepam 2mg at HS with use of commode during the night when necessary, pt verbalized understanding  Discussed safety and monitoring, pt's daughter and home health RN verbalized understanding

## 2018-10-22 NOTE — PATIENT INSTRUCTIONS
PAIN:  GRADUAL increase in gabapentin dosing as discussed  - to improve neuropathic pain and decrease use of percocet  Decrease use of percocet to 1 tab in the AM and 1 tab in the early afternoon PRN moderate to severe pain  - decreased use to help with constipation symptoms    CONSTIPATION:  Continue Miralax in the morning and add PM dose  Add on Ducosate in the AM and PM with ultimate goal of 1 bowel movement every 1-2 days and decreased abdominal pain and bloating    SLEEP:  Start lorazepam at night as discussed  USE COMMODE for safety, have this right next to your bed  - Allow home health nurse to instruct you and your partner on safe transfers as we discussed    DIABETES:  Eat diabetic diet, add one whole grain per day and/or replace all white bread with whole grains  Decrease refined sugar intake and add on mild leg exercises each day  Avoid large carbohydrate meals  Increase Novalog mix pen to 45 units twice daily and monitor sugars 3 times per day    FACIAL SWELLING/Ear congsetion  Likely related to sinus congestion  Start zyrtec and flonase as directed  Avoid allergens if you can

## 2018-10-22 NOTE — ASSESSMENT & PLAN NOTE
Increased pain during periods of fluid overload, coupled with neuropathic pain exacerbated by uncontrolled DM  Percocet 5/3255 - pt taking 2 tabs mx times per day for avg pain 10/10  - experiencing constipating side effect which is exacerbating her overall pain rating  Instructed pt on constipation management, will increase gabapentin for neuropathic pain and decrease percocet to 1 tab in AM and 1 in early afternoon with eventual weaning to PRN breakthrough pain only

## 2018-10-22 NOTE — ASSESSMENT & PLAN NOTE
Fasting BG averaging 200's despite use of Novalog 70/30 40 units BID; instructed pt and home health RN to increase dosing to 45 units BID and monitor sugars 3 times daily  Discussed nutrition and dietary changes to help decrease fasting sugars

## 2018-10-22 NOTE — ASSESSMENT & PLAN NOTE
Taking percocet for pain management which includes partial management of abdominal pain caused by constipation from taking percocet  Discussed weaning off of percocet and increasing gabapentin for neuropathic pain  Instructed patient on constipation management including Miralax and colace AM and PM with prunes int he morning and increase intake of fluid as tolerated

## 2018-10-22 NOTE — ASSESSMENT & PLAN NOTE
Grade I pitting, no open areas, ulcers noted  Instructed pt to elevate BL LE's 2 times per day, decrease periods of long standing on legs which pt reports she does daily

## 2018-10-22 NOTE — PROGRESS NOTES
Assessment/Plan:    Problem List Items Addressed This Visit     Constipation due to opioid therapy (Chronic)     Taking percocet for pain management which includes partial management of abdominal pain caused by constipation from taking percocet  Discussed weaning off of percocet and increasing gabapentin for neuropathic pain  Instructed patient on constipation management including Miralax and colace AM and PM with prunes int he morning and increase intake of fluid as tolerated         Obesity hypoventilation syndrome (HCC) (Chronic)    Relevant Medications    fluticasone (FLONASE) 50 mcg/act nasal spray    cetirizine (ZyrTEC) 10 mg tablet    Diabetes mellitus with neuropathy (HCC) - Primary (Chronic)     Fasting BG averaging 200's despite use of Novalog 70/30 40 units BID; instructed pt and home health RN to increase dosing to 45 units BID and monitor sugars 3 times daily  Discussed nutrition and dietary changes to help decrease fasting sugars         Relevant Medications    insulin aspart protamine-insulin aspart (NOVOLOG MIX 70/30 FLEXPEN) 100 Units/mL injection pen    Chronic pain syndrome     Increased pain during periods of fluid overload, coupled with neuropathic pain exacerbated by uncontrolled DM  Percocet 5/3255 - pt taking 2 tabs mx times per day for avg pain 10/10  - experiencing constipating side effect which is exacerbating her overall pain rating  Instructed pt on constipation management, will increase gabapentin for neuropathic pain and decrease percocet to 1 tab in AM and 1 in early afternoon with eventual weaning to PRN breakthrough pain only         Chronic respiratory failure with hypoxia and hypercapnia (HCC)     Symptoms well controlled at this time, continue the same         Anxiety (Chronic)     Affecting pt's sleep, melatonin not effective for management  Discussed use of lorazepam 2mg at HS with use of commode during the night when necessary, pt verbalized understanding  Discussed safety and monitoring, pt's daughter and home health RN verbalized understanding         Peripheral edema     Grade I pitting, no open areas, ulcers noted  Instructed pt to elevate BL LE's 2 times per day, decrease periods of long standing on legs which pt reports she does daily         Sinus congestion    Relevant Medications    fluticasone (FLONASE) 50 mcg/act nasal spray    cetirizine (ZyrTEC) 10 mg tablet    Neuropathic pain    Relevant Medications    gabapentin (NEURONTIN) 600 MG tablet        Patient Instructions   PAIN:  GRADUAL increase in gabapentin dosing as discussed  - to improve neuropathic pain and decrease use of percocet  Decrease use of percocet to 1 tab in the AM and 1 tab in the early afternoon PRN moderate to severe pain  - decreased use to help with constipation symptoms    CONSTIPATION:  Continue Miralax in the morning and add PM dose  Add on Ducosate in the AM and PM with ultimate goal of 1 bowel movement every 1-2 days and decreased abdominal pain and bloating    SLEEP:  Start lorazepam at night as discussed  USE COMMODE for safety, have this right next to your bed  - Allow home health nurse to instruct you and your partner on safe transfers as we discussed    DIABETES:  Eat diabetic diet, add one whole grain per day and/or replace all white bread with whole grains  Decrease refined sugar intake and add on mild leg exercises each day  Avoid large carbohydrate meals  Increase Novalog mix pen to 45 units twice daily and monitor sugars 3 times per day    FACIAL SWELLING/Ear congsetion  Likely related to sinus congestion  Start zyrtec and flonase as directed  Avoid allergens if you can      Return in about 1 month (around 11/22/2018) for Recheck  Subjective:      Patient ID: Lily Brush is a 77 y o  female      Chief Complaint   Patient presents with    Medication Management    Diabetes    COPD     Danita Newton is a 77year old female who presents to the office with her daughter and her home health RN for discussion of her current issues and medications  Most of the history provided by pt's home health RN  Reports COPD has been well controlled without episodes of SOB, chest pain, cough or wheezing  Denies fevers, or chills  States she does not use CPAP machine r/t mask not fitting correctly  Pt complaints of sleep disturbance r/t anxiety and neuropathic pain in hands and feet 10/10  Taking percocet 5/325 2 tablets which she states is effective for pain management  Also c/o elevated fasting blood sugars, avg 250's  Taking Novolog 70/30 40 units BID without improvement in numbers  Pt states she does a low salt diet but loves food and sitting down to eat with family so is experiencing difficulty in that aspect  Reports constipation from pain medication, causing abdominal pain and bloating that affects her sleeping and walking  Denies blood in stool  Taking Miralax every morning and colace 4-5 tablets PRN or lactulose when she really cannot go  Reports she is currently having diarrhea r/t needing to take additional interventions for constipation  Pt has chronic BL LE edema and she reports that she walks around a lot during the day doing errands for her partner in the house which prevents her from elevating her feet at times  Pt, nurse and family express desire for pt to stay active  Diabetes   She presents for her follow-up diabetic visit  She has type 2 diabetes mellitus  Her disease course has been worsening  There are no hypoglycemic associated symptoms  Pertinent negatives for hypoglycemia include no dizziness  Associated symptoms include fatigue, foot paresthesias and weakness  Pertinent negatives for diabetes include no chest pain, no foot ulcerations and no visual change  There are no hypoglycemic complications  Diabetic complications include peripheral neuropathy  Risk factors for coronary artery disease include diabetes mellitus, hypertension, sedentary lifestyle, post-menopausal, stress and obesity  Current diabetic treatment includes insulin injections  She is compliant with treatment some of the time  Her weight is decreasing steadily  She is following a low salt diet  When asked about meal planning, she reported none  She has not had a previous visit with a dietitian  She never participates in exercise  Her home blood glucose trend is increasing steadily  Her breakfast blood glucose range is generally >200 mg/dl  The following portions of the patient's history were reviewed and updated as appropriate: allergies, current medications, past family history, past medical history, past social history, past surgical history and problem list     Review of Systems   Constitutional: Positive for fatigue  Negative for appetite change, chills, diaphoresis and fever  HENT: Positive for congestion and sinus pressure  Negative for dental problem, ear discharge, ear pain (reports ear fullness bilaterally), postnasal drip, sore throat and trouble swallowing  Eyes: Positive for discharge (intermittent when her face feels swollen)  Negative for photophobia, pain and redness  Respiratory: Negative for cough, chest tightness, shortness of breath and wheezing  Cardiovascular: Positive for leg swelling  Negative for chest pain and palpitations  Gastrointestinal: Positive for abdominal distention, abdominal pain, constipation and diarrhea  Negative for blood in stool, nausea and vomiting  Genitourinary: Negative for difficulty urinating and dysuria  Musculoskeletal: Positive for arthralgias, gait problem and myalgias  Skin: Positive for color change (BL leg redness; intermittent pale when on her feet all day)  Neurological: Positive for weakness and numbness  Negative for dizziness and light-headedness  Psychiatric/Behavioral: Positive for sleep disturbance           Current Outpatient Prescriptions   Medication Sig Dispense Refill    albuterol (ACCUNEB) 1 25 MG/3ML nebulizer solution Take 3 mL (1 25 mg total) by nebulization 3 (three) times a day as needed for wheezing (J45 909) 60 vial 0    amoxicillin (AMOXIL) 500 mg capsule       folic acid (FOLVITE) 1 mg tablet Take by mouth daily      levothyroxine 125 mcg tablet Take 1 tablet (125 mcg total) by mouth daily 90 tablet 1    Linaclotide (LINZESS) 72 MCG CAPS Take 72 mcg by mouth daily before breakfast 30 capsule 6    metoprolol tartrate (LOPRESSOR) 25 mg tablet Take 1 tablet (25 mg total) by mouth 2 (two) times a day for 90 days 180 tablet 3    oxyCODONE-acetaminophen (PERCOCET) 5-325 mg per tablet Take 1-2 tablets by mouth every 6 (six) hours as needed for moderate pain Max Daily Amount: 8 tablets 30 tablet 0    potassium chloride (MICRO-K) 10 MEQ CR capsule Take 1 capsule (10 mEq total) by mouth daily (Patient taking differently: Take 10 mEq by mouth daily 20meq daily, 3 times per week 40meq ) 90 capsule 3    pramipexole (MIRAPEX) 1 mg tablet Take 1 tablet (1 mg total) by mouth 3 (three) times a day 90 tablet 3    tobramycin-dexamethasone (TOBRADEX) ophthalmic suspension Administer 2 drops to both eyes 4 (four) times a day for 5 days 5 mL 0    cetirizine (ZyrTEC) 10 mg tablet Take 1 tablet (10 mg total) by mouth daily at bedtime 30 tablet 2    fluticasone (FLONASE) 50 mcg/act nasal spray 2 sprays into each nostril daily 16 g 0    furosemide (LASIX) 20 mg tablet Take 1 tablet (20 mg total) by mouth daily for 30 days (Patient taking differently: Take 40 mg by mouth daily  ) 30 tablet 0    gabapentin (NEURONTIN) 600 MG tablet Take 1 tablet (600 mg total) by mouth 3 (three) times a day 90 tablet 2    insulin aspart protamine-insulin aspart (NOVOLOG MIX 70/30 FLEXPEN) 100 Units/mL injection pen Inject 45 Units under the skin 2 (two) times a day before meals 5 pen 0    LORazepam (ATIVAN) 0 5 mg tablet Take 1 tablet (0 5 mg total) by mouth every 8 (eight) hours as needed for anxiety for up to 10 days 30 tablet 0     Current Facility-Administered Medications   Medication Dose Route Frequency Provider Last Rate Last Dose    furosemide (LASIX) injection 30 mg  30 mg Intravenous Once Cristhian Bustamante MD           Objective:    /60 (BP Location: Left arm, Patient Position: Sitting, Cuff Size: Extra-Large)   Pulse 87   Temp 98 4 °F (36 9 °C) (Temporal)   Resp 18   Ht 4' 11" (1 499 m)   Wt 98 3 kg (216 lb 12 8 oz)   LMP  (LMP Unknown)   SpO2 92%   BMI 43 79 kg/m²        Physical Exam   Constitutional: She is oriented to person, place, and time  She appears well-developed and well-nourished  No distress  HENT:   Head: Normocephalic and atraumatic  Right Ear: Tympanic membrane normal  No drainage, swelling or tenderness  No middle ear effusion  Left Ear: Tympanic membrane normal  No drainage, swelling or tenderness  No middle ear effusion  Nose: Right sinus exhibits no maxillary sinus tenderness and no frontal sinus tenderness  Left sinus exhibits no maxillary sinus tenderness and no frontal sinus tenderness  Mouth/Throat: Uvula is midline, oropharynx is clear and moist and mucous membranes are normal    Mild canal erythema bilaterally   Eyes: Conjunctivae and EOM are normal  Right eye exhibits no discharge  Left eye exhibits no discharge  Neck: Normal range of motion  Neck supple  No tracheal deviation present  No thyromegaly present  Cardiovascular: Normal rate, regular rhythm, S1 normal, S2 normal, normal heart sounds and intact distal pulses  No murmur heard  Pulses:       Radial pulses are 2+ on the right side, and 2+ on the left side  Popliteal pulses are 2+ on the right side, and 2+ on the left side  Grade I -II pitting edema noted to bilateral lower extremities   Pulmonary/Chest: Effort normal  She has decreased breath sounds in the right middle field, the right lower field, the left middle field and the left lower field  She has no wheezes  She has no rhonchi  She has no rales  Abdominal: Soft   Bowel sounds are normal  She exhibits no distension and no abdominal bruit  There is tenderness in the right upper quadrant and right lower quadrant  Lymphadenopathy:     She has no cervical adenopathy  Right: No supraclavicular adenopathy present  Left: No supraclavicular adenopathy present  Neurological: She is alert and oriented to person, place, and time  Skin: Skin is warm and dry  No rash noted  She is not diaphoretic  There is erythema (BL LEs)  Psychiatric: She has a normal mood and affect   Her behavior is normal  Judgment and thought content normal          FATIMAH Rey

## 2018-10-24 DIAGNOSIS — G89.4 CHRONIC PAIN SYNDROME: ICD-10-CM

## 2018-10-24 RX ORDER — OXYCODONE HYDROCHLORIDE AND ACETAMINOPHEN 5; 325 MG/1; MG/1
1-2 TABLET ORAL EVERY 6 HOURS PRN
Qty: 30 TABLET | Refills: 0 | Status: SHIPPED | OUTPATIENT
Start: 2018-10-24 | End: 2018-11-06 | Stop reason: SDUPTHER

## 2018-10-31 DIAGNOSIS — E03.9 ACQUIRED HYPOTHYROIDISM: ICD-10-CM

## 2018-10-31 RX ORDER — LEVOTHYROXINE SODIUM 0.12 MG/1
125 TABLET ORAL DAILY
Qty: 90 TABLET | Refills: 0 | Status: SHIPPED | OUTPATIENT
Start: 2018-10-31 | End: 2019-07-22 | Stop reason: SDUPTHER

## 2018-10-31 RX ORDER — LEVOTHYROXINE SODIUM 0.12 MG/1
125 TABLET ORAL DAILY
Qty: 90 TABLET | Refills: 0 | Status: SHIPPED | OUTPATIENT
Start: 2018-10-31 | End: 2018-10-31 | Stop reason: SDUPTHER

## 2018-11-06 DIAGNOSIS — G89.4 CHRONIC PAIN SYNDROME: ICD-10-CM

## 2018-11-06 RX ORDER — OXYCODONE HYDROCHLORIDE AND ACETAMINOPHEN 5; 325 MG/1; MG/1
1-2 TABLET ORAL EVERY 6 HOURS PRN
Qty: 30 TABLET | Refills: 0 | Status: SHIPPED | OUTPATIENT
Start: 2018-11-06 | End: 2018-11-21 | Stop reason: SDUPTHER

## 2018-11-21 ENCOUNTER — TELEPHONE (OUTPATIENT)
Dept: FAMILY MEDICINE CLINIC | Facility: CLINIC | Age: 78
End: 2018-11-21

## 2018-11-21 DIAGNOSIS — G89.4 CHRONIC PAIN SYNDROME: ICD-10-CM

## 2018-11-21 RX ORDER — OXYCODONE HYDROCHLORIDE AND ACETAMINOPHEN 5; 325 MG/1; MG/1
1-2 TABLET ORAL EVERY 6 HOURS PRN
Qty: 30 TABLET | Refills: 0 | Status: SHIPPED | OUTPATIENT
Start: 2018-11-21 | End: 2019-07-12 | Stop reason: SDUPTHER

## 2018-11-21 NOTE — TELEPHONE ENCOUNTER
Spoke with pt's hospice nurse Blanche Antoine  Reports pthas been taking CBD only x's 2 days with her levothyroxine and Linzess  Pt reports she does not want to take as many pills as she does  Also reports she is "addicted to food" and does not make good food choices  Per hospice nurse pt discussed nursing home and/or nutrition consultation  Recommend pt consider both options and needs to come into the office for discussion of medication compliance and possible discontinuation of unecssary mediations  Also need to discuss pt's wishes, does she want to continue with hospice or does she want to end service and work on nutrition and medication compliance? Per hospice nurse, pt will schedule an appt to  Come in after the holidays for further discussion  In the interim, recommend pt take all medications as directed with greater importance placed on BP medications (lasix, metoprolol) as well as diabetic medications  Left message with [facility] nutritional services for more information      Jovita Shearer 17 Nickie Jones

## 2018-11-26 DIAGNOSIS — T40.2X5A CONSTIPATION DUE TO OPIOID THERAPY: Chronic | ICD-10-CM

## 2018-11-26 DIAGNOSIS — K59.03 CONSTIPATION DUE TO OPIOID THERAPY: Chronic | ICD-10-CM

## 2018-11-28 ENCOUNTER — TELEPHONE (OUTPATIENT)
Dept: FAMILY MEDICINE CLINIC | Facility: CLINIC | Age: 78
End: 2018-11-28

## 2018-11-28 NOTE — TELEPHONE ENCOUNTER
fyi She had a fall overnight and no injurys and very declined in raspatory status  Back to baseline  She declined other assistance

## 2018-11-29 ENCOUNTER — TELEPHONE (OUTPATIENT)
Dept: FAMILY MEDICINE CLINIC | Facility: CLINIC | Age: 78
End: 2018-11-29

## 2018-11-29 NOTE — TELEPHONE ENCOUNTER
Update on Chelsi Daljitjoselin - she is doing terrible    IBS - Flared up - Did not take medication over Thanksgiving weekend    Must more lethargic, much more short of breath    She had a pretty bad fall on 11/26  Trying to get her into a nursing home  Waiting for a bed      Diabetes is out of controlled

## 2018-11-30 ENCOUNTER — TELEPHONE (OUTPATIENT)
Dept: FAMILY MEDICINE CLINIC | Facility: CLINIC | Age: 78
End: 2018-11-30

## 2018-11-30 DIAGNOSIS — J96.11 CHRONIC RESPIRATORY FAILURE WITH HYPOXIA AND HYPERCAPNIA (HCC): Primary | ICD-10-CM

## 2018-11-30 DIAGNOSIS — G89.4 CHRONIC PAIN SYNDROME: ICD-10-CM

## 2018-11-30 DIAGNOSIS — E08.40 DIABETES MELLITUS DUE TO UNDERLYING CONDITION WITH DIABETIC NEUROPATHY, WITH LONG-TERM CURRENT USE OF INSULIN (HCC): Chronic | ICD-10-CM

## 2018-11-30 DIAGNOSIS — J96.12 CHRONIC RESPIRATORY FAILURE WITH HYPOXIA AND HYPERCAPNIA (HCC): Primary | ICD-10-CM

## 2018-11-30 DIAGNOSIS — Z79.4 DIABETES MELLITUS DUE TO UNDERLYING CONDITION WITH DIABETIC NEUROPATHY, WITH LONG-TERM CURRENT USE OF INSULIN (HCC): Chronic | ICD-10-CM

## 2018-11-30 NOTE — TELEPHONE ENCOUNTER
Patient daughter dropped off forms at [employer] to have  Filled out needs a script  Stating that it is okay to receive hospice services

## 2018-11-30 NOTE — TELEPHONE ENCOUNTER
Paperwork for admission into nursing home completed and signed and hospice rx provided  (pt's family came to [place] office) Recommend pt completes TB screening and needs Dtap if that is the facility requirements  This was discussed with the family by Brice Daniels MA, verbalized understanding  No further action necessary   Jovita Lawson

## 2018-12-27 ENCOUNTER — TELEPHONE (OUTPATIENT)
Dept: FAMILY MEDICINE CLINIC | Facility: CLINIC | Age: 78
End: 2018-12-27

## 2018-12-28 DIAGNOSIS — R60.9 EDEMA, UNSPECIFIED TYPE: Primary | ICD-10-CM

## 2018-12-28 RX ORDER — METOLAZONE 2.5 MG/1
2.5 TABLET ORAL 3 TIMES WEEKLY
Qty: 12 TABLET | Refills: 2 | Status: SHIPPED | OUTPATIENT
Start: 2018-12-28 | End: 2019-01-03 | Stop reason: SDUPTHER

## 2018-12-28 NOTE — TELEPHONE ENCOUNTER
Requesting a refill, but is not in the chart    Metolazone 2 5 mg tab  12 tabs  1 tab orally 3 times weekly (MO, WE, FR) at 7:30 a m

## 2018-12-28 NOTE — TELEPHONE ENCOUNTER
Is patient in nursing home? If so, is this medication being managed by an attending at the nursing home? Who wrote the original rx?

## 2019-01-03 DIAGNOSIS — R60.9 EDEMA, UNSPECIFIED TYPE: ICD-10-CM

## 2019-01-03 RX ORDER — METOLAZONE 2.5 MG/1
TABLET ORAL
Qty: 12 TABLET | Refills: 0 | Status: SHIPPED | OUTPATIENT
Start: 2019-01-03 | End: 2019-09-04 | Stop reason: SDUPTHER

## 2019-01-14 DIAGNOSIS — R60.9 PERIPHERAL EDEMA: ICD-10-CM

## 2019-01-14 RX ORDER — FUROSEMIDE 20 MG/1
20 TABLET ORAL DAILY
Qty: 30 TABLET | Refills: 0 | Status: SHIPPED | OUTPATIENT
Start: 2019-01-14 | End: 2019-09-04

## 2019-06-28 ENCOUNTER — HOSPITAL ENCOUNTER (EMERGENCY)
Facility: HOSPITAL | Age: 79
Discharge: HOME | End: 2019-06-28
Attending: EMERGENCY MEDICINE
Payer: MEDICARE

## 2019-06-28 VITALS
DIASTOLIC BLOOD PRESSURE: 71 MMHG | HEART RATE: 88 BPM | SYSTOLIC BLOOD PRESSURE: 174 MMHG | RESPIRATION RATE: 18 BRPM | OXYGEN SATURATION: 94 % | TEMPERATURE: 98.7 F

## 2019-06-28 DIAGNOSIS — E13.49 OTHER DIABETIC NEUROLOGICAL COMPLICATION ASSOCIATED WITH OTHER SPECIFIED DIABETES MELLITUS (HCC): ICD-10-CM

## 2019-06-28 DIAGNOSIS — R26.2 AMBULATORY DYSFUNCTION: ICD-10-CM

## 2019-06-28 DIAGNOSIS — J44.9 END STAGE COPD (HCC): ICD-10-CM

## 2019-06-28 DIAGNOSIS — M79.604 BILATERAL LEG PAIN: Primary | ICD-10-CM

## 2019-06-28 DIAGNOSIS — M79.605 BILATERAL LEG PAIN: Primary | ICD-10-CM

## 2019-06-28 PROCEDURE — 99284 EMERGENCY DEPT VISIT MOD MDM: CPT | Performed by: EMERGENCY MEDICINE

## 2019-06-28 PROCEDURE — 99283 EMERGENCY DEPT VISIT LOW MDM: CPT

## 2019-06-28 RX ORDER — MORPHINE SULFATE 15 MG/1
15 TABLET ORAL EVERY 4 HOURS PRN
Status: DISCONTINUED | OUTPATIENT
Start: 2019-06-28 | End: 2019-06-28 | Stop reason: HOSPADM

## 2019-06-28 RX ADMIN — MORPHINE SULFATE 15 MG: 15 TABLET ORAL at 07:59

## 2019-06-28 NOTE — ED PROVIDER NOTES
History  Chief Complaint   Patient presents with    Leg Pain     Pt presents from [facility] w/bilat lower leg pain x 3 weeks PT is DNR @ facility       History provided by:  Patient (Hospice nurse)  Leg Pain   Location:  Leg  Time since incident:  3 days  Injury: no    Leg location:  L lower leg  Pain details:     Quality:  Aching    Radiates to:  Does not radiate    Severity:  Moderate    Onset quality:  Gradual    Duration:  3 days    Timing:  Constant    Progression:  Worsening  Chronicity:  Chronic (Patient with chronic open wounds on her legs being managed by Podiatry )  Relieved by: Percocet  Patient also written for morphine but is afraid to take it  Worsened by:  Nothing  Associated symptoms: no fever and no neck pain    Associated symptoms comment:  Patient with end-stage COPD, on hospice  Prior to Admission Medications   Prescriptions Last Dose Informant Patient Reported? Taking?    LORazepam (ATIVAN) 0 5 mg tablet   No No   Sig: Take 1 tablet (0 5 mg total) by mouth every 8 (eight) hours as needed for anxiety for up to 10 days   Linaclotide (LINZESS) 72 MCG CAPS   No No   Sig: Take 72 mcg by mouth daily before breakfast   albuterol (ACCUNEB) 1 25 MG/3ML nebulizer solution  Self No No   Sig: Take 3 mL (1 25 mg total) by nebulization 3 (three) times a day as needed for wheezing (J45 909)   amoxicillin (AMOXIL) 500 mg capsule   Yes No   cetirizine (ZyrTEC) 10 mg tablet   No No   Sig: Take 1 tablet (10 mg total) by mouth daily at bedtime   fluticasone (FLONASE) 50 mcg/act nasal spray   No No   Si sprays into each nostril daily   folic acid (FOLVITE) 1 mg tablet  Self Yes No   Sig: Take by mouth daily   furosemide (LASIX) 20 mg tablet   No No   Sig: Take 1 tablet (20 mg total) by mouth daily for 30 days   gabapentin (NEURONTIN) 600 MG tablet   No No   Sig: Take 1 tablet (600 mg total) by mouth 3 (three) times a day   insulin aspart protamine-insulin aspart (NOVOLOG MIX 70/30 FLEXPEN) 100 Units/mL injection pen   No No   Sig: Inject 45 Units under the skin 2 (two) times a day before meals   levothyroxine 125 mcg tablet   No No   Sig: Take 1 tablet (125 mcg total) by mouth daily   metolazone (ZAROXOLYN) 2 5 mg tablet   No No   Si TAB ORALLY 3 TIMES WEEKLY (MO,WE,FR) AT 7:30AM DX: EDEMA *NOT ON CYCLE-NO REFILL*   metoprolol tartrate (LOPRESSOR) 25 mg tablet  Self No No   Sig: Take 1 tablet (25 mg total) by mouth 2 (two) times a day for 90 days   oxyCODONE-acetaminophen (PERCOCET) 5-325 mg per tablet   No No   Sig: Take 1-2 tablets by mouth every 6 (six) hours as needed for moderate pain Max Daily Amount: 8 tablets   potassium chloride (MICRO-K) 10 MEQ CR capsule  Self No No   Sig: Take 1 capsule (10 mEq total) by mouth daily   Patient taking differently: Take 10 mEq by mouth daily 20meq daily, 3 times per week 40meq    pramipexole (MIRAPEX) 1 mg tablet  Self No No   Sig: Take 1 tablet (1 mg total) by mouth 3 (three) times a day   tobramycin-dexamethasone (TOBRADEX) ophthalmic suspension   No No   Sig: Administer 2 drops to both eyes 4 (four) times a day for 5 days      Facility-Administered Medications Last Administration Doses Remaining   furosemide (LASIX) injection 30 mg None recorded 1          Past Medical History:   Diagnosis Date    Anxiety     Aortic valve disorder     Arthritis     Asthma     Cataract     CHF (congestive heart failure) (HCC)     COPD (chronic obstructive pulmonary disease) (HCC)     Diabetes mellitus (HCC)     Disease of thyroid gland     Dry eye syndrome     Ectropion of left eye     last assessed: 10/17/2016    Emphysema, 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HCC)     Hearing loss     History of malignant neoplasm of thyroid     Hypertension     Malignant neoplasm of skin     Memory loss     Mitral valve regurgitation     Myocardial infarction (HCC)     Obesity hypoventilation syndrome (HCC)     Pain     right hip    Restless leg syndrome     Seasonal allergies     [facility]     Sleep related hypoxia     Thyroid cancer ([facility]     Urinary tract infection without hematuria 7/14/2018       Past Surgical History:   Procedure Laterality Date    CHOLECYSTECTOMY      EYE SURGERY      HYSTERECTOMY      OTHER SURGICAL HISTORY      removal of lesion    PERICARDIUM SURGERY      resection of pericardial cyst or tumor    TX ERCP DX COLLECTION SPECIMEN BRUSHING/WASHING N/A 5/30/2017    Procedure: ENDOSCOPIC RETROGRADE CHOLANGIOPANCREATOGRAPHY (ERCP); SPHINCTEROTOMY;  Surgeon: Ihsan Dupont MD;  Location:  GI LAB; Service: Gastroenterology   Tanna Anibal JOINT Right 3/30/2018    Procedure: Right Sacroiliac Injection;  Surgeon: Kim Patel MD;  Location: [facility] MAIN OR;  Service: Pain Management     REPLACEMENT TOTAL KNEE Left     REPLACEMENT TOTAL KNEE Right     REPLACEMENT TOTAL KNEE BILATERAL      SKIN BIOPSY      melanoma of back    STEROID INJECTION HIP Right 12/21/2017    Procedure: TROCHANTERIC BURSA INJECTION RIGHT;  Surgeon: Kim Patel MD;  Location: [facility] MAIN OR;  Service: Pain Management     THORACOSCOPY      (therapeutic) w/excision of pericardial cyst    THORACOTOMY Right     at 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, for pericardial cyst    THYROID SURGERY      THYROIDECTOMY, PARTIAL      For thyroid cancer       Family History   Problem Relation Age of Onset    Cancer Father     Arthritis Father     Liver cancer Father     Diabetes Sister     Asthma Mother     No Known Problems Brother     Substance Abuse Neg Hx     Mental illness Neg Hx      I have reviewed and agree with the history as documented  Social History     Tobacco Use    Smoking status: Never Smoker    Smokeless tobacco: Never Used   Substance Use Topics    Alcohol use: No    Drug use: No        Review of Systems   Constitutional: Negative for activity change, chills, diaphoresis and fever     HENT: Negative for congestion, sinus pressure and sore throat  Eyes: Negative for pain and visual disturbance  Respiratory: Negative for cough, chest tightness, shortness of breath, wheezing and stridor  Cardiovascular: Negative for chest pain and palpitations  Gastrointestinal: Negative for abdominal distention, abdominal pain, constipation, diarrhea, nausea and vomiting  Genitourinary: Negative for dysuria and frequency  Musculoskeletal: Negative for neck pain and neck stiffness  Skin: Negative for rash  Neurological: Negative for dizziness, speech difficulty, light-headedness, numbness and headaches  Physical Exam  Physical Exam   Constitutional: She is oriented to person, place, and time  She appears well-developed  No distress  HENT:   Head: Normocephalic and atraumatic  Eyes: Pupils are equal, round, and reactive to light  Neck: Normal range of motion  Neck supple  No tracheal deviation present  Cardiovascular: Normal rate, regular rhythm, normal heart sounds and intact distal pulses  No murmur heard  Pulmonary/Chest: Effort normal and breath sounds normal  No stridor  No respiratory distress  Abdominal: Soft  She exhibits no distension  There is no tenderness  There is no rebound and no guarding  Musculoskeletal: Normal range of motion  Bilateral legs in compression wraps, dated and signed   by Podiatry   Neurological: She is alert and oriented to person, place, and time  Skin: Skin is warm and dry  She is not diaphoretic  No erythema  No pallor  Psychiatric: She has a normal mood and affect  Vitals reviewed        Vital Signs  ED Triage Vitals   Temperature Pulse Respirations Blood Pressure SpO2   06/28/19 0740 06/28/19 0740 06/28/19 0740 06/28/19 0740 06/28/19 0740   98 7 °F (37 1 °C) 89 18 (!) 174/71 94 %      Temp Source Heart Rate Source Patient Position - Orthostatic VS BP Location FiO2 (%)   06/28/19 0740 06/28/19 0740 06/28/19 0740 06/28/19 0740 --   Oral Monitor Lying Right arm       Pain Score       06/28/19 5803       Worst Possible Pain           Vitals:    06/28/19 0740 06/28/19 0745   BP: (!) 174/71 (!) 174/71   Pulse: 89 88   Patient Position - Orthostatic VS: Lying          Visual Acuity      ED Medications  Medications - No data to display    Diagnostic Studies  Results Reviewed     None                 No orders to display              Procedures  Procedures       ED Course                               MDM  Number of Diagnoses or Management Options  Ambulatory dysfunction: new and requires workup  Bilateral leg pain: new and requires workup  End stage COPD ([facility] [address] ): minor  Other diabetic neurological complication associated with other specified diabetes mellitus ([facility] ): new and requires workup  Diagnosis management comments: 77-year-old female, on hospice for end-stage COPD, chronic lower leg pain, due to bad diabetic neuropathy now open wounds on her leg being managed by Podiatry was told specifically not to take the dressings off , presenting with increasing pain, neurovascularly intact distally on exam, we had a long conversation with her as well as the hospice nurse was at bedside with her, we elected not to remove dressings due to interference with Podiatry ease plan, she states this pain is typical for her, she just needs more medication  She is actually written for morphine but has not been taking it because she was afraid to hospice nurse states that this is a recurrent conversation she has with her regarding her medications and also states the facility she is at is very hesitant to give morphine on top of the Percocet  , explained at this time I would simply take the medications as already prescribed, would not want to escalate any therapy    No signs of fever patient in hospice team agree with discharge plan       Amount and/or Complexity of Data Reviewed  Decide to obtain previous medical records or to obtain history from someone other than the patient: yes  Obtain history from someone other than the patient: yes  Review and summarize past medical records: yes        Disposition  Final diagnoses:   Bilateral leg pain   Other diabetic neurological complication associated with other specified diabetes mellitus ([facility] )   Ambulatory dysfunction   End stage COPD ([facility] )     Time reflects when diagnosis was documented in both MDM as applicable and the Disposition within this note     Time User Action Codes Description Comment    6/28/2019  7:49 AM Lyndia Goad Add [F25 428,  M79 605] Bilateral leg pain     6/28/2019  7:49 AM Lyndia Goad Add [E13 49] Other diabetic neurological complication associated with other specified diabetes mellitus ([facility] )     6/28/2019  7:49 AM Lyndia Goad Add [R26 2] Ambulatory dysfunction     6/28/2019  7:49 AM Lyndia Goad Add [J44 9] End stage COPD [facility])       ED Disposition     ED Disposition Condition Date/Time Comment    Discharge Stable Fri Jun 28, 2019  7:49 AM Sarah Hou discharge to home/self care              Follow-up Information    None         Discharge Medication List as of 6/28/2019  7:51 AM      CONTINUE these medications which have NOT CHANGED    Details   albuterol (ACCUNEB) 1 25 MG/3ML nebulizer solution Take 3 mL (1 25 mg total) by nebulization 3 (three) times a day as needed for wheezing (J45 909), Starting Sat 4/21/2018, Normal      amoxicillin (AMOXIL) 500 mg capsule Starting Mon 10/15/2018, Historical Med      cetirizine (ZyrTEC) 10 mg tablet Take 1 tablet (10 mg total) by mouth daily at bedtime, Starting Mon 10/22/2018, Normal      fluticasone (FLONASE) 50 mcg/act nasal spray 2 sprays into each nostril daily, Starting Mon 32/8332/83/4352, Normal      folic acid (FOLVITE) 1 mg tablet Take by mouth daily, Historical Med      furosemide (LASIX) 20 mg tablet Take 1 tablet (20 mg total) by mouth daily for 30 days, Starting Mon 1/14/2019, Until Wed 2/13/2019, Normal      gabapentin (NEURONTIN) 600 MG tablet Take 1 tablet (600 mg total) by mouth 3 (three) times a day, Starting Mon 10/22/2018, Normal      insulin aspart protamine-insulin aspart (NOVOLOG MIX 70/30 FLEXPEN) 100 Units/mL injection pen Inject 45 Units under the skin 2 (two) times a day before meals, Starting Mon 10/22/2018, Normal      levothyroxine 125 mcg tablet Take 1 tablet (125 mcg total) by mouth daily, Starting Wed 10/31/2018, Normal      Linaclotide (LINZESS) 72 MCG CAPS Take 72 mcg by mouth daily before breakfast, Starting Mon 11/26/2018, Normal      LORazepam (ATIVAN) 0 5 mg tablet Take 1 tablet (0 5 mg total) by mouth every 8 (eight) hours as needed for anxiety for up to 10 days, Starting Wed 7/18/2018, Until Fri 9/7/2018, No Print      metolazone (ZAROXOLYN) 2 5 mg tablet 1 TAB ORALLY 3 TIMES WEEKLY (MO,WE,FR) AT 7:30AM DX: EDEMA *NOT ON CYCLE-NO REFILL*, Normal      metoprolol tartrate (LOPRESSOR) 25 mg tablet Take 1 tablet (25 mg total) by mouth 2 (two) times a day for 90 days, Starting Fri 8/24/2018, Until Thu 11/22/2018, Normal      oxyCODONE-acetaminophen (PERCOCET) 5-325 mg per tablet Take 1-2 tablets by mouth every 6 (six) hours as needed for moderate pain Max Daily Amount: 8 tablets, Starting Wed 11/21/2018, Normal      potassium chloride (MICRO-K) 10 MEQ CR capsule Take 1 capsule (10 mEq total) by mouth daily, Starting Wed 5/23/2018, Normal      pramipexole (MIRAPEX) 1 mg tablet Take 1 tablet (1 mg total) by mouth 3 (three) times a day, Starting Thu 5/10/2018, Normal      tobramycin-dexamethasone (TOBRADEX) ophthalmic suspension Administer 2 drops to both eyes 4 (four) times a day for 5 days, Starting Sat 10/20/2018, Until Thu 10/25/2018, Normal           No discharge procedures on file      ED Provider  Electronically Signed by           Gayle Stewart, DO  06/28/19 2312

## 2019-06-28 NOTE — DISCHARGE INSTRUCTIONS
Please give all chronic pain medications as prescribed  If patient is having pain flares, please contact hospice doctor for increasing pain controlling medications

## 2019-06-28 NOTE — ED NOTES
FILIBERTO working on transport back to facility     Chun Carrillo, [facility]  06/28/19 4803

## 2019-07-11 ENCOUNTER — TELEPHONE (OUTPATIENT)
Dept: FAMILY MEDICINE CLINIC | Facility: CLINIC | Age: 79
End: 2019-07-11

## 2019-07-11 NOTE — TELEPHONE ENCOUNTER
Graham Do states this pt is coming back to [place] and going back on hospice  They would like to know if you would be her PCP? They were told that the pt's dtr has already spoke to you regard to this, but they are reaching out to you do double check and to complete paperwork for them  Please confirm or decline and advise so we can call [facility] back  Thank you    rt

## 2019-07-11 NOTE — TELEPHONE ENCOUNTER
CALLED AZUL AT [facility] AND ADVISED DR TEMPLETON WILL ACCEPT THIS PT AND SIGN HOSPICE PAPERWORK    RT

## 2019-07-12 DIAGNOSIS — G89.4 CHRONIC PAIN SYNDROME: ICD-10-CM

## 2019-07-14 RX ORDER — OXYCODONE HYDROCHLORIDE AND ACETAMINOPHEN 5; 325 MG/1; MG/1
1-2 TABLET ORAL EVERY 6 HOURS PRN
Qty: 30 TABLET | Refills: 0 | Status: SHIPPED | OUTPATIENT
Start: 2019-07-14 | End: 2019-07-22 | Stop reason: SDUPTHER

## 2019-07-15 ENCOUNTER — TELEPHONE (OUTPATIENT)
Dept: FAMILY MEDICINE CLINIC | Facility: CLINIC | Age: 79
End: 2019-07-15

## 2019-07-15 NOTE — TELEPHONE ENCOUNTER
Tyree Dean called back and said to add 30g/ 8mm to the order      Added that info to the order and re-faxed

## 2019-07-15 NOTE — TELEPHONE ENCOUNTER
Venecia Starks from [facility] called stating she needs to cancel the rx that was sent in  She states she needs to know the gauge of the needle  I will call the family or hospice to get more info and then the rx will need to be re-sent

## 2019-07-15 NOTE — TELEPHONE ENCOUNTER
Darrall Patches is on Hospice and needs a refill for   novolog 70/30 flexpen needles   45 units 2x day   1 box 3 refills  [facility] [place]

## 2019-07-15 NOTE — TELEPHONE ENCOUNTER
Spoke to Shasta at [facility] and informed them Cristina Huffman from [facility] canceled the order for the needles due to not having a gauge on it  Shasta states she will find out and call back with that info

## 2019-07-15 NOTE — TELEPHONE ENCOUNTER
Done and faxed to [facility] [place] at [phone]    No further action required at this time

## 2019-07-16 ENCOUNTER — TELEPHONE (OUTPATIENT)
Dept: FAMILY MEDICINE CLINIC | Facility: CLINIC | Age: 79
End: 2019-07-16

## 2019-07-16 NOTE — TELEPHONE ENCOUNTER
Kimber Jim from [facility] called stating the rx for novolog needles is wrong    She states it needs to say   BD Ultra Fine mini pen needles 31 gauge  Test 2x a day  #100  3 refills  Please send to [facility] [place]

## 2019-07-17 ENCOUNTER — TELEPHONE (OUTPATIENT)
Dept: FAMILY MEDICINE CLINIC | Facility: CLINIC | Age: 79
End: 2019-07-17

## 2019-07-17 NOTE — TELEPHONE ENCOUNTER
Simin Ashley took Tilmon Faster to the wound care doc today because has 2 huge weeping wounds on her L leg and edema in both legs  Things look better and will keep wrapping and change the dressing every 2-3 days  Please call Simin Ashley when you get a chance to discuss insulin so they know they are giving her the right amount  Simin Ashley has her on a very strict diet    [phone]

## 2019-07-17 NOTE — TELEPHONE ENCOUNTER
Jenna Castillo got discharged from the nursing home on Friday  They feel Abidna 45 am and 45pm is not helping  She is spiking and then coming down too often  They would like to have a rx for a sliding scale and use humolog or novolog and lantus at night  Davina Martinez asked if you could call Jenna Castillo other daughter [place] Given to discuss [phone]

## 2019-07-22 DIAGNOSIS — E03.9 ACQUIRED HYPOTHYROIDISM: ICD-10-CM

## 2019-07-22 DIAGNOSIS — G89.4 CHRONIC PAIN SYNDROME: ICD-10-CM

## 2019-07-22 DIAGNOSIS — G25.81 RESTLESS LEG SYNDROME: ICD-10-CM

## 2019-07-22 RX ORDER — OXYCODONE HYDROCHLORIDE AND ACETAMINOPHEN 5; 325 MG/1; MG/1
1-2 TABLET ORAL EVERY 6 HOURS PRN
Qty: 30 TABLET | Refills: 0 | Status: SHIPPED | OUTPATIENT
Start: 2019-07-22 | End: 2019-08-07 | Stop reason: SDUPTHER

## 2019-07-22 RX ORDER — LEVOTHYROXINE SODIUM 0.12 MG/1
125 TABLET ORAL DAILY
Qty: 90 TABLET | Refills: 0 | Status: SHIPPED | OUTPATIENT
Start: 2019-07-22 | End: 2019-10-07 | Stop reason: SDUPTHER

## 2019-07-22 RX ORDER — PRAMIPEXOLE DIHYDROCHLORIDE 1 MG/1
0.5 TABLET ORAL 3 TIMES DAILY
Qty: 90 TABLET | Refills: 3 | Status: SHIPPED | OUTPATIENT
Start: 2019-07-22 | End: 2019-09-04 | Stop reason: SDUPTHER

## 2019-07-22 NOTE — TELEPHONE ENCOUNTER
When she gave her the 45 units of insulin for blood sugar, Shayna's blood sugar would go to 40  Hospice nurse told Laquita Masters that if it is below 79, do not give her any insulin  Wanted to confirm with you if that is correct  Wanted to discuss with you regarding her pain  Right now she is on Morphine and percocet and she is still in a lot of pain  According to hospice, Laquita Masters can give Morphine every hour but she is laying there lethargic  Any other suggestions? Also, she is requesting to have amlodipine 10 mg  MIGUEL renewed  Nothing is in her chart  She also takes metoprolol, is she suppose to take both?

## 2019-07-23 ENCOUNTER — TELEPHONE (OUTPATIENT)
Dept: FAMILY MEDICINE CLINIC | Facility: CLINIC | Age: 79
End: 2019-07-23

## 2019-07-23 NOTE — TELEPHONE ENCOUNTER
The prescription for the percocet exceeds the MME  Should be 50 mg  It is written for 60 mg  Is it for Acute pain? Chronic pain? Where is her pain? Insurance is not paying for this   Would cost $22 82

## 2019-07-25 DIAGNOSIS — G89.4 CHRONIC PAIN SYNDROME: ICD-10-CM

## 2019-07-25 DIAGNOSIS — I51.89 DIASTOLIC DYSFUNCTION: ICD-10-CM

## 2019-07-25 RX ORDER — OXYCODONE HYDROCHLORIDE AND ACETAMINOPHEN 5; 325 MG/1; MG/1
1-2 TABLET ORAL EVERY 6 HOURS PRN
Qty: 30 TABLET | Refills: 0 | OUTPATIENT
Start: 2019-07-25

## 2019-07-29 ENCOUNTER — TELEPHONE (OUTPATIENT)
Dept: FAMILY MEDICINE CLINIC | Facility: CLINIC | Age: 79
End: 2019-07-29

## 2019-07-29 NOTE — TELEPHONE ENCOUNTER
PLEASE CALL BACK    151-200   5 UNITS  201-250   8 UNITS  251-300   12 UNITS  301-350   15 UNITS  351-400   18 UNITS

## 2019-07-29 NOTE — TELEPHONE ENCOUNTER
Patients daughter called concerned about Shayna glucose numbers  They have not been below 200  Demetris  is asking if her sliding scale should be adjusted?   The scale is at:  0 units                                                 3 units    151-200                       5 units    201-250                       8 units    251-300                       10 units  301-350                       12 units  351-400        Please advise,  Thank you- Rishi Giordano

## 2019-08-07 DIAGNOSIS — G89.4 CHRONIC PAIN SYNDROME: ICD-10-CM

## 2019-08-07 RX ORDER — OXYCODONE HYDROCHLORIDE AND ACETAMINOPHEN 5; 325 MG/1; MG/1
1-2 TABLET ORAL EVERY 6 HOURS PRN
Qty: 60 TABLET | Refills: 0 | Status: SHIPPED | OUTPATIENT
Start: 2019-08-07 | End: 2019-08-21 | Stop reason: SDUPTHER

## 2019-08-07 NOTE — TELEPHONE ENCOUNTER
Brian Credit will be out of percocet today  Spoke to Jenn Juares at [facility] and also her supervisor Chika Natarajan  They both agree ok to escribe and not mail in hard copy this time since Dr Ayaka Dwyer is on vacation      Please escribe to [facility]  Thanks

## 2019-08-08 NOTE — CASE MANAGEMENT
Initial Clinical Review    Admission: Date/Time/Statement: 10/8/17 @ 2050     Orders Placed This Encounter   Procedures    Inpatient Admission (expected length of stay for this patient is greater than two midnights)     Standing Status:   Standing     Number of Occurrences:   1     Order Specific Question:   Admitting Physician     Answer:   Devon De La Fuente     Order Specific Question:   Level of Care     Answer:   Med Surg [16]     Order Specific Question:   Estimated length of stay     Answer:   More than 2 Midnights     Order Specific Question:   Certification     Answer:   I certify that inpatient services are medically necessary for this patient for a duration of greater than two midnights  See H&P and MD Progress Notes for additional information about the patient's course of treatment  ED: Date/Time/Mode of Arrival:   ED Arrival Information     Expected Arrival Acuity Means of Arrival Escorted By Service Admission Type    - 10/8/2017 17:19 Urgent Wheelchair Family Member General Medicine Urgent    Arrival Complaint    DIFFICULTY BREATHING  ONGOING      Chief Complaint:   Chief Complaint   Patient presents with    Shortness of Breath     Pt reports SOB x 4 days, and chest tightness  History of Illness:   64 yo female with 4 d hx of increasing dyspnea  Some cough, denies chest pain, states pain all over  No fever  HX of COPD and has had prior admission for same, most recently about 3 weeks ago  States reason for today's visit is SOB, not chest pain  Has nebulizers at home which states not helping  Has home O2 as well  Not sure if she has seen a pulmonologist   During last admission pt had gastro evaluation with abdominal CT as well, no specific findings  Pt has umbilical hernia  Pulmonary/Chest: She has no wheezes  She has no rales  Moderate tachypnea noted  Pulse ox on room air is 95  Decreased BS bilaterally, no wheezing noted  Abdominal: Soft   She exhibits no distension  reduceable umbilical hernia noted  Diffuse nonlocalizing tenderness   ED Vital Signs:   ED Triage Vitals [10/08/17 1731]   Temperature Pulse Respirations Blood Pressure SpO2   98 9 °F (37 2 °C) 92 20 147/73 95 %      Temp Source Heart Rate Source Patient Position - Orthostatic VS BP Location FiO2 (%)   Tympanic Monitor Sitting Left arm --      Pain Score       Worst Possible Pain        Wt Readings from Last 1 Encounters:   10/08/17 103 kg (227 lb 1 2 oz)   Vital Signs (abnormal):   T 97 2    /78  Abnormal Labs/Diagnostic Test Results:   GLUC 324   CXR=No active pulmonary disease  ECG 12 Lead Documentation  Date/Time: 10/8/2017 7:05 PM  Performed by: Kitty Norman  Authorized by: Kitty Norman   Indications / Diagnosis:  Dyspnea  Patient location:  ED  Previous ECG:     Previous ECG:  Compared to current    Similarity:  No change    Comparison to cardiac monitor: Yes    Interpretation:     Interpretation: abnormal    Rate:     ECG rate:  87  Rhythm:     Rhythm: sinus rhythm    Ectopy:     Ectopy: none    QRS:     QRS axis:  Normal  Conduction:     Conduction: abnormal      Abnormal conduction: incomplete LBBB    ST segments:     ST segments:  Normal  T waves:     T waves: normal  ED Treatment:   Medication Administration from 10/08/2017 1719 to 10/08/2017 2221       Date/Time Order Dose Route Action Action by Comments     10/08/2017 1906 ipratropium-albuterol (DUO-NEB) 0 5-2 5 mg/mL inhalation solution 3 mL 3 mL Nebulization Given Juanita Duckworth RN      10/08/2017 2022 ipratropium-albuterol (DUO-NEB) 0 5-2 5 mg/mL inhalation solution 3 mL 3 mL Nebulization Given George Vasquez RN      10/08/2017 2022 morphine (PF) 4 mg/mL injection 4 mg 4 mg Intravenous Given George Vasquez RN      10/08/2017 2023 methylPREDNISolone sodium succinate (Solu-MEDROL) injection 80 mg 80 mg Intravenous Given George Vasquez RN       Past Medical/Surgical History:    Active Ambulatory Problems Diagnosis Date Noted    Abdominal pain 04/15/2017    Acquired hypothyroidism 04/15/2017    T2DM (type 2 diabetes mellitus) ([facility] ) 04/15/2017    Lung nodules 04/15/2017    Chronic constipation 04/17/2017    Morbid obesity with BMI of 40 0-44 9, adult ([facility] ) 04/18/2017    Opioid dependence ([facility] ) 04/18/2017    Acute on chronic systolic heart failure ([facility] ) 06/10/2017    Asthma 06/10/2017    Chronic pain 06/10/2017    Constipation due to opioid therapy 06/10/2017    Restless leg 06/10/2017    Sleep apnea 06/10/2017    Acute on chronic respiratory failure with hypoxia and hypercapnia ([facility] ) 09/19/2017    Morbid obesity due to excess calories ([facility] ) 09/19/2017    Pancreatic cyst 09/19/2017    COPD exacerbation ([facility] ) 09/19/2017     Resolved Ambulatory Problems     Diagnosis Date Noted    Nausea 04/15/2017    Hypokalemia 04/15/2017    Skin lesion of left leg 04/21/2017    Tachypnea 06/07/2017    SOB (shortness of breath) 06/07/2017    Severe sepsis ([facility] ) 06/07/2017    Non-ST elevation myocardial infarction (NSTEMI) ([facility] ) 06/07/2017    Bacteremia due to Klebsiella pneumoniae 06/08/2017    Cholangitis 06/09/2017    Respiratory failure with hypercapnia ([facility] ) 06/10/2017    Irritable bowel syndrome with diarrhea 06/10/2017    Meningioma ([facility] ) 06/16/2017     Past Medical History:   Diagnosis Date    Arthritis     Asthma     COPD (chronic obstructive pulmonary disease) ([facility] )     Diabetes mellitus ([facility] )     Disease of thyroid gland     Hypertension     Mitral valve regurgitation     Restless leg syndrome     Sleep related hypoxia     Thyroid cancer ([facility] )    Admitting Diagnosis: Acute exacerbation of chronic obstructive pulmonary disease (COPD) ([facility] ) [J44 1]  Difficulty breathing [R06 89]  Age/Sex: 65 y o  female  Assessment/Plan:   Principal Problem:    Dyspnea on exertion  Active Problems:    COPD exacerbation (HCC)    Anxiety    Type 2 diabetes mellitus with hyperglycemia, with long-term current use of insulin ([facility] )    Morbid obesity with BMI of 40 0-44 9, adult ([facility] )  Plan for the Primary Problem(s):  · Dyspnea on exertion  Possible COPD exacerbation  Most likely driven by anxiety  Chest x-ray unremarkable, pending final read  Patient is afebrile  Denies productive cough  Patient received DuoNeb x2, Solu-Medrol 80 mg IV x1, morphine 4 mg IV x1 in ED  Will continue with Solu-Medrol 40 mg IV q 12 hours  Will restart patient on Xanax 1 mg p o  daily p r n  Orbkirill Kendall There is no indication for IV antibiotic at present  Respiratory protocol  DuoNeb p r n     Continue oxygen 2 liters/minute via nasal cannula at HS  Will consult Pulmonary and psychiatry  Plan for Additional Problems:   · Possible COPD exacerbation  Management as above  Patient was admitted between 9/19/2017-9/21/2017 for COPD exacerbation  Was treated with IV steroids and IV Zithromax  · Anxiety  History of  Was on Xanax in the past   Will restart  Will consult Psychiatry  · Hyperglycemia secondary to uncontrolled type 2 diabetes  Glucose 324  A1c 9 3 10 days ago  Patient was changed from Lantus 30 units subcu b i d   to NovoLog 70 / 30 40 units subcu b i d  by her PCP recently  Glucose 324  Will add Humalog 5 units subcu t i d  with meals  Fingerstick blood sugar a c  and HS with SSI  Monitor     · Hypertension  BP elevated at times  Continue home medication lisinopril  Will order hydralazine p r n  for systolic above 897  Monitor BP  · Chronic diastolic CHF  2D echo in June 2017 showed normal EF, grade 1 diastolic dysfunction  Continue Demadex  Daily weight  · Hypothyroidism/partial thyroidectomy  Continue Synthroid  TSH, free T4 within normal limit 10 days ago  · IBS  Continue Bentyl p r n  MiraLax b i d  last BM 2 days ago  Questionable abdominal distension to me  Will add Colace  Will order OBS in a m  Jaime Kendall · Chronic pain in joints and abdomen  Continue Percocet p r n  and Bentyl p r n  · Restless leg syndrome  Continue Mirapex  · Pulmonary nodules  Right lower lobe  No history of smoking  Patient saw Dr Puma Barbosa in April 2017, he recommended follow-up CT scan in 3 months  Not sure if it was done  Will defer to Pulmonary  · History of obesity hypoventilation  Patient was supposedly to be on BiPAP at home  Currently using oxygen 2 liters/minute at night only  Will need to follow up with her Pulmonary as outpatient  · Morbid obesity  Body mass index is 41 53 kg/m²  Diet and lifestyle modification    Anticipated Length of Stay:  Patient will be admitted on an Inpatient basis with an anticipated length of stay of  > 2 midnights  Admission Orders:  MED SURG  CONSULT PULMONARY  BLE VENODYNES  PT/OT EVAL & TX  ACCUCHECKS WITH COVERAGE SCALE  O2 TO KEEP SATS>92$  CONSULT PSYCHE  Scheduled Meds:   ascorbic acid 500 mg Oral Daily   aspirin 81 mg Oral Daily   docusate sodium 100 mg Oral BID   enoxaparin 40 mg Subcutaneous Daily   insulin aspart protamine-insulin aspart 40 Units Subcutaneous BID AC   insulin lispro 1-5 Units Subcutaneous HS   insulin lispro 1-6 Units Subcutaneous TID AC   insulin lispro 8 Units Subcutaneous TID With Meals   ipratropium-albuterol 3 mL Nebulization Q6H   levothyroxine 125 mcg Oral Early Morning   lisinopril 5 mg Oral Daily   multivitamin-minerals 1 tablet Oral Daily   polyethylene glycol 17 g Oral BID   pramipexole 1 mg Oral TID   sodium chloride 500 mL Intravenous Once   torsemide 20 mg Oral BID (diuretic)   IV SOLUMEDROL 40MG Q12H  Continuous Infusions:    PRN Meds:   acetaminophen    ALPRAZolam    dicyclomine    oxyCODONE-acetaminophen     [address] [place] [address] in the [facility] by Miguel Ángel Estevez for 2017  Network Utilization Review Department  Phone: [phone]; Fax [phone]  ATTENTION: The Network Utilization Review Department is now centralized for our 7 Facilities   Make a note that we have a new phone and fax numbers for our Department  Please call with any questions or concerns to [phone] and carefully follow the prompts so that you are directed to the right person  All voicemails are confidential  Fax any determinations, approvals, denials, and requests for initial or continue stay review clinical to [phone]  Due to HIGH CALL volume, it would be easier if you could please send faxed requests to expedite your requests and in part, help us provide discharge notifications faster  management per primary team  follow bmp

## 2019-08-09 ENCOUNTER — TELEPHONE (OUTPATIENT)
Dept: FAMILY MEDICINE CLINIC | Facility: CLINIC | Age: 79
End: 2019-08-09

## 2019-08-09 NOTE — TELEPHONE ENCOUNTER
Nelson Graham the hospice nurse called to inform you Edvin Mike had a fall last night and got a bruise on her forehead  Also she is complaining of L hip pain, but no bruising or deformity there  They can take her for a xray if you feel it's necessary  Amy Richardson is aware you are out of the office till Monday, but just wanted to make you aware of this  Can reach Amy Richardson before 4:30pm today  [phone]  Or call the service after 4:30pm [phone] if need be

## 2019-08-21 DIAGNOSIS — G89.4 CHRONIC PAIN SYNDROME: ICD-10-CM

## 2019-08-21 RX ORDER — OXYCODONE HYDROCHLORIDE AND ACETAMINOPHEN 5; 325 MG/1; MG/1
1-2 TABLET ORAL EVERY 6 HOURS PRN
Qty: 60 TABLET | Refills: 0 | Status: SHIPPED | OUTPATIENT
Start: 2019-08-21 | End: 2019-08-28 | Stop reason: SDUPTHER

## 2019-08-23 ENCOUNTER — TELEPHONE (OUTPATIENT)
Dept: FAMILY MEDICINE CLINIC | Facility: CLINIC | Age: 79
End: 2019-08-23

## 2019-08-23 NOTE — TELEPHONE ENCOUNTER
Spoke to Annabelle and gave new diagnosis per Dr Jett Alanis states she will run them both by her director and call back on Monday to let us know if either of them would work to continue hospice  If not Dr Jett Mendoza can send the oxygen order   (If sending the oxygen order, need to know where to send it and to what fax #)   Will wait to hear back from Hospice

## 2019-08-23 NOTE — TELEPHONE ENCOUNTER
Roberta العراقي from Hospice called to inform you they are thinking about discharging Viamão from hospice for her COPD  Roberta العراقي states Viamão is stable at this point and does not qualify anymore  If you agree with this, Roberta العراقي would like you to order home oxygen because Viamão will be going back to stay with [facility]      Please advise Roberta العراقي if you are ok with this [phone]  Thanks

## 2019-08-23 NOTE — TELEPHONE ENCOUNTER
I STILL THINK SHE COULD USE THE SERVICES, BUT IF THEY THINK SHE IS NOT A CANDIDATE FOR HOSPICE THEN I GUESS SHE COULD BE DISCHARGED  I WILL SEND UP ORDER FOR HOME O2

## 2019-08-27 NOTE — TELEPHONE ENCOUNTER
Duyen Rodriguez from [facility] called back stating the medical director states since respitory wise Marybel Rankin is stable and her secondary diagnoses still will not work  As of now unless her situation changes, they will move forward with discharging Marybel Rankin  Duyen Rodriguez plans on speaking with Marybel Rankin daughter tomorrow and Duyen Rodriguez will call back with info on where to send the home oxygen order to

## 2019-08-28 DIAGNOSIS — G89.4 CHRONIC PAIN SYNDROME: ICD-10-CM

## 2019-08-28 RX ORDER — OXYCODONE HYDROCHLORIDE AND ACETAMINOPHEN 5; 325 MG/1; MG/1
1-2 TABLET ORAL EVERY 6 HOURS PRN
Qty: 60 TABLET | Refills: 0 | Status: SHIPPED | OUTPATIENT
Start: 2019-08-28 | End: 2019-09-04

## 2019-08-28 NOTE — TELEPHONE ENCOUNTER
lmom for Meron Devlin to get info on where [place] will be staying after being discharged from Hospice so we can send the order for home oxygen

## 2019-08-28 NOTE — TELEPHONE ENCOUNTER
Sander Bowers called back  She states Marybel Chucho will be staying with [facility] in [place] until the beginning of September when she will be moving in with her other daughter in [place]  I informed Sander Bowers we will be faxing the home oxygen order to [facility] this morning at [phone]  I also gave Annmarie Lund phone number [phone] in case they need to reach out to them for any reason  Home oxygen order done and faxed to [facility] at [phone] per     No further action required at this time

## 2019-08-29 NOTE — TELEPHONE ENCOUNTER
Goldy Alonso got the order for home oxygen, but need to know if its pulse flow or continuous flow  Please advise    Thanks

## 2019-09-04 ENCOUNTER — OFFICE VISIT (OUTPATIENT)
Dept: FAMILY MEDICINE CLINIC | Facility: CLINIC | Age: 79
End: 2019-09-04
Payer: COMMERCIAL

## 2019-09-04 VITALS
HEIGHT: 59 IN | TEMPERATURE: 98.6 F | BODY MASS INDEX: 42.05 KG/M2 | SYSTOLIC BLOOD PRESSURE: 100 MMHG | WEIGHT: 208.6 LBS | DIASTOLIC BLOOD PRESSURE: 50 MMHG | OXYGEN SATURATION: 94 % | HEART RATE: 66 BPM | RESPIRATION RATE: 18 BRPM

## 2019-09-04 DIAGNOSIS — I10 ESSENTIAL HYPERTENSION: Chronic | ICD-10-CM

## 2019-09-04 DIAGNOSIS — J96.11 CHRONIC RESPIRATORY FAILURE WITH HYPOXIA AND HYPERCAPNIA (HCC): ICD-10-CM

## 2019-09-04 DIAGNOSIS — M79.2 NEUROPATHIC PAIN: ICD-10-CM

## 2019-09-04 DIAGNOSIS — J96.12 CHRONIC RESPIRATORY FAILURE WITH HYPOXIA AND HYPERCAPNIA (HCC): ICD-10-CM

## 2019-09-04 DIAGNOSIS — E08.40 DIABETES MELLITUS DUE TO UNDERLYING CONDITION WITH DIABETIC NEUROPATHY, WITH LONG-TERM CURRENT USE OF INSULIN (HCC): Primary | Chronic | ICD-10-CM

## 2019-09-04 DIAGNOSIS — G89.4 CHRONIC PAIN SYNDROME: ICD-10-CM

## 2019-09-04 DIAGNOSIS — R60.9 EDEMA, UNSPECIFIED TYPE: ICD-10-CM

## 2019-09-04 DIAGNOSIS — B37.2 YEAST DERMATITIS: ICD-10-CM

## 2019-09-04 DIAGNOSIS — M15.9 PRIMARY OSTEOARTHRITIS INVOLVING MULTIPLE JOINTS: ICD-10-CM

## 2019-09-04 DIAGNOSIS — E66.2 OBESITY HYPOVENTILATION SYNDROME (HCC): Chronic | ICD-10-CM

## 2019-09-04 DIAGNOSIS — G25.81 RESTLESS LEG SYNDROME: ICD-10-CM

## 2019-09-04 DIAGNOSIS — Z79.4 DIABETES MELLITUS DUE TO UNDERLYING CONDITION WITH DIABETIC NEUROPATHY, WITH LONG-TERM CURRENT USE OF INSULIN (HCC): Primary | Chronic | ICD-10-CM

## 2019-09-04 PROCEDURE — 1100F PTFALLS ASSESS-DOCD GE2>/YR: CPT | Performed by: FAMILY MEDICINE

## 2019-09-04 PROCEDURE — 99213 OFFICE O/P EST LOW 20 MIN: CPT | Performed by: FAMILY MEDICINE

## 2019-09-04 PROCEDURE — 3288F FALL RISK ASSESSMENT DOCD: CPT | Performed by: FAMILY MEDICINE

## 2019-09-04 RX ORDER — OXYCODONE AND ACETAMINOPHEN 7.5; 325 MG/1; MG/1
1 TABLET ORAL EVERY 4 HOURS PRN
Qty: 150 TABLET | Refills: 0 | Status: SHIPPED | OUTPATIENT
Start: 2019-09-04 | End: 2019-09-05 | Stop reason: SDUPTHER

## 2019-09-04 RX ORDER — POTASSIUM CHLORIDE 1500 MG/1
20 TABLET, FILM COATED, EXTENDED RELEASE ORAL 2 TIMES DAILY
Qty: 60 TABLET | Refills: 3 | Status: SHIPPED | OUTPATIENT
Start: 2019-09-04 | End: 2019-09-06 | Stop reason: SDUPTHER

## 2019-09-04 RX ORDER — METOLAZONE 2.5 MG/1
2.5 TABLET ORAL 2 TIMES DAILY
Qty: 60 TABLET | Refills: 3 | Status: SHIPPED | OUTPATIENT
Start: 2019-09-04 | End: 2020-03-30 | Stop reason: SDUPTHER

## 2019-09-04 RX ORDER — AMOXICILLIN 250 MG
2 CAPSULE ORAL
COMMUNITY
End: 2019-10-25 | Stop reason: SDUPTHER

## 2019-09-04 RX ORDER — GABAPENTIN 100 MG/1
300 CAPSULE ORAL 3 TIMES DAILY
COMMUNITY
End: 2019-09-04

## 2019-09-04 RX ORDER — MORPHINE SULFATE 15 MG/1
TABLET, FILM COATED, EXTENDED RELEASE ORAL
COMMUNITY
End: 2019-09-06 | Stop reason: ALTCHOICE

## 2019-09-04 RX ORDER — FUROSEMIDE 40 MG/1
TABLET ORAL
COMMUNITY
End: 2019-09-04

## 2019-09-04 RX ORDER — AMLODIPINE BESYLATE 5 MG/1
10 TABLET ORAL DAILY
COMMUNITY
Start: 2019-05-31 | End: 2019-09-06 | Stop reason: ALTCHOICE

## 2019-09-04 RX ORDER — GABAPENTIN 300 MG/1
300 CAPSULE ORAL 3 TIMES DAILY
Qty: 90 CAPSULE | Refills: 3 | Status: SHIPPED | OUTPATIENT
Start: 2019-09-04 | End: 2019-09-04 | Stop reason: SDUPTHER

## 2019-09-04 RX ORDER — LEVOTHYROXINE SODIUM 0.12 MG/1
TABLET ORAL
COMMUNITY
End: 2019-09-06 | Stop reason: SDUPTHER

## 2019-09-04 RX ORDER — POTASSIUM CHLORIDE 1500 MG/1
TABLET, FILM COATED, EXTENDED RELEASE ORAL
COMMUNITY
End: 2019-09-04

## 2019-09-04 RX ORDER — PEN NEEDLE, DIABETIC 31 GX5/16"
NEEDLE, DISPOSABLE MISCELLANEOUS
Refills: 3 | COMMUNITY
Start: 2019-07-18

## 2019-09-04 RX ORDER — PRAMIPEXOLE DIHYDROCHLORIDE 1 MG/1
1 TABLET ORAL 3 TIMES DAILY
Qty: 90 TABLET | Refills: 3 | Status: SHIPPED | OUTPATIENT
Start: 2019-09-04 | End: 2019-11-04 | Stop reason: SDUPTHER

## 2019-09-04 RX ORDER — POLYETHYLENE GLYCOL 3350
POWDER (GRAM) MISCELLANEOUS
COMMUNITY
End: 2019-10-30 | Stop reason: SDUPTHER

## 2019-09-04 RX ORDER — GABAPENTIN 300 MG/1
300 CAPSULE ORAL 3 TIMES DAILY
Qty: 90 CAPSULE | Refills: 3 | Status: SHIPPED | OUTPATIENT
Start: 2019-09-04 | End: 2019-09-23 | Stop reason: SDUPTHER

## 2019-09-04 RX ORDER — POTASSIUM CHLORIDE 20 MEQ/1
TABLET, EXTENDED RELEASE ORAL
COMMUNITY
End: 2019-09-06 | Stop reason: SDUPTHER

## 2019-09-04 RX ORDER — FUROSEMIDE 40 MG/1
40 TABLET ORAL 2 TIMES DAILY
Qty: 60 TABLET | Refills: 3 | Status: SHIPPED | OUTPATIENT
Start: 2019-09-04 | End: 2019-11-26 | Stop reason: SDUPTHER

## 2019-09-04 NOTE — PROGRESS NOTES
C/O double vision when looking with both eyes, when she looks with one eye at a time vision is not blurry  C/O Headache: left side of head  C/O body aches/pain 8/10 with Percocet   - Neck, Left shoulder, Left fingers  C/O rash under both breasts

## 2019-09-04 NOTE — TELEPHONE ENCOUNTER
[facility] states they do not have percocet 7 5/325 in stock ad can not order it  They will notify the patient and have her call us to inform another pharmacy they might want to use instead

## 2019-09-05 ENCOUNTER — TELEPHONE (OUTPATIENT)
Dept: FAMILY MEDICINE CLINIC | Facility: CLINIC | Age: 79
End: 2019-09-05

## 2019-09-05 PROBLEM — G25.81 RESTLESS LEG SYNDROME: Status: ACTIVE | Noted: 2019-09-05

## 2019-09-05 PROBLEM — R60.9 EDEMA: Status: ACTIVE | Noted: 2019-09-05

## 2019-09-05 PROBLEM — B37.2 YEAST DERMATITIS: Status: ACTIVE | Noted: 2019-09-05

## 2019-09-05 RX ORDER — CLOTRIMAZOLE AND BETAMETHASONE DIPROPIONATE 10; .64 MG/G; MG/G
CREAM TOPICAL 2 TIMES DAILY
Qty: 30 G | Refills: 0 | Status: SHIPPED | OUTPATIENT
Start: 2019-09-05 | End: 2019-09-17 | Stop reason: SDUPTHER

## 2019-09-05 RX ORDER — OXYCODONE AND ACETAMINOPHEN 7.5; 325 MG/1; MG/1
1 TABLET ORAL EVERY 4 HOURS PRN
Qty: 150 TABLET | Refills: 0 | Status: SHIPPED | OUTPATIENT
Start: 2019-09-05 | End: 2019-09-23 | Stop reason: SDUPTHER

## 2019-09-05 NOTE — PROGRESS NOTES
Assessment/Plan:    No problem-specific Assessment & Plan notes found for this encounter  Diagnoses and all orders for this visit:    Diabetes mellitus due to underlying condition with diabetic neuropathy, with long-term current use of insulin ([facility] )  -     Ambulatory Referral to [address] [place] [address]; Future  -     glucose blood (MM EASY TOUCH GLUCOSE) test strip; Use as instructed    Obesity hypoventilation syndrome ([facility] )  -     Ambulatory Referral to Home Health; Future    Chronic respiratory failure with hypoxia and hypercapnia ([facility] )  -     Ambulatory Referral to Home Health; Future    Essential hypertension  -     Potassium Chloride ER 20 MEQ TBCR; Take 1 tablet (20 mEq total) by mouth 2 (two) times a day  -     Ambulatory Referral to [address] [place] [address]; Future    Primary osteoarthritis involving multiple joints  -     oxyCODONE-acetaminophen (PERCOCET) 7 5-325 MG per tablet; Take 1 tablet by mouth every 4 (four) hours as needed for moderate painMax Daily Amount: 5 tablets  -     Ambulatory Referral to [address] [place] [address]; Future    Neuropathic pain  -     Discontinue: gabapentin (NEURONTIN) 300 mg capsule; Take 1 capsule (300 mg total) by mouth 3 (three) times a day  -     gabapentin (NEURONTIN) 300 mg capsule; Take 1 capsule (300 mg total) by mouth 3 (three) times a day  -     oxyCODONE-acetaminophen (PERCOCET) 7 5-325 MG per tablet; Take 1 tablet by mouth every 4 (four) hours as needed for moderate painMax Daily Amount: 5 tablets  -     Ambulatory Referral to [address] [place] [address]; Future    Edema, unspecified type  -     furosemide (LASIX) 40 mg tablet; Take 1 tablet (40 mg total) by mouth 2 (two) times a day  -     Potassium Chloride ER 20 MEQ TBCR; Take 1 tablet (20 mEq total) by mouth 2 (two) times a day  -     metolazone (ZAROXOLYN) 2 5 mg tablet; Take 1 tablet (2 5 mg total) by mouth 2 (two) times a day  -     Ambulatory Referral to [address] [place] [address]; Future    Restless leg syndrome  -     pramipexole (MIRAPEX) 1 mg tablet;  Take 1 tablet (1 mg total) by mouth 3 (three) times a day  -     Ambulatory Referral to [address] [place] [address]; Future    Chronic pain syndrome  -     oxyCODONE-acetaminophen (PERCOCET) 7 5-325 MG per tablet; Take 1 tablet by mouth every 4 (four) hours as needed for moderate painMax Daily Amount: 5 tablets  -     Ambulatory Referral to [address] [place] [address]; Future    Yeast dermatitis  -     clotrimazole-betamethasone (LOTRISONE) 1-0 05 % cream; Apply topically 2 (two) times a day  -     Ambulatory Referral to Home Health; Future    Other orders  -     Discontinue: furosemide (LASIX) 40 mg tablet; furosemide 40 mg tablet  -     potassium chloride (K-DUR,KLOR-CON) 20 mEq tablet; potassium chloride ER 20 mEq tablet,extended release(part/cryst)  -     morphine (MS CONTIN) 15 mg 12 hr tablet; morphine ER 15 mg tablet,extended release  -     NOVOFINE AUTOCOVER 30G X 8 MM MISC  -     B-D UF III MINI PEN NEEDLES 31G X 5 MM MISC; USE TWICE DAILY TO TEST BLOOD SUGAR  -     insulin glargine (LANTUS SOLOSTAR) 100 units/mL injection pen; Lantus Solostar U-100 Insulin 100 unit/mL (3 mL) subcutaneous pen  -     amLODIPine (NORVASC) 5 mg tablet; 10 mg daily   -     Discontinue: gabapentin (NEURONTIN) 100 mg capsule; 300 mg 3 (three) times a day   -     Discontinue: furosemide (LASIX) 40 mg tablet; furosemide 40 mg tabs  -     levothyroxine 125 mcg tablet; levothyroxine sodium 125 mcg tabs  -     Polyethylene Glycol 3350 POWD; polyethylene glycol 3350  powd  -     Discontinue: Potassium Chloride ER 20 MEQ TBCR; potassium chloride er 20 meq tbcr  -     senna-docusate sodium (SENOKOT S) 8 6-50 mg per tablet; Take 2 tablets by mouth daily at bedtime          Patient Instructions   CONTINUE CURRENT TREATMENT PLAN  DISCUSSED PAIN MANAGEMENT    DAILY WEIGHT  MONITOR DIETARY SODIUM INTAKE  ELEVATE LEGS  COMPRESSION WRAPS    OPHTHALMOLOGY CONSULT FOR VISION CHANGES    RV 2 WEEKS      Return in about 2 weeks (around 9/18/2019) for Recheck      Subjective:      Patient ID: Christo Kinney is a [de-identified] y o  female  Chief Complaint   Patient presents with    not feeling well     for the past couple weeks       PATIENT RETURNS AFTER BEING RELEASED FROM HOSPICE SERVICES  WILL BE LIVING WITH SIGNIFICANT OTHER    REVIEWED MEDICAL RECORD AND MEDICATIONS    REVIEWED INSULIN READINGS AND ADJUSTMENTS WERE MADE    DISCUSSED PAIN MANAGEMENT AT LENGTH      The following portions of the patient's history were reviewed and updated as appropriate: allergies, current medications, past family history, past medical history, past social history, past surgical history and problem list     Review of Systems   Constitutional: Positive for fatigue  Negative for chills and fever  HENT: Negative for congestion, ear discharge, ear pain, mouth sores, postnasal drip, sore throat and trouble swallowing  Eyes: Negative for pain, discharge and visual disturbance  Respiratory: Positive for shortness of breath and wheezing  Negative for cough  Cardiovascular: Positive for leg swelling  Negative for chest pain and palpitations  Gastrointestinal: Negative for abdominal distention, abdominal pain, blood in stool, diarrhea and nausea  Endocrine: Negative for polydipsia, polyphagia and polyuria  Genitourinary: Negative for dysuria, frequency, hematuria and urgency  Musculoskeletal: Positive for arthralgias and gait problem  Negative for joint swelling  Skin: Negative for pallor and rash  Neurological: Negative for dizziness, syncope, speech difficulty, weakness, light-headedness, numbness and headaches  Hematological: Negative for adenopathy  Psychiatric/Behavioral: Positive for dysphoric mood  Negative for behavioral problems, confusion and sleep disturbance  The patient is nervous/anxious            Current Outpatient Medications   Medication Sig Dispense Refill    amLODIPine (NORVASC) 5 mg tablet 10 mg daily       B-D UF III MINI PEN NEEDLES 31G X 5 MM MISC USE TWICE DAILY TO TEST BLOOD SUGAR  3  gabapentin (NEURONTIN) 300 mg capsule Take 1 capsule (300 mg total) by mouth 3 (three) times a day 90 capsule 3    insulin aspart protamine-insulin aspart (NOVOLOG MIX 70/30 FLEXPEN) 100 Units/mL injection pen Inject 45 Units under the skin 2 (two) times a day before meals 5 pen 0    levothyroxine 125 mcg tablet Take 1 tablet (125 mcg total) by mouth daily 90 tablet 0    metolazone (ZAROXOLYN) 2 5 mg tablet Take 1 tablet (2 5 mg total) by mouth 2 (two) times a day 60 tablet 3    metoprolol tartrate (LOPRESSOR) 25 mg tablet Take 1 tablet (25 mg total) by mouth 2 (two) times a day for 90 days 180 tablet 3    NOVOFINE AUTOCOVER 30G X 8 MM MISC       Polyethylene Glycol 3350 POWD polyethylene glycol 3350  powd      potassium chloride (K-DUR,KLOR-CON) 20 mEq tablet potassium chloride ER 20 mEq tablet,extended release(part/cryst)      pramipexole (MIRAPEX) 1 mg tablet Take 1 tablet (1 mg total) by mouth 3 (three) times a day 90 tablet 3    senna-docusate sodium (SENOKOT S) 8 6-50 mg per tablet Take 2 tablets by mouth daily at bedtime      albuterol (ACCUNEB) 1 25 MG/3ML nebulizer solution Take 3 mL (1 25 mg total) by nebulization 3 (three) times a day as needed for wheezing (J45 909) (Patient not taking: Reported on 9/4/2019) 60 vial 0    amoxicillin (AMOXIL) 500 mg capsule       cetirizine (ZyrTEC) 10 mg tablet Take 1 tablet (10 mg total) by mouth daily at bedtime (Patient not taking: Reported on 9/4/2019) 30 tablet 2    clotrimazole-betamethasone (LOTRISONE) 1-0 05 % cream Apply topically 2 (two) times a day 30 g 0    fluticasone (FLONASE) 50 mcg/act nasal spray 2 sprays into each nostril daily (Patient not taking: Reported on 5/1/8402) 16 g 0    folic acid (FOLVITE) 1 mg tablet Take by mouth daily      furosemide (LASIX) 40 mg tablet Take 1 tablet (40 mg total) by mouth 2 (two) times a day 60 tablet 3    glucose blood (MM EASY TOUCH GLUCOSE) test strip Use as instructed 200 each 3    insulin glargine (LANTUS SOLOSTAR) 100 units/mL injection pen Lantus Solostar U-100 Insulin 100 unit/mL (3 mL) subcutaneous pen      levothyroxine 125 mcg tablet levothyroxine sodium 125 mcg tabs      Linaclotide (LINZESS) 72 MCG CAPS Take 72 mcg by mouth daily before breakfast (Patient not taking: Reported on 9/4/2019) 30 capsule 3    LORazepam (ATIVAN) 0 5 mg tablet Take 1 tablet (0 5 mg total) by mouth every 8 (eight) hours as needed for anxiety for up to 10 days 30 tablet 0    morphine (MS CONTIN) 15 mg 12 hr tablet morphine ER 15 mg tablet,extended release      oxyCODONE-acetaminophen (PERCOCET) 7 5-325 MG per tablet Take 1 tablet by mouth every 4 (four) hours as needed for moderate painMax Daily Amount: 5 tablets 150 tablet 0    Potassium Chloride ER 20 MEQ TBCR Take 1 tablet (20 mEq total) by mouth 2 (two) times a day 60 tablet 3    tobramycin-dexamethasone (TOBRADEX) ophthalmic suspension Administer 2 drops to both eyes 4 (four) times a day for 5 days 5 mL 0     Current Facility-Administered Medications   Medication Dose Route Frequency Provider Last Rate Last Dose    furosemide (LASIX) injection 30 mg  30 mg Intravenous Once Norberto Pineda MD           Objective:    /50 (BP Location: Right arm, Patient Position: Sitting, Cuff Size: Large)   Pulse 66   Temp 98 6 °F (37 °C) (Temporal)   Resp 18   Ht 4' 11" (1 499 m)   Wt 94 6 kg (208 lb 9 6 oz)   LMP  (LMP Unknown)   SpO2 94%   BMI 42 13 kg/m²        Physical Exam   Constitutional: She is oriented to person, place, and time  She appears well-developed and well-nourished  HENT:   Head: Normocephalic and atraumatic  Eyes: Pupils are equal, round, and reactive to light  Conjunctivae and EOM are normal  Right eye exhibits no discharge  Left eye exhibits no discharge  Neck: Normal range of motion  Neck supple  No thyromegaly present  Cardiovascular: Normal rate, regular rhythm and normal heart sounds  No murmur heard    Pulmonary/Chest: Effort normal and breath sounds normal  No respiratory distress  She has no wheezes  She has no rales  Abdominal: Soft  Bowel sounds are normal  There is no tenderness  OBESE   Musculoskeletal: Normal range of motion  She exhibits edema and tenderness  MODERATE DJD FINDINGS  1-2+ EDEMA  NO ULCERATIONS   Lymphadenopathy:     She has no cervical adenopathy  Neurological: She is alert and oriented to person, place, and time  Skin: Skin is warm and dry  No rash noted  No erythema  Psychiatric: She has a normal mood and affect   Her behavior is normal  Judgment and thought content normal               Shea Caballero MD

## 2019-09-05 NOTE — PATIENT INSTRUCTIONS
CONTINUE CURRENT TREATMENT PLAN  DISCUSSED PAIN MANAGEMENT    DAILY WEIGHT  MONITOR DIETARY SODIUM INTAKE  ELEVATE LEGS  COMPRESSION WRAPS    OPHTHALMOLOGY CONSULT FOR VISION CHANGES    RV 2 WEEKS

## 2019-09-05 NOTE — TELEPHONE ENCOUNTER
[facility] sent a PA request   Called to verify with them what needed to be done for the PA  They stated that it is the quantity  Insurance will pay for a 7 day supply not the 30 without a PA  Do you want us to try to do the PA? Or have her call every 7 days?

## 2019-09-05 NOTE — TELEPHONE ENCOUNTER
Needs a new prescription for the test strips  Medicare does not allow it to say use as instructed  It needs to say how many times she tests a day      Checked with Bill Friend - she tests 3 times a day    [facility] in [facility]

## 2019-09-06 RX ORDER — POTASSIUM CHLORIDE 20 MEQ/1
20 TABLET, EXTENDED RELEASE ORAL DAILY
COMMUNITY
End: 2019-10-25 | Stop reason: SDUPTHER

## 2019-09-17 ENCOUNTER — OFFICE VISIT (OUTPATIENT)
Dept: FAMILY MEDICINE CLINIC | Facility: CLINIC | Age: 79
End: 2019-09-17
Payer: COMMERCIAL

## 2019-09-17 ENCOUNTER — TELEPHONE (OUTPATIENT)
Dept: FAMILY MEDICINE CLINIC | Facility: CLINIC | Age: 79
End: 2019-09-17

## 2019-09-17 VITALS
DIASTOLIC BLOOD PRESSURE: 68 MMHG | TEMPERATURE: 98 F | HEIGHT: 59 IN | WEIGHT: 205 LBS | HEART RATE: 66 BPM | BODY MASS INDEX: 41.33 KG/M2 | OXYGEN SATURATION: 94 % | RESPIRATION RATE: 18 BRPM | SYSTOLIC BLOOD PRESSURE: 138 MMHG

## 2019-09-17 DIAGNOSIS — Z79.4 DIABETES MELLITUS DUE TO UNDERLYING CONDITION WITH DIABETIC NEUROPATHY, WITH LONG-TERM CURRENT USE OF INSULIN (HCC): Primary | Chronic | ICD-10-CM

## 2019-09-17 DIAGNOSIS — M15.9 PRIMARY OSTEOARTHRITIS INVOLVING MULTIPLE JOINTS: ICD-10-CM

## 2019-09-17 DIAGNOSIS — Z23 NEED FOR INFLUENZA VACCINATION: ICD-10-CM

## 2019-09-17 DIAGNOSIS — E66.2 OBESITY HYPOVENTILATION SYNDROME (HCC): Chronic | ICD-10-CM

## 2019-09-17 DIAGNOSIS — D32.9 MENINGIOMA (HCC): ICD-10-CM

## 2019-09-17 DIAGNOSIS — J42 CHRONIC BRONCHITIS, UNSPECIFIED CHRONIC BRONCHITIS TYPE (HCC): ICD-10-CM

## 2019-09-17 DIAGNOSIS — B37.2 YEAST DERMATITIS: ICD-10-CM

## 2019-09-17 DIAGNOSIS — I50.32 CHRONIC DIASTOLIC CONGESTIVE HEART FAILURE (HCC): ICD-10-CM

## 2019-09-17 DIAGNOSIS — J96.12 CHRONIC RESPIRATORY FAILURE WITH HYPOXIA AND HYPERCAPNIA (HCC): ICD-10-CM

## 2019-09-17 DIAGNOSIS — F33.1 MAJOR DEPRESSIVE DISORDER, RECURRENT, MODERATE (HCC): ICD-10-CM

## 2019-09-17 DIAGNOSIS — J96.11 CHRONIC RESPIRATORY FAILURE WITH HYPOXIA AND HYPERCAPNIA (HCC): ICD-10-CM

## 2019-09-17 DIAGNOSIS — I10 ESSENTIAL HYPERTENSION: Chronic | ICD-10-CM

## 2019-09-17 DIAGNOSIS — E08.40 DIABETES MELLITUS DUE TO UNDERLYING CONDITION WITH DIABETIC NEUROPATHY, WITH LONG-TERM CURRENT USE OF INSULIN (HCC): Primary | Chronic | ICD-10-CM

## 2019-09-17 PROBLEM — L97.909 CHRONIC CUTANEOUS VENOUS STASIS ULCER (HCC): Status: RESOLVED | Noted: 2018-08-30 | Resolved: 2019-09-17

## 2019-09-17 PROBLEM — I83.009 VENOUS STASIS ULCER (HCC): Status: RESOLVED | Noted: 2018-09-08 | Resolved: 2019-09-17

## 2019-09-17 PROBLEM — I83.009 CHRONIC CUTANEOUS VENOUS STASIS ULCER (HCC): Status: RESOLVED | Noted: 2018-08-30 | Resolved: 2019-09-17

## 2019-09-17 PROBLEM — G25.81 RESTLESS LEG SYNDROME: Status: RESOLVED | Noted: 2019-09-05 | Resolved: 2019-09-17

## 2019-09-17 PROBLEM — R60.9 EDEMA: Status: RESOLVED | Noted: 2019-09-05 | Resolved: 2019-09-17

## 2019-09-17 PROBLEM — L97.909 VENOUS STASIS ULCER (HCC): Status: RESOLVED | Noted: 2018-09-08 | Resolved: 2019-09-17

## 2019-09-17 PROBLEM — R09.81 SINUS CONGESTION: Status: RESOLVED | Noted: 2018-10-22 | Resolved: 2019-09-17

## 2019-09-17 LAB
CREAT UR-MCNC: 21.8 MG/DL
MICROALBUMIN UR-MCNC: <5 MG/L (ref 0–20)
MICROALBUMIN/CREAT 24H UR: <23 MG/G CREATININE (ref 0–30)
SL AMB POCT HEMOGLOBIN AIC: 8.4 (ref ?–6.5)

## 2019-09-17 PROCEDURE — G0008 ADMIN INFLUENZA VIRUS VAC: HCPCS

## 2019-09-17 PROCEDURE — 82570 ASSAY OF URINE CREATININE: CPT | Performed by: FAMILY MEDICINE

## 2019-09-17 PROCEDURE — 82043 UR ALBUMIN QUANTITATIVE: CPT | Performed by: FAMILY MEDICINE

## 2019-09-17 PROCEDURE — 99213 OFFICE O/P EST LOW 20 MIN: CPT | Performed by: FAMILY MEDICINE

## 2019-09-17 PROCEDURE — 90662 IIV NO PRSV INCREASED AG IM: CPT

## 2019-09-17 PROCEDURE — 83036 HEMOGLOBIN GLYCOSYLATED A1C: CPT | Performed by: FAMILY MEDICINE

## 2019-09-17 RX ORDER — CLOTRIMAZOLE AND BETAMETHASONE DIPROPIONATE 10; .64 MG/G; MG/G
CREAM TOPICAL 2 TIMES DAILY
Qty: 30 G | Refills: 0 | Status: SHIPPED | OUTPATIENT
Start: 2019-09-17

## 2019-09-17 NOTE — TELEPHONE ENCOUNTER
Phyllis Dose called back stating you discussed getting a cream for under [facility]' breast   Phyllis Dose would like that called into [facility]

## 2019-09-17 NOTE — PATIENT INSTRUCTIONS
CONTINUE CURRENT TREATMENT PLAN  MONITOR DIETARY SODIUM, CHOL, AND CARBOHYDRATE INTAKE  ENCOURAGE PHYSICAL ACTIVITY  FOOT CARE  DAILY WEIGHT  INSULIN CHANGES AS NOTED ON SLIDING SCALE    RV 6 WEEKS, SOONER PRN

## 2019-09-17 NOTE — PROGRESS NOTES
Assessment/Plan:    1  Diabetes mellitus due to underlying condition with diabetic neuropathy, with long-term current use of insulin (HCC)  -     POCT hemoglobin A1c    2  Chronic respiratory failure with hypoxia and hypercapnia ([facility])    3  Chronic bronchitis, unspecified chronic bronchitis type ([place] [address] )    4  Obesity hypoventilation syndrome ([place] [address] )    5  Essential hypertension    6  Primary osteoarthritis involving multiple joints    7  Yeast dermatitis    8  Major depressive disorder, recurrent, moderate ([facility])    9  Chronic diastolic congestive heart failure ([place] [address] )    10  Meningioma ([place] [address] )    11  Need for influenza vaccination  -     influenza vaccine, 2622-3723, high-dose, PF 0 5 mL (FLUZONE HIGH-DOSE)          BMI Counseling: Body mass index is 41 4 kg/m²  Discussed the patient's BMI with her  The BMI is above normal  Nutrition recommendations include decreasing overall calorie intake and moderation in carbohydrate intake  Patient Instructions   CONTINUE CURRENT TREATMENT PLAN  MONITOR DIETARY SODIUM, CHOL, AND CARBOHYDRATE INTAKE  ENCOURAGE PHYSICAL ACTIVITY  FOOT CARE  DAILY WEIGHT  INSULIN CHANGES AS NOTED ON SLIDING SCALE    RV 6 WEEKS, SOONER PRN        Return in about 6 weeks (around 10/29/2019) for Recheck AWV  Subjective:      Patient ID: Molly Abbott is a [de-identified] y o  female  Chief Complaint   Patient presents with    2 week revisit    Back Pain       PATIENT RETURNS FOR FOLLOW UP ON MULTIPLE MEDICAL ISSUES    OVERALL FEELING WELL  BREATHING STABLE  LEG SWELLING DOWN  PAIN MANAGEMENT ADEQUATE    REVIEWED BS READINGS AND ADJUSTMENTS TO SLIDING SCALE WERE MADE  ENCOURAGED MEDICATION COMPLIANCE AND DAILY WEIGHTS        The following portions of the patient's history were reviewed and updated as appropriate: allergies, current medications, past family history, past medical history, past social history, past surgical history and problem list     Review of Systems   Constitutional: Positive for fatigue  Negative for chills and fever  HENT: Negative for congestion, ear discharge, ear pain, mouth sores, postnasal drip, sore throat and trouble swallowing  Eyes: Positive for visual disturbance  Negative for pain and discharge  Respiratory: Positive for shortness of breath  Negative for cough and wheezing  Cardiovascular: Positive for leg swelling  Negative for chest pain and palpitations  Gastrointestinal: Negative for abdominal distention, abdominal pain, blood in stool, diarrhea, nausea and vomiting  Endocrine: Negative for polydipsia, polyphagia and polyuria  Genitourinary: Negative for dysuria, frequency, hematuria and urgency  Musculoskeletal: Positive for arthralgias, back pain and gait problem  Negative for joint swelling  Skin: Negative for pallor and rash  Neurological: Negative for dizziness, syncope, speech difficulty, weakness, light-headedness, numbness and headaches  Hematological: Negative for adenopathy  Psychiatric/Behavioral: Negative for behavioral problems, confusion and sleep disturbance  The patient is nervous/anxious            Current Outpatient Medications   Medication Sig Dispense Refill    B-D UF III MINI PEN NEEDLES 31G X 5 MM MISC USE TWICE DAILY TO TEST BLOOD SUGAR  3    clotrimazole-betamethasone (LOTRISONE) 1-0 05 % cream Apply topically 2 (two) times a day 30 g 0    furosemide (LASIX) 40 mg tablet Take 1 tablet (40 mg total) by mouth 2 (two) times a day (Patient taking differently: Take 40 mg by mouth daily ) 60 tablet 3    gabapentin (NEURONTIN) 300 mg capsule Take 1 capsule (300 mg total) by mouth 3 (three) times a day 90 capsule 3    glucose blood (MM EASY TOUCH GLUCOSE) test strip Use as instructed 200 each 3    insulin aspart protamine-insulin aspart (NOVOLOG MIX 70/30 FLEXPEN) 100 Units/mL injection pen Inject 45 Units under the skin 2 (two) times a day before meals 5 pen 0    levothyroxine 125 mcg tablet Take 1 tablet (125 mcg total) by mouth daily 90 tablet 0    metolazone (ZAROXOLYN) 2 5 mg tablet Take 1 tablet (2 5 mg total) by mouth 2 (two) times a day (Patient taking differently: Take 2 5 mg by mouth 3 (three) times a week ) 60 tablet 3    metoprolol tartrate (LOPRESSOR) 25 mg tablet Take 1 tablet (25 mg total) by mouth 2 (two) times a day for 90 days 180 tablet 3    NOVOFINE AUTOCOVER 30G X 8 MM MISC       oxyCODONE-acetaminophen (PERCOCET) 7 5-325 MG per tablet Take 1 tablet by mouth every 4 (four) hours as needed for moderate painMax Daily Amount: 5 tablets (Patient taking differently: Take 1 tablet by mouth 5 (five) times a day ) 150 tablet 0    Polyethylene Glycol 3350 POWD polyethylene glycol 3350  powd      potassium chloride (K-DUR,KLOR-CON) 20 mEq tablet Take 20 mEq by mouth daily      pramipexole (MIRAPEX) 1 mg tablet Take 1 tablet (1 mg total) by mouth 3 (three) times a day 90 tablet 3    senna-docusate sodium (SENOKOT S) 8 6-50 mg per tablet Take 2 tablets by mouth daily at bedtime      albuterol (ACCUNEB) 1 25 MG/3ML nebulizer solution Take 3 mL (1 25 mg total) by nebulization 3 (three) times a day as needed for wheezing (J45 909) (Patient not taking: Reported on 9/4/2019) 60 vial 0    LORazepam (ATIVAN) 0 5 mg tablet Take 1 tablet (0 5 mg total) by mouth every 8 (eight) hours as needed for anxiety for up to 10 days 30 tablet 0    tobramycin-dexamethasone (TOBRADEX) ophthalmic suspension Administer 2 drops to both eyes 4 (four) times a day for 5 days 5 mL 0     Current Facility-Administered Medications   Medication Dose Route Frequency Provider Last Rate Last Dose    furosemide (LASIX) injection 30 mg  30 mg Intravenous Once Karissa Walker MD           Objective:    /68   Pulse 66   Temp 98 °F (36 7 °C) (Temporal)   Resp 18   Ht 4' 11" (1 499 m)   Wt 93 kg (205 lb)   LMP  (LMP Unknown)   SpO2 94%   BMI 41 40 kg/m²        Physical Exam   Constitutional: She is oriented to person, place, and time   She appears well-developed and well-nourished  HENT:   Head: Normocephalic and atraumatic  Eyes: Pupils are equal, round, and reactive to light  EOM are normal  Right eye exhibits no discharge  Left eye exhibits no discharge  CONJ SL RED  NO DRAINAGE   Neck: Normal range of motion  Neck supple  No JVD present  No tracheal deviation present  No thyromegaly present  Cardiovascular: Normal rate, regular rhythm and normal heart sounds  Pulses are no weak pulses  No murmur heard  Pulses:       Dorsalis pedis pulses are 1+ on the right side, and 1+ on the left side  Posterior tibial pulses are 1+ on the right side, and 1+ on the left side  Pulmonary/Chest: Effort normal  No respiratory distress  She has no wheezes  She has no rales  COARSE BS  MILDLY PROLONGED EXP PHASE   Abdominal: Soft  Bowel sounds are normal  There is no tenderness  OBESE   Musculoskeletal: She exhibits edema  She exhibits no tenderness  MODERATE DJD CHANGES  TR - 1+ EDEMA   Feet:   Right Foot:   Skin Integrity: Negative for ulcer, skin breakdown, erythema, warmth, callus or dry skin  Left Foot:   Skin Integrity: Negative for ulcer, skin breakdown, erythema, warmth, callus or dry skin  Lymphadenopathy:     She has no cervical adenopathy  Neurological: She is alert and oriented to person, place, and time  Skin: Skin is warm and dry  No rash noted  No erythema  Psychiatric: She has a normal mood and affect  Her behavior is normal  Judgment and thought content normal            Patient's shoes and socks removed  Right Foot/Ankle   Right Foot Inspection  Skin Exam: skin normal and skin intact no dry skin, no warmth, no callus, no erythema, no maceration, no abnormal color, no pre-ulcer, no ulcer and no callus                          Toe Exam: ROM and strength within normal limits and swelling  Sensory       Monofilament testing: diminished  Vascular    The right DP pulse is 1+  The right PT pulse is 1+       Left Foot/Ankle  Left Foot Inspection  Skin Exam: skin normal and skin intactno dry skin, no warmth, no erythema, no maceration, normal color, no pre-ulcer, no ulcer and no callus                         Toe Exam: ROM and strength within normal limits and swelling                   Sensory       Monofilament: diminished  Vascular    The left DP pulse is 1+  The left PT pulse is 1+  Assign Risk Category:  No deformity present; Loss of protective sensation;  No weak pulses       Risk: 1         Marifer Buitrago MD

## 2019-09-20 NOTE — TELEPHONE ENCOUNTER
Andrea Hernandez from [facility] called stating she spoke with Shaye James from [facility] states they do not have any testing to send [facility] as well  Please advise your thoughts on how we can get [facility] the proper testing she needs to get home oxygen  As of now, I advised Andrea Hernandez from ????s to hold the order till she speaks with our office again       CC :  Jayleen

## 2019-09-20 NOTE — TELEPHONE ENCOUNTER
Bairon Martin stated that Alvina Monroe told her that she needed a resting SAO2 on room air showing 88% or less or if they have a 6 minute walk test or both  This is [employer] regulations  Stated that someone needs to go to her house to do this  She is not longer on hospice  Deanna Pedro has been using [facility] oxygen since 8/30/19 and they need to get it out

## 2019-09-20 NOTE — TELEPHONE ENCOUNTER
Gloriann Krabbe from [facility] called back  She states she spoke to Bhavna Parker and she advised her that she is still waiting for [employer] to deliver her home oxygen  Annabelle asked us to call [facility] to find out what is going on  I called [facility] at [phone] and spoke to Genesis Carreno  She states they received everything we sent them, but there is a note they are still waiting for testing results  I explained our office has no testing to send since the patient was on hospice  Crys said they can only send out oxygen once they recieve that  Genesis Carreno states it needs to be faxed to [phone]  I called Gloriann Krabbe at [facility] and left a message on her cell [phone], that [facility] is only waiting for testing to be sent over to fax # [phone], which we dont have since she was on Hospice  I asked Annabelle if they had testing to fax it to [facility]

## 2019-09-23 DIAGNOSIS — G89.4 CHRONIC PAIN SYNDROME: ICD-10-CM

## 2019-09-23 DIAGNOSIS — M79.2 NEUROPATHIC PAIN: ICD-10-CM

## 2019-09-23 DIAGNOSIS — M15.9 PRIMARY OSTEOARTHRITIS INVOLVING MULTIPLE JOINTS: ICD-10-CM

## 2019-09-23 RX ORDER — GABAPENTIN 300 MG/1
300 CAPSULE ORAL 3 TIMES DAILY
Qty: 90 CAPSULE | Refills: 3 | Status: SHIPPED | OUTPATIENT
Start: 2019-09-23 | End: 2019-10-25 | Stop reason: SDUPTHER

## 2019-09-23 RX ORDER — OXYCODONE AND ACETAMINOPHEN 7.5; 325 MG/1; MG/1
1 TABLET ORAL EVERY 4 HOURS PRN
Qty: 150 TABLET | Refills: 0 | Status: SHIPPED | OUTPATIENT
Start: 2019-09-23 | End: 2019-10-25 | Stop reason: SDUPTHER

## 2019-09-24 ENCOUNTER — TELEPHONE (OUTPATIENT)
Dept: FAMILY MEDICINE CLINIC | Facility: CLINIC | Age: 79
End: 2019-09-24

## 2019-09-24 NOTE — TELEPHONE ENCOUNTER
Tried calling the nurse back at [phone]  Unable to leave a message  Mailbox not set up    Lawson Rubinstein

## 2019-09-24 NOTE — TELEPHONE ENCOUNTER
Spoke to Travis Stevens at [facility] - [phone]  Patient does not have to have the 6 minute walk test with her PCP

## 2019-09-24 NOTE — TELEPHONE ENCOUNTER
Ang Caal wanted to talk to a nurse  I took down this information for you  She had questions with her Insulin change that I could not confirm  Please call  Etienne Saliva is having a headache and some neck pain and is very fatigue  BP is normal   There are no other signs/symptoms of anything going on    Since 9/17 her Blood sugar is ranging from 209 to 172  Wanted to confirm her insulin change   (Please call her)

## 2019-09-26 ENCOUNTER — OFFICE VISIT (OUTPATIENT)
Dept: FAMILY MEDICINE CLINIC | Facility: CLINIC | Age: 79
End: 2019-09-26
Payer: COMMERCIAL

## 2019-09-26 VITALS
HEART RATE: 64 BPM | WEIGHT: 204 LBS | TEMPERATURE: 98.2 F | RESPIRATION RATE: 24 BRPM | BODY MASS INDEX: 41.12 KG/M2 | HEIGHT: 59 IN | DIASTOLIC BLOOD PRESSURE: 70 MMHG | OXYGEN SATURATION: 88 % | SYSTOLIC BLOOD PRESSURE: 134 MMHG

## 2019-09-26 DIAGNOSIS — J96.12 CHRONIC RESPIRATORY FAILURE WITH HYPOXIA AND HYPERCAPNIA (HCC): Primary | ICD-10-CM

## 2019-09-26 DIAGNOSIS — R06.02 SHORTNESS OF BREATH: ICD-10-CM

## 2019-09-26 DIAGNOSIS — J96.11 CHRONIC RESPIRATORY FAILURE WITH HYPOXIA AND HYPERCAPNIA (HCC): Primary | ICD-10-CM

## 2019-09-26 PROCEDURE — 99213 OFFICE O/P EST LOW 20 MIN: CPT | Performed by: NURSE PRACTITIONER

## 2019-09-26 NOTE — ASSESSMENT & PLAN NOTE
6 minute walk test performed in office  Pt began test at 90% O2 on room air  Ambulated with assistance of walker  By the end of 6 minutes, pt's O2 dropped to 88% on room air  Test was completed, 3L oxygen applied as pt felt she was short of breath and fatigued  Oxygen increased to 97% with 3L O2 via NC at that time

## 2019-09-26 NOTE — PROGRESS NOTES
Assessment/Plan:    1  Chronic respiratory failure with hypoxia and hypercapnia (HCC)  Assessment & Plan:  6 minute walk test performed in office  Pt began test at 90% O2 on room air  Ambulated with assistance of walker  By the end of 6 minutes, pt's O2 dropped to 88% on room air  Test was completed, 3L oxygen applied as pt felt she was short of breath and fatigued  Oxygen increased to 97% with 3L O2 via NC at that time  Orders:  -     Ambulatory referral to Pulmonology; Future  -     Home Oxygen with Portability    2  Shortness of breath  -     Home Oxygen with Portability    Patient Instructions   Use oxygen as directed  Follow up with pulmonology    Return for As Needed  Subjective:      Patient ID: Ashley Carroll is a [de-identified] y o  female  Chief Complaint   Patient presents with    6 min walk test       Giselle Dalal is an [de-identified]year old female who presents to the office for evaluation of lung function with a 6 minute walking test  Pt has been using oxygen at home 3L continuous  Reports she uses her oxygen for most of the day and sleeps with her oxygen on  Reports shortness of breath with ambulation  Denies any current cough, wheezing, fevers or chills, or chest pain  Pt is currently on hospice  The following portions of the patient's history were reviewed and updated as appropriate: allergies, current medications, past family history, past medical history, past social history, past surgical history and problem list     Review of Systems   Constitutional: Negative for chills and fever  Respiratory: Positive for shortness of breath  Negative for cough, chest tightness and wheezing  Cardiovascular: Negative for chest pain, palpitations and leg swelling  Musculoskeletal: Positive for arthralgias and gait problem (ambulates with walker)  Neurological: Positive for weakness  Negative for dizziness and light-headedness           Current Outpatient Medications   Medication Sig Dispense Refill    B-D UF III MINI PEN NEEDLES 31G X 5 MM MISC USE TWICE DAILY TO TEST BLOOD SUGAR  3    clotrimazole-betamethasone (LOTRISONE) 1-0 05 % cream Apply topically 2 (two) times a day 30 g 0    furosemide (LASIX) 40 mg tablet Take 1 tablet (40 mg total) by mouth 2 (two) times a day (Patient taking differently: Take 40 mg by mouth daily ) 60 tablet 3    gabapentin (NEURONTIN) 300 mg capsule Take 1 capsule (300 mg total) by mouth 3 (three) times a day 90 capsule 3    glucose blood (MM EASY TOUCH GLUCOSE) test strip Use as instructed 200 each 3    insulin aspart protamine-insulin aspart (NOVOLOG MIX 70/30 FLEXPEN) 100 Units/mL injection pen Inject 45 Units under the skin 2 (two) times a day before meals 5 pen 0    levothyroxine 125 mcg tablet Take 1 tablet (125 mcg total) by mouth daily 90 tablet 0    LORazepam (ATIVAN) 0 5 mg tablet Take 1 tablet (0 5 mg total) by mouth every 8 (eight) hours as needed for anxiety for up to 10 days 30 tablet 0    metolazone (ZAROXOLYN) 2 5 mg tablet Take 1 tablet (2 5 mg total) by mouth 2 (two) times a day (Patient taking differently: Take 2 5 mg by mouth 3 (three) times a week ) 60 tablet 3    metoprolol tartrate (LOPRESSOR) 25 mg tablet Take 1 tablet (25 mg total) by mouth 2 (two) times a day for 90 days 180 tablet 3    NOVOFINE AUTOCOVER 30G X 8 MM MISC       oxyCODONE-acetaminophen (PERCOCET) 7 5-325 MG per tablet Take 1 tablet by mouth every 4 (four) hours as needed for moderate painMax Daily Amount: 5 tablets 150 tablet 0    Polyethylene Glycol 3350 POWD polyethylene glycol 3350  powd      potassium chloride (K-DUR,KLOR-CON) 20 mEq tablet Take 20 mEq by mouth daily      pramipexole (MIRAPEX) 1 mg tablet Take 1 tablet (1 mg total) by mouth 3 (three) times a day 90 tablet 3    senna-docusate sodium (SENOKOT S) 8 6-50 mg per tablet Take 2 tablets by mouth daily at bedtime      albuterol (ACCUNEB) 1 25 MG/3ML nebulizer solution Take 3 mL (1 25 mg total) by nebulization 3 (three) times a day as needed for wheezing (J45 909) (Patient not taking: Reported on 9/26/2019) 60 vial 0    tobramycin-dexamethasone (TOBRADEX) ophthalmic suspension Administer 2 drops to both eyes 4 (four) times a day for 5 days 5 mL 0     Current Facility-Administered Medications   Medication Dose Route Frequency Provider Last Rate Last Dose    furosemide (LASIX) injection 30 mg  30 mg Intravenous Once Shea Caballero MD           Objective:    /70   Pulse 64   Temp 98 2 °F (36 8 °C) (Temporal)   Resp (!) 24   Ht 4' 11" (1 499 m)   Wt 92 5 kg (204 lb)   LMP  (LMP Unknown)   SpO2 (!) 88%   BMI 41 20 kg/m²        Physical Exam   Constitutional: She is oriented to person, place, and time  She appears well-developed and well-nourished  No distress  HENT:   Head: Normocephalic and atraumatic  Eyes: Conjunctivae are normal  Right eye exhibits no discharge  Left eye exhibits no discharge  Neck: Normal range of motion  Neck supple  No thyromegaly present  Cardiovascular: Normal rate, regular rhythm and normal heart sounds  Pulmonary/Chest: No respiratory distress  She has no decreased breath sounds  She has no wheezes  She has no rhonchi  She has no rales  Lymphadenopathy:     She has no cervical adenopathy  Neurological: She is alert and oriented to person, place, and time  Skin: Skin is warm and dry  No rash noted  She is not diaphoretic  Psychiatric: She has a normal mood and affect   Her behavior is normal  Judgment and thought content normal          Shefali Ralphs, CRNP

## 2019-10-02 ENCOUNTER — TELEPHONE (OUTPATIENT)
Dept: FAMILY MEDICINE CLINIC | Facility: CLINIC | Age: 79
End: 2019-10-02

## 2019-10-02 DIAGNOSIS — J96.12 CHRONIC RESPIRATORY FAILURE WITH HYPOXIA AND HYPERCAPNIA (HCC): Primary | ICD-10-CM

## 2019-10-02 DIAGNOSIS — J96.11 CHRONIC RESPIRATORY FAILURE WITH HYPOXIA AND HYPERCAPNIA (HCC): Primary | ICD-10-CM

## 2019-10-02 NOTE — TELEPHONE ENCOUNTER
Had through Hospice a portable O2 concentrater for the home    She also had 2 of the E tanks on a tanvir/carrier and  portable O2 M6 tanks for travel

## 2019-10-02 NOTE — TELEPHONE ENCOUNTER
Spoke to Beatacharo Gonzalez at [facility]  They need a new order  The other one did not state if it was to be a  tank or concentrated      Fax to [phone]

## 2019-10-02 NOTE — TELEPHONE ENCOUNTER
Please fax new order  I specified in comments section for portable O2 concentrator  I recommend that she follow up with pulmonology so this process can be more streamlined  She has referral order  Thanks!

## 2019-10-02 NOTE — TELEPHONE ENCOUNTER
Laura Denton called to get an update on Viamão    She spoke to Viamão and Viahio has not heard anything from [facility]  Berry Johnson asked if we can call to find the status    LM with Christopher Harris at [facility]  We faxed Shayna's [facility] the day of her visit

## 2019-10-03 ENCOUNTER — TELEPHONE (OUTPATIENT)
Dept: FAMILY MEDICINE CLINIC | Facility: CLINIC | Age: 79
End: 2019-10-03

## 2019-10-03 NOTE — TELEPHONE ENCOUNTER
Went to visit her this morning for discharge visit and Sohan Mcghee was not home    So she is discharging her  Without being seen

## 2019-10-04 NOTE — TELEPHONE ENCOUNTER
Spoke with Amparo phillips [facility] - She advised they need to have "Portable 02 Consentrator" on the ORDER  That information was added and it was REFAXED to [phone]  I left a voicemail for Viamão on the mobile number listed on her chart with instructions to call my office number with questions     Chris Harper

## 2019-10-07 DIAGNOSIS — E08.40 DIABETES MELLITUS DUE TO UNDERLYING CONDITION WITH DIABETIC NEUROPATHY, WITH LONG-TERM CURRENT USE OF INSULIN (HCC): Chronic | ICD-10-CM

## 2019-10-07 DIAGNOSIS — E03.9 ACQUIRED HYPOTHYROIDISM: ICD-10-CM

## 2019-10-07 DIAGNOSIS — Z79.4 DIABETES MELLITUS DUE TO UNDERLYING CONDITION WITH DIABETIC NEUROPATHY, WITH LONG-TERM CURRENT USE OF INSULIN (HCC): Chronic | ICD-10-CM

## 2019-10-07 RX ORDER — LEVOTHYROXINE SODIUM 0.12 MG/1
125 TABLET ORAL DAILY
Qty: 90 TABLET | Refills: 0 | Status: SHIPPED | OUTPATIENT
Start: 2019-10-07 | End: 2020-03-19 | Stop reason: SDUPTHER

## 2019-10-07 NOTE — TELEPHONE ENCOUNTER
Called stating that she has been released from home care but she should not be  How can she get back on it?

## 2019-10-08 DIAGNOSIS — E08.40 DIABETES MELLITUS DUE TO UNDERLYING CONDITION WITH DIABETIC NEUROPATHY, WITH LONG-TERM CURRENT USE OF INSULIN (HCC): Chronic | ICD-10-CM

## 2019-10-08 DIAGNOSIS — Z79.4 DIABETES MELLITUS DUE TO UNDERLYING CONDITION WITH DIABETIC NEUROPATHY, WITH LONG-TERM CURRENT USE OF INSULIN (HCC): Chronic | ICD-10-CM

## 2019-10-08 NOTE — TELEPHONE ENCOUNTER
Needs a new script for the glucose blood (MM EASY TOUCH GLUCOSE) test strip    Needs to include specific details for daily use and the ICD 10 code for medicare  Also there is another prescription attached to this  It keeps printing

## 2019-10-08 NOTE — TELEPHONE ENCOUNTER
Spoke to Margie at [facility]  Ambrosio Canales would need to come in for a visit  They need to know what has changed  What is the need for Home Health      Fax order and notes to intake:  [phone]

## 2019-10-11 NOTE — MISCELLANEOUS
Message    Spoke with patient's daughter Melyssa Ramirez  States mother hadn't had a bowel movement in 3 days  Typically takes Dulcolax 2 tablets at bedtime but hadn't bonifacio taking her pilss for the last several days  Guerita administered two Fleet's enemas with a small amount of stool expelled  Unlikely that the patient is impacted  Patient is also on oxycodone as needed for pain but daughter is unsure how often she has been taking it  Advised Gureita to ensure mother takes dulcolax tonight and try Miralax OTC once daily  If symptoms worsen or no improvement by tomorrow afternoon, daughter should call our office back  Signatures   Electronically signed by : ROBB Young;  Apr 6 2017  3:44PM EST                       (Author)
No pertinent past medical history

## 2019-10-22 DIAGNOSIS — Z71.89 COMPLEX CARE COORDINATION: Primary | ICD-10-CM

## 2019-10-23 ENCOUNTER — TELEPHONE (OUTPATIENT)
Dept: FAMILY MEDICINE CLINIC | Facility: CLINIC | Age: 79
End: 2019-10-23

## 2019-10-23 NOTE — TELEPHONE ENCOUNTER
Received a call from Iris Lorenzana [phone]  She received a phone call from Thi Gaitan today stating that they still have not received the O2 from [facility] yet      Called and LM for Luisana at [facility] to return my call for the status of her O2

## 2019-10-24 ENCOUNTER — PATIENT OUTREACH (OUTPATIENT)
Dept: CASE MANAGEMENT | Facility: OTHER | Age: 79
End: 2019-10-24

## 2019-10-24 NOTE — TELEPHONE ENCOUNTER
Janel Up from [facility] did not return my call  I called again this morning  Janel Up was not available  Spoke to [facility]  She said that it looks like they did not have updated insurance information  Faxed to [facility]      Pk Bernard was notified with the update

## 2019-10-25 DIAGNOSIS — M79.2 NEUROPATHIC PAIN: ICD-10-CM

## 2019-10-25 DIAGNOSIS — M15.9 PRIMARY OSTEOARTHRITIS INVOLVING MULTIPLE JOINTS: ICD-10-CM

## 2019-10-25 DIAGNOSIS — K59.00 CONSTIPATION, UNSPECIFIED CONSTIPATION TYPE: Primary | ICD-10-CM

## 2019-10-25 DIAGNOSIS — I51.89 DIASTOLIC DYSFUNCTION: ICD-10-CM

## 2019-10-25 DIAGNOSIS — G89.4 CHRONIC PAIN SYNDROME: ICD-10-CM

## 2019-10-25 RX ORDER — POTASSIUM CHLORIDE 20 MEQ/1
20 TABLET, EXTENDED RELEASE ORAL DAILY
Qty: 60 TABLET | Refills: 3 | Status: SHIPPED | OUTPATIENT
Start: 2019-10-25 | End: 2019-12-30 | Stop reason: SDUPTHER

## 2019-10-25 RX ORDER — AMOXICILLIN 250 MG
2 CAPSULE ORAL
Qty: 112 TABLET | Refills: 2 | Status: SHIPPED | OUTPATIENT
Start: 2019-10-25

## 2019-10-25 RX ORDER — GABAPENTIN 300 MG/1
300 CAPSULE ORAL 3 TIMES DAILY
Qty: 90 CAPSULE | Refills: 3 | Status: SHIPPED | OUTPATIENT
Start: 2019-10-25 | End: 2019-11-26 | Stop reason: SDUPTHER

## 2019-10-25 RX ORDER — OXYCODONE AND ACETAMINOPHEN 7.5; 325 MG/1; MG/1
1 TABLET ORAL EVERY 4 HOURS PRN
Qty: 150 TABLET | Refills: 0 | Status: SHIPPED | OUTPATIENT
Start: 2019-10-25 | End: 2019-11-18 | Stop reason: SDUPTHER

## 2019-10-28 ENCOUNTER — PATIENT OUTREACH (OUTPATIENT)
Dept: CASE MANAGEMENT | Facility: OTHER | Age: 79
End: 2019-10-28

## 2019-10-28 NOTE — TELEPHONE ENCOUNTER
Andrea Hernandez from [facility] called to inform our office, they received Brandiedarin' updated insurance cards that were faxed by Dereje Hernandez states they are in the process of updating her insurance in their system and then will be able to forward  No further action is required from our office at this time

## 2019-10-29 ENCOUNTER — OFFICE VISIT (OUTPATIENT)
Dept: FAMILY MEDICINE CLINIC | Facility: CLINIC | Age: 79
End: 2019-10-29
Payer: COMMERCIAL

## 2019-10-29 VITALS
TEMPERATURE: 98.5 F | DIASTOLIC BLOOD PRESSURE: 60 MMHG | WEIGHT: 204.2 LBS | SYSTOLIC BLOOD PRESSURE: 100 MMHG | RESPIRATION RATE: 16 BRPM | OXYGEN SATURATION: 89 % | HEIGHT: 61 IN | HEART RATE: 62 BPM | BODY MASS INDEX: 38.55 KG/M2

## 2019-10-29 DIAGNOSIS — G89.4 CHRONIC PAIN SYNDROME: ICD-10-CM

## 2019-10-29 DIAGNOSIS — M54.50 CHRONIC RIGHT-SIDED LOW BACK PAIN WITHOUT SCIATICA: Chronic | ICD-10-CM

## 2019-10-29 DIAGNOSIS — I10 ESSENTIAL HYPERTENSION: Chronic | ICD-10-CM

## 2019-10-29 DIAGNOSIS — I50.32 CHRONIC DIASTOLIC CONGESTIVE HEART FAILURE (HCC): Chronic | ICD-10-CM

## 2019-10-29 DIAGNOSIS — Z79.4 DIABETES MELLITUS DUE TO UNDERLYING CONDITION WITH DIABETIC NEUROPATHY, WITH LONG-TERM CURRENT USE OF INSULIN (HCC): Primary | Chronic | ICD-10-CM

## 2019-10-29 DIAGNOSIS — E03.8 OTHER SPECIFIED HYPOTHYROIDISM: ICD-10-CM

## 2019-10-29 DIAGNOSIS — J96.11 CHRONIC RESPIRATORY FAILURE WITH HYPOXIA AND HYPERCAPNIA (HCC): ICD-10-CM

## 2019-10-29 DIAGNOSIS — D72.829 LEUKOCYTOSIS, UNSPECIFIED TYPE: ICD-10-CM

## 2019-10-29 DIAGNOSIS — E78.01 FAMILIAL HYPERCHOLESTEROLEMIA: ICD-10-CM

## 2019-10-29 DIAGNOSIS — E08.40 DIABETES MELLITUS DUE TO UNDERLYING CONDITION WITH DIABETIC NEUROPATHY, WITH LONG-TERM CURRENT USE OF INSULIN (HCC): Chronic | ICD-10-CM

## 2019-10-29 DIAGNOSIS — Z00.00 MEDICARE ANNUAL WELLNESS VISIT, SUBSEQUENT: ICD-10-CM

## 2019-10-29 DIAGNOSIS — Z79.4 DIABETES MELLITUS DUE TO UNDERLYING CONDITION WITH DIABETIC NEUROPATHY, WITH LONG-TERM CURRENT USE OF INSULIN (HCC): Chronic | ICD-10-CM

## 2019-10-29 DIAGNOSIS — J96.12 CHRONIC RESPIRATORY FAILURE WITH HYPOXIA AND HYPERCAPNIA (HCC): ICD-10-CM

## 2019-10-29 DIAGNOSIS — G89.29 CHRONIC RIGHT-SIDED LOW BACK PAIN WITHOUT SCIATICA: Chronic | ICD-10-CM

## 2019-10-29 DIAGNOSIS — E08.40 DIABETES MELLITUS DUE TO UNDERLYING CONDITION WITH DIABETIC NEUROPATHY, WITH LONG-TERM CURRENT USE OF INSULIN (HCC): Primary | Chronic | ICD-10-CM

## 2019-10-29 LAB
SL AMB  POCT GLUCOSE, UA: 0
SL AMB LEUKOCYTE ESTERASE,UA: 0
SL AMB POCT BILIRUBIN,UA: 0
SL AMB POCT BLOOD,UA: 0
SL AMB POCT CLARITY,UA: CLEAR
SL AMB POCT COLOR,UA: YELLOW
SL AMB POCT KETONES,UA: 0
SL AMB POCT NITRITE,UA: 0
SL AMB POCT PH,UA: 6
SL AMB POCT SPECIFIC GRAVITY,UA: 1.01
SL AMB POCT URINE PROTEIN: 0
SL AMB POCT UROBILINOGEN: 0

## 2019-10-29 PROCEDURE — 99214 OFFICE O/P EST MOD 30 MIN: CPT | Performed by: FAMILY MEDICINE

## 2019-10-29 PROCEDURE — 81003 URINALYSIS AUTO W/O SCOPE: CPT | Performed by: FAMILY MEDICINE

## 2019-10-29 PROCEDURE — 1170F FXNL STATUS ASSESSED: CPT | Performed by: FAMILY MEDICINE

## 2019-10-29 PROCEDURE — 3078F DIAST BP <80 MM HG: CPT | Performed by: FAMILY MEDICINE

## 2019-10-29 PROCEDURE — 3074F SYST BP LT 130 MM HG: CPT | Performed by: FAMILY MEDICINE

## 2019-10-29 PROCEDURE — 36415 COLL VENOUS BLD VENIPUNCTURE: CPT | Performed by: FAMILY MEDICINE

## 2019-10-29 PROCEDURE — G0439 PPPS, SUBSEQ VISIT: HCPCS | Performed by: FAMILY MEDICINE

## 2019-10-29 PROCEDURE — 1125F AMNT PAIN NOTED PAIN PRSNT: CPT | Performed by: FAMILY MEDICINE

## 2019-10-29 NOTE — PROGRESS NOTES
Assessment and Plan:     Problem List Items Addressed This Visit        Endocrine    Diabetes mellitus with neuropathy ([facility] [address] ) - Primary (Chronic)       Lab Results   Component Value Date    HGBA1C 8 8 (H) 10/29/2019       COMPLIANT WITH MEDICATION  DENIES ANY NUMBNESS, TINGLING IN FEET    - DISCUSSED DIETARY MODIFICATION OF CARBOHYDRATES  - HGB A1C AND URINE STUDIES DUE TODAY  - FOOT CARE  - RV 3 MONTHS             Relevant Orders    Hemoglobin A1c (w/out EAG) (Completed)    POCT urine dip auto non-scope (Completed)    Other specified hypothyroidism    Relevant Orders    TSH, 3rd generation (Completed)    Anti-thyroglobulin antibody (Completed)    Anti-microsomal antibody (Completed)    T4, free (Completed)       Respiratory    Chronic respiratory failure with hypoxia and hypercapnia (HCC)     STABLE  USING O2  MONITORING DAILY WEIGHT    MINIMAL COUGH            Cardiovascular and Mediastinum    HTN (hypertension) (Chronic)    Relevant Orders    Comprehensive metabolic panel (Completed)    POCT urine dip auto non-scope (Completed)    Diastolic CHF (HCC) (Chronic)     Wt Readings from Last 3 Encounters:   10/29/19 92 6 kg (204 lb 3 2 oz)   09/26/19 92 5 kg (204 lb)   09/17/19 93 kg (205 lb)       STABLE  DISCUSSED DIETARY RESTRICTION OF SODIUM  WEIGHTS HAVE BEEN STABLE           Relevant Orders    CBC and differential (Completed)       Other    Chronic right-sided low back pain without sciatica (Chronic)    Chronic pain syndrome     REVIEWED MEDICATIONS  PROVIDING ADEQUATE PAIN RELIEF  REVIEWED PRECAUTIONS         Leukocytosis    Relevant Orders    CBC and differential (Completed)    Familial hypercholesterolemia     WATCHING CHOLESTEROL INTAKE  COMPLIANT WITH MEDICATION  NO CONCERNS    - CONTINUE TO MONITOR DIETARY CHOL INTAKE  - CONTINUE CURRENT MEDICATION  - ENCOURAGED PHYSICAL ACTIVITY             Relevant Orders    Lipid panel (Completed)    Medicare annual wellness visit, subsequent           Preventive health issues were discussed with patient, and age appropriate screening tests were ordered as noted in patient's After Visit Summary  Personalized health advice and appropriate referrals for health education or preventive services given if needed, as noted in patient's After Visit Summary       History of Present Illness:     Patient presents for Medicare Annual Wellness visit    Patient Care Team:  David Carmen MD as PCP - General (Family Medicine)  Ludwig Gilmore MD as Proceduralist (Gastroenterology)  Marian Preston as Lead OP Care Mgr (Care Coordination)     Problem List:     Patient Active Problem List   Diagnosis    Other specified hypothyroidism    Lung nodules    Morbid obesity with BMI of 40 0-44 9, adult ([facility] [address] )    Dyspnea    Asthma    Constipation due to opioid therapy    RLS (restless legs syndrome)    Sleep apnea    Pancreatic cyst    Depression    Obesity hypoventilation syndrome ([facility] [address] )    Bilateral hip pain    HTN (hypertension)    Primary osteoarthritis involving multiple joints    Balance problem    [facility]    Meningiomas, multiple [facility]    Moderate episode of recurrent major depressive disorder ([facility]    Chronic bronchitis ([facility] [address] )    Chronic right-sided low back pain without sciatica    Chronic pain syndrome    Diastolic CHF (HCC)    Chronic venous stasis dermatitis of both lower extremities    Physical deconditioning    Chronic respiratory failure with hypoxia and hypercapnia (HCC)    Sacroiliitis (HCC)    Dilation of biliary tract    Onychomycosis    Peripheral vascular disease (HCC)    Peripheral venous insufficiency    Cervical radiculopathy    Multiple joint pain    Anxiety    Peripheral edema    Leukocytosis    Neuropathic pain    Yeast dermatitis    Familial hypercholesterolemia    Medicare annual wellness visit, subsequent      Past Medical and Surgical History:     Past Medical History:   Diagnosis Date    Anxiety     Aortic valve disorder     Arthritis     Asthma     Cataract     CHF (congestive heart failure) (HCC)     COPD (chronic obstructive pulmonary disease) (HCC)     Diabetes mellitus (HCC)     Disease of thyroid gland     Dry eye syndrome     Ectropion of left eye     last assessed: 10/17/2016    Emphysema, 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HCC)     Hearing loss     History of malignant neoplasm of thyroid     Hypertension     Malignant neoplasm of skin     Memory loss     Mitral valve regurgitation     Myocardial infarction ([facility] [address] )     Obesity hypoventilation syndrome (HCC)     Pain     right hip    Restless leg syndrome     Seasonal allergies     Skin cancer (melanoma) ([facility] [address] )     Sleep related hypoxia     Thyroid cancer ([facility] [address] )     Urinary tract infection without hematuria 7/14/2018     Past Surgical History:   Procedure Laterality Date    CHOLECYSTECTOMY      EYE SURGERY      HYSTERECTOMY      OTHER SURGICAL HISTORY      removal of lesion    PERICARDIUM SURGERY      resection of pericardial cyst or tumor    [place] ERCP DX COLLECTION SPECIMEN BRUSHING/WASHING N/A 5/30/2017    Procedure: ENDOSCOPIC RETROGRADE CHOLANGIOPANCREATOGRAPHY (ERCP); SPHINCTEROTOMY;  Surgeon: Sarai Mendoza MD;  Location:  GI LAB;   Service: Gastroenterology   [facility] JOINT Right 3/30/2018    Procedure: Right Sacroiliac Injection;  Surgeon: Jocelyne Ambrosio MD;  Location: [facility] MAIN OR;  Service: Pain Management     REPLACEMENT TOTAL KNEE Left     REPLACEMENT TOTAL KNEE Right     REPLACEMENT TOTAL KNEE BILATERAL      SKIN BIOPSY      melanoma of back    STEROID INJECTION HIP Right 12/21/2017    Procedure: TROCHANTERIC BURSA INJECTION RIGHT;  Surgeon: Jocelyne Ambrosio MD;  Location: [facility] MAIN OR;  Service: Pain Management     THORACOSCOPY      (therapeutic) w/excision of pericardial cyst    THORACOTOMY Right     at least 40 years, for pericardial cyst    THYROID SURGERY      THYROIDECTOMY, PARTIAL      For thyroid cancer Family History:     Family History   Problem Relation Age of Onset    Cancer Father     Arthritis Father     Liver cancer Father     Diabetes Sister     Asthma Mother     No Known Problems Brother     Substance Abuse Neg Hx     Mental illness Neg Hx       Social History:     Social History     Socioeconomic History    Marital status: Single     Spouse name: None    Number of children: 3    Years of education: high school graduate    Highest education level: None   Occupational History    Occupation: retired   Social Needs    Financial resource strain: None    Food insecurity:     Worry: None     Inability: None    Transportation needs:     Medical: None     Non-medical: None   Tobacco Use    Smoking status: Never Smoker    Smokeless tobacco: Never Used   Substance and Sexual Activity    Alcohol use: No    Drug use: No    Sexual activity: Not Currently     Partners: Male   Lifestyle    Physical activity:     Days per week: None     Minutes per session: None    Stress: None   Relationships    Social connections:     Talks on phone: None     Gets together: None     Attends Spiritism service: None     Active member of club or organization: None     Attends meetings of clubs or organizations: None     Relationship status: None    Intimate partner violence:     Fear of current or ex partner: None     Emotionally abused: None     Physically abused: None     Forced sexual activity: None   Other Topics Concern    None   Social History Narrative    Lives with significant others  Uses walker outside      Active advance directive-copy no obtained    Caffeine use    Drinks coffee    Feels safe at home    Seeing a dentist       Medications and Allergies:     Current Outpatient Medications   Medication Sig Dispense Refill    B-D UF III MINI PEN NEEDLES 31G X 5 MM MISC USE TWICE DAILY TO TEST BLOOD SUGAR  3    clotrimazole-betamethasone (LOTRISONE) 1-0 05 % cream Apply topically 2 (two) times a day (Patient taking differently: Apply topically as needed ) 30 g 0    furosemide (LASIX) 40 mg tablet Take 1 tablet (40 mg total) by mouth 2 (two) times a day (Patient taking differently: Take 40 mg by mouth daily ) 60 tablet 3    gabapentin (NEURONTIN) 300 mg capsule Take 1 capsule (300 mg total) by mouth 3 (three) times a day 90 capsule 3    insulin aspart protamine-insulin aspart (NOVOLOG MIX 70/30 FLEXPEN) 100 Units/mL injection pen Inject 45 Units under the skin 2 (two) times a day before meals 5 pen 0    levothyroxine 125 mcg tablet Take 1 tablet (125 mcg total) by mouth daily 90 tablet 0    metolazone (ZAROXOLYN) 2 5 mg tablet Take 1 tablet (2 5 mg total) by mouth 2 (two) times a day (Patient taking differently: Take 2 5 mg by mouth 3 (three) times a week ) 60 tablet 3    metoprolol tartrate (LOPRESSOR) 25 mg tablet Take 1 tablet (25 mg total) by mouth 2 (two) times a day 180 tablet 3    NOVOFINE AUTOCOVER 30G X 8 MM MISC       oxyCODONE-acetaminophen (PERCOCET) 7 5-325 MG per tablet Take 1 tablet by mouth every 4 (four) hours as needed for moderate painMax Daily Amount: 5 tablets 150 tablet 0    potassium chloride (K-DUR,KLOR-CON) 20 mEq tablet Take 1 tablet (20 mEq total) by mouth daily 60 tablet 3    pramipexole (MIRAPEX) 1 mg tablet Take 1 tablet (1 mg total) by mouth 3 (three) times a day 90 tablet 3    senna-docusate sodium (SENOKOT S) 8 6-50 mg per tablet Take 2 tablets by mouth daily at bedtime 112 tablet 2    albuterol (ACCUNEB) 1 25 MG/3ML nebulizer solution Take 3 mL (1 25 mg total) by nebulization 3 (three) times a day as needed for wheezing (J45 909) (Patient not taking: Reported on 9/26/2019) 60 vial 0    glucose blood (MM EASY TOUCH GLUCOSE) test strip Use as instructed 200 each 0    LORazepam (ATIVAN) 0 5 mg tablet Take 1 tablet (0 5 mg total) by mouth every 8 (eight) hours as needed for anxiety for up to 10 days (Patient not taking: Reported on 10/29/2019) 30 tablet 0    Polyethylene Glycol 3350 POWD Take by mouth daily with breakfast Take 1 capful daily with breakfast 1 Bottle 3    tobramycin-dexamethasone (TOBRADEX) ophthalmic suspension Administer 2 drops to both eyes 4 (four) times a day for 5 days (Patient not taking: Reported on 10/29/2019) 5 mL 0     Current Facility-Administered Medications   Medication Dose Route Frequency Provider Last Rate Last Dose    furosemide (LASIX) injection 30 mg  30 mg Intravenous Once Dionne Casanova MD         Allergies   Allergen Reactions    Ketorolac Swelling     Toradol    Latex Rash    Wound Dressing Adhesive Rash      Immunizations:     Immunization History   Administered Date(s) Administered    Influenza Split High Dose Preservative Free IM 10/05/2017    Influenza TIV (IM) 09/09/2014    Influenza, high dose seasonal 0 5 mL 08/30/2018, 09/17/2019    Pneumococcal Conjugate 13-Valent 06/17/2017    Pneumococcal Polysaccharide PPV23 04/04/2016      Health Maintenance: There are no preventive care reminders to display for this patient  Topic Date Due    HEPATITIS B VACCINES (1 of 3 - Risk 3-dose series) 01/16/1959      Medicare Health Risk Assessment:     /60 (BP Location: Right arm, Patient Position: Sitting, Cuff Size: Large)   Pulse 62   Temp 98 5 °F (36 9 °C) (Temporal)   Resp 16   Ht 5' 0 5" (1 537 m)   Wt 92 6 kg (204 lb 3 2 oz)   LMP  (LMP Unknown)   SpO2 (!) 89%   BMI 39 22 kg/m²      Annabelle Hernandez is here for her Subsequent Wellness visit  Health Risk Assessment:   Patient rates overall health as fair  Patient feels that their physical health rating is same  Eyesight was rated as much worse  Hearing was rated as slightly worse  Patient feels that their emotional and mental health rating is same  Pain experienced in the last 7 days has been a lot  Patient's pain rating has been 10/10  Patient states that she has experienced no weight loss or gain in last 6 months       Depression Screening:   PHQ-2 Score: 4  PHQ-9 Score: 14      Fall Risk Screening: In the past year, patient has experienced: history of falling in past year    Number of falls: 2 or more  Injured during fall?: No    Feels unsteady when standing or walking?: Yes    Worried about falling?: Yes      Urinary Incontinence Screening:   Patient has not leaked urine accidently in the last six months  Home Safety:  Patient has trouble with stairs inside or outside of their home  Patient has working smoke alarms and has working carbon monoxide detector  Home safety hazards include: none  Nutrition:   Current diet is Diabetic  Medications:   Patient is not currently taking any over-the-counter supplements  Patient is not able to manage medications  Daughter helps    Activities of Daily Living (ADLs)/Instrumental Activities of Daily Living (IADLs):   Walk and transfer into and out of bed and chair?: Yes  Dress and groom yourself?: Yes    Bathe or shower yourself?: No    Feed yourself? Yes  Do your laundry/housekeeping?: Yes  Manage your money, pay your bills and track your expenses?: Yes  Make your own meals?: Yes    Do your own shopping?: Yes    ADL comments: With daughters help    Previous Hospitalizations:   Any hospitalizations or ED visits within the last 12 months?: Yes    How many hospitalizations have you had in the last year?: 1-2    Advance Care Planning:   Living will: Yes    Durable POA for healthcare:  Yes    Advanced directive: Yes    Five wishes given: Yes    Provider agrees with end of life decisions: Yes      PREVENTIVE SCREENINGS      Cardiovascular Screening:    General: Screening Current      Diabetes Screening:     General: Screening Not Indicated and History Diabetes      Colorectal Cancer Screening:     General: Screening Not Indicated      Breast Cancer Screening:     General: Screening Not Indicated      Cervical Cancer Screening:    General: Screening Not Indicated      Osteoporosis Screening:    General: Screening Not Indicated      Abdominal Aortic Aneurysm (AAA) Screening:        General: Screening Not Indicated      Lung Cancer Screening:     General: Screening Not Indicated      Hepatitis C Screening:    General: Screening Not Indicated      Alexa Jaeger MD

## 2019-10-30 DIAGNOSIS — K59.00 CONSTIPATION, UNSPECIFIED CONSTIPATION TYPE: Primary | ICD-10-CM

## 2019-10-30 RX ORDER — POLYETHYLENE GLYCOL 3350
POWDER (GRAM) MISCELLANEOUS
Qty: 1 BOTTLE | Refills: 3 | Status: SHIPPED | OUTPATIENT
Start: 2019-10-30 | End: 2019-10-31 | Stop reason: SDUPTHER

## 2019-10-30 NOTE — TELEPHONE ENCOUNTER
Linda Sheridan has an Easy Touch Monitor, please call in tests for that  She tests 2x ad day    [facility]

## 2019-10-31 DIAGNOSIS — K59.00 CONSTIPATION, UNSPECIFIED CONSTIPATION TYPE: ICD-10-CM

## 2019-10-31 LAB
ALBUMIN SERPL-MCNC: 3.8 G/DL (ref 3.6–5.1)
ALBUMIN/GLOB SERPL: 1.5 (CALC) (ref 1–2.5)
ALP SERPL-CCNC: 74 U/L (ref 33–130)
ALT SERPL-CCNC: 11 U/L (ref 6–29)
AST SERPL-CCNC: 13 U/L (ref 10–35)
BASOPHILS # BLD AUTO: 29 CELLS/UL (ref 0–200)
BASOPHILS NFR BLD AUTO: 0.3 %
BILIRUB SERPL-MCNC: 0.3 MG/DL (ref 0.2–1.2)
BUN SERPL-MCNC: 29 MG/DL (ref 7–25)
BUN/CREAT SERPL: 35 (CALC) (ref 6–22)
CALCIUM SERPL-MCNC: 9.2 MG/DL (ref 8.6–10.4)
CHLORIDE SERPL-SCNC: 91 MMOL/L (ref 98–110)
CHOLEST SERPL-MCNC: 141 MG/DL
CHOLEST/HDLC SERPL: 3.9 (CALC)
CO2 SERPL-SCNC: 38 MMOL/L (ref 20–32)
CREAT SERPL-MCNC: 0.84 MG/DL (ref 0.6–0.88)
EOSINOPHIL # BLD AUTO: 58 CELLS/UL (ref 15–500)
EOSINOPHIL NFR BLD AUTO: 0.6 %
ERYTHROCYTE [DISTWIDTH] IN BLOOD BY AUTOMATED COUNT: 14.3 % (ref 11–15)
GLOBULIN SER CALC-MCNC: 2.6 G/DL (CALC) (ref 1.9–3.7)
GLUCOSE SERPL-MCNC: 130 MG/DL (ref 65–99)
HBA1C MFR BLD: 8.8 % OF TOTAL HGB
HCT VFR BLD AUTO: 39.5 % (ref 35–45)
HDLC SERPL-MCNC: 36 MG/DL
HGB BLD-MCNC: 12.9 G/DL (ref 11.7–15.5)
LDLC SERPL CALC-MCNC: 72 MG/DL (CALC)
LYMPHOCYTES # BLD AUTO: 2493 CELLS/UL (ref 850–3900)
LYMPHOCYTES NFR BLD AUTO: 25.7 %
MCH RBC QN AUTO: 30.3 PG (ref 27–33)
MCHC RBC AUTO-ENTMCNC: 32.7 G/DL (ref 32–36)
MCV RBC AUTO: 92.7 FL (ref 80–100)
MONOCYTES # BLD AUTO: 708 CELLS/UL (ref 200–950)
MONOCYTES NFR BLD AUTO: 7.3 %
NEUTROPHILS # BLD AUTO: 6412 CELLS/UL (ref 1500–7800)
NEUTROPHILS NFR BLD AUTO: 66.1 %
NONHDLC SERPL-MCNC: 105 MG/DL (CALC)
PLATELET # BLD AUTO: 196 THOUSAND/UL (ref 140–400)
PMV BLD REES-ECKER: 11.2 FL (ref 7.5–12.5)
POTASSIUM SERPL-SCNC: 3.2 MMOL/L (ref 3.5–5.3)
PROT SERPL-MCNC: 6.4 G/DL (ref 6.1–8.1)
RBC # BLD AUTO: 4.26 MILLION/UL (ref 3.8–5.1)
SL AMB EGFR AFRICAN AMERICAN: 76 ML/MIN/1.73M2
SL AMB EGFR NON AFRICAN AMERICAN: 66 ML/MIN/1.73M2
SODIUM SERPL-SCNC: 142 MMOL/L (ref 135–146)
T4 FREE SERPL-MCNC: 0.9 NG/DL (ref 0.8–1.8)
THYROGLOB AB SERPL-ACNC: <1 IU/ML
THYROPEROXIDASE AB SERPL-ACNC: <1 IU/ML
TRIGL SERPL-MCNC: 237 MG/DL
TSH SERPL-ACNC: 2.96 MIU/L (ref 0.4–4.5)
WBC # BLD AUTO: 9.7 THOUSAND/UL (ref 3.8–10.8)

## 2019-10-31 RX ORDER — POLYETHYLENE GLYCOL 3350
POWDER (GRAM) MISCELLANEOUS
Qty: 1 BOTTLE | Refills: 3 | Status: SHIPPED | OUTPATIENT
Start: 2019-10-31 | End: 2020-01-10 | Stop reason: ALTCHOICE

## 2019-10-31 NOTE — TELEPHONE ENCOUNTER
Please resend - for insurance purposes, it needed for clarification   Added 1 capful daily at breakfast

## 2019-11-02 PROBLEM — Z00.00 MEDICARE ANNUAL WELLNESS VISIT, SUBSEQUENT: Status: ACTIVE | Noted: 2019-11-02

## 2019-11-02 NOTE — ASSESSMENT & PLAN NOTE
Wt Readings from Last 3 Encounters:   10/29/19 92 6 kg (204 lb 3 2 oz)   09/26/19 92 5 kg (204 lb)   09/17/19 93 kg (205 lb)       STABLE  DISCUSSED DIETARY RESTRICTION OF SODIUM  WEIGHTS HAVE BEEN STABLE

## 2019-11-02 NOTE — ASSESSMENT & PLAN NOTE
Lab Results   Component Value Date    HGBA1C 8 8 (H) 10/29/2019       COMPLIANT WITH MEDICATION  DENIES ANY NUMBNESS, TINGLING IN FEET    - DISCUSSED DIETARY MODIFICATION OF CARBOHYDRATES  - HGB A1C AND URINE STUDIES DUE TODAY  - FOOT CARE  - RV 3 MONTHS

## 2019-11-02 NOTE — PROGRESS NOTES
Assessment/Plan:    Diabetes mellitus with neuropathy (HCC)    Lab Results   Component Value Date    HGBA1C 8 8 (H) 10/29/2019       COMPLIANT WITH MEDICATION  DENIES ANY NUMBNESS, TINGLING IN FEET    - DISCUSSED DIETARY MODIFICATION OF CARBOHYDRATES  - HGB A1C AND URINE STUDIES DUE TODAY  - FOOT CARE  - RV 3 MONTHS        Chronic respiratory failure with hypoxia and hypercapnia (HCC)  STABLE  USING O2  MONITORING DAILY WEIGHT    MINIMAL COUGH    Diastolic CHF (HCC)  Wt Readings from Last 3 Encounters:   10/29/19 92 6 kg (204 lb 3 2 oz)   09/26/19 92 5 kg (204 lb)   09/17/19 93 kg (205 lb)       STABLE  DISCUSSED DIETARY RESTRICTION OF SODIUM  WEIGHTS HAVE BEEN STABLE      Chronic pain syndrome  REVIEWED MEDICATIONS  PROVIDING ADEQUATE PAIN RELIEF  REVIEWED PRECAUTIONS    Familial hypercholesterolemia  WATCHING CHOLESTEROL INTAKE  COMPLIANT WITH MEDICATION  NO CONCERNS    - CONTINUE TO MONITOR DIETARY CHOL INTAKE  - CONTINUE CURRENT MEDICATION  - ENCOURAGED PHYSICAL ACTIVITY           Diagnoses and all orders for this visit:    Diabetes mellitus due to underlying condition with diabetic neuropathy, with long-term current use of insulin (HCC)  -     Hemoglobin A1c (w/out EAG)  -     POCT urine dip auto non-scope    Chronic respiratory failure with hypoxia and hypercapnia (HCC)    Essential hypertension  -     Comprehensive metabolic panel  -     POCT urine dip auto non-scope    Chronic pain syndrome    Chronic diastolic congestive heart failure (HCC)  -     CBC and differential    Other specified hypothyroidism  -     TSH, 3rd generation  -     Anti-thyroglobulin antibody  -     Anti-microsomal antibody  -     T4, free    Familial hypercholesterolemia  -     Lipid panel    Leukocytosis, unspecified type  -     CBC and differential    Chronic right-sided low back pain without sciatica    Medicare annual wellness visit, subsequent          Patient Instructions   CONTINUE CURRENT TREATMENT PLAN  MONITOR DIETARY SODIUM, CHOL, AND CARBOHYDRATE INTAKE  ENCOURAGE PHYSICAL ACTIVITY  FOOT CARE    RV 3 M, SOONER PRN        Return in about 3 months (around 1/29/2020) for Recheck  Subjective:      Patient ID: Venus Ferguson is a [de-identified] y o  female  Chief Complaint   Patient presents with   Natale Argyle Medicare Wellness Visit       PATIENT RETURNS FOR ANNUAL WELLNESS EXAM AND ANNUAL EVALUATION OF PATIENT'S MEDICAL ISSUES    INDIVIDUAL MEDICAL ISSUES WITH THEIR CURRENT STATUS, ASSESSMENT AND PLANS ARE LISTED ABOVE          The following portions of the patient's history were reviewed and updated as appropriate: allergies, current medications, past family history, past medical history, past social history, past surgical history and problem list     Review of Systems   Constitutional: Positive for fatigue  Negative for chills and fever  HENT: Negative for congestion, ear discharge, ear pain, mouth sores, postnasal drip, sore throat and trouble swallowing  Eyes: Negative for pain, discharge and visual disturbance  Respiratory: Negative for cough, shortness of breath and wheezing  Cardiovascular: Positive for leg swelling  Negative for chest pain and palpitations  Gastrointestinal: Negative for abdominal distention, abdominal pain, blood in stool, diarrhea and nausea  Endocrine: Negative for polydipsia, polyphagia and polyuria  Genitourinary: Negative for dysuria, frequency, hematuria and urgency  Musculoskeletal: Positive for arthralgias and gait problem  Negative for joint swelling  Skin: Negative for pallor and rash  Neurological: Negative for dizziness, syncope, speech difficulty, weakness, light-headedness, numbness and headaches  Hematological: Negative for adenopathy  Psychiatric/Behavioral: Negative for behavioral problems, confusion and sleep disturbance  The patient is not nervous/anxious            Current Outpatient Medications   Medication Sig Dispense Refill    B-D UF III MINI PEN NEEDLES 31G X 5 MM MISC USE TWICE DAILY TO TEST BLOOD SUGAR  3    clotrimazole-betamethasone (LOTRISONE) 1-0 05 % cream Apply topically 2 (two) times a day (Patient taking differently: Apply topically as needed ) 30 g 0    furosemide (LASIX) 40 mg tablet Take 1 tablet (40 mg total) by mouth 2 (two) times a day (Patient taking differently: Take 40 mg by mouth daily ) 60 tablet 3    gabapentin (NEURONTIN) 300 mg capsule Take 1 capsule (300 mg total) by mouth 3 (three) times a day 90 capsule 3    insulin aspart protamine-insulin aspart (NOVOLOG MIX 70/30 FLEXPEN) 100 Units/mL injection pen Inject 45 Units under the skin 2 (two) times a day before meals 5 pen 0    levothyroxine 125 mcg tablet Take 1 tablet (125 mcg total) by mouth daily 90 tablet 0    metolazone (ZAROXOLYN) 2 5 mg tablet Take 1 tablet (2 5 mg total) by mouth 2 (two) times a day (Patient taking differently: Take 2 5 mg by mouth 3 (three) times a week ) 60 tablet 3    metoprolol tartrate (LOPRESSOR) 25 mg tablet Take 1 tablet (25 mg total) by mouth 2 (two) times a day 180 tablet 3    NOVOFINE AUTOCOVER 30G X 8 MM MISC       oxyCODONE-acetaminophen (PERCOCET) 7 5-325 MG per tablet Take 1 tablet by mouth every 4 (four) hours as needed for moderate painMax Daily Amount: 5 tablets 150 tablet 0    potassium chloride (K-DUR,KLOR-CON) 20 mEq tablet Take 1 tablet (20 mEq total) by mouth daily 60 tablet 3    pramipexole (MIRAPEX) 1 mg tablet Take 1 tablet (1 mg total) by mouth 3 (three) times a day 90 tablet 3    senna-docusate sodium (SENOKOT S) 8 6-50 mg per tablet Take 2 tablets by mouth daily at bedtime 112 tablet 2    albuterol (ACCUNEB) 1 25 MG/3ML nebulizer solution Take 3 mL (1 25 mg total) by nebulization 3 (three) times a day as needed for wheezing (J45 909) (Patient not taking: Reported on 9/26/2019) 60 vial 0    glucose blood (MM EASY TOUCH GLUCOSE) test strip Use as instructed 200 each 0    LORazepam (ATIVAN) 0 5 mg tablet Take 1 tablet (0 5 mg total) by mouth every 8 (eight) hours as needed for anxiety for up to 10 days (Patient not taking: Reported on 10/29/2019) 30 tablet 0    Polyethylene Glycol 3350 POWD Take by mouth daily with breakfast Take 1 capful daily with breakfast 1 Bottle 3    tobramycin-dexamethasone (TOBRADEX) ophthalmic suspension Administer 2 drops to both eyes 4 (four) times a day for 5 days (Patient not taking: Reported on 10/29/2019) 5 mL 0     Current Facility-Administered Medications   Medication Dose Route Frequency Provider Last Rate Last Dose    furosemide (LASIX) injection 30 mg  30 mg Intravenous Once Kiarra Hanson MD           Objective:    /60 (BP Location: Right arm, Patient Position: Sitting, Cuff Size: Large)   Pulse 62   Temp 98 5 °F (36 9 °C) (Temporal)   Resp 16   Ht 5' 0 5" (1 537 m)   Wt 92 6 kg (204 lb 3 2 oz)   LMP  (LMP Unknown)   SpO2 (!) 89%   BMI 39 22 kg/m²        Physical Exam   Constitutional: She is oriented to person, place, and time  She appears well-developed and well-nourished  HENT:   Head: Normocephalic and atraumatic  Eyes: Pupils are equal, round, and reactive to light  Conjunctivae and EOM are normal  Right eye exhibits no discharge  Left eye exhibits no discharge  Neck: Normal range of motion  Neck supple  No thyromegaly present  Cardiovascular: Normal rate, regular rhythm and normal heart sounds  No murmur heard  Pulmonary/Chest: Effort normal  No respiratory distress  She has no wheezes  She has rales  COARSE   Abdominal: Soft  Bowel sounds are normal  There is no tenderness  Musculoskeletal: Normal range of motion  She exhibits edema  She exhibits no tenderness  MODERATE DJD CHANGES  1+ EDEMA BILAT   Lymphadenopathy:     She has no cervical adenopathy  Neurological: She is alert and oriented to person, place, and time  Skin: Skin is warm and dry  No rash noted  No erythema  Psychiatric: She has a normal mood and affect   Her behavior is normal  Judgment and thought content normal               Daryl Billings MD

## 2019-11-04 ENCOUNTER — PATIENT OUTREACH (OUTPATIENT)
Dept: CASE MANAGEMENT | Facility: OTHER | Age: 79
End: 2019-11-04

## 2019-11-04 DIAGNOSIS — G25.81 RESTLESS LEG SYNDROME: ICD-10-CM

## 2019-11-04 RX ORDER — PRAMIPEXOLE DIHYDROCHLORIDE 1 MG/1
1 TABLET ORAL 3 TIMES DAILY
Qty: 90 TABLET | Refills: 3 | Status: SHIPPED | OUTPATIENT
Start: 2019-11-04 | End: 2019-12-13 | Stop reason: SDUPTHER

## 2019-11-04 NOTE — PROGRESS NOTES
Third attempt at outreach  No answer  Left a voicemail requesting a call back, phone number provided   UTR letter sent

## 2019-11-13 NOTE — TELEPHONE ENCOUNTER
Spoke to Aarono - her O2 was delivered from [facility] yesterday!!!!  [facility] are scheduled to  their tanks tomorrow        LM a message for Daryl Mathew to let her know all is taken care of finally!!!

## 2019-11-18 DIAGNOSIS — M15.9 PRIMARY OSTEOARTHRITIS INVOLVING MULTIPLE JOINTS: ICD-10-CM

## 2019-11-18 DIAGNOSIS — M79.2 NEUROPATHIC PAIN: ICD-10-CM

## 2019-11-18 DIAGNOSIS — G89.4 CHRONIC PAIN SYNDROME: ICD-10-CM

## 2019-11-18 RX ORDER — OXYCODONE AND ACETAMINOPHEN 7.5; 325 MG/1; MG/1
1 TABLET ORAL EVERY 4 HOURS PRN
Qty: 150 TABLET | Refills: 0 | Status: SHIPPED | OUTPATIENT
Start: 2019-11-18 | End: 2019-12-13 | Stop reason: SDUPTHER

## 2019-11-25 LAB
LEFT EYE DIABETIC RETINOPATHY: NORMAL
RIGHT EYE DIABETIC RETINOPATHY: NORMAL

## 2019-11-26 ENCOUNTER — OFFICE VISIT (OUTPATIENT)
Dept: FAMILY MEDICINE CLINIC | Facility: CLINIC | Age: 79
End: 2019-11-26
Payer: COMMERCIAL

## 2019-11-26 VITALS
BODY MASS INDEX: 41.49 KG/M2 | OXYGEN SATURATION: 94 % | SYSTOLIC BLOOD PRESSURE: 118 MMHG | WEIGHT: 205.8 LBS | TEMPERATURE: 97.5 F | RESPIRATION RATE: 16 BRPM | HEART RATE: 66 BPM | DIASTOLIC BLOOD PRESSURE: 70 MMHG | HEIGHT: 59 IN

## 2019-11-26 DIAGNOSIS — Z79.4 DIABETES MELLITUS DUE TO UNDERLYING CONDITION WITH DIABETIC NEUROPATHY, WITH LONG-TERM CURRENT USE OF INSULIN (HCC): Primary | Chronic | ICD-10-CM

## 2019-11-26 DIAGNOSIS — J96.11 CHRONIC RESPIRATORY FAILURE WITH HYPOXIA AND HYPERCAPNIA (HCC): ICD-10-CM

## 2019-11-26 DIAGNOSIS — I10 ESSENTIAL HYPERTENSION: Chronic | ICD-10-CM

## 2019-11-26 DIAGNOSIS — E08.40 DIABETES MELLITUS DUE TO UNDERLYING CONDITION WITH DIABETIC NEUROPATHY, WITH LONG-TERM CURRENT USE OF INSULIN (HCC): Primary | Chronic | ICD-10-CM

## 2019-11-26 DIAGNOSIS — Z79.4 DIABETES MELLITUS DUE TO UNDERLYING CONDITION WITH DIABETIC NEUROPATHY, WITH LONG-TERM CURRENT USE OF INSULIN (HCC): Chronic | ICD-10-CM

## 2019-11-26 DIAGNOSIS — J96.12 CHRONIC RESPIRATORY FAILURE WITH HYPOXIA AND HYPERCAPNIA (HCC): ICD-10-CM

## 2019-11-26 DIAGNOSIS — M15.9 PRIMARY OSTEOARTHRITIS INVOLVING MULTIPLE JOINTS: ICD-10-CM

## 2019-11-26 DIAGNOSIS — E78.01 FAMILIAL HYPERCHOLESTEROLEMIA: ICD-10-CM

## 2019-11-26 DIAGNOSIS — E08.40 DIABETES MELLITUS DUE TO UNDERLYING CONDITION WITH DIABETIC NEUROPATHY, WITH LONG-TERM CURRENT USE OF INSULIN (HCC): Chronic | ICD-10-CM

## 2019-11-26 DIAGNOSIS — M79.2 NEUROPATHIC PAIN: ICD-10-CM

## 2019-11-26 DIAGNOSIS — G47.9 SLEEP DISORDER: ICD-10-CM

## 2019-11-26 DIAGNOSIS — R60.9 EDEMA, UNSPECIFIED TYPE: ICD-10-CM

## 2019-11-26 DIAGNOSIS — K59.00 CONSTIPATION, UNSPECIFIED CONSTIPATION TYPE: ICD-10-CM

## 2019-11-26 DIAGNOSIS — J42 CHRONIC BRONCHITIS, UNSPECIFIED CHRONIC BRONCHITIS TYPE (HCC): ICD-10-CM

## 2019-11-26 PROBLEM — Z00.00 MEDICARE ANNUAL WELLNESS VISIT, SUBSEQUENT: Status: RESOLVED | Noted: 2019-11-02 | Resolved: 2019-11-26

## 2019-11-26 PROCEDURE — 99214 OFFICE O/P EST MOD 30 MIN: CPT | Performed by: FAMILY MEDICINE

## 2019-11-26 RX ORDER — POLYETHYLENE GLYCOL 3350 17 G/17G
POWDER, FOR SOLUTION ORAL
COMMUNITY
Start: 2019-10-31 | End: 2020-01-14 | Stop reason: SDUPTHER

## 2019-11-26 RX ORDER — GABAPENTIN 300 MG/1
300 CAPSULE ORAL 3 TIMES DAILY
Qty: 90 CAPSULE | Refills: 3 | Status: SHIPPED | OUTPATIENT
Start: 2019-11-26 | End: 2019-12-30 | Stop reason: SDUPTHER

## 2019-11-26 RX ORDER — TRAZODONE HYDROCHLORIDE 50 MG/1
50 TABLET ORAL
Qty: 30 TABLET | Refills: 1 | Status: SHIPPED | OUTPATIENT
Start: 2019-11-26 | End: 2020-01-14 | Stop reason: ALTCHOICE

## 2019-11-26 RX ORDER — FUROSEMIDE 40 MG/1
40 TABLET ORAL DAILY
Qty: 60 TABLET | Refills: 1 | Status: SHIPPED | OUTPATIENT
Start: 2019-11-26 | End: 2019-12-30 | Stop reason: SDUPTHER

## 2019-11-26 NOTE — PROGRESS NOTES
Assessment/Plan:    Familial hypercholesterolemia  WATCHING CHOLESTEROL INTAKE  COMPLIANT WITH MEDICATION  NO CONCERNS    - CONTINUE TO MONITOR DIETARY CHOL INTAKE  - CONTINUE CURRENT MEDICATION  - ENCOURAGED PHYSICAL ACTIVITY        Primary osteoarthritis involving multiple joints  STABLE  DENIES ANY JOINT SWELLING OR REDNESS  JOINT STIFFNESS PRESENT  PAIN MANAGEMENT ADEQUATE    - CONTINUE CURRENT MANAGEMENT  - MEDICATION AS DIRECTED  - CALL / RETURN IF SYMPTOMS WORSEN      Diabetes mellitus with neuropathy (HCC)    Lab Results   Component Value Date    HGBA1C 8 8 (H) 10/29/2019       COMPLIANT WITH MEDICATION  DENIES ANY NUMBNESS, TINGLING IN FEET    - DISCUSSED DIETARY MODIFICATION OF CARBOHYDRATES  - HGB A1C AND URINE STUDIES DUE TODAY  - FOOT CARE  - RV 3 MONTHS        Chronic respiratory failure with hypoxia and hypercapnia (HCC)  STABLE O2 DEPENDENT       Diagnoses and all orders for this visit:    Diabetes mellitus due to underlying condition with diabetic neuropathy, with long-term current use of insulin (HCC)    Chronic respiratory failure with hypoxia and hypercapnia (HCC)    Chronic bronchitis, unspecified chronic bronchitis type ([facility] [address] )    Essential hypertension    Primary osteoarthritis involving multiple joints    Familial hypercholesterolemia    Other orders  -     GAVILAX powder          Patient Instructions   CONTINUE CURRENT TREATMENT PLAN  MONITOR DIETARY SODIUM, CHOL, AND CARBOHYDRATE INTAKE  ENCOURAGE PHYSICAL ACTIVITY  FOOT CARE    RV 3 M, SOONER PRN        Return in about 3 months (around 2/26/2020) for Recheck  Subjective:      Patient ID: Cassidy No is a [de-identified] y o  female      Chief Complaint   Patient presents with   [facility]     Thoracic area (about bra strap) getting painful over the past couple few weeks    Follow-up    Diabetes       PATIENT RETURNS FOR ROUTINE EVALUATION OF PATIENT'S MEDICAL ISSUES    INDIVIDUAL MEDICAL ISSUES WITH THEIR CURRENT STATUS, ASSESSMENT AND PLANS ARE LISTED ABOVE          The following portions of the patient's history were reviewed and updated as appropriate: allergies, current medications, past family history, past medical history, past social history, past surgical history and problem list     Review of Systems   Constitutional: Negative for chills, fatigue and fever  HENT: Negative for congestion, ear discharge, ear pain, mouth sores, postnasal drip, sore throat and trouble swallowing  Eyes: Negative for pain, discharge and visual disturbance  Respiratory: Negative for cough, shortness of breath and wheezing  Cardiovascular: Negative for chest pain, palpitations and leg swelling  Gastrointestinal: Negative for abdominal distention, abdominal pain, blood in stool, diarrhea and nausea  Endocrine: Negative for polydipsia, polyphagia and polyuria  Genitourinary: Negative for dysuria, frequency, hematuria and urgency  Musculoskeletal: Negative for arthralgias, gait problem and joint swelling  Skin: Negative for pallor and rash  Neurological: Negative for dizziness, syncope, speech difficulty, weakness, light-headedness, numbness and headaches  Hematological: Negative for adenopathy  Psychiatric/Behavioral: Negative for behavioral problems, confusion and sleep disturbance  The patient is not nervous/anxious            Current Outpatient Medications   Medication Sig Dispense Refill    furosemide (LASIX) 40 mg tablet Take 1 tablet (40 mg total) by mouth 2 (two) times a day (Patient taking differently: Take 40 mg by mouth daily ) 60 tablet 3    gabapentin (NEURONTIN) 300 mg capsule Take 1 capsule (300 mg total) by mouth 3 (three) times a day 90 capsule 3    glucose blood (MM EASY TOUCH GLUCOSE) test strip Use as instructed 200 each 0    insulin aspart protamine-insulin aspart (NOVOLOG MIX 70/30 FLEXPEN) 100 Units/mL injection pen Inject 45 Units under the skin 2 (two) times a day before meals 5 pen 0    levothyroxine 125 mcg tablet Take 1 tablet (125 mcg total) by mouth daily 90 tablet 0    metolazone (ZAROXOLYN) 2 5 mg tablet Take 1 tablet (2 5 mg total) by mouth 2 (two) times a day (Patient taking differently: Take 2 5 mg by mouth 3 (three) times a week ) 60 tablet 3    metoprolol tartrate (LOPRESSOR) 25 mg tablet Take 1 tablet (25 mg total) by mouth 2 (two) times a day 180 tablet 3    NOVOFINE AUTOCOVER 30G X 8 MM MISC       oxyCODONE-acetaminophen (PERCOCET) 7 5-325 MG per tablet Take 1 tablet by mouth every 4 (four) hours as needed for moderate painMax Daily Amount: 5 tablets 150 tablet 0    Polyethylene Glycol 3350 POWD Take by mouth daily with breakfast Take 1 capful daily with breakfast 1 Bottle 3    potassium chloride (K-DUR,KLOR-CON) 20 mEq tablet Take 1 tablet (20 mEq total) by mouth daily 60 tablet 3    pramipexole (MIRAPEX) 1 mg tablet Take 1 tablet (1 mg total) by mouth 3 (three) times a day 90 tablet 3    senna-docusate sodium (SENOKOT S) 8 6-50 mg per tablet Take 2 tablets by mouth daily at bedtime 112 tablet 2    B-D UF III MINI PEN NEEDLES 31G X 5 MM MISC USE TWICE DAILY TO TEST BLOOD SUGAR  3    clotrimazole-betamethasone (LOTRISONE) 1-0 05 % cream Apply topically 2 (two) times a day (Patient not taking: Reported on 11/26/2019) 30 g 0    GAVILAX powder       tobramycin-dexamethasone (TOBRADEX) ophthalmic suspension Administer 2 drops to both eyes 4 (four) times a day for 5 days (Patient not taking: Reported on 10/29/2019) 5 mL 0     Current Facility-Administered Medications   Medication Dose Route Frequency Provider Last Rate Last Dose    furosemide (LASIX) injection 30 mg  30 mg Intravenous Once Latseha Oconnell MD           Objective:    LMP  (LMP Unknown)        Physical Exam   Constitutional: She is oriented to person, place, and time  She appears well-developed and well-nourished  HENT:   Head: Normocephalic and atraumatic  Eyes: Pupils are equal, round, and reactive to light   Conjunctivae and EOM are normal  Right eye exhibits no discharge  Left eye exhibits no discharge  Neck: Normal range of motion  Neck supple  No thyromegaly present  Cardiovascular: Normal rate, regular rhythm and normal heart sounds  No murmur heard  Pulmonary/Chest: Effort normal and breath sounds normal  No respiratory distress  She has no wheezes  She has no rales  Abdominal: Soft  Bowel sounds are normal  There is no tenderness  OBESE   Musculoskeletal: She exhibits edema  She exhibits no tenderness  MODERATE DJD FINDINGS   Lymphadenopathy:     She has no cervical adenopathy  Neurological: She is alert and oriented to person, place, and time  Skin: Skin is warm and dry  No rash noted  No erythema  Psychiatric: She has a normal mood and affect   Her behavior is normal  Judgment and thought content normal               Madelyn Sanchez MD

## 2019-11-27 ENCOUNTER — TELEPHONE (OUTPATIENT)
Dept: FAMILY MEDICINE CLINIC | Facility: CLINIC | Age: 79
End: 2019-11-27

## 2019-11-27 NOTE — TELEPHONE ENCOUNTER
Note to file: All the medication that were sent yesterday were sent to [facility]  They needed to go to [facility]  I called and cancelled with [facility]

## 2019-11-29 ENCOUNTER — VBI (OUTPATIENT)
Dept: FAMILY MEDICINE CLINIC | Facility: CLINIC | Age: 79
End: 2019-11-29

## 2019-11-29 NOTE — LETTER
Diabetic Eye Exam Form    Date Requested: 19  Patient: Oval Cough  Patient : 1940   Referring Provider: Rosendo Enamorado MD    Dilated Retinal Exam        Yes         No     Date of Diabetic Eye Exam ______________________________  Left Eye      Exam did show retinopathy    Exam did not show retinopathy         Mild       Moderate       None       Proliferative       Severe     Right Eye     Exam did show retinopathy    Exam did not show retinopathy         Mild       Moderate       None       Proliferative       Severe     Comments __________________________________________________________    Practice Providing Exam ______________________________________________    Exam Performed By (print name) _______________________________________      Provider Signature ___________________________________________________      These reports are needed for  compliance    Please fax this completed form and a copy of the Diabetic Eye Exam report to our office as soon as possible to [phone] attention Mel: Phone [phone]    We thank you for your assistance in treating our mutual patient     (sent to Dr Ros Huang)

## 2019-11-29 NOTE — TELEPHONE ENCOUNTER
Jamaal Osman, [address] [facility]   Phone Number: [phone]             I have reviewed both our old [address] and need your assistance in locating Diabetic Eye exam  Per Patient, testing was done by Dr August Nicolas eye see care team on 12/25/19 Thank you  Faxed to Dr Nichol Rubalcava [phone] Victoria Garcia 11/29/19 2:15 PM              12/03/2019 01:01 PM Phone Jessie Hodge at Dr Joselo Chamberlain (Provider) [phone] Remove   Call Complete - Recieved fax back form with no date of service listed; she told me I can fill out the DOS/ explained that I cannot do this- she then stated I would have to refax the form to them (they do not have it) for the DOS to be listed         By HODAN Garcia               Received office visit note DOS 11/25/2019 Diabetic Eye Exam, resulted Mel Ahmadi MA 12/03/19 1:14 PM

## 2019-12-13 DIAGNOSIS — G25.81 RESTLESS LEG SYNDROME: ICD-10-CM

## 2019-12-13 DIAGNOSIS — M15.9 PRIMARY OSTEOARTHRITIS INVOLVING MULTIPLE JOINTS: ICD-10-CM

## 2019-12-13 DIAGNOSIS — G89.4 CHRONIC PAIN SYNDROME: ICD-10-CM

## 2019-12-13 DIAGNOSIS — M79.2 NEUROPATHIC PAIN: ICD-10-CM

## 2019-12-13 RX ORDER — PRAMIPEXOLE DIHYDROCHLORIDE 1 MG/1
1 TABLET ORAL 3 TIMES DAILY
Qty: 90 TABLET | Refills: 3 | Status: SHIPPED | OUTPATIENT
Start: 2019-12-13 | End: 2020-01-21 | Stop reason: SDUPTHER

## 2019-12-13 RX ORDER — OXYCODONE AND ACETAMINOPHEN 7.5; 325 MG/1; MG/1
1 TABLET ORAL EVERY 4 HOURS PRN
Qty: 150 TABLET | Refills: 0 | Status: SHIPPED | OUTPATIENT
Start: 2019-12-13 | End: 2019-12-16 | Stop reason: SDUPTHER

## 2019-12-16 ENCOUNTER — OFFICE VISIT (OUTPATIENT)
Dept: FAMILY MEDICINE CLINIC | Facility: CLINIC | Age: 79
End: 2019-12-16
Payer: COMMERCIAL

## 2019-12-16 VITALS
SYSTOLIC BLOOD PRESSURE: 118 MMHG | RESPIRATION RATE: 16 BRPM | DIASTOLIC BLOOD PRESSURE: 60 MMHG | HEART RATE: 79 BPM | OXYGEN SATURATION: 93 %

## 2019-12-16 DIAGNOSIS — G89.4 CHRONIC PAIN SYNDROME: ICD-10-CM

## 2019-12-16 DIAGNOSIS — E08.40 DIABETES MELLITUS DUE TO UNDERLYING CONDITION WITH DIABETIC NEUROPATHY, WITH LONG-TERM CURRENT USE OF INSULIN (HCC): Chronic | ICD-10-CM

## 2019-12-16 DIAGNOSIS — R68.89 FORGETFULNESS: Primary | ICD-10-CM

## 2019-12-16 DIAGNOSIS — R47.89 WORD FINDING DIFFICULTY: ICD-10-CM

## 2019-12-16 DIAGNOSIS — E87.6 LOW SERUM POTASSIUM: ICD-10-CM

## 2019-12-16 DIAGNOSIS — R81 GLUCOSURIA: ICD-10-CM

## 2019-12-16 DIAGNOSIS — Z79.4 DIABETES MELLITUS DUE TO UNDERLYING CONDITION WITH DIABETIC NEUROPATHY, WITH LONG-TERM CURRENT USE OF INSULIN (HCC): Chronic | ICD-10-CM

## 2019-12-16 DIAGNOSIS — M79.2 NEUROPATHIC PAIN: ICD-10-CM

## 2019-12-16 DIAGNOSIS — M15.9 PRIMARY OSTEOARTHRITIS INVOLVING MULTIPLE JOINTS: ICD-10-CM

## 2019-12-16 PROBLEM — R41.0 CONFUSION: Status: ACTIVE | Noted: 2019-12-16

## 2019-12-16 LAB
ANION GAP SERPL CALCULATED.3IONS-SCNC: 7 MMOL/L (ref 4–13)
BASOPHILS # BLD AUTO: 0.03 THOUSANDS/ΜL (ref 0–0.1)
BASOPHILS NFR BLD AUTO: 0 % (ref 0–1)
BUN SERPL-MCNC: 29 MG/DL (ref 5–25)
CALCIUM SERPL-MCNC: 9 MG/DL (ref 8.3–10.1)
CHLORIDE SERPL-SCNC: 95 MMOL/L (ref 100–108)
CO2 SERPL-SCNC: 37 MMOL/L (ref 21–32)
CREAT SERPL-MCNC: 0.88 MG/DL (ref 0.6–1.3)
EOSINOPHIL # BLD AUTO: 0.06 THOUSAND/ΜL (ref 0–0.61)
EOSINOPHIL NFR BLD AUTO: 1 % (ref 0–6)
ERYTHROCYTE [DISTWIDTH] IN BLOOD BY AUTOMATED COUNT: 14.4 % (ref 11.6–15.1)
GFR SERPL CREATININE-BSD FRML MDRD: 62 ML/MIN/1.73SQ M
GLUCOSE SERPL-MCNC: 312 MG/DL (ref 65–140)
HCT VFR BLD AUTO: 42.6 % (ref 34.8–46.1)
HGB BLD-MCNC: 13.4 G/DL (ref 11.5–15.4)
IMM GRANULOCYTES # BLD AUTO: 0.05 THOUSAND/UL (ref 0–0.2)
IMM GRANULOCYTES NFR BLD AUTO: 1 % (ref 0–2)
LYMPHOCYTES # BLD AUTO: 2.15 THOUSANDS/ΜL (ref 0.6–4.47)
LYMPHOCYTES NFR BLD AUTO: 22 % (ref 14–44)
MCH RBC QN AUTO: 30.5 PG (ref 26.8–34.3)
MCHC RBC AUTO-ENTMCNC: 31.5 G/DL (ref 31.4–37.4)
MCV RBC AUTO: 97 FL (ref 82–98)
MONOCYTES # BLD AUTO: 0.53 THOUSAND/ΜL (ref 0.17–1.22)
MONOCYTES NFR BLD AUTO: 5 % (ref 4–12)
NEUTROPHILS # BLD AUTO: 7.2 THOUSANDS/ΜL (ref 1.85–7.62)
NEUTS SEG NFR BLD AUTO: 71 % (ref 43–75)
NRBC BLD AUTO-RTO: 0 /100 WBCS
PLATELET # BLD AUTO: 205 THOUSANDS/UL (ref 149–390)
PMV BLD AUTO: 11 FL (ref 8.9–12.7)
POTASSIUM SERPL-SCNC: 3.5 MMOL/L (ref 3.5–5.3)
RBC # BLD AUTO: 4.39 MILLION/UL (ref 3.81–5.12)
SL AMB  POCT GLUCOSE, UA: 100
SL AMB LEUKOCYTE ESTERASE,UA: 0
SL AMB POCT BILIRUBIN,UA: 0
SL AMB POCT BLOOD,UA: 0
SL AMB POCT CLARITY,UA: CLEAR
SL AMB POCT COLOR,UA: ABNORMAL
SL AMB POCT KETONES,UA: 0
SL AMB POCT NITRITE,UA: 0
SL AMB POCT PH,UA: 6.5
SL AMB POCT SPECIFIC GRAVITY,UA: 1
SL AMB POCT URINE PROTEIN: 0
SL AMB POCT UROBILINOGEN: 0
SODIUM SERPL-SCNC: 139 MMOL/L (ref 136–145)
WBC # BLD AUTO: 10.02 THOUSAND/UL (ref 4.31–10.16)

## 2019-12-16 PROCEDURE — 99214 OFFICE O/P EST MOD 30 MIN: CPT | Performed by: NURSE PRACTITIONER

## 2019-12-16 PROCEDURE — 85025 COMPLETE CBC W/AUTO DIFF WBC: CPT | Performed by: NURSE PRACTITIONER

## 2019-12-16 PROCEDURE — 36415 COLL VENOUS BLD VENIPUNCTURE: CPT | Performed by: NURSE PRACTITIONER

## 2019-12-16 PROCEDURE — 81003 URINALYSIS AUTO W/O SCOPE: CPT | Performed by: NURSE PRACTITIONER

## 2019-12-16 PROCEDURE — 87086 URINE CULTURE/COLONY COUNT: CPT | Performed by: NURSE PRACTITIONER

## 2019-12-16 PROCEDURE — 80048 BASIC METABOLIC PNL TOTAL CA: CPT | Performed by: NURSE PRACTITIONER

## 2019-12-16 RX ORDER — POLYETHYLENE GLYCOL 3350 17 G/17G
POWDER, FOR SOLUTION ORAL
COMMUNITY

## 2019-12-16 RX ORDER — ISOPRPOPYL ALCOHOL 70 ML/100ML
SWAB TOPICAL
COMMUNITY
Start: 2019-11-27 | End: 2020-02-17 | Stop reason: SDUPTHER

## 2019-12-16 NOTE — TELEPHONE ENCOUNTER
[facility] received this rx written and faxed to them by [place]  They can only accept it escribed  Please resend to [facility]    Thanks

## 2019-12-16 NOTE — ASSESSMENT & PLAN NOTE
Discussed moderation of carbohydrates and sugars in order to improve blood sugars overall  Brief conversation in regards to quality of life decision making and assessing benefits vs risks  Discussed medical risks r/t non compliance of diabetic nutrition     Lab Results   Component Value Date    HGBA1C 8 8 (H) 10/29/2019

## 2019-12-16 NOTE — ASSESSMENT & PLAN NOTE
Ryan Erazo was alert and oriented x's 3 with some difficulty expressing ideas/thoughts  I do not believe she has a UTI or underlying infection at this time  Urine dip was negative for leukocytes, will still send for culture  I will do basic lab evaluation with CBC and BMP  I did advise both pt and daughter that this could be progressive dementia  Recommend neurology follow up for further evaluation

## 2019-12-16 NOTE — PROGRESS NOTES
Assessment/Plan:    1  Forgetfulness  Assessment & Plan:  Albert Cook was alert and oriented x's 3 with some difficulty expressing ideas/thoughts  I do not believe she has a UTI or underlying infection at this time  Urine dip was negative for leukocytes, will still send for culture  I will do basic lab evaluation with CBC and BMP  I did advise both pt and daughter that this could be progressive dementia  Recommend neurology follow up for further evaluation  Orders:  -     POCT urine dip auto non-scope  -     CBC and differential  -     Basic metabolic panel  -     Urine culture    2  Word finding difficulty  -     CBC and differential  -     Basic metabolic panel    3  Low serum potassium  -     CBC and differential  -     Basic metabolic panel    4  Glucosuria  -     Urine culture    5  Diabetes mellitus due to underlying condition with diabetic neuropathy, with long-term current use of insulin ([facility] [address] )  Assessment & Plan:  Discussed moderation of carbohydrates and sugars in order to improve blood sugars overall  Brief conversation in regards to quality of life decision making and assessing benefits vs risks  Discussed medical risks r/t non compliance of diabetic nutrition  Lab Results   Component Value Date    HGBA1C 8 8 (H) 10/29/2019         Return in about 3 months (around 3/16/2020) for Next scheduled follow up  Subjective:      Patient ID: Moon Garza is a [de-identified] y o  female  Chief Complaint   Patient presents with    Memory Loss    questions UTI    eye appt     double vision - 3rd opinion next week       Albert Cook is an [de-identified]year old female with diabetes, COPD, chronic pain, PVD who presents to the office for evaluation of increased confusion  She is accompanied by her daughter who assisted in providing most of the history   Pt's daughter reports she has not noted any significant change in her mother's condition, but her mother's partner and her other 2 daughters have noted that she has increased confusion and difficulty expression her needs  States, she will ask for "that long thing that goes in a bun", when trying to ask for a hot dog, for example  Denies any fever, chills, c/o burning or pain with urination  Pt reports she still gets herself dressed and cooks meals in the home  She has a great deal of assistance from her daughter and her partner  Pt's daughters are concerned about possible infection/UTI  Pt has seen neurology in the past to assess cognitive function  The following portions of the patient's history were reviewed and updated as appropriate: allergies, current medications, past family history, past medical history, past social history, past surgical history and problem list     Review of Systems   Constitutional: Negative for chills and fever  Respiratory: Negative for shortness of breath  Cardiovascular: Positive for leg swelling  Negative for chest pain  Gastrointestinal: Positive for constipation  Negative for abdominal pain, nausea and vomiting  Genitourinary: Negative for difficulty urinating, dysuria, flank pain, frequency, hematuria and pelvic pain  Neurological: Negative for dizziness           Current Outpatient Medications   Medication Sig Dispense Refill    Alcohol Swabs (ULTRA-CARE ALCOHOL PREP PADS) 70 % PADS       B-D UF III MINI PEN NEEDLES 31G X 5 MM MISC USE TWICE DAILY TO TEST BLOOD SUGAR  3    furosemide (LASIX) 40 mg tablet Take 1 tablet (40 mg total) by mouth daily 60 tablet 1    clotrimazole-betamethasone (LOTRISONE) 1-0 05 % cream Apply topically 2 (two) times a day (Patient not taking: Reported on 11/26/2019) 30 g 0    gabapentin (NEURONTIN) 300 mg capsule Take 1 capsule (300 mg total) by mouth 3 (three) times a day 90 capsule 3    GAVILAX powder       glucose blood (MM EASY TOUCH GLUCOSE) test strip Test 3 times a day 200 each 0    insulin aspart protamine-insulin aspart (NOVOLOG MIX 70/30 FLEXPEN) 100 Units/mL injection pen Inject 45 Units under the skin 2 (two) times a day before meals 5 pen 3    levothyroxine 125 mcg tablet Take 1 tablet (125 mcg total) by mouth daily 90 tablet 0    metolazone (ZAROXOLYN) 2 5 mg tablet Take 1 tablet (2 5 mg total) by mouth 2 (two) times a day (Patient taking differently: Take 2 5 mg by mouth 3 (three) times a week ) 60 tablet 3    metoprolol tartrate (LOPRESSOR) 25 mg tablet Take 1 tablet (25 mg total) by mouth 2 (two) times a day 180 tablet 3    NOVOFINE AUTOCOVER 30G X 8 MM MISC       oxyCODONE-acetaminophen (PERCOCET) 7 5-325 MG per tablet Take 1 tablet by mouth every 4 (four) hours as needed for moderate painMax Daily Amount: 5 tablets 150 tablet 0    polyethylene glycol (GAVILAX) powder gavilax  powd      Polyethylene Glycol 3350 POWD Take by mouth daily with breakfast Take 1 capful daily with breakfast 1 Bottle 3    potassium chloride (K-DUR,KLOR-CON) 20 mEq tablet Take 1 tablet (20 mEq total) by mouth daily 60 tablet 3    pramipexole (MIRAPEX) 1 mg tablet Take 1 tablet (1 mg total) by mouth 3 (three) times a day 90 tablet 3    senna-docusate sodium (SENOKOT S) 8 6-50 mg per tablet Take 2 tablets by mouth daily at bedtime 112 tablet 2    tobramycin-dexamethasone (TOBRADEX) ophthalmic suspension Administer 2 drops to both eyes 4 (four) times a day for 5 days (Patient not taking: Reported on 10/29/2019) 5 mL 0    traZODone (DESYREL) 50 mg tablet Take 1 tablet (50 mg total) by mouth daily at bedtime 30 tablet 1     Current Facility-Administered Medications   Medication Dose Route Frequency Provider Last Rate Last Dose    furosemide (LASIX) injection 30 mg  30 mg Intravenous Once Bhavana Tate MD           Objective:    /60   Pulse 79   Resp 16   LMP  (LMP Unknown)   SpO2 93%        Physical Exam   Constitutional: She is oriented to person, place, and time  She appears well-developed and well-nourished  No distress  HENT:   Head: Normocephalic and atraumatic     Eyes: Conjunctivae are normal  Right eye exhibits no discharge  Left eye exhibits no discharge  Neck: Normal range of motion  Neck supple  No thyromegaly present  Cardiovascular: Normal rate, regular rhythm and normal heart sounds  Pulmonary/Chest: Effort normal and breath sounds normal  No respiratory distress  She has no decreased breath sounds  She has no wheezes  She has no rhonchi  She has no rales  Abdominal: Soft  Lymphadenopathy:     She has no cervical adenopathy  Neurological: She is alert and oriented to person, place, and time  Skin: Skin is warm and dry  No rash noted  She is not diaphoretic  Psychiatric: She has a normal mood and affect   Her behavior is normal  Judgment and thought content normal            FATIMAH Amato

## 2019-12-17 LAB — BACTERIA UR CULT: NORMAL

## 2019-12-17 RX ORDER — OXYCODONE AND ACETAMINOPHEN 7.5; 325 MG/1; MG/1
1 TABLET ORAL EVERY 4 HOURS PRN
Qty: 150 TABLET | Refills: 0 | Status: SHIPPED | OUTPATIENT
Start: 2019-12-17 | End: 2020-01-17 | Stop reason: SDUPTHER

## 2019-12-18 ENCOUNTER — OFFICE VISIT (OUTPATIENT)
Dept: FAMILY MEDICINE CLINIC | Facility: CLINIC | Age: 79
End: 2019-12-18
Payer: COMMERCIAL

## 2019-12-18 VITALS
SYSTOLIC BLOOD PRESSURE: 120 MMHG | RESPIRATION RATE: 16 BRPM | HEART RATE: 64 BPM | TEMPERATURE: 98.5 F | HEIGHT: 59 IN | BODY MASS INDEX: 41.37 KG/M2 | WEIGHT: 205.2 LBS | DIASTOLIC BLOOD PRESSURE: 64 MMHG | OXYGEN SATURATION: 93 %

## 2019-12-18 DIAGNOSIS — G89.29 CHRONIC PAIN OF BOTH SHOULDERS: ICD-10-CM

## 2019-12-18 DIAGNOSIS — M54.2 NECK PAIN: ICD-10-CM

## 2019-12-18 DIAGNOSIS — M46.1 SACROILIITIS (HCC): ICD-10-CM

## 2019-12-18 DIAGNOSIS — M25.511 CHRONIC PAIN OF BOTH SHOULDERS: ICD-10-CM

## 2019-12-18 DIAGNOSIS — R22.2 LOCALIZED SWELLING OF BACK: ICD-10-CM

## 2019-12-18 DIAGNOSIS — G89.29 CHRONIC MIDLINE LOW BACK PAIN WITH RIGHT-SIDED SCIATICA: ICD-10-CM

## 2019-12-18 DIAGNOSIS — Z79.4 DIABETES MELLITUS DUE TO UNDERLYING CONDITION WITH DIABETIC NEUROPATHY, WITH LONG-TERM CURRENT USE OF INSULIN (HCC): Primary | Chronic | ICD-10-CM

## 2019-12-18 DIAGNOSIS — E08.40 DIABETES MELLITUS DUE TO UNDERLYING CONDITION WITH DIABETIC NEUROPATHY, WITH LONG-TERM CURRENT USE OF INSULIN (HCC): Primary | Chronic | ICD-10-CM

## 2019-12-18 DIAGNOSIS — M54.41 CHRONIC MIDLINE LOW BACK PAIN WITH RIGHT-SIDED SCIATICA: ICD-10-CM

## 2019-12-18 DIAGNOSIS — M25.512 CHRONIC PAIN OF BOTH SHOULDERS: ICD-10-CM

## 2019-12-18 PROCEDURE — 99214 OFFICE O/P EST MOD 30 MIN: CPT | Performed by: NURSE PRACTITIONER

## 2019-12-18 PROCEDURE — 1160F RVW MEDS BY RX/DR IN RCRD: CPT | Performed by: NURSE PRACTITIONER

## 2019-12-18 RX ORDER — CHOLECALCIFEROL (VITAMIN D3) 125 MCG
CAPSULE ORAL
COMMUNITY

## 2019-12-18 NOTE — PROGRESS NOTES
Assessment/Plan:    1  Diabetes mellitus due to underlying condition with diabetic neuropathy, with long-term current use of insulin ([address] )  Assessment & Plan:  We discussed that her A1c is improved from last year  Her partner helps with meal planning and he provides mostly healthy options for her  Discussed with Marge [place] the risks of non diabetic diet but also recommend everything in moderation  Reviewed quality of life goals r/t multiple comorbidity and chronic discomfort  Lab Results   Component Value Date    HGBA1C 8 8 (H) 10/29/2019         2  Chronic midline low back pain with right-sided sciatica  -     Ambulatory referral to Physical Therapy; Future    3  Neck pain  -     Ambulatory referral to Physical Therapy; Future    4  Chronic pain of both shoulders  -     Ambulatory referral to Physical Therapy; Future    5  Sacroiliitis ([address] )    6  Localized swelling of back  -     US superficial lump (non extremity); Future; Expected date: 12/18/2019    Patient Instructions   Follow diabetic nutrition to the best of your ability  Monitor your blood sugars as directed      Return for Next scheduled follow up  Subjective:      Patient ID: Clemente Cox is a [de-identified] y o  female  Chief Complaint   Patient presents with   [facility] Rosemariee is an [de-identified]year old female with DM, chronic lower back pain, COPD and recent history of confusion, who returns to the office for evaluation of upper and lower back pain that has been increasing over the past one week  Pt's daughter and partner accompany the patient and assisted in providing the history  Pt is an OK historian at this time  Reports she frequently has posture in which she is leaning over  She experiences pain on both sides of her upper back without numbness of BL upper extremities  Her daughter and partner report she continues to struggle with blood sugar control  She states she makes a lot of pasta because it is easy for her   Per pt's partner, she eats late into the evening and early hours of the mornings  The following portions of the patient's history were reviewed and updated as appropriate: allergies, current medications, past family history, past medical history, past social history, past surgical history and problem list     Review of Systems   Respiratory: Positive for shortness of breath (at baseline)  Negative for chest tightness  Cardiovascular: Negative for chest pain  Musculoskeletal: Positive for arthralgias, back pain, gait problem, joint swelling, myalgias and neck pain  Negative for neck stiffness  Neurological: Negative for numbness           Current Outpatient Medications   Medication Sig Dispense Refill    Alcohol Swabs (ULTRA-CARE ALCOHOL PREP PADS) 70 % PADS       B-D UF III MINI PEN NEEDLES 31G X 5 MM MISC USE TWICE DAILY TO TEST BLOOD SUGAR  3    furosemide (LASIX) 40 mg tablet Take 1 tablet (40 mg total) by mouth daily 60 tablet 1    gabapentin (NEURONTIN) 300 mg capsule Take 1 capsule (300 mg total) by mouth 3 (three) times a day 90 capsule 3    glucose blood (MM EASY TOUCH GLUCOSE) test strip Test 3 times a day 200 each 0    insulin aspart protamine-insulin aspart (NOVOLOG MIX 70/30 FLEXPEN) 100 Units/mL injection pen Inject 45 Units under the skin 2 (two) times a day before meals 5 pen 3    levothyroxine 125 mcg tablet Take 1 tablet (125 mcg total) by mouth daily 90 tablet 0    Melatonin 5 MG TABS Take by mouth daily at bedtime      metolazone (ZAROXOLYN) 2 5 mg tablet Take 1 tablet (2 5 mg total) by mouth 2 (two) times a day (Patient taking differently: Take 2 5 mg by mouth 3 (three) times a week ) 60 tablet 3    metoprolol tartrate (LOPRESSOR) 25 mg tablet Take 1 tablet (25 mg total) by mouth 2 (two) times a day (Patient taking differently: Take 25 mg by mouth daily ) 180 tablet 3    NOVOFINE AUTOCOVER 30G X 8 MM MISC       oxyCODONE-acetaminophen (PERCOCET) 7 5-325 MG per tablet Take 1 tablet by mouth every 4 (four) hours as needed for moderate pain (chronic intactable pain)Max Daily Amount: 5 tablets 150 tablet 0    Polyethylene Glycol 3350 POWD Take by mouth daily with breakfast Take 1 capful daily with breakfast 1 Bottle 3    potassium chloride (K-DUR,KLOR-CON) 20 mEq tablet Take 1 tablet (20 mEq total) by mouth daily 60 tablet 3    pramipexole (MIRAPEX) 1 mg tablet Take 1 tablet (1 mg total) by mouth 3 (three) times a day 90 tablet 3    senna-docusate sodium (SENOKOT S) 8 6-50 mg per tablet Take 2 tablets by mouth daily at bedtime 112 tablet 2    clotrimazole-betamethasone (LOTRISONE) 1-0 05 % cream Apply topically 2 (two) times a day (Patient not taking: Reported on 11/26/2019) 30 g 0    GAVILAX powder       polyethylene glycol (GAVILAX) powder gavilax  powd      tobramycin-dexamethasone (TOBRADEX) ophthalmic suspension Administer 2 drops to both eyes 4 (four) times a day for 5 days (Patient not taking: Reported on 10/29/2019) 5 mL 0    traZODone (DESYREL) 50 mg tablet Take 1 tablet (50 mg total) by mouth daily at bedtime (Patient not taking: Reported on 12/18/2019) 30 tablet 1     Current Facility-Administered Medications   Medication Dose Route Frequency Provider Last Rate Last Dose    furosemide (LASIX) injection 30 mg  30 mg Intravenous Once Carla Aranda MD           Objective:    /64   Pulse 64   Temp 98 5 °F (36 9 °C) (Temporal)   Resp 16   Ht 4' 10 5" (1 486 m)   Wt 93 1 kg (205 lb 3 2 oz)   LMP  (LMP Unknown)   SpO2 93%   BMI 42 16 kg/m²        Physical Exam   Constitutional: She is oriented to person, place, and time  She appears well-developed and well-nourished  No distress  HENT:   Head: Normocephalic and atraumatic  Eyes: Conjunctivae are normal  Right eye exhibits no discharge  Left eye exhibits no discharge  Cardiovascular: Normal rate, regular rhythm and normal heart sounds  Pulmonary/Chest: She has no decreased breath sounds  She has no rhonchi     Musculoskeletal: Cervical back: She exhibits normal range of motion, no tenderness, no swelling and no edema  Lumbar back: She exhibits tenderness and swelling  She exhibits no edema  Back:    Neurological: She is alert and oriented to person, place, and time  Skin: Skin is warm and dry  No rash noted  She is not diaphoretic  Psychiatric: She has a normal mood and affect   Her behavior is normal  Judgment and thought content normal          FATIMAH Ojeda

## 2019-12-19 NOTE — ASSESSMENT & PLAN NOTE
We discussed that her A1c is improved from last year  Her partner helps with meal planning and he provides mostly healthy options for her  Discussed with Ericka Betheaing the risks of non diabetic diet but also recommend everything in moderation  Reviewed quality of life goals r/t multiple comorbidity and chronic discomfort       Lab Results   Component Value Date    HGBA1C 8 8 (H) 10/29/2019

## 2019-12-20 ENCOUNTER — HOSPITAL ENCOUNTER (OUTPATIENT)
Dept: RADIOLOGY | Facility: HOSPITAL | Age: 79
Discharge: HOME | End: 2019-12-20
Payer: COMMERCIAL

## 2019-12-20 DIAGNOSIS — R22.2 LOCALIZED SWELLING OF BACK: ICD-10-CM

## 2019-12-20 PROCEDURE — 76705 ECHO EXAM OF ABDOMEN: CPT

## 2019-12-25 NOTE — PROGRESS NOTES
[facility]  Cal Lucero 77 y o  female MRN: [id number]            Assessment & Plan:    Pleasant 58-year-old female with a history of significant arthritis, chronic constipation, morbid obesity, COPD  Here for follow-up of chronic constipation and abdominal pain  1   Chronic constipation:  Likely functional, the patient no longer takes opiate narcotics  -we will discontinue the Movantik  -continue Dulcolax and MiraLax  -we will start her on Linzess 72 micrograms daily, she was instructed to give us a call we can increase the dose to to the highest dose if clinically nuclear  -she was asked to follow up the next 2 to 3 months    2  Abnormal CT scan with intrahepatic biliary dilatation and pancreatic cyst:  She has had an attempted workup which has led to cholangitis  -she has had stable findings on imaging over the last 1 year with no worsening findings, no elevation LFTs  -small chance that this may be an intrahepatic process though very unlikely, discussed this with the patient and her daughter today  -if there is any worsening on future imaging we can refer down to [place]  -we attended a percutaneous liver biopsy and ERCP both of which were unsuccessful, lesion has shown stability on imaging studies  -patient has not been able to tolerate an MRI despite multiple attempts  -we will follow with cross-sectional imaging in approximately 6 months      Adelene Hammans was seen today for abdominal pain, constipation and diarrhea  Diagnoses and all orders for this visit:    Constipation due to opioid therapy  -     Linaclotide (LINZESS) 72 MCG CAPS; Take 72 mcg by mouth daily before breakfast    Pancreatic cyst    Abnormal CT scan, liver            _____________________________________________________________        CC: Follow-up of abdominal pain and constipation    HPI:  Cal Lucero is a 77 y  o female who is here for her abdominal pain constipation   As you know this is a 58-year-old female with a history of COPD, recurrent admissions for bronchitis, pneumonia, also has a history of abnormal CT scan with intrahepatic biliary dilatation, chronic constipation  Previously this was in the setting of chronic narcotic use for her arthritis  The patient has stopped taking all narcotics but still has significant constipation  She reports abdominal bloating, distention, small pencil like stools and incomplete evacuation  She was recently in the hospital for influenza  The patient has been taking MiraLax 3 times daily, Dulcolax in the evening, Movantik as previously prescribed  However as noted she has stopped taking narcotic pain medications  ROS:  The remainder of the ROS was negative except for the pertinent positives mentioned in HPI        Allergies: Ketorolac and Latex    Medications:   Current Outpatient Prescriptions:     acetaminophen (TYLENOL) 325 mg tablet, Take 2 tablets by mouth every 6 (six) hours as needed for mild pain or headaches, Disp: 30 tablet, Rfl: 0    ascorbic acid (VITAMIN C) 500 mg tablet, Take 500 mg by mouth daily, Disp: , Rfl:     aspirin 325 mg tablet, Take 1 tablet by mouth daily, Disp: , Rfl:     atorvastatin (LIPITOR) 20 mg tablet, Take 1 tablet by mouth daily with dinner, Disp: 30 tablet, Rfl: 0    bisacodyl (DULCOLAX) 5 mg EC tablet, Take 10 mg by mouth daily at bedtime, Disp: , Rfl:     COD LIVER OIL PO, Take by mouth daily, Disp: , Rfl:     dextromethorphan-guaiFENesin (ROBITUSSIN DM)  mg/5 mL syrup, Take 10 mL by mouth every 4 (four) hours as needed for cough, Disp: 118 mL, Rfl: 0    escitalopram (LEXAPRO) 10 mg tablet, Take 1 tablet (10 mg total) by mouth daily, Disp: 30 tablet, Rfl: 3    folic acid (FOLVITE) 1 mg tablet, Take by mouth daily, Disp: , Rfl:     furosemide (LASIX) 40 mg tablet, Take 40 mg by mouth daily after lunch, Disp: , Rfl:     gabapentin (NEURONTIN) 100 mg capsule, , Disp: , Rfl:     lactulose 10 g/15 mL, Take 15 mL by mouth daily, Disp: , Rfl:     levothyroxine 125 mcg tablet, Take 1 tablet by mouth daily, Disp: , Rfl:     lisinopril (ZESTRIL) 10 mg tablet, Take 1 tablet by mouth daily, Disp: , Rfl:     metoprolol tartrate (LOPRESSOR) 25 mg tablet, Take 1 tablet by mouth 2 (two) times a day, Disp: , Rfl:     Multiple Vitamins-Minerals (MULTIVITAMIN ADULT PO), Take 1 tablet by mouth daily, Disp: , Rfl:     NOVOLOG MIX 70/30 FLEXPEN (70-30) 100 UNIT/ML SUPN, , Disp: , Rfl:     polyethylene glycol (MIRALAX) 17 g packet, Take 17 g by mouth daily (Patient taking differently: Take 17 g by mouth 2 (two) times a day  ), Disp: 14 each, Rfl: 0    potassium chloride (K-DUR,KLOR-CON) 10 mEq tablet, Take 2 tablets by mouth daily, Disp: 30 tablet, Rfl: 0    pramipexole (MIRAPEX) 1 mg tablet, Take 1 tablet by mouth 3 (three) times a day, Disp: , Rfl:     torsemide (DEMADEX) 20 mg tablet, Take 1 tablet (20 mg total) by mouth daily, Disp: 30 tablet, Rfl: 3    ALPRAZolam (XANAX) 0 25 mg tablet, Take 1 tablet by mouth 3 (three) times a day as needed for anxiety for up to 10 days, Disp: 30 tablet, Rfl: 0    budesonide (PULMICORT) 0 5 mg/2 mL nebulizer solution, Take 2 mL (0 5 mg total) by nebulization every 12 (twelve) hours, Disp: 60 mL, Rfl: 0    clotrimazole (MYCELEX) 10 mg jaswant, Take 1 tablet (10 mg total) by mouth 5 (five) times a day, Disp: 30 tablet, Rfl: 1    clotrimazole (MYCELEX) 10 mg jaswant, Take 1 tablet (10 mg total) by mouth 4 (four) times a day for 14 days, Disp: 56 tablet, Rfl: 0    diclofenac sodium (VOLTAREN) 1 %, Place on the skin, Disp: , Rfl:     dicyclomine (BENTYL) 10 mg capsule, Take 1 capsule by mouth 3 (three) times a day, Disp: , Rfl:     fentaNYL (DURAGESIC) 25 mcg/hr, , Disp: , Rfl:     ipratropium-albuterol (DUONEB) 0 5-2 5 mg/3 mL, Inhale 1 each every 4 (four) hours as needed, Disp: , Rfl:     levalbuterol (XOPENEX) 1 25 mg/0 5 mL nebulizer solution, Take 0 5 mL (1 25 mg total) by nebulization 3 (three) times a day for 30 days, Disp: 45 mL, Rfl: 0    Linaclotide (LINZESS) 72 MCG CAPS, Take 72 mcg by mouth daily before breakfast, Disp: 30 capsule, Rfl: 6    LORazepam (ATIVAN) 1 mg tablet, Take by mouth, Disp: , Rfl:     metolazone (ZAROXOLYN) 2 5 mg tablet, Take 1 tablet by mouth, Disp: , Rfl:     oxyCODONE-acetaminophen (PERCOCET) 5-325 mg per tablet, Take 1 tablet by mouth every 4 (four) hours as needed for moderate pain, Disp: , Rfl:     oxyCODONE-acetaminophen (PERCOCET) 5-325 mg per tablet, Take by mouth every 6 (six) hours, Disp: , Rfl:     predniSONE 10 mg tablet, Take 40 mg/day for 3 days then 30mg/day for 3 days then 20mg/day for 3 days then 10mg/day for 3 days, Disp: 30 tablet, Rfl: 0    Current Facility-Administered Medications:     levalbuterol (XOPENEX) inhalation solution 1 25 mg, 1 25 mg, Nebulization, 4x Daily, FATIMAH Enamorado    Past Medical History:   Diagnosis Date    Arthritis     Asthma     Diabetes mellitus ([facility] [address] )     Disease of thyroid gland     Hypertension     Mitral valve regurgitation     Obesity hypoventilation syndrome (HCC)     Pain     right hip    Restless leg syndrome     Sleep related hypoxia     Thyroid cancer ([facility] [address] )        Past Surgical History:   Procedure Laterality Date    CHOLECYSTECTOMY      EYE SURGERY      [facility] ERCP DX COLLECTION SPECIMEN BRUSHING/WASHING N/A 5/30/2017    Procedure: ENDOSCOPIC RETROGRADE CHOLANGIOPANCREATOGRAPHY (ERCP); SPHINCTEROTOMY;  Surgeon: Monty Smith MD;  [facility]: [facility];   Service: Gastroenterology    REPLACEMENT TOTAL KNEE BILATERAL      SKIN BIOPSY      melanoma of back    STEROID INJECTION HIP Right 12/21/2017    Procedure: TROCHANTERIC BURSA INJECTION RIGHT;  Surgeon: Alysia Sy MD;  Location: [place] [id number] MAIN OR;  Service: Pain Management     THORACOTOMY Right     at least 40 years, for pericardial cyst    THYROIDECTOMY, PARTIAL      For thyroid cancer       Family History   Problem Relation Age of Onset    Cancer Father     Diabetes Sister     Substance Abuse Neg Hx     Mental illness Neg Hx         reports that she has never smoked  She has never used smokeless tobacco  She reports that she does not drink alcohol or use drugs        Physical Exam:    /64   Pulse 103   Temp 99 1 °F (37 3 °C) (Tympanic)   Resp 18   Ht 5' 9" (1 753 m)   Wt 103 kg (228 lb)   LMP  (LMP Unknown)   BMI 33 67 kg/m²     Gen: wn/wd, NAD, obese, uses a walker  HEENT: anicteric, MMM, no cervical LAD  CVS: RRR, no m/r/g  CHEST: CTA b/l  ABD: +BS, soft, epigastric abdominal pain, no rebound, guarding  EXT: +edema, RLE ulceration  NEURO: aaox3  SKIN: NO rashes Arm band on/IV intact

## 2019-12-27 ENCOUNTER — TELEPHONE (OUTPATIENT)
Dept: FAMILY MEDICINE CLINIC | Facility: CLINIC | Age: 79
End: 2019-12-27

## 2019-12-30 DIAGNOSIS — R60.9 EDEMA, UNSPECIFIED TYPE: ICD-10-CM

## 2019-12-30 DIAGNOSIS — I51.89 DIASTOLIC DYSFUNCTION: ICD-10-CM

## 2019-12-30 DIAGNOSIS — M79.2 NEUROPATHIC PAIN: ICD-10-CM

## 2019-12-30 RX ORDER — POTASSIUM CHLORIDE 20 MEQ/1
20 TABLET, EXTENDED RELEASE ORAL DAILY
Qty: 60 TABLET | Refills: 3 | Status: SHIPPED | OUTPATIENT
Start: 2019-12-30 | End: 2020-06-18 | Stop reason: SDUPTHER

## 2019-12-30 RX ORDER — GABAPENTIN 300 MG/1
300 CAPSULE ORAL 3 TIMES DAILY
Qty: 90 CAPSULE | Refills: 3 | Status: SHIPPED | OUTPATIENT
Start: 2019-12-30 | End: 2020-02-04 | Stop reason: SDUPTHER

## 2019-12-30 RX ORDER — FUROSEMIDE 40 MG/1
40 TABLET ORAL DAILY
Qty: 60 TABLET | Refills: 1 | Status: SHIPPED | OUTPATIENT
Start: 2019-12-30 | End: 2020-03-02 | Stop reason: SDUPTHER

## 2019-12-31 ENCOUNTER — TELEPHONE (OUTPATIENT)
Dept: FAMILY MEDICINE CLINIC | Facility: CLINIC | Age: 79
End: 2019-12-31

## 2019-12-31 NOTE — TELEPHONE ENCOUNTER
BERNIE Corral called stating both her and Mami Del Cid have 102 fevers and they both feel "horrible"  She states they are achy all over and fatigued too  Betsy Bagley and I spoke to Alma White and we advised Linda Sheridan and Mami Del Cid to go to the ER  She states Avila mustafa drive right now and Sebastian Seven her daughter is also sick  I advised Linda Sheridan to call 911 if they can not drive to the ER  She asked for me to call 911 for her  I called 911 and the squad was dispatched to their home  I called Linda Sheridan and notified her they were on their way  No further action required at this time

## 2020-01-08 ENCOUNTER — TELEPHONE (OUTPATIENT)
Dept: FAMILY MEDICINE CLINIC | Facility: CLINIC | Age: 80
End: 2020-01-08

## 2020-01-08 NOTE — TELEPHONE ENCOUNTER
Admitted on 12/31 to [facility] discharged 1/7  Went in for Bronchitis/sugar over 500      Call Lanny but if she does not know the answers to your questions, call Ledy Choi  [phone]    Coming in on 1/14 with [facility]

## 2020-01-09 DIAGNOSIS — R05.9 COUGH: Primary | ICD-10-CM

## 2020-01-09 RX ORDER — PROMETHAZINE HYDROCHLORIDE AND CODEINE PHOSPHATE 6.25; 1 MG/5ML; MG/5ML
5 SYRUP ORAL EVERY 4 HOURS PRN
Qty: 120 ML | Refills: 0 | Status: SHIPPED | OUTPATIENT
Start: 2020-01-09 | End: 2020-05-11

## 2020-01-09 NOTE — TELEPHONE ENCOUNTER
Cough syrup was sent electronically  Received script  Faxed to [facility] [phone]    [address]

## 2020-01-10 ENCOUNTER — TRANSITIONAL CARE MANAGEMENT (OUTPATIENT)
Dept: FAMILY MEDICINE CLINIC | Facility: CLINIC | Age: 80
End: 2020-01-10

## 2020-01-10 ENCOUNTER — TELEPHONE (OUTPATIENT)
Dept: FAMILY MEDICINE CLINIC | Facility: CLINIC | Age: 80
End: 2020-01-10

## 2020-01-10 RX ORDER — PANTOPRAZOLE SODIUM 40 MG/1
TABLET, DELAYED RELEASE ORAL
COMMUNITY
Start: 2020-01-07 | End: 2020-01-10 | Stop reason: ALTCHOICE

## 2020-01-10 RX ORDER — PANTOPRAZOLE SODIUM 40 MG/1
40 TABLET, DELAYED RELEASE ORAL DAILY
COMMUNITY

## 2020-01-10 RX ORDER — PREDNISONE 10 MG/1
10 TABLET ORAL
COMMUNITY
Start: 2020-01-07

## 2020-01-13 ENCOUNTER — TELEPHONE (OUTPATIENT)
Dept: FAMILY MEDICINE CLINIC | Facility: CLINIC | Age: 80
End: 2020-01-13

## 2020-01-13 NOTE — TELEPHONE ENCOUNTER
Received a call from [place] at [facility] to inform us that Megan Florence fasting blood sugar is at 272  She is asking if you would like to put her on a oral medication for this  Also she is concerned about her chronic pain  She is asking if you could discuss the pros and cons of medical marijuana with Megan Florence  She is willing to stop the narcotics and start with the medical marijuana  She has a appointment with you tomorrow

## 2020-01-14 ENCOUNTER — OFFICE VISIT (OUTPATIENT)
Dept: FAMILY MEDICINE CLINIC | Facility: CLINIC | Age: 80
End: 2020-01-14
Payer: COMMERCIAL

## 2020-01-14 VITALS
TEMPERATURE: 98.5 F | DIASTOLIC BLOOD PRESSURE: 40 MMHG | BODY MASS INDEX: 42.36 KG/M2 | RESPIRATION RATE: 14 BRPM | SYSTOLIC BLOOD PRESSURE: 86 MMHG | OXYGEN SATURATION: 95 % | HEART RATE: 71 BPM | WEIGHT: 201.8 LBS | HEIGHT: 58 IN

## 2020-01-14 DIAGNOSIS — M54.12 CERVICAL RADICULOPATHY: ICD-10-CM

## 2020-01-14 DIAGNOSIS — IMO0001 TRANSITION OF CARE PERFORMED WITH SHARING OF CLINICAL SUMMARY: Primary | ICD-10-CM

## 2020-01-14 DIAGNOSIS — G89.29 CHRONIC RIGHT-SIDED LOW BACK PAIN WITHOUT SCIATICA: Chronic | ICD-10-CM

## 2020-01-14 DIAGNOSIS — E08.40 DIABETES MELLITUS DUE TO UNDERLYING CONDITION WITH DIABETIC NEUROPATHY, WITH LONG-TERM CURRENT USE OF INSULIN (HCC): Chronic | ICD-10-CM

## 2020-01-14 DIAGNOSIS — G89.4 CHRONIC PAIN SYNDROME: ICD-10-CM

## 2020-01-14 DIAGNOSIS — M54.50 CHRONIC RIGHT-SIDED LOW BACK PAIN WITHOUT SCIATICA: Chronic | ICD-10-CM

## 2020-01-14 DIAGNOSIS — Z79.4 DIABETES MELLITUS DUE TO UNDERLYING CONDITION WITH DIABETIC NEUROPATHY, WITH LONG-TERM CURRENT USE OF INSULIN (HCC): Chronic | ICD-10-CM

## 2020-01-14 DIAGNOSIS — J96.11 CHRONIC RESPIRATORY FAILURE WITH HYPOXIA AND HYPERCAPNIA (HCC): ICD-10-CM

## 2020-01-14 DIAGNOSIS — I50.32 CHRONIC DIASTOLIC CONGESTIVE HEART FAILURE (HCC): Chronic | ICD-10-CM

## 2020-01-14 DIAGNOSIS — J40 BRONCHITIS: ICD-10-CM

## 2020-01-14 DIAGNOSIS — J96.12 CHRONIC RESPIRATORY FAILURE WITH HYPOXIA AND HYPERCAPNIA (HCC): ICD-10-CM

## 2020-01-14 DIAGNOSIS — E86.0 DEHYDRATION: ICD-10-CM

## 2020-01-14 PROCEDURE — 99496 TRANSJ CARE MGMT HIGH F2F 7D: CPT | Performed by: NURSE PRACTITIONER

## 2020-01-14 RX ORDER — CEFUROXIME AXETIL 250 MG/1
250 TABLET ORAL EVERY 12 HOURS SCHEDULED
Qty: 14 TABLET | Refills: 0 | Status: SHIPPED | OUTPATIENT
Start: 2020-01-14 | End: 2020-01-21

## 2020-01-14 RX ORDER — PROMETHAZINE HYDROCHLORIDE AND CODEINE PHOSPHATE 6.25; 1 MG/5ML; MG/5ML
SOLUTION ORAL
COMMUNITY
Start: 2020-01-09 | End: 2020-01-14 | Stop reason: SDUPTHER

## 2020-01-14 NOTE — ASSESSMENT & PLAN NOTE
Taking Novolog differently 25 units breakfast and dinner during course of prednisone  Monitor for improvement in BG s/p completion of treatment      Lab Results   Component Value Date    HGBA1C 8 8 (H) 10/29/2019

## 2020-01-14 NOTE — PROGRESS NOTES
Assessment/Plan:    1  Transition of care performed with sharing of clinical summary  Comments:  Reviewed diagnosis and discharge instructions  Reviewed medications  Changes as specified below  2  Dehydration  Comments:  Advised she HOLD lasix and metolazone ONCE tomorrow  Recheck in 2 days  3  Bronchitis  -     cefuroxime (CEFTIN) 250 mg tablet; Take 1 tablet (250 mg total) by mouth every 12 (twelve) hours for 7 days    4  Chronic diastolic congestive heart failure [facility])  Assessment & Plan:  Wt Readings from Last 3 Encounters:   01/14/20 91 5 kg (201 lb 12 8 oz)   12/18/19 93 1 kg (205 lb 3 2 oz)   11/26/19 93 4 kg (205 lb 12 8 oz)     BL LE swelling improved  Continue Ace wraps daily  Sodium restriction  Will HOLD furosemide and metolazone dosing tomorrow, once and recheck BP in 2 days in office  5  Chronic pain syndrome  Assessment & Plan:  Taking oxycodone/acetaminophen Q8 hours PRN  Per pt's daughters, she takes additional tablets r/t break-through pain  She expresses interest in medical marijuana  I advise risks of falls but recommend she speak with certified specialist - information given for Dr Esteban Miles   Encouraged medication compliance  6  Cervical radiculopathy    7  Chronic right-sided low back pain without sciatica    8  Chronic respiratory failure with hypoxia and hypercapnia (HCC)  Assessment & Plan:  O2 use, encourage compliance  Follow up with pulmonology as scheduled  9  Diabetes mellitus due to underlying condition with diabetic neuropathy, with long-term current use of insulin (HCC)  Assessment & Plan:  Taking Novolog differently 25 units breakfast and dinner during course of prednisone  Monitor for improvement in BG s/p completion of treatment  Lab Results   Component Value Date    HGBA1C 8 8 (H) 10/29/2019         BMI Counseling: Body mass index is 42 18 kg/m²   The BMI is above normal  Nutrition recommendations include encouraging healthy choices of fruits and vegetables, increasing intake of lean protein, reducing intake of saturated and trans fat and reducing intake of cholesterol  Falls Plan of Care: balance, strength, and gait training instructions were provided  Medications that increase falls were reviewed  Patient Instructions   HOLD furosemide and metolazone dosing tomorrow  Recheck BP in office on Thursday  Take metoprolol as prescribed  Monitor for improvement in symptoms  Check BP at home periodically  Sodium restriction as directed      Return in about 2 days (around 1/16/2020) for Recheck  Subjective:      Patient ID: Maris Cohen is a [de-identified] y o  female  Chief Complaint   Patient presents with    Transition of Care Management     disch1/7/20  feels weak and tired - using O2 18hr out of 24 hrs  2 ltrs       Abigail Duarte is a [de-identified]year old diabetic female who presents to the office accompanied by her two daughters who assisted in providing part of the history  Recently admitted for shortness of breath symptoms  Acute exaccerbation of COPD with underlying bronchitis  Pt reports she is almost finished with her prednisone taper, feeling slightly improved  Continues to feel fatigued  Denies fever, chills, SOB, chest pain or wheezing  Reports periodic elevations in blood sugars, insulin increased to 25 units breakfast and dinner and was instructed to decrease dose to 20 units breakfast and dinner after taper is completed  The following portions of the patient's history were reviewed and updated as appropriate: allergies, current medications, past family history, past medical history, past social history, past surgical history and problem list     Review of Systems   Constitutional: Positive for fatigue  Negative for chills, diaphoresis and fever  HENT: Negative for congestion, ear discharge, ear pain, postnasal drip, rhinorrhea, sinus pressure, sinus pain and sore throat  Eyes: Negative for pain and discharge     Respiratory: Negative for cough, chest tightness, shortness of breath and wheezing  Cardiovascular: Negative for chest pain  Gastrointestinal: Negative for diarrhea, nausea and vomiting  Genitourinary: Negative for dysuria  Musculoskeletal: Negative for myalgias  Skin: Negative for rash  Neurological: Positive for weakness  Negative for dizziness and headaches  Hematological: Negative for adenopathy           Current Outpatient Medications   Medication Sig Dispense Refill    Alcohol Swabs (ULTRA-CARE ALCOHOL PREP PADS) 70 % PADS       B-D UF III MINI PEN NEEDLES 31G X 5 MM MISC USE TWICE DAILY TO TEST BLOOD SUGAR  3    furosemide (LASIX) 40 mg tablet Take 1 tablet (40 mg total) by mouth daily 60 tablet 1    gabapentin (NEURONTIN) 300 mg capsule Take 1 capsule (300 mg total) by mouth 3 (three) times a day 90 capsule 3    glucose blood (MM EASY TOUCH GLUCOSE) test strip Test 3 times a day 200 each 0    insulin aspart protamine-insulin aspart (NOVOLOG MIX 70/30 FLEXPEN) 100 Units/mL injection pen Inject 45 Units under the skin 2 (two) times a day before meals (Patient taking differently: Inject 25 Units under the skin 2 (two) times a day before meals ) 5 pen 3    levothyroxine 125 mcg tablet Take 1 tablet (125 mcg total) by mouth daily 90 tablet 0    Melatonin 5 MG TABS Take by mouth daily at bedtime      metolazone (ZAROXOLYN) 2 5 mg tablet Take 1 tablet (2 5 mg total) by mouth 2 (two) times a day (Patient taking differently: Take 2 5 mg by mouth 3 (three) times a week ) 60 tablet 3    metoprolol tartrate (LOPRESSOR) 25 mg tablet Take 1 tablet (25 mg total) by mouth 2 (two) times a day (Patient taking differently: Take 25 mg by mouth daily ) 180 tablet 3    NOVOFINE AUTOCOVER 30G X 8 MM MISC       oxyCODONE-acetaminophen (PERCOCET) 7 5-325 MG per tablet Take 1 tablet by mouth every 4 (four) hours as needed for moderate pain (chronic intactable pain)Max Daily Amount: 5 tablets 150 tablet 0    pantoprazole (PROTONIX) 40 mg tablet Take 40 mg by mouth daily      polyethylene glycol (GAVILAX) powder gavilax  powd      potassium chloride (K-DUR,KLOR-CON) 20 mEq tablet Take 1 tablet (20 mEq total) by mouth daily 60 tablet 3    pramipexole (MIRAPEX) 1 mg tablet Take 1 tablet (1 mg total) by mouth 3 (three) times a day 90 tablet 3    predniSONE 10 mg tablet 10 mg       promethazine-codeine (PHENERGAN WITH CODEINE) 6 25-10 mg/5 mL syrup Take 5 mL by mouth every 4 (four) hours as needed for cough 120 mL 0    senna-docusate sodium (SENOKOT S) 8 6-50 mg per tablet Take 2 tablets by mouth daily at bedtime 112 tablet 2    cefuroxime (CEFTIN) 250 mg tablet Take 1 tablet (250 mg total) by mouth every 12 (twelve) hours for 7 days 14 tablet 0    clotrimazole-betamethasone (LOTRISONE) 1-0 05 % cream Apply topically 2 (two) times a day (Patient not taking: Reported on 11/26/2019) 30 g 0     Current Facility-Administered Medications   Medication Dose Route Frequency Provider Last Rate Last Dose    furosemide (LASIX) injection 30 mg  30 mg Intravenous Once Navya Jeffries MD           Objective:    BP (!) 86/40   Pulse 71   Temp 98 5 °F (36 9 °C) (Temporal)   Resp 14   Ht 4' 10" (1 473 m)   Wt 91 5 kg (201 lb 12 8 oz)   LMP  (LMP Unknown)   SpO2 95%   BMI 42 18 kg/m²        Physical Exam   Constitutional: She is oriented to person, place, and time  She appears well-developed and well-nourished  No distress  HENT:   Head: Normocephalic and atraumatic  Right Ear: Tympanic membrane, external ear and ear canal normal  No drainage, swelling or tenderness  No middle ear effusion  Left Ear: Tympanic membrane, external ear and ear canal normal  No drainage, swelling or tenderness  No middle ear effusion  Nose: No mucosal edema, rhinorrhea or sinus tenderness  Right sinus exhibits no maxillary sinus tenderness and no frontal sinus tenderness  Left sinus exhibits no maxillary sinus tenderness and no frontal sinus tenderness     Mouth/Throat: Uvula is midline and mucous membranes are normal  No oropharyngeal exudate, posterior oropharyngeal edema or posterior oropharyngeal erythema  Eyes: Conjunctivae are normal  Right eye exhibits no discharge  Left eye exhibits no discharge  Neck: Normal range of motion  Neck supple  No thyromegaly present  Cardiovascular: Normal rate, regular rhythm and normal heart sounds  Pulmonary/Chest: Effort normal  No respiratory distress  She has no decreased breath sounds  She has no wheezes  She has rhonchi (cleared with cough)  She has no rales  Abdominal: Soft  Bowel sounds are normal  She exhibits no distension  There is no tenderness  There is no rebound  Lymphadenopathy:     She has no cervical adenopathy  Neurological: She is alert and oriented to person, place, and time  Skin: Skin is warm and dry  No rash noted  She is not diaphoretic  Psychiatric: She has a normal mood and affect   Her behavior is normal  Thought content normal          FATIMAH Owen

## 2020-01-14 NOTE — PATIENT INSTRUCTIONS
HOLD furosemide and metolazone dosing tomorrow  Recheck BP in office on Thursday  Take metoprolol as prescribed  Monitor for improvement in symptoms  Check BP at home periodically  Sodium restriction as directed

## 2020-01-14 NOTE — ASSESSMENT & PLAN NOTE
Taking oxycodone/acetaminophen Q8 hours PRN  Per pt's daughters, she takes additional tablets r/t break-through pain  She expresses interest in medical marijuana  I advise risks of falls but recommend she speak with certified specialist - information given for Dr Bertin Leigh   Encouraged medication compliance

## 2020-01-14 NOTE — ASSESSMENT & PLAN NOTE
Wt Readings from Last 3 Encounters:   01/14/20 91 5 kg (201 lb 12 8 oz)   12/18/19 93 1 kg (205 lb 3 2 oz)   11/26/19 93 4 kg (205 lb 12 8 oz)     BL LE swelling improved  Continue Ace wraps daily  Sodium restriction  Will HOLD furosemide and metolazone dosing tomorrow, once and recheck BP in 2 days in office

## 2020-01-15 ENCOUNTER — TELEPHONE (OUTPATIENT)
Dept: FAMILY MEDICINE CLINIC | Facility: CLINIC | Age: 80
End: 2020-01-15

## 2020-01-15 NOTE — TELEPHONE ENCOUNTER
Attempted to call Shantel Mars [facility] Nurse, [phone]  Phone Busy  Please try again    rt

## 2020-01-16 ENCOUNTER — OFFICE VISIT (OUTPATIENT)
Dept: FAMILY MEDICINE CLINIC | Facility: CLINIC | Age: 80
End: 2020-01-16
Payer: COMMERCIAL

## 2020-01-16 VITALS
HEIGHT: 60 IN | SYSTOLIC BLOOD PRESSURE: 110 MMHG | DIASTOLIC BLOOD PRESSURE: 56 MMHG | RESPIRATION RATE: 16 BRPM | BODY MASS INDEX: 39.85 KG/M2 | WEIGHT: 203 LBS | TEMPERATURE: 97.9 F | HEART RATE: 75 BPM | OXYGEN SATURATION: 92 %

## 2020-01-16 DIAGNOSIS — Z79.4 DIABETES MELLITUS DUE TO UNDERLYING CONDITION WITH DIABETIC NEUROPATHY, WITH LONG-TERM CURRENT USE OF INSULIN (HCC): Chronic | ICD-10-CM

## 2020-01-16 DIAGNOSIS — E08.40 DIABETES MELLITUS DUE TO UNDERLYING CONDITION WITH DIABETIC NEUROPATHY, WITH LONG-TERM CURRENT USE OF INSULIN (HCC): Chronic | ICD-10-CM

## 2020-01-16 DIAGNOSIS — I50.32 CHRONIC DIASTOLIC CONGESTIVE HEART FAILURE (HCC): Chronic | ICD-10-CM

## 2020-01-16 DIAGNOSIS — I10 ESSENTIAL HYPERTENSION: Chronic | ICD-10-CM

## 2020-01-16 DIAGNOSIS — E86.0 DEHYDRATION: Primary | ICD-10-CM

## 2020-01-16 PROCEDURE — 99214 OFFICE O/P EST MOD 30 MIN: CPT | Performed by: FAMILY MEDICINE

## 2020-01-16 PROCEDURE — 3078F DIAST BP <80 MM HG: CPT | Performed by: FAMILY MEDICINE

## 2020-01-16 PROCEDURE — 3074F SYST BP LT 130 MM HG: CPT | Performed by: FAMILY MEDICINE

## 2020-01-16 NOTE — ASSESSMENT & PLAN NOTE
Reports improvement in blood glucose since DC of prednisone yesterday  Continue nutrition and moderation of carbohydrate intake

## 2020-01-16 NOTE — PROGRESS NOTES
Assessment/Plan:    1  Dehydration  Comments:  Greatly improved BP today  Advise she take diuretics as prescribed  2  Chronic diastolic congestive heart failure [facility])  Assessment & Plan:  Wt Readings from Last 3 Encounters:   01/16/20 92 1 kg (203 lb)   01/14/20 91 5 kg (201 lb 12 8 oz)   12/18/19 93 1 kg (205 lb 3 2 oz)   Advise diuretic use as directed  Sodium restriction and heart healthy nutrition  3  Essential hypertension  Comments:  BP wnl today  Continue medications as directed  No change  4  Diabetes mellitus due to underlying condition with diabetic neuropathy, with long-term current use of insulin ([facility])  Assessment & Plan:  Reports improvement in blood glucose since DC of prednisone yesterday  Continue nutrition and moderation of carbohydrate intake  Return for Next scheduled follow up  Subjective:      Patient ID: Angel Hartmann is a 79 y o  female  Chief Complaint   Patient presents with    Follow-up     2 day macho - feeling better       Antonino Reynolds is an 79year old diabetic female who returns to the office for recheck of blood pressure and dehydration  Accompanied by her daughters who assisted in providing the history  She held her diuretics x's 1 day, reports she is feeling improved  Reports no change in baseline swelling of LEs  States she has stopped her prednisone taper as prescribed  States her AM sugar was 96  No acute complaints today  The following portions of the patient's history were reviewed and updated as appropriate: allergies, current medications, past family history, past medical history, past social history, past surgical history and problem list     Review of Systems   Respiratory: Negative for chest tightness and shortness of breath  Cardiovascular: Positive for leg swelling  Negative for chest pain  Neurological: Negative for dizziness, weakness, light-headedness and headaches           Current Outpatient Medications   Medication Sig Dispense Refill    Alcohol Swabs (ULTRA-CARE ALCOHOL PREP PADS) 70 % PADS       B-D UF III MINI PEN NEEDLES 31G X 5 MM MISC USE TWICE DAILY TO TEST BLOOD SUGAR  3    cefuroxime (CEFTIN) 250 mg tablet Take 1 tablet (250 mg total) by mouth every 12 (twelve) hours for 7 days 14 tablet 0    gabapentin (NEURONTIN) 300 mg capsule Take 1 capsule (300 mg total) by mouth 3 (three) times a day 90 capsule 3    glucose blood (MM EASY TOUCH GLUCOSE) test strip Test 3 times a day 200 each 0    insulin aspart protamine-insulin aspart (NOVOLOG MIX 70/30 FLEXPEN) 100 Units/mL injection pen Inject 45 Units under the skin 2 (two) times a day before meals (Patient taking differently: Inject 20 Units under the skin 2 (two) times a day before meals ) 5 pen 3    levothyroxine 125 mcg tablet Take 1 tablet (125 mcg total) by mouth daily 90 tablet 0    Melatonin 5 MG TABS Take by mouth daily at bedtime      metolazone (ZAROXOLYN) 2 5 mg tablet Take 1 tablet (2 5 mg total) by mouth 2 (two) times a day (Patient taking differently: Take 2 5 mg by mouth 3 (three) times a week ) 60 tablet 3    metoprolol tartrate (LOPRESSOR) 25 mg tablet Take 1 tablet (25 mg total) by mouth 2 (two) times a day (Patient taking differently: Take 25 mg by mouth daily ) 180 tablet 3    NOVOFINE AUTOCOVER 30G X 8 MM MISC       oxyCODONE-acetaminophen (PERCOCET) 7 5-325 MG per tablet Take 1 tablet by mouth every 4 (four) hours as needed for moderate pain (chronic intactable pain)Max Daily Amount: 5 tablets 150 tablet 0    polyethylene glycol (GAVILAX) powder gavilax  powd      potassium chloride (K-DUR,KLOR-CON) 20 mEq tablet Take 1 tablet (20 mEq total) by mouth daily 60 tablet 3    pramipexole (MIRAPEX) 1 mg tablet Take 1 tablet (1 mg total) by mouth 3 (three) times a day 90 tablet 3    promethazine-codeine (PHENERGAN WITH CODEINE) 6 25-10 mg/5 mL syrup Take 5 mL by mouth every 4 (four) hours as needed for cough 120 mL 0    senna-docusate sodium (SENOKOT S) 8 6-50 mg per tablet Take 2 tablets by mouth daily at bedtime 112 tablet 2    clotrimazole-betamethasone (LOTRISONE) 1-0 05 % cream Apply topically 2 (two) times a day (Patient not taking: Reported on 11/26/2019) 30 g 0    furosemide (LASIX) 40 mg tablet Take 1 tablet (40 mg total) by mouth daily (Patient not taking: Reported on 1/16/2020) 60 tablet 1    pantoprazole (PROTONIX) 40 mg tablet Take 40 mg by mouth daily      predniSONE 10 mg tablet 10 mg        Current Facility-Administered Medications   Medication Dose Route Frequency Provider Last Rate Last Dose    furosemide (LASIX) injection 30 mg  30 mg Intravenous Once Ryan Francis MD           Objective:    /56   Pulse 75   Temp 97 9 °F (36 6 °C) (Temporal)   Resp 16   Ht 4' 11 5" (1 511 m)   Wt 92 1 kg (203 lb)   LMP  (LMP Unknown)   SpO2 92%   BMI 40 31 kg/m²        Physical Exam   Constitutional: She is oriented to person, place, and time  She appears well-developed and well-nourished  No distress  HENT:   Head: Normocephalic and atraumatic  Eyes: Conjunctivae are normal  Right eye exhibits no discharge  Left eye exhibits no discharge  Cardiovascular: Normal rate, regular rhythm and normal heart sounds  Pulmonary/Chest: Effort normal  No respiratory distress  She has no decreased breath sounds  She has no wheezes  She has rhonchi in the left lower field  She has no rales  Right crackles BL anterior lung fields   Lymphadenopathy:     She has no cervical adenopathy  Neurological: She is alert and oriented to person, place, and time  Skin: Skin is warm and dry  No rash noted  She is not diaphoretic  Psychiatric: She has a normal mood and affect   Her behavior is normal  Judgment and thought content normal          FATIMAH Garcia

## 2020-01-16 NOTE — ASSESSMENT & PLAN NOTE
Wt Readings from Last 3 Encounters:   01/16/20 92 1 kg (203 lb)   01/14/20 91 5 kg (201 lb 12 8 oz)   12/18/19 93 1 kg (205 lb 3 2 oz)   Advise diuretic use as directed  Sodium restriction and heart healthy nutrition

## 2020-01-16 NOTE — TELEPHONE ENCOUNTER
Home health nurse notes that pt is very depressed  There are arguments between her and her significant other about eating well

## 2020-01-17 DIAGNOSIS — M79.2 NEUROPATHIC PAIN: ICD-10-CM

## 2020-01-17 DIAGNOSIS — G89.4 CHRONIC PAIN SYNDROME: ICD-10-CM

## 2020-01-17 DIAGNOSIS — M15.9 PRIMARY OSTEOARTHRITIS INVOLVING MULTIPLE JOINTS: ICD-10-CM

## 2020-01-18 RX ORDER — OXYCODONE AND ACETAMINOPHEN 7.5; 325 MG/1; MG/1
1 TABLET ORAL EVERY 4 HOURS PRN
Qty: 150 TABLET | Refills: 0 | Status: SHIPPED | OUTPATIENT
Start: 2020-01-18 | End: 2020-02-20 | Stop reason: SDUPTHER

## 2020-01-21 DIAGNOSIS — G25.81 RESTLESS LEG SYNDROME: ICD-10-CM

## 2020-01-21 RX ORDER — PRAMIPEXOLE DIHYDROCHLORIDE 1 MG/1
1 TABLET ORAL 3 TIMES DAILY
Qty: 90 TABLET | Refills: 3 | Status: SHIPPED | OUTPATIENT
Start: 2020-01-21 | End: 2020-02-20 | Stop reason: SDUPTHER

## 2020-01-23 ENCOUNTER — TELEPHONE (OUTPATIENT)
Dept: FAMILY MEDICINE CLINIC | Facility: CLINIC | Age: 80
End: 2020-01-23

## 2020-01-23 NOTE — TELEPHONE ENCOUNTER
Sharon Miranda from [facility] called  She is at the home of Lanny and would like to go over her meds and discuss some issues with a nurse  Please call Sharon Miranda back as soon as possible [phone]    Thanks

## 2020-01-24 ENCOUNTER — TELEPHONE (OUTPATIENT)
Dept: FAMILY MEDICINE CLINIC | Facility: CLINIC | Age: 80
End: 2020-01-24

## 2020-01-24 NOTE — TELEPHONE ENCOUNTER
Wanted to let you know that her blood sugar is 274 before breakfast   Admitted to snacking around 1:00 a m  She was instructed to snack closer to 9:00 p m and eat food that contains low sugar and she said she will try to do this

## 2020-01-25 NOTE — TELEPHONE ENCOUNTER
Pls notify, if sugars persist at these levels, we may need to consider increasing dosing of insulin and/or adding additional agents  Strongly recommend nutrition, high protein snacks in the evening, decrease sugar and carbohydrate intake as dicussed

## 2020-01-25 NOTE — TELEPHONE ENCOUNTER
Left a message on her cell but will try to reach her again on Monday to make sure she got my message

## 2020-01-27 ENCOUNTER — TELEPHONE (OUTPATIENT)
Dept: FAMILY MEDICINE CLINIC | Facility: CLINIC | Age: 80
End: 2020-01-27

## 2020-01-27 DIAGNOSIS — E08.40 DIABETES MELLITUS DUE TO UNDERLYING CONDITION WITH DIABETIC NEUROPATHY, WITH LONG-TERM CURRENT USE OF INSULIN (HCC): Primary | Chronic | ICD-10-CM

## 2020-01-27 DIAGNOSIS — Z79.4 DIABETES MELLITUS DUE TO UNDERLYING CONDITION WITH DIABETIC NEUROPATHY, WITH LONG-TERM CURRENT USE OF INSULIN (HCC): Primary | Chronic | ICD-10-CM

## 2020-01-27 NOTE — TELEPHONE ENCOUNTER
Orders for diabetes education class and endocrinology placed in pt's chart  I advise that she schedule with both at her earliest convenience  She should return to the office to discuss depression and possible start of medication  I do not think cymbalta is appropriate for her at this time       Jovita Shearer [address]

## 2020-01-27 NOTE — TELEPHONE ENCOUNTER
Misbah Smith returned your call  She states she does not have voice mail  She will keep her phone on till 4:30 today  Wants to discuss her meds and blood glucose and depression and pain  Please call back [phone]

## 2020-01-27 NOTE — TELEPHONE ENCOUNTER
Marge Segundo and Kenyetta Padilla called back and put the occupational nurse on the phone  Belen's message was relayed to them  No further action is required

## 2020-01-27 NOTE — TELEPHONE ENCOUNTER
Yoon John is concerned regard to Shayna's depression  She would like to suggest a possibility of maybe having the provider order Lexapro or maybe Cymbalta for her  She said she has had it help other patients with depression and pain  Mike Jalloh is not compliant  She is also concerned that her Blood Sugar is out of control she was on 30 units and now on 20 units  For the past couple days her fasting blood sugars have been 227, 320, and today 241  Yoon Dora would like to know if you think she should see a specialist, Endo or Cardio to monitor her conditions  Yoon John said you don't need to call her back, you can call and speak to Mike Jalloh or her daughters with your opinion and advise

## 2020-01-28 ENCOUNTER — OFFICE VISIT (OUTPATIENT)
Dept: FAMILY MEDICINE CLINIC | Facility: CLINIC | Age: 80
End: 2020-01-28
Payer: COMMERCIAL

## 2020-01-28 VITALS
TEMPERATURE: 98 F | BODY MASS INDEX: 41.73 KG/M2 | SYSTOLIC BLOOD PRESSURE: 120 MMHG | WEIGHT: 207 LBS | HEART RATE: 70 BPM | DIASTOLIC BLOOD PRESSURE: 60 MMHG | HEIGHT: 59 IN | OXYGEN SATURATION: 93 % | RESPIRATION RATE: 16 BRPM

## 2020-01-28 DIAGNOSIS — E08.40 DIABETES MELLITUS DUE TO UNDERLYING CONDITION WITH DIABETIC NEUROPATHY, WITH LONG-TERM CURRENT USE OF INSULIN (HCC): Primary | Chronic | ICD-10-CM

## 2020-01-28 DIAGNOSIS — Z79.4 DIABETES MELLITUS DUE TO UNDERLYING CONDITION WITH DIABETIC NEUROPATHY, WITH LONG-TERM CURRENT USE OF INSULIN (HCC): Primary | Chronic | ICD-10-CM

## 2020-01-28 PROCEDURE — 1160F RVW MEDS BY RX/DR IN RCRD: CPT | Performed by: NURSE PRACTITIONER

## 2020-01-28 PROCEDURE — 99213 OFFICE O/P EST LOW 20 MIN: CPT | Performed by: NURSE PRACTITIONER

## 2020-01-28 PROCEDURE — 1036F TOBACCO NON-USER: CPT | Performed by: NURSE PRACTITIONER

## 2020-01-28 RX ORDER — INSULIN ASPART 100 [IU]/ML
INJECTION, SUSPENSION SUBCUTANEOUS
COMMUNITY
End: 2020-01-28 | Stop reason: SDUPTHER

## 2020-01-28 RX ORDER — IPRATROPIUM BROMIDE AND ALBUTEROL SULFATE 2.5; .5 MG/3ML; MG/3ML
SOLUTION RESPIRATORY (INHALATION)
COMMUNITY

## 2020-01-28 NOTE — PATIENT INSTRUCTIONS
Sliding Scale As Follows:         0 units                                                 3 units    151-200                        5 units    201-250                        8 units    251-300                        10 units  301-350                        12 units  351-400

## 2020-01-28 NOTE — PROGRESS NOTES
Assessment/Plan:    1  Diabetes mellitus due to underlying condition with diabetic neuropathy, with long-term current use of insulin ([address] )  Assessment & Plan:  I discussed with pt and family the importnace of endo follow up  Recommend she schedule with diabetes education  Reports she has a nutritionist come to her house  Recommend nutrition and exercise as tolerated  Emotional eating component, and I recommend she have changes made to her insulin, possible basal dosing  She is scheduled to see endocrinology next month  Will change to sliding scale coverage  Call with any issues  Lab Results   Component Value Date    HGBA1C 8 8 (H) 10/29/2019               Patient Instructions   Sliding Scale As Follows:         0 units                                                 3 units    151-200                        5 units    201-250                        8 units    251-300                        10 units  301-350                        12 units  351-400      Return in about 3 months (around 4/28/2020) for Diabetic Follow Up  Subjective:      Patient ID: Angel Hartmann is a 79 y o  female  Chief Complaint   Patient presents with    discussion     diet and sliding scale for insulin       Antonino Reynolsd is an 79year old diabetic who returns to the office, accompanied by her daughters, for evaluation of elevated blood sugars  Pt reports she likes to eat, likes food and struggles with diabetic diet  Per pt's daughters, she eats in the middle of the night  The following portions of the patient's history were reviewed and updated as appropriate: allergies, current medications, past family history, past medical history, past social history, past surgical history and problem list     Review of Systems   Respiratory: Negative for chest tightness and shortness of breath  Cardiovascular: Positive for leg swelling  Negative for chest pain and palpitations           Current Outpatient Medications   Medication Sig Dispense Refill    Alcohol Swabs (ULTRA-CARE ALCOHOL PREP PADS) 70 % PADS       furosemide (LASIX) 40 mg tablet Take 1 tablet (40 mg total) by mouth daily 60 tablet 1    gabapentin (NEURONTIN) 300 mg capsule Take 1 capsule (300 mg total) by mouth 3 (three) times a day 90 capsule 3    glucose blood (MM EASY TOUCH GLUCOSE) test strip Test 3 times a day 200 each 0    insulin aspart protamine-insulin aspart (NOVOLOG MIX 70/30 FLEXPEN) 100 Units/mL injection pen Inject 45 Units under the skin 2 (two) times a day before meals (Patient taking differently: Inject 20 Units under the skin 2 (two) times a day before meals ) 5 pen 3    ipratropium-albuterol (DUO-NEB) 0 5-2 5 mg/3 mL nebulizer solution Inhale      levothyroxine 125 mcg tablet Take 1 tablet (125 mcg total) by mouth daily 90 tablet 0    Melatonin 5 MG TABS Take by mouth daily at bedtime      metolazone (ZAROXOLYN) 2 5 mg tablet Take 1 tablet (2 5 mg total) by mouth 2 (two) times a day (Patient taking differently: Take 2 5 mg by mouth 3 (three) times a week ) 60 tablet 3    metoprolol tartrate (LOPRESSOR) 25 mg tablet Take 1 tablet (25 mg total) by mouth 2 (two) times a day (Patient taking differently: Take 25 mg by mouth daily ) 180 tablet 3    NOVOFINE AUTOCOVER 30G X 8 MM MISC       oxyCODONE-acetaminophen (PERCOCET) 7 5-325 MG per tablet Take 1 tablet by mouth every 4 (four) hours as needed for moderate pain (chronic intactable pain)Max Daily Amount: 5 tablets 150 tablet 0    polyethylene glycol (GAVILAX) powder gavilax  powd      potassium chloride (K-DUR,KLOR-CON) 20 mEq tablet Take 1 tablet (20 mEq total) by mouth daily 60 tablet 3    pramipexole (MIRAPEX) 1 mg tablet Take 1 tablet (1 mg total) by mouth 3 (three) times a day 90 tablet 3    senna-docusate sodium (SENOKOT S) 8 6-50 mg per tablet Take 2 tablets by mouth daily at bedtime 112 tablet 2    B-D UF III MINI PEN NEEDLES 31G X 5 MM MISC USE TWICE DAILY TO TEST BLOOD SUGAR  3    clotrimazole-betamethasone (LOTRISONE) 1-0 05 % cream Apply topically 2 (two) times a day (Patient not taking: Reported on 11/26/2019) 30 g 0    pantoprazole (PROTONIX) 40 mg tablet Take 40 mg by mouth daily      predniSONE 10 mg tablet 10 mg       promethazine-codeine (PHENERGAN WITH CODEINE) 6 25-10 mg/5 mL syrup Take 5 mL by mouth every 4 (four) hours as needed for cough (Patient not taking: Reported on 1/28/2020) 120 mL 0     Current Facility-Administered Medications   Medication Dose Route Frequency Provider Last Rate Last Dose    furosemide (LASIX) injection 30 mg  30 mg Intravenous Once Helena Spangler MD           Objective:    /60   Pulse 70   Temp 98 °F (36 7 °C) (Temporal)   Resp 16   Ht 4' 11" (1 499 m)   Wt 93 9 kg (207 lb)   LMP  (LMP Unknown)   SpO2 93%   BMI 41 81 kg/m²        Physical Exam   Constitutional: She is oriented to person, place, and time  She appears well-developed and well-nourished  No distress  HENT:   Head: Normocephalic and atraumatic  Neurological: She is alert and oriented to person, place, and time  Skin: Skin is warm and dry  She is not diaphoretic  Psychiatric: She has a normal mood and affect   Her behavior is normal  Judgment and thought content normal          FATIMAH Chamberlain

## 2020-01-28 NOTE — ASSESSMENT & PLAN NOTE
I discussed with pt and family the importnace of endo follow up  Recommend she schedule with diabetes education  Reports she has a nutritionist come to her house  Recommend nutrition and exercise as tolerated  Emotional eating component, and I recommend she have changes made to her insulin, possible basal dosing  She is scheduled to see endocrinology next month  Will change to sliding scale coverage  Call with any issues    Lab Results   Component Value Date    HGBA1C 8 8 (H) 10/29/2019

## 2020-01-29 NOTE — TELEPHONE ENCOUNTER
Albert Cook and her family returned my call to verify that they received my message    Albert Cook came in to see Lazarus Dalton on 1/28

## 2020-01-31 ENCOUNTER — TELEPHONE (OUTPATIENT)
Dept: FAMILY MEDICINE CLINIC | Facility: CLINIC | Age: 80
End: 2020-01-31

## 2020-01-31 NOTE — TELEPHONE ENCOUNTER
IF she has already started with this scale as above, continue the same  Continue to monitor for improvement in sugars

## 2020-01-31 NOTE — TELEPHONE ENCOUNTER
Manuelito Raphael wanted to confirm the sliding scale for the Novalog insulin dose that Fam Mata had recommended for [facility] when she was here on 20  As Shayna's daughter had left a hand written piece of paper with the followin units -   7 units 151-200  10 units 201-250  14 units 251-300  17 units 301-350  20 units 351-400  Over 400 - call the doctor    I read her what was listed on the After Visit Summary, which is not the same as above  I told Manuelito Raphael that after every visit the patient leaves with an After Visit Summary with the providers directions  Manuelito Raphael also wanted to let Fam Mata know [facility] has an appt with Dr Jacque Patel in [facility] on 20      RT CMA

## 2020-02-03 ENCOUNTER — TELEPHONE (OUTPATIENT)
Dept: FAMILY MEDICINE CLINIC | Facility: CLINIC | Age: 80
End: 2020-02-03

## 2020-02-03 NOTE — TELEPHONE ENCOUNTER
Called and spoke to [address] [place] [address] Misbah Smith [phone]  The pt's daughter was there today at [facility] visit and showed her a piece of paper that Albert Cook had written down what she had ate or what she wanted to eat for her breakfast,lunch,dinner  The daughter states she isn't spelling the words correctly, which is worse than normal     Misbah Smith said after her assessment, she didn't find anything significant, only that she was tired, and fatigued  [place] did tell Albert Cook and her Daughter that she was planning on discharging Albert Cook next week, and they became upset  They told Misbah Smith, they don't understand everyone else in the world can have home service but they are taking her off      Misbah Smith would like to know what you would advise regard to her "mental status"    RT

## 2020-02-03 NOTE — TELEPHONE ENCOUNTER
Called & Left [employer] meesage for the [address] [place] [address] nurse: Marina Romero [phone]    Called and notified Megan Florence of [employer] message regard to the Novalog sliding scale      Rt cma

## 2020-02-03 NOTE — TELEPHONE ENCOUNTER
There has been an increase in mental status change over the past week  No signs of infection anywhere  Radha Nena asked if you could return her call  [phone]

## 2020-02-04 DIAGNOSIS — R53.81 PHYSICAL DECONDITIONING: ICD-10-CM

## 2020-02-04 DIAGNOSIS — M54.50 CHRONIC RIGHT-SIDED LOW BACK PAIN WITHOUT SCIATICA: Chronic | ICD-10-CM

## 2020-02-04 DIAGNOSIS — M79.2 NEUROPATHIC PAIN: ICD-10-CM

## 2020-02-04 DIAGNOSIS — G89.4 CHRONIC PAIN SYNDROME: ICD-10-CM

## 2020-02-04 DIAGNOSIS — E08.40 DIABETES MELLITUS DUE TO UNDERLYING CONDITION WITH DIABETIC NEUROPATHY, WITH LONG-TERM CURRENT USE OF INSULIN (HCC): Primary | Chronic | ICD-10-CM

## 2020-02-04 DIAGNOSIS — Z79.4 DIABETES MELLITUS DUE TO UNDERLYING CONDITION WITH DIABETIC NEUROPATHY, WITH LONG-TERM CURRENT USE OF INSULIN (HCC): Primary | Chronic | ICD-10-CM

## 2020-02-04 DIAGNOSIS — M46.1 SACROILIITIS (HCC): ICD-10-CM

## 2020-02-04 DIAGNOSIS — E66.2 OBESITY HYPOVENTILATION SYNDROME (HCC): Chronic | ICD-10-CM

## 2020-02-04 DIAGNOSIS — G89.29 CHRONIC RIGHT-SIDED LOW BACK PAIN WITHOUT SCIATICA: Chronic | ICD-10-CM

## 2020-02-04 RX ORDER — GABAPENTIN 300 MG/1
300 CAPSULE ORAL 3 TIMES DAILY
Qty: 90 CAPSULE | Refills: 3 | Status: SHIPPED | OUTPATIENT
Start: 2020-02-04 | End: 2020-03-09 | Stop reason: SDUPTHER

## 2020-02-04 NOTE — TELEPHONE ENCOUNTER
Spoke to Maren ren Pont daughter)  Informed her orders for PT and OT are ready to p/u  No further action required at this time

## 2020-02-04 NOTE — TELEPHONE ENCOUNTER
Spoke with edward Candelaria  Per her request and for clinical necessity after our discussion, I placed a new order for PT (with some additional diagnoses codes) and an order for OT  Please inform edward Griffin that these orders were placed if they would like to start, they may call to schedule, otherwise [employer] scheduling/PT/OT will call with more info

## 2020-02-07 ENCOUNTER — TELEPHONE (OUTPATIENT)
Dept: FAMILY MEDICINE CLINIC | Facility: CLINIC | Age: 80
End: 2020-02-07

## 2020-02-07 NOTE — TELEPHONE ENCOUNTER
Cristina Funes from [facility] called to inform you she is discharging [address] from their services  No further action is required

## 2020-02-13 ENCOUNTER — TELEPHONE (OUTPATIENT)
Dept: FAMILY MEDICINE CLINIC | Facility: CLINIC | Age: 80
End: 2020-02-13

## 2020-02-13 NOTE — TELEPHONE ENCOUNTER
Chloe Valdes from [facility] ()  [phone]    She has seen Val Byrne a couple of times  Has done mental status checks    First one on 1/30 - Scored 23 out of 30  Second on 2/6 - Scored 21 out of 30    Found her a little better in terms of the depression  She is a little more cooperative  Does practice the techniques that she has been taught  Difficulty word finding  Family is concerned with her cognitive status  At [facility] request, she would like Chloe Valdes to come back one more time  Chloe Valdes will see her on 2/20

## 2020-02-13 NOTE — TELEPHONE ENCOUNTER
Yanet Clemons from [facility] called to inform you she is  discharging [address] from nursing services today  [address] will continue on OT and social work services for another week

## 2020-02-17 DIAGNOSIS — E08.40 DIABETES MELLITUS DUE TO UNDERLYING CONDITION WITH DIABETIC NEUROPATHY, WITH LONG-TERM CURRENT USE OF INSULIN (HCC): Chronic | ICD-10-CM

## 2020-02-17 DIAGNOSIS — Z79.4 DIABETES MELLITUS DUE TO UNDERLYING CONDITION WITH DIABETIC NEUROPATHY, WITH LONG-TERM CURRENT USE OF INSULIN (HCC): Chronic | ICD-10-CM

## 2020-02-17 RX ORDER — ISOPRPOPYL ALCOHOL 70 ML/100ML
1 SWAB TOPICAL 3 TIMES DAILY
Qty: 200 EACH | Refills: 2 | Status: SHIPPED | OUTPATIENT
Start: 2020-02-17

## 2020-02-20 DIAGNOSIS — M79.2 NEUROPATHIC PAIN: ICD-10-CM

## 2020-02-20 DIAGNOSIS — M15.9 PRIMARY OSTEOARTHRITIS INVOLVING MULTIPLE JOINTS: ICD-10-CM

## 2020-02-20 DIAGNOSIS — G25.81 RESTLESS LEG SYNDROME: ICD-10-CM

## 2020-02-20 DIAGNOSIS — G89.4 CHRONIC PAIN SYNDROME: ICD-10-CM

## 2020-02-21 RX ORDER — PRAMIPEXOLE DIHYDROCHLORIDE 1 MG/1
1 TABLET ORAL 3 TIMES DAILY
Qty: 90 TABLET | Refills: 3 | Status: SHIPPED | OUTPATIENT
Start: 2020-02-21 | End: 2020-03-19 | Stop reason: SDUPTHER

## 2020-02-21 RX ORDER — OXYCODONE AND ACETAMINOPHEN 7.5; 325 MG/1; MG/1
1 TABLET ORAL EVERY 4 HOURS PRN
Qty: 150 TABLET | Refills: 0 | Status: SHIPPED | OUTPATIENT
Start: 2020-02-21 | End: 2020-03-24 | Stop reason: SDUPTHER

## 2020-02-21 NOTE — TELEPHONE ENCOUNTER
Vandana Valencia from [facility] called  [phone]  They wanted to let you know that they discharged [facility] yesterday and she is doing good

## 2020-03-02 DIAGNOSIS — R60.9 EDEMA, UNSPECIFIED TYPE: ICD-10-CM

## 2020-03-02 RX ORDER — FUROSEMIDE 40 MG/1
40 TABLET ORAL DAILY
Qty: 60 TABLET | Refills: 1 | Status: SHIPPED | OUTPATIENT
Start: 2020-03-02 | End: 2020-05-04 | Stop reason: SDUPTHER

## 2020-03-09 DIAGNOSIS — M79.2 NEUROPATHIC PAIN: ICD-10-CM

## 2020-03-09 RX ORDER — GABAPENTIN 300 MG/1
300 CAPSULE ORAL 3 TIMES DAILY
Qty: 90 CAPSULE | Refills: 3 | Status: SHIPPED | OUTPATIENT
Start: 2020-03-09 | End: 2020-04-06 | Stop reason: SDUPTHER

## 2020-03-19 DIAGNOSIS — E03.9 ACQUIRED HYPOTHYROIDISM: ICD-10-CM

## 2020-03-19 DIAGNOSIS — G25.81 RESTLESS LEG SYNDROME: ICD-10-CM

## 2020-03-19 RX ORDER — PRAMIPEXOLE DIHYDROCHLORIDE 1 MG/1
1 TABLET ORAL 3 TIMES DAILY
Qty: 90 TABLET | Refills: 3 | Status: SHIPPED | OUTPATIENT
Start: 2020-03-19 | End: 2020-04-20 | Stop reason: SDUPTHER

## 2020-03-19 RX ORDER — LEVOTHYROXINE SODIUM 0.12 MG/1
125 TABLET ORAL DAILY
Qty: 90 TABLET | Refills: 0 | Status: SHIPPED | OUTPATIENT
Start: 2020-03-19 | End: 2020-07-16 | Stop reason: SDUPTHER

## 2020-03-24 DIAGNOSIS — G89.4 CHRONIC PAIN SYNDROME: ICD-10-CM

## 2020-03-24 DIAGNOSIS — M15.9 PRIMARY OSTEOARTHRITIS INVOLVING MULTIPLE JOINTS: ICD-10-CM

## 2020-03-24 DIAGNOSIS — M79.2 NEUROPATHIC PAIN: ICD-10-CM

## 2020-03-24 RX ORDER — OXYCODONE AND ACETAMINOPHEN 7.5; 325 MG/1; MG/1
1 TABLET ORAL EVERY 4 HOURS PRN
Qty: 150 TABLET | Refills: 0 | Status: SHIPPED | OUTPATIENT
Start: 2020-03-24 | End: 2020-05-11

## 2020-03-30 DIAGNOSIS — R60.9 EDEMA, UNSPECIFIED TYPE: ICD-10-CM

## 2020-03-30 DIAGNOSIS — Z79.4 DIABETES MELLITUS DUE TO UNDERLYING CONDITION WITH DIABETIC NEUROPATHY, WITH LONG-TERM CURRENT USE OF INSULIN (HCC): Chronic | ICD-10-CM

## 2020-03-30 DIAGNOSIS — E08.40 DIABETES MELLITUS DUE TO UNDERLYING CONDITION WITH DIABETIC NEUROPATHY, WITH LONG-TERM CURRENT USE OF INSULIN (HCC): Chronic | ICD-10-CM

## 2020-03-30 RX ORDER — METOLAZONE 2.5 MG/1
2.5 TABLET ORAL 3 TIMES WEEKLY
Qty: 60 TABLET | Refills: 3 | Status: SHIPPED | OUTPATIENT
Start: 2020-03-30 | End: 2020-08-04 | Stop reason: SDUPTHER

## 2020-04-06 DIAGNOSIS — M79.2 NEUROPATHIC PAIN: ICD-10-CM

## 2020-04-06 RX ORDER — GABAPENTIN 300 MG/1
300 CAPSULE ORAL 3 TIMES DAILY
Qty: 90 CAPSULE | Refills: 3 | Status: SHIPPED | OUTPATIENT
Start: 2020-04-06 | End: 2020-04-28 | Stop reason: SDUPTHER

## 2020-04-20 DIAGNOSIS — G25.81 RESTLESS LEG SYNDROME: ICD-10-CM

## 2020-04-20 RX ORDER — PRAMIPEXOLE DIHYDROCHLORIDE 1 MG/1
1 TABLET ORAL 3 TIMES DAILY
Qty: 90 TABLET | Refills: 3 | Status: SHIPPED | OUTPATIENT
Start: 2020-04-20 | End: 2020-05-04

## 2020-04-28 ENCOUNTER — TELEPHONE (OUTPATIENT)
Dept: FAMILY MEDICINE CLINIC | Facility: CLINIC | Age: 80
End: 2020-04-28

## 2020-04-28 DIAGNOSIS — M79.2 NEUROPATHIC PAIN: ICD-10-CM

## 2020-04-28 DIAGNOSIS — K59.00 CONSTIPATION, UNSPECIFIED CONSTIPATION TYPE: Primary | ICD-10-CM

## 2020-04-28 RX ORDER — LACTULOSE 20 G/30ML
20 SOLUTION ORAL 3 TIMES DAILY
Qty: 946 ML | Refills: 3 | Status: SHIPPED | OUTPATIENT
Start: 2020-04-28

## 2020-04-28 RX ORDER — GABAPENTIN 300 MG/1
300 CAPSULE ORAL 3 TIMES DAILY
Qty: 90 CAPSULE | Refills: 3 | Status: SHIPPED | OUTPATIENT
Start: 2020-04-28 | End: 2020-06-26 | Stop reason: SDUPTHER

## 2020-05-04 ENCOUNTER — TELEMEDICINE (OUTPATIENT)
Dept: FAMILY MEDICINE CLINIC | Facility: CLINIC | Age: 80
End: 2020-05-04
Payer: COMMERCIAL

## 2020-05-04 DIAGNOSIS — Z79.4 DIABETES MELLITUS DUE TO UNDERLYING CONDITION WITH DIABETIC NEUROPATHY, WITH LONG-TERM CURRENT USE OF INSULIN (HCC): Primary | Chronic | ICD-10-CM

## 2020-05-04 DIAGNOSIS — R52 GENERALIZED PAIN: ICD-10-CM

## 2020-05-04 DIAGNOSIS — E03.8 OTHER SPECIFIED HYPOTHYROIDISM: ICD-10-CM

## 2020-05-04 DIAGNOSIS — E66.2 OBESITY HYPOVENTILATION SYNDROME (HCC): Chronic | ICD-10-CM

## 2020-05-04 DIAGNOSIS — I51.89 DIASTOLIC DYSFUNCTION: ICD-10-CM

## 2020-05-04 DIAGNOSIS — R60.9 EDEMA, UNSPECIFIED TYPE: ICD-10-CM

## 2020-05-04 DIAGNOSIS — E08.40 DIABETES MELLITUS DUE TO UNDERLYING CONDITION WITH DIABETIC NEUROPATHY, WITH LONG-TERM CURRENT USE OF INSULIN (HCC): Primary | Chronic | ICD-10-CM

## 2020-05-04 PROBLEM — G47.33 MODERATE OBSTRUCTIVE SLEEP APNEA: Status: ACTIVE | Noted: 2017-06-10

## 2020-05-04 PROCEDURE — 99214 OFFICE O/P EST MOD 30 MIN: CPT | Performed by: FAMILY MEDICINE

## 2020-05-04 RX ORDER — FUROSEMIDE 40 MG/1
40 TABLET ORAL DAILY
Qty: 60 TABLET | Refills: 1 | Status: SHIPPED | OUTPATIENT
Start: 2020-05-04 | End: 2020-07-13 | Stop reason: SDUPTHER

## 2020-05-04 RX ORDER — PRAMIPEXOLE DIHYDROCHLORIDE 1 MG/1
TABLET ORAL
COMMUNITY
Start: 2020-02-21 | End: 2020-05-19 | Stop reason: SDUPTHER

## 2020-05-05 ENCOUNTER — TELEPHONE (OUTPATIENT)
Dept: FAMILY MEDICINE CLINIC | Facility: CLINIC | Age: 80
End: 2020-05-05

## 2020-05-07 ENCOUNTER — TELEPHONE (OUTPATIENT)
Dept: FAMILY MEDICINE CLINIC | Facility: CLINIC | Age: 80
End: 2020-05-07

## 2020-05-11 ENCOUNTER — OFFICE VISIT (OUTPATIENT)
Dept: FAMILY MEDICINE CLINIC | Facility: CLINIC | Age: 80
End: 2020-05-11
Payer: COMMERCIAL

## 2020-05-11 VITALS
SYSTOLIC BLOOD PRESSURE: 120 MMHG | OXYGEN SATURATION: 93 % | WEIGHT: 189 LBS | HEIGHT: 59 IN | RESPIRATION RATE: 20 BRPM | TEMPERATURE: 99.1 F | DIASTOLIC BLOOD PRESSURE: 60 MMHG | HEART RATE: 74 BPM | BODY MASS INDEX: 38.1 KG/M2

## 2020-05-11 DIAGNOSIS — I50.32 CHRONIC DIASTOLIC CONGESTIVE HEART FAILURE (HCC): Chronic | ICD-10-CM

## 2020-05-11 DIAGNOSIS — M15.9 PRIMARY OSTEOARTHRITIS INVOLVING MULTIPLE JOINTS: ICD-10-CM

## 2020-05-11 DIAGNOSIS — R47.01 APHASIA: Primary | ICD-10-CM

## 2020-05-11 DIAGNOSIS — I10 ESSENTIAL HYPERTENSION: Chronic | ICD-10-CM

## 2020-05-11 DIAGNOSIS — J42 CHRONIC BRONCHITIS, UNSPECIFIED CHRONIC BRONCHITIS TYPE (HCC): ICD-10-CM

## 2020-05-11 DIAGNOSIS — E66.2 OBESITY HYPOVENTILATION SYNDROME (HCC): Chronic | ICD-10-CM

## 2020-05-11 DIAGNOSIS — Z79.4 DIABETES MELLITUS DUE TO UNDERLYING CONDITION WITH DIABETIC NEUROPATHY, WITH LONG-TERM CURRENT USE OF INSULIN (HCC): Chronic | ICD-10-CM

## 2020-05-11 DIAGNOSIS — E08.40 DIABETES MELLITUS DUE TO UNDERLYING CONDITION WITH DIABETIC NEUROPATHY, WITH LONG-TERM CURRENT USE OF INSULIN (HCC): Chronic | ICD-10-CM

## 2020-05-11 PROBLEM — M46.1 SACROILIITIS (HCC): Status: RESOLVED | Noted: 2018-03-29 | Resolved: 2020-05-11

## 2020-05-11 PROBLEM — B37.2 YEAST DERMATITIS: Status: RESOLVED | Noted: 2019-09-05 | Resolved: 2020-05-11

## 2020-05-11 PROCEDURE — 99214 OFFICE O/P EST MOD 30 MIN: CPT | Performed by: FAMILY MEDICINE

## 2020-05-11 PROCEDURE — 1036F TOBACCO NON-USER: CPT | Performed by: FAMILY MEDICINE

## 2020-05-11 PROCEDURE — 2022F DILAT RTA XM EVC RTNOPTHY: CPT | Performed by: FAMILY MEDICINE

## 2020-05-11 PROCEDURE — 3008F BODY MASS INDEX DOCD: CPT | Performed by: FAMILY MEDICINE

## 2020-05-11 PROCEDURE — 4040F PNEUMOC VAC/ADMIN/RCVD: CPT | Performed by: FAMILY MEDICINE

## 2020-05-11 PROCEDURE — 1160F RVW MEDS BY RX/DR IN RCRD: CPT | Performed by: FAMILY MEDICINE

## 2020-05-11 RX ORDER — HYDROCODONE BITARTRATE AND ACETAMINOPHEN 5; 325 MG/1; MG/1
1 TABLET ORAL EVERY 12 HOURS
Qty: 60 TABLET | Refills: 0 | Status: SHIPPED | OUTPATIENT
Start: 2020-05-11 | End: 2020-06-19 | Stop reason: SDUPTHER

## 2020-05-19 DIAGNOSIS — G25.81 RESTLESS LEG SYNDROME: ICD-10-CM

## 2020-05-19 DIAGNOSIS — E66.2 OBESITY HYPOVENTILATION SYNDROME (HCC): Primary | ICD-10-CM

## 2020-05-19 RX ORDER — BUDESONIDE 0.5 MG/2ML
0.5 INHALANT ORAL 2 TIMES DAILY
COMMUNITY
End: 2020-05-19 | Stop reason: SDUPTHER

## 2020-05-19 RX ORDER — BUDESONIDE 0.5 MG/2ML
0.5 INHALANT ORAL 2 TIMES DAILY
Qty: 60 VIAL | Refills: 1 | Status: SHIPPED | OUTPATIENT
Start: 2020-05-19 | End: 2020-05-20 | Stop reason: SDUPTHER

## 2020-05-19 RX ORDER — PRAMIPEXOLE DIHYDROCHLORIDE 1 MG/1
1 TABLET ORAL 3 TIMES DAILY
Qty: 90 TABLET | Refills: 1 | Status: SHIPPED | OUTPATIENT
Start: 2020-05-19 | End: 2020-06-19 | Stop reason: SDUPTHER

## 2020-05-20 DIAGNOSIS — E66.2 OBESITY HYPOVENTILATION SYNDROME (HCC): ICD-10-CM

## 2020-05-20 RX ORDER — BUDESONIDE 0.5 MG/2ML
0.5 INHALANT ORAL 2 TIMES DAILY
Qty: 60 VIAL | Refills: 1 | Status: SHIPPED | OUTPATIENT
Start: 2020-05-20

## 2020-05-26 ENCOUNTER — OFFICE VISIT (OUTPATIENT)
Dept: FAMILY MEDICINE CLINIC | Facility: CLINIC | Age: 80
End: 2020-05-26
Payer: COMMERCIAL

## 2020-05-26 VITALS
DIASTOLIC BLOOD PRESSURE: 60 MMHG | OXYGEN SATURATION: 92 % | TEMPERATURE: 99.1 F | SYSTOLIC BLOOD PRESSURE: 108 MMHG | HEART RATE: 75 BPM

## 2020-05-26 DIAGNOSIS — R82.998 LEUKOCYTES IN URINE: ICD-10-CM

## 2020-05-26 DIAGNOSIS — R31.29 MICROSCOPIC HEMATURIA: ICD-10-CM

## 2020-05-26 DIAGNOSIS — R50.9 FEVER, UNSPECIFIED FEVER CAUSE: Primary | ICD-10-CM

## 2020-05-26 DIAGNOSIS — R53.1 WEAKNESS: ICD-10-CM

## 2020-05-26 PROBLEM — R52 GENERALIZED PAIN: Status: RESOLVED | Noted: 2018-02-14 | Resolved: 2020-05-26

## 2020-05-26 PROBLEM — M54.50 CHRONIC RIGHT-SIDED LOW BACK PAIN WITHOUT SCIATICA: Chronic | Status: RESOLVED | Noted: 2018-02-14 | Resolved: 2020-05-26

## 2020-05-26 PROBLEM — D72.829 LEUKOCYTOSIS: Status: RESOLVED | Noted: 2018-09-09 | Resolved: 2020-05-26

## 2020-05-26 PROBLEM — G89.29 CHRONIC RIGHT-SIDED LOW BACK PAIN WITHOUT SCIATICA: Chronic | Status: RESOLVED | Noted: 2018-02-14 | Resolved: 2020-05-26

## 2020-05-26 PROBLEM — M25.50 MULTIPLE JOINT PAIN: Status: RESOLVED | Noted: 2018-07-12 | Resolved: 2020-05-26

## 2020-05-26 LAB
SL AMB  POCT GLUCOSE, UA: 100
SL AMB LEUKOCYTE ESTERASE,UA: 500
SL AMB POCT BILIRUBIN,UA: 0
SL AMB POCT BLOOD,UA: 250
SL AMB POCT CLARITY,UA: ABNORMAL
SL AMB POCT COLOR,UA: YELLOW
SL AMB POCT KETONES,UA: 0
SL AMB POCT NITRITE,UA: 0
SL AMB POCT PH,UA: 9
SL AMB POCT SPECIFIC GRAVITY,UA: 1.01
SL AMB POCT URINE PROTEIN: 0
SL AMB POCT UROBILINOGEN: 0

## 2020-05-26 PROCEDURE — 81003 URINALYSIS AUTO W/O SCOPE: CPT | Performed by: FAMILY MEDICINE

## 2020-05-26 PROCEDURE — 2022F DILAT RTA XM EVC RTNOPTHY: CPT | Performed by: FAMILY MEDICINE

## 2020-05-26 PROCEDURE — 1160F RVW MEDS BY RX/DR IN RCRD: CPT | Performed by: FAMILY MEDICINE

## 2020-05-26 PROCEDURE — 3074F SYST BP LT 130 MM HG: CPT | Performed by: FAMILY MEDICINE

## 2020-05-26 PROCEDURE — 4040F PNEUMOC VAC/ADMIN/RCVD: CPT | Performed by: FAMILY MEDICINE

## 2020-05-26 PROCEDURE — 1036F TOBACCO NON-USER: CPT | Performed by: FAMILY MEDICINE

## 2020-05-26 PROCEDURE — 3078F DIAST BP <80 MM HG: CPT | Performed by: FAMILY MEDICINE

## 2020-05-26 PROCEDURE — 96374 THER/PROPH/DIAG INJ IV PUSH: CPT | Performed by: FAMILY MEDICINE

## 2020-05-26 PROCEDURE — 99213 OFFICE O/P EST LOW 20 MIN: CPT | Performed by: FAMILY MEDICINE

## 2020-05-26 PROCEDURE — 87086 URINE CULTURE/COLONY COUNT: CPT | Performed by: FAMILY MEDICINE

## 2020-05-26 RX ORDER — ASPIRIN 81 MG/1
81 TABLET ORAL DAILY
COMMUNITY

## 2020-05-26 RX ORDER — CEFUROXIME AXETIL 250 MG/1
250 TABLET ORAL EVERY 12 HOURS SCHEDULED
Qty: 20 TABLET | Refills: 0 | Status: SHIPPED | OUTPATIENT
Start: 2020-05-26 | End: 2020-06-05

## 2020-05-26 RX ORDER — CEFTRIAXONE SODIUM 250 MG/1
250 INJECTION, POWDER, FOR SOLUTION INTRAMUSCULAR; INTRAVENOUS ONCE
Status: COMPLETED | OUTPATIENT
Start: 2020-05-26 | End: 2020-05-26

## 2020-05-26 RX ORDER — OXYCODONE HYDROCHLORIDE AND ACETAMINOPHEN 5; 325 MG/1; MG/1
1 TABLET ORAL
COMMUNITY
End: 2020-06-04 | Stop reason: SDUPTHER

## 2020-05-26 RX ADMIN — CEFTRIAXONE SODIUM 250 MG: 250 INJECTION, POWDER, FOR SOLUTION INTRAMUSCULAR; INTRAVENOUS at 16:04

## 2020-05-27 LAB — BACTERIA UR CULT: NORMAL

## 2020-05-28 ENCOUNTER — EVALUATION (OUTPATIENT)
Dept: SPEECH THERAPY | Facility: CLINIC | Age: 80
End: 2020-05-28
Payer: COMMERCIAL

## 2020-05-28 ENCOUNTER — TELEPHONE (OUTPATIENT)
Dept: NEUROSURGERY | Facility: CLINIC | Age: 80
End: 2020-05-28

## 2020-05-28 DIAGNOSIS — R47.01 APHASIA: ICD-10-CM

## 2020-05-28 DIAGNOSIS — R48.8 OTHER SYMBOLIC DYSFUNCTIONS: Primary | ICD-10-CM

## 2020-05-28 PROCEDURE — 96105 ASSESSMENT OF APHASIA: CPT | Performed by: SPEECH-LANGUAGE PATHOLOGIST

## 2020-05-29 ENCOUNTER — TELEPHONE (OUTPATIENT)
Dept: NEUROLOGY | Facility: CLINIC | Age: 80
End: 2020-05-29

## 2020-05-29 ENCOUNTER — OFFICE VISIT (OUTPATIENT)
Dept: FAMILY MEDICINE CLINIC | Facility: CLINIC | Age: 80
End: 2020-05-29
Payer: COMMERCIAL

## 2020-05-29 DIAGNOSIS — R50.9 FEVER, UNSPECIFIED FEVER CAUSE: Primary | ICD-10-CM

## 2020-05-29 DIAGNOSIS — M79.2 NEUROPATHIC PAIN: ICD-10-CM

## 2020-05-29 DIAGNOSIS — R82.998 LEUKOCYTES IN URINE: ICD-10-CM

## 2020-05-29 DIAGNOSIS — Z79.4 DIABETES MELLITUS DUE TO UNDERLYING CONDITION WITH DIABETIC NEUROPATHY, WITH LONG-TERM CURRENT USE OF INSULIN (HCC): ICD-10-CM

## 2020-05-29 DIAGNOSIS — E08.40 DIABETES MELLITUS DUE TO UNDERLYING CONDITION WITH DIABETIC NEUROPATHY, WITH LONG-TERM CURRENT USE OF INSULIN (HCC): ICD-10-CM

## 2020-05-29 DIAGNOSIS — D32.9 MENINGIOMA (HCC): ICD-10-CM

## 2020-05-29 DIAGNOSIS — R53.1 WEAKNESS: ICD-10-CM

## 2020-05-29 LAB
CREAT UR-MCNC: 97.3 MG/DL
MICROALBUMIN UR-MCNC: 11 MG/L (ref 0–20)
MICROALBUMIN/CREAT 24H UR: 11 MG/G CREATININE (ref 0–30)
SL AMB POCT HEMOGLOBIN AIC: 8.9 (ref ?–6.5)

## 2020-05-29 PROCEDURE — 1036F TOBACCO NON-USER: CPT | Performed by: FAMILY MEDICINE

## 2020-05-29 PROCEDURE — 2022F DILAT RTA XM EVC RTNOPTHY: CPT | Performed by: FAMILY MEDICINE

## 2020-05-29 PROCEDURE — 99214 OFFICE O/P EST MOD 30 MIN: CPT | Performed by: FAMILY MEDICINE

## 2020-05-29 PROCEDURE — 82043 UR ALBUMIN QUANTITATIVE: CPT | Performed by: FAMILY MEDICINE

## 2020-05-29 PROCEDURE — 4040F PNEUMOC VAC/ADMIN/RCVD: CPT | Performed by: FAMILY MEDICINE

## 2020-05-29 PROCEDURE — 82570 ASSAY OF URINE CREATININE: CPT | Performed by: FAMILY MEDICINE

## 2020-05-29 PROCEDURE — 1160F RVW MEDS BY RX/DR IN RCRD: CPT | Performed by: FAMILY MEDICINE

## 2020-05-29 PROCEDURE — 83036 HEMOGLOBIN GLYCOSYLATED A1C: CPT | Performed by: FAMILY MEDICINE

## 2020-06-04 ENCOUNTER — TELEMEDICINE (OUTPATIENT)
Dept: SPEECH THERAPY | Facility: CLINIC | Age: 80
End: 2020-06-04
Payer: COMMERCIAL

## 2020-06-04 ENCOUNTER — OFFICE VISIT (OUTPATIENT)
Dept: FAMILY MEDICINE CLINIC | Facility: CLINIC | Age: 80
End: 2020-06-04
Payer: COMMERCIAL

## 2020-06-04 VITALS
OXYGEN SATURATION: 87 % | HEIGHT: 59 IN | HEART RATE: 66 BPM | DIASTOLIC BLOOD PRESSURE: 50 MMHG | SYSTOLIC BLOOD PRESSURE: 100 MMHG | TEMPERATURE: 100.3 F | BODY MASS INDEX: 36.49 KG/M2 | WEIGHT: 181 LBS

## 2020-06-04 DIAGNOSIS — E08.40 DIABETES MELLITUS DUE TO UNDERLYING CONDITION WITH DIABETIC NEUROPATHY, WITH LONG-TERM CURRENT USE OF INSULIN (HCC): Chronic | ICD-10-CM

## 2020-06-04 DIAGNOSIS — R53.1 WEAKNESS: Primary | ICD-10-CM

## 2020-06-04 DIAGNOSIS — R06.00 DYSPNEA ON EXERTION: ICD-10-CM

## 2020-06-04 DIAGNOSIS — R48.8 OTHER SYMBOLIC DYSFUNCTIONS: Primary | ICD-10-CM

## 2020-06-04 DIAGNOSIS — R47.01 APHASIA: ICD-10-CM

## 2020-06-04 DIAGNOSIS — I50.32 CHRONIC DIASTOLIC CONGESTIVE HEART FAILURE (HCC): Chronic | ICD-10-CM

## 2020-06-04 DIAGNOSIS — Z79.4 DIABETES MELLITUS DUE TO UNDERLYING CONDITION WITH DIABETIC NEUROPATHY, WITH LONG-TERM CURRENT USE OF INSULIN (HCC): Chronic | ICD-10-CM

## 2020-06-04 DIAGNOSIS — M15.9 PRIMARY OSTEOARTHRITIS INVOLVING MULTIPLE JOINTS: ICD-10-CM

## 2020-06-04 PROCEDURE — 92507 TX SP LANG VOICE COMM INDIV: CPT | Performed by: SPEECH-LANGUAGE PATHOLOGIST

## 2020-06-04 PROCEDURE — 99214 OFFICE O/P EST MOD 30 MIN: CPT | Performed by: FAMILY MEDICINE

## 2020-06-04 PROCEDURE — 1036F TOBACCO NON-USER: CPT | Performed by: FAMILY MEDICINE

## 2020-06-04 PROCEDURE — 4040F PNEUMOC VAC/ADMIN/RCVD: CPT | Performed by: FAMILY MEDICINE

## 2020-06-04 PROCEDURE — 1160F RVW MEDS BY RX/DR IN RCRD: CPT | Performed by: FAMILY MEDICINE

## 2020-06-04 PROCEDURE — 3008F BODY MASS INDEX DOCD: CPT | Performed by: FAMILY MEDICINE

## 2020-06-04 PROCEDURE — 36415 COLL VENOUS BLD VENIPUNCTURE: CPT | Performed by: FAMILY MEDICINE

## 2020-06-04 PROCEDURE — 2022F DILAT RTA XM EVC RTNOPTHY: CPT | Performed by: FAMILY MEDICINE

## 2020-06-05 LAB
ALBUMIN SERPL-MCNC: 4.2 G/DL (ref 3.6–4.6)
ALBUMIN/GLOB SERPL: 1.9 {RATIO} (ref 1.2–2.2)
ALP SERPL-CCNC: 74 IU/L (ref 39–117)
ALT SERPL-CCNC: 14 IU/L (ref 0–32)
AST SERPL-CCNC: 13 IU/L (ref 0–40)
BASOPHILS # BLD AUTO: 0 X10E3/UL (ref 0–0.2)
BASOPHILS NFR BLD AUTO: 0 %
BILIRUB SERPL-MCNC: 0.3 MG/DL (ref 0–1.2)
BUN SERPL-MCNC: 24 MG/DL (ref 8–27)
BUN/CREAT SERPL: 27 (ref 12–28)
CALCIUM SERPL-MCNC: 9.6 MG/DL (ref 8.7–10.3)
CHLORIDE SERPL-SCNC: 89 MMOL/L (ref 96–106)
CO2 SERPL-SCNC: 34 MMOL/L (ref 20–29)
CREAT SERPL-MCNC: 0.88 MG/DL (ref 0.57–1)
EOSINOPHIL # BLD AUTO: 0.1 X10E3/UL (ref 0–0.4)
EOSINOPHIL NFR BLD AUTO: 1 %
ERYTHROCYTE [DISTWIDTH] IN BLOOD BY AUTOMATED COUNT: 13.5 % (ref 11.7–15.4)
GLOBULIN SER-MCNC: 2.2 G/DL (ref 1.5–4.5)
GLUCOSE SERPL-MCNC: 185 MG/DL (ref 65–99)
HCT VFR BLD AUTO: 40.3 % (ref 34–46.6)
HGB BLD-MCNC: 13.6 G/DL (ref 11.1–15.9)
IMM GRANULOCYTES # BLD: 0 X10E3/UL (ref 0–0.1)
IMM GRANULOCYTES NFR BLD: 0 %
LYMPHOCYTES # BLD AUTO: 1.6 X10E3/UL (ref 0.7–3.1)
LYMPHOCYTES NFR BLD AUTO: 19 %
MCH RBC QN AUTO: 31.9 PG (ref 26.6–33)
MCHC RBC AUTO-ENTMCNC: 33.7 G/DL (ref 31.5–35.7)
MCV RBC AUTO: 94 FL (ref 79–97)
MONOCYTES # BLD AUTO: 0.6 X10E3/UL (ref 0.1–0.9)
MONOCYTES NFR BLD AUTO: 7 %
NEUTROPHILS # BLD AUTO: 6.2 X10E3/UL (ref 1.4–7)
NEUTROPHILS NFR BLD AUTO: 73 %
PLATELET # BLD AUTO: 206 X10E3/UL (ref 150–450)
POTASSIUM SERPL-SCNC: 3.1 MMOL/L (ref 3.5–5.2)
PROT SERPL-MCNC: 6.4 G/DL (ref 6–8.5)
RBC # BLD AUTO: 4.27 X10E6/UL (ref 3.77–5.28)
SL AMB EGFR AFRICAN AMERICAN: 71 ML/MIN/1.73
SL AMB EGFR NON AFRICAN AMERICAN: 62 ML/MIN/1.73
SODIUM SERPL-SCNC: 142 MMOL/L (ref 134–144)
TSH SERPL DL<=0.005 MIU/L-ACNC: 1.39 UIU/ML (ref 0.45–4.5)
WBC # BLD AUTO: 8.6 X10E3/UL (ref 3.4–10.8)

## 2020-06-09 DIAGNOSIS — Z79.4 DIABETES MELLITUS DUE TO UNDERLYING CONDITION WITH DIABETIC NEUROPATHY, WITH LONG-TERM CURRENT USE OF INSULIN (HCC): Chronic | ICD-10-CM

## 2020-06-09 DIAGNOSIS — E08.40 DIABETES MELLITUS DUE TO UNDERLYING CONDITION WITH DIABETIC NEUROPATHY, WITH LONG-TERM CURRENT USE OF INSULIN (HCC): Chronic | ICD-10-CM

## 2020-06-09 DIAGNOSIS — F41.9 ANXIETY: Primary | ICD-10-CM

## 2020-06-09 RX ORDER — LORAZEPAM 0.5 MG/1
0.5 TABLET ORAL 2 TIMES DAILY PRN
Qty: 20 TABLET | Refills: 0 | Status: SHIPPED | OUTPATIENT
Start: 2020-06-09

## 2020-06-11 ENCOUNTER — TELEMEDICINE (OUTPATIENT)
Dept: SPEECH THERAPY | Facility: CLINIC | Age: 80
End: 2020-06-11
Payer: COMMERCIAL

## 2020-06-11 DIAGNOSIS — R47.01 APHASIA: ICD-10-CM

## 2020-06-11 DIAGNOSIS — R48.8 OTHER SYMBOLIC DYSFUNCTIONS: Primary | ICD-10-CM

## 2020-06-11 PROCEDURE — 92507 TX SP LANG VOICE COMM INDIV: CPT | Performed by: SPEECH-LANGUAGE PATHOLOGIST

## 2020-06-18 ENCOUNTER — OFFICE VISIT (OUTPATIENT)
Dept: FAMILY MEDICINE CLINIC | Facility: CLINIC | Age: 80
End: 2020-06-18
Payer: COMMERCIAL

## 2020-06-18 ENCOUNTER — TELEMEDICINE (OUTPATIENT)
Dept: SPEECH THERAPY | Facility: CLINIC | Age: 80
End: 2020-06-18
Payer: COMMERCIAL

## 2020-06-18 VITALS
SYSTOLIC BLOOD PRESSURE: 100 MMHG | WEIGHT: 176 LBS | RESPIRATION RATE: 18 BRPM | OXYGEN SATURATION: 94 % | TEMPERATURE: 98.8 F | HEIGHT: 58 IN | DIASTOLIC BLOOD PRESSURE: 60 MMHG | BODY MASS INDEX: 36.94 KG/M2 | HEART RATE: 77 BPM

## 2020-06-18 DIAGNOSIS — M15.9 PRIMARY OSTEOARTHRITIS INVOLVING MULTIPLE JOINTS: ICD-10-CM

## 2020-06-18 DIAGNOSIS — J96.12 CHRONIC RESPIRATORY FAILURE WITH HYPOXIA AND HYPERCAPNIA (HCC): ICD-10-CM

## 2020-06-18 DIAGNOSIS — Z79.4 DIABETES MELLITUS DUE TO UNDERLYING CONDITION WITH DIABETIC NEUROPATHY, WITH LONG-TERM CURRENT USE OF INSULIN (HCC): Chronic | ICD-10-CM

## 2020-06-18 DIAGNOSIS — R48.8 OTHER SYMBOLIC DYSFUNCTIONS: Primary | ICD-10-CM

## 2020-06-18 DIAGNOSIS — Z87.440 HISTORY OF RECURRENT UTI (URINARY TRACT INFECTION): Primary | ICD-10-CM

## 2020-06-18 DIAGNOSIS — I10 ESSENTIAL HYPERTENSION: Chronic | ICD-10-CM

## 2020-06-18 DIAGNOSIS — J96.11 CHRONIC RESPIRATORY FAILURE WITH HYPOXIA AND HYPERCAPNIA (HCC): ICD-10-CM

## 2020-06-18 DIAGNOSIS — R47.01 APHASIA: ICD-10-CM

## 2020-06-18 DIAGNOSIS — E08.40 DIABETES MELLITUS DUE TO UNDERLYING CONDITION WITH DIABETIC NEUROPATHY, WITH LONG-TERM CURRENT USE OF INSULIN (HCC): Chronic | ICD-10-CM

## 2020-06-18 PROCEDURE — 1160F RVW MEDS BY RX/DR IN RCRD: CPT | Performed by: FAMILY MEDICINE

## 2020-06-18 PROCEDURE — 3074F SYST BP LT 130 MM HG: CPT | Performed by: FAMILY MEDICINE

## 2020-06-18 PROCEDURE — 3078F DIAST BP <80 MM HG: CPT | Performed by: FAMILY MEDICINE

## 2020-06-18 PROCEDURE — 1036F TOBACCO NON-USER: CPT | Performed by: FAMILY MEDICINE

## 2020-06-18 PROCEDURE — 4040F PNEUMOC VAC/ADMIN/RCVD: CPT | Performed by: FAMILY MEDICINE

## 2020-06-18 PROCEDURE — 2022F DILAT RTA XM EVC RTNOPTHY: CPT | Performed by: FAMILY MEDICINE

## 2020-06-18 PROCEDURE — 92507 TX SP LANG VOICE COMM INDIV: CPT | Performed by: SPEECH-LANGUAGE PATHOLOGIST

## 2020-06-18 PROCEDURE — 99214 OFFICE O/P EST MOD 30 MIN: CPT | Performed by: FAMILY MEDICINE

## 2020-06-18 PROCEDURE — 81003 URINALYSIS AUTO W/O SCOPE: CPT | Performed by: FAMILY MEDICINE

## 2020-06-18 RX ORDER — POTASSIUM CHLORIDE 20 MEQ/1
20 TABLET, EXTENDED RELEASE ORAL 3 TIMES DAILY
COMMUNITY
End: 2020-06-19 | Stop reason: SDUPTHER

## 2020-06-19 DIAGNOSIS — G25.81 RESTLESS LEG SYNDROME: ICD-10-CM

## 2020-06-19 DIAGNOSIS — I10 ESSENTIAL HYPERTENSION: Primary | ICD-10-CM

## 2020-06-19 DIAGNOSIS — M15.9 PRIMARY OSTEOARTHRITIS INVOLVING MULTIPLE JOINTS: ICD-10-CM

## 2020-06-19 LAB
BACTERIA UR CULT: NORMAL
Lab: NORMAL

## 2020-06-19 RX ORDER — PRAMIPEXOLE DIHYDROCHLORIDE 1 MG/1
1 TABLET ORAL 3 TIMES DAILY
Qty: 90 TABLET | Refills: 1 | Status: SHIPPED | OUTPATIENT
Start: 2020-06-19 | End: 2020-07-21 | Stop reason: SDUPTHER

## 2020-06-19 RX ORDER — POTASSIUM CHLORIDE 20 MEQ/1
20 TABLET, EXTENDED RELEASE ORAL 3 TIMES DAILY
Qty: 60 TABLET | Refills: 1 | Status: SHIPPED | OUTPATIENT
Start: 2020-06-19 | End: 2020-07-13 | Stop reason: SDUPTHER

## 2020-06-19 RX ORDER — HYDROCODONE BITARTRATE AND ACETAMINOPHEN 5; 325 MG/1; MG/1
1 TABLET ORAL DAILY
Qty: 30 TABLET | Refills: 0 | Status: SHIPPED | OUTPATIENT
Start: 2020-06-19 | End: 2020-07-16 | Stop reason: SDUPTHER

## 2020-06-20 PROBLEM — K59.03 CONSTIPATION DUE TO OPIOID THERAPY: Chronic | Status: RESOLVED | Noted: 2017-06-10 | Resolved: 2020-06-20

## 2020-06-20 PROBLEM — Z87.440 HISTORY OF RECURRENT UTI (URINARY TRACT INFECTION): Status: ACTIVE | Noted: 2020-06-20

## 2020-06-20 PROBLEM — T40.2X5A CONSTIPATION DUE TO OPIOID THERAPY: Chronic | Status: RESOLVED | Noted: 2017-06-10 | Resolved: 2020-06-20

## 2020-06-20 PROBLEM — M54.12 CERVICAL RADICULOPATHY: Status: RESOLVED | Noted: 2018-07-12 | Resolved: 2020-06-20

## 2020-06-20 PROBLEM — R50.9 FEVER: Status: RESOLVED | Noted: 2020-05-26 | Resolved: 2020-06-20

## 2020-06-26 DIAGNOSIS — M79.2 NEUROPATHIC PAIN: ICD-10-CM

## 2020-06-26 RX ORDER — GABAPENTIN 300 MG/1
300 CAPSULE ORAL 2 TIMES DAILY
Qty: 180 CAPSULE | Refills: 3 | Status: SHIPPED | OUTPATIENT
Start: 2020-06-26

## 2020-07-13 DIAGNOSIS — I10 ESSENTIAL HYPERTENSION: ICD-10-CM

## 2020-07-13 DIAGNOSIS — R60.9 EDEMA, UNSPECIFIED TYPE: ICD-10-CM

## 2020-07-13 RX ORDER — FUROSEMIDE 40 MG/1
40 TABLET ORAL DAILY
Qty: 60 TABLET | Refills: 1 | Status: SHIPPED | OUTPATIENT
Start: 2020-07-13

## 2020-07-13 RX ORDER — POTASSIUM CHLORIDE 20 MEQ/1
20 TABLET, EXTENDED RELEASE ORAL 3 TIMES DAILY
Qty: 60 TABLET | Refills: 1 | Status: SHIPPED | OUTPATIENT
Start: 2020-07-13 | End: 2020-08-04 | Stop reason: SDUPTHER

## 2020-07-16 DIAGNOSIS — M15.9 PRIMARY OSTEOARTHRITIS INVOLVING MULTIPLE JOINTS: ICD-10-CM

## 2020-07-16 DIAGNOSIS — E03.9 ACQUIRED HYPOTHYROIDISM: ICD-10-CM

## 2020-07-16 RX ORDER — HYDROCODONE BITARTRATE AND ACETAMINOPHEN 5; 325 MG/1; MG/1
1 TABLET ORAL DAILY
Qty: 30 TABLET | Refills: 0 | Status: SHIPPED | OUTPATIENT
Start: 2020-07-16

## 2020-07-16 RX ORDER — LEVOTHYROXINE SODIUM 0.12 MG/1
125 TABLET ORAL DAILY
Qty: 90 TABLET | Refills: 0 | Status: SHIPPED | OUTPATIENT
Start: 2020-07-16

## 2020-07-21 DIAGNOSIS — G25.81 RESTLESS LEG SYNDROME: ICD-10-CM

## 2020-07-21 RX ORDER — PRAMIPEXOLE DIHYDROCHLORIDE 1 MG/1
1 TABLET ORAL 3 TIMES DAILY
Qty: 90 TABLET | Refills: 1 | Status: SHIPPED | OUTPATIENT
Start: 2020-07-21

## 2020-07-24 ENCOUNTER — TELEPHONE (OUTPATIENT)
Dept: FAMILY MEDICINE CLINIC | Facility: CLINIC | Age: 80
End: 2020-07-24

## 2020-07-24 ENCOUNTER — OFFICE VISIT (OUTPATIENT)
Dept: FAMILY MEDICINE CLINIC | Facility: CLINIC | Age: 80
End: 2020-07-24
Payer: MEDICARE

## 2020-07-24 VITALS
TEMPERATURE: 98.9 F | SYSTOLIC BLOOD PRESSURE: 110 MMHG | DIASTOLIC BLOOD PRESSURE: 56 MMHG | HEART RATE: 82 BPM | OXYGEN SATURATION: 78 %

## 2020-07-24 DIAGNOSIS — R53.1 WEAKNESS: ICD-10-CM

## 2020-07-24 DIAGNOSIS — J96.11 CHRONIC RESPIRATORY FAILURE WITH HYPOXIA AND HYPERCAPNIA (HCC): ICD-10-CM

## 2020-07-24 DIAGNOSIS — J96.12 CHRONIC RESPIRATORY FAILURE WITH HYPOXIA AND HYPERCAPNIA (HCC): ICD-10-CM

## 2020-07-24 DIAGNOSIS — I50.32 CHRONIC DIASTOLIC CONGESTIVE HEART FAILURE (HCC): Chronic | ICD-10-CM

## 2020-07-24 DIAGNOSIS — R41.82 ALTERED MENTAL STATUS, UNSPECIFIED ALTERED MENTAL STATUS TYPE: Primary | ICD-10-CM

## 2020-07-24 PROCEDURE — 4040F PNEUMOC VAC/ADMIN/RCVD: CPT | Performed by: FAMILY MEDICINE

## 2020-07-24 PROCEDURE — 3078F DIAST BP <80 MM HG: CPT | Performed by: FAMILY MEDICINE

## 2020-07-24 PROCEDURE — 1160F RVW MEDS BY RX/DR IN RCRD: CPT | Performed by: FAMILY MEDICINE

## 2020-07-24 PROCEDURE — 1036F TOBACCO NON-USER: CPT | Performed by: FAMILY MEDICINE

## 2020-07-24 PROCEDURE — 2022F DILAT RTA XM EVC RTNOPTHY: CPT | Performed by: FAMILY MEDICINE

## 2020-07-24 PROCEDURE — 3074F SYST BP LT 130 MM HG: CPT | Performed by: FAMILY MEDICINE

## 2020-07-24 PROCEDURE — 99214 OFFICE O/P EST MOD 30 MIN: CPT | Performed by: FAMILY MEDICINE

## 2020-07-24 NOTE — TELEPHONE ENCOUNTER
Called to verify if you will sign the papers for [facility]  I told her yes you would sign

## 2020-07-25 PROBLEM — R41.82 ALTERED MENTAL STATUS: Status: ACTIVE | Noted: 2020-07-25

## 2020-07-25 NOTE — PROGRESS NOTES
Assessment/Plan:    No problem-specific Assessment & Plan notes found for this encounter  Diagnoses and all orders for this visit:    Altered mental status, unspecified altered mental status type    Weakness    Chronic respiratory failure with hypoxia and hypercapnia (HCC)    Chronic diastolic congestive heart failure ([address] )          Patient Instructions   PATIENT APPEARS TO BE IN MILD RESPIRATORY DISTRESS  PATIENT WILL IMMEDIATELY GO TO THE ER FOR FURTHER EVALUATION ? POSSIBLE CVA    CONSIDERATION OF HOSPICE SERVICES WAS DISCUSSED      Return in about 1 week (around 7/31/2020)  Subjective:      Patient ID: Bairon Pearson is a 79 y o  female  Chief Complaint   Patient presents with    Follow-up     daughter states she needs alot of help - [address] 19, POOR PO INTAKE  APPEARS OUT OF IT  FAMILY ? WHETHER SHE HAD ANOTHER STROKE    NOTES NO FEVER  SLEEPING ALL THE TIME  HAS NOT RELATED TO BE IN PAIN    REVIEWED ISSUES AND FINDINGS AT LENGTH WITH DAUGHTER  ER EVALUATION SHOULD BE PURSUED WITH POSSIBLE HOSPICE INTERVENTION      The following portions of the patient's history were reviewed and updated as appropriate: allergies, current medications, past family history, past medical history, past social history, past surgical history and problem list     Review of Systems   Constitutional: Positive for activity change, appetite change and fatigue  Negative for chills and fever  HENT: Negative for congestion, ear discharge, ear pain, mouth sores, postnasal drip, sore throat and trouble swallowing  Eyes: Negative for pain, discharge and visual disturbance  Respiratory: Positive for shortness of breath  Negative for cough and wheezing  Cardiovascular: Negative for chest pain, palpitations and leg swelling  Gastrointestinal: Negative for abdominal distention, abdominal pain, blood in stool, diarrhea and nausea     Endocrine: Negative for polydipsia, polyphagia and polyuria  Genitourinary: Positive for decreased urine volume  Negative for dysuria, frequency, hematuria and urgency  Musculoskeletal: Negative for arthralgias, gait problem and joint swelling  Skin: Negative for pallor and rash  Neurological: Positive for speech difficulty and weakness  Negative for dizziness, syncope, light-headedness, numbness and headaches  Hematological: Negative for adenopathy  Psychiatric/Behavioral: Positive for decreased concentration  Negative for behavioral problems, confusion and sleep disturbance  The patient is not nervous/anxious  Current Outpatient Medications   Medication Sig Dispense Refill    Alcohol Swabs (ULTRA-CARE ALCOHOL PREP PADS) 70 % PADS Apply 1 each topically 3 (three) times a day Use as directed with test strips 3 times a day 200 each 2    aspirin (ECOTRIN LOW STRENGTH) 81 mg EC tablet Take 81 mg by mouth daily      B-D UF III MINI PEN NEEDLES 31G X 5 MM MISC USE TWICE DAILY TO TEST BLOOD SUGAR  3    budesonide (PULMICORT) 0 5 mg/2 mL nebulizer solution Take 1 vial (0 5 mg total) by nebulization 2 (two) times a day Rinse mouth after use   60 vial 1    furosemide (LASIX) 40 mg tablet Take 1 tablet (40 mg total) by mouth daily 60 tablet 1    gabapentin (NEURONTIN) 300 mg capsule Take 1 capsule (300 mg total) by mouth 2 (two) times a day 180 capsule 3    glucose blood (MM Easy Touch Glucose) test strip Test 3 times a day 200 each 0    HYDROcodone-acetaminophen (NORCO) 5-325 mg per tablet Take 1 tablet by mouth dailyMax Daily Amount: 1 tablet 30 tablet 0    ipratropium-albuterol (DUO-NEB) 0 5-2 5 mg/3 mL nebulizer solution Inhale      lactulose 20 g/30 mL Take 30 mL (20 g total) by mouth 3 (three) times a day (Patient taking differently: Take 20 g by mouth as needed ) 946 mL 3    levothyroxine 125 mcg tablet Take 1 tablet (125 mcg total) by mouth daily 90 tablet 0    Melatonin 5 MG TABS Take by mouth daily at bedtime      metoprolol tartrate (LOPRESSOR) 25 mg tablet Take 1 tablet (25 mg total) by mouth 2 (two) times a day 180 tablet 3    NOVOFINE AUTOCOVER 30G X 8 MM MISC       polyethylene glycol (GAVILAX) powder gavilax  powd      potassium chloride (K-DUR,KLOR-CON) 20 mEq tablet Take 1 tablet (20 mEq total) by mouth 3 (three) times a day 60 tablet 1    pramipexole (MIRAPEX) 1 mg tablet Take 1 tablet (1 mg total) by mouth 3 (three) times a day 90 tablet 1    predniSONE 10 mg tablet 10 mg       senna-docusate sodium (SENOKOT S) 8 6-50 mg per tablet Take 2 tablets by mouth daily at bedtime (Patient taking differently: Take 1 tablet by mouth daily at bedtime ) 112 tablet 2    clotrimazole-betamethasone (LOTRISONE) 1-0 05 % cream Apply topically 2 (two) times a day (Patient not taking: Reported on 11/26/2019) 30 g 0    insulin aspart protamine-insulin aspart (NovoLOG Mix 70/30 FlexPen) 100 Units/mL injection pen Inject 45 Units under the skin 2 (two) times a day before meals (Patient not taking: Reported on 7/24/2020) 5 pen 3    LORazepam (ATIVAN) 0 5 mg tablet Take 1 tablet (0 5 mg total) by mouth 2 (two) times a day as needed for anxiety (Patient not taking: Reported on 6/18/2020) 20 tablet 0    metolazone (ZAROXOLYN) 2 5 mg tablet Take 1 tablet (2 5 mg total) by mouth 3 (three) times a week (Patient not taking: Reported on 7/24/2020) 60 tablet 3    pantoprazole (PROTONIX) 40 mg tablet Take 40 mg by mouth daily       Current Facility-Administered Medications   Medication Dose Route Frequency Provider Last Rate Last Dose    furosemide (LASIX) injection 30 mg  30 mg Intravenous Once Erin Gupta MD           Objective:    /56   Pulse 82   Temp 98 9 °F (37 2 °C) (Temporal)   LMP  (LMP Unknown)   SpO2 (!) 78%        Physical Exam   Constitutional: She appears well-developed  She appears distressed  HENT:   Head: Normocephalic and atraumatic  Eyes: Pupils are equal, round, and reactive to light  Right eye exhibits no discharge  Left eye exhibits no discharge  CONJ PALE   Neck: Normal range of motion  Neck supple  No JVD present  No tracheal deviation present  No thyromegaly present  Cardiovascular: Normal rate and regular rhythm  Murmur heard  Pulmonary/Chest: She is in respiratory distress  She has no wheezes  She has rales  DECREASED RESPIRATORY EFFORT  BILATERAL BASILAR RALES   Abdominal: Soft  Bowel sounds are normal  There is no tenderness  OBESE   Musculoskeletal: She exhibits no edema or tenderness  WHEELCHAIR BOUND   Lymphadenopathy:     She has no cervical adenopathy  Neurological: She is alert  OBTUNDED   Skin: Skin is warm and dry  No rash noted  No erythema         LENGTH OF VISIT 30 MIN  LENGTH OF  15 MIN       Belen Cabello MD

## 2020-07-25 NOTE — PATIENT INSTRUCTIONS
PATIENT APPEARS TO BE IN MILD RESPIRATORY DISTRESS  PATIENT WILL IMMEDIATELY GO TO THE ER FOR FURTHER EVALUATION ?  POSSIBLE CVA    CONSIDERATION OF HOSPICE SERVICES WAS DISCUSSED

## 2020-08-04 DIAGNOSIS — R60.9 EDEMA, UNSPECIFIED TYPE: ICD-10-CM

## 2020-08-04 DIAGNOSIS — I10 ESSENTIAL HYPERTENSION: ICD-10-CM

## 2020-08-04 RX ORDER — POTASSIUM CHLORIDE 20 MEQ/1
20 TABLET, EXTENDED RELEASE ORAL 3 TIMES DAILY
Qty: 60 TABLET | Refills: 1 | Status: SHIPPED | OUTPATIENT
Start: 2020-08-04

## 2020-08-04 RX ORDER — METOLAZONE 2.5 MG/1
2.5 TABLET ORAL 3 TIMES WEEKLY
Qty: 60 TABLET | Refills: 0 | Status: SHIPPED | OUTPATIENT
Start: 2020-08-05

## 2020-08-04 NOTE — TELEPHONE ENCOUNTER
Metolazone states in the chart that she is not taking it  Dg Gomez told us that she does not take it   Mau Queen stated that she is taking it when she stays with her

## 2020-08-08 ENCOUNTER — TELEPHONE (OUTPATIENT)
Dept: OTHER | Facility: OTHER | Age: 80
End: 2020-08-08

## 2020-08-10 ENCOUNTER — TELEPHONE (OUTPATIENT)
Dept: FAMILY MEDICINE CLINIC | Facility: CLINIC | Age: 80
End: 2020-08-10

## 2020-08-10 NOTE — TELEPHONE ENCOUNTER
Carlos Pfeiffer from [facility] called  You need to sign the death certificate  He mentioned that hospice had to make a change to the death certificate and it may have kicked your signature out  They were hoping to cremate her today      Cert # [id number]

## 2021-08-07 NOTE — ASSESSMENT & PLAN NOTE
Pt pyling on cot, personable, pleassant and makes good eye contact. Reports that he made family aware of his admission and is agreeable to Admit. Reports last etOH was 48 Hrs ago, last seixure was 1500. Sometimes has an aura. States normal etoh intake approx 2-4 fifths of vodka per day.    Pt is s/p 6 days out of rehab     Rinku Gaitan, [address] [place] [address]  08/06/21 1005 STABLE  USING O2  MONITORING DAILY WEIGHT    MINIMAL COUGH

## 2022-02-18 NOTE — CASE MANAGEMENT
Continued Stay Review    Date: 7/17/18    Vital Signs: /64 (BP Location: Right arm)   Pulse 69   Temp 98 °F (36 7 °C) (Oral)   Resp 18   Ht 5' 2" (1 575 m)   Wt 96 kg (211 lb 10 3 oz)   LMP  (LMP Unknown)   SpO2 98%   BMI 38 71 kg/m²     Medications:   Scheduled Meds:   Current Facility-Administered Medications:  aspirin 325 mg Oral Daily FATIMAH Orosco    docusate sodium 100 mg Oral BID FATIMAH Orosco    erythromycin 0 5 inch Both Eyes Q12H 3630 Stacey Walls MD    escitalopram 10 mg Oral Daily FATIMAH Orosco    folic acid 1 mg Oral Daily FATIMAH Orosco    gabapentin 100 mg Oral TID Jay Camara MD    heparin (porcine) 5,000 Units Subcutaneous Q8H [address] FATIMAH Orosco    insulin glargine 20 Units Subcutaneous Q12H [address] Susie Gutierres MD    insulin lispro 14 Units Subcutaneous TID With Meals Susie Gutierres MD    insulin lispro 4-20 Units Subcutaneous TID With Meals Jay Camara MD    ipratropium 0 5 mg Nebulization 4x Daily Jay Camara MD    levalbuterol 1 25 mg Nebulization 4x Daily FATIMAH Healy    levalbuterol 1 25 mg Nebulization Q2H PRN Jay Camara MD    And        sodium chloride 3 mL Nebulization Q2H PRN Jay Camara MD    levothyroxine 125 mcg Oral Daily Cecillia OldFATIMAH    Linaclotide 72 mcg Oral Daily Before Breakfast FATIMAH Ham    meropenem 1,000 mg Intravenous Q12H Gina Gutierrez PA-C Last Rate: 1,000 mg (07/16/18 2215)   methylPREDNISolone sodium succinate 20 mg Intravenous Q8H 3630 Stacey Walls MD    metoclopramide 10 mg Intravenous Once FATIMAH Healy    metolazone 2 5 mg Oral Once per day on Mon Wed Fri Jay Camara MD    metoprolol tartrate 25 mg Oral BID FATIMAH Orosco    ondansetron 4 mg Oral Q6H FATIMAH Reed    polyethylene glycol 17 g Oral Daily FATIMAH Orosco    pramipexole 1 mg Oral TID FATIMAH Orosco    senna 1 tablet Oral HS FATIMAH Orosco    torsemide 40 mg Oral Once per day on Mon Wed Fri Jarrod Lane MD    traMADol 50 mg Oral Q6H PRN FATIMAH Ivan    venlafaxine 75 mg Oral Daily FATIMAH Orosco      Continuous Infusions:    PRN Meds: levalbuterol **AND** sodium chloride    ondansetron    traMADol    Abnormal Labs/Diagnostic Results: GLUCOSE 489 445 PCO2 69 2 HCO3 45 2  CL 89 CO2 >45 BUN 29     Age/Sex: 77 y o  female     PULMONARY  Assessment:  Acute on chronic hypercapnic hypoxemic respiratory failure improved  Patient did not use BiPAP last night and is doing well  She is on her baseline dose of 2 L of oxygen  Obesity alveolar hypoventilation syndrome  Chronic bronchitis with mild exacerbation  Severe sepsis likely urinary origin  Urine culture was not sent until after patient had been started on IV antibiotic therapy  Chest x-ray showed no evidence of pneumonia  Chronic diastolic congestive heart failure  Plan:  Can decrease methylprednisone to 20 mg IV every 12 hours  I discussed this with hospitalist Dr Adolph Isabel  Continue nebulized treatments levalbuterol 1 25 mg and Atrovent 0 5 mg q i d  Portable chest x-ray done today was reviewed  There is some minimal subsegmental atelectasis left lower lobe        ID  Assessment:  Clinically improving slowly  Chronic bronchitis  Possible sepsis of urinary origin  Plan: On meropenem   will discuss with primary service    ATTENDING  Severe sepsis   Source unclear, possibly UTI, less likely early PNA  Hx of ESBL positive klebsiella bacteremia June 2017  CXR- No acute cardiopulmonary disease   On presentation and on repeated chest x-ray today  Urine culture with no growth, but was collected after initiation of antibiotics  Blood cx no growth to day  ID consulted, input appreciated  On IV meropenem day 5  Procalcitonin less than 0 07  Acute on chronic respiratory failure with hypoxia and hypercapnia   Oxygenation is improving  Continue supplemental oxygen  Chronic obstructive pulmonary disease with acute exacerbation   Cont bronchodilators  Slowly improving  Taper IV Solu-Medrol to q 12 hours  Diabetes mellitus with neuropathy   POCGLU  445*  350*  242*  254*   · Remains with significant hyperglycemia on sliding scale and pre meal insulin  · Will add basal insulin, tapering steroid as above  · Monitor  Chronic venous stasis dermatitis of both lower extremities  · Cont wound care with adaptic, gauze and cling wrap  · Recent venous Doppler negative for DVT independent

## 2022-07-06 NOTE — CASE MANAGEMENT
Kim Barnett MD Physician Signed Internal Medicine  Discharge Summaries Date of Service: 1/30/2018 11:18 AM         []Hide copied text  Discharge Summary - [address] Internal Medicine     Patient Information: Farhana Gonzalez 77 y o  female MRN: [id number]  Unit/Bed#: 708 [facility] Encounter: [id number]     Discharging Physician / Practitioner: Kim Barnett MD  PCP: Cristhian Bustamante MD  Admission Date: 1/19/2018  Discharge Date: 01/30/18     Reason for Admission: Flu Symptoms (woke up with chills,sore throat,general body aches, sent here by family doctor,has serous drainage from rt shin area also)        Discharge Diagnoses:      Principal Problem:    Influenzal tracheobronchitis  Active Problems:    T2DM (type 2 diabetes mellitus) ([facility] [address] )    Asthma    Restless leg    HTN (hypertension)    HLD (hyperlipidemia)    Hypothyroidism  Resolved Problems:    Acute on chronic respiratory failure with hypoxemia ([facility] [address] )    COPD exacerbation ([facility] )    Sepsis ([facility] )        Consultations During Hospital Stay:  IP CONSULT TO PULMONOLOGY     Procedures Performed:      Home Oxygen Test:    **Medicare Guidelines require item(s) 1-5 on all ambulatory patients or 1 and 2 on on-ambulatory patients         1   Baseline SPO2 on Room Air at rest 94 %              If = or < 88% on room air add O2 via NC and titrate patient                 Patient must be ambulated with O2 and titrated to > 88% with exertion      2   SPO2 on Oxygen at rest NA %      3   SPO2 during exercise on Room Air 95-97% %      4   SPO2 during exercise on Oxygen  NA % at a liter flow of NA lpm      5   Exercise performed:               [x] Walking     [] Stairs     [x] Duration 6 (min )     [] Distance  (ft )        []  Supplemental Home Oxygen is indicated      [x]  Client does not qualify for home oxygen  Not needed for ambulation     Significant Findings:      · Influenza A PCR -positive  · MRSA surveillance negative   · Blood cultures were negative     Imaging while in hospital:     Xr Chest Portable     Result Date: 1/30/2018  Narrative: CHEST INDICATION:  Acute asthma  Bronchitis  COMPARISON:  Chest radiographs January 19, 2018 VIEWS:   AP frontal IMAGES:  1 FINDINGS:     Heart shadow appears unremarkable  Atherosclerotic vascular calcifications are noted  The lungs are clear  No pneumothorax or pleural effusion  Visualized osseous structures appear within normal limits for the patient's age       Impression: No active pulmonary disease  Workstation performed: [id number]      Xr Chest 1 View Portable     Result Date: 1/19/2018  Narrative: CHEST INDICATION:  Flu symptoms  COMPARISON:  October 8, 2017 VIEWS:   AP frontal IMAGES:  1 FINDINGS:     Heart shadow appears unremarkable  Atherosclerotic vascular calcifications are noted  The lungs are clear  No pneumothorax or pleural effusion  Visualized osseous structures appear within normal limits for the patient's age       Impression: No active pulmonary disease  Workstation performed: [id number]      Ct Head Without Contrast     Result Date: 1/9/2018  Narrative: CT BRAIN - WITHOUT CONTRAST INDICATION:  Headache  COMPARISON:  10/10/2017 TECHNIQUE:  CT examination of the brain was performed  In addition to axial images, coronal reformatted images were created and submitted for interpretation  Radiation dose length product (DLP) for this visit:  1134 76 mGy-cm   This examination, like all CT scans performed in the [facility], was performed utilizing techniques to minimize radiation dose exposure, including the use of iterative reconstruction and automated exposure control  IMAGE QUALITY:  Diagnostic  FINDINGS:  PARENCHYMA: No acute intracranial hemorrhage  No extra-axial fluid collection  No midline shift  Unchanged is the calcified lesion in the left parietal cortex seen most consistent with a meningioma  It measures approximately 1 6 x 1 4 cm in axial dimension, unchanged from prior examination   Mass effect on the left superior parietal lobule without substantial adjacent edema  Stable atrophy and patchy areas of periventricular hypoattenuation noted  While nonspecific, these likely reflect changes of chronic microvascular ischemia in a patient of this age  VENTRICLES AND EXTRA-AXIAL SPACES:  Ventricles are commensurate with the degree of volume loss  Basal cisterns are patent  Atherosclerotic calcification of the intracranial vasculature is noted  VISUALIZED ORBITS AND PARANASAL SINUSES:  Changes of bilateral lens replacements noted  The paranasal sinuses and mastoid air cells remain clear  CALVARIUM AND EXTRACRANIAL SOFT TISSUES:   Normal       Impression: No acute intracranial abnormality  Stable left parietal convexity meningioma  No demonstrable adjacent parenchymal edema  Workstation performed: [id number]      Ct Abdomen Pelvis With Contrast     Result Date: 1/9/2018  Narrative: CT ABDOMEN AND PELVIS WITH IV CONTRAST INDICATION:  Chronic abdominal pain  COMPARISON: 12/13/2017  TECHNIQUE:  CT examination of the abdomen and pelvis was performed  Reformatted images were created in axial, sagittal, and coronal planes  Radiation dose length product (DLP) for this visit:  [id number] [id number] 387 mGy-cm   This examination, like all CT scans performed in the [facility], was performed utilizing techniques to minimize radiation dose exposure, including the use of iterative reconstruction and automated exposure control  IV Contrast:  100 mL of iohexol (OMNIPAQUE)         Enteric Contrast:  Enteric contrast was not administered  FINDINGS: ABDOMEN LOWER CHEST:  No significant abnormalities identified in the lower chest  LIVER/BILIARY TREE:  No biliary obstruction  GALLBLADDER:  Cholecystectomy  SPLEEN:  Unremarkable  PANCREAS:  Stable 11 mm cystic lesion in the body of the pancreas  Distal body and tail not visualized  Question prior resection  No pancreatic duct dilatation or evidence of pancreatitis   ADRENAL GLANDS:  Unremarkable  KIDNEYS/URETERS:  Stable fat-containing 15 mm right renal cortical lesion suggesting an angiomyolipoma  No evidence of pyelonephritis or obstructive uropathy  Ashford Chavez STOMACH AND BOWEL:  Stable moderate hiatal hernia  Fat-containing 4 cm periumbilical hernia, stable  Stool throughout the colon  No evidence of bowel obstruction, colitis or diverticulitis  APPENDIX:  No findings to suggest appendicitis  ABDOMINOPELVIC CAVITY:  No ascites or free intraperitoneal air  Stable shotty left para-aortic lymph nodes  VESSELS:  Unremarkable for patient's age  PELVIS REPRODUCTIVE ORGANS:  Patient is status post hysterectomy  URINARY BLADDER:  Unremarkable  ABDOMINAL WALL/INGUINAL REGIONS:  Unremarkable  OSSEOUS STRUCTURES:  No acute fracture or destructive osseous lesion       Impression: 1  Moderate amount of stool throughout the colon may indicate constipation  No evidence of bowel obstruction, colitis, diverticulitis or appendicitis  Moderate hiatal hernia and small fat-containing periumbilical hernia  2  Stable left millimeters cystic lesion in the body of the pancreas  No ductal dilatation  Possible  serous cystadenoma  3  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  No pyelonephritis or obstructive uropathy  Workstation performed: [id number]         Incidental Findings:   Stable left parietal convexity meningioma  No demonstrable adjacent parenchymal edema  Stable left millimeters cystic lesion in the body of the pancreas  No ductal dilatation  Possible  serous cystadenoma  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   No pyelonephritis or obstructive uropathy     Test Results Pending at Discharge (will require follow up):   · As per After Visit Summary     Outpatient Tests Requested:  · BMP in 1 week     Complications:  None, see HPI     Hospital Course:      Jonah Wu is a 77 y o  female patient with multiple comorbidities including diastolic CHF, obesity, chronic hypercapnic respiratory failure secondary to obesity hypoventilation, dyslipidemia, TIA, hypothyroidism, poor compliance, uncontrolled diabetes mellitus type 2, who originally presented to the hospital on 1/19/2018 due to fever chills cough and shortness of breath  Patient was evaluated in emergency department and was noted to have influenza a with associated reactive airway disease and patient was subsequently admitted for further evaluation workup  Patient had prolonged hospitalization and slow recovery the the the from influenza associated bronchitis considering her multiple comorbidities and poor respiratory reserve  Course was also complicated because of significant hyperglycemia related to steroids  Following issues were addressed      1  Acute bronchitis, likely secondary to influenza A, less likely bacterial-completed Tamiflu and azithromycin  Blood cultures remain negative   Urine Legionella pneumococcal antigen are negative    patient was treated with bronchodilators, steroids with slow improvement in pulmonary status    symptomatic treatment was continued  Patient was closely followed by Pulmonary  Respiration wheezing gradually improved and steroids were tapered off  Patient is currently oxygenating well at rest and with ambulation does not require supplemental oxygen  Wheezing is significantly improved  Denies any shortness of breath and cough is improving  Patient will be discharged home, will be continued on bronchodilators and chest PT  They have was initially recommended but was not approved by the insurance  Visiting nurse will be arranged for monitoring of respiratory status and ensuring compliance with also monitoring of cardiorespiratory status and blood glucose  Plan of care were discussed in depth the family  2  Leukocytosis secondary to steroid-no evidence of ongoing infection  Repeat chest x-ray without any acute abnormalities  3   Acute on chronic hypoxic hypercapnic respiratory failure secondary to 1 and underlying obesity hypoventilation-continue supplemental oxygen at bedtime     4  Diabetes mellitus type 2 with hemoglobin A1c of 9 2-uncontrolled secondary to steroid    manage with Lantus and NovoLog and corrective scale while in the hospital    will resume NovoLog 70 30 at the time of discharge at 44 units twice a day  Advise close blood glucose monitoring 4 times a day while on prednisone  5  Hypotension secondary to volume depletion and acute illness-improved   home medication resumed, tolerating   Monitor blood pressure     6  Chronic diastolic CHF, chronic lower extremity edema-appears euvolemic  Will resume Lasix at 40 mg b i d  [place] Organ Zaroxolyn on the as needed  BMP in 1 week after discharge  7  Dyslipidemia-on Lipitor  8  History of TIA-continue aspirin and Lipitor  9  Hypothyroidism, post thyroidectomy-on Synthroid  10  Restless leg syndrome-on Mirapex  11  History of ESBL Klebsiella bacteremia  12  Deconditioning-PT/OT  13  GERD-continue PPI  14  CKD-on baseline, monitor on diuresis     Condition at Discharge: stable      Discharge Day Visit / Exam:      Subjective:  I think I am starting getting better  Cough is improving  Denies shortness of breath and chest pain  Appetite is improving  Ambulating in hallway     Vitals: Blood Pressure: 122/58 (01/30/18 0836)  Pulse: 90 (01/30/18 0836)  Temperature: 98 °F (36 7 °C) (01/30/18 0836)  Temp Source: Oral (01/30/18 0836)  Respirations: 18 (01/30/18 0836)  Height: 5' 2" (157 5 cm) (01/22/18 1442)  Weight - Scale: 107 kg (236 lb 5 3 oz) (01/30/18 0555)  SpO2: 94 % (01/30/18 1113)  Exam:   Physical Exam   Constitutional: No distress  Morbidly obese   HENT:   Head: Normocephalic and atraumatic  Nose: Nose normal    Eyes: Conjunctivae and EOM are normal  Pupils are equal, round, and reactive to light  Neck: Normal range of motion  Neck supple  No JVD present  Cardiovascular: Normal rate and regular rhythm  Exam reveals no gallop and no friction rub  Murmur heard  Pulmonary/Chest: Effort normal  No respiratory distress  She has decreased breath sounds  She has wheezes (Significantly improved)  She has no rhonchi  She has no rales  She exhibits no tenderness  Good bilateral air entry   Abdominal: Soft  Bowel sounds are normal  She exhibits no distension  There is no tenderness  There is no rebound and no guarding  Musculoskeletal: She exhibits edema (Chronic venous stasis)  Neurological: She is alert  No cranial nerve deficit  Skin: Skin is warm and dry  No rash noted  Psychiatric: She has a normal mood and affect          Discharge instructions/Information to patient and family:(Discharge Medications and Follow up):   See after visit summary for information provided to patient and family        Provisions for Follow-Up Care:  See after visit summary for information related to follow-up care and any pertinent home health orders        Disposition: Home with [facility]     Planned Readmission:  No     Discharge Statement:  I spent 45 minutes discharging the patient  This time was spent on the day of discharge  I had direct contact with the patient on the day of discharge  Greater than 50% of the total time was spent examining patient, answering all patient questions, arranging and discussing plan of care with patient as well as directly providing post-discharge instructions  Additional time then spent on discharge activities  Coordinated with the family and pulmonary at the time of discharge      Discharge Medications:  See after visit summary for reconciled discharge medications provided to patient and family        ** Please Note: Dragon 360 Dictation voice to text software may have been used in the creation of this document   **       Orthopedic

## 2022-09-15 NOTE — PLAN OF CARE
DISCHARGE PLANNING - CARE MANAGEMENT     Discharge to post-acute care or home with appropriate resources Progressing        Nutrition/Hydration-ADULT     Nutrient/Hydration intake appropriate for improving, restoring or maintaining nutritional needs Progressing        Potential for Falls     Patient will remain free of falls Progressing        Prexisting or High Potential for Compromised Skin Integrity     Skin integrity is maintained or improved Progressing        RESPIRATORY - ADULT     Achieves optimal ventilation and oxygenation Progressing 44 year old female with a past medical history of hypothyroidism reports right upper quad pain 2-3 months ago, she went to the ER at [facility] an ultrasound revealed stones.  She is scheduled for laparoscopic cholecystomy possible open on  9/29/2022.    She denies fever, cough, SOB, recent travels, and sick contacts.

## 2023-01-03 NOTE — ASSESSMENT & PLAN NOTE
Writer called patient. No answer. Left message to call back clinic. Awaiting for patient to return call at this time.     -patient was using oxygen at night and is having chronic dyspnea  -O2 qualifying exam ordered:  She needs 3 L continuously    -concentrator ordered and patient strongly advise to use oxygen as instructed as she is having the admissions for shortness of breath and respiratory failure  -her pulmonologist Dr Julia Miller was made aware

## 2023-03-01 NOTE — NURSING NOTE
IV removed-pt  Tolerated well  Report given to Juvencio Suarez at [facility]  Pt  Left will all her personal belongings   Pt  Left the floor via stretcher with transport team 
Offered, declined by mother

## 2023-07-17 NOTE — RESPIRATORY THERAPY NOTE
BIPAP on SB in room at bedside  Physical Therapy    Visit Type: treatment    Relevant History/Co-morbidities: Pt. fell and sustained right humerus fracture.  Brace and splint on right UE at all times.   NWB right UE.     SUBJECTIVE  Patient agreed to participate in therapy this date.  Patient verbally agrees to allow the following to be present during session: son (daughter in law)  RNYoanna, approved session.    Patient reports that she is tired.   Patient / Family Goal: maximize function    Pain   RN informed on pain level.    Location: head 6/10 and right arm 4/10     OBJECTIVE     Cognitive Status   Arousal Alertness   - appropriate responses to stimuli  Affect/Behavior    - cooperative and pleasant  Orientation    - Oriented to: person, place, time and situation  Functional Communication   - Overall Status: within functional limits   - Forms of Communication: verbal  Following Direction   - follows all commands and directions consistently    Vitals:  SpO2 ranging from 94-95% with activity and 95-96% at rest on 2L O2.     Patient Activity Tolerance: 1 to 2 activity to rest    Posture:  - Seated: thoracic kyphosis and forward head  - Standing: thoracic kyphosis, trunk lean anterior, flexed hips and forward head      Range of Motion (ROM)   (degrees unless noted; active unless noted; norms in ( ); negative=lacking to 0, positive=beyond 0)  WFL: LLE, RLE    Strength  (out of 5 unless noted, standard test position unless noted)   Comments / Details: Decreased LE strength as noted with transfers.       Sitting Balance  (VANDANA = base of support)  Dynamic      - Trial 1 details: stand by assist  SBA to scoot forward and backward in chair.     Standing Balance  (VANDANA = base of support)  Firm Surface: Double Leg      - Static, Eyes Open       - Trial 1 details: with single UE support and contact guard     - Dynamic, Eyes Open       - Trial 1 details: with single UE support and minimal assist  SBQC utilized in left hand for standing balance.           Transfers  Assistive devices: small base quad cane, gait belt  - Sit to stand: total assist - non-dependent (min A x 1, SBA x 1)  - Stand to sit: total assist - non-dependent (min A x 1, SBA x 1)  Pt. performed three standing transfers during session, two to walk and one to change brief and get cleaned up.  Cues for sequencing and hand placement.  Min assist for steadying and to support left UE.  Left UE sling needing to be adjusted during standing trials.     Ambulation / Gait  - Assistive device: gait belt and small base quad cane  - Distance (feet unless otherwise indicated): 8, 10  - Assist Level: total assist - non-dependent (min A x 1, SBA x 1)  - Description: forward flexed at hips, shuffling, decreased mercedes/pace, decreased step length left, decreased step length right, decreased stance time LLE and decreased stance time RLE  Cues for sequencing, upright posture, and pacing/breathing, as pt with shortness of breath with activity.  Sitting rest breaks needed between walks to recover from shortness of breath.        Interventions     Training provided: bed mobility training, balance retraining, gait training, safety training, transfer training, activity tolerance, use of assistive device and positioning  Increased time to adjust sling appropriately during session.   Skilled input: Verbal instruction/cues  Verbal Consent: Writer verbally educated and received verbal consent for hand placement, positioning of patient, and techniques to be performed today from patient          Education:   - Present and ready to learn: patient, patient's family and with patient present  Education provided during session:  - Results of above outlined education: Verbalizes understanding, Demonstrates understanding and Needs reinforcement    ASSESSMENT   Progress: progressing toward goals    Summary of function and discharge needs based on today's assessment:   - Current level of function: significantly below baseline level of  function   - Therapy needs at discharge: therapy 5 or more times per week   - Activities of daily living (ADLs) requiring support at discharge: bed mobility, transfers and ambulation   - Impairments that require further therapy intervention: ROM, strength, balance, activity tolerance and pain    AM-PAC  - Generalized Prior Level of Function: IND/MOD I ([facility] 22-24)       Key: MOD A=moderate assistance, IND/MOD I=independent/modified independent  - Generalized Current Level of Function     - Current Mobility Score: 10       AM-PAC Scoring Key= >21 Modified Independent; 20-21 Supervision; 18-19 Minimal assist; 13-18 Moderate assist; 9-12 Max assist; <9 Total assist        PLAN   Suggestions for next session as indicated: Bed mobility, transfers and ambulation with SBQC, bring aide to assist and provide chair follow for ambulation  PT Frequency: 3-5 x per week      PT/OT Mobility Equipment for Discharge: pt will need small base quad cane - likely defer to DI  Agreement to plan and goals: patient agrees with goals and treatment plan        GOALS  Review Date: 7/20/2023  Long Term Goals: (to be met by time of discharge from hospital)  Sit to supine: Patient will complete sit to supine modified independent.  Status: not met  Supine to sit: Patient will complete supine to sit modified independent.  Status: progressing/ongoing  Sit to stand: Patient will complete sit to stand transfer with least restrictive device, modified independent.   Status: progressing/ongoing  Stand to sit: Patient will complete stand to sit transfer with least restrictive device, modified independent.   Status: progressing/ongoing  Ambulation (even): Patient will ambulate on even surface for 100 feet with least restrictive device, modified independent.   Status: progressing/ongoing  Home program: patient independent with home program as instructed.   Status: progressing/ongoing    Documented in the chart in the following areas:  Assessment/Plan.      Patient at End of Session:   Location: in chair  Safety measures: alarm system in place/re-engaged, call light within reach and lines intact  Handoff to: nurse      Therapy procedure time and total treatment time can be found documented on the Time Entry flowsheet

## 2024-10-17 NOTE — TELEPHONE ENCOUNTER
Most recent CT showed no sign of metastatic disease or recurrence  Follow-up with GYN as scheduled in November   Would like to speak to your regarding [facility]

## 2024-12-01 NOTE — TELEPHONE ENCOUNTER
[address]    Needs a script for lancets and alcohol pads Will monitor need for SLP services upon d/c

## 2025-03-15 NOTE — ASSESSMENT & PLAN NOTE
· Patient has had multiple admissions in the past, all recommending STR  · PT/OT eval  · She may need a long term higher level of care, but current she is not interested in that NEGATIVE

## (undated) DEVICE — CHLORAPREP HI-LITE 10.5ML ORANGE

## (undated) DEVICE — WIPES BABY PAMPERS SENSITIVE 36/PK

## (undated) DEVICE — NEEDLE SPINAL 22G X 3.5IN  QUINCKE

## (undated) DEVICE — GLOVE SRG BIOGEL 7.5

## (undated) DEVICE — PLASTIC ADHESIVE BANDAGE: Brand: CURITY

## (undated) DEVICE — NEEDLE BLUNT 18 G X 1 1/2 W FILTER

## (undated) DEVICE — SYRINGE 5ML LL

## (undated) DEVICE — SMALL NEEDLE COUNTER NEST

## (undated) DEVICE — CORONARY VENOUS STEROID-ELUTING BIPOLAR PACE/SENSE LEAD: Brand: EASYTRAK® 2

## (undated) DEVICE — ERCP CANNULA: Brand: RX ERCP CANNULA

## (undated) DEVICE — NEEDLE KNIFE XL 5.5FR  TRIPLE LUMEN

## (undated) DEVICE — RETRIEVAL BALLOON CATHETER: Brand: EXTRACTOR™ PRO RX

## (undated) DEVICE — GUIDEWIRE 0.035IN 270CM STR TIP VISIGLIDE

## (undated) DEVICE — UNIVERSAL BLOCK TRAY: Brand: INTEGRA®

## (undated) DEVICE — TOWEL SET X-RAY

## (undated) DEVICE — FORCEP ENDO RESCUE RAT TOOTH 230CM

## (undated) DEVICE — NEEDLE SPINAL 25G X 3.5 IN QUINCKE

## (undated) DEVICE — CHLORAPREP APPLICATOR TINTED 10.5ML ONE-STEP

## (undated) DEVICE — RADIOLOGY STERILE LABELS: Brand: CENTURION

## (undated) DEVICE — SPHINCTEROTOME: Brand: DREAMTOME™ RX 44

## (undated) DEVICE — GUIDEWIRE 0.035IN 270CM ANGL TIP VISIGLIDE